# Patient Record
Sex: FEMALE | Race: WHITE | NOT HISPANIC OR LATINO | Employment: OTHER | URBAN - METROPOLITAN AREA
[De-identification: names, ages, dates, MRNs, and addresses within clinical notes are randomized per-mention and may not be internally consistent; named-entity substitution may affect disease eponyms.]

---

## 2017-01-10 ENCOUNTER — OFFICE VISIT - HEALTHEAST (OUTPATIENT)
Dept: ENDOCRINOLOGY | Facility: CLINIC | Age: 72
End: 2017-01-10

## 2017-01-10 DIAGNOSIS — E55.9 VITAMIN D DEFICIENCY: ICD-10-CM

## 2017-01-10 DIAGNOSIS — M85.80 OSTEOPENIA: ICD-10-CM

## 2017-01-10 ASSESSMENT — MIFFLIN-ST. JEOR: SCORE: 1413.95

## 2017-02-04 ENCOUNTER — COMMUNICATION - HEALTHEAST (OUTPATIENT)
Dept: FAMILY MEDICINE | Facility: CLINIC | Age: 72
End: 2017-02-04

## 2017-02-17 ENCOUNTER — COMMUNICATION - HEALTHEAST (OUTPATIENT)
Dept: FAMILY MEDICINE | Facility: CLINIC | Age: 72
End: 2017-02-17

## 2017-02-21 ENCOUNTER — RECORDS - HEALTHEAST (OUTPATIENT)
Dept: ADMINISTRATIVE | Facility: OTHER | Age: 72
End: 2017-02-21

## 2017-03-06 ENCOUNTER — COMMUNICATION - HEALTHEAST (OUTPATIENT)
Dept: FAMILY MEDICINE | Facility: CLINIC | Age: 72
End: 2017-03-06

## 2017-03-15 ENCOUNTER — OFFICE VISIT - HEALTHEAST (OUTPATIENT)
Dept: VASCULAR SURGERY | Facility: CLINIC | Age: 72
End: 2017-03-15

## 2017-03-15 ENCOUNTER — AMBULATORY - HEALTHEAST (OUTPATIENT)
Dept: VASCULAR SURGERY | Facility: CLINIC | Age: 72
End: 2017-03-15

## 2017-03-15 DIAGNOSIS — M79.89 LEFT ARM SWELLING: ICD-10-CM

## 2017-03-15 DIAGNOSIS — M79.89 LEG SWELLING: ICD-10-CM

## 2017-03-15 DIAGNOSIS — M21.42 FLAT FEET: ICD-10-CM

## 2017-03-15 DIAGNOSIS — Q66.6 VALGUS DEFORMITY OF BOTH FEET: ICD-10-CM

## 2017-03-15 DIAGNOSIS — I87.2 VENOUS INSUFFICIENCY OF BOTH LOWER EXTREMITIES: ICD-10-CM

## 2017-03-15 DIAGNOSIS — M21.41 FLAT FEET: ICD-10-CM

## 2017-04-10 ENCOUNTER — COMMUNICATION - HEALTHEAST (OUTPATIENT)
Dept: SCHEDULING | Facility: CLINIC | Age: 72
End: 2017-04-10

## 2017-04-11 ENCOUNTER — OFFICE VISIT - HEALTHEAST (OUTPATIENT)
Dept: FAMILY MEDICINE | Facility: CLINIC | Age: 72
End: 2017-04-11

## 2017-04-11 DIAGNOSIS — J44.89 CHRONIC AIRWAY OBSTRUCTION, NOT ELSEWHERE CLASSIFIED: ICD-10-CM

## 2017-04-11 DIAGNOSIS — J06.9 URI (UPPER RESPIRATORY INFECTION): ICD-10-CM

## 2017-04-11 DIAGNOSIS — R06.2 WHEEZING: ICD-10-CM

## 2017-04-20 ENCOUNTER — OFFICE VISIT - HEALTHEAST (OUTPATIENT)
Dept: FAMILY MEDICINE | Facility: CLINIC | Age: 72
End: 2017-04-20

## 2017-04-20 DIAGNOSIS — J20.9 COPD WITH ACUTE BRONCHITIS (H): ICD-10-CM

## 2017-04-20 DIAGNOSIS — J44.0 COPD WITH ACUTE BRONCHITIS (H): ICD-10-CM

## 2017-06-12 ENCOUNTER — OFFICE VISIT - HEALTHEAST (OUTPATIENT)
Dept: FAMILY MEDICINE | Facility: CLINIC | Age: 72
End: 2017-06-12

## 2017-06-12 DIAGNOSIS — I10 HYPERTENSION: ICD-10-CM

## 2017-06-12 DIAGNOSIS — J44.89 CHRONIC AIRWAY OBSTRUCTION, NOT ELSEWHERE CLASSIFIED: ICD-10-CM

## 2017-06-12 ASSESSMENT — MIFFLIN-ST. JEOR: SCORE: 1423.92

## 2017-06-26 ENCOUNTER — RECORDS - HEALTHEAST (OUTPATIENT)
Dept: ADMINISTRATIVE | Facility: OTHER | Age: 72
End: 2017-06-26

## 2017-06-28 ENCOUNTER — RECORDS - HEALTHEAST (OUTPATIENT)
Dept: ADMINISTRATIVE | Facility: OTHER | Age: 72
End: 2017-06-28

## 2017-06-29 ENCOUNTER — COMMUNICATION - HEALTHEAST (OUTPATIENT)
Dept: FAMILY MEDICINE | Facility: CLINIC | Age: 72
End: 2017-06-29

## 2017-07-13 ENCOUNTER — AMBULATORY - HEALTHEAST (OUTPATIENT)
Dept: ENDOCRINOLOGY | Facility: CLINIC | Age: 72
End: 2017-07-13

## 2017-07-13 DIAGNOSIS — M85.80 OSTEOPENIA: ICD-10-CM

## 2017-08-03 ENCOUNTER — COMMUNICATION - HEALTHEAST (OUTPATIENT)
Dept: FAMILY MEDICINE | Facility: CLINIC | Age: 72
End: 2017-08-03

## 2017-08-04 ENCOUNTER — COMMUNICATION - HEALTHEAST (OUTPATIENT)
Dept: SCHEDULING | Facility: CLINIC | Age: 72
End: 2017-08-04

## 2017-08-04 DIAGNOSIS — J44.9 COPD (CHRONIC OBSTRUCTIVE PULMONARY DISEASE) (H): ICD-10-CM

## 2017-08-11 ENCOUNTER — COMMUNICATION - HEALTHEAST (OUTPATIENT)
Dept: FAMILY MEDICINE | Facility: CLINIC | Age: 72
End: 2017-08-11

## 2017-08-11 DIAGNOSIS — J44.9 COPD (CHRONIC OBSTRUCTIVE PULMONARY DISEASE) (H): ICD-10-CM

## 2017-08-21 ENCOUNTER — COMMUNICATION - HEALTHEAST (OUTPATIENT)
Dept: FAMILY MEDICINE | Facility: CLINIC | Age: 72
End: 2017-08-21

## 2017-08-21 DIAGNOSIS — J44.9 COPD (CHRONIC OBSTRUCTIVE PULMONARY DISEASE) (H): ICD-10-CM

## 2017-08-21 DIAGNOSIS — R06.2 WHEEZING: ICD-10-CM

## 2017-09-18 ENCOUNTER — COMMUNICATION - HEALTHEAST (OUTPATIENT)
Dept: FAMILY MEDICINE | Facility: CLINIC | Age: 72
End: 2017-09-18

## 2017-10-30 ENCOUNTER — OFFICE VISIT - HEALTHEAST (OUTPATIENT)
Dept: FAMILY MEDICINE | Facility: CLINIC | Age: 72
End: 2017-10-30

## 2017-10-30 DIAGNOSIS — M25.519 SHOULDER PAIN: ICD-10-CM

## 2017-10-30 DIAGNOSIS — R01.1 MURMUR, HEART: ICD-10-CM

## 2017-10-30 DIAGNOSIS — M54.2 NECK PAIN: ICD-10-CM

## 2017-10-31 LAB
ATRIAL RATE - MUSE: 83 BPM
DIASTOLIC BLOOD PRESSURE - MUSE: NORMAL MMHG
INTERPRETATION ECG - MUSE: NORMAL
P AXIS - MUSE: 64 DEGREES
PR INTERVAL - MUSE: 198 MS
QRS DURATION - MUSE: 90 MS
QT - MUSE: 384 MS
QTC - MUSE: 451 MS
R AXIS - MUSE: 53 DEGREES
SYSTOLIC BLOOD PRESSURE - MUSE: NORMAL MMHG
T AXIS - MUSE: 51 DEGREES
VENTRICULAR RATE- MUSE: 83 BPM

## 2017-11-06 ENCOUNTER — HOSPITAL ENCOUNTER (OUTPATIENT)
Dept: CARDIOLOGY | Facility: HOSPITAL | Age: 72
Discharge: HOME | End: 2017-11-06
Attending: INTERNAL MEDICINE

## 2017-11-06 DIAGNOSIS — R01.1 MURMUR, HEART: ICD-10-CM

## 2017-11-06 LAB
AORTIC ROOT: 2.7 CM
AORTIC VALVE MEAN VELOCITY: 154 CM/S
AR DECEL SLOPE: 2840 MM/S2
AR PEAK VELOCITY: 455 CM/S
AV DIMENSIONLESS INDEX VTI: 0.5
AV MEAN GRADIENT: 11 MMHG
AV PEAK GRADIENT: 18.5 MMHG
AV REGURGITANT PEAK GRADIENT: 82.8 MMHG
AV REGURGITATION PRESSURE HALF TIME: 471 MS
AV VALVE AREA: 1.6 CM2
AV VELOCITY RATIO: 0.5
BSA FOR ECHO PROCEDURE: 2.06 M2
CV BLOOD PRESSURE: NORMAL MMHG
CV ECHO HEIGHT: 64 IN
CV ECHO WEIGHT: 208 LBS
DOP CALC AO PEAK VEL: 215 CM/S
DOP CALC AO VTI: 47.9 CM
DOP CALC LVOT AREA: 3.46 CM2
DOP CALC LVOT DIAMETER: 2.1 CM
DOP CALC LVOT PEAK VEL: 109 CM/S
DOP CALC LVOT STROKE VOLUME: 77.9 CM3
DOP CALCLVOT PEAK VEL VTI: 22.5 CM
EJECTION FRACTION: 68 % (ref 55–75)
FRACTIONAL SHORTENING: 53.1 % (ref 28–44)
INTERVENTRICULAR SEPTUM IN END DIASTOLE: 0.88 CM (ref 0.6–0.9)
IVS/PW RATIO: 1
LEFT ATRIUM SIZE: 3.5 CM
LEFT ATRIUM TO AORTIC ROOT RATIO: 1.3 NO UNITS
LEFT VENTRICLE CARDIAC INDEX: 2.3 L/MIN/M2
LEFT VENTRICLE CARDIAC OUTPUT: 4.7 L/MIN
LEFT VENTRICLE DIASTOLIC VOLUME INDEX: 32.3 CM3/M2 (ref 34–74)
LEFT VENTRICLE DIASTOLIC VOLUME: 66.6 CM3 (ref 46–106)
LEFT VENTRICLE HEART RATE: 60 BPM
LEFT VENTRICLE MASS INDEX: 56.4 G/M2
LEFT VENTRICLE SYSTOLIC VOLUME INDEX: 10.3 CM3/M2 (ref 11–31)
LEFT VENTRICLE SYSTOLIC VOLUME: 21.2 CM3 (ref 14–42)
LEFT VENTRICULAR INTERNAL DIMENSION IN DIASTOLE: 4.16 CM (ref 3.8–5.2)
LEFT VENTRICULAR INTERNAL DIMENSION IN SYSTOLE: 1.95 CM (ref 2.2–3.5)
LEFT VENTRICULAR MASS: 116.1 G
LEFT VENTRICULAR OUTFLOW TRACT MEAN GRADIENT: 2 MMHG
LEFT VENTRICULAR OUTFLOW TRACT MEAN VELOCITY: 68.7 CM/S
LEFT VENTRICULAR OUTFLOW TRACT PEAK GRADIENT: 5 MMHG
LEFT VENTRICULAR POSTERIOR WALL IN END DIASTOLE: 0.92 CM (ref 0.6–0.9)
LV STROKE VOLUME INDEX: 37.8 ML/M2
MITRAL VALVE DECELERATION SLOPE: 2930 MM/S2
MITRAL VALVE E/A RATIO: 0.9
MITRAL VALVE PRESSURE HALF-TIME: 125 MS
MV AVERAGE E/E' RATIO: 8 CM/S
MV DECELERATION TIME: 289 MS
MV E'TISSUE VEL-LAT: 9.48 CM/S
MV E'TISSUE VEL-MED: 9.67 CM/S
MV LATERAL E/E' RATIO: 8
MV MEDIAL E/E' RATIO: 7.9
MV PEAK A VELOCITY: 82 CM/S
MV PEAK E VELOCITY: 76.2 CM/S
MV VALVE AREA PRESSURE 1/2 METHOD: 1.8 CM2
NUC REST DIASTOLIC VOLUME INDEX: 3328 LBS
NUC REST SYSTOLIC VOLUME INDEX: 64 IN
TRICUSPID REGURGITATION PEAK PRESSURE GRADIENT: 33.9 MMHG
TRICUSPID VALVE PEAK REGURGITANT VELOCITY: 291 CM/S

## 2017-11-06 ASSESSMENT — MIFFLIN-ST. JEOR: SCORE: 1418.48

## 2017-11-16 ENCOUNTER — OFFICE VISIT - HEALTHEAST (OUTPATIENT)
Dept: FAMILY MEDICINE | Facility: CLINIC | Age: 72
End: 2017-11-16

## 2017-11-16 DIAGNOSIS — N39.41 URGE INCONTINENCE OF URINE: ICD-10-CM

## 2017-11-16 DIAGNOSIS — J44.9 COPD (CHRONIC OBSTRUCTIVE PULMONARY DISEASE) (H): ICD-10-CM

## 2017-11-16 DIAGNOSIS — K21.9 GASTROESOPHAGEAL REFLUX DISEASE WITHOUT ESOPHAGITIS: ICD-10-CM

## 2017-11-16 DIAGNOSIS — R26.9 ABNORMALITY OF GAIT: ICD-10-CM

## 2017-11-16 DIAGNOSIS — G40.909 EPILEPSY (H): ICD-10-CM

## 2017-11-16 DIAGNOSIS — M85.80 OSTEOPENIA: ICD-10-CM

## 2017-11-16 DIAGNOSIS — I10 BENIGN ESSENTIAL HYPERTENSION: ICD-10-CM

## 2017-11-16 DIAGNOSIS — Z00.00 MEDICARE ANNUAL WELLNESS VISIT, INITIAL: ICD-10-CM

## 2017-11-16 DIAGNOSIS — Z23 NEED FOR VACCINATION: ICD-10-CM

## 2017-11-16 DIAGNOSIS — M54.42 CHRONIC BILATERAL LOW BACK PAIN WITH BILATERAL SCIATICA: ICD-10-CM

## 2017-11-16 DIAGNOSIS — J43.9 PULMONARY EMPHYSEMA (H): ICD-10-CM

## 2017-11-16 DIAGNOSIS — I35.1 MILD AORTIC VALVE REGURGITATION: ICD-10-CM

## 2017-11-16 DIAGNOSIS — I35.0 MILD AORTIC VALVE STENOSIS: ICD-10-CM

## 2017-11-16 DIAGNOSIS — M79.605 LEG PAIN, DIFFUSE, LEFT: ICD-10-CM

## 2017-11-16 DIAGNOSIS — E55.9 VITAMIN D DEFICIENCY: ICD-10-CM

## 2017-11-16 DIAGNOSIS — E78.5 HYPERLIPIDEMIA: ICD-10-CM

## 2017-11-16 DIAGNOSIS — I87.2 VENOUS INSUFFICIENCY OF BOTH LOWER EXTREMITIES: ICD-10-CM

## 2017-11-16 DIAGNOSIS — K44.9 HIATAL HERNIA: ICD-10-CM

## 2017-11-16 DIAGNOSIS — G89.29 CHRONIC BILATERAL LOW BACK PAIN WITH BILATERAL SCIATICA: ICD-10-CM

## 2017-11-16 DIAGNOSIS — M54.41 CHRONIC BILATERAL LOW BACK PAIN WITH BILATERAL SCIATICA: ICD-10-CM

## 2017-11-16 LAB
CHOLEST SERPL-MCNC: 247 MG/DL
FASTING STATUS PATIENT QL REPORTED: YES
HDLC SERPL-MCNC: 74 MG/DL
LDLC SERPL CALC-MCNC: 158 MG/DL
TRIGL SERPL-MCNC: 77 MG/DL

## 2017-11-16 ASSESSMENT — MIFFLIN-ST. JEOR: SCORE: 1412.13

## 2017-12-09 ENCOUNTER — HOSPITAL ENCOUNTER (OUTPATIENT)
Dept: MAMMOGRAPHY | Facility: HOSPITAL | Age: 72
Discharge: HOME | End: 2017-12-09
Attending: FAMILY MEDICINE

## 2017-12-09 DIAGNOSIS — Z12.31 VISIT FOR SCREENING MAMMOGRAM: ICD-10-CM

## 2018-01-19 ENCOUNTER — COMMUNICATION - HEALTHEAST (OUTPATIENT)
Dept: ENDOCRINOLOGY | Facility: CLINIC | Age: 73
End: 2018-01-19

## 2018-01-22 ENCOUNTER — OFFICE VISIT - HEALTHEAST (OUTPATIENT)
Dept: FAMILY MEDICINE | Facility: CLINIC | Age: 73
End: 2018-01-22

## 2018-01-22 DIAGNOSIS — M54.50 LOW BACK PAIN: ICD-10-CM

## 2018-01-22 DIAGNOSIS — F32.0 MILD MAJOR DEPRESSION (H): ICD-10-CM

## 2018-01-22 DIAGNOSIS — I35.1 MILD AORTIC VALVE REGURGITATION: ICD-10-CM

## 2018-01-22 DIAGNOSIS — I35.0 MILD AORTIC VALVE STENOSIS: ICD-10-CM

## 2018-01-22 DIAGNOSIS — L84 PRE-ULCERATIVE CORN OR CALLOUS: ICD-10-CM

## 2018-01-22 ASSESSMENT — MIFFLIN-ST. JEOR: SCORE: 1404.87

## 2018-02-08 ENCOUNTER — OFFICE VISIT - HEALTHEAST (OUTPATIENT)
Dept: ENDOCRINOLOGY | Facility: CLINIC | Age: 73
End: 2018-02-08

## 2018-02-08 DIAGNOSIS — M85.80 OSTEOPENIA: ICD-10-CM

## 2018-02-08 DIAGNOSIS — M81.0 OSTEOPOROSIS: ICD-10-CM

## 2018-02-08 ASSESSMENT — MIFFLIN-ST. JEOR: SCORE: 1404.65

## 2018-02-13 ENCOUNTER — OFFICE VISIT - HEALTHEAST (OUTPATIENT)
Dept: PODIATRY | Age: 73
End: 2018-02-13

## 2018-02-13 ENCOUNTER — AMBULATORY - HEALTHEAST (OUTPATIENT)
Dept: ENDOCRINOLOGY | Facility: CLINIC | Age: 73
End: 2018-02-13

## 2018-02-13 DIAGNOSIS — M21.41 BILATERAL PES PLANUS: ICD-10-CM

## 2018-02-13 DIAGNOSIS — M20.40 HAMMER TOE, UNSPECIFIED LATERALITY: ICD-10-CM

## 2018-02-13 DIAGNOSIS — L84 CALLUS OF FOOT: ICD-10-CM

## 2018-02-13 DIAGNOSIS — M81.0 OSTEOPOROSIS: ICD-10-CM

## 2018-02-13 DIAGNOSIS — M21.42 BILATERAL PES PLANUS: ICD-10-CM

## 2018-02-13 DIAGNOSIS — M79.673 PAIN OF FOOT, UNSPECIFIED LATERALITY: ICD-10-CM

## 2018-02-13 ASSESSMENT — MIFFLIN-ST. JEOR: SCORE: 1401.47

## 2018-03-20 ENCOUNTER — COMMUNICATION - HEALTHEAST (OUTPATIENT)
Dept: SURGERY | Facility: CLINIC | Age: 73
End: 2018-03-20

## 2018-03-23 ENCOUNTER — COMMUNICATION - HEALTHEAST (OUTPATIENT)
Dept: FAMILY MEDICINE | Facility: CLINIC | Age: 73
End: 2018-03-23

## 2018-03-23 ENCOUNTER — OFFICE VISIT - HEALTHEAST (OUTPATIENT)
Dept: SURGERY | Facility: CLINIC | Age: 73
End: 2018-03-23

## 2018-03-23 DIAGNOSIS — K44.9 PARAESOPHAGEAL HERNIA: ICD-10-CM

## 2018-03-23 DIAGNOSIS — K42.9 UMBILICAL HERNIA WITHOUT OBSTRUCTION AND WITHOUT GANGRENE: ICD-10-CM

## 2018-03-23 ASSESSMENT — MIFFLIN-ST. JEOR: SCORE: 1380.95

## 2018-03-26 ENCOUNTER — OFFICE VISIT - HEALTHEAST (OUTPATIENT)
Dept: FAMILY MEDICINE | Facility: CLINIC | Age: 73
End: 2018-03-26

## 2018-03-26 DIAGNOSIS — K44.9 HIATAL HERNIA: ICD-10-CM

## 2018-03-26 DIAGNOSIS — I87.2 VENOUS INSUFFICIENCY OF BOTH LOWER EXTREMITIES: ICD-10-CM

## 2018-03-26 DIAGNOSIS — Z01.818 PRE-OP EXAM: ICD-10-CM

## 2018-03-26 DIAGNOSIS — J43.9 PULMONARY EMPHYSEMA (H): ICD-10-CM

## 2018-03-26 DIAGNOSIS — E78.5 HYPERLIPIDEMIA: ICD-10-CM

## 2018-03-26 DIAGNOSIS — I35.0 MILD AORTIC VALVE STENOSIS: ICD-10-CM

## 2018-03-26 DIAGNOSIS — I10 ESSENTIAL HYPERTENSION: ICD-10-CM

## 2018-03-26 DIAGNOSIS — I35.1 MILD AORTIC VALVE REGURGITATION: ICD-10-CM

## 2018-03-26 LAB
BASOPHILS # BLD AUTO: 0.1 THOU/UL (ref 0–0.2)
BASOPHILS NFR BLD AUTO: 1 % (ref 0–2)
EOSINOPHIL # BLD AUTO: 0.2 THOU/UL (ref 0–0.4)
EOSINOPHIL NFR BLD AUTO: 1 % (ref 0–6)
ERYTHROCYTE [DISTWIDTH] IN BLOOD BY AUTOMATED COUNT: 13.1 % (ref 11–14.5)
HCT VFR BLD AUTO: 42.5 % (ref 35–47)
HGB BLD-MCNC: 14.3 G/DL (ref 12–16)
LYMPHOCYTES # BLD AUTO: 1.9 THOU/UL (ref 0.8–4.4)
LYMPHOCYTES NFR BLD AUTO: 13 % (ref 20–40)
MCH RBC QN AUTO: 30.7 PG (ref 27–34)
MCHC RBC AUTO-ENTMCNC: 33.6 G/DL (ref 32–36)
MCV RBC AUTO: 92 FL (ref 80–100)
MONOCYTES # BLD AUTO: 1.4 THOU/UL (ref 0–0.9)
MONOCYTES NFR BLD AUTO: 10 % (ref 2–10)
NEUTROPHILS # BLD AUTO: 10.8 THOU/UL (ref 2–7.7)
NEUTROPHILS NFR BLD AUTO: 75 % (ref 50–70)
PLATELET # BLD AUTO: 197 THOU/UL (ref 140–440)
PMV BLD AUTO: 7.4 FL (ref 7–10)
RBC # BLD AUTO: 4.65 MILL/UL (ref 3.8–5.4)
WBC: 14.3 THOU/UL (ref 4–11)

## 2018-03-26 ASSESSMENT — MIFFLIN-ST. JEOR: SCORE: 1402.27

## 2018-03-27 ENCOUNTER — HOSPITAL ENCOUNTER (OUTPATIENT)
Dept: RADIOLOGY | Facility: HOSPITAL | Age: 73
Discharge: HOME | End: 2018-03-27
Attending: SURGERY

## 2018-03-27 DIAGNOSIS — K44.9 PARAESOPHAGEAL HERNIA: ICD-10-CM

## 2018-03-29 ENCOUNTER — OFFICE VISIT - HEALTHEAST (OUTPATIENT)
Dept: FAMILY MEDICINE | Facility: CLINIC | Age: 73
End: 2018-03-29

## 2018-03-29 DIAGNOSIS — R06.02 SHORTNESS OF BREATH ON EXERTION: ICD-10-CM

## 2018-04-03 ENCOUNTER — ANESTHESIA - HEALTHEAST (OUTPATIENT)
Dept: SURGERY | Facility: HOSPITAL | Age: 73
End: 2018-04-03

## 2018-04-03 ASSESSMENT — MIFFLIN-ST. JEOR: SCORE: 1366.89

## 2018-04-04 ENCOUNTER — SURGERY - HEALTHEAST (OUTPATIENT)
Dept: SURGERY | Facility: HOSPITAL | Age: 73
End: 2018-04-04

## 2018-04-04 ASSESSMENT — MIFFLIN-ST. JEOR: SCORE: 1371.31

## 2018-04-09 ENCOUNTER — COMMUNICATION - HEALTHEAST (OUTPATIENT)
Dept: FAMILY MEDICINE | Facility: CLINIC | Age: 73
End: 2018-04-09

## 2018-04-12 ENCOUNTER — OFFICE VISIT - HEALTHEAST (OUTPATIENT)
Dept: SURGERY | Facility: CLINIC | Age: 73
End: 2018-04-12

## 2018-04-12 DIAGNOSIS — Z48.89 POSTOPERATIVE VISIT: ICD-10-CM

## 2018-05-10 ENCOUNTER — OFFICE VISIT - HEALTHEAST (OUTPATIENT)
Dept: VASCULAR SURGERY | Facility: CLINIC | Age: 73
End: 2018-05-10

## 2018-05-10 DIAGNOSIS — M79.89 LEG SWELLING: ICD-10-CM

## 2018-05-10 DIAGNOSIS — M21.41 FLAT FEET: ICD-10-CM

## 2018-05-10 DIAGNOSIS — M21.42 FLAT FEET: ICD-10-CM

## 2018-05-10 DIAGNOSIS — I87.2 VENOUS INSUFFICIENCY OF BOTH LOWER EXTREMITIES: ICD-10-CM

## 2018-05-14 ENCOUNTER — OFFICE VISIT - HEALTHEAST (OUTPATIENT)
Dept: FAMILY MEDICINE | Facility: CLINIC | Age: 73
End: 2018-05-14

## 2018-05-14 DIAGNOSIS — R42 DIZZINESS: ICD-10-CM

## 2018-05-14 LAB
ALBUMIN SERPL-MCNC: 3.6 G/DL (ref 3.5–5)
ALP SERPL-CCNC: 79 U/L (ref 45–120)
ALT SERPL W P-5'-P-CCNC: <9 U/L (ref 0–45)
ANION GAP SERPL CALCULATED.3IONS-SCNC: 11 MMOL/L (ref 5–18)
AST SERPL W P-5'-P-CCNC: 11 U/L (ref 0–40)
BASOPHILS # BLD AUTO: 0 THOU/UL (ref 0–0.2)
BASOPHILS NFR BLD AUTO: 1 % (ref 0–2)
BILIRUB SERPL-MCNC: 0.3 MG/DL (ref 0–1)
BUN SERPL-MCNC: 19 MG/DL (ref 8–28)
CALCIUM SERPL-MCNC: 9.2 MG/DL (ref 8.5–10.5)
CHLORIDE BLD-SCNC: 101 MMOL/L (ref 98–107)
CO2 SERPL-SCNC: 26 MMOL/L (ref 22–31)
CREAT SERPL-MCNC: 0.59 MG/DL (ref 0.6–1.1)
EOSINOPHIL # BLD AUTO: 0.2 THOU/UL (ref 0–0.4)
EOSINOPHIL NFR BLD AUTO: 3 % (ref 0–6)
ERYTHROCYTE [DISTWIDTH] IN BLOOD BY AUTOMATED COUNT: 13.5 % (ref 11–14.5)
GFR SERPL CREATININE-BSD FRML MDRD: >60 ML/MIN/1.73M2
GLUCOSE BLD-MCNC: 77 MG/DL (ref 70–125)
HCT VFR BLD AUTO: 40.2 % (ref 35–47)
HGB BLD-MCNC: 13.5 G/DL (ref 12–16)
LYMPHOCYTES # BLD AUTO: 2.1 THOU/UL (ref 0.8–4.4)
LYMPHOCYTES NFR BLD AUTO: 31 % (ref 20–40)
MCH RBC QN AUTO: 30.2 PG (ref 27–34)
MCHC RBC AUTO-ENTMCNC: 33.7 G/DL (ref 32–36)
MCV RBC AUTO: 90 FL (ref 80–100)
MONOCYTES # BLD AUTO: 0.8 THOU/UL (ref 0–0.9)
MONOCYTES NFR BLD AUTO: 11 % (ref 2–10)
NEUTROPHILS # BLD AUTO: 3.8 THOU/UL (ref 2–7.7)
NEUTROPHILS NFR BLD AUTO: 55 % (ref 50–70)
PLATELET # BLD AUTO: 191 THOU/UL (ref 140–440)
PMV BLD AUTO: 7.4 FL (ref 7–10)
POTASSIUM BLD-SCNC: 4 MMOL/L (ref 3.5–5)
PROT SERPL-MCNC: 6.6 G/DL (ref 6–8)
RBC # BLD AUTO: 4.48 MILL/UL (ref 3.8–5.4)
SODIUM SERPL-SCNC: 138 MMOL/L (ref 136–145)
WBC: 7 THOU/UL (ref 4–11)

## 2018-05-14 ASSESSMENT — MIFFLIN-ST. JEOR: SCORE: 1384.69

## 2018-05-22 ENCOUNTER — COMMUNICATION - HEALTHEAST (OUTPATIENT)
Dept: FAMILY MEDICINE | Facility: CLINIC | Age: 73
End: 2018-05-22

## 2018-05-23 ENCOUNTER — AMBULATORY - HEALTHEAST (OUTPATIENT)
Dept: NURSING | Facility: CLINIC | Age: 73
End: 2018-05-23

## 2018-05-25 ENCOUNTER — AMBULATORY - HEALTHEAST (OUTPATIENT)
Dept: NURSING | Facility: CLINIC | Age: 73
End: 2018-05-25

## 2018-05-29 ENCOUNTER — OFFICE VISIT - HEALTHEAST (OUTPATIENT)
Dept: FAMILY MEDICINE | Facility: CLINIC | Age: 73
End: 2018-05-29

## 2018-05-29 DIAGNOSIS — R26.9 ABNORMALITY OF GAIT: ICD-10-CM

## 2018-05-29 DIAGNOSIS — K44.9 HIATAL HERNIA: ICD-10-CM

## 2018-05-29 DIAGNOSIS — I10 ESSENTIAL HYPERTENSION: ICD-10-CM

## 2018-05-29 DIAGNOSIS — L84 CALLUS OF FOOT: ICD-10-CM

## 2018-05-29 DIAGNOSIS — H91.93 DECREASED HEARING OF BOTH EARS: ICD-10-CM

## 2018-05-29 ASSESSMENT — MIFFLIN-ST. JEOR: SCORE: 1380.38

## 2018-06-05 ENCOUNTER — OFFICE VISIT - HEALTHEAST (OUTPATIENT)
Dept: PHYSICAL THERAPY | Facility: REHABILITATION | Age: 73
End: 2018-06-05

## 2018-06-05 DIAGNOSIS — R26.81 UNSTEADINESS ON FEET: ICD-10-CM

## 2018-06-05 DIAGNOSIS — R26.9 ABNORMALITY OF GAIT: ICD-10-CM

## 2018-06-05 DIAGNOSIS — M62.81 GENERALIZED MUSCLE WEAKNESS: ICD-10-CM

## 2018-06-05 DIAGNOSIS — R26.89 POOR BALANCE: ICD-10-CM

## 2018-06-05 DIAGNOSIS — Z74.09 DECREASED FUNCTIONAL MOBILITY AND ENDURANCE: ICD-10-CM

## 2018-06-07 ENCOUNTER — OFFICE VISIT - HEALTHEAST (OUTPATIENT)
Dept: PHYSICAL THERAPY | Facility: REHABILITATION | Age: 73
End: 2018-06-07

## 2018-06-07 DIAGNOSIS — Z74.09 DECREASED FUNCTIONAL MOBILITY AND ENDURANCE: ICD-10-CM

## 2018-06-07 DIAGNOSIS — M62.81 GENERALIZED MUSCLE WEAKNESS: ICD-10-CM

## 2018-06-07 DIAGNOSIS — R26.9 ABNORMALITY OF GAIT: ICD-10-CM

## 2018-06-07 DIAGNOSIS — R26.81 UNSTEADINESS ON FEET: ICD-10-CM

## 2018-06-07 DIAGNOSIS — R26.89 POOR BALANCE: ICD-10-CM

## 2018-06-13 ENCOUNTER — OFFICE VISIT - HEALTHEAST (OUTPATIENT)
Dept: PHYSICAL THERAPY | Facility: REHABILITATION | Age: 73
End: 2018-06-13

## 2018-06-13 DIAGNOSIS — R26.9 ABNORMALITY OF GAIT: ICD-10-CM

## 2018-06-13 DIAGNOSIS — M62.81 GENERALIZED MUSCLE WEAKNESS: ICD-10-CM

## 2018-06-13 DIAGNOSIS — Z74.09 DECREASED FUNCTIONAL MOBILITY AND ENDURANCE: ICD-10-CM

## 2018-06-13 DIAGNOSIS — R26.89 POOR BALANCE: ICD-10-CM

## 2018-06-13 DIAGNOSIS — R26.81 UNSTEADINESS ON FEET: ICD-10-CM

## 2018-06-18 ENCOUNTER — OFFICE VISIT - HEALTHEAST (OUTPATIENT)
Dept: PHYSICAL THERAPY | Facility: REHABILITATION | Age: 73
End: 2018-06-18

## 2018-06-18 DIAGNOSIS — M62.81 GENERALIZED MUSCLE WEAKNESS: ICD-10-CM

## 2018-06-18 DIAGNOSIS — R26.89 POOR BALANCE: ICD-10-CM

## 2018-06-18 DIAGNOSIS — R26.9 ABNORMALITY OF GAIT: ICD-10-CM

## 2018-06-18 DIAGNOSIS — R26.81 UNSTEADINESS ON FEET: ICD-10-CM

## 2018-06-18 DIAGNOSIS — Z74.09 DECREASED FUNCTIONAL MOBILITY AND ENDURANCE: ICD-10-CM

## 2018-06-19 ENCOUNTER — OFFICE VISIT - HEALTHEAST (OUTPATIENT)
Dept: AUDIOLOGY | Facility: CLINIC | Age: 73
End: 2018-06-19

## 2018-06-19 DIAGNOSIS — H90.3 SENSORINEURAL HEARING LOSS, BILATERAL: ICD-10-CM

## 2018-06-21 ENCOUNTER — OFFICE VISIT - HEALTHEAST (OUTPATIENT)
Dept: PHYSICAL THERAPY | Facility: REHABILITATION | Age: 73
End: 2018-06-21

## 2018-06-21 DIAGNOSIS — R26.81 UNSTEADINESS ON FEET: ICD-10-CM

## 2018-06-21 DIAGNOSIS — R26.89 POOR BALANCE: ICD-10-CM

## 2018-06-21 DIAGNOSIS — R26.9 ABNORMALITY OF GAIT: ICD-10-CM

## 2018-06-21 DIAGNOSIS — Z74.09 DECREASED FUNCTIONAL MOBILITY AND ENDURANCE: ICD-10-CM

## 2018-06-21 DIAGNOSIS — M62.81 GENERALIZED MUSCLE WEAKNESS: ICD-10-CM

## 2018-06-25 ENCOUNTER — OFFICE VISIT - HEALTHEAST (OUTPATIENT)
Dept: PHYSICAL THERAPY | Facility: REHABILITATION | Age: 73
End: 2018-06-25

## 2018-06-25 DIAGNOSIS — Z74.09 DECREASED FUNCTIONAL MOBILITY AND ENDURANCE: ICD-10-CM

## 2018-06-25 DIAGNOSIS — R26.9 ABNORMALITY OF GAIT: ICD-10-CM

## 2018-06-25 DIAGNOSIS — R26.81 UNSTEADINESS ON FEET: ICD-10-CM

## 2018-06-25 DIAGNOSIS — R26.89 POOR BALANCE: ICD-10-CM

## 2018-06-25 DIAGNOSIS — M62.81 GENERALIZED MUSCLE WEAKNESS: ICD-10-CM

## 2018-06-28 ENCOUNTER — OFFICE VISIT - HEALTHEAST (OUTPATIENT)
Dept: PHYSICAL THERAPY | Facility: REHABILITATION | Age: 73
End: 2018-06-28

## 2018-06-28 DIAGNOSIS — R26.89 POOR BALANCE: ICD-10-CM

## 2018-06-28 DIAGNOSIS — Z74.09 DECREASED FUNCTIONAL MOBILITY AND ENDURANCE: ICD-10-CM

## 2018-06-28 DIAGNOSIS — R26.81 UNSTEADINESS ON FEET: ICD-10-CM

## 2018-06-28 DIAGNOSIS — R26.9 ABNORMALITY OF GAIT: ICD-10-CM

## 2018-06-28 DIAGNOSIS — M62.81 GENERALIZED MUSCLE WEAKNESS: ICD-10-CM

## 2018-07-05 ENCOUNTER — OFFICE VISIT - HEALTHEAST (OUTPATIENT)
Dept: PHYSICAL THERAPY | Facility: REHABILITATION | Age: 73
End: 2018-07-05

## 2018-07-05 DIAGNOSIS — R26.9 ABNORMALITY OF GAIT: ICD-10-CM

## 2018-07-05 DIAGNOSIS — M62.81 GENERALIZED MUSCLE WEAKNESS: ICD-10-CM

## 2018-07-05 DIAGNOSIS — R26.81 UNSTEADINESS ON FEET: ICD-10-CM

## 2018-07-05 DIAGNOSIS — R26.89 POOR BALANCE: ICD-10-CM

## 2018-07-05 DIAGNOSIS — Z74.09 DECREASED FUNCTIONAL MOBILITY AND ENDURANCE: ICD-10-CM

## 2018-07-09 ENCOUNTER — COMMUNICATION - HEALTHEAST (OUTPATIENT)
Dept: FAMILY MEDICINE | Facility: CLINIC | Age: 73
End: 2018-07-09

## 2018-07-09 ENCOUNTER — OFFICE VISIT - HEALTHEAST (OUTPATIENT)
Dept: PHYSICAL THERAPY | Facility: REHABILITATION | Age: 73
End: 2018-07-09

## 2018-07-09 DIAGNOSIS — R26.81 UNSTEADINESS ON FEET: ICD-10-CM

## 2018-07-09 DIAGNOSIS — R26.89 POOR BALANCE: ICD-10-CM

## 2018-07-09 DIAGNOSIS — M62.81 GENERALIZED MUSCLE WEAKNESS: ICD-10-CM

## 2018-07-09 DIAGNOSIS — R26.9 ABNORMALITY OF GAIT: ICD-10-CM

## 2018-07-09 DIAGNOSIS — Z74.09 DECREASED FUNCTIONAL MOBILITY AND ENDURANCE: ICD-10-CM

## 2018-07-12 ENCOUNTER — COMMUNICATION - HEALTHEAST (OUTPATIENT)
Dept: FAMILY MEDICINE | Facility: CLINIC | Age: 73
End: 2018-07-12

## 2018-07-18 ENCOUNTER — OFFICE VISIT - HEALTHEAST (OUTPATIENT)
Dept: PHYSICAL THERAPY | Facility: REHABILITATION | Age: 73
End: 2018-07-18

## 2018-07-18 DIAGNOSIS — R26.81 UNSTEADINESS ON FEET: ICD-10-CM

## 2018-07-18 DIAGNOSIS — R26.89 POOR BALANCE: ICD-10-CM

## 2018-07-18 DIAGNOSIS — R26.9 ABNORMALITY OF GAIT: ICD-10-CM

## 2018-07-18 DIAGNOSIS — Z74.09 DECREASED FUNCTIONAL MOBILITY AND ENDURANCE: ICD-10-CM

## 2018-07-18 DIAGNOSIS — M62.81 GENERALIZED MUSCLE WEAKNESS: ICD-10-CM

## 2018-07-20 ENCOUNTER — COMMUNICATION - HEALTHEAST (OUTPATIENT)
Dept: FAMILY MEDICINE | Facility: CLINIC | Age: 73
End: 2018-07-20

## 2018-07-20 DIAGNOSIS — E87.6 HYPOKALEMIA: ICD-10-CM

## 2018-07-23 ENCOUNTER — OFFICE VISIT - HEALTHEAST (OUTPATIENT)
Dept: PHYSICAL THERAPY | Facility: REHABILITATION | Age: 73
End: 2018-07-23

## 2018-07-23 DIAGNOSIS — R26.9 ABNORMALITY OF GAIT: ICD-10-CM

## 2018-07-23 DIAGNOSIS — R26.81 UNSTEADINESS ON FEET: ICD-10-CM

## 2018-07-23 DIAGNOSIS — R26.89 POOR BALANCE: ICD-10-CM

## 2018-07-23 DIAGNOSIS — M62.81 GENERALIZED MUSCLE WEAKNESS: ICD-10-CM

## 2018-07-23 DIAGNOSIS — Z74.09 DECREASED FUNCTIONAL MOBILITY AND ENDURANCE: ICD-10-CM

## 2018-07-25 ENCOUNTER — OFFICE VISIT - HEALTHEAST (OUTPATIENT)
Dept: PHYSICAL THERAPY | Facility: REHABILITATION | Age: 73
End: 2018-07-25

## 2018-07-25 DIAGNOSIS — R26.81 UNSTEADINESS ON FEET: ICD-10-CM

## 2018-07-25 DIAGNOSIS — Z74.09 DECREASED FUNCTIONAL MOBILITY AND ENDURANCE: ICD-10-CM

## 2018-07-25 DIAGNOSIS — R26.9 ABNORMALITY OF GAIT: ICD-10-CM

## 2018-07-25 DIAGNOSIS — M62.81 GENERALIZED MUSCLE WEAKNESS: ICD-10-CM

## 2018-07-25 DIAGNOSIS — R26.89 POOR BALANCE: ICD-10-CM

## 2018-07-31 ENCOUNTER — OFFICE VISIT - HEALTHEAST (OUTPATIENT)
Dept: PHYSICAL THERAPY | Facility: REHABILITATION | Age: 73
End: 2018-07-31

## 2018-07-31 DIAGNOSIS — R26.9 ABNORMALITY OF GAIT: ICD-10-CM

## 2018-07-31 DIAGNOSIS — R26.89 POOR BALANCE: ICD-10-CM

## 2018-07-31 DIAGNOSIS — M62.81 GENERALIZED MUSCLE WEAKNESS: ICD-10-CM

## 2018-07-31 DIAGNOSIS — R26.81 UNSTEADINESS ON FEET: ICD-10-CM

## 2018-07-31 DIAGNOSIS — Z74.09 DECREASED FUNCTIONAL MOBILITY AND ENDURANCE: ICD-10-CM

## 2018-08-08 ENCOUNTER — OFFICE VISIT - HEALTHEAST (OUTPATIENT)
Dept: PHYSICAL THERAPY | Facility: REHABILITATION | Age: 73
End: 2018-08-08

## 2018-08-08 DIAGNOSIS — M62.81 GENERALIZED MUSCLE WEAKNESS: ICD-10-CM

## 2018-08-08 DIAGNOSIS — Z74.09 DECREASED FUNCTIONAL MOBILITY AND ENDURANCE: ICD-10-CM

## 2018-08-08 DIAGNOSIS — R26.9 ABNORMALITY OF GAIT: ICD-10-CM

## 2018-08-08 DIAGNOSIS — R26.81 UNSTEADINESS ON FEET: ICD-10-CM

## 2018-08-08 DIAGNOSIS — R26.89 POOR BALANCE: ICD-10-CM

## 2018-08-10 ENCOUNTER — COMMUNICATION - HEALTHEAST (OUTPATIENT)
Dept: FAMILY MEDICINE | Facility: CLINIC | Age: 73
End: 2018-08-10

## 2018-08-10 DIAGNOSIS — J44.9 COPD (CHRONIC OBSTRUCTIVE PULMONARY DISEASE) (H): ICD-10-CM

## 2018-08-13 ENCOUNTER — OFFICE VISIT - HEALTHEAST (OUTPATIENT)
Dept: PHYSICAL THERAPY | Facility: REHABILITATION | Age: 73
End: 2018-08-13

## 2018-08-13 DIAGNOSIS — Z74.09 DECREASED FUNCTIONAL MOBILITY AND ENDURANCE: ICD-10-CM

## 2018-08-13 DIAGNOSIS — R26.89 POOR BALANCE: ICD-10-CM

## 2018-08-13 DIAGNOSIS — R26.9 ABNORMALITY OF GAIT: ICD-10-CM

## 2018-08-13 DIAGNOSIS — R26.81 UNSTEADINESS ON FEET: ICD-10-CM

## 2018-08-13 DIAGNOSIS — M62.81 GENERALIZED MUSCLE WEAKNESS: ICD-10-CM

## 2018-08-23 ENCOUNTER — OFFICE VISIT - HEALTHEAST (OUTPATIENT)
Dept: PHYSICAL THERAPY | Facility: REHABILITATION | Age: 73
End: 2018-08-23

## 2018-08-23 DIAGNOSIS — R26.89 POOR BALANCE: ICD-10-CM

## 2018-08-23 DIAGNOSIS — R26.81 UNSTEADINESS ON FEET: ICD-10-CM

## 2018-08-23 DIAGNOSIS — R26.9 ABNORMALITY OF GAIT: ICD-10-CM

## 2018-08-23 DIAGNOSIS — Z74.09 DECREASED FUNCTIONAL MOBILITY AND ENDURANCE: ICD-10-CM

## 2018-08-23 DIAGNOSIS — M62.81 GENERALIZED MUSCLE WEAKNESS: ICD-10-CM

## 2018-08-28 ENCOUNTER — OFFICE VISIT - HEALTHEAST (OUTPATIENT)
Dept: PHYSICAL THERAPY | Facility: REHABILITATION | Age: 73
End: 2018-08-28

## 2018-08-28 DIAGNOSIS — R26.81 UNSTEADINESS ON FEET: ICD-10-CM

## 2018-08-28 DIAGNOSIS — R26.9 ABNORMALITY OF GAIT: ICD-10-CM

## 2018-08-28 DIAGNOSIS — Z74.09 DECREASED FUNCTIONAL MOBILITY AND ENDURANCE: ICD-10-CM

## 2018-08-28 DIAGNOSIS — M62.81 GENERALIZED MUSCLE WEAKNESS: ICD-10-CM

## 2018-08-28 DIAGNOSIS — R26.89 POOR BALANCE: ICD-10-CM

## 2018-09-04 ENCOUNTER — OFFICE VISIT - HEALTHEAST (OUTPATIENT)
Dept: PHYSICAL THERAPY | Facility: REHABILITATION | Age: 73
End: 2018-09-04

## 2018-09-04 DIAGNOSIS — R26.89 POOR BALANCE: ICD-10-CM

## 2018-09-04 DIAGNOSIS — Z74.09 DECREASED FUNCTIONAL MOBILITY AND ENDURANCE: ICD-10-CM

## 2018-09-04 DIAGNOSIS — M62.81 GENERALIZED MUSCLE WEAKNESS: ICD-10-CM

## 2018-09-04 DIAGNOSIS — R26.81 UNSTEADINESS ON FEET: ICD-10-CM

## 2018-09-04 DIAGNOSIS — R26.9 ABNORMALITY OF GAIT: ICD-10-CM

## 2018-09-09 ENCOUNTER — COMMUNICATION - HEALTHEAST (OUTPATIENT)
Dept: ENDOCRINOLOGY | Facility: CLINIC | Age: 73
End: 2018-09-09

## 2018-09-13 ENCOUNTER — AMBULATORY - HEALTHEAST (OUTPATIENT)
Dept: PULMONOLOGY | Facility: OTHER | Age: 73
End: 2018-09-13

## 2018-09-13 ENCOUNTER — COMMUNICATION - HEALTHEAST (OUTPATIENT)
Dept: PULMONOLOGY | Facility: OTHER | Age: 73
End: 2018-09-13

## 2018-09-13 DIAGNOSIS — R06.02 SOB (SHORTNESS OF BREATH): ICD-10-CM

## 2018-10-03 ENCOUNTER — RECORDS - HEALTHEAST (OUTPATIENT)
Dept: PULMONOLOGY | Facility: OTHER | Age: 73
End: 2018-10-03

## 2018-10-03 ENCOUNTER — RECORDS - HEALTHEAST (OUTPATIENT)
Dept: ADMINISTRATIVE | Facility: OTHER | Age: 73
End: 2018-10-03

## 2018-10-03 DIAGNOSIS — R06.02 SHORTNESS OF BREATH: ICD-10-CM

## 2018-10-03 LAB — HGB BLD-MCNC: 13.3 G/DL

## 2018-10-05 ENCOUNTER — AMBULATORY - HEALTHEAST (OUTPATIENT)
Dept: ENDOCRINOLOGY | Facility: CLINIC | Age: 73
End: 2018-10-05

## 2018-10-05 DIAGNOSIS — M81.0 OSTEOPOROSIS: ICD-10-CM

## 2018-10-09 ENCOUNTER — OFFICE VISIT - HEALTHEAST (OUTPATIENT)
Dept: PULMONOLOGY | Facility: OTHER | Age: 73
End: 2018-10-09

## 2018-10-09 DIAGNOSIS — K44.9 HIATAL HERNIA: ICD-10-CM

## 2018-10-09 DIAGNOSIS — R06.09 DYSPNEA ON EXERTION: ICD-10-CM

## 2018-10-09 ASSESSMENT — MIFFLIN-ST. JEOR: SCORE: 1404.87

## 2018-10-10 ENCOUNTER — HOSPITAL ENCOUNTER (OUTPATIENT)
Dept: RADIOLOGY | Facility: HOSPITAL | Age: 73
Discharge: HOME | End: 2018-10-10
Attending: INTERNAL MEDICINE

## 2018-10-10 DIAGNOSIS — R06.09 OTHER FORMS OF DYSPNEA: ICD-10-CM

## 2018-10-10 DIAGNOSIS — R06.09 DYSPNEA ON EXERTION: ICD-10-CM

## 2018-10-25 ENCOUNTER — TRANSFERRED RECORDS (OUTPATIENT)
Dept: HEALTH INFORMATION MANAGEMENT | Facility: CLINIC | Age: 73
End: 2018-10-25

## 2018-10-25 ENCOUNTER — OFFICE VISIT - HEALTHEAST (OUTPATIENT)
Dept: PULMONOLOGY | Facility: OTHER | Age: 73
End: 2018-10-25

## 2018-10-25 DIAGNOSIS — J44.9 CHRONIC OBSTRUCTIVE PULMONARY DISEASE, UNSPECIFIED COPD TYPE (H): ICD-10-CM

## 2018-10-25 DIAGNOSIS — K44.9 HIATAL HERNIA: ICD-10-CM

## 2018-10-25 ASSESSMENT — MIFFLIN-ST. JEOR: SCORE: 1409.41

## 2018-10-28 ENCOUNTER — COMMUNICATION - HEALTHEAST (OUTPATIENT)
Dept: FAMILY MEDICINE | Facility: CLINIC | Age: 73
End: 2018-10-28

## 2018-10-29 ENCOUNTER — COMMUNICATION - HEALTHEAST (OUTPATIENT)
Dept: FAMILY MEDICINE | Facility: CLINIC | Age: 73
End: 2018-10-29

## 2018-10-30 ENCOUNTER — COMMUNICATION - HEALTHEAST (OUTPATIENT)
Dept: PULMONOLOGY | Facility: OTHER | Age: 73
End: 2018-10-30

## 2018-10-30 DIAGNOSIS — J44.1 COPD EXACERBATION (H): ICD-10-CM

## 2018-11-02 ENCOUNTER — TELEPHONE (OUTPATIENT)
Dept: SURGERY | Facility: CLINIC | Age: 73
End: 2018-11-02

## 2018-11-02 DIAGNOSIS — K44.9 HIATAL HERNIA: Primary | ICD-10-CM

## 2018-11-02 RX ORDER — CEFAZOLIN SODIUM 2 G/50ML
2 SOLUTION INTRAVENOUS
Status: CANCELLED | OUTPATIENT
Start: 2018-11-02

## 2018-11-02 RX ORDER — CEFAZOLIN SODIUM 1 G/50ML
1 INJECTION, SOLUTION INTRAVENOUS SEE ADMIN INSTRUCTIONS
Status: CANCELLED | OUTPATIENT
Start: 2018-11-02

## 2018-11-02 NOTE — TELEPHONE ENCOUNTER
Spoke with patient to schedule surgery with Dr Jasmeet Wilder    Surgery was scheduled on 11/26/18 at [place] [facility]    Patient will have H&P with PAC on 11/13/18    Post-Op care appointment scheduled? NO, unclear if she will follow up here or [facility]     Patient is aware a / is needed day of surgery.     Surgery packet was sent via mail, patient has my direct contact information for any further questions.     Additional Comments:

## 2018-11-07 ENCOUNTER — AMBULATORY - HEALTHEAST (OUTPATIENT)
Dept: PULMONOLOGY | Facility: OTHER | Age: 73
End: 2018-11-07

## 2018-11-07 DIAGNOSIS — J44.1 COPD EXACERBATION (H): ICD-10-CM

## 2018-11-09 ENCOUNTER — TELEPHONE (OUTPATIENT)
Dept: SURGERY | Facility: CLINIC | Age: 73
End: 2018-11-09

## 2018-11-09 NOTE — TELEPHONE ENCOUNTER
Patient left a message stating she has some questions regarding her upcoming appointment.  Called and spoke with patient.  Questions were answered to patient's satisfaction.

## 2018-11-10 ENCOUNTER — COMMUNICATION - HEALTHEAST (OUTPATIENT)
Dept: FAMILY MEDICINE | Facility: CLINIC | Age: 73
End: 2018-11-10

## 2018-11-10 DIAGNOSIS — J44.9 COPD (CHRONIC OBSTRUCTIVE PULMONARY DISEASE) (H): ICD-10-CM

## 2018-11-12 ENCOUNTER — PRE VISIT (OUTPATIENT)
Dept: SURGERY | Facility: CLINIC | Age: 73
End: 2018-11-12

## 2018-11-12 NOTE — TELEPHONE ENCOUNTER
RECORDS RECEIVED FROM:   DATE RECEIVED:    NOTES STATUS DETAILS   OFFICE NOTE from Cardiologist N/A    OFFICE NOTE from Pulmonoligist N/A    MEDICATION LIST N/A    DIAGNOSTIC PROCEDURES     ECHO N/A    STRESS TEST N/A    PULMONARY FUNCTION TEST N/A    CORONARY ANGIOGRAM     EKG N/A    IMAGING (DISC & REPORT)      CHEST XRAY N/A

## 2018-11-13 ENCOUNTER — ANESTHESIA EVENT (OUTPATIENT)
Dept: SURGERY | Facility: CLINIC | Age: 73
DRG: 328 | End: 2018-11-13
Payer: MEDICARE

## 2018-11-13 ENCOUNTER — RECORDS - HEALTHEAST (OUTPATIENT)
Dept: ADMINISTRATIVE | Facility: OTHER | Age: 73
End: 2018-11-13

## 2018-11-13 ENCOUNTER — OFFICE VISIT (OUTPATIENT)
Dept: SURGERY | Facility: CLINIC | Age: 73
End: 2018-11-13
Payer: MEDICARE

## 2018-11-13 VITALS
SYSTOLIC BLOOD PRESSURE: 164 MMHG | RESPIRATION RATE: 20 BRPM | BODY MASS INDEX: 35.03 KG/M2 | WEIGHT: 205.2 LBS | OXYGEN SATURATION: 92 % | DIASTOLIC BLOOD PRESSURE: 90 MMHG | HEIGHT: 64 IN | TEMPERATURE: 97.8 F | HEART RATE: 106 BPM

## 2018-11-13 DIAGNOSIS — K44.9 HIATAL HERNIA: ICD-10-CM

## 2018-11-13 DIAGNOSIS — Z87.891 SMOKING HISTORY: ICD-10-CM

## 2018-11-13 DIAGNOSIS — Z01.818 PREOP EXAMINATION: Primary | ICD-10-CM

## 2018-11-13 LAB
ABO + RH BLD: NORMAL
ABO + RH BLD: NORMAL
ANION GAP SERPL CALCULATED.3IONS-SCNC: 6 MMOL/L (ref 3–14)
BLD GP AB SCN SERPL QL: NORMAL
BLOOD BANK CMNT PATIENT-IMP: NORMAL
BUN SERPL-MCNC: 16 MG/DL (ref 7–30)
CALCIUM SERPL-MCNC: 8.6 MG/DL (ref 8.5–10.1)
CHLORIDE SERPL-SCNC: 100 MMOL/L (ref 94–109)
CO2 SERPL-SCNC: 29 MMOL/L (ref 20–32)
CREAT SERPL-MCNC: 0.68 MG/DL (ref 0.52–1.04)
ERYTHROCYTE [DISTWIDTH] IN BLOOD BY AUTOMATED COUNT: 13.4 % (ref 10–15)
GFR SERPL CREATININE-BSD FRML MDRD: 84 ML/MIN/1.7M2
GLUCOSE SERPL-MCNC: 104 MG/DL (ref 70–99)
HCT VFR BLD AUTO: 43.6 % (ref 35–47)
HGB BLD-MCNC: 13.7 G/DL (ref 11.7–15.7)
MCH RBC QN AUTO: 29.3 PG (ref 26.5–33)
MCHC RBC AUTO-ENTMCNC: 31.4 G/DL (ref 31.5–36.5)
MCV RBC AUTO: 93 FL (ref 78–100)
NT-PROBNP SERPL-MCNC: 63 PG/ML (ref 0–125)
PLATELET # BLD AUTO: 246 10E9/L (ref 150–450)
POTASSIUM SERPL-SCNC: 3.8 MMOL/L (ref 3.4–5.3)
RBC # BLD AUTO: 4.67 10E12/L (ref 3.8–5.2)
SODIUM SERPL-SCNC: 135 MMOL/L (ref 133–144)
SPECIMEN EXP DATE BLD: NORMAL
WBC # BLD AUTO: 15.1 10E9/L (ref 4–11)

## 2018-11-13 RX ORDER — SOLIFENACIN SUCCINATE 5 MG/1
5 TABLET, FILM COATED ORAL EVERY MORNING
COMMUNITY
End: 2022-02-22

## 2018-11-13 RX ORDER — PREDNISONE 10 MG/1
10 TABLET ORAL
COMMUNITY
Start: 2018-10-30 | End: 2018-11-13

## 2018-11-13 RX ORDER — LOSARTAN POTASSIUM 50 MG/1
50 TABLET ORAL 2 TIMES DAILY
COMMUNITY
Start: 2017-11-16 | End: 2022-02-22

## 2018-11-13 RX ORDER — ACETAMINOPHEN 325 MG/1
975 TABLET ORAL ONCE
Status: CANCELLED | OUTPATIENT
Start: 2018-11-13 | End: 2018-11-13

## 2018-11-13 RX ORDER — RABEPRAZOLE SODIUM 20 MG/1
20 TABLET, DELAYED RELEASE ORAL EVERY MORNING
COMMUNITY
Start: 2017-11-16 | End: 2022-01-20

## 2018-11-13 RX ORDER — IPRATROPIUM BROMIDE AND ALBUTEROL SULFATE 2.5; .5 MG/3ML; MG/3ML
3 SOLUTION RESPIRATORY (INHALATION) ONCE
Status: CANCELLED | OUTPATIENT
Start: 2018-11-13 | End: 2018-11-13

## 2018-11-13 RX ORDER — HYOSCYAMINE SULFATE 0.38 MG/1
0.38 TABLET, EXTENDED RELEASE ORAL 2 TIMES DAILY
COMMUNITY
Start: 2018-11-11 | End: 2021-07-16

## 2018-11-13 RX ORDER — ALBUTEROL SULFATE 1.25 MG/3ML
1.25 SOLUTION RESPIRATORY (INHALATION) PRN
COMMUNITY
Start: 2017-11-16 | End: 2021-08-13

## 2018-11-13 RX ORDER — TIOTROPIUM BROMIDE 18 UG/1
2 CAPSULE ORAL; RESPIRATORY (INHALATION) DAILY
COMMUNITY
Start: 2018-11-13 | End: 2021-08-13

## 2018-11-13 RX ORDER — CHLORHEXIDINE GLUCONATE ORAL RINSE 1.2 MG/ML
15 SOLUTION DENTAL ONCE
Status: CANCELLED | OUTPATIENT
Start: 2018-11-13 | End: 2018-11-13

## 2018-11-13 RX ORDER — ALBUTEROL SULFATE 90 UG/1
2 AEROSOL, METERED RESPIRATORY (INHALATION) EVERY 6 HOURS PRN
COMMUNITY
Start: 2017-11-16 | End: 2022-02-22

## 2018-11-13 RX ORDER — POTASSIUM CHLORIDE 1500 MG/1
TABLET, EXTENDED RELEASE ORAL
COMMUNITY
Start: 2018-07-22 | End: 2021-07-16

## 2018-11-13 RX ORDER — PHENYTOIN SODIUM 100 MG/1
100-200 CAPSULE, EXTENDED RELEASE ORAL AT BEDTIME
COMMUNITY
End: 2021-10-15

## 2018-11-13 ASSESSMENT — ENCOUNTER SYMPTOMS: SEIZURES: 1

## 2018-11-13 ASSESSMENT — COPD QUESTIONNAIRES
CAT_SEVERITY: SEVERE
COPD: 1

## 2018-11-13 ASSESSMENT — LIFESTYLE VARIABLES: TOBACCO_USE: 1

## 2018-11-13 NOTE — ANESTHESIA PREPROCEDURE EVALUATION
Anesthesia Evaluation     . Pt has had prior anesthetic. Type: General and MAC    No history of anesthetic complications          ROS/MED HX    ENT/Pulmonary: Comment: History of neg sleep study 15 years ago. Started on nighttime 02 at that time.    (+)tobacco use, Past use severe COPD, O2 dependent, during Nighttime 1.5  liters/min,  , recent URI current symptoms of congestion and cough, s/p antibiotics and Prednisone-to see Pulm 1 week for update: . .    Neurologic:     (+)seizures last seizure: 1991 features: grand mal,     Cardiovascular:     (+) hypertension-range: high in clinic, last 160/90, ---. : . . . :. valvular problems/murmurs type: AS and MR mild:. Previous cardiac testing Echodate:2017results:date: results:ECG reviewed date:11/13/18 results:NSR, anteroseptal infarct, age undetermined date: results:         (-) taking anticoagulants/antiplatelets   METS/Exercise Tolerance:  1 - Eating, dressing   Hematologic:  - neg hematologic  ROS       Musculoskeletal:   (+) , , other musculoskeletal- hx shoulder sugery       GI/Hepatic:     (+) GERD hiatal hernia,       Renal/Genitourinary:  - ROS Renal section negative       Endo: Comment: Recent steroid use    (+) Obesity, .      Psychiatric:     (+) psychiatric history anxiety      Infectious Disease:  - neg infectious disease ROS       Malignancy:      - no malignancy   Other:    (+) No chance of pregnancy C-spine cleared: N/A, no H/O Chronic Pain,no other significant disability                    Physical Exam      Airway   Mallampati: II  TM distance: >3 FB  Neck ROM: limited    Dental   (+) upper dentures and lower dentures    Cardiovascular   Rhythm and rate: regular and normal      Pulmonary (+) decreased breath sounds     PE comment: Coarse breath sounds    Other findings: For further details of assessment, testing, and physical exam please see H and P completed on same date.           PAC Discussion and Assessment    ASA Classification: 3  Case is  suitable for: [place]  Anesthetic techniques and relevant risks discussed: GA and GA with regional block for post-op pain control  Invasive monitoring and risk discussed: No  Types:   Possibility and Risk of blood transfusion discussed: Yes  NPO instructions given:   Additional anesthetic preparation and risks discussed:   Needs early admission to pre-op area:   Other:     PAC Resident/NP Anesthesia Assessment:  Lalitha Underwood is a 73 year old female scheduled to undergo laparoscopic hiatal hernia repair with Dr. Wilder on 11/26/18. She has the following specific operative considerations:   - RCRI : 0.9% risk of major adverse cardiac event.   - VTE risk: 0.5%  - SHAZIA # of risks 2/8 = Low risk  - Risk of PONV score = 3.  If > 2, anti-emetic intervention recommended. If 3 or > anti emetic intervention recommended with two or more meds    Last GA for umbilical hernia repair 4/2018. No history of problems with anesthesia.       --Giant hiatal hernia. Above procedure now planned.    --HTN. Will hold Losartan on DOS. No cardiac symptoms. Echo above showing mild aortic stenosis/regurgitation. EKG updated above.    --Former smoker 1.5 PPD X 38 years. COPD, severe with 02 1.5 liters at night FEV1 43%. Recent URI s/p antibiotics and two courses of Prednisone. Congested cough continues. No fever. Coarse and diminished breath sounds. Patient will use Advair and Spiriva and Albuterol on DOS and will bring inhaler. Instructed patient to contact her Pulmonologist for an appointment in one week for update and to confirm that she is optimized to proceed to surgery. She agreed. Surgery team notified.    --History of neg sleep study 15 years ago but was put on oxygen at that time.    --Seizure disorder after fall with head injury age 18. 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 Has been on and off medication but has been taking Dilantin for several years with no issues. Last level 1.6. Has made contact with her neurologist to ensure she is on  the right dose prior to surgery.    --Pain management. Discussed possibility of nerve block if appropriate for patient's procedure. Final counseling and decisions by regional team on DOS.   --Very difficult IV access.    --Anxiety. Worried about upcoming surgery because of breathing and seizure history.    Type and screen drawn today.     Patient was discussed with Dr Bello.      Reviewed and Signed by PAC Mid-Level Provider/Resident  Mid-Level Provider/Resident: DONAVAN Leroy CNS  Date: 11/13/18  Time: 2:00pm    Attending Anesthesiologist Anesthesia Assessment:  73 year old for lap hiatal hernia repair for large hiatal hernia. Patient has known COPD but has had increasing SOB recently, saw pulmonary, and this HH found incidentally. Pulmonologist has been managing her COPD, she has had URI recently and received antibiotics and steroids. Now afebrile, but continues with a cough. She will see pulmonologist in a week (which will be 7 days prior to surgery) for re-evaluation.     She was given the aerobika device and trained on it today. FEV1 43%, DLCO 46% of predicted. She uses oxygen only at night, and walks around without significant difficulty without oxygen. No known cardiac disease, but does have family history. Will draw BNP today and consider stress test if that is abnormal.    Chart reviewed, patient seen and evaluated; agree with above assessment.    Patient is appropriate for the planned procedure without further workup or medical management change, pending result of BNP. The final anesthesia plan will be determined by the physician anesthesiologist caring for the patient on the day of surgery.        Reviewed and Signed by PAC Anesthesiologist  Anesthesiologist: steve  Date: 11/13/2018  Time:   Pass/Fail: Pass  Disposition:     PAC Pharmacist Assessment:        Pharmacist:   Date:   Time:      Anesthesia Plan      History & Physical Review      ASA Status:  3 .        Plan for General           Postoperative Care      Consents                          .

## 2018-11-13 NOTE — H&P
Pre-Operative H & P     CC:  Preoperative exam to assess for increased cardiopulmonary risk while undergoing surgery and anesthesia.    Date of Encounter: 11/13/2018  Primary Care Physician:  No primary care provider on file.  Reason for visit: Hiatal hernia [K44.9]  - Primary   HPI  Lalitha Underwood is a 73 year old female who presents for pre-operative H & P in preparation for laparoscopic hiatal hernia repair with Dr. Wilder on 11/26/18 at [facility]. History is obtained from the patient.   Patient who was recently evaluated by Dr. Wilder after findings of giant hiatal hernia on imaging. Patient was being evaluated for increasing dyspnea on exertion when her COPD was thought to be stable. Above procedure is now planned.   Her history is otherwise significant for HTN, 02 dependence at night, depression, and seizure disorder.   At the end of October she developed a cold with runny nose and coughing. No fever. Since that time she has had difficulty clearing this with congested cough and shortness of breath. She continues to use her inhalers and has been in touch with her Pulmonologist. She has finished a course of antibiotics and 2 five day courses of Prednisone 20 mg. She doesn't feel that she is much better overall. She is able to lay down to sleep at night and wears her oxygen.   Past Medical History  Past Medical History:   Diagnosis Date     Convulsive disorder (H)      COPD (chronic obstructive pulmonary disease) (H)      Hiatal hernia      Hypertension      Osteopenia      Oxygen dependent     1.5 L per NC     URI (upper respiratory infection)      Venous insufficiency of both lower extremities        Past Surgical History  Past Surgical History:   Procedure Laterality Date     HERNIA REPAIR, UMBILICAL  04/2018     Repair arm fracture Left      SHOULDER SURGERY Right 1967       Hx of Blood transfusions/reactions: Denies.      Hx of abnormal bleeding or  anti-platelet use: Denies.     Menstrual history: No LMP recorded.    Steroid use in the last year: Yes, recent for her URI.    Personal or FH with difficulty with Anesthesia:  Denies.     Prior to Admission Medications  Current Outpatient Prescriptions   Medication Sig Dispense Refill     albuterol (ACCUNEB) 1.25 MG/3ML nebulizer solution Inhale 1.25 mg into the lungs as needed       albuterol (PROAIR HFA/PROVENTIL HFA/VENTOLIN HFA) 108 (90 Base) MCG/ACT inhaler Inhale 2 puffs into the lungs every 6 hours as needed       fluticasone-salmeterol (ADVAIR) 250-50 MCG/DOSE diskus inhaler Inhale 1 puff into the lungs 2 times daily       losartan (COZAAR) 50 MG tablet Take 50 mg by mouth 2 times daily       potassium chloride SA (K-DUR/KLOR-CON M) 20 MEQ CR tablet Take by mouth daily (with lunch)       RABEprazole (ACIPHEX) 20 MG EC tablet Take 20 mg by mouth every morning       tiotropium (SPIRIVA) 18 MCG capsule Inhale 2 puffs into the lungs daily       UNKNOWN TO PATIENT Take 1 tablet by mouth daily (with lunch) HAIR SUPPLEMENT       Calcium Citrate 200 MG TABS Take 1 tablet by mouth 2 times daily       cholecalciferol (VITAMIN D3) 5000 units TABS tablet Take 5,000 Units by mouth daily (with lunch)       denosumab (PROLIA) 60 MG/ML SOLN injection Inject 60 mg Subcutaneous every 6 months       hyoscyamine (LEVBID) 0.375 MG 12 hr tablet Take 0.375 mg by mouth 2 times daily ON HOLD SINCE 10/26/2018 WHEN SHE STARTED VESICARE       OTHER MEDICAL SUPPLIES 1.5 L At Bedtime       phenytoin (DILANTIN) 100 MG CR capsule Take 100-200 mg by mouth At Bedtime 100 MG IN THE MORNING   200 MG AT BEDTIME       solifenacin (VESICARE) 5 MG tablet Take 5 mg by mouth every morning         Allergies  Allergies   Allergen Reactions     Clindamycin Rash     Carbamazepine Rash     Morphine Nausea       Social History  Social History     Social History     Marital status:      Spouse name: N/A     Number of children: N/A     Years of  "education: N/A     Occupational History     Not on file.     Social History Main Topics     Smoking status: Former Smoker     Packs/day: 1.50     Years: 38.00     Types: Cigarettes     Smokeless tobacco: Never Used      Comment: quit in 1998     Alcohol use Yes      Comment: occ     Drug use: Not on file     Sexual activity: Not on file     Other Topics Concern     Not on file     Social History Narrative       Family History  Family History   Problem Relation Age of Onset     Pulmonary Hypertension Daughter      idiopathic      HEART DISEASE Other      2 sibs MI, 1 sib electrical conduction disorder       Review of Systems  ROS/MED HX    The complete review of systems is negative other than noted in the HPI or here.   ENT/Pulmonary:     (+)SHAZIA risk factors tobacco use, Past use COPD, O2 dependent, during Nighttime 1.5  liters/min,  , . .    Neurologic:     (+)seizures, last 1991   Cardiovascular:     (+) hypertension----. : . . . :. . Previous cardiac testing Echodate:2017results:date: results:ECG reviewed date:3/24/18 results:, possible LAE, IVCD, LBBB, incomplete date: results:         (-) taking anticoagulants/antiplatelets   METS/Exercise Tolerance:  1 - Eating, dressing   Hematologic:  - neg hematologic  ROS       Musculoskeletal: NEG     GI/Hepatic:     (+) GERD hiatal hernia,       Renal/Genitourinary:  - ROS Renal section negative       Endo:  - neg endo ROS       Psychiatric:  - neg psychiatric ROS       Infectious Disease:  - neg infectious disease ROS       Malignancy:      - no malignancy   Other:    (+) No chance of pregnancy C-spine cleared: N/A, H/O Chronic Pain,no other significant disability        Physical Exam      Airway   Mallampati: II  TM distance: >3 FB  Neck ROM: limited    Temp: 97.8  F (36.6  C) Temp src: Oral BP: 164/90 Pulse: 106   Resp: 20 SpO2: 92 %    Last /90     205 lbs 3.2 oz  5' 4\"   Body mass index is 35.22 kg/(m^2).       Physical Exam  Constitutional: Awake, alert, " cooperative, no apparent distress, and appears stated age.  Eyes: Pupils equal, round and reactive to light, extra ocular muscles intact, sclera clear, conjunctiva normal.  HENT: Normocephalic, oral pharynx with moist mucus membranes, dentures. No goiter appreciated.   Respiratory: Lung fields diminished and course. Frequent congested cough.   Cardiovascular: Regular rate and rhythm, systolic murmur noted.  Carotids +2, no bruits. No edema. Palpable pulses to radial  DP and PT arteries.   GI: Normal bowel sounds, soft, non-distended, non-tender, no masses palpated. Difficult exam due to obese abdomen.  Lymph/Hematologic: No cervical lymphadenopathy and no supraclavicular lymphadenopathy.  Genitourinary:  Deferred.   Skin: Warm and dry.  No rashes at anticipated surgical site.   Musculoskeletal: Limited ROM of neck. There is no redness, warmth, or swelling of the joints. Gross motor strength is normal.    Neurologic: Awake, alert, oriented to name, place and time. Cranial nerves II-XII are grossly intact. Gait is irregular.  Neuropsychiatric: Mildly anxious, cooperative. Normal affect.     Labs: (personally reviewed)  Lab Results   Component Value Date    WBC 15.1 11/13/2018     Lab Results   Component Value Date    RBC 4.67 11/13/2018     Lab Results   Component Value Date    HGB 13.7 11/13/2018     Lab Results   Component Value Date    HCT 43.6 11/13/2018     Lab Results   Component Value Date    MCV 93 11/13/2018     Lab Results   Component Value Date    MCH 29.3 11/13/2018     Lab Results   Component Value Date    MCHC 31.4 11/13/2018     Lab Results   Component Value Date    RDW 13.4 11/13/2018     Lab Results   Component Value Date     11/13/2018     Last Comprehensive Metabolic Panel:  Sodium   Date Value Ref Range Status   11/13/2018 135 133 - 144 mmol/L Final     Potassium   Date Value Ref Range Status   11/13/2018 3.8 3.4 - 5.3 mmol/L Final     Chloride   Date Value Ref Range Status   11/13/2018 100  94 - 109 mmol/L Final     Carbon Dioxide   Date Value Ref Range Status   2018 29 20 - 32 mmol/L Final     Anion Gap   Date Value Ref Range Status   2018 6 3 - 14 mmol/L Final     Glucose   Date Value Ref Range Status   2018 104 (H) 70 - 99 mg/dL Final     Urea Nitrogen   Date Value Ref Range Status   2018 16 7 - 30 mg/dL Final     Creatinine   Date Value Ref Range Status   2018 0.68 0.52 - 1.04 mg/dL Final     GFR Estimate   Date Value Ref Range Status   2018 84 >60 mL/min/1.7m2 Final     Comment:     Non  GFR Calc     Calcium   Date Value Ref Range Status   2018 8.6 8.5 - 10.1 mg/dL Final     BNP 63  EKG: Personally reviewed but formal cardiology interpretation pendin18 Normal sinus rhythm, anteroseptal infarct, age undetermined  Cardiac echo: 2017     No previous study for comparison.    Technically somewhat limited examination.    Left ventricle ejection fraction is The calculated left ventricular   ejection fraction is 68%.    Abnormal diastolic relaxation    Right ventricular systolic function appears grossly normal.    Mild aortic stenosis. Mild aortic regurgitation.  PFT's: 10/3/18  Impression:  Full Pulmonary Function Test is abnormal.  PFTs are   consistent with severe obstructive disease.  Spirometry is not consistent with reversibility.  There is hyperinflation.  There is air-trapping.  Diffusion capacity when corrected for hemoglobin is moderately reduced.    Note, that RT comments that pt was unable to reach > 85% of VC. Also note   that on pleth pt was unable to pant during lung volume measurements. N2   washout was attempted but unsuccessful.   10/10/18 Sniff test  CONCLUSION:  No significant movement of the left hemidiaphragm. Findings are likely related to the large hiatal hernia defect and intrathoracic stomach.  CXR 3/29/18    FINDINGS: Mild cardiac enlargement. Pulmonary vascularity normal. Marked  elevation of the left  hemidiaphragm with subsegmental atelectasis left base.  Intrathoracic stomach lower left chest as seen on prior CT. Contrast material  within the left colon. The right lung is clear. Postop changes right shoulder.  Esophagram 3/27/18  ESOPHAGUS: Unremarkable flow of contrast through the esophagus. No narrowing or stricture of the esophagus. Large paraesophageal hiatus hernia again seen with essentially complete intrathoracic stomach. Difficult evaluation of gastric fold pattern from   the stomach folding upon itself. After several minutes, contrast flowed from the stomach into the duodenum. Mild gastroesophageal reflux noted  CT abd/pelvis 3/24/18  CONCLUSION:  1.  Small bowel obstruction secondary to a ventral wall hernia has progressed.  2.  Large esophageal hiatal hernia with a significant portion of the stomach located in the left chest.  Imaging and cardiac testing  reviewed by this provider  Outside records reviewed from: Care Everywhere    ASSESSMENT and PLAN  Lalitha Underwood is a 73 year old female scheduled to undergo laparoscopic hiatal hernia repair with Dr. Wilder on 11/26/18. She has the following specific operative considerations:   - RCRI : 0.9% risk of major adverse cardiac event.   - Anesthesia considerations:  Refer to PAC assessment in anesthesia records  - VTE risk: 0.5%  - SHAZIA # of risks 2/8 = Low risk  - Risk of PONV score = 3.  If > 2, anti-emetic intervention recommended. If 3 or > anti emetic intervention recommended with two or more meds     --Giant hiatal hernia. Above procedure now planned.    --HTN. Will hold Losartan on DOS. No cardiac symptoms. Echo above showing mild aortic stenosis/regurgitation. EKG updated above.    --Former smoker 1.5 PPD X 38 years. COPD, severe with 02 1.5 liters at night FEV1 43%. Recent URI s/p antibiotics and two courses of Prednisone. Congested cough continues. No fever. Coarse and diminished breath sounds. Patient will use Advair and Spiriva and Albuterol  on DOS and will bring inhaler. Instructed patient to contact her Pulmonologist for an appointment in one week for update and to confirm that she is optimized to proceed to surgery. She agreed. Surgery team notified.    --History of neg sleep study 15 years ago but was put on oxygen at that time.    --Seizure disorder after fall with head injury age 18. 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 Has been on and off medication but has been taking Dilantin for several years with no issues. Last level 1.6. Has made contact with her neurologist to ensure she is on the right dose prior to surgery.    --Pain management. Discussed possibility of nerve block if appropriate for patient's procedure. Final counseling and decisions by regional team on DOS.   --Very difficult IV access.    --Anxiety. Worried about upcoming surgery because of breathing and seizure history.    Type and screen drawn today.   Arrival time, NPO, shower and medication instructions provided by nursing staff today. Preparing For Your Surgery handout given.  Patient was discussed with Dr Bello.    DONAVAN Cortes CNS  Preoperative Assessment Center  [facility]  Clinic and Surgery Center  Phone: [phone]  Fax: [phone]

## 2018-11-13 NOTE — MR AVS SNAPSHOT
After Visit Summary   2018    Mrs. Lalitha Underwood    MRN: [id number]           Patient Information     Date Of Birth          1945        Visit Information        Provider Department      2018 2:00 PM Margaret Gutiérrez APRN [facility]        Today's Diagnoses     Preop examination    -  1    Hiatal hernia          Care Instructions    Preparing for Your Surgery      Name:  Lalitha Underwood   MRN:  [id number]   :  1945   Today's Date:  2018     Arriving for surgery:  Surgery date:  18  Arrival time:  5:30 am    Please come to:  [facility] [address]  [address] [place] [address]    -   parking is available in front of the hospital from 5:15 am to 8:00 pm    -  Stop at the Information Desk in the lobby    -   Inform the information person that you are here for surgery. An escort to 3C will be provided. If you would not like an escort, please proceed to 3C on the 3rd floor. [phone]     -  Bring your ID and insurance card.    What can I eat or drink?  -  You may have solid food or milk products until 8 hours prior to your surgery. (Until 11:30 pm 18 )  -  You may have water, apple juice, clear BLACK coffee (NO creamer or nondairy creamer), or 7up/Sprite until 2 hours prior to your surgery. (Until 5:30 am )    Which medicines can I take?       -  Do not bring your own medications to the hospital.        -  Follow Surgery Clinic instructions regarding Ibuprofen. If no instructions given, NO Ibuprofen the day prior to surgery.     -  Do NOT take these medications in the morning, the day of surgery:  Losartan, Calcium Citrate    -  Please take these medications the morning of surgery:  Advair inhaler, Rabeprazole (Aciphex), Tiotropium (Spiriva), Phenytoin (Dilantin), Solifenacin (Vesicare)      Albuterol inhaler/neb if needed      Acetaminophen (Tylenol) if needed      How do I  prepare myself?  -  Take two showers: one the night before surgery; and one the morning of surgery.         Use Scrubcare or Hibiclens to wash from neck down.  You may use your own shampoo and conditioner. No other hair products.   -  Do NOT use lotion, powder, colognes, deodorant, or antiperspirant the day of your surgery.  -  Do NOT wear any makeup, fingernail polish or jewelry.    -  Begin using Aerobika.  Use 3 times a day for 10-20 minutes each time.  Bring Aerobika to hospital.    Questions or Concerns:  If you have questions or concerns prior to your surgery, call [phone]. (Mon - Fri   8 am- 5:30 pm)  Questions after surgery, contact your Surgeons office.      AFTER YOUR SURGERY  Breathing exercises   Breathing exercises help you recover faster. Take deep breaths and let the air out slowly. This will:     Help you wake up after surgery.    Help prevent complications like pneumonia.  Preventing complications will help you go home sooner.   Nausea and vomiting   You may feel sick to your stomach after surgery; if so, let your nurse know.    Pain control:  After surgery, you may have pain. Our goal is to help you manage your pain. Pain medicine will help you feel comfortable enough to do activities that will help you heal.  These activities may include breathing exercises, walking and physical therapy.   To help your health care team treat your pain we will ask: 1) If you have pain  2) where it is located 3) describe your pain in your words  Methods of pain control include medications given by mouth, vein or by nerve block for some surgeries.  We may give you a pain control pump that will:  1) Deliver the medicine through a tube placed in your vein  2) Control the amount of medicine you receive  3) Allow you to push a button to deliver a dose of pain medicine  Sequential Compression Device (SCD) or Pneumo Boots:  You may need to wear SCD S on your legs or feet. These are wraps connected to a machine that  pumps in air and releases it. The repeated pumping helps prevent blood clots from forming.                     Follow-ups after your visit        Your next 10 appointments already scheduled     2018   Procedure with Jasmeet Wilder MD   [facility], [place], Same Day Surgery (--)    [address]  [place] [address] [phone]              Future tests that were ordered for you today     Open Future Orders        Priority Expected Expires Ordered    ABO/Rh type and screen Routine 2018    Basic metabolic panel Routine 2018    CBC with platelets Routine 2018            Who to contact     Please call your clinic at [phone] to:    Ask questions about your health    Make or cancel appointments    Discuss your medicines    Learn about your test results    Speak to your doctor            Additional Information About Your Visit        [employer] Information     [employer]reg is an electronic gateway that provides easy, online access to your medical records. With Pyreg, you can request a clinic appointment, read your test results, renew a prescription or communicate with your care team.     To sign up for Pyreg visit the website at [email or web address]   You will be asked to enter the access code listed below, as well as some personal information. Please follow the directions to create your username and password.     Your access code is: [email or web address]  Expires: 2/3/2019  6:30 AM     Your access code will  in 90 days. If you need help or a new code, please contact your [facility] or call [phone] for assistance.        Care EveryWhere ID     This is your Care EveryWhere ID. This could be used by other organizations to access your [place] medical records  [id number]        Your Vitals Were     Pulse Temperature Respirations Height Pulse Oximetry BMI (Body Mass Index)    106 97.8  " F (36.6  C) (Oral) 20 1.626 m (5' 4\") 92% 35.22 kg/m2       Blood Pressure from Last 3 Encounters:   11/13/18 (!) 191/99    Weight from Last 3 Encounters:   11/13/18 93.1 kg (205 lb 3.2 oz)               Primary Care Provider    None Specified       No primary provider on file.        Equal Access to Services     [facility]: Hadii aad ku hadasho Soomaali, waaxda luqadaha, qaybta kaalmada adeegyada, waxay sheilain haytamin adepao dubois lawhitneyn . So [facility] [phone].    ATENCIÓN: Si habla español, tiene a saxena disposición servicios gratuitos de asistencia lingüística. Llame al [phone].    We comply with applicable federal civil rights laws and [place] laws. We do not discriminate on the basis of race, color, national origin, age, disability, sex, sexual orientation, or gender identity.            Thank you!     Thank you for choosing [facility]  for your care. Our goal is always to provide you with excellent care. Hearing back from our patients is one way we can continue to improve our services. Please take a few minutes to complete the written survey that you may receive in the mail after your visit with us. Thank you!             Your Updated Medication List - Protect others around you: Learn how to safely use, store and throw away your medicines at www.disposemymeds.org.          This list is accurate as of 11/13/18  2:49 PM.  Always use your most recent med list.                   Brand Name Dispense Instructions for use Diagnosis    * albuterol 1.25 MG/3ML nebulizer solution    ACCUNEB     Inhale 1.25 mg into the lungs as needed        * albuterol 108 (90 Base) MCG/ACT inhaler    PROAIR HFA/PROVENTIL HFA/VENTOLIN HFA     Inhale 2 puffs into the lungs every 6 hours as needed        Calcium Citrate 200 MG Tabs      Take 1 tablet by mouth 2 times daily        cholecalciferol 5000 units Tabs tablet    vitamin D3     Take 5,000 Units by mouth daily (with lunch)        denosumab 60 " MG/ML Soln injection    PROLIA     Inject 60 mg Subcutaneous every 6 months        fluticasone-salmeterol 250-50 MCG/DOSE diskus inhaler    ADVAIR     Inhale 1 puff into the lungs 2 times daily        hyoscyamine 0.375 MG 12 hr tablet    LEVBID     Take 0.375 mg by mouth 2 times daily ON HOLD SINCE 10/26/2018 WHEN SHE STARTED VESICARE        losartan 50 MG tablet    COZAAR     Take 50 mg by mouth 2 times daily        OTHER MEDICAL SUPPLIES      1.5 L At Bedtime        phenytoin 100 MG CR capsule    DILANTIN     Take 100-200 mg by mouth At Bedtime 100 MG IN THE MORNING   200 MG AT BEDTIME        potassium chloride SA 20 MEQ CR tablet    K-DUR/KLOR-CON M     Take by mouth daily (with lunch)        RABEprazole 20 MG EC tablet    ACIPHEX     Take 20 mg by mouth every morning        solifenacin 5 MG tablet    VESICARE     Take 5 mg by mouth every morning        tiotropium 18 MCG capsule    SPIRIVA     Inhale 2 puffs into the lungs daily        UNKNOWN TO PATIENT      Take 1 tablet by mouth daily (with lunch) HAIR SUPPLEMENT        * Notice:  This list has 2 medication(s) that are the same as other medications prescribed for you. Read the directions carefully, and ask your doctor or other care provider to review them with you.

## 2018-11-13 NOTE — PATIENT INSTRUCTIONS
Preparing for Your Surgery      Name:  Lalitha Underwood   MRN:  [id number]   :  1945   Today's Date:  2018     Arriving for surgery:  Surgery date:  18  Arrival time:  5:30 am    Please come to:  [facility] [address]  [address] [place]  [address]    -   parking is available in front of the hospital from 5:15 am to 8:00 pm    -  Stop at the Information Desk in the lobby    -   Inform the information person that you are here for surgery. An escort to 3C will be provided. If you would not like an escort, please proceed to 3C on the 3rd floor. [phone]     -  Bring your ID and insurance card.    What can I eat or drink?  -  You may have solid food or milk products until 8 hours prior to your surgery. (Until 11:30 pm 18 )  -  You may have water, apple juice, clear BLACK coffee (NO creamer or nondairy creamer), or 7up/Sprite until 2 hours prior to your surgery. (Until 5:30 am )    Which medicines can I take?       -  Do not bring your own medications to the hospital.        -  Follow Surgery Clinic instructions regarding Ibuprofen. If no instructions given, NO Ibuprofen the day prior to surgery.     -  Do NOT take these medications in the morning, the day of surgery:  Losartan, Calcium Citrate    -  Please take these medications the morning of surgery:  Advair inhaler, Rabeprazole (Aciphex), Tiotropium (Spiriva), Phenytoin (Dilantin), Solifenacin (Vesicare)      Albuterol inhaler/neb if needed      Acetaminophen (Tylenol) if needed      How do I prepare myself?  -  Take two showers: one the night before surgery; and one the morning of surgery.         Use Scrubcare or Hibiclens to wash from neck down.  You may use your own shampoo and conditioner. No other hair products.   -  Do NOT use lotion, powder, colognes, deodorant, or antiperspirant the day of your surgery.  -  Do NOT wear any makeup, fingernail polish or jewelry.    -  Begin using  Aerobika.  Use 3 times a day for 10-20 minutes each time.  Bring Aerobika to hospital.    Questions or Concerns:  If you have questions or concerns prior to your surgery, call [phone]. (Mon - Fri   8 am- 5:30 pm)  Questions after surgery, contact your Surgeons office.      AFTER YOUR SURGERY  Breathing exercises   Breathing exercises help you recover faster. Take deep breaths and let the air out slowly. This will:     Help you wake up after surgery.    Help prevent complications like pneumonia.  Preventing complications will help you go home sooner.   Nausea and vomiting   You may feel sick to your stomach after surgery; if so, let your nurse know.    Pain control:  After surgery, you may have pain. Our goal is to help you manage your pain. Pain medicine will help you feel comfortable enough to do activities that will help you heal.  These activities may include breathing exercises, walking and physical therapy.   To help your health care team treat your pain we will ask: 1) If you have pain  2) where it is located 3) describe your pain in your words  Methods of pain control include medications given by mouth, vein or by nerve block for some surgeries.  We may give you a pain control pump that will:  1) Deliver the medicine through a tube placed in your vein  2) Control the amount of medicine you receive  3) Allow you to push a button to deliver a dose of pain medicine  Sequential Compression Device (SCD) or Pneumo Boots:  You may need to wear SCD S on your legs or feet. These are wraps connected to a machine that pumps in air and releases it. The repeated pumping helps prevent blood clots from forming.

## 2018-11-14 ENCOUNTER — COMMUNICATION - HEALTHEAST (OUTPATIENT)
Dept: FAMILY MEDICINE | Facility: CLINIC | Age: 73
End: 2018-11-14

## 2018-11-14 ENCOUNTER — COMMUNICATION - HEALTHEAST (OUTPATIENT)
Dept: PULMONOLOGY | Facility: OTHER | Age: 73
End: 2018-11-14

## 2018-11-14 DIAGNOSIS — J44.9 COPD (CHRONIC OBSTRUCTIVE PULMONARY DISEASE) (H): ICD-10-CM

## 2018-11-14 DIAGNOSIS — J44.1 COPD EXACERBATION (H): ICD-10-CM

## 2018-11-14 LAB — INTERPRETATION ECG - MUSE: NORMAL

## 2018-11-16 ENCOUNTER — COMMUNICATION - HEALTHEAST (OUTPATIENT)
Dept: VASCULAR SURGERY | Facility: CLINIC | Age: 73
End: 2018-11-16

## 2018-11-16 ENCOUNTER — AMBULATORY - HEALTHEAST (OUTPATIENT)
Dept: VASCULAR SURGERY | Facility: CLINIC | Age: 73
End: 2018-11-16

## 2018-11-16 DIAGNOSIS — Q66.6 VALGUS DEFORMITY OF BOTH FEET: ICD-10-CM

## 2018-11-16 DIAGNOSIS — M21.41 FLAT FEET: ICD-10-CM

## 2018-11-16 DIAGNOSIS — M21.42 FLAT FEET: ICD-10-CM

## 2018-11-16 DIAGNOSIS — M79.89 LEG SWELLING: ICD-10-CM

## 2018-11-16 DIAGNOSIS — I87.2 VENOUS INSUFFICIENCY OF BOTH LOWER EXTREMITIES: ICD-10-CM

## 2018-11-19 ENCOUNTER — RECORDS - HEALTHEAST (OUTPATIENT)
Dept: ADMINISTRATIVE | Facility: OTHER | Age: 73
End: 2018-11-19

## 2018-11-19 ENCOUNTER — COMMUNICATION - HEALTHEAST (OUTPATIENT)
Dept: VASCULAR SURGERY | Facility: CLINIC | Age: 73
End: 2018-11-19

## 2018-11-23 ENCOUNTER — OFFICE VISIT - HEALTHEAST (OUTPATIENT)
Dept: PULMONOLOGY | Facility: OTHER | Age: 73
End: 2018-11-23

## 2018-11-23 DIAGNOSIS — J44.9 CHRONIC OBSTRUCTIVE PULMONARY DISEASE, UNSPECIFIED COPD TYPE (H): ICD-10-CM

## 2018-11-23 ASSESSMENT — MIFFLIN-ST. JEOR: SCORE: 1400.79

## 2018-11-26 ENCOUNTER — ANESTHESIA (OUTPATIENT)
Dept: SURGERY | Facility: CLINIC | Age: 73
DRG: 328 | End: 2018-11-26
Payer: MEDICARE

## 2018-11-26 ENCOUNTER — HOSPITAL ENCOUNTER (INPATIENT)
Facility: CLINIC | Age: 73
LOS: 7 days | Discharge: HOME HEALTH | DRG: 328 | End: 2018-12-03
Attending: THORACIC SURGERY (CARDIOTHORACIC VASCULAR SURGERY) | Admitting: THORACIC SURGERY (CARDIOTHORACIC VASCULAR SURGERY)
Payer: MEDICARE

## 2018-11-26 ENCOUNTER — SURGERY (OUTPATIENT)
Age: 73
End: 2018-11-26

## 2018-11-26 ENCOUNTER — APPOINTMENT (OUTPATIENT)
Dept: GENERAL RADIOLOGY | Facility: CLINIC | Age: 73
DRG: 328 | End: 2018-11-26
Attending: THORACIC SURGERY (CARDIOTHORACIC VASCULAR SURGERY)
Payer: MEDICARE

## 2018-11-26 DIAGNOSIS — R19.4 BOWEL HABIT CHANGES: ICD-10-CM

## 2018-11-26 DIAGNOSIS — Z98.890 S/P REPAIR OF PARAESOPHAGEAL HERNIA: ICD-10-CM

## 2018-11-26 DIAGNOSIS — Z98.890 PONV (POSTOPERATIVE NAUSEA AND VOMITING): ICD-10-CM

## 2018-11-26 DIAGNOSIS — R11.2 PONV (POSTOPERATIVE NAUSEA AND VOMITING): ICD-10-CM

## 2018-11-26 DIAGNOSIS — Z87.19 S/P REPAIR OF PARAESOPHAGEAL HERNIA: ICD-10-CM

## 2018-11-26 DIAGNOSIS — G89.18 ACUTE POST-OPERATIVE PAIN: Primary | ICD-10-CM

## 2018-11-26 LAB
ABO + RH BLD: NORMAL
ABO + RH BLD: NORMAL
BLD GP AB SCN SERPL QL: NORMAL
BLOOD BANK CMNT PATIENT-IMP: NORMAL
CREAT SERPL-MCNC: 0.66 MG/DL (ref 0.52–1.04)
GFR SERPL CREATININE-BSD FRML MDRD: 88 ML/MIN/1.7M2
GLUCOSE BLDC GLUCOMTR-MCNC: 131 MG/DL (ref 70–99)
GLUCOSE BLDC GLUCOMTR-MCNC: 150 MG/DL (ref 70–99)
GLUCOSE BLDC GLUCOMTR-MCNC: 99 MG/DL (ref 70–99)
PLATELET # BLD AUTO: 259 10E9/L (ref 150–450)
SPECIMEN EXP DATE BLD: NORMAL

## 2018-11-26 PROCEDURE — 88307 TISSUE EXAM BY PATHOLOGIST: CPT | Performed by: THORACIC SURGERY (CARDIOTHORACIC VASCULAR SURGERY)

## 2018-11-26 PROCEDURE — 25000125 ZZHC RX 250: Performed by: NURSE ANESTHETIST, CERTIFIED REGISTERED

## 2018-11-26 PROCEDURE — 86900 BLOOD TYPING SEROLOGIC ABO: CPT | Performed by: THORACIC SURGERY (CARDIOTHORACIC VASCULAR SURGERY)

## 2018-11-26 PROCEDURE — 25000132 ZZH RX MED GY IP 250 OP 250 PS 637: Performed by: STUDENT IN AN ORGANIZED HEALTH CARE EDUCATION/TRAINING PROGRAM

## 2018-11-26 PROCEDURE — 71045 X-RAY EXAM CHEST 1 VIEW: CPT

## 2018-11-26 PROCEDURE — 27210995 ZZH RX 272: Performed by: THORACIC SURGERY (CARDIOTHORACIC VASCULAR SURGERY)

## 2018-11-26 PROCEDURE — 36415 COLL VENOUS BLD VENIPUNCTURE: CPT | Performed by: THORACIC SURGERY (CARDIOTHORACIC VASCULAR SURGERY)

## 2018-11-26 PROCEDURE — 0DH63UZ INSERTION OF FEEDING DEVICE INTO STOMACH, PERCUTANEOUS APPROACH: ICD-10-PCS | Performed by: THORACIC SURGERY (CARDIOTHORACIC VASCULAR SURGERY)

## 2018-11-26 PROCEDURE — 36000062 ZZH SURGERY LEVEL 4 1ST 30 MIN - UMMC: Performed by: THORACIC SURGERY (CARDIOTHORACIC VASCULAR SURGERY)

## 2018-11-26 PROCEDURE — 25000128 H RX IP 250 OP 636: Performed by: THORACIC SURGERY (CARDIOTHORACIC VASCULAR SURGERY)

## 2018-11-26 PROCEDURE — 88331 PATH CONSLTJ SURG 1 BLK 1SPC: CPT | Performed by: THORACIC SURGERY (CARDIOTHORACIC VASCULAR SURGERY)

## 2018-11-26 PROCEDURE — 25000125 ZZHC RX 250: Performed by: ANESTHESIOLOGY

## 2018-11-26 PROCEDURE — 00000146 ZZHCL STATISTIC GLUCOSE BY METER IP

## 2018-11-26 PROCEDURE — 71000015 ZZH RECOVERY PHASE 1 LEVEL 2 EA ADDTL HR: Performed by: THORACIC SURGERY (CARDIOTHORACIC VASCULAR SURGERY)

## 2018-11-26 PROCEDURE — A9270 NON-COVERED ITEM OR SERVICE: HCPCS | Performed by: SURGERY

## 2018-11-26 PROCEDURE — 86901 BLOOD TYPING SEROLOGIC RH(D): CPT | Performed by: THORACIC SURGERY (CARDIOTHORACIC VASCULAR SURGERY)

## 2018-11-26 PROCEDURE — 40000275 ZZH STATISTIC RCP TIME EA 10 MIN

## 2018-11-26 PROCEDURE — 25000128 H RX IP 250 OP 636: Performed by: NURSE ANESTHETIST, CERTIFIED REGISTERED

## 2018-11-26 PROCEDURE — 25000128 H RX IP 250 OP 636: Performed by: SURGERY

## 2018-11-26 PROCEDURE — 25000128 H RX IP 250 OP 636: Performed by: ANESTHESIOLOGY

## 2018-11-26 PROCEDURE — 25000565 ZZH ISOFLURANE, EA 15 MIN: Performed by: THORACIC SURGERY (CARDIOTHORACIC VASCULAR SURGERY)

## 2018-11-26 PROCEDURE — 71000014 ZZH RECOVERY PHASE 1 LEVEL 2 FIRST HR: Performed by: THORACIC SURGERY (CARDIOTHORACIC VASCULAR SURGERY)

## 2018-11-26 PROCEDURE — 85049 AUTOMATED PLATELET COUNT: CPT | Performed by: THORACIC SURGERY (CARDIOTHORACIC VASCULAR SURGERY)

## 2018-11-26 PROCEDURE — 25000132 ZZH RX MED GY IP 250 OP 250 PS 637: Performed by: ANESTHESIOLOGY

## 2018-11-26 PROCEDURE — 12000006 ZZH R&B IMCU INTERMEDIATE UMMC

## 2018-11-26 PROCEDURE — 86850 RBC ANTIBODY SCREEN: CPT | Performed by: THORACIC SURGERY (CARDIOTHORACIC VASCULAR SURGERY)

## 2018-11-26 PROCEDURE — 82565 ASSAY OF CREATININE: CPT | Performed by: THORACIC SURGERY (CARDIOTHORACIC VASCULAR SURGERY)

## 2018-11-26 PROCEDURE — 40000141 ZZH STATISTIC PERIPHERAL IV START W/O US GUIDANCE

## 2018-11-26 PROCEDURE — 0FB14ZX EXCISION OF RIGHT LOBE LIVER, PERCUTANEOUS ENDOSCOPIC APPROACH, DIAGNOSTIC: ICD-10-PCS | Performed by: THORACIC SURGERY (CARDIOTHORACIC VASCULAR SURGERY)

## 2018-11-26 PROCEDURE — 27210794 ZZH OR GENERAL SUPPLY STERILE: Performed by: THORACIC SURGERY (CARDIOTHORACIC VASCULAR SURGERY)

## 2018-11-26 PROCEDURE — 0DJ08ZZ INSPECTION OF UPPER INTESTINAL TRACT, VIA NATURAL OR ARTIFICIAL OPENING ENDOSCOPIC: ICD-10-PCS | Performed by: THORACIC SURGERY (CARDIOTHORACIC VASCULAR SURGERY)

## 2018-11-26 PROCEDURE — 40000014 ZZH STATISTIC ARTERIAL MONITORING DAILY

## 2018-11-26 PROCEDURE — 25000132 ZZH RX MED GY IP 250 OP 250 PS 637: Performed by: SURGERY

## 2018-11-26 PROCEDURE — 0W9930Z DRAINAGE OF RIGHT PLEURAL CAVITY WITH DRAINAGE DEVICE, PERCUTANEOUS APPROACH: ICD-10-PCS | Performed by: THORACIC SURGERY (CARDIOTHORACIC VASCULAR SURGERY)

## 2018-11-26 PROCEDURE — 36000064 ZZH SURGERY LEVEL 4 EA 15 ADDTL MIN - UMMC: Performed by: THORACIC SURGERY (CARDIOTHORACIC VASCULAR SURGERY)

## 2018-11-26 PROCEDURE — 37000008 ZZH ANESTHESIA TECHNICAL FEE, 1ST 30 MIN: Performed by: THORACIC SURGERY (CARDIOTHORACIC VASCULAR SURGERY)

## 2018-11-26 PROCEDURE — 0BQT4ZZ REPAIR DIAPHRAGM, PERCUTANEOUS ENDOSCOPIC APPROACH: ICD-10-PCS | Performed by: THORACIC SURGERY (CARDIOTHORACIC VASCULAR SURGERY)

## 2018-11-26 PROCEDURE — 37000009 ZZH ANESTHESIA TECHNICAL FEE, EACH ADDTL 15 MIN: Performed by: THORACIC SURGERY (CARDIOTHORACIC VASCULAR SURGERY)

## 2018-11-26 PROCEDURE — P9041 ALBUMIN (HUMAN),5%, 50ML: HCPCS | Performed by: NURSE ANESTHETIST, CERTIFIED REGISTERED

## 2018-11-26 PROCEDURE — 0W9B30Z DRAINAGE OF LEFT PLEURAL CAVITY WITH DRAINAGE DEVICE, PERCUTANEOUS APPROACH: ICD-10-PCS | Performed by: THORACIC SURGERY (CARDIOTHORACIC VASCULAR SURGERY)

## 2018-11-26 PROCEDURE — A9270 NON-COVERED ITEM OR SERVICE: HCPCS | Performed by: ANESTHESIOLOGY

## 2018-11-26 PROCEDURE — 40000170 ZZH STATISTIC PRE-PROCEDURE ASSESSMENT II: Performed by: THORACIC SURGERY (CARDIOTHORACIC VASCULAR SURGERY)

## 2018-11-26 RX ORDER — ALBUTEROL SULFATE 90 UG/1
2 AEROSOL, METERED RESPIRATORY (INHALATION) EVERY 6 HOURS PRN
Status: DISCONTINUED | OUTPATIENT
Start: 2018-11-26 | End: 2018-12-03 | Stop reason: HOSPADM

## 2018-11-26 RX ORDER — ALBUTEROL SULFATE 1.25 MG/3ML
1.25 SOLUTION RESPIRATORY (INHALATION) 4 TIMES DAILY PRN
Status: DISCONTINUED | OUTPATIENT
Start: 2018-11-26 | End: 2018-11-26

## 2018-11-26 RX ORDER — SODIUM CHLORIDE, SODIUM LACTATE, POTASSIUM CHLORIDE, CALCIUM CHLORIDE 600; 310; 30; 20 MG/100ML; MG/100ML; MG/100ML; MG/100ML
INJECTION, SOLUTION INTRAVENOUS CONTINUOUS PRN
Status: DISCONTINUED | OUTPATIENT
Start: 2018-11-26 | End: 2018-11-26

## 2018-11-26 RX ORDER — FENTANYL CITRATE 50 UG/ML
25-50 INJECTION, SOLUTION INTRAMUSCULAR; INTRAVENOUS
Status: DISCONTINUED | OUTPATIENT
Start: 2018-11-26 | End: 2018-11-26 | Stop reason: HOSPADM

## 2018-11-26 RX ORDER — CEFAZOLIN SODIUM 1 G/3ML
1 INJECTION, POWDER, FOR SOLUTION INTRAMUSCULAR; INTRAVENOUS SEE ADMIN INSTRUCTIONS
Status: DISCONTINUED | OUTPATIENT
Start: 2018-11-26 | End: 2018-11-26 | Stop reason: HOSPADM

## 2018-11-26 RX ORDER — PROCHLORPERAZINE MALEATE 5 MG
5 TABLET ORAL EVERY 6 HOURS PRN
Status: DISCONTINUED | OUTPATIENT
Start: 2018-11-26 | End: 2018-12-03 | Stop reason: HOSPADM

## 2018-11-26 RX ORDER — CHLORHEXIDINE GLUCONATE ORAL RINSE 1.2 MG/ML
15 SOLUTION DENTAL ONCE
Status: COMPLETED | OUTPATIENT
Start: 2018-11-26 | End: 2018-11-26

## 2018-11-26 RX ORDER — BUPIVACAINE HYDROCHLORIDE 2.5 MG/ML
INJECTION, SOLUTION INFILTRATION; PERINEURAL PRN
Status: DISCONTINUED | OUTPATIENT
Start: 2018-11-26 | End: 2018-11-26 | Stop reason: HOSPADM

## 2018-11-26 RX ORDER — PANTOPRAZOLE SODIUM 40 MG/1
40 TABLET, DELAYED RELEASE ORAL
Status: DISCONTINUED | OUTPATIENT
Start: 2018-11-27 | End: 2018-12-03 | Stop reason: HOSPADM

## 2018-11-26 RX ORDER — METOCLOPRAMIDE 5 MG/1
5 TABLET ORAL EVERY 6 HOURS PRN
Status: DISCONTINUED | OUTPATIENT
Start: 2018-11-26 | End: 2018-12-03 | Stop reason: HOSPADM

## 2018-11-26 RX ORDER — LIDOCAINE HYDROCHLORIDE 20 MG/ML
INJECTION, SOLUTION INFILTRATION; PERINEURAL PRN
Status: DISCONTINUED | OUTPATIENT
Start: 2018-11-26 | End: 2018-11-26

## 2018-11-26 RX ORDER — LIDOCAINE 4 G/G
1 PATCH TOPICAL
Status: DISCONTINUED | OUTPATIENT
Start: 2018-11-26 | End: 2018-12-03 | Stop reason: HOSPADM

## 2018-11-26 RX ORDER — NALOXONE HYDROCHLORIDE 0.4 MG/ML
.1-.4 INJECTION, SOLUTION INTRAMUSCULAR; INTRAVENOUS; SUBCUTANEOUS
Status: DISCONTINUED | OUTPATIENT
Start: 2018-11-26 | End: 2018-11-26

## 2018-11-26 RX ORDER — METOCLOPRAMIDE HYDROCHLORIDE 5 MG/ML
5 INJECTION INTRAMUSCULAR; INTRAVENOUS EVERY 6 HOURS PRN
Status: DISCONTINUED | OUTPATIENT
Start: 2018-11-26 | End: 2018-12-03 | Stop reason: HOSPADM

## 2018-11-26 RX ORDER — PHENYTOIN SODIUM 100 MG/1
200 CAPSULE, EXTENDED RELEASE ORAL AT BEDTIME
Status: DISCONTINUED | OUTPATIENT
Start: 2018-11-26 | End: 2018-12-03 | Stop reason: HOSPADM

## 2018-11-26 RX ORDER — IPRATROPIUM BROMIDE AND ALBUTEROL SULFATE 2.5; .5 MG/3ML; MG/3ML
3 SOLUTION RESPIRATORY (INHALATION) ONCE
Status: COMPLETED | OUTPATIENT
Start: 2018-11-26 | End: 2018-11-26

## 2018-11-26 RX ORDER — HYDROMORPHONE HYDROCHLORIDE 1 MG/ML
.3-.5 INJECTION, SOLUTION INTRAMUSCULAR; INTRAVENOUS; SUBCUTANEOUS EVERY 5 MIN PRN
Status: DISCONTINUED | OUTPATIENT
Start: 2018-11-26 | End: 2018-11-26 | Stop reason: HOSPADM

## 2018-11-26 RX ORDER — FENTANYL CITRATE 50 UG/ML
INJECTION, SOLUTION INTRAMUSCULAR; INTRAVENOUS PRN
Status: DISCONTINUED | OUTPATIENT
Start: 2018-11-26 | End: 2018-11-26

## 2018-11-26 RX ORDER — ACETAMINOPHEN 325 MG/1
975 TABLET ORAL ONCE
Status: COMPLETED | OUTPATIENT
Start: 2018-11-26 | End: 2018-11-26

## 2018-11-26 RX ORDER — LIDOCAINE 40 MG/G
CREAM TOPICAL
Status: DISCONTINUED | OUTPATIENT
Start: 2018-11-26 | End: 2018-12-03 | Stop reason: HOSPADM

## 2018-11-26 RX ORDER — TIOTROPIUM BROMIDE 18 UG/1
18 CAPSULE ORAL; RESPIRATORY (INHALATION) DAILY
Status: DISCONTINUED | OUTPATIENT
Start: 2018-11-26 | End: 2018-11-26

## 2018-11-26 RX ORDER — RABEPRAZOLE SODIUM 20 MG/1
20 TABLET, DELAYED RELEASE ORAL EVERY MORNING
Status: DISCONTINUED | OUTPATIENT
Start: 2018-11-27 | End: 2018-11-26

## 2018-11-26 RX ORDER — PROPOFOL 10 MG/ML
INJECTION, EMULSION INTRAVENOUS PRN
Status: DISCONTINUED | OUTPATIENT
Start: 2018-11-26 | End: 2018-11-26

## 2018-11-26 RX ORDER — CEFAZOLIN SODIUM 2 G/100ML
2 INJECTION, SOLUTION INTRAVENOUS
Status: COMPLETED | OUTPATIENT
Start: 2018-11-26 | End: 2018-11-26

## 2018-11-26 RX ORDER — ONDANSETRON 2 MG/ML
INJECTION INTRAMUSCULAR; INTRAVENOUS PRN
Status: DISCONTINUED | OUTPATIENT
Start: 2018-11-26 | End: 2018-11-26

## 2018-11-26 RX ORDER — PHENYTOIN SODIUM 100 MG/1
100 CAPSULE, EXTENDED RELEASE ORAL EVERY MORNING
Status: DISCONTINUED | OUTPATIENT
Start: 2018-11-27 | End: 2018-12-03 | Stop reason: HOSPADM

## 2018-11-26 RX ORDER — NALOXONE HYDROCHLORIDE 0.4 MG/ML
.1-.4 INJECTION, SOLUTION INTRAMUSCULAR; INTRAVENOUS; SUBCUTANEOUS
Status: DISCONTINUED | OUTPATIENT
Start: 2018-11-26 | End: 2018-12-03 | Stop reason: HOSPADM

## 2018-11-26 RX ORDER — METOPROLOL TARTRATE 1 MG/ML
1-2 INJECTION, SOLUTION INTRAVENOUS EVERY 5 MIN PRN
Status: DISCONTINUED | OUTPATIENT
Start: 2018-11-26 | End: 2018-11-26 | Stop reason: HOSPADM

## 2018-11-26 RX ORDER — ONDANSETRON 2 MG/ML
4 INJECTION INTRAMUSCULAR; INTRAVENOUS EVERY 30 MIN PRN
Status: DISCONTINUED | OUTPATIENT
Start: 2018-11-26 | End: 2018-11-26 | Stop reason: HOSPADM

## 2018-11-26 RX ORDER — ONDANSETRON 2 MG/ML
4 INJECTION INTRAMUSCULAR; INTRAVENOUS EVERY 6 HOURS PRN
Status: DISCONTINUED | OUTPATIENT
Start: 2018-11-26 | End: 2018-12-03 | Stop reason: HOSPADM

## 2018-11-26 RX ORDER — ONDANSETRON 4 MG/1
4 TABLET, ORALLY DISINTEGRATING ORAL EVERY 30 MIN PRN
Status: DISCONTINUED | OUTPATIENT
Start: 2018-11-26 | End: 2018-11-26 | Stop reason: HOSPADM

## 2018-11-26 RX ORDER — SODIUM CHLORIDE, SODIUM LACTATE, POTASSIUM CHLORIDE, CALCIUM CHLORIDE 600; 310; 30; 20 MG/100ML; MG/100ML; MG/100ML; MG/100ML
INJECTION, SOLUTION INTRAVENOUS CONTINUOUS
Status: DISCONTINUED | OUTPATIENT
Start: 2018-11-26 | End: 2018-11-26 | Stop reason: HOSPADM

## 2018-11-26 RX ORDER — ONDANSETRON 4 MG/1
4 TABLET, ORALLY DISINTEGRATING ORAL EVERY 6 HOURS PRN
Status: DISCONTINUED | OUTPATIENT
Start: 2018-11-26 | End: 2018-12-03 | Stop reason: HOSPADM

## 2018-11-26 RX ORDER — ALBUTEROL SULFATE 0.83 MG/ML
1.25 SOLUTION RESPIRATORY (INHALATION) 4 TIMES DAILY PRN
Status: DISCONTINUED | OUTPATIENT
Start: 2018-11-26 | End: 2018-12-03 | Stop reason: HOSPADM

## 2018-11-26 RX ORDER — ALBUMIN, HUMAN INJ 5% 5 %
SOLUTION INTRAVENOUS CONTINUOUS PRN
Status: DISCONTINUED | OUTPATIENT
Start: 2018-11-26 | End: 2018-11-26

## 2018-11-26 RX ORDER — EPHEDRINE SULFATE 50 MG/ML
INJECTION, SOLUTION INTRAMUSCULAR; INTRAVENOUS; SUBCUTANEOUS PRN
Status: DISCONTINUED | OUTPATIENT
Start: 2018-11-26 | End: 2018-11-26

## 2018-11-26 RX ADMIN — FENTANYL CITRATE 50 MCG: 50 INJECTION, SOLUTION INTRAMUSCULAR; INTRAVENOUS at 15:51

## 2018-11-26 RX ADMIN — ROCURONIUM BROMIDE 10 MG: 10 INJECTION INTRAVENOUS at 14:14

## 2018-11-26 RX ADMIN — LIDOCAINE HYDROCHLORIDE 100 MG: 20 INJECTION, SOLUTION INFILTRATION; PERINEURAL at 11:28

## 2018-11-26 RX ADMIN — PHENYLEPHRINE HYDROCHLORIDE 100 MCG: 10 INJECTION, SOLUTION INTRAMUSCULAR; INTRAVENOUS; SUBCUTANEOUS at 12:51

## 2018-11-26 RX ADMIN — PHENYLEPHRINE HYDROCHLORIDE 100 MCG: 10 INJECTION, SOLUTION INTRAMUSCULAR; INTRAVENOUS; SUBCUTANEOUS at 13:03

## 2018-11-26 RX ADMIN — FENTANYL CITRATE 25 MCG: 50 INJECTION, SOLUTION INTRAMUSCULAR; INTRAVENOUS at 16:42

## 2018-11-26 RX ADMIN — ROCURONIUM BROMIDE 20 MG: 10 INJECTION INTRAVENOUS at 14:19

## 2018-11-26 RX ADMIN — CEFAZOLIN SODIUM 2 G: 2 INJECTION, SOLUTION INTRAVENOUS at 11:56

## 2018-11-26 RX ADMIN — PHENYLEPHRINE HYDROCHLORIDE 100 MCG: 10 INJECTION, SOLUTION INTRAMUSCULAR; INTRAVENOUS; SUBCUTANEOUS at 13:20

## 2018-11-26 RX ADMIN — Medication 1 PAD.: at 15:31

## 2018-11-26 RX ADMIN — Medication 0.3 MG: at 17:16

## 2018-11-26 RX ADMIN — CHLORHEXIDINE GLUCONATE 0.12% ORAL RINSE 15 ML: 1.2 LIQUID ORAL at 09:56

## 2018-11-26 RX ADMIN — Medication 0.4 MG: at 17:29

## 2018-11-26 RX ADMIN — BUPIVACAINE HYDROCHLORIDE 27 ML: 2.5 INJECTION, SOLUTION INFILTRATION; PERINEURAL at 15:37

## 2018-11-26 RX ADMIN — ROCURONIUM BROMIDE 10 MG: 10 INJECTION INTRAVENOUS at 14:06

## 2018-11-26 RX ADMIN — HYDROCORTISONE SODIUM SUCCINATE 100 MG: 100 INJECTION, POWDER, FOR SOLUTION INTRAMUSCULAR; INTRAVENOUS at 13:19

## 2018-11-26 RX ADMIN — IPRATROPIUM BROMIDE AND ALBUTEROL SULFATE 3 ML: .5; 3 SOLUTION RESPIRATORY (INHALATION) at 09:56

## 2018-11-26 RX ADMIN — PHENYTOIN SODIUM 200 MG: 100 CAPSULE ORAL at 22:46

## 2018-11-26 RX ADMIN — ROCURONIUM BROMIDE 30 MG: 10 INJECTION INTRAVENOUS at 11:37

## 2018-11-26 RX ADMIN — Medication 100 MG: at 11:28

## 2018-11-26 RX ADMIN — Medication 5 MG: at 13:04

## 2018-11-26 RX ADMIN — PHENYLEPHRINE HYDROCHLORIDE 100 MCG: 10 INJECTION, SOLUTION INTRAMUSCULAR; INTRAVENOUS; SUBCUTANEOUS at 14:06

## 2018-11-26 RX ADMIN — ALBUMIN HUMAN: 0.05 INJECTION, SOLUTION INTRAVENOUS at 13:04

## 2018-11-26 RX ADMIN — FENTANYL CITRATE 50 MCG: 50 INJECTION, SOLUTION INTRAMUSCULAR; INTRAVENOUS at 16:50

## 2018-11-26 RX ADMIN — ENOXAPARIN SODIUM 40 MG: 40 INJECTION SUBCUTANEOUS at 12:11

## 2018-11-26 RX ADMIN — FENTANYL CITRATE 50 MCG: 50 INJECTION, SOLUTION INTRAMUSCULAR; INTRAVENOUS at 15:48

## 2018-11-26 RX ADMIN — ONDANSETRON 4 MG: 2 INJECTION INTRAMUSCULAR; INTRAVENOUS at 15:38

## 2018-11-26 RX ADMIN — SUGAMMADEX 180 MG: 100 INJECTION, SOLUTION INTRAVENOUS at 15:39

## 2018-11-26 RX ADMIN — ROCURONIUM BROMIDE 20 MG: 10 INJECTION INTRAVENOUS at 13:34

## 2018-11-26 RX ADMIN — ROCURONIUM BROMIDE 20 MG: 10 INJECTION INTRAVENOUS at 15:21

## 2018-11-26 RX ADMIN — PHENYLEPHRINE HYDROCHLORIDE 100 MCG: 10 INJECTION, SOLUTION INTRAMUSCULAR; INTRAVENOUS; SUBCUTANEOUS at 12:08

## 2018-11-26 RX ADMIN — SODIUM CHLORIDE, POTASSIUM CHLORIDE, SODIUM LACTATE AND CALCIUM CHLORIDE: 600; 310; 30; 20 INJECTION, SOLUTION INTRAVENOUS at 16:52

## 2018-11-26 RX ADMIN — CEFAZOLIN 1 G: 1 INJECTION, POWDER, FOR SOLUTION INTRAMUSCULAR; INTRAVENOUS at 13:56

## 2018-11-26 RX ADMIN — SODIUM CHLORIDE, POTASSIUM CHLORIDE, SODIUM LACTATE AND CALCIUM CHLORIDE: 600; 310; 30; 20 INJECTION, SOLUTION INTRAVENOUS at 11:15

## 2018-11-26 RX ADMIN — MIDAZOLAM 1 MG: 1 INJECTION INTRAMUSCULAR; INTRAVENOUS at 11:23

## 2018-11-26 RX ADMIN — FENTANYL CITRATE 25 MCG: 50 INJECTION, SOLUTION INTRAMUSCULAR; INTRAVENOUS at 16:40

## 2018-11-26 RX ADMIN — MIDAZOLAM 1 MG: 1 INJECTION INTRAMUSCULAR; INTRAVENOUS at 12:22

## 2018-11-26 RX ADMIN — FENTANYL CITRATE 50 MCG: 50 INJECTION, SOLUTION INTRAMUSCULAR; INTRAVENOUS at 15:37

## 2018-11-26 RX ADMIN — PROPOFOL 150 MG: 10 INJECTION, EMULSION INTRAVENOUS at 11:28

## 2018-11-26 RX ADMIN — ACETAMINOPHEN 975 MG: 325 TABLET, FILM COATED ORAL at 09:56

## 2018-11-26 RX ADMIN — PHENYLEPHRINE HYDROCHLORIDE 100 MCG: 10 INJECTION, SOLUTION INTRAMUSCULAR; INTRAVENOUS; SUBCUTANEOUS at 12:55

## 2018-11-26 RX ADMIN — PHENYLEPHRINE HYDROCHLORIDE 0.5 MCG/KG/MIN: 10 INJECTION, SOLUTION INTRAMUSCULAR; INTRAVENOUS; SUBCUTANEOUS at 13:30

## 2018-11-26 RX ADMIN — ROCURONIUM BROMIDE 20 MG: 10 INJECTION INTRAVENOUS at 13:16

## 2018-11-26 RX ADMIN — LIDOCAINE 1 PATCH: 560 PATCH PERCUTANEOUS; TOPICAL; TRANSDERMAL at 20:03

## 2018-11-26 RX ADMIN — FENTANYL CITRATE 50 MCG: 50 INJECTION, SOLUTION INTRAMUSCULAR; INTRAVENOUS at 12:23

## 2018-11-26 RX ADMIN — FENTANYL CITRATE 50 MCG: 50 INJECTION, SOLUTION INTRAMUSCULAR; INTRAVENOUS at 12:18

## 2018-11-26 RX ADMIN — PHENYLEPHRINE HYDROCHLORIDE 200 MCG: 10 INJECTION, SOLUTION INTRAMUSCULAR; INTRAVENOUS; SUBCUTANEOUS at 12:15

## 2018-11-26 RX ADMIN — ROCURONIUM BROMIDE 20 MG: 10 INJECTION INTRAVENOUS at 12:46

## 2018-11-26 RX ADMIN — ROCURONIUM BROMIDE 20 MG: 10 INJECTION INTRAVENOUS at 12:27

## 2018-11-26 RX ADMIN — ROCURONIUM BROMIDE 20 MG: 10 INJECTION INTRAVENOUS at 11:47

## 2018-11-26 RX ADMIN — Medication: at 16:39

## 2018-11-26 ASSESSMENT — PAIN DESCRIPTION - DESCRIPTORS
DESCRIPTORS: CRAMPING;DISCOMFORT
DESCRIPTORS: ACHING

## 2018-11-26 ASSESSMENT — ACTIVITIES OF DAILY LIVING (ADL): ADLS_ACUITY_SCORE: 15

## 2018-11-26 NOTE — OR NURSING
No Ann was placed in OR. Bladder scanned for 349 ml. Patient baseline urine status is frequent incontinence.

## 2018-11-26 NOTE — ANESTHESIA CARE TRANSFER NOTE
Patient: Lalitha Underwood    Procedure(s):  Laparoscopic Hiatal Hernia Repair,bilateral chest tubes  Esophagogastroduodenoscopy  PERCUTANEOUS INSERTION TUBE GASTROSTOMY    Diagnosis: Giant Hiatal Hernia  Diagnosis Additional Information: No value filed.    Anesthesia Type:   No value filed.     Note:  Airway :Face Mask  Patient transferred to:PACU  Handoff Report: Identifed the Patient, Identified the Reponsible Provider, Reviewed the pertinent medical history, Discussed the surgical course, Reviewed Intra-OP anesthesia mangement and issues during anesthesia, Set expectations for post-procedure period and Allowed opportunity for questions and acknowledgement of understanding      Vitals: (Last set prior to Anesthesia Care Transfer)    CRNA VITALS  11/26/2018 1541 - 11/26/2018 1611      11/26/2018             Resp Rate (observed): (!)  1                Electronically Signed By: DONAVAN Du CRNA  November 26, 2018  4:11 PM

## 2018-11-26 NOTE — PROGRESS NOTES
"SPIRITUAL HEALTH SERVICES  [facility] [address]   PRE-SURGERY VISIT      Had pre-surgery visit with Serene.    We shared a brief reflective conversation during which Serene explained that she was feeling particularly anxious because her daughter had  in this hospital a few years ago (\"If I don't make it through this I have someone waiting for me\").    She is also worried about her  who has early stage dementia and insisted on driving her here. She wants him to be able to leave hospital as soon as possible after she comes out of surgery.    Serene identifies as Advent. We prayed together for God to free her from anxiety and worry and to give her the strength and willingness to turn over her burdens to God's care.    Serene expects to be admitted and would appreciate another visit. I informed her to notify her bedside nurse of her request and a  would follow up.    Lito Knott  Chaplain Resident  Pager [phone]    "

## 2018-11-26 NOTE — IP AVS SNAPSHOT
MRN:[id number]                      After Visit Summary   11/26/2018    Mrs. Lalitha Underwood    MRN: [id number]           Thank you!     Thank you for choosing [facility] for your care. Our goal is always to provide you with excellent care. Hearing back from our patients is one way we can continue to improve our services. Please take a few minutes to complete the written survey that you may receive in the mail after you visit with us. Thank you!        Patient Information     Date Of Birth          1945        Designated Caregiver       Most Recent Value    Caregiver    Will someone help with your care after discharge? no      About your hospital stay     You were admitted on:  November 26, 2018 You last received care in the:  [address] [facility]    You were discharged on:  December 3, 2018       Who to Call     For medical emergencies, please call 911.  For non-urgent questions about your medical care, please call your primary care provider or clinic, [phone]  For questions related to your surgery, please call your surgery clinic        Attending Provider     Provider Specialty    Jasmeet Wilder MD Thoracic Diseases       Primary Care Provider Office Phone # Fax #    Kelly Hull [phone]      After Care Instructions     Discharge Instructions       THORACIC SURGERY DISCHARGE INSTRUCTIONS    DIET: Full liquid diet at time of discharge.  Follow post Nissen diet recommendations as discussed with dietician and detailed in provided handout    Take the medications in solution form provided by the pharmacy, after these solutions run out, you may return to taking them in tablet or capsule form from your home supply.     If your plans upon discharge include prolonged periods of sitting (i.e a lengthy car or plane ride), it is highly beneficial to get up and walk at least once per hour to help prevent swelling and blood clots.     You may remove chest tube dressing  "48 hours after tube removal and bandage the site at your own discretion thereafter.  Small amounts of leakage are normal for 2-3 days after removal.  Feel free to call with questions.    You may get incision wet 2 days after operation. Do not submerge, soak, or scrub incision or swim until seen in follow-up.    Take incentive spirometer home for continued frequent use    Activity as tolerated, no strenous activity until seen in follow-up, no lifting greater than 10 pounds for the next 8-10 weeks.    Stay hydrated. Take over the counter fiber (metamucil or benefiber) and stool softeners (Miralax, docusate or senna) if becoming constipated.     Call for fever greater than 101.5, chills, increased size of incision, red skin around incision, vision changes, muscle strength changes, sensation changes, shortness of breath, or other concerns.    No driving while taking narcotic pain medication.    Transition to ibuprofen or tylenol/acetaminophen for pain control. Do not take tylenol/acetaminophen and acetaminophen containing narcotic (e.g., percocet or vicodin) at the same time. If you have known ulcer problems, or kidney trouble (elevated creatinine) do not take the ibuprofen.    In emergencies, call 911    For other Questions or Concerns;   A.) During weekday working hours (Monday through Friday 8am to 4:30pm)   call [phone] and ask to speak to a clinical nurse specialist.     B.) At nights (after 4:30pm), on weekends, or if urgent call [phone] and   tell the  \"I would like to page job code [phone], the thoracic surgery   fellow on call, please.\"            Tubes       PEG TUBE INSTRUCTIONS:    Venting:  -You should vent or temporarily put your tube to gravity bag (or basin) drainage if you experience nausea or bloating.  This is important as it will help to protect the hernia repair.   If you are uncertain how to do this, please ask your nurse for instruction.    Skin care:  -Change the gauze " dressing around your PEG tube daily.  -Check for redness and swelling in the area where the tube goes into your skin.   -Keep the skin around your tube dry and with a split gauze under the flange. If the hole where the tube enters your body is draining fluid, you may need to put barrier cream or antibiotic cream on the skin around your tube after you are done cleaning it. Cover the area with bandages, and call your caregiver.   -If there are no stitches holding your PEG tube in place on your skin, gently turn the tube. This may decrease pressure on your skin, and help prevent an infection. Ask your caregiver if you should turn your PEG tube, and how to do it correctly.     Flushes:  -Flush your feeding tube with 30cc of water twice daily to ensure it does not become plugged  -If administering medications or tube feedings via your tube, make sure to flush with water immediately after use to prevent the tube from plugging                  Follow-up Appointments     Follow Up and recommended labs and tests       1.) Follow up with primary care physician, Kelly Hull, in 1-2 weeks.  2.) Follow up with a thoracic surgery Clinical Nurse Specialist in Thoracic Surgery clinic in 1 month, prior to which an upper GI should be performed.                  Additional Services     Home care nursing referral       _______________________  [facility]  Phone  [phone]  Fax  [phone]  ______________________    RN skilled nursing visit. RN to assess vital signs and weight, respiratory and cardiac status, pain level and activity tolerance, incision for signs/symptoms of infection, hydration, nutrition and bowel status and home safety.  RN to teach medication management.  RN to provide tube site care and management.    PT to eval and treat.    Your provider has ordered home care nursing services. If you have not been contacted within 2 days of your discharge please call the inpatient department phone number at  "[phone] .                  Future tests that were ordered for you     XR Upper GI without KUB                 Pending Results     No orders found from 2018 to 2018.            Statement of Approval     Ordered          18 1243  I have reviewed and agree with all the recommendations and orders detailed in this document.  EFFECTIVE NOW     Approved and electronically signed by:  Nuno Garcia PA-C             Admission Information     Date & Time Provider Department Dept. Phone    2018 Jasmeet Wilder MD [address] [facility] [phone]      Your Vitals Were     Blood Pressure Pulse Temperature Respirations Height Weight    149/56 (BP Location: Left arm) 83 97.6  F (36.4  C) (Oral) 16 1.626 m (5' 4\") 92.2 kg (203 lb 4.2 oz)    Pulse Oximetry BMI (Body Mass Index)                97% 34.89 kg/m2          [employer] Information     [facility] lets you send messages to your doctor, view your test results, renew your prescriptions, schedule appointments and more. To sign up, go to [email or web address] . Click on \"Log in\" on the left side of the screen, which will take you to the Welcome page. Then click on \"Sign up Now\" on the right side of the page.     You will be asked to enter the access code listed below, as well as some personal information. Please follow the directions to create your username and password.     Your access code is: [email or web address]  Expires: 2/3/2019  6:30 AM     Your access code will  in 90 days. If you need help or a new code, please call your [facility] clinic or [phone].        Care EveryWhere ID     This is your Care EveryWhere ID. This could be used by other organizations to access your [facility] medical records  [id number]        Equal Access to Services     [facility] MELLY NIELSEN: Jocelyn Cordero, waaxda luqadaha, qaybta kaalmada dewayne, kapil nielsen. So [facility] [phone].    ATENCIÓN: Si robertala español, " tiene a saxena disposición servicios gratuitos de asistencia lingüística. Derek arroyo [phone].    We comply with applicable federal civil rights laws and [place] laws. We do not discriminate on the basis of race, color, national origin, age, disability, sex, sexual orientation, or gender identity.               Review of your medicines      START taking        Dose / Directions    acetaminophen 500 MG tablet   Commonly known as:  TYLENOL   Used for:  Acute post-operative pain        Dose:  1000 mg   Take 2 tablets (1,000 mg) by mouth every 8 hours as needed for mild pain   Refills:  0       Lidocaine 4 % Patch   Commonly known as:  LIDOCARE   Used for:  Acute post-operative pain        Dose:  1 patch   Place 1 patch onto the skin every 24 hours Apply to point of maximal pain.  Do not place directly over incision.   Quantity:  10 patch   Refills:  0       methocarbamol 500 MG tablet   Commonly known as:  ROBAXIN   Used for:  Acute post-operative pain        Dose:  500 mg   Take 1 tablet (500 mg) by mouth 3 times daily as needed for muscle spasms Wean as tolerated starting at time of discharge   Quantity:  30 tablet   Refills:  0       ondansetron 4 MG ODT tab   Commonly known as:  ZOFRAN-ODT   Used for:  PONV (postoperative nausea and vomiting)        Dose:  4 mg   Take 1 tablet (4 mg) by mouth every 6 hours as needed for nausea or vomiting   Quantity:  10 tablet   Refills:  0       oxyCODONE 5 MG/5ML solution   Commonly known as:  ROXICODONE   Used for:  Acute post-operative pain        Dose:  2.5-5 mg   Take 2.5-5 mLs (2.5-5 mg) by mouth every 4 hours as needed for moderate to severe pain Wean narcotic use as tolerated starting at time of discharge   Quantity:  20 mL   Refills:  0       senna-docusate 8.6-50 MG tablet   Commonly known as:  SENOKOT-S/PERICOLACE   Used for:  Bowel habit changes        Dose:  1 tablet   Take 1 tablet by mouth 2 times daily Reduce dose or temporarily discontinue if having loose stools.    Quantity:  60 tablet   Refills:  0         CONTINUE these medicines which have NOT CHANGED        Dose / Directions    * albuterol 1.25 MG/3ML neb solution   Commonly known as:  ACCUNEB        Dose:  1.25 mg   Inhale 1.25 mg into the lungs as needed   Refills:  0       * albuterol 108 (90 Base) MCG/ACT inhaler   Commonly known as:  PROAIR HFA/PROVENTIL HFA/VENTOLIN HFA        Dose:  2 puff   Inhale 2 puffs into the lungs every 6 hours as needed   Refills:  0       Calcium Citrate 200 MG Tabs        Dose:  1 tablet   Take 1 tablet by mouth 2 times daily   Refills:  0       cholecalciferol 5000 units Tabs tablet   Commonly known as:  vitamin D3        Dose:  5000 Units   Take 5,000 Units by mouth daily (with lunch)   Refills:  0       denosumab 60 MG/ML Soln injection   Commonly known as:  PROLIA        Dose:  60 mg   Inject 60 mg Subcutaneous every 6 months   Refills:  0       fluticasone-salmeterol 250-50 MCG/DOSE inhaler   Commonly known as:  ADVAIR        Dose:  1 puff   Inhale 1 puff into the lungs 2 times daily   Refills:  0       hyoscyamine ER 0.375 MG 12 hr tablet   Commonly known as:  LEVBID        Dose:  0.375 mg   Take 0.375 mg by mouth 2 times daily ON HOLD SINCE 10/26/2018 WHEN SHE STARTED VESICARE   Refills:  0       losartan 50 MG tablet   Commonly known as:  COZAAR        Dose:  50 mg   Take 50 mg by mouth 2 times daily   Refills:  0       OTHER MEDICAL SUPPLIES        Dose:  1.5 L   1.5 L At Bedtime   Refills:  0       phenytoin 100 MG capsule   Commonly known as:  DILANTIN        Dose:  100-200 mg   Take 100-200 mg by mouth At Bedtime 100 MG IN THE MORNING   200 MG AT BEDTIME   Refills:  0       potassium chloride ER 20 MEQ CR tablet   Commonly known as:  K-DUR/KLOR-CON M        Take by mouth daily (with lunch)   Refills:  0       RABEprazole 20 MG EC tablet   Commonly known as:  ACIPHEX        Dose:  20 mg   Take 20 mg by mouth every morning   Refills:  0       solifenacin 5 MG tablet   Commonly  known as:  VESICARE        Dose:  5 mg   Take 5 mg by mouth every morning   Refills:  0       tiotropium 18 MCG inhaled capsule   Commonly known as:  SPIRIVA        Dose:  2 puff   Inhale 2 puffs into the lungs daily   Refills:  0       UNKNOWN TO PATIENT        Dose:  1 tablet   Take 1 tablet by mouth daily (with lunch) HAIR SUPPLEMENT   Refills:  0       * Notice:  This list has 2 medication(s) that are the same as other medications prescribed for you. Read the directions carefully, and ask your doctor or other care provider to review them with you.         Where to get your medicines      These medications were sent to [facility] [place], [place] - [address]  [address] [address]     Phone:  [phone]     Lidocaine 4 % Patch    methocarbamol 500 MG tablet    ondansetron 4 MG ODT tab    senna-docusate 8.6-50 MG tablet         Some of these will need a paper prescription and others can be bought over the counter. Ask your nurse if you have questions.     You don't need a prescription for these medications     acetaminophen 500 MG tablet         Information about where to get these medications is not yet available     ! Ask your nurse or doctor about these medications     oxyCODONE 5 MG/5ML solution                Protect others around you: Learn how to safely use, store and throw away your medicines at www.disposemymeds.org.        Information about OPIOIDS     PRESCRIPTION OPIOIDS: WHAT YOU NEED TO KNOW   We gave you an opioid (narcotic) pain medicine. It is important to manage your pain, but opioids are not always the best choice. You should first try all the other options your care team gave you. Take this medicine for as short a time (and as few doses) as possible.    Some activities can increase your pain, such as bandage changes or therapy sessions. It may help to take your pain medicine 30 to 60 minutes before these activities. Reduce your stress by  getting enough sleep, working on hobbies you enjoy and practicing relaxation or meditation. Talk to your care team about ways to manage your pain beyond prescription opioids.    These medicines have risks:    DO NOT drive when on new or higher doses of pain medicine. These medicines can affect your alertness and reaction times, and you could be arrested for driving under the influence (DUI). If you need to use opioids long-term, talk to your care team about driving.    DO NOT operate heavy machinery    DO NOT do any other dangerous activities while taking these medicines.    DO NOT drink any alcohol while taking these medicines.     If the opioid prescribed includes acetaminophen, DO NOT take with any other medicines that contain acetaminophen. Read all labels carefully. Look for the word  acetaminophen  or  Tylenol.  Ask your pharmacist if you have questions or are unsure.    You can get addicted to pain medicines, especially if you have a history of addiction (chemical, alcohol or substance dependence). Talk to your care team about ways to reduce this risk.    All opioids tend to cause constipation. Drink plenty of water and eat foods that have a lot of fiber, such as fruits, vegetables, prune juice, apple juice and high-fiber cereal. Take a laxative (Miralax, milk of magnesia, Colace, Senna) if you don t move your bowels at least every other day. Other side effects include upset stomach, sleepiness, dizziness, throwing up, tolerance (needing more of the medicine to have the same effect), physical dependence and slowed breathing.    Store your pills in a secure place, locked if possible. We will not replace any lost or stolen medicine. If you don t finish your medicine, please throw away (dispose) as directed by your pharmacist. The [place] Pollution [employer] has more information about safe disposal: https://www.pca.[facility].mn.us/living-green/managing-unwanted-medications             Medication List: This is  a list of all your medications and when to take them. Check marks below indicate your daily home schedule. Keep this list as a reference.      Medications           Morning Afternoon Evening Bedtime As Needed    acetaminophen 500 MG tablet   Commonly known as:  TYLENOL   Take 2 tablets (1,000 mg) by mouth every 8 hours as needed for mild pain   Last time this was given:  1,000 mg on 12/3/2018  8:07 AM                                * albuterol 1.25 MG/3ML neb solution   Commonly known as:  ACCUNEB   Inhale 1.25 mg into the lungs as needed                                * albuterol 108 (90 Base) MCG/ACT inhaler   Commonly known as:  PROAIR HFA/PROVENTIL HFA/VENTOLIN HFA   Inhale 2 puffs into the lungs every 6 hours as needed   Last time this was given:  2 puffs on 11/30/2018  7:16 AM                                Calcium Citrate 200 MG Tabs   Take 1 tablet by mouth 2 times daily                                cholecalciferol 5000 units Tabs tablet   Commonly known as:  vitamin D3   Take 5,000 Units by mouth daily (with lunch)                                denosumab 60 MG/ML Soln injection   Commonly known as:  PROLIA   Inject 60 mg Subcutaneous every 6 months                                fluticasone-salmeterol 250-50 MCG/DOSE inhaler   Commonly known as:  ADVAIR   Inhale 1 puff into the lungs 2 times daily                                hyoscyamine ER 0.375 MG 12 hr tablet   Commonly known as:  LEVBID   Take 0.375 mg by mouth 2 times daily ON HOLD SINCE 10/26/2018 WHEN SHE STARTED VESICARE                                Lidocaine 4 % Patch   Commonly known as:  LIDOCARE   Place 1 patch onto the skin every 24 hours Apply to point of maximal pain.  Do not place directly over incision.   Last time this was given:  1 patch on 12/3/2018  8:11 AM                                losartan 50 MG tablet   Commonly known as:  COZAAR   Take 50 mg by mouth 2 times daily   Last time this was given:  50 mg on 12/3/2018  8:09 AM                                 methocarbamol 500 MG tablet   Commonly known as:  ROBAXIN   Take 1 tablet (500 mg) by mouth 3 times daily as needed for muscle spasms Wean as tolerated starting at time of discharge   Last time this was given:  500 mg on 12/3/2018  8:09 AM                                ondansetron 4 MG ODT tab   Commonly known as:  ZOFRAN-ODT   Take 1 tablet (4 mg) by mouth every 6 hours as needed for nausea or vomiting   Last time this was given:  4 mg on 11/27/2018 10:59 AM                                OTHER MEDICAL SUPPLIES   1.5 L At Bedtime                                oxyCODONE 5 MG/5ML solution   Commonly known as:  ROXICODONE   Take 2.5-5 mLs (2.5-5 mg) by mouth every 4 hours as needed for moderate to severe pain Wean narcotic use as tolerated starting at time of discharge   Last time this was given:  5 mg on 12/3/2018  1:28 AM                                phenytoin 100 MG capsule   Commonly known as:  DILANTIN   Take 100-200 mg by mouth At Bedtime 100 MG IN THE MORNING   200 MG AT BEDTIME   Last time this was given:  100 mg on 12/3/2018  8:09 AM                                potassium chloride ER 20 MEQ CR tablet   Commonly known as:  K-DUR/KLOR-CON M   Take by mouth daily (with lunch)                                RABEprazole 20 MG EC tablet   Commonly known as:  ACIPHEX   Take 20 mg by mouth every morning                                senna-docusate 8.6-50 MG tablet   Commonly known as:  SENOKOT-S/PERICOLACE   Take 1 tablet by mouth 2 times daily Reduce dose or temporarily discontinue if having loose stools.                                solifenacin 5 MG tablet   Commonly known as:  VESICARE   Take 5 mg by mouth every morning                                tiotropium 18 MCG inhaled capsule   Commonly known as:  SPIRIVA   Inhale 2 puffs into the lungs daily                                UNKNOWN TO PATIENT   Take 1 tablet by mouth daily (with lunch) HAIR SUPPLEMENT                                 * Notice:  This list has 2 medication(s) that are the same as other medications prescribed for you. Read the directions carefully, and ask your doctor or other care provider to review them with you.

## 2018-11-26 NOTE — OR NURSING
Dr. Navarro of anesthesia notified that patient meets PACU discharge criteria. Per Dr. Navarro, patient okay to transfer to 6B and he will place formal sign out when able.

## 2018-11-26 NOTE — IP AVS SNAPSHOT
[address] [address]    Phone:  [phone]                                       After Visit Summary   11/26/2018    Mrs. Lalitha Underwood    MRN: [id number]           After Visit Summary Signature Page     I have received my discharge instructions, and my questions have been answered. I have discussed any challenges I see with this plan with the nurse or doctor.    ..........................................................................................................................................  Patient/Patient Representative Signature      ..........................................................................................................................................  Patient Representative Print Name and Relationship to Patient    ..................................................               ................................................  Date                                   Time    ..........................................................................................................................................  Reviewed by Signature/Title    ...................................................              ..............................................  Date                                               Time          22EPIC Rev 08/18

## 2018-11-26 NOTE — OR NURSING
"Dr. John Linares reviewed patients chest xray and stated \"the chest xray looks good and she can go to the floor.\"  "

## 2018-11-26 NOTE — BRIEF OP NOTE
[facility], [place]    Brief Operative Note    Pre-operative diagnosis: Giant Hiatal Hernia  Post-operative diagnosis Giant Hiatal Hernia  Procedure: Procedure(s):  Laparoscopic Hiatal Hernia Repair,bilateral chest tubes  Esophagogastroduodenoscopy  PERCUTANEOUS INSERTION TUBE GASTROSTOMY  Surgeon: Surgeon(s) and Role:     * Jasmeet Wilder MD - Primary     * Sepideh Mcadams MD - Resident - Assisting     * Gin Newby MD - Assisting  Anesthesia: General   Estimated blood loss: 50 mL  Drains: None  Specimens:   ID Type Source Tests Collected by Time Destination   A : liver biopsy Biopsy Liver SURGICAL PATHOLOGY EXAM Jasmeet Wilder MD 11/26/2018  2:44 PM      Findings:   Colon and stomach in hernia. Biopsy of liver lesion, frozen, returned benign.  Complications: None.  Implants: None.

## 2018-11-27 ENCOUNTER — APPOINTMENT (OUTPATIENT)
Dept: OCCUPATIONAL THERAPY | Facility: CLINIC | Age: 73
DRG: 328 | End: 2018-11-27
Attending: THORACIC SURGERY (CARDIOTHORACIC VASCULAR SURGERY)
Payer: MEDICARE

## 2018-11-27 ENCOUNTER — APPOINTMENT (OUTPATIENT)
Dept: PHYSICAL THERAPY | Facility: CLINIC | Age: 73
DRG: 328 | End: 2018-11-27
Attending: THORACIC SURGERY (CARDIOTHORACIC VASCULAR SURGERY)
Payer: MEDICARE

## 2018-11-27 ENCOUNTER — APPOINTMENT (OUTPATIENT)
Dept: GENERAL RADIOLOGY | Facility: CLINIC | Age: 73
DRG: 328 | End: 2018-11-27
Attending: SURGERY
Payer: MEDICARE

## 2018-11-27 LAB
ALBUMIN SERPL-MCNC: 3 G/DL (ref 3.4–5)
ALP SERPL-CCNC: 71 U/L (ref 40–150)
ALT SERPL W P-5'-P-CCNC: 87 U/L (ref 0–50)
ANION GAP SERPL CALCULATED.3IONS-SCNC: 9 MMOL/L (ref 3–14)
AST SERPL W P-5'-P-CCNC: 98 U/L (ref 0–45)
BILIRUB SERPL-MCNC: 0.3 MG/DL (ref 0.2–1.3)
BUN SERPL-MCNC: 15 MG/DL (ref 7–30)
CALCIUM SERPL-MCNC: 8.2 MG/DL (ref 8.5–10.1)
CHLORIDE SERPL-SCNC: 105 MMOL/L (ref 94–109)
CO2 SERPL-SCNC: 26 MMOL/L (ref 20–32)
CREAT SERPL-MCNC: 0.59 MG/DL (ref 0.52–1.04)
ERYTHROCYTE [DISTWIDTH] IN BLOOD BY AUTOMATED COUNT: 14.3 % (ref 10–15)
GFR SERPL CREATININE-BSD FRML MDRD: >90 ML/MIN/1.7M2
GLUCOSE SERPL-MCNC: 118 MG/DL (ref 70–99)
HCT VFR BLD AUTO: 38.1 % (ref 35–47)
HGB BLD-MCNC: 12.6 G/DL (ref 11.7–15.7)
LACTATE BLD-SCNC: 1.4 MMOL/L (ref 0.7–2)
MCH RBC QN AUTO: 30.6 PG (ref 26.5–33)
MCHC RBC AUTO-ENTMCNC: 33.1 G/DL (ref 31.5–36.5)
MCV RBC AUTO: 93 FL (ref 78–100)
PLATELET # BLD AUTO: 188 10E9/L (ref 150–450)
POTASSIUM SERPL-SCNC: 5 MMOL/L (ref 3.4–5.3)
PROT SERPL-MCNC: 6.2 G/DL (ref 6.8–8.8)
RADIOLOGIST FLAGS: NORMAL
RBC # BLD AUTO: 4.12 10E12/L (ref 3.8–5.2)
SODIUM SERPL-SCNC: 140 MMOL/L (ref 133–144)
WBC # BLD AUTO: 17.5 10E9/L (ref 4–11)

## 2018-11-27 PROCEDURE — A9270 NON-COVERED ITEM OR SERVICE: HCPCS | Performed by: SURGERY

## 2018-11-27 PROCEDURE — 25000132 ZZH RX MED GY IP 250 OP 250 PS 637: Performed by: SURGERY

## 2018-11-27 PROCEDURE — 40000133 ZZH STATISTIC OT WARD VISIT: Performed by: OCCUPATIONAL THERAPIST

## 2018-11-27 PROCEDURE — 97165 OT EVAL LOW COMPLEX 30 MIN: CPT | Mod: GO | Performed by: OCCUPATIONAL THERAPIST

## 2018-11-27 PROCEDURE — 97535 SELF CARE MNGMENT TRAINING: CPT | Mod: GO | Performed by: OCCUPATIONAL THERAPIST

## 2018-11-27 PROCEDURE — 83605 ASSAY OF LACTIC ACID: CPT | Performed by: THORACIC SURGERY (CARDIOTHORACIC VASCULAR SURGERY)

## 2018-11-27 PROCEDURE — 71046 X-RAY EXAM CHEST 2 VIEWS: CPT

## 2018-11-27 PROCEDURE — 25000128 H RX IP 250 OP 636: Performed by: SURGERY

## 2018-11-27 PROCEDURE — 25000132 ZZH RX MED GY IP 250 OP 250 PS 637

## 2018-11-27 PROCEDURE — 80053 COMPREHEN METABOLIC PANEL: CPT | Performed by: SURGERY

## 2018-11-27 PROCEDURE — 97530 THERAPEUTIC ACTIVITIES: CPT | Mod: GP

## 2018-11-27 PROCEDURE — 12000006 ZZH R&B IMCU INTERMEDIATE UMMC

## 2018-11-27 PROCEDURE — 25000132 ZZH RX MED GY IP 250 OP 250 PS 637: Performed by: STUDENT IN AN ORGANIZED HEALTH CARE EDUCATION/TRAINING PROGRAM

## 2018-11-27 PROCEDURE — A9270 NON-COVERED ITEM OR SERVICE: HCPCS

## 2018-11-27 PROCEDURE — 85027 COMPLETE CBC AUTOMATED: CPT | Performed by: SURGERY

## 2018-11-27 PROCEDURE — 83605 ASSAY OF LACTIC ACID: CPT

## 2018-11-27 PROCEDURE — 25000131 ZZH RX MED GY IP 250 OP 636 PS 637: Performed by: SURGERY

## 2018-11-27 PROCEDURE — 40000193 ZZH STATISTIC PT WARD VISIT

## 2018-11-27 PROCEDURE — 36416 COLLJ CAPILLARY BLOOD SPEC: CPT | Performed by: SURGERY

## 2018-11-27 PROCEDURE — 97162 PT EVAL MOD COMPLEX 30 MIN: CPT | Mod: GP

## 2018-11-27 RX ORDER — LOSARTAN POTASSIUM 50 MG/1
50 TABLET ORAL DAILY
Status: DISCONTINUED | OUTPATIENT
Start: 2018-11-27 | End: 2018-12-03 | Stop reason: HOSPADM

## 2018-11-27 RX ORDER — LABETALOL HYDROCHLORIDE 5 MG/ML
10 INJECTION, SOLUTION INTRAVENOUS EVERY 4 HOURS PRN
Status: DISCONTINUED | OUTPATIENT
Start: 2018-11-27 | End: 2018-12-03 | Stop reason: HOSPADM

## 2018-11-27 RX ADMIN — PHENYTOIN SODIUM 100 MG: 100 CAPSULE, EXTENDED RELEASE ORAL at 07:47

## 2018-11-27 RX ADMIN — UMECLIDINIUM 1 PUFF: 62.5 AEROSOL, POWDER ORAL at 07:47

## 2018-11-27 RX ADMIN — LOSARTAN POTASSIUM 50 MG: 50 TABLET ORAL at 18:23

## 2018-11-27 RX ADMIN — PANTOPRAZOLE SODIUM 40 MG: 40 TABLET, DELAYED RELEASE ORAL at 07:47

## 2018-11-27 RX ADMIN — ENOXAPARIN SODIUM 40 MG: 40 INJECTION SUBCUTANEOUS at 10:53

## 2018-11-27 RX ADMIN — ONDANSETRON 4 MG: 4 TABLET, ORALLY DISINTEGRATING ORAL at 10:59

## 2018-11-27 RX ADMIN — PHENYTOIN SODIUM 200 MG: 100 CAPSULE ORAL at 22:17

## 2018-11-27 RX ADMIN — LIDOCAINE 1 PATCH: 560 PATCH PERCUTANEOUS; TOPICAL; TRANSDERMAL at 08:02

## 2018-11-27 RX ADMIN — FLUTICASONE FUROATE AND VILANTEROL TRIFENATATE 1 PUFF: 200; 25 POWDER RESPIRATORY (INHALATION) at 07:47

## 2018-11-27 ASSESSMENT — PAIN DESCRIPTION - DESCRIPTORS
DESCRIPTORS: ACHING

## 2018-11-27 ASSESSMENT — ACTIVITIES OF DAILY LIVING (ADL)
ADLS_ACUITY_SCORE: 11
TRANSFERRING: 0-->INDEPENDENT
SWALLOWING: 0-->SWALLOWS FOODS/LIQUIDS WITHOUT DIFFICULTY
TOILETING: 0-->INDEPENDENT
RETIRED_COMMUNICATION: 0-->UNDERSTANDS/COMMUNICATES WITHOUT DIFFICULTY
COGNITION: 0 - NO COGNITION ISSUES REPORTED
ADLS_ACUITY_SCORE: 11
DRESS: 0-->INDEPENDENT
ADLS_ACUITY_SCORE: 11
AMBULATION: 0-->INDEPENDENT
RETIRED_EATING: 0-->INDEPENDENT
ADLS_ACUITY_SCORE: 15
BATHING: 0-->INDEPENDENT
FALL_HISTORY_WITHIN_LAST_SIX_MONTHS: NO
ADLS_ACUITY_SCORE: 11
ADLS_ACUITY_SCORE: 12

## 2018-11-27 NOTE — PROGRESS NOTES
"POST OP CHECK  11/26/2018    Lalitha Underwood is a 73 year old female with h/o Type IV hiatal hernia now POD #0 s/p esophagogastroscopy, laparoscopic repair of hiatal hernia, liver biopsy, gastrostomy tube placement, and bilateral tube thoracostomies.     Pt reports pain is controlled. Having one episode of incontinence, now having urinary retention and will have straight cath x1. No new issues.    /72  Pulse 90  Temp 97.4  F (36.3  C) (Oral)  Resp 21  Ht 1.626 m (5' 4\")  Wt 93.5 kg (206 lb 2.1 oz)  SpO2 94%  BMI 35.38 kg/m2  General: Alert, interactive, & in NAD  Resp: Non-labored on 3L NC  Cardiac: Regular rate; extremities warm, well perfused  Abdomen: Soft, appropriately tender, non-distended  Incision: c/d/i withouth erythema, warmth, or discharge.   Extremities: No LE edema or obvious joint abnormalities    EBL 50      A/P: No acute post-op issues. Continue plan of care per primary team. Please call with questions.     - straight cath     Carol De La Cruz MD  General Surgery PGY-1  [phone]      "

## 2018-11-27 NOTE — OP NOTE
Preoperative diagnosis: Hiatal hernia type IV  Postoperative diagnosis: Hiatal hernia type IV  Procedure:   1) Esophagogastroscopy  2) Laparoscopic repair of hiatal hernia    3) Liver biopsy  4) Gastrostomy tube placement (20 Fr)  5) Bilateral tube thoracostomies (19 Fr Graeme)   Anesthesia: General   Surgeon: Jasmeet Wilder (present and participated in the entire procedure)  Co-surgeon: Gin Newby (Dr. Newby assisted with the hernia sac reduction and performed intraoperative endoscopy, since this was a very challenging case and required 2 staff surgeons for these portions despite the presence of a resident)  Resident surgeon: Sepideh Linares  EBL: 50 ml  Complications: none immediate  Findings:  Endoscopy: normal mucosa, z-line below hiatus by about 1.5 cm after reduction of hernia  Laparoscopy: fatty-appearing nodule in the right lobe of the liver, biopsy revealed no malignancy on frozen section. The liver was severely enlarged and we needed 2 liver retractors to do the case. Overall a very challenging case due to obesity, severe hepatomegaly, a seemingly small peritoneal cavity, only a modestly enlarged hiatus, and a very large hernia sac. Additionally, she had a 11 cm diameter mesh at the site of a previous umbilical hernia repair, we avoided the mesh with our ports and placed the g-tube at a site remote to the mesh (to minimize potential contamination, we placed the PEG tube through a purse-string).  Hernia: >50% of stomach  G-tube: PEG with pursestring     Description of procedure  We secured  in the supine position, with a foot-board and prepared and draped the area. We used a 5-port approach and placed the first port using an open technique with fascial sutures for eventual closure. We had to place 3 more ports for an additional liver retractor and additional assistant ports for exposure. We placed the ports in such a way as to avoid the umbilical mesh. The remainder of the ports were placed under  laparoscopic guidance and the used a Kb liver retractor.    We then took down the gastrohepatic ligament and short gastric vessels and dissected the hiatus circumferentially, preserving the peritoneal lining on the crura. Next, we dissected the esophagus high up into the mediastinum to the level of the inferior pulmonary veins and taking great care to preserve the vagus nerves. We mobilized the gastroesophageal fat pad and off the GE junction to clearly identify it and closed the hiatus with a total of 3 stitches (# 2 Ticron with pledgets, 2 stitches posteriorly and one anteriorly).  We verified with a 52 dilator in place that there was sufficient room to comfortably pass a laparoscopic grasper alongside the esophagus and that the approximation was sufficient.     Next, we took two small bites out of the liver nodule (after conferring intra-operatively with Dr. Weeks) and sent them for frozen section (no malignancy). We easily controlled bleeding from the site with cautery and surgical.     G-tube: We then placed a purse-string on the anterior wall of the stomach and placed a G-tube (PEG) with endoscopic and laparoscopic guidance then tightened the purse-string.  Finally, we placed 2 19 Fr. Graeme drains through a subxiphoid incision, one into each pleural cavity and secured them.  We closed the incisions with absorbable sutures.

## 2018-11-27 NOTE — PROGRESS NOTES
"Thoracic Surgery Progress Note  11/27/2018    Stable overnight. Pain controlled with dilaudid PCA. Weaned from 4L to 3L NC. Tolerating clears. Had to be straight cathed yesterday. Good UOP since midnight.     /83 (BP Location: Left arm)  Pulse 90  Temp 97.4  F (36.3  C) (Axillary)  Resp 18  Ht 1.626 m (5' 4\")  Wt 92.3 kg (203 lb 6.4 oz)  SpO2 97%  BMI 34.91 kg/m2     since midnight  Chest tubes 180, 470 since midnight    PE  NAD  NLB on 3L  Abd incisions c/d/i, Gtube to gravity, chest tubes to -20 suction, placed to waterseal  Ext wwp    Labs  WBC 17.5  Hg 12.6  Cr 0.59  Albumin 3  ALT 87  AST 98  Tbili 0.3    A/P: 73F with baseline COPD on 1.5 L NC at night, POD1 s/p paraesophageal hernia repair. Stable.    -continue PCA, lidocaine patch, will hold off on tylenol 2/2 transaminitis  -encourage IS, continue chest tubes to waterseal  -continue G tube to gravity, will plan to clamp tomorrow  -clears  -monitor leukocytosis, afebrile, likely normal post-surgical response  -no abx  -monitor UOP  -lovenox ppx  -ambulate     Discussed with staff, Dr. Wilder.    Sepideh Linares MD PGY3  General Surgery    "

## 2018-11-27 NOTE — PROGRESS NOTES
STAFF ADDENDUM:  I saw and evaluated Ms. Underwood and agree with the resident s findings and plan of care as documented in the resident s note and edited by me, as applicable.      In summary, Ms. Underwood looks good this morning. We will work on ambulation. She needs prophylactic enoxaparin due to her COPD.  The patient had all questions answered and was in agreement with the plan.  Jasmeet Wilder MD

## 2018-11-27 NOTE — PLAN OF CARE
Problem: Patient Care Overview  Goal: Plan of Care/Patient Progress Review  Outcome: No Change  Admission          11/26/2018  7PM  -----------------------------------------------------------  Reason for admission: lap hernia repair  Primary team notified of pt arrival.  Admitted from: PACU  Via: bed  Accompanied by: family  Belongings: Placed in closet; valuables sent home with family  Admission Profile: complete  Teaching: orientation to unit and call light- call light within reach, call don't fall, use of console, meal times, when to call for the RN, and enforced importance of safety   Access: PIV  Telemetry:Placed on pt      Neuro: A&Ox4.   Cardiac: SR. VSS. BP 140s-150s SBP  Respiratory: Sating 92-94% on 4 L NC. Pt is on 1.5 L NC at home at night  GI/: BS active, denied flatus, pt unable to void after 4 hours, bladder scanned for 286, md aware. LBM 11/26  Diet/appetite: Tolerating clear liquid diet, denied nausea  Activity:  Assist of 2 to chair. Sat in chair for about 3 hours  Pain: pt c/o lower back pain, lidocaine patches ordered, PCA encouraged  Skin: 9 lap sites CDI, CT sites CDI, dressing changed.   LDA's: Bilateral CT to suction, known air leak.     Plan: Continue with POC. Notify primary team with changes.    Temp:  [97.4  F (36.3  C)-99  F (37.2  C)] 97.4  F (36.3  C)  Pulse:  [90] 90  Heart Rate:  [85-97] 85  Resp:  [16-24] 19  BP: (134-182)/() 143/78  MAP:  [135 mmHg-152 mmHg] 152 mmHg  Arterial Line BP: (154-158)/(117-146) 158/146  SpO2:  [93 %-100 %] 100 %

## 2018-11-27 NOTE — PLAN OF CARE
Problem: Patient Care Overview  Goal: Plan of Care/Patient Progress Review  Discharge Planner OT   Patient plan for discharge: rehab  Current status: Pt required Min A for bed mobility, standing and commode tx. Max A for LE dressing and UE dressing.   Barriers to return to prior living situation: UE ROM, post surgical precautions, pain  Recommendations for discharge: TCU  Rationale for recommendations: to increase ADL I and tolerance       Entered by: Edgar Feldman 11/27/2018 4:21 PM

## 2018-11-27 NOTE — PLAN OF CARE
Problem: Patient Care Overview  Goal: Plan of Care/Patient Progress Review    PT 6B / Discharge Planner PT   Patient plan for discharge: not discussed today  Current status: PT evaluation completed, patient limited by significant pain with mobility, utilizing PCA pump. Supine>sit with mod A, sit<>stand with min/mod A, ambulated ~10 ft in room with FWW + min A. VSS on 2-4L NC.   Barriers to return to prior living situation: medical needs, pain control, level of assist for mobility  Recommendations for discharge: currently recommend TCU, however significantly limited by pain during session today, will continue to assess mobility and update recs as appropriate  Rationale for recommendations: Patient is requiring assistance for all mobility and has decreased tolerance for activity due to pain.        Entered by: Terrence Gallardo 11/27/2018 3:16 PM

## 2018-11-27 NOTE — PROGRESS NOTES
"SPIRITUAL HEALTH SERVICES  SPIRITUAL ASSESSMENT Progress Note  [facility] Unit 6B   ON-CALL VISIT    REFERRAL SOURCE: Colleague recommendation/Initial unit  visit     After reviewing documentation, I visited patient, Serene Underwood to introduce myself as unit  and orient to the role of SHS on 6B.  Lito Knott had also suggested the value of a visit, as Serene had been anxious when he made a surgical prayer visit prior to her surgery yesterday. Serene told me she was still somewhat anxious, but grateful that \"the worst part is over.\" Serene asked for prayer, which I provided. I also offered information on how she can access additional SHS/ support, if desired.     PLAN: Will advise tomorrow's on-call  of patient's situation, should she request a visit during that time.    Joe Huber  Chaplain Resident  Pager [phone]    "

## 2018-11-27 NOTE — PROGRESS NOTES
"Neuro: A&Ox4. Lethargic at times.  Cardiac: SR. /77 (BP Location: Left arm)  Pulse 90  Temp 97.2  F (36.2  C) (Axillary)  Resp 18  Ht 1.626 m (5' 4\")  Wt 92.3 kg (203 lb 6.4 oz)  SpO2 100%  BMI 34.91 kg/m2   Respiratory: Sating >95% on 2-3L NC. CAPNO on. Pt desats to mid 80's while in pain. Encouraged IS and deep breathing.  GI/: Adequate urine output. No incontinence. Able to void on own. No BM this shift.  Diet/appetite: Tolerating clear liquids.  Activity:  Assist of 2, dangled feet at bedside.  Pain: Tolerable with discomfort. Main complaint of back pain, heat applied- T pump ordered. PCA dilaudid.  Skin: 9 lap sites, CT sites CDI  LDA's: Bilateral CT to suction, known air leak. PEG to gravity.    Plan: Continue with POC. Notify primary team with changes.  "

## 2018-11-27 NOTE — PLAN OF CARE
Neuro: A&Ox4.   Cardiac: SR. VSS. BP elevated (thoracic notified this AM, cozaar restarted this evening)   Respiratory: Sating > 95% on 1-2 L NC. Encourage IS Q 1 hr  GI/: Adequate urine output. Up to commode, encourage oral hydration. No BM. zofran PRN nausea.   Diet/appetite: Tolerating clears..  Activity:  Assist of 1 and walker, up to chair and in halls.  Pain: At acceptable level on current regimen. Dilaudid PCA and heating pad   Skin: No new deficits noted.  LDA's: CT to water, G-tube to gravity     Plan: Continue with POC. Notify primary team with changes.

## 2018-11-27 NOTE — PROGRESS NOTES
"Clinical Nutrition Services- Brief Note    Reviewed nursing screen for \"tube feeding or parenteral nutrition (PEG) placed\". PEG was placed for decompression only and no plan to use for feeding at this time. Pt is tolerating a clear liquid diet, eating well per nursing documentation and patient report. No nutrition issues identified at this time. RD will continue to follow per nutrition protocol.  Madelin Brito RD, MS, LD  6B- Pager: [phone]      "

## 2018-11-27 NOTE — PLAN OF CARE
Problem: Chronic Respiratory Difficulty Comorbidity  Goal: Chronic Respiratory Difficulty  Patient comorbidity will be monitored for signs and symptoms of Respiratory Difficulty (Chronic) condition.  Problems will be absent, minimized or managed by discharge/transition of care.  Outcome: No Change  Capnography monitoring

## 2018-11-27 NOTE — ANESTHESIA POSTPROCEDURE EVALUATION
Anesthesia POST Procedure Evaluation    Patient: Lalitha Underwood   MRN:     [id number] Gender:   female   Age:    73 year old :      1945        Preoperative Diagnosis: Giant Hiatal Hernia   Procedure(s):  Laparoscopic Hiatal Hernia Repair,bilateral chest tubes  Esophagogastroduodenoscopy  PERCUTANEOUS INSERTION TUBE GASTROSTOMY   Postop Comments: No value filed.       Anesthesia Type:  Not documented    Reportable Event: NO     PAIN: Uncomplicated   Sign Out status: Comfortable, Well controlled pain     PONV: No PONV   Sign Out status:  No Nausea or Vomiting     Neuro/Psych: Uneventful perioperative course   Sign Out Status: Preoperative baseline; Age appropriate mentation     Airway/Resp.: Uneventful perioperative course   Sign Out Status: Non labored breathing, age appropriate RR; Resp. Status within EXPECTED Parameters     CV: Uneventful perioperative course   Sign Out status: Appropriate BP and perfusion indices; Appropriate HR/Rhythm     Disposition:   Sign Out in:  PACU  Recovery Course: Uneventful  Follow-Up: Not required           Last Anesthesia Record Vitals:  CRNA VITALS  2018 1541 - 2018 1641      2018             NIBP: (!)  121/94    Ht Rate: 95          Last PACU/Preop Vitals:  Vitals:    18 1854 18 1900 18   BP: 162/70 137/76 134/73   Pulse:      Resp: 22 20 22   Temp: 36.4  C (97.5  F) 36.4  C (97.6  F) 36.6  C (97.9  F)   SpO2: 95% 95% 96%         Electronically Signed By: Robert Navarro MD, 2018, 9:28 PM

## 2018-11-27 NOTE — PROGRESS NOTES
11/27/18 1514   Quick Adds   Type of Visit Initial Occupational Therapy Evaluation   Living Environment   Lives With spouse   Living Arrangements ([facility])   Home Accessibility tub/shower is not walk in;grab bars present (bathtub)   Number of Stairs to Enter Home 0   Number of Stairs Within Home 0   Transportation Available car;family or friend will provide   Living Environment Comment  is home to A   Self-Care   Dominant Hand right   Usual Activity Tolerance moderate   Current Activity Tolerance fair   Regular Exercise no   Equipment Currently Used at Home walker, rolling  (has walker from previous surgery)   Functional Level Prior   Ambulation 0-->independent   Transferring 0-->independent   Toileting 0-->independent   Bathing 0-->independent   Dressing 0-->independent   Eating 0-->independent   Communication 0-->understands/communicates without difficulty   Swallowing 0-->swallows foods/liquids without difficulty   Cognition 0 - no cognition issues reported   Fall history within last six months no   Which of the above functional risks had a recent onset or change? none   General Information   Onset of Illness/Injury or Date of Surgery - Date 11/26/18   Referring Physician John Linares, Sepideh Parish MD   Patient/Family Goals Statement to get home and be Ind   Additional Occupational Profile Info/Pertinent History of Current Problem 73F with baseline COPD on 1.5 L NC at night, POD1 s/p paraesophageal hernia repair. Stable.   Precautions/Limitations abdominal precautions  (2 chest tubes)   Cognitive Status Examination   Orientation orientation to person, place and time   Level of Consciousness alert   Visual Perception   Visual Perception No deficits were identified   Sensory Examination   Sensory Comments Pt reports baseline numbness in feet   Pain Assessment   Patient Currently in Pain Yes, see Vital Sign flowsheet   Integumentary/Edema   Integumentary/Edema no deficits were identifed   Range of Motion (ROM)    ROM Comment below baseline, shoulder flexion under 90 deg   Strength   Strength Comments NT 2/2 pain   Muscle Tone Assessment   Muscle Tone Comments WNL   Coordination   Upper Extremity Coordination No deficits were identified   Transfer Skill: Sit to Stand   Level of [place]: Sit/Stand minimum assist (75% patients effort)   Upper Body Dressing   Level of [place]: Dress Upper Body moderate assist (50% patients effort)   Lower Body Dressing   Level of [place]: Dress Lower Body maximum assist (25% patients effort)   Toileting   Level of [place]: Toilet minimum assist (75% patients effort)   Grooming   Level of [place]: Grooming stand-by assist   Instrumental Activities of Daily Living (IADL)   Previous Responsibilities (Pt had A from  and DTR in law)   Activities of Daily Living Analysis   Impairments Contributing to Impaired Activities of Daily Living pain;post surgical precautions;ROM decreased   General Therapy Interventions   Planned Therapy Interventions ADL retraining;progressive activity/exercise;home program guidelines   Clinical Impression   Criteria for Skilled Therapeutic Interventions Met yes, treatment indicated   OT Diagnosis decreased ADL I and tolerance   Influenced by the following impairments pain, post surgical precautions   Assessment of Occupational Performance 3-5 Performance Deficits   Identified Performance Deficits home mgmt, leisure, dressing, bathing   Clinical Decision Making (Complexity) Low complexity   Therapy Frequency 5 times/wk   Predicted Duration of Therapy Intervention (days/wks) 12/04/18   Anticipated Equipment Needs at Discharge (TBD)   Anticipated Discharge Disposition Transitional Care Facility   Risks and Benefits of Treatment have been explained. Yes   Patient, Family & other staff in agreement with plan of care Yes   Clinical Impression Comments Pt may benefit from skilled OT to help increase ADL I and tolerance   [facility] AM-PAC TM  "\"6 Clicks\"   2016, Trustees of [facility], under license to [employer].  All rights reserved.   6 Clicks Short Forms Daily Activity Inpatient Short Form   [facility] AM-PAC  \"6 Clicks\" Daily Activity Inpatient Short Form   1. Putting on and taking off regular lower body clothing? 2 - A Lot   2. Bathing (including washing, rinsing, drying)? 2 - A Lot   3. Toileting, which includes using toilet, bedpan or urinal? 3 - A Little   4. Putting on and taking off regular upper body clothing? 2 - A Lot   5. Taking care of personal grooming such as brushing teeth? 3 - A Little   6. Eating meals? 3 - A Little   Daily Activity Raw Score (Score out of 24.Lower scores equate to lower levels of function) 15   Total Evaluation Time   Total Evaluation Time (Minutes) 10     "

## 2018-11-28 ENCOUNTER — APPOINTMENT (OUTPATIENT)
Dept: GENERAL RADIOLOGY | Facility: CLINIC | Age: 73
DRG: 328 | End: 2018-11-28
Attending: SURGERY
Payer: MEDICARE

## 2018-11-28 ENCOUNTER — APPOINTMENT (OUTPATIENT)
Dept: OCCUPATIONAL THERAPY | Facility: CLINIC | Age: 73
DRG: 328 | End: 2018-11-28
Attending: THORACIC SURGERY (CARDIOTHORACIC VASCULAR SURGERY)
Payer: MEDICARE

## 2018-11-28 LAB
ANION GAP SERPL CALCULATED.3IONS-SCNC: 5 MMOL/L (ref 3–14)
BUN SERPL-MCNC: 20 MG/DL (ref 7–30)
CALCIUM SERPL-MCNC: 7.7 MG/DL (ref 8.5–10.1)
CHLORIDE SERPL-SCNC: 101 MMOL/L (ref 94–109)
CO2 SERPL-SCNC: 30 MMOL/L (ref 20–32)
CREAT SERPL-MCNC: 0.85 MG/DL (ref 0.52–1.04)
ERYTHROCYTE [DISTWIDTH] IN BLOOD BY AUTOMATED COUNT: 14.3 % (ref 10–15)
GFR SERPL CREATININE-BSD FRML MDRD: 66 ML/MIN/1.7M2
GLUCOSE SERPL-MCNC: 94 MG/DL (ref 70–99)
HCT VFR BLD AUTO: 37.5 % (ref 35–47)
HGB BLD-MCNC: 11.7 G/DL (ref 11.7–15.7)
LACTATE BLD-SCNC: 1.6 MMOL/L (ref 0.7–2)
MCH RBC QN AUTO: 29.6 PG (ref 26.5–33)
MCHC RBC AUTO-ENTMCNC: 31.2 G/DL (ref 31.5–36.5)
MCV RBC AUTO: 95 FL (ref 78–100)
PLATELET # BLD AUTO: 160 10E9/L (ref 150–450)
POTASSIUM SERPL-SCNC: 4.8 MMOL/L (ref 3.4–5.3)
RADIOLOGIST FLAGS: NORMAL
RBC # BLD AUTO: 3.95 10E12/L (ref 3.8–5.2)
SODIUM SERPL-SCNC: 136 MMOL/L (ref 133–144)
WBC # BLD AUTO: 15.5 10E9/L (ref 4–11)

## 2018-11-28 PROCEDURE — 40000133 ZZH STATISTIC OT WARD VISIT: Performed by: OCCUPATIONAL THERAPIST

## 2018-11-28 PROCEDURE — A9270 NON-COVERED ITEM OR SERVICE: HCPCS | Performed by: THORACIC SURGERY (CARDIOTHORACIC VASCULAR SURGERY)

## 2018-11-28 PROCEDURE — 83605 ASSAY OF LACTIC ACID: CPT | Performed by: STUDENT IN AN ORGANIZED HEALTH CARE EDUCATION/TRAINING PROGRAM

## 2018-11-28 PROCEDURE — 36416 COLLJ CAPILLARY BLOOD SPEC: CPT | Performed by: STUDENT IN AN ORGANIZED HEALTH CARE EDUCATION/TRAINING PROGRAM

## 2018-11-28 PROCEDURE — 25000132 ZZH RX MED GY IP 250 OP 250 PS 637: Performed by: STUDENT IN AN ORGANIZED HEALTH CARE EDUCATION/TRAINING PROGRAM

## 2018-11-28 PROCEDURE — 25000128 H RX IP 250 OP 636: Performed by: SURGERY

## 2018-11-28 PROCEDURE — A9270 NON-COVERED ITEM OR SERVICE: HCPCS

## 2018-11-28 PROCEDURE — 12000006 ZZH R&B IMCU INTERMEDIATE UMMC

## 2018-11-28 PROCEDURE — 25000132 ZZH RX MED GY IP 250 OP 250 PS 637

## 2018-11-28 PROCEDURE — 97530 THERAPEUTIC ACTIVITIES: CPT | Mod: GO | Performed by: OCCUPATIONAL THERAPIST

## 2018-11-28 PROCEDURE — 25000132 ZZH RX MED GY IP 250 OP 250 PS 637: Performed by: SURGERY

## 2018-11-28 PROCEDURE — 71046 X-RAY EXAM CHEST 2 VIEWS: CPT

## 2018-11-28 PROCEDURE — A9270 NON-COVERED ITEM OR SERVICE: HCPCS | Performed by: SURGERY

## 2018-11-28 PROCEDURE — 97535 SELF CARE MNGMENT TRAINING: CPT | Mod: GO | Performed by: OCCUPATIONAL THERAPIST

## 2018-11-28 PROCEDURE — 25000132 ZZH RX MED GY IP 250 OP 250 PS 637: Performed by: THORACIC SURGERY (CARDIOTHORACIC VASCULAR SURGERY)

## 2018-11-28 PROCEDURE — 85027 COMPLETE CBC AUTOMATED: CPT | Performed by: SURGERY

## 2018-11-28 PROCEDURE — 80048 BASIC METABOLIC PNL TOTAL CA: CPT | Performed by: SURGERY

## 2018-11-28 RX ORDER — METHOCARBAMOL 500 MG/1
500 TABLET, FILM COATED ORAL 4 TIMES DAILY
Status: DISCONTINUED | OUTPATIENT
Start: 2018-11-29 | End: 2018-11-28

## 2018-11-28 RX ORDER — METHOCARBAMOL 500 MG/1
500 TABLET, FILM COATED ORAL 4 TIMES DAILY PRN
Status: DISCONTINUED | OUTPATIENT
Start: 2018-11-28 | End: 2018-11-29 | Stop reason: DRUGHIGH

## 2018-11-28 RX ADMIN — ALBUTEROL SULFATE 2 PUFF: 90 AEROSOL, METERED RESPIRATORY (INHALATION) at 15:36

## 2018-11-28 RX ADMIN — LOSARTAN POTASSIUM 50 MG: 50 TABLET ORAL at 08:03

## 2018-11-28 RX ADMIN — UMECLIDINIUM 1 PUFF: 62.5 AEROSOL, POWDER ORAL at 08:03

## 2018-11-28 RX ADMIN — METHOCARBAMOL TABLETS 500 MG: 500 TABLET, COATED ORAL at 23:49

## 2018-11-28 RX ADMIN — LIDOCAINE 1 PATCH: 560 PATCH PERCUTANEOUS; TOPICAL; TRANSDERMAL at 08:03

## 2018-11-28 RX ADMIN — PANTOPRAZOLE SODIUM 40 MG: 40 TABLET, DELAYED RELEASE ORAL at 08:03

## 2018-11-28 RX ADMIN — ENOXAPARIN SODIUM 40 MG: 40 INJECTION SUBCUTANEOUS at 10:12

## 2018-11-28 RX ADMIN — PHENYTOIN SODIUM 200 MG: 100 CAPSULE ORAL at 22:41

## 2018-11-28 RX ADMIN — FLUTICASONE FUROATE AND VILANTEROL TRIFENATATE 1 PUFF: 200; 25 POWDER RESPIRATORY (INHALATION) at 08:03

## 2018-11-28 RX ADMIN — PHENYTOIN SODIUM 100 MG: 100 CAPSULE, EXTENDED RELEASE ORAL at 08:03

## 2018-11-28 ASSESSMENT — ACTIVITIES OF DAILY LIVING (ADL)
ADLS_ACUITY_SCORE: 11
ADLS_ACUITY_SCORE: 15
ADLS_ACUITY_SCORE: 12
ADLS_ACUITY_SCORE: 11

## 2018-11-28 ASSESSMENT — PAIN DESCRIPTION - DESCRIPTORS: DESCRIPTORS: SORE;TENDER

## 2018-11-28 NOTE — PLAN OF CARE
Problem: Patient Care Overview  Goal: Plan of Care/Patient Progress Review  OT/6B:  Discharge Planner OT   Patient plan for discharge: rehab  Current status: Pt min A sit to stand and ambulate 20 ft with walker. Pt max A LB dressing   Barriers to return to prior living situation: medical status, pain, post op precautions, mobility   Recommendations for discharge: TCU  Rationale for recommendations: progress ADL I, transfers, mobility        Entered by: Anais Feldman 11/28/2018 3:44 PM

## 2018-11-28 NOTE — PROGRESS NOTES
"Social Work: Assessment with Discharge Plan    Patient Name: Lalitha Underwood  : 1945  Age: 73 year old  MRN: [id number]  Risk/Complexity Score: 3  Completed assessment with: pt    Presenting Information   Reason for Referral: discharge planning  Date of intake: 2018  Referral Source: 6B    Decision Maker: self at baseline  Alternate Decision Maker: spouse (spouse has been diagnosed with early dementia recently per pt)  Health Care Directive: Copy in Chart  Living Situation: Pt lives with spouse in their [facility]  Previous Functional Status: Pt reports she has been \"totally independent\" but that hernia has prevented her from being as independent as she would like recently.   Patient and family understanding of hospitalization: Hernia pressure  On lungs in addition to her COPD.   Cultural/Language/Spiritual Considerations: Pt reports she is [employer]  Adjustment to Illness: Pt adjusting adequately to illness and is motivated to return home with increased support from spouse. Pt open to TCU potentially but prefers to return home.     Physical Health  Reason for admission: No diagnosis found.  Services Needed/Recommended: therapies recommending TCU     Mental Health/Chemical Dependency:   Diagnosis: Pt reports history of anxiety and some depression.   Support/Services in Place: Pt states she has been in therapy for the past 3 years since dtr passed away.  Pt states she now sees a therapist once every 3 months.  Pt reports she also participated in a grief support group up until this summer.   Services Needed/Recommended: Pt to continue with quarterly therapy sessions and can increase frequency if needed.     Support System  Significant Relationship at Present time:  spouse  Family of Origin is available for support: yes  Other support available:  none  Current in home services: none  Gaps in Support System: n/a    Provider Information   Primary Care Physician:Kelly Hull      Financial   Income " Source: social security and [employer] income  Financial Concerns:  None, pt reports working for the [employer] for over 40 years and has good [employer] benefits.   Insurance: MEDICARE (primary), [employer]       Discharge Plan   Patient and family discharge goal: Pt would like to return home with assist from spouse and adult son  Provided Education on discharge plan: YES  Patient agreeable to discharge plan:  YES  A list of Medicare Certified Facilities was provided to the patient and/or family to encourage patient choice. Patient's choices for facility are:   1) [facility]  2) [facility]  Will NH provide Skilled rehabilitation or complex medical:  YES  General information regarding anticipated insurance coverage and possible out of pocket cost was discussed. Patient and patient's family are aware patient may incur the cost of transportation to the facility, pending insurance payment: YES  Barriers to discharge: medical stability    Discharge Recommendations   Disposition: therapies recommending TCU. Pt prefers to return home however if TCU is strongly recommended prefers referrals be sent to [facility] and [facility].   Transportation Needs: Son likely able to transport otherwise medical transport will be needed.   Name of Transportation Company and Phone: [employer]    Additional comments     Lalitha Underwood is a 73 year old female with h/o Type IV hiatal hernia now POD #0 s/p esophagogastroscopy, laparoscopic repair of hiatal hernia, liver biopsy, gastrostomy tube placement, and bilateral tube thoracostomies.     BRANDON Helms, [facility]  Float   Pager: [phone]  11/28/2018

## 2018-11-28 NOTE — PROGRESS NOTES
"Thoracic Surgery Progress Note  11/28/2018    Stable overnight. Pain controlled with dilaudid PCA. On 3L NC. Tolerating clears.     /62 (BP Location: Left arm)  Pulse 90  Temp 98.2  F (36.8  C) (Oral)  Resp 20  Ht 1.626 m (5' 4\")  Wt 92.3 kg (203 lb 6.4 oz)  SpO2 (!) 89%  BMI 34.91 kg/m2    Chest tubes 350, 475 since midnight    PE  NAD  NLB on 3L  Abd incisions c/d/i, Gtube to gravity, chest tubes to waterseal  Ext wwp    Labs  WBC 15.5 from 17.5  Hg 11.7 from 12.6  Cr 0.85    A/P: 73F with baseline COPD on 1.5 L NC at night, POD2 s/p paraesophageal hernia repair. Stable.    -continue PCA, lidocaine patch, will hold off on tylenol 2/2 transaminitis  -encourage IS, continue chest tubes to waterseal  -clamp G tube  -clears   -monitor leukocytosis, afebrile, likely normal post-surgical response  -no abx  -monitor UOP  -lovenox ppx  -ambulate     Seen with fellow, discussed with staff.     Sepideh Linares MD PGY3  General Surgery    "

## 2018-11-28 NOTE — PLAN OF CARE
Neuro: A&Ox4.   Cardiac: SR. VSS.   Respiratory: Sating >95% on 2L NC  GI/: Adequate urine output. Incontinence, brief on. No BM  Diet/appetite: Tolerating clears. Eating well.  Activity:  Assist of 1 and walker, up to chair and in halls.  Pain: At acceptable level on current regimen. Dilaudid PCA, Lidoderm patch and heating pad  Skin: No new deficits noted.  LDA's: G-tube to gravity, CT to water seal.     Plan: Continue with POC. Notify primary team with changes.

## 2018-11-28 NOTE — PROGRESS NOTES
11/27/18 0900   Quick Adds   Type of Visit Initial PT Evaluation   Living Environment   Lives With spouse   Living Arrangements house  ([facility])   Number of Stairs to Enter Home 0   Number of Stairs Within Home 0   Transportation Available car;family or friend will provide   Living Environment Comment Patient lives in [facility] in [place] with her , both retired. Patient reports  has some dementia, patient was driving prior to admission.   Self-Care   Dominant Hand right   Usual Activity Tolerance moderate   Current Activity Tolerance poor   Regular Exercise no   Equipment Currently Used at Home cane, straight  (owns FWW from previous surgery)   Activity/Exercise/Self-Care Comment Patient IND with mobility and ADLs at baseline. Patient says she takes cane with her in car for use in community, otherwise does not use AD.   Functional Level Prior   Ambulation 1-->assistive equipment   Transferring 0-->independent   Toileting 0-->independent   Bathing 0-->independent   Dressing 0-->independent   Eating 0-->independent   Communication 0-->understands/communicates without difficulty   Swallowing 0-->swallows foods/liquids without difficulty   Cognition 0 - no cognition issues reported   Fall history within last six months no   Which of the above functional risks had a recent onset or change? ambulation;transferring   General Information   Onset of Illness/Injury or Date of Surgery - Date 11/26/18   Referring Physician John Linares, Sepideh Parish MD   Patient/Family Goals Statement to return home   Pertinent History of Current Problem (include personal factors and/or comorbidities that impact the POC) 73F with baseline COPD on 1.5 L NC at night, POD1 s/p paraesophageal hernia repair.   Precautions/Limitations abdominal precautions   General Observations Patient greeted supine in bed, agreeable to PT, RN ok'd session.   General Info Comments Activity: up with assist   Cognitive Status Examination   Orientation  "orientation to person, place and time   Level of Consciousness alert   Follows Commands and Answers Questions 100% of the time   Pain Assessment   Patient Currently in Pain Yes, see Vital Sign flowsheet  (utlizing PCA pump)   Integumentary/Edema   Integumentary/Edema no deficits were identifed   Posture    Posture Protracted shoulders   Range of Motion (ROM)   ROM Comment BLE WFL   Strength   Strength Comments Not formally tested, presents with generalized weakness, BLE strength at least 3/5 with mobility   Bed Mobility   Bed Mobility Comments Supine>sit with mod A   Transfer Skills   Transfer Comments sit<>stand with min/mod A   Gait   Gait Comments ambulates with FWW + min A, decreased step length and foot clearance   Balance   Balance Comments impaired standing balance, FWW for stability   Sensory Examination   Sensory Perception no deficits were identified   General Therapy Interventions   Planned Therapy Interventions balance training;bed mobility training;gait training;strengthening;transfer training;risk factor education;home program guidelines;progressive activity/exercise   Clinical Impression   Criteria for Skilled Therapeutic Intervention yes, treatment indicated   PT Diagnosis impaired functional mobility   Influenced by the following impairments strength, balance, activity tolerance, pain, post-op precautions   Functional limitations due to impairments bed mobility, gait, transfers, functional endurance   Clinical Presentation Evolving/Changing   Clinical Presentation Rationale clinical judgement   Clinical Decision Making (Complexity) Moderate complexity   Therapy Frequency` 5 times/week   Predicted Duration of Therapy Intervention (days/wks) 1 week   Anticipated Discharge Disposition Transitional Care Facility   Risk & Benefits of therapy have been explained Yes   Patient, Family & other staff in agreement with plan of care Yes   [facility] AM-PAC TM \"6 Clicks\"   2016, [employer] " "[place], under license to [employer].  All rights reserved.   6 Clicks Short Forms Basic Mobility Inpatient Short Form   [facility] AM-PAC  \"6 Clicks\" V.2 Basic Mobility Inpatient Short Form   1. Turning from your back to your side while in a flat bed without using bedrails? 3 - A Little   2. Moving from lying on your back to sitting on the side of a flat bed without using bedrails? 2 - A Lot   3. Moving to and from a bed to a chair (including a wheelchair)? 3 - A Little   4. Standing up from a chair using your arms (e.g., wheelchair, or bedside chair)? 3 - A Little   5. To walk in hospital room? 3 - A Little   6. Climbing 3-5 steps with a railing? 2 - A Lot   Basic Mobility Raw Score (Score out of 24.Lower scores equate to lower levels of function) 16   Total Evaluation Time   Total Evaluation Time (Minutes) 10     "

## 2018-11-28 NOTE — PLAN OF CARE
"Problem: Chronic Respiratory Difficulty Comorbidity  Goal: Chronic Respiratory Difficulty  Patient comorbidity will be monitored for signs and symptoms of Respiratory Difficulty (Chronic) condition.  Problems will be absent, minimized or managed by discharge/transition of care.   Hx COPD; 1.5L @ home.     Problem: Patient Care Overview  Goal: Plan of Care/Patient Progress Review  Outcome: No Change  Neuro: A&Ox4.   Cardiac: SR. VSS. /71 (BP Location: Left arm)  Pulse 90  Temp 98.2  F (36.8  C) (Oral)  Resp 18  Ht 1.626 m (5' 4\")  Wt 92.3 kg (203 lb 6.4 oz)  SpO2 95%  BMI 34.91 kg/m2    Respiratory: Sating  94-98% on 2L  GI/: Adequate urine output. No flatus, No bowel movement   Diet/appetite:  Tolerating clear liquid  diet. Eating well.  Activity:  Assist of 1 up to chair and in halls.  Pain: At acceptable level on current regimen. PCA Dilaudid 0.2 q 10 min  Skin: No new deficits noted. 9 laps sites clean dry and intact  LDA's: chest tube times 2. Y'd together to water seal Moderate serosanguinous output    Plan: Continue with POC. Notify primary team with changes.      "

## 2018-11-29 ENCOUNTER — APPOINTMENT (OUTPATIENT)
Dept: GENERAL RADIOLOGY | Facility: CLINIC | Age: 73
DRG: 328 | End: 2018-11-29
Attending: THORACIC SURGERY (CARDIOTHORACIC VASCULAR SURGERY)
Payer: MEDICARE

## 2018-11-29 ENCOUNTER — APPOINTMENT (OUTPATIENT)
Dept: OCCUPATIONAL THERAPY | Facility: CLINIC | Age: 73
DRG: 328 | End: 2018-11-29
Attending: THORACIC SURGERY (CARDIOTHORACIC VASCULAR SURGERY)
Payer: MEDICARE

## 2018-11-29 ENCOUNTER — APPOINTMENT (OUTPATIENT)
Dept: GENERAL RADIOLOGY | Facility: CLINIC | Age: 73
DRG: 328 | End: 2018-11-29
Attending: SURGERY
Payer: MEDICARE

## 2018-11-29 ENCOUNTER — APPOINTMENT (OUTPATIENT)
Dept: PHYSICAL THERAPY | Facility: CLINIC | Age: 73
DRG: 328 | End: 2018-11-29
Attending: THORACIC SURGERY (CARDIOTHORACIC VASCULAR SURGERY)
Payer: MEDICARE

## 2018-11-29 LAB
COPATH REPORT: NORMAL
ERYTHROCYTE [DISTWIDTH] IN BLOOD BY AUTOMATED COUNT: 13.9 % (ref 10–15)
HCT VFR BLD AUTO: 34.5 % (ref 35–47)
HGB BLD-MCNC: 11.3 G/DL (ref 11.7–15.7)
MCH RBC QN AUTO: 30.5 PG (ref 26.5–33)
MCHC RBC AUTO-ENTMCNC: 32.8 G/DL (ref 31.5–36.5)
MCV RBC AUTO: 93 FL (ref 78–100)
PLATELET # BLD AUTO: 171 10E9/L (ref 150–450)
RADIOLOGIST FLAGS: NORMAL
RBC # BLD AUTO: 3.71 10E12/L (ref 3.8–5.2)
WBC # BLD AUTO: 13.6 10E9/L (ref 4–11)

## 2018-11-29 PROCEDURE — 40000193 ZZH STATISTIC PT WARD VISIT

## 2018-11-29 PROCEDURE — 25000132 ZZH RX MED GY IP 250 OP 250 PS 637: Mod: GY | Performed by: PHYSICIAN ASSISTANT

## 2018-11-29 PROCEDURE — 25000132 ZZH RX MED GY IP 250 OP 250 PS 637: Performed by: STUDENT IN AN ORGANIZED HEALTH CARE EDUCATION/TRAINING PROGRAM

## 2018-11-29 PROCEDURE — 25000128 H RX IP 250 OP 636: Performed by: SURGERY

## 2018-11-29 PROCEDURE — 71046 X-RAY EXAM CHEST 2 VIEWS: CPT | Mod: 77

## 2018-11-29 PROCEDURE — 97116 GAIT TRAINING THERAPY: CPT | Mod: GP

## 2018-11-29 PROCEDURE — 97535 SELF CARE MNGMENT TRAINING: CPT | Mod: GO | Performed by: OCCUPATIONAL THERAPIST

## 2018-11-29 PROCEDURE — A9270 NON-COVERED ITEM OR SERVICE: HCPCS | Performed by: SURGERY

## 2018-11-29 PROCEDURE — 25000132 ZZH RX MED GY IP 250 OP 250 PS 637: Performed by: THORACIC SURGERY (CARDIOTHORACIC VASCULAR SURGERY)

## 2018-11-29 PROCEDURE — A9270 NON-COVERED ITEM OR SERVICE: HCPCS | Performed by: STUDENT IN AN ORGANIZED HEALTH CARE EDUCATION/TRAINING PROGRAM

## 2018-11-29 PROCEDURE — 25000132 ZZH RX MED GY IP 250 OP 250 PS 637: Mod: GY | Performed by: STUDENT IN AN ORGANIZED HEALTH CARE EDUCATION/TRAINING PROGRAM

## 2018-11-29 PROCEDURE — 25000132 ZZH RX MED GY IP 250 OP 250 PS 637

## 2018-11-29 PROCEDURE — A9270 NON-COVERED ITEM OR SERVICE: HCPCS

## 2018-11-29 PROCEDURE — A9270 NON-COVERED ITEM OR SERVICE: HCPCS | Mod: GY | Performed by: STUDENT IN AN ORGANIZED HEALTH CARE EDUCATION/TRAINING PROGRAM

## 2018-11-29 PROCEDURE — 25000132 ZZH RX MED GY IP 250 OP 250 PS 637: Performed by: SURGERY

## 2018-11-29 PROCEDURE — 97530 THERAPEUTIC ACTIVITIES: CPT | Mod: GP

## 2018-11-29 PROCEDURE — 12000008 ZZH R&B INTERMEDIATE UMMC

## 2018-11-29 PROCEDURE — 71046 X-RAY EXAM CHEST 2 VIEWS: CPT

## 2018-11-29 PROCEDURE — A9270 NON-COVERED ITEM OR SERVICE: HCPCS | Mod: GY | Performed by: PHYSICIAN ASSISTANT

## 2018-11-29 PROCEDURE — 36416 COLLJ CAPILLARY BLOOD SPEC: CPT | Performed by: SURGERY

## 2018-11-29 PROCEDURE — 85027 COMPLETE CBC AUTOMATED: CPT | Performed by: SURGERY

## 2018-11-29 PROCEDURE — A9270 NON-COVERED ITEM OR SERVICE: HCPCS | Performed by: THORACIC SURGERY (CARDIOTHORACIC VASCULAR SURGERY)

## 2018-11-29 PROCEDURE — 40000133 ZZH STATISTIC OT WARD VISIT: Performed by: OCCUPATIONAL THERAPIST

## 2018-11-29 RX ORDER — METHOCARBAMOL 500 MG/1
500 TABLET, FILM COATED ORAL 3 TIMES DAILY PRN
Status: DISCONTINUED | OUTPATIENT
Start: 2018-11-29 | End: 2018-11-30

## 2018-11-29 RX ORDER — SENNOSIDES 8.6 MG
8.6 TABLET ORAL 2 TIMES DAILY
Status: DISCONTINUED | OUTPATIENT
Start: 2018-11-29 | End: 2018-12-03 | Stop reason: HOSPADM

## 2018-11-29 RX ORDER — OXYCODONE HCL 5 MG/5 ML
5 SOLUTION, ORAL ORAL EVERY 4 HOURS PRN
Status: DISCONTINUED | OUTPATIENT
Start: 2018-11-29 | End: 2018-12-03 | Stop reason: HOSPADM

## 2018-11-29 RX ORDER — POLYETHYLENE GLYCOL 3350 17 G/17G
17 POWDER, FOR SOLUTION ORAL DAILY
Status: DISCONTINUED | OUTPATIENT
Start: 2018-11-29 | End: 2018-11-30

## 2018-11-29 RX ADMIN — FLUTICASONE FUROATE AND VILANTEROL TRIFENATATE 1 PUFF: 200; 25 POWDER RESPIRATORY (INHALATION) at 08:45

## 2018-11-29 RX ADMIN — ALBUTEROL SULFATE 2 PUFF: 90 AEROSOL, METERED RESPIRATORY (INHALATION) at 06:53

## 2018-11-29 RX ADMIN — PANTOPRAZOLE SODIUM 40 MG: 40 TABLET, DELAYED RELEASE ORAL at 08:43

## 2018-11-29 RX ADMIN — PHENYTOIN SODIUM 100 MG: 100 CAPSULE, EXTENDED RELEASE ORAL at 08:43

## 2018-11-29 RX ADMIN — PHENYTOIN SODIUM 200 MG: 100 CAPSULE ORAL at 21:29

## 2018-11-29 RX ADMIN — OXYCODONE HYDROCHLORIDE 5 MG: 5 SOLUTION ORAL at 23:40

## 2018-11-29 RX ADMIN — SENNOSIDES 8.6 MG: 8.6 TABLET, FILM COATED ORAL at 20:42

## 2018-11-29 RX ADMIN — OXYCODONE HYDROCHLORIDE 5 MG: 5 SOLUTION ORAL at 15:25

## 2018-11-29 RX ADMIN — OXYCODONE HYDROCHLORIDE 5 MG: 5 SOLUTION ORAL at 08:39

## 2018-11-29 RX ADMIN — POLYETHYLENE GLYCOL 3350 17 G: 17 POWDER, FOR SOLUTION ORAL at 18:02

## 2018-11-29 RX ADMIN — METHOCARBAMOL TABLETS 500 MG: 500 TABLET, COATED ORAL at 04:06

## 2018-11-29 RX ADMIN — ACETAMINOPHEN 650 MG: 325 SOLUTION ORAL at 15:25

## 2018-11-29 RX ADMIN — ACETAMINOPHEN 650 MG: 325 SOLUTION ORAL at 11:59

## 2018-11-29 RX ADMIN — ENOXAPARIN SODIUM 40 MG: 40 INJECTION SUBCUTANEOUS at 11:58

## 2018-11-29 RX ADMIN — ACETAMINOPHEN 650 MG: 325 SOLUTION ORAL at 20:42

## 2018-11-29 RX ADMIN — ACETAMINOPHEN 650 MG: 325 SOLUTION ORAL at 08:39

## 2018-11-29 RX ADMIN — UMECLIDINIUM 1 PUFF: 62.5 AEROSOL, POWDER ORAL at 08:45

## 2018-11-29 RX ADMIN — LIDOCAINE 1 PATCH: 560 PATCH PERCUTANEOUS; TOPICAL; TRANSDERMAL at 08:49

## 2018-11-29 RX ADMIN — LOSARTAN POTASSIUM 50 MG: 50 TABLET ORAL at 08:43

## 2018-11-29 RX ADMIN — MENTHOL 1 PATCH: 205.5 PATCH TOPICAL at 00:19

## 2018-11-29 RX ADMIN — DOCUSATE SODIUM 286 ML: 50 LIQUID ORAL at 13:34

## 2018-11-29 ASSESSMENT — ACTIVITIES OF DAILY LIVING (ADL)
ADLS_ACUITY_SCORE: 16
ADLS_ACUITY_SCORE: 15
ADLS_ACUITY_SCORE: 16
ADLS_ACUITY_SCORE: 15
ADLS_ACUITY_SCORE: 15
ADLS_ACUITY_SCORE: 16

## 2018-11-29 ASSESSMENT — PAIN DESCRIPTION - DESCRIPTORS
DESCRIPTORS: ACHING;TIGHTNESS;TENDER
DESCRIPTORS: ACHING

## 2018-11-29 NOTE — PLAN OF CARE
Problem: Patient Care Overview  Goal: Plan of Care/Patient Progress Review  PT - 6B  Discharge Planner PT   Patient plan for discharge: Home  Current status: Education on precautions since pt unable to recall. CGA with supine to sit with verbal cues to bend opposite knee. Instructed pt in sit to stands x 3 reps throughout session with CGA-SBA.  Instructed pt in gait training with FWW x 80ft x 2 reps on 3-4L NC O2. Unable to get reliable O2 sat after first bout of ambulation due to poor signal, but 97-99% using ear pulse oximeter immediately after second bout of ambulation. Pt reports SOB with ambulation that dissipated after 30 sec seated rest break, no lightheadedness with ambulation.    Barriers to return to prior living situation: Limited distance and requiring assistance with ambulation, assistance with transfers. Unable to recall abdominal precautions.   Recommendations for discharge: TCU, Home with HH PT pending improved independence with ambulation and transfers  Rationale for recommendations: Current level of function.   Entered by: Viraj Hall 11/29/2018 3:15 PM

## 2018-11-29 NOTE — PROGRESS NOTES
"Thoracic Surgery Progress Note  11/29/2018    Stable overnight. Pain controlled with dilaudid PCA. On 3L NC. Tolerating clears. Right chest tube removed with stable post-pull CXR.     BP 92/58 (BP Location: Left arm)  Pulse 90  Temp 97.4  F (36.3  C) (Oral)  Resp 18  Ht 1.626 m (5' 4\")  Wt 92.2 kg (203 lb 4.2 oz)  SpO2 96%  BMI 34.89 kg/m2    Gtube 475   + unmeasured voids + baseline urinary incontinence    PE  NAD  NLB on 3L  Abd incisions c/d/i, Gtube to gravity, chest tubes to waterseal  Ext wwp    Labs  WBC 13.6 from 15.5   Hg 11.3 from 11.7    A/P: 73F with baseline COPD on 1.5 L NC at night, POD3 s/p paraesophageal hernia repair. Stable. R chest tube out today.     -PO pain meds, llidocaine patch, Tylenol  -encourage IS   -clamp G tube  -clears   -enema today  -leukocytosis trending down, afebrile, likely normal post-surgical response, no abx  -lovenox ppx  -ambulate     Seen with fellow, discussed with staff.     Sepideh Linares MD PGY3  General Surgery    "

## 2018-11-29 NOTE — PROGRESS NOTES
STAFF ADDENDUM:  I saw and evaluated Ms. Underwood and agree with the resident s findings and plan of care as documented in the resident s note and edited by me, as applicable.      In summary, Ms. Underwood is doing well. Tolerating cleras with G tube clamped  Plan:  Continue clears  Bowel regimen  Discontinue one chest tube  Gin Newby MD

## 2018-11-29 NOTE — PLAN OF CARE
Problem: Patient Care Overview  Goal: Plan of Care/Patient Progress Review  OT/6B:  Discharge Planner OT   Patient plan for discharge: rehab  Current status: Pt completed seated and standing ADLs with set-up and rest breaks. Pt limited by pain   Barriers to return to prior living situation: post op precautions, pain, ADL I  Recommendations for discharge: TCU  Rationale for recommendations: to progress ADL I, mobility, transfers       Entered by: Anais Feldman 11/29/2018 3:44 PM

## 2018-11-29 NOTE — PLAN OF CARE
"Problem: Patient Care Overview  Goal: Plan of Care/Patient Progress Review    Neuro: A&Ox4. Restless overnight d/t pain - frustrated could not get comfortable.   Cardiac: SR./66 (BP Location: Left arm)  Pulse 90  Temp 98.8  F (37.1  C) (Oral)  Resp 18  Ht 1.626 m (5' 4\")  Wt 92.2 kg (203 lb 4.2 oz)  SpO2 95%  BMI 34.89 kg/m2  Respiratory: Sating  >93% on 3L NC with humidification. Lungs clear/diminished.   GI/: Pt oliguric; reports peeing \"about the same as at home\"; intermittent incontinence. Bladder scanned <200.   No flatus yet. No BM since 11/26. (MD Jose made aware in AM).  Diet/appetite:  Tolerating clear liquid diet.   Activity:  Assist of 1 up to chair x3 on shift; ambulated in halls x2.   Pain: Pt very uncomfortable overnight. Back pain - mostly left, mid/lower. Massage, Icyhot patch, heat pad attempted without much effect. PRN robaxin ordered obtained; given, little improvement. PCA Dilaudid 0.2mg/10 min; doesn't do much for back pain, but makes sleepy.   Skin: 9 laps sites; CDI. Bruises. No new deficits noted; see documentation.   LDA's: CTx2 y'd together to same cannister; water seal minimal serosanguinous output     Plan: Continue with POC. Notify primary team with changes.    Goal: Chronic Respiratory Difficulty  Patient comorbidity will be monitored for signs and symptoms of Respiratory Difficulty (Chronic) condition.  Problems will be absent, minimized or managed by discharge/transition of care.   Hx COPD; 1.5L @ home.      11/29/18 0629   Surgery Nonspecified   Problems Assessed (Surgery) all   Problems Present (Surgery) bowel motility decreased;pain;situational response         "

## 2018-11-30 ENCOUNTER — APPOINTMENT (OUTPATIENT)
Dept: PHYSICAL THERAPY | Facility: CLINIC | Age: 73
DRG: 328 | End: 2018-11-30
Attending: THORACIC SURGERY (CARDIOTHORACIC VASCULAR SURGERY)
Payer: MEDICARE

## 2018-11-30 LAB
ERYTHROCYTE [DISTWIDTH] IN BLOOD BY AUTOMATED COUNT: 13.8 % (ref 10–15)
HCT VFR BLD AUTO: 35 % (ref 35–47)
HGB BLD-MCNC: 11.1 G/DL (ref 11.7–15.7)
MCH RBC QN AUTO: 29.7 PG (ref 26.5–33)
MCHC RBC AUTO-ENTMCNC: 31.7 G/DL (ref 31.5–36.5)
MCV RBC AUTO: 94 FL (ref 78–100)
PLATELET # BLD AUTO: 256 10E9/L (ref 150–450)
RBC # BLD AUTO: 3.74 10E12/L (ref 3.8–5.2)
WBC # BLD AUTO: 12 10E9/L (ref 4–11)

## 2018-11-30 PROCEDURE — 97530 THERAPEUTIC ACTIVITIES: CPT | Mod: GP

## 2018-11-30 PROCEDURE — 36415 COLL VENOUS BLD VENIPUNCTURE: CPT | Performed by: SURGERY

## 2018-11-30 PROCEDURE — A9270 NON-COVERED ITEM OR SERVICE: HCPCS | Mod: GY

## 2018-11-30 PROCEDURE — 97116 GAIT TRAINING THERAPY: CPT | Mod: GP

## 2018-11-30 PROCEDURE — 40000193 ZZH STATISTIC PT WARD VISIT

## 2018-11-30 PROCEDURE — 12000008 ZZH R&B INTERMEDIATE UMMC

## 2018-11-30 PROCEDURE — 85027 COMPLETE CBC AUTOMATED: CPT | Performed by: SURGERY

## 2018-11-30 PROCEDURE — A9270 NON-COVERED ITEM OR SERVICE: HCPCS | Mod: GY | Performed by: SURGERY

## 2018-11-30 PROCEDURE — 25000132 ZZH RX MED GY IP 250 OP 250 PS 637: Mod: GY | Performed by: SURGERY

## 2018-11-30 PROCEDURE — 25000132 ZZH RX MED GY IP 250 OP 250 PS 637: Mod: GY

## 2018-11-30 PROCEDURE — A9270 NON-COVERED ITEM OR SERVICE: HCPCS | Mod: GY | Performed by: STUDENT IN AN ORGANIZED HEALTH CARE EDUCATION/TRAINING PROGRAM

## 2018-11-30 PROCEDURE — 25000132 ZZH RX MED GY IP 250 OP 250 PS 637: Mod: GY | Performed by: STUDENT IN AN ORGANIZED HEALTH CARE EDUCATION/TRAINING PROGRAM

## 2018-11-30 PROCEDURE — 25000128 H RX IP 250 OP 636: Performed by: SURGERY

## 2018-11-30 RX ORDER — METHOCARBAMOL 500 MG/1
500 TABLET, FILM COATED ORAL 3 TIMES DAILY
Status: DISCONTINUED | OUTPATIENT
Start: 2018-11-30 | End: 2018-12-03 | Stop reason: HOSPADM

## 2018-11-30 RX ORDER — POLYETHYLENE GLYCOL 3350 17 G/17G
17 POWDER, FOR SOLUTION ORAL 2 TIMES DAILY
Status: DISCONTINUED | OUTPATIENT
Start: 2018-11-30 | End: 2018-12-03 | Stop reason: HOSPADM

## 2018-11-30 RX ADMIN — METHOCARBAMOL TABLETS 500 MG: 500 TABLET, COATED ORAL at 08:15

## 2018-11-30 RX ADMIN — LOSARTAN POTASSIUM 50 MG: 50 TABLET ORAL at 08:14

## 2018-11-30 RX ADMIN — METHOCARBAMOL TABLETS 500 MG: 500 TABLET, COATED ORAL at 13:51

## 2018-11-30 RX ADMIN — OXYCODONE HYDROCHLORIDE 5 MG: 5 SOLUTION ORAL at 04:10

## 2018-11-30 RX ADMIN — OXYCODONE HYDROCHLORIDE 5 MG: 5 SOLUTION ORAL at 08:42

## 2018-11-30 RX ADMIN — SENNOSIDES 8.6 MG: 8.6 TABLET, FILM COATED ORAL at 20:26

## 2018-11-30 RX ADMIN — SENNOSIDES 8.6 MG: 8.6 TABLET, FILM COATED ORAL at 08:14

## 2018-11-30 RX ADMIN — OXYCODONE HYDROCHLORIDE 5 MG: 5 SOLUTION ORAL at 21:58

## 2018-11-30 RX ADMIN — PANTOPRAZOLE SODIUM 40 MG: 40 TABLET, DELAYED RELEASE ORAL at 08:14

## 2018-11-30 RX ADMIN — PHENYTOIN SODIUM 100 MG: 100 CAPSULE, EXTENDED RELEASE ORAL at 08:14

## 2018-11-30 RX ADMIN — ACETAMINOPHEN 1000 MG: 325 SOLUTION ORAL at 13:51

## 2018-11-30 RX ADMIN — LIDOCAINE 1 PATCH: 560 PATCH PERCUTANEOUS; TOPICAL; TRANSDERMAL at 08:13

## 2018-11-30 RX ADMIN — OXYCODONE HYDROCHLORIDE 5 MG: 5 SOLUTION ORAL at 13:51

## 2018-11-30 RX ADMIN — METHOCARBAMOL TABLETS 500 MG: 500 TABLET, COATED ORAL at 03:06

## 2018-11-30 RX ADMIN — UMECLIDINIUM 1 PUFF: 62.5 AEROSOL, POWDER ORAL at 08:15

## 2018-11-30 RX ADMIN — POLYETHYLENE GLYCOL 3350 17 G: 17 POWDER, FOR SOLUTION ORAL at 20:26

## 2018-11-30 RX ADMIN — ACETAMINOPHEN 1000 MG: 325 SOLUTION ORAL at 20:25

## 2018-11-30 RX ADMIN — ENOXAPARIN SODIUM 40 MG: 40 INJECTION SUBCUTANEOUS at 11:33

## 2018-11-30 RX ADMIN — METHOCARBAMOL TABLETS 500 MG: 500 TABLET, COATED ORAL at 20:25

## 2018-11-30 RX ADMIN — POLYETHYLENE GLYCOL 3350 17 G: 17 POWDER, FOR SOLUTION ORAL at 08:13

## 2018-11-30 RX ADMIN — DOCUSATE SODIUM 286 ML: 50 LIQUID ORAL at 08:16

## 2018-11-30 RX ADMIN — FLUTICASONE FUROATE AND VILANTEROL TRIFENATATE 1 PUFF: 200; 25 POWDER RESPIRATORY (INHALATION) at 08:15

## 2018-11-30 RX ADMIN — PHENYTOIN SODIUM 200 MG: 100 CAPSULE ORAL at 21:58

## 2018-11-30 RX ADMIN — ACETAMINOPHEN 1000 MG: 325 SOLUTION ORAL at 08:14

## 2018-11-30 RX ADMIN — ALBUTEROL SULFATE 2 PUFF: 90 AEROSOL, METERED RESPIRATORY (INHALATION) at 07:16

## 2018-11-30 ASSESSMENT — ACTIVITIES OF DAILY LIVING (ADL)
ADLS_ACUITY_SCORE: 15

## 2018-11-30 ASSESSMENT — PAIN DESCRIPTION - DESCRIPTORS: DESCRIPTORS: ACHING

## 2018-11-30 NOTE — PROGRESS NOTES
STAFF ADDENDUM:  I saw and evaluated Ms. Underwood and agree with the resident s findings and plan of care as documented in the resident s note and edited by me, as applicable.      In summary, Ms. Underwood is progressing slowly, her main complaint is back pain. We are also working on proper bowel function.  The patient had all questions answered and was in agreement with the plan.  Jasmeet Wilder MD

## 2018-11-30 NOTE — PLAN OF CARE
Problem: Patient Care Overview  Goal: Plan of Care/Patient Progress Review  Outcome: No Change  Pt arrived to  just before 2200. Pt was very agitated as her back hurt and she wanted to sit in a chair. AVSS on 2L, A&Ox4, moderate back pain, oxycodone was offered but pt refused stated she wanted to wait. Currently sitting in her chair with warm water pad to her back and sitting on pillows, and resting.  LS fine crackles and diminished in bases. BS hypo, pt stated she sometimes passes gas and had a bm today on 6b. Chest tube had zero output since arriving from , new canister placed by  nurse prior to transport. Previous chest tube dressing cdi.  G-tube in place and clamped. Tolerating clear liquid diet. Right PIV SL. Resting at this time. Continue with POC.

## 2018-11-30 NOTE — PROGRESS NOTES
Transfer  Transferred to: 7B RM 38-1  Via:bed  Reason for transfer:Pt no longer appropriate for 6B- improved patient condition  Family: Notified patient's son (Mick) per pt request via telephone at 2117.  Pt's son acknowledged move and stated he would like her  know  Belongings: Packed and sent with pt  Chart: Delivered with pt to next unit  Medications: Meds sent to new unit with pt  Report given to: Pamela RN at 2107  Pt status: Stable

## 2018-11-30 NOTE — PLAN OF CARE
Problem: Patient Care Overview  Goal: Plan of Care/Patient Progress Review  OT/7B: Pt requesting to rest this PM on approach, pt agreeable to therapy later in the day. PT appointment scheduled. Will reschedule forward per POC.

## 2018-11-30 NOTE — PROGRESS NOTES
STAFF ADDENDUM:  I saw and evaluated Ms. Underwood and agree with the resident s findings and plan of care as documented in the resident s note and edited by me, as applicable.      73 F s/p HH repair, no fundoplication, G-tube. Left chest tube to waterseal. Encourage ambulation, PT/OT. Clears for comfort, enema today. Likely will need TCU.   Charissa Ramsay mD

## 2018-11-30 NOTE — PROGRESS NOTES
"Thoracic Surgery Progress Note  11/30/2018    NAEO. Reports back pain. Pain controlled overall. Tolerating clears. Voiding adequately. +BM    /62 (BP Location: Left arm)  Pulse 90  Temp 97.8  F (36.6  C) (Axillary)  Resp 20  Ht 1.626 m (5' 4\")  Wt 92.2 kg (203 lb 4.2 oz)  SpO2 97%  BMI 34.89 kg/m2    Intake/Output Summary (Last 24 hours) at 11/30/18 0627  Last data filed at 11/30/18 0500   Gross per 24 hour   Intake              550 ml   Output             1545 ml   Net             -995 ml     PE  NAD  NLB on RA  Abd incisions c/d/i, Gtube to clamped  Ext wwp, motor intact throughout    Left chest tube to waterseal, 460ml (350ml) in 24 hr, s/s, no air leak      Recent Labs  Lab 11/29/18  0558 11/28/18  0627 11/27/18  0632   WBC 13.6* 15.5* 17.5*   HGB 11.3* 11.7 12.6    160 188     CXR pending     A/P: 73F with baseline COPD on 1.5 L NC at night, 11/27 s/p paraesophageal hernia repair 11/27. Stable.     -PO pain meds, lidocaine patch, Tylenol, robaxin   -Encourage IS   -Clamp G tube  -Full liquid diet   -pink lady enema today, senna, miralax  -no abx  -lovenox ppx  -ambulate     Seen with fellow, discussed with staff.     Sangeeta Jimenez MD  Surgery PGY1   "

## 2018-11-30 NOTE — PLAN OF CARE
"Problem: Patient Care Overview  Goal: Plan of Care/Patient Progress Review  Outcome: No Change  Vital signs:  Temp: 97.8  F (36.6  C) Temp src: Axillary BP: 110/62   Heart Rate: 85 Resp: 20 SpO2: 97 % O2 Device: Nasal cannula Oxygen Delivery: 2 LPM     Activity: Up with Ax1 and walker.  Neuros: A&O x4, forgetful at times, calls appropriately.  Cardiac: WDL.  Respiratory: LS clear, diminished in bases. On 2L O2. Dyspnea upon exertion.   GI/: Voided 200 mL. Pt has hx of urinary incontinence/dribbling, briefs changed x1. +BS, passing flatus \"sometimes.\"  Diet: Clear.  Lines: Right PIV SL.  Incisions/Drains: Lap sites with liquid bandage, JEROME, no drainage. Left CT to waterseal, continues to have airleak, small output. Gtube clamped.   Pain/nausea: Pt c/o back pain, back massage x3, oxy given x2, robaxin given x1. Bio freeze utilized. Denies nausea. Sleeping between cares.   New changes this shift: New orders for pink lady enema, pt does not want to do this yet. Advanced to full liquids.          "

## 2018-11-30 NOTE — PROGRESS NOTES
"Social Work Services Progress Note    Hospital Day: 5  Date of Initial Social Work Evaluation:  11/28/18- please see for details  Collaborated with:  Chart review, team rounds, Thoracic team, nursing facilities    Data:  Pt is a 73 year old female admitted from home and is s/p paraesophageal hernia repair. Pt currently has a chest tube and uses oxygen at night at baseline. Pt will have a venting G tube at discharge. TCU is currently recommended and per MARCO eval pt would like referrals to [facility] and [facility] is needed.     Intervention:  SW met with pt in pt's room to introduce self and discuss discharge planning. Pt discussed having a lot of back pain but reported she is a \"titch better than yesterday\". SW explained TCU recommendations and inquired if pt is agreeable to this. Pt expressed concerns about getting stuck at a TCU, having to stay there longer than needed/wanted, and worries she will waste the day she arrives as she may not be able to get PT/OT that day. SW validated pt's concerns and explained that there will be initial nursing, PT/OT assessments which will develop and plan and goals for pt and pt's length of stay would be dependant on how pt progresses towards these goals. Pt asked if she would be able to be at a TCU for 3-4 days and SW explained that TCUs generally want a pt to be there at least 5 days. SW encouraged pt to ask PT/OT when they see her how long they think she would need to be there. Pt discussed how PT/OT here are not very aggressive about things and don't care where she goes. SW explained that they do care and explained that they are not able to work with pts as much here as they can in a TCU. Pt ultimately is agreeable to SW making TCU referrals in case she needs TCU but wants to see how she does with PT/OT during the rest of her stay and is open to having home PT/OT if she doesn't go to TCU.   Pt denied having further SW questions or needs at this time. SW " updated OT as to pt's concerns with going to a TCU and pt's questions about length of stay.     MARCO contacted the following TCUs:  - [facility] (p. [phone], f. [phone], have access to Epic)- spoke with Valery in admissions and she anticipates bed availability over the weekend and is willing to review referral, referral sent via [employer], waiting to hear back. Katarzyna called back reporting that pt would not be appropriate for a weekend admission given her g tube needs. Katarzyna reported that if pt is still needing placement Monday they will re-assess then.   - [facility] (p. [phone], f. [phone])- sent referral via Epic, waiting to hear back.     PAS completed in anticipation of discharge- confirmation code: [id number].    Assessment:  See bedside RN, PT/OT, medical team notes    Plan:    Anticipated Disposition:  TCU vs home    Barriers to d/c plan:  Medical stability, bed availability/acceptance at TCU if needed    Follow Up:  MARCO JERRY will continue to follow    NAYELI Ansari, MSW  7B   [phone] (pager) [phone]  11/30/2018

## 2018-11-30 NOTE — PLAN OF CARE
Problem: Patient Care Overview  Goal: Plan of Care/Patient Progress Review  Neuro: A&Ox4.   Cardiac: SR. VSS.   Respiratory: Sating 98% on 2 LPM by NC. Continue to be dyspneaic with activities.   GI/: Adequate urine output. BM x2 post enema and miralax .   Diet/appetite: Tolerating clear lq diet. Eating well.  Activity:  Assist of one with walker up to chair and ambulated in halls with PT.   Pain: At acceptable level on current regimen.   Skin: one CT got pulled out by Md , pt tolerated well. No new deficits noted.  LDA's: PIV sl'd. CT to water seal with air leak functioning well.   Plan: Continue with POC. Notify primary team with changes.

## 2018-11-30 NOTE — PLAN OF CARE
"Problem: Patient Care Overview  Goal: Individualization & Mutuality  Outcome: No Change  /41 (BP Location: Left arm)  Pulse 90  Temp 97.1  F (36.2  C) (Oral)  Resp 20  Ht 1.626 m (5' 4\")  Wt 92.2 kg (203 lb 4.2 oz)  SpO2 98%  BMI 34.89 kg/m2    AVSS, 2L NC in use- baseline at home. Ax1 when OOB & in up in chair most of this shift. Incision with liquid dressing & juan carlos/ cdi. FLD, good PO intake. Adequate urine output & have unmeasured urine incontinent. Hypo BS, received enema & no result yet. LS diminished & crackles in bases. A&O times 4 & forgetful at times. Will continue POC.       "

## 2018-12-01 ENCOUNTER — APPOINTMENT (OUTPATIENT)
Dept: GENERAL RADIOLOGY | Facility: CLINIC | Age: 73
DRG: 328 | End: 2018-12-01
Attending: THORACIC SURGERY (CARDIOTHORACIC VASCULAR SURGERY)
Payer: MEDICARE

## 2018-12-01 ENCOUNTER — APPOINTMENT (OUTPATIENT)
Dept: PHYSICAL THERAPY | Facility: CLINIC | Age: 73
DRG: 328 | End: 2018-12-01
Attending: THORACIC SURGERY (CARDIOTHORACIC VASCULAR SURGERY)
Payer: MEDICARE

## 2018-12-01 ENCOUNTER — APPOINTMENT (OUTPATIENT)
Dept: GENERAL RADIOLOGY | Facility: CLINIC | Age: 73
DRG: 328 | End: 2018-12-01
Attending: SURGERY
Payer: MEDICARE

## 2018-12-01 ENCOUNTER — APPOINTMENT (OUTPATIENT)
Dept: OCCUPATIONAL THERAPY | Facility: CLINIC | Age: 73
DRG: 328 | End: 2018-12-01
Attending: THORACIC SURGERY (CARDIOTHORACIC VASCULAR SURGERY)
Payer: MEDICARE

## 2018-12-01 LAB
ERYTHROCYTE [DISTWIDTH] IN BLOOD BY AUTOMATED COUNT: 13.7 % (ref 10–15)
HCT VFR BLD AUTO: 36.1 % (ref 35–47)
HGB BLD-MCNC: 11.8 G/DL (ref 11.7–15.7)
MCH RBC QN AUTO: 29.8 PG (ref 26.5–33)
MCHC RBC AUTO-ENTMCNC: 32.7 G/DL (ref 31.5–36.5)
MCV RBC AUTO: 91 FL (ref 78–100)
PLATELET # BLD AUTO: 261 10E9/L (ref 150–450)
RBC # BLD AUTO: 3.96 10E12/L (ref 3.8–5.2)
WBC # BLD AUTO: 11.9 10E9/L (ref 4–11)

## 2018-12-01 PROCEDURE — 40000133 ZZH STATISTIC OT WARD VISIT

## 2018-12-01 PROCEDURE — 36416 COLLJ CAPILLARY BLOOD SPEC: CPT | Performed by: STUDENT IN AN ORGANIZED HEALTH CARE EDUCATION/TRAINING PROGRAM

## 2018-12-01 PROCEDURE — 85027 COMPLETE CBC AUTOMATED: CPT | Performed by: STUDENT IN AN ORGANIZED HEALTH CARE EDUCATION/TRAINING PROGRAM

## 2018-12-01 PROCEDURE — 25000132 ZZH RX MED GY IP 250 OP 250 PS 637: Mod: GY | Performed by: SURGERY

## 2018-12-01 PROCEDURE — 97535 SELF CARE MNGMENT TRAINING: CPT | Mod: GO

## 2018-12-01 PROCEDURE — 25000132 ZZH RX MED GY IP 250 OP 250 PS 637: Mod: GY

## 2018-12-01 PROCEDURE — 12000008 ZZH R&B INTERMEDIATE UMMC

## 2018-12-01 PROCEDURE — 71046 X-RAY EXAM CHEST 2 VIEWS: CPT

## 2018-12-01 PROCEDURE — 97116 GAIT TRAINING THERAPY: CPT | Mod: GP | Performed by: PHYSICAL THERAPIST

## 2018-12-01 PROCEDURE — A9270 NON-COVERED ITEM OR SERVICE: HCPCS | Mod: GY | Performed by: STUDENT IN AN ORGANIZED HEALTH CARE EDUCATION/TRAINING PROGRAM

## 2018-12-01 PROCEDURE — 25000128 H RX IP 250 OP 636: Performed by: SURGERY

## 2018-12-01 PROCEDURE — 97530 THERAPEUTIC ACTIVITIES: CPT | Mod: GO

## 2018-12-01 PROCEDURE — A9270 NON-COVERED ITEM OR SERVICE: HCPCS | Mod: GY | Performed by: SURGERY

## 2018-12-01 PROCEDURE — 25000132 ZZH RX MED GY IP 250 OP 250 PS 637: Mod: GY | Performed by: STUDENT IN AN ORGANIZED HEALTH CARE EDUCATION/TRAINING PROGRAM

## 2018-12-01 PROCEDURE — 71045 X-RAY EXAM CHEST 1 VIEW: CPT

## 2018-12-01 PROCEDURE — 97530 THERAPEUTIC ACTIVITIES: CPT | Mod: GP | Performed by: PHYSICAL THERAPIST

## 2018-12-01 PROCEDURE — 40000193 ZZH STATISTIC PT WARD VISIT: Performed by: PHYSICAL THERAPIST

## 2018-12-01 PROCEDURE — A9270 NON-COVERED ITEM OR SERVICE: HCPCS | Mod: GY

## 2018-12-01 RX ORDER — ACETAMINOPHEN 500 MG
1000 TABLET ORAL 3 TIMES DAILY
Status: DISCONTINUED | OUTPATIENT
Start: 2018-12-01 | End: 2018-12-03 | Stop reason: HOSPADM

## 2018-12-01 RX ORDER — LANOLIN ALCOHOL/MO/W.PET/CERES
3 CREAM (GRAM) TOPICAL
Status: DISCONTINUED | OUTPATIENT
Start: 2018-12-01 | End: 2018-12-03 | Stop reason: HOSPADM

## 2018-12-01 RX ADMIN — ACETAMINOPHEN 1000 MG: 325 SOLUTION ORAL at 13:42

## 2018-12-01 RX ADMIN — METHOCARBAMOL TABLETS 500 MG: 500 TABLET, COATED ORAL at 19:28

## 2018-12-01 RX ADMIN — MELATONIN TAB 3 MG 3 MG: 3 TAB at 21:42

## 2018-12-01 RX ADMIN — UMECLIDINIUM 1 PUFF: 62.5 AEROSOL, POWDER ORAL at 08:22

## 2018-12-01 RX ADMIN — ACETAMINOPHEN 1000 MG: 500 TABLET, FILM COATED ORAL at 19:28

## 2018-12-01 RX ADMIN — LIDOCAINE 1 PATCH: 560 PATCH PERCUTANEOUS; TOPICAL; TRANSDERMAL at 08:21

## 2018-12-01 RX ADMIN — ACETAMINOPHEN 1000 MG: 325 SOLUTION ORAL at 08:22

## 2018-12-01 RX ADMIN — MENTHOL 1 PATCH: 205.5 PATCH TOPICAL at 19:29

## 2018-12-01 RX ADMIN — POLYETHYLENE GLYCOL 3350 17 G: 17 POWDER, FOR SOLUTION ORAL at 08:22

## 2018-12-01 RX ADMIN — METHOCARBAMOL TABLETS 500 MG: 500 TABLET, COATED ORAL at 13:42

## 2018-12-01 RX ADMIN — SENNOSIDES 8.6 MG: 8.6 TABLET, FILM COATED ORAL at 19:28

## 2018-12-01 RX ADMIN — OXYCODONE HYDROCHLORIDE 5 MG: 5 SOLUTION ORAL at 02:27

## 2018-12-01 RX ADMIN — ENOXAPARIN SODIUM 40 MG: 40 INJECTION SUBCUTANEOUS at 13:42

## 2018-12-01 RX ADMIN — METHOCARBAMOL TABLETS 500 MG: 500 TABLET, COATED ORAL at 08:23

## 2018-12-01 RX ADMIN — LOSARTAN POTASSIUM 50 MG: 50 TABLET ORAL at 08:23

## 2018-12-01 RX ADMIN — OXYCODONE HYDROCHLORIDE 5 MG: 5 SOLUTION ORAL at 21:42

## 2018-12-01 RX ADMIN — PHENYTOIN SODIUM 200 MG: 100 CAPSULE ORAL at 21:24

## 2018-12-01 RX ADMIN — MENTHOL 1 PATCH: 205.5 PATCH TOPICAL at 08:23

## 2018-12-01 RX ADMIN — PANTOPRAZOLE SODIUM 40 MG: 40 TABLET, DELAYED RELEASE ORAL at 08:23

## 2018-12-01 RX ADMIN — FLUTICASONE FUROATE AND VILANTEROL TRIFENATATE 1 PUFF: 200; 25 POWDER RESPIRATORY (INHALATION) at 08:22

## 2018-12-01 RX ADMIN — PHENYTOIN SODIUM 100 MG: 100 CAPSULE, EXTENDED RELEASE ORAL at 08:23

## 2018-12-01 RX ADMIN — SENNOSIDES 8.6 MG: 8.6 TABLET, FILM COATED ORAL at 08:23

## 2018-12-01 ASSESSMENT — ACTIVITIES OF DAILY LIVING (ADL)
ADLS_ACUITY_SCORE: 15

## 2018-12-01 ASSESSMENT — PAIN DESCRIPTION - DESCRIPTORS: DESCRIPTORS: ACHING

## 2018-12-01 NOTE — PLAN OF CARE
Problem: Patient Care Overview  Goal: Plan of Care/Patient Progress Review  OT/7B: Discharge Planner OT   Patient plan for discharge: Not discussed  Current status: Educated on importance of OOB activity for back pain management. Functional mobility ~10ft x2 SBA+FWW. Toilet transfer CGA-Glo+FWW. IND pericare/clothing management. Tolerates standing at sink ~5min for ADLs before requesting to return to bed. Glo for bed mobility to manage LE following abdominal precautions using log-roll technique. VSS on 2L O2 throughout session.  Barriers to return to prior living situation: generalized weakness, activity tolerance, pain, abdominal precautions, fall risk  Recommendations for discharge: TCU  Rationale for recommendations: Pt is performing below functional baseline and would benefit from continued skilled therapy services to increase activity tolerance and IND required for ADL/IADL participation prior to returning home.        Entered by: Tina Dobbins 12/01/2018 9:39 AM

## 2018-12-01 NOTE — PLAN OF CARE
"Problem: Patient Care Overview  Goal: Individualization & Mutuality  Outcome: No Change  /62 (BP Location: Left arm)  Pulse 96  Temp 98.3  F (36.8  C) (Oral)  Resp 20  Ht 1.626 m (5' 4\")  Wt 92.2 kg (203 lb 4.2 oz)  SpO2 98%  BMI 34.89 kg/m2    AVSS, back pain controlled with current regime & denies N/V. Voiding & incontinent at times. Passing flatus & no BM. Up ambulated with staff. abd incision with derm/ bruising around the incisions. CT site with dressing & CT removed by team this afternoon. Mid abd have bruising & ecchymotic. A&O times 4/ forgetful at times. Continue POC.       "

## 2018-12-01 NOTE — PLAN OF CARE
Problem: Patient Care Overview  Goal: Plan of Care/Patient Progress Review  PT 7B    Discharge Planner PT   Patient plan for discharge: TCU  Current status: sit <> stand with FWW and min assist.  Ambulated 100', 150', 225' with FWW and SBA.  SpO2 > 94% on 2 LPM via NC during session.  Mobility improving.   Barriers to return to prior living situation: assistance needed for safe mobility.  Recommendations for discharge: TCU  Rationale for recommendations: dependence with functional mobility, limited tolerance to activity.        Entered by: Dejuan Waldron 12/01/2018 5:07 PM

## 2018-12-01 NOTE — PROGRESS NOTES
"Thoracic Surgery Progress Note  12/01/2018    GAGANDEEP. Reports back pain improved during day, no has left shoulder pain    /62 (BP Location: Left arm)  Pulse 96  Temp 98.3  F (36.8  C) (Oral)  Resp 20  Ht 1.626 m (5' 4\")  Wt 92.2 kg (203 lb 4.2 oz)  SpO2 98%  BMI 34.89 kg/m2    Intake/Output Summary (Last 24 hours) at 11/30/18 0627  Last data filed at 11/30/18 0500   Gross per 24 hour   Intake              550 ml   Output             1545 ml   Net             -995 ml     PE  NAD  NLB on RA  Abd incisions c/d/i, Gtube to clamped  Ext wwp, motor intact throughout    Left chest tube to waterseal      Recent Labs  Lab 12/01/18  0720 11/30/18  0715 11/29/18  0558   WBC 11.9* 12.0* 13.6*   HGB 11.8 11.1* 11.3*    256 171     CXR pending     A/P: 73F with baseline COPD on 1.5 L NC at night, 11/27 s/p paraesophageal hernia repair 11/27. Stable.     -PO pain meds, lidocaine patch, Tylenol, robaxin   -Encourage IS   -Clamp G tube  -Full liquid diet   -no abx  -lovenox ppx  -ambulate   -remove chest tube today  -PT, likely TCT early next week    Danny Acosta  Thoracic Surgery Fellow  [phone]      "

## 2018-12-01 NOTE — PLAN OF CARE
"Problem: Patient Care Overview  Goal: Plan of Care/Patient Progress Review  Outcome: No Change  /70 (BP Location: Right arm)  Pulse 78  Temp 98.1  F (36.7  C) (Oral)  Resp 20  Ht 1.626 m (5' 4\")  Wt 92.2 kg (203 lb 4.2 oz)  SpO2 100%  BMI 34.89 kg/m2  Currently on 2L of oxygen/saturation at 94%. Oxycodone for pain.   Chest tube on water seal/small output noted. Voiding adequately.   Abdomen rounded, passing flatus, no stool. Full liquid with no discomfort.  Continue with plan of care.        "

## 2018-12-01 NOTE — PROGRESS NOTES
Social Work Services Progress Note    Hospital Day: 6  Date of Initial Social Work Evaluation:  11/28/18  Collaborated with:  The patient and paged Thoracic Team    Data:  SW notified that pt was potentially ready for discharge over weekend and awaiting further recs re: home vs TCU.  PT/OT are continuing to recommend TCU.    Intervention:  Met with pt, she is in agreement with TCU but again emphasized that she hopes it is a short stay.  She is likely going to remain hospitalized through the weekend, per thoaric notes (paged thoracic but did not hear back).  Chest tube removed today.  Pt is in agreement with going to either [facility] or [facility] where referrals have been sent.      Assessment:  TCU is still recommended, pt is in agreement with TCU.  Anticipated discharge early next week per notes.    Plan:    Anticipated Disposition:  Facility:  [facility] vs [facility]    Barriers to d/c plan:  Medical stability    Follow Up:  Will have Monday SW f/u, please page weekend SW if more immediate needs arise.    CLEVE Louis  Saturday    Pager [phone]

## 2018-12-01 NOTE — PLAN OF CARE
Problem: Patient Care Overview  Goal: Plan of Care/Patient Progress Review  Discharge Planner PT   Patient plan for discharge: TCU  Current status: PT: Pt seated in chair, agreeable to ambulate with PT. Pt feeling better but with sore back and at spot below scapula, provided pt heat pack after light STM to help with mm pain, also educated pt on ambulating more frequently with nursing to change position and decrease time in bed/chair, pt very agreeable and RN also on board. Pt transfers min A sit<>stand to wh walker, edu on hand positioning, leaning forward to shift weight. Pt ambulated 90' x2 with wh walker on 3>2 L O2 with sats 94%+ and on seated rest between. Edu on PLB and posture during gait.  Barriers to return to prior living situation: medical and functional mobility status  Recommendations for discharge: TCU  Rationale for recommendations: Pt will benefit from continued therapy treatment to improve functional I with mobility, ADLs to progress to ultimately return home alone.       Entered by: Rossy Hernandez 12/01/2018 6:37 AM

## 2018-12-02 ENCOUNTER — APPOINTMENT (OUTPATIENT)
Dept: GENERAL RADIOLOGY | Facility: CLINIC | Age: 73
DRG: 328 | End: 2018-12-02
Attending: THORACIC SURGERY (CARDIOTHORACIC VASCULAR SURGERY)
Payer: MEDICARE

## 2018-12-02 LAB
ERYTHROCYTE [DISTWIDTH] IN BLOOD BY AUTOMATED COUNT: 13.7 % (ref 10–15)
HCT VFR BLD AUTO: 34.7 % (ref 35–47)
HGB BLD-MCNC: 11.1 G/DL (ref 11.7–15.7)
MCH RBC QN AUTO: 29.6 PG (ref 26.5–33)
MCHC RBC AUTO-ENTMCNC: 32 G/DL (ref 31.5–36.5)
MCV RBC AUTO: 93 FL (ref 78–100)
PLATELET # BLD AUTO: 279 10E9/L (ref 150–450)
RBC # BLD AUTO: 3.75 10E12/L (ref 3.8–5.2)
WBC # BLD AUTO: 9.5 10E9/L (ref 4–11)

## 2018-12-02 PROCEDURE — A9270 NON-COVERED ITEM OR SERVICE: HCPCS | Mod: GY | Performed by: SURGERY

## 2018-12-02 PROCEDURE — 25000132 ZZH RX MED GY IP 250 OP 250 PS 637: Mod: GY | Performed by: STUDENT IN AN ORGANIZED HEALTH CARE EDUCATION/TRAINING PROGRAM

## 2018-12-02 PROCEDURE — 12000008 ZZH R&B INTERMEDIATE UMMC

## 2018-12-02 PROCEDURE — A9270 NON-COVERED ITEM OR SERVICE: HCPCS | Mod: GY | Performed by: STUDENT IN AN ORGANIZED HEALTH CARE EDUCATION/TRAINING PROGRAM

## 2018-12-02 PROCEDURE — 25000132 ZZH RX MED GY IP 250 OP 250 PS 637: Mod: GY | Performed by: SURGERY

## 2018-12-02 PROCEDURE — 85027 COMPLETE CBC AUTOMATED: CPT | Performed by: STUDENT IN AN ORGANIZED HEALTH CARE EDUCATION/TRAINING PROGRAM

## 2018-12-02 PROCEDURE — 36416 COLLJ CAPILLARY BLOOD SPEC: CPT | Performed by: STUDENT IN AN ORGANIZED HEALTH CARE EDUCATION/TRAINING PROGRAM

## 2018-12-02 PROCEDURE — 25000128 H RX IP 250 OP 636: Performed by: SURGERY

## 2018-12-02 PROCEDURE — 25000132 ZZH RX MED GY IP 250 OP 250 PS 637: Mod: GY

## 2018-12-02 PROCEDURE — 71046 X-RAY EXAM CHEST 2 VIEWS: CPT

## 2018-12-02 PROCEDURE — A9270 NON-COVERED ITEM OR SERVICE: HCPCS | Mod: GY

## 2018-12-02 RX ADMIN — MENTHOL 1 PATCH: 205.5 PATCH TOPICAL at 08:57

## 2018-12-02 RX ADMIN — ACETAMINOPHEN 1000 MG: 500 TABLET, FILM COATED ORAL at 19:38

## 2018-12-02 RX ADMIN — UMECLIDINIUM 1 PUFF: 62.5 AEROSOL, POWDER ORAL at 08:58

## 2018-12-02 RX ADMIN — LIDOCAINE 1 PATCH: 560 PATCH PERCUTANEOUS; TOPICAL; TRANSDERMAL at 08:57

## 2018-12-02 RX ADMIN — ACETAMINOPHEN 1000 MG: 500 TABLET, FILM COATED ORAL at 13:33

## 2018-12-02 RX ADMIN — METHOCARBAMOL TABLETS 500 MG: 500 TABLET, COATED ORAL at 13:32

## 2018-12-02 RX ADMIN — PANTOPRAZOLE SODIUM 40 MG: 40 TABLET, DELAYED RELEASE ORAL at 08:59

## 2018-12-02 RX ADMIN — METHOCARBAMOL TABLETS 500 MG: 500 TABLET, COATED ORAL at 19:31

## 2018-12-02 RX ADMIN — MELATONIN TAB 3 MG 3 MG: 3 TAB at 21:38

## 2018-12-02 RX ADMIN — MENTHOL 1 PATCH: 205.5 PATCH TOPICAL at 19:32

## 2018-12-02 RX ADMIN — PHENYTOIN SODIUM 200 MG: 100 CAPSULE ORAL at 21:38

## 2018-12-02 RX ADMIN — LOSARTAN POTASSIUM 50 MG: 50 TABLET ORAL at 08:59

## 2018-12-02 RX ADMIN — OXYCODONE HYDROCHLORIDE 5 MG: 5 SOLUTION ORAL at 21:39

## 2018-12-02 RX ADMIN — METHOCARBAMOL TABLETS 500 MG: 500 TABLET, COATED ORAL at 08:59

## 2018-12-02 RX ADMIN — ACETAMINOPHEN 1000 MG: 500 TABLET, FILM COATED ORAL at 08:59

## 2018-12-02 RX ADMIN — PHENYTOIN SODIUM 100 MG: 100 CAPSULE, EXTENDED RELEASE ORAL at 08:59

## 2018-12-02 RX ADMIN — OXYCODONE HYDROCHLORIDE 5 MG: 5 SOLUTION ORAL at 02:13

## 2018-12-02 RX ADMIN — ENOXAPARIN SODIUM 40 MG: 40 INJECTION SUBCUTANEOUS at 13:33

## 2018-12-02 RX ADMIN — FLUTICASONE FUROATE AND VILANTEROL TRIFENATATE 1 PUFF: 200; 25 POWDER RESPIRATORY (INHALATION) at 08:58

## 2018-12-02 RX ADMIN — SENNOSIDES 8.6 MG: 8.6 TABLET, FILM COATED ORAL at 09:00

## 2018-12-02 ASSESSMENT — ACTIVITIES OF DAILY LIVING (ADL)
ADLS_ACUITY_SCORE: 15

## 2018-12-02 NOTE — PLAN OF CARE
"Problem: Patient Care Overview  Goal: Plan of Care/Patient Progress Review  Outcome: No Change  /76 (BP Location: Left arm)  Pulse 92  Temp 97.8  F (36.6  C) (Oral)  Resp 18  Ht 1.626 m (5' 4\")  Wt 92.2 kg (203 lb 4.2 oz)  SpO2 98%  BMI 34.89 kg/m2  Voiding adequately. Abdomen rounded/passing flatus but no stool.  No nausea. Oxycodone for pain. Old chest tube site is intact. Gtube remain clamped.  Saturation at 95% 2L/NC with sleep. Continue with plan of care.        "

## 2018-12-02 NOTE — PROGRESS NOTES
Calorie Count  Intake recorded for: 12/1  Kcals: 1061  Protein: 40g  # Meals Recorded: 50% coke, turkey w/ gravy, 25% peaches, pancake, coffee,   # Supplements Recorded: 100% 2 Boost shakes, 50% 1 magic cup

## 2018-12-02 NOTE — PROGRESS NOTES
STAFF ADDENDUM:  I saw and evaluated Ms. Underwood and agree with the resident s findings and plan of care as documented in the resident s note and edited by me, as applicable.      In summary, Ms. Underwood is getting ready for discharge pending TCU arrangements.  The patient had all questions answered and was in agreement with the plan.  Jasmeet Wilder MD

## 2018-12-02 NOTE — PLAN OF CARE
"Problem: Patient Care Overview  Goal: Individualization & Mutuality  Outcome: No Change  /53 (BP Location: Left arm)  Pulse 86  Temp 99  F (37.2  C) (Oral)  Resp 20  Ht 1.626 m (5' 4\")  Wt 92.2 kg (203 lb 4.2 oz)  SpO2 92%  BMI 34.89 kg/m2    AVSS, pain managed well with current regime & denies N/V. Tolerating FLD well. Active BS, passing flatus & last BM 12/1. Multiple bruising around abd & old CT site. Derma bonded incision, juan carlos & cdi. Ambulated with walker. 2L NC in use. Will continue POC.        "

## 2018-12-02 NOTE — PLAN OF CARE
Problem: Patient Care Overview  Goal: Plan of Care/Patient Progress Review  Outcome: Improving  AVSS. 98% 2L NC. Pain well controlled w/o PRN medications. GT clamped. Abdomen rounded with some bruising around incision sites. Previous CT site dressings c/d/i. Tolerating regular diet, w/o nausea. Voiding in adequate amounts. Had loose BM x1. Ambulated in soriano with PT, SBA with walker. PIV SL'D. Continue POC.

## 2018-12-02 NOTE — PROGRESS NOTES
STAFF ADDENDUM:  I saw and evaluated Ms. Underwood and agree with the resident s findings and plan of care as documented in the resident s note and edited by me, as applicable.      In summary, Ms. Underwood is getting better and approaching discharge to TCU. We will remove her right chest tube today.  The patient had all questions answered and was in agreement with the plan.  Jasmeet Wilder MD

## 2018-12-02 NOTE — PROGRESS NOTES
"Thoracic Surgery Progress Note  12/02/2018    NAEO. Pain better with chest tube removed. Took only 2 tabs 5 mg oxy. Tolerating fulls. Having bowel function.    /53 (BP Location: Left arm)  Pulse 86  Temp 99  F (37.2  C) (Oral)  Resp 20  Ht 1.626 m (5' 4\")  Wt 92.2 kg (203 lb 4.2 oz)  SpO2 92%  BMI 34.89 kg/m2    Intake/Output Summary (Last 24 hours) at 11/30/18 0627  Last data filed at 11/30/18 0500   Gross per 24 hour   Intake              550 ml   Output             1545 ml   Net             -995 ml     PE  NAD  NLB on RA  Abd incisions c/d/i, Gtube to clamped  Ext wwp, motor intact throughout      Recent Labs  Lab 12/02/18  0818 12/01/18  0720 11/30/18  0715   WBC 9.5 11.9* 12.0*   HGB 11.1* 11.8 11.1*    261 256     CXR pending     A/P: 73F with baseline COPD on 1.5 L NC at night, 11/27 s/p paraesophageal hernia repair 11/27. Stable, doing well.     -PO pain meds, lidocaine patch, Tylenol, robaxin   -Encourage IS   -Clamp G tube  -Full liquid diet   -no abx  -lovenox ppx  -ambulate   -PT, likely TCT early next week    Seen with fellow, discussed with Dr. Wilder.    Sepideh Linares MD PGY3  General Surgery      "

## 2018-12-03 ENCOUNTER — RECORDS - HEALTHEAST (OUTPATIENT)
Dept: ADMINISTRATIVE | Facility: OTHER | Age: 73
End: 2018-12-03

## 2018-12-03 ENCOUNTER — APPOINTMENT (OUTPATIENT)
Dept: GENERAL RADIOLOGY | Facility: CLINIC | Age: 73
DRG: 328 | End: 2018-12-03
Attending: THORACIC SURGERY (CARDIOTHORACIC VASCULAR SURGERY)
Payer: MEDICARE

## 2018-12-03 ENCOUNTER — APPOINTMENT (OUTPATIENT)
Dept: PHYSICAL THERAPY | Facility: CLINIC | Age: 73
DRG: 328 | End: 2018-12-03
Attending: THORACIC SURGERY (CARDIOTHORACIC VASCULAR SURGERY)
Payer: MEDICARE

## 2018-12-03 ENCOUNTER — APPOINTMENT (OUTPATIENT)
Dept: OCCUPATIONAL THERAPY | Facility: CLINIC | Age: 73
DRG: 328 | End: 2018-12-03
Attending: THORACIC SURGERY (CARDIOTHORACIC VASCULAR SURGERY)
Payer: MEDICARE

## 2018-12-03 VITALS
OXYGEN SATURATION: 97 % | TEMPERATURE: 97.6 F | HEART RATE: 83 BPM | HEIGHT: 64 IN | RESPIRATION RATE: 16 BRPM | WEIGHT: 203.26 LBS | DIASTOLIC BLOOD PRESSURE: 56 MMHG | BODY MASS INDEX: 34.7 KG/M2 | SYSTOLIC BLOOD PRESSURE: 149 MMHG

## 2018-12-03 LAB
ERYTHROCYTE [DISTWIDTH] IN BLOOD BY AUTOMATED COUNT: 13.7 % (ref 10–15)
HCT VFR BLD AUTO: 34.3 % (ref 35–47)
HGB BLD-MCNC: 10.9 G/DL (ref 11.7–15.7)
MCH RBC QN AUTO: 29.5 PG (ref 26.5–33)
MCHC RBC AUTO-ENTMCNC: 31.8 G/DL (ref 31.5–36.5)
MCV RBC AUTO: 93 FL (ref 78–100)
PLATELET # BLD AUTO: 287 10E9/L (ref 150–450)
RADIOLOGIST FLAGS: NORMAL
RBC # BLD AUTO: 3.7 10E12/L (ref 3.8–5.2)
WBC # BLD AUTO: 9.1 10E9/L (ref 4–11)

## 2018-12-03 PROCEDURE — 97116 GAIT TRAINING THERAPY: CPT | Mod: GP | Performed by: PHYSICAL THERAPIST

## 2018-12-03 PROCEDURE — 85027 COMPLETE CBC AUTOMATED: CPT | Performed by: STUDENT IN AN ORGANIZED HEALTH CARE EDUCATION/TRAINING PROGRAM

## 2018-12-03 PROCEDURE — 25000132 ZZH RX MED GY IP 250 OP 250 PS 637: Mod: GY

## 2018-12-03 PROCEDURE — 25000132 ZZH RX MED GY IP 250 OP 250 PS 637: Mod: GY | Performed by: STUDENT IN AN ORGANIZED HEALTH CARE EDUCATION/TRAINING PROGRAM

## 2018-12-03 PROCEDURE — A9270 NON-COVERED ITEM OR SERVICE: HCPCS | Mod: GY | Performed by: STUDENT IN AN ORGANIZED HEALTH CARE EDUCATION/TRAINING PROGRAM

## 2018-12-03 PROCEDURE — 71046 X-RAY EXAM CHEST 2 VIEWS: CPT

## 2018-12-03 PROCEDURE — A9270 NON-COVERED ITEM OR SERVICE: HCPCS | Mod: GY | Performed by: SURGERY

## 2018-12-03 PROCEDURE — 40000133 ZZH STATISTIC OT WARD VISIT

## 2018-12-03 PROCEDURE — 40000193 ZZH STATISTIC PT WARD VISIT: Performed by: PHYSICAL THERAPIST

## 2018-12-03 PROCEDURE — 25000132 ZZH RX MED GY IP 250 OP 250 PS 637: Mod: GY | Performed by: SURGERY

## 2018-12-03 PROCEDURE — 97535 SELF CARE MNGMENT TRAINING: CPT | Mod: GO

## 2018-12-03 PROCEDURE — 36415 COLL VENOUS BLD VENIPUNCTURE: CPT | Performed by: STUDENT IN AN ORGANIZED HEALTH CARE EDUCATION/TRAINING PROGRAM

## 2018-12-03 PROCEDURE — A9270 NON-COVERED ITEM OR SERVICE: HCPCS | Mod: GY

## 2018-12-03 PROCEDURE — 97530 THERAPEUTIC ACTIVITIES: CPT | Mod: GO

## 2018-12-03 PROCEDURE — 97530 THERAPEUTIC ACTIVITIES: CPT | Mod: GP | Performed by: PHYSICAL THERAPIST

## 2018-12-03 RX ORDER — METHOCARBAMOL 500 MG/1
500 TABLET, FILM COATED ORAL 3 TIMES DAILY PRN
Qty: 50 TABLET | DISCHARGE
Start: 2018-12-03 | End: 2018-12-03

## 2018-12-03 RX ORDER — ONDANSETRON 4 MG/1
4 TABLET, ORALLY DISINTEGRATING ORAL EVERY 6 HOURS PRN
Qty: 10 TABLET | Refills: 0 | Status: SHIPPED | OUTPATIENT
Start: 2018-12-03 | End: 2021-07-16

## 2018-12-03 RX ORDER — ACETAMINOPHEN 500 MG
1000 TABLET ORAL EVERY 8 HOURS PRN
COMMUNITY
Start: 2018-12-03 | End: 2021-12-13

## 2018-12-03 RX ORDER — METHOCARBAMOL 500 MG/1
500 TABLET, FILM COATED ORAL 3 TIMES DAILY PRN
Qty: 30 TABLET | Refills: 0 | Status: SHIPPED | OUTPATIENT
Start: 2018-12-03 | End: 2021-07-16

## 2018-12-03 RX ORDER — LIDOCAINE 4 G/G
1 PATCH TOPICAL EVERY 24 HOURS
Qty: 10 PATCH | Refills: 0 | Status: SHIPPED | OUTPATIENT
Start: 2018-12-03 | End: 2021-07-16

## 2018-12-03 RX ORDER — ONDANSETRON 4 MG/1
4 TABLET, ORALLY DISINTEGRATING ORAL EVERY 6 HOURS PRN
Qty: 10 TABLET | Refills: 0 | DISCHARGE
Start: 2018-12-03 | End: 2018-12-03

## 2018-12-03 RX ORDER — ACETAMINOPHEN 500 MG
1000 TABLET ORAL EVERY 8 HOURS PRN
DISCHARGE
Start: 2018-12-03 | End: 2018-12-03

## 2018-12-03 RX ORDER — AMOXICILLIN 250 MG
1 CAPSULE ORAL 2 TIMES DAILY
Qty: 60 TABLET | Refills: 0 | Status: SHIPPED | OUTPATIENT
Start: 2018-12-03 | End: 2021-11-29

## 2018-12-03 RX ORDER — OXYCODONE HCL 5 MG/5 ML
2.5-5 SOLUTION, ORAL ORAL EVERY 4 HOURS PRN
Qty: 20 ML | Refills: 0 | Status: SHIPPED | DISCHARGE
Start: 2018-12-03 | End: 2021-07-16

## 2018-12-03 RX ORDER — AMOXICILLIN 250 MG
1 CAPSULE ORAL 2 TIMES DAILY
Qty: 60 TABLET | Refills: 0 | DISCHARGE
Start: 2018-12-03 | End: 2018-12-03

## 2018-12-03 RX ADMIN — FLUTICASONE FUROATE AND VILANTEROL TRIFENATATE 1 PUFF: 200; 25 POWDER RESPIRATORY (INHALATION) at 08:09

## 2018-12-03 RX ADMIN — PHENYTOIN SODIUM 100 MG: 100 CAPSULE, EXTENDED RELEASE ORAL at 08:09

## 2018-12-03 RX ADMIN — PANTOPRAZOLE SODIUM 40 MG: 40 TABLET, DELAYED RELEASE ORAL at 08:09

## 2018-12-03 RX ADMIN — UMECLIDINIUM 1 PUFF: 62.5 AEROSOL, POWDER ORAL at 08:09

## 2018-12-03 RX ADMIN — LIDOCAINE 1 PATCH: 560 PATCH PERCUTANEOUS; TOPICAL; TRANSDERMAL at 08:11

## 2018-12-03 RX ADMIN — LOSARTAN POTASSIUM 50 MG: 50 TABLET ORAL at 08:09

## 2018-12-03 RX ADMIN — METHOCARBAMOL TABLETS 500 MG: 500 TABLET, COATED ORAL at 08:09

## 2018-12-03 RX ADMIN — ACETAMINOPHEN 1000 MG: 500 TABLET, FILM COATED ORAL at 08:07

## 2018-12-03 RX ADMIN — OXYCODONE HYDROCHLORIDE 5 MG: 5 SOLUTION ORAL at 01:28

## 2018-12-03 RX ADMIN — MENTHOL 1 PATCH: 205.5 PATCH TOPICAL at 09:57

## 2018-12-03 ASSESSMENT — PAIN DESCRIPTION - DESCRIPTORS: DESCRIPTORS: ACHING

## 2018-12-03 ASSESSMENT — ACTIVITIES OF DAILY LIVING (ADL)
ADLS_ACUITY_SCORE: 15

## 2018-12-03 NOTE — PROGRESS NOTES
Social Work Services Progress Note    12PM Addendum: PT/OT now recommending home with assist, home PT/OT. RNCC to follow up with pt.     Hospital Day: 8  Date of Initial Social Work Evaluation:  11.28.13  Collaborated with:  Pt, TCU facilities, thoracic team    Data:  SW involved for discharge planning.     Intervention:  SW received message from [facility] who states they cannot accept pt with chest tube.  SW called back this morning and left VM with admissions that pt no longer requires chest tube.     SW additionally refaxed referral to [facility] as initial referral was made to [facility].      Assessment: SW to remain involved for discharge planning.   Plan:    Anticipated Disposition:  Facility:  TCU TBD    Barriers to d/c plan:  Bed availability    Follow Up:  SW will continue to follow and assist.     BRANDON Helms, LGSW  [place]   7B covering SW for 12/3-12/4 Only  Pager [phone]  12/3/2018

## 2018-12-03 NOTE — PLAN OF CARE
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 There is not mass effect associated with it. We will continue to monitor the patients clinical exam for any worsening of symptoms which may indicate expansion of the stroke or the area of the edema resulting in the clinical change. The pattern is suggestive of embolic etiology.       Problem: Patient Care Overview  Goal: Plan of Care/Patient Progress Review  Outcome: No Change  AVSS. 98% 2L NC. Denies pain. BM x1. Tolerating FLD w/o nausea. GT clamped. Lap sites with some bruising on abdomen. Up in chair throughout shift. Ambulates to bathroom and halls with SBA and walker. Continue POC.

## 2018-12-03 NOTE — PLAN OF CARE
Problem: Patient Care Overview  Goal: Plan of Care/Patient Progress Review  OT/7B: Discharge Planner OT   Patient plan for discharge: Home   Current status: SBA for bed mobility. SBA+FWW for in-room mobility ~15ft x2. Tolerates standing at sink for ADLs ~10min. Educated on AE (shower chair), energy conservation for bathing and dressing. Provided energy conservation handout encouraging IND utilization for ADL/IADLs. Pt receptive. VSS.   Barriers to return to prior living situation: generalized weakness, abdominal precautions  Recommendations for discharge: Home with assist and home PT for safe functional mobility+FWW use  Rationale for recommendations: Pt performing near functional baseline and demonstrates IND with BADLs. Encourging use of FWW and AE (shower chair) for safety and energy conservation. Home PT recommended for safe mobility and balance.        Entered by: Tina Dobbins 12/03/2018 11:12 AM         Occupational Therapy Discharge Summary    Reason for therapy discharge:    Discharged to home with home therapy.    Progress towards therapy goal(s). See goals on Care Plan in Psychiatric electronic health record for goal details.  Goals partially met.  Barriers to achieving goals:   discharge from facility.    Therapy recommendation(s):    Continued therapy is recommended.  Rationale/Recommendations:  Recommending home PT to increase activity tolerance and safe functional mobility. .

## 2018-12-03 NOTE — PROGRESS NOTES
Care Coordinator Progress Note    Admission Date/Time:  11/26/2018  Attending MD:  Jasmeet Wilder*    Data  Chart reviewed, discussed with interdisciplinary team.   Patient was admitted for:    Acute post-operative pain  PONV (postoperative nausea and vomiting)  Bowel habit changes  S/P repair of paraesophageal hernia.    Concerns with insurance coverage for discharge needs: None.  Current Living Situation: Patient lives with spouse.  Support System: Supportive and Involved  Services Involved: DME , has oxygen concentrator overnight oxygen  Transportation at Discharge: Family or friend will provide  Transportation to Medical Appointments:   - Name of caregiver: spouseSaul  Barriers to Discharge: no barriers identified    Coordination of Care and Referrals: Provided patient/family with options for Home Care.        Assessment  Patient is a 73 year old female who is s/p paraesophogeal hernia repair with G tube placement.  Per MD team patient is medically ready for discharge today.  PT/OT had been recommending TCU, but as of today have changed their recommendations to home with home PT and use of FWW.  Met with patient at bedside to introduce RNCC role and discuss discharge planning.  Patient agreeable to home care f/u.  After giving choice of agency patient chose [facility](P: [phone], F: [phone]), referral made and orders placed.  Patient has a walker at home.  Bedside RN to do G tube teaching with patient prior to discharge.  Patients  will provide transportation to home.       Plan  Anticipated Discharge Date: 12/3/2018  Anticipated Discharge Plan:  Home with [facility] for skilled RN/PT visits.      Kaleigh Campuzano, RNCC  [phone]

## 2018-12-03 NOTE — DISCHARGE SUMMARY
NAME: Lalitha Underwood   MRN: [id number]   : 1945     DATE OF ADMISSION: 2018     PRE/POSTOPERATIVE DIAGNOSES: Hiatal hernia type IV    PROCEDURES PERFORMED:   1. Esophagogastroscopy  2. Laparoscopic repair of hiatal hernia    3. Liver biopsy  4. Gastrostomy tube placement (20 Fr)  5. Bilateral tube thoracostomies (19 Fr Graeme)     PATHOLOGY RESULTS:   LIVER BIOPSY:   - Sclerosed cavernous hemangioma   - Negative for malignancy     CULTURE RESULTS: None     INTRAOPERATIVE COMPLICATIONS: None     POSTOPERATIVE COMPLICATIONS: None     DRAINS/TUBES PRESENT AT DISCHARGE: PEG tube for gastropexy and PRN decompression     DATE OF DISCHARGE:  December 3, 2018     HOSPITAL COURSE: Lalitha Underwood is a 73 year old female who on 2018 underwent the above-named procedures.  She tolerated the operation well and postoperatively was transferred to the general post-surgical unit.  The remainder of her course was essentially uncomplicated.  Prior to discharge, her pain was controlled well, she was able to perform ADLs and ambulate independently without difficulty, and had full return of bowel and bladder function.  On December 3, 2018, she was discharged to home in stable condition.    DISCHARGE EXAM:   A&O, NAD  Resp non-labored  Distal extremities warm    Incisions healing well     DISCHARGE INSTRUCTIONS:    Discharge Procedure Orders  XR Upper GI without KUB   Standing Status: Future  Standing Exp. Date: 19   Order Specific Question Answer Comments   Priority Routine      Home care nursing referral   Referral Type: Home Health Therapies & Aides     Follow Up and recommended labs and tests   Order Comments: 1.) Follow up with primary care physician, Kelly Hull, in 1-2 weeks.  2.) Follow up with a thoracic surgery Clinical Nurse Specialist in Thoracic Surgery clinic in 1 month, prior to which an upper GI should be performed.     Discharge Instructions   Order Comments: THORACIC SURGERY DISCHARGE  INSTRUCTIONS    DIET: Full liquid diet at time of discharge.  Follow post Nissen diet recommendations as discussed with dietician and detailed in provided handout    Take the medications in solution form provided by the pharmacy, after these solutions run out, you may return to taking them in tablet or capsule form from your home supply.     If your plans upon discharge include prolonged periods of sitting (i.e a lengthy car or plane ride), it is highly beneficial to get up and walk at least once per hour to help prevent swelling and blood clots.     You may remove chest tube dressing 48 hours after tube removal and bandage the site at your own discretion thereafter.  Small amounts of leakage are normal for 2-3 days after removal.  Feel free to call with questions.    You may get incision wet 2 days after operation. Do not submerge, soak, or scrub incision or swim until seen in follow-up.    Take incentive spirometer home for continued frequent use    Activity as tolerated, no strenous activity until seen in follow-up, no lifting greater than 10 pounds for the next 8-10 weeks.    Stay hydrated. Take over the counter fiber (metamucil or benefiber) and stool softeners (Miralax, docusate or senna) if becoming constipated.     Call for fever greater than 101.5, chills, increased size of incision, red skin around incision, vision changes, muscle strength changes, sensation changes, shortness of breath, or other concerns.    No driving while taking narcotic pain medication.    Transition to ibuprofen or tylenol/acetaminophen for pain control. Do not take tylenol/acetaminophen and acetaminophen containing narcotic (e.g., percocet or vicodin) at the same time. If you have known ulcer problems, or kidney trouble (elevated creatinine) do not take the ibuprofen.    In emergencies, call 911    For other Questions or Concerns;  A.) During weekday working hours (Monday through Friday 8am to 4:30pm)  call [phone] and  "ask to speak to a clinical nurse specialist.    B.) At nights (after 4:30pm), on weekends, or if urgent call [phone] and  tell the  \"I would like to page job code [phone], the thoracic surgery  fellow on call, please.\"     MD face to face encounter   Order Comments: Documentation of Face to Face and Certification for Home Health Services    I certify that patient: Lalitha Underwood is under my care and that I, or a nurse practitioner or physician's assistant working with me, had a face-to-face encounter that meets the physician face-to-face encounter requirements with this patient on: 12/3/2018.    This encounter with the patient was in whole, or in part, for the following medical condition, which is the primary reason for home health care: s/p paraesophogeal hernia repair.    I certify that, based on my findings, the following services are medically necessary home health services: Nursing and Physical Therapy.    My clinical findings support the need for the above services because: Nurse is needed: to reinforce education on G tube management and assess patient in home setting following surgery. and Physical Therapy Services are needed to assess and treat the following functional impairments: mobility.    Further, I certify that my clinical findings support that this patient is homebound (i.e. absences from home require considerable and taxing effort and are for medical reasons or [facility] services or infrequently or of short duration when for other reasons) because: Requires assistance of another person or specialized equipment to access medical services because patient: Requires supervision of another for safe transfer...    Based on the above findings. I certify that this patient is confined to the home and needs intermittent skilled nursing care, physical therapy and/or speech therapy.  The patient is under my care, and I have initiated the establishment of the plan of care.  This patient will be " followed by a physician who will periodically review the plan of care.  Physician/Provider to provide follow up care: Kelly Hull    Attending hospital physician (the Medicare certified [facility] provider): Dr. Jasemet Wilder  Physician Signature: See electronic signature associated with these discharge orders.  Date: 12/3/2018     Tubes   Order Comments: PEG TUBE INSTRUCTIONS:    Venting:  -You should vent or temporarily put your tube to gravity bag (or basin) drainage if you experience nausea or bloating.  This is important as it will help to protect the hernia repair.   If you are uncertain how to do this, please ask your nurse for instruction.    Skin care:  -Change the gauze dressing around your PEG tube daily.  -Check for redness and swelling in the area where the tube goes into your skin.   -Keep the skin around your tube dry and with a split gauze under the flange. If the hole where the tube enters your body is draining fluid, you may need to put barrier cream or antibiotic cream on the skin around your tube after you are done cleaning it. Cover the area with bandages, and call your caregiver.   -If there are no stitches holding your PEG tube in place on your skin, gently turn the tube. This may decrease pressure on your skin, and help prevent an infection. Ask your caregiver if you should turn your PEG tube, and how to do it correctly.     Flushes:  -Flush your feeding tube with 30cc of water twice daily to ensure it does not become plugged  -If administering medications or tube feedings via your tube, make sure to flush with water immediately after use to prevent the tube from plugging         DISCHARGE MEDICATIONS:   Current Discharge Medication List      START taking these medications    Details   acetaminophen (TYLENOL) 500 MG tablet Take 2 tablets (1,000 mg) by mouth every 8 hours as needed for mild pain    Associated Diagnoses: Acute post-operative pain      methocarbamol (ROBAXIN) 500 MG tablet Take  1 tablet (500 mg) by mouth 3 times daily as needed for muscle spasms Wean as tolerated starting at time of discharge  Qty: 30 tablet, Refills: 0    Associated Diagnoses: Acute post-operative pain      ondansetron (ZOFRAN-ODT) 4 MG ODT tab Take 1 tablet (4 mg) by mouth every 6 hours as needed for nausea or vomiting  Qty: 10 tablet, Refills: 0    Associated Diagnoses: PONV (postoperative nausea and vomiting)      oxyCODONE (ROXICODONE) 5 MG/5ML solution Take 2.5-5 mLs (2.5-5 mg) by mouth every 4 hours as needed for moderate to severe pain Wean narcotic use as tolerated starting at time of discharge  Qty: 20 mL, Refills: 0    Associated Diagnoses: Acute post-operative pain      senna-docusate (SENOKOT-S/PERICOLACE) 8.6-50 MG tablet Take 1 tablet by mouth 2 times daily Reduce dose or temporarily discontinue if having loose stools.  Qty: 60 tablet, Refills: 0    Associated Diagnoses: Bowel habit changes         CONTINUE these medications which have NOT CHANGED    Details   albuterol (PROAIR HFA/PROVENTIL HFA/VENTOLIN HFA) 108 (90 Base) MCG/ACT inhaler Inhale 2 puffs into the lungs every 6 hours as needed      Calcium Citrate 200 MG TABS Take 1 tablet by mouth 2 times daily      cholecalciferol (VITAMIN D3) 5000 units TABS tablet Take 5,000 Units by mouth daily (with lunch)      fluticasone-salmeterol (ADVAIR) 250-50 MCG/DOSE diskus inhaler Inhale 1 puff into the lungs 2 times daily      hyoscyamine (LEVBID) 0.375 MG 12 hr tablet Take 0.375 mg by mouth 2 times daily ON HOLD SINCE 10/26/2018 WHEN SHE STARTED VESICARE      losartan (COZAAR) 50 MG tablet Take 50 mg by mouth 2 times daily      OTHER MEDICAL SUPPLIES 1.5 L At Bedtime      phenytoin (DILANTIN) 100 MG CR capsule Take 100-200 mg by mouth At Bedtime 100 MG IN THE MORNING   200 MG AT BEDTIME      potassium chloride SA (K-DUR/KLOR-CON M) 20 MEQ CR tablet Take by mouth daily (with lunch)      RABEprazole (ACIPHEX) 20 MG EC tablet Take 20 mg by mouth every morning       solifenacin (VESICARE) 5 MG tablet Take 5 mg by mouth every morning      tiotropium (SPIRIVA) 18 MCG capsule Inhale 2 puffs into the lungs daily      albuterol (ACCUNEB) 1.25 MG/3ML nebulizer solution Inhale 1.25 mg into the lungs as needed      denosumab (PROLIA) 60 MG/ML SOLN injection Inject 60 mg Subcutaneous every 6 months      UNKNOWN TO PATIENT Take 1 tablet by mouth daily (with lunch) HAIR SUPPLEMENT

## 2018-12-03 NOTE — PLAN OF CARE
Problem: Patient Care Overview  Goal: Individualization & Mutuality  Discharge Planner PT   Patient plan for discharge: TBD, reports preference for home w/home PT, though does note some concerns with dressing and pt will see OT regarding  Current status: PT 7B: Pt demonstrates very much improved mobility status since eval, and was last seen in PT 11/1. Pt JIMBO with bed mobility simulating home set-up, also JIMBO transferring, progressed from SBA to JIMBO with gait and was able to achieve 150 ft x 2 with FWW. O2 sats 92% on 2L with gait, and without supplemental O2 briefly desaturated to 86% on RA, though recovered to 90+% within 20 sec of rest. Discussed discharge safety, pt does not have concerns except indicates is currently having difficulty putting on her underpants and in the past (after prior abdominal surgery) wore the same clothes for 2 wks straight, which indicates some concern pt may not be able to safely manage dressing at home.   Barriers to return to prior living situation: no major barriers from PT standpoint, but recommend OT assess pt for safe ADLs with respect to home DC  Recommendations for discharge: home with assist, home PT. Recommend walker use initially within pt's home and for going to/from apts. Pt already has walker available at home for use.   Rationale for recommendations: progress in PT       Entered by: Rosa Maria Sampson 12/03/2018 9:35 AM

## 2018-12-03 NOTE — PROGRESS NOTES
"Thoracic Surgery Progress Note  12/03/2018    NAEO. Reports muscular chest pain. Tolerating fulls. Having bowel function.    /56 (BP Location: Left arm)  Pulse 83  Temp 97.6  F (36.4  C) (Oral)  Resp 16  Ht 1.626 m (5' 4\")  Wt 92.2 kg (203 lb 4.2 oz)  SpO2 97%  BMI 34.89 kg/m2    PE  NAD  NLB on RA  Abd incisions c/d/i, Gtube to clamped  Ext wwp, motor intact throughout      Recent Labs  Lab 12/03/18  0745 12/02/18  0818 12/01/18  0720   WBC 9.1 9.5 11.9*   HGB 10.9* 11.1* 11.8    279 261     A/P: 73F with baseline COPD on 1.5 L NC at night, 11/27 s/p paraesophageal hernia repair 11/27. Stable, doing well.     -PO pain meds, lidocaine patch, Tylenol, robaxin   -Encourage IS   -Clamp G tube  -Full liquid diet   -no abx  -lovenox ppx  -ambulate   -PT, likely TCU today/tomorrow pending bed availability     Seen with fellow, discussed with staff.  Sangeeta Jimenez MD  Surgery PGY1     "

## 2018-12-03 NOTE — PLAN OF CARE
Problem: Patient Care Overview  Goal: Plan of Care/Patient Progress Review  Outcome: No Change  Vitals:    12/02/18 0738 12/02/18 1512 12/03/18 0112 12/03/18 0737   BP: 144/53 131/60 141/68 149/56   BP Location: Left arm Left arm Left arm Left arm   Pulse: 86  89 83   Resp: 20 18 16 16   Temp: 99  F (37.2  C) 97.5  F (36.4  C) 98.4  F (36.9  C) 97.6  F (36.4  C)   TempSrc: Oral Oral Oral Oral   SpO2: 92% 98% 96% 97%   Weight:       Height:         Afebrile. VSS. sats 97% on 2L NC. Pain is currently managed with schedule tylenol and robaxin. Discharge paperwork was discussed with patient. A copy was given to patient. Dressing on on lap sites changed. G tube teaching and Nissen diet teaching completed with patient. R PIV removed. Patient discharging home. Awaiting transport. Continue plan of care.

## 2018-12-03 NOTE — PLAN OF CARE
"Problem: Patient Care Overview  Goal: Plan of Care/Patient Progress Review  Outcome: No Change  /68 (BP Location: Left arm)  Pulse 89  Temp 98.4  F (36.9  C) (Oral)  Resp 16  Ht 1.626 m (5' 4\")  Wt 92.2 kg (203 lb 4.2 oz)  SpO2 96%  BMI 34.89 kg/m2  Voiding adequately. Abdomen rounded/passing flatus, small stool. Oxycodone for pain.  Gtube remain clamped. Abdominal incision intact with no drainage. Continue with plan of care.          "

## 2018-12-03 NOTE — PROGRESS NOTES
[facility]  Met with pt to discuss plans for HC.  Pt to be discharged home  12/3  and has agreed to have [facility] follow with services of SN and PT. Patient care support center processing referral.  Pt verbalized understanding that initial visit is scheduled for  1 to 2 days after discharge.    Pt has 24 hour phone number for [facility] for any questions or concerns.    Thank you  Caty Lorenz RN, BSN  [facility] Liaison  [facility]  [phone]

## 2018-12-04 ENCOUNTER — PATIENT OUTREACH (OUTPATIENT)
Dept: CARE COORDINATION | Facility: CLINIC | Age: 73
End: 2018-12-04

## 2018-12-04 NOTE — PROGRESS NOTES
Patient is feeling frustrated with her tube  She is not sure about it and would like someone to call her back  She states that she was not given clear instructions and ate some scrambled eggs and thinks the scrabble eggs are stuck in the tube    She also called home care to see if they could come out and look at it but they are suppose to come out tomorrow    Advised the patient I would route message to care team to see if they could give her a call back about her tube.

## 2018-12-05 ENCOUNTER — TELEPHONE (OUTPATIENT)
Dept: SURGERY | Facility: CLINIC | Age: 73
End: 2018-12-05

## 2018-12-05 NOTE — TELEPHONE ENCOUNTER
Call to Lalitha in response to message received about her feeding tube being clogged with scrambled eggs.    A home care nurse came out last night but was unable to flush the tube because Lalitha was not given the syringe at the time of discharge. The home care nurse has ordered syringes and they are expected to be in tomorrow.     Lalitha was able to milk the contents from the tube to clear it out. She was quite disappointed that she was not given any teaching on how to care for the tube before she was discharged from the hospital. Lalitha says all she got was a sheet of paper but no one explained itr or showed her what to do.    I will share this information with Dr. Wilder so he can follow up with our inpatient team.     Lalitha's questions were answered and her tube issue has been addressed.

## 2018-12-12 ENCOUNTER — OFFICE VISIT - HEALTHEAST (OUTPATIENT)
Dept: FAMILY MEDICINE | Facility: CLINIC | Age: 73
End: 2018-12-12

## 2018-12-12 DIAGNOSIS — S40.021A HEMATOMA OF ARM, RIGHT, INITIAL ENCOUNTER: ICD-10-CM

## 2018-12-12 DIAGNOSIS — E66.01 MORBID OBESITY (H): ICD-10-CM

## 2018-12-12 DIAGNOSIS — R60.9 EDEMA, UNSPECIFIED TYPE: ICD-10-CM

## 2018-12-12 DIAGNOSIS — D18.03 CAVERNOUS HEMANGIOMA OF LIVER: ICD-10-CM

## 2018-12-12 DIAGNOSIS — K44.9 HIATAL HERNIA: ICD-10-CM

## 2018-12-12 ASSESSMENT — MIFFLIN-ST. JEOR: SCORE: 1402.61

## 2018-12-17 DIAGNOSIS — K44.9 HIATAL HERNIA: Primary | ICD-10-CM

## 2018-12-19 ENCOUNTER — ANCILLARY PROCEDURE (OUTPATIENT)
Dept: GENERAL RADIOLOGY | Facility: CLINIC | Age: 73
End: 2018-12-19
Attending: PHYSICIAN ASSISTANT
Payer: MEDICARE

## 2018-12-19 ENCOUNTER — RECORDS - HEALTHEAST (OUTPATIENT)
Dept: ADMINISTRATIVE | Facility: OTHER | Age: 73
End: 2018-12-19

## 2018-12-19 ENCOUNTER — OFFICE VISIT (OUTPATIENT)
Dept: SURGERY | Facility: CLINIC | Age: 73
End: 2018-12-19
Attending: THORACIC SURGERY (CARDIOTHORACIC VASCULAR SURGERY)
Payer: MEDICARE

## 2018-12-19 VITALS
SYSTOLIC BLOOD PRESSURE: 156 MMHG | BODY MASS INDEX: 34.57 KG/M2 | RESPIRATION RATE: 16 BRPM | HEART RATE: 118 BPM | WEIGHT: 202.5 LBS | TEMPERATURE: 97.8 F | HEIGHT: 64 IN | OXYGEN SATURATION: 94 % | DIASTOLIC BLOOD PRESSURE: 83 MMHG

## 2018-12-19 DIAGNOSIS — Z87.19 S/P REPAIR OF PARAESOPHAGEAL HERNIA: ICD-10-CM

## 2018-12-19 DIAGNOSIS — K44.9 HIATAL HERNIA: Primary | ICD-10-CM

## 2018-12-19 DIAGNOSIS — Z98.890 S/P REPAIR OF PARAESOPHAGEAL HERNIA: ICD-10-CM

## 2018-12-19 DIAGNOSIS — Z93.1 S/P NISSEN FUNDOPLICATION (WITH GASTROSTOMY TUBE PLACEMENT) (H): ICD-10-CM

## 2018-12-19 PROCEDURE — G0463 HOSPITAL OUTPT CLINIC VISIT: HCPCS | Mod: ZF

## 2018-12-19 ASSESSMENT — MIFFLIN-ST. JEOR: SCORE: 1408.53

## 2018-12-19 ASSESSMENT — PAIN SCALES - GENERAL: PAINLEVEL: NO PAIN (0)

## 2018-12-19 NOTE — Clinical Note
12/19/2018      RE: Lalitha Underwood  [address]  [facility] [address]       THORACIC SURGERY FOLLOW UP VISIT    Dear Dr. Hull,  I saw Lalitha Underwood in follow-up today. The clinical summary follows:     PREOP DIAGNOSIS   Hiatal hernia type IV  PROCEDURE   1.Esophagograstroscopy   2. Laparoscopic repair of hiatal hernia   3. Liver biopsy   4. Gastrostomy tube placement (20Fr)  5. Bilateral tube thoracostomies (19Fr Graeme)   DATE OF PROCEDURE  11/26/18    Pathology results:   LIVER BIOPSY:   - Sclerosed cavernous hemangioma   - Negative for malignancy     COMPLICATIONS  None    INTERVAL STUDIES  Upper GI endoscopy ***     ETOH***  TOB***  BMI***  SUBJECTIVE  ***    From a personal perspective, ***    IMPRESSION No diagnosis found.  She is a 73y.o. With type IV hiatal hernia s/p laparoscopic repair 11/26/18.     PLAN  I spent a total of *** minutes with M***. Lalitha Underwood and ***, more than 50% of which were spent in counseling, coordination of care, and face-to-face time. I reviewed the plan as follows:  ***  Necessary Tests & Appointments: ***  Pain Control Plan: ***  Anticoagulation Plan: ***  Smoking Cessation: ***  All questions were answered and the patient and present family were in agreement with the plan.  I appreciate the opportunity to participate in the care of your patient and will keep you updated.  Sincerely,      Jasmeet Wilder MD

## 2018-12-19 NOTE — NURSING NOTE
"Oncology Rooming Note    December 19, 2018 4:56 PM   Lalitha Underwood is a 73 year old female who presents for:    Chief Complaint   Patient presents with     Oncology Clinic Visit     Return: Post op Hiatal Hernia     Initial Vitals: /83   Pulse 118   Temp 97.8  F (36.6  C) (Tympanic)   Resp 16   Ht 1.626 m (5' 4\")   Wt 91.9 kg (202 lb 8 oz)   SpO2 94%   Breastfeeding? No   BMI 34.76 kg/m   Estimated body mass index is 34.76 kg/m  as calculated from the following:    Height as of this encounter: 1.626 m (5' 4\").    Weight as of this encounter: 91.9 kg (202 lb 8 oz). Body surface area is 2.04 meters squared.  No Pain (0) Comment: Data Unavailable   No LMP recorded. Patient is postmenopausal.  Allergies reviewed: Yes  Medications reviewed: Yes    Medications: Medication refills not needed today.  Pharmacy name entered into [facility]: [facility] [address] [place] [address]  AT [facility] [address]    Clinical concerns: no new concerns today but is hoping that she can get her g - tube out today. Dr. Wilder was notified.    10 minutes for nursing intake (face to face time)     Eliana German CMA              "

## 2018-12-19 NOTE — LETTER
12/19/2018      RE: Lalitha Underwood  [address] [facility] [address]       THORACIC SURGERY FOLLOW UP VISIT    Dear Dr. Hull,  I saw Lalitha Underwood in follow-up today. The clinical summary follows:     PREOP DIAGNOSIS   Hiatal hernia type IV  PROCEDURE   1. Laparoscopic repair of hiatal hernia   2. Liver biopsy   3. Gastrostomy tube placement (20Fr)  4. Bilateral tube thoracostomies (19Fr Graeme)   DATE OF PROCEDURE  11/26/18    Pathology results:   LIVER BIOPSY:   - Sclerosed cavernous hemangioma   - Negative for malignancy     COMPLICATIONS  None    INTERVAL STUDIES  Upper GI endoscopy 12/19/2018: expected postop changes     ETOH negative  TOB previous smoker  BMI 35  SUBJECTIVE  Ms. Undewrood is doing very well and advancing her diet. We removed her g-tube today, since she had inflammation at the site and it has been almost 1 month.    From a personal perspective, she is here alone today.    IMPRESSION (K44.9) Hiatal hernia  (primary encounter diagnosis)  (Z93.1) S/P Nissen fundoplication (with gastrostomy tube placement) (H)    73 y.o. female 1 month s/p laparoscopic repair of hiatal hernia type IV    PLAN    I reviewed the plan as follows:  Follow-up in 3 months with Dr. Tamayo  No further follow-up with me unless she develops new problems.  All questions were answered and the patient and present family were in agreement with the plan.  I appreciate the opportunity to participate in the care of your patient and will keep you updated.  Sincerely,      Jasmeet Wilder MD

## 2018-12-19 NOTE — PROGRESS NOTES
THORACIC SURGERY FOLLOW UP VISIT    Dear Dr. Hull,  I saw Lalitha Underwood in follow-up today. The clinical summary follows:     PREOP DIAGNOSIS   Hiatal hernia type IV  PROCEDURE   1. Laparoscopic repair of hiatal hernia   2. Liver biopsy   3. Gastrostomy tube placement (20Fr)  4. Bilateral tube thoracostomies (19Fr Graeme)   DATE OF PROCEDURE  11/26/18    Pathology results:   LIVER BIOPSY:   - Sclerosed cavernous hemangioma   - Negative for malignancy     COMPLICATIONS  None    INTERVAL STUDIES  Upper GI endoscopy 12/19/2018: expected postop changes     ETOH negative  TOB previous smoker  BMI 35  SUBJECTIVE  Ms. Underwood is doing very well and advancing her diet. We removed her g-tube today, since she had inflammation at the site and it has been almost 1 month.    From a personal perspective, she is here alone today.    IMPRESSION (K44.9) Hiatal hernia  (primary encounter diagnosis)  (Z93.1) S/P Nissen fundoplication (with gastrostomy tube placement) (H)    73 y.o. female 1 month s/p laparoscopic repair of hiatal hernia type IV    PLAN    I reviewed the plan as follows:  Follow-up in 3 months with Dr. Tamayo  No further follow-up with me unless she develops new problems.  All questions were answered and the patient and present family were in agreement with the plan.  I appreciate the opportunity to participate in the care of your patient and will keep you updated.  Sincerely,

## 2018-12-20 ENCOUNTER — AMBULATORY - HEALTHEAST (OUTPATIENT)
Dept: PULMONOLOGY | Facility: OTHER | Age: 73
End: 2018-12-20

## 2018-12-20 DIAGNOSIS — K44.9 DIAPHRAGMATIC HERNIA WITHOUT OBSTRUCTION AND WITHOUT GANGRENE: ICD-10-CM

## 2018-12-24 ENCOUNTER — COMMUNICATION - HEALTHEAST (OUTPATIENT)
Dept: FAMILY MEDICINE | Facility: CLINIC | Age: 73
End: 2018-12-24

## 2019-01-04 ENCOUNTER — COMMUNICATION - HEALTHEAST (OUTPATIENT)
Dept: ENDOCRINOLOGY | Facility: CLINIC | Age: 74
End: 2019-01-04

## 2019-01-29 ENCOUNTER — COMMUNICATION - HEALTHEAST (OUTPATIENT)
Dept: FAMILY MEDICINE | Facility: CLINIC | Age: 74
End: 2019-01-29

## 2019-02-21 ENCOUNTER — OFFICE VISIT - HEALTHEAST (OUTPATIENT)
Dept: FAMILY MEDICINE | Facility: CLINIC | Age: 74
End: 2019-02-21

## 2019-02-21 DIAGNOSIS — Z00.00 MEDICARE ANNUAL WELLNESS VISIT, SUBSEQUENT: ICD-10-CM

## 2019-02-21 DIAGNOSIS — R26.9 ABNORMALITY OF GAIT: ICD-10-CM

## 2019-02-21 DIAGNOSIS — I35.0 MILD AORTIC VALVE STENOSIS: ICD-10-CM

## 2019-02-21 DIAGNOSIS — N39.41 URGE INCONTINENCE OF URINE: ICD-10-CM

## 2019-02-21 DIAGNOSIS — R60.9 EDEMA, UNSPECIFIED TYPE: ICD-10-CM

## 2019-02-21 DIAGNOSIS — I35.1 MILD AORTIC VALVE REGURGITATION: ICD-10-CM

## 2019-02-21 DIAGNOSIS — E78.5 HYPERLIPIDEMIA, UNSPECIFIED HYPERLIPIDEMIA TYPE: ICD-10-CM

## 2019-02-21 DIAGNOSIS — G40.909 NONINTRACTABLE EPILEPSY WITHOUT STATUS EPILEPTICUS, UNSPECIFIED EPILEPSY TYPE (H): ICD-10-CM

## 2019-02-21 DIAGNOSIS — K21.9 GASTROESOPHAGEAL REFLUX DISEASE WITHOUT ESOPHAGITIS: ICD-10-CM

## 2019-02-21 DIAGNOSIS — E55.9 VITAMIN D DEFICIENCY: ICD-10-CM

## 2019-02-21 DIAGNOSIS — I10 ESSENTIAL HYPERTENSION: ICD-10-CM

## 2019-02-21 DIAGNOSIS — I87.2 VENOUS INSUFFICIENCY OF BOTH LOWER EXTREMITIES: ICD-10-CM

## 2019-02-21 DIAGNOSIS — E66.01 MORBID OBESITY (H): ICD-10-CM

## 2019-02-21 DIAGNOSIS — M81.0 AGE-RELATED OSTEOPOROSIS WITHOUT CURRENT PATHOLOGICAL FRACTURE: ICD-10-CM

## 2019-02-21 DIAGNOSIS — M81.0 SENILE OSTEOPOROSIS: ICD-10-CM

## 2019-02-21 DIAGNOSIS — G60.9 NEUROPATHY, IDIOPATHIC: ICD-10-CM

## 2019-02-21 DIAGNOSIS — J43.9 PULMONARY EMPHYSEMA, UNSPECIFIED EMPHYSEMA TYPE (H): ICD-10-CM

## 2019-02-21 LAB
ALBUMIN SERPL-MCNC: 4.1 G/DL (ref 3.5–5)
ALP SERPL-CCNC: 83 U/L (ref 45–120)
ALT SERPL W P-5'-P-CCNC: 10 U/L (ref 0–45)
ANION GAP SERPL CALCULATED.3IONS-SCNC: 12 MMOL/L (ref 5–18)
AST SERPL W P-5'-P-CCNC: 13 U/L (ref 0–40)
BILIRUB SERPL-MCNC: 0.3 MG/DL (ref 0–1)
BUN SERPL-MCNC: 18 MG/DL (ref 8–28)
CALCIUM SERPL-MCNC: 9.4 MG/DL (ref 8.5–10.5)
CHLORIDE BLD-SCNC: 101 MMOL/L (ref 98–107)
CHOLEST SERPL-MCNC: 226 MG/DL
CO2 SERPL-SCNC: 25 MMOL/L (ref 22–31)
CREAT SERPL-MCNC: 0.67 MG/DL (ref 0.6–1.1)
ERYTHROCYTE [DISTWIDTH] IN BLOOD BY AUTOMATED COUNT: 14 % (ref 11–14.5)
FASTING STATUS PATIENT QL REPORTED: YES
GFR SERPL CREATININE-BSD FRML MDRD: >60 ML/MIN/1.73M2
GLUCOSE BLD-MCNC: 80 MG/DL (ref 70–125)
HCT VFR BLD AUTO: 39.3 % (ref 35–47)
HDLC SERPL-MCNC: 82 MG/DL
HGB BLD-MCNC: 12.8 G/DL (ref 12–16)
LDLC SERPL CALC-MCNC: 128 MG/DL
MCH RBC QN AUTO: 29.1 PG (ref 27–34)
MCHC RBC AUTO-ENTMCNC: 32.5 G/DL (ref 32–36)
MCV RBC AUTO: 90 FL (ref 80–100)
PHENYTOIN SERPL-MCNC: 4.8 UG/ML (ref 10–20)
PLATELET # BLD AUTO: 189 THOU/UL (ref 140–440)
PMV BLD AUTO: 7.6 FL (ref 7–10)
POTASSIUM BLD-SCNC: 4.1 MMOL/L (ref 3.5–5)
PROT SERPL-MCNC: 7.1 G/DL (ref 6–8)
RBC # BLD AUTO: 4.39 MILL/UL (ref 3.8–5.4)
SODIUM SERPL-SCNC: 138 MMOL/L (ref 136–145)
TRIGL SERPL-MCNC: 80 MG/DL
TSH SERPL DL<=0.005 MIU/L-ACNC: 2.67 UIU/ML (ref 0.3–5)
VIT B12 SERPL-MCNC: 362 PG/ML (ref 213–816)
WBC: 8 THOU/UL (ref 4–11)

## 2019-02-21 ASSESSMENT — MIFFLIN-ST. JEOR: SCORE: 1392.4

## 2019-02-22 LAB
25(OH)D3 SERPL-MCNC: 59 NG/ML (ref 30–80)
25(OH)D3 SERPL-MCNC: 59 NG/ML (ref 30–80)

## 2019-03-11 ENCOUNTER — RECORDS - HEALTHEAST (OUTPATIENT)
Dept: ADMINISTRATIVE | Facility: OTHER | Age: 74
End: 2019-03-11

## 2019-03-12 ENCOUNTER — OFFICE VISIT - HEALTHEAST (OUTPATIENT)
Dept: PODIATRY | Facility: CLINIC | Age: 74
End: 2019-03-12

## 2019-03-12 ENCOUNTER — COMMUNICATION - HEALTHEAST (OUTPATIENT)
Dept: ADMINISTRATIVE | Facility: CLINIC | Age: 74
End: 2019-03-12

## 2019-03-12 DIAGNOSIS — M21.41 BILATERAL PES PLANUS: ICD-10-CM

## 2019-03-12 DIAGNOSIS — R26.81 UNSTABLE GAIT: ICD-10-CM

## 2019-03-12 DIAGNOSIS — M21.42 BILATERAL PES PLANUS: ICD-10-CM

## 2019-03-12 ASSESSMENT — MIFFLIN-ST. JEOR: SCORE: 1392.4

## 2019-03-18 ENCOUNTER — OFFICE VISIT - HEALTHEAST (OUTPATIENT)
Dept: PULMONOLOGY | Facility: OTHER | Age: 74
End: 2019-03-18

## 2019-03-18 ENCOUNTER — HOSPITAL ENCOUNTER (OUTPATIENT)
Dept: RADIOLOGY | Facility: HOSPITAL | Age: 74
Discharge: HOME | End: 2019-03-18
Attending: INTERNAL MEDICINE

## 2019-03-18 DIAGNOSIS — J43.2 CENTRILOBULAR EMPHYSEMA (H): ICD-10-CM

## 2019-03-18 DIAGNOSIS — K44.9 DIAPHRAGMATIC HERNIA WITHOUT OBSTRUCTION AND WITHOUT GANGRENE: ICD-10-CM

## 2019-04-08 ENCOUNTER — RECORDS - HEALTHEAST (OUTPATIENT)
Dept: ADMINISTRATIVE | Facility: OTHER | Age: 74
End: 2019-04-08

## 2019-04-10 ENCOUNTER — AMBULATORY - HEALTHEAST (OUTPATIENT)
Dept: ENDOCRINOLOGY | Facility: CLINIC | Age: 74
End: 2019-04-10

## 2019-04-10 ENCOUNTER — HOSPITAL ENCOUNTER (OUTPATIENT)
Dept: MAMMOGRAPHY | Facility: CLINIC | Age: 74
Discharge: HOME | End: 2019-04-10
Attending: FAMILY MEDICINE

## 2019-04-10 DIAGNOSIS — Z12.31 VISIT FOR SCREENING MAMMOGRAM: ICD-10-CM

## 2019-04-19 ENCOUNTER — COMMUNICATION - HEALTHEAST (OUTPATIENT)
Dept: PULMONOLOGY | Facility: OTHER | Age: 74
End: 2019-04-19

## 2019-04-22 ENCOUNTER — RECORDS - HEALTHEAST (OUTPATIENT)
Dept: ADMINISTRATIVE | Facility: OTHER | Age: 74
End: 2019-04-22

## 2019-04-22 ENCOUNTER — RECORDS - HEALTHEAST (OUTPATIENT)
Dept: PULMONOLOGY | Facility: OTHER | Age: 74
End: 2019-04-22

## 2019-04-22 DIAGNOSIS — J43.2 CENTRILOBULAR EMPHYSEMA (H): ICD-10-CM

## 2019-04-23 ENCOUNTER — OFFICE VISIT - HEALTHEAST (OUTPATIENT)
Dept: PHYSICAL THERAPY | Facility: REHABILITATION | Age: 74
End: 2019-04-23

## 2019-04-23 DIAGNOSIS — R26.9 ABNORMALITY OF GAIT: ICD-10-CM

## 2019-04-23 DIAGNOSIS — Z74.09 DECREASED FUNCTIONAL MOBILITY AND ENDURANCE: ICD-10-CM

## 2019-04-23 DIAGNOSIS — R26.89 POOR BALANCE: ICD-10-CM

## 2019-04-23 DIAGNOSIS — R26.81 UNSTEADINESS ON FEET: ICD-10-CM

## 2019-04-23 DIAGNOSIS — M62.81 GENERALIZED MUSCLE WEAKNESS: ICD-10-CM

## 2019-04-24 ENCOUNTER — COMMUNICATION - HEALTHEAST (OUTPATIENT)
Dept: PULMONOLOGY | Facility: OTHER | Age: 74
End: 2019-04-24

## 2019-04-25 ENCOUNTER — OFFICE VISIT - HEALTHEAST (OUTPATIENT)
Dept: PHYSICAL THERAPY | Facility: REHABILITATION | Age: 74
End: 2019-04-25

## 2019-04-25 ENCOUNTER — AMBULATORY - HEALTHEAST (OUTPATIENT)
Dept: PULMONOLOGY | Facility: OTHER | Age: 74
End: 2019-04-25

## 2019-04-25 DIAGNOSIS — R26.9 ABNORMALITY OF GAIT: ICD-10-CM

## 2019-04-25 DIAGNOSIS — R26.81 UNSTEADINESS ON FEET: ICD-10-CM

## 2019-04-25 DIAGNOSIS — R26.89 POOR BALANCE: ICD-10-CM

## 2019-04-25 DIAGNOSIS — Z74.09 DECREASED FUNCTIONAL MOBILITY AND ENDURANCE: ICD-10-CM

## 2019-04-25 DIAGNOSIS — M62.81 GENERALIZED MUSCLE WEAKNESS: ICD-10-CM

## 2019-04-26 ENCOUNTER — COMMUNICATION - HEALTHEAST (OUTPATIENT)
Dept: FAMILY MEDICINE | Facility: CLINIC | Age: 74
End: 2019-04-26

## 2019-04-26 DIAGNOSIS — J44.9 COPD (CHRONIC OBSTRUCTIVE PULMONARY DISEASE) (H): ICD-10-CM

## 2019-04-30 ENCOUNTER — OFFICE VISIT - HEALTHEAST (OUTPATIENT)
Dept: PHYSICAL THERAPY | Facility: REHABILITATION | Age: 74
End: 2019-04-30

## 2019-04-30 DIAGNOSIS — Z74.09 DECREASED FUNCTIONAL MOBILITY AND ENDURANCE: ICD-10-CM

## 2019-04-30 DIAGNOSIS — R26.9 ABNORMALITY OF GAIT: ICD-10-CM

## 2019-04-30 DIAGNOSIS — R26.89 POOR BALANCE: ICD-10-CM

## 2019-04-30 DIAGNOSIS — R26.81 UNSTEADINESS ON FEET: ICD-10-CM

## 2019-04-30 DIAGNOSIS — M62.81 GENERALIZED MUSCLE WEAKNESS: ICD-10-CM

## 2019-05-02 ENCOUNTER — OFFICE VISIT - HEALTHEAST (OUTPATIENT)
Dept: PHYSICAL THERAPY | Facility: REHABILITATION | Age: 74
End: 2019-05-02

## 2019-05-02 DIAGNOSIS — R26.9 ABNORMALITY OF GAIT: ICD-10-CM

## 2019-05-02 DIAGNOSIS — M62.81 GENERALIZED MUSCLE WEAKNESS: ICD-10-CM

## 2019-05-02 DIAGNOSIS — R26.81 UNSTEADINESS ON FEET: ICD-10-CM

## 2019-05-02 DIAGNOSIS — R26.89 POOR BALANCE: ICD-10-CM

## 2019-05-02 DIAGNOSIS — Z74.09 DECREASED FUNCTIONAL MOBILITY AND ENDURANCE: ICD-10-CM

## 2019-05-06 ENCOUNTER — COMMUNICATION - HEALTHEAST (OUTPATIENT)
Dept: PULMONOLOGY | Facility: OTHER | Age: 74
End: 2019-05-06

## 2019-05-06 ENCOUNTER — OFFICE VISIT - HEALTHEAST (OUTPATIENT)
Dept: VASCULAR SURGERY | Facility: CLINIC | Age: 74
End: 2019-05-06

## 2019-05-06 DIAGNOSIS — M21.42 FLAT FEET: ICD-10-CM

## 2019-05-06 DIAGNOSIS — Q66.6 VALGUS DEFORMITY OF BOTH FEET: ICD-10-CM

## 2019-05-06 DIAGNOSIS — M21.41 FLAT FEET: ICD-10-CM

## 2019-05-06 DIAGNOSIS — M79.89 LEG SWELLING: ICD-10-CM

## 2019-05-06 DIAGNOSIS — E65 LOCALIZED DEPOSITS OF FAT: ICD-10-CM

## 2019-05-06 DIAGNOSIS — I87.2 VENOUS INSUFFICIENCY OF BOTH LOWER EXTREMITIES: ICD-10-CM

## 2019-05-06 ASSESSMENT — MIFFLIN-ST. JEOR: SCORE: 1346.82

## 2019-05-09 ENCOUNTER — OFFICE VISIT - HEALTHEAST (OUTPATIENT)
Dept: PHYSICAL THERAPY | Facility: REHABILITATION | Age: 74
End: 2019-05-09

## 2019-05-09 DIAGNOSIS — R26.89 POOR BALANCE: ICD-10-CM

## 2019-05-09 DIAGNOSIS — M62.81 GENERALIZED MUSCLE WEAKNESS: ICD-10-CM

## 2019-05-09 DIAGNOSIS — Z74.09 DECREASED FUNCTIONAL MOBILITY AND ENDURANCE: ICD-10-CM

## 2019-05-09 DIAGNOSIS — R26.9 ABNORMALITY OF GAIT: ICD-10-CM

## 2019-05-09 DIAGNOSIS — R26.81 UNSTEADINESS ON FEET: ICD-10-CM

## 2019-05-14 ENCOUNTER — OFFICE VISIT - HEALTHEAST (OUTPATIENT)
Dept: PHYSICAL THERAPY | Facility: REHABILITATION | Age: 74
End: 2019-05-14

## 2019-05-14 DIAGNOSIS — R26.81 UNSTEADINESS ON FEET: ICD-10-CM

## 2019-05-14 DIAGNOSIS — R26.89 POOR BALANCE: ICD-10-CM

## 2019-05-14 DIAGNOSIS — R26.9 ABNORMALITY OF GAIT: ICD-10-CM

## 2019-05-14 DIAGNOSIS — M62.81 GENERALIZED MUSCLE WEAKNESS: ICD-10-CM

## 2019-05-14 DIAGNOSIS — Z74.09 DECREASED FUNCTIONAL MOBILITY AND ENDURANCE: ICD-10-CM

## 2019-05-16 ENCOUNTER — OFFICE VISIT - HEALTHEAST (OUTPATIENT)
Dept: PHYSICAL THERAPY | Facility: REHABILITATION | Age: 74
End: 2019-05-16

## 2019-05-16 DIAGNOSIS — R26.9 ABNORMALITY OF GAIT: ICD-10-CM

## 2019-05-16 DIAGNOSIS — M62.81 GENERALIZED MUSCLE WEAKNESS: ICD-10-CM

## 2019-05-16 DIAGNOSIS — R26.89 POOR BALANCE: ICD-10-CM

## 2019-05-16 DIAGNOSIS — R26.81 UNSTEADINESS ON FEET: ICD-10-CM

## 2019-05-16 DIAGNOSIS — Z74.09 DECREASED FUNCTIONAL MOBILITY AND ENDURANCE: ICD-10-CM

## 2019-05-21 ENCOUNTER — OFFICE VISIT - HEALTHEAST (OUTPATIENT)
Dept: PHYSICAL THERAPY | Facility: REHABILITATION | Age: 74
End: 2019-05-21

## 2019-05-21 DIAGNOSIS — M62.81 GENERALIZED MUSCLE WEAKNESS: ICD-10-CM

## 2019-05-21 DIAGNOSIS — R26.81 UNSTEADINESS ON FEET: ICD-10-CM

## 2019-05-21 DIAGNOSIS — R26.9 ABNORMALITY OF GAIT: ICD-10-CM

## 2019-05-21 DIAGNOSIS — R26.89 POOR BALANCE: ICD-10-CM

## 2019-05-21 DIAGNOSIS — Z74.09 DECREASED FUNCTIONAL MOBILITY AND ENDURANCE: ICD-10-CM

## 2019-05-23 ENCOUNTER — OFFICE VISIT - HEALTHEAST (OUTPATIENT)
Dept: PHYSICAL THERAPY | Facility: REHABILITATION | Age: 74
End: 2019-05-23

## 2019-05-23 DIAGNOSIS — R26.81 UNSTEADINESS ON FEET: ICD-10-CM

## 2019-05-23 DIAGNOSIS — Z74.09 DECREASED FUNCTIONAL MOBILITY AND ENDURANCE: ICD-10-CM

## 2019-05-23 DIAGNOSIS — M62.81 GENERALIZED MUSCLE WEAKNESS: ICD-10-CM

## 2019-05-23 DIAGNOSIS — R26.9 ABNORMALITY OF GAIT: ICD-10-CM

## 2019-05-23 DIAGNOSIS — R26.89 POOR BALANCE: ICD-10-CM

## 2019-05-28 ENCOUNTER — OFFICE VISIT - HEALTHEAST (OUTPATIENT)
Dept: PHYSICAL THERAPY | Facility: REHABILITATION | Age: 74
End: 2019-05-28

## 2019-05-28 DIAGNOSIS — R26.81 UNSTEADINESS ON FEET: ICD-10-CM

## 2019-05-28 DIAGNOSIS — R26.89 POOR BALANCE: ICD-10-CM

## 2019-05-28 DIAGNOSIS — Z74.09 DECREASED FUNCTIONAL MOBILITY AND ENDURANCE: ICD-10-CM

## 2019-05-28 DIAGNOSIS — M62.81 GENERALIZED MUSCLE WEAKNESS: ICD-10-CM

## 2019-05-28 DIAGNOSIS — R26.9 ABNORMALITY OF GAIT: ICD-10-CM

## 2019-05-30 ENCOUNTER — OFFICE VISIT - HEALTHEAST (OUTPATIENT)
Dept: PHYSICAL THERAPY | Facility: REHABILITATION | Age: 74
End: 2019-05-30

## 2019-05-30 DIAGNOSIS — M62.81 GENERALIZED MUSCLE WEAKNESS: ICD-10-CM

## 2019-05-30 DIAGNOSIS — R26.81 UNSTEADINESS ON FEET: ICD-10-CM

## 2019-05-30 DIAGNOSIS — R26.9 ABNORMALITY OF GAIT: ICD-10-CM

## 2019-05-30 DIAGNOSIS — R26.89 POOR BALANCE: ICD-10-CM

## 2019-05-30 DIAGNOSIS — Z74.09 DECREASED FUNCTIONAL MOBILITY AND ENDURANCE: ICD-10-CM

## 2019-06-03 ENCOUNTER — COMMUNICATION - HEALTHEAST (OUTPATIENT)
Dept: FAMILY MEDICINE | Facility: CLINIC | Age: 74
End: 2019-06-03

## 2019-06-03 DIAGNOSIS — R42 DIZZINESS: ICD-10-CM

## 2019-06-06 ENCOUNTER — OFFICE VISIT - HEALTHEAST (OUTPATIENT)
Dept: PHYSICAL THERAPY | Facility: REHABILITATION | Age: 74
End: 2019-06-06

## 2019-06-06 DIAGNOSIS — R26.9 ABNORMALITY OF GAIT: ICD-10-CM

## 2019-06-06 DIAGNOSIS — Z74.09 DECREASED FUNCTIONAL MOBILITY AND ENDURANCE: ICD-10-CM

## 2019-06-06 DIAGNOSIS — R26.81 UNSTEADINESS ON FEET: ICD-10-CM

## 2019-06-06 DIAGNOSIS — R26.89 POOR BALANCE: ICD-10-CM

## 2019-06-06 DIAGNOSIS — M62.81 GENERALIZED MUSCLE WEAKNESS: ICD-10-CM

## 2019-06-13 ENCOUNTER — OFFICE VISIT - HEALTHEAST (OUTPATIENT)
Dept: PHYSICAL THERAPY | Facility: REHABILITATION | Age: 74
End: 2019-06-13

## 2019-06-13 ENCOUNTER — COMMUNICATION - HEALTHEAST (OUTPATIENT)
Dept: FAMILY MEDICINE | Facility: CLINIC | Age: 74
End: 2019-06-13

## 2019-06-13 DIAGNOSIS — R26.81 UNSTEADINESS ON FEET: ICD-10-CM

## 2019-06-13 DIAGNOSIS — M62.81 GENERALIZED MUSCLE WEAKNESS: ICD-10-CM

## 2019-06-13 DIAGNOSIS — R26.9 ABNORMALITY OF GAIT: ICD-10-CM

## 2019-06-13 DIAGNOSIS — Z74.09 DECREASED FUNCTIONAL MOBILITY AND ENDURANCE: ICD-10-CM

## 2019-06-13 DIAGNOSIS — R26.89 POOR BALANCE: ICD-10-CM

## 2019-06-20 ENCOUNTER — OFFICE VISIT - HEALTHEAST (OUTPATIENT)
Dept: PHYSICAL THERAPY | Facility: REHABILITATION | Age: 74
End: 2019-06-20

## 2019-06-20 DIAGNOSIS — Z74.09 DECREASED FUNCTIONAL MOBILITY AND ENDURANCE: ICD-10-CM

## 2019-06-20 DIAGNOSIS — R26.89 POOR BALANCE: ICD-10-CM

## 2019-06-20 DIAGNOSIS — M62.81 GENERALIZED MUSCLE WEAKNESS: ICD-10-CM

## 2019-06-20 DIAGNOSIS — R26.81 UNSTEADINESS ON FEET: ICD-10-CM

## 2019-06-20 DIAGNOSIS — R26.9 ABNORMALITY OF GAIT: ICD-10-CM

## 2019-06-27 ENCOUNTER — OFFICE VISIT - HEALTHEAST (OUTPATIENT)
Dept: PHYSICAL THERAPY | Facility: REHABILITATION | Age: 74
End: 2019-06-27

## 2019-06-27 DIAGNOSIS — R26.81 UNSTEADINESS ON FEET: ICD-10-CM

## 2019-06-27 DIAGNOSIS — R26.89 POOR BALANCE: ICD-10-CM

## 2019-06-27 DIAGNOSIS — R26.9 ABNORMALITY OF GAIT: ICD-10-CM

## 2019-06-27 DIAGNOSIS — Z74.09 DECREASED FUNCTIONAL MOBILITY AND ENDURANCE: ICD-10-CM

## 2019-06-27 DIAGNOSIS — M62.81 GENERALIZED MUSCLE WEAKNESS: ICD-10-CM

## 2019-06-28 ENCOUNTER — OFFICE VISIT - HEALTHEAST (OUTPATIENT)
Dept: OTOLARYNGOLOGY | Facility: CLINIC | Age: 74
End: 2019-06-28

## 2019-06-28 ENCOUNTER — OFFICE VISIT - HEALTHEAST (OUTPATIENT)
Dept: AUDIOLOGY | Facility: CLINIC | Age: 74
End: 2019-06-28

## 2019-06-28 DIAGNOSIS — R42 DIZZINESS AND GIDDINESS: ICD-10-CM

## 2019-06-28 DIAGNOSIS — H90.3 SENSORINEURAL HEARING LOSS (SNHL) OF BOTH EARS: ICD-10-CM

## 2019-06-28 DIAGNOSIS — H90.3 SENSORINEURAL HEARING LOSS, BILATERAL: ICD-10-CM

## 2019-06-28 DIAGNOSIS — H93.A9 SUBJECTIVE PULSATILE TINNITUS: ICD-10-CM

## 2019-07-10 ENCOUNTER — COMMUNICATION - HEALTHEAST (OUTPATIENT)
Dept: FAMILY MEDICINE | Facility: CLINIC | Age: 74
End: 2019-07-10

## 2019-07-11 ENCOUNTER — AMBULATORY - HEALTHEAST (OUTPATIENT)
Dept: NURSING | Facility: CLINIC | Age: 74
End: 2019-07-11

## 2019-08-09 ENCOUNTER — COMMUNICATION - HEALTHEAST (OUTPATIENT)
Dept: PULMONOLOGY | Facility: OTHER | Age: 74
End: 2019-08-09

## 2019-09-03 ENCOUNTER — COMMUNICATION - HEALTHEAST (OUTPATIENT)
Dept: FAMILY MEDICINE | Facility: CLINIC | Age: 74
End: 2019-09-03

## 2019-09-03 DIAGNOSIS — E87.6 HYPOKALEMIA: ICD-10-CM

## 2019-09-04 ENCOUNTER — COMMUNICATION - HEALTHEAST (OUTPATIENT)
Dept: ENDOCRINOLOGY | Facility: CLINIC | Age: 74
End: 2019-09-04

## 2019-09-04 ENCOUNTER — AMBULATORY - HEALTHEAST (OUTPATIENT)
Dept: ENDOCRINOLOGY | Facility: CLINIC | Age: 74
End: 2019-09-04

## 2019-09-04 DIAGNOSIS — M81.0 AGE-RELATED OSTEOPOROSIS WITHOUT CURRENT PATHOLOGICAL FRACTURE: ICD-10-CM

## 2019-09-09 ENCOUNTER — COMMUNICATION - HEALTHEAST (OUTPATIENT)
Dept: VASCULAR SURGERY | Facility: CLINIC | Age: 74
End: 2019-09-09

## 2019-09-09 DIAGNOSIS — I87.2 VENOUS INSUFFICIENCY OF BOTH LOWER EXTREMITIES: ICD-10-CM

## 2019-09-09 DIAGNOSIS — M79.89 LEG SWELLING: ICD-10-CM

## 2019-09-16 ENCOUNTER — AMBULATORY - HEALTHEAST (OUTPATIENT)
Dept: LAB | Facility: CLINIC | Age: 74
End: 2019-09-16

## 2019-09-16 ENCOUNTER — OFFICE VISIT - HEALTHEAST (OUTPATIENT)
Dept: PULMONOLOGY | Facility: OTHER | Age: 74
End: 2019-09-16

## 2019-09-16 DIAGNOSIS — J43.8 OTHER EMPHYSEMA (H): ICD-10-CM

## 2019-09-16 DIAGNOSIS — M81.0 AGE-RELATED OSTEOPOROSIS WITHOUT CURRENT PATHOLOGICAL FRACTURE: ICD-10-CM

## 2019-09-16 LAB — CALCIUM SERPL-MCNC: 9.6 MG/DL (ref 8.5–10.5)

## 2019-09-16 ASSESSMENT — MIFFLIN-ST. JEOR: SCORE: 1468.38

## 2019-09-17 ENCOUNTER — COMMUNICATION - HEALTHEAST (OUTPATIENT)
Dept: FAMILY MEDICINE | Facility: CLINIC | Age: 74
End: 2019-09-17

## 2019-09-17 LAB
25(OH)D3 SERPL-MCNC: 53.9 NG/ML (ref 30–80)
25(OH)D3 SERPL-MCNC: 53.9 NG/ML (ref 30–80)

## 2019-09-26 ENCOUNTER — COMMUNICATION - HEALTHEAST (OUTPATIENT)
Dept: FAMILY MEDICINE | Facility: CLINIC | Age: 74
End: 2019-09-26

## 2019-09-26 DIAGNOSIS — J44.9 COPD (CHRONIC OBSTRUCTIVE PULMONARY DISEASE) (H): ICD-10-CM

## 2019-10-01 ENCOUNTER — AMBULATORY - HEALTHEAST (OUTPATIENT)
Dept: NURSING | Facility: CLINIC | Age: 74
End: 2019-10-01

## 2019-10-01 DIAGNOSIS — Z23 NEEDS FLU SHOT: ICD-10-CM

## 2019-10-07 ENCOUNTER — COMMUNICATION - HEALTHEAST (OUTPATIENT)
Dept: SCHEDULING | Facility: CLINIC | Age: 74
End: 2019-10-07

## 2019-10-07 DIAGNOSIS — K21.9 GASTROESOPHAGEAL REFLUX DISEASE WITHOUT ESOPHAGITIS: ICD-10-CM

## 2019-10-07 DIAGNOSIS — I10 ESSENTIAL HYPERTENSION: ICD-10-CM

## 2019-10-07 DIAGNOSIS — J44.9 COPD (CHRONIC OBSTRUCTIVE PULMONARY DISEASE) (H): ICD-10-CM

## 2019-10-08 ENCOUNTER — AMBULATORY - HEALTHEAST (OUTPATIENT)
Dept: FAMILY MEDICINE | Facility: CLINIC | Age: 74
End: 2019-10-08

## 2019-10-08 DIAGNOSIS — R26.9 ABNORMALITY OF GAIT: ICD-10-CM

## 2019-10-15 ENCOUNTER — OFFICE VISIT - HEALTHEAST (OUTPATIENT)
Dept: ENDOCRINOLOGY | Facility: CLINIC | Age: 74
End: 2019-10-15

## 2019-10-15 DIAGNOSIS — M81.0 AGE-RELATED OSTEOPOROSIS WITHOUT CURRENT PATHOLOGICAL FRACTURE: ICD-10-CM

## 2019-10-15 ASSESSMENT — MIFFLIN-ST. JEOR: SCORE: 1484.71

## 2019-10-16 ENCOUNTER — COMMUNICATION - HEALTHEAST (OUTPATIENT)
Dept: ADMINISTRATIVE | Facility: CLINIC | Age: 74
End: 2019-10-16

## 2019-10-16 DIAGNOSIS — M81.0 AGE-RELATED OSTEOPOROSIS WITHOUT CURRENT PATHOLOGICAL FRACTURE: ICD-10-CM

## 2019-10-28 ENCOUNTER — OFFICE VISIT - HEALTHEAST (OUTPATIENT)
Dept: PHYSICAL THERAPY | Facility: REHABILITATION | Age: 74
End: 2019-10-28

## 2019-10-28 DIAGNOSIS — R26.9 ABNORMALITY OF GAIT: ICD-10-CM

## 2019-10-28 DIAGNOSIS — R26.89 DECREASED FUNCTIONAL MOBILITY: ICD-10-CM

## 2019-10-28 DIAGNOSIS — M62.81 GENERALIZED MUSCLE WEAKNESS: ICD-10-CM

## 2019-10-30 ENCOUNTER — OFFICE VISIT - HEALTHEAST (OUTPATIENT)
Dept: PHYSICAL THERAPY | Facility: REHABILITATION | Age: 74
End: 2019-10-30

## 2019-10-30 DIAGNOSIS — R26.89 POOR BALANCE: ICD-10-CM

## 2019-10-30 DIAGNOSIS — R26.89 DECREASED FUNCTIONAL MOBILITY: ICD-10-CM

## 2019-10-30 DIAGNOSIS — R26.81 UNSTEADINESS ON FEET: ICD-10-CM

## 2019-10-30 DIAGNOSIS — Z74.09 DECREASED FUNCTIONAL MOBILITY AND ENDURANCE: ICD-10-CM

## 2019-10-30 DIAGNOSIS — R26.9 ABNORMALITY OF GAIT: ICD-10-CM

## 2019-10-30 DIAGNOSIS — M62.81 GENERALIZED MUSCLE WEAKNESS: ICD-10-CM

## 2019-11-11 ENCOUNTER — OFFICE VISIT - HEALTHEAST (OUTPATIENT)
Dept: PHYSICAL THERAPY | Facility: REHABILITATION | Age: 74
End: 2019-11-11

## 2019-11-11 DIAGNOSIS — M62.81 GENERALIZED MUSCLE WEAKNESS: ICD-10-CM

## 2019-11-11 DIAGNOSIS — Z74.09 DECREASED FUNCTIONAL MOBILITY AND ENDURANCE: ICD-10-CM

## 2019-11-11 DIAGNOSIS — R26.89 DECREASED FUNCTIONAL MOBILITY: ICD-10-CM

## 2019-11-11 DIAGNOSIS — R26.89 POOR BALANCE: ICD-10-CM

## 2019-11-11 DIAGNOSIS — R26.9 ABNORMALITY OF GAIT: ICD-10-CM

## 2019-11-11 DIAGNOSIS — R26.81 UNSTEADINESS ON FEET: ICD-10-CM

## 2019-11-14 ENCOUNTER — OFFICE VISIT - HEALTHEAST (OUTPATIENT)
Dept: PHYSICAL THERAPY | Facility: REHABILITATION | Age: 74
End: 2019-11-14

## 2019-11-14 DIAGNOSIS — R26.81 UNSTEADINESS ON FEET: ICD-10-CM

## 2019-11-14 DIAGNOSIS — R26.89 POOR BALANCE: ICD-10-CM

## 2019-11-14 DIAGNOSIS — Z74.09 DECREASED FUNCTIONAL MOBILITY AND ENDURANCE: ICD-10-CM

## 2019-11-14 DIAGNOSIS — R26.9 ABNORMALITY OF GAIT: ICD-10-CM

## 2019-11-14 DIAGNOSIS — M62.81 GENERALIZED MUSCLE WEAKNESS: ICD-10-CM

## 2019-11-14 DIAGNOSIS — R26.89 DECREASED FUNCTIONAL MOBILITY: ICD-10-CM

## 2019-11-18 ENCOUNTER — OFFICE VISIT - HEALTHEAST (OUTPATIENT)
Dept: PHYSICAL THERAPY | Facility: REHABILITATION | Age: 74
End: 2019-11-18

## 2019-11-18 DIAGNOSIS — R26.89 POOR BALANCE: ICD-10-CM

## 2019-11-18 DIAGNOSIS — R26.9 ABNORMALITY OF GAIT: ICD-10-CM

## 2019-11-18 DIAGNOSIS — R26.81 UNSTEADINESS ON FEET: ICD-10-CM

## 2019-11-18 DIAGNOSIS — Z74.09 DECREASED FUNCTIONAL MOBILITY AND ENDURANCE: ICD-10-CM

## 2019-11-18 DIAGNOSIS — M62.81 GENERALIZED MUSCLE WEAKNESS: ICD-10-CM

## 2019-11-18 DIAGNOSIS — R26.89 DECREASED FUNCTIONAL MOBILITY: ICD-10-CM

## 2019-11-20 ENCOUNTER — OFFICE VISIT - HEALTHEAST (OUTPATIENT)
Dept: PHYSICAL THERAPY | Facility: REHABILITATION | Age: 74
End: 2019-11-20

## 2019-11-20 DIAGNOSIS — R26.89 DECREASED FUNCTIONAL MOBILITY: ICD-10-CM

## 2019-11-20 DIAGNOSIS — R26.81 UNSTEADINESS ON FEET: ICD-10-CM

## 2019-11-20 DIAGNOSIS — R26.89 POOR BALANCE: ICD-10-CM

## 2019-11-20 DIAGNOSIS — R26.9 ABNORMALITY OF GAIT: ICD-10-CM

## 2019-11-20 DIAGNOSIS — Z74.09 DECREASED FUNCTIONAL MOBILITY AND ENDURANCE: ICD-10-CM

## 2019-11-20 DIAGNOSIS — M62.81 GENERALIZED MUSCLE WEAKNESS: ICD-10-CM

## 2019-11-24 ENCOUNTER — OFFICE VISIT - HEALTHEAST (OUTPATIENT)
Dept: FAMILY MEDICINE | Facility: CLINIC | Age: 74
End: 2019-11-24

## 2019-11-24 DIAGNOSIS — J06.9 VIRAL URI: ICD-10-CM

## 2019-11-24 DIAGNOSIS — J43.9 PULMONARY EMPHYSEMA, UNSPECIFIED EMPHYSEMA TYPE (H): ICD-10-CM

## 2019-11-25 ENCOUNTER — OFFICE VISIT - HEALTHEAST (OUTPATIENT)
Dept: PHYSICAL THERAPY | Facility: REHABILITATION | Age: 74
End: 2019-11-25

## 2019-11-25 DIAGNOSIS — R26.89 POOR BALANCE: ICD-10-CM

## 2019-11-25 DIAGNOSIS — R26.89 DECREASED FUNCTIONAL MOBILITY: ICD-10-CM

## 2019-11-25 DIAGNOSIS — Z74.09 DECREASED FUNCTIONAL MOBILITY AND ENDURANCE: ICD-10-CM

## 2019-11-25 DIAGNOSIS — R26.81 UNSTEADINESS ON FEET: ICD-10-CM

## 2019-11-25 DIAGNOSIS — M62.81 GENERALIZED MUSCLE WEAKNESS: ICD-10-CM

## 2019-11-25 DIAGNOSIS — R26.9 ABNORMALITY OF GAIT: ICD-10-CM

## 2019-12-04 ENCOUNTER — COMMUNICATION - HEALTHEAST (OUTPATIENT)
Dept: FAMILY MEDICINE | Facility: CLINIC | Age: 74
End: 2019-12-04

## 2019-12-04 ENCOUNTER — AMBULATORY - HEALTHEAST (OUTPATIENT)
Dept: FAMILY MEDICINE | Facility: CLINIC | Age: 74
End: 2019-12-04

## 2019-12-04 DIAGNOSIS — K21.9 GASTROESOPHAGEAL REFLUX DISEASE WITHOUT ESOPHAGITIS: ICD-10-CM

## 2019-12-07 ENCOUNTER — OFFICE VISIT - HEALTHEAST (OUTPATIENT)
Dept: FAMILY MEDICINE | Facility: CLINIC | Age: 74
End: 2019-12-07

## 2019-12-07 DIAGNOSIS — J44.1 COPD EXACERBATION (H): ICD-10-CM

## 2019-12-09 ENCOUNTER — COMMUNICATION - HEALTHEAST (OUTPATIENT)
Dept: PULMONOLOGY | Facility: OTHER | Age: 74
End: 2019-12-09

## 2019-12-09 DIAGNOSIS — J44.1 COPD EXACERBATION (H): ICD-10-CM

## 2019-12-10 ENCOUNTER — COMMUNICATION - HEALTHEAST (OUTPATIENT)
Dept: FAMILY MEDICINE | Facility: CLINIC | Age: 74
End: 2019-12-10

## 2019-12-11 ENCOUNTER — COMMUNICATION - HEALTHEAST (OUTPATIENT)
Dept: PULMONOLOGY | Facility: OTHER | Age: 74
End: 2019-12-11

## 2019-12-11 DIAGNOSIS — J44.1 COPD EXACERBATION (H): ICD-10-CM

## 2019-12-12 ENCOUNTER — OFFICE VISIT - HEALTHEAST (OUTPATIENT)
Dept: PHYSICAL THERAPY | Facility: REHABILITATION | Age: 74
End: 2019-12-12

## 2019-12-12 DIAGNOSIS — M62.81 GENERALIZED MUSCLE WEAKNESS: ICD-10-CM

## 2019-12-12 DIAGNOSIS — R26.9 ABNORMALITY OF GAIT: ICD-10-CM

## 2019-12-12 DIAGNOSIS — Z74.09 DECREASED FUNCTIONAL MOBILITY AND ENDURANCE: ICD-10-CM

## 2019-12-12 DIAGNOSIS — R26.89 POOR BALANCE: ICD-10-CM

## 2019-12-12 DIAGNOSIS — R26.89 DECREASED FUNCTIONAL MOBILITY: ICD-10-CM

## 2019-12-12 DIAGNOSIS — R26.81 UNSTEADINESS ON FEET: ICD-10-CM

## 2020-01-04 ENCOUNTER — OFFICE VISIT - HEALTHEAST (OUTPATIENT)
Dept: FAMILY MEDICINE | Facility: CLINIC | Age: 75
End: 2020-01-04

## 2020-01-04 DIAGNOSIS — M25.471 BILATERAL SWELLING OF FEET AND ANKLES: ICD-10-CM

## 2020-01-04 DIAGNOSIS — M25.472 BILATERAL SWELLING OF FEET AND ANKLES: ICD-10-CM

## 2020-01-04 DIAGNOSIS — M25.475 BILATERAL SWELLING OF FEET AND ANKLES: ICD-10-CM

## 2020-01-04 DIAGNOSIS — M25.474 BILATERAL SWELLING OF FEET AND ANKLES: ICD-10-CM

## 2020-01-14 ENCOUNTER — COMMUNICATION - HEALTHEAST (OUTPATIENT)
Dept: FAMILY MEDICINE | Facility: CLINIC | Age: 75
End: 2020-01-14

## 2020-02-24 ENCOUNTER — COMMUNICATION - HEALTHEAST (OUTPATIENT)
Dept: FAMILY MEDICINE | Facility: CLINIC | Age: 75
End: 2020-02-24

## 2020-02-24 ENCOUNTER — OFFICE VISIT - HEALTHEAST (OUTPATIENT)
Dept: FAMILY MEDICINE | Facility: CLINIC | Age: 75
End: 2020-02-24

## 2020-02-24 DIAGNOSIS — Z00.00 MEDICARE ANNUAL WELLNESS VISIT, SUBSEQUENT: ICD-10-CM

## 2020-02-24 DIAGNOSIS — I35.1 MILD AORTIC VALVE REGURGITATION: ICD-10-CM

## 2020-02-24 DIAGNOSIS — R25.2 LEG CRAMPS: ICD-10-CM

## 2020-02-24 DIAGNOSIS — E55.9 VITAMIN D DEFICIENCY: ICD-10-CM

## 2020-02-24 DIAGNOSIS — E78.5 HYPERLIPIDEMIA, UNSPECIFIED HYPERLIPIDEMIA TYPE: ICD-10-CM

## 2020-02-24 DIAGNOSIS — K21.9 GASTROESOPHAGEAL REFLUX DISEASE WITHOUT ESOPHAGITIS: ICD-10-CM

## 2020-02-24 DIAGNOSIS — G60.9 NEUROPATHY, IDIOPATHIC: ICD-10-CM

## 2020-02-24 DIAGNOSIS — I35.0 MILD AORTIC VALVE STENOSIS: ICD-10-CM

## 2020-02-24 DIAGNOSIS — M81.0 AGE-RELATED OSTEOPOROSIS WITHOUT CURRENT PATHOLOGICAL FRACTURE: ICD-10-CM

## 2020-02-24 DIAGNOSIS — E66.01 MORBID OBESITY (H): ICD-10-CM

## 2020-02-24 DIAGNOSIS — J43.9 PULMONARY EMPHYSEMA, UNSPECIFIED EMPHYSEMA TYPE (H): ICD-10-CM

## 2020-02-24 DIAGNOSIS — I87.2 VENOUS INSUFFICIENCY OF BOTH LOWER EXTREMITIES: ICD-10-CM

## 2020-02-24 DIAGNOSIS — G40.909 NONINTRACTABLE EPILEPSY WITHOUT STATUS EPILEPTICUS, UNSPECIFIED EPILEPSY TYPE (H): ICD-10-CM

## 2020-02-24 DIAGNOSIS — I10 ESSENTIAL HYPERTENSION: ICD-10-CM

## 2020-02-24 DIAGNOSIS — R26.9 ABNORMALITY OF GAIT: ICD-10-CM

## 2020-02-24 DIAGNOSIS — N39.41 URGE INCONTINENCE OF URINE: ICD-10-CM

## 2020-02-24 LAB
ALBUMIN SERPL-MCNC: 4.1 G/DL (ref 3.5–5)
ALP SERPL-CCNC: 72 U/L (ref 45–120)
ALT SERPL W P-5'-P-CCNC: 14 U/L (ref 0–45)
ANION GAP SERPL CALCULATED.3IONS-SCNC: 14 MMOL/L (ref 5–18)
AST SERPL W P-5'-P-CCNC: 17 U/L (ref 0–40)
BILIRUB SERPL-MCNC: 0.5 MG/DL (ref 0–1)
BUN SERPL-MCNC: 21 MG/DL (ref 8–28)
CALCIUM SERPL-MCNC: 9.8 MG/DL (ref 8.5–10.5)
CHLORIDE BLD-SCNC: 100 MMOL/L (ref 98–107)
CHOLEST SERPL-MCNC: 180 MG/DL
CO2 SERPL-SCNC: 25 MMOL/L (ref 22–31)
CREAT SERPL-MCNC: 0.72 MG/DL (ref 0.6–1.1)
ERYTHROCYTE [DISTWIDTH] IN BLOOD BY AUTOMATED COUNT: 13.6 % (ref 11–14.5)
FASTING STATUS PATIENT QL REPORTED: NORMAL
GFR SERPL CREATININE-BSD FRML MDRD: >60 ML/MIN/1.73M2
GLUCOSE BLD-MCNC: 91 MG/DL (ref 70–125)
HCT VFR BLD AUTO: 41.4 % (ref 35–47)
HDLC SERPL-MCNC: 63 MG/DL
HGB BLD-MCNC: 13.8 G/DL (ref 12–16)
LDLC SERPL CALC-MCNC: 100 MG/DL
MAGNESIUM SERPL-MCNC: 1.6 MG/DL (ref 1.8–2.6)
MCH RBC QN AUTO: 28.9 PG (ref 27–34)
MCHC RBC AUTO-ENTMCNC: 33.4 G/DL (ref 32–36)
MCV RBC AUTO: 87 FL (ref 80–100)
PLATELET # BLD AUTO: 207 THOU/UL (ref 140–440)
PMV BLD AUTO: 7.8 FL (ref 7–10)
POTASSIUM BLD-SCNC: 4.2 MMOL/L (ref 3.5–5)
PROT SERPL-MCNC: 7 G/DL (ref 6–8)
RBC # BLD AUTO: 4.79 MILL/UL (ref 3.8–5.4)
SODIUM SERPL-SCNC: 139 MMOL/L (ref 136–145)
TRIGL SERPL-MCNC: 86 MG/DL
WBC: 8 THOU/UL (ref 4–11)

## 2020-02-24 ASSESSMENT — PATIENT HEALTH QUESTIONNAIRE - PHQ9: SUM OF ALL RESPONSES TO PHQ QUESTIONS 1-9: 0

## 2020-02-24 ASSESSMENT — MIFFLIN-ST. JEOR: SCORE: 1505.23

## 2020-02-25 ENCOUNTER — AMBULATORY - HEALTHEAST (OUTPATIENT)
Dept: FAMILY MEDICINE | Facility: CLINIC | Age: 75
End: 2020-02-25

## 2020-02-25 ENCOUNTER — COMMUNICATION - HEALTHEAST (OUTPATIENT)
Dept: FAMILY MEDICINE | Facility: CLINIC | Age: 75
End: 2020-02-25

## 2020-02-25 LAB
25(OH)D3 SERPL-MCNC: 54.7 NG/ML (ref 30–80)
25(OH)D3 SERPL-MCNC: 54.7 NG/ML (ref 30–80)

## 2020-02-27 ENCOUNTER — COMMUNICATION - HEALTHEAST (OUTPATIENT)
Dept: FAMILY MEDICINE | Facility: CLINIC | Age: 75
End: 2020-02-27

## 2020-03-06 ENCOUNTER — COMMUNICATION - HEALTHEAST (OUTPATIENT)
Dept: FAMILY MEDICINE | Facility: CLINIC | Age: 75
End: 2020-03-06

## 2020-03-06 DIAGNOSIS — E04.1 LEFT THYROID NODULE: ICD-10-CM

## 2020-03-09 ENCOUNTER — HOSPITAL ENCOUNTER (OUTPATIENT)
Dept: ULTRASOUND IMAGING | Facility: HOSPITAL | Age: 75
Discharge: HOME | End: 2020-03-09
Attending: FAMILY MEDICINE

## 2020-03-09 DIAGNOSIS — E04.1 LEFT THYROID NODULE: ICD-10-CM

## 2020-03-16 ENCOUNTER — OFFICE VISIT - HEALTHEAST (OUTPATIENT)
Dept: PHYSICAL THERAPY | Facility: REHABILITATION | Age: 75
End: 2020-03-16

## 2020-03-16 DIAGNOSIS — R26.9 ABNORMALITY OF GAIT: ICD-10-CM

## 2020-03-17 ENCOUNTER — AMBULATORY - HEALTHEAST (OUTPATIENT)
Dept: ENDOCRINOLOGY | Facility: CLINIC | Age: 75
End: 2020-03-17

## 2020-03-17 ENCOUNTER — COMMUNICATION - HEALTHEAST (OUTPATIENT)
Dept: FAMILY MEDICINE | Facility: CLINIC | Age: 75
End: 2020-03-17

## 2020-03-17 DIAGNOSIS — E07.9 THYROID MASS: ICD-10-CM

## 2020-03-17 DIAGNOSIS — M85.80 OSTEOPENIA: ICD-10-CM

## 2020-03-17 DIAGNOSIS — Z80.8 FAMILY HISTORY OF THYROID CANCER: ICD-10-CM

## 2020-03-17 DIAGNOSIS — M81.0 AGE-RELATED OSTEOPOROSIS WITHOUT CURRENT PATHOLOGICAL FRACTURE: ICD-10-CM

## 2020-03-18 ENCOUNTER — COMMUNICATION - HEALTHEAST (OUTPATIENT)
Dept: PHYSICAL THERAPY | Facility: REHABILITATION | Age: 75
End: 2020-03-18

## 2020-04-06 ENCOUNTER — COMMUNICATION - HEALTHEAST (OUTPATIENT)
Dept: FAMILY MEDICINE | Facility: CLINIC | Age: 75
End: 2020-04-06

## 2020-04-06 DIAGNOSIS — E83.42 HYPOMAGNESEMIA: ICD-10-CM

## 2020-04-09 ENCOUNTER — COMMUNICATION - HEALTHEAST (OUTPATIENT)
Dept: SCHEDULING | Facility: CLINIC | Age: 75
End: 2020-04-09

## 2020-04-22 ENCOUNTER — COMMUNICATION - HEALTHEAST (OUTPATIENT)
Dept: AUDIOLOGY | Facility: CLINIC | Age: 75
End: 2020-04-22

## 2020-05-07 ENCOUNTER — COMMUNICATION - HEALTHEAST (OUTPATIENT)
Dept: VASCULAR SURGERY | Facility: CLINIC | Age: 75
End: 2020-05-07

## 2020-05-11 ENCOUNTER — AMBULATORY - HEALTHEAST (OUTPATIENT)
Dept: VASCULAR SURGERY | Facility: CLINIC | Age: 75
End: 2020-05-11

## 2020-05-11 ENCOUNTER — RECORDS - HEALTHEAST (OUTPATIENT)
Dept: ADMINISTRATIVE | Facility: OTHER | Age: 75
End: 2020-05-11

## 2020-05-11 DIAGNOSIS — M21.42 FLAT FEET: ICD-10-CM

## 2020-05-11 DIAGNOSIS — I87.2 VENOUS INSUFFICIENCY OF BOTH LOWER EXTREMITIES: ICD-10-CM

## 2020-05-11 DIAGNOSIS — Q66.6 VALGUS DEFORMITY OF BOTH FEET: ICD-10-CM

## 2020-05-11 DIAGNOSIS — M79.89 LEG SWELLING: ICD-10-CM

## 2020-05-11 DIAGNOSIS — E65 LOCALIZED DEPOSITS OF FAT: ICD-10-CM

## 2020-05-11 DIAGNOSIS — M21.41 FLAT FEET: ICD-10-CM

## 2020-05-15 ENCOUNTER — AMBULATORY - HEALTHEAST (OUTPATIENT)
Dept: FAMILY MEDICINE | Facility: CLINIC | Age: 75
End: 2020-05-15

## 2020-05-15 DIAGNOSIS — K21.9 GASTROESOPHAGEAL REFLUX DISEASE WITHOUT ESOPHAGITIS: ICD-10-CM

## 2020-05-21 ENCOUNTER — AMBULATORY - HEALTHEAST (OUTPATIENT)
Dept: ENDOCRINOLOGY | Facility: CLINIC | Age: 75
End: 2020-05-21

## 2020-05-27 ENCOUNTER — OFFICE VISIT - HEALTHEAST (OUTPATIENT)
Dept: PHYSICAL THERAPY | Facility: REHABILITATION | Age: 75
End: 2020-05-27

## 2020-05-27 DIAGNOSIS — M62.81 GENERALIZED MUSCLE WEAKNESS: ICD-10-CM

## 2020-05-27 DIAGNOSIS — R26.9 ABNORMALITY OF GAIT: ICD-10-CM

## 2020-05-27 DIAGNOSIS — R26.89 POOR BALANCE: ICD-10-CM

## 2020-05-27 DIAGNOSIS — R26.81 UNSTEADINESS ON FEET: ICD-10-CM

## 2020-05-27 DIAGNOSIS — Z74.09 DECREASED FUNCTIONAL MOBILITY AND ENDURANCE: ICD-10-CM

## 2020-05-27 DIAGNOSIS — R26.89 DECREASED FUNCTIONAL MOBILITY: ICD-10-CM

## 2020-06-04 ENCOUNTER — OFFICE VISIT - HEALTHEAST (OUTPATIENT)
Dept: PHYSICAL THERAPY | Facility: REHABILITATION | Age: 75
End: 2020-06-04

## 2020-06-04 DIAGNOSIS — R26.89 DECREASED FUNCTIONAL MOBILITY: ICD-10-CM

## 2020-06-04 DIAGNOSIS — R26.89 POOR BALANCE: ICD-10-CM

## 2020-06-04 DIAGNOSIS — R26.81 UNSTEADINESS ON FEET: ICD-10-CM

## 2020-06-04 DIAGNOSIS — M62.81 GENERALIZED MUSCLE WEAKNESS: ICD-10-CM

## 2020-06-04 DIAGNOSIS — R26.9 ABNORMALITY OF GAIT: ICD-10-CM

## 2020-06-09 ENCOUNTER — COMMUNICATION - HEALTHEAST (OUTPATIENT)
Dept: FAMILY MEDICINE | Facility: CLINIC | Age: 75
End: 2020-06-09

## 2020-06-11 ENCOUNTER — OFFICE VISIT - HEALTHEAST (OUTPATIENT)
Dept: PHYSICAL THERAPY | Facility: REHABILITATION | Age: 75
End: 2020-06-11

## 2020-06-11 DIAGNOSIS — R26.89 DECREASED FUNCTIONAL MOBILITY: ICD-10-CM

## 2020-06-11 DIAGNOSIS — R26.89 POOR BALANCE: ICD-10-CM

## 2020-06-11 DIAGNOSIS — Z74.09 DECREASED FUNCTIONAL MOBILITY AND ENDURANCE: ICD-10-CM

## 2020-06-11 DIAGNOSIS — R26.9 ABNORMALITY OF GAIT: ICD-10-CM

## 2020-06-11 DIAGNOSIS — M62.81 GENERALIZED MUSCLE WEAKNESS: ICD-10-CM

## 2020-06-11 DIAGNOSIS — R26.81 UNSTEADINESS ON FEET: ICD-10-CM

## 2020-06-18 ENCOUNTER — OFFICE VISIT - HEALTHEAST (OUTPATIENT)
Dept: PHYSICAL THERAPY | Facility: REHABILITATION | Age: 75
End: 2020-06-18

## 2020-06-18 DIAGNOSIS — M62.81 GENERALIZED MUSCLE WEAKNESS: ICD-10-CM

## 2020-06-18 DIAGNOSIS — R26.89 POOR BALANCE: ICD-10-CM

## 2020-06-18 DIAGNOSIS — R26.9 ABNORMALITY OF GAIT: ICD-10-CM

## 2020-06-18 DIAGNOSIS — Z74.09 DECREASED FUNCTIONAL MOBILITY AND ENDURANCE: ICD-10-CM

## 2020-06-18 DIAGNOSIS — R26.89 DECREASED FUNCTIONAL MOBILITY: ICD-10-CM

## 2020-06-18 DIAGNOSIS — R26.81 UNSTEADINESS ON FEET: ICD-10-CM

## 2020-06-25 ENCOUNTER — OFFICE VISIT - HEALTHEAST (OUTPATIENT)
Dept: PHYSICAL THERAPY | Facility: REHABILITATION | Age: 75
End: 2020-06-25

## 2020-06-25 DIAGNOSIS — R26.89 POOR BALANCE: ICD-10-CM

## 2020-06-25 DIAGNOSIS — Z74.09 DECREASED FUNCTIONAL MOBILITY AND ENDURANCE: ICD-10-CM

## 2020-06-25 DIAGNOSIS — R26.9 ABNORMALITY OF GAIT: ICD-10-CM

## 2020-06-25 DIAGNOSIS — R26.81 UNSTEADINESS ON FEET: ICD-10-CM

## 2020-06-25 DIAGNOSIS — R26.89 DECREASED FUNCTIONAL MOBILITY: ICD-10-CM

## 2020-06-25 DIAGNOSIS — M62.81 GENERALIZED MUSCLE WEAKNESS: ICD-10-CM

## 2020-07-02 ENCOUNTER — OFFICE VISIT - HEALTHEAST (OUTPATIENT)
Dept: PHYSICAL THERAPY | Facility: REHABILITATION | Age: 75
End: 2020-07-02

## 2020-07-02 DIAGNOSIS — R26.89 POOR BALANCE: ICD-10-CM

## 2020-07-02 DIAGNOSIS — R26.89 DECREASED FUNCTIONAL MOBILITY: ICD-10-CM

## 2020-07-02 DIAGNOSIS — Z74.09 DECREASED FUNCTIONAL MOBILITY AND ENDURANCE: ICD-10-CM

## 2020-07-02 DIAGNOSIS — R26.81 UNSTEADINESS ON FEET: ICD-10-CM

## 2020-07-02 DIAGNOSIS — M62.81 GENERALIZED MUSCLE WEAKNESS: ICD-10-CM

## 2020-07-02 DIAGNOSIS — R26.9 ABNORMALITY OF GAIT: ICD-10-CM

## 2020-07-09 ENCOUNTER — HOSPITAL ENCOUNTER (OUTPATIENT)
Dept: MAMMOGRAPHY | Facility: CLINIC | Age: 75
Discharge: HOME | End: 2020-07-09
Attending: FAMILY MEDICINE

## 2020-07-09 ENCOUNTER — OFFICE VISIT - HEALTHEAST (OUTPATIENT)
Dept: PHYSICAL THERAPY | Facility: REHABILITATION | Age: 75
End: 2020-07-09

## 2020-07-09 DIAGNOSIS — R26.89 POOR BALANCE: ICD-10-CM

## 2020-07-09 DIAGNOSIS — R26.9 ABNORMALITY OF GAIT: ICD-10-CM

## 2020-07-09 DIAGNOSIS — R26.81 UNSTEADINESS ON FEET: ICD-10-CM

## 2020-07-09 DIAGNOSIS — R26.89 DECREASED FUNCTIONAL MOBILITY: ICD-10-CM

## 2020-07-09 DIAGNOSIS — Z12.31 VISIT FOR SCREENING MAMMOGRAM: ICD-10-CM

## 2020-07-09 DIAGNOSIS — M62.81 GENERALIZED MUSCLE WEAKNESS: ICD-10-CM

## 2020-07-09 DIAGNOSIS — Z74.09 DECREASED FUNCTIONAL MOBILITY AND ENDURANCE: ICD-10-CM

## 2020-07-10 ENCOUNTER — OFFICE VISIT - HEALTHEAST (OUTPATIENT)
Dept: OTOLARYNGOLOGY | Facility: CLINIC | Age: 75
End: 2020-07-10

## 2020-07-10 ENCOUNTER — OFFICE VISIT - HEALTHEAST (OUTPATIENT)
Dept: AUDIOLOGY | Facility: CLINIC | Age: 75
End: 2020-07-10

## 2020-07-10 DIAGNOSIS — G60.9 NEUROPATHY, IDIOPATHIC: ICD-10-CM

## 2020-07-10 DIAGNOSIS — H90.3 SENSORINEURAL HEARING LOSS (SNHL) OF BOTH EARS: ICD-10-CM

## 2020-07-10 DIAGNOSIS — H90.3 SENSORINEURAL HEARING LOSS, BILATERAL: ICD-10-CM

## 2020-07-10 DIAGNOSIS — E04.1 THYROID NODULE: ICD-10-CM

## 2020-07-10 DIAGNOSIS — R26.89 IMBALANCE: ICD-10-CM

## 2020-07-13 ENCOUNTER — COMMUNICATION - HEALTHEAST (OUTPATIENT)
Dept: FAMILY MEDICINE | Facility: CLINIC | Age: 75
End: 2020-07-13

## 2020-07-14 ENCOUNTER — COMMUNICATION - HEALTHEAST (OUTPATIENT)
Dept: FAMILY MEDICINE | Facility: CLINIC | Age: 75
End: 2020-07-14

## 2020-07-14 DIAGNOSIS — F41.9 ANXIETY: ICD-10-CM

## 2020-07-15 ENCOUNTER — COMMUNICATION - HEALTHEAST (OUTPATIENT)
Dept: FAMILY MEDICINE | Facility: CLINIC | Age: 75
End: 2020-07-15

## 2020-07-15 ENCOUNTER — COMMUNICATION - HEALTHEAST (OUTPATIENT)
Dept: INTERVENTIONAL RADIOLOGY/VASCULAR | Facility: HOSPITAL | Age: 75
End: 2020-07-15

## 2020-07-16 ENCOUNTER — HOSPITAL ENCOUNTER (OUTPATIENT)
Dept: ULTRASOUND IMAGING | Facility: HOSPITAL | Age: 75
Discharge: HOME | End: 2020-07-16
Attending: FAMILY MEDICINE

## 2020-07-16 ENCOUNTER — COMMUNICATION - HEALTHEAST (OUTPATIENT)
Dept: FAMILY MEDICINE | Facility: CLINIC | Age: 75
End: 2020-07-16

## 2020-07-16 DIAGNOSIS — E07.9 THYROID MASS: ICD-10-CM

## 2020-07-17 ENCOUNTER — AMBULATORY - HEALTHEAST (OUTPATIENT)
Dept: ULTRASOUND IMAGING | Facility: HOSPITAL | Age: 75
End: 2020-07-17

## 2020-07-26 ENCOUNTER — AMBULATORY - HEALTHEAST (OUTPATIENT)
Dept: FAMILY MEDICINE | Facility: CLINIC | Age: 75
End: 2020-07-26

## 2020-07-26 DIAGNOSIS — E07.9 THYROID MASS: ICD-10-CM

## 2020-07-28 ENCOUNTER — COMMUNICATION - HEALTHEAST (OUTPATIENT)
Dept: SCHEDULING | Facility: CLINIC | Age: 75
End: 2020-07-28

## 2020-07-29 ENCOUNTER — HOSPITAL ENCOUNTER (OUTPATIENT)
Dept: ULTRASOUND IMAGING | Facility: HOSPITAL | Age: 75
Discharge: HOME | End: 2020-07-29
Attending: FAMILY MEDICINE | Admitting: RADIOLOGY

## 2020-07-29 DIAGNOSIS — E07.9 THYROID MASS: ICD-10-CM

## 2020-07-29 DIAGNOSIS — Z80.8 FAMILY HISTORY OF THYROID CANCER: ICD-10-CM

## 2020-07-31 LAB
CAP COMMENT: NORMAL
LAB AP CHARGES (HE HISTORICAL CONVERSION): NORMAL
LAB AP INITIAL CYTO EVAL (HE HISTORICAL CONVERSION): NORMAL
LAB MED GENERAL PATH INTERP (HE HISTORICAL CONVERSION): NORMAL
PATH REPORT.COMMENTS IMP SPEC: NORMAL
PATH REPORT.FINAL DX SPEC: NORMAL
PATH REPORT.MICROSCOPIC SPEC OTHER STN: NORMAL
PATH REPORT.RELEVANT HX SPEC: NORMAL
SPECIMEN DESCRIPTION: NORMAL

## 2020-08-02 ENCOUNTER — COMMUNICATION - HEALTHEAST (OUTPATIENT)
Dept: FAMILY MEDICINE | Facility: CLINIC | Age: 75
End: 2020-08-02

## 2020-08-03 ENCOUNTER — COMMUNICATION - HEALTHEAST (OUTPATIENT)
Dept: FAMILY MEDICINE | Facility: CLINIC | Age: 75
End: 2020-08-03

## 2020-08-03 DIAGNOSIS — R26.89 IMBALANCE: ICD-10-CM

## 2020-08-25 ENCOUNTER — COMMUNICATION - HEALTHEAST (OUTPATIENT)
Dept: FAMILY MEDICINE | Facility: CLINIC | Age: 75
End: 2020-08-25

## 2020-09-01 ENCOUNTER — AMBULATORY - HEALTHEAST (OUTPATIENT)
Dept: PULMONOLOGY | Facility: OTHER | Age: 75
End: 2020-09-01

## 2020-09-08 ENCOUNTER — AMBULATORY - HEALTHEAST (OUTPATIENT)
Dept: PULMONOLOGY | Facility: OTHER | Age: 75
End: 2020-09-08

## 2020-09-08 ENCOUNTER — OFFICE VISIT - HEALTHEAST (OUTPATIENT)
Dept: PULMONOLOGY | Facility: OTHER | Age: 75
End: 2020-09-08

## 2020-09-08 DIAGNOSIS — J43.9 PULMONARY EMPHYSEMA, UNSPECIFIED EMPHYSEMA TYPE (H): ICD-10-CM

## 2020-09-08 ASSESSMENT — MIFFLIN-ST. JEOR: SCORE: 1472.91

## 2020-09-10 ENCOUNTER — RECORDS - HEALTHEAST (OUTPATIENT)
Dept: ADMINISTRATIVE | Facility: OTHER | Age: 75
End: 2020-09-10

## 2020-09-23 ENCOUNTER — COMMUNICATION - HEALTHEAST (OUTPATIENT)
Dept: VASCULAR SURGERY | Facility: CLINIC | Age: 75
End: 2020-09-23

## 2020-09-29 ENCOUNTER — AMBULATORY - HEALTHEAST (OUTPATIENT)
Dept: ENDOCRINOLOGY | Facility: CLINIC | Age: 75
End: 2020-09-29

## 2020-09-29 DIAGNOSIS — M81.0 AGE-RELATED OSTEOPOROSIS WITHOUT CURRENT PATHOLOGICAL FRACTURE: ICD-10-CM

## 2020-09-30 ENCOUNTER — AMBULATORY - HEALTHEAST (OUTPATIENT)
Dept: VASCULAR SURGERY | Facility: CLINIC | Age: 75
End: 2020-09-30

## 2020-09-30 DIAGNOSIS — M79.89 LEG SWELLING: ICD-10-CM

## 2020-09-30 DIAGNOSIS — I87.2 VENOUS INSUFFICIENCY OF BOTH LOWER EXTREMITIES: ICD-10-CM

## 2020-10-01 ENCOUNTER — RECORDS - HEALTHEAST (OUTPATIENT)
Dept: ADMINISTRATIVE | Facility: OTHER | Age: 75
End: 2020-10-01

## 2020-10-05 ENCOUNTER — COMMUNICATION - HEALTHEAST (OUTPATIENT)
Dept: FAMILY MEDICINE | Facility: CLINIC | Age: 75
End: 2020-10-05

## 2020-10-08 ENCOUNTER — RECORDS - HEALTHEAST (OUTPATIENT)
Dept: ADMINISTRATIVE | Facility: OTHER | Age: 75
End: 2020-10-08

## 2020-10-19 ENCOUNTER — RECORDS - HEALTHEAST (OUTPATIENT)
Dept: ADMINISTRATIVE | Facility: OTHER | Age: 75
End: 2020-10-19

## 2020-10-20 ENCOUNTER — COMMUNICATION - HEALTHEAST (OUTPATIENT)
Dept: FAMILY MEDICINE | Facility: CLINIC | Age: 75
End: 2020-10-20

## 2020-10-27 ENCOUNTER — OFFICE VISIT - HEALTHEAST (OUTPATIENT)
Dept: FAMILY MEDICINE | Facility: CLINIC | Age: 75
End: 2020-10-27

## 2020-10-27 DIAGNOSIS — B07.0 PLANTAR WARTS: ICD-10-CM

## 2020-10-27 ASSESSMENT — PATIENT HEALTH QUESTIONNAIRE - PHQ9: SUM OF ALL RESPONSES TO PHQ QUESTIONS 1-9: 7

## 2020-11-04 ENCOUNTER — OFFICE VISIT - HEALTHEAST (OUTPATIENT)
Dept: FAMILY MEDICINE | Facility: CLINIC | Age: 75
End: 2020-11-04

## 2020-11-04 DIAGNOSIS — B07.0 PLANTAR WARTS: ICD-10-CM

## 2020-11-10 ENCOUNTER — COMMUNICATION - HEALTHEAST (OUTPATIENT)
Dept: FAMILY MEDICINE | Facility: CLINIC | Age: 75
End: 2020-11-10

## 2020-11-10 ENCOUNTER — COMMUNICATION - HEALTHEAST (OUTPATIENT)
Dept: PULMONOLOGY | Facility: OTHER | Age: 75
End: 2020-11-10

## 2020-11-11 ENCOUNTER — COMMUNICATION - HEALTHEAST (OUTPATIENT)
Dept: FAMILY MEDICINE | Facility: CLINIC | Age: 75
End: 2020-11-11

## 2020-11-17 ENCOUNTER — COMMUNICATION - HEALTHEAST (OUTPATIENT)
Dept: FAMILY MEDICINE | Facility: CLINIC | Age: 75
End: 2020-11-17

## 2020-11-17 DIAGNOSIS — K21.9 GASTROESOPHAGEAL REFLUX DISEASE WITHOUT ESOPHAGITIS: ICD-10-CM

## 2020-11-23 ENCOUNTER — AMBULATORY - HEALTHEAST (OUTPATIENT)
Dept: ENDOCRINOLOGY | Facility: CLINIC | Age: 75
End: 2020-11-23

## 2020-11-25 ENCOUNTER — OFFICE VISIT - HEALTHEAST (OUTPATIENT)
Dept: ENDOCRINOLOGY | Facility: CLINIC | Age: 75
End: 2020-11-25

## 2020-11-25 DIAGNOSIS — M81.0 AGE-RELATED OSTEOPOROSIS WITHOUT CURRENT PATHOLOGICAL FRACTURE: ICD-10-CM

## 2020-12-02 ENCOUNTER — OFFICE VISIT - HEALTHEAST (OUTPATIENT)
Dept: FAMILY MEDICINE | Facility: CLINIC | Age: 75
End: 2020-12-02

## 2020-12-02 DIAGNOSIS — B07.0 PLANTAR WARTS: ICD-10-CM

## 2020-12-02 DIAGNOSIS — R60.0 EDEMA OF LEFT LOWER EXTREMITY: ICD-10-CM

## 2020-12-03 ENCOUNTER — HOSPITAL ENCOUNTER (OUTPATIENT)
Dept: ULTRASOUND IMAGING | Facility: HOSPITAL | Age: 75
Discharge: HOME | End: 2020-12-03

## 2020-12-03 DIAGNOSIS — R60.0 EDEMA OF LEFT LOWER EXTREMITY: ICD-10-CM

## 2020-12-07 ENCOUNTER — COMMUNICATION - HEALTHEAST (OUTPATIENT)
Dept: FAMILY MEDICINE | Facility: CLINIC | Age: 75
End: 2020-12-07

## 2020-12-08 ENCOUNTER — RECORDS - HEALTHEAST (OUTPATIENT)
Dept: ADMINISTRATIVE | Facility: OTHER | Age: 75
End: 2020-12-08

## 2020-12-16 ENCOUNTER — OFFICE VISIT - HEALTHEAST (OUTPATIENT)
Dept: FAMILY MEDICINE | Facility: CLINIC | Age: 75
End: 2020-12-16

## 2020-12-16 ENCOUNTER — COMMUNICATION - HEALTHEAST (OUTPATIENT)
Dept: FAMILY MEDICINE | Facility: CLINIC | Age: 75
End: 2020-12-16

## 2020-12-16 DIAGNOSIS — B07.0 PLANTAR WARTS: ICD-10-CM

## 2020-12-16 DIAGNOSIS — R60.9 EDEMA, UNSPECIFIED TYPE: ICD-10-CM

## 2020-12-16 DIAGNOSIS — R26.9 ABNORMALITY OF GAIT: ICD-10-CM

## 2021-01-06 ENCOUNTER — AMBULATORY - HEALTHEAST (OUTPATIENT)
Dept: ENDOCRINOLOGY | Facility: CLINIC | Age: 76
End: 2021-01-06

## 2021-01-06 ENCOUNTER — OFFICE VISIT - HEALTHEAST (OUTPATIENT)
Dept: FAMILY MEDICINE | Facility: CLINIC | Age: 76
End: 2021-01-06

## 2021-01-06 DIAGNOSIS — Z92.29 PERSONAL HISTORY OF OTHER DRUG THERAPY: ICD-10-CM

## 2021-01-06 DIAGNOSIS — M81.0 AGE-RELATED OSTEOPOROSIS WITHOUT CURRENT PATHOLOGICAL FRACTURE: ICD-10-CM

## 2021-01-06 DIAGNOSIS — B07.0 PLANTAR WARTS: ICD-10-CM

## 2021-01-22 ENCOUNTER — COMMUNICATION - HEALTHEAST (OUTPATIENT)
Dept: ENDOCRINOLOGY | Facility: CLINIC | Age: 76
End: 2021-01-22

## 2021-01-25 ENCOUNTER — COMMUNICATION - HEALTHEAST (OUTPATIENT)
Dept: ENDOCRINOLOGY | Facility: CLINIC | Age: 76
End: 2021-01-25

## 2021-02-01 ENCOUNTER — OFFICE VISIT - HEALTHEAST (OUTPATIENT)
Dept: VASCULAR SURGERY | Facility: CLINIC | Age: 76
End: 2021-02-01

## 2021-02-01 ENCOUNTER — AMBULATORY - HEALTHEAST (OUTPATIENT)
Dept: NURSING | Facility: CLINIC | Age: 76
End: 2021-02-01

## 2021-02-01 DIAGNOSIS — Q66.6 VALGUS DEFORMITY OF BOTH FEET: ICD-10-CM

## 2021-02-01 DIAGNOSIS — I89.0 ACQUIRED LYMPHEDEMA OF LEG: ICD-10-CM

## 2021-02-01 DIAGNOSIS — M21.70 LEG LENGTH DISCREPANCY: ICD-10-CM

## 2021-02-01 DIAGNOSIS — I87.2 VENOUS INSUFFICIENCY OF BOTH LOWER EXTREMITIES: ICD-10-CM

## 2021-02-01 DIAGNOSIS — I87.303 VENOUS HYPERTENSION OF LOWER EXTREMITY, BILATERAL: ICD-10-CM

## 2021-02-01 DIAGNOSIS — R26.89 IMPAIRED GAIT AND MOBILITY: ICD-10-CM

## 2021-02-01 DIAGNOSIS — M79.89 LEG SWELLING: ICD-10-CM

## 2021-02-12 ENCOUNTER — OFFICE VISIT - HEALTHEAST (OUTPATIENT)
Dept: FAMILY MEDICINE | Facility: CLINIC | Age: 76
End: 2021-02-12

## 2021-02-12 DIAGNOSIS — B07.0 PLANTAR WART: ICD-10-CM

## 2021-02-12 DIAGNOSIS — Z00.00 MEDICARE ANNUAL WELLNESS VISIT, SUBSEQUENT: ICD-10-CM

## 2021-02-12 DIAGNOSIS — J43.9 PULMONARY EMPHYSEMA, UNSPECIFIED EMPHYSEMA TYPE (H): ICD-10-CM

## 2021-02-12 DIAGNOSIS — I10 ESSENTIAL HYPERTENSION: ICD-10-CM

## 2021-02-12 DIAGNOSIS — I35.0 MILD AORTIC VALVE STENOSIS: ICD-10-CM

## 2021-02-12 DIAGNOSIS — I87.2 VENOUS INSUFFICIENCY OF BOTH LOWER EXTREMITIES: ICD-10-CM

## 2021-02-12 DIAGNOSIS — K21.9 GASTROESOPHAGEAL REFLUX DISEASE WITHOUT ESOPHAGITIS: ICD-10-CM

## 2021-02-12 DIAGNOSIS — G40.909 NONINTRACTABLE EPILEPSY WITHOUT STATUS EPILEPTICUS, UNSPECIFIED EPILEPSY TYPE (H): ICD-10-CM

## 2021-02-12 DIAGNOSIS — M81.0 AGE-RELATED OSTEOPOROSIS WITHOUT CURRENT PATHOLOGICAL FRACTURE: ICD-10-CM

## 2021-02-12 DIAGNOSIS — E55.9 VITAMIN D DEFICIENCY: ICD-10-CM

## 2021-02-12 DIAGNOSIS — E78.5 HYPERLIPIDEMIA, UNSPECIFIED HYPERLIPIDEMIA TYPE: ICD-10-CM

## 2021-02-12 DIAGNOSIS — E66.01 MORBID OBESITY (H): ICD-10-CM

## 2021-02-12 LAB
ALBUMIN SERPL-MCNC: 4.2 G/DL (ref 3.5–5)
ALP SERPL-CCNC: 69 U/L (ref 45–120)
ALT SERPL W P-5'-P-CCNC: 11 U/L (ref 0–45)
ANION GAP SERPL CALCULATED.3IONS-SCNC: 11 MMOL/L (ref 5–18)
AST SERPL W P-5'-P-CCNC: 14 U/L (ref 0–40)
BILIRUB SERPL-MCNC: 0.5 MG/DL (ref 0–1)
BUN SERPL-MCNC: 20 MG/DL (ref 8–28)
CALCIUM SERPL-MCNC: 9.7 MG/DL (ref 8.5–10.5)
CHLORIDE BLD-SCNC: 98 MMOL/L (ref 98–107)
CHOLEST SERPL-MCNC: 192 MG/DL
CO2 SERPL-SCNC: 29 MMOL/L (ref 22–31)
CREAT SERPL-MCNC: 0.8 MG/DL (ref 0.6–1.1)
ERYTHROCYTE [DISTWIDTH] IN BLOOD BY AUTOMATED COUNT: 13.2 % (ref 11–14.5)
FASTING STATUS PATIENT QL REPORTED: YES
GFR SERPL CREATININE-BSD FRML MDRD: >60 ML/MIN/1.73M2
GLUCOSE BLD-MCNC: 89 MG/DL (ref 70–125)
HCT VFR BLD AUTO: 44.2 % (ref 35–47)
HDLC SERPL-MCNC: 59 MG/DL
HGB BLD-MCNC: 14.5 G/DL (ref 12–16)
LDLC SERPL CALC-MCNC: 110 MG/DL
MCH RBC QN AUTO: 28.9 PG (ref 27–34)
MCHC RBC AUTO-ENTMCNC: 32.8 G/DL (ref 32–36)
MCV RBC AUTO: 88 FL (ref 80–100)
PLATELET # BLD AUTO: 223 THOU/UL (ref 140–440)
PMV BLD AUTO: 9.5 FL (ref 7–10)
POTASSIUM BLD-SCNC: 3.9 MMOL/L (ref 3.5–5)
PROT SERPL-MCNC: 7.2 G/DL (ref 6–8)
RBC # BLD AUTO: 5.02 MILL/UL (ref 3.8–5.4)
SODIUM SERPL-SCNC: 138 MMOL/L (ref 136–145)
TRIGL SERPL-MCNC: 117 MG/DL
WBC: 8.9 THOU/UL (ref 4–11)

## 2021-02-12 ASSESSMENT — MIFFLIN-ST. JEOR: SCORE: 1492.19

## 2021-02-15 LAB
25(OH)D3 SERPL-MCNC: 52.1 NG/ML (ref 30–80)
25(OH)D3 SERPL-MCNC: 52.1 NG/ML (ref 30–80)

## 2021-02-22 ENCOUNTER — COMMUNICATION - HEALTHEAST (OUTPATIENT)
Dept: FAMILY MEDICINE | Facility: CLINIC | Age: 76
End: 2021-02-22

## 2021-02-24 ENCOUNTER — OFFICE VISIT - HEALTHEAST (OUTPATIENT)
Dept: FAMILY MEDICINE | Facility: CLINIC | Age: 76
End: 2021-02-24

## 2021-02-24 DIAGNOSIS — B07.0 PLANTAR WART: ICD-10-CM

## 2021-03-01 ENCOUNTER — AMBULATORY - HEALTHEAST (OUTPATIENT)
Dept: NURSING | Facility: CLINIC | Age: 76
End: 2021-03-01

## 2021-03-04 ENCOUNTER — OFFICE VISIT - HEALTHEAST (OUTPATIENT)
Dept: PODIATRY | Facility: CLINIC | Age: 76
End: 2021-03-04

## 2021-03-04 DIAGNOSIS — I73.9 PAD (PERIPHERAL ARTERY DISEASE) (H): ICD-10-CM

## 2021-03-04 DIAGNOSIS — L57.0 KERATOMA: ICD-10-CM

## 2021-03-04 DIAGNOSIS — M20.5X1 HALLUX LIMITUS OF RIGHT FOOT: ICD-10-CM

## 2021-03-05 ENCOUNTER — COMMUNICATION - HEALTHEAST (OUTPATIENT)
Dept: FAMILY MEDICINE | Facility: CLINIC | Age: 76
End: 2021-03-05

## 2021-03-05 DIAGNOSIS — R42 DIZZINESS: ICD-10-CM

## 2021-03-08 ENCOUNTER — OFFICE VISIT - HEALTHEAST (OUTPATIENT)
Dept: PULMONOLOGY | Facility: OTHER | Age: 76
End: 2021-03-08

## 2021-03-08 DIAGNOSIS — K21.9 GASTROESOPHAGEAL REFLUX DISEASE WITHOUT ESOPHAGITIS: ICD-10-CM

## 2021-03-08 ASSESSMENT — MIFFLIN-ST. JEOR: SCORE: 1492.19

## 2021-03-09 LAB
LAB AP CHARGES (HE HISTORICAL CONVERSION): NORMAL
PATH REPORT.COMMENTS IMP SPEC: NORMAL
PATH REPORT.COMMENTS IMP SPEC: NORMAL
PATH REPORT.FINAL DX SPEC: NORMAL
PATH REPORT.GROSS SPEC: NORMAL
PATH REPORT.MICROSCOPIC SPEC OTHER STN: NORMAL
PATH REPORT.RELEVANT HX SPEC: NORMAL
RESULT FLAG (HE HISTORICAL CONVERSION): NORMAL

## 2021-03-10 ENCOUNTER — AMBULATORY - HEALTHEAST (OUTPATIENT)
Dept: VASCULAR SURGERY | Facility: CLINIC | Age: 76
End: 2021-03-10

## 2021-04-08 ENCOUNTER — COMMUNICATION - HEALTHEAST (OUTPATIENT)
Dept: FAMILY MEDICINE | Facility: CLINIC | Age: 76
End: 2021-04-08

## 2021-04-08 DIAGNOSIS — J43.9 PULMONARY EMPHYSEMA, UNSPECIFIED EMPHYSEMA TYPE (H): ICD-10-CM

## 2021-04-13 ENCOUNTER — OFFICE VISIT - HEALTHEAST (OUTPATIENT)
Dept: PHARMACY | Facility: CLINIC | Age: 76
End: 2021-04-13

## 2021-04-13 DIAGNOSIS — E55.9 VITAMIN D DEFICIENCY: ICD-10-CM

## 2021-04-13 DIAGNOSIS — N39.41 URGE INCONTINENCE OF URINE: ICD-10-CM

## 2021-04-13 DIAGNOSIS — R42 DIZZINESS: ICD-10-CM

## 2021-04-13 DIAGNOSIS — K21.9 GASTROESOPHAGEAL REFLUX DISEASE WITHOUT ESOPHAGITIS: ICD-10-CM

## 2021-04-13 DIAGNOSIS — G40.909 NONINTRACTABLE EPILEPSY WITHOUT STATUS EPILEPTICUS, UNSPECIFIED EPILEPSY TYPE (H): ICD-10-CM

## 2021-04-13 DIAGNOSIS — R25.2 LEG CRAMPS: ICD-10-CM

## 2021-04-13 DIAGNOSIS — J43.9 PULMONARY EMPHYSEMA, UNSPECIFIED EMPHYSEMA TYPE (H): ICD-10-CM

## 2021-04-13 DIAGNOSIS — I10 ESSENTIAL HYPERTENSION: ICD-10-CM

## 2021-04-13 PROCEDURE — 99607 MTMS BY PHARM ADDL 15 MIN: CPT | Performed by: PHARMACIST

## 2021-04-13 PROCEDURE — 99605 MTMS BY PHARM NP 15 MIN: CPT | Performed by: PHARMACIST

## 2021-05-14 ENCOUNTER — OFFICE VISIT - HEALTHEAST (OUTPATIENT)
Dept: PODIATRY | Facility: CLINIC | Age: 76
End: 2021-05-14

## 2021-05-14 DIAGNOSIS — M25.571 PAIN IN JOINT INVOLVING ANKLE AND FOOT, RIGHT: ICD-10-CM

## 2021-05-14 DIAGNOSIS — M20.41 HAMMER TOES OF BOTH FEET: ICD-10-CM

## 2021-05-14 DIAGNOSIS — M20.42 HAMMER TOES OF BOTH FEET: ICD-10-CM

## 2021-05-14 DIAGNOSIS — L57.0 KERATOMA: ICD-10-CM

## 2021-05-24 ENCOUNTER — RECORDS - HEALTHEAST (OUTPATIENT)
Dept: ADMINISTRATIVE | Facility: CLINIC | Age: 76
End: 2021-05-24

## 2021-05-25 ENCOUNTER — RECORDS - HEALTHEAST (OUTPATIENT)
Dept: ADMINISTRATIVE | Facility: CLINIC | Age: 76
End: 2021-05-25

## 2021-05-25 ENCOUNTER — OFFICE VISIT - HEALTHEAST (OUTPATIENT)
Dept: PODIATRY | Facility: CLINIC | Age: 76
End: 2021-05-25

## 2021-05-25 ENCOUNTER — AMBULATORY - HEALTHEAST (OUTPATIENT)
Dept: ENDOCRINOLOGY | Facility: CLINIC | Age: 76
End: 2021-05-25

## 2021-05-25 DIAGNOSIS — M25.571 PAIN IN JOINT INVOLVING ANKLE AND FOOT, RIGHT: ICD-10-CM

## 2021-05-25 ASSESSMENT — MIFFLIN-ST. JEOR: SCORE: 1500.13

## 2021-05-26 ENCOUNTER — RECORDS - HEALTHEAST (OUTPATIENT)
Dept: ADMINISTRATIVE | Facility: CLINIC | Age: 76
End: 2021-05-26

## 2021-05-26 ASSESSMENT — PATIENT HEALTH QUESTIONNAIRE - PHQ9: SUM OF ALL RESPONSES TO PHQ QUESTIONS 1-9: 0

## 2021-05-27 ENCOUNTER — RECORDS - HEALTHEAST (OUTPATIENT)
Dept: ADMINISTRATIVE | Facility: CLINIC | Age: 76
End: 2021-05-27

## 2021-05-27 VITALS
TEMPERATURE: 97.6 F | HEART RATE: 76 BPM | DIASTOLIC BLOOD PRESSURE: 72 MMHG | RESPIRATION RATE: 20 BRPM | SYSTOLIC BLOOD PRESSURE: 130 MMHG

## 2021-05-27 VITALS — HEART RATE: 85 BPM | DIASTOLIC BLOOD PRESSURE: 76 MMHG | OXYGEN SATURATION: 96 % | SYSTOLIC BLOOD PRESSURE: 130 MMHG

## 2021-05-27 VITALS
BODY MASS INDEX: 38.79 KG/M2 | SYSTOLIC BLOOD PRESSURE: 108 MMHG | WEIGHT: 226 LBS | HEART RATE: 88 BPM | TEMPERATURE: 98 F | DIASTOLIC BLOOD PRESSURE: 84 MMHG

## 2021-05-27 ASSESSMENT — PATIENT HEALTH QUESTIONNAIRE - PHQ9: SUM OF ALL RESPONSES TO PHQ QUESTIONS 1-9: 7

## 2021-05-27 NOTE — TELEPHONE ENCOUNTER
Per [facility] medicare is the primary payor so a PA is not necessary. NO PA needed per [facility] 3/22/19. Paperwork sent to scan.

## 2021-05-28 ENCOUNTER — RECORDS - HEALTHEAST (OUTPATIENT)
Dept: ADMINISTRATIVE | Facility: CLINIC | Age: 76
End: 2021-05-28

## 2021-05-28 NOTE — PROGRESS NOTES
"[facility] Daily Progress     Patient Name: Lalitha Underwood  Date: 5/14/2019  Visit #: 6  PTA visit #:  -  Date of evaluation: 4/23/2019   Referral Diagnosis: Neuropathy, idiopathic [G60.9]  Referring provider: Kelly Hull MD  Visit Diagnosis:     ICD-10-CM    1. Unsteadiness on feet R26.81    2. Generalized muscle weakness M62.81    3. Decreased functional mobility and endurance Z74.09    4. Poor balance R26.89    5. Abnormality of gait R26.9          Assessment:     Significant hip weakness likely most limiting pt gait and balance.  Pt has many things factoring into her fear of falling - she is afraid of injury, increased by history of osteoporosis and knee pain and inability to transfer floor<>stand.  This fear significantly decreases her confidence and balance in public areas.  We discussed a life alert today, or that she may need a walker in her future but I do believe ongoing strengthening and PT should continue to help.  She does also see a psychotherapist, and we discussed that they may be able to address this to some extent.  Patient is benefitting from skilled physical therapy and is making steady progress toward functional goals.  Patient is appropriate to continue with skilled physical therapy intervention, as indicated by initial plan of care.       Goal Status:  Pt. will demonstrate/verbalize independence in self-management of condition in : 6 weeks  Pt. will be independent with home exercise program in : 6 weeks    Pt will: be able to step up a curb with walker safely in 6 weeks  Pt will: improve her gait speed by 0.3 m/sec in 6 weeks  Pt will: be able to walk on a slope ascend/descend without AD safely so she can walk a ramp in the community in 6 weeks      Plan / Patient Education:     Continue with initial plan of care.  Progress with home program as tolerated.   Plan for next visit: Step up on foam 6\", static standing on incline    Subjective:     Still very fearfull of " "falling.  Fearful of breaking bones, fearful she won't be able to get back up.    Objective:     Pt education: We discussed a life alert today, or that she may need a walker in her future but I do believe ongoing strengthening and PT should continue to help.  She does also see a psychotherapist, and we discussed that they may be able to address this to some extent.      Gait: outside on asphalt with an incline.  Pt fearful and increased lateral sway.  Gait with SPC and SBA.    Treatment Today     TREATMENT MINUTES COMMENTS   Evaluation     Self-care/ Home management     Manual therapy     Neuromuscular Re-education 13 Walking on stepping stones, standing on foam, step ups on foam   Therapeutic Activity     Therapeutic Exercises 30 Exercises:  Exercise #1: NuStep L5 5'  Comment #1: HEP review of sit<>stands, gastroc stretch, towel scrunch  Exercise #2: Step over hurdles fwd, sideways ~10x each  Comment #2: Step up on blue foam 10x  Exercise #3: Seated L4 band hip abduction  Comment #3: Hooklying hip ab/adduction 10x 10\"  Exercise #4: Narrow gait - unable to do tandem 2 laps with SPC  Comment #4: Sit<>stands 12 x cues to slow down  Exercise #5: Step over zofia 6\" 10x benedicto  Exercise #6: Standing on blue foam, no hanging on 60\" x2  Comment #6: Controlled cone taps 10x benedicto  Exercise #7: Backwards walking over dowels 10x       Gait training 10    Modality__________________                Total 53    Blank areas are intentional and mean the treatment did not include these items.       Latasha Cowan, CALLIET  5/14/2019  "

## 2021-05-28 NOTE — PROGRESS NOTES
[facility] Daily Progress     Patient Name: Lalitha Underwood  Date: 5/9/2019  Visit #: 5  PTA visit #:  -  Date of evaluation: 4/23/2019   Referral Diagnosis: Neuropathy, idiopathic [G60.9]  Referring provider: Kelly Hull MD  Visit Diagnosis:     ICD-10-CM    1. Unsteadiness on feet R26.81    2. Generalized muscle weakness M62.81    3. Decreased functional mobility and endurance Z74.09    4. Poor balance R26.89    5. Abnormality of gait R26.9          Assessment:     Significant hip weakness likely most limiting pt gait and balance.  Patient is benefitting from skilled physical therapy and is making steady progress toward functional goals.  Patient is appropriate to continue with skilled physical therapy intervention, as indicated by initial plan of care.     From Eval: Lalitha Underwood is a 74 y.o. female who presents to therapy today with chief complaints of unsteadiness on feet.  She has been seen here at [facility] in the past.  She has recently had both podiatry and neurology follow ups for this issue.  [facility] Dr. Carter did do an MRI of the entire spine which I do not have access to but per pt report they found degenerative changes only and nothing to explain her imbalance.  She believes fear is a big factor in her unsteadiness.  On testing today she demonstrates decline significantly since her last PT sessions in 2018.  She is below age/gender matched norms for all balance testing which puts her at high fall risk.  She also demonstrates significant LE weakness.  Patient will benefit from 1:1 skilled PT services to address the above limitations.     Goal Status:  Pt. will demonstrate/verbalize independence in self-management of condition in : 6 weeks  Pt. will be independent with home exercise program in : 6 weeks    Pt will: be able to step up a curb with walker safely in 6 weeks  Pt will: improve her gait speed by 0.3 m/sec in 6 weeks  Pt will: be able to walk on a  "slope ascend/descend without AD safely so she can walk a ramp in the community in 6 weeks      Plan / Patient Education:     Continue with initial plan of care.  Progress with home program as tolerated.   Plan for next visit: Step up on foam 6\", static standing on incline    Subjective:     Pain Rating: Not noticing a specific change to date.    Objective:          Initial test: 4/23/19 Norms    30 second sit<>stand    11 reps  Uses arms 30-35th percentile    TUG    13.4 seconds    <10 sec    Cognitive TUG    14.6 seconds      Comfortable Gait Speed    0.90 m/s 1.32 M/sec    Fast Gait Speed    1.0 m/s 1.71 M/sec    4 Square Step Test    13.47 seconds  With SPC <15 sec    2 Minute Walk test    103.1 M 145.9 M    Turning    R 5.6/L 7.5           Gait: Poor with SPC, carries SPC and does not use it.  Significant toe out and trunk sway indicative of <3/5 hip abduction strength.    Treatment Today     TREATMENT MINUTES COMMENTS   Evaluation     Self-care/ Home management     Manual therapy     Neuromuscular Re-education 13 Walking on stepping stones, standing on foam, step ups on foam   Therapeutic Activity     Therapeutic Exercises 30 Exercises:  Exercise #1: NuStep L5 5'  Comment #1: HEP review of sit<>stands, gastroc stretch, towel scrunch  Exercise #2: Step over hurdles fwd, sideways ~10x each  Comment #2: Step ups 10x benedicto  Exercise #3: Seated L3 band pull aparts 5x 20\"  Comment #3: Hooklying hip ab/adduction 10x 10\"  Exercise #4: Tandem gait and grapevine 2 laps with SPC  Comment #4: Backwards pernell       Gait training     Modality__________________                Total 43    Blank areas are intentional and mean the treatment did not include these items.       Latasha Cowan, DPT  5/9/2019  "

## 2021-05-28 NOTE — PROGRESS NOTES
[facility] Daily Progress     Patient Name: Lalitha Underwood  Date: 4/25/2019  Visit #: 2  PTA visit #:  -    Date of evaluation: 4/23/2019   Referral Diagnosis: Neuropathy, idiopathic [G60.9]  Referring provider: Kelly Hull MD  Visit Diagnosis:     ICD-10-CM    1. Unsteadiness on feet R26.81    2. Generalized muscle weakness M62.81    3. Decreased functional mobility and endurance Z74.09    4. Poor balance R26.89    5. Abnormality of gait R26.9          Assessment:     Patient is benefitting from skilled physical therapy and is making steady progress toward functional goals.  Patient is appropriate to continue with skilled physical therapy intervention, as indicated by initial plan of care.     From Eval: Lalitha Underwood is a 74 y.o. female who presents to therapy today with chief complaints of unsteadiness on feet.  She has been seen here at [facility] in the past.  She has recently had both podiatry and neurology follow ups for this issue.  [facility] Dr. Carter did do an MRI of the entire spine which I do not have access to but per pt report they found degenerative changes only and nothing to explain her imbalance.  She believes fear is a big factor in her unsteadiness.  On testing today she demonstrates decline significantly since her last PT sessions in 2018.  She is below age/gender matched norms for all balance testing which puts her at high fall risk.  She also demonstrates significant LE weakness.  Patient will benefit from 1:1 skilled PT services to address the above limitations.     Goal Status:  Pt. will demonstrate/verbalize independence in self-management of condition in : 6 weeks  Pt. will be independent with home exercise program in : 6 weeks    Pt will: be able to step up a curb with walker safely in 6 weeks  Pt will: improve her gait speed by 0.3 m/sec in 6 weeks  Pt will: be able to walk on a slope ascend/descend without AD safely so she can walk a ramp in  "the community in 6 weeks      Plan / Patient Education:     Continue with initial plan of care.  Progress with home program as tolerated.   Plan for next visit: Hip/gluteal strengthening.  Foot intrinsic strengthening and ankle stretches.  Quad strengthening.  Dynamic balance/gait training    Subjective:     Pain Rating: Stiff, not really painful  Pt having an upset stomach today.    Pt would prefer just a few exercises for a HEP, as she admits she is not good at doing exercises.    Objective:     Therapeutic Exercise  NuStep L6, 5'          Initial test: 4/23/19 Norms    30 second sit<>stand    11 reps  Uses arms 30-35th percentile    TUG    13.4 seconds    <10 sec    Cognitive TUG    14.6 seconds      Comfortable Gait Speed    0.90 m/s 1.32 M/sec    Fast Gait Speed    1.0 m/s 1.71 M/sec    4 Square Step Test    13.47 seconds  With SPC <15 sec    2 Minute Walk test    103.1 M 145.9 M    Turning    R 5.6/L 7.5           Gait: Poor with SPC, carries SPC and does not use it    Treatment Today     TREATMENT MINUTES COMMENTS   Evaluation     Self-care/ Home management     Manual therapy     Neuromuscular Re-education     Therapeutic Activity     Therapeutic Exercises 25 Initiated HEP today:  - Toe curls (like towel scrunch or similar at home)  - Gastroc stretch 2x 30\", many cues for form  - Sit<>stands 10x   Gait training     Modality__________________                Total 25    Blank areas are intentional and mean the treatment did not include these items.       Latasha Cowan, DPT  4/25/2019    "

## 2021-05-28 NOTE — TELEPHONE ENCOUNTER
Refill Approved    Rx renewed per Medication Renewal Policy. Medication was last renewed on 11/16/17.    Marley Vuong, [facility] Triage/Med Refill 4/29/2019     Requested Prescriptions   Pending Prescriptions Disp Refills     albuterol (PROAIR HFA;PROVENTIL HFA;VENTOLIN HFA) 90 mcg/actuation inhaler [Pharmacy Med Name: ALBUTEROL HFA INH (200 PUFFS) 18GM] 54 g 0     Sig: INHALE 2 PUFFS EVERY 6 HOURS AS NEEDED FOR WHEEZING       Albuterol/Levalbuterol Refill Protocol Passed - 4/26/2019  9:48 AM        Passed - PCP or prescribing provider visit in last year     Last office visit with prescriber/PCP: 12/12/2018 Kelly Hull MD OR same dept: 12/12/2018 Kelly Hull MD OR same specialty: 12/12/2018 Kelly Hull MD Last physical: 2/21/2019       Next appt within 3 mo: Visit date not found  Next physical within 3 mo: Visit date not found  Prescriber OR PCP: Kelly Hull MD  Last diagnosis associated with med order: 1. COPD (chronic obstructive pulmonary disease) (H)  - albuterol (PROAIR HFA;PROVENTIL HFA;VENTOLIN HFA) 90 mcg/actuation inhaler [Pharmacy Med Name: ALBUTEROL HFA INH (200 PUFFS) 18GM]; INHALE 2 PUFFS EVERY 6 HOURS AS NEEDED FOR WHEEZING  Dispense: 54 g; Refill: 0    If protocol passes may refill for 6 months if within 3 months of last provider visit (or a total of 9 months). If patient requesting >1 inhaler per month refill x 6 months and have patient make appointment with provider.

## 2021-05-28 NOTE — PROGRESS NOTES
Date of Service: 05/06/19    Date last seen: 05/10/18    PCP: Kelly Hull MD    Impression:  1. Bilateral leg swelling-stable  2. Bilateral venous insufficiency legs-stable  3. Flat feet with valgus deformities  4. Lipidema    Plan:  1. Questions were answered.    2. New compression socks written for.   3. Continue insoles.  New ones written for.     4. Doing well with program,continue.  5. Patient will follow up in 1 year or when needed.      Time spent with patient 15 minutes with greater than 50% time in consultation, education and coordination of care.     ---------------------------------------------------------------------------------------------------------------------    Chief Complaint: Bilateral leg swelling      History of Present Illness:    Lalitha Underwood returns to the [facility] for follow up of Bilateral leg swelling.  She wears compression stockings and replaces them at least twice a year.  The swelling has remained the same. Previous treatment has included MLD, education, compression bandaging, lymphatic exercise, range of motion work, exercise and elevation when able.  There has been no new numbness, tingling, weakness, masses, rashes, shortness of breath or chest pain. There have not been any new areas of ulceration. There has been no new fevers or pain.  There are no new or changing skin lesions. Pain is rated a 0 on a 10 point scale.  Her compression is working well. She wears her insoles.  She is due for new insoles and compression.  She feels she overall is doing well.  She feels things are stable.    Past Medical History:   Diagnosis Date     Convulsive disorder (H)      COPD (chronic obstructive pulmonary disease) (H)      Depression      Hernia of unspecified site of abdominal cavity without mention of obstruction or gangrene      Hiatal hernia      Hypertension      On home oxygen therapy     at 1.5 liters at nite     Osteopenia       Shortness of breath      Venous insufficiency of both lower extremities        Past Surgical History:   Procedure Laterality Date     OTHER SURGICAL HISTORY Left 2003    Broke titanium in left arm     SHOULDER SURGERY Right 1967     UMBILICAL HERNIA REPAIR  04/04/2018    Dr. Jimenez       Current Outpatient Medications   Medication Sig Dispense Refill     albuterol (PROAIR HFA;PROVENTIL HFA;VENTOLIN HFA) 90 mcg/actuation inhaler INHALE 2 PUFFS EVERY 6 HOURS AS NEEDED FOR WHEEZING 3 Inhaler 0     calcium citrate (CALCITRATE) 200 mg (950 mg) tablet Take 1 tablet by mouth 2 (two) times a day.       cholecalciferol, vitamin D3, 5,000 unit Tab Take 5,000 Units by mouth daily.        denosumab 60 mg/mL Syrg Inject 60 mg under the skin every 6 (six) months.       fluticasone-salmeterol (ADVAIR) 250-50 mcg/dose DISKUS Inhale 1 puff 2 (two) times a day. 180 each 3     losartan (COZAAR) 50 MG tablet Take 1 tablet (50 mg total) by mouth 2 (two) times a day. 180 tablet 3     OXYGEN-AIR DELIVERY SYSTEMS [facility] [place] Use 1.5 L As Directed bedtime.       phenytoin (DILANTIN) 100 MG ER capsule Take 2 tabs by mouth in the morning and 1 tab in the evening. 270 capsule 3     potassium chloride 20 mEq TbER Take 20 mEq by mouth daily. 90 tablet 3     RABEprazole (ACIPHEX) 20 mg tablet Take 1 tablet (20 mg total) by mouth daily. 90 tablet 3     solifenacin (VESICARE) 5 MG tablet Take 2 tablets (10 mg total) by mouth daily. 180 tablet 3     tiotropium (SPIRIVA) 18 mcg inhalation capsule Place 1 capsule (2 puffs total) into inhaler and inhale daily. 90 capsule 3     Current Facility-Administered Medications   Medication Dose Route Frequency Provider Last Rate Last Dose     denosumab 60 mg (PROLIA 60 mg/ml)  60 mg Subcutaneous Q6 Months Lalitha Haq NP   60 mg at 04/10/19 0812       Allergies   Allergen Reactions     Clindamycin Rash     Carbamazepine Rash     Morphine Nausea Only       Social History     Socioeconomic History     Marital  status:      Spouse name: Not on file     Number of children: Not on file     Years of education: Not on file     Highest education level: Not on file   Occupational History     Not on file   Social Needs     Financial resource strain: Not on file     Food insecurity:     Worry: Not on file     Inability: Not on file     Transportation needs:     Medical: Not on file     Non-medical: Not on file   Tobacco Use     Smoking status: Former Smoker     Types: Cigarettes     Last attempt to quit: 1998     Years since quittin.4     Smokeless tobacco: Never Used   Substance and Sexual Activity     Alcohol use: Yes     Alcohol/week: 1.2 oz     Types: 2 Glasses of wine per week     Drug use: Yes     Types: Oxycodone     Sexual activity: Never   Lifestyle     Physical activity:     Days per week: Not on file     Minutes per session: Not on file     Stress: Not on file   Relationships     Social connections:     Talks on phone: Not on file     Gets together: Not on file     Attends Nondenominational service: Not on file     Active member of club or organization: Not on file     Attends meetings of clubs or organizations: Not on file     Relationship status: Not on file     Intimate partner violence:     Fear of current or ex partner: Not on file     Emotionally abused: Not on file     Physically abused: Not on file     Forced sexual activity: Not on file   Other Topics Concern     Not on file   Social History Narrative    .  Two kids.  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       Family History   Problem Relation Age of Onset     Breast cancer Mother 66     Hypertension Mother      Heart attack Mother      Varicose Veins Mother      Hypertension Father      Heart attack Sister      Heart disease Sister      Diabetes Son      Pulmonary Hypertension Daughter      Heart attack Sister      Heart disease Sister      Review of Systems:  Lalitha Underwood no new numbess, tingling or weakness, redness or rashes, fevers, new masses,  unexplained weight loss, increased pain, new ulcers, shortness of breath and chest pain  Full 12 point review of systems was completed.    Labs:    I personally reviewed the following labs today and those on care everywhere, if indicated    No results found for: SEDRATE      No results found for: CRP        Lab Results   Component Value Date    CREATININE 0.67 02/21/2019      No results found for: HGBA1C        Lab Results   Component Value Date    BUN 18 02/21/2019              Lab Results   Component Value Date    ALBUMIN 4.1 02/21/2019       Vitamin D, Total (25-Hydroxy)   Date Value Ref Range Status   02/21/2019 59.0 30.0 - 80.0 ng/mL Final       Lab Results   Component Value Date    TSH 2.67 02/21/2019     Lab Results   Component Value Date    WBC 8.0 02/21/2019    HGB 12.8 02/21/2019    HCT 39.3 02/21/2019    MCV 90 02/21/2019     02/21/2019       Imaging:    I personally reviewed the following imaging today and those on care everywhere, if indicated    Nothing new to review    Physical Exam:  Vitals:    05/06/19 0956   BP: 132/64   Pulse: 60   Resp: 20   Temp: 97.8  F (36.6  C)     BMI 32.67 stable, weight 191 pounds    Circumferential measures:    Vasc Edema 12/7/2015 3/15/2017 5/10/2018 5/6/2019   Right-just above MCP 18.5 18 18 19   Right Wrist 15.6 16.4 16 17.2   Right Up 10cm 19.5 21.3 21.4 21.5   Right Up 10cm From Elbow 28.4 31.8 30 32.5   Left-just above MCP 18.4 18 18 19.5   Left Wrist 15.9 16.2 17 17   Left Up 10cm 18.4 21.8 22 23   Left Up 10cm From Elbow 42.2 44.8 37 44.5   Right just above MTP 20.0 19.6 19 20.7   Right Ankle 18.8 18.5 18.5 19.3   Right Widest Calf 40.6 39.2 41 39.5   Right Thigh Up 10cm 49.9 55 51.5 56.5   Left - just above MTP 19.6 23 19 20.2   Left Ankle 19.5 20.3 19.5 20.5   Left Widest Calf 41.2 20.2 39.5 40   Left Thigh Up 10cm 58.5 61.5 57 66.8     Circumferential measures looking good.    General:  74 y.o. female in no apparent distress.      Psych: Alert and  oriented x 3.  Cooperative. Affect normal.    HEENT: Atraumatic and normocephalic.    Musculoskeletal:  Normal range of motion in knees and ankles bilaterally. There is no active joint synovitis, erythema, swelling or joint laxity.  Flat feet with valgus deformities continue.  Unchanged.  Normal strength ankle dorsiflexion and great toe extension bilaterally.     Neurological:  Sensation is intact to pin prick and light touch in both legs.      Vascular: Dorsalis pedis and posterior tibialis pulses are strong and equal bilaterally. There are significant telangietasias, medial ankle venous flares, venous varicosities  and spider veins .    Integumentary: Skin of the legs is uniformly warm and dry.   Pear shaped body.    Jyoti Shultz MD, ABWMS, FACCWS, [facility]  Medical Director Wound Care and Lymphedema  [facility]  [phone]

## 2021-05-28 NOTE — PROGRESS NOTES
[facility] Daily Progress     Patient Name: Lalitha Underwood  Date: 4/30/2019  Visit #: 3  PTA visit #:  -    Date of evaluation: 4/23/2019   Referral Diagnosis: Neuropathy, idiopathic [G60.9]  Referring provider: Kelly Hull MD  Visit Diagnosis:     ICD-10-CM    1. Unsteadiness on feet R26.81    2. Generalized muscle weakness M62.81    3. Decreased functional mobility and endurance Z74.09    4. Poor balance R26.89    5. Abnormality of gait R26.9          Assessment:     Significant hip weakness likely most limiting pt gait and balance.  Patient is benefitting from skilled physical therapy and is making steady progress toward functional goals.  Patient is appropriate to continue with skilled physical therapy intervention, as indicated by initial plan of care.     From Eval: Lalitha Underwood is a 74 y.o. female who presents to therapy today with chief complaints of unsteadiness on feet.  She has been seen here at [facility] in the past.  She has recently had both podiatry and neurology follow ups for this issue.  [facility] Dr. Carter did do an MRI of the entire spine which I do not have access to but per pt report they found degenerative changes only and nothing to explain her imbalance.  She believes fear is a big factor in her unsteadiness.  On testing today she demonstrates decline significantly since her last PT sessions in 2018.  She is below age/gender matched norms for all balance testing which puts her at high fall risk.  She also demonstrates significant LE weakness.  Patient will benefit from 1:1 skilled PT services to address the above limitations.     Goal Status:  Pt. will demonstrate/verbalize independence in self-management of condition in : 6 weeks  Pt. will be independent with home exercise program in : 6 weeks    Pt will: be able to step up a curb with walker safely in 6 weeks  Pt will: improve her gait speed by 0.3 m/sec in 6 weeks  Pt will: be able to walk on  "a slope ascend/descend without AD safely so she can walk a ramp in the community in 6 weeks      Plan / Patient Education:     Continue with initial plan of care.  Progress with home program as tolerated.   Plan for next visit: Hip/gluteal strengthening.  Foot intrinsic strengthening and ankle stretches.  Quad strengthening.  Dynamic balance/gait training    Subjective:     Pain Rating: Stiff, not really painful  No significant change to date.    Objective:     Therapeutic Exercise  NuStep L6, 5'          Initial test: 4/23/19 Norms    30 second sit<>stand    11 reps  Uses arms 30-35th percentile    TUG    13.4 seconds    <10 sec    Cognitive TUG    14.6 seconds      Comfortable Gait Speed    0.90 m/s 1.32 M/sec    Fast Gait Speed    1.0 m/s 1.71 M/sec    4 Square Step Test    13.47 seconds  With SPC <15 sec    2 Minute Walk test    103.1 M 145.9 M    Turning    R 5.6/L 7.5           Gait: Poor with SPC, carries SPC and does not use it.  Significant toe out and trunk sway indicative of <3/5 hip abduction strength.    Treatment Today     TREATMENT MINUTES COMMENTS   Evaluation     Self-care/ Home management     Manual therapy     Neuromuscular Re-education     Therapeutic Activity     Therapeutic Exercises 25 Exercises:  Exercise #1: NuStep L5 5'  Comment #1: HEP review of sit<>stands, gastroc stretch, towel scrunch  Exercise #2: Step over hurdles fwd, sideways ~10x each  Comment #2: Step ups 10x benedicto  Exercise #3: Seated L3 band pull aparts 5x 20\" DIFFICULT       Gait training     Modality__________________                Total 25    Blank areas are intentional and mean the treatment did not include these items.       Latasha Cowan, CALLIET  4/30/2019  "

## 2021-05-28 NOTE — PROGRESS NOTES
[facility] Daily Progress     Patient Name: Lalitha Underwood  Date: 5/16/2019  Visit #: 7 (retest at 10)  PTA visit #:  -  Date of evaluation: 4/23/2019   Referral Diagnosis: Neuropathy, idiopathic [G60.9]  Referring provider: Kelly Hull MD  Visit Diagnosis:     ICD-10-CM    1. Unsteadiness on feet R26.81    2. Generalized muscle weakness M62.81    3. Decreased functional mobility and endurance Z74.09    4. Poor balance R26.89    5. Abnormality of gait R26.9          Assessment:     Significant hip weakness likely most limiting pt gait and balance.  Pt has many things factoring into her fear of falling - she is afraid of injury, increased by history of osteoporosis and knee pain and inability to transfer floor<>stand.  This fear significantly decreases her confidence and balance in public areas.  We discussed a life alert today, or that she may need a walker in her future but I do believe ongoing strengthening and PT should continue to help.  She does also see a psychotherapist, and we discussed that they may be able to address this to some extent.  Patient is benefitting from skilled physical therapy and is making steady progress toward functional goals.  Patient is appropriate to continue with skilled physical therapy intervention, as indicated by initial plan of care.       Goal Status:  Pt. will demonstrate/verbalize independence in self-management of condition in : 6 weeks  Pt. will be independent with home exercise program in : 6 weeks    Pt will: be able to step up a curb with walker safely in 6 weeks  Pt will: improve her gait speed by 0.3 m/sec in 6 weeks  Pt will: be able to walk on a slope ascend/descend without AD safely so she can walk a ramp in the community in 6 weeks      Plan / Patient Education:     Continue with initial plan of care.  Progress with home program as tolerated.       Subjective:     Still very fearfull of falling.  Fearful of breaking bones, fearful she won't be  "able to get back up.    Objective:     Gait: outside on asphalt with an incline.  Pt fearful and increased lateral sway.  Gait with SPC and SBA.    Treatment Today     TREATMENT MINUTES COMMENTS   Evaluation     Self-care/ Home management     Manual therapy     Neuromuscular Re-education 10 Walking on stepping stones, standing on foam, step ups on foam   Therapeutic Activity     Therapeutic Exercises 30 Exercises:  Exercise #1: NuStep L5 5'  Comment #1: HEP review of sit<>stands, gastroc stretch, towel scrunch  Exercise #2: Step over hurdles fwd, sideways ~10x each  Comment #2: Step up on blue foam 10x  Exercise #3: Seated L4 band hip abduction  Comment #3: Hooklying hip ab/adduction 10x 10\"  Exercise #4: Narrow gait - unable to do tandem 2 laps with SPC  Comment #4: Sit<>stands 12 x cues to slow down  Exercise #5: Step over zofia 6\" 10x benedicto  Comment #5: Static balance on incline/foam 30\"  Exercise #6: Standing on blue foam, no hanging on 60\" x2  Comment #6: Controlled cone taps 10x benedicto  Exercise #7: Backwards walking over dowels 10x  Comment #7: SLS with other foot on ball  Exercise #8: Walking across blue foam further away from bars/wall 10x       Gait training 6    Modality__________________                Total 46    Blank areas are intentional and mean the treatment did not include these items.       Latasha Cowan, CALLIET  5/16/2019  "

## 2021-05-28 NOTE — TELEPHONE ENCOUNTER
Serene called to let Dr. Tamayo know she is feeling better. No questions or concerns at this time.

## 2021-05-28 NOTE — PROGRESS NOTES
DME Provider: Sabrina  Date Faxed: 4/25/19  Ordering Provider: Dr. Tamayo  Equipment ordered: Overnight oximtery

## 2021-05-28 NOTE — PROGRESS NOTES
[facility] Daily Progress     Patient Name: Lalitha Underwood  Date: 5/2/2019  Visit #: 4  PTA visit #:  -  Date of evaluation: 4/23/2019   Referral Diagnosis: Neuropathy, idiopathic [G60.9]  Referring provider: Kelly Hull MD  Visit Diagnosis:     ICD-10-CM    1. Unsteadiness on feet R26.81    2. Generalized muscle weakness M62.81    3. Decreased functional mobility and endurance Z74.09    4. Poor balance R26.89    5. Abnormality of gait R26.9          Assessment:     Significant hip weakness likely most limiting pt gait and balance.  Patient is benefitting from skilled physical therapy and is making steady progress toward functional goals.  Patient is appropriate to continue with skilled physical therapy intervention, as indicated by initial plan of care.     From Eval: Lalitha Underwood is a 74 y.o. female who presents to therapy today with chief complaints of unsteadiness on feet.  She has been seen here at [facility] in the past.  She has recently had both podiatry and neurology follow ups for this issue.  [facility] Dr. Carter did do an MRI of the entire spine which I do not have access to but per pt report they found degenerative changes only and nothing to explain her imbalance.  She believes fear is a big factor in her unsteadiness.  On testing today she demonstrates decline significantly since her last PT sessions in 2018.  She is below age/gender matched norms for all balance testing which puts her at high fall risk.  She also demonstrates significant LE weakness.  Patient will benefit from 1:1 skilled PT services to address the above limitations.     Goal Status:  Pt. will demonstrate/verbalize independence in self-management of condition in : 6 weeks  Pt. will be independent with home exercise program in : 6 weeks    Pt will: be able to step up a curb with walker safely in 6 weeks  Pt will: improve her gait speed by 0.3 m/sec in 6 weeks  Pt will: be able to walk on a  "slope ascend/descend without AD safely so she can walk a ramp in the community in 6 weeks      Plan / Patient Education:     Continue with initial plan of care.  Progress with home program as tolerated.   Plan for next visit: Step up on foam 6\", static standing on incline    Subjective:     Pain Rating: Stiff, not really painful  No significant change to date.    Objective:            Initial test: 4/23/19 Norms    30 second sit<>stand    11 reps  Uses arms 30-35th percentile    TUG    13.4 seconds    <10 sec    Cognitive TUG    14.6 seconds      Comfortable Gait Speed    0.90 m/s 1.32 M/sec    Fast Gait Speed    1.0 m/s 1.71 M/sec    4 Square Step Test    13.47 seconds  With SPC <15 sec    2 Minute Walk test    103.1 M 145.9 M    Turning    R 5.6/L 7.5           Gait: Poor with SPC, carries SPC and does not use it.  Significant toe out and trunk sway indicative of <3/5 hip abduction strength.    Treatment Today     TREATMENT MINUTES COMMENTS   Evaluation     Self-care/ Home management     Manual therapy     Neuromuscular Re-education 12 Walking on stepping stones, standing on foam, step ups on foam   Therapeutic Activity     Therapeutic Exercises 25 Exercises:  Exercise #1: NuStep L5 5'  Comment #1: HEP review of sit<>stands, gastroc stretch, towel scrunch  Exercise #2: Step over hurdles fwd, sideways ~10x each  Comment #2: Step ups 10x benedicto  Exercise #3: Seated L3 band pull aparts 5x 20\" DIFFICULT       Gait training 10 With SPC.  Many manual cues and verbal cues, demosntrations for pt to use cane appropriately.   Modality__________________                Total 47    Blank areas are intentional and mean the treatment did not include these items.       Latasha Cowan, DPT  5/2/2019  "

## 2021-05-28 NOTE — TELEPHONE ENCOUNTER
Lalitha called for results of her SERGIO and PFTs. Called her back and let her know that her PFTs show severely reduced lung function, consistent with COPD. Her surgery has improved her air trapping but air flow is about the same. Reminded to follow up in six months. [place] is borderline , she has O2 at night and is wondering if she needs to keep it. Will call her back after talking again with Dr. Tamayo.

## 2021-05-28 NOTE — PATIENT INSTRUCTIONS - HE
"Swelling in the legs can be caused by many reasons. No matter what the reason, treatment usually includes some type of compression. You should wear your compression socks as much as you can. Your compression should be put on first thing in the morning. Take the compression off at night. It is especially important to wear them with long periods of sitting/standing, long car rides or if you will be flying. Going without compression for even brief periods of time can be damaging to your legs and your health.  Compression socks should get replaced usually every 4-6 months. They do not need to be worn at night while in bed. If we have seen you in the last year, we can refill your sock prescription, otherwise your primary care provider is able to refill them for you. Call us with any problems or questions.   Compression Velcro may need to be replaced every 9-12 months.  Often the liners and socks need to be replaced every three or four months.  The neoprene velcro may last up to 2 years.  If the velcro becomes worn a tailor may be able to repair it for you inexpensively.    If you do a lot of standing, it is good to do calf raises to help keep the blood pumping. If you sit a lot at work, it is good to get up periodically to walk around. Elevation of the foot of your bed 4-6\" helps the blood return back to where it is needed.   Please call us if you have any questions [phone]    Thank you for choosing [facility].  "

## 2021-05-28 NOTE — PROGRESS NOTES
[facility] Certification Request    April 23, 2019      Patient: Lalitha Underwood  MR Number: [id number]  YOB: 1945  Date of Visit: 4/23/2019      Dear Dr. Hull:    Thank you for this referral.   We are seeing Lalitha Underwood for Physical Therapy of unsteady gait, neuropathy.    Medicare and/or Medicaid requires physician review and approval of the treatment plan. Please review the plan of care and verify that you agree with the therapy plan of care by co-signing this note.      Plan of Care  Authorization / Certification Start Date: 04/23/19  Authorization / Certification End Date: 07/23/19  Authorization / Certification Number of Visits: 12  Communication with: Referral Source  Patient Related Instruction: Nature of Condition;Treatment plan and rationale;Self Care instruction;Basis of treatment;Body mechanics;Posture  Times per Week: 2  Number of Weeks: 6  Number of Visits: 12  Discharge Planning: When goals are met or plateau of progress  Precautions / Restrictions : COPD  Therapeutic Exercise: ROM;Stretching;Strengthening  Neuromuscular Reeducation: posture;core;balance/proprioception  Manual Therapy: joint mobilization;muscle energy      Goals:  Pt. will demonstrate/verbalize independence in self-management of condition in : 6 weeks  Pt. will be independent with home exercise program in : 6 weeks    Pt will: be able to step up a curb with walker safely in 6 weeks  Pt will: improve her gait speed by 0.3 m/sec in 6 weeks  Pt will: be able to walk on a slope ascend/descend without AD safely so she can walk a ramp in the community in 6 weeks        If you have any questions or concerns, please don't hesitate to call.    Sincerely,      Latasha Cowan, PT        Physician recommendation:     ___ Follow therapist's recommendation        ___ Modify therapy      *Physician co-signature indicates they certify the need for these services furnished within this plan and while under their  care.      [facility]   Balance Initial Evaluation    Patient Name: Lalitha Underwood  Date of evaluation: 4/23/2019  Referral Diagnosis: Neuropathy, idiopathic [G60.9]  Referring provider: Kelly Hull MD  Visit Diagnosis:     ICD-10-CM    1. Unsteadiness on feet R26.81    2. Generalized muscle weakness M62.81    3. Decreased functional mobility and endurance Z74.09    4. Poor balance R26.89    5. Abnormality of gait R26.9        Assessment:   Lalitha Underwood is a 74 y.o. female who presents to therapy today with chief complaints of unsteadiness on feet.  She has been seen here at [facility] in the past.  She has recently had both podiatry and neurology follow ups for this issue.  [facility] Dr. Carter did do an MRI of the entire spine which I do not have access to but per pt report they found degenerative changes only and nothing to explain her imbalance.  She believes fear is a big factor in her unsteadiness.  On testing today she demonstrates decline significantly since her last PT sessions in 2018.  She is below age/gender matched norms for all balance testing which puts her at high fall risk.  She also demonstrates significant LE weakness.  Patient will benefit from 1:1 skilled PT services to address the above limitations.     Goals:  Pt. will demonstrate/verbalize independence in self-management of condition in : 6 weeks  Pt. will be independent with home exercise program in : 6 weeks    Pt will: be able to step up a curb with walker safely in 6 weeks  Pt will: improve her gait speed by 0.3 m/sec in 6 weeks  Pt will: be able to walk on a slope ascend/descend without AD safely so she can walk a ramp in the community in 6 weeks      Patient's expectations/goals are realistic.    Barriers to Learning or Achieving Goals:  Chronicity of the problem.       Plan / Patient Instructions:        Plan of Care:   Authorization / Certification Start Date: 04/23/19  Authorization /  "Certification End Date: 07/23/19  Authorization / Certification Number of Visits: 12  Communication with: Referral Source  Patient Related Instruction: Nature of Condition;Treatment plan and rationale;Self Care instruction;Basis of treatment;Body mechanics;Posture  Times per Week: 2  Number of Weeks: 6  Number of Visits: 12  Discharge Planning: When goals are met or plateau of progress  Precautions / Restrictions : COPD  Therapeutic Exercise: ROM;Stretching;Strengthening  Neuromuscular Reeducation: posture;core;balance/proprioception  Manual Therapy: joint mobilization;muscle energy      Plan for next visit: Hip/gluteal strengthening.  Foot intrinsic strengthening and ankle stretches.  Quad strengthening.  Dynamic balance/gait training     Subjective:         Living situation:  is on hospice  Caregiver support: Son lives in [facility], calls daily.  Social with ladies/friends groups  Equipment available: SPC, 2WW, 4WW walker  Activity level: Homebound, hasn't done her own grocery shopping since last November  Home setup: Some rugs that may be slippery underneath, no thick rugs.  No stairs.    Chief mobility complaint: Feels unsteady on her feet.  \"I think it's in my head\".  No pain.  She reports she freezes a lot because she feels very uneasy.  She is seeing podiatry to make sure nothing is wrong.  Thinks she has numbness in her feet/toes but the podiatrist could not find any.  He reports he saw neurology as well, they did MRIs of her entire spine to look for compression and just saw \"normal wear and tear.\"  She is frustrated not to have answers, but continues to report she thinks \"It's in my head\".  She uses a walker in the winter, now feels like she has to use it all the time.  Keeps it in the trunk of her car.  Big open spaces.    Patient's functional goals: Step up on a curb.  Walk up a handicap ramp. Stand easier from chair.  Fall history: No falls, thinks 2 years ago maybe at home turning   Functional " limitations: Bend, stairs, walk, uneven surfaces, kneel/squat, house/yard work, looking up/down/turning head, community ambulation      Pain  Pain Ratin  Pain rating at best: 0  Pain rating at worst: 3    Past medical history/precautions: Umbilical and hiatal hernia repairs last year, COPD, no seizures since , venous insufficiency of both LE       Objective:      Note: Items left blank indicates the item was not performed or not indicated at the time of the evaluation.    Balance Examination  1. Unsteadiness on feet     2. Generalized muscle weakness     3. Decreased functional mobility and endurance     4. Poor balance     5. Abnormality of gait       Involved side: Bilateral    Posture Observation: Fair seated  Assistive Device: SPC today, has a 4WW  Gait Observation: Significant trendelenburg R>L    Sit<>stand: uses arms, speed WNL, has to steady herself when standing    Sit<>supine: NT today    LE Strength:  EHL 4/5 benedicto  DF 4+/5 R, 4/5 L  PF 4/5 able to go up benedicto with limited ROM  Knee extension 4/5 benedicto  Knee flexion 5/5 benedicto  Hip flexion 4+/5 benedicto  Hip abduction 4/5 R, 4+/5 L    LE ROM: Great toe WNL.  Ankle DF limited to just past neutral.  Knees WFL.  Hip mobility mildly limited    Foot strength: Unable to towel scrunch, unable to pick things up with toes.          Initial test: 19 Norms (PREVIOUS): 18   30 second sit<>stand    11 reps  Uses arms 30-35th percentile 12 reps   TUG    13.4 seconds    <10 sec 8.88 seconds   Cognitive TUG    14.6 seconds  NT seconds   Comfortable Gait Speed    0.90 m/s 1.32 M/sec 1.01 m/s   Fast Gait Speed    1.0 m/s 1.71 M/sec 1.20 m/s   4 Square Step Test    13.47 seconds  With SPC <15 sec 17.72 seconds   2 Minute Walk test    103.1 M 145.9 M 125.5 meters   Turning    R 5.6/L 7.5  NT/56          Treatment Today     TREATMENT MINUTES COMMENTS   Evaluation 50 Low complexity neuro eval,  Unsteady gait   Self-care/ Home management     Manual therapy     Neuromuscular  Re-education     Therapeutic Activity     Therapeutic Exercises     Gait training 10 Discussed her gait pattern, discussed use of SPC vs 4WW.  Pt educated that 4WW should make her steadier in the community and will provide a lot more feeling of stability than the cane.  She does not use the SPC correctly, though she could to allow the right hip to strengthen.  She had difficulty doing this in clinic today, will continue to work on future appointments.   Modality__________________                Total 60    Blank areas are intentional and mean the treatment did not include these items.          Latasha Cowan, SANDY  4/23/2019  10:55 AM

## 2021-05-28 NOTE — TELEPHONE ENCOUNTER
Call from Lalitha. States she was in to see Dr. Tamayo recently and was supposed to have SERGIO testing done.  She called her oxygen company and they do not have orders for this.    Looks like orders were never sent there.    Will have orders sent to [facility] for SERGIO testing.on room air.   Postop Diagnosis: same

## 2021-05-29 ENCOUNTER — RECORDS - HEALTHEAST (OUTPATIENT)
Dept: ADMINISTRATIVE | Facility: CLINIC | Age: 76
End: 2021-05-29

## 2021-05-29 NOTE — PROGRESS NOTES
[facility] Daily Progress     Patient Name: Lalitha Underwood  Date: 5/23/2019  Visit #: 9 (retest at 11)  PTA visit #:  -  Date of evaluation: 4/23/2019   Referral Diagnosis: Neuropathy, idiopathic [G60.9]  Referring provider: Kelly Hull MD  Visit Diagnosis:     ICD-10-CM    1. Unsteadiness on feet R26.81    2. Generalized muscle weakness M62.81    3. Decreased functional mobility and endurance Z74.09    4. Poor balance R26.89    5. Abnormality of gait R26.9          Assessment:     Significant hip weakness likely most limiting pt gait and balance.  Pt has many things factoring into her fear of falling - she is afraid of injury, increased by history of osteoporosis and knee pain and inability to transfer floor<>stand.  This fear significantly decreases her confidence and balance in public areas.     Today she demonstrates improved confidence and balance on foam and BOSU surfaces with SPC.    Patient is benefitting from skilled physical therapy and is making steady progress toward functional goals.  Patient is appropriate to continue with skilled physical therapy intervention, as indicated by initial plan of care.       Goal Status:  Pt. will demonstrate/verbalize independence in self-management of condition in : 6 weeks  Pt. will be independent with home exercise program in : 6 weeks    Pt will: be able to step up a curb with walker safely in 6 weeks  Pt will: improve her gait speed by 0.3 m/sec in 6 weeks  Pt will: be able to walk on a slope ascend/descend without AD safely so she can walk a ramp in the community in 6 weeks      Plan / Patient Education:     Continue with initial plan of care.  Progress with home program as tolerated.   Next visit: Reassess/retest    Subjective:     Did go to a doctor and found she has a UTI.  Will be starting medication for this today.    Objective:     Fatigues quickly with repetitive exercises, but able to reduce this with standing rest breaks.    Treatment  "Today     TREATMENT MINUTES COMMENTS   Evaluation     Self-care/ Home management     Manual therapy     Neuromuscular Re-education 15 Walking on stepping stones, standing on foam, step ups on foam   Therapeutic Activity     Therapeutic Exercises 33 Exercises:  Exercise #1: NuStep L6, 3' with arms, 3' without arms  Comment #1: HEP review of sit<>stands, gastroc stretch, towel scrunch  Exercise #2: Step over hurdles fwd, sideways ~10x each difficult today  Comment #2: Step up on blue foam 10x GOOD with SPC today!  Exercise #3: Seated L4 band hip abduction  Comment #3: Hooklying hip ab/adduction 10x 10\"  Exercise #4: Narrow gait - unable to do tandem 2 laps with SPC  Comment #4: Sit<>stands 10 x, with foam cushion 10x no hands  Exercise #5: Step over zofia 6\" 10x benedicto  Comment #5: Step lunge onto incline, BOSU 10x benedicto  Exercise #6: Standing on blue foam, no hanging on 60\" x2  Comment #6: Controlled cone taps 10x benedicto no hanging on difficult today  Exercise #7: Backwards walking over dowels 10x  Comment #7: SLS with other foot on ball  Exercise #8: Walking across blue/ylw foam further away from bars/wall 10x  Comment #8: Step ups laterally 10x benedicto       Gait training     Modality__________________                Total 48 Pt had to leave early to pickup    Blank areas are intentional and mean the treatment did not include these items.       Latasha Cowan, DPT  5/23/2019  "

## 2021-05-29 NOTE — PROGRESS NOTES
[facility] Daily Progress     Patient Name: Lalitha Underwood  Date: 6/13/2019  Visit #: 13  PTA visit #:  -  Date of evaluation: 4/23/2019  Referral Diagnosis: Neuropathy, idiopathic [G60.9]  Referring provider: Kelly Hull MD  Visit Diagnosis:     ICD-10-CM    1. Unsteadiness on feet R26.81    2. Generalized muscle weakness M62.81    3. Decreased functional mobility and endurance Z74.09    4. Poor balance R26.89    5. Abnormality of gait R26.9          Assessment:     Serene is demonstrating good improvement on the TUG test and 4-square step test.  Her endurance and gait speed are much unchanged.  I do believe the 4-square step test is a good indication of assessing her chief complaint of unsteadiness on feet.  Since she has made significant improvement on this test I believe she is a good candidate to continue PT 1x a week for 4 weeks to continue to focus on balance and transfers.  We assessed the Garcia balance test as well today for the first time and it does indicate a fall risk.  She is significantly lacking confidence as well which I believe will improve as long as we can challenge it enough in a clinical setting.  Pt does have a small HEP focusing on strength, it is few exercises per pt request.  POC and goals have been updated and were made in collaboration with the patient.    Patient is benefitting from skilled physical therapy and is making steady progress toward functional goals.  Patient is appropriate to continue with skilled physical therapy intervention, as indicated by initial plan of care.       Goal Status:  Pt. will demonstrate/verbalize independence in self-management of condition in : 6 weeks: Ongoing  Pt. will be independent with home exercise program in : 6 weeks: MET  Pt will: be able to step up a curb with walker safely in 6 weeks: Not met  Pt will: improve her gait speed by 0.3 m/sec in 6 weeks: Not met  Pt will: be able to walk on a slope ascend/descend without AD safely  "so she can walk a ramp in the community in 6 weeks: Partially met with AD  Pt will ambulate across an open space with her cane without feeling unsteady in 4 weeks: new goal  Pt will improve Garcia balance scale by 6 points in 4 weeks to demonstrate low fall risk: new goal      Plan / Patient Education:     Continue with POC 1x/week for 4 weeks    Next visit: Balance activities with decreasing support      Subjective:     Not sure she is making any progress.  Still very uncomfortable walking across open spaces.    Objective:     See 5/28/19 for objective tests    /71, Pulse 77, SaO2 95-98%      Treatment Today     TREATMENT MINUTES COMMENTS   Evaluation     Self-care/ Home management     Manual therapy     Neuromuscular Re-education 40 Walking on stepping stones, standing on foam, step ups on foam   Therapeutic Activity     Therapeutic Exercises 14 Exercises:  Exercise #1: NuStep L6, 3' with arms, 3' without arms  Comment #1: Mini Lunges down/back 4x  Exercise #2: Forward over zofia, HEEL STRIKE 10x  Comment #2: Step up on blue foam 10x GOOD with SPC today!, did without 2x  Exercise #3: Step over blue 1/2 foam 10x   Comment #3: Hooklying hip ab/adduction 10x 10\"  Exercise #4: Narrow gait - unable to do tandem 2 laps with SPC  Comment #4: Sit<>stands 10 x  Exercise #5: Step over zofia 6\" 10x benedicto  Comment #5: Step lunge onto incline, BOSU 10x benedicto  Exercise #6: Standing on blue foam no hold, eyes closed 60\" x2  Comment #6: Controlled cone taps 10x benedicto Finger tip and cane  Exercise #7: Backwards walking over dowels 10x  Comment #7: SLS with other foot on ball  Exercise #8: Walking across blue/ylw foam further away from bars/wall 10x with SPC  Comment #8: Step ups laterally 10x benedicto       Gait training     Modality__________________                Total 54    Blank areas are intentional and mean the treatment did not include these items.       Latasha Cowan, DPT  6/13/2019  "

## 2021-05-29 NOTE — PROGRESS NOTES
[facility] Daily Progress     Patient Name: Lalitha Underwood  Date: 5/28/2019  Visit #: 9 (retest at 11)  PTA visit #:  -  Date of evaluation: 4/23/2019   Referral Diagnosis: Neuropathy, idiopathic [G60.9]  Referring provider: Kelly Hull MD  Visit Diagnosis:     ICD-10-CM    1. Unsteadiness on feet R26.81    2. Generalized muscle weakness M62.81    3. Decreased functional mobility and endurance Z74.09    4. Poor balance R26.89    5. Abnormality of gait R26.9          Assessment:     Serene is demonstrating good improvement on the TUG test and 4-square step test.  Her endurance and gait speed are much unchanged.  I do believe the 4-square step test is a good indication of assessing her chief complaint of unsteadiness on feet.  Since she has made significant improvement on this test I believe she is a good candidate to continue PT 1x a week for 4 weeks to continue to focus on balance and transfers.  We assessed the Garcia balance test as well today for the first time and it does indicate a fall risk.  She is significantly lacking confidence as well which I believe will improve as long as we can challenge it enough in a clinical setting.  Pt does have a small HEP focusing on strength, it is few exercises per pt request.  POC and goals have been updated and were made in collaboration with the patient.    Patient is benefitting from skilled physical therapy and is making steady progress toward functional goals.  Patient is appropriate to continue with skilled physical therapy intervention, as indicated by initial plan of care.       Goal Status:  Pt. will demonstrate/verbalize independence in self-management of condition in : 6 weeks: Ongoing  Pt. will be independent with home exercise program in : 6 weeks: MET  Pt will: be able to step up a curb with walker safely in 6 weeks: Not met  Pt will: improve her gait speed by 0.3 m/sec in 6 weeks: Not met  Pt will: be able to walk on a slope ascend/descend  without AD safely so she can walk a ramp in the community in 6 weeks: Partially met with AD  Pt will ambulate across an open space with her cane without feeling unsteady in 4 weeks: new goal  Pt will improve Garcia balance scale by 6 points in 4 weeks to demonstrate low fall risk: new goal      Plan / Patient Education:     Authorization / Certification Start Date: 05/28/19  Authorization / Certification End Date: 07/23/19  Authorization / Certification Number of Visits: 4  Communication with: Referral Source  Patient Related Instruction: Nature of Condition;Treatment plan and rationale;Self Care instruction;Basis of treatment;Body mechanics;Posture  Times per Week: 1  Number of Weeks: 4  Number of Visits: 4  Discharge Planning: When goals are met or plateau of progress  Precautions / Restrictions : COPD  Therapeutic Exercise: ROM;Stretching;Strengthening  Neuromuscular Reeducation: posture;core;balance/proprioception  Manual Therapy: joint mobilization;muscle energy    Next visit: Balance activities with decreasing support      Subjective:     Pt still very weary when she is walking through an open space with nothing to hang on to.    Objective:     Garcia Balance Scale: TESTED in middle of room, near chairs  1) Sitting to standing (3)   2) Standing unsupported (4)  3) Sitting with back unsupported but feet supported on floor (4)  4) Standing to sitting (3)  5) Transfer(3)  6) Standing unsupported with eyes closed (4)  7) Standing unsupported with feet together (3)  8) Reaching forward with outstretched arm while standing (3)  9)  object from the floor from a standing position (3)  10) Turning to look behind left and right shoulder while standing (3)  11) Turn 360 degrees (3)  12) Place alternate foot on step/stool while standing unsupported (3)  13) Standing unsupported one foot in front (1)  14) Standing on one leg (0)    Total score: 40/56 (<45/56 falls risk)          Initial test: 4/23/19 Norms  "(PREVIOUS): 8/23/18   30 second sit<>stand    12 reps  Uses arms 30-35th percentile 11 reps  Uses arms   TUG    10.35 sec  Comf speed 15.4 sec    <10 sec 13.4 sec with SPC   Cognitive TUG    12.9 sec   14.6 sec   Comfortable Gait Speed    0.83 m/s 1.32 M/sec 0.9 m/s   Fast Gait Speed    0.95 m/s 1.71 M/sec 1.0 m/s   4 Square Step Test    10.25 sec  with SPC <15 sec 13.47 seconds  With SPC   2 Minute Walk test    101.1 M 145.9 M 103.1 M   Turning    R 3.0/L 5.4 sec   R 5.6/L 7.5 sec              Treatment Today     TREATMENT MINUTES COMMENTS   Evaluation     Self-care/ Home management     Manual therapy     Neuromuscular Re-education 43 Walking on stepping stones, standing on foam, step ups on foam   Therapeutic Activity     Therapeutic Exercises 12 Exercises:  Exercise #1: NuStep L6, 3' with arms, 3' without arms  Comment #1: HEP review of sit<>stands, gastroc stretch, towel scrunch  Exercise #2: Step over hurdles fwd, sideways ~10x each difficult today  Comment #2: Step up on blue foam 10x GOOD with SPC today!  Exercise #3: Seated L4 band hip abduction  Comment #3: Hooklying hip ab/adduction 10x 10\"  Exercise #4: Narrow gait - unable to do tandem 2 laps with SPC  Comment #4: Sit<>stands 10 x, with foam cushion 10x no hands  Exercise #5: Step over zofia 6\" 10x benedicto  Comment #5: Step lunge onto incline, BOSU 10x benedicto  Exercise #6: Standing on blue foam, no hanging on 60\" x2  Comment #6: Controlled cone taps 10x benedicto no hanging on difficult today  Exercise #7: Backwards walking over dowels 10x  Comment #7: SLS with other foot on ball  Exercise #8: Walking across blue/ylw foam further away from bars/wall 10x  Comment #8: Step ups laterally 10x benedicto       Gait training     Modality__________________                Total 55    Blank areas are intentional and mean the treatment did not include these items.       Latasha Cowan, DPT  5/28/2019  "

## 2021-05-29 NOTE — PROGRESS NOTES
[facility] Daily Progress     Patient Name: Lalitha Underwood  Date: 6/6/2019  Visit #: 12  PTA visit #:  -  Date of evaluation: 4/23/2019  Referral Diagnosis: Neuropathy, idiopathic [G60.9]  Referring provider: Kelly Hull MD  Visit Diagnosis:     ICD-10-CM    1. Unsteadiness on feet R26.81    2. Generalized muscle weakness M62.81    3. Decreased functional mobility and endurance Z74.09    4. Poor balance R26.89    5. Abnormality of gait R26.9          Assessment:     Serene is demonstrating good improvement on the TUG test and 4-square step test.  Her endurance and gait speed are much unchanged.  I do believe the 4-square step test is a good indication of assessing her chief complaint of unsteadiness on feet.  Since she has made significant improvement on this test I believe she is a good candidate to continue PT 1x a week for 4 weeks to continue to focus on balance and transfers.  We assessed the Garcia balance test as well today for the first time and it does indicate a fall risk.  She is significantly lacking confidence as well which I believe will improve as long as we can challenge it enough in a clinical setting.  Pt does have a small HEP focusing on strength, it is few exercises per pt request.  POC and goals have been updated and were made in collaboration with the patient.    Patient is benefitting from skilled physical therapy and is making steady progress toward functional goals.  Patient is appropriate to continue with skilled physical therapy intervention, as indicated by initial plan of care.       Goal Status:  Pt. will demonstrate/verbalize independence in self-management of condition in : 6 weeks: Ongoing  Pt. will be independent with home exercise program in : 6 weeks: MET  Pt will: be able to step up a curb with walker safely in 6 weeks: Not met  Pt will: improve her gait speed by 0.3 m/sec in 6 weeks: Not met  Pt will: be able to walk on a slope ascend/descend without AD safely  "so she can walk a ramp in the community in 6 weeks: Partially met with AD  Pt will ambulate across an open space with her cane without feeling unsteady in 4 weeks: new goal  Pt will improve Garcia balance scale by 6 points in 4 weeks to demonstrate low fall risk: new goal      Plan / Patient Education:     Continue with POC 1x/week for 4 weeks    Next visit: Balance activities with decreasing support      Subjective:     Feels worse today and in the last 2 weeks.  Is having more trouble walking around her house now as well.    Objective:     See 5/28/19 for objective tests    /71, Pulse 77, SaO2 95-98%      Treatment Today     TREATMENT MINUTES COMMENTS   Evaluation     Self-care/ Home management     Manual therapy     Neuromuscular Re-education 40 Walking on stepping stones, standing on foam, step ups on foam   Therapeutic Activity     Therapeutic Exercises 15 Exercises:  Exercise #1: NuStep L6, 3' with arms, 3' without arms  Comment #1: HEP review of sit<>stands, gastroc stretch, towel scrunch  Exercise #2: Forward over zofia, HEEL STRIKE  Comment #2: Step up on blue foam 10x GOOD with SPC today!, did without 2x  Exercise #3: Seated L4 band hip abduction  Comment #3: Hooklying hip ab/adduction 10x 10\"  Exercise #4: Narrow gait - unable to do tandem 2 laps with SPC  Comment #4: Sit<>stands 10 x  Exercise #5: Step over zofia 6\" 10x benedicto  Comment #5: Step lunge onto incline, BOSU 10x benedicto  Exercise #6: Standing on blue foam, no hanging on 60\" x2  Comment #6: Controlled cone taps 10x benedicto no hanging on difficult today  Exercise #7: Backwards walking over dowels 10x  Comment #7: SLS with other foot on ball  Exercise #8: Walking across blue/ylw foam further away from bars/wall 10x  Comment #8: Step ups laterally 10x benedicto       Gait training     Modality__________________                Total 55 Pt late to appointment today   Blank areas are intentional and mean the treatment did not include these items.       Latasha" CALLIE CowanT  6/6/2019

## 2021-05-29 NOTE — TELEPHONE ENCOUNTER
Will send a message to the [facility] to see if they are able to schedule patient for a nurse only visit for second shingrix vaccine.

## 2021-05-29 NOTE — PROGRESS NOTES
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  Many of these items weighing more than 50 pounds.  He states approximately 2 months ago he had a brief episode of severe abdominal pain above his umbilicus with some radiation across his abdomen slightly worse in the left.  He denies nausea or vomiting.  That pain subsided.  He has noticed change in the appearance of his umbilicus but he thinks he may have first noticed small changes 2 years ago.  He denies a noman bulge in his umbilicus.  He is having regular bowel movements.  1-1/2 to 2 months ago he then began experiencing pain in the left groin with some radiation to the left lower abdomen and radiation of pain down the medial aspect of his left thigh.  He denies a palpable or visible bulge in the left groin.  He does not recall a specific event preceding the onset of his symptoms.      Past Medical History:   Diagnosis Date   • ADHD        History reviewed. No pertinent surgical history.    Social History     Tobacco Use   • Smoking status: Never Smoker   • Smokeless tobacco: Never Used   Substance Use Topics   • Alcohol use: Yes     Alcohol/week: 0.0 - 1.0 standard drinks   • Drug use: Never       Family History  Family History   Problem Relation Age of Onset   • Patient is unaware of any medical problems Mother    • Patient is unaware of any medical problems Father        No current outpatient medications on file.     No current facility-administered medications for this visit.        ALLERGIES:  No Known Allergies    Review of Systems   Constitutional: Negative for appetite change, chills, diaphoresis, fatigue, fever and unexpected weight change.   HENT: Negative for congestion, hearing loss, rhinorrhea, sneezing, sore throat, trouble swallowing and voice change.    Eyes: Negative for pain,  [facility] Daily Progress     Patient Name: Lalitha Underwood  Date: 6/20/2019  Visit #: 14  PTA visit #:  -  Date of evaluation: 4/23/2019  Referral Diagnosis: Neuropathy, idiopathic [G60.9]  Referring provider: Kelly Hull MD  Visit Diagnosis:     ICD-10-CM    1. Unsteadiness on feet R26.81    2. Generalized muscle weakness M62.81    3. Decreased functional mobility and endurance Z74.09    4. Poor balance R26.89    5. Abnormality of gait R26.9          Assessment:     Serene demonstrates some improvement in Garcia, TUG, 4-square step test.  She does not demonstrate improvement in gait speed or 2-minute walk test.  We have 1 more additional session to review HEP and we will likely discharge patient then.  She is to f/u with ENT and neurology.    Patient is benefitting from skilled physical therapy and is making steady progress toward functional goals.  Patient is appropriate to continue with skilled physical therapy intervention, as indicated by initial plan of care.       Goal Status:  Pt. will demonstrate/verbalize independence in self-management of condition in : 6 weeks: Ongoing  Pt. will be independent with home exercise program in : 6 weeks: MET  Pt will: be able to step up a curb with walker safely in 6 weeks: Not met  Pt will: improve her gait speed by 0.3 m/sec in 6 weeks: Not met  Pt will: be able to walk on a slope ascend/descend without AD safely so she can walk a ramp in the community in 6 weeks: Partially met with AD  Pt will ambulate across an open space with her cane without feeling unsteady in 4 weeks: new goal  Pt will improve Garcia balance scale by 6 points in 4 weeks to demonstrate low fall risk: new goal      Plan / Patient Education:     Continue with POC 1x/week for 4 weeks  Review HEP    Subjective:     Feeling a little better today, not sure why.  Seems to be no rhyme or reason.  Neurology is going to try changing her medications    Objective:     See 5/28/19 for objective  "tests    /71, Pulse 77, SaO2 95-98%    Garcia Balance Scale: TESTED in middle of room, near chairs  1) Sitting to standing (3)   2) Standing unsupported (4)  3) Sitting with back unsupported but feet supported on floor (4)  4) Standing to sitting (4)  5) Transfer(3)  6) Standing unsupported with eyes closed (4)  7) Standing unsupported with feet together (4)  8) Reaching forward with outstretched arm while standing (3)  9)  object from the floor from a standing position (3)  10) Turning to look behind left and right shoulder while standing (4)  11) Turn 360 degrees (2)  12) Place alternate foot on step/stool while standing unsupported (4)  13) Standing unsupported one foot in front (2)  14) Standing on one leg (1)     Total score: 45/56 (<45/56 falls risk)  (Previous 40/56)          Initial test: 4/23/19 Norms (PREVIOUS): 8/23/18   30 second sit<>stand    10 reps  Uses arms 30-35th percentile 12 reps  Uses arms   TUG    11.6 sec Comf   9.5 sec FAST    <10 sec 13.4 sec with SPC   Cognitive TUG    13.3 sec   14.6 sec   Comfortable Gait Speed    0.83 m/s 1.32 M/sec 0.9 m/s   Fast Gait Speed    0.95 m/s 1.71 M/sec 1.0 m/s   4 Square Step Test    9.5 sec  Without SPC <15 sec 10.25 seconds  With SPC   2 Minute Walk test    99.2 M 145.9 M 101.1 M   Turning    R 3.0/L 5.4 sec   R 5.6/L 7.5 sec            Treatment Today     TREATMENT MINUTES COMMENTS   Evaluation     Self-care/ Home management     Manual therapy     Neuromuscular Re-education  Walking on stepping stones, standing on foam, step ups on foam   Therapeutic Activity     Therapeutic Exercises 23 Exercises:  Exercise #1: NuStep L6, 3' with arms, 3' without arms  Comment #1: Mini Lunges down/back 4x  Exercise #2: Forward over zofia, HEEL STRIKE 10x  Comment #2: Step up on blue foam 10x GOOD with SPC today!, did without 2x  Exercise #3: Step over blue 1/2 foam 10x   Comment #3: Hooklying hip ab/adduction 10x 10\"  Exercise #4: Narrow gait - unable to do " discharge and redness.   Respiratory: Negative for apnea, cough, choking, chest tightness, shortness of breath and wheezing.    Cardiovascular: Negative for chest pain, palpitations and leg swelling.   Gastrointestinal: Positive for abdominal pain (mid/umbilical abdominal pain with bulging). Negative for abdominal distention, anal bleeding, blood in stool, constipation, diarrhea, nausea, rectal pain and vomiting.   Endocrine: Negative for cold intolerance, heat intolerance, polydipsia and polyuria.   Genitourinary: Negative for difficulty urinating, dysuria, flank pain, frequency, hematuria, scrotal swelling, testicular pain and urgency.        Intermittent left inguinal pain   Musculoskeletal: Negative for arthralgias, back pain, joint swelling and myalgias.   Skin: Negative for color change, rash and wound.   Neurological: Positive for light-headedness (on/off with abdominal pain). Negative for dizziness, seizures, syncope, speech difficulty, numbness and headaches.   Hematological: Negative for adenopathy. Does not bruise/bleed easily.   Psychiatric/Behavioral: Negative for confusion and decreased concentration. The patient is not hyperactive.         OBJECTIVE:  Visit Vitals  /86 (BP Location: LUE - Left upper extremity, Patient Position: Sitting, Cuff Size: Regular)   Pulse 89   Temp 98 °F (36.7 °C) (Temporal)   Resp 17   Ht 5' 9\" (1.753 m)   Wt 85.3 kg (188 lb)   SpO2 98%   BMI 27.76 kg/m²       Physical Exam   Constitutional: He appears well-developed and well-nourished. No distress.   Eyes: Conjunctivae are normal. No scleral icterus.   Neck: Neck supple.   Cardiovascular: Normal rate, regular rhythm and normal heart sounds.   Pulmonary/Chest: Effort normal and breath sounds normal. No respiratory distress.   Abdominal: Soft. He exhibits no distension and no mass. There is abdominal tenderness (left inguinal) in the left lower quadrant. There is no rigidity and no guarding. A hernia (small reducible  "tandem 2 laps with SPC  Comment #4: Sit<>stands 10 x  Exercise #5: Step over zofia 6\" 10x benedicto  Comment #5: Step lunge onto incline, BOSU 10x benedicto  Exercise #6: Standing on blue foam no hold, eyes closed 60\" x2  Comment #6: Controlled cone taps 10x benedicto Finger tip and cane  Exercise #7: Backwards walking over dowels 10x  Comment #7: SLS with other foot on ball  Exercise #8: Walking across blue/ylw foam further away from bars/wall 10x with SPC  Comment #8: Step ups laterally 10x benedicto       Gait training     Modality__________________     Reassessment 30 CPT 34832 see above         Total 53    Blank areas are intentional and mean the treatment did not include these items.       Latasha Cowan, DPT  6/20/2019  " fat containing umbilical) is present. Hernia confirmed negative in the right inguinal area and confirmed negative in the left inguinal area.   Lymphadenopathy:     He has no cervical adenopathy.   Skin: Skin is warm and dry. No rash noted.   Vitals reviewed.       ASSESSMENT/PLAN:  Umbilical hernia without obstruction and without gangrene  Slight enlargement over the last few months with mild symptoms.  Ultimately would be reasonable to proceed with operative repair however would like to complete evaluation of his left lower abdominal and left groin pain to be sure that he does not have concurrent left inguinal hernia that would also warrant repair.   -Follow-up after CT scan to discuss surgical planning    Left groin pain  Unclear etiology without clear evidence on physical exam for an inguinal or femoral hernia.  His pain in the left lower abdomen superior to the inguinal canal as well as radiation of discomfort down the medial aspect of his thigh are not typically seen with a small inguinal hernia.  These may be more related to musculoskeletal strain and or inflammation.  Would like to obtain imaging to better evaluate for possible hernia or inflammatory changes.   -Will obtain CT scan of the abdomen pelvis with oral and IV contrast   -Follow-up after CT scan completed      Electronically signed by: Janusz Santos MD  5/19/2020

## 2021-05-29 NOTE — PROGRESS NOTES
[facility] Daily Progress     Patient Name: Lalitha Underwood  Date: 5/21/2019  Visit #: 8 (retest at 10)  PTA visit #:  -  Date of evaluation: 4/23/2019   Referral Diagnosis: Neuropathy, idiopathic [G60.9]  Referring provider: Kelly Hull MD  Visit Diagnosis:     ICD-10-CM    1. Unsteadiness on feet R26.81    2. Generalized muscle weakness M62.81    3. Decreased functional mobility and endurance Z74.09    4. Poor balance R26.89    5. Abnormality of gait R26.9          Assessment:     Significant hip weakness likely most limiting pt gait and balance.  Pt has many things factoring into her fear of falling - she is afraid of injury, increased by history of osteoporosis and knee pain and inability to transfer floor<>stand.  This fear significantly decreases her confidence and balance in public areas.     Today she demonstrates improved confidence and balance on foam and BOSU surfaces with SPC.    Patient is benefitting from skilled physical therapy and is making steady progress toward functional goals.  Patient is appropriate to continue with skilled physical therapy intervention, as indicated by initial plan of care.       Goal Status:  Pt. will demonstrate/verbalize independence in self-management of condition in : 6 weeks  Pt. will be independent with home exercise program in : 6 weeks    Pt will: be able to step up a curb with walker safely in 6 weeks  Pt will: improve her gait speed by 0.3 m/sec in 6 weeks  Pt will: be able to walk on a slope ascend/descend without AD safely so she can walk a ramp in the community in 6 weeks      Plan / Patient Education:     Continue with initial plan of care.  Progress with home program as tolerated.     Subjective:     Feels so/so today.  Got up early.     Objective:     Fatigues quickly with repetitive exercises, but able to reduce this with standing rest breaks.    Treatment Today     TREATMENT MINUTES COMMENTS   Evaluation     Self-care/ Home management    "  Manual therapy     Neuromuscular Re-education 15 Walking on stepping stones, standing on foam, step ups on foam   Therapeutic Activity     Therapeutic Exercises 38 Exercises:  Exercise #1: NuStep L6, 3' with arms, 3' without arms  Comment #1: HEP review of sit<>stands, gastroc stretch, towel scrunch  Exercise #2: Step over hurdles fwd, sideways ~10x each  Comment #2: Step up on blue foam 10x GOOD with SPC today!  Exercise #3: Seated L4 band hip abduction  Comment #3: Hooklying hip ab/adduction 10x 10\"  Exercise #4: Narrow gait - unable to do tandem 2 laps with SPC  Comment #4: Sit<>stands 10 x minor verbal cues  Exercise #5: Step over zofia 6\" 10x benedicto  Comment #5: Step lunge onto incline, BOSU 10x benedicto  Exercise #6: Standing on blue foam, no hanging on 60\" x2  Comment #6: Controlled cone taps 10x benedicto  Exercise #7: Backwards walking over dowels 10x  Comment #7: SLS with other foot on ball  Exercise #8: Walking across blue/ylw foam further away from bars/wall 10x       Gait training     Modality__________________                Total 53    Blank areas are intentional and mean the treatment did not include these items.       Latasha Cowan, CALLIET  5/21/2019  "

## 2021-05-29 NOTE — TELEPHONE ENCOUNTER
New Appointment Needed  What is the reason for the visit:    2nd shingles shot  Provider Preference: Any available   Patient is interested in going to the [facility] for this.  How soon do you need to be seen?: when shot available  Waitlist offered?: No  Okay to leave a detailed message:  Yes  Patient would like a call back on status

## 2021-05-29 NOTE — PROGRESS NOTES
[facility] Daily Progress     Patient Name: Lalitha Underwood  Date: 5/30/2019  Visit #: 9 (retest at 11)  PTA visit #:  -  Date of evaluation: 4/23/2019  Referral Diagnosis: Neuropathy, idiopathic [G60.9]  Referring provider: Kelly Hull MD  Visit Diagnosis:     ICD-10-CM    1. Unsteadiness on feet R26.81    2. Generalized muscle weakness M62.81    3. Decreased functional mobility and endurance Z74.09    4. Poor balance R26.89    5. Abnormality of gait R26.9          Assessment:     Serene is demonstrating good improvement on the TUG test and 4-square step test.  Her endurance and gait speed are much unchanged.  I do believe the 4-square step test is a good indication of assessing her chief complaint of unsteadiness on feet.  Since she has made significant improvement on this test I believe she is a good candidate to continue PT 1x a week for 4 weeks to continue to focus on balance and transfers.  We assessed the Garcia balance test as well today for the first time and it does indicate a fall risk.  She is significantly lacking confidence as well which I believe will improve as long as we can challenge it enough in a clinical setting.  Pt does have a small HEP focusing on strength, it is few exercises per pt request.  POC and goals have been updated and were made in collaboration with the patient.    Patient is benefitting from skilled physical therapy and is making steady progress toward functional goals.  Patient is appropriate to continue with skilled physical therapy intervention, as indicated by initial plan of care.       Goal Status:  Pt. will demonstrate/verbalize independence in self-management of condition in : 6 weeks: Ongoing  Pt. will be independent with home exercise program in : 6 weeks: MET  Pt will: be able to step up a curb with walker safely in 6 weeks: Not met  Pt will: improve her gait speed by 0.3 m/sec in 6 weeks: Not met  Pt will: be able to walk on a slope ascend/descend  "without AD safely so she can walk a ramp in the community in 6 weeks: Partially met with AD  Pt will ambulate across an open space with her cane without feeling unsteady in 4 weeks: new goal  Pt will improve Garcia balance scale by 6 points in 4 weeks to demonstrate low fall risk: new goal      Plan / Patient Education:     Continue with POC 1x/week for 4 weeks    Next visit: Balance activities with decreasing support      Subjective:     No change.  Going to talk to her couselor as well see if he has ideas.    Objective:     See 5/28/19 for objective tests        Treatment Today     TREATMENT MINUTES COMMENTS   Evaluation     Self-care/ Home management     Manual therapy     Neuromuscular Re-education 35 Walking on stepping stones, standing on foam, step ups on foam   Therapeutic Activity     Therapeutic Exercises 10 Exercises:  Exercise #1: NuStep L6, 3' with arms, 3' without arms  Comment #1: HEP review of sit<>stands, gastroc stretch, towel scrunch  Exercise #2: Forward over zofia, HEEL STRIKE  Comment #2: Step up on blue foam 10x GOOD with SPC today!, did without 2x  Exercise #3: Seated L4 band hip abduction  Comment #3: Hooklying hip ab/adduction 10x 10\"  Exercise #4: Narrow gait - unable to do tandem 2 laps with SPC  Comment #4: Sit<>stands 10 x  Exercise #5: Step over zofia 6\" 10x benedicto  Comment #5: Step lunge onto incline, BOSU 10x benedicto  Exercise #6: Standing on blue foam, no hanging on 60\" x2  Comment #6: Controlled cone taps 10x benedicto no hanging on difficult today  Exercise #7: Backwards walking over dowels 10x  Comment #7: SLS with other foot on ball  Exercise #8: Walking across blue/ylw foam further away from bars/wall 10x  Comment #8: Step ups laterally 10x benedicto       Gait training     Modality__________________                Total 45 Pt late to appointment today   Blank areas are intentional and mean the treatment did not include these items.       Latasha Cowan, CALLIET  5/30/2019  "

## 2021-05-29 NOTE — TELEPHONE ENCOUNTER
Reason contacted:  Shingrix  Information relayed:  Informed patient I have placed her on the wait list for 2nd dose of Shingrix and we will contact her to schedule once we have Shingrix in stock. Pt voiced understanding.  Additional questions:  No  Further follow-up needed:  No

## 2021-05-30 ENCOUNTER — RECORDS - HEALTHEAST (OUTPATIENT)
Dept: ADMINISTRATIVE | Facility: CLINIC | Age: 76
End: 2021-05-30

## 2021-05-30 VITALS — HEIGHT: 64 IN | BODY MASS INDEX: 35.34 KG/M2 | WEIGHT: 207 LBS

## 2021-05-30 VITALS — BODY MASS INDEX: 36.85 KG/M2 | WEIGHT: 214.7 LBS

## 2021-05-30 VITALS — WEIGHT: 213.1 LBS | BODY MASS INDEX: 36.58 KG/M2

## 2021-05-30 NOTE — PROGRESS NOTES
"HPI: This patient is a 73yo who presents for evaluation of hearing and dizziness at the request of Dr. Hull. Patient has noted a feeling in her head that she describes as \"somewhat pulsatile\" and sizzling.  It happens when she's walking and she descries it as just \"feeling off\" in her head. Others have commented on patient's hearing, but she states that her hearing is not interrupting her life. Denies otalgia, otorrhea, vertigo, and ear infections.  Patient has an unsteady gait for which she's been seeing PT.  Does have venous insufficiency in lower limbs bilaterally and obesity.  Has seizure disorder that is stable on medication.  Had a recent MRI that was normal aside from age-related changes.    Past medical history, surgical history, social history, family history, medications, and allergies have been reviewed with the patient and are documented above.    Review of Systems: a 10-system review was performed. Pertinent positives are noted in the HPI and on a separate scanned document in the chart.    PHYSICAL EXAMINATION:  GEN: no acute distress, normocephalic  EYES: extraocular movements are intact, pupils are equal and round. Sclera clear.   EARS: auricles are normally formed. The external auditory canals are clear with minimal to no cerumen. Tympanic membranes are intact bilaterally with no signs of infection, effusion, retractions, or perforations.  NOSE: anterior nares are patent.   OC/OP: clear, dentition is in good repair. The tongue and palate are fully mobile and symmetric. The floor of mouth, base of tongue, and tonsils are soft and symmetric.   NECK: soft and supple. No lymphadenopathy or masses. Airway is midline.  NEURO: CN VII and XII are intact and symmetric. alert and oriented. No nystagmus. Gait is normal.  PULM: breathing comfortably on room air, normal chest expansion with respiration  HEART: no clubbing or cyanosis, no peripheral edema    IMAGING: MRI 4/8/19  HEAD MRI:  1. No finding for " acute infarct or intracranial hemorrhage. No extra-axial collection and no findings to suggest an abnormal mass on this noncontrast brain MRI.  2. Mild diffuse intraparenchymal volume loss.  3. Moderate burden scattered white matter FLAIR hyperintense foci as detailed above. While nonspecific, this most likely relates to a moderate burden chronic small vessel ischemic change.    AUDIOGRAM:  Normal sloping to mod-severe SNHL bilaterally. Normal tymps bilaterally.  Stable hearing per 6/19/18 audiogram.    MEDICAL DECISION-MAKING: This patient is a 75 yo with sensorineural hearing loss and pulsatile tinnitus. Medically clear for hearing aids should the patient desire them; however, she is not ready for hearing aids at this time.  For the subjective pulsatile tinnitus, will order a CTA of head and neck.  If CTA normal, then there is nothing else from an ENT standpoint we can offer.  Did reassure patient her ear exam and hearing test were normal aside from high frequency SNHL. She is with good understanding and in agreement of plan.

## 2021-05-30 NOTE — TELEPHONE ENCOUNTER
First Attempt: Patient is on our Shingrix waiting list to receive her 1st dose of the Shingrix vaccine. We received a shipment in.  for patient with her spouse to call back to schedule a nurse only appt for this.

## 2021-05-30 NOTE — PROGRESS NOTES
[facility] Daily Progress     Patient Name: Lalitha Underwood  Date: 6/27/2019  Visit #: 15  PTA visit #:  -  Date of evaluation: 4/23/2019  Referral Diagnosis: Neuropathy, idiopathic [G60.9]  Referring provider: Kelly Hull MD  Visit Diagnosis:     ICD-10-CM    1. Unsteadiness on feet R26.81    2. Generalized muscle weakness M62.81    3. Decreased functional mobility and endurance Z74.09    4. Poor balance R26.89    5. Abnormality of gait R26.9          Assessment:     Serene demonstrates some improvement in Garcia, TUG, 4-square step test.  She does not demonstrate improvement in gait speed or 2-minute walk test.  She seems to have plateaued some as these changes are not clinically significant.  She has met most of her PT goals but did not reach her new goals.  Pt is appropriate for discharge at this time to continue on her own with home exercises.  She is to f/u with ENT and neurology.    Goal Status:  Pt. will demonstrate/verbalize independence in self-management of condition in : 6 weeks: Ongoing  Pt. will be independent with home exercise program in : 6 weeks: MET  Pt will: be able to step up a curb with walker safely in 6 weeks: Not met  Pt will: improve her gait speed by 0.3 m/sec in 6 weeks: Not met  Pt will: be able to walk on a slope ascend/descend without AD safely so she can walk a ramp in the community in 6 weeks: Partially met with AD  Pt will ambulate across an open space with her cane without feeling unsteady in 4 weeks: new goal: Not met  Pt will improve Garcia balance scale by 6 points in 4 weeks to demonstrate low fall risk: new goal: Progressed      Plan / Patient Education:     Discharge with HEP    Subjective:     Feeling a little better today, not sure why.  Seems to be no rhyme or reason.  Neurology is going to try changing her medications    Objective:     See 5/28/19 for objective tests    /71, Pulse 77, SaO2 95-98%    Garcia Balance Scale: TESTED in middle of room, near  "chairs  1) Sitting to standing (3)   2) Standing unsupported (4)  3) Sitting with back unsupported but feet supported on floor (4)  4) Standing to sitting (4)  5) Transfer(3)  6) Standing unsupported with eyes closed (4)  7) Standing unsupported with feet together (4)  8) Reaching forward with outstretched arm while standing (3)  9)  object from the floor from a standing position (3)  10) Turning to look behind left and right shoulder while standing (4)  11) Turn 360 degrees (2)  12) Place alternate foot on step/stool while standing unsupported (4)  13) Standing unsupported one foot in front (2)  14) Standing on one leg (1)     Total score: 45/56 (<45/56 falls risk)  (Previous 40/56)          Initial test: 4/23/19 Norms (PREVIOUS): 8/23/18   30 second sit<>stand    10 reps  Uses arms 30-35th percentile 12 reps  Uses arms   TUG    11.6 sec Comf   9.5 sec FAST    <10 sec 13.4 sec with SPC   Cognitive TUG    13.3 sec   14.6 sec   Comfortable Gait Speed    0.83 m/s 1.32 M/sec 0.9 m/s   Fast Gait Speed    0.95 m/s 1.71 M/sec 1.0 m/s   4 Square Step Test    9.5 sec  Without SPC <15 sec 10.25 seconds  With SPC   2 Minute Walk test    99.2 M 145.9 M 101.1 M   Turning    R 3.0/L 5.4 sec   R 5.6/L 7.5 sec            Treatment Today     TREATMENT MINUTES COMMENTS   Evaluation     Self-care/ Home management     Manual therapy     Neuromuscular Re-education 30 Walking on stepping stones, standing on foam, step ups on foam   Therapeutic Activity     Therapeutic Exercises 23 Exercises:  Reviewed home program today:  - Towel scrunches, stretching toes flex/ext daily  - Bridges 10x 10\"  - Sit<>stands 15x  - Tandem stance 30\" each  - Gastroc/soleus stretch 30\" each     Gait training     Modality__________________     Reassessment  CPT 00969 see above         Total 53    Blank areas are intentional and mean the treatment did not include these items.       Latasha Cowan, DPT  6/27/2019  "

## 2021-05-31 VITALS — HEIGHT: 64 IN | BODY MASS INDEX: 35.51 KG/M2 | WEIGHT: 208 LBS

## 2021-05-31 VITALS — WEIGHT: 206.6 LBS | BODY MASS INDEX: 35.27 KG/M2 | HEIGHT: 64 IN

## 2021-05-31 VITALS — WEIGHT: 208.06 LBS | BODY MASS INDEX: 35.71 KG/M2

## 2021-05-31 VITALS — BODY MASS INDEX: 35.71 KG/M2 | WEIGHT: 209.2 LBS | HEIGHT: 64 IN

## 2021-05-31 VITALS — HEIGHT: 64 IN | BODY MASS INDEX: 35 KG/M2 | WEIGHT: 205 LBS

## 2021-05-31 VITALS — BODY MASS INDEX: 35.29 KG/M2 | HEIGHT: 64 IN | WEIGHT: 206.7 LBS

## 2021-05-31 VITALS — BODY MASS INDEX: 35.17 KG/M2 | HEIGHT: 64 IN | WEIGHT: 206 LBS

## 2021-05-31 NOTE — TELEPHONE ENCOUNTER
Who is calling:  The patient  Reason for Call:  The patient is calling to check the status of the medication request. The patient is aware that it is pending review. The patient would like a 90 day supply with refills.   Date of last appointment with primary care: 6/28/2019  Okay to leave a detailed message: No

## 2021-05-31 NOTE — TELEPHONE ENCOUNTER
Lalitha called with complaint of some random discomfort in her abdomin near her hernia repair and is wondering if Dr. Tamayo would order a scan to make sure nothing is coming apart from her surgery. Will update Dr. Tamayo. She did not want to follow up with her PCP at this time. Instructed to go to the Ed if increased pain or concern over the weekend.

## 2021-06-01 VITALS — WEIGHT: 197.6 LBS | HEIGHT: 64 IN | BODY MASS INDEX: 33.73 KG/M2

## 2021-06-01 VITALS — HEIGHT: 64 IN | BODY MASS INDEX: 35 KG/M2 | WEIGHT: 205 LBS

## 2021-06-01 VITALS — WEIGHT: 200 LBS | BODY MASS INDEX: 34.33 KG/M2

## 2021-06-01 VITALS — HEIGHT: 64 IN | BODY MASS INDEX: 34.08 KG/M2 | WEIGHT: 199.6 LBS

## 2021-06-01 VITALS — WEIGHT: 200.6 LBS | HEIGHT: 64 IN | BODY MASS INDEX: 34.25 KG/M2

## 2021-06-01 VITALS — BODY MASS INDEX: 33.12 KG/M2 | WEIGHT: 198.8 LBS | HEIGHT: 65 IN

## 2021-06-01 VITALS — WEIGHT: 201.8 LBS | BODY MASS INDEX: 33.62 KG/M2 | HEIGHT: 65 IN

## 2021-06-01 VITALS — WEIGHT: 201 LBS | BODY MASS INDEX: 33.71 KG/M2

## 2021-06-01 NOTE — TELEPHONE ENCOUNTER
Refill Approved    Rx renewed per Medication Renewal Policy. Medication was last renewed on 6/12/2017 with 3 refills.  Last office visit:2/21/2019 with  PCP DR MARYLOU Schulz, [facility] Triage/Med Refill 9/3/2019     Requested Prescriptions   Pending Prescriptions Disp Refills     potassium chloride (K-DUR,KLOR-CON) 20 MEQ tablet [Pharmacy Med Name: POTASSIUM CL 20MEQ ER TABLETS] 90 tablet 0     Sig: TAKE 1 TABLET BY MOUTH EVERY DAY       Potassium Supplements Refill Protocol Passed - 9/3/2019  2:52 PM        Passed - PCP or prescribing provider visit in past 12 months       Last office visit with prescriber/PCP: 12/12/2018 Kelly Hull MD OR same dept: 12/12/2018 Kelly Hull MD OR same specialty: 12/12/2018 Kelly Hull MD  Last physical: 2/21/2019 Last MTM visit: Visit date not found   Next visit within 3 mo: Visit date not found  Next physical within 3 mo: Visit date not found  Prescriber OR PCP: Kelly Hull MD  Last diagnosis associated with med order: 1. Hypokalemia  - potassium chloride (K-DUR,KLOR-CON) 20 MEQ tablet [Pharmacy Med Name: POTASSIUM CL 20MEQ ER TABLETS]; TAKE 1 TABLET BY MOUTH EVERY DAY  Dispense: 90 tablet; Refill: 0    If protocol passes may refill for 12 months if within 3 months of last provider visit (or a total of 15 months).             Passed - Potassium level in last 12 months     Lab Results   Component Value Date    Potassium 4.1 02/21/2019

## 2021-06-01 NOTE — TELEPHONE ENCOUNTER
Patient called to request a prescription for 2 pairs of compression stockings be sent to [facility].

## 2021-06-01 NOTE — TELEPHONE ENCOUNTER
Central PA team  [phone]  Pool: HE PA MED ([id number])          PA has been initiated.       PA form completed and faxed insurance via Cover My Meds     Key:  [id number]     Medication:  RABEPRAZOLE 20MG    Insurance:  FEP        Response will be received via fax and may take up to 5-10 business days depending on plan

## 2021-06-01 NOTE — TELEPHONE ENCOUNTER
Refill Request  Did you contact pharmacy: Yes  Medication name:   Requested Prescriptions     Pending Prescriptions Disp Refills     tiotropium (SPIRIVA) 18 mcg inhalation capsule 90 capsule 3     Sig: Place 1 capsule (2 puffs total) into inhaler and inhale daily.     Who prescribed the medication:   HARMAN SANCHEZ  Pharmacy Name and Location:   [facility]  [address] [address]  Phone: [phone]  Fax: [phone]  Is patient out of medication: No.  7 days left  Patient notified refills processed in 72 hours:  yes  Okay to leave a detailed message: yes

## 2021-06-01 NOTE — PROGRESS NOTES
"Assessment and Plan:Lalitha Underwood is a 74 y.o. F with a past medical history significant for severe COPD and a prior severe hiatal hernia who presents to clinic today for follow up.  Since her hernia repair her symptoms are much improved.  Her PFTs show a dramatic change in air trapping and lung volumes, but curioiusly, did not change her airflows.    1) COPD - remain on daily spiriva and OK to use advair only once a day given her symptoms are so well managed.  2) Recommend flu shot later in the year  3) Recommend exercise as possible to improve conditioning  4) RTC in 1 year           CCx: COPD follow up    HPI:Ms. Underwood is a 74 year old female with a history of COPD who returns for follow up.  This is her second visit after having her hiatal hernia repair early in the year.  She thinks she is continuing to do well.  She has had no exacerbations and has not required antibiotics or steroids.  She is not limited by her lungs in anyway.  She has an occasional cough.  She is using spiriva once a day and advair once a day, having quit her morning dose of advair with no detriment.  She uses her albuterol in the morning to \"get going\".  She ambulates with a walker given some instability issues, and this has helped a lot over walking with a cane alone.  She did have a intermittent pulling sensation in her stomach she was worried was her surgical repair coming undone.  It went away and didn't effect her breathing.    ROS:  Review of Systems - History obtained from the patient  General ROS: negative  Psychological ROS: negative  ENT ROS: negative  Allergy and Immunology ROS: negative  Endocrine ROS: negative  Respiratory ROS: positive for - cough  negative for - shortness of breath or sputum changes  Cardiovascular ROS: no chest pain or palpitations  Gastrointestinal ROS: no abdominal pain, change in bowel habits, or black or bloody stools  Genito-Urinary ROS: no dysuria, trouble voiding, or hematuria  Musculoskeletal " ROS: negative  Neurological ROS: no TIA or stroke symptoms  Dermatological ROS: negative      Current Meds:  Current Outpatient Medications   Medication Sig     albuterol (PROAIR HFA;PROVENTIL HFA;VENTOLIN HFA) 90 mcg/actuation inhaler INHALE 2 PUFFS EVERY 6 HOURS AS NEEDED FOR WHEEZING     calcium citrate (CALCITRATE) 200 mg (950 mg) tablet Take 1 tablet by mouth 2 (two) times a day.     cholecalciferol, vitamin D3, 5,000 unit Tab Take 5,000 Units by mouth daily.      denosumab 60 mg/mL Syrg Inject 60 mg under the skin every 6 (six) months.     fluticasone-salmeterol (ADVAIR) 250-50 mcg/dose DISKUS Inhale 1 puff 2 (two) times a day.     levETIRAcetam (KEPPRA) 500 MG tablet Take 500 mg by mouth 2 (two) times a day.     losartan (COZAAR) 50 MG tablet Take 1 tablet (50 mg total) by mouth 2 (two) times a day.     OXYGEN-AIR DELIVERY SYSTEMS MISC Use 1.5 L As Directed bedtime.     potassium chloride 20 mEq TbER Take 20 mEq by mouth daily.     RABEprazole (ACIPHEX) 20 mg tablet Take 1 tablet (20 mg total) by mouth daily.     tiotropium (SPIRIVA) 18 mcg inhalation capsule Place 1 capsule (2 puffs total) into inhaler and inhale daily.       Labs:  No results found for this or any previous visit (from the past 72 hour(s)).    I have personally reviewed all pertinent imaging studies and PFT results unless otherwise noted.    Imaging studies:  Mammo Screening Bilateral    Result Date: 4/10/2019  BILATERAL FULL FIELD DIGITAL SCREENING MAMMOGRAM WITH TOMOSYNTHESIS Performed on: 4/10/19. Compared to: 12/09/2017 Mammo Screening Bilateral, 12/01/2016 Mammo Screening Bilateral, and 12/16/2015 Mammo Screening Bilateral Findings: The breasts have scattered areas of fibroglandular densities. There is no radiographic evidence of malignancy. This study was evaluated with the assistance of Computer-Aided Detection. Breast Tomosynthesis was used in interpretation. Repeat routine screening mammogram in one year is recommended. ACR BI-RADS  "Category 1: Negative        Physical Exam:  /80   Pulse 86   Resp 12   Ht 5' 4\" (1.626 m)   Wt 219 lb (99.3 kg)   LMP 12/12/1998   SpO2 94%   Breastfeeding? No   BMI 37.59 kg/m    General - Well nourished  Ears/Mouth -  OP pink moist, no thrush  Neck - no cervical lymphadenopathy  Lungs - Clear to ausculation bilaterally  CVS - regular rhythm with 2/6 pansystolic murmur  Abdomen - soft, NT, ND, NABS  Ext - no cyanosis, clubbing or edema  Skin - no rash  Psychology - alert and oriented, answers appropriate        Electronically signed by:    Perry Tamayo MD PhD  [facility]  "

## 2021-06-01 NOTE — TELEPHONE ENCOUNTER
Who is calling:  Patient   Reason for Call:  Caller states in my chart Pneumo Conj 13-V vaccine is overdue for her .Caller would like to know if she need to take one . Please advise .  Date of last appointment with primary care: 9/16/19  Okay to leave a detailed message: No

## 2021-06-01 NOTE — TELEPHONE ENCOUNTER
Please call patient.  We had an upgrade to our computer system last weekend that is currently suggesting patients are overdue for pneumococcal 13 vaccine incorrectly. She may disregard this message as it was sent in error.  MARYLOUJ

## 2021-06-01 NOTE — PATIENT INSTRUCTIONS - HE
1) I think your lungs are stable, I would not change anything regarding your meds  2) Keep working on your balance and exercise  3) I don't need to see you back for a year

## 2021-06-01 NOTE — TELEPHONE ENCOUNTER
RN cannot approve Refill Request    RN can NOT refill this medication PCP messaged that patient is overdue for Office Visit. Last office visit: 12/12/2018 Kelly Hull MD Last Physical: 2/21/2019 Last MTM visit: Visit date not found Last visit same specialty: 12/12/2018 Kelly Hull MD.  Next visit within 3 mo: Visit date not found  Next physical within 3 mo: Visit date not found      Myranda Ware, [facility] Triage/Med Refill 9/27/2019    Requested Prescriptions   Pending Prescriptions Disp Refills     tiotropium (SPIRIVA) 18 mcg inhalation capsule 90 capsule 3     Sig: Place 1 capsule (2 puffs total) into inhaler and inhale daily.       Ipratropium/Tiotropium/Umeclidinium Refill Protocol Failed - 9/26/2019 12:28 PM        Failed - PCP or prescribing provider visit in last 6 months     Last office visit with prescriber/PCP: Visit date not found OR same dept: 12/12/2018 Kelly Hull MD OR same specialty: 12/12/2018 Kelly Hull MD Last physical: Visit date not found Last MTM visit: Visit date not found     Next appt within 3 mo: Visit date not found  Next physical within 3 mo: Visit date not found  Prescriber OR PCP: Kelly Hull MD  Last diagnosis associated with med order: 1. COPD (chronic obstructive pulmonary disease) (H)  - tiotropium (SPIRIVA) 18 mcg inhalation capsule; Place 1 capsule (2 puffs total) into inhaler and inhale daily.  Dispense: 90 capsule; Refill: 3    If protocol passes may refill for 6 months if within 3 months of last provider visit (or a total of 9 months).

## 2021-06-01 NOTE — TELEPHONE ENCOUNTER
Prior Authorization Request  Who s requesting:  Patient  Pharmacy Name and Location: [facility] - [place], [place] - [address] AT Portal to Registered [facility] Sites     Medication Name:      Disp Refills Start End    RABEprazole (ACIPHEX) 20 mg tablet 90 tablet 3 11/14/2018     Sig - Route: Take 1 tablet (20 mg total) by mouth daily. - Oral    Class: Print      Insurance Plan: Medicare A and B  Insurance Member ID Number:  [id number]  Informed patient that prior authorizations can take up to 10 business days for response:   Yes  Okay to leave a detailed message: Yes

## 2021-06-01 NOTE — TELEPHONE ENCOUNTER
Reason contacted:  Vaccine question  Information relayed:  Below message   Additional questions:  No  Further follow-up needed:  No  Okay to leave a detailed message:  No

## 2021-06-02 ENCOUNTER — RECORDS - HEALTHEAST (OUTPATIENT)
Dept: ADMINISTRATIVE | Facility: CLINIC | Age: 76
End: 2021-06-02

## 2021-06-02 VITALS — HEIGHT: 64 IN | WEIGHT: 206 LBS | BODY MASS INDEX: 35.17 KG/M2

## 2021-06-02 VITALS — BODY MASS INDEX: 34.85 KG/M2 | HEIGHT: 64 IN | WEIGHT: 204.1 LBS

## 2021-06-02 VITALS — BODY MASS INDEX: 34.83 KG/M2 | HEIGHT: 64 IN | WEIGHT: 204 LBS

## 2021-06-02 VITALS — BODY MASS INDEX: 34.91 KG/M2 | HEIGHT: 64 IN | WEIGHT: 204.5 LBS

## 2021-06-02 VITALS — HEIGHT: 64 IN | BODY MASS INDEX: 34.8 KG/M2

## 2021-06-02 VITALS — HEIGHT: 64 IN | WEIGHT: 191.5 LBS | BODY MASS INDEX: 32.69 KG/M2

## 2021-06-02 VITALS — WEIGHT: 204 LBS | BODY MASS INDEX: 34.83 KG/M2 | HEIGHT: 64 IN

## 2021-06-02 NOTE — TELEPHONE ENCOUNTER
Reason contacted:  Orders request   Information relayed:  Below message   Additional questions:  No  Further follow-up needed:  No  Okay to leave a detailed message:  No

## 2021-06-02 NOTE — TELEPHONE ENCOUNTER
Serene patient    Patient called. She just received a call from [facility] to arrange a DXA scan and she's not sure why? She had her previouse scans done at [facility].     Please send orders to [facility] instead.    Serene @ [phone]

## 2021-06-02 NOTE — TELEPHONE ENCOUNTER
4 concerns.    1. Mail order needs spiriva refill. Was sent to [facility], send to [facility].    2. Also needs 2 other meds filled, needs printed and mailed to patient, losartan and generic aciphex.    3. ALso wants referral to PT refresh or to [facility]. Was there last year for walking.  Feet issues and balance issues.    Patient is scheduled for Feb 2020 for her physical.    Routed to clinic care team to review items #2 and #3.    #1 done by Triage RN.    Jihan Shi, RN, [facility] Nurse Triage/Med Refills RN

## 2021-06-02 NOTE — PROGRESS NOTES
[facility] Daily Progress     Patient Name: Lalitha Underwood  Date: 10/30/2019  Visit #:2  PTA visit #:  1  Referral Diagnosis: [unfilled]  Referring provider: Kelly Hull MD  Visit Diagnosis:     ICD-10-CM    1. Abnormality of gait R26.9    2. Generalized muscle weakness M62.81    3. Decreased functional mobility R26.89    4. Unsteadiness on feet R26.81    5. Decreased functional mobility and endurance Z74.09    6. Poor balance R26.89          Assessment:   Pt returns today for her first follow up appointment.  Pt with waddling type gait which is more pronounced without her SEC.    Patient is benefitting from skilled physical therapy and is making steady progress toward functional goals.  Patient is appropriate to continue with skilled physical therapy intervention, as indicated by initial plan of care.    Goal Status:On going  Pt. will demonstrate/verbalize independence in self-management of condition in : 4 weeks  Pt. will be independent with home exercise program in : 4 weeks    Pt will: improve 4-square step test to <15 seconds in 4-6 weeks to demonstrate improved mobility over obstacles and multi-directional balance  Pt will: improve her gait speed by 0.6 m/sec in 4 weeks to demonstrate better community ambulation with SPC  Pt will: perform step ups with only SPC to demosntrate improved community ambulation      Plan / Patient Education:     Continue with initial plan of care.  Progress with home program as tolerated.   Progress hip and ankle strengthening, balance drills.     Subjective:   Pain Ratin   Pt reports that she tends to walk close to the wall at home. She does not use her cane inside her home.   Pt has been compliant with her HEP.        Objective:     Pt ambulates with a SEC, + Trendelenburg B.     Exercises:  Exercise #1: Treadmill @ .8 MPH x3'  Comment #1: Sit<>stands 10x pt uses arms up, no arms down,  HEP  Exercise #2: Wobble board balance fwd/side  Comment #2:  "30\"  Exercise #3: Cone taps in // bars  Comment #3: 10x benedicto   Exercise #4: Heel/toe raises in // bars HEP  Comment #4: 10x, difficulty posterior weight shift  Exercise #5: Low back stretch seated HEP  Comment #5: 30\"  Exercise #6: Walking across open space 5' with SPC  Comment #6: Standing tandem HEP 30\"  Exercise #7: standing hip AB and ext alternating legs  Comment #7: x10 each B  Exercise #8: march  Comment #8: x10      Treatment Today    TREATMENT MINUTES COMMENTS   Evaluation     Self-care/ Home management     Manual therapy     Neuromuscular Re-education     Therapeutic Activity     Therapeutic Exercises 28 Treadmill @.8 MPH x 3'  See flow sheet  Added to HEP:   -standing hip AB -alternating   -standing hip Ext-alternating  -marching   Gait training     Modality__________________                Total 28    Blank areas are intentional and mean the treatment did not include these items.       ABRAN Persaud  10/30/2019    "

## 2021-06-02 NOTE — TELEPHONE ENCOUNTER
Please print prescriptions pended for approval if appropriate  Patient is requesting an order for PT

## 2021-06-02 NOTE — PROGRESS NOTES
[facility]  ENDOCRINOLOGY    Osteoporosis Follow Up 10/15/2019    Lalitha Underwood, 1945, [id number]          Reason for visit      1. Age-related osteoporosis without current pathological fracture        History     Lalitha Underwood is a very pleasant 74 y.o. old female who presents for follow up.   SUMMARY:  1. Osteopenia-she was started on alendronate 2 years ago. She has been tolerating alendronate well however a recent bone density report as noted below showed deterioration in the bone density in the right hip. She states she's been compliant with her alendronate every week.  She does not take any calcium supplements and only consumes about 1 serving of dairy each day. She takes 5000 IU daily of the 3 and her recent level was 41.8.  TSH in December 2014 was 1.87.  She has been on Dilantin for the last 24 years after having a grand mal seizure. Prior to that she was diagnosed with seizure disorder at the age of 18 and use medications initially for about 2 years and then discontinued for about 27 years.  She is also a former smoker and smoked for about 35 years and quit in 1998.  She does not undertake any weight-bearing exercise currently. She is thinking of joining the [employer] in January  She has had several falls but has never resulted in a fragility fracture.  She has lost 1.75 inches in height.  Menopause at age 50 and she used hormone replacement therapy for about 2-3 years and stopped after the women's health initiative data came out.  Her mother had an osteoporotic hip fracture at age 90. She had to have open reduction internal fixation and lived for another 4 years after that but was dependent on a walker.  Her sister also has osteopenia.  She has a long-standing history of hiatal hernia and has been on proton pump inhibitors followed time and is currently in [employer]ip[employer].      TODAY:  Serene returns today in f/u for Osteoporosis. She reports that she has been having some difficulty with walking lately, and  has been using her wheeled Walker for ambulation. She states that it is a balance problem and that she is fine when she is at home and has things to hold on to. Her latest Dexa Scan shows: 73 y.o. female with LOW BONE DENSITY (OSTEOPENIA), stable on the current treatment.  She has had no problems with the Prolia injections. She has been on them since 2015.  Current Vit D level is 53. 9 and Calcium level is 9.6.     Risk Factors     The following high- risk conditions have been ruled out: celiac disease, eating disorders, gastric bypass, hyperparathyroidism, inflammatory bowel disease, hyperthyroidism, rheumatoid arthritis, lupus, chronic kidney disease.      Lalitha Underwood has the following risk factors: Age, female gender, ex smoker, and breast cancer in her mother.      She is not on high risk medications such as glucocorticoids, anti-coagulants, chemotherapy or levothyroxine.  She is on an anticonvulsant.  Patient deniesHysterectomy, Oophrectomy, Breast cancer and Family history of breast cancer.   Menopause was at age: 50 years    Past Medical History     Patient Active Problem List   Diagnosis     Solar Elastosis     Rosacea     Epilepsy And Recurrent Seizures     Esophageal Reflux     Vitamin D Deficiency     Pulmonary emphysema (H)     Hyperlipidemia     Walk Is Wobbly Or Unsteady (Ataxia)     Hypertension     Venous insufficiency of both lower extremities     Hiatal hernia     Leg swelling     Left arm swelling     Flat feet     Valgus deformity of both feet     Urge incontinence of urine     Mild aortic valve regurgitation     Mild aortic valve stenosis     Osteoporosis     Decreased hearing of both ears     Obesity (BMI 35.0-39.9) with comorbidity (H)     Neuropathy, idiopathic     Localized deposits of fat     Collapsed arches     S/P repair of paraesophageal hernia       Family History       family history includes Breast cancer (age of onset: 66) in her mother; Diabetes in her son; Heart attack in her  "mother, sister, and sister; Heart disease in her sister and sister; Hypertension in her father and mother; Pulmonary Hypertension in her daughter; Varicose Veins in her mother.    Social History      reports that she quit smoking about 20 years ago. Her smoking use included cigarettes. She has never used smokeless tobacco. She reports current alcohol use of about 2.0 standard drinks of alcohol per week. She reports current drug use. Drug: Oxycodone.      Review of Systems     Patient denies current pain, limited mobility, fractures.   Remainder per HPI.      Vital Signs     /76 (Patient Site: Right Arm, Patient Position: Sitting, Cuff Size: Adult Regular) Comment (Patient Site): taken on forearm  Pulse 76   Ht 5' 4\" (1.626 m)   Wt (!) 222 lb 9.6 oz (101 kg)   LMP 12/12/1998   BMI 38.21 kg/m      Physical Exam     GENERAL:  Normal, NIRD  EYES:  Pupils equal, round and reactive to light; no proptosis, lid lag or  periorbital edema.  THYROID:  Thyroid is normal.  No tenderness or bruit  NECK: No lymph nodes  MUSCULOSKELETAL: No joint abnormalities, FROM in all four extremities. No kyphosis. Muscle strength grossly normal without evidence of wasting.  HEART:  Regular rate and rhythm without murmur.  LUNGS:  Clear to auscultation.  ABDOMEN:Soft, non-tender, no masses or organomegaly  NEURO:  Patella Reflexes were normal.No tremors  SKIN:  No acanthosis nigricans or vitiligo        Assessment     1. Age-related osteoporosis without current pathological fracture        Plan     Pt due for Prolia today.  She will continue on therapy. We will get another Dexa Scan next year.  F/u with me in 1 year.       Total visit minutes:25  Time spent counseling and coordination of care:23    Lalitha GUSMAN Endocrinology  10/15/2019  2:30 PM      Current Medications     Outpatient Medications Prior to Visit   Medication Sig Dispense Refill     hyoscyamine (LEVBID) 0.375 mg 12 hr tablet Take 0.375 mg by mouth every 12 " (twelve) hours as needed for cramping.       albuterol (PROAIR HFA;PROVENTIL HFA;VENTOLIN HFA) 90 mcg/actuation inhaler INHALE 2 PUFFS EVERY 6 HOURS AS NEEDED FOR WHEEZING 3 Inhaler 0     calcium citrate (CALCITRATE) 200 mg (950 mg) tablet Take 1 tablet by mouth 2 (two) times a day.       cholecalciferol, vitamin D3, 5,000 unit Tab Take 5,000 Units by mouth daily.        denosumab 60 mg/mL Syrg Inject 60 mg under the skin every 6 (six) months.       fluticasone-salmeterol (ADVAIR) 250-50 mcg/dose DISKUS Inhale 1 puff 2 (two) times a day. 180 each 3     levETIRAcetam (KEPPRA) 500 MG tablet Take 500 mg by mouth 2 (two) times a day.  3     losartan (COZAAR) 50 MG tablet Take 1 tablet (50 mg total) by mouth 2 (two) times a day. 180 tablet 1     OXYGEN-AIR DELIVERY SYSTEMS [facility] Use 1.5 L As Directed bedtime.       potassium chloride 20 mEq TbER Take 20 mEq by mouth daily. 90 tablet 3     RABEprazole (ACIPHEX) 20 mg tablet Take 1 tablet (20 mg total) by mouth daily. 90 tablet 1     tiotropium (SPIRIVA) 18 mcg inhalation capsule Place 1 capsule (2 puffs total) into inhaler and inhale daily. 90 capsule 3     Facility-Administered Medications Prior to Visit   Medication Dose Route Frequency Provider Last Rate Last Dose     denosumab 60 mg (PROLIA 60 mg/ml)  60 mg Subcutaneous Q6 Months Lalitha Haq NP   60 mg at 10/15/19 1052         Lab Results     TSH   Date Value Ref Range Status   02/21/2019 2.67 0.30 - 5.00 uIU/mL Final     PTH   Date Value Ref Range Status   11/21/2013 71 16 - 73 pg/mL Final     Calcium   Date Value Ref Range Status   09/16/2019 9.6 8.5 - 10.5 mg/dL Final     Phosphorus   Date Value Ref Range Status   11/21/2013 3.9 2.5 - 4.5 mg/dL Final           Imaging Results   Last DEXA scan:  Results for orders placed in visit on 02/08/18   DXA Bone Density Scan    Narrative 12/31/2018      RE: Lalitha Underwood  YOB: 1945        Dear Lalitha Haq,    Patient Profile:  73 y.o. female,  postmenopausal, is here for the follow up bone density   test.   History of fractures - Yes;  Wrist. Family history of osteoporosis - Yes;    mother.  Family history of hip fracture: Yes;  mother. Smoking history -   Past. Osteoporosis treatment past -  No. Osteoporosis treatment current -   Yes;  Prolia.  Chronic medical problems - Chronic low back problems. High   risk medications -  Anti-seizure;  Yes, Currently.      Assessment:    1. The spine bone density L1-L4 was not done because of the significant   artifacts.  2. Femoral bone densities show left femoral neck T- score -1.2 and right   femoral neck T-score -0.5, stable compared to 2016.  3. Left forearm bone density with T-score 0.2.      73 y.o. female with LOW BONE DENSITY (OSTEOPENIA), stable on the current   treatment.        Recommendations:  Appropriate calcium and vitamin D supplements and active treatment   recommended with follow up bone density scan in 2 years.      Bone densitometry was performed on your patient using our ComCam   densitometer. The results are summarized and a copy of the actual scans   are included for your review. In conformity with the International Society   of Clinical Densitometry's most recent position statement for DXA   interpretation (2015), the diagnosis will be made on the lowest measured   T-score of the lumbar spine, femoral neck, total proximal femur or 33%   radius. Note the change in terminology for diagnostic classification from   OSTEOPENIA to LOW BONE MASS. All trending for sequential exams will be   done using multiple vertebrae or the total proximal femur. Fracture risk   is based on the WHO Fracture Risk Assessment Tool (FRAX). If additional   information is needed or if you would like to discuss the results, please   do not hesitate to call me.       Thank you for referring this patient to [facility]opor[facility].   We are happy to be of service in support of you and your practice. If you  "  have any questions or suggestions to improve our service, please call me   at [phone].     Sincerely,     Ashli Mendez M.D. RENETTA.  Osteoporosis Services, [facility]               Prolia Injection Phone Screen      Screening questions have been asked 2-3 days prior to administration visit for Prolia. If any questions are answered with \"Yes,\" this phone encounter were will routed to ordering provider for further evaluation.     1.  When was the last injection?  4/10/19    2.  Has insurance for this injection been verified?  Yes    3.  Did you experience any new onset achiness or rashes that lasted for over a month with your previous Prolia injection?   No    4.  Do you have a fever over 101?F or a new deep cough that is unusual for you today? No    5.  Have you started any new medications in the last 6 months that you were told could affect your immune system? These may have been prescribed by oncologist, transplant, rheumatology, or dermatology.   No    6.  In the last 6 months have you have gastric bypass or parathyroid surgery?   No    7.  Do you plan dental work requiring drilling into the bone such as implants/extractions or oral surgery in the next 2-3 months?   No    8. Do you have new insurance since the last injection?    Patient informed if symptoms discussed above present prior to their administration appointment, they are to notify clinic immediately.     Reshma Chatman            The following steps were completed to comply with the REMS program for Prolia:  1. Ordering provider has previously reviewed information in the Medication Guide and Patient Counseling Chart, including the serious risks of Prolia  and the symptoms of each risk and have been advised to seek prompt medical attention if they have signs or symptoms of any of the serious risks.  2. Provided each patient a copy of the Medication Guide and Patient Brochure.  See MAR for administration details.   Indication: Prolia  " (denosumab) is a prescription medicine used to treat osteoporosis in patients who:   Are at high risk for fracture, meaning patients who have had a fracture related to osteoporosis, or who have multiple risk factors for fracture; Cannot use another osteoporosis medicine or other osteoporosis medicines did not work well.   The timeline for early/late injections would be 4 weeks early and any time after the 6 month edmar. If a patient receives their injection late, then the subsequent injection would be 6 months from the date that they actually received the injection    Have the screening questions been asked prior to this administration? Yes      After obtaining consent, and per orders of Lalitha Haq NP, injection of Prolia 60 mg given by Reshma Chatman. Patient instructed to remain in clinic for 20 minutes afterwards, and to report any adverse reaction to me immediately.

## 2021-06-02 NOTE — TELEPHONE ENCOUNTER
Attempted to call patient X 2 and received a busy tone.   Printed prescriptions were mailed to patients home address on file.

## 2021-06-03 VITALS
SYSTOLIC BLOOD PRESSURE: 149 MMHG | HEART RATE: 96 BPM | RESPIRATION RATE: 16 BRPM | TEMPERATURE: 97.8 F | OXYGEN SATURATION: 96 % | DIASTOLIC BLOOD PRESSURE: 84 MMHG

## 2021-06-03 VITALS
OXYGEN SATURATION: 96 % | DIASTOLIC BLOOD PRESSURE: 68 MMHG | SYSTOLIC BLOOD PRESSURE: 152 MMHG | RESPIRATION RATE: 20 BRPM | HEART RATE: 89 BPM | BODY MASS INDEX: 38.19 KG/M2 | WEIGHT: 222.5 LBS | TEMPERATURE: 97.6 F

## 2021-06-03 VITALS
OXYGEN SATURATION: 95 % | TEMPERATURE: 98 F | RESPIRATION RATE: 16 BRPM | BODY MASS INDEX: 37.02 KG/M2 | WEIGHT: 215.7 LBS | HEART RATE: 86 BPM

## 2021-06-03 VITALS
SYSTOLIC BLOOD PRESSURE: 130 MMHG | DIASTOLIC BLOOD PRESSURE: 80 MMHG | OXYGEN SATURATION: 94 % | WEIGHT: 219 LBS | HEART RATE: 86 BPM | HEIGHT: 64 IN | BODY MASS INDEX: 37.39 KG/M2 | RESPIRATION RATE: 12 BRPM

## 2021-06-03 VITALS
BODY MASS INDEX: 38 KG/M2 | HEIGHT: 64 IN | DIASTOLIC BLOOD PRESSURE: 76 MMHG | WEIGHT: 222.6 LBS | HEART RATE: 76 BPM | SYSTOLIC BLOOD PRESSURE: 118 MMHG

## 2021-06-03 NOTE — PROGRESS NOTES
[facility] Daily Progress     Patient Name: Lalitha Underwood  Date: 2019  Visit #:  PTA visit #: 2  Referral Diagnosis: Abnormality of gait  Referring provider: Kelly Hull MD  Visit Diagnosis:     ICD-10-CM    1. Abnormality of gait R26.9    2. Generalized muscle weakness M62.81    3. Decreased functional mobility R26.89    4. Unsteadiness on feet R26.81    5. Decreased functional mobility and endurance Z74.09    6. Poor balance R26.89          Assessment:     Pt unable to walk across room with support >SPC.  Discussed that we may have to use her walker so that she is better able to exercise.  Pt tolerated exercises well.  She is unable to use UE to push herself out of a chair or hold herself up in a seated position on bed.    Patient is benefitting from skilled physical therapy and is making steady progress toward functional goals.  Patient is appropriate to continue with skilled physical therapy intervention, as indicated by initial plan of care.    Goal Status:On going  Pt. will demonstrate/verbalize independence in self-management of condition in : 4 weeks  Pt. will be independent with home exercise program in : 4 weeks    Pt will: improve 4-square step test to <15 seconds in 4-6 weeks to demonstrate improved mobility over obstacles and multi-directional balance  Pt will: improve her gait speed by 0.6 m/sec in 4 weeks to demonstrate better community ambulation with SPC  Pt will: perform step ups with only SPC to demosntrate improved community ambulation      Plan / Patient Education:     Continue with initial plan of care.  Progress with home program as tolerated.   Progress hip and ankle strengthening, balance drills.     Subjective:     Pain Ratin   No falls since previous visit.  She does have a lot of daily exercises she does.  Uses her walker or cart when out in the community.     is back home on hospice.        Objective:     Pt ambulates with a SEC, + Trendelenburg  "B.     Exercises:  Exercise #1: NuStep L7 6'  Comment #1: Sit<>stands 10x gave a cushion and no arms 10x HEP  Exercise #2: Wobble board balance fwd/side  Comment #2: 30\"  Exercise #3: Cone taps in // bars  Comment #3: 10x benedicto   Exercise #4: Heel/toe raises in // bars HEP  Comment #4: 10x, difficulty posterior weight shift  Exercise #5: Tandom stance on foam, head turns with gaze stabilization  Comment #5: 2' each foot forward  Exercise #6: Walking across open space  Comment #6: 5' with SPC - tried step to gait pattern, no change  Exercise #7: standing hip AB and ext alternating legs  Comment #7: x10 each B  Exercise #8: march  Comment #8: x10  Exercise #9: Chair press up - UNABLE  Comment #9: Lat pull downs instead - 15x  Exercise #10: Counter top pushups  Comment #10: 15x    Had pt focus on a solid object when walking across the room, this helped slightly.  Also had to cue patient to use her cane.    Treatment Today    TREATMENT MINUTES COMMENTS   Evaluation     Self-care/ Home management     Manual therapy     Neuromuscular Re-education 25 Balance drills in //bars:  -NBOS on firm and foam x60\"  -NBOS with head and trunk turns 10x benedicto  - Tandem stance   Therapeutic Activity     Therapeutic Exercises 30 Treadmill @.8 MPH x 3'  See flow sheet     Gait training     Modality__________________                Total 55    Blank areas are intentional and mean the treatment did not include these items.       Latasha Cowan PT, DPT  11/18/2019  "

## 2021-06-03 NOTE — PROGRESS NOTES
[facility] Daily Progress     Patient Name: Lalitha Underwood  Date: 2019  Visit #:4  PTA visit #: 2  Referral Diagnosis: Abnormality of gait  Referring provider: Kelly Hull MD  Visit Diagnosis:     ICD-10-CM    1. Abnormality of gait R26.9    2. Generalized muscle weakness M62.81    3. Decreased functional mobility R26.89    4. Unsteadiness on feet R26.81    5. Decreased functional mobility and endurance Z74.09    6. Poor balance R26.89          Assessment:     Pt unable to walk across room with support >SPC.  Discussed that we may have to use her walker so that she is better able to exercise.  Pt tolerated exercises well.  She is unable to use UE to push herself out of a chair or hold herself up in a seated position on bed.    Patient is benefitting from skilled physical therapy and is making steady progress toward functional goals.  Patient is appropriate to continue with skilled physical therapy intervention, as indicated by initial plan of care.    Goal Status:On going  Pt. will demonstrate/verbalize independence in self-management of condition in : 4 weeks  Pt. will be independent with home exercise program in : 4 weeks    Pt will: improve 4-square step test to <15 seconds in 4-6 weeks to demonstrate improved mobility over obstacles and multi-directional balance  Pt will: improve her gait speed by 0.6 m/sec in 4 weeks to demonstrate better community ambulation with SPC  Pt will: perform step ups with only SPC to demosntrate improved community ambulation      Plan / Patient Education:     Continue with initial plan of care.  Progress with home program as tolerated.   Progress hip and ankle strengthening, balance drills.     Subjective:   Pain Ratin   Pt still has not walked on her treadmill.  No falls since previous visit.  She does have a lot of daily exercises she does.        Objective:     Pt ambulates with a SEC, + Trendelenburg B.     Exercises:  Exercise #1: Treadmill @ 1.4  "MPH x 5'  Comment #1: Sit<>stands 10x pt uses arms up, no arms down,  HEP  Exercise #2: Wobble board balance fwd/side  Comment #2: 30\"  Exercise #3: Cone taps in // bars  Comment #3: 10x benedicto   Exercise #4: Heel/toe raises in // bars HEP  Comment #4: 10x, difficulty posterior weight shift  Exercise #5: Low back stretch seated HEP  Comment #5: 30\"  Exercise #6: Walking across open space 5' with SPC  Comment #6: Standing tandem HEP 30\" on firm and foam  Exercise #7: standing hip AB and ext alternating legs  Comment #7: x10 each B  Exercise #8: march  Comment #8: x10    Had pt focus on a solid object when walking across the room, this helped slightly.  Also had to cue patient to use her cane.    Treatment Today    TREATMENT MINUTES COMMENTS   Evaluation     Self-care/ Home management     Manual therapy     Neuromuscular Re-education 25 Balance drills in //bars:  -NBOS on firm and foam x60\"  -NBOS with head and trunk turns 10x benedicto  - Tandem stance   Therapeutic Activity     Therapeutic Exercises 30 Treadmill @.8 MPH x 3'  See flow sheet     Gait training     Modality__________________                Total 55    Blank areas are intentional and mean the treatment did not include these items.       Latasha Cowan PT, DPT  11/14/2019  "

## 2021-06-03 NOTE — PROGRESS NOTES
"[facility] Daily Progress     Patient Name: Lalitha Underwood  Date: 2019  Visit #:  PTA visit #: 3  Referral Diagnosis: Abnormality of gait  Referring provider: Kelly Hull MD  Visit Diagnosis:     ICD-10-CM    1. Abnormality of gait R26.9    2. Generalized muscle weakness M62.81    3. Decreased functional mobility R26.89    4. Unsteadiness on feet R26.81    5. Decreased functional mobility and endurance Z74.09    6. Poor balance R26.89          Assessment:     Pt tolerating higher level balance drills without LOB.  Pt does require short rest breaks every 2-3 exercises.    Patient is benefitting from skilled physical therapy and is making steady progress toward functional goals.  Patient is appropriate to continue with skilled physical therapy intervention, as indicated by initial plan of care.    Goal Status:On going  Pt. will demonstrate/verbalize independence in self-management of condition in : 4 weeks  Pt. will be independent with home exercise program in : 4 weeks    Pt will: improve 4-square step test to <15 seconds in 4-6 weeks to demonstrate improved mobility over obstacles and multi-directional balance  Pt will: improve her gait speed by 0.6 m/sec in 4 weeks to demonstrate better community ambulation with SPC  Pt will: perform step ups with only SPC to demosntrate improved community ambulation      Plan / Patient Education:     Continue with initial plan of care.  Progress with home program as tolerated.   Progress hip and ankle strengthening, balance drills.     Subjective:   Pt reports she does her exercises prior to bed.   Pt reports no falls or LOB since her last visit.  Pt reports she uses a heating pad at night \"it keeps the cramps away.\"  Pain Ratin           Objective:     Pt ambulates with a SEC, + Trendelenburg B.     Exercises:  Exercise #1: NuStep L7 6'  Comment #1: Sit<>stands 10x gave a cushion and no arms 10x HEP  Exercise #2: Wobble board balance " "fwd/side  Comment #2: 30\"  Exercise #3: Cone taps in // bars  Comment #3: 10x benedicto   Exercise #4: Heel/toe raises in // bars HEP  Comment #4: 10x, difficulty posterior weight shift  Exercise #5: Tandom stance on foam, head turns with gaze stabilization  Comment #5: 2' each foot forward  Exercise #6: Walking across open space  Comment #6: 5' with SPC - tried step to gait pattern, no change  Exercise #7: standing hip AB and ext alternating legs  Comment #7: x10 each B  Exercise #8: march  Comment #8: x10  Exercise #9: Chair press up - UNABLE  Comment #9: Lat pull downs instead - 15x  Exercise #10: Counter top pushups  Comment #10: 15x        Treatment Today    TREATMENT MINUTES COMMENTS   Evaluation     Self-care/ Home management     Manual therapy     Neuromuscular Re-education 25 Balance drills in //bars:  -NBOS on firm and foam x60\"  -NBOS with head and trunk turns 10x benedicto  - Tandem stance  -walk on airex and 4 dynadiscs and up/down 4' step 10ft x4 forward and side stepping  -toe taps onto 1 cone x10  -toe taps onto 3 cones x10  -reaching forward and across body for cones x10   Therapeutic Activity     Therapeutic Exercises 30 Treadmill @1.3 MPH x 5'  See flow sheet     Gait training     Modality__________________                Total 55    Blank areas are intentional and mean the treatment did not include these items.       Sumaya Garnett PTA,CLT  11/20/2019  "

## 2021-06-03 NOTE — PROGRESS NOTES
Assessment:     1. Viral URI     2. Pulmonary emphysema, unspecified emphysema type (H)            Plan:     Patient with emphysema with a probable viral URI.  Given that her symptoms are so mild would recommend just continuing to monitor.  No antibiotics indicated at this time.  If her symptoms are getting significantly worse so she is having difficulty breathing or wheezing recommend that she make a follow-up appointment with her primary care provider or come back in as needed to urgent care.  She does agreeable with this plan.    Subjective:       74 y.o. female with a history of emphysema presents for evaluation some slight nasal congestion and cough for the past couple of days.  She otherwise feels well and has not had a fever.  She describes her symptoms as very mild but because she has emphysema would like somebody to listen to her lungs.  She denies any significantly increased shortness of breath or wheezing.  She uses Advair and Spiriva regularly and uses albuterol as needed as a rescue inhaler.  She has not really needed to use it any more frequently over the past several days.  She denies any ear pain, headache, sore throat, facial pain, abdominal pain, nausea, vomiting, or skin rash.    Patient Active Problem List   Diagnosis     Solar Elastosis     Rosacea     Epilepsy And Recurrent Seizures     Esophageal Reflux     Vitamin D Deficiency     Pulmonary emphysema (H)     Hyperlipidemia     Walk Is Wobbly Or Unsteady (Ataxia)     Hypertension     Venous insufficiency of both lower extremities     Hiatal hernia     Leg swelling     Left arm swelling     Flat feet     Valgus deformity of both feet     Urge incontinence of urine     Mild aortic valve regurgitation     Mild aortic valve stenosis     Osteoporosis     Decreased hearing of both ears     Obesity (BMI 35.0-39.9) with comorbidity (H)     Neuropathy, idiopathic     Localized deposits of fat     Collapsed arches     S/P repair of paraesophageal hernia        Past Medical History:   Diagnosis Date     Convulsive disorder (H)      COPD (chronic obstructive pulmonary disease) (H)      Depression      Hernia of unspecified site of abdominal cavity without mention of obstruction or gangrene      Hiatal hernia      Hypertension      On home oxygen therapy     at 1.5 liters at nite     Osteopenia      Shortness of breath      Venous insufficiency of both lower extremities        Past Surgical History:   Procedure Laterality Date     OTHER SURGICAL HISTORY Left 2003    Broke titanium in left arm     SHOULDER SURGERY Right 1967     UMBILICAL HERNIA REPAIR  04/04/2018    Dr. Jimenez       Current Outpatient Medications on File Prior to Visit   Medication Sig Dispense Refill     albuterol (PROAIR HFA;PROVENTIL HFA;VENTOLIN HFA) 90 mcg/actuation inhaler INHALE 2 PUFFS EVERY 6 HOURS AS NEEDED FOR WHEEZING 3 Inhaler 0     calcium citrate (CALCITRATE) 200 mg (950 mg) tablet Take 1 tablet by mouth 2 (two) times a day.       cholecalciferol, vitamin D3, 5,000 unit Tab Take 5,000 Units by mouth daily.        denosumab 60 mg/mL Syrg Inject 60 mg under the skin every 6 (six) months.       fluticasone-salmeterol (ADVAIR) 250-50 mcg/dose DISKUS Inhale 1 puff 2 (two) times a day. 180 each 3     hyoscyamine (LEVBID) 0.375 mg 12 hr tablet Take 0.375 mg by mouth every 12 (twelve) hours as needed for cramping.       levETIRAcetam (KEPPRA) 500 MG tablet Take 500 mg by mouth 2 (two) times a day.  3     losartan (COZAAR) 50 MG tablet Take 1 tablet (50 mg total) by mouth 2 (two) times a day. 180 tablet 1     multivit-min-iron-FA-lutein (MULTIVITAMIN WOMEN 50 PLUS) 8 mg iron-400 mcg-300 mcg Tab Take by mouth.       potassium chloride 20 mEq TbER Take 20 mEq by mouth daily. 90 tablet 3     RABEprazole (ACIPHEX) 20 mg tablet Take 1 tablet (20 mg total) by mouth daily. 90 tablet 1     tiotropium (SPIRIVA) 18 mcg inhalation capsule Place 1 capsule (2 puffs total) into inhaler and inhale daily. 90  capsule 3     OXYGEN-AIR DELIVERY SYSTEMS MISC Use 1.5 L As Directed bedtime.       Current Facility-Administered Medications on File Prior to Visit   Medication Dose Route Frequency Provider Last Rate Last Dose     denosumab 60 mg (PROLIA 60 mg/ml)  60 mg Subcutaneous Q6 Months Lalitha Haq NP   60 mg at 10/15/19 1052       Allergies   Allergen Reactions     Clindamycin Rash     Carbamazepine Rash     Morphine Nausea Only       Family History   Problem Relation Age of Onset     Breast cancer Mother 66     Hypertension Mother      Heart attack Mother      Varicose Veins Mother      Hypertension Father      Heart attack Sister      Heart disease Sister      Diabetes Son      Pulmonary Hypertension Daughter      Heart attack Sister      Heart disease Sister        Social History     Socioeconomic History     Marital status:      Spouse name: None     Number of children: None     Years of education: None     Highest education level: None   Occupational History     None   Social Needs     Financial resource strain: None     Food insecurity:     Worry: None     Inability: None     Transportation needs:     Medical: None     Non-medical: None   Tobacco Use     Smoking status: Former Smoker     Types: Cigarettes     Last attempt to quit: 1998     Years since quittin.9     Smokeless tobacco: Never Used   Substance and Sexual Activity     Alcohol use: Yes     Alcohol/week: 2.0 standard drinks     Types: 2 Glasses of wine per week     Drug use: Yes     Types: Oxycodone     Sexual activity: Never   Lifestyle     Physical activity:     Days per week: None     Minutes per session: None     Stress: None   Relationships     Social connections:     Talks on phone: None     Gets together: None     Attends Muslim service: None     Active member of club or organization: None     Attends meetings of clubs or organizations: None     Relationship status: None     Intimate partner violence:     Fear of current or  ex partner: None     Emotionally abused: None     Physically abused: None     Forced sexual activity: None   Other Topics Concern     None   Social History Narrative    .  Two kids.  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         Review of Systems  A 12 point comprehensive review of systems was negative except as noted.      Objective:   /84 (Patient Site: Right Arm, Patient Position: Sitting, Cuff Size: Adult Regular)   Pulse 96   Temp 97.8  F (36.6  C) (Oral)   Resp 16   LMP 12/12/1998   SpO2 96%     General Appearance:    Alert, pleasant, cooperative, no distress, appears stated age, obese   Head:    Normocephalic, without obvious abnormality, atraumatic   Eyes:    Conjunctiva/corneas clear   Ears:    Normal TM's without erythema or bulging. Nicole external ear canals, both ears   Nose:   Nares normal, septum midline, mucosa normal, no drainage    or sinus tenderness   Throat:   Lips, mucosa, and tongue normal; teeth and gums normal.  No tonsilar hypertrophy or exudate.   Neck:   Supple, symmetrical, trachea midline, no adenopathy    Lungs:   , Prolonged expiration noted bilaterally.  Otherwise lungs are clear.    Heart:    Regular rate and rhythm, S1 and S2 normal, no murmur, rub   or gallop       Extremities:   Extremities normal, atraumatic, no cyanosis or edema   Skin:   Skin color, texture, turgor normal, no rashes or lesions               This note has been dictated using voice recognition software. Any grammatical or context distortions are unintentional and inherent to the software

## 2021-06-03 NOTE — PROGRESS NOTES
[facility] Daily Progress     Patient Name: Lalitha Underwood  Date: 2019  Visit #:3  PTA visit #: 2  Referral Diagnosis: Abnormality of gait  Referring provider: Kelly Hull MD  Visit Diagnosis:     ICD-10-CM    1. Abnormality of gait R26.9    2. Generalized muscle weakness M62.81    3. Decreased functional mobility R26.89    4. Unsteadiness on feet R26.81    5. Decreased functional mobility and endurance Z74.09    6. Poor balance R26.89          Assessment:   No LOB with dynamic balance drills.   Pt with waddling type gait which is more pronounced without her SEC.  Pt demonstrates weak hip musculature.   Patient is benefitting from skilled physical therapy and is making steady progress toward functional goals.  Patient is appropriate to continue with skilled physical therapy intervention, as indicated by initial plan of care.    Goal Status:On going  Pt. will demonstrate/verbalize independence in self-management of condition in : 4 weeks  Pt. will be independent with home exercise program in : 4 weeks    Pt will: improve 4-square step test to <15 seconds in 4-6 weeks to demonstrate improved mobility over obstacles and multi-directional balance  Pt will: improve her gait speed by 0.6 m/sec in 4 weeks to demonstrate better community ambulation with SPC  Pt will: perform step ups with only SPC to demosntrate improved community ambulation      Plan / Patient Education:     Continue with initial plan of care.  Progress with home program as tolerated.   Progress hip and ankle strengthening, balance drills.     Subjective:   Pain Ratin   Pt reports that her  recently had a fall fracturing his hip. She has tried to keep up with the exercises. She has not walked on her treadmill.  Pt reports no falls or LOB since her last visit.        Objective:   O2 stats after treadmill: 96%-98%  HR: 102  Pt ambulates with a SEC, + Trendelenburg B.     Exercises:  Exercise #1: Treadmill @ 1.4 MPH x  "5'  Comment #1: Sit<>stands 10x pt uses arms up, no arms down,  HEP  Exercise #2: Wobble board balance fwd/side  Comment #2: 30\"  Exercise #3: Cone taps in // bars  Comment #3: 10x benedicto   Exercise #4: Heel/toe raises in // bars HEP  Comment #4: 10x, difficulty posterior weight shift  Exercise #5: Low back stretch seated HEP  Comment #5: 30\"  Exercise #6: Walking across open space 5' with SPC  Comment #6: Standing tandem HEP 30\" on firm and foam  Exercise #7: standing hip AB and ext alternating legs  Comment #7: x10 each B  Exercise #8: march  Comment #8: x10      Treatment Today    TREATMENT MINUTES COMMENTS   Evaluation     Self-care/ Home management     Manual therapy     Neuromuscular Re-education 18 Balance drills in //bars:  -NBOS on firm and foam x60\"  -modified tandem stand on firm and foam 30 x2 each  -side stepping 10 ft x4 no UE support  - marching 10 ft x 4 1 hand on bar  -tandem walk 10 ft x4 1 hand on bar   Therapeutic Activity     Therapeutic Exercises 15 Treadmill @.8 MPH x 3'  See flow sheet     Gait training     Modality__________________                Total 33    Blank areas are intentional and mean the treatment did not include these items.       ABRAN Persaud  11/11/2019  "

## 2021-06-03 NOTE — TELEPHONE ENCOUNTER
Medication Question or Clarification  Who is calling: Pharmacy: [facility]  What medication are you calling about? (include dose and sig)   RABEprazole (ACIPHEX) 20 mg tablet 90 tablet 1 10/7/2019     Sig - Route: Take 1 tablet (20 mg total) by mouth daily. - Oral    Class: Print        Who prescribed the medication?: Kelly Hull MD  What is your question/concern?: Pharmacy stated the above medication is on back order and an alternative is requested.  Pharmacy: [facility]  Okay to leave a detailed message?: Yes  Site CMT - Please call the pharmacy to obtain any additional needed information.

## 2021-06-03 NOTE — PATIENT INSTRUCTIONS - HE
Symptoms consistent with a viral upper respiratory infection.   No antibiotics indicated at this time.  Recommend symptomatic treatment such as decongestants such as Mucinex.  Recommend follow up if getting worse or not improving.

## 2021-06-03 NOTE — PROGRESS NOTES
[facility] Daily Progress     Patient Name: Lalitha Underwood  Date: 2019  Visit #:  PTA visit #: 4  Referral Diagnosis: Abnormality of gait  Referring provider: Kelly Hull MD  Visit Diagnosis:     ICD-10-CM    1. Abnormality of gait R26.9    2. Generalized muscle weakness M62.81    3. Decreased functional mobility R26.89    4. Unsteadiness on feet R26.81    5. Decreased functional mobility and endurance Z74.09    6. Poor balance R26.89          Assessment:     Pt tolerating higher level balance drills without LOB.  Pt does require short rest breaks every 2-3 exercises.    Patient is benefitting from skilled physical therapy and is making steady progress toward functional goals.  Patient is appropriate to continue with skilled physical therapy intervention, as indicated by initial plan of care.    Goal Status:On going  Pt. will demonstrate/verbalize independence in self-management of condition in : 4 weeks  Pt. will be independent with home exercise program in : 4 weeks    Pt will: improve 4-square step test to <15 seconds in 4-6 weeks to demonstrate improved mobility over obstacles and multi-directional balance  Pt will: improve her gait speed by 0.6 m/sec in 4 weeks to demonstrate better community ambulation with SPC  Pt will: perform step ups with only SPC to demosntrate improved community ambulation      Plan / Patient Education:     Continue with initial plan of care.  Progress with home program as tolerated.   Progress hip and ankle strengthening, balance drills.   Suggested to pt using her walker at night. Pt stated she does not bring her walker in from her car.    Subjective:     Pain Ratin   Pt reports she felt like she had a set back over the weekend. Pt reports having a riddle time moving around her house. She does not typically use an AD in her home.         Objective:     Pt ambulates with a SEC, + Trendelenburg B.     Exercises:  Exercise #1: NuStep L7 6'  Comment #1:  "Sit<>stands 10x gave a cushion and no arms 10x HEP  Exercise #2: Wobble board balance fwd/side  Comment #2: 30\"  Exercise #3: Cone taps in // bars  Comment #3: 10x benedicto   Exercise #4: Heel/toe raises in // bars HEP  Comment #4: 10x, difficulty posterior weight shift  Exercise #5: Tandom stance on foam, head turns with gaze stabilization  Comment #5: 2' each foot forward  Exercise #6: Walking across open space  Comment #6: 5' with SPC - tried step to gait pattern, no change  Exercise #7: standing hip AB and ext alternating legs  Comment #7: x10 each B  Exercise #8: march  Comment #8: x10  Exercise #9: Chair press up - UNABLE  Comment #9: Lat pull downs instead - 15x  Exercise #10: Counter top pushups  Comment #10: 15x        Treatment Today    TREATMENT MINUTES COMMENTS   Evaluation     Self-care/ Home management     Manual therapy     Neuromuscular Re-education 30 Balance drills in //bars:  -NBOS on firm and foam x60\"  -NBOS with head and trunk turns 10x benedicto  - Tandem stance  -walk on airex and 4 dynadiscs and up/down 4' step 10ft x4 forward and side stepping  -toe taps onto 1 cone x10  -toe taps onto 3 cones x10  -reaching forward and across body for cones x10   Therapeutic Activity     Therapeutic Exercises 25 Treadmill @1.3 MPH x 5'  See flow sheet     Gait training     Modality__________________                Total 55    Blank areas are intentional and mean the treatment did not include these items.       Sumaya Garnett PTA,CLT  11/25/2019  "

## 2021-06-04 VITALS
DIASTOLIC BLOOD PRESSURE: 86 MMHG | HEART RATE: 84 BPM | OXYGEN SATURATION: 92 % | BODY MASS INDEX: 37.56 KG/M2 | SYSTOLIC BLOOD PRESSURE: 124 MMHG | HEIGHT: 64 IN | WEIGHT: 220 LBS

## 2021-06-04 VITALS
OXYGEN SATURATION: 94 % | WEIGHT: 228 LBS | TEMPERATURE: 98 F | RESPIRATION RATE: 20 BRPM | BODY MASS INDEX: 38.93 KG/M2 | HEIGHT: 64 IN | HEART RATE: 74 BPM | DIASTOLIC BLOOD PRESSURE: 84 MMHG | SYSTOLIC BLOOD PRESSURE: 136 MMHG

## 2021-06-04 NOTE — TELEPHONE ENCOUNTER
Serene called to say she is really not feeling any better. Has completed her prednisone and today is her last day of her antibiotic. Will give 7 days of prednisone 40 mg and then 7 days of 20 mg of prednisone per Dr. Messer. Instructed to call next week with an update, sooner if symptoms get worse.

## 2021-06-04 NOTE — PROGRESS NOTES
Assessment and Plan   1. COPD exacerbation (H)  Given symptoms (increased dyspnea, sputum V, and sputum purulence) and age >65 will treat as complicated COPD but no risk factors for pseudomonas so will treat with the below. Personally reviewed CXR which showed no acute findings. Patient instructed to continue home inhalers and call her Pulm clinic on Monday to let them know about exacerbation and if there are any different instructions or follow up recommended before she is scheduled to be seen fall 2020. If worsening or failing to improve, should RTC.  - XR Chest 2 Views; Future  - XR Chest 2 Views  - amoxicillin-clavulanate (AUGMENTIN) 875-125 mg per tablet; Take 1 tablet by mouth 2 (two) times a day for 5 days.  Dispense: 10 tablet; Refill: 0  - predniSONE (DELTASONE) 20 MG tablet; Take 40 mg by mouth daily for 5 days.  Dispense: 10 tablet; Refill: 0    Follow up if worsening.    Options for treatment and follow-up care were reviewed with the patient and/or guardian. Lalitha Underwood and/or guardian engaged in the decision making process and verbalized understanding of the options discussed and agreed with the final plan.    Anthony Cotton MD         HPI:   Lalitha Underwood is a 74 y.o. female who presents to clinic today for   Chief Complaint   Patient presents with     Cough     x 2 week that is worsening last few days, chest congestion ,SOB      Patient stated she was coughing up phlegm 2 weeks ago, which then improved. However, she has been coughing up phlegm for the past few days now. No fevers or chills, nausea, vomiting, diarrhea. She has felt congestion in her chest and felt she has been more shortness of breath recently. She uses O2 while she sleeps - has for many years. She has not needed to use her O2 during the day.    Patient was seen here in  11/24, but did not require antibiotics or steroids.    She follows with Dr. Tamayo for Pulm, last saw him 9/19/2019 - plan had been to return in 1  year.    She has been taking Mucinex. Using her rescue inhaler a bit more than usual.         Review of Systems:     A complete, 12 point review of systems was negative unless otherwise noted above in the HPI.          PMHX:   Active Problems List  Patient Active Problem List   Diagnosis     Solar Elastosis     Rosacea     Epilepsy And Recurrent Seizures     Esophageal Reflux     Vitamin D Deficiency     Pulmonary emphysema (H)     Hyperlipidemia     Walk Is Wobbly Or Unsteady (Ataxia)     Hypertension     Venous insufficiency of both lower extremities     Hiatal hernia     Leg swelling     Left arm swelling     Flat feet     Valgus deformity of both feet     Urge incontinence of urine     Mild aortic valve regurgitation     Mild aortic valve stenosis     Osteoporosis     Decreased hearing of both ears     Obesity (BMI 35.0-39.9) with comorbidity (H)     Neuropathy, idiopathic     Localized deposits of fat     Collapsed arches     S/P repair of paraesophageal hernia     Active problem list reviewed.    Current Medications  Current Outpatient Medications   Medication Sig Dispense Refill     albuterol (PROAIR HFA;PROVENTIL HFA;VENTOLIN HFA) 90 mcg/actuation inhaler INHALE 2 PUFFS EVERY 6 HOURS AS NEEDED FOR WHEEZING 3 Inhaler 0     calcium citrate (CALCITRATE) 200 mg (950 mg) tablet Take 1 tablet by mouth 2 (two) times a day.       cholecalciferol, vitamin D3, 5,000 unit Tab Take 5,000 Units by mouth daily.        denosumab 60 mg/mL Syrg Inject 60 mg under the skin every 6 (six) months.       fluticasone-salmeterol (ADVAIR) 250-50 mcg/dose DISKUS Inhale 1 puff 2 (two) times a day. 180 each 3     hyoscyamine (LEVBID) 0.375 mg 12 hr tablet Take 0.375 mg by mouth every 12 (twelve) hours as needed for cramping.       levETIRAcetam (KEPPRA) 500 MG tablet Take 500 mg by mouth 2 (two) times a day.  3     losartan (COZAAR) 50 MG tablet Take 1 tablet (50 mg total) by mouth 2 (two) times a day. 180 tablet 1      multivit-min-iron-FA-lutein (MULTIVITAMIN WOMEN 50 PLUS) 8 mg iron-400 mcg-300 mcg Tab Take by mouth.       omeprazole (PRILOSEC) 20 MG capsule Take 1 capsule (20 mg total) by mouth daily. 90 capsule 4     OXYGEN-AIR DELIVERY SYSTEMS MISC Use 1.5 L As Directed bedtime.       potassium chloride 20 mEq TbER Take 20 mEq by mouth daily. 90 tablet 3     RABEprazole (ACIPHEX) 20 mg tablet Take 1 tablet (20 mg total) by mouth daily. 90 tablet 1     tiotropium (SPIRIVA) 18 mcg inhalation capsule Place 1 capsule (2 puffs total) into inhaler and inhale daily. 90 capsule 3     amoxicillin-clavulanate (AUGMENTIN) 875-125 mg per tablet Take 1 tablet by mouth 2 (two) times a day for 5 days. 10 tablet 0     predniSONE (DELTASONE) 20 MG tablet Take 40 mg by mouth daily for 5 days. 10 tablet 0     Current Facility-Administered Medications   Medication Dose Route Frequency Provider Last Rate Last Dose     denosumab 60 mg (PROLIA 60 mg/ml)  60 mg Subcutaneous Q6 Months Lalitha Haq NP   60 mg at 10/15/19 1052     Medication list reviewed.    Social History  Social History     Tobacco Use     Smoking status: Former Smoker     Types: Cigarettes     Last attempt to quit: 1998     Years since quittin.0     Smokeless tobacco: Never Used   Substance Use Topics     Alcohol use: Yes     Alcohol/week: 2.0 standard drinks     Types: 2 Glasses of wine per week     Drug use: Yes     Types: Oxycodone     Social History     Substance and Sexual Activity   Drug Use Yes     Types: Oxycodone     Social history reviewed.    Family History  Family History   Problem Relation Age of Onset     Breast cancer Mother 66     Hypertension Mother      Heart attack Mother      Varicose Veins Mother      Hypertension Father      Heart attack Sister      Heart disease Sister      Diabetes Son      Pulmonary Hypertension Daughter      Heart attack Sister      Heart disease Sister      Family history reviewed.    Allergies  Allergies   Allergen  Reactions     Clindamycin Rash     Carbamazepine Rash     Morphine Nausea Only     Allergies reviewed.       Physical Exam:     Vitals:    12/07/19 0913   Pulse: 86   Resp: 16   Temp: 98  F (36.7  C)   TempSrc: Oral   SpO2: 95%   Weight: 215 lb 11.2 oz (97.8 kg)     Body mass index is 37.02 kg/m .    GENERAL APPEARANCE: alert, appears stated age, no acute distress  HEENT: Eyes grossly normal to inspection, mouth and throat without erythema, ulcers, or lesions  NECK: no cervical lymphadenopathy  RESP: Diffuse bilateral expiratory wheezing  CV: regular rate and rhythm, no murmur, click, rub, or gallop  ABDOMEN: soft, nontender   MSK: extremities normal, no gross deformities noted, no lower extremity edema  NEURO: sensory exam grossly normal, mentation appears intact and speech normal  PSYCH: mood and affect normal

## 2021-06-04 NOTE — PROGRESS NOTES
[facility] Daily Progress     Patient Name: Lalitha Underwood  Date: 2019  Visit #:  PTA visit #: 4  Referral Diagnosis: Abnormality of gait  Referring provider: Kelly Hull MD  Visit Diagnosis:     ICD-10-CM    1. Abnormality of gait R26.9    2. Generalized muscle weakness M62.81    3. Decreased functional mobility R26.89    4. Unsteadiness on feet R26.81    5. Decreased functional mobility and endurance Z74.09    6. Poor balance R26.89          Assessment:     Serene is retested today however has been ill with complications of COPD.  It likely was not a valid retest day due to her recent illness.  She did demonstrate some improvement in the TUG test, and improvement in her gait speed since initial evaluation though this is not significant.  There was no improvement for sit<>stand test, 4-square step test, 2 minutes walk test.  She continues to have significant difficulty when she reaches an open space.  We will discharge her at this time but I do recommend that she follows up in 2-3 months.    Goal Status:On going  Pt. will demonstrate/verbalize independence in self-management of condition in : 4 weeks: MET  Pt. will be independent with home exercise program in : 4 weeks: MET  Pt will: improve 4-square step test to <15 seconds in 4-6 weeks to demonstrate improved mobility over obstacles and multi-directional balance:   Pt will: improve her gait speed by 0.6 m/sec in 4 weeks to demonstrate better community ambulation with SPC:   Pt will: perform step ups with only SPC to demosntrate improved community ambulation: Not met      Plan / Patient Education:     Continue with initial plan of care.  Progress with home program as tolerated.   Progress hip and ankle strengthening, balance drills.   Suggested to pt using her walker at night. Pt stated she does not bring her walker in from her car.    Subjective:     Pain Ratin   Pt is not feeling well today, on a course of prednisone and  "antibiotics.    Uses a walker for curbs/community ambulation.  She gets out into the community daily.          Objective:       Functional test Score / AD if used Previous Score from 6/27/19 Fall risk cutoff score Norms Observations   30 second sit<>stand 11x    11 <8 high fall risk  9-12 mod risk       Timed up and go (TUG)  (seconds) 11.4 sec 12.8 sec >13 sec       Cognitive TUG (seconds) 15.1 sec    13.3 sec >15 sec 9-10 seconds     Comfortable gait speed 10M    0.9 M/sec      1 M/sec   1.32 M/sec - end of last episode of care  0.71 M/sec - eval <1.0 m/s       4-square step test    41 s Trial 1  18 s Trial 2 9.5 sec without SPC - end of last episode  38 sec with SPC - IE >15 sec       2 minute walk test    87.6 M 91%SaO2, several minutes to get back to 98% 98 M             Exercises:  Exercise #1: NuStep L7 6'  Comment #1: Sit<>stands 10x gave a cushion and no arms 10x HEP  Exercise #2: Wobble board balance fwd/side  Comment #2: 30\"  Exercise #3: Cone taps in // bars  Comment #3: 10x benedicto   Exercise #4: Heel/toe raises in // bars HEP  Comment #4: 10x, difficulty posterior weight shift  Exercise #5: Tandom stance on foam, head turns with gaze stabilization  Comment #5: 2' each foot forward  Exercise #6: Walking across open space  Comment #6: 5' with SPC - tried step to gait pattern, no change  Exercise #7: standing hip AB and ext alternating legs  Comment #7: x10 each B  Exercise #8: march  Comment #8: x10  Exercise #9: Chair press up -ABLE  Comment #9: Lat pull downs instead - 15x  Exercise #10: Counter top pushups  Comment #10: 15x        Treatment Today    TREATMENT MINUTES COMMENTS   Evaluation     Self-care/ Home management     Manual therapy     Neuromuscular Re-education     Therapeutic Activity     Therapeutic Exercises 25 Treadmill @1.3 MPH x 5'  See flow sheet     Gait training     Modality__________________     Physical Performance Testing  30          Total 55    Blank areas are intentional and mean the " treatment did not include these items.       Latasha LEDESMAT

## 2021-06-04 NOTE — PROGRESS NOTES
Assessment:     1. Bilateral swelling of feet and ankles  hydroCHLOROthiazide (MICROZIDE) 12.5 mg capsule          Plan:     Patient's bilateral ankle swelling slightly worse ever since she stopped her hydrochlorothiazide.  She is wanting to get started back on this again as this is been uncomfortable for her.  Prescription given for hydrochlorothiazide 2.5 mg taken daily.  Recommend she follow-up with her primary care provider if symptoms are getting worse or not improving.    Small red irritated area on the lateral aspect of her right ankle overall improved.  Continue to monitor.    Subjective:       74 y.o. female presents for evaluation of bilateral ankle swelling that has been going on ever since she stopped taking her hydrochlorothiazide.  This was stopped initially because she had been feeling a bit lightheaded but she does not think that stopping it really made any difference for this.  Her ankle swelling is gotten a bit more uncomfortable for her and is hoping to get back on hydrochlorothiazide.  She denies any calf pain, swelling, or redness.  No shortness of breath or wheezing.  She was recently treated for a bronchitis which is now significantly better.    She also has a small red irritated area on the lateral aspect of her right ankle that started about a week ago but overall this is better.    Patient Active Problem List   Diagnosis     Solar Elastosis     Rosacea     Epilepsy And Recurrent Seizures     Esophageal Reflux     Vitamin D Deficiency     Pulmonary emphysema (H)     Hyperlipidemia     Walk Is Wobbly Or Unsteady (Ataxia)     Hypertension     Venous insufficiency of both lower extremities     Hiatal hernia     Leg swelling     Left arm swelling     Flat feet     Valgus deformity of both feet     Urge incontinence of urine     Mild aortic valve regurgitation     Mild aortic valve stenosis     Osteoporosis     Decreased hearing of both ears     Obesity (BMI 35.0-39.9) with comorbidity (H)      Neuropathy, idiopathic     Localized deposits of fat     Collapsed arches     S/P repair of paraesophageal hernia       Past Medical History:   Diagnosis Date     Convulsive disorder (H)      COPD (chronic obstructive pulmonary disease) (H)      Depression      Hernia of unspecified site of abdominal cavity without mention of obstruction or gangrene      Hiatal hernia      Hypertension      On home oxygen therapy     at 1.5 liters at nite     Osteopenia      Shortness of breath      Venous insufficiency of both lower extremities        Past Surgical History:   Procedure Laterality Date     OTHER SURGICAL HISTORY Left 2003    Broke titanium in left arm     SHOULDER SURGERY Right 1967     UMBILICAL HERNIA REPAIR  04/04/2018    Dr. Jimenez       Current Outpatient Medications on File Prior to Visit   Medication Sig Dispense Refill     albuterol (PROAIR HFA;PROVENTIL HFA;VENTOLIN HFA) 90 mcg/actuation inhaler INHALE 2 PUFFS EVERY 6 HOURS AS NEEDED FOR WHEEZING 3 Inhaler 0     calcium citrate (CALCITRATE) 200 mg (950 mg) tablet Take 1 tablet by mouth 2 (two) times a day.       cholecalciferol, vitamin D3, 5,000 unit Tab Take 5,000 Units by mouth daily.        denosumab 60 mg/mL Syrg Inject 60 mg under the skin every 6 (six) months.       fluticasone-salmeterol (ADVAIR) 250-50 mcg/dose DISKUS Inhale 1 puff 2 (two) times a day. 180 each 3     hyoscyamine (LEVBID) 0.375 mg 12 hr tablet Take 0.375 mg by mouth every 12 (twelve) hours as needed for cramping.       levETIRAcetam (KEPPRA) 500 MG tablet Take 500 mg by mouth 2 (two) times a day.  3     losartan (COZAAR) 50 MG tablet Take 1 tablet (50 mg total) by mouth 2 (two) times a day. 180 tablet 1     multivit-min-iron-FA-lutein (MULTIVITAMIN WOMEN 50 PLUS) 8 mg iron-400 mcg-300 mcg Tab Take by mouth.       omeprazole (PRILOSEC) 20 MG capsule Take 1 capsule (20 mg total) by mouth daily. 90 capsule 4     OXYGEN-AIR DELIVERY SYSTEMS [facility] Use 1.5 L As Directed bedtime.        potassium chloride 20 mEq TbER Take 20 mEq by mouth daily. 90 tablet 3     RABEprazole (ACIPHEX) 20 mg tablet Take 1 tablet (20 mg total) by mouth daily. 90 tablet 1     tiotropium (SPIRIVA) 18 mcg inhalation capsule Place 1 capsule (2 puffs total) into inhaler and inhale daily. 90 capsule 3     Current Facility-Administered Medications on File Prior to Visit   Medication Dose Route Frequency Provider Last Rate Last Dose     denosumab 60 mg (PROLIA 60 mg/ml)  60 mg Subcutaneous Q6 Months Lalitha Haq NP   60 mg at 10/15/19 1052       Allergies   Allergen Reactions     Clindamycin Rash     Carbamazepine Rash     Morphine Nausea Only       Family History   Problem Relation Age of Onset     Breast cancer Mother 66     Hypertension Mother      Heart attack Mother      Varicose Veins Mother      Hypertension Father      Heart attack Sister      Heart disease Sister      Diabetes Son      Pulmonary Hypertension Daughter      Heart attack Sister      Heart disease Sister        Social History     Socioeconomic History     Marital status:      Spouse name: None     Number of children: None     Years of education: None     Highest education level: None   Occupational History     None   Social Needs     Financial resource strain: None     Food insecurity:     Worry: None     Inability: None     Transportation needs:     Medical: None     Non-medical: None   Tobacco Use     Smoking status: Former Smoker     Types: Cigarettes     Last attempt to quit: 1998     Years since quittin.0     Smokeless tobacco: Never Used   Substance and Sexual Activity     Alcohol use: Yes     Alcohol/week: 2.0 standard drinks     Types: 2 Glasses of wine per week     Drug use: Yes     Types: Oxycodone     Sexual activity: Never   Lifestyle     Physical activity:     Days per week: None     Minutes per session: None     Stress: None   Relationships     Social connections:     Talks on phone: None     Gets together: None      Attends Zoroastrian service: None     Active member of club or organization: None     Attends meetings of clubs or organizations: None     Relationship status: None     Intimate partner violence:     Fear of current or ex partner: None     Emotionally abused: None     Physically abused: None     Forced sexual activity: None   Other Topics Concern     None   Social History Narrative    .  Two kids.  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         Review of Systems  12 point review of systems performed and is negative except as per HPI     Objective:                               General Appearance:      Vitals:    01/04/20 1318   BP: 152/68   Pulse: 89   Resp:    Temp:    SpO2:            Alert, pleasant, cooperative, no distress, appears stated age   Head:    Normocephalic, without obvious abnormality, atraumatic               Throat:   Lips, mucosa, and tongue normal; teeth and gums normal.  No tonsilar hypertrophy or exudate.   Neck:   Supple, symmetrical, trachea midline, no adenopathy    Lungs:     Clear to auscultation bilaterally without wheezes, rales, or rhonchi, respirations unlabored    Heart:    Regular rate and rhythm, S1 and S2 normal, no murmur, rub or gallop       Extremities:  1+ edema noted bilaterally in her feet and ankles.     Skin:  Very minimally tender area of the skin just on the lateral aspect of her right ankle.  No ecchymosis.  There is a slightly reddened       This note has been dictated using voice recognition software. Any grammatical or context distortions are unintentional and inherent to the software

## 2021-06-04 NOTE — TELEPHONE ENCOUNTER
Serene called with an update. She was seen in walk in clinic yesterday for shortness of breath and areally bad cough. Was started on prednisone and an antibiotic. Requesting something for her cough, said it is really bad, And is wondering if she should be seen sooner. Instructed to continue the prednisone and antibiotic will order tessalon pearls for her cough and instructed to call with any concerns or if no improvement. Message left with her .

## 2021-06-05 VITALS
HEIGHT: 64 IN | WEIGHT: 226 LBS | SYSTOLIC BLOOD PRESSURE: 132 MMHG | BODY MASS INDEX: 38.58 KG/M2 | HEART RATE: 72 BPM | DIASTOLIC BLOOD PRESSURE: 88 MMHG

## 2021-06-05 VITALS
SYSTOLIC BLOOD PRESSURE: 124 MMHG | BODY MASS INDEX: 39.05 KG/M2 | WEIGHT: 227.5 LBS | DIASTOLIC BLOOD PRESSURE: 82 MMHG | TEMPERATURE: 96.8 F | HEART RATE: 80 BPM

## 2021-06-05 VITALS
WEIGHT: 225.1 LBS | TEMPERATURE: 96.3 F | SYSTOLIC BLOOD PRESSURE: 112 MMHG | BODY MASS INDEX: 38.64 KG/M2 | HEART RATE: 88 BPM | DIASTOLIC BLOOD PRESSURE: 62 MMHG

## 2021-06-05 VITALS
BODY MASS INDEX: 38.26 KG/M2 | HEART RATE: 80 BPM | TEMPERATURE: 96.2 F | WEIGHT: 222.9 LBS | SYSTOLIC BLOOD PRESSURE: 122 MMHG | DIASTOLIC BLOOD PRESSURE: 82 MMHG

## 2021-06-05 VITALS
BODY MASS INDEX: 38.31 KG/M2 | DIASTOLIC BLOOD PRESSURE: 84 MMHG | RESPIRATION RATE: 18 BRPM | HEART RATE: 88 BPM | TEMPERATURE: 98.2 F | SYSTOLIC BLOOD PRESSURE: 122 MMHG | WEIGHT: 223.2 LBS

## 2021-06-05 VITALS — BODY MASS INDEX: 38.58 KG/M2 | WEIGHT: 226 LBS | HEIGHT: 64 IN

## 2021-06-05 VITALS — HEART RATE: 100 BPM | SYSTOLIC BLOOD PRESSURE: 120 MMHG | DIASTOLIC BLOOD PRESSURE: 76 MMHG

## 2021-06-06 NOTE — PROGRESS NOTES
Assessment and Plan:     1. Medicare annual wellness visit, subsequent  At today's visit, we discussed lifestyle interventions to promote self-management and wellness, including maintenance of a healthy weight, healthy diet, regular physical activity and exercise, and falls prevention.  Referral is placed to physical therapy to assist with balance.  I complete a handicap parking sticker today for her.  She has an advance healthcare directive, I advised that she forward this to us.  She declines further evaluation and treatment in regards to diet and obesity.  Immunizations reviewed, advised tetanus booster this summer, she will check her insurance coverage.  Discussed colon cancer screening, she is not interested in completing this before her 75th birthday and would like to defer future colon cancer screening.  She will continue yearly mammograms due to family history.    2. Pulmonary emphysema, unspecified emphysema type (H)  She continues to follow with Dr. Tamayo of pulmonology, stable on Advair and Spiriva with oxygen with sleep.    3. Hyperlipidemia, unspecified hyperlipidemia type  Encourage continued efforts at healthy lifestyle habits.  She currently takes no medication and management of this.  Will check fasting lipids today.    4. Essential hypertension  Adequately managed with combination of losartan and hydrochlorothiazide.  Advised healthy lifestyle habits.  Will check comprehensive metabolic panel in monitoring of these medications.  - losartan (COZAAR) 50 MG tablet; Take 1 tablet (50 mg total) by mouth 2 (two) times a day.  Dispense: 180 tablet; Refill: 4    5. Mild aortic valve regurgitation  6. Mild aortic valve stenosis  No symptoms from this.  No significant change in heart murmur.  Would plan follow-up echocardiogram in the next 1 to 2 years for monitoring.    7. Obesity (BMI 35.0-39.9) with comorbidity (H)  Encouraged healthy lifestyle habits.  She declines referral.    8. Neuropathy,  idiopathic  Advised that she follow-up with Dr. Morales to discuss this further.  Will obtain records in regards to previous MRI studies and further evaluation of this.    9. Age-related osteoporosis without current pathological fracture  She will continue Prolia under the direction of Lalitha Haq NP through endocrinology.  Check vitamin D level today.    10. Venous insufficiency of both lower extremities  She continues to work with Dr. Shultz.  Due to worsened edema, will increase hydrochlorothiazide to 25 mg daily.  Advised continued use of compression stockings, regular exercise, elevation of legs when seated, and limitation of dietary salt intake.    11. Walk Is Wobbly Or Unsteady (Ataxia)  Using a walker or cane.  Handicap parking sticker completed today.  Deserving further evaluation as noted by Dr. Morales, unclear how much possible neuropathy may be contributing.  Referral placed for physical therapy.  Records request made for MRI results from neurology as patient has not heard results.  Advise follow-up with neurology to discuss abnormality of gait and to discuss potential diagnosis of neuropathy that they are exploring.  - Ambulatory referral to Adult PT- Internal    12. Nonintractable epilepsy without status epilepticus, unspecified epilepsy type (H)  She will continue to follow with Dr. Morales.  Doing well on Keppra instead of Dilantin.  - levETIRAcetam (KEPPRA) 500 MG tablet; Take 1 tablet (500 mg total) by mouth 2 (two) times a day.  Dispense: 180 tablet; Refill: 4    13. Gastroesophageal reflux disease without esophagitis  Adequately managed with daily antacid.  - omeprazole (PRILOSEC) 20 MG capsule; Take 1 capsule (20 mg total) by mouth daily before breakfast.  Dispense: 90 capsule; Refill: 4  - RABEprazole (ACIPHEX) 20 mg tablet; Take 1 tablet (20 mg total) by mouth daily.  Dispense: 90 tablet; Refill: 4    14. Vitamin D deficiency  Encouraged daily supplement.  Will check vitamin D level  both due to prior deficiency and diagnosis of osteoporosis.    15. Urge incontinence of urine  Chronic, she continues to work with her gynecologist using a pessary.    16. Screen for colon cancer  We discussed colon cancer screening recommendations up through age 75.  Advise colonoscopy.  She is not interested.    20. Leg cramps  Advised adequate hydration, consideration of regular stretching, addition of magnesium supplement.  Will check magnesium level and hemogram today to assess further looking for electrolyte abnormalities or anemia or iron deficiency that would suggest underlying contributing factors.  Would prefer to check ferritin, however this is not covered by Medicare unfortunately.  - Magnesium  - HM2(CBC w/o Differential)     The patient's current medical problems were reviewed.    The following health maintenance schedule was reviewed with the patient and provided in printed form in the after visit summary:   Health Maintenance   Topic Date Due     COPD ACTION PLAN  1945     COLONOSCOPY  08/26/2019     FALL RISK ASSESSMENT  02/21/2020     MEDICARE ANNUAL WELLNESS VISIT  02/21/2020     TD 18+ HE  06/07/2020     DXA SCAN  12/31/2020     MAMMOGRAM  04/10/2021     LIPID  02/21/2024     ADVANCE CARE PLANNING  02/21/2024     HEPATITIS C SCREENING  Completed     SPIROMETRY  Completed     PNEUMOCOCCAL IMMUNIZATION 65+ LOW/MEDIUM RISK  Completed     INFLUENZA VACCINE RULE BASED  Completed     ZOSTER VACCINES  Completed        Subjective:   Chief Complaint: Lalitha Underwood is an 74 y.o. female here for an Annual Wellness visit.   HPI: History of COPD, followed by Dr. Tamayo of pulmonology.  She remains compliant with Advair but taking just once daily now, also Spiriva.  Wearing 1.5 lpm of oxygen with sleep.  Overall is feeling stable.  Last flare in November responded quickly to prednisone.  Feeling that things are going well.  History of dyslipidemia due for follow-up.  She remains on  hydrochlorothiazide losartan and management of her hypertension.  Denies lightheadedness, dizziness, headaches, chest pain, dyspnea, or swelling.  Previous history of mild aortic valve regurgitation and mild stenosis, last echocardiogram November 2017.  She denies lightheadedness or dizziness or impact on activity level.  History of which she felt is neuropathy, this is been presumed.  She saw Dr. Morales recently for management of her epilepsy, he had recommended imaging with MRIs, she has not yet received those results, that was back in last spring.  Due for follow-up.  She has not previously had any formal diagnosis of neuropathy and has not previously had an EMG.  History of osteoporosis for which she receives Prolia under the direction of the endocrinology team.  Chronic bilateral venous insufficiency for which she uses compression stockings, it is stable.  Managed by Dr. Shultz.  Ongoing difficulties with ataxia and balance troubles, unclear if it is related to her neuropathy, managed by neurology and further evaluation results are not yet available.  Interested in seeing physical therapy for strength and balancing.  Using a walker or cane at all times.  Interested in handicap parking sticker.  History of epilepsy and recurrent seizure disorder, was transitioned from Dilantin to Keppra by Dr. Morales and that is going well.  Chronic esophageal reflux managed previously with AcipHex, has transition to omeprazole due to insurance coverage.  History of vitamin D deficiency that is due for follow-up.  Neuro with pulsatile tinnitus, CTA was recommended but never done, she is considering this.  Chronic urge incontinence is stable.  She is had increasing difficulties with leg cramps mostly at bedtime, intermittent hand cramps, and in general increased leg pain.  Some improvement with addition of a multivitamin and use of an over-the- cream to help with hand cramps, interested in further evaluation  and treatment.  Playing cards seems to trigger the hand cramps in particular.  Patient has an advance healthcare directive at home, she will forward this to us.  She is due for tetanus booster, would like to check her insurance coverage first.  She is due for colonoscopy but is not interested in further colonoscopies.    Review of Systems:   Please see above.  The rest of the review of systems are negative for all systems.    Patient Care Team:  Kelly Hull MD as PCP - General (Family Medicine)  Kelly Hull MD as Assigned PCP     Patient Active Problem List   Diagnosis     Solar Elastosis     Rosacea     Epilepsy And Recurrent Seizures     Esophageal Reflux     Vitamin D Deficiency     Pulmonary emphysema (H)     Hyperlipidemia     Walk Is Wobbly Or Unsteady (Ataxia)     Hypertension     Venous insufficiency of both lower extremities     Left arm swelling     Valgus deformity of both feet     Urge incontinence of urine     Mild aortic valve regurgitation     Mild aortic valve stenosis     Osteoporosis     Decreased hearing of both ears     Obesity (BMI 35.0-39.9) with comorbidity (H)     Neuropathy, idiopathic     Localized deposits of fat     Collapsed arches     S/P repair of paraesophageal hernia     Past Medical History:   Diagnosis Date     Convulsive disorder (H)      COPD (chronic obstructive pulmonary disease) (H)      Depression      Hernia of unspecified site of abdominal cavity without mention of obstruction or gangrene      Hiatal hernia      Hypertension      On home oxygen therapy     at 1.5 liters at nite     Osteopenia      Shortness of breath      Venous insufficiency of both lower extremities       Past Surgical History:   Procedure Laterality Date     OTHER SURGICAL HISTORY Left 2003    Broke titanium in left arm     SHOULDER SURGERY Right 1967     UMBILICAL HERNIA REPAIR  04/04/2018    Dr. Jimenez      Family History   Problem Relation Age of Onset     Breast cancer Mother 66      Hypertension Mother      Heart attack Mother      Varicose Veins Mother      Hypertension Father      Heart attack Sister      Heart disease Sister      Diabetes Son      Pulmonary Hypertension Daughter      Heart attack Sister      Heart disease Sister       Social History     Socioeconomic History     Marital status:      Spouse name: Not on file     Number of children: Not on file     Years of education: Not on file     Highest education level: Not on file   Occupational History     Not on file   Social Needs     Financial resource strain: Not on file     Food insecurity:     Worry: Not on file     Inability: Not on file     Transportation needs:     Medical: Not on file     Non-medical: Not on file   Tobacco Use     Smoking status: Former Smoker     Types: Cigarettes     Last attempt to quit: 1998     Years since quittin.2     Smokeless tobacco: Never Used   Substance and Sexual Activity     Alcohol use: Yes     Alcohol/week: 2.0 standard drinks     Types: 2 Glasses of wine per week     Drug use: Yes     Types: Oxycodone     Sexual activity: Never   Lifestyle     Physical activity:     Days per week: Not on file     Minutes per session: Not on file     Stress: Not on file   Relationships     Social connections:     Talks on phone: Not on file     Gets together: Not on file     Attends Muslim service: Not on file     Active member of club or organization: Not on file     Attends meetings of clubs or organizations: Not on file     Relationship status: Not on file     Intimate partner violence:     Fear of current or ex partner: Not on file     Emotionally abused: Not on file     Physically abused: Not on file     Forced sexual activity: Not on file   Other Topics Concern     Not on file   Social History Narrative    .  Two kids.  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      Current Outpatient Medications   Medication Sig Dispense Refill     albuterol (PROAIR HFA;PROVENTIL HFA;VENTOLIN HFA) 90  mcg/actuation inhaler Inhale 2 puffs every 4 (four) hours as needed for wheezing. 3 Inhaler 4     calcium citrate (CALCITRATE) 200 mg (950 mg) tablet Take 1 tablet by mouth 2 (two) times a day.       cholecalciferol, vitamin D3, 5,000 unit Tab Take 5,000 Units by mouth daily.        denosumab 60 mg/mL Syrg Inject 60 mg under the skin every 6 (six) months.       fluticasone propion-salmeteroL (ADVAIR) 250-50 mcg/dose DISKUS Inhale 1 puff 2 (two) times a day. 180 each 4     folic acid/multivit-min/lutein (CENTRUM SILVER ORAL) Take by mouth.       hyoscyamine (LEVBID) 0.375 mg 12 hr tablet Take 1 tablet (0.375 mg total) by mouth every 12 (twelve) hours as needed for cramping. 60 tablet 3     levETIRAcetam (KEPPRA) 500 MG tablet Take 1 tablet (500 mg total) by mouth 2 (two) times a day. 180 tablet 4     losartan (COZAAR) 50 MG tablet Take 1 tablet (50 mg total) by mouth 2 (two) times a day. 180 tablet 4     multivit-min-iron-FA-lutein (MULTIVITAMIN WOMEN 50 PLUS) 8 mg iron-400 mcg-300 mcg Tab Take by mouth.       omeprazole (PRILOSEC) 20 MG capsule Take 1 capsule (20 mg total) by mouth daily before breakfast. 90 capsule 4     OXYGEN-AIR DELIVERY SYSTEMS [facility] Use 1.5 L As Directed bedtime.       potassium chloride 20 mEq TbER Take 20 mEq by mouth daily. 90 tablet 4     RABEprazole (ACIPHEX) 20 mg tablet Take 1 tablet (20 mg total) by mouth daily. 90 tablet 4     tiotropium (SPIRIVA) 18 mcg inhalation capsule Place 1 capsule (2 puffs total) into inhaler and inhale daily. 90 capsule 4     hydroCHLOROthiazide (HYDRODIURIL) 25 MG tablet Take 1 tablet (25 mg total) by mouth daily. 90 tablet 4     Current Facility-Administered Medications   Medication Dose Route Frequency Provider Last Rate Last Dose     denosumab 60 mg (PROLIA 60 mg/ml)  60 mg Subcutaneous Q6 Months Lalitha Haq NP   60 mg at 10/15/19 1052      Objective:   Vital Signs:   Visit Vitals  /84   Pulse 74   Temp 98  F (36.7  C) (Oral)   Resp 20   Ht 5'  "3.75\" (1.619 m)   Wt (!) 228 lb (103.4 kg)   LMP 12/12/1998   SpO2 94%   BMI 39.44 kg/m         VisionScreening:  No exam data present     PHYSICAL EXAM  Physical Examination: General appearance - alert, well appearing, and in no distress, oriented to person, place, and time and normal appearing weight  Mental status - alert, oriented to person, place, and time, normal mood, behavior, speech, dress, motor activity, and thought processes  Eyes - pupils equal and reactive, extraocular eye movements intact  Ears - bilateral TM's and external ear canals normal  Nose - normal and patent, no erythema, discharge or polyps  Mouth - mucous membranes moist, pharynx normal without lesions  Neck - supple, no significant adenopathy  Lymphatics - no palpable lymphadenopathy, no hepatosplenomegaly  Chest - clear to auscultation, no wheezes, rales or rhonchi, symmetric air entry  Heart - normal rate, regular rhythm, normal S1, S2, 2/6 early systolic murmur right upper sternal border, no rubs, clicks or gallops  Abdomen - soft, nontender, nondistended, no masses or organomegaly  Breasts - declined  Neurological - alert, oriented, normal speech, no focal findings or movement disorder noted  Musculoskeletal - no joint tenderness, deformity or swelling  Extremities - peripheral pulses normal, 1+ pedal edema bilaterally, no clubbing or cyanosis  Skin - normal coloration and turgor, no rashes, no suspicious skin lesions noted      Assessment Results 2/24/2020   Activities of Daily Living No help needed   Instrumental Activities of Daily Living No help needed   Mini Cog Total Score 5   Some recent data might be hidden     A Mini-Cog score of 0-2 suggests the possibility of dementia, score of 3-5 suggests no dementia    Identified Health Risks:     The patient was counseled and encouraged to consider modifying their diet and eating habits. She was provided with information on recommended healthy diet options.  Information on urinary " incontinence and treatment options given to patient.  She is at risk for falling and has been provided with information to reduce the risk of falling at home.  Information regarding advance directives (living fritz), including where she can download the appropriate form, was provided to the patient via the AVS.

## 2021-06-06 NOTE — TELEPHONE ENCOUNTER
Please call patient.  At her visit today, she endorsed taking both omeprazole and AcipHex.  We had prescribed omeprazole while AcipHex was on back order.  Please find out which of these she is currently taking.  She should take only 1 and not the other.

## 2021-06-06 NOTE — TELEPHONE ENCOUNTER
Please call patient:    I was able to obtain and review the roport of her head and neck MRI from Dr. Morales's office.  There are some wear and tear findings in her brain and neck, but nothing to account for her symptoms, based on my interpretation.  There was an incidental finding of a nodule on the left side of her thyroid (a gland that sits in her neck).  This should be evaluated further with an ultrasound.  The nodule was about 3 cm in diameter, so about the size of a ping pong ball.  It's not uncommon that we find nodules in the thyroid incidentally, and we want to be sure that it is not concerning.  Our radiology office will contact her next week to schedule the ultrasound.  JALEEL

## 2021-06-06 NOTE — TELEPHONE ENCOUNTER
Left message to call back for: medication question  Information to relay to patient:  Below message

## 2021-06-06 NOTE — TELEPHONE ENCOUNTER
Who is calling:  Patient   Reason for Call:  Calling back for unknown reasons . No detailed msg left to relay , Patient replied to medication question from PCP yesterday. Please call patient back in regards to Paper work left to be filled out and leave detailed msg when replying. Patient is available for conversation for the rest of the day .  Please advise   Date of last appointment with primary care:   02/24/20  Okay to leave a detailed message: Yes

## 2021-06-06 NOTE — TELEPHONE ENCOUNTER
Who is calling:  Patient   Reason for Call:  Patient is calling looking for her handicap for that was filled out for her at her doctors appointment on the 24th. Can the form be mailed out to her? Please advise  Date of last appointment with primary care: 02/24/2020  Okay to leave a detailed message: Yes

## 2021-06-06 NOTE — TELEPHONE ENCOUNTER
Called patient to discuss the abnormal thyroid ultrasound result. Recommend fine needle aspiration biopsy.  Ordered for the patient.    Patient is also concerned about her imbalance.  She was under the impression that her last hearing test demonstrated possible reason for her dizziness.  I was unable to find this and a cursory search of her chart, I would defer this back to the PCP and patient is amenable to wait.

## 2021-06-06 NOTE — TELEPHONE ENCOUNTER
Called and talked to patient.  Due to COVID19 we will be canceling appointments up to March 30th, 2020.  Patient is scheduled for 4/2.

## 2021-06-06 NOTE — TELEPHONE ENCOUNTER
Patient Returning Call  Reason for call:  lmtcb   Information relayed to patient:  Relayed msg and patient states she is only taking Omeprazole. Patient agrees not to take both medications and will follow up as needed .  Thank You   Patient has additional questions:  No  If YES, what are your questions/concerns:  Na   Okay to leave a detailed message?: No call back needed

## 2021-06-07 NOTE — TELEPHONE ENCOUNTER
If patient is feeling well overall, I think it would be reasonable to wait to come in when coronavirus concerns slow down. If she were to develop any new symptoms to concerns in the meantime, please let us know so we can determine appropriate follow up.

## 2021-06-07 NOTE — TELEPHONE ENCOUNTER
Who is calling:  [facility]  Reason for Call:  The radiologist  Is insisting that Thyroid biopsy for this patient is not urgent and should not be done at this time.  Also with the patient needing sedation should not do now.  Needs PCP's authorization to delay this procedure on the patient, please advise.  Date of last appointment with primary care: 2/24/20  Okay to leave a detailed message: No

## 2021-06-07 NOTE — TELEPHONE ENCOUNTER
LVM to call back regarding balance and hearing loss questions.    Scott Pineda, [facility]  Clinical Audiologist  [place] #[id number]

## 2021-06-07 NOTE — TELEPHONE ENCOUNTER
Patient is feeling good and would like to wait until end of may , she will set up lab apt for Magnesium at the [facility], please place order.

## 2021-06-07 NOTE — TELEPHONE ENCOUNTER
Called and left message to return call to clinic. If patients is feeling ok, we should wait a few more weeks. When she saw Dr. Hull a month ago, it was explained that she could return for BP check and labs in 4-8 weeks. Given the COVID issue, we would advise that she wait to come in unless she is feeling unwell. If that is the case, we will call her and discuss her symptoms over the phone.     Charley Philip RN

## 2021-06-07 NOTE — TELEPHONE ENCOUNTER
Who is calling:  patient  Reason for Call:  Patient called stating she was seen in February & was told to return for a blood pressure check & labs. She would like to know if she needs to come in or if she can hold off. If she needs to come in for the BP check & lab, she would prefer to come to the [facility] clinic as if it closer to her home.   Date of last appointment with primary care: 2/24/2020  Okay to leave a detailed message: Yes

## 2021-06-08 NOTE — TELEPHONE ENCOUNTER
Medication Question or Clarification  Who is calling: Patient  What medication are you calling about (include dose and sig)?:   Disp  Refills  Start  End      hyoscyamine (LEVBID) 0.375 mg 12 hr tablet  60 tablet  3  5/15/2020      Sig - Route: Take 1 tablet (0.375 mg total) by mouth every 12 (twelve) hours as needed for cramping. - Oral     Sent to pharmacy as: hyoscyamine ER 0.375 mg tablet,extended release,12 hr (LEVBID)     E-Prescribing Status: Receipt confirmed by pharmacy (5/15/2020  8:36 AM CDT)       Who prescribed the medication?: Kelly Hull MD   What is your question/concern?: Patient stated she saves more money when she gets a 90 day supply of her medications. Patient stated the first Rx was written for a 30 day supply back in 02/2020 and now on 5/15/20, Kelly Hull MD wrote her a 30 day supply again. Patient is asking why did Kelly Hull MD write her another 30 day supply Rx for hyoscyamine? Patient stated she didn't request for a refill from the office. Patient stated she had to pay the price of a 90 day supply, for only a 30 day supply of hyoscyamine. Patient stated she would like an explanation from Kelly Hull MD on this.  Requested Pharmacy: n/a  Okay to leave a detailed message?: No  [phone]

## 2021-06-08 NOTE — PROGRESS NOTES
[facility]  ENDOCRINOLOGY    Osteoporosis Follow Up 1/11/2017    Lalitha Underwood, 1945, [id number]          Reason for visit      1. Osteopenia    2. Vitamin D deficiency        History     Lalitha Underwood is a very pleasant 71 y.o. old female who presents for follow up.   SUMMARY:  1. Osteopenia-she was started on alendronate 2 years ago. She has been tolerating alendronate well however a recent bone density report as noted below showed deterioration in the bone density in the right hip. She states she's been compliant with her alendronate every week.  She does not take any calcium supplements and only consumes about 1 serving of dairy each day. She takes 5000 IU daily of the 3 and her recent level was 41.8.  TSH in December 2014 was 1.87.  She has been on Dilantin for the last 24 years after having a grand mal seizure. Prior to that she was diagnosed with seizure disorder at the age of 18 and use medications initially for about 2 years and then discontinued for about 27 years.  She is also a former smoker and smoked for about 35 years and quit in 1998.  She does not undertake any weight-bearing exercise currently. She is thinking of joining the [employer] in January  She has had several falls but has never resulted in a frigidity fracture.  She has lost 1.75 inches in height.  Menopause at age 50 and she used hormone replacement therapy for about 2-3 years and stopped after the women's health initiative data came out.  Her mother had an osteoporotic hip fracture at age 90. She had to have open reduction internal fixation and lived for another 4 years after that but was dependent on a walker.  Her sister also has osteopenia.  She has a long-standing history of hiatal hernia and has been on proton pump inhibitors followed time and is currently in AcipHex.     Today:  Serene returns today in f/u after a recent Dexa Scan.  This was a one year scan after she had started Prolia injections for Osteopenia.  The Scan  "shows significant improvement in both hips.      Risk Factors     The following high- risk conditions have been ruled out: celiac disease, eating disorders, gastric bypass, hyperparathyroidism, inflammatory bowel disease, hyperthyroidism, rheumatoid arthritis, lupus, chronic kidney disease.     Lalitha Underwood has the following risk factors: Age, female gender, ex smoker, and breast cancer in her mother.     She is not on high risk medications such as glucocorticoids, anti-coagulants, chemotherapy or levothyroxine.  She is on an anticonvulsant.  Patient deniesHysterectomy, Oophrectomy, Breast cancer and Family history of breast cancer.   Menopause was at age: 50 years.   Past Medical History     Patient Active Problem List   Diagnosis     Solar Elastosis     Rosacea     Epilepsy And Recurrent Seizures     Esophageal Reflux     Vitamin D Deficiency     Chronic Obstructive Pulmonary Disease     Hyperlipidemia     Walk Is Wobbly Or Unsteady (Ataxia)     Obesity     Hypertension     Osteopenia     Venous insufficiency of both lower extremities     Hiatal hernia     Leg swelling     Left arm swelling     Flat feet     History of right shoulder fracture       Family History       family history includes Breast cancer (age of onset: 66) in her mother; Heart attack in her mother; Hypertension in her mother; Varicose Veins in her mother.    Social History      reports that she quit smoking about 18 years ago. Her smoking use included Cigarettes. She has never used smokeless tobacco. She reports that she drinks about 1.2 oz of alcohol per week  She reports that she does not use illicit drugs.      Review of Systems     Patient denies current pain, limited mobility, fractures.   Remainder per HPI.      Vital Signs     Visit Vitals     /76     Ht 5' 4\" (1.626 m)     Wt 207 lb (93.9 kg)     LMP 12/12/1998     BMI 35.53 kg/m2       Physical Exam     GENERAL:  Normal, NIRD  EYES:  Pupils equal, round and reactive to light; " no proptosis, lid lag or  periorbital edema.  THYROID:  Thyroid is normal.  No tenderness or bruit  NECK: No lymph nodes  MUSCULOSKELETAL: No joint abnormalities, FROM in all four extremities. No kyphosis. Muscle strength grossly normal without evidence of wasting.  HEART:  Regular rate and rhythm without murmur.  LUNGS:  Clear to auscultation.  NEURO:  Patella Reflexes were normal.No tremors  SKIN:  No acanthosis nigricans or vitiligo        Assessment     1. Osteopenia    2. Vitamin D deficiency        Plan     Will have her get another Prolia injection today and will schedule the next one.  Will f/u in 1 year.      Total visit minutes: 20  Time spent counseling and coordination of care:18    Lalitha L Severino   Endocrinology  1/11/2017  2:37 PM      Current Medications     Outpatient Medications Prior to Visit   Medication Sig Dispense Refill     albuterol (PROVENTIL HFA;VENTOLIN HFA) 90 mcg/actuation inhaler Inhale 2 puffs every 6 (six) hours as needed for wheezing. 3 Inhaler 3     benzonatate (TESSALON PERLES) 100 MG capsule Take 1-2 capsules (100-200 mg total) by mouth every 6 (six) hours as needed for cough. 30 capsule 0     calcium citrate 250 mg calcium Tab Take 500 Units by mouth 2 (two) times a day.       cholecalciferol, vitamin D3, 5,000 unit Tab Take by mouth.       DENOSUMAB (PROLIA SUBQ) Inject 60 mL as directed every 6 (six) months.       fluticasone-salmeterol (ADVAIR) 250-50 mcg/dose DISKUS Inhale 1 puff 2 (two) times a day. 3 each 3     hydroCHLOROthiazide (HYDRODIURIL) 12.5 MG tablet Take 1 tablet (12.5 mg total) by mouth daily. 90 tablet 3     hyoscyamine (LEVBID) 0.375 mg 12 hr tablet Take 1 tablet (0.375 mg total) by mouth every 12 (twelve) hours as needed for cramping. 180 tablet 3     losartan (COZAAR) 50 MG tablet Take 1 tablet (50 mg total) by mouth 2 (two) times a day. 180 tablet 3     OXYGEN-AIR DELIVERY SYSTEMS MISC Use 1.5 L As Directed bedtime.       phenytoin (DILANTIN) 100 MG ER  capsule Take 1 capsule (100 mg total) by mouth 3 (three) times a day. 270 capsule 3     potassium chloride 20 mEq TbER Take 20 mEq by mouth daily. 90 tablet 3     RABEprazole (ACIPHEX) 20 mg tablet Take 1 tablet (20 mg total) by mouth daily. 90 tablet 3     tiotropium (SPIRIVA) 18 mcg inhalation capsule Place 1 capsule (2 puffs total) into inhaler and inhale daily. 90 capsule 3     No facility-administered medications prior to visit.          Lab Results     TSH   Date Value Ref Range Status   12/05/2014 1.87 0.30 - 5.05 uIU/mL Final     PTH   Date Value Ref Range Status   11/21/2013 71 16 - 73 pg/mL Final     CALCIUM   Date Value Ref Range Status   11/14/2016 9.3 8.5 - 10.5 mg/dL Final     PHOSPHORUS   Date Value Ref Range Status   11/21/2013 3.9 2.5 - 4.5 mg/dL Final           Imaging Results   Last DEXA scan:  Results for orders placed in visit on 12/28/16   DXA Bone Density Scan    Narrative 12/28/2016      RE: Lalitha Underwood  YOB: 1945        Dear Dr.Rebecca RITA Haq,    Patient Profile:  71 y.o. female, postmenopausal, is here for the follow up bone density   test.   History of fractures - None. Family history of osteoporosis - None.    Family history of hip fracture: Yes;  mother. Smoking history - Past.   Osteoporosis treatment past -  Yes;  Bisphosphonates. Osteoporosis   treatment current - Yes;  Prolia.  Chronic medical problems - Chronic low   back problems. High risk medications -  None.      Assessment:    1. The spine bone density was not done.  2. Femoral bone densities show left femoral neck T- score -1.3 and right   femoral neck T-score -0.7 and significant improvement of 7.4% on the right   hip compared to 2015.  3. Left forearm bone density with T-score 1.2, stable.    71 y.o. female with LOW BONE DENSITY (OSTEOPENIA), stable on the current   treatment.        Recommendations:  Appropriate calcium and vitamin D supplements and active treatment   recommended with follow up bone  density scan in 2 years.      Bone densitometry was performed on your patient using our [employer] iDXA   densitometer. The results are summarized and a copy of the actual scans   are included for your review. In conformity with the International Society   of Clinical Densitometry's most recent position statement for DXA   interpretation (2015), the diagnosis will be made on the lowest measured   T-score of the lumbar spine, femoral neck, total proximal femur or 33%   radius. Note the change in terminology for diagnostic classification from   OSTEOPENIA to LOW BONE MASS. All trending for sequential exams will be   done using multiple vertebrae or the total proximal femur. Fracture risk   is based on the WHO Fracture Risk Assessment Tool (FRAX). If additional   information is needed or if you would like to discuss the results, please   do not hesitate to call me.       Thank you for referring this patient to [facility]oporosis [facility].   We are happy to be of service in support of you and your practice. If you   have any questions or suggestions to improve our service, please call me   at [phone].     Sincerely,     Ashli Mendez M.D. CDmitriyCANN MARIE.  Osteoporosis Services, [facility]

## 2021-06-08 NOTE — PROGRESS NOTES

## 2021-06-08 NOTE — TELEPHONE ENCOUNTER
Updated patient that prescriptions were written and will be faxed to [facility] today.

## 2021-06-08 NOTE — PROGRESS NOTES
[facility] Rehabilitation Daily Progress     Patient Name: Lalitha Underwood  Date: 2020  Date of Initial Evaluation: 3/16/2020  Visit #: 3   PTA visit #:  1  Referral Diagnosis: Abnormality of gait   Referring provider: Kelly Hull MD  Visit Diagnosis:     ICD-10-CM    1. Abnormality of gait  R26.9    2. Generalized muscle weakness  M62.81    3. Decreased functional mobility  R26.89    4. Unsteadiness on feet  R26.81    5. Poor balance  R26.89          Assessment:     HEP/POC compliance is  good .      Pt continues to tired with exercises and breathing is a bit difficult due to the mask. O2 stats in the normal range.  The patient will benefit from return to PT for increasing strength, balance, and activity tolerance.     Goal Status:  Pt. will demonstrate/verbalize independence in self-management of condition in : 4 weeks  Pt. will be independent with home exercise program in : 4 weeks    Pt will: improve MILAN balance testing by 6 points in 4 weeks  Pt will: improve 2 min walk test to 130 M in 4 weeks      Plan / Patient Education:     Continue with initial plan of care.     Plan for next visit: LE and core strengthening and balance exercises.    Subjective:   Pt reports she has been using her walker when out in pubic. She does not use it in her home.   Pt reports no falls or LOB.  Pt continues to perform 5-6 exercises each night. She finds it difficult to do the bridges due to her soft mattress.  Pain Ratin         Objective:     95% O2 106 HR after nustep and standing exercises    Exercises:  Exercise #1: Nu step WL 5 x 5'  Comment #1: sit to stand  x15  Exercise #2: hip kicks fwd, side and bwd x 10 B  Comment #2: Rows and shoulder extensions X 15 blue  Exercise #3: lateral and forward step ups on 4' step x10 each  Comment #3: step ups onto red disc x15  Exercise #4: Bridge X 10  Comment #4: Cone Taps in // bars  Exercise #5: marching in //bars  Comment #5: x10  Exercise #6: seated hip AB  and flexion with green band  Comment #6: x10 each B    Treatment Today     TREATMENT MINUTES COMMENTS   Evaluation     Self-care/ Home management     Manual therapy     Neuromuscular Re-education     Therapeutic Activity     Therapeutic Exercises 30 See flowsheet   Gait training     Modality__________________                Total 30    Blank areas are intentional and mean the treatment did not include these items.       Sumaya Garnett, PTA,CLT  6/4/2020

## 2021-06-08 NOTE — PROGRESS NOTES
[facility] Rehabilitation Daily Progress     Patient Name: Lalitha Underwood  Date: 2020  Date of Initial Evaluation: 3/16/2020  Visit #:    PTA visit #:  1  Referral Diagnosis: Abnormality of gait   Referring provider: Kelly Hull MD  Visit Diagnosis:     ICD-10-CM    1. Abnormality of gait  R26.9    2. Generalized muscle weakness  M62.81    3. Decreased functional mobility  R26.89    4. Unsteadiness on feet  R26.81    5. Poor balance  R26.89    6. Decreased functional mobility and endurance  Z74.09          Assessment:     HEP/POC compliance is  good .     Patient did well with session today, still requiring breaks throughout the session, but more for her COPD than her LE weakness. The patient would benefit from switching to hour long sessions after another 1-2 visits. O2 stats in the normal range. The patient will benefit from return to PT for increasing strength, balance, and activity tolerance.     Goal Status:  Pt. will demonstrate/verbalize independence in self-management of condition in : 4 weeks  Pt. will be independent with home exercise program in : 4 weeks    Pt will: improve MILAN balance testing by 6 points in 4 weeks  Pt will: improve 2 min walk test to 130 M in 4 weeks      Plan / Patient Education:     Continue with initial plan of care.     Plan for next visit: LE and core strengthening and balance exercises.    Subjective:     Pain Ratin     Had some cramping last night she put a heating pad on to help. Exercises are going fine thus far. New band has been helpful, old one was worn out. Did get out to [employer] the other day and did some walking with a cart.    Objective:     95% O2 108 HR after nustep and standing exercises    Exercises:  Exercise #1: Nu step WL 5 x 5'  Comment #1: sit to stand  x15  Exercise #2: hip kicks fwd, side and bwd x 10 B- patient to trial with yellow band at home.  Comment #2: Rows and shoulder extensions X 15 blue  Exercise #3: lateral and forward  step ups on 4' step x10 each  Comment #3: step ups onto red disc x15 then fwd to green disc and back  Exercise #4: Bridge X 10  Comment #4: Cone Taps in // bars  Exercise #5: marching in //bars  Comment #5: x10  Exercise #6: seated hip AB and flexion with green band  Comment #6: x10 each B  Exercise #7: Walking in hallway X 3 laps  Comment #7: lateral steps to stair on stair case X 10/side    Treatment Today     TREATMENT MINUTES COMMENTS   Evaluation     Self-care/ Home management     Manual therapy     Neuromuscular Re-education     Therapeutic Activity     Therapeutic Exercises 32 See flowsheet   Gait training     Modality__________________                Total 32    Blank areas are intentional and mean the treatment did not include these items.       Hero ROQUE, PT  6/11/2020

## 2021-06-08 NOTE — TELEPHONE ENCOUNTER
Called and notified patient of the below message. She is wondering if you might have this fax from [facility] for her to review?

## 2021-06-08 NOTE — TELEPHONE ENCOUNTER
I received a faxed refill request on the date requested, requesting the prescription be sent as generic hyoscyamine ER rather than Levbid.  The fax was generated by [employer].  The request was for 60 tabs and 3 refills, which was granted per request.  There was no request made for a different quantity.  My apologies that the Rx was sent without her knowledge, as it was an appropriate request from the mail order pharmacy.  MARYLOUJ

## 2021-06-08 NOTE — PROGRESS NOTES
[facility] Rehabilitation Daily Progress     Patient Name: Lalitha Underwood  Date: 2020  Date of Initial Evaluation: 3/16/2020  Visit #: 2   PTA visit #:  -  Referral Diagnosis: Abnormality of gait   Referring provider: Kelly Hull MD  Visit Diagnosis:     ICD-10-CM    1. Abnormality of gait  R26.9    2. Generalized muscle weakness  M62.81    3. Decreased functional mobility  R26.89    4. Unsteadiness on feet  R26.81    5. Decreased functional mobility and endurance  Z74.09    6. Poor balance  R26.89          Assessment:     HEP/POC compliance is  good .     Patient returns to PT for work on endurance, strength, and balance. She did well with return to PT, but with needing to use a mask, the patient felt she did tire faster, with multiple breaks for her breathing. O2 sats did remain normal during exercise. The patient will benefit from return to PT for increasing strength, balance, and activity tolerance.     Goal Status:  Pt. will demonstrate/verbalize independence in self-management of condition in : 4 weeks  Pt. will be independent with home exercise program in : 4 weeks    Pt will: improve MILAN balance testing by 6 points in 4 weeks  Pt will: improve 2 min walk test to 130 M in 4 weeks      Plan / Patient Education:     Continue with initial plan of care.     Plan for next visit: LE and core strengthening and balance exercises.    Subjective:     Pain Ratin     She is doing 5-6 exercises every night. Wouldn't say she is too active though. She is using a walker when out and about.    Objective:     96% O2 128 HR after nustep and stairs stepping    Exercises:  Exercise #1: Nustep X 5  Comment #1: Sit to stands X 10  Exercise #2: Hip kicks lateral bwd march in place X 10 each  Comment #2: Rows and shoulder extensions X 15 blue  Exercise #3: lateral step ups to stairs X 10 each side  Comment #3: Step ups to red foam X 10  Exercise #4: Bridge X 10  Comment #4: Cone Taps in //  bars    Treatment Today     TREATMENT MINUTES COMMENTS   Evaluation     Self-care/ Home management     Manual therapy     Neuromuscular Re-education     Therapeutic Activity     Therapeutic Exercises 39 See flowsheet   Gait training     Modality__________________                Total 39    Blank areas are intentional and mean the treatment did not include these items.       Hero ROQUE, PT  5/27/2020

## 2021-06-08 NOTE — TELEPHONE ENCOUNTER
Patient called to request orders for new insoles as well as compression stockings that she would like sent to [facility]. She has not been seen for 1 year, she is wondering if she needs to have a visit before orders are sent?

## 2021-06-08 NOTE — TELEPHONE ENCOUNTER
Patient here for pain in the R ankle - started hurting last night at softball while she was running.  Pain is worse today.  Patient presents ambulatory to the urgent care.     Unable to determine why this was refilled on 5/15/2020   It was an orders only encounter therefore I am uncertain how this request came through?

## 2021-06-09 NOTE — PROGRESS NOTES
Pre-Procedure Unconfirmed COVID Test     Lalitha SALINAS Kj    COVID Screening  Due to the inability to confirm the patient's COVID status (positive or negative), the patient was screened for COVID symptoms     Patient reports the following:  Fever? No   Cough? No   Shortness of breath? No   Skin rash? No    If the patient is positive for new symptoms or worsening symptoms, and the procedure is deemed necessary by the ordering provider, notify your manager/supervisor. Patient has not been called for COVID testing. Will follow-up with MD.    Patient Information  Patient informed of the no visitor policy  Patient instructed to continue to self-quarantine prior to procedure  Patient informed to contact the ordering provider if the following symptoms develop prior to procedure:   Fever  Cough  Shortness of Breath  Sore throat   Runny or stuffy nose  Muscle or body aches  Headaches  Fatigue  Vomiting or diarrhea   Rash    Dioegnes Bernstein

## 2021-06-09 NOTE — PROGRESS NOTES
[facility] Rehabilitation Daily Progress     Patient Name: Lalitha Underwood  Date: 2020  Date of Initial Evaluation: 3/16/2020  Visit #: 6   PTA visit #:  3  Referral Diagnosis: Abnormality of gait   Referring provider: Kelly Hull MD  Visit Diagnosis:     ICD-10-CM    1. Abnormality of gait  R26.9    2. Generalized muscle weakness  M62.81    3. Decreased functional mobility  R26.89    4. Unsteadiness on feet  R26.81    5. Decreased functional mobility and endurance  Z74.09    6. Poor balance  R26.89          Assessment:     HEP/POC compliance is  good .     Patient tested with vestibular oculomotor screen today. She was normal with this to rule out this as a possibility of her reported occasional dizziness. She did have a + robyn hallpike on the left with testing. Due to a lack of time and minor dizziness, will treat next time patient has session with current PT.     The patient will benefit from return to PT for increasing strength, balance, and activity tolerance.     Lalitha Underwood is a 74 y.o. female with PMH significant for COPD, osteopenia, pre-diabetes who presents to therapy today with chief complaints of mobility issues due to decreased balance and strength.  Pt symptoms are chronic and worsening.  On exam pt demonstrates significant fall risk, she scores a 36/56 on the [place] Balance Scale and 30 sec sit<>stand.  Pt would benefit from skilled PT to address the above limitations.    Goals:  Pt. will demonstrate/verbalize independence in self-management of condition in : 4 weeks  Pt. will be independent with home exercise program in : 4 weeks    Pt will: improve MILAN balance testing by 6 points in 4 weeks  Pt will: improve 2 min walk test to 130 M in 4 weeks      Plan / Patient Education:     Continue with initial plan of care.     Plan for next visit: LE and core strengthening and balance exercises.    Subjective:     Pain Ratin     She reports she is going to be getting an ENT  consult and thyroid biopsy. Continues to work on the exercises to the best of her ability.    Objective:     VOMS- normal with exception of convergence 5 inches- patient has h/o convergence issues.    [place]-Hallpike Test- Positive on the left.    Exercises:  Exercise #1: Nu step WL 5 x 5'  Comment #1: sit to stand  x15  Exercise #2: hip kicks fwd, side and bwd x 10 B- patient to trial with yellow band at home.  Comment #2: Rows and shoulder extensions X 15 blue  Exercise #3: lateral and forward step ups on 4' step x10 each  Comment #3: step ups onto red disc x15 then fwd to green disc and back  Exercise #4: Bridge X 10  Comment #4: Cone Taps in // bars  Exercise #5: marching in //bars  Comment #5: x10  Exercise #6: seated hip AB and flexion with green band  Comment #6: x10 each B  Exercise #7: Walking in hallway X 3 laps  Comment #7: lateral steps to stair on stair case X 10/side    Treatment Today     TREATMENT MINUTES COMMENTS   Evaluation     Self-care/ Home management     Manual therapy     Neuromuscular Re-education 15 VOMS and robyn-hallpike testing.   Therapeutic Activity     Therapeutic Exercises 10 See flowsheet   Gait training     Modality__________________                Total 25    Blank areas are intentional and mean the treatment did not include these items.       Hero ROQUE, PT  6/25/2020

## 2021-06-09 NOTE — TELEPHONE ENCOUNTER
Who is calling:  Vanessa From [facility] Procedure [place]   Reason for Call:  Caller states patient need to be tested  for COVID-19  3 to 4 days  prior to the Ultrasound Biopsy Procedure requesting for COVID-19  Test orders . Up on completion of orders please call patient and inform . writer  Called both phone number on file  to inform  Patient about her Lorazepam Rx was sent to [facility]'s pharmacy patient did not answer the phone and no Voice mail set up .  Date of last appointment with primary care: unknown   Okay to leave a detailed message: No

## 2021-06-09 NOTE — TELEPHONE ENCOUNTER
Rx for lorazepam sent 7/14.  Radiology usually places the pre-procedure order, so my apologies that this was not done.  I have placed the order.  Our COVID scheduling team will contact patient in the next 2 days to schedule the test.  MARYLOUJ

## 2021-06-09 NOTE — PROGRESS NOTES
[facility] Rehabilitation Daily Progress     Patient Name: Lalitha Underwood  Date: 2020  Date of Initial Evaluation: 3/16/2020  Visit #: 5/8   PTA visit #:  3  Referral Diagnosis: Abnormality of gait   Referring provider: Kelly Hull MD  Visit Diagnosis:     ICD-10-CM    1. Abnormality of gait  R26.9    2. Generalized muscle weakness  M62.81    3. Decreased functional mobility  R26.89    4. Unsteadiness on feet  R26.81    5. Poor balance  R26.89    6. Decreased functional mobility and endurance  Z74.09          Assessment:     HEP/POC compliance is  good .   Patient requiring more rest breaks today when on the Nustep.  The patient will benefit from return to PT for increasing strength, balance, and activity tolerance.     Lalitha Underwood is a 74 y.o. female with PMH significant for COPD, osteopenia, pre-diabetes who presents to therapy today with chief complaints of mobility issues due to decreased balance and strength.  Pt symptoms are chronic and worsening.  On exam pt demonstrates significant fall risk, she scores a 36/56 on the Milan Balance Scale and 30 sec sit<>stand.  Pt would benefit from skilled PT to address the above limitations.    Goals:  Pt. will demonstrate/verbalize independence in self-management of condition in : 4 weeks  Pt. will be independent with home exercise program in : 4 weeks    Pt will: improve MILAN balance testing by 6 points in 4 weeks  Pt will: improve 2 min walk test to 130 M in 4 weeks           Plan / Patient Education:     Continue with initial plan of care.     Plan for next visit: LE and core strengthening and balance exercises.    Subjective:   Pt continues to report occasional cramping in her L hip.   Pt reports some more difficulty with her breathing. Pt feels that this may be due to the humidity.    Pain Ratin         Objective:     95% O2 108 HR after nustep and standing exercises, O2 @ 91 after walkng, HR @ 122    Exercises:  Exercise #1: Nu step WL  5 x 5'  Comment #1: sit to stand  x15  Exercise #2: hip kicks fwd, side and bwd x 10 B- patient to trial with yellow band at home.  Comment #2: Rows and shoulder extensions X 15 blue  Exercise #3: lateral and forward step ups on 4' step x10 each  Comment #3: step ups onto red disc x15 then fwd to green disc and back  Exercise #4: Bridge X 10  Comment #4: Cone Taps in // bars  Exercise #5: marching in //bars  Comment #5: x10  Exercise #6: seated hip AB and flexion with green band  Comment #6: x10 each B  Exercise #7: Walking in hallway X 3 laps  Comment #7: lateral steps to stair on stair case X 10/side    Treatment Today     TREATMENT MINUTES COMMENTS   Evaluation     Self-care/ Home management     Manual therapy     Neuromuscular Re-education     Therapeutic Activity     Therapeutic Exercises 30 See flowsheet   Gait training     Modality__________________                Total 30    Blank areas are intentional and mean the treatment did not include these items.       Sumaya Garnett, PTA,CLT  6/18/2020

## 2021-06-09 NOTE — PROGRESS NOTES
"HPI: This patient is a 74yo F who presents for evaluation of balance issues at the request of Dr. Hull. She has multiple medical issues, including venous insufficiency in her lower extremities, that contribute to an unsteady gait. She does not describe vertigo or oscillopsia. She has been in PT/OT for her unsteadiness and their assessment is multifactorial, otologic sources not making the list. She has neuropathy in her feet also. She also has considerable anxiety about her walking, stating that she basically stopped in the middle of crossing the street and was unable to make herself move. She knows she is too deconditioned and weak to get herself off the floor if she did fall. She comes today because she \"needs an answer\" as to why she can't walk without unsteadiness.     Past medical history, surgical history, social history, family history, medications, and allergies have been reviewed with the patient and are documented above.    Review of Systems: a 10-system review was performed. Pertinent positives are noted in the HPI and on a separate scanned document in the chart.    PHYSICAL EXAMINATION:  GEN: no acute distress, normocephalic. Obese.  EYES: extraocular movements are intact, pupils are equal and round. Sclera clear.   EARS: auricles are normally formed. The external auditory canals are clear with minimal to no cerumen. Tympanic membranes are intact bilaterally with no signs of infection, effusion, retractions, or perforations.  NOSE: anterior nares are patent.   NECK: soft and supple. No lymphadenopathy or masses. Airway is midline.  NEURO: CN VII and XII symmetric. alert and oriented.   PULM: breathing comfortably on room air, normal chest expansion with respiration  CARDS: no cyanosis or clubbing, normal carotid pulses  EXT: wide-based gait with flat feet. Poor leg musculature with significant venous insufficiency. Unable to stand with feet together due to body habitus and overall deconditioning. "     AUDIOGRAM: mild to moderate SNHL bilaterally, unchanged from 2019.     MEDICAL DECISION-MAKING: Serene is a 76yo F with multifactorial unsteadiness (general poor muscle conditioning, obesity, neuropathy, and venous insufficiency complicated by anxiety about it). There is no otologic component. Continue PT/OT and Neurology follow-up. She also appears to have a thyroid nodule that is to be biopsied next week. In discussion about this, she was trying to go down the path that her unsteadiness could be a result of the thyroid nodule. Quickly dispelled that and redirected her to her lower extremities lack of conditioning as the source. If her thyroid biopsy shows any pathology requiring intervention, she is welcome to return or be re-referred. Medically clear for hearing aids for her SNHL; had to explain here, too, that the hearing aids will not make her steadier on her feet.

## 2021-06-09 NOTE — PROGRESS NOTES
[facility] Rehabilitation Daily Progress     Patient Name: Lalitha Underwood  Date: 7/9/2020  Date of Initial Evaluation: 3/16/2020  Visit #: 8/8   PTA visit #:  4  Referral Diagnosis: Abnormality of gait   Referring provider: Kelly Hull MD  Visit Diagnosis:     ICD-10-CM    1. Abnormality of gait  R26.9    2. Generalized muscle weakness  M62.81    3. Decreased functional mobility  R26.89    4. Unsteadiness on feet  R26.81    5. Decreased functional mobility and endurance  Z74.09    6. Poor balance  R26.89          Assessment:     HEP/POC compliance is  good .     Pt has a hard time with abdominal sets. She holds her breath. Pt requires many verbal cues for correct technique. Worked on this again today with slight improvement.    Continues to display general deconditioning 2/2 COPD requiring rest breaks, but she does recover quickly. Patient will benefit from further PT visits, re-assessment of mobility next session with POC update.    The patient will benefit from return to PT for increasing strength, balance, and activity tolerance.     Lalitha Underwood is a 74 y.o. female with PMH significant for COPD, osteopenia, pre-diabetes who presents to therapy today with chief complaints of mobility issues due to decreased balance and strength.  Pt symptoms are chronic and worsening.  On exam pt demonstrates significant fall risk, she scores a 36/56 on the Milan Balance Scale and 30 sec sit<>stand.  Pt would benefit from skilled PT to address the above limitations.    Goals:  Pt. will demonstrate/verbalize independence in self-management of condition in : 4 weeks  Pt. will be independent with home exercise program in : 4 weeks    Pt will: improve MILAN balance testing by 6 points in 4 weeks  Pt will: improve 2 min walk test to 130 M in 4h weeks      Plan / Patient Education:     Continue with initial plan of care.     Plan for next visit: LE and core strengthening and balance exercises. Re-test balance and  "strength, new med cert    Subjective:   Pt states she does her exercises \" I may do them too fast.\"   Pt reports the humidity and the mask has affected her breathing.     Pain Ratin     She reports she is going to be getting an ENT consult and thyroid biopsy in the next 2 weeks.    Feels coming to PT is helpful and is necessary for her. Will plan to return after getting results and info from biopsy and ENT.    Objective:     O2: 88% after stairs, 95% after 1.5 min rest  HR: 120 BPM after stairs 108 BPM after 1.5 min rest    - [facility] today    Exercises:  Exercise #1: Nustep WL x5  Comment #1: sit to stand  x15  Exercise #2: hip kicks fwd, side and bwd x 10 B-  Comment #2: Rows and shoulder extensions X 15 blue  Exercise #3: Laterla and fwd step ups on 6' step x10 each  Comment #3: NBOS 60\" and FWD step ups onto green disc x15  Exercise #4: bridge x12  Comment #4: Cone Taps in // bars  Exercise #5: marching in //bars  Comment #5: x10  Exercise #6: seated hip AB and seated march with green band  Comment #6: x10 each  Exercise #7: Walk in hallway x 3 laps  Comment #7: lateral steps to stair on stair case X 10/side  Exercise #8: Fwd steps to stairs X 10  Comment #8: Abdominal crunches X 15  Exercise #9: TA SLR X 10  Comment #9: Lunges to bosu X 10  Exercise #10: Gait across [place] foam in // X 4 passes    Treatment Today     TREATMENT MINUTES COMMENTS   Evaluation     Self-care/ Home management     Manual therapy     Neuromuscular Re-education 20 See flow sheet    Therapeutic Activity     Therapeutic Exercises 35 See flowsheet   Gait training     Modality__________________                Total 55    Blank areas are intentional and mean the treatment did not include these items.       Hero ROQUE, PT  2020  "

## 2021-06-09 NOTE — PROGRESS NOTES
Patient called regarding COVID screen prior to procedure 2020. Patient has not had COVID testing, no order in the system. Also patient has concerns regarding anxiety for procedure. Per patient she was going to take  Ativan from four years ago. Explain to the patient she should not take  medications. Patient informed to call MD who ordered biopsy regarding the Ativan. Patient given COVID testing number and scheduling number. Per Dr. Back patient need to reschedule. Called Dr. Hull office, and spoke with Destini JARRETT; explained the patient's situation. She will contact patient and let Dr. Hull know procedure is cancelled. Writer called patient and informed her primary Md office will call.

## 2021-06-09 NOTE — TELEPHONE ENCOUNTER
Who is calling:  Patient   Reason for Call:  Patient stated that her biopsy appointment today was canceled. Patient stated she was informed that she needed a COVID testing done. Patient stated that she needs some anxiety medications for this procedure as well. Patient stated she needs to reschedule her biopsy 3 days after COVID testing. Patient would like a call back.  Date of last appointment with primary care: n/a  Okay to leave a detailed message: Yes  [phone]

## 2021-06-09 NOTE — PROGRESS NOTES
"[facility] Rehabilitation Daily Progress     Patient Name: Lalitha Underwood  Date: 2020  Date of Initial Evaluation: 3/16/2020  Visit #:    PTA visit #:  4  Referral Diagnosis: Abnormality of gait   Referring provider: Kelly Hull MD  Visit Diagnosis:     ICD-10-CM    1. Abnormality of gait  R26.9    2. Generalized muscle weakness  M62.81    3. Decreased functional mobility  R26.89    4. Unsteadiness on feet  R26.81    5. Decreased functional mobility and endurance  Z74.09    6. Poor balance  R26.89          Assessment:     HEP/POC compliance is  good .     Pt has a hard time with abdominal sets. She holds her breath. Pt requires many verbal cues for correct technique.    The patient will benefit from return to PT for increasing strength, balance, and activity tolerance.     Lalitha Underwood is a 74 y.o. female with PMH significant for COPD, osteopenia, pre-diabetes who presents to therapy today with chief complaints of mobility issues due to decreased balance and strength.  Pt symptoms are chronic and worsening.  On exam pt demonstrates significant fall risk, she scores a 36/56 on the Milan Balance Scale and 30 sec sit<>stand.  Pt would benefit from skilled PT to address the above limitations.    Goals:  Pt. will demonstrate/verbalize independence in self-management of condition in : 4 weeks  Pt. will be independent with home exercise program in : 4 weeks    Pt will: improve MILAN balance testing by 6 points in 4 weeks  Pt will: improve 2 min walk test to 130 M in 4 weeks      Plan / Patient Education:     Continue with initial plan of care.     Plan for next visit: LE and core strengthening and balance exercises.    Subjective:   Pt states she does her exercises \" I may do them too fast.\"   Pt reports the humidity and the mask has affected her breathing.     Pain Ratin     She reports she is going to be getting an ENT consult and thyroid biopsy in the next 2 weeks.    Objective:     O2: 93% " "after walking, 96% after short rest  HR: 120 BPM after walking 108 BPM after short rest    Exercises:  Exercise #1: Nustep WL x5  Comment #1: sit to stand  x15  Exercise #2: hip kicks fwd, side and bwd x 10 B- patient to trial with yellow band at home.  Comment #2: Rows and shoulder extensions X 15 blue  Exercise #3: Laterla and fwd step ups on 6' step x10 each  Comment #3: NBOS 60\" and FWD step ups onto green disc x15  Exercise #4: bridge x12  Comment #4: Cone Taps in // bars  Exercise #5: marching in //bars  Comment #5: x10  Exercise #6: seated hip AB and seated march with green band  Comment #6: x10 each  Exercise #7: Wa.lk in hallway x 3 laps  Comment #7: lateral steps to stair on stair case X 10/side    Treatment Today     TREATMENT MINUTES COMMENTS   Evaluation     Self-care/ Home management     Manual therapy     Neuromuscular Re-education 15 See flow sheet    Therapeutic Activity     Therapeutic Exercises 15 See flowsheet  Much time spent on ab sets   Gait training     Modality__________________                Total 30    Blank areas are intentional and mean the treatment did not include these items.       Sumaya Garnett, PTA,CLT  7/2/2020  "

## 2021-06-09 NOTE — PROGRESS NOTES
Date of Service: 03/15/17    Date last seen: 12/07/15    PCP: Kelly Hull MD    Impression:  1.  Bilateral leg swelling  2.  Bilateral venous insufficiency legs  3.  Flat feet with valgus deformities and foot pain  4.  Left arm post traumatic lymphedema ( elbow fracture with implant around 1998) with adipose deposition       Plan:  1.  Questions were answered.    2.  Continuing support group for grieving over loss of daughter.   3.  Continue insoles.  New ones written for.     4.  New compression socks written for.   5.  Compression sleeve for proximal left arm.  She does not want plastic surgery and massage and bandaging was not extremely effective.    6.  Patient will follow up in 1 year or when needed.     Time spent with patient 25 minutes with greater than 50% time in consultation, education and coordination of care.     ---------------------------------------------------------------------------------------------------------------------    Chief Complaint: Bilateral leg swelling with foot pain      History of Present Illness:    Lalitha Underwood returns to the [facility] for follow up of Bilateral leg swelling with foot pain.   Previous treatment has included MLD, education, compression bandaging, lymphatic exercise, range of motion work, exercise and elevation when able. Present compression the patient is using is compression stockings. She replaces her sock at least twice a year.  The swelling has remained the same.  There has been no new numbness, tingling, weakness, masses, rashes, shortness of breath or chest pain. There have not been any new areas of ulceration. There has been no new fevers or pain.  There are no new or changing skin lesions. Pain is rated a 0 on a 10 point scale.  The left arm swelling has been slowly getting worse.  She was never able to wear a sleeve, but she is interested now.  She needs new compression stockings and new insoles.   She recently lost her 45 years  old daughter to pulmonary hypertension.  She is attending a grief support group which seems to be helping.    Past Medical History:   Diagnosis Date     Convulsive disorder      COPD (chronic obstructive pulmonary disease)      Depression      Hernia of unspecified site of abdominal cavity without mention of obstruction or gangrene      Hiatal hernia      Hypertension      Osteopenia      Venous insufficiency of both lower extremities        Past Surgical History:   Procedure Laterality Date     OTHER SURGICAL HISTORY Left 2003    Broke titanium in left arm     SHOULDER SURGERY Right 1967       Current Outpatient Prescriptions   Medication Sig Dispense Refill     albuterol (PROVENTIL HFA;VENTOLIN HFA) 90 mcg/actuation inhaler Inhale 2 puffs every 6 (six) hours as needed for wheezing. 3 Inhaler 3     benzonatate (TESSALON PERLES) 100 MG capsule Take 1-2 capsules (100-200 mg total) by mouth every 6 (six) hours as needed for cough. 30 capsule 0     calcium citrate 250 mg calcium Tab Take 500 Units by mouth 2 (two) times a day.       cholecalciferol, vitamin D3, 5,000 unit Tab Take by mouth.       DENOSUMAB (PROLIA SUBQ) Inject 60 mL as directed every 6 (six) months.       fluticasone-salmeterol (ADVAIR) 250-50 mcg/dose DISKUS Inhale 1 puff 2 (two) times a day. 3 each 3     hydroCHLOROthiazide (HYDRODIURIL) 12.5 MG tablet Take 1 tablet (12.5 mg total) by mouth daily. 90 tablet 3     hyoscyamine (LEVBID) 0.375 mg 12 hr tablet Take 1 tablet (0.375 mg total) by mouth every 12 (twelve) hours as needed for cramping. 180 tablet 3     losartan (COZAAR) 50 MG tablet Take 1 tablet (50 mg total) by mouth 2 (two) times a day. 180 tablet 3     OXYGEN-AIR DELIVERY SYSTEMS [facility] Use 1.5 L As Directed bedtime.       phenytoin (DILANTIN) 100 MG ER capsule Take 1 capsule (100 mg total) by mouth 3 (three) times a day. 270 capsule 3     potassium chloride 20 mEq TbER Take 20 mEq by mouth daily. 90 tablet 3     RABEprazole (ACIPHEX) 20 mg  tablet Take 1 tablet (20 mg total) by mouth daily. 90 tablet 3     tiotropium (SPIRIVA) 18 mcg inhalation capsule Place 1 capsule (2 puffs total) into inhaler and inhale daily. 90 capsule 3     No current facility-administered medications for this visit.        Allergies   Allergen Reactions     Clindamycin Rash     Carbamazepine Rash     Morphine Nausea Only       Social History     Social History     Marital status:      Spouse name: N/A     Number of children: N/A     Years of education: N/A     Occupational History     Not on file.     Social History Main Topics     Smoking status: Former Smoker     Types: Cigarettes     Quit date: 12/7/1998     Smokeless tobacco: Never Used     Alcohol use 1.2 oz/week     2 Glasses of wine per week     Drug use: No     Sexual activity: No     Other Topics Concern     Not on file     Social History Narrative    .  Two kids.  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       Family History   Problem Relation Age of Onset     Breast cancer Mother 66     Hypertension Mother      Heart attack Mother      Varicose Veins Mother      Hypertension Father      Heart attack Sister      Diabetes Son      Pulmonary Hypertension Daughter      Heart attack Sister      Review of Systems:  Lalitha Underwood no new numbess, tingling or weakness, redness or rashes, fevers, new masses, unexplained weight loss, increased pain, new ulcers, shortness of breath and chest pain  Full 12 point review of systems was completed.    Physical Exam:  Vitals:    03/15/17 1338   BP: 132/66   Pulse: 67   Resp: 18   Temp: 97.6  F (36.4  C)    There is no height or weight on file to calculate BMI.    Circumferential measures:    Vasc Edema 12/7/2015 3/15/2017   Right-just above MCP 18.5 18   Right Wrist 15.6 16.4   Right Up 10cm 19.5 21.3   Right Up 10cm From Elbow 28.4 31.8   Left-just above MCP 18.4 18   Left Wrist 15.9 16.2   Left Up 10cm 18.4 21.8   Left Up 10cm From Elbow 42.2 44.8   Right just above MTP 20.0 19.6    Right Ankle 18.8 18.5   Right Widest Calf 40.6 39.2   Right Thigh Up 10cm 49.9 55   Left - just above MTP 19.6 23   Left Ankle 19.5 20.3   Left Widest Calf 41.2 20.2   Left Thigh Up 10cm 58.5 61.5       General:  71 y.o. female in no apparent distress.  Alert and oriented x 3.  Cooperative. Affect normal.    Musculoskeletal:  Normal range of motion in knees and ankles, elbows and wrist bilaterally throughout .  Right shoulder to 75 degrees forward flexion and abduction ( old).  Left to 165 degrees forward flexion and abduction.  There is no active joint synovitis, erythema, swelling or joint laxity.       Neurological:  Sensation is intact to pin prick and light touch in both legs.  Strength testing is normal in hip flexion, knee flexion, knee extension, ankle dorsiflexion, great toe extension, shoulder abduction, elbow flexion, elbow extension, wrist extension and intrinsic hands  bilaterally. Deep tendon reflexes, knee jerks and ankle jerks, are normal bilaterally.      Vascular: Dorsalis pedis and posterior tibialis pulses are strong and equal bilaterally. Radial arterial pulses are strong and equal at the wrists.  There are some significant telangietasias, medial ankle venous flares, venous varicosities  and spider veins . There is normal capillary refill.     Integumentary: Skin of the arms and legs is uniformly warm and dry.  There is continuing adipose tissue distal left posterior upper arm.  No specific masses or pain is palpated.      Jyoti Shultz MD, ABWMS, FACCWS, [facility]  Medical Director Wound Care and Lymphedema  [facility]  [phone]

## 2021-06-10 NOTE — PROGRESS NOTES
ASSESSMENT:   1. URI (upper respiratory infection)  predniSONE (DELTASONE) 20 MG tablet   2. Chronic Obstructive Pulmonary Disease  albuterol nebulizer solution 3 mL (PROVENTIL)    predniSONE (DELTASONE) 20 MG tablet   3. Wheezing  albuterol nebulizer solution 3 mL (PROVENTIL)    predniSONE (DELTASONE) 20 MG tablet       PLAN:  Upper respiratory infection  Likely viral  Prednisone prescribed.   Continue to use inhaler as prescribed.   May use an antihistamine such as claritin, zyrtec or allegra for runny nose/watery eyes.   Push fluids, get extra rest  Recommend hot tea with lemon/honey, lozenges  Recommend hot steamy showers, saline nasal spray or a netti pot to relieve congestion  May use a cough suppressant or a cool mist humidifier to lessen cough  Return to clinic if symptoms are not improving as expected or if worsening in any way.       SUBJECTIVE:   Lalitha Underwood is a 72 y.o. female presents today with cold symptoms for 4 days. She has had mostly dry cough, SOB, wheezing, watery eyes, rhinorrhea, nasal congestion, fatigue but denies congestion, sore throat, post nasal drip, fever and chills. Sick contacts: family. Has tried nothing for relief. She has a hx of COPD and is using her albuterol about 3 -4 times daily with temp relief. Still feels quite tight in her chest.     Past Medical History:   Diagnosis Date     Convulsive disorder      COPD (chronic obstructive pulmonary disease)      Depression      Hernia of unspecified site of abdominal cavity without mention of obstruction or gangrene      Hiatal hernia      Hypertension      Osteopenia      Venous insufficiency of both lower extremities        History   Smoking Status     Former Smoker     Types: Cigarettes     Quit date: 12/7/1998   Smokeless Tobacco     Never Used       Current Medications:  Current Outpatient Prescriptions   Medication Sig Dispense Refill     albuterol (PROVENTIL HFA;VENTOLIN HFA) 90 mcg/actuation inhaler Inhale 2 puffs every 6  (six) hours as needed for wheezing. 3 Inhaler 3     calcium citrate 250 mg calcium Tab Take 500 Units by mouth 2 (two) times a day.       cholecalciferol, vitamin D3, 5,000 unit Tab Take by mouth.       DENOSUMAB (PROLIA SUBQ) Inject 60 mL as directed every 6 (six) months.       fluticasone-salmeterol (ADVAIR) 250-50 mcg/dose DISKUS Inhale 1 puff 2 (two) times a day. 3 each 3     hydroCHLOROthiazide (HYDRODIURIL) 12.5 MG tablet Take 1 tablet (12.5 mg total) by mouth daily. 90 tablet 3     hyoscyamine (LEVBID) 0.375 mg 12 hr tablet Take 1 tablet (0.375 mg total) by mouth every 12 (twelve) hours as needed for cramping. 180 tablet 3     losartan (COZAAR) 50 MG tablet Take 1 tablet (50 mg total) by mouth 2 (two) times a day. 180 tablet 3     OXYGEN-AIR DELIVERY SYSTEMS MISC Use 1.5 L As Directed bedtime.       phenytoin (DILANTIN) 100 MG ER capsule Take 1 capsule (100 mg total) by mouth 3 (three) times a day. 270 capsule 3     potassium chloride 20 mEq TbER Take 20 mEq by mouth daily. 90 tablet 3     tiotropium (SPIRIVA) 18 mcg inhalation capsule Place 1 capsule (2 puffs total) into inhaler and inhale daily. 90 capsule 3     predniSONE (DELTASONE) 20 MG tablet Take 1 tablet (20 mg total) by mouth daily. TAKE 2 TABLETS DAILY FOR THE FIRST 3 DAYS, THEN ONE TABLET DAILY FOR THE NEXT 3 DAYS 9 tablet 0     No current facility-administered medications for this visit.        Allergies:   Allergies   Allergen Reactions     Clindamycin Rash     Carbamazepine Rash     Morphine Nausea Only       OBJECTIVE:   Vitals:    04/11/17 1213   BP: 110/60   Pulse: 68   Resp: 24   Temp: 98.3  F (36.8  C)   TempSrc: Oral   SpO2: 95%   Weight: 213 lb 1.6 oz (96.7 kg)     Physical exam reveals a pleasant 72 y.o. female.   Appears alert and cooperative.  Eyes:  HARVINDER, EOMI  Ears:  normal TMs bilaterally and normal canals bilaterally  Nose:    Mucosa normal. Scant, clear rhinorrhea.septum midline, normal mucosa and clear rhinorrhea  Mouth:   Mucosa pink and moist.  mild erythema   Neck: normal, supple and no adenopathy  Sinuses: nontender with palpation  Lungs: inspiratory and expiratory wheezes throughout, no crackles.   Heart: regular rate and rhythm, no murmur, rub or gallop

## 2021-06-10 NOTE — PROGRESS NOTES
SUBJECTIVE:   Lalitha Underwood is a 72 y.o. female comes in for evaluation of continued cough and chest tightness when breathing.  She was seen a little over a week ago after symptoms began it is thought that she developed a viral illness.  She has a history of COPD and she continues to use albuterol MDI and nebs as well as Advair to help with her symptoms.     OBJECTIVE:  /80  Pulse 75  Temp 97.6  F (36.4  C) (Oral)   Resp 20  Wt 214 lb 11.2 oz (97.4 kg)  LMP 12/12/1998  SpO2 96%  BMI 36.85 kg/m2  Appearance: talkative and mildly ill.   ENT- ENT exam normal, no neck nodes or sinus tenderness.   LUNGS: Fair to good air movement throughout.  She has some end expiratory wheezes and mostly lung fields as well as some coarse wheezes/rhonchi.  CV: Regular and not tachy.  SKIN: Warm, dry with good color.    Studies: None    ASSESSMENT:   COPD with bronchitis.     PLAN:    Patient Instructions       COPD bronchitis    It has been almost 2 weeks and no improvement.    Will treat with antibiotic Doxycycline.    Continue with nebs and COPD medications.       Medications Ordered   Medications     doxycycline (VIBRA-TABS) 100 MG tablet     Sig: Take 1 tablet (100 mg total) by mouth 2 (two) times a day for 10 days.     Dispense:  20 tablet     Refill:  0     Or equivalent

## 2021-06-11 NOTE — PROGRESS NOTES
DME Provider: Sabrina  Date Faxed: 9/8/2020  Ordering Provider: Dr. Tamayo  Equipment ordered: Portable oxygen concentrator

## 2021-06-11 NOTE — PROGRESS NOTES
Assessment/Plan:     1. Chronic Obstructive Pulmonary Disease  Controlled and stable.  Of note is provided to her for her oxygen supplier indicating her benefit from and her need for continued use of overnight oxygen per oximetry study report from June 2016.  She may continue her rescue inhaler, Advair, and Spiriva.  Continue to abstain from tobacco use.    2. Hypertension  Controlled on current regimen.  Potassium refill provided.  - potassium chloride 20 mEq TbER; Take 20 mEq by mouth daily.  Dispense: 90 tablet; Refill: 3      Subjective:      Lalitha Underwood is a 72 y.o. female presented to clinic today requesting a letter stating her oxygen need to do COPD and overnight hypoxia.  This letter is required for ongoing use of her overnight oxygen.  She has been doing well on her oxygen, has been using it for at least 20 years now.  When taking it she is felt significantly less tired, awakened less at night, and we review that she is at lower risk for complications from her COPD as a result of that.  She continues to use her rescue inhaler most mornings and as needed.  She remains compliant with Advair and Spiriva.  She is needing a refill of her potassium today.    Current Outpatient Prescriptions   Medication Sig Dispense Refill     albuterol (ACCUNEB) 1.25 mg/3 mL nebulizer solution Take 3 mL (1.25 mg total) by nebulization every 6 (six) hours as needed for wheezing. 75 mL 0     albuterol (PROVENTIL HFA;VENTOLIN HFA) 90 mcg/actuation inhaler Inhale 2 puffs every 6 (six) hours as needed for wheezing. 3 Inhaler 3     calcium citrate 250 mg calcium Tab Take 500 Units by mouth 2 (two) times a day.       cholecalciferol, vitamin D3, 5,000 unit Tab Take by mouth.       DENOSUMAB (PROLIA SUBQ) Inject 60 mL as directed every 6 (six) months.       fluticasone-salmeterol (ADVAIR) 250-50 mcg/dose DISKUS Inhale 1 puff 2 (two) times a day. 3 each 3     hydroCHLOROthiazide (HYDRODIURIL) 12.5 MG tablet Take 1 tablet (12.5 mg  total) by mouth daily. 90 tablet 3     hyoscyamine (LEVBID) 0.375 mg 12 hr tablet Take 1 tablet (0.375 mg total) by mouth every 12 (twelve) hours as needed for cramping. 180 tablet 3     losartan (COZAAR) 50 MG tablet Take 1 tablet (50 mg total) by mouth 2 (two) times a day. 180 tablet 3     OXYGEN-AIR DELIVERY SYSTEMS [facility] Use 1.5 L As Directed bedtime.       phenytoin (DILANTIN) 100 MG ER capsule Take 1 capsule (100 mg total) by mouth 3 (three) times a day. 270 capsule 3     predniSONE (DELTASONE) 20 MG tablet Take 1 tablet (20 mg total) by mouth daily. TAKE 2 TABLETS DAILY FOR THE FIRST 3 DAYS, THEN ONE TABLET DAILY FOR THE NEXT 3 DAYS 9 tablet 0     tiotropium (SPIRIVA) 18 mcg inhalation capsule Place 1 capsule (2 puffs total) into inhaler and inhale daily. 90 capsule 3     potassium chloride 20 mEq TbER Take 20 mEq by mouth daily. 90 tablet 3     No current facility-administered medications for this visit.        Past Medical History, Family History, and Social History reviewed.  Past Medical History:   Diagnosis Date     Convulsive disorder      COPD (chronic obstructive pulmonary disease)      Depression      Hernia of unspecified site of abdominal cavity without mention of obstruction or gangrene      Hiatal hernia      Hypertension      Osteopenia      Venous insufficiency of both lower extremities      Past Surgical History:   Procedure Laterality Date     OTHER SURGICAL HISTORY Left 2003    Broke titanium in left arm     SHOULDER SURGERY Right 1967     Clindamycin; Carbamazepine; and Morphine  Family History   Problem Relation Age of Onset     Breast cancer Mother 66     Hypertension Mother      Heart attack Mother      Varicose Veins Mother      Hypertension Father      Heart attack Sister      Diabetes Son      Pulmonary Hypertension Daughter      Heart attack Sister      Social History     Social History     Marital status:      Spouse name: N/A     Number of children: N/A     Years of  "education: N/A     Occupational History     Not on file.     Social History Main Topics     Smoking status: Former Smoker     Types: Cigarettes     Quit date: 12/7/1998     Smokeless tobacco: Never Used     Alcohol use 1.2 oz/week     2 Glasses of wine per week     Drug use: No     Sexual activity: No     Other Topics Concern     Not on file     Social History Narrative    .  Two kids.  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         Review of systems is as stated in HPI, and the remainder of the 10 system review is otherwise negative.    Objective:     Vitals:    06/12/17 1516   BP: 126/82   Patient Site: Right Arm   Patient Position: Sitting   Cuff Size: Adult Large   Pulse: 73   Resp: 16   Temp: 97.9  F (36.6  C)   TempSrc: Oral   SpO2: 93%   Weight: 209 lb 3.2 oz (94.9 kg)   Height: 5' 4\" (1.626 m)    Body mass index is 35.91 kg/(m^2).    Alert female.  Mucous members moist.  Heart with regular rate and rhythm.  Lungs clear.      This note has been dictated using voice recognition software. Any grammatical or context distortions are unintentional and inherent to the the software.     "

## 2021-06-11 NOTE — PROGRESS NOTES
Assessment and Plan:Lalitha Underwood is a 75 y.o. F with a past medical history significant for severe COPD who presents to clinic today for follow up.  She is doing quite well from a breathing standpoint.  She is stable on her spiriva and once a day advair.  She would like to see if she needs oxygen with exertion.    1) COPD - continue spiriva and advair as current.  Use albuterol as needed.    2) Chronic hypoxic respiratory failure - uses 1.5 L with sleep, today we learned she qualifies for oxygen with ambulation    Due to desaturation of SaO2 less than or equal to 88% on room air at rest from COPD, home oxygen therapy will benefit my patient's condition.  The patient has tried (or considered) other medications with limited success and oxygen is still required. The patient is mobile in the home and requires portability.    3) RTC in 6 months        CCx: copd    HPI: Ms. Michaud is a 75 year old female with severe COPD and chronic hypoxic respiratory failure who presents for a routine follow up.  She feels her lungs are doing well as of late, her main problem is gait steadiness for which she is seeing therapists for.  She remains on spiriva and advair.  She uses albuterol once in the morning out of routine, but then almost never needs it later in the day.  She has had no flairs of her lungs since then.  She is gaining some weight so feels a little more short of breath from this.  She is wondering if she needs portable oxygen.  She uses 1.5L with sleep at night, but this unit is not portable.    ROS:  Review of Systems - History obtained from the patient  General ROS: negative  Psychological ROS: negative  ENT ROS: negative  Allergy and Immunology ROS: negative  Endocrine ROS: negative  Respiratory ROS: positive for - shortness of breath  negative for - cough, hemoptysis, orthopnea, sputum changes, stridor or tachypnea  Cardiovascular ROS: no chest pain or palpitations  Gastrointestinal ROS: no abdominal pain, change  in bowel habits, or black or bloody stools  Genito-Urinary ROS: no dysuria, trouble voiding, or hematuria  Musculoskeletal ROS: negative  Neurological ROS: no TIA or stroke symptoms  Dermatological ROS: negative      Current Meds:  Current Outpatient Medications   Medication Sig     albuterol (PROAIR HFA;PROVENTIL HFA;VENTOLIN HFA) 90 mcg/actuation inhaler Inhale 2 puffs every 4 (four) hours as needed for wheezing.     calcium citrate (CALCITRATE) 200 mg (950 mg) tablet Take 1 tablet by mouth 2 (two) times a day.     cholecalciferol, vitamin D3, 5,000 unit Tab Take 5,000 Units by mouth daily.      denosumab 60 mg/mL Syrg Inject 60 mg under the skin every 6 (six) months.     fluticasone propion-salmeteroL (ADVAIR) 250-50 mcg/dose DISKUS Inhale 1 puff 2 (two) times a day.     hydroCHLOROthiazide (HYDRODIURIL) 25 MG tablet Take 1 tablet (25 mg total) by mouth daily.     hyoscyamine (LEVBID) 0.375 mg 12 hr tablet Take 1 tablet (0.375 mg total) by mouth every 12 (twelve) hours as needed for cramping.     levETIRAcetam (KEPPRA) 500 MG tablet Take 1 tablet (500 mg total) by mouth 2 (two) times a day.     LORazepam (ATIVAN) 0.5 MG tablet Take 1 tablet (0.5 mg total) by mouth every 8 (eight) hours as needed for anxiety.     losartan (COZAAR) 50 MG tablet Take 1 tablet (50 mg total) by mouth 2 (two) times a day.     omeprazole (PRILOSEC) 20 MG capsule Take 1 capsule (20 mg total) by mouth daily before breakfast.     OXYGEN-AIR DELIVERY SYSTEMS MISC Use 1.5 L As Directed bedtime.     potassium chloride 20 mEq TbER Take 20 mEq by mouth daily.     RABEprazole (ACIPHEX) 20 mg tablet      tiotropium (SPIRIVA) 18 mcg inhalation capsule Place 1 capsule (2 puffs total) into inhaler and inhale daily.       Labs:  No results found for this or any previous visit (from the past 72 hour(s)).    I have personally reviewed all pertinent imaging studies and PFT results unless otherwise noted.    Imaging studies:  Us Biopsy Thyroid Fine  "Needle Aspiration    Result Date: 7/29/2020  EXAM: 1. FINE-NEEDLE ASPIRATION LEFT THYROID NODULE 2. ULTRASOUND GUIDANCE LOCATION: [facility] DATE/TIME: 7/29/2020 2:20 PM INDICATION: Family history of malignant neoplasm of other organs or systems PROCEDURE: Informed consent obtained. Time out performed. The site was prepped and draped in sterile fashion. 5 mL of 1% lidocaine was infused into the local soft tissues. Under direct ultrasound guidance, multiple fine-needle aspirates of the nodule were obtained. RADIOLOGIC SUPERVISION AND INTERPRETATION: ULTRASOUND GUIDANCE: Images demonstrate the needle within the nodule.     1.  Status post ultrasound-guided fine-needle aspiration of a left thyroid nodule.         Physical Exam:  /86   Pulse 84   Ht 5' 4\" (1.626 m)   Wt 220 lb (99.8 kg)   LMP 12/12/1998   SpO2 92% Comment: RA  BMI 37.76 kg/m    General - Well nourished  Ears/Mouth -  OP pink moist, no thrush  Neck - no cervical lymphadenopathy  Lungs - Clear to ausculation bilaterally  CVS - regular rhythm with no murmurs, rubs or gallups  Abdomen - soft, NT, ND, NABS  Ext - no cyanosis, clubbing or edema  Skin - no rash  Psychology - alert and oriented, answers appropriate        Electronically signed by:    Perry Tamayo MD PhD  [facility]    "

## 2021-06-11 NOTE — PROGRESS NOTES
Oxygen saturation walk test    Patient oxygen saturation on RA at rest is 93%.  Oxygen saturation while ambulating 150ft on RA is 84%.    Oxygen pulse dose testing  While ambulating 300ft on 3LPM at pulse dose, oxygen saturation is 84%.   While ambulating 300ft on 5LPM at pulse dose, oxygen saturation is 89%.    DME Provider: [employer]    Patient is ambulatory within his/her home.

## 2021-06-11 NOTE — PATIENT INSTRUCTIONS - HE
1) Continue spiriva and advair  2) Continue albuterol use as current  3) We will check your oxygen with ambulation to see if it dips, this may help you qualify for portable oxygen.

## 2021-06-12 NOTE — PROGRESS NOTES
"Lalitha Underwood is a 75 y.o. female who is being evaluated via a billable telephone visit.      The patient has been notified of following:     \"This telephone visit will be conducted via a call between you and your physician/provider. We have found that certain health care needs can be provided without the need for a physical exam.  This service lets us provide the care you need with a short phone conversation.  If a prescription is necessary we can send it directly to your pharmacy.  If lab work is needed we can place an order for that and you can then stop by our lab to have the test done at a later time.    Telephone visits are billed at different rates depending on your insurance coverage. During this emergency period, for some insurers they may be billed the same as an in-person visit.  Please reach out to your insurance provider with any questions.    If during the course of the call the physician/provider feels a telephone visit is not appropriate, you will not be charged for this service.\"    Patient has given verbal consent to a Telephone visit? Yes    What phone number would you like to be contacted at? [phone]    Patient would like to receive their AVS by AVS Preference: James.    Assessment/Plan:    1. Plantar warts  Patient describes skin lesion most consistent with a plantar wart on the bottom of her foot.   I recommend follow-up in clinic for cryotherapy.  She will schedule this for next week.  In the meantime, she will hold off on using over-the-counter medications for wart removal given that it has not been helping thus far.    Subjective:    Lalitha Underwood is a 75-year-old female here today for telephone visit to discuss concerns regarding possible wart on the bottom of her foot.  Patient first developed what looks like a wart on the bottom of her right foot earlier this summer.  She tried using several over-the-counter wart remedies including the Compound W Band-Aids.  These " over-the-counter medications did not provide relief.  She does have some discomfort with weightbearing on the right foot.  She is interested in having it removed in clinic.  She may also have a wart on her hairline but she is not sure if that is what this is.  She would like to have this evaluated in clinic as well.  Review of systems is as stated in HPI, and the remainder of the 10 system review is otherwise unremarkable.    Past Medical History, Family History, and Social History reviewed.    Past Surgical History:   Procedure Laterality Date     OTHER SURGICAL HISTORY Left     Broke titanium in left arm     SHOULDER SURGERY Right 1967     UMBILICAL HERNIA REPAIR  2018    Dr. Jimenez      THYROID BIOPSY  2020        Family History   Problem Relation Age of Onset     Breast cancer Mother 66     Hypertension Mother      Heart attack Mother      Varicose Veins Mother      Hypertension Father      Heart attack Sister      Heart disease Sister      Diabetes Son      Pulmonary Hypertension Daughter      Heart attack Sister      Heart disease Sister         Past Medical History:   Diagnosis Date     Convulsive disorder (H)      COPD (chronic obstructive pulmonary disease) (H)      Depression      Hernia of unspecified site of abdominal cavity without mention of obstruction or gangrene      Hiatal hernia      Hypertension      On home oxygen therapy     at 1.5 liters at nite     Osteopenia      Shortness of breath      Venous insufficiency of both lower extremities         Social History     Tobacco Use     Smoking status: Former Smoker     Types: Cigarettes     Quit date: 1998     Years since quittin.9     Smokeless tobacco: Never Used   Substance Use Topics     Alcohol use: Yes     Alcohol/week: 2.0 standard drinks     Types: 2 Glasses of wine per week     Drug use: Yes     Types: Oxycodone        Current Outpatient Medications   Medication Sig Dispense Refill     albuterol (PROAIR HFA;PROVENTIL  HFA;VENTOLIN HFA) 90 mcg/actuation inhaler Inhale 2 puffs every 4 (four) hours as needed for wheezing. 3 Inhaler 4     calcium citrate (CALCITRATE) 200 mg (950 mg) tablet Take 1 tablet by mouth 2 (two) times a day.       cholecalciferol, vitamin D3, 5,000 unit Tab Take 5,000 Units by mouth daily.        denosumab 60 mg/mL Syrg Inject 60 mg under the skin every 6 (six) months.       fluticasone propion-salmeteroL (ADVAIR) 250-50 mcg/dose DISKUS Inhale 1 puff 2 (two) times a day. 180 each 4     hydroCHLOROthiazide (HYDRODIURIL) 25 MG tablet Take 1 tablet (25 mg total) by mouth daily. 90 tablet 4     hyoscyamine (LEVBID) 0.375 mg 12 hr tablet Take 1 tablet (0.375 mg total) by mouth every 12 (twelve) hours as needed for cramping. 60 tablet 3     levETIRAcetam (KEPPRA) 500 MG tablet Take 1 tablet (500 mg total) by mouth 2 (two) times a day. 180 tablet 4     losartan (COZAAR) 50 MG tablet Take 1 tablet (50 mg total) by mouth 2 (two) times a day. 180 tablet 4     omeprazole (PRILOSEC) 20 MG capsule Take 1 capsule (20 mg total) by mouth daily before breakfast. 90 capsule 4     OXYGEN-AIR DELIVERY SYSTEMS [facility] Use 1.5 L As Directed bedtime.       potassium chloride 20 mEq TbER Take 20 mEq by mouth daily. 90 tablet 4     RABEprazole (ACIPHEX) 20 mg tablet        tiotropium (SPIRIVA) 18 mcg inhalation capsule Place 1 capsule (2 puffs total) into inhaler and inhale daily. 90 capsule 4     Current Facility-Administered Medications   Medication Dose Route Frequency Provider Last Rate Last Dose     denosumab 60 mg (PROLIA 60 mg/ml)  60 mg Subcutaneous Q6 Months Lalitha Haq, NP   60 mg at 10/15/19 1052     denosumab 60 mg (PROLIA 60 mg/ml)  60 mg Subcutaneous Q6 Months Lalitha Haq, NP   60 mg at 05/21/20 1303          Objective:    There were no vitals filed for this visit.   There is no height or weight on file to calculate BMI.      General:   Patient is pleasant and cooperative over the phone.  Does not sound to be in  any type of respiratory distress.  No coughing.           This note has been dictated using voice recognition software. Any grammatical or context distortions are unintentional and inherent to the use of this software.       Phone call duration:  5 minutes    Loretta Sewell, CNP

## 2021-06-12 NOTE — PROGRESS NOTES
[facility] Discharge Summary  Patient Name: Lalitha Underwood  Date: 10/22/2020  Referral Diagnosis:   Abnormality of gait   Referring provider: Kelly Hull MD  Visit Diagnosis:   1. Abnormality of gait     2. Generalized muscle weakness     3. Decreased functional mobility     4. Unsteadiness on feet     5. Decreased functional mobility and endurance     6. Poor balance         Goals:   Pt. will demonstrate/verbalize independence in self-management of condition in : 4 weeks Met  Pt. will be independent with home exercise program in : 4 weeks Met  Pt will: improve MILAN balance testing by 6 points in 4 weeks Not Tested  Pt will: improve 2 min walk test to 130 M in 4h weeks Not Tested    Patient was seen for 8 visits from 3/16/2020 to 7/9/2020 with - missed appointments.    The patient made progress towards goals. She was to be re-tested in her next session, but did not return following her last visit.    Therapy will be discontinued at this time.  The patient will need a new referral to resume.    Thank you for your referral.  Hero ROQUE, PT  10/22/2020  2:21 PM

## 2021-06-12 NOTE — TELEPHONE ENCOUNTER
Patient Returning Call  Reason for call:  DON'T KNOW WHY  Information relayed to patient:  THERE WAS NOTHING TO RELAY TO THE PATIENT  Patient has additional questions:  No  If YES, what are your questions/concerns:  WOULD LIKE A CALL AND A NAME OF PERSON SHE MAY CONTACT OR REASON FOR THE CALL  Okay to leave a detailed message?: Yes

## 2021-06-12 NOTE — PROGRESS NOTES
Assessment/Plan:    1. Plantar warts  Plantar wart located on ball of right foot.  First round of cryotherapy completed today.  Patient tolerated this well.  She will return to clinic in approximately 3 weeks for second round.  Recommend monitoring for any new or worsening symptoms in the meantime.      Subjective:    Lalitha Underwood is a 75-year-old female seen today for wart removal.  She has had what she believes to be a plantar wart on the ball of her right foot since early summer 2020.  She reports having history of plantar warts in her younger years but has not had one recently until now.  She is fairly confident that what it is.  She has been using over-the-counter remedies such as Compound W etc. without relief.  It is causing some discomfort with weightbearing on her right foot. Review of systems is as stated in HPI, and the remainder of the 10 system review is otherwise unremarkable.    Past Medical History, Family History, and Social History reviewed.    Past Surgical History:   Procedure Laterality Date     OTHER SURGICAL HISTORY Left 2003    Broke titanium in left arm     SHOULDER SURGERY Right 1967     UMBILICAL HERNIA REPAIR  04/04/2018    Dr. Jimenez      THYROID BIOPSY  7/29/2020        Family History   Problem Relation Age of Onset     Breast cancer Mother 66     Hypertension Mother      Heart attack Mother      Varicose Veins Mother      Hypertension Father      Heart attack Sister      Heart disease Sister      Diabetes Son      Pulmonary Hypertension Daughter      Heart attack Sister      Heart disease Sister         Past Medical History:   Diagnosis Date     Convulsive disorder (H)      COPD (chronic obstructive pulmonary disease) (H)      Depression      Hernia of unspecified site of abdominal cavity without mention of obstruction or gangrene      Hiatal hernia      Hypertension      On home oxygen therapy     at 1.5 liters at nite     Osteopenia      Shortness of breath      Venous  insufficiency of both lower extremities         Social History     Tobacco Use     Smoking status: Former Smoker     Types: Cigarettes     Quit date: 1998     Years since quittin.9     Smokeless tobacco: Never Used   Substance Use Topics     Alcohol use: Yes     Alcohol/week: 2.0 standard drinks     Types: 2 Glasses of wine per week     Drug use: Yes     Types: Oxycodone        Current Outpatient Medications   Medication Sig Dispense Refill     albuterol (PROAIR HFA;PROVENTIL HFA;VENTOLIN HFA) 90 mcg/actuation inhaler Inhale 2 puffs every 4 (four) hours as needed for wheezing. 3 Inhaler 4     calcium citrate (CALCITRATE) 200 mg (950 mg) tablet Take 1 tablet by mouth 2 (two) times a day.       cholecalciferol, vitamin D3, 5,000 unit Tab Take 5,000 Units by mouth daily.        denosumab 60 mg/mL Syrg Inject 60 mg under the skin every 6 (six) months.       fluticasone propion-salmeteroL (ADVAIR) 250-50 mcg/dose DISKUS Inhale 1 puff 2 (two) times a day. 180 each 4     hydroCHLOROthiazide (HYDRODIURIL) 25 MG tablet Take 1 tablet (25 mg total) by mouth daily. 90 tablet 4     levETIRAcetam (KEPPRA) 500 MG tablet Take 1 tablet (500 mg total) by mouth 2 (two) times a day. 180 tablet 4     losartan (COZAAR) 50 MG tablet Take 1 tablet (50 mg total) by mouth 2 (two) times a day. 180 tablet 4     omeprazole (PRILOSEC) 20 MG capsule Take 1 capsule (20 mg total) by mouth daily before breakfast. 90 capsule 4     OXYGEN-AIR DELIVERY SYSTEMS MISC Use 1.5 L As Directed bedtime.       potassium chloride 20 mEq TbER Take 20 mEq by mouth daily. 90 tablet 4     RABEprazole (ACIPHEX) 20 mg tablet        tiotropium (SPIRIVA) 18 mcg inhalation capsule Place 1 capsule (2 puffs total) into inhaler and inhale daily. 90 capsule 4     hyoscyamine (LEVBID) 0.375 mg 12 hr tablet Take 1 tablet (0.375 mg total) by mouth every 12 (twelve) hours as needed for cramping. 60 tablet 3     Current Facility-Administered Medications   Medication  Dose Route Frequency Provider Last Rate Last Dose     denosumab 60 mg (PROLIA 60 mg/ml)  60 mg Subcutaneous Q6 Months Severino Lalitha L, NP   60 mg at 10/15/19 1052     denosumab 60 mg (PROLIA 60 mg/ml)  60 mg Subcutaneous Q6 Months Felicia Haqecca L, NP   60 mg at 05/21/20 1303          Objective:    Vitals:    11/04/20 1407   BP: 130/76   Patient Site: Right Arm   Patient Position: Sitting   Cuff Size: Adult Large   Pulse: 85   SpO2: 96%      There is no height or weight on file to calculate BMI.      General Appearance:  Alert, cooperative, no distress, appears stated age   Skin: Warm, dry.  Skin color, texture, turgor normal.  Plantar wart noted on ball of right foot.  No other lesions noted on the foot.       This note has been dictated using voice recognition software. Any grammatical or context distortions are unintentional and inherent to the use of this software.

## 2021-06-13 NOTE — TELEPHONE ENCOUNTER
Left message to call back for: medication clarification  Information to relay to patient:  Below message from Dr. Hull

## 2021-06-13 NOTE — TELEPHONE ENCOUNTER
"Refill Request  Did you contact pharmacy: Yes  Medication name:   Requested Prescriptions     Pending Prescriptions Disp Refills     RABEprazole (ACIPHEX) 20 mg tablet   0     Who prescribed the medication: Kelly Hull MD Original prescriber was Dr Renetta Rae from over 10 yrs ago.    Requested Pharmacy: [facility]   Is patient out of medication: No.  \"several\" days left  Patient notified refills processed in 3 business days:  yes  Okay to leave a detailed message: yes    Patient is stating that per [facility] pharmacy that this medication is needing a prior authorization. Would it make a difference if she got the generic form of this medication then would not need the prior auth.     "

## 2021-06-13 NOTE — TELEPHONE ENCOUNTER
Prior Authorization Request  Who s requesting:  Pharmacy  Pharmacy Name and Location: [facility]  Medication Name:    Disp Refills Start End    RABEprazole (ACIPHEX) 20 mg tablet   8/3/2020     Class: Historical Med      Insurance Plan: [employer]   Insurance Member ID Number:  [id number]  CoverMyMeds Key: N/A  Informed patient that prior authorizations can take up to 10 business days for response:   NA  Okay to leave a detailed message: Yes  [phone]

## 2021-06-13 NOTE — PROGRESS NOTES

## 2021-06-13 NOTE — TELEPHONE ENCOUNTER
Central PA team  [phone]  Pool: HE PA MED ([id number])          PA has been initiated.       PA form completed and faxed insurance via Cover My Meds     Key:  [id number] - PA Case ID: [id number]     Medication:  RABEprazole Sodium 20MG dr tablets    Insurance:  [employer] FEP        Response will be received via fax and may take up to 5-10 business days depending on plan

## 2021-06-13 NOTE — PROGRESS NOTES
Assessment/Plan:    1. Plantar warts  Third round cryotherapy performed on plantar wart of right foot today.  Patient tolerated well.  Will monitor and follow-up for additional treatment if still having symptoms of plantar wart.    2. Edema, unspecified type  Bilateral lower extremity edema.  Recent ultrasound negative for DVT.  I recommend patient continue with higher dose of hydrochlorothiazide, will check BMP at her follow-up visit in 3 weeks.  Also recommend consistent use of compression stockings, elevation of lower extremities and decreasing sodium intake.      Subjective:    Lalitha Underwood is a 75-year-old female here today for follow-up on plantar wart.  Patient has had 2 rounds of cryotherapy up to this point.  She continues to have discomfort on her right ball of her foot from plantar wart that she has had since summer 2020.  She will be having third round of cryotherapy completed today.  She would also like to talk about lower extremity edema.  She had ultrasound which was negative for DVT.  Patient increased dose of hydrochlorothiazide over the last couple weeks, currently taking 25 mg in the morning and 12.5 in the afternoon.  Feels that this may be helping slightly.  She does have compression stockings but is not consistent with using them, typically uses a couple days a week.  She does not elevate her legs often.  Denies any pain in her lower extremities.  No redness or other skin changes that she has noticed.    Patient is due for colonoscopy but declines this.  Review of systems is as stated in HPI, and the remainder of the 10 system review is otherwise unremarkable.    Past Medical History, Family History, and Social History reviewed.    Past Surgical History:   Procedure Laterality Date     OTHER SURGICAL HISTORY Left 2003    Broke titanium in left arm     SHOULDER SURGERY Right 1967     UMBILICAL HERNIA REPAIR  04/04/2018    Dr. Jimenez      THYROID BIOPSY  7/29/2020        Family History    Problem Relation Age of Onset     Breast cancer Mother 66     Hypertension Mother      Heart attack Mother      Varicose Veins Mother      Hypertension Father      Heart attack Sister      Heart disease Sister      Diabetes Son      Pulmonary Hypertension Daughter      Heart attack Sister      Heart disease Sister         Past Medical History:   Diagnosis Date     Convulsive disorder (H)      COPD (chronic obstructive pulmonary disease) (H)      Depression      Hernia of unspecified site of abdominal cavity without mention of obstruction or gangrene      Hiatal hernia      Hypertension      On home oxygen therapy     at 1.5 liters at nite     Osteopenia      Shortness of breath      Venous insufficiency of both lower extremities         Social History     Tobacco Use     Smoking status: Former Smoker     Types: Cigarettes     Quit date: 1998     Years since quittin.0     Smokeless tobacco: Never Used   Substance Use Topics     Alcohol use: Yes     Alcohol/week: 2.0 standard drinks     Types: 2 Glasses of wine per week     Drug use: Yes     Types: Oxycodone        Current Outpatient Medications   Medication Sig Dispense Refill     albuterol (PROAIR HFA;PROVENTIL HFA;VENTOLIN HFA) 90 mcg/actuation inhaler Inhale 2 puffs every 4 (four) hours as needed for wheezing. 3 Inhaler 4     calcium citrate (CALCITRATE) 200 mg (950 mg) tablet Take 1 tablet by mouth 2 (two) times a day.       cholecalciferol, vitamin D3, 5,000 unit Tab Take 5,000 Units by mouth daily.        denosumab 60 mg/mL Syrg Inject 60 mg under the skin every 6 (six) months.       fluticasone propion-salmeteroL (ADVAIR) 250-50 mcg/dose DISKUS Inhale 1 puff 2 (two) times a day. 180 each 4     hydroCHLOROthiazide (HYDRODIURIL) 25 MG tablet Take 1 tablet (25 mg total) by mouth daily. 90 tablet 4     hyoscyamine (LEVBID) 0.375 mg 12 hr tablet Take 1 tablet (0.375 mg total) by mouth every 12 (twelve) hours as needed for cramping. 60 tablet 3      levETIRAcetam (KEPPRA) 500 MG tablet Take 1 tablet (500 mg total) by mouth 2 (two) times a day. 180 tablet 4     losartan (COZAAR) 50 MG tablet Take 1 tablet (50 mg total) by mouth 2 (two) times a day. 180 tablet 4     OXYGEN-AIR DELIVERY SYSTEMS [facility] Use 1.5 L As Directed bedtime.       potassium chloride 20 mEq TbER Take 20 mEq by mouth daily. 90 tablet 4     RABEprazole (ACIPHEX) 20 mg tablet Take 1 tablet (20 mg total) by mouth daily. 90 tablet 3     tiotropium (SPIRIVA) 18 mcg inhalation capsule Place 1 capsule (2 puffs total) into inhaler and inhale daily. 90 capsule 4     Current Facility-Administered Medications   Medication Dose Route Frequency Provider Last Rate Last Admin     denosumab 60 mg (PROLIA 60 mg/ml)  60 mg Subcutaneous Q6 Months Severino, Lalitha L, NP   60 mg at 10/15/19 1052     denosumab 60 mg (PROLIA 60 mg/ml)  60 mg Subcutaneous Q6 Months Tyron Haqca L, NP   60 mg at 11/23/20 1356          Objective:    Vitals:    12/16/20 1316   BP: 112/62   Patient Site: Left Arm   Patient Position: Sitting   Cuff Size: Adult Large   Pulse: 88   Temp: (!) 96.3  F (35.7  C)   Weight: (!) 225 lb 1.6 oz (102.1 kg)      Body mass index is 38.64 kg/m .      General Appearance:  Alert, cooperative, no distress, appears stated age   Heart:  Regular rate and rhythm, S1, S2 normal, no murmur, rub or gallop   Extremities:  Plantar wart noted on bottom of right foot.  Some callused skin around it.  Bilateral lower extremities with +1 pitting edema.  No redness, tenderness.   Skin: Warm, dry.  Skin color, texture, turgor normal.           This note has been dictated using voice recognition software. Any grammatical or context distortions are unintentional and inherent to the use of this software.

## 2021-06-13 NOTE — TELEPHONE ENCOUNTER
Patient Returning Call  Reason for call:  Patient returning missed call from clinic staff  Information relayed to patient:  Writer relayed clinic encounter message as written to patient on medication clarification being needed.  Patient has additional questions:  Yes  If YES, what are your questions/concerns:  Patient states she was taking omeprazole for a short time only as her original medication, aciphex, was unavailable through the pharmacy(out of stock/back order).    Patient states she is now just only taking the generic aciphex medication. Patient also asked about her Prior authorization for the aciphex, see related encounter, and writer did let patient know of the approval since she asked specifically about it.    Okay to leave a detailed message?: Yes

## 2021-06-13 NOTE — PROGRESS NOTES
Assessment/Plan:    1. Plantar warts  Second round of cryotherapy completed today on plantar wart.  Will have patient schedule third round for 2 to 3 weeks out and cancel if wart resolves before then.  She will continue to monitor for any redness, swelling or increased pain.    2. Left lower leg edema  Patient has chronic bilateral lower extremity edema.  More recently, left lower extremity, particularly the left ankle, has been worse.  She is fairly sedentary.  Denies any pain currently but is swelling is quite a bit more noticeable.  Will obtain ultrasound of her left lower extremity for safe measures.  If negative, will consider adjusting diuretics.  -US left leg    Subjective:    Lalitha Underwood is a 75 year old female seen today for wart removal.  She was seen about 3 weeks ago for initial round of cryotherapy.  She has had plantar wart on the bottom of her foot for the last couple of months.  She has had no relief from over-the-counter regimens.  She reports tolerating the cryotherapy at her last appointment well.  She continued to have some discomfort with walking on that foot.  No redness, swelling or worsening pain than before.    She has noticed that her left ankle has been more swollen recently.  She always has some degree of edema in her lower extremities.  She uses furosemide and feels that the dose may need to be increased.  The left leg has been worse recently.  She is fairly sedentary.  She denies any pain in her calf.  No weakness.  No history of blood clots.  Review of systems is as stated in HPI, and the remainder of the 10 system review is otherwise unremarkable.    Past Medical History, Family History, and Social History reviewed.    Past Surgical History:   Procedure Laterality Date     OTHER SURGICAL HISTORY Left 2003    Broke titanium in left arm     SHOULDER SURGERY Right 1967     UMBILICAL HERNIA REPAIR  04/04/2018    Dr. Jimenez     US THYROID BIOPSY  7/29/2020        Family History    Problem Relation Age of Onset     Breast cancer Mother 66     Hypertension Mother      Heart attack Mother      Varicose Veins Mother      Hypertension Father      Heart attack Sister      Heart disease Sister      Diabetes Son      Pulmonary Hypertension Daughter      Heart attack Sister      Heart disease Sister         Past Medical History:   Diagnosis Date     Convulsive disorder (H)      COPD (chronic obstructive pulmonary disease) (H)      Depression      Hernia of unspecified site of abdominal cavity without mention of obstruction or gangrene      Hiatal hernia      Hypertension      On home oxygen therapy     at 1.5 liters at nite     Osteopenia      Shortness of breath      Venous insufficiency of both lower extremities         Social History     Tobacco Use     Smoking status: Former Smoker     Types: Cigarettes     Quit date: 1998     Years since quittin.0     Smokeless tobacco: Never Used   Substance Use Topics     Alcohol use: Yes     Alcohol/week: 2.0 standard drinks     Types: 2 Glasses of wine per week     Drug use: Yes     Types: Oxycodone        Current Outpatient Medications   Medication Sig Dispense Refill     albuterol (PROAIR HFA;PROVENTIL HFA;VENTOLIN HFA) 90 mcg/actuation inhaler Inhale 2 puffs every 4 (four) hours as needed for wheezing. 3 Inhaler 4     calcium citrate (CALCITRATE) 200 mg (950 mg) tablet Take 1 tablet by mouth 2 (two) times a day.       cholecalciferol, vitamin D3, 5,000 unit Tab Take 5,000 Units by mouth daily.        denosumab 60 mg/mL Syrg Inject 60 mg under the skin every 6 (six) months.       fluticasone propion-salmeteroL (ADVAIR) 250-50 mcg/dose DISKUS Inhale 1 puff 2 (two) times a day. 180 each 4     hydroCHLOROthiazide (HYDRODIURIL) 25 MG tablet Take 1 tablet (25 mg total) by mouth daily. 90 tablet 4     hyoscyamine (LEVBID) 0.375 mg 12 hr tablet Take 1 tablet (0.375 mg total) by mouth every 12 (twelve) hours as needed for cramping. 60 tablet 3      levETIRAcetam (KEPPRA) 500 MG tablet Take 1 tablet (500 mg total) by mouth 2 (two) times a day. 180 tablet 4     losartan (COZAAR) 50 MG tablet Take 1 tablet (50 mg total) by mouth 2 (two) times a day. 180 tablet 4     OXYGEN-AIR DELIVERY SYSTEMS [facility] Use 1.5 L As Directed bedtime.       potassium chloride 20 mEq TbER Take 20 mEq by mouth daily. 90 tablet 4     RABEprazole (ACIPHEX) 20 mg tablet Take 1 tablet (20 mg total) by mouth daily. 90 tablet 3     tiotropium (SPIRIVA) 18 mcg inhalation capsule Place 1 capsule (2 puffs total) into inhaler and inhale daily. 90 capsule 4     Current Facility-Administered Medications   Medication Dose Route Frequency Provider Last Rate Last Admin     denosumab 60 mg (PROLIA 60 mg/ml)  60 mg Subcutaneous Q6 Months Severino Lalitha L, NP   60 mg at 10/15/19 1052     denosumab 60 mg (PROLIA 60 mg/ml)  60 mg Subcutaneous Q6 Months Lalitha Haq L, NP   60 mg at 11/23/20 1356          Objective:    Vitals:    12/02/20 1317   BP: 124/82   Patient Site: Left Arm   Patient Position: Sitting   Cuff Size: Adult Large   Pulse: 80   Temp: 96.8  F (36  C)   Weight: (!) 227 lb 8 oz (103.2 kg)      Body mass index is 39.05 kg/m .    General Appearance:  Alert, cooperative, no distress, appears stated age   Extremities:  Left lower extremity with +1 pitting edema around the ankle.  Trace swelling of the right ankle.  She has some varicose veins noted on bilateral lower extremities.  Extremities are otherwise normal.     Skin:  Plantar wart noted on ball of right foot.  Skin is otherwise warm, dry.           This note has been dictated using voice recognition software. Any grammatical or context distortions are unintentional and inherent to the use of this software.

## 2021-06-13 NOTE — PROGRESS NOTES
" Lalitha Underwood is a 75 y.o. female who is being evaluated via a billable telephone visit.      The patient has been notified of following:     \"This telephone visit will be conducted via a call between you and your physician/provider. We have found that certain health care needs can be provided without the need for a physical exam.  This service lets us provide the care you need with a short phone conversation.  If a prescription is necessary we can send it directly to your pharmacy.  If lab work is needed we can place an order for that and you can then stop by our lab to have the test done at a later time.    Telephone visits are billed at different rates depending on your insurance coverage. During this emergency period, for some insurers they may be billed the same as an in-person visit.  Please reach out to your insurance provider with any questions.    If during the course of the call the physician/provider feels a telephone visit is not appropriate, you will not be charged for this service.\"    Patient has given verbal consent to a Telephone visit? Yes    What phone number would you like to be contacted at? [phone]    Patient would like to receive their AVS by AVS Preference: James.    Additional provider notes:       Reason for visit      1. Age-related osteoporosis without current pathological fracture        History     Lalitha Underwood is a very pleasant 75 y.o. old female who presents for follow up.   SUMMARY:  1. Osteopenia-she was started on alendronate 2 years ago. She has been tolerating alendronate well however a recent bone density report as noted below showed deterioration in the bone density in the right hip. She states she's been compliant with her alendronate every week.  She does not take any calcium supplements and only consumes about 1 serving of dairy each day. She takes 5000 IU daily of the 3 and her recent level was 41.8.  TSH in December 2014 was 1.87.  She has been on Dilantin " "for the last 24 years after having a grand mal seizure. Prior to that she was diagnosed with seizure disorder at the age of 18 and use medications initially for about 2 years and then discontinued for about 27 years.  She is also a former smoker and smoked for about 35 years and quit in 1998.  She does not undertake any weight-bearing exercise currently. She is thinking of joining the [facility] in January  She has had several falls but has never resulted in a fragility fracture.  She has lost 1.75 inches in height.  Menopause at age 50 and she used hormone replacement therapy for about 2-3 years and stopped after the women's health initiative data came out.  Her mother had an osteoporotic hip fracture at age 90. She had to have open reduction internal fixation and lived for another 4 years after that but was dependent on a walker.  Her sister also has osteopenia.  She has a long-standing history of hiatal hernia and has been on proton pump inhibitors followed time and is currently in AcipHex.       TODAY:  Serene is contacted today in f/u for Osteoporosis. She remains on Prolia injections and has had no problems with them. She is currently due for a f/u Dexa Scan. Her last showed stability under current treatment. She notes that she has become \"addicted\" to using her walker. She has been to the \"[facility] to get her \"crystals checked\" has undergone some Psychotherapy to work on her anxiety as well as PT. She states that it is difficult for her to walk without it and feel safe. Her current Vit D level is 54.7, and Calcium level is 9.8.       Risk Factors     The following high- risk conditions have been ruled out: celiac disease, eating disorders, gastric bypass, hyperparathyroidism, inflammatory bowel disease, hyperthyroidism, rheumatoid arthritis, lupus, chronic kidney disease.      Lalitha Underwood has the following risk factors: Age, female gender, ex smoker, and breast cancer in her mother.      She is not on high " risk medications such as glucocorticoids, anti-coagulants, chemotherapy or levothyroxine.  She is on an anticonvulsant.  Patient deniesHysterectomy, Oophrectomy, Breast cancer and Family history of breast cancer.   Menopause was at age: 50 years    Past Medical History     Patient Active Problem List   Diagnosis     Solar Elastosis     Rosacea     Epilepsy And Recurrent Seizures     Esophageal Reflux     Vitamin D Deficiency     Pulmonary emphysema (H)     Hyperlipidemia     Walk Is Wobbly Or Unsteady (Ataxia)     Hypertension     Venous insufficiency of both lower extremities     Left arm swelling     Valgus deformity of both feet     Urge incontinence of urine     Mild aortic valve regurgitation     Mild aortic valve stenosis     Osteoporosis     Decreased hearing of both ears     Obesity (BMI 35.0-39.9) with comorbidity (H)     Neuropathy, idiopathic     Localized deposits of fat     Collapsed arches     S/P repair of paraesophageal hernia       Family History       family history includes Breast cancer (age of onset: 66) in her mother; Diabetes in her son; Heart attack in her mother, sister, and sister; Heart disease in her sister and sister; Hypertension in her father and mother; Pulmonary Hypertension in her daughter; Varicose Veins in her mother.    Social History      reports that she quit smoking about 21 years ago. Her smoking use included cigarettes. She has never used smokeless tobacco. She reports current alcohol use of about 2.0 standard drinks of alcohol per week. She reports current drug use. Drug: Oxycodone.      Review of Systems     Patient denies current pain, limited mobility, fractures.   Remainder per HPI.      Vital Signs     LMP 12/12/1998         Assessment     1. Age-related osteoporosis without current pathological fracture        Plan     Encouraged to keep working on her walking. She will remain on the Prolia injections and has her next one scheduled. F/u with me in 1 year, sooner with  problems or concerns.           Current Medications     Outpatient Medications Prior to Visit   Medication Sig Dispense Refill     albuterol (PROAIR HFA;PROVENTIL HFA;VENTOLIN HFA) 90 mcg/actuation inhaler Inhale 2 puffs every 4 (four) hours as needed for wheezing. 3 Inhaler 4     calcium citrate (CALCITRATE) 200 mg (950 mg) tablet Take 1 tablet by mouth 2 (two) times a day.       cholecalciferol, vitamin D3, 5,000 unit Tab Take 5,000 Units by mouth daily.        denosumab 60 mg/mL Syrg Inject 60 mg under the skin every 6 (six) months.       fluticasone propion-salmeteroL (ADVAIR) 250-50 mcg/dose DISKUS Inhale 1 puff 2 (two) times a day. 180 each 4     hydroCHLOROthiazide (HYDRODIURIL) 25 MG tablet Take 1 tablet (25 mg total) by mouth daily. 90 tablet 4     hyoscyamine (LEVBID) 0.375 mg 12 hr tablet Take 1 tablet (0.375 mg total) by mouth every 12 (twelve) hours as needed for cramping. 60 tablet 3     levETIRAcetam (KEPPRA) 500 MG tablet Take 1 tablet (500 mg total) by mouth 2 (two) times a day. 180 tablet 4     losartan (COZAAR) 50 MG tablet Take 1 tablet (50 mg total) by mouth 2 (two) times a day. 180 tablet 4     OXYGEN-AIR DELIVERY SYSTEMS [facility] Use 1.5 L As Directed bedtime.       potassium chloride 20 mEq TbER Take 20 mEq by mouth daily. 90 tablet 4     RABEprazole (ACIPHEX) 20 mg tablet Take 1 tablet (20 mg total) by mouth daily. 90 tablet 3     tiotropium (SPIRIVA) 18 mcg inhalation capsule Place 1 capsule (2 puffs total) into inhaler and inhale daily. 90 capsule 4     Facility-Administered Medications Prior to Visit   Medication Dose Route Frequency Provider Last Rate Last Admin     denosumab 60 mg (PROLIA 60 mg/ml)  60 mg Subcutaneous Q6 Months Lalitha Haq NP   60 mg at 10/15/19 1052     denosumab 60 mg (PROLIA 60 mg/ml)  60 mg Subcutaneous Q6 Months Lalitha Haq NP   60 mg at 11/23/20 1356         Lab Results     TSH   Date Value Ref Range Status   02/21/2019 2.67 0.30 - 5.00 uIU/mL Final      PTH   Date Value Ref Range Status   11/21/2013 71 16 - 73 pg/mL Final     Calcium   Date Value Ref Range Status   02/24/2020 9.8 8.5 - 10.5 mg/dL Final     Phosphorus   Date Value Ref Range Status   11/21/2013 3.9 2.5 - 4.5 mg/dL Final           Imaging Results   Last DEXA scan:  Results for orders placed in visit on 02/08/18   DXA Bone Density Scan    Narrative 12/31/2018      RE: Lalitha Underwood  YOB: 1945        Dear Lalitha Haq,    Patient Profile:  73 y.o. female, postmenopausal, is here for the follow up bone density   test.   History of fractures - Yes;  Wrist. Family history of osteoporosis - Yes;    mother.  Family history of hip fracture: Yes;  mother. Smoking history -   Past. Osteoporosis treatment past -  No. Osteoporosis treatment current -   Yes;  Prolia.  Chronic medical problems - Chronic low back problems. High   risk medications -  Anti-seizure;  Yes, Currently.      Assessment:    1. The spine bone density L1-L4 was not done because of the significant   artifacts.  2. Femoral bone densities show left femoral neck T- score -1.2 and right   femoral neck T-score -0.5, stable compared to 2016.  3. Left forearm bone density with T-score 0.2.      73 y.o. female with LOW BONE DENSITY (OSTEOPENIA), stable on the current   treatment.        Recommendations:  Appropriate calcium and vitamin D supplements and active treatment   recommended with follow up bone density scan in 2 years.      Bone densitometry was performed on your patient using our [employer]   densitometer. The results are summarized and a copy of the actual scans   are included for your review. In conformity with the International Society   of Clinical Densitometry's most recent position statement for DXA   interpretation (2015), the diagnosis will be made on the lowest measured   T-score of the lumbar spine, femoral neck, total proximal femur or 33%   radius. Note the change in terminology for diagnostic  classification from   OSTEOPENIA to LOW BONE MASS. All trending for sequential exams will be   done using multiple vertebrae or the total proximal femur. Fracture risk   is based on the WHO Fracture Risk Assessment Tool (FRAX). If additional   information is needed or if you would like to discuss the results, please   do not hesitate to call me.       Thank you for referring this patient to [facility].   We are happy to be of service in support of you and your practice. If you   have any questions or suggestions to improve our service, please call me   at [phone].     Sincerely,     Ashli Mendez M.D. C.C.MYA.  Osteoporosis Services, [facility]           Phone call duration: 18 minutes

## 2021-06-14 NOTE — PROGRESS NOTES
"  Assessment & Plan     Plantar warts  She continues to have discomfort associated with plantar wart on bottom of right foot. Fourth round of cryotherapy completed today.  Patient tolerated well.  She will follow up in 6 to 8 weeks to reassess wart at the time for physical exam.  This will allow for more healing time to determine if wart is still present.    7 minutes spent on the date of the encounter doing chart review, history and exam, documentation and further activities as noted above    BMI:   Estimated body mass index is 38.26 kg/m  as calculated from the following:    Height as of 9/8/20: 5' 4\" (1.626 m).    Weight as of this encounter: 222 lb 14.4 oz (101.1 kg).       No follow-ups on file.    Loretta Sewell, [facility]     Lalitha Underwood is 75 y.o. and presents to clinic today for the following health issues: Follow-up  HPI   Patient is here today for follow-up on plantar wart she has had 3 rounds of cryotherapy completed over the last few months.  Continues to have some discomfort associated with the plantar wart on the bottom of her left foot.  Area scabbed up and there is a small callus buildup today.  She initially felt that pain was improving a few days after her last treatment.  She denies any change in sensation of her foot.  She does have some neuropathy at baseline.  She has not been using any over-the-counter medications on the wart.          Review of Systems  Review of Systems - pertinent positives noted in HPI, otherwise ROS is negative.        Objective    /82 (Patient Site: Right Arm, Patient Position: Sitting, Cuff Size: Adult Regular)   Pulse 80   Temp (!) 96.2  F (35.7  C)   Wt (!) 222 lb 14.4 oz (101.1 kg)   LMP 12/12/1998   BMI 38.26 kg/m    Body mass index is 38.26 kg/m .  Physical Exam    General Appearance:  Alert, cooperative, no distress, appears stated age   Skin: Warm, dry.  Plantar wart on ball of right foot. Some tenderness " with deep palpation.

## 2021-06-14 NOTE — PROGRESS NOTES
Assessment and Plan:     1. Medicare annual wellness visit, initial  At today's visit, we discussed lifestyle interventions to promote self-management and wellness, including maintenance of a healthy weight, healthy diet, regular physical activity and exercise, and falls prevention.  She has an advanced healthcare directive.  Discussed yearly mammography due to family history of breast cancer.  Up-to-date with colon cancer screening.  No longer need of Pap smear screening.  Will obtain fasting lipids and fasting glucose today.  Prior screen for hepatitis C negative.  Reviewed measures to reduce risk of falls, she declines further assistance with this.  We discussed her perception of reduced hearing, as it is not bothering her and she is not wanting intervention, we elect not to pursue further evaluation.  Up-to-date to date with routine vaccinations, discussed future shingles vaccine.    2. Need for vaccination  - Influenza High Dose, Seasonal 65+ yrs    3. COPD (chronic obstructive pulmonary disease)  She will continue to avoid tobacco use.  She will continue Advair and Spiriva daily for control, albuterol as needed, and prednisone burst with exacerbations.  She will continue oxygen with sleep.  She is adequately vaccinated against pneumonia and influenza.  - tiotropium (SPIRIVA) 18 mcg inhalation capsule; Place 1 capsule (2 puffs total) into inhaler and inhale daily.  Dispense: 90 capsule; Refill: 3  - albuterol (ACCUNEB) 1.25 mg/3 mL nebulizer solution; Take 3 mL (1.25 mg total) by nebulization every 6 (six) hours as needed for wheezing.  Dispense: 75 mL; Refill: 0  - albuterol (PROAIR HFA;PROVENTIL HFA;VENTOLIN HFA) 90 mcg/actuation inhaler; Inhale 2 puffs every 6 (six) hours as needed for wheezing.  Dispense: 3 Inhaler; Refill: 3    4. Pulmonary emphysema  Due to COPD.  Managed as above.    5. Benign essential hypertension  Chronic and stable on current regimen of hydrochlorthiazide and losartan.  Will check  conference of metabolic panel in monitoring of this today.  - hydroCHLOROthiazide (HYDRODIURIL) 12.5 MG tablet; Take 1 tablet (12.5 mg total) by mouth daily.  Dispense: 90 tablet; Refill: 3    6. Vitamin D deficiency  Encouraged adequate daily intake.  Will check vitamin D level.  - Vitamin D, Total (25-Hydroxy)    7. Epilepsy  Doing well without recurrence of seizures.  She is tolerating full phenytoin well, we elected not make any changes.  Form completed for her from [employer] of transportation.  Will check phenytoin level.  - Comprehensive Metabolic Panel  - HM2(CBC w/o Differential)  - Phenytoin (Dilantin )    8. Hyperlipidemia  Encouraged healthy lifestyle habits.  No medications at this time.  Will check lipid cascade and conference of metabolic panel.  - Lipid [place] FASTING  - Comprehensive Metabolic Panel    9. Gastroesophageal reflux disease without esophagitis  Symptoms stable on AcipHex, she will continue.    10. Hiatal hernia  She continues with dietary measures to reduce risk of symptoms.  Continue AcipHex.    11. Osteopenia  Doing well, remains on Prolia injections.  Will check vitamin D level today.  Discussed measures to reduce risk of falling.  Discussed importance of regular exercise.    12. Venous insufficiency of both lower extremities  Chronic and stable.  She remains on hydrochlorothiazide.    13. 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  Encouraged regular physical activity at home, prior physical therapy exercises as well.  She declines further assistance.  Plans to begin seeing a  at [facility], will let us know if further interventions are needed.    14. Chronic bilateral low back pain with bilateral sciatica  Chronic and stable.  Declines further intervention at this time.  Unclear if this is related to her left leg cramping.  As this causes her to need to use a cane and puts her at higher risk of falling and is affected her gait, we complete forms  for disability parking    15. Urge incontinence of urine  She continues to work with gynecology, has pessary, and continues to take Levbid as needed.    16. Leg pain, diffuse, left  We discussed differential diagnosis, I am most suspicious of this being related to her sciatica, also discussed possibility of underlying vascular complication, though this is unlikely given presence of symptoms only in certain positions and never with activity.  We discussed option of medication such as gabapentin, we discussed options of stretching or Zickel therapy, she will consider.    17. Mild aortic valve stenosis  18. Mild aortic valve regurgitation  Because of murmur, asymptomatic.  Discussed future monitoring, could consider echocardiogram at intervals or should symptoms develop.    The patient's current medical problems were reviewed.    I have had an Advance Directives discussion with the patient.  The following high BMI interventions were performed this visit: encouragement to exercise and lifestyle education regarding diet  The following health maintenance schedule was reviewed with the patient and provided in printed form in the after visit summary:   Health Maintenance   Topic Date Due     FALL RISK ASSESSMENT  11/16/2018     MAMMOGRAM  12/01/2018     DXA SCAN  12/28/2018     COLONOSCOPY  08/26/2019     TD 18+ HE  06/07/2020     ADVANCE DIRECTIVES DISCUSSED WITH PATIENT  11/14/2021     PNEUMOCOCCAL POLYSACCHARIDE VACCINE AGE 65 AND OVER  Completed     INFLUENZA VACCINE RULE BASED  Completed     PNEUMOCOCCAL CONJUGATE VACCINE FOR ADULTS (PCV13 OR PREVNAR)  Completed     ZOSTER VACCINE  Completed        Subjective:   Chief Complaint: Lalitha Underwood is an 72 y.o. female here for an Annual Wellness visit.   HPI: Recent difficulties with left leg cramping described as deep and occurring in the posterior lateral thigh and calf.  It seems better with movement and it seems to occur more often if she has her foot elevated on the  recliner or if she turns it the wrong way.  Nothing radiates into her ankle.  Does not seem to be coming from her back.  No associated weakness, numbness, or tingling.  No prior history of same.  She does have a history of chronic low back pain and prior by it lateral sciatica, more recently has not had the sciatica symptoms however, has seen physical therapy and is doing her exercises at home about 3 times a week.  She carries a cane as result of this.  This is been stable.    History of COPD, uses Advair and Spiriva daily, albuterol needed about twice daily.  She has prednisone available.  She uses oxygen with sleep.  History of hypertension for which she takes losartan hydrochlorothiazide  Denies lightheadedness, dizziness, headaches, chest pain, dyspnea, or swelling.  She is a seizure disorder, has been taking phenytoin for 20 years or more, last seizure was in 1991.  She has form from [employer] reflecting this.  She is taking AcipHex for esophageal reflux and hiatal hernia, symptoms have been adequately controlled.  She is due for follow-up of hyperlipidemia.  History of venous insufficiency for which she takes hydrochlorothiazide daily and that has been stable.  She receives Prolia injections for osteopenia.  Chronic ataxic gait, feels like her balance is worsening, is taking classes for this and physical therapy in the past.  She is planning to begin working out with a  for the next month at [facility] and would like to see how this goes.  She carries a cane with her.  History of overactive bladder and urge incontinence of her urine for which she sees gynecology and has a pessary.  She also takes Levbid as needed.  Chronic flat feet, she has insoles that are prescribed to her.  Recent evaluation in urgent care on October 30, new murmur identified, echocardiogram reviewed today showing mild aortic stenosis and regurgitation along with abnormal diastolic relaxation.    Review of  Systems:   Please see above.  The rest of the review of systems are negative for all systems.    Patient Care Team:  Kelly Hull MD as PCP - General (Family Medicine)     Patient Active Problem List   Diagnosis     Solar Elastosis     Rosacea     Epilepsy And Recurrent Seizures     Esophageal Reflux     Vitamin D Deficiency     Chronic Obstructive Pulmonary Disease     Hyperlipidemia     Walk Is Wobbly Or Unsteady (Ataxia)     Obesity     Hypertension     Osteopenia     Venous insufficiency of both lower extremities     Hiatal hernia     Leg swelling     Left arm swelling     Flat feet     History of right shoulder fracture     Valgus deformity of both feet     Past Medical History:   Diagnosis Date     Convulsive disorder      COPD (chronic obstructive pulmonary disease)      Depression      Hernia of unspecified site of abdominal cavity without mention of obstruction or gangrene      Hiatal hernia      Hypertension      Osteopenia      Venous insufficiency of both lower extremities       Past Surgical History:   Procedure Laterality Date     OTHER SURGICAL HISTORY Left 2003    Broke titanium in left arm     SHOULDER SURGERY Right 1967      Family History   Problem Relation Age of Onset     Breast cancer Mother 66     Hypertension Mother      Heart attack Mother      Varicose Veins Mother      Hypertension Father      Heart attack Sister      Heart disease Sister      Diabetes Son      Pulmonary Hypertension Daughter      Heart attack Sister      Heart disease Sister       Social History     Social History     Marital status:      Spouse name: N/A     Number of children: N/A     Years of education: N/A     Occupational History     Not on file.     Social History Main Topics     Smoking status: Former Smoker     Types: Cigarettes     Quit date: 12/7/1998     Smokeless tobacco: Never Used     Alcohol use 1.2 oz/week     2 Glasses of wine per week     Drug use: No     Sexual activity: No     Other Topics  Concern     Not on file     Social History Narrative    .  Two kids.  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      Current Outpatient Prescriptions   Medication Sig Dispense Refill     albuterol (ACCUNEB) 1.25 mg/3 mL nebulizer solution Take 3 mL (1.25 mg total) by nebulization every 6 (six) hours as needed for wheezing. 75 mL 0     albuterol (PROAIR HFA;PROVENTIL HFA;VENTOLIN HFA) 90 mcg/actuation inhaler Inhale 2 puffs every 6 (six) hours as needed for wheezing. 3 Inhaler 3     calcium citrate 250 mg calcium Tab Take 500 Units by mouth 2 (two) times a day.       cholecalciferol, vitamin D3, 5,000 unit Tab Take by mouth.       DENOSUMAB (PROLIA SUBQ) Inject 60 mL as directed every 6 (six) months.       fluticasone-salmeterol (ADVAIR) 250-50 mcg/dose DISKUS Inhale 1 puff 2 (two) times a day. 180 each 3     hydroCHLOROthiazide (HYDRODIURIL) 12.5 MG tablet Take 1 tablet (12.5 mg total) by mouth daily. 90 tablet 3     hyoscyamine (LEVBID) 0.375 mg 12 hr tablet Take 1 tablet (0.375 mg total) by mouth every 12 (twelve) hours as needed for cramping. 180 tablet 3     losartan (COZAAR) 50 MG tablet Take 1 tablet (50 mg total) by mouth 2 (two) times a day. 180 tablet 3     OXYGEN-AIR DELIVERY SYSTEMS MISC Use 1.5 L As Directed bedtime.       phenytoin (DILANTIN) 100 MG ER capsule Take 1 capsule (100 mg total) by mouth 3 (three) times a day. 270 capsule 3     potassium chloride 20 mEq TbER Take 20 mEq by mouth daily. 90 tablet 3     RABEprazole (ACIPHEX) 20 mg tablet Take 1 tablet (20 mg total) by mouth daily. 90 tablet 3     predniSONE (DELTASONE) 20 MG tablet Take 1 tablet (20 mg total) by mouth daily. TAKE 2 TABLETS DAILY FOR THE FIRST 3 DAYS, THEN ONE TABLET DAILY FOR THE NEXT 3 DAYS 9 tablet 0     tiotropium (SPIRIVA) 18 mcg inhalation capsule Place 1 capsule (2 puffs total) into inhaler and inhale daily. 90 capsule 3     No current facility-administered medications for this visit.       Objective:   Vital Signs:   Visit Vitals  "    /82 (Patient Site: Left Arm, Patient Position: Sitting, Cuff Size: Adult Large)     Pulse 95     Temp 98  F (36.7  C)     Resp 20     Ht 5' 4\" (1.626 m)     Wt 206 lb 9.6 oz (93.7 kg)     LMP 12/12/1998     SpO2 98%     BMI 35.46 kg/m2        VisionScreening:  No exam data present     PHYSICAL EXAM  Physical Examination: General appearance - alert, well appearing, and in no distress, oriented to person, place, and time, overweight and acyanotic, in no respiratory distress  Mental status - alert, oriented to person, place, and time, normal mood, behavior, speech, dress, motor activity, and thought processes  Eyes - pupils equal and reactive, extraocular eye movements intact  Ears - bilateral TM's and external ear canals normal  Nose - normal and patent, no erythema, discharge or polyps  Mouth - mucous membranes moist, pharynx normal without lesions  Neck - supple, no significant adenopathy, thyroid exam: thyroid is normal in size without nodules or tenderness  Lymphatics - no palpable lymphadenopathy, no hepatosplenomegaly  Chest - clear to auscultation, no wheezes, rales or rhonchi, symmetric air entry  Heart - normal rate, regular rhythm, normal S1, S2, 2/6 early systolic murmur at RUSB, norubs, clicks or gallops  Abdomen - soft, nontender, nondistended, no masses or organomegaly  Neurological - alert, oriented, normal speech, no focal findings or movement disorder noted  Musculoskeletal - no joint tenderness, deformity or swelling, good strength and sensation through lower extremities  Extremities - peripheral pulses normal, no pedal edema, no clubbing or cyanosis, pedal edema 1 + nonpitting  Skin - normal coloration and turgor, no rashes, no suspicious skin lesions noted     Assessment Results 11/16/2017   Activities of Daily Living No help needed   Instrumental Activities of Daily Living No help needed   Mini Cog Total Score 4   Some recent data might be hidden     A Mini-Cog score of 0-2 suggests the " possibility of dementia, score of 3-5 suggests no dementia    Identified Health Risks:     The patient was provided with suggestions to help her develop a healthy lifestyle.   The patient was provided with written information regarding signs of hearing loss.  She is at risk for falling and has been provided with information to reduce the risk of falling at home.  Patient's advanced directive was discussed and I am comfortable with the patient's wishes.

## 2021-06-15 ENCOUNTER — RECORDS - HEALTHEAST (OUTPATIENT)
Dept: FAMILY MEDICINE | Facility: CLINIC | Age: 76
End: 2021-06-15

## 2021-06-15 DIAGNOSIS — Z12.31 VISIT FOR SCREENING MAMMOGRAM: ICD-10-CM

## 2021-06-15 NOTE — PROGRESS NOTES
Assessment & Plan     Plantar wart  She continues to have quite a bit of discomfort related to plantar wart on ball of right foot.  Cryotherapy completed today..  Referral to podiatry made and soonest appointment available is April.  I encouraged her to keep this appointment in the event that symptoms fail to improve.       Return in about 3 weeks (around 3/17/2021) for follow up wart.    Loretta Sewell, CNP  [facility]    Subjective   Lalitha Underwood is 75 y.o. and presents today for the following health issues   HPI   Patient is here today to have another round of cryotherapy completed on plantar wart located on sole of right foot.  She continues to have discomfort as a result of the wart, particularly with weightbearing.  It is relieved when she wears supportive shoes.  She has had 4-5 rounds of cryotherapy completed so far.  Has not been using any over-the-counter medications recently.  She denies any new pain or symptoms otherwise.  No additional concerns today.    Review of Systems  Review of Systems - pertinent positives noted in HPI, otherwise ROS is negative.        Objective    /76 (Patient Site: Left Arm, Patient Position: Sitting, Cuff Size: Adult Regular)   Pulse 100   LMP 12/12/1998   There is no height or weight on file to calculate BMI.  Physical Exam    General Appearance:  Alert, cooperative, no distress, appears stated age   Skin: Warm, dry.  Plantar wart noted on ball of right foot.  Callusing noted.

## 2021-06-15 NOTE — PROGRESS NOTES
FOOT AND ANKLE SURGERY/PODIATRY CONSULT NOTE         ASSESSMENT:   Keratoma  Hallux Limitus       TREATMENT:  -I trimmed the callus tissue x2 plantar 1st MPJ on the right foot today. No pinpoint bleeding noted. I reviewed with the patient that the skin lesion may not be related to wart. Shave biopsy sent to pathology.     -We discussed that increased skin pressure under the MPJ's can cause similar skin lesions. I have referred her to [facility] to modify her existing insets to further offload the 1st metatarsal head.     -I will contact her with the pathology report when available and we will be guided by the results.    Alfonso Orozco DPM  [facility] Podiatry/Foot & Ankle Surgery      HPI: I was asked to see Lalitha Underwood today by Loretta Sewell CNP for a painful wart on her right foot. The patient states she started having pain several weeks ago and has tried freezing the wart in the past with no relief. She currently wears custom inserts from [address]. PMH reviewed.     Past Medical History:   Diagnosis Date     Convulsive disorder (H)      COPD (chronic obstructive pulmonary disease) (H)      Depression      Hernia of unspecified site of abdominal cavity without mention of obstruction or gangrene      Hiatal hernia      Hypertension      On home oxygen therapy     at 1.5 liters at nite     Osteopenia      Shortness of breath      Venous insufficiency of both lower extremities        Past Surgical History:   Procedure Laterality Date     OTHER SURGICAL HISTORY Left 2003    Broke titanium in left arm     SHOULDER SURGERY Right 1967     UMBILICAL HERNIA REPAIR  04/04/2018    Dr. Jimenez     US THYROID BIOPSY  7/29/2020       Allergies   Allergen Reactions     Clindamycin Rash     Carbamazepine Rash     Morphine Nausea Only         Current Outpatient Medications:      albuterol (PROAIR HFA;PROVENTIL HFA;VENTOLIN HFA) 90 mcg/actuation inhaler, Inhale 2 puffs every 4 (four) hours as needed for wheezing., Disp: 3  Inhaler, Rfl: 4     calcium citrate (CALCITRATE) 200 mg (950 mg) tablet, Take 1 tablet by mouth 2 (two) times a day., Disp: , Rfl:      cholecalciferol, vitamin D3, 5,000 unit Tab, Take 5,000 Units by mouth daily. , Disp: , Rfl:      denosumab 60 mg/mL Syrg, Inject 60 mg under the skin every 6 (six) months., Disp: , Rfl:      fluticasone propion-salmeteroL (ADVAIR) 250-50 mcg/dose DISKUS, Inhale 1 puff 2 (two) times a day., Disp: 180 each, Rfl: 4     hydroCHLOROthiazide (HYDRODIURIL) 25 MG tablet, Take 1 tablet (25 mg total) by mouth daily., Disp: 90 tablet, Rfl: 4     levETIRAcetam (KEPPRA) 500 MG tablet, Take 1 tablet (500 mg total) by mouth 2 (two) times a day., Disp: 180 tablet, Rfl: 4     losartan (COZAAR) 50 MG tablet, Take 1 tablet (50 mg total) by mouth 2 (two) times a day., Disp: 180 tablet, Rfl: 4     OXYGEN-AIR DELIVERY SYSTEMS [facility] Use 1.5 L As Directed bedtime., Disp: , Rfl:      potassium chloride 20 mEq TbER, Take 20 mEq by mouth daily., Disp: 90 tablet, Rfl: 4     RABEprazole (ACIPHEX) 20 mg tablet, Take 1 tablet (20 mg total) by mouth daily., Disp: 90 tablet, Rfl: 3     tiotropium (SPIRIVA) 18 mcg inhalation capsule, Place 1 capsule (2 puffs total) into inhaler and inhale daily., Disp: 90 capsule, Rfl: 4    Current Facility-Administered Medications:      denosumab 60 mg (PROLIA 60 mg/ml), 60 mg, Subcutaneous, Q6 Months, Kapriscillar Lalitha L, NP, 60 mg at 10/15/19 1052     denosumab 60 mg (PROLIA 60 mg/ml), 60 mg, Subcutaneous, Q6 Months, Kaehr, Lalitha L, NP, 60 mg at 11/23/20 1356     [START ON 3/6/2021] denosumab 60 mg (PROLIA 60 mg/ml), 60 mg, Subcutaneous, Q6 Months, Lalitha Haq NP    Family History   Problem Relation Age of Onset     Breast cancer Mother 66     Hypertension Mother      Heart attack Mother      Varicose Veins Mother      Hypertension Father      Heart attack Sister      Heart disease Sister      Diabetes Son      Pulmonary Hypertension Daughter      Heart attack Sister       Heart disease Sister        Social History     Socioeconomic History     Marital status:      Spouse name: Not on file     Number of children: Not on file     Years of education: Not on file     Highest education level: Not on file   Occupational History     Not on file   Social Needs     Financial resource strain: Not on file     Food insecurity     Worry: Not on file     Inability: Not on file     Transportation needs     Medical: Not on file     Non-medical: Not on file   Tobacco Use     Smoking status: Former Smoker     Types: Cigarettes     Quit date: 1998     Years since quittin.2     Smokeless tobacco: Never Used   Substance and Sexual Activity     Alcohol use: Yes     Alcohol/week: 2.0 standard drinks     Types: 2 Glasses of wine per week     Drug use: Yes     Types: Oxycodone     Sexual activity: Never   Lifestyle     Physical activity     Days per week: Not on file     Minutes per session: Not on file     Stress: Not on file   Relationships     Social connections     Talks on phone: Not on file     Gets together: Not on file     Attends Latter day service: Not on file     Active member of club or organization: Not on file     Attends meetings of clubs or organizations: Not on file     Relationship status: Not on file     Intimate partner violence     Fear of current or ex partner: Not on file     Emotionally abused: Not on file     Physically abused: Not on file     Forced sexual activity: Not on file   Other Topics Concern     Not on file   Social History Narrative    .  Two kids.  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       Review of Systems - 10 point Review of Systems is negative except for right foot pain which is noted in HPI.    OBJECTIVE:  Appearance: alert, well appearing, and in no distress.    Vitals:    21 1508   BP: 130/72   Pulse: 76   Resp: 20   Temp: 97.6  F (36.4  C)       BMI= There is no height or weight on file to calculate BMI.    General appearance: Patient is alert  and fully cooperative with history & exam.  No sign of distress is noted during the visit.     Psychiatric: Affect is pleasant & appropriate.  Patient appears motivated to improve health.     Respiratory: Breathing is regular & unlabored while sitting.     HEENT: Hearing is intact to spoken word.  Speech is clear.  No gross evidence of visual impairment that would impact ambulation.      Vascular: Dorsalis pedis palpable right. Non-palpable PT right. There is no appreciable edema noted.  Dermatologic: Turgor and texture are within normal limits. Hyperkeratotic tissue x2 plantar 1st MPJ right foot, no disruption of normal skin lines or pinpoint bleeding noted.   Neurologic: All epicritic and proprioceptive sensations are grossly intact right.  Musculoskeletal: Prominent metatarsal heads right foot. There is mild limitation dorsiflexion 1st MPJ right.

## 2021-06-15 NOTE — TELEPHONE ENCOUNTER
Reason contacted:  Symptom  Information relayed:   Patient is calling because she was referred to podiatry due to plantars wart.     She states the podiatrist she was initially scheduled with had an accident and will be out of the office.   She is unable to arrange an appointment with a different podiatrist until April.    Patient is wondering if she could arrange an appointment to have one more wart treatment prior to her appointment in April with podiatry?     Please advise   Additional questions:  No  Further follow-up needed:  Yes  Okay to leave a detailed message:  Yes - [phone]

## 2021-06-15 NOTE — PROGRESS NOTES
I reviewed the pathology report with the patient today which states possible verruca but sample is very thin. I spoke to pathology who recommends a punch biopsy. Discussed with patient today. The patient is not having pain after having the callus trimmed. She would like to avoid a punch biopsy at this time. I recommend she continue to monitor and return to see me as symptoms dictate. We discussed that the lesion does not appear to show any signs of malignancy at this time, but reviewed these features today.

## 2021-06-15 NOTE — TELEPHONE ENCOUNTER
Reason for Call:  Other call back      Detailed comments: Recvd call from pt/Lalitha, she is very upset-- she was in clinic on 02.12.2021 for an OV w/Loretta Sewell NP. Lalitha stated to me that she mentioned at that visit that she had her COVID vaccine and was receiving the 2nd dose on 02.22.2021.   At her OV she recvd her Td shot as she was due for it. At that time nothing was stated that she would not be able to receive the 2nd COVID vaccine, due to having the Td.   She showed up today for her 2nd vaccine and was told she could not have it due to receiving the Td shot on 02.12.2021 but no other reason was provided to her. So she reached out to our office and at that time I shared with her that she has to wait until 2 weeks from the date of her Td to receive the 2nd vaccine.  I directed her to the COVID scheduling # [phone] to explain to them what happened and if she could get scheduled for the 2nd vaccine the first week of March.  (I do see that she is scheduled for March 1st, 2021 for her 2nd vaccine)    Lalitha asked that I relay this message so in the future everyone in the clinic is aware of this situation.    She also asked if she could receive a call bk from Dr Hull or the clinic manager so she knows that we are aware of this situation.    Phone Number Patient can be reached at:   Cell number on file:    Telephone Information:   Mobile [phone]       Best Time: anytime    Can we leave a detailed message on this number?: Yes    Call taken on 2/22/2021 at 3:25 PM by Martina Schwarz

## 2021-06-15 NOTE — PROGRESS NOTES
Assessment/Plan:     1. Pre-ulcerative corn or callous  Debridement performed as noted.  I advised follow-up with podiatry with recurrence.  May use warm soaks if helpful.  Visual monitoring to ensure no ulceration occurs.    2. Mild major depression  We discussed options.  Difficult to know whether she has true major depression or more of an adjustment disorder.  Encouraged her to begin seeing a cognitive behavioral therapist.  We discussed option of initiating sertraline, she is going to consider and will call me if she like to do that.  In the event that we initiated sertraline, would need to monitor phenytoin levels to ensure they do not become higher as a result of sertraline.    3. Mild aortic valve regurgitation  4. Mild aortic valve stenosis  Reassurance provided that in the mild state these valvular disorder should be asymptomatic.  Reviewed warning signs and symptoms of other underlying cardiovascular disease and when to seek emergent care.    5. Low back pain  I feel that she would benefit from regular exercise and use of a  both her low back pain, for her balance changes, and for management of hypertension.  Letter provided to her supporting this.      Subjective:      Lalitha Underwood is a 72 y.o. female presenting to clinic today for a few concerns.  She has first had some difficulties with a callus in her left third toe.  She has had a significant callus there, had seen with foot care nurse who came to her home, unfortunately with the last attempted debridement there is a some bleeding afterward.  Patient has been soaking her foot.  Discomfort from it seems to come and go.  Has not noticed any open areas currently.  She has no difficulty with the sensation in her feet.  No history of peripheral vascular disease.    Patient notes that her sister called her with concerns about potential depression in patient.  She has been to significant amount of time with patient during the holidays.   Finds that she has been less engaged, finding less enjoyment, feeling more anxious, and in general is more negative.  Sister has psychology background.  Patient herself is noting a little bit less enjoyment but attributes this to her 's illness, he has been hospitalized twice within the last 2 months.  She also continues to struggle with grief from the loss of her daughter.  Some difficulties with motivation at times.  She has no personal history of major depression.  She is wondering about my thoughts.    Requesting a letter for [facility] to allow her to exercise regularly and see a  is so that it may be covered by her insurance.  She is a history of balance difficulties.  She also has a history of low back pain with bilateral sciatica.    Current Outpatient Prescriptions   Medication Sig Dispense Refill     albuterol (ACCUNEB) 1.25 mg/3 mL nebulizer solution Take 3 mL (1.25 mg total) by nebulization every 6 (six) hours as needed for wheezing. 75 mL 0     albuterol (PROAIR HFA;PROVENTIL HFA;VENTOLIN HFA) 90 mcg/actuation inhaler Inhale 2 puffs every 6 (six) hours as needed for wheezing. 3 Inhaler 3     calcium citrate 250 mg calcium Tab Take 500 Units by mouth 2 (two) times a day.       cholecalciferol, vitamin D3, 5,000 unit Tab Take by mouth.       DENOSUMAB (PROLIA SUBQ) Inject 60 mL as directed every 6 (six) months.       fluticasone-salmeterol (ADVAIR) 250-50 mcg/dose DISKUS Inhale 1 puff 2 (two) times a day. 180 each 3     hydroCHLOROthiazide (HYDRODIURIL) 12.5 MG tablet Take 1 tablet (12.5 mg total) by mouth daily. 90 tablet 3     hyoscyamine (LEVBID) 0.375 mg 12 hr tablet Take 1 tablet (0.375 mg total) by mouth every 12 (twelve) hours as needed for cramping. 180 tablet 3     losartan (COZAAR) 50 MG tablet Take 1 tablet (50 mg total) by mouth 2 (two) times a day. 180 tablet 3     OXYGEN-AIR DELIVERY SYSTEMS MISC Use 1.5 L As Directed bedtime.       phenytoin (DILANTIN) 100 MG ER capsule  Take 1 capsule (100 mg total) by mouth 3 (three) times a day. 270 capsule 3     potassium chloride 20 mEq TbER Take 20 mEq by mouth daily. 90 tablet 3     RABEprazole (ACIPHEX) 20 mg tablet Take 1 tablet (20 mg total) by mouth daily. 90 tablet 3     tiotropium (SPIRIVA) 18 mcg inhalation capsule Place 1 capsule (2 puffs total) into inhaler and inhale daily. 90 capsule 3     predniSONE (DELTASONE) 20 MG tablet Take 1 tablet (20 mg total) by mouth daily. TAKE 2 TABLETS DAILY FOR THE FIRST 3 DAYS, THEN ONE TABLET DAILY FOR THE NEXT 3 DAYS 9 tablet 0     No current facility-administered medications for this visit.        Past Medical History, Family History, and Social History reviewed.  Past Medical History:   Diagnosis Date     Convulsive disorder      COPD (chronic obstructive pulmonary disease)      Depression      Hernia of unspecified site of abdominal cavity without mention of obstruction or gangrene      Hiatal hernia      Hypertension      Osteopenia      Venous insufficiency of both lower extremities      Past Surgical History:   Procedure Laterality Date     OTHER SURGICAL HISTORY Left 2003    Broke titanium in left arm     SHOULDER SURGERY Right 1967     Clindamycin; Carbamazepine; and Morphine  Family History   Problem Relation Age of Onset     Breast cancer Mother 66     Hypertension Mother      Heart attack Mother      Varicose Veins Mother      Hypertension Father      Heart attack Sister      Heart disease Sister      Diabetes Son      Pulmonary Hypertension Daughter      Heart attack Sister      Heart disease Sister      Social History     Social History     Marital status:      Spouse name: N/A     Number of children: N/A     Years of education: N/A     Occupational History     Not on file.     Social History Main Topics     Smoking status: Former Smoker     Types: Cigarettes     Quit date: 12/7/1998     Smokeless tobacco: Never Used     Alcohol use 1.2 oz/week     2 Glasses of wine per week  "    Drug use: No     Sexual activity: No     Other Topics Concern     Not on file     Social History Narrative    .  Two kids.  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         Review of systems is as stated in HPI, and the remainder of the 10 system review is otherwise negative.    Objective:     Vitals:    01/22/18 0952   BP: 130/80   Patient Site: Right Arm   Patient Position: Sitting   Cuff Size: Adult Regular   Pulse: 74   Resp: 20   Weight: 205 lb (93 kg)   Height: 5' 4\" (1.626 m)    Body mass index is 35.19 kg/(m^2).    Alert female.  Thought processes and judgment intact.  Affect is appropriate.  Normal psychomotor activity.  Left third toe with significant callus at the tip.  There is an area of healed ulcer with overlying eschar.    Debridement performed of callus of the left distal third toe using a 15 blade.  I am able to remove a significant amount of callus.  There is no open areas or bleeding.  There does not appear to be any current ulceration underlying the callus.      This note has been dictated using voice recognition software. Any grammatical or context distortions are unintentional and inherent to the the software.   "

## 2021-06-15 NOTE — PATIENT INSTRUCTIONS - HE
A sample was sent to pathology today, Dr. Orozco will call you when we get the results from pathology.   No more freezing treatments are needed.   If the area gets built up with callous again feel free to set up an appointment to get it shaved down         [facility] [employer]  [facility]  [place] [address]  [phone]

## 2021-06-15 NOTE — TELEPHONE ENCOUNTER
Noted; reminder sent out to staff and providers. I also alerted the vaccine teams to be sure to mention this when scheduling the second vaccine as well.     Spoke to patient and let her know we appreciate the call and the actions we have taken. No need for further action.

## 2021-06-15 NOTE — PROGRESS NOTES
Lalitha Underwood is a 75 y.o. female who is being evaluated via a billable telephone visit.      What phone number would you like to be contacted at? [phone]   How would you like to obtain your AVS? AVS Preference: James.    Assessment & Plan   Ms. Underwood has severe COPD that is under good control with triple therapy.  There is no need to change her medications.  She remains on nocturnal and ambulatory oxygen for chronic hypoxic respiratory failure and is using this appropriately.    1) COPD - severe Gold C with infrequent exacerbations.  Continue spiriva and advair with as needed albuterol  2) Dyspnea - from COPD, could stand to get more exercise for pulmonary rehab but limited by vertigo issues requiring a walker  3) Chronic hypoxic respiratory failure. - continue 1.5 L nasal canula with sleep and exertion.  4) RTC in 1 year      Perry Tamayo MD  [facility]    Lalitha Underwood is 75 y.o. and presents today for the following health issues   COPD - Serene states her lungs have done well.  She has had no issues since we last spoke, no exacerbations or hospitalizations.  She is using her oxygen as previously prescribed.  She has just gotten her second COVID vaccine and she believes this stirred up her vertigo which seems to have been responding to adjustments.  She has not been terribly active this winter given the need for a walker secondary to her vertigo.      Phone call start: [phone]  Phone call end: [phone]

## 2021-06-15 NOTE — PROGRESS NOTES
Assessment and Plan:     Patient has been advised of split billing requirements and indicates understanding: Yes  1. Medicare annual wellness visit, subsequent  Medicare annual wellness visit completed today.  Discussed healthy lifestyle modifications to promote wellness including maintaining a healthy weight, regular physical activity and healthy diet.  Tetanus booster administered today.  We discussed colon cancer screening and she is not interested in pursuing any further colon cancer screenings.  She is up-to-date on mammogram.  We will check the following labs as noted below.  Continue with annual wellness visits going forward.  Recommend returning to clinic in 6 months for medication check.    - Td, Preservative Free (green label)  - HM2(CBC w/o Differential)    2. Morbid obesity (H)  Discussed importance of healthy weight.  Encouraged adequate exercise and maintaining healthy diet.    3. Essential hypertension  Blood pressure remains adequately controlled on losartan and hydrochlorothiazide.  Will check metabolic panel today to ensure stable renal function and electrolytes.  - Comprehensive Metabolic Panel  - losartan (COZAAR) 50 MG tablet; Take 1 tablet (50 mg total) by mouth 2 (two) times a day.  Dispense: 180 tablet; Refill: 4  - hydroCHLOROthiazide (HYDRODIURIL) 25 MG tablet; Take 1 tablet (25 mg total) by mouth daily.  Dispense: 90 tablet; Refill: 4    4. Mild aortic valve stenosis  She is asymptomatic.  Follow-up with cardiology within the year for repeat echocardiogram.    5. Hyperlipidemia, unspecified hyperlipidemia type  As above, encouraged healthy lifestyle habits.  Will check fasting lipids today.  - Lipid [place] FASTING    6. Pulmonary emphysema, unspecified emphysema type (H)  Followed by Dr. Tamayo in pulmonology.  Stable on Spiriva and Advair.  Continue oxygen with sleep.  - tiotropium (SPIRIVA) 18 mcg inhalation capsule; Place 1 capsule (2 puffs total) into inhaler and inhale daily.   Dispense: 90 capsule; Refill: 4  - potassium chloride 20 mEq TbER; Take 20 mEq by mouth daily.  Dispense: 90 tablet; Refill: 4  - fluticasone propion-salmeteroL (ADVAIR) 250-50 mcg/dose DISKUS; Inhale 1 puff 2 (two) times a day.  Dispense: 180 each; Refill: 4  - albuterol (PROAIR HFA;PROVENTIL HFA;VENTOLIN HFA) 90 mcg/actuation inhaler; Inhale 2 puffs every 4 (four) hours as needed for wheezing.  Dispense: 3 Inhaler; Refill: 4    7. Venous insufficiency of both lower extremities  Followed by Dr. Shultz at vascular clinic.  Continues hydrochlorothiazide.  Recommend regular exercise and use of compression stockings as well as decreasing sodium intake.  - hydroCHLOROthiazide (HYDRODIURIL) 25 MG tablet; Take 1 tablet (25 mg total) by mouth daily.  Dispense: 90 tablet; Refill: 4    8. Nonintractable epilepsy without status epilepticus, unspecified epilepsy type (H)  Doing well on Keppra.  No concern for seizures at this time.  Continue to follow with Dr. Morales in neurology.  - levETIRAcetam (KEPPRA) 500 MG tablet; Take 1 tablet (500 mg total) by mouth 2 (two) times a day.  Dispense: 180 tablet; Refill: 4    9. Gastroesophageal reflux disease without esophagitis  Stable.  Continue aciphex.  - RABEprazole (ACIPHEX) 20 mg tablet; Take 1 tablet (20 mg total) by mouth daily.  Dispense: 90 tablet; Refill: 3    10. Plantar wart  Several rounds of cryotherapy completed on plantar wart.  Given persistence, will place referral to podiatry.  - Ambulatory referral to Podiatry - [facility] (includes [facility] groups)    11. Age-related osteoporosis without current pathological fracture  Reviewed history of osteoporosis.  Continue with Prolia injections every 6 months.  Next DEXA scan due in 2023.    12. Vitamin D deficiency  History of vitamin D deficiency noted.  Will check vitamin D level today.  - Vitamin D, Total (25-Hydroxy)      The patient's current medical problems were reviewed.    The following high BMI  interventions were performed this visit: encouragement to exercise and lifestyle education regarding diet  The following health maintenance schedule was reviewed with the patient and provided in printed form in the after visit summary:   Health Maintenance   Topic Date Due     COPD ACTION PLAN  1945     HEPATITIS B VACCINES (1 of 3 - Risk 3-dose series) 03/22/1964     COLORECTAL CANCER SCREENING  08/26/2019     COVID-19 Vaccine (2 of 2 - Pfizer series) 02/22/2021     MEDICARE ANNUAL WELLNESS VISIT  02/12/2022     FALL RISK ASSESSMENT  02/12/2022     DEXA SCAN  01/20/2023     LIPID  02/12/2026     ADVANCE CARE PLANNING  02/12/2026     TD 18+ HE  02/12/2031     HEPATITIS C SCREENING  Completed     SPIROMETRY  Completed     Pneumococcal Vaccine: 65+ Years  Completed     INFLUENZA VACCINE RULE BASED  Completed     ZOSTER VACCINES  Completed     Pneumococcal Vaccine: Pediatrics (0 to 5 Years) and At-Risk Patients (6 to 64 Years)  Aged Out        Subjective:   Chief Complaint: Lalitha Underwood is an 75 y.o. female here for an Annual Wellness visit.   HPI: Patient doing well today overall.  She continues to struggle with plantar wart on bottom of right foot.  She has had several rounds of cryotherapy completed here in clinic without much benefit.  Wart causes discomfort with weightbearing.  She does not feel that it is necessarily getting worse but just not improving.  She denies warts anywhere else on her body.  Additionally, she reports having some right hamstring pain.  Feels as though she may have pulled a muscle during her therapy that she does for chronic dizziness.  She denies any known injury.  Pain is described as a dull muscle ache.  No shooting or stabbing pain.  No swelling of her right thigh or lower extremity.  She denies any new low back pain but does have some chronic low back pain unchanged.  She denies any weakness of her right leg.  No giving out of her right leg.      She has history of COPD from  previous smoking.  Has not smoked in over 20 years.  Denies any new shortness of breath or respiratory symptoms today.  She remains compliant on Advair and Spiriva.  She wears 1.5 L of oxygen during sleep.  No new concerns in this regard today.      She has dyslipidemia and is due for follow-up on that today.  She has hypertension and remains on hydrochlorothiazide and losartan.  Tolerating medications well and blood pressure remains adequately controlled on this regimen.      She has history of epilepsy and is on Keppra 500 mg twice daily.  She has been stable on this dose without any concerns for seizures.  It has been a while since she has seen neurology but previously followed with Dr. Morales in neurology.  She is due for follow-up with him.    History of osteoporosis for which she receives Prolia injections.  Due for DEXA bone density scan in 1 year.    Chronic bilateral venous insufficiency, uses compression stockings on occasion.  Continues to be managed by Dr. Shultz and was seen recently by him.    She has ongoing difficulties with balance issues and ataxia.  It is unclear if this is related to neuropathy versus neurology.  She continues to do therapy for strength and balance.  She uses assistance with walker currently.    She remains on omeprazole for reflux.  Has history of vitamin D deficiency and is due for follow-up on this.    She has chronic urge incontinence which is stable overall.  Is followed by gynecology.    She is due for tetanus booster today.  Also due for colonoscopy but declines referral for this.    Review of Systems:   Please see above.  The rest of the review of systems are negative for all systems.    Patient Care Team:  Kelly Hull MD as PCP - General (Family Medicine)  Kelly Hull MD as Assigned PCP  Nabila Garrett MD as Assigned Surgical Provider  Perry Tamayo MD as Assigned Pulmonology Provider     Patient Active Problem List    Diagnosis     Solar Elastosis     Rosacea     Epilepsy And Recurrent Seizures     Esophageal Reflux     Vitamin D Deficiency     Pulmonary emphysema (H)     Hyperlipidemia     Impaired gait and mobility     Hypertension     Venous insufficiency of both lower extremities     Leg swelling     Left arm swelling     Valgus deformity of both feet     Urge incontinence of urine     Mild aortic valve regurgitation     Mild aortic valve stenosis     Osteoporosis     Decreased hearing of both ears     Obesity (BMI 35.0-39.9) with comorbidity (H)     Neuropathy, idiopathic     Localized deposits of fat     Collapsed arches     S/P repair of paraesophageal hernia     Venous hypertension of lower extremity, bilateral     Acquired lymphedema of leg     Leg length discrepancy     Past Medical History:   Diagnosis Date     Convulsive disorder (H)      COPD (chronic obstructive pulmonary disease) (H)      Depression      Hernia of unspecified site of abdominal cavity without mention of obstruction or gangrene      Hiatal hernia      Hypertension      On home oxygen therapy     at 1.5 liters at nite     Osteopenia      Shortness of breath      Venous insufficiency of both lower extremities       Past Surgical History:   Procedure Laterality Date     OTHER SURGICAL HISTORY Left 2003    Broke titanium in left arm     SHOULDER SURGERY Right 1967     UMBILICAL HERNIA REPAIR  04/04/2018    Dr. Jimenez      THYROID BIOPSY  7/29/2020      Family History   Problem Relation Age of Onset     Breast cancer Mother 66     Hypertension Mother      Heart attack Mother      Varicose Veins Mother      Hypertension Father      Heart attack Sister      Heart disease Sister      Diabetes Son      Pulmonary Hypertension Daughter      Heart attack Sister      Heart disease Sister       Social History     Socioeconomic History     Marital status:      Spouse name: Not on file     Number of children: Not on file     Years of education: Not on file      Highest education level: Not on file   Occupational History     Not on file   Social Needs     Financial resource strain: Not on file     Food insecurity     Worry: Not on file     Inability: Not on file     Transportation needs     Medical: Not on file     Non-medical: Not on file   Tobacco Use     Smoking status: Former Smoker     Types: Cigarettes     Quit date: 1998     Years since quittin.2     Smokeless tobacco: Never Used   Substance and Sexual Activity     Alcohol use: Yes     Alcohol/week: 2.0 standard drinks     Types: 2 Glasses of wine per week     Drug use: Yes     Types: Oxycodone     Sexual activity: Never   Lifestyle     Physical activity     Days per week: Not on file     Minutes per session: Not on file     Stress: Not on file   Relationships     Social connections     Talks on phone: Not on file     Gets together: Not on file     Attends Mosque service: Not on file     Active member of club or organization: Not on file     Attends meetings of clubs or organizations: Not on file     Relationship status: Not on file     Intimate partner violence     Fear of current or ex partner: Not on file     Emotionally abused: Not on file     Physically abused: Not on file     Forced sexual activity: Not on file   Other Topics Concern     Not on file   Social History Narrative    .  Two kids.  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      Current Outpatient Medications   Medication Sig Dispense Refill     albuterol (PROAIR HFA;PROVENTIL HFA;VENTOLIN HFA) 90 mcg/actuation inhaler Inhale 2 puffs every 4 (four) hours as needed for wheezing. 3 Inhaler 4     calcium citrate (CALCITRATE) 200 mg (950 mg) tablet Take 1 tablet by mouth 2 (two) times a day.       cholecalciferol, vitamin D3, 5,000 unit Tab Take 5,000 Units by mouth daily.        denosumab 60 mg/mL Syrg Inject 60 mg under the skin every 6 (six) months.       fluticasone propion-salmeteroL (ADVAIR) 250-50 mcg/dose DISKUS Inhale 1 puff 2 (two) times  "a day. 180 each 4     hydroCHLOROthiazide (HYDRODIURIL) 25 MG tablet Take 1 tablet (25 mg total) by mouth daily. 90 tablet 4     levETIRAcetam (KEPPRA) 500 MG tablet Take 1 tablet (500 mg total) by mouth 2 (two) times a day. 180 tablet 4     losartan (COZAAR) 50 MG tablet Take 1 tablet (50 mg total) by mouth 2 (two) times a day. 180 tablet 4     OXYGEN-AIR DELIVERY SYSTEMS [facility] Use 1.5 L As Directed bedtime.       potassium chloride 20 mEq TbER Take 20 mEq by mouth daily. 90 tablet 4     RABEprazole (ACIPHEX) 20 mg tablet Take 1 tablet (20 mg total) by mouth daily. 90 tablet 3     tiotropium (SPIRIVA) 18 mcg inhalation capsule Place 1 capsule (2 puffs total) into inhaler and inhale daily. 90 capsule 4     Current Facility-Administered Medications   Medication Dose Route Frequency Provider Last Rate Last Admin     denosumab 60 mg (PROLIA 60 mg/ml)  60 mg Subcutaneous Q6 Months Felicia Haqecca RITA, NP   60 mg at 10/15/19 1052     denosumab 60 mg (PROLIA 60 mg/ml)  60 mg Subcutaneous Q6 Months Severino Lalitha L, NP   60 mg at 11/23/20 1356     [START ON 3/6/2021] denosumab 60 mg (PROLIA 60 mg/ml)  60 mg Subcutaneous Q6 Months Tyron Haqca RITA, NP          Objective:   Vital Signs:   Visit Vitals  /88 (Patient Site: Left Arm, Patient Position: Sitting, Cuff Size: Adult Large)   Pulse 72   Ht 5' 3.5\" (1.613 m)   Wt (!) 226 lb (102.5 kg)   [facility] 12/12/1998   BMI 39.41 kg/m           VisionScreening:  No exam data present     PHYSICAL EXAM    General Appearance:    Alert, overweight female.  Cooperative, no distress, appears stated age.     Head:    Normocephalic, without obvious abnormality, atraumatic   Eyes:    PERRL, conjunctiva/corneas clear, EOM's intact, fundi     benign, both eyes        Ears:    Normal TM's and external ear canals, both ears   Nose:   Nares normal, septum midline, mucosa normal, no drainage    or sinus tenderness   Throat:   Lips, mucosa, and tongue normal; teeth and gums normal   Neck:   Supple, " symmetrical, trachea midline, no adenopathy;        thyroid:  No enlargement/tenderness/nodules; no carotid    bruit or JVD   Back:     Symmetric, no curvature, ROM normal, no CVA tenderness   Lungs:     Clear to auscultation bilaterally, respirations unlabored   Chest wall:    No tenderness or deformity   Heart:    Regular rate and rhythm, S1 and S2 normal, no murmur, rub   or gallop   Abdomen:     Soft, non-tender, bowel sounds active all four quadrants,     no masses, no organomegaly.     Genitalia:   Patient declined   Rectal:   Declined.   Extremities:   Extremities normal, atraumatic, no cyanosis or edema   Pulses:   2+ and symmetric all extremities   Skin:   Skin color, texture, turgor normal, no rashes or lesions   Lymph nodes:   Cervical, supraclavicular, and axillary nodes normal   Neurologic:   CNII-XII intact. Normal strength, sensation and reflexes       throughout         Assessment Results 2/12/2021   Activities of Daily Living No help needed   Instrumental Activities of Daily Living No help needed   Get Up and Go Score Less than 12 seconds   Mini Cog Total Score 5   Some recent data might be hidden     A Mini-Cog score of 0-2 suggests the possibility of dementia, score of 3-5 suggests no dementia    Identified Health Risks:     The patient was counseled and encouraged to consider modifying their diet and eating habits. She was provided with information on recommended healthy diet options.  The patient was provided with written information regarding signs of hearing loss.  Information on urinary incontinence and treatment options given to patient.  The patient's PHQ-9 score is consistent with mild depression. She was provided with information regarding depression and was advised to schedule a follow up appointment in 6 months to further address this issue.  She is at risk for falling and has been provided with information to reduce the risk of falling at home.  She is at risk for falling and has been  provided with information about  [employer] [place]'s Matter of Balance program.  Patient's advanced directive was discussed and I am comfortable with the patient's wishes.

## 2021-06-15 NOTE — PROGRESS NOTES
[facility]  ENDOCRINOLOGY    Osteoporosis Follow Up 2/9/2018    Lalitha Underwood, 1945, [id number]          Reason for visit      1. Osteopenia    2. Osteoporosis        History     Lalitha Underwood is a very pleasant 72 y.o. old female who presents for follow up.   SUMMARY:  1. Osteopenia-she was started on alendronate 2 years ago. She has been tolerating alendronate well however a recent bone density report as noted below showed deterioration in the bone density in the right hip. She states she's been compliant with her alendronate every week.  She does not take any calcium supplements and only consumes about 1 serving of dairy each day. She takes 5000 IU daily of the 3 and her recent level was 41.8.  TSH in December 2014 was 1.87.  She has been on Dilantin for the last 24 years after having a grand mal seizure. Prior to that she was diagnosed with seizure disorder at the age of 18 and use medications initially for about 2 years and then discontinued for about 27 years.  She is also a former smoker and smoked for about 35 years and quit in 1998.  She does not undertake any weight-bearing exercise currently. She is thinking of joining the [employer] in January  She has had several falls but has never resulted in a fragility fracture.  She has lost 1.75 inches in height.  Menopause at age 50 and she used hormone replacement therapy for about 2-3 years and stopped after the women's health initiative data came out.  Her mother had an osteoporotic hip fracture at age 90. She had to have open reduction internal fixation and lived for another 4 years after that but was dependent on a walker.  Her sister also has osteopenia.  She has a long-standing history of hiatal hernia and has been on proton pump inhibitors followed time and is currently in AcipHex.    TODAY:  Serene returns today in f/u for Osteoporosis.  She continues getting Prolia injections every 6 months and denies any problems with them. She continues to be on  Dilantin, which puts her at risk.  She is taking Vit D and Calcium Citrate in supplementation.  Her current Vit D level is 57, and Calcium is 9.5.She does some walking, but reports that she could do more.    Risk Factors     The following high- risk conditions have been ruled out: celiac disease, eating disorders, gastric bypass, hyperparathyroidism, inflammatory bowel disease, hyperthyroidism, rheumatoid arthritis, lupus, chronic kidney disease.      Lalitha Underwood has the following risk factors: Age, female gender, ex smoker, and breast cancer in her mother.      She is not on high risk medications such as glucocorticoids, anti-coagulants, chemotherapy or levothyroxine.  She is on an anticonvulsant.  Patient deniesHysterectomy, Oophrectomy, Breast cancer and Family history of breast cancer.   Menopause was at age: 50 years.     Past Medical History     Patient Active Problem List   Diagnosis     Solar Elastosis     Rosacea     Epilepsy And Recurrent Seizures     Esophageal Reflux     Vitamin D Deficiency     Pulmonary emphysema     Hyperlipidemia     Walk Is Wobbly Or Unsteady (Ataxia)     Obesity     Hypertension     Osteopenia     Venous insufficiency of both lower extremities     Hiatal hernia     Leg swelling     Left arm swelling     Flat feet     History of right shoulder fracture     Valgus deformity of both feet     Urge incontinence of urine     Mild aortic valve regurgitation     Mild aortic valve stenosis     Osteoporosis       Family History       family history includes Breast cancer (age of onset: 66) in her mother; Diabetes in her son; Heart attack in her mother, sister, and sister; Heart disease in her sister and sister; Hypertension in her father and mother; Pulmonary Hypertension in her daughter; Varicose Veins in her mother.    Social History      reports that she quit smoking about 19 years ago. Her smoking use included Cigarettes. She has never used smokeless tobacco. She reports that she  "drinks about 1.2 oz of alcohol per week  She reports that she does not use illicit drugs.      Review of Systems     Patient denies current pain, limited mobility, fractures.   Remainder per HPI.      Vital Signs     /82 (Patient Site: Right Arm, Patient Position: Sitting, Cuff Size: Adult Regular) Comment (Patient Site): taken on forearm  Pulse 78  Ht 5' 3.5\" (1.613 m)  Wt 206 lb 11.2 oz (93.8 kg)  LMP 12/12/1998  BMI 36.04 kg/m2    Physical Exam     GENERAL:  Normal, NIRD  EYES:  Pupils equal, round and reactive to light; no proptosis, lid lag or  periorbital edema.  THYROID:  Thyroid is normal.  No tenderness or bruit  NECK: No lymph nodes  MUSCULOSKELETAL: No joint abnormalities, FROM in all four extremities. No kyphosis. Muscle strength grossly normal without evidence of wasting.  HEART:  Regular rate and rhythm without murmur.  LUNGS:  Clear to auscultation.  ABDOMEN:Soft, non-tender, no masses or organomegaly  NEURO:  Patella Reflexes were normal.No tremors  SKIN:  No acanthosis nigricans or vitiligo        Assessment     1. Osteopenia    2. Osteoporosis        Plan     She will continue on the Prolia injections at present.  She is due for another Dexa Scan in December and we will f/u together in January of next year, at which time we will decide if a Drug Holiday is appropriate at that time.      Total visit minutes:25  Time spent counseling and coordination of care:23    Lalitha Haq   Endocrinology  2/9/2018  9:59 AM      Current Medications     Outpatient Medications Prior to Visit   Medication Sig Dispense Refill     albuterol (ACCUNEB) 1.25 mg/3 mL nebulizer solution Take 3 mL (1.25 mg total) by nebulization every 6 (six) hours as needed for wheezing. 75 mL 0     albuterol (PROAIR HFA;PROVENTIL HFA;VENTOLIN HFA) 90 mcg/actuation inhaler Inhale 2 puffs every 6 (six) hours as needed for wheezing. 3 Inhaler 3     calcium citrate 250 mg calcium Tab Take 500 Units by mouth 2 (two) times a " day.       cholecalciferol, vitamin D3, 5,000 unit Tab Take by mouth.       fluticasone-salmeterol (ADVAIR) 250-50 mcg/dose DISKUS Inhale 1 puff 2 (two) times a day. 180 each 3     hydroCHLOROthiazide (HYDRODIURIL) 12.5 MG tablet Take 1 tablet (12.5 mg total) by mouth daily. 90 tablet 3     hyoscyamine (LEVBID) 0.375 mg 12 hr tablet Take 1 tablet (0.375 mg total) by mouth every 12 (twelve) hours as needed for cramping. 180 tablet 3     losartan (COZAAR) 50 MG tablet Take 1 tablet (50 mg total) by mouth 2 (two) times a day. 180 tablet 3     OXYGEN-AIR DELIVERY SYSTEMS MISC Use 1.5 L As Directed bedtime.       phenytoin (DILANTIN) 100 MG ER capsule Take 1 capsule (100 mg total) by mouth 3 (three) times a day. 270 capsule 3     potassium chloride 20 mEq TbER Take 20 mEq by mouth daily. 90 tablet 3     RABEprazole (ACIPHEX) 20 mg tablet Take 1 tablet (20 mg total) by mouth daily. 90 tablet 3     tiotropium (SPIRIVA) 18 mcg inhalation capsule Place 1 capsule (2 puffs total) into inhaler and inhale daily. 90 capsule 3     DENOSUMAB (PROLIA SUBQ) Inject 60 mL as directed every 6 (six) months.       No facility-administered medications prior to visit.          Lab Results     TSH   Date Value Ref Range Status   12/05/2014 1.87 0.30 - 5.05 uIU/mL Final     PTH   Date Value Ref Range Status   11/21/2013 71 16 - 73 pg/mL Final     Calcium   Date Value Ref Range Status   11/16/2017 9.5 8.5 - 10.5 mg/dL Final     Phosphorus   Date Value Ref Range Status   11/21/2013 3.9 2.5 - 4.5 mg/dL Final           Imaging Results   Last DEXA scan:  Results for orders placed in visit on 12/28/16   DXA Bone Density Scan    Narrative 12/28/2016      RE: Lalitha Underwood  YOB: 1945        Dear Dr.Rebecca RITA Haq,    Patient Profile:  71 y.o. female, postmenopausal, is here for the follow up bone density   test.   History of fractures - None. Family history of osteoporosis - None.    Family history of hip fracture: Yes;  mother.  Smoking history - Past.   Osteoporosis treatment past -  Yes;  Bisphosphonates. Osteoporosis   treatment current - Yes;  Prolia.  Chronic medical problems - Chronic low   back problems. High risk medications -  None.      Assessment:    1. The spine bone density was not done.  2. Femoral bone densities show left femoral neck T- score -1.3 and right   femoral neck T-score -0.7 and significant improvement of 7.4% on the right   hip compared to 2015.  3. Left forearm bone density with T-score 1.2, stable.    71 y.o. female with LOW BONE DENSITY (OSTEOPENIA), stable on the current   treatment.        Recommendations:  Appropriate calcium and vitamin D supplements and active treatment   recommended with follow up bone density scan in 2 years.      Bone densitometry was performed on your patient using our [employer]   densitometer. The results are summarized and a copy of the actual scans   are included for your review. In conformity with the International Society   of Clinical Densitometry's most recent position statement for DXA   interpretation (2015), the diagnosis will be made on the lowest measured   T-score of the lumbar spine, femoral neck, total proximal femur or 33%   radius. Note the change in terminology for diagnostic classification from   OSTEOPENIA to LOW BONE MASS. All trending for sequential exams will be   done using multiple vertebrae or the total proximal femur. Fracture risk   is based on the WHO Fracture Risk Assessment Tool (FRAX). If additional   information is needed or if you would like to discuss the results, please   do not hesitate to call me.       Thank you for referring this patient to [facility].   We are happy to be of service in support of you and your practice. If you   have any questions or suggestions to improve our service, please call me   at [phone].     Sincerely,     Ashli Mendez M.D. ARAVINDCANN MARIE.  Osteoporosis Services, [facility]

## 2021-06-16 NOTE — TELEPHONE ENCOUNTER
Telephone Encounter by Bonnie Alvarez at 9/23/2019  3:11 PM     Author: Bonnie Alvarez Service: -- Author Type: --    Filed: 9/23/2019  3:13 PM Encounter Date: 9/17/2019 Status: Signed    : Bonnie Alvarez APPROVED:    Approval start date:8/24/19  Approval end date:9/22/2020    Pharmacy has been notified of approval and will contact patient when medication is ready for pickup.

## 2021-06-16 NOTE — PROGRESS NOTES
Preoperative Exam    Scheduled Procedure: hernia repair   Surgery Date:  No later than 4/4/18  Surgery Location: [facility], fax [phone]    Surgeon:  Dr. Jimenez    Assessment/Plan:     1. Pre-op exam  Preoperative exam was completed today.  Patient is scheduled for periumbilical hernia repair on April 4, 2018.  Surgery will be completed by Dr. Jimenez at [facility].  Patient had EKG completed this past week at the hospital which was normal. Additional EKG is not indicated. Basic metabolic panel and hepatic profile  were normal at that time as well.  CBC showed mildly elevated white blood cell count. CBC was repeated today and indicates that white blood cell count is decreasing.  No other labs or workup is needed at this time.  Patient may proceed with scheduled procedure.  Patient should notify clinic or surgeon if shehas any questions or concerns.  - HM1(CBC and Differential)  - HM1 (CBC with Diff)    2. Periumbilical Hernia   Patient has a corrie-umbilical hernia without strangulation or major obstruction.  She has had signs of early obstruction in which she was seen in the ED for last weekend.  Hernia is able to be reduced manually.  She is currently using a abdominal binder to help relieve pain.  Surgery is scheduled for periumbilical hernia repair next week.    3. Pulmonary emphysema  Patient has chronic obstructive pulmonary disease, emphysema.  She continues to avoid tobacco use.  Uses Advair and Spiriva daily for control.  She has albuterol as needed.  She reports the condition remains stable.     4. Hyperlipidemia  Discussed healthy diet and exercise.  Patient does not require medication at this time.  We will continue to monitor at follow-up appointments.    5. Essential hypertension  Patient has hypertension which is chronic but stable.  Current regimen includes hydrochlorothiazide and losartan.  Basic metabolic panel was recently drawn and remained stable.  Will continue to follow-up at  future visits.    6. Venous insufficiency of both lower extremities  Venous insufficiency is chronic but stable.  She remains on hydrochlorothiazide.    7. Hiatal hernia  Patient's hiatal hernia is not bothersome to her at this time.  Continues to watch and wait.  Encouraged dietary measures to reduce risk of worsening symptoms.    8. Mild aortic valve regurgitation  9. Mild aortic valve stenosis  Remained stable.  She is asymptomatic.  We will continue to monitor in the future.  Discussed worsening signs and symptoms to be aware of.   8. Mild aortic valve stenosis    Surgical Procedure Risk: Intermediate (reported cardiac risk generally 1-5%)  Have you had prior anesthesia?: YES  Have you or any family members had a previous anesthesia reaction:  No  Do you or any family members have a history of a clotting or bleeding disorder?: No  Cardiac Risk Assessment: no increased risk for major cardiac complications    Patient approved for surgery with general or local anesthesia.    Functional Status: Independent  Patient plans to recover at home with family.     Subjective:      Lalitha Underwood is a 73 y.o. female who presents for a preoperative consultation.  She is scheduled for a periumbilical hernia repair on April 4, 2018.  This will be completed by Dr. Jimenez at [facility].  Patient reports symptoms of abdominal pain brought her to the hospital last week.  Hernia was discovered on Monday, 1 week ago.  She was able to manually reduce the hernia on her own last week.  She has not tried to do that again fear of causing abdominal pain.  Patient reports that other chronic conditions are stable at this time.  Hiatal hernia is not causing symptoms at this time.  She will continue to watch and wait with possibility of future procedure to repair that.  She has no other concerns today.  States that she is feeling well overall and is ready to have the surgery done. All other systems reviewed and are negative, other  than those listed in the HPI.    Pertinent History  Do you have difficulty breathing or chest pain after walking up a flight of stairs: No  History of obstructive sleep apnea: No  Steroid use in the last 6 months: No  Frequent Aspirin/NSAID use: No  Prior Blood Transfusion: No  Prior Blood Transfusion Reaction: No  If for some reason prior to, during or after the procedure, if it is medically indicated, would you be willing to have a blood transfusion?:  There is no transfusion refusal.    Current Outpatient Prescriptions   Medication Sig Dispense Refill     albuterol (ACCUNEB) 1.25 mg/3 mL nebulizer solution Take 3 mL (1.25 mg total) by nebulization every 6 (six) hours as needed for wheezing. 75 mL 0     albuterol (PROAIR HFA;PROVENTIL HFA;VENTOLIN HFA) 90 mcg/actuation inhaler Inhale 2 puffs every 6 (six) hours as needed for wheezing. 3 Inhaler 3     calcium citrate 250 mg calcium Tab Take 500 Units by mouth 2 (two) times a day.       cholecalciferol, vitamin D3, 5,000 unit Tab Take by mouth.       fluticasone-salmeterol (ADVAIR) 250-50 mcg/dose DISKUS Inhale 1 puff 2 (two) times a day. 180 each 3     hydroCHLOROthiazide (HYDRODIURIL) 12.5 MG tablet Take 1 tablet (12.5 mg total) by mouth daily. 90 tablet 3     hyoscyamine (LEVBID) 0.375 mg 12 hr tablet Take 1 tablet (0.375 mg total) by mouth every 12 (twelve) hours as needed for cramping. 180 tablet 3     losartan (COZAAR) 50 MG tablet Take 1 tablet (50 mg total) by mouth 2 (two) times a day. 180 tablet 3     OXYGEN-AIR DELIVERY SYSTEMS MISC Use 1.5 L As Directed bedtime.       phenytoin (DILANTIN) 100 MG ER capsule Take 1 capsule (100 mg total) by mouth 3 (three) times a day. 270 capsule 3     potassium chloride 20 mEq TbER Take 20 mEq by mouth daily. 90 tablet 3     RABEprazole (ACIPHEX) 20 mg tablet Take 1 tablet (20 mg total) by mouth daily. 90 tablet 3     tiotropium (SPIRIVA) 18 mcg inhalation capsule Place 1 capsule (2 puffs total) into inhaler and inhale  daily. 90 capsule 3     Current Facility-Administered Medications   Medication Dose Route Frequency Provider Last Rate Last Dose     denosumab 60 mg (PROLIA 60 mg/ml)  60 mg Subcutaneous Q6 Months Lalitha Haq NP   60 mg at 02/13/18 1005        Allergies   Allergen Reactions     Clindamycin Rash     Carbamazepine Rash     Morphine Nausea Only       Patient Active Problem List   Diagnosis     Solar Elastosis     Rosacea     Epilepsy And Recurrent Seizures     Esophageal Reflux     Vitamin D Deficiency     Pulmonary emphysema     Hyperlipidemia     Walk Is Wobbly Or Unsteady (Ataxia)     Obesity     Hypertension     Osteopenia     Venous insufficiency of both lower extremities     Hiatal hernia     Leg swelling     Left arm swelling     Flat feet     History of right shoulder fracture     Valgus deformity of both feet     Urge incontinence of urine     Mild aortic valve regurgitation     Mild aortic valve stenosis     Osteoporosis       Past Medical History:   Diagnosis Date     Convulsive disorder      COPD (chronic obstructive pulmonary disease)      Depression      Hernia of unspecified site of abdominal cavity without mention of obstruction or gangrene      Hiatal hernia      Hypertension      Osteopenia      Venous insufficiency of both lower extremities        Past Surgical History:   Procedure Laterality Date     OTHER SURGICAL HISTORY Left 2003    Broke titanium in left arm     SHOULDER SURGERY Right 1967       Social History     Social History     Marital status:      Spouse name: N/A     Number of children: N/A     Years of education: N/A     Occupational History     Not on file.     Social History Main Topics     Smoking status: Former Smoker     Types: Cigarettes     Quit date: 12/7/1998     Smokeless tobacco: Never Used     Alcohol use 1.2 oz/week     2 Glasses of wine per week     Drug use: No     Sexual activity: No     Other Topics Concern     Not on file     Social History Narrative     ".  Two kids.  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       Patient Care Team:  Kelly Hull MD as PCP - General (Family Medicine)          Objective:     Vitals:    03/26/18 1350   BP: 118/64   Pulse: 100   Weight: 201 lb 12.8 oz (91.5 kg)   Height: 5' 4.75\" (1.645 m)   LMP: 12/12/1998         Physical Exam:    General Appearance:    Alert, cooperative, no distress, appears stated age.     Head:    Normocephalic, without obvious abnormality, atraumatic   Eyes:    PERRL, conjunctiva/corneas clear, EOM's intact, fundi     benign, both eyes        Ears:    Normal TM's and external ear canals, both ears   Nose:   Nares normal, septum midline, mucosa normal, no drainage    or sinus tenderness   Throat:   Lips, mucosa, and tongue normal; teeth and gums normal   Neck:   Supple, symmetrical, trachea midline, no adenopathy;        thyroid:  No enlargement/tenderness/nodules; no carotid    bruit or JVD   Back:     Symmetric, no curvature, ROM normal, no CVA tenderness   Lungs:     Clear to auscultation bilaterally, respirations unlabored   Heart:    Regular rate and rhythm, S1 and S2 normal, no murmur, rub   or gallop   Abdomen:     Soft, non-tender, bowel sounds active all four quadrants,     no masses, no organomegaly.     Extremities:   Extremities normal, atraumatic, no cyanosis or edema   Pulses:   2+ and symmetric all extremities   Skin:   Skin color, texture, turgor normal, no rashes or lesions   Lymph nodes:   Cervical, supraclavicular, and axillary nodes normal   Neurologic:   CNII-XII intact. Normal strength, sensation and reflexes       throughout         There are no Patient Instructions on file for this visit.    EKG: Not indicated at this time.  Normal EKG reading on 3/24/2018    Labs:  Recent Results (from the past 120 hour(s))   HM2(CBC w/o Differential)    Collection Time: 03/24/18  4:33 PM   Result Value Ref Range    WBC 17.4 (H) 4.0 - 11.0 thou/uL    RBC 5.26 3.80 - 5.40 mill/uL    Hemoglobin 15.7 12.0 - " 16.0 g/dL    Hematocrit 46.9 35.0 - 47.0 %    MCV 89 80 - 100 fL    MCH 29.8 27.0 - 34.0 pg    MCHC 33.5 32.0 - 36.0 g/dL    RDW 13.7 11.0 - 14.5 %    Platelets 230 140 - 440 thou/uL    MPV 9.9 8.5 - 12.5 fL   Basic Metabolic Panel    Collection Time: 03/24/18  4:33 PM   Result Value Ref Range    Sodium 141 136 - 145 mmol/L    Potassium 4.1 3.5 - 5.0 mmol/L    Chloride 99 98 - 107 mmol/L    CO2 28 22 - 31 mmol/L    Anion Gap, Calculation 14 5 - 18 mmol/L    Glucose 139 (H) 70 - 125 mg/dL    Calcium 10.1 8.5 - 10.5 mg/dL    BUN 16 8 - 28 mg/dL    Creatinine 0.77 0.60 - 1.10 mg/dL    GFR MDRD Af Amer >60 >60 mL/min/1.73m2    GFR MDRD Non Af Amer >60 >60 mL/min/1.73m2   Hepatic Profile    Collection Time: 03/24/18  4:33 PM   Result Value Ref Range    Bilirubin, Total 0.4 0.0 - 1.0 mg/dL    Bilirubin, Direct 0.1 <=0.5 mg/dL    Protein, Total 7.6 6.0 - 8.0 g/dL    Albumin 4.0 3.5 - 5.0 g/dL    Alkaline Phosphatase 79 45 - 120 U/L    AST 14 0 - 40 U/L    ALT 10 0 - 45 U/L   Lipase    Collection Time: 03/24/18  4:33 PM   Result Value Ref Range    Lipase 22 0 - 52 U/L   Lactic Acid    Collection Time: 03/24/18  4:33 PM   Result Value Ref Range    Lactic Acid 1.6 0.5 - 2.2 mmol/L   Troponin I    Collection Time: 03/24/18  4:33 PM   Result Value Ref Range    Troponin I <0.01 0.00 - 0.29 ng/mL   ECG 12 lead nursing unit performed    Collection Time: 03/24/18  4:49 PM   Result Value Ref Range    SYSTOLIC BLOOD PRESSURE  mmHg    DIASTOLIC BLOOD PRESSURE  mmHg    VENTRICULAR RATE 106 BPM    ATRIAL RATE 106 BPM    P-R INTERVAL 186 ms    QRS DURATION 114 ms    Q-T INTERVAL 366 ms    QTC CALCULATION (BEZET) 486 ms    P Axis 57 degrees    R AXIS -28 degrees    T AXIS 101 degrees    MUSE DIAGNOSIS       Sinus tachycardia  Possible Left atrial enlargement    IVCD of Left Bundle type (incomplete)  Abnormal ECG  When compared with ECG of 30-OCT-2017 16:34,  IVCD is new      Confirmed by VINICIO CHRISTY MD LOC: ([id number]) on 3/25/2018  3:38:12 PM     Urinalysis-UC if Indicated    Collection Time: 03/24/18  7:26 PM   Result Value Ref Range    Color, UA Yellow Colorless, Yellow, Straw, Light Yellow    Clarity, UA Clear Clear    Glucose, UA Negative Negative    Bilirubin, UA Negative Negative    Ketones, UA >150 mg/dL (!) Negative, 60 mg/dL    Specific Gravity, UA >1.050 (H) 1.001 - 1.030    Blood, UA Negative Negative    pH, UA 6.0 4.5 - 8.0    Protein,  mg/dL (!) Negative mg/dL    Urobilinogen, UA 2.0 E.U./dL <2.0 E.U./dL, 2.0 E.U./dL    Nitrite, UA Negative Negative    Leukocytes, UA Negative Negative    Bacteria, UA None Seen None Seen hpf    RBC, UA 0-2 None Seen, 0-2 hpf    WBC, UA 0-5 None Seen, 0-5 hpf    Squam Epithel, UA  (!) None Seen, 0-5 lpf    Mucus, UA Many (!) None Seen lpf    Hyaline Casts, UA 0-5 0-5, None Seen lpf   HM1 (CBC with Diff)    Collection Time: 03/26/18  2:02 PM   Result Value Ref Range    WBC 14.3 (H) 4.0 - 11.0 thou/uL    RBC 4.65 3.80 - 5.40 mill/uL    Hemoglobin 14.3 12.0 - 16.0 g/dL    Hematocrit 42.5 35.0 - 47.0 %    MCV 92 80 - 100 fL    MCH 30.7 27.0 - 34.0 pg    MCHC 33.6 32.0 - 36.0 g/dL    RDW 13.1 11.0 - 14.5 %    Platelets 197 140 - 440 thou/uL    MPV 7.4 7.0 - 10.0 fL    Neutrophils % 75 (H) 50 - 70 %    Lymphocytes % 13 (L) 20 - 40 %    Monocytes % 10 2 - 10 %    Eosinophils % 1 0 - 6 %    Basophils % 1 0 - 2 %    Neutrophils Absolute 10.8 (H) 2.0 - 7.7 thou/uL    Lymphocytes Absolute 1.9 0.8 - 4.4 thou/uL    Monocytes Absolute 1.4 (H) 0.0 - 0.9 thou/uL    Eosinophils Absolute 0.2 0.0 - 0.4 thou/uL    Basophils Absolute 0.1 0.0 - 0.2 thou/uL       Immunization History   Administered Date(s) Administered     DT (pediatric) 01/01/1993, 11/01/1993     Influenza high dose, seasonal 09/16/2015, 11/14/2016, 11/16/2017     Influenza, inj, historic,unspecified 10/26/2007, 11/14/2008     Influenza, seasonal,quad inj 6-35 mos 11/11/2010, 09/13/2011, 11/02/2012, 09/12/2013, 09/16/2014     PPD Test  01/01/2016     Pneumo Conj 13-V (2010&after) 09/16/2015     Pneumo Polysac 23-V 06/01/2006, 09/12/2013     Td,adult,historic,unspecified 06/07/2010     Tdap 06/28/2006     ZOSTER, LIVE 10/26/2007           Electronically signed by Loretta Sewell CNP 03/26/18 1:52 PM

## 2021-06-16 NOTE — PROGRESS NOTES
"ASSESSMENT: Callus on the left third toe, hammertoe, pes planus, foot pain    PLAN: We reviewed options for managing the callus at home including moisturizing, padding and filing.  If it becomes painful again, return to clinic for debridement as needed.      SUBJECTIVE: New patient visit at the [place] clinic regarding a painful callus on the end of her left third toe.  This developed over the past several months.  She has not seen bleeding or drainage.  Some relief when a traveling nurse debrided down.  That lasted for a few weeks.  She has not seen bleeding or drainage from.  She denies any trauma.  Trouble with flatfeet her whole life, but that is unchanged.  She denies diabetes.  No numbness or tingling.    PHYSICAL EXAM:  /68  Pulse 64  Resp 16  Ht 5' 3.5\" (1.613 m)  Wt 206 lb (93.4 kg)  LMP 12/12/1998  SpO2 99%  BMI 35.92 kg/m2  General: Pleasant 72 y.o. female in no acute distress.  Vascular: DP pulses are diminished. PT pulses are diminished. Pedal hair is diminished. Feet are warm to the touch.  Cardiac: Pulse is regular.  Lymphatic: No edema at the ankles.  Neuro: Sensation in the feet is grossly intact to light touch.  Derm: Hyperkeratosis at the end of the left third toe.  That was debrided down to intact skin.  Remaining skin is thin and shiny but intact.  Musculoskeletal: Significant pes planus bilaterally.  Hammertoe contractures in the lesser digits bilaterally.    Past Medical History:   Diagnosis Date     Convulsive disorder      COPD (chronic obstructive pulmonary disease)      Depression      Hernia of unspecified site of abdominal cavity without mention of obstruction or gangrene      Hiatal hernia      Hypertension      Osteopenia      Venous insufficiency of both lower extremities        She has a past surgical history that includes Shoulder surgery (Right, 1967) and Other surgical history (Left, 2003).    Allergies   Allergen Reactions     Clindamycin Rash     " Carbamazepine Rash     Morphine Nausea Only       Current Outpatient Prescriptions   Medication Sig Dispense Refill     albuterol (ACCUNEB) 1.25 mg/3 mL nebulizer solution Take 3 mL (1.25 mg total) by nebulization every 6 (six) hours as needed for wheezing. 75 mL 0     albuterol (PROAIR HFA;PROVENTIL HFA;VENTOLIN HFA) 90 mcg/actuation inhaler Inhale 2 puffs every 6 (six) hours as needed for wheezing. 3 Inhaler 3     calcium citrate 250 mg calcium Tab Take 500 Units by mouth 2 (two) times a day.       cholecalciferol, vitamin D3, 5,000 unit Tab Take by mouth.       fluticasone-salmeterol (ADVAIR) 250-50 mcg/dose DISKUS Inhale 1 puff 2 (two) times a day. 180 each 3     hydroCHLOROthiazide (HYDRODIURIL) 12.5 MG tablet Take 1 tablet (12.5 mg total) by mouth daily. 90 tablet 3     hyoscyamine (LEVBID) 0.375 mg 12 hr tablet Take 1 tablet (0.375 mg total) by mouth every 12 (twelve) hours as needed for cramping. 180 tablet 3     losartan (COZAAR) 50 MG tablet Take 1 tablet (50 mg total) by mouth 2 (two) times a day. 180 tablet 3     OXYGEN-AIR DELIVERY SYSTEMS [facility] Use 1.5 L As Directed bedtime.       phenytoin (DILANTIN) 100 MG ER capsule Take 1 capsule (100 mg total) by mouth 3 (three) times a day. 270 capsule 3     potassium chloride 20 mEq TbER Take 20 mEq by mouth daily. 90 tablet 3     RABEprazole (ACIPHEX) 20 mg tablet Take 1 tablet (20 mg total) by mouth daily. 90 tablet 3     tiotropium (SPIRIVA) 18 mcg inhalation capsule Place 1 capsule (2 puffs total) into inhaler and inhale daily. 90 capsule 3     No current facility-administered medications for this visit.        Family History:  family history includes Breast cancer (age of onset: 66) in her mother; Diabetes in her son; Heart attack in her mother, sister, and sister; Heart disease in her sister and sister; Hypertension in her father and mother; Pulmonary Hypertension in her daughter; Varicose Veins in her mother.    Social History:  Reviewed, and she reports  that she quit smoking about 19 years ago. Her smoking use included Cigarettes. She has never used smokeless tobacco. She reports that she drinks about 1.2 oz of alcohol per week  She reports that she does not use illicit drugs.    Review of Systems:  A 12 point comprehensive review of systems was negative except as noted.

## 2021-06-16 NOTE — TELEPHONE ENCOUNTER
tiotropium (SPIRIVA) 18 mcg inhalation capsule  18 mcg, Inhalation, DAILY 2/12/2021 ROBBY DASILVA 90 capsule 4 ordered           EditCancel Reorder       Summary: Place 1 capsule (2 puffs total) into inhaler and inhale daily., Starting Fri 2/12/2021, Print   Dose, Frequency: 18 mcg, DAILY  Ord/Sold: 2/12/2021 (O)  Report  Taking:   Long-term:   Pharmacy: [facility] #[id number] - [address]  AT [facility] [address]  Med Dose History       Patient Sig: Place 1 capsule (2 puffs total) into inhaler and inhale daily.        CASE:  Print.    Refilled 2/12/21.  Pharmacy change.  Refills adjusted.

## 2021-06-16 NOTE — TELEPHONE ENCOUNTER
Telephone Encounter by Destini Daugherty LPN at 3/6/2020  2:54 PM     Author: Destini Daugherty LPN Service: -- Author Type: Licensed Nurse    Filed: 3/6/2020  2:55 PM Encounter Date: 3/6/2020 Status: Signed    : Destini Daugherty LPN (Licensed Nurse)       Patient Returning Call  Reason for call:  Patient returning call   Information relayed to patient:  Kelly Hull MD           3/6/20 2:01 PM   Note      Please call patient:     I was able to obtain and review the roport of her head and neck MRI from Dr. Morales's office.  There are some wear and tear findings in her brain and neck, but nothing to account for her symptoms, based on my interpretation.  There was an incidental finding of a nodule on the left side of her thyroid (a gland that sits in her neck).  This should be evaluated further with an ultrasound.  The nodule was about 3 cm in diameter, so about the size of a ping pong ball.  It's not uncommon that we find nodules in the thyroid incidentally, and we want to be sure that it is not concerning.  Our radiology office will contact her next week to schedule the ultrasound.  VJ      Patient has additional questions:  No  If YES, what are your questions/concerns:  Patient verbalized understanding above message   Okay to leave a detailed message?: No

## 2021-06-16 NOTE — PROGRESS NOTES
Medication Therapy Management (MTM) Encounter    Assessment:                                                      Leg Cramps: We reviewed common causes of leg cramps including dehydration, low magnesium, low potassium.  Her last potassium value was normal.  Her last magnesium level was slightly low however.  At this point she is not having worsening in symptoms, but discussed that if her symptoms return then to double the magnesium at bedtime regardless of her magnesium level.  I will place a future magnesium level, but I do not think it needs to be checked today because she could increase the magnesium even if her level was normal.  Encouraged her to drink more water, but she will need to be cautious due to her incontinence as well.    Dizziness: Patient to continue to follow with [facility] and use her walker    Hypertension: Last blood pressure at goal and is not too low therefore likely not contributing to her dizziness.    COPD - severe Gold C: Stable.  Encouraged her to continue current inhalers and follow-up with pulmonology.  Spiriva refilled.    Epilepsy: Continue current medication and follow-up with neurology as needed    GERD: Stable.  Continue current regimen.    Osteoporosis: Patient is not getting much dietary calcium intake, and is only getting 600 mg of calcium daily, however her last DEXA scan was good therefore okay to continue current calcium regimen and encouraged her to get more dietary calcium intake.  Last vitamin D level normal.  She is on the appropriate type of calcium since she is on PPI.    Urinary Incontinence: Patient to continue current regimen and follow-up with urology.    Plan:                                                     1.  If your cramps return, double your magnesium to 500 mg x 2 nightly  2.  Future magnesium level    Follow Up  As needed with MTM    Subjective & Objective                                                     Lalitha Underwood is  a 76 y.o. female coming in for an initial visit for Medication Therapy Management. She was referred to me from self. Of note, I have met with her  Tony in 2016.     Reason for visit: Patient was going through leg cramps and she wonders if she is taking too much medications. She initiated the appointment on her own   Medication Adherence/Access: She brings her medications with her today.  's care is in [facility]. Eventually she may transfer to [facility].     Leg Cramps: Fred gets them during the night or if sitting for a long period of time. She will do leg stretches before bed, not sure if helpful. Not every day she gets a cramp, it is random. However, she feels the cramps have actually been much better lately.   She does still take the magnesium 500 mg daily. She is not sure if it was helpful. Admits that she does not drink much water.   Last mag level 2/24/20 = 1.6    Dizziness: Worsened after COVID, was referred to [facility]. Using a walker. She has a follow up tomorrow for balance. No recent falls.     Hypertension: Currently taking HCTZ 25 mg daily, losartan 50 mg two times a day, and potassium 20 meq daily. She has been of hydrochlorothiazide and her edema would worsen. Patient does not monitor BP at home. Last K = 3.9 on 2/12/21  BP Readings from Last 3 Encounters:   03/04/21 130/72   02/24/21 120/76   02/12/21 132/88       COPD - severe Gold C: Continues Advair 250-50 mcg 1 puff two times a day, Spiriva daily, and albuterol HFA PRN.  She wears 1.5 L of oxygen during sleep. Breathing has been ok.  She coughed up a little phlegm and it was not clear but she only coughed a little yesterday and it is getting better. Rinses mouth after Advair. Uses albuterol once in the morning. She needs a refill of Spiriva.   Follows with Dr. Tamayo    Has not smoked in over 20 years.     Epilepsy: Continues Keppra 500 mg two times a day.  No concern for seizures at this time.  Continue to  follow with Dr. Morales in neurology. No recent follow up and only went to him for the vertigo again.     GERD: Currently taking rabeprazole 20 mg daily. She has hiatal hernia and hx surgery about two years ago since it was putting pressre on her lungs. No symptoms now.     Osteoporosis: Currently taking calcium citrate 300 mg two times a day, Vitamin D 5000 units daily, and Prolia.  Last DEXA scan on 21 showed a t-score of -1.   Dietary calcium intake: once a week ice cream, cheese not daily, yogurt rarely.   Vitamin D, Total (25-Hydroxy)   Date Value Ref Range Status   2021 52.1 30.0 - 80.0 ng/mL Final       Urinary Incontinence: Currently taking solifenacin 5 mg x2 (10 mg) daily. Follows with urology and has a pessaerie. Thinks the current medication finally started working, not completely helpful but a little bit.       PMH: reviewed in EPIC   Allergies/ADRs: reviewed in EPIC   Alcohol: reviewed in EPIC   Tobacco:   Social History     Tobacco Use   Smoking Status Former Smoker     Types: Cigarettes     Quit date: 1998     Years since quittin.3   Smokeless Tobacco Never Used     Today's Vitals: There were no vitals filed for this visit.  ----------------    The patient declined an after visit summary    I spent 45 minutes with this patient today.   All changes were made via collaborative practice agreement with Kelly Hull MD. A copy of the visit note was provided to the patient's provider.     Karissa Felipe, Pharm.D., [facility]  Medication Therapy Management Pharmacist  [facility]      Current Outpatient Medications   Medication Sig Dispense Refill     albuterol (PROAIR HFA;PROVENTIL HFA;VENTOLIN HFA) 90 mcg/actuation inhaler Inhale 2 puffs every 4 (four) hours as needed for wheezing. 3 Inhaler 4     calcium citrate (CALCITRATE) 200 mg (950 mg) tablet Take 1 tablet by mouth 2 (two) times a day.       cholecalciferol, vitamin D3, 5,000 unit Tab Take 5,000 Units  by mouth daily.        denosumab 60 mg/mL Syrg Inject 60 mg under the skin every 6 (six) months.       fluticasone propion-salmeteroL (ADVAIR) 250-50 mcg/dose DISKUS Inhale 1 puff 2 (two) times a day. 180 each 4     hydroCHLOROthiazide (HYDRODIURIL) 25 MG tablet Take 1 tablet (25 mg total) by mouth daily. 90 tablet 4     levETIRAcetam (KEPPRA) 500 MG tablet Take 1 tablet (500 mg total) by mouth 2 (two) times a day. 180 tablet 4     losartan (COZAAR) 50 MG tablet Take 1 tablet (50 mg total) by mouth 2 (two) times a day. 180 tablet 4     OXYGEN-AIR DELIVERY SYSTEMS MISC Use 1.5 L As Directed bedtime.       potassium chloride 20 mEq TbER Take 20 mEq by mouth daily. 90 tablet 4     RABEprazole (ACIPHEX) 20 mg tablet Take 1 tablet (20 mg total) by mouth daily. 90 tablet 3     tiotropium (SPIRIVA) 18 mcg inhalation capsule Place 1 capsule (2 puffs total) into inhaler and inhale daily. 90 capsule 2     Current Facility-Administered Medications   Medication Dose Route Frequency Provider Last Rate Last Admin     denosumab 60 mg (PROLIA 60 mg/ml)  60 mg Subcutaneous Q6 Months Lalitha Haq NP   60 mg at 10/15/19 1052     denosumab 60 mg (PROLIA 60 mg/ml)  60 mg Subcutaneous Q6 Months Lalitha Haq NP   60 mg at 11/23/20 1356     denosumab 60 mg (PROLIA 60 mg/ml)  60 mg Subcutaneous Q6 Months Lalitha Haq NP            Medication Therapy Recommendations  No medication therapy recommendations to display

## 2021-06-16 NOTE — PROGRESS NOTES
HPI:  Lalitha Underwood is a 73 y.o. female who was referred to me by Kelly Hull MD for a ventral hernia.  She presents with complaints of a bulge in the periumbilical region with recent severe dicomfort.   She has noted this for the past several years and had an episode with what was diagnosed as an incarcerated ventral hernia approximately 1 week ago.  She presented to the emergency room where she was evaluated and the hernia was reduced.  She now presents for evaluation follow-up..  She denies any nausea, vomiting, fevers, chills, bloody bowel movements or any other complaints at this time.  Additionally, she does have a history of COPD but denies any history of epigastric or left chest pain.  She denies any reflux but does state that after eating meals at time she feels like she is nauseated.    Allergies:Clindamycin; Carbamazepine; and Morphine    Past Medical History:   Diagnosis Date     Convulsive disorder      COPD (chronic obstructive pulmonary disease)      Depression      Hernia of unspecified site of abdominal cavity without mention of obstruction or gangrene      Hiatal hernia      Hypertension      Osteopenia      Venous insufficiency of both lower extremities        Past Surgical History:   Procedure Laterality Date     OTHER SURGICAL HISTORY Left 2003    Broke titanium in left arm     SHOULDER SURGERY Right 1967       CURRENT MEDS:  Current Outpatient Prescriptions   Medication Sig Dispense Refill     albuterol (ACCUNEB) 1.25 mg/3 mL nebulizer solution Take 3 mL (1.25 mg total) by nebulization every 6 (six) hours as needed for wheezing. 75 mL 0     albuterol (PROAIR HFA;PROVENTIL HFA;VENTOLIN HFA) 90 mcg/actuation inhaler Inhale 2 puffs every 6 (six) hours as needed for wheezing. 3 Inhaler 3     calcium citrate 250 mg calcium Tab Take 500 Units by mouth 2 (two) times a day.       cholecalciferol, vitamin D3, 5,000 unit Tab Take by mouth.       fluticasone-salmeterol (ADVAIR) 250-50 mcg/dose  DISKUS Inhale 1 puff 2 (two) times a day. 180 each 3     hydroCHLOROthiazide (HYDRODIURIL) 12.5 MG tablet Take 1 tablet (12.5 mg total) by mouth daily. 90 tablet 3     hyoscyamine (LEVBID) 0.375 mg 12 hr tablet Take 1 tablet (0.375 mg total) by mouth every 12 (twelve) hours as needed for cramping. 180 tablet 3     losartan (COZAAR) 50 MG tablet Take 1 tablet (50 mg total) by mouth 2 (two) times a day. 180 tablet 3     OXYGEN-AIR DELIVERY SYSTEMS MISC Use 1.5 L As Directed bedtime.       phenytoin (DILANTIN) 100 MG ER capsule Take 1 capsule (100 mg total) by mouth 3 (three) times a day. 270 capsule 3     potassium chloride 20 mEq TbER Take 20 mEq by mouth daily. 90 tablet 3     RABEprazole (ACIPHEX) 20 mg tablet Take 1 tablet (20 mg total) by mouth daily. 90 tablet 3     tiotropium (SPIRIVA) 18 mcg inhalation capsule Place 1 capsule (2 puffs total) into inhaler and inhale daily. 90 capsule 3     Current Facility-Administered Medications   Medication Dose Route Frequency Provider Last Rate Last Dose     denosumab 60 mg (PROLIA 60 mg/ml)  60 mg Subcutaneous Q6 Months Lalitha Haq NP   60 mg at 02/13/18 1005       Family History   Problem Relation Age of Onset     Breast cancer Mother 66     Hypertension Mother      Heart attack Mother      Varicose Veins Mother      Hypertension Father      Heart attack Sister      Heart disease Sister      Diabetes Son      Pulmonary Hypertension Daughter      Heart attack Sister      Heart disease Sister         reports that she quit smoking about 19 years ago. Her smoking use included Cigarettes. She has never used smokeless tobacco. She reports that she drinks about 1.2 oz of alcohol per week  She reports that she does not use illicit drugs.    Review of Systems -   The 12 point review of systems  is within normal limits except for as mentioned above in the HPI.  General ROS: No complaints or constitutional symptoms  Ophthalmic ROS: No complaints of visual changes  Skin: No  "complaints or symptoms   Endocrine: No complaints or symptoms  Hematologic/Lymphatic: No symptoms or complaints  Psychiatric: No symptoms or complaints  Respiratory ROS: no cough, shortness of breath, or wheezing  Cardiovascular ROS: no chest pain or dyspnea on exertion  Gastrointestinal ROS: As per HPI  Genito-Urinary ROS: no dysuria, trouble voiding, or hematuria  Musculoskeletal ROS: no joint or muscle pain  Neurological ROS: no TIA or stroke symptoms      Pulse (!) 113  Ht 5' 3.75\" (1.619 m)  Wt 200 lb 9.6 oz (91 kg)  LMP 12/12/1998  SpO2 100%  Breastfeeding? No  BMI 34.7 kg/m2  Body mass index is 34.7 kg/(m^2).    EXAM:  GENERAL: Well developed female  HEENT: Extra ocular muscles intact, pupils are round and reactive, sclera is anicteric,   NECK:  No obvious masses or deformities  CARDIAC: RRR w/out murmur   CHEST/LUNG: Clear to auscultation on the right with audible bowel sounds in the left lower lung fields  ABDOMEN: Soft, palpable umbilical hernia with reducible intestinal contents measuring approximately 3-4 cm  NEURO: No obvious defects noted.  EXT: No edema, no obvious deformities or any other abnormalities    IMAGES:   CT ABDOMEN PELVIS W ORAL W IV CONTRAST  3/24/2018 6:38 PM      INDICATION: abdominal pain, umbilical hernia more painful abdominal pain, umbilical hernia more painful  TECHNIQUE: CT abdomen and pelvis. Multiplanar reformation images (MPR). Dose reduction techniques were used.   IV CONTRAST: Iohexol (Omni) 100 mL  COMPARISON: CT abdomen and pelvis 03/19/2018     FINDINGS:  LUNG BASES: Large esophageal hiatal hernia. The stomach is distended with fluid, with the majority of the stomach located in the left chest.      ABDOMEN: The liver, spleen, pancreas, adrenal glands, gallbladder, and right kidney are normal. Small left renal cyst. No adenopathy. Atherosclerotic disease of the aorta.     PELVIS: Several moderately dilated, fluid-filled loops of small bowel have progressed. This is " secondary to a midline ventral wall hernia containing a dilated loop of small bowel. The fascial defect measures 3.6 cm in width. No ascites or adenopathy.   Pessary in the vagina.     MUSCULOSKELETAL: Scoliosis. Degenerative changes lumbar spine.     IMPRESSION:   CONCLUSION:  1.  Small bowel obstruction secondary to a ventral wall hernia has progressed.  2.  Large esophageal hiatal hernia with a significant portion of the stomach located in the left chest.    Assessment/Plan:    Lalitha Underwood is a 73 y.o. female with a extremely large paraesophageal hernia as well as an umbilical hernia.  The pathophysiology of these hernias was discussed as were the surgical and non-operative management strategies.  She does have a risk of gastric volvulus given the size of the hiatal hernia.  Additionally, her pulmonary function may be continually compromised secondary to the volume displacement by the abdominal contents in the left lung and chest.  Her umbilical hernia is uncomplicated at this time and should be addressed as well.    The risks of surgery were discussed which include, but are not limited to, bleeding, infection, recurrent hernia, chronic pain, poor cosmesis, blood clots, stroke, heart attack and death.  Additionally, the risks of observation were discussed which include, but are not limited to, enlargement of the hernia, incarceration, strangulation, pain and death.  Surgical options including open, laparoscopic and robotic hernia repair were discussed in detail.      We had an extensive discussion regarding robotic repair of her umbilical hernia which would take approximately 2 hours.  However addressing the paraesophageal hernia would extend the case several hours.  We would be able to accomplish both surgeries at the same time.  However, the risk of recurrence for her paraesophageal hernia is fairly high and I have mentioned that we will likely place mesh inside the abdomen and packed to the stomach to the  abdominal wall.  After careful consideration, she would like to address both problems at the same time if possible.  Scheduling may be a bit difficult given the length of the case but we will aim for this.  In the meantime I will have her follow-up with her primary care physician and undergo additional imaging with an esophagram and CT scan for better preoperative planning.    She understands everything which was discussed.  We will schedule surgery accordingly.    Bull Jimenez D.O. [facility]  [phone]  [facility] Department of Surgery

## 2021-06-17 NOTE — ANESTHESIA CARE TRANSFER NOTE
Last vitals:   Vitals:    04/04/18 1605   BP: 166/75   Pulse: 82   Resp: 14   Temp: 36.2  C (97.1  F)   SpO2: 100%     Patient's level of consciousness is drowsy  Spontaneous respirations: yes  Maintains airway independently: yes  Dentition unchanged: yes  Oropharynx: oropharynx clear of all foreign objects    QCDR Measures:  ASA# 20 - Surgical Safety Checklist: WHO surgical safety checklist completed prior to induction  PQRS# 430 - Adult PONV Prevention: 4558F - Pt received => 2 anti-emetic agents (different classes) preop & intraop  ASA# 8 - Peds PONV Prevention: NA - Not pediatric patient, not GA or 2 or more risk factors NOT present  PQRS# 424 - [place]-op Temp Management: 4559F - At least one body temp DOCUMENTED => 35.5C or 95.9F within required timeframe  PQRS# 426 - PACU Transfer Protocol: - Transfer of care checklist used  ASA# 14 - Acute Post-op Pain: ASA14B - Patient did NOT experience pain >= 7 out of 10

## 2021-06-17 NOTE — PROGRESS NOTES
"HPI: Pt is here for follow up umbilical hernia repair with Dr. Jimenez on 4/04/2018.   She reports discomfort with coughing, getting out of the car and getting out of the bed. She is occasionally taking a pain pill at bedtime, but pain is tolerable and controlled. She reports holding fluid and has been up about 8 pounds since surgery. She feels \"full\" and is wondering when her abdomen will decrease. She feels her breathing is not as good due to this, but she is not sort of breath except after exertion. This was true prior to surgery, but she thinks it is a little more prominent since surgery. She reports the walk to the room from the waiting room was too long for her and       BP (!) 154/99 (Patient Site: Right Arm, Patient Position: Sitting, Cuff Size: Adult Regular) Comment (Patient Site): forearm  Pulse (!) 113  LMP 12/12/1998  SpO2 (!) 89%    Recheck of HR and SpO2 after resting in exam room for 10 minutes  HR 94; SpO2 93%    EXAM:  GENERAL:Appears well, NAD, fatigued after walking from waiting room  RESP: regular respiratory effort and excursion  ABDOMEN:  Soft, +BS, not distended, nontender  SURGICAL WOUNDS:  Incisions healing well, no enduration or drainage.  EXT: moderate edema on ankles      Assessment/Plan: Post op recovery progressing as expected for the patient's history and age. No obvious distension to abdomen, but it is not uncommon to feel bloated after surgery. This should dissipate over the next couple weeks, as should her swollen ankles. If edema persists, she should see her PCP. She will require extra rest after surgery for healing and rehabilitation. Managed patient's expectations of how fast she should be recuperating after surgery. Patient is healing well and should return if she has any further issues.    Yumiko Mccullough , [facility] Surgery       "

## 2021-06-17 NOTE — PATIENT INSTRUCTIONS - HE
Physical therapy to work with you regarding your right ankle pain.      See Dr. Amor about your right ankle burning and tingling.    Come back to clinic in about 2 1/2 months for callus and nail trimming.  We have your name on a list to call, if you do not hear from us please call clinic closer to that time to be scheduled.    Get a crest pad from upstairs in [address] [facility].

## 2021-06-17 NOTE — ANESTHESIA POSTPROCEDURE EVALUATION
Patient: Lalitha Underwood  ROBOTIC UMBILICAL HERNIA  REPAIR WITH MESH PLACEMENT  Anesthesia type: general    Patient location: PACU  Last vitals:   Vitals:    04/04/18 1915   BP: 154/75   Pulse: 96   Resp: 22   Temp: 36.7  C (98.1  F)   SpO2: 94%     Post vital signs: stable  Level of consciousness: awake and responds to simple questions  Post-anesthesia pain: pain controlled  Post-anesthesia nausea and vomiting: no  Pulmonary: unassisted, return to baseline  Cardiovascular: stable and blood pressure at baseline  Hydration: adequate  Anesthetic events: no    QCDR Measures:  ASA# 11 - [place]-op Cardiac Arrest: ASA11B - Patient did NOT experience unanticipated cardiac arrest  ASA# 12 - [place]-op Mortality Rate: ASA12B - Patient did NOT die  ASA# 13 - PACU Re-Intubation Rate: ASA13B - Patient did NOT require a new airway mgmt  ASA# 10 - Composite Anes Safety: ASA10A - No serious adverse event    Additional Notes: Patient admitted for acute pain management.    Enio Santos MD

## 2021-06-17 NOTE — PATIENT INSTRUCTIONS - HE
"Patient Instructions by Latasha Cowan PT at 4/25/2019  9:30 AM     Author: Latasha Cowan PT Service: -- Author Type: Physical Therapist    Filed: 4/25/2019  9:58 AM Encounter Date: 4/25/2019 Status: Signed    : Latasha Cowan PT (Physical Therapist)           TOE YOGA    Sit with a towel on a smooth surface. Place your foot on the towel. Using your toes, scrunch the towel up. Unfold fold the towel and repeat the process.    You do not necessarily need to the towel, if you can imagine doing      GASTROCNEMIUS STRETCH    While standing and leaning against a wall, place one foot back behind you and bend the front knee until a gentle stretch is felt on the back of the lower leg.     Your back toe should point forward    Hold 30\", twice on each leg      SIT TO STAND    While making sure you bend at the hip, SLOWLY sit down    Your chest may come forward over your toes, but your spine should stay in neutral as shown.    You may use your hands to get back up                  "

## 2021-06-17 NOTE — PROGRESS NOTES
1 year f/u leg swelling. Wears compression socks. Having breathing difficulties post hernia repair. Also states dizzy.Daughter  2 years ago.

## 2021-06-17 NOTE — PATIENT INSTRUCTIONS - HE
"Patient Instructions by Latasha Cowan PT at 6/6/2019 11:00 AM     Author: Latasha Cowan PT Service: -- Author Type: Physical Therapist    Filed: 6/6/2019 11:31 AM Encounter Date: 6/6/2019 Status: Signed    : Latasha Cowan PT (Physical Therapist)        BRIDGING    While lying on your back, tighten your lower abdominals, squeeze your buttocks and then raise your buttocks off the floor/bed as creating a \"Bridge\" with your body.      TANDEM STANCE    Stand with one foot directly in front of the other so that the toes of one foot touches the heel of the other. Maintain your balance.  If this is too difficult, take a step forward and maintain your balance in that position.                  "

## 2021-06-17 NOTE — PROGRESS NOTES
Date of Service: 05/10/18    Date last seen: 03/15/17    PCP: Kelly Hull MD    Impression:  1.  Bilateral leg swelling-stable  2.  Bilateral venous insufficiency legs-stable  3.  Flat feet with valgus deformities    Plan:  1.  Questions were answered.    2.  New compression socks written for.   3.  Continue insoles.  New ones written for.     4.   Patient will follow up in 1 year or when needed.      Time spent with patient 25 minutes with greater than 50% time in consultation, education and coordination of care.     ---------------------------------------------------------------------------------------------------------------------    Chief Complaint: Bilateral leg swelling with foot pain      History of Present Illness:    Lalitha Underwood returns to the [facility] for follow up of Bilateral leg swelling with foot pain.  Present compression the patient is using is compression stockings. She replaces her sock at least twice a year.  The swelling has remained the same. Previous treatment has included MLD, education, compression bandaging, lymphatic exercise, range of motion work, exercise and elevation when able.  There has been no new numbness, tingling, weakness, masses, rashes, shortness of breath or chest pain. There have not been any new areas of ulceration. There has been no new fevers or pain.  There are no new or changing skin lesions. Pain is rated a 0 on a 10 point scale.  The left arm swelling has been stable.  She needs new compression stockings and new insoles and is here to get new ones. She had abdominal hernia surgery April 4th, 2018.   She is recovering nicely.  Otherwise, she has been healthy.  She says she was a little dizzy when she first got here but is doing better.  She is scheduled to see her primary care doctor soon.  She does not feel faint.    Past Medical History:   Diagnosis Date     Convulsive disorder      COPD (chronic  obstructive pulmonary disease)      Depression      Hernia of unspecified site of abdominal cavity without mention of obstruction or gangrene      Hiatal hernia      Hypertension      On home oxygen therapy     at 1.5 liters at nite     Osteopenia      Shortness of breath      Venous insufficiency of both lower extremities        Past Surgical History:   Procedure Laterality Date     OTHER SURGICAL HISTORY Left 2003    Broke titanium in left arm     SHOULDER SURGERY Right 1967     UMBILICAL HERNIA REPAIR  04/04/2018    Dr. Jimenez       Current Outpatient Prescriptions   Medication Sig Dispense Refill     albuterol (ACCUNEB) 1.25 mg/3 mL nebulizer solution Take 3 mL (1.25 mg total) by nebulization every 6 (six) hours as needed for wheezing. 75 mL 0     albuterol (PROAIR HFA;PROVENTIL HFA;VENTOLIN HFA) 90 mcg/actuation inhaler Inhale 2 puffs every 6 (six) hours as needed for wheezing. 3 Inhaler 3     calcium citrate (CALCITRATE) 200 mg (950 mg) tablet Take 1 tablet by mouth 2 (two) times a day.       cholecalciferol, vitamin D3, 5,000 unit Tab Take 5,000 Units by mouth daily.        denosumab 60 mg/mL Syrg Inject 60 mg under the skin every 6 (six) months.       fluticasone-salmeterol (ADVAIR) 250-50 mcg/dose DISKUS Inhale 1 puff 2 (two) times a day. 180 each 3     hydroCHLOROthiazide (HYDRODIURIL) 12.5 MG tablet Take 1 tablet (12.5 mg total) by mouth daily. 90 tablet 3     hyoscyamine (LEVBID) 0.375 mg 12 hr tablet Take 1 tablet (0.375 mg total) by mouth every 12 (twelve) hours as needed for cramping. 180 tablet 3     losartan (COZAAR) 50 MG tablet Take 1 tablet (50 mg total) by mouth 2 (two) times a day. 180 tablet 3     OXYGEN-AIR DELIVERY SYSTEMS [facility] [place] Use 1.5 L As Directed bedtime.       phenytoin (DILANTIN) 100 MG ER capsule Take 100 mg by mouth every morning.       phenytoin (DILANTIN) 100 MG ER capsule Take 200 mg by mouth at bedtime.       potassium chloride 20 mEq TbER Take 20 mEq by mouth daily. 90 tablet 3      RABEprazole (ACIPHEX) 20 mg tablet Take 1 tablet (20 mg total) by mouth daily. 90 tablet 3     tiotropium (SPIRIVA) 18 mcg inhalation capsule Place 1 capsule (2 puffs total) into inhaler and inhale daily. 90 capsule 3     No current facility-administered medications for this visit.        Allergies   Allergen Reactions     Clindamycin Rash     Carbamazepine Rash     Morphine Nausea Only       Social History     Social History     Marital status:      Spouse name: N/A     Number of children: N/A     Years of education: N/A     Occupational History     Not on file.     Social History Main Topics     Smoking status: Former Smoker     Types: Cigarettes     Quit date: 12/7/1998     Smokeless tobacco: Never Used     Alcohol use 1.2 oz/week     2 Glasses of wine per week     Drug use: Yes     Special: Oxycodone     Sexual activity: No     Other Topics Concern     Not on file     Social History Narrative    .  Two kids.  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       Family History   Problem Relation Age of Onset     Breast cancer Mother 66     Hypertension Mother      Heart attack Mother      Varicose Veins Mother      Hypertension Father      Heart attack Sister      Heart disease Sister      Diabetes Son      Pulmonary Hypertension Daughter      Heart attack Sister      Heart disease Sister      Review of Systems:  Lalitha Underwood no new numbess, tingling or weakness, redness or rashes, fevers, new masses, unexplained weight loss, increased pain, new ulcers, shortness of breath and chest pain  Full 12 point review of systems was completed.    Physical Exam:  Vitals:    05/10/18 1101   BP: 112/70   Pulse: 88   Resp: 12   Temp: 98  F (36.7  C)   SpO2: 93%     BMI 34.33    Circumferential measures:    Vasc Edema 12/7/2015 3/15/2017 5/10/2018   Right-just above MCP 18.5 18 18   Right Wrist 15.6 16.4 16   Right Up 10cm 19.5 21.3 21.4   Right Up 10cm From Elbow 28.4 31.8 30   Left-just above MCP 18.4 18 18   Left Wrist  15.9 16.2 17   Left Up 10cm 18.4 21.8 22   Left Up 10cm From Elbow 42.2 44.8 37   Right just above MTP 20.0 19.6 19   Right Ankle 18.8 18.5 18.5   Right Widest Calf 40.6 39.2 41   Right Thigh Up 10cm 49.9 55 51.5   Left - just above MTP 19.6 23 19   Left Ankle 19.5 20.3 19.5   Left Widest Calf 41.2 20.2 39.5   Left Thigh Up 10cm 58.5 61.5 57     Circumferential measures looking good.    General:  73 y.o. female in no apparent distress.  Alert and oriented x 3.  Cooperative. Affect normal.    Musculoskeletal:  Normal range of motion in knees and ankles bilaterally. There is no active joint synovitis, erythema, swelling or joint laxity.  Flat feet with valgus deformities continue.  Unchanged.    Neurological:  Sensation is intact to pin prick and light touch in both legs.  Strength testing is normal in hip flexion, knee flexion, knee extension, ankle dorsiflexion, great toe extension bilaterally.      Vascular: Dorsalis pedis and posterior tibialis pulses are strong and equal bilaterally. There are some significant telangietasias, medial ankle venous flares, venous varicosities  and spider veins .    Integumentary: Skin of the legs is uniformly warm and dry.    Jyoti Shultz MD, ABWMS, FACCWS, [facility]  Medical Director Wound Care and Lymphedema  [facility]  [phone]

## 2021-06-17 NOTE — TELEPHONE ENCOUNTER
Telephone Encounter by Eboni Calvo at 11/18/2020  8:19 AM     Author: Eboni Calvo Service: -- Author Type: --    Filed: 11/18/2020  8:19 AM Encounter Date: 11/17/2020 Status: Signed    : Eboni Calvo APPROVED:    Approval start date: 10/18/2020  Approval end date:  11/17/2021    Pharmacy has been notified of approval and will contact patient when medication is ready for pickup.

## 2021-06-17 NOTE — PROGRESS NOTES
FOOT AND ANKLE SURGERY/PODIATRY Progress Note        ASSESSMENT:   Pronation deformity bilateral feet    HPI: Lalitha Underwood was seen again today to have her right ankle evaluated.  She stated that several days ago she had a severe burning pain in the ankle.  It was intermittent and lasted a few days.  She stated that at this time she has no pain.  She is able to ambulate and weight-bear without any discomfort.  She has no pain with range of motion of her right ankle.  She never had any obvious redness or swelling surrounding her right ankle.  She denies any recent trauma to the right ankle.  She denies any other previous treatment.  She does have a history of very flat feet.  She does wear orthotics.  She wears support stockings to control swelling of her feet and ankles.    Past Medical History:   Diagnosis Date     Convulsive disorder (H)      COPD (chronic obstructive pulmonary disease) (H)      Depression      Hernia of unspecified site of abdominal cavity without mention of obstruction or gangrene      Hiatal hernia      Hypertension      On home oxygen therapy     at 1.5 liters at nite     Osteopenia      Shortness of breath      Venous insufficiency of both lower extremities        Past Surgical History:   Procedure Laterality Date     OTHER SURGICAL HISTORY Left 2003    Broke titanium in left arm     SHOULDER SURGERY Right 1967     UMBILICAL HERNIA REPAIR  04/04/2018    Dr. Jimenez     US THYROID BIOPSY  7/29/2020       Allergies   Allergen Reactions     Clindamycin Rash     Carbamazepine Rash     Morphine Nausea Only         Current Outpatient Medications:      albuterol (PROAIR HFA;PROVENTIL HFA;VENTOLIN HFA) 90 mcg/actuation inhaler, Inhale 2 puffs every 4 (four) hours as needed for wheezing., Disp: 3 Inhaler, Rfl: 4     biotin 5 mg cap, Take 1 capsule by mouth daily., Disp: , Rfl:      calcium citrate (CALCITRATE) 200 mg (950 mg) tablet, Take 1 tablet by mouth 2 (two) times a day., Disp: , Rfl:       cholecalciferol, vitamin D3, 5,000 unit Tab, Take 5,000 Units by mouth daily. , Disp: , Rfl:      denosumab 60 mg/mL Syrg, Inject 60 mg under the skin every 6 (six) months., Disp: , Rfl:      fluticasone propion-salmeteroL (ADVAIR) 250-50 mcg/dose DISKUS, Inhale 1 puff 2 (two) times a day., Disp: 180 each, Rfl: 4     hydroCHLOROthiazide (HYDRODIURIL) 25 MG tablet, Take 1 tablet (25 mg total) by mouth daily., Disp: 90 tablet, Rfl: 4     levETIRAcetam (KEPPRA) 500 MG tablet, Take 1 tablet (500 mg total) by mouth 2 (two) times a day., Disp: 180 tablet, Rfl: 4     losartan (COZAAR) 50 MG tablet, Take 1 tablet (50 mg total) by mouth 2 (two) times a day., Disp: 180 tablet, Rfl: 4     magnesium oxide 500 mg Tab, Take 500 mg by mouth at bedtime., Disp: , Rfl:      nystatin-triamcinolone (MYCOLOG II) cream, nystatin-triamcinolone 100,000 unit/g-0.1 % topical cream, Disp: , Rfl:      OXYGEN-AIR DELIVERY SYSTEMS [facility] [place] Use 1.5 L As Directed bedtime., Disp: , Rfl:      potassium chloride 20 mEq TbER, Take 20 mEq by mouth daily., Disp: 90 tablet, Rfl: 4     RABEprazole (ACIPHEX) 20 mg tablet, Take 1 tablet (20 mg total) by mouth daily., Disp: 90 tablet, Rfl: 3     solifenacin (VESICARE) 5 MG tablet, Take 10 mg by mouth daily., Disp: , Rfl:      tiotropium (SPIRIVA) 18 mcg inhalation capsule, Place 1 capsule (2 puffs total) into inhaler and inhale daily., Disp: 90 capsule, Rfl: 3     nystatin (MYCOSTATIN) powder, nystatin 100,000 unit/gram topical powder  APPLY TO THE AFFECTED AREA(S) BY TOPICAL ROUTE 2 TIMES PER DAY, Disp: , Rfl:     Current Facility-Administered Medications:      denosumab 60 mg (PROLIA 60 mg/ml), 60 mg, Subcutaneous, Q6 Months, Lalitha Haq NP, 60 mg at 10/15/19 1052     denosumab 60 mg (PROLIA 60 mg/ml), 60 mg, Subcutaneous, Q6 Months, Lalitha Haq NP, 60 mg at 05/25/21 1034     denosumab 60 mg (PROLIA 60 mg/ml), 60 mg, Subcutaneous, Q6 Months, Lalitha Haq, BRIANNA    Family History   Problem  Relation Age of Onset     Breast cancer Mother 66     Hypertension Mother      Heart attack Mother      Varicose Veins Mother      Hypertension Father      Heart attack Sister      Heart disease Sister      Diabetes Son      Pulmonary Hypertension Daughter      Heart attack Sister      Heart disease Sister        Social History     Socioeconomic History     Marital status:      Spouse name: Not on file     Number of children: Not on file     Years of education: Not on file     Highest education level: Not on file   Occupational History     Not on file   Social Needs     Financial resource strain: Not on file     Food insecurity     Worry: Not on file     Inability: Not on file     Transportation needs     Medical: Not on file     Non-medical: Not on file   Tobacco Use     Smoking status: Former Smoker     Types: Cigarettes     Quit date: 1998     Years since quittin.4     Smokeless tobacco: Never Used   Substance and Sexual Activity     Alcohol use: Yes     Alcohol/week: 2.0 standard drinks     Types: 2 Glasses of wine per week     Drug use: Yes     Types: Oxycodone     Sexual activity: Never   Lifestyle     Physical activity     Days per week: Not on file     Minutes per session: Not on file     Stress: Not on file   Relationships     Social connections     Talks on phone: Not on file     Gets together: Not on file     Attends [employer]an service: Not on file     Active member of club or organization: Not on file     Attends meetings of clubs or organizations: Not on file     Relationship status: Not on file     Intimate partner violence     Fear of current or ex partner: Not on file     Emotionally abused: Not on file     Physically abused: Not on file     Forced sexual activity: Not on file   Other Topics Concern     Not on file   Social History Narrative    .  Two kids.  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       10 point Review of Systems is negative     There were no vitals filed for this  visit.    BMI= There is no height or weight on file to calculate BMI.    OBJECTIVE:  General appearance: Patient is alert and fully cooperative with history & exam.  No sign of distress is noted during the visit.  Vascular: Dorsalis pedis and posterior tibial pulses are palpable. There is no pedal hair growth bilaterally.  CFT < 3 sec from anterior tibial surface to distal digits bilaterally. There is no appreciable edema noted.  Dermatologic: Turgor and texture are within normal limits. No coloration or temperature changes. No primary or secondary lesions noted.  Neurologic: All epicritic and proprioceptive sensations are grossly intact within normal limits bilaterally.  Musculoskeletal: All active and passive ankle, subtalar, midtarsal, and 1st MPJ range of motion are grossly intact without pain or crepitus, with the exception of none. Manual muscle strength is within normal limits bilaterally. All dorsiflexors, plantarflexors, invertors, evertors are intact bilaterally.  No tenderness present to the right foot or ankle on palpation.  No tenderness to the right foot or ankle with range of motion.  Severe flattening of the medial longitudinal arch noted bilaterally.  Calf is soft/non-tender without warmth/induration    Imaging:         No results found.         TREATMENT:  I informed the patient that there is no clinical evidence of any inflammation to the right ankle.  I informed the patient that at this particular time she can participate in all activities without any restriction.  If her pain returns she is to return to the clinic for follow-up visit.        Elijah Amor; NAY  [facility] Foot & Ankle Surgery/Podiatry

## 2021-06-17 NOTE — PROGRESS NOTES
FOOT AND ANKLE SURGERY/PODIATRY Progress Note        ASSESSMENT:   Keratoma  Hammertoe  Right ankle pain      TREATMENT:  Keratoma: I trimmed callus tissue x5 plantar 1st MPJ right foot with a #15 blade today. I recommend she use a pumice stone 2-3x per week prn.     Hammertoe: There is also callus formation along the distal 3rd digit left foot. No open lesions. We discussed that the etiology of callus formation is due to the underlying hammertoe deformity. Treatment options include CREST pads and surgical correction of the hammertoe deformity. She would like to avoid surgery at this time. I have referred her to [facility] for CREST pads.     Right Ankle pain: Patient complains of tingling sensation along the lateral right ankle. No pain on exam today, no evidence of history of trauma. We discussed that her symptoms may be related to nerve dysfunction. I have referred her to my partner Dr. Amor for nerve pain consultation.     All questions invited and answered. She will follow-up with me as concerns develop.     Alfonso Orozco DPM  [facility] Podiatry/Foot & Ankle Surgery      HPI: Lalitha Underwood was seen again today complaining of bilateral foot pain. She has noticed callus formation on the plantar right foot and has not used a pumice stone as directed. She also complains of pain in the 3rd digit left foot, denies trauma.  Also complains of right ankle tingling, mild pain, denies trauma.     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 Jimenez     US THYROID BIOPSY  7/29/2020       Allergies   Allergen Reactions     Clindamycin Rash     Carbamazepine Rash     Morphine Nausea Only         Current Outpatient Medications:      albuterol (PROAIR HFA;PROVENTIL HFA;VENTOLIN HFA) 90 mcg/actuation inhaler, Inhale 2 puffs every 4 (four) hours as needed for wheezing., Disp: 3 Inhaler, Rfl: 4     biotin 5 mg cap, Take 1 capsule by mouth daily., Disp: , Rfl:      calcium citrate (CALCITRATE) 200 mg (950 mg) tablet, Take 1 tablet by mouth 2 (two) times a day., Disp: , Rfl:      cholecalciferol, vitamin D3, 5,000 unit Tab, Take 5,000 Units by mouth daily. , Disp: , Rfl:      denosumab 60 mg/mL Syrg, Inject 60 mg under the skin every 6 (six) months., Disp: , Rfl:      fluticasone propion-salmeteroL (ADVAIR) 250-50 mcg/dose DISKUS, Inhale 1 puff 2 (two) times a day., Disp: 180 each, Rfl: 4     hydroCHLOROthiazide (HYDRODIURIL) 25 MG tablet, Take 1 tablet (25 mg total) by mouth daily., Disp: 90 tablet, Rfl: 4     levETIRAcetam (KEPPRA) 500 MG tablet, Take 1 tablet (500 mg total) by mouth 2 (two) times a day., Disp: 180 tablet, Rfl: 4     losartan (COZAAR) 50 MG tablet, Take 1 tablet (50 mg total) by mouth 2 (two) times a day., Disp: 180 tablet, Rfl: 4     magnesium oxide 500 mg Tab, Take 500 mg by mouth at bedtime., Disp: , Rfl:      nystatin-triamcinolone (MYCOLOG II) cream, nystatin-triamcinolone 100,000 unit/g-0.1 % topical cream, Disp: , Rfl:      OXYGEN-AIR DELIVERY SYSTEMS [facility] [place] Use 1.5 L As Directed bedtime., Disp: , Rfl:      potassium chloride 20 mEq TbER, Take 20 mEq by mouth daily., Disp: 90 tablet, Rfl: 4     RABEprazole (ACIPHEX) 20 mg tablet, Take 1 tablet (20 mg total) by mouth daily., Disp: 90 tablet, Rfl: 3     solifenacin (VESICARE) 5 MG tablet, Take 10 mg by mouth daily., Disp: , Rfl:      tiotropium (SPIRIVA) 18 mcg inhalation capsule, Place 1  capsule (2 puffs total) into inhaler and inhale daily., Disp: 90 capsule, Rfl: 3    Current Facility-Administered Medications:      denosumab 60 mg (PROLIA 60 mg/ml), 60 mg, Subcutaneous, Q6 Months, Kaehr, Lalitha L, NP, 60 mg at 10/15/19 1052     denosumab 60 mg (PROLIA 60 mg/ml), 60 mg, Subcutaneous, Q6 Months, Kaehr, Lalitha L, NP, 60 mg at 20 1356     denosumab 60 mg (PROLIA 60 mg/ml), 60 mg, Subcutaneous, Q6 Months, Kaehr, Lalitha L, NP    Family History   Problem Relation Age of Onset     Breast cancer Mother 66     Hypertension Mother      Heart attack Mother      Varicose Veins Mother      Hypertension Father      Heart attack Sister      Heart disease Sister      Diabetes Son      Pulmonary Hypertension Daughter      Heart attack Sister      Heart disease Sister        Social History     Socioeconomic History     Marital status:      Spouse name: Not on file     Number of children: Not on file     Years of education: Not on file     Highest education level: Not on file   Occupational History     Not on file   Social Needs     Financial resource strain: Not on file     Food insecurity     Worry: Not on file     Inability: Not on file     Transportation needs     Medical: Not on file     Non-medical: Not on file   Tobacco Use     Smoking status: Former Smoker     Types: Cigarettes     Quit date: 1998     Years since quittin.4     Smokeless tobacco: Never Used   Substance and Sexual Activity     Alcohol use: Yes     Alcohol/week: 2.0 standard drinks     Types: 2 Glasses of wine per week     Drug use: Yes     Types: Oxycodone     Sexual activity: Never   Lifestyle     Physical activity     Days per week: Not on file     Minutes per session: Not on file     Stress: Not on file   Relationships     Social connections     Talks on phone: Not on file     Gets together: Not on file     Attends Gnosticism service: Not on file     Active member of club or organization: Not on file     Attends  meetings of clubs or organizations: Not on file     Relationship status: Not on file     Intimate partner violence     Fear of current or ex partner: Not on file     Emotionally abused: Not on file     Physically abused: Not on file     Forced sexual activity: Not on file   Other Topics Concern     Not on file   Social History Narrative    .  Two kids.  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       10 point Review of Systems is negative except for foot pain which is noted in HPI.       Vitals:    05/14/21 1414   BP: 108/84   Pulse: 88   Temp: 98  F (36.7  C)       BMI= Body mass index is 39.41 kg/m .    OBJECTIVE:  General appearance: Patient is alert and fully cooperative with history & exam.  No sign of distress is noted during the visit.  Vascular: Dorsalis pedis and posterior tibial pulses are non-palpable. There is no appreciable edema noted.  Dermatologic: Hyperkeratotic tissue x5 plantar 1st MPJ right. Hyperkeratotic tissue distal 3rd digit left foot. No erythema or open lesions bilateral.   Neurologic: All epicritic and proprioceptive sensations are grossly intact bilateral.  Musculoskeletal: Contracted digits bilateral. No pain along lateral right ankle, full ROM.     Imaging:     No results found.

## 2021-06-17 NOTE — ANESTHESIA PREPROCEDURE EVALUATION
Anesthesia Evaluation      Patient summary reviewed   No history of anesthetic complications     Airway   Mallampati: I  Neck ROM: full   Pulmonary - normal exam   (+) COPD,     ROS comment: Pt had chest congestion and dyspnea on exertion last week, which has resolved. No evidence of pneumonia on CXR, but does have significant hiatal hernia. PCP had requested followup within 1-2 days but pt did not follow up because her symptoms had resolved.                         Cardiovascular - normal exam  Exercise tolerance: > or = 4 METS  (+) hypertension, ,     (-) murmur  ECG reviewed  Rhythm: regular  Rate: normal,    no murmur   ROS comment: Borderline LBBB on EKG in late March, which is apparently new. Possibly rate-dependent, since HR was 102, and previous EKGs had a slower HR. Pt denies any anginal symptoms.     Neuro/Psych      Endo/Other - negative ROS      GI/Hepatic/Renal    (+) hiatal hernia, GERD,             Dental - normal exam                        Anesthesia Plan  Planned anesthetic: general endotracheal  Discussed EKG findings and respiratory symptoms with pt over the phone. Given that her respiratory symptoms have resolved and no concerning cardiac symptoms, it is reasonable to proceed, but pt understands that anesthesiologist on DOS will decide whether further workup is needed, based on in-person evaluation on DOS.  ASA 2     Anesthetic plan and risks discussed with: patient    Post-op plan: routine recovery

## 2021-06-17 NOTE — PROGRESS NOTES
Chief Complaint   Patient presents with     POss pneumonia     Hx of pneumonia. Latasha from surgery upstairs said to call her if you have any concerns.       HPI    Patient is here for 36 hrs of feeling phlegm in her chest without coughing (she reported a baseline occasional cough that has not changed), and having shortness of breath when walking from the parking lot to the clinic. She had no shortness of breath at home. No fever, nasal congestion, chest pain, shortness of breath at rest, chest tightness. She is scheduled to have an umbilical hernia repair 4/4.     ROS: Pertinent ROS noted in HPI.     Allergies   Allergen Reactions     Clindamycin Rash     Carbamazepine Rash     Morphine Nausea Only       Patient Active Problem List   Diagnosis     Solar Elastosis     Rosacea     Epilepsy And Recurrent Seizures     Esophageal Reflux     Vitamin D Deficiency     Pulmonary emphysema     Hyperlipidemia     Walk Is Wobbly Or Unsteady (Ataxia)     Obesity     Hypertension     Osteopenia     Venous insufficiency of both lower extremities     Hiatal hernia     Leg swelling     Left arm swelling     Flat feet     History of right shoulder fracture     Valgus deformity of both feet     Urge incontinence of urine     Mild aortic valve regurgitation     Mild aortic valve stenosis     Osteoporosis     Pre-op exam       Family History   Problem Relation Age of Onset     Breast cancer Mother 66     Hypertension Mother      Heart attack Mother      Varicose Veins Mother      Hypertension Father      Heart attack Sister      Heart disease Sister      Diabetes Son      Pulmonary Hypertension Daughter      Heart attack Sister      Heart disease Sister        Social History     Social History     Marital status:      Spouse name: N/A     Number of children: N/A     Years of education: N/A     Occupational History     Not on file.     Social History Main Topics     Smoking status: Former Smoker     Types: Cigarettes     Quit  date: 12/7/1998     Smokeless tobacco: Never Used     Alcohol use 1.2 oz/week     2 Glasses of wine per week     Drug use: No     Sexual activity: No     Other Topics Concern     Not on file     Social History Narrative    .  Two kids.  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       Objective:    Vitals:    03/29/18 0929   BP: 126/76   Pulse: 82   Resp: 18   Temp: 97.8  F (36.6  C)   SpO2: 96%       Gen:NAD  Throat: oropharynx clear, tonsils normal  Nose: no discharge  Neck: no significant adenopathy  CV: RRR, normal S1S2, no M, R, G. No peripheral edema.   Pulm: CTAB, normal effort    CXR - no acute infiltrates per my interpretation, reviewed official read as well, discussed findings during visit.      Shortness of breath on exertion - no evidence of fluid overload/acute heart failure, suspect developing URI . Advised f/u with PCP in 1-2 days.   -     XR Chest 2 Views

## 2021-06-17 NOTE — PROGRESS NOTES

## 2021-06-17 NOTE — PATIENT INSTRUCTIONS - HE
"Patient Instructions by Latasha Cowan PT at 6/27/2019 11:00 AM     Author: Latasha Cowan PT Service: -- Author Type: Physical Therapist    Filed: 6/27/2019  1:08 PM Encounter Date: 6/27/2019 Status: Signed    : Latasha Cowan PT (Physical Therapist)           TOE YOGA    Sit with a towel on a smooth surface. Place your foot on the towel. Using your toes, scrunch the towel up. Unfold fold the towel and repeat the process.    You do not necessarily need to the towel, if you can imagine doing      GASTROCNEMIUS STRETCH    While standing and leaning against a wall, place one foot back behind you and bend the front knee until a gentle stretch is felt on the back of the lower leg.     Your back toe should point forward    Hold 30\", twice on each leg      SIT TO STAND    While making sure you bend at the hip, SLOWLY sit down    Your chest may come forward over your toes, but your spine should stay in neutral as shown.    You may use your hands to get back up          BRIDGING    While lying on your back, tighten your lower abdominals, squeeze your buttocks and then raise your buttocks off the floor/bed as creating a \"Bridge\" with your body.      TANDEM STANCE    Stand with one foot directly in front of the other so that the toes of one foot touches the heel of the other. Maintain your balance.  If this is too difficult, take a step forward and maintain your balance in that position.                  "

## 2021-06-17 NOTE — PATIENT INSTRUCTIONS - HE
Patient Instructions by Kelly Hull MD at 2/21/2019  9:50 AM     Author: Kelly Hull MD Service: -- Author Type: Physician    Filed: 2/21/2019 11:18 AM Encounter Date: 2/21/2019 Status: Addendum    : Kelly Hull MD (Physician)    Related Notes: Original Note by Kelly Hull MD (Physician) filed at 2/21/2019 11:17 AM         Patient Education     Exercise for a Healthier Heart  You may wonder how you can improve the health of your heart. If youre thinking about exercise, youre on the right track. You dont need to become an athlete, but you do need a certain amount of brisk exercise to help strengthen your heart. If you have been diagnosed with a heart condition, your doctor may recommend exercise to help stabilize your condition. To help make exercise a habit, choose safe, fun activities.       Be sure to check with your health care provider before starting an exercise program.    Why exercise?  Exercising regularly offers many healthy rewards. It can help you do all of the following:    Improve your blood cholesterol levels to help prevent further heart trouble    Lower your blood pressure to help prevent a stroke or heart attack    Control diabetes, or reduce your risk of getting this disease    Improve your heart and lung function    Reach and maintain a healthy weight    Make your muscles stronger and more limber so you can stay active    Prevent falls and fractures by slowing the loss of bone mass (osteoporosis)    Manage stress better  Exercise tips  Ease into your routine. Set small goals. Then build on them.  Exercise on most days. Aim for a total of 150 or more minutes of moderate to  vigorous intensity activity each week. Consider 40 minutes, 3 to 4 times a week. For best results, activity should last for 40 minutes on average. It is OK to work up to the 40 minute period over time. Examples of moderate-intensity activity is walking one mile in 15 minutes or 30 to 45  minutes of yard work.  Step up your daily activity level. Along with your exercise program, try being more active throughout the day. Walk instead of drive. Do more household tasks or yard work.  Choose one or more activities you enjoy. Walking is one of the easiest things you can do. You can also try swimming, riding a bike, or taking an exercise class.  Stop exercising and call your doctor if you:    Have chest pain or feel dizzy or lightheaded    Feel burning, tightness, pressure, or heaviness in your chest, neck, shoulders, back, or arms    Have unusual shortness of breath    Have increased joint or muscle pain    Have palpitations or an irregular heartbeat      1364-5111 [employer]. [address] [place] [address], [place] [address]. All rights reserved. This information is not intended as a substitute for professional medical care. Always follow your healthcare professional's instructions.         Patient Education   Signs of Hearing Loss  Hearing loss is a problem shared by many people. In fact, it is one of the most common health conditions, particularly as people age. Most people over age 65 have some hearing loss, and by age 80, almost everyone does. Because hearing loss usually occurs slowly over the years, you may not realize your hearing ability has gotten worse.       Have your hearing checked  Contact your [facility] care provider if you:    Have to strain to hear normal conversation.    Have to watch other peoples faces very carefully to follow what theyre saying.    Need to ask people to repeat what theyve said.    Often misunderstand what people are saying.    Turn the volume of the television or radio up so high that others complain.    Feel that people are mumbling when theyre talking to you.    Find that the effort to hear leaves you feeling tired and irritated.    Notice, when using the phone, that you hear better with 1 ear than the other.    1997-6881 [employer]. [address] [place] [address]  [place] [address]. All rights reserved. This information is not intended as a substitute for professional medical care. Always follow your healthcare professional's instructions.         Patient Education   Urinary Incontinence, Female (Adult)  Urinary incontinence means loss of control of the bladder. This problem affects many women, especially as they get older. If you have incontinence, you may be embarrassed to ask for help. But know that this problem can be treated.  Types of Incontinence  There are different types of incontinence. Two of the main types are described here. You can have more than one type.    Stress incontinence. With this type, urine leaks when pressure (stress) is put on the bladder. This may happen when you cough, sneeze, or laugh. Stress incontinence most often occurs because the pelvic floor muscles that support the bladder and urethra are weak. This can happen after pregnancy and vaginal childbirth or a hysterectomy. It can also be due to excess body weight or hormone changes.    Urge incontinence (also called overactive bladder). With this type, a sudden urge to urinate is felt often. This may happen even though there may not be much urine in the bladder. The need to urinate often during the night is common. Urge incontinence most often occurs because of bladder spasms. This may be due to bladder irritation or infection. Damage to bladder nerves or pelvic muscles, constipation, and certain medicines can also lead to urge incontinence.  Treatment of urinary incontinence depends on the cause. Further evaluation is needed to find the type you have. This will likely include an exam and certain tests. Based on the results, you and your healthcare provider can then plan treatment. Until a diagnosis is made, the home care tips below can help relieve symptoms.  Home care    Do pelvic floor muscle exercises, if they are prescribed. The pelvic floor muscles help support the bladder and  urethra. Many women find that their symptoms improve when doing special exercises that strengthen these muscles. To do the exercises contract the muscles you would use to stop your stream of urine, but do this when youre not urinating. Hold for 10 seconds, then relax. Repeat 10 to 20 times in a row, at least 3 times a day. Your provider may give you other instructions for how to do the exercises and how often.    Keep a bladder diary. This helps track how often and how much you urinate over a set period of time. Bring this diary with you to your next visit with the provider. The information can help your provider learn more about your bladder problem.    Lose weight, if advised to by your provider. Excess weight puts pressure on the bladder. Your provider can help you create a weight-loss plan thats right for you. This may include exercising more and making certain diet changes.    Don't consume foods and drinks that may irritate the bladder. These can include alcohol and caffeinated drinks.    Quit smoking. Smoking and other tobacco use can lead to chronic cough that strains the pelvic floor muscles. Smoking may also damage the bladder and urethra. Talk with your provider about treatments or methods you can use to quit smoking.    If drinking large amounts of fluid causes you to have symptoms, you may be advised to limit your fluid intake. You may also be advised to drink most of your fluids during the day and to limit fluids at night.    If youre worried about urine leakage or accidents, you may wear absorbent pads to catch urine. Change the pads often. This helps reduce discomfort. It may also reduce the risk of skin or bladder infections.  Follow-up care  Follow up with your healthcare provider, or as directed. It may take some to find the right treatment for your problem. Your treatment plan may include special therapies or medicines. Certain procedures or surgery may also be options. Be sure to discuss any  questions you have with your provider.  When to seek medical advice  Call the healthcare provider right away if any of these occur:    Fever of 100.4 F (38 C) or higher, or as directed by your provider    Bladder pain or fullness    Abdominal swelling    Nausea or vomiting    Back pain    Weakness, dizziness or fainting  Date Last Reviewed: 10/1/2017    2978-5361 [employer]. [address] [place] [address]. All rights reserved. This information is not intended as a substitute for professional medical care. Always follow your healthcare professional's instructions.     Patient Education   Depression and Suicide in Older Adults  Nearly 2 million older Americans have some type of depression. Sadly, some of them even take their own lives. Yet depression among older adults is often ignored. Learn the warning signs. You may help spare a loved one needless pain. You may also save a life.       What Is Depression?  Depression is a mood disorder that affects the way you think and feel. The most common symptom is a feeling of deep sadness. People who are depressed also may seem tired and listless. And nothing seems to give them pleasure. Its normal to grieve or be sad sometimes. But sadness lessens or passes with time. Depression rarely goes away or improves on its own. Other symptoms of depression are:    Sleeping more or less than normal    Eating more or less than normal    Having headaches, stomachaches, or other pains that dont go away    Feeling nervous, empty, or worthless    Crying a great deal    Thinking or talking about suicide or death    Feeling confused or forgetful  What Causes It?  The causes of depression arent fully known. Certain chemicals in the brain play a role. Depression does run in families. And life stresses can also trigger depression in some people. That may be the case with older adults. They often face great burdens, such as the death of friends or a spouse. They may have  failing health. And they are more likely to be alone, lonely, or poor.  How You Can Help  Often, depressed people may not want to ask for help. When they do, they may be ignored. Or, they may receive the wrong treatment. You can help by showing parents and older friends love and support. If they seem depressed, help them find the right treatment. Talk to your doctor. Or contact a local mental health center, social service agency, or hospital. With modern treatment, no one has to suffer from depression.  Resources:    National [place] of Mental Health  526.884.6918  www.nimh.nih.gov    National [place] on Mental Illness  831.751.5560  www.s[place]ma.org    [facility] [place]  761.735.8640  www.[facility].org    National Suicide Hotline  569.807.6334 (800-SUICIDE)      5128-7630 [employer]. [address] [place] [address]. All rights reserved. This information is not intended as a substitute for professional medical care. Always follow your healthcare professional's instructions.         Patient Education   Preventing Falls in the Home  As you get older, falls are more likely. Thats because your reaction time slows. Your muscles and joints may also get stiffer, making them less flexible. Illness, medications, and vision changes can also affect your balance. A fall could leave you unable to live on your own. To make your home safer, follow these tips:    Floors    Put nonskid pads under area rugs.    Remove throw rugs.    Replace worn floor coverings.    Tack carpets firmly to each step on carpeted stairs. Put nonskid strips on the edges of uncarpeted stairs.    Keep floors and stairs free of clutter and cords.    Arrange furniture so there are clear pathways.    Clean up any spills right away.    Bathrooms    Install grab bars in the tub or shower.    Apply nonskid strips or put a nonskid rubber mat in the tub or shower.    Sit on a bath chair to bathe.    Use bathmats with nonskid  backing.    Lighting    Keep a flashlight in each room.    Put a nightlight along the pathway between the bedroom and the bathroom.    5414-8421 [employer]. [address] [place] [address], [place] [address]. All rights reserved. This information is not intended as a substitute for professional medical care. Always follow your healthcare professional's instructions.           Advance Directive  Patients advance directive was discussed and I am comfortable with the patients wishes.  Patient Education   Personalized Prevention Plan  You are due for the preventive services outlined below.  Your care team is available to assist you in scheduling these services.  If you have already completed any of these items, please share that information with your care team to update in your medical record.  Health Maintenance   Topic Date Due   ? ZOSTER VACCINES (2 of 3) 12/21/2007   ? FALL RISK ASSESSMENT  11/16/2018   ? COLONOSCOPY  08/26/2019   ? MAMMOGRAM  12/09/2019   ? TD 18+ HE  06/07/2020   ? DXA SCAN  12/31/2020   ? ADVANCE DIRECTIVES DISCUSSED WITH PATIENT  11/16/2022   ? PNEUMOCOCCAL POLYSACCHARIDE VACCINE AGE 65 AND OVER  Completed   ? INFLUENZA VACCINE RULE BASED  Completed   ? PNEUMOCOCCAL CONJUGATE VACCINE FOR ADULTS (PCV13 OR PREVNAR)  Completed

## 2021-06-18 NOTE — PATIENT INSTRUCTIONS - HE
Patient Instructions by Loretta Sewell CNP at 1/6/2021  3:20 PM     Author: Loretta Sewell CNP Service: -- Author Type: Nurse Practitioner    Filed: 1/6/2021  4:03 PM Encounter Date: 1/6/2021 Status: Signed    : Loretta Sewell CNP (Nurse Practitioner)       Patient Education     Plantar Warts  Warts are common skin growths that can appear anywhere on the body. Warts on the soles of the feet are called plantar warts. These warts are not a serious health problem. They usually go away without treatment. But plantar warts can be painful when you stand or walk. If this is the case, special cushions can help relieve pressure and pain. Drugstores carry these cushions and you can buy them without a prescription. If cushions do not work and the pain interferes with walking, the wart can be removed.  General care    Your healthcare provider may remove the plantar wart:  ? With prescription medications. These may be placed directly on the wart at each office visit. Or you may be sent home with the medication.  ? With a blade, or by freezing (cryotherapy), burning (electrocautery), or laser treatment.    You may be instructed to treat the wart yourself at home using an over-the-counter wart-removal medication (such as 40 percent salicylic acid). Apply the medication to the wart every day as directed. Avoid the healthy skin around the wart. In between applications, remove the dead wart tissue using the type of file suggested by your health care provider. You will likely need to repeat this process for several weeks to remove the entire wart.    Warts can spread from your foot to other parts of your body and to other people. Do not scratch or pick at the wart. Wash your hands well before and after touching your warts.    Warts often come back, even after successful treatment. Return promptly for treatment of any new warts.  Follow-up care  Follow up with your health care provider, or as advised.  When to seek medical  advice    Signs of infection (red streaks, pus, smelly or colored discharge, or fever) appear.    You experience heavy bleeding or bleeding that wont stop with light pressure.    The wart doesnt go away after several weeks of self-care.     New warts appear on feet, hands, or face.  Date Last Reviewed: 8/19/2015 2000-2017 [employer]. [address] [place] [address]. All rights reserved. This information is not intended as a substitute for professional medical care. Always follow your healthcare professional's instructions.

## 2021-06-18 NOTE — PROGRESS NOTES
[facility] Daily Progress     Patient Name: Lalitha Underwood  Date: 6/7/2018  Visit #: 2/up to 16 - medicare  PTA visit #:    Referral Diagnosis: Unsteady ambulation   Referring provider: Kelly Hull MD  Visit Diagnosis:     ICD-10-CM    1. Unsteadiness on feet R26.81    2. Abnormality of gait R26.9    3. Generalized muscle weakness M62.81    4. Poor balance R26.89    5. Decreased functional mobility and endurance Z74.09          Assessment:   Patient returns to PT for first FU. HEP was advanced with core and LE strengthening. Pt tolerated treatment with slight fatigue at the end and minimal rest breaks, no increased pain. Pt ambulated outdoors with SPC without LOB although displayed more instability as she fatigued. She will benefit from additional gait challenges at next visit.    Patient is benefitting from skilled physical therapy and is making steady progress toward functional goals.  Patient is appropriate to continue with skilled physical therapy intervention, as indicated by initial plan of care.    Goal Status:  Pt. will be independent with home exercise program in : 2 weeks  Pt will: increase her comfortable gait speed to >1.0m/s to decrease falls risk in 8 weeks.  Pt will: be able to complete 3 STS in 30 sec without use of UEs to decrease her falls risk in 8 weeks.    Plan / Patient Education:     Continue with initial plan of care.  Progress with home program as tolerated.   Plan to assess floor transfer with a chair in future visits.     Subjective:   Patient reports no major changes today. No questions.   Patient has been going to the [facility] 1x[facility]week and uses the [employer] and works with a . She will be going to a graduation party next week at a park and wonders if she will be able to walk on the grass with her cane.     Objective:     Therapeutic exercise performed today:    Nu-step WL 5, 8:00 total.     Progressed HEP:  - Ab set with alternating marching x 20-reps, cues  for breathing  - Bridges x 3 seconds x 10 reps total; VC to activate TA  - Clamshell x 15 reps B with cues for TA again  - Standing hip abd x 15 reps B. VC for upright posture to avoid trunk lean.   - Heel raises x 15 reps B.     Performed the following but did not add to HEP:    - SLR x 10 reps in shortened range due to weakness and L knee pain- cues to perform slowly   - Step up x 15 reps B with single UE support. No LOB; cues to push through heel for gluteal activation.       Neuromuscular re-education performed today:     - Sit to stand with UE support x 10 reps. Pt keeps hips externally rotated    Ambulation outdoors:  With SPC and CGA on grass, rocks, inclines. No LOB; pt progressively more unsteady as she fatigues.   ambulated total distance of 300 feet.         Treatment Today     TREATMENT MINUTES COMMENTS   Evaluation     Self-care/ Home management     Manual therapy     Neuromuscular Re-education 15 See above    Therapeutic Activity     Therapeutic Exercises 40 See above   Gait training     Modality__________________                Total 55    Blank areas are intentional and mean the treatment did not include these items.       Aminata Urban  6/7/2018

## 2021-06-18 NOTE — PROGRESS NOTES
I met with Lalitha Underwood at the request of Dr. Hull to recheck her blood pressure.  Blood pressure medications on the MAR were reviewed with patient.    Patient has taken all medications as per usual regimen: Yes  Patient reports tolerating them without any issues or concerns: No    Vitals:    05/23/18 1040   BP: 124/62   Patient Site: Right Arm   Patient Position: Sitting   Cuff Size: Adult Regular   Pulse: 84       Blood pressure was taken, previous encounter was reviewed, recorded blood pressure below 140/90.  Patient was discharged and the note will be sent to the provider for final review.

## 2021-06-18 NOTE — PROGRESS NOTES
"[facility] Daily Progress     Patient Name: Lalitha Underwood  Date: 6/21/2018  Visit #: 5/up to 16 - medicare  PTA visit #: 1   Referral Diagnosis: Unsteady ambulation   Referring provider: Kelly Hull MD  Visit Diagnosis:     ICD-10-CM    1. Unsteadiness on feet R26.81    2. Generalized muscle weakness M62.81    3. Poor balance R26.89    4. Abnormality of gait R26.9    5. Decreased functional mobility and endurance Z74.09          Assessment:   Patient continues to display weakness of B UE and LE mm, and would like to continue working on floor transfer and curb ascending/descending. Consider re-test of balance/gait measures at next visit for mid-way progress.     Patient is benefitting from skilled physical therapy and is making steady progress toward functional goals.  Patient is appropriate to continue with skilled physical therapy intervention, as indicated by initial plan of care.    Goal Status:  Pt. will be independent with home exercise program in : 2 weeks  Pt will: increase her comfortable gait speed to >1.0m/s to decrease falls risk in 8 weeks.  Pt will: be able to complete 3 STS in 30 sec without use of UEs to decrease her falls risk in 8 weeks.    Plan / Patient Education:     Continue with initial plan of care.  Progress with home program as tolerated.   Plan to assess floor transfer with a chair in future visits.     Subjective:   Patient reports no changes, no questions today.     Objective:   Gait observation: pt with waddling type gait + Trendelanberg B LE externally rotated.    Therapeutic exercise performed today:    Nu-step WL 6, 10:00 total. Average SPM 44    - Ab set with marching unilaterally 20 reps each side. Cues for TA engagement  - Bridges x 3 sec hold x 15 reps.   - Clamshell L2 band   - SLR with quad set x 10 reps B, cues to relax between reps.   - Glut raises on swiss ball x 10 reps x 2 sets, PT holding ball  - Step up onto 6\" step x 10 reps each LE, single UE " support on //.  - Standing hip abd x 15 reps B  - Heel raises x 15 reps B    Neuromuscular Re-education performed:    Airex pad with CGA:   Head turns right/left/up/center x 60 seconds x 2 reps. No LOB   EC x 30 seconds x 20 reps  SLS with foot on foam roller, static standing x 30 seconds x 2 reps B   SLS with rolling heel<>toe on foam roller x 15 reps x 2 sets B. Intermittent UE support on //.       HEP  - Ab set with alternating marching  - Bridges   - Standing hip abd.   - Heel raises   -marching   - standing modified heel toe  - sit to stands             Treatment Today   6/21/2018  TREATMENT MINUTES COMMENTS   Evaluation     Self-care/ Home management     Manual therapy     Neuromuscular Re-education 15 See above    Therapeutic Activity     Therapeutic Exercises 40 See above   Gait training     Modality__________________                Total 55    Blank areas are intentional and mean the treatment did not include these items.       Aminata Urban, PT, DPT   6/21/2018

## 2021-06-18 NOTE — PROGRESS NOTES
Assessment/Plan:     1. Essential hypertension  Doing quite well on hydrochlorothiazide.  He has not made an impact on her edema either.  Will remain off this.  Encourage adequate hydration.    2. Hiatal hernia  She is asymptomatic with this other than needing to avoid certain foods, and therefore I do not feel any further intervention is needed unless otherwise recommended by surgery team.  I think that in light of her COPD that avoiding surgery would be in her best interest.    3. Walk Is Wobbly Or Unsteady (Ataxia)  This is been ongoing and multifactorial in nature.  Improved a bit with discontinuation of hydrochlorothiazide but foot deformity, edema, and mild change in sensation in her feet that has been ongoing is likely contributing.  Referral made to physical therapy.  Continue to use cane as needed.  - Ambulatory referral to PT/OT    4. Decreased hearing of both ears  Ear exam unremarkable.  Referral made to audiology.  - Ambulatory referral to Audiology    5. Callus of foot  Compared as noted.  No signs of active plantar wart.  Reviewed symptomatic treatments at home and management of callus.  Notify me with additional symptoms.      Subjective:      Lalitha Underwood is a 73 y.o. female presented clinic today for follow-up of multiple concerns.  I last visit Ms. Burgos her hydrochlorothiazide was discontinued due to some lightheadedness.  Notes that her lightheadedness is now resolved.  Denies any chest pain, palpitations, or edema worsening with this discontinuation.  Notes however that her balance remains poor which is been an ongoing struggle.  Intermittently using a cane.  Working weekly with a  at [facility] [facility] but finds herself walking outside less than usual.  Notes that she has foot pain with some fat pad atrophy limits her, also recovering from umbilical hernia repair recovering well but slowly, not yet comfortable walking outside.  Her edema limits her little bit.  Her COPD limits her little bit as  well especially with humid weather.  Wondering about options for treatment of this.  Noting some very mild headache since the surgery primarily in the frontal region.  Describes it is not quite heaviness.  It is not daily.  It is intermittent and unpredictable.  It resolves if she lays down or closes her eyes.  Notes a wart on her right foot that she like me take a look at.  Also wondering about her hiatal hernia.  She was told it was very large and then consider repairing it at the same time she had her umbilical hernia repaired, but they elected not to.  Notes that occasionally if she eats certain foods in combination she will get some mild discomfort from it but otherwise in following a good diet she is not having any symptoms from her hiatal hernia, having this controlled this well with AcipHex.  Specifically denies any reflux symptoms, nausea, vomiting, or abdominal pain.  Finally she is noting gradual loss of hearing in her ears bilaterally, interested in seeing an audiologist.    Current Outpatient Prescriptions   Medication Sig Dispense Refill     albuterol (ACCUNEB) 1.25 mg/3 mL nebulizer solution Take 3 mL (1.25 mg total) by nebulization every 6 (six) hours as needed for wheezing. 75 mL 0     albuterol (PROAIR HFA;PROVENTIL HFA;VENTOLIN HFA) 90 mcg/actuation inhaler Inhale 2 puffs every 6 (six) hours as needed for wheezing. 3 Inhaler 3     calcium citrate (CALCITRATE) 200 mg (950 mg) tablet Take 1 tablet by mouth 2 (two) times a day.       cholecalciferol, vitamin D3, 5,000 unit Tab Take 5,000 Units by mouth daily.        denosumab 60 mg/mL Syrg Inject 60 mg under the skin every 6 (six) months.       fluticasone-salmeterol (ADVAIR) 250-50 mcg/dose DISKUS Inhale 1 puff 2 (two) times a day. 180 each 3     hydroCHLOROthiazide (HYDRODIURIL) 12.5 MG tablet Take 1 tablet (12.5 mg total) by mouth daily. 90 tablet 3     hyoscyamine (LEVBID) 0.375 mg 12 hr tablet Take 1 tablet (0.375 mg total) by mouth every 12  (twelve) hours as needed for cramping. 180 tablet 3     losartan (COZAAR) 50 MG tablet Take 1 tablet (50 mg total) by mouth 2 (two) times a day. 180 tablet 3     OXYGEN-AIR DELIVERY SYSTEMS [facility] [place] Use 1.5 L As Directed bedtime.       phenytoin (DILANTIN) 100 MG ER capsule Take 100 mg by mouth every morning.       phenytoin (DILANTIN) 100 MG ER capsule Take 200 mg by mouth at bedtime.       potassium chloride 20 mEq TbER Take 20 mEq by mouth daily. 90 tablet 3     RABEprazole (ACIPHEX) 20 mg tablet Take 1 tablet (20 mg total) by mouth daily. 90 tablet 3     tiotropium (SPIRIVA) 18 mcg inhalation capsule Place 1 capsule (2 puffs total) into inhaler and inhale daily. 90 capsule 3     No current facility-administered medications for this visit.        Past Medical History, Family History, and Social History reviewed.  Past Medical History:   Diagnosis Date     Convulsive disorder      COPD (chronic obstructive pulmonary disease)      Depression      Hernia of unspecified site of abdominal cavity without mention of obstruction or gangrene      Hiatal hernia      Hypertension      On home oxygen therapy     at 1.5 liters at nite     Osteopenia      Shortness of breath      Venous insufficiency of both lower extremities      Past Surgical History:   Procedure Laterality Date     OTHER SURGICAL HISTORY Left 2003    Broke titanium in left arm     SHOULDER SURGERY Right 1967     UMBILICAL HERNIA REPAIR  04/04/2018    Dr. Jimenez     Clindamycin; Carbamazepine; and Morphine  Family History   Problem Relation Age of Onset     Breast cancer Mother 66     Hypertension Mother      Heart attack Mother      Varicose Veins Mother      Hypertension Father      Heart attack Sister      Heart disease Sister      Diabetes Son      Pulmonary Hypertension Daughter      Heart attack Sister      Heart disease Sister      Social History     Social History     Marital status:      Spouse name: N/A     Number of children: N/A     Years of  "education: N/A     Occupational History     Not on file.     Social History Main Topics     Smoking status: Former Smoker     Types: Cigarettes     Quit date: 12/7/1998     Smokeless tobacco: Never Used     Alcohol use 1.2 oz/week     2 Glasses of wine per week     Drug use: Yes     Special: Oxycodone     Sexual activity: No     Other Topics Concern     Not on file     Social History Narrative    .  Two kids.  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         Review of systems is as stated in HPI, and the remainder of the 10 system review is otherwise negative.    Objective:     Vitals:    05/29/18 0937   BP: 134/80   Pulse: 80   Weight: 199 lb 9.6 oz (90.5 kg)   Height: 5' 4\" (1.626 m)    Body mass index is 34.26 kg/(m^2).    Alert female.  Mucous membranes moist.  Tympanic remains pearly and translucent.  Heart with regular rate and rhythm.  Lungs clear.  Abdomen is soft and nontender.  Right foot is with a small callus on her right metatarsal pad.  This is pared with a 15 blade and I am able to remove a significant amount of callus.  I do not see any plantar wart or other abnormalities underneath.      This note has been dictated using voice recognition software. Any grammatical or context distortions are unintentional and inherent to the the software.   "

## 2021-06-18 NOTE — PROGRESS NOTES
Audiology Report:    Referring Provider:  Kelly Hull MD    History:  Lalitha Underwood is seen today for comprehensive hearing evaluation. She has a history of decreased hearing bilaterally for at least the past two years. She reports her  has made comments about her hearing abilities. She uses the closed captioning more frequently when watching television. She reports people biking or walking behind her have startled her at times if she isn't able to hear them. Her mother had hearing loss. Serene has a history of balance concerns and is currently in a balance class.  She denies a history of otalgia, tinnitus, aural fullness and history of noise exposure.    Results:     Left Ear Right Ear   Otoscopy non-occluding cerumen non-occluding cerumen   Pure Tone Audiometry Normal hearing from 250-500 Hz sloping to a moderate to moderately-severe sensorineural hearing loss Normal hearing at 250 Hz sloping to a moderate to severe sensorineural hearing loss   Word Recognition excellent excellent   Tympanometry normal (Type A)  normal (Type A)     Transducer: Circumaural headphones    Reliability was good  and there was good  SRT to PTA agreement.       Plan:  Results are discussed in detail. She is a candidate for hearing aids bilaterally. She does not feel that her hearing loss is impacting her life enough to warrant a trial of hearing aids. Return within six months of today's test for a hearing aid evaluation to proceed with a hearing aid trial. Provided patient with a copy of her hearing test, my contact information, and the hearing aid brochure from the [place] [employer]. She should return for retesting annually, or sooner with concerns.  The patient is counseled on hearing protection.    Please see audiogram under  media  and  audiogram  in the patient s chart.     Lottie Fraga, CCC-A  [place] Licensed Audiologist #[id number]

## 2021-06-18 NOTE — PATIENT INSTRUCTIONS - HE
Patient Instructions by Kelly Hull MD at 2/24/2020  9:50 AM     Author: Kelly Hull MD Service: -- Author Type: Physician    Filed: 2/24/2020 12:20 PM Encounter Date: 2/24/2020 Status: Addendum    : Kelly Hull MD (Physician)    Related Notes: Original Note by Kelly Hull MD (Physician) filed at 2/24/2020 11:02 AM       You are due for a tetanus booster.  Check your insurance coverage to determine whether this is best covered at a clinic or a pharmacy.  It is due in June.  Schedule a follow-up visit with Dr. Morales of neurology.  In the meantime, we will obtain your MRI records.    Try taking a magnesium supplement such as magnesium oxide 400 mg once daily in the evening to help with leg cramps.  Regular activity and adequate hydration can help this as well.  Consider a stretching routine for your legs to help reduce the frequency of leg cramping.    I have placed an order for physical therapy for strengthening and balance, you may schedule of visit.    Consider forwarding us a new copy of your advance healthcare directive.    Schedule a follow-up visit in 4 to 8 weeks for a nurse only blood pressure check and lab visit to check your kidney function, potassium level, and blood pressure after increasing hydrochlorothiazide.  Patient Education   Understanding USDA MyPlate  The USDA (US Department of Agriculture) has guidelines to help you make healthy food choices. These are called MyPlate. MyPlate shows the food groups that make up healthy meals using the image of a place setting. Before you eat, think about the healthiest choices for what to put onto your plate or into your cup or bowl. To learn more about building a healthy plate, visit www.choosemyplate.gov.       The Food Groups    Fruits: Any fruit or 100% fruit juice counts as part of the Fruit Group. Fruits may be fresh, canned, frozen, or dried, and may be whole, cut-up, or pureed. Make half your plate fruits and  vegetables.    Vegetables: Any vegetable or 100% vegetable juice counts as a member of the Vegetable Group. Vegetables may be fresh, frozen, canned, or dried. They can be served raw or cooked and may be whole, cut-up, or mashed. Make half your plate fruits and vegetables.     Grains: All foods made from grains are part of the Grains Group. These include wheat, rice, oats, cornmeal, and barley such as bread, pasta, oatmeal, cereal, tortillas, and grits. Grains should be no more than a quarter of your plate. At least half of your grains should be whole grains.    Protein: This group includes meat, poultry, seafood, beans and peas, eggs, processed soy products (like tofu), nuts (including nut butters), and seeds. Make protein choices no more than a quarter of your plate. Meat and poultry choices should be lean or low fat.    Dairy: All fluid milk products and foods made from milk that contain calcium, like yogurt and cheese are part of the Dairy Group. (Foods that have little calcium, such as cream, butter, and cream cheese, are not part of the group.) Most dairy choices should be low-fat or fat-free.    Oils: These are fats that are liquid at room temperature. They include canola, corn, olive, soybean, and sunflower oil. Foods that are mainly oil include mayonnaise, certain salad dressings, and soft margarines. You should have only 5 to 7 teaspoons of oils a day. You probably already get this much from the food you eat.  Use My True Fit to Help Build Your Meals  The [employer] can help you plan and track your meals and activity. You can look up individual foods to see or compare their nutritional value. You can get guidelines for what and how much you should eat. You can compare your food choices. And you can assess personal physical activities and see ways you can improve. Go to www.[employer].gov/supertracker/.    6100-6013 [employer]. [address] [place] [id number]. All rights  reserved. This information is not intended as a substitute for professional medical care. Always follow your healthcare professional's instructions.           Patient Education   Urinary Incontinence, Female (Adult)  Urinary incontinence means loss of control of the bladder. This problem affects many women, especially as they get older. If you have incontinence, you may be embarrassed to ask for help. But know that this problem can be treated.  Types of Incontinence  There are different types of incontinence. Two of the main types are described here. You can have more than one type.    Stress incontinence. With this type, urine leaks when pressure (stress) is put on the bladder. This may happen when you cough, sneeze, or laugh. Stress incontinence most often occurs because the pelvic floor muscles that support the bladder and urethra are weak. This can happen after pregnancy and vaginal childbirth or a hysterectomy. It can also be due to excess body weight or hormone changes.    Urge incontinence (also called overactive bladder). With this type, a sudden urge to urinate is felt often. This may happen even though there may not be much urine in the bladder. The need to urinate often during the night is common. Urge incontinence most often occurs because of bladder spasms. This may be due to bladder irritation or infection. Damage to bladder nerves or pelvic muscles, constipation, and certain medicines can also lead to urge incontinence.  Treatment of urinary incontinence depends on the cause. Further evaluation is needed to find the type you have. This will likely include an exam and certain tests. Based on the results, you and your healthcare provider can then plan treatment. Until a diagnosis is made, the home care tips below can help relieve symptoms.  Home care    Do pelvic floor muscle exercises, if they are prescribed. The pelvic floor muscles help support the bladder and urethra. Many women find that their  symptoms improve when doing special exercises that strengthen these muscles. To do the exercises contract the muscles you would use to stop your stream of urine, but do this when youre not urinating. Hold for 10 seconds, then relax. Repeat 10 to 20 times in a row, at least 3 times a day. Your provider may give you other instructions for how to do the exercises and how often.    Keep a bladder diary. This helps track how often and how much you urinate over a set period of time. Bring this diary with you to your next visit with the provider. The information can help your provider learn more about your bladder problem.    Lose weight, if advised to by your provider. Excess weight puts pressure on the bladder. Your provider can help you create a weight-loss plan thats right for you. This may include exercising more and making certain diet changes.    Don't consume foods and drinks that may irritate the bladder. These can include alcohol and caffeinated drinks.    Quit smoking. Smoking and other tobacco use can lead to chronic cough that strains the pelvic floor muscles. Smoking may also damage the bladder and urethra. Talk with your provider about treatments or methods you can use to quit smoking.    If drinking large amounts of fluid causes you to have symptoms, you may be advised to limit your fluid intake. You may also be advised to drink most of your fluids during the day and to limit fluids at night.    If youre worried about urine leakage or accidents, you may wear absorbent pads to catch urine. Change the pads often. This helps reduce discomfort. It may also reduce the risk of skin or bladder infections.  Follow-up care  Follow up with your healthcare provider, or as directed. It may take some to find the right treatment for your problem. Your treatment plan may include special therapies or medicines. Certain procedures or surgery may also be options. Be sure to discuss any questions you have with your  provider.  When to seek medical advice  Call the healthcare provider right away if any of these occur:    Fever of 100.4 F (38 C) or higher, or as directed by your provider    Bladder pain or fullness    Abdominal swelling    Nausea or vomiting    Back pain    Weakness, dizziness or fainting  Date Last Reviewed: 10/1/2017    4218-2486 [employer]. [address] [place] [address]. All rights reserved. This information is not intended as a substitute for professional medical care. Always follow your healthcare professional's instructions.     Patient Education   Preventing Falls in the Home  As you get older, falls are more likely. Thats because your reaction time slows. Your muscles and joints may also get stiffer, making them less flexible. Illness, medications, and vision changes can also affect your balance. A fall could leave you unable to live on your own. To make your home safer, follow these tips:    Floors    Put nonskid pads under area rugs.    Remove throw rugs.    Replace worn floor coverings.    Tack carpets firmly to each step on carpeted stairs. Put nonskid strips on the edges of uncarpeted stairs.    Keep floors and stairs free of clutter and cords.    Arrange furniture so there are clear pathways.    Clean up any spills right away.    Bathrooms    Install grab bars in the tub or shower.    Apply nonskid strips or put a nonskid rubber mat in the tub or shower.    Sit on a bath chair to bathe.    Use bathmats with nonskid backing.    Lighting    Keep a flashlight in each room.    Put a nightlight along the pathway between the bedroom and the bathroom.    6119-7199 [employer]. [address] [place] [address]. All rights reserved. This information is not intended as a substitute for professional medical care. Always follow your healthcare professional's instructions.         Patient Education   Understanding Advance Care Planning  Advance care planning is  the process of deciding ones own future medical care. It helps ensure that if you cant speak for yourself, your wishes can still be carried out. The plan is a series of legal documents that note a persons wishes. The documents vary by state. Advance care planning may be done when a person has a serious illness that is expected to get worse. It may be done before major surgery. And it can help you and your family be prepared in case of a major illness or injury. Advance care planning helps with making decisions at these times.       A health care proxy is a person who acts as the voice of a patient when the patient cant speak for himself or herself. The name of this role varies by state. It may be called a Durable Medical Power of  or Durable Power of  for Healthcare. It may be called an agent, surrogate, or advocate. Or it may be called a representative or decision maker. It is an official duty that is identified by a legal document. The document also varies by state.    Why Is Advance Care Planning Important?  If a person communicates their healthcare wishes:    They will be given medical care that matches their values and goals.    Their family members will not be forced to make decisions in a crisis with no guidance.  Creating a Plan  Making an advance care plan is often done in 3 steps:    Thinking about ones wishes. To create an advance care plan, you should think about what kind of medical treatment you would want if you lose the ability to communicate. Are there any situations in which you would refuse or stop treatment? Are there therapies you would want or not want? And whom do you want to make decisions for you? There are many places to learn more about how to plan for your care. Ask your doctor or  for resources.    Picking a health care proxy. This means choosing a trusted person to speak for you only when you cant speak for yourself. When you cannot make medical decisions,  your proxy makes sure the instructions in your advance care plan are followed. A proxy does not make decisions based on his or her own opinions. They must put aside those opinions and values if needed, and carry out your wishes.    Filling out the legal documents. There are several kinds of legal documents for advance care planning. Each one tells health care providers your wishes. The documents may vary by state. They must be signed and may need to be witnessed or notarized. You can cancel or change them whenever you wish. Depending on your state, the documents may include a Healthcare Proxy form, Living Will, Durable Medical Power of , Advance Directive, or others.  The Familys Role  The best help a family can give is to support their loved ones wishes. Open and honest communication is vital. Family should express any concerns they have about the patients choices while the patient can still make decisions.    0299-7427 [employer]. [address] [place] [address]. All rights reserved. This information is not intended as a substitute for professional medical care. Always follow your healthcare professional's instructions.         Also, [facility] [employer] [place] offers a free, downloadable health care directive that allows you to share your treatment choices and personal preferences if you cannot communicate your wishes. It also allows you to appoint another person (called a health care agent) to make health care decisions if you are unable to do so. You can download an advance directive by going here: [email or web address]     Patient Education   Personalized Prevention Plan  You are due for the preventive services outlined below.  Your care team is available to assist you in scheduling these services.  If you have already completed any of these items, please share that information with your care team to update in your medical record.  Health Maintenance    Topic Date Due   ? COPD ACTION PLAN  1945   ? COLONOSCOPY  08/26/2019   ? FALL RISK ASSESSMENT  02/21/2020   ? MEDICARE ANNUAL WELLNESS VISIT  02/21/2020   ? TD 18+ HE  06/07/2020   ? DXA SCAN  12/31/2020   ? MAMMOGRAM  04/10/2021   ? LIPID  02/21/2024   ? ADVANCE CARE PLANNING  02/21/2024   ? HEPATITIS C SCREENING  Completed   ? SPIROMETRY  Completed   ? PNEUMOCOCCAL IMMUNIZATION 65+ LOW/MEDIUM RISK  Completed   ? INFLUENZA VACCINE RULE BASED  Completed   ? ZOSTER VACCINES  Completed

## 2021-06-18 NOTE — PATIENT INSTRUCTIONS - HE
Patient Instructions by Loretta Sewell CNP at 2/24/2021  1:20 PM     Author: Loretta eSwell CNP Service: -- Author Type: Nurse Practitioner    Filed: 2/24/2021  1:59 PM Encounter Date: 2/24/2021 Status: Signed    : Loretta Sewell CNP (Nurse Practitioner)       Patient Education     Plantar Warts  Warts are common skin growths that can appear anywhere on the body. Warts on the soles of the feet are called plantar warts. These warts are not a serious health problem. They usually go away without treatment. But plantar warts can be painful when you stand or walk. If this is the case, special cushions can help relieve pressure and pain. Drugstores carry these cushions and you can buy them without a prescription. If cushions do not work and the pain interferes with walking, the wart can be removed.  General care    Your healthcare provider may remove the plantar wart:  ? With prescription medications. These may be placed directly on the wart at each office visit. Or you may be sent home with the medication.  ? With a blade, or by freezing (cryotherapy), burning (electrocautery), or laser treatment.    You may be instructed to treat the wart yourself at home using an over-the-counter wart-removal medication (such as 40 percent salicylic acid). Apply the medication to the wart every day as directed. Avoid the healthy skin around the wart. In between applications, remove the dead wart tissue using the type of file suggested by your health care provider. You will likely need to repeat this process for several weeks to remove the entire wart.    Warts can spread from your foot to other parts of your body and to other people. Do not scratch or pick at the wart. Wash your hands well before and after touching your warts.    Warts often come back, even after successful treatment. Return promptly for treatment of any new warts.  Follow-up care  Follow up with your health care provider, or as advised.  When to seek medical  advice    Signs of infection (red streaks, pus, smelly or colored discharge, or fever) appear.    You experience heavy bleeding or bleeding that wont stop with light pressure.    The wart doesnt go away after several weeks of self-care.     New warts appear on feet, hands, or face.  Date Last Reviewed: 8/19/2015 2000-2017 [employer] [address]. [address] [place] [address]. All rights reserved. This information is not intended as a substitute for professional medical care. Always follow your healthcare professional's instructions.

## 2021-06-18 NOTE — PROGRESS NOTES
Assessment/Plan:    1. Dizziness  Due to patient's recent hernia repair procedure on April 4, 2018, will order CBC with differential to rule out any possible underlying blood loss that could be contributing to the dizziness.  Will also order comprehensive metabolic panel to check for any electrolyte imbalances.  I suspect that dizziness could be related to low blood pressures and orthostatic hypotension.  Physical exam today is otherwise unremarkable.  Patient is advised to hold her hydrochlorothiazide 12.5 mg.  She should return to clinic in 2 weeks for blood pressure follow-up.  Discussed red flag symptoms that would trigger prompt visit to emergency room.  Patient should notify clinic if symptoms worsen or fail to improve.  She understands and is comfortable with this plan of care  - HM1(CBC and Differential)  - Comprehensive Metabolic Panel  - HM1 (CBC with Diff)    Subjective:    Lalitha Underwood is a 73 year old female seen today for valuation of recent dizziness.  Patient reports she has noticed dizziness since last Wednesday, about 5 days.  Dizziness is described as more of a lightheadedness rather than room spinning sensation.  Usually lying down helps resolve the dizziness.  It does not worsen with change of positioning.  Notices it most often upon standing up.  Cannot tell if it gets better as time goes on.  Dizziness is not as severe as it was last week. Denies history of dizziness but has had some unsteadiness. She stopped taking escalators and walking down wide staircases because she often feels that she has a balance issue.  This is been going on for 30 years and has not worsened or changed.  Patient had hernia repair on April 4h of this year.  Since then she has felt a little bit more off.  Has had some very mild headaches.  Has not taken anything for these headaches.  They resolve on their own.  She is wondering if symptoms of dizziness are related to vertigo or if it could possibly be a blood  pressure issue.  Reports that blood pressures used to run in the 130s systolically and lately her readings have been closer to 110 systolically.  She denies having any chest pain or shortness of breath.  Does have history of COPD.  Continues to take Advair and Spiriva daily for management of respiratory symptoms.  Denies any edema of extremities.  No palpitations.  She otherwise feels well and has no additional concerns today.  Review of systems is as stated in HPI, and the remainder of the 10 system review is otherwise unremarkable.    Past Medical History, Family History, and Social History reviewed.    Past Surgical History:   Procedure Laterality Date     OTHER SURGICAL HISTORY Left 2003    Broke titanium in left arm     SHOULDER SURGERY Right 1967     UMBILICAL HERNIA REPAIR  04/04/2018    Dr. Jimenez        Family History   Problem Relation Age of Onset     Breast cancer Mother 66     Hypertension Mother      Heart attack Mother      Varicose Veins Mother      Hypertension Father      Heart attack Sister      Heart disease Sister      Diabetes Son      Pulmonary Hypertension Daughter      Heart attack Sister      Heart disease Sister         Past Medical History:   Diagnosis Date     Convulsive disorder      COPD (chronic obstructive pulmonary disease)      Depression      Hernia of unspecified site of abdominal cavity without mention of obstruction or gangrene      Hiatal hernia      Hypertension      On home oxygen therapy     at 1.5 liters at nite     Osteopenia      Shortness of breath      Venous insufficiency of both lower extremities         Social History   Substance Use Topics     Smoking status: Former Smoker     Types: Cigarettes     Quit date: 12/7/1998     Smokeless tobacco: Never Used     Alcohol use 1.2 oz/week     2 Glasses of wine per week        Current Outpatient Prescriptions   Medication Sig Dispense Refill     albuterol (ACCUNEB) 1.25 mg/3 mL nebulizer solution Take 3 mL (1.25 mg total) by  "nebulization every 6 (six) hours as needed for wheezing. 75 mL 0     albuterol (PROAIR HFA;PROVENTIL HFA;VENTOLIN HFA) 90 mcg/actuation inhaler Inhale 2 puffs every 6 (six) hours as needed for wheezing. 3 Inhaler 3     calcium citrate (CALCITRATE) 200 mg (950 mg) tablet Take 1 tablet by mouth 2 (two) times a day.       cholecalciferol, vitamin D3, 5,000 unit Tab Take 5,000 Units by mouth daily.        denosumab 60 mg/mL Syrg Inject 60 mg under the skin every 6 (six) months.       fluticasone-salmeterol (ADVAIR) 250-50 mcg/dose DISKUS Inhale 1 puff 2 (two) times a day. 180 each 3     hydroCHLOROthiazide (HYDRODIURIL) 12.5 MG tablet Take 1 tablet (12.5 mg total) by mouth daily. 90 tablet 3     hyoscyamine (LEVBID) 0.375 mg 12 hr tablet Take 1 tablet (0.375 mg total) by mouth every 12 (twelve) hours as needed for cramping. 180 tablet 3     losartan (COZAAR) 50 MG tablet Take 1 tablet (50 mg total) by mouth 2 (two) times a day. 180 tablet 3     OXYGEN-AIR DELIVERY SYSTEMS [facility] Use 1.5 L As Directed bedtime.       phenytoin (DILANTIN) 100 MG ER capsule Take 100 mg by mouth every morning.       phenytoin (DILANTIN) 100 MG ER capsule Take 200 mg by mouth at bedtime.       potassium chloride 20 mEq TbER Take 20 mEq by mouth daily. 90 tablet 3     RABEprazole (ACIPHEX) 20 mg tablet Take 1 tablet (20 mg total) by mouth daily. 90 tablet 3     tiotropium (SPIRIVA) 18 mcg inhalation capsule Place 1 capsule (2 puffs total) into inhaler and inhale daily. 90 capsule 3     No current facility-administered medications for this visit.           Objective:    Vitals:    05/14/18 1037   BP: 118/62   Patient Site: Right Arm   Patient Position: Sitting   Cuff Size: Adult Regular   Pulse: 88   Weight: 198 lb 12.8 oz (90.2 kg)   Height: 5' 4.5\" (1.638 m)      Body mass index is 33.6 kg/(m^2).    General Appearance:  Alert, cooperative, no distress, appears stated age   HEENT:  Normal.  No acute findings.   Neck: Supple, symmetrical, no " adenopathy.   Lungs:   Clear to auscultation bilaterally, respirations unlabored.  No expiratory wheeze or inspiratory crackles noted.   Heart:  Regular rate and rhythm, S1, S2 normal, no murmur, rub or gallop   Extremities: Extremities normal.  No cyanosis or edema   Skin: Warm, dry.  Skin color, texture, turgor normal, no rashes or lesions   Neurologic:  Nonfocal.  Normal strength, sensation, reflexes throughout.         This note has been dictated using voice recognition software. Any grammatical or context distortions are unintentional and inherent to the use of this software.

## 2021-06-18 NOTE — PROGRESS NOTES
[facility] Daily Progress     Patient Name: Lalitha Underwood  Date: 6/25/2018  Visit #: 6/up to 16 - medicare  PTA visit #:    Referral Diagnosis: Unsteady ambulation   Referring provider: Kelly Hull MD  Visit Diagnosis:     ICD-10-CM    1. Unsteadiness on feet R26.81    2. Generalized muscle weakness M62.81    3. Poor balance R26.89    4. Abnormality of gait R26.9    5. Decreased functional mobility and endurance Z74.09          Assessment:   Patient made mild improvements in her TUG and gait speeds today as compared to the initial assessment demonstrating some improvement in stability. There was an increase in distance on 2 minute walk test and on the 2nd trial of the four square step test suggesting an improvement in balance and endurance as well. Patient continued to have trouble with more dynamic movements, change in direction and obstacles.     Patient is benefitting from skilled physical therapy and is making steady progress toward functional goals.  Patient is appropriate to continue with skilled physical therapy intervention, as indicated by initial plan of care.    Goal Status:  Pt. will be independent with home exercise program in : 2 weeks  Pt will: increase her comfortable gait speed to >1.0m/s to decrease falls risk in 8 weeks.  Pt will: be able to complete 3 STS in 30 sec without use of UEs to decrease her falls risk in 8 weeks.    Plan / Patient Education:     Continue with initial plan of care.  Progress with home program as tolerated.   Plan to assess floor transfer with a chair in future visits.     Subjective:   Doing ok, no changes. No falls, no pain today     Objective:   Gait observation: pt with waddling type gait + Trendelanberg B LE externally rotated.     Timed Up and Go (TUG): Trial 1: 14.00 seconds Trial 2: 10.36 seconds  Trial 3: 9.43 seconds  Average:  11.3 seconds  Age gender norm: 8-10 sec  AD used? none     TUG Cognitive: 12.5 seconds  Task: count backwards by  3s starting at 100     Four Square Step Test (motor planning): Trial 1: 43.18 seconds with hitting the dowel 1. times, Trial 2: 20.22seconds with hitting the dowel 0 times.   (>15 seconds falls risk for best of 2 trials)     Gait speed (10 meter walk test):  6.00 seconds   Comfortable gait speed:1.00 m/s  Age gender norm: 1.32 m/s  AD used? none     Fast gait speed: 1.16 m/s;  5.19 seconds   Age gender norm: 1.71 m/s  AD used? none    30 second sit<>stand: 8 reps  UE support? On chair with 1 arm pushing off seat of chair    2 Minute Walk Test  127.2 meters RPE 5/10 not hard but feels winded       Therapeutic exercise performed today:    Nu-step WL 5, 10:00 total. Average SPM 44    Neuromuscular Re-education performed:  Dynamic walking balance tasks on 20 foot line x 3 each alternating   - side step right  - side step left  - backwards walking  - walking forward eyes closed     Obstacle course  Stepping on foam disc, over 1/2 foam roll,. 2 8in and 1 6 in hurdles and walking tandem on 2x 6 x 2 reps with min to mod assist from therapist on tandem walk and over larger hurdles, on major LOB requiring min assist from therapist        HEP  - Ab set with alternating marching  - Bridges   - Standing hip abd.   - Heel raises   -marching   - standing modified heel toe  - sit to stands             Treatment Today   6/25/2018  TREATMENT MINUTES COMMENTS   Evaluation     Self-care/ Home management     Manual therapy     Neuromuscular Re-education 25 See above    Therapeutic Activity     Therapeutic Exercises 30 See above   Gait training     Modality__________________                Total 55    Blank areas are intentional and mean the treatment did not include these items.       Dilia Coronado, PT, DPT   6/25/2018

## 2021-06-18 NOTE — LETTER
Letter by Lalitha Haq NP at      Author: Lalitha Haq NP Service: -- Author Type: --    Filed:  Encounter Date: 1/4/2019 Status: (Other)       Lalitha Underwood  [address]  [place] [address]             January 4, 2019         Dear Ms. Underwood,    Below are the results from your recent visit:    Resulted Orders   DXA Bone Density Scan    Narrative    12/31/2018      RE: Lalitha Santizoyasmanisushil  YOB: 1945        Dear Lalitha Haq,    Patient Profile:  73 y.o. female, postmenopausal, is here for the follow up bone density   test.   History of fractures - Yes;  Wrist. Family history of osteoporosis - Yes;    mother.  Family history of hip fracture: Yes;  mother. Smoking history -   Past. Osteoporosis treatment past -  No. Osteoporosis treatment current -   Yes;  Prolia.  Chronic medical problems - Chronic low back problems. High   risk medications -  Anti-seizure;  Yes, Currently.      Assessment:    1. The spine bone density L1-L4 was not done because of the significant   artifacts.  2. Femoral bone densities show left femoral neck T- score -1.2 and right   femoral neck T-score -0.5, stable compared to 2016.  3. Left forearm bone density with T-score 0.2.      73 y.o. female with LOW BONE DENSITY (OSTEOPENIA), stable on the current   treatment.        Recommendations:  Appropriate calcium and vitamin D supplements and active treatment   recommended with follow up bone density scan in 2 years.      Bone densitometry was performed on your patient using our [employer]   densitometer. The results are summarized and a copy of the actual scans   are included for your review. In conformity with the International Society   of Clinical Densitometry's most recent position statement for DXA   interpretation (2015), the diagnosis will be made on the lowest measured   T-score of the lumbar spine, femoral neck, total proximal femur or 33%   radius. Note the change in terminology for diagnostic  classification from   OSTEOPENIA to LOW BONE MASS. All trending for sequential exams will be   done using multiple vertebrae or the total proximal femur. Fracture risk   is based on the WHO Fracture Risk Assessment Tool (FRAX). If additional   information is needed or if you would like to discuss the results, please   do not hesitate to call me.       Thank you for referring this patient to [facility].   We are happy to be of service in support of you and your practice. If you   have any questions or suggestions to improve our service, please call me   at [phone].     Sincerely,     Ashli Mendez M.D. C.C.MYA.  O[facility]oporosis Services, [facility]       The test results show that your current treatment is working. Please continue your current medication and plan. We recommend that you repeat the above test(s) in 2 years.    Please call with questions or contact us using [email or web address].    Sincerely,        Electronically signed by Lalitha Haq NP

## 2021-06-18 NOTE — PATIENT INSTRUCTIONS - HE
"Patient Instructions by Jyoti Shultz MD at 2/1/2021 10:30 AM     Author: Jyoti Shultz MD Service: -- Author Type: Physician    Filed: 2/1/2021 11:02 AM Encounter Date: 2/1/2021 Status: Addendum    : Shadia Cuadra LPN (Licensed Nurse)    Related Notes: Original Note by Shadia Cuadra LPN (Licensed Nurse) filed at 2/1/2021 11:00 AM       To order compression socks through [employer] or other online site:     Compression needed:  15-20 mmHg and 20-30 mmHg    Length:  knee high    Toe Piece:  closed toe    Measures:       Right leg  left leg    Ankle 19 cm  21.3 cm   Calf (Widest Part) 44.4 cm  43.5 cm   Thigh  56.7 cm  67 cm   Length-heel to knee       Short   Regular     Long           You may need to search \"compression sock large or extra large\"    How to measure swelling in legs:   1. Measure in centimeters around (this is called circumference) the forefoot before the toes.   2. Measure the circumference on the smallest part of the ankle above the ankle bone.  3. Measure the circumference on the biggest part of the calf.   4. Go up 10cm from the knee then measure the circumference of the thigh.   5. Measure both legs.           Please call one of the [place] location below to schedule an appointment. If you received a prescription please bring it with you to your appointment.     Office Location    [facility]  [facility] [address] [place] [address]  Orthotics and Prosthetics, [address]   Phone: [phone]    Continue exercising, using insoles.       "

## 2021-06-18 NOTE — PROGRESS NOTES
[facility] Daily Progress     Patient Name: Lalitha Underwood  Date: 6/13/2018  Visit #: 3/up to 16 - medicare  PTA visit #: 1   Referral Diagnosis: Unsteady ambulation   Referring provider: Kelly Hull MD  Visit Diagnosis:     ICD-10-CM    1. Unsteadiness on feet R26.81    2. Abnormality of gait R26.9    3. Generalized muscle weakness M62.81    4. Poor balance R26.89    5. Decreased functional mobility and endurance Z74.09          Assessment:   Patient fatiques with dynamic balance drills.   Pt demonstrates weak hip/LE musculature. Pt with difficulty performing supine SLR correctly.    Patient is benefitting from skilled physical therapy and is making steady progress toward functional goals.  Patient is appropriate to continue with skilled physical therapy intervention, as indicated by initial plan of care.    Goal Status:  Pt. will be independent with home exercise program in : 2 weeks  Pt will: increase her comfortable gait speed to >1.0m/s to decrease falls risk in 8 weeks.  Pt will: be able to complete 3 STS in 30 sec without use of UEs to decrease her falls risk in 8 weeks.    Plan / Patient Education:     Continue with initial plan of care.  Progress with home program as tolerated.   Plan to assess floor transfer with a chair in future visits.     Subjective:   Patient reports she feels like she will loose her balance.  Pt reports she has a new pair of arch supports which she feels that will affect her balance.She is just breaking in a new pair.     Objective:   Gait observation: pt with waddling type gait + Trendelanberg B LE externally rotated.    Therapeutic exercise performed today:    Nu-step WL 5, 10:00 total.     Progressed HEP:  - Ab set with alternating marching x 20-reps, cues for breathing  - Bridges x 3 seconds x 10 reps total; VC to activate TA  - Clamshell x 15 reps B with cues for TA again  - Standing hip abd x 15 reps B. VC for upright posture to avoid trunk lean.   - Heel  "raises x 15 reps B.   -standing hip flexion x10  -marching x10      -      Neuromuscular re-education performed today:     - Sit to stand with UE support x 10 reps. Pt keeps hips externally rotated  -tandem stand 30\" x2  -NBOS eyes closed 30\"  -walk on discs 10ft x4 one hand hold  -river rocks 10 ft x4  -tandem walk 10 ft x4    Ambulation outdoors:  With SPC and CGA on grass, rocks, inclines. No LOB; pt progressively more unsteady as she fatigues.   ambulated total distance of 300 feet.         Treatment Today     TREATMENT MINUTES COMMENTS   Evaluation     Self-care/ Home management     Manual therapy     Neuromuscular Re-education 25 See above    Therapeutic Activity     Therapeutic Exercises 30 See above   Gait training     Modality__________________                Total 55    Blank areas are intentional and mean the treatment did not include these items.       Sumaya Hayes,CLT  6/13/2018    " [As Noted in HPI] : as noted in HPI [Negative] : Heme/Lymph

## 2021-06-18 NOTE — PROGRESS NOTES
[facility] Daily Progress     Patient Name: Lalitha Underwood  Date: 6/18/2018  Visit #: 4/up to 16 - medicare  PTA visit #: 1   Referral Diagnosis: Unsteady ambulation   Referring provider: Kelly Hull MD  Visit Diagnosis:     ICD-10-CM    1. Unsteadiness on feet R26.81    2. Abnormality of gait R26.9    3. Generalized muscle weakness M62.81    4. Poor balance R26.89    5. Decreased functional mobility and endurance Z74.09          Assessment:   Patient demonstrated weakness of the LEs and of the upper extremities today. She has a goal to increase strength and improve ability of getting up from the floor as well as decrease risk for falls. Patient will continued to benefit from skilled therapy to increase LE and UE strength, to improve transfer and decrease her risk for falls.     Patient is benefitting from skilled physical therapy and is making steady progress toward functional goals.  Patient is appropriate to continue with skilled physical therapy intervention, as indicated by initial plan of care.    Goal Status:  Pt. will be independent with home exercise program in : 2 weeks  Pt will: increase her comfortable gait speed to >1.0m/s to decrease falls risk in 8 weeks.  Pt will: be able to complete 3 STS in 30 sec without use of UEs to decrease her falls risk in 8 weeks.    Plan / Patient Education:     Continue with initial plan of care.  Progress with home program as tolerated.   Plan to assess floor transfer with a chair in future visits.     Subjective:   Patient reports she has been feeling there is a little bit of improvements. She knows she could get more if she continued to do the exercises at home. She does not doing the exercises on the days she comes here. She had been going to the [facility] one day per week before but she has not been doing them. Patient has not been using her cane, keeps in the car. No falls since last session. No pain.     Objective:   Gait observation: pt with  "waddling type gait + Trendelanberg B LE externally rotated.    Therapeutic exercise performed today:    Nu-step WL 5, 10:00 total. Average SPM 44    Ab set with marching unilaterally 2 x 10 reps each side     Bridging 3 count hold x 12 reps cues to slow down and adjust position intermittently     Hooklying hip abduction L2 band 3 count hold x 15 reps - TC for TA setting with this as well      Counter top push up plus x 15 reps - many cues for core activation needed    Wall push up plus x 15 reps     Chair tricep push up 2 x 10 reps     Sit to stands from chair B UE assist x 10 reps     Mini lunges in //bars with B UE support 2 x 10 reps each side       HEP  - Ab set with alternating marching  - Bridges   - Standing hip abd.   - Heel raises   -marching   - standing modified heel toe  - sit to stands           Neuromuscular re-education performed today:     -tandem stand 30\" x2  -NBOS on red foam eyes open 2 x 30 sec, eyes closed 3 x 30\"  -walk on discs and river rocks 10ft x 8 one hand hold        Treatment Today   6/18/2018  TREATMENT MINUTES COMMENTS   Evaluation     Self-care/ Home management     Manual therapy     Neuromuscular Re-education 13 See above    Therapeutic Activity     Therapeutic Exercises 40 See above   Gait training     Modality__________________                Total 53    Blank areas are intentional and mean the treatment did not include these items.       Dilia Coronado, PT, DPT   6/18/2018  "

## 2021-06-18 NOTE — PROGRESS NOTES
I met with Lalitha Underwood at the request of Kurtis to recheck her blood pressure.  Blood pressure medications on the MAR were reviewed with patient.    Patient has taken all medications as per usual regimen: Yes  Patient reports tolerating them without any issues or concerns: Yes    Vitals:    05/25/18 1336   BP: 132/76   Patient Site: Left Arm   Patient Position: Sitting   Cuff Size: Adult Regular       Blood pressure was taken, previous encounter was reviewed, recorded blood pressure below 140/90.  Patient was discharged and the note will be sent to the provider for final review.

## 2021-06-18 NOTE — PROGRESS NOTES
[facility] Certification Request    June 5, 2018      Patient: Lalitha Underwood  MR Number: [id number]  YOB: 1945  Date of Visit: 6/5/2018      Dear Dr. Hull:    Thank you for this referral.   We are seeing Lalitha Underwood for Physical Therapy of her balance impairment.    Medicare and/or Medicaid requires physician review and approval of the treatment plan. Please review the plan of care and verify that you agree with the therapy plan of care by co-signing this note.      Plan of Care  Authorization / Certification Start Date: 06/05/18  Authorization / Certification End Date: 08/04/18  Authorization / Certification Number of Visits: 16  Communication with: Referral Source  Patient Related Instruction: Nature of Condition;Treatment plan and rationale;Self Care instruction;Posture;Precautions;Basis of treatment;Next steps;Expected outcome;Body mechanics  Times per Week: 2  Number of Weeks: 8  Number of Visits: up to 16  Therapeutic Exercise: ROM;Stretching;Strengthening  Neuromuscular Reeducation: posture;balance/proprioception;core  Gait Training: to decrease falls risk  Equipment: theraband    Goals:  Pt. will be independent with home exercise program in : 2 weeks  Pt will: increase her comfortable gait speed to >1.0m/s to decrease falls risk in 8 weeks.  Pt will: be able to complete 3 STS in 30 sec without use of UEs to decrease her falls risk in 8 weeks.      If you have any questions or concerns, please don't hesitate to call.    Sincerely,      Nichol Tejeda, PT        Physician recommendation:     ___ Follow therapist's recommendation        ___ Modify therapy      *Physician co-signature indicates they certify the need for these services furnished within this plan and while under their care.        [facility]   Balance Initial Evaluation    Patient Name: Lalitha Cast  Date of evaluation: 6/5/2018  Referral Diagnosis:Walk Is Wobbly Or Unsteady  (Ataxia) [R26.9]    Referring provider: Kelly Hull MD  Visit Diagnosis:     ICD-10-CM    1. Unsteadiness on feet R26.81    2. Abnormality of gait R26.9    3. Generalized muscle weakness M62.81    4. Poor balance R26.89    5. Decreased functional mobility and endurance Z74.09        Assessment:   Lalitha Underwood is a 73 y.o. female who presents to therapy today with chief complaints of balance impairment and falls. Pt is unable to go from sitting to standing without UE support. Her STS test with UE support score was 11 reps. She has a comfortable gait speed of 0.85 sec with her age matched norm of 1.32 m/s and TUG score of 12 sec with increased time during TUG cognitive by 3 sec. Pt also has LE weakness. She is not using her AD consistently at this time and was educated that with her scores using the SEC at all times is going to be the safest. She is unable to walk for exercise, ascend/descend stairs/curb and has poor activity tolerance.  Patient will benefit from 1:1 skilled PT services to address the above limitations.     Goals:  Pt. will be independent with home exercise program in : 2 weeks  Pt will: increase her comfortable gait speed to >1.0m/s to decrease falls risk in 8 weeks.  Pt will: be able to complete 3 STS in 30 sec without use of UEs to decrease her falls risk in 8 weeks.    Patient's expectations/goals are realistic.    Barriers to Learning or Achieving Goals:  Co-morbidities or other medical factors.  see below       Plan / Patient Instructions:        Plan of Care:   Authorization / Certification Start Date: 06/05/18  Authorization / Certification End Date: 08/04/18  Authorization / Certification Number of Visits: 16  Communication with: Referral Source  Patient Related Instruction: Nature of Condition;Treatment plan and rationale;Self Care instruction;Posture;Precautions;Basis of treatment;Next steps;Expected outcome;Body mechanics  Times per Week: 2  Number of Weeks: 8  Number of  Visits: up to 16  Therapeutic Exercise: ROM;Stretching;Strengthening  Neuromuscular Reeducation: posture;balance/proprioception;core  Gait Training: to decrease falls risk  Equipment: theraband    Plan for next visit: review HEP, work on dynamic balance and balance on foam     Subjective:         Past medical history/precautions:   Past Medical History:   Diagnosis Date     Convulsive disorder      COPD (chronic obstructive pulmonary disease)      Depression      Hernia of unspecified site of abdominal cavity without mention of obstruction or gangrene      Hiatal hernia      Hypertension      On home oxygen therapy     at 1.5 liters at nite     Osteopenia      Shortness of breath      Venous insufficiency of both lower extremities      Pt comes to therapy to work on her balance. She feels her balance has progressively been getting worse. She does have neuropathy and has prescription. 2 years ago she was able to walk 1 mile a few days a week. She has not been able walk recently. Last year she was able to walk on the treadmill. Now, she does not feel stable or safe walking and is afraid she is going to fall. She has broken several bones falling over the years. She did take balance classes 4-5 years ago.     Living Situation:[facility] with no steps, she avoids stairs and even curbs  Caregiver Support:   Equipment: SEC in the car- she uses it more in the winter or out on unstable surfaces      Patient's Functional Goal: up/down curbs better, walk for exercise    Fall history:Occasional: Date of last fall: Winter 6/5/18    Functional Limitations: stand 5 min , walking, stairs, kneel/squat, walk on uneven surfaces    Pain  Pain Ratin  Pain rating at best: 1  Pain rating at worst: 2           Objective:      Note: Items left blank indicates the item was not performed or not indicated at the time of the evaluation.    Balance Examination  1. Unsteadiness on feet     2. Abnormality of gait     3. Generalized muscle  weakness     4. Poor balance     5. Decreased functional mobility and endurance         Posture Observation: increased thoracic kyphosis, mod forward head  Assistive Device: none but has SEC in car    Transitional movements:          Sit?Stand:  Independent with use of arms      Strength    Strength   L / R ROM   L / R Comments   Seated Hip Flexion 3+/3+      Knee Extension 4+/4+      Dorsi Flexion 5/5                   Supine Straight Leg Raise (degrees)       Quad activation             Side lying Hip Abduction       Hip Adduction             Prone Hip Extension       Knee Flexion             Standing Plantar Flexion* 2-/2-      Dorsi Flexion        *Standing PF (single heel raise with UE support for balance only):  5= 16-20 heel raises  4= 10-15 heel raises  3= 5-9 heel raises  2= 1-4 heel raises    Timed Up and Go (TUG): Trial 1: 13.28 seconds Trial 2: 12.25 seconds  Trial 3: 11.68 seconds  Average: 12.4 seconds  Age gender norm: 8-10 sec  AD used? none    TUG Cognitive: 15 seconds  Task: count backwards by 3s starting at 100, 1 error    30 second sit<>stand: 11 reps  UE support? On chair with 1 arm      Standing eyes closed: 30 sec  On foam feet together (unable to fully reach feet together, pt reports due to knees): 22 sec with increased postural sway    Gait: increased R foot ER, benedicto trendelenberg    Balance:    Firm Surface (seconds) Unstable Surface (seconds)    EO EC EO EC   Romberg (feet together)       Sharpened Romberg (tandem)       Semi-Romberg (small step) 30 sec benedicto but with increased postural sway          Other Balance Test: (Garcia, Tinettti, FGA, Mini-BESTest)    2  m; 11 Jack scale        Four Square Step Test (motor planning): Trial 1: 30 seconds with hitting the dowel 4 times, Trial 2: 22 seconds with hitting the dowel 3 times. With verbal cues to change direction each time  (>15 seconds falls risk for best of 2 trials)    Gait speed (10 meter walk test):  7.06 sec  Comfortable gait  speed:0.85 m/s  Age gender norm: 1.32 m/s  AD used? none    Fast gait speed: 1.07 m/s;  5.60 sec  Age gender norm: 1.71 m/s  AD used? none          Treatment Today     TREATMENT MINUTES COMMENTS   Evaluation 40 -balance/gait   Self-care/ Home management     Manual therapy     Neuromuscular Re-education     Therapeutic Activity     Therapeutic Exercises 15 -see exercise flow sheet  -educated on POC, diagnosis and HEP   Gait training     Modality__________________                Total 55    Blank areas are intentional and mean the treatment did not include these items.     PT Evaluation Code: (Please list factors)  Patient History/Comorbidities: see above  Examination: balance  Clinical Presentation: stable  Clinical Decision Making: low    Patient History/  Comorbidities Examination  (body structures and functions, activity limitations, and/or participation restrictions) Clinical Presentation Clinical Decision Making (Complexity)   No documented Comorbidities or personal factors 1-2 Elements Stable and/or uncomplicated Low   1-2 documented comorbidities or personal factor 3 Elements Evolving clinical presentation with changing characteristics Moderate   3-4 documented comorbidities or personal factors 4 or more Unstable and unpredictable High                Nichol Tejeda, PT, DPT  6/5/2018  10:09 AM

## 2021-06-18 NOTE — PATIENT INSTRUCTIONS - HE
Patient Instructions by Loretta Sewell CNP at 2/12/2021 10:00 AM     Author: Loretta Sewell CNP Service: -- Author Type: Nurse Practitioner    Filed: 2/12/2021 10:40 AM Encounter Date: 2/12/2021 Status: Signed    : Loretta Sewell CNP (Nurse Practitioner)         Patient Education   Understanding USDA MyPlate  The USDA (US Department of Agriculture) has guidelines to help you make healthy food choices. These are called MyPlate. MyPlate shows the food groups that make up healthy meals using the image of a place setting. Before you eat, think about the healthiest choices for what to put onto your plate or into your cup or bowl. To learn more about building a healthy plate, visit www.choosemyplate.gov.       The Food Groups    Fruits: Any fruit or 100% fruit juice counts as part of the Fruit Group. Fruits may be fresh, canned, frozen, or dried, and may be whole, cut-up, or pureed. Make half your plate fruits and vegetables.    Vegetables: Any vegetable or 100% vegetable juice counts as a member of the Vegetable Group. Vegetables may be fresh, frozen, canned, or dried. They can be served raw or cooked and may be whole, cut-up, or mashed. Make half your plate fruits and vegetables.     Grains: All foods made from grains are part of the Grains Group. These include wheat, rice, oats, cornmeal, and barley such as bread, pasta, oatmeal, cereal, tortillas, and grits. Grains should be no more than a quarter of your plate. At least half of your grains should be whole grains.    Protein: This group includes meat, poultry, seafood, beans and peas, eggs, processed soy products (like tofu), nuts (including nut butters), and seeds. Make protein choices no more than a quarter of your plate. Meat and poultry choices should be lean or low fat.    Dairy: All fluid milk products and foods made from milk that contain calcium, like yogurt and cheese are part of the Dairy Group. (Foods that have little calcium, such as cream,  butter, and cream cheese, are not part of the group.) Most dairy choices should be low-fat or fat-free.    Oils: These are fats that are liquid at room temperature. They include canola, corn, olive, soybean, and sunflower oil. Foods that are mainly oil include mayonnaise, certain salad dressings, and soft margarines. You should have only 5 to 7 teaspoons of oils a day. You probably already get this much from the food you eat.  Use E Inker to Help Build Your Meals  The SuperTracker can help you plan and track your meals and activity. You can look up individual foods to see or compare their nutritional value. You can get guidelines for what and how much you should eat. You can compare your food choices. And you can assess personal physical activities and see ways you can improve. Go to www.Yesmail.gov/AltheaDxcker/.    3113-2501 [employer]. [address] [place] [address]. All rights reserved. This information is not intended as a substitute for professional medical care. Always follow your healthcare professional's instructions.           Patient Education   Signs of Hearing Loss  Hearing loss is a problem shared by many people. In fact, it is one of the most common health conditions, particularly as people age. Most people over age 65 have some hearing loss, and by age 80, almost everyone does. Because hearing loss usually occurs slowly over the years, you may not realize your hearing ability has gotten worse.       Have your hearing checked  Contact your [facility] care provider if you:    Have to strain to hear normal conversation.    Have to watch other peoples faces very carefully to follow what theyre saying.    Need to ask people to repeat what theyve said.    Often misunderstand what people are saying.    Turn the volume of the television or radio up so high that others complain.    Feel that people are mumbling when theyre talking to you.    Find that the effort to hear leaves  you feeling tired and irritated.    Notice, when using the phone, that you hear better with 1 ear than the other.    7942-9699 [employer]. [address] [place] [address], [place] [address]. All rights reserved. This information is not intended as a substitute for professional medical care. Always follow your healthcare professional's instructions.         Patient Education   Urinary Incontinence, Female (Adult)  Urinary incontinence means loss of control of the bladder. This problem affects many women, especially as they get older. If you have incontinence, you may be embarrassed to ask for help. But know that this problem can be treated.  Types of Incontinence  There are different types of incontinence. Two of the main types are described here. You can have more than one type.    Stress incontinence. With this type, urine leaks when pressure (stress) is put on the bladder. This may happen when you cough, sneeze, or laugh. Stress incontinence most often occurs because the pelvic floor muscles that support the bladder and urethra are weak. This can happen after pregnancy and vaginal childbirth or a hysterectomy. It can also be due to excess body weight or hormone changes.    Urge incontinence (also called overactive bladder). With this type, a sudden urge to urinate is felt often. This may happen even though there may not be much urine in the bladder. The need to urinate often during the night is common. Urge incontinence most often occurs because of bladder spasms. This may be due to bladder irritation or infection. Damage to bladder nerves or pelvic muscles, constipation, and certain medicines can also lead to urge incontinence.  Treatment of urinary incontinence depends on the cause. Further evaluation is needed to find the type you have. This will likely include an exam and certain tests. Based on the results, you and your healthcare provider can then plan treatment. Until a diagnosis is made, the home  care tips below can help relieve symptoms.  Home care    Do pelvic floor muscle exercises, if they are prescribed. The pelvic floor muscles help support the bladder and urethra. Many women find that their symptoms improve when doing special exercises that strengthen these muscles. To do the exercises contract the muscles you would use to stop your stream of urine, but do this when youre not urinating. Hold for 10 seconds, then relax. Repeat 10 to 20 times in a row, at least 3 times a day. Your provider may give you other instructions for how to do the exercises and how often.    Keep a bladder diary. This helps track how often and how much you urinate over a set period of time. Bring this diary with you to your next visit with the provider. The information can help your provider learn more about your bladder problem.    Lose weight, if advised to by your provider. Excess weight puts pressure on the bladder. Your provider can help you create a weight-loss plan thats right for you. This may include exercising more and making certain diet changes.    Don't consume foods and drinks that may irritate the bladder. These can include alcohol and caffeinated drinks.    Quit smoking. Smoking and other tobacco use can lead to chronic cough that strains the pelvic floor muscles. Smoking may also damage the bladder and urethra. Talk with your provider about treatments or methods you can use to quit smoking.    If drinking large amounts of fluid causes you to have symptoms, you may be advised to limit your fluid intake. You may also be advised to drink most of your fluids during the day and to limit fluids at night.    If youre worried about urine leakage or accidents, you may wear absorbent pads to catch urine. Change the pads often. This helps reduce discomfort. It may also reduce the risk of skin or bladder infections.  Follow-up care  Follow up with your healthcare provider, or as directed. It may take some to find the right  treatment for your problem. Your treatment plan may include special therapies or medicines. Certain procedures or surgery may also be options. Be sure to discuss any questions you have with your provider.  When to seek medical advice  Call the healthcare provider right away if any of these occur:    Fever of 100.4 F (38 C) or higher, or as directed by your provider    Bladder pain or fullness    Abdominal swelling    Nausea or vomiting    Back pain    Weakness, dizziness or fainting  Date Last Reviewed: 10/1/2017    7494-5861 [employer]. [address] [place] [address]. All rights reserved. This information is not intended as a substitute for professional medical care. Always follow your healthcare professional's instructions.     Patient Education   Depression and Suicide in Older Adults  Nearly 2 million older Americans have some type of depression. Sadly, some of them even take their own lives. Yet depression among older adults is often ignored. Learn the warning signs. You may help spare a loved one needless pain. You may also save a life.       What Is Depression?  Depression is a mood disorder that affects the way you think and feel. The most common symptom is a feeling of deep sadness. People who are depressed also may seem tired and listless. And nothing seems to give them pleasure. Its normal to grieve or be sad sometimes. But sadness lessens or passes with time. Depression rarely goes away or improves on its own. Other symptoms of depression are:    Sleeping more or less than normal    Eating more or less than normal    Having headaches, stomachaches, or other pains that dont go away    Feeling nervous, empty, or worthless    Crying a great deal    Thinking or talking about suicide or death    Feeling confused or forgetful  What Causes It?  The causes of depression arent fully known. Certain chemicals in the brain play a role. Depression does run in families. And life stresses can  also trigger depression in some people. That may be the case with older adults. They often face great burdens, such as the death of friends or a spouse. They may have failing health. And they are more likely to be alone, lonely, or poor.  How You Can Help  Often, depressed people may not want to ask for help. When they do, they may be ignored. Or, they may receive the wrong treatment. You can help by showing parents and older friends love and support. If they seem depressed, help them find the right treatment. Talk to your doctor. Or contact a local mental health center, social service agency, or hospital. With modern treatment, no one has to suffer from depression.  Resources:    National [place] of Mental Health  997.849.6912  www.nimh.nih.gov    National [place] on Mental Illness  262.680.9685  www.sulema.org    [facility] [place]  606.852.1686  www.RUST.org    National Suicide Hotline  325.493.4504 (800-SUICIDE)      9095-3147 [employer]. [address] [place] [address]. All rights reserved. This information is not intended as a substitute for professional medical care. Always follow your healthcare professional's instructions.         Patient Education   Preventing Falls in the Home  As you get older, falls are more likely. Thats because your reaction time slows. Your muscles and joints may also get stiffer, making them less flexible. Illness, medications, and vision changes can also affect your balance. A fall could leave you unable to live on your own. To make your home safer, follow these tips:    Floors    Put nonskid pads under area rugs.    Remove throw rugs.    Replace worn floor coverings.    Tack carpets firmly to each step on carpeted stairs. Put nonskid strips on the edges of uncarpeted stairs.    Keep floors and stairs free of clutter and cords.    Arrange furniture so there are clear pathways.    Clean up any spills right away.    Bathrooms    Install grab bars in the  tub or shower.    Apply nonskid strips or put a nonskid rubber mat in the tub or shower.    Sit on a bath chair to bathe.    Use bathmats with nonskid backing.    Lighting    Keep a flashlight in each room.    Put a nightlight along the pathway between the bedroom and the bathroom.    0969-2653 [employer]. [address] [place] [address]. All rights reserved. This information is not intended as a substitute for professional medical care. Always follow your healthcare professional's instructions.           Advance Directive  Patients advance directive was discussed and I am comfortable with the patients wishes.  Patient Education   Personalized Prevention Plan  You are due for the preventive services outlined below.  Your care team is available to assist you in scheduling these services.  If you have already completed any of these items, please share that information with your care team to update in your medical record.  Health Maintenance   Topic Date Due   ? COPD ACTION PLAN  1945   ? HEPATITIS B VACCINES (1 of 3 - Risk 3-dose series) 03/22/1964   ? COLORECTAL CANCER SCREENING  08/26/2019   ? TD 18+ HE  06/07/2020   ? COVID-19 Vaccine (2 of 2 - Pfizer series) 02/22/2021   ? MEDICARE ANNUAL WELLNESS VISIT  02/12/2022   ? FALL RISK ASSESSMENT  02/12/2022   ? ADVANCE CARE PLANNING  02/21/2024   ? LIPID  02/24/2025   ? DEXA SCAN  01/20/2036   ? HEPATITIS C SCREENING  Completed   ? SPIROMETRY  Completed   ? Pneumococcal Vaccine: 65+ Years  Completed   ? INFLUENZA VACCINE RULE BASED  Completed   ? ZOSTER VACCINES  Completed   ? Pneumococcal Vaccine: Pediatrics (0 to 5 Years) and At-Risk Patients (6 to 64 Years)  Aged Out

## 2021-06-19 NOTE — PROGRESS NOTES
[facility] Daily Progress     Patient Name: Lalitha Underwood  Date: 7/23/2018  Visit #: 10/up to 16 - medicare  PTA visit #:    Referral Diagnosis: Unsteady ambulation   Referring provider: Kelly Hull MD  Visit Diagnosis:     ICD-10-CM    1. Generalized muscle weakness M62.81    2. Unsteadiness on feet R26.81    3. Poor balance R26.89    4. Abnormality of gait R26.9    5. Decreased functional mobility and endurance Z74.09          Assessment:   Patient continues to report improved confidence with inclines and curbs. Plan to re-test balance at next visit to determine progress thus far in PT.     Patient is benefitting from skilled physical therapy and is making steady progress toward functional goals.  Patient is appropriate to continue with skilled physical therapy intervention, as indicated by initial plan of care.    Goal Status:  Pt. will be independent with home exercise program in : 2 weeks  Pt will: increase her comfortable gait speed to >1.0m/s to decrease falls risk in 8 weeks.  Pt will: be able to complete 3 STS in 30 sec without use of UEs to decrease her falls risk in 8 weeks.    Plan / Patient Education:     Continue with initial plan of care.  Progress with home program as tolerated.   Re-test balance measures next visit.     Subjective:   Patient reports no major changes since evaluation. Notices it's easier to walk up and down her driveway which is an incline.     Objective:       Therapeutic exercise performed today:    Nu-step WL 6, 10:00 total. Average SPM 44.    - ab set with alternating leg extension and leg lower x 10 reps each side (20 total)  - glut raises x 10 reps with PT holding ball. Decreased height overall today.   - SLR with quad set x 15 reps B. Cues to relax and re-set quad between reps. Pt unable to perform without extensor lag on L side- VC to perform slowly with control upon lowering.  - clamshell L3 band x 20 reps B with TA engagement.     Other:  - standing  hip abduction  B x 15 reps  - heel/tiny raises x 30 reps with cues for increasing height of PF      Neuromuscular Re-education performed:    Foam steps from red yellow foam to ground: intermittent UE support on // with CGA.    Forward steps  x 20 reps x 2 sets. Pt using // for support- one finger   Side steps x 20 reps x 2 sets. Good demo today's date.    Backwards x 20 reps x 2 sets. No UE support, no LOB.  Seated rest between sets with cues for PLB. RPD between sets 5/10.       Other:  -1/2 foam stand x 60 seconds x 2. Pt using // intermittently for balance support.   - incline stand EC x 30 seconds x 2 reps. Slight A/P sway but no LOB.     HEP  - Ab set with alternating marching  - Bridges   - Standing hip abd.   - Heel raises   -marching   - standing modified heel toe  - sit to stands             Treatment Today   7/23/2018  TREATMENT MINUTES COMMENTS   Evaluation     Self-care/ Home management     Manual therapy     Neuromuscular Re-education 25 See above    Therapeutic Activity     Therapeutic Exercises 30 See above   Gait training     Modality__________________                Total 55    Blank areas are intentional and mean the treatment did not include these items.       Aminata Urban, PT, DPT   7/23/2018

## 2021-06-19 NOTE — PROGRESS NOTES
[facility] Daily Progress     Patient Name: Lalitha Underwood  Date: 7/9/2018  Visit #: 8/up to 16 - medicare  PTA visit #:    Referral Diagnosis: Unsteady ambulation   Referring provider: Kelly Hull MD  Visit Diagnosis:     ICD-10-CM    1. Generalized muscle weakness M62.81    2. Unsteadiness on feet R26.81    3. Poor balance R26.89    4. Abnormality of gait R26.9    5. Decreased functional mobility and endurance Z74.09          Assessment:   Patient displays improved TUG and LE strength on 30 second sit<>stand since tested on 6/25/18. These measures were re-tested today to demonstrate objectively to patient she is making improvement. Pt continues to feel poor confidence with amb outdoors and on inclines. Plan to challenge pt with gait tasks at next visit (head turns, direction changes, obstacle course).     Patient is benefitting from skilled physical therapy and is making steady progress toward functional goals.  Patient is appropriate to continue with skilled physical therapy intervention, as indicated by initial plan of care.    Goal Status:  Pt. will be independent with home exercise program in : 2 weeks  Pt will: increase her comfortable gait speed to >1.0m/s to decrease falls risk in 8 weeks.  Pt will: be able to complete 3 STS in 30 sec without use of UEs to decrease her falls risk in 8 weeks.    Plan / Patient Education:     Continue with initial plan of care.  Progress with home program as tolerated.   Plan to assess floor transfer with a chair in future visits.     Subjective:   Patient reports she has trouble walking on slight inclined surfaces where she feels like she almost freezes when she approaches it, even a little incline like going up the handicap side walk entrances. She looks to do this because she has trouble with going up a curb.     Objective:       Therapeutic exercise performed today:    Nu-step WL 5, 9:00 total. Average SPM 40.    - ab set with alternating leg  extension x 10 reps each side   - Bridges with marches x 15 reps.   - standing laying over green ball on table alternating LE extension x 10 reps each side   - sit to stands from large mat at lowest setting x 10 reps , added yellow foam disc and increased height by 2 in of table x 10 reps - able to do without UE assist today     Neuromuscular Re-education performed:    Foam steps from yellow foam to ground: intermittent UE support on // with CGA.    Forward steps  x 20 reps x 2 sets. Using // intermittently for balance.  Especially when stepping back onto foam with left foot.    Side steps x 20 reps x 2 sets.    Backwards x 20 reps x 2 sets. No UE support. No LOB.     Hip hinges against the wall - a lot of cues provided on control movement 2 x 15 reps, made small adjustments in distance from the wall throughout exercises    Shoulder taps on wall with cues for TA contraction and control of movement x 20 reps             HEP  - Ab set with alternating marching  - Bridges   - Standing hip abd.   - Heel raises   -marching   - standing modified heel toe  - sit to stands             Treatment Today   7/9/2018  TREATMENT MINUTES COMMENTS   Evaluation     Self-care/ Home management     Manual therapy     Neuromuscular Re-education 25 See above    Therapeutic Activity     Therapeutic Exercises 28 See above   Gait training     Modality__________________                Total 53    Blank areas are intentional and mean the treatment did not include these items.       Dilia Coronado, PT, DPT   7/9/2018

## 2021-06-19 NOTE — PROGRESS NOTES
[facility] Daily Progress     Patient Name: Lalitha Underwood  Date: 6/28/2018  Visit #: 6/up to 16 - medicare  PTA visit #:    Referral Diagnosis: Unsteady ambulation   Referring provider: Kelly Hull MD  Visit Diagnosis:     ICD-10-CM    1. Generalized muscle weakness M62.81    2. Unsteadiness on feet R26.81    3. Poor balance R26.89    4. Abnormality of gait R26.9    5. Decreased functional mobility and endurance Z74.09          Assessment:   Patient made mild improvements in her TUG and gait speeds today as compared to the initial assessment demonstrating some improvement in stability. There was an increase in distance on 2 minute walk test and on the 2nd trial of the four square step test suggesting an improvement in balance and endurance as well. Patient continued to have trouble with more dynamic movements, change in direction and obstacles.     Patient is benefitting from skilled physical therapy and is making steady progress toward functional goals.  Patient is appropriate to continue with skilled physical therapy intervention, as indicated by initial plan of care.    Goal Status:  Pt. will be independent with home exercise program in : 2 weeks  Pt will: increase her comfortable gait speed to >1.0m/s to decrease falls risk in 8 weeks.  Pt will: be able to complete 3 STS in 30 sec without use of UEs to decrease her falls risk in 8 weeks.    Plan / Patient Education:     Continue with initial plan of care.  Progress with home program as tolerated.   Plan to assess floor transfer with a chair in future visits.     Subjective:   Patient reports she is doing ok, a little out of breath today due to the heat. No falls, no changes.      Objective:     Therapeutic exercise performed today:    Nu-step WL 5, 10:00 total. Average SPM 44    - Ab set with marching unilaterally 20 reps each side. Cues for TA engagement  - ab set with alternating leg extension x 10 reps each side   - Bridges x 3 sec hold  "x 15 reps.   - SLR with quad set x 10 reps B, cues to relax between reps.   - standing marches alternating sides 2 x 15 reps  - standing HS curls 2 x 10 reps each side   - Step up onto 6\" step x 10 reps each LE, single UE support on //.  - Standing hip abd x 15 reps B  - Heel raises x 15 reps B    Neuromuscular Re-education performed:  Dynamic walking balance tasks on 20 foot line x 3 each alternating   - side step right  - side step left  - backwards walking  - walking forward eyes closed       HEP  - Ab set with alternating marching  - Bridges   - Standing hip abd.   - Heel raises   -marching   - standing modified heel toe  - sit to stands             Treatment Today   6/28/2018  TREATMENT MINUTES COMMENTS   Evaluation     Self-care/ Home management     Manual therapy     Neuromuscular Re-education 10 See above    Therapeutic Activity     Therapeutic Exercises 45 See above   Gait training     Modality__________________                Total 55    Blank areas are intentional and mean the treatment did not include these items.       Dilia Coronado, PT, DPT   6/28/2018  "

## 2021-06-19 NOTE — PROGRESS NOTES
[facility] Daily Progress     Patient Name: Lalitha Underwood  Date: 7/31/2018  Visit #: 11/up to 16 - medicare  PTA visit #:    Referral Diagnosis: Unsteady ambulation   Referring provider: Kelly Hull MD  Visit Diagnosis:     ICD-10-CM    1. Unsteadiness on feet R26.81    2. Generalized muscle weakness M62.81    3. Poor balance R26.89    4. Abnormality of gait R26.9    5. Decreased functional mobility and endurance Z74.09          Assessment:   Patient continues to report improving LE strength. She is appropriate for continued PT services.     Patient is benefitting from skilled physical therapy and is making steady progress toward functional goals.  Patient is appropriate to continue with skilled physical therapy intervention, as indicated by initial plan of care.    Goal Status:  Pt. will be independent with home exercise program in : 2 weeks (goal met)  Pt will: increase her comfortable gait speed to >1.0m/s to decrease falls risk in 8 weeks (progressing, goal not met, 0.95 m/s today, pt is making progress)  Pt will: be able to complete 3 STS in 30 sec without use of UEs to decrease her falls risk in 8 weeks (progressing, goal not met)    Plan / Patient Education:     Continue with initial plan of care.  Progress with home program as tolerated.   Cont PT 1x/week up to 16 sessions in POC.     Subjective:   Patient reports no major changes, no questions today.     Objective:       Therapeutic exercise performed today:    Nu-step WL 5 x 9:00 and 6 x 1:00 minute for 10:00 total. Average SPM 44.    - SLR with quad set x 15 reps B. Cues to relax and re-set quad between reps needed again today.  - Double leg bridges x 10 reps with cues for breathing.   - Standing heel raises x 15 reps      Neuromuscular Re-education performed:      Foam steps from green foam to ground: intermittent UE support on // with CGA.    Forward steps  x 20 reps x 1 set. Pt using // for support- one finger   Side steps x 20  reps x 1 set. No UE support needed.    Backwards x 20 reps x 1 set. No UE support, no LOB. shortness of breath 6/10 following all 3 sets.       HEP  - Ab set with alternating marching  - Bridges   - Standing hip abd.   - Heel raises   -marching   - standing modified heel toe  - sit to stands             Treatment Today   7/31/2018  TREATMENT MINUTES COMMENTS   Evaluation     Self-care/ Home management     Manual therapy     Neuromuscular Re-education 10 See above    Therapeutic Activity     Therapeutic Exercises 15 See above   Gait training     Modality__________________                Total 25    Blank areas are intentional and mean the treatment did not include these items.       Aminata Urban, PT, DPT   7/31/2018

## 2021-06-19 NOTE — LETTER
Letter by Perry Tamayo MD at      Author: Perry Tamayo MD Service: -- Author Type: --    Filed:  Encounter Date: 3/18/2019 Status: (Other)         Kelly Hull MD  [address]  [address]  [facility] [address]                                  March 18, 2019    Patient: Lalitha Underwood   MR Number: [id number]   YOB: 1945   Date of Visit: 3/18/2019     Dear Dr. Kurtis MD:    Thank you for referring Lalitha Underwood to me for evaluation. Below are the relevant portions of my assessment and plan of care.    If you have questions, please do not hesitate to call me. I look forward to following Lalitha along with you.    Sincerely,        Perry Tamayo MD          CC  MD Roni Estrada Bryan Jeffrey, MD  3/18/2019 11:14 AM  Sign at close encounter  Assessment and Plan:Lalitha Underwood is a 73 y.o.F  with a past medical history significant for COPD with chronic hypoxemic respiratory failure and who presents to clinic today for follow up.  She had a very large hiatal hernia repair by Dr. Wilder and this is her first visit with me since then.  She is doing better.  She is using her albuterol only out of habit, but doesn't need it through the day.  I suspect her PFTs are now improve with more lung excursion.  She may also not need nocturnal oxygen anymore.    1) COPD - Continue spiriva.  Try reducing to Advair once a day to see if this is still needed.  Stay on as needed albuterol  2) Hiatal hernia repair - her CXR today shows good reduction in diaphragmatic height and restoration of her left lower lung field.  Will recheck her PFTs to see how much ground we have gained.  3) Chronic hypoxemic respiratory failure - wondering if her oxygen needs have improved, will recheck her overnight oximetry on room air to see if she still needs this as travelling with oxygen is a hassle.  4) RTC in 6 months          CCx: COPD with diaphragm hernia    HPI: Ms. Underwood is a 73 year  "old female with COPD who returns for follow up after having her laparascopic hernia repair with Dr. Wilder in November.  She says she is \"better.\"  She is using her albuterol only once a day now, and in the morning out of habit.  She doesn't cough as much and isn't as short of breath.  Her mobility is more limited by her knees than her breathing.  She also has balance issues that she is seeing a neurologist about.  She still uses spiriva once a day and advair twice a day.   She has worn oxygen at night at 1.5 L for over 30 years, and was actively smoking when this was started.  She says travelling with oxygen is a pain.    ROS:  Review of Systems - History obtained from the patient  General ROS: negative  Psychological ROS: negative  ENT ROS: negative  Allergy and Immunology ROS: negative  Endocrine ROS: negative  Respiratory ROS: positive for - cough and shortness of breath  negative for - sputum changes or stridor  Cardiovascular ROS: no chest pain or palpitations  Gastrointestinal ROS: no abdominal pain, change in bowel habits, or black or bloody stools  Genito-Urinary ROS: no dysuria, trouble voiding, or hematuria  Musculoskeletal ROS: negative  Neurological ROS: no TIA or stroke symptoms  Dermatological ROS: negative      Current Meds:  Current Outpatient Medications   Medication Sig   ? albuterol (PROAIR HFA;PROVENTIL HFA;VENTOLIN HFA) 90 mcg/actuation inhaler Inhale 2 puffs every 6 (six) hours as needed for wheezing.   ? calcium citrate (CALCITRATE) 200 mg (950 mg) tablet Take 1 tablet by mouth 2 (two) times a day.   ? cholecalciferol, vitamin D3, 5,000 unit Tab Take 5,000 Units by mouth daily.    ? denosumab 60 mg/mL Syrg Inject 60 mg under the skin every 6 (six) months.   ? fluticasone-salmeterol (ADVAIR) 250-50 mcg/dose DISKUS Inhale 1 puff 2 (two) times a day.   ? losartan (COZAAR) 50 MG tablet Take 1 tablet (50 mg total) by mouth 2 (two) times a day.   ? OXYGEN-AIR DELIVERY SYSTEMS [facility] Use 1.5 L As " "Directed bedtime.   ? phenytoin (DILANTIN) 100 MG ER capsule Take 2 tabs by mouth in the morning and 1 tab in the evening.   ? potassium chloride 20 mEq TbER Take 20 mEq by mouth daily.   ? RABEprazole (ACIPHEX) 20 mg tablet Take 1 tablet (20 mg total) by mouth daily.   ? solifenacin (VESICARE) 5 MG tablet Take 2 tablets (10 mg total) by mouth daily.   ? tiotropium (SPIRIVA) 18 mcg inhalation capsule Place 1 capsule (2 puffs total) into inhaler and inhale daily.       Labs:  No results found for this or any previous visit (from the past 72 hour(s)).    I have personally reviewed all pertinent imaging studies and PFT results unless otherwise noted.    Imaging studies:  Xr Chest 2 Views    Result Date: 3/18/2019  XR CHEST 2 VIEWS 3/18/2019 9:45 AM INDICATION: Diaphragmatic hernia without obstruction or gangrene COMPARISON: 03/29/2018 and older studies. FINDINGS: Interval repair of the large hiatal hernia. Slight elevation of left hemidiaphragm, much less than before. Linear areas of fibroatelectasis noted in both bases. Lungs are otherwise clear. Heart and pulmonary vascularity are normal. Posttraumatic deformity of the right glenohumeral joint with cancellus screw in the glenoid.        Physical Exam:  /76   Pulse 93   Temp 97.6  F (36.4  C) (Oral)   Ht 5' 4.2\" (1.631 m)   LMP 12/12/1998   SpO2 96%   BMI 34.80 kg/m     General - Well nourished  Ears/Mouth -  OP pink moist, no thrush  Neck - no cervical lymphadenopathy  Lungs - Clear to ausculation bilaterally   CVS - regular rhythm with no murmurs, rubs or gallups  Abdomen - soft, NT, ND, NABS  Ext - no cyanosis, clubbing or edema  Skin - no rash  Psychology - alert and oriented, answers appropriate        Electronically signed by:    Perry Tamayo MD PhD  [facility]         "

## 2021-06-19 NOTE — PROGRESS NOTES
[facility] Daily Progress     Patient Name: Lalitha Underwood  Date: 7/18/2018  Visit #: 9/up to 16 - medicare  PTA visit #:    Referral Diagnosis: Unsteady ambulation   Referring provider: Kelly Hull MD  Visit Diagnosis:     ICD-10-CM    1. Generalized muscle weakness M62.81    2. Unsteadiness on feet R26.81    3. Poor balance R26.89    4. Abnormality of gait R26.9    5. Decreased functional mobility and endurance Z74.09          Assessment:   Patient displays improved static balance today with standing foam ex's- she was able to stand EC in [place]berg without LOB on blue airex foam. Patient continues to appear motivated throughout sessions and is compliant with HEP outside of PT.    Patient is benefitting from skilled physical therapy and is making steady progress toward functional goals.  Patient is appropriate to continue with skilled physical therapy intervention, as indicated by initial plan of care.    Goal Status:  Pt. will be independent with home exercise program in : 2 weeks  Pt will: increase her comfortable gait speed to >1.0m/s to decrease falls risk in 8 weeks.  Pt will: be able to complete 3 STS in 30 sec without use of UEs to decrease her falls risk in 8 weeks.    Plan / Patient Education:     Continue with initial plan of care.  Progress with home program as tolerated.   Plan to assess floor transfer with a chair in future visits.     Subjective:   Patient reports she continues to avoid curbs, but does feel her balance is improving. Felt more secure with transferring in/out of her tub at home.  Objective:       Therapeutic exercise performed today:    Nu-step WL 6, 9:00 total. Average SPM 40.    - ab set with alternating leg extension and leg lower x 10 reps each side (20 total)  - bridges x 15 reps total with cues to increase height of bridges  - glut raises x 10 reps with PT holding bal   - SLR with quad set x 15 reps B. Extensor lag present on L side.   - clamshell L2 band x 20  reps B with TA engagement    Other:  - standing hip abduction  B x 15 reps  - heel/tiny raises x 30 reps with cues for increasing height of PF      Neuromuscular Re-education performed:    Foam steps from red yellow foam to ground: intermittent UE support on // with CGA.    Forward steps  x 20 reps x 2 sets. LOB x 2 with each set when stepping back with L LE.   Side steps x 20 reps x 2 sets. Good demo today's date.    Backwards x 20 reps x 2 sets. No UE support, no LOB.      Other:  -1/2 foam stand x 60 seconds x 2. Pt using // intermittently for balance support.   - incline stand EC x 30 seconds x 2 reps. Moderate instability, no LOB.  - NBOS with head turns up/center, right/left. No LOB.   - scarves tossing x 60 seconds with cognitive task of naming fruits for additional rep     HEP  - Ab set with alternating marching  - Bridges   - Standing hip abd.   - Heel raises   -marching   - standing modified heel toe  - sit to stands             Treatment Today   7/18/2018  TREATMENT MINUTES COMMENTS   Evaluation     Self-care/ Home management     Manual therapy     Neuromuscular Re-education 25 See above    Therapeutic Activity     Therapeutic Exercises 30 See above   Gait training     Modality__________________                Total 55    Blank areas are intentional and mean the treatment did not include these items.       Aminata Urban, PT, DPT   7/18/2018

## 2021-06-19 NOTE — PROGRESS NOTES
[facility] Daily Progress     Patient Name: Llaitha Underwood  Date: 7/5/2018  Visit #: 7/up to 16 - medicare  PTA visit #:    Referral Diagnosis: Unsteady ambulation   Referring provider: Kelly Hull MD  Visit Diagnosis:     ICD-10-CM    1. Generalized muscle weakness M62.81    2. Unsteadiness on feet R26.81    3. Poor balance R26.89    4. Abnormality of gait R26.9    5. Decreased functional mobility and endurance Z74.09          Assessment:   Patient displays improved TUG and LE strength on 30 second sit<>stand since tested on 6/25/18. These measures were re-tested today to demonstrate objectively to patient she is making improvement. Pt continues to feel poor confidence with amb outdoors and on inclines. Plan to challenge pt with gait tasks at next visit (head turns, direction changes, obstacle course).     Patient is benefitting from skilled physical therapy and is making steady progress toward functional goals.  Patient is appropriate to continue with skilled physical therapy intervention, as indicated by initial plan of care.    Goal Status:  Pt. will be independent with home exercise program in : 2 weeks  Pt will: increase her comfortable gait speed to >1.0m/s to decrease falls risk in 8 weeks.  Pt will: be able to complete 3 STS in 30 sec without use of UEs to decrease her falls risk in 8 weeks.    Plan / Patient Education:     Continue with initial plan of care.  Progress with home program as tolerated.   Plan to assess floor transfer with a chair in future visits.     Subjective:   Patient reports she had to walk up an incline and was fearful she was going to fall.     Objective:     30 second sit<>stand: 12 reps with single UE support  (11 reps 6/5/18).     TUG: 10.71 seconds, 9.50 seconds, 9.84 seconds  Average of 3: 10.02   (6/5/18 12.4 seconds)     Gait speed (10M walk test)  Comfortable speed: 6.5 seconds, 0.92 m/s  Fast speed: 5.10 seconds, 1.18 m/s  (previously 0.85 m/s and 1.07  "m/s)    Therapeutic exercise performed today:    Nu-step WL 5, 8:00 total. Average SPM 40.    Testing as above    Neuromuscular Re-education performed:    Foam steps from yellow foam to ground: intermittent UE support on // with CGA.    Forward steps  x 20 reps x 2 sets. Using // intermittently for balance.    Side steps x 20 reps x 2 sets.    Backwards x 20 reps x 2 sets. No UE support. No LOB. Pt catches her foot on foam occasionally due to fatigue.   Pt requires seated rest break between sets due to shortness of breath and LE fatigue.   Cued patient for breathing \"smell the roses, blow out the birthday candles\" to facilitate more efficient breathing.               HEP  - Ab set with alternating marching  - Bridges   - Standing hip abd.   - Heel raises   -marching   - standing modified heel toe  - sit to stands             Treatment Today   7/5/2018  TREATMENT MINUTES COMMENTS   Evaluation     Self-care/ Home management     Manual therapy     Neuromuscular Re-education 30 See above    Therapeutic Activity     Therapeutic Exercises 25 See above   Gait training     Modality__________________                Total 55    Blank areas are intentional and mean the treatment did not include these items.       Aminata Urban, PT, DPT   7/5/2018  "

## 2021-06-19 NOTE — PROGRESS NOTES
"[facility] Daily Progress     Patient Name: Lalitha Underwood  Date: 8/8/2018  Visit #: 12/up to 16 - medicare  PTA visit #:    Referral Diagnosis: Unsteady ambulation   Referring provider: Kelly Hull MD  Visit Diagnosis:     ICD-10-CM    1. Unsteadiness on feet R26.81    2. Generalized muscle weakness M62.81    3. Poor balance R26.89    4. Abnormality of gait R26.9    5. Decreased functional mobility and endurance Z74.09          Assessment:   Patient continues to report improving LE strength and improving confidence with navigation on uneven terrain and inclines/declines.    Patient is benefitting from skilled physical therapy and is making steady progress toward functional goals.  Patient is appropriate to continue with skilled physical therapy intervention, as indicated by initial plan of care.    Goal Status:  Pt. will be independent with home exercise program in : 2 weeks (goal met)  Pt will: increase her comfortable gait speed to >1.0m/s to decrease falls risk in 8 weeks (progressing, goal not met, 0.95 m/s today, pt is making progress)  Pt will: be able to complete 3 STS in 30 sec without use of UEs to decrease her falls risk in 8 weeks (progressing, goal not met)    Plan / Patient Education:     Continue with initial plan of care.  Progress with home program as tolerated.   Cont PT 1x/week up to 16 sessions in POC.     Subjective:   Patient reports no major changes, no questions today.     Objective:       Therapeutic exercise performed today:    Nu-step WL 5 x 9:00. Average SPM 47.    - SLR with quad set x 15 reps B. Continued cues to relax and re-set quad between reps needed again today.  - Sidelying SLR x 10 reps B with TC to keep hips level. Difficulty performing without lumbar mm activation on R side.   - Glut raises x 10 reps x 2 sets on green ball, PT holding swiss ball.   - Clamshell L2 band x 15 reps B.   - Heel raises x 15 reps B  - Step up onto 6\" step x 10 reps B with cues to " push through heel to increase gluteal activation.       Neuromuscular Re-education performed:      Foam steps from green foam to ground: intermittent UE support on // with CGA.    Forward steps  x 20 reps x 1 set. Pt using // for support- one finger   Side steps x 20 reps x 1 set. No UE support needed.    Backwards x 20 reps x 1 set. No UE support, no LOB. shortness of breath 5/10 following all 3 sets.   Other:  - Sit to stand from standard chair x 10 reps with B UE support.   - EC on incline x 30 seconds x 3 reps  - 1/2 foam stand x 30 seconds x 2 reps    HEP  - Ab set with alternating marching  - Bridges   - Standing hip abd.   - Heel raises   -marching   - standing modified heel toe  - sit to stands             Treatment Today   8/8/2018  TREATMENT MINUTES COMMENTS   Evaluation     Self-care/ Home management     Manual therapy     Neuromuscular Re-education 25 See above    Therapeutic Activity     Therapeutic Exercises 30 See above   Gait training     Modality__________________                Total 55    Blank areas are intentional and mean the treatment did not include these items.       Aminata Urban, PT, DPT   8/8/2018

## 2021-06-19 NOTE — PROGRESS NOTES
"[facility] Daily Progress     Patient Name: Lalitha Underwood  Date: 8/13/2018  Visit #: 13/up to 16 - medicare  PTA visit #:    Referral Diagnosis: Unsteady ambulation   Referring provider: Kelly Hull MD  Visit Diagnosis:     ICD-10-CM    1. Unsteadiness on feet R26.81    2. Generalized muscle weakness M62.81    3. Poor balance R26.89    4. Abnormality of gait R26.9    5. Decreased functional mobility and endurance Z74.09          Assessment:   Patient continues to report improving LE strength. She displays good foot clearance and overall less LE fatigue with dynamic balance steps on foam today. Pt required less frequent rest breaks today and continues to appear highly motivated during all exercises.    Patient is benefitting from skilled physical therapy and is making steady progress toward functional goals.  Patient is appropriate to continue with skilled physical therapy intervention, as indicated by initial plan of care.    Goal Status:  Pt. will be independent with home exercise program in : 2 weeks (goal met)  Pt will: increase her comfortable gait speed to >1.0m/s to decrease falls risk in 8 weeks (progressing, goal not met, 0.95 m/s today, pt is making progress)  Pt will: be able to complete 3 STS in 30 sec without use of UEs to decrease her falls risk in 8 weeks (progressing, goal not met)    Plan / Patient Education:     Continue with initial plan of care.  Progress with home program as tolerated.   Cont PT 1x/week up to 16 sessions in POC.     Subjective:   Patient reports no major changes, no questions today.     Objective:       Therapeutic exercise performed today:    Nu-step WL 5 x 9:00. Average SPM 49.    - SLR with quad set x 15 reps B. Improved extensor lag B>   - Sidelying SLR not today  - Glut raises x 10 reps x 2 sets on green ball, PT holding swiss ball.   - Clamshell L2 band x 15 reps B.   - Step up onto 6\" step x 10 reps B with cues to push through heel to increase " gluteal activation.       Neuromuscular Re-education performed:      Foam steps from green foam to ground: intermittent UE support on // with CGA.    Forward steps  x 20 reps x 1 set. Pt using // for support- one finger   Side steps x 20 reps x 1 set. No UE support needed.    Backwards x 20 reps x 1 set. No UE support, no LOB. Short seated rest following all 3 sets. Pt appearing overall less fatigued in B LE after sets today, good foot clearance present.     Other:    - tandem stance with head turns up/right/left/center. Frequent LOB with head turns, otherwise stable  - NBOS on airex with head turns up/right/left/center. No major LOB; VC for core activation.   - NBOS on airex with EC x 30 seconds x 2 reps.   - Sit to stand from standard chair x 10 reps with B UE support. RPD: 5/10 following.  - 1/2 foam stand x 30 seconds x 2 reps. Using // fo rsupport.           HEP  - Ab set with alternating marching  - Bridges   - Standing hip abd.   - Heel raises   -marching   - standing modified heel toe  - sit to stands             Treatment Today   8/13/2018  TREATMENT MINUTES COMMENTS   Evaluation     Self-care/ Home management     Manual therapy     Neuromuscular Re-education 25 See above    Therapeutic Activity     Therapeutic Exercises 30 See above   Gait training     Modality__________________                Total 55    Blank areas are intentional and mean the treatment did not include these items.       Aminata Urban, PT, DPT   8/13/2018

## 2021-06-19 NOTE — LETTER
Letter by Perry Tamayo MD at      Author: Perry Tamayo MD Service: -- Author Type: --    Filed:  Encounter Date: 9/16/2019 Status: (Other)         Kelly Hull MD  [address]  [address]  [facility] [address]                                  September 16, 2019    Patient: Lalitha Underwood   MR Number: [id number]   YOB: 1945   Date of Visit: 9/16/2019     Dear Dr. Kurtis MD:    Thank you for referring Lalitha Underwood to me for evaluation. Below are the relevant portions of my assessment and plan of care.    If you have questions, please do not hesitate to call me. I look forward to following Lalitha along with you.    Sincerely,        Perry Tamayo MD          CC  No Recipients  Perry Tamayo MD  9/16/2019 11:10 AM  Sign at close encounter  Assessment and Plan:Lalitha Underwood is a 74 y.o. F with a past medical history significant for severe COPD and a prior severe hiatal hernia who presents to clinic today for follow up.  Since her hernia repair her symptoms are much improved.  Her PFTs show a dramatic change in air trapping and lung volumes, but curioiusly, did not change her airflows.    1) COPD - remain on daily spiriva and OK to use advair only once a day given her symptoms are so well managed.  2) Recommend flu shot later in the year  3) Recommend exercise as possible to improve conditioning  4) RTC in 1 year           CCx: COPD follow up    HPI:Ms. Underwood is a 74 year old female with a history of COPD who returns for follow up.  This is her second visit after having her hiatal hernia repair early in the year.  She thinks she is continuing to do well.  She has had no exacerbations and has not required antibiotics or steroids.  She is not limited by her lungs in anyway.  She has an occasional cough.  She is using spiriva once a day and advair once a day, having quit her morning dose of advair with no detriment.  She uses her albuterol in the morning to  "\"get going\".  She ambulates with a walker given some instability issues, and this has helped a lot over walking with a cane alone.  She did have a intermittent pulling sensation in her stomach she was worried was her surgical repair coming undone.  It went away and didn't effect her breathing.    ROS:  Review of Systems - History obtained from the patient  General ROS: negative  Psychological ROS: negative  ENT ROS: negative  Allergy and Immunology ROS: negative  Endocrine ROS: negative  Respiratory ROS: positive for - cough  negative for - shortness of breath or sputum changes  Cardiovascular ROS: no chest pain or palpitations  Gastrointestinal ROS: no abdominal pain, change in bowel habits, or black or bloody stools  Genito-Urinary ROS: no dysuria, trouble voiding, or hematuria  Musculoskeletal ROS: negative  Neurological ROS: no TIA or stroke symptoms  Dermatological ROS: negative      Current Meds:  Current Outpatient Medications   Medication Sig   ? albuterol (PROAIR HFA;PROVENTIL HFA;VENTOLIN HFA) 90 mcg/actuation inhaler INHALE 2 PUFFS EVERY 6 HOURS AS NEEDED FOR WHEEZING   ? calcium citrate (CALCITRATE) 200 mg (950 mg) tablet Take 1 tablet by mouth 2 (two) times a day.   ? cholecalciferol, vitamin D3, 5,000 unit Tab Take 5,000 Units by mouth daily.    ? denosumab 60 mg/mL Syrg Inject 60 mg under the skin every 6 (six) months.   ? fluticasone-salmeterol (ADVAIR) 250-50 mcg/dose DISKUS Inhale 1 puff 2 (two) times a day.   ? levETIRAcetam (KEPPRA) 500 MG tablet Take 500 mg by mouth 2 (two) times a day.   ? losartan (COZAAR) 50 MG tablet Take 1 tablet (50 mg total) by mouth 2 (two) times a day.   ? OXYGEN-AIR DELIVERY SYSTEMS MISC Use 1.5 L As Directed bedtime.   ? potassium chloride 20 mEq TbER Take 20 mEq by mouth daily.   ? RABEprazole (ACIPHEX) 20 mg tablet Take 1 tablet (20 mg total) by mouth daily.   ? tiotropium (SPIRIVA) 18 mcg inhalation capsule Place 1 capsule (2 puffs total) into inhaler and inhale " "daily.       Labs:  No results found for this or any previous visit (from the past 72 hour(s)).    I have personally reviewed all pertinent imaging studies and PFT results unless otherwise noted.    Imaging studies:  Mammo Screening Bilateral    Result Date: 4/10/2019  BILATERAL FULL FIELD DIGITAL SCREENING MAMMOGRAM WITH TOMOSYNTHESIS Performed on: 4/10/19. Compared to: 12/09/2017 Mammo Screening Bilateral, 12/01/2016 Mammo Screening Bilateral, and 12/16/2015 Mammo Screening Bilateral Findings: The breasts have scattered areas of fibroglandular densities. There is no radiographic evidence of malignancy. This study was evaluated with the assistance of Computer-Aided Detection. Breast Tomosynthesis was used in interpretation. Repeat routine screening mammogram in one year is recommended. ACR BI-RADS Category 1: Negative        Physical Exam:  /80   Pulse 86   Resp 12   Ht 5' 4\" (1.626 m)   Wt 219 lb (99.3 kg)   LMP 12/12/1998   SpO2 94%   Breastfeeding? No   BMI 37.59 kg/m     General - Well nourished  Ears/Mouth -  OP pink moist, no thrush  Neck - no cervical lymphadenopathy  Lungs - Clear to ausculation bilaterally  CVS - regular rhythm with 2/6 pansystolic murmur  Abdomen - soft, NT, ND, NABS  Ext - no cyanosis, clubbing or edema  Skin - no rash  Psychology - alert and oriented, answers appropriate        Electronically signed by:    Perry Tamayo MD PhD  [facility]       "

## 2021-06-19 NOTE — PROGRESS NOTES
[facility] Daily Progress     Patient Name: Lalitha Underwood  Date: 7/25/2018  Visit #: 10/up to 16 - medicare  PTA visit #:    Referral Diagnosis: Unsteady ambulation   Referring provider: Kelly Hull MD  Visit Diagnosis:     ICD-10-CM    1. Generalized muscle weakness M62.81    2. Unsteadiness on feet R26.81    3. Poor balance R26.89    4. Abnormality of gait R26.9    5. Decreased functional mobility and endurance Z74.09          Assessment:   Patient has been seen for 10 PT visits since evaluation. Balance/gait measures were re-tested today. Pt displays steady improvement since evaluation on 6/5/18 and re-test on 7/5/18. Since tested on 7/5/18, TUG score decreased from 10.02 seconds to 9.75 seconds, indicating decreased falls risk. Gait speed has increased steadily from 0.85 m/s on 6/5/18 and 0.92 m/s on 7/5/18 to 0.95 m/s today, just short of patient's goal of 1.0 m/s. Pt displays improved dynamic balance with decrease from 22.0 seconds to 20.93 seconds on 4 square step test. Patient appears happy with her progress thus far and is appropriate to cont PT services up to 16 visits as outlined in original POC.     Patient is benefitting from skilled physical therapy and is making steady progress toward functional goals.  Patient is appropriate to continue with skilled physical therapy intervention, as indicated by initial plan of care.    Goal Status:  Pt. will be independent with home exercise program in : 2 weeks (goal met)  Pt will: increase her comfortable gait speed to >1.0m/s to decrease falls risk in 8 weeks (progressing, goal not met, 0.95 m/s today, pt is making progress)  Pt will: be able to complete 3 STS in 30 sec without use of UEs to decrease her falls risk in 8 weeks (progressing, goal not met)    Plan / Patient Education:     Continue with initial plan of care.  Progress with home program as tolerated.   Cont PT 1x/week up to 16 sessions in POC.     Subjective:   Patient reports  she is happy with her progress so far and would like to cont PT 1x/week.     Objective:     TU.53, 10.88, 8.88 seconds,   Average of 3: 9.75 seconds  (10.02 seconds on 18)    30 second sit<>stand: 12 reps with single UE support  (12 reps with single UE support on 18)    2 minute walk test: 124.4 meters  (106 meters on 18)    4 square step test: 20.93 seconds, no dowel strikes  (22.0 seconds with 3 dowel strikes on 18)    Gait speed (10 meter walk test):  Comfortable gait speed: 6.34 seconds, 0.95 m/s  AD used? None   (gait speed on 18 was 0.92 m/s)    Fast gait speed: 5.06 seconds, 1.19 m/s  AD used? None   (gait speed was 1.18 m/s on 18)        Therapeutic exercise performed today:    Nu-step WL 6, 10:00 total. Average SPM 44.    - glut raises x 10 reps with PT holding ball. Decreased height overall today.   - SLR with quad set x 15 reps B. Cues to relax and re-set quad between reps needed again today.  - sidelying SLR x 10 reps with TC to keep hip in neutral       Neuromuscular Re-education performed:    Testing as above    Foam steps from green foam to ground: intermittent UE support on // with CGA.    Forward steps  x 20 reps x 1 set. Pt using // for support- one finger   Side steps x 20 reps x 1 sets. No UE support needed.    Backwards x 20 reps x 1 sets. No UE support, no LOB. shortness of breath 7/10 following all 3 sets.             HEP  - Ab set with alternating marching  - Bridges   - Standing hip abd.   - Heel raises   -marching   - standing modified heel toe  - sit to stands             Treatment Today   2018  TREATMENT MINUTES COMMENTS   Evaluation     Self-care/ Home management     Manual therapy     Neuromuscular Re-education 25 See above    Therapeutic Activity     Therapeutic Exercises 30 See above   Gait training     Modality__________________                Total 55    Blank areas are intentional and mean the treatment did not include these items.       Aminata  Wilmar, PT, DPT   7/25/2018

## 2021-06-20 NOTE — PROGRESS NOTES
[facility] Re-Certification Request    August 23, 2018      Patient: Lalitha Underwood  MR Number: [id number]  YOB: 1945  Date of Visit: 8/23/2018  Date of Eval: 6/5/18    Dear Dr. Hull,    As you may recall, we have been seeing Lalitha Underwood for Physical Therapy for .    For therapy to continue, Medicare and/or Medicaid requires periodic physician review of the treatment plan. Please review the summary of the patient's progress and our plan for continued therapy, and verify  that you agree therapy should continue by entering certification dates at the bottom of this note and co-signing this note.    Plan of Care  Authorization / Certification Start Date: 08/23/18  Authorization / Certification End Date: 11/21/18  Authorization / Certification Number of Visits: 16  Communication with: Referral Source  Patient Related Instruction: Nature of Condition;Treatment plan and rationale;Self Care instruction;Posture;Precautions;Basis of treatment;Next steps;Expected outcome;Body mechanics  Times per Week: 2  Number of Weeks: 8  Number of Visits: up to 16  Therapeutic Exercise: ROM;Stretching;Strengthening  Neuromuscular Reeducation: posture;balance/proprioception;core  Gait Training: to decrease falls risk  Equipment: theraband    Goal  Pt. will be independent with home exercise program in : 2 weeks  Pt will: increase her comfortable gait speed to >1.0m/s to decrease falls risk in 8 weeks.  Pt will: be able to complete 3 STS in 30 sec without use of UEs to decrease her falls risk in 8 weeks.      If you have any questions or concerns, please don't hesitate to call.    Sincerely,      Aminata Urban, PT        Physician recommendation:     ___ Follow therapist's recommendation        ___ Modify therapy      *Physician co-signature indicates they certify the need for these services furnished within this plan and while under their care.      [facility] Daily Progress     Patient Name:  Lalitha SALINAS [facility]  Date: 8/23/2018  Visit #: 14/up to 16 - medicare  PTA visit #:    Referral Diagnosis: Unsteady ambulation   Referring provider: Kelly Hull MD  Visit Diagnosis:     ICD-10-CM    1. Unsteadiness on feet R26.81    2. Generalized muscle weakness M62.81    3. Decreased functional mobility and endurance Z74.09    4. Poor balance R26.89    5. Abnormality of gait R26.9          Assessment:   Balance and gait measures were re-assessed today. Pt displays improvements on all measures with the exception of 30 second sit<>stand since last tested on 7/25/18. Pt displays decreased falls risk with TUG score decreased from 9.75 seconds to 8.88 seconds. Gait speed increased from 0.95 m/s to 1.01 m/s indicating improved gait efficiency and decreased falls risk. Pt's dynamic balance has improved from 20.93 seonds to 17.72 seconds on 4 square step test, however pt is still considered a high falls risk. She continues to score below age gender normative values. Patient has made steady improvement in balance and gait measures since evaluation. She has met gait speed goal today's date and other goals are partially met. Pt is appropriate for up to 2 additional PT visits before d/c.   Re-cert performed today, g-codes updated accordingly.      Patient is benefitting from skilled physical therapy and is making steady progress toward functional goals.  Patient is appropriate to continue with skilled physical therapy intervention, as indicated by initial plan of care.       Goal Status:  Pt. will be independent with home exercise program in : 2 weeks (goal met)  Pt will: increase her comfortable gait speed to >1.0m/s to decrease falls risk in 8 weeks (progressing, goal not met, 0.95 m/s today, pt is making progress)  Pt will: be able to complete 3 STS in 30 sec without use of UEs to decrease her falls risk in 8 weeks (progressing, goal not met)    Plan / Patient Education:     Continue with initial plan of  care.  Progress with home program as tolerated.   Cont PT 1x/week up to 16 sessions in POC.   Add g-codes for sit to stand   Subjective:   Patient reports no major changes, no questions today.    Objective:        Re-test: 7/25/18 Testing Date: 8/23/18   30 second sit<>stand   12 reps 12 reps   TUG   9.75 seconds   8.88 seconds   Cognitive TUG   NT seconds NT seconds   Comfortable Gait Speed   0.95 m/s 5.89 seconds,1.01 m/s   Fast Gait Speed   1.19 m/s 1.20 m/s   4 Square Step Test   20.93 seconds 17.72 seconds   2 Minute Walk test   124.4  125.5 meters   Mini-BESTest   NT/28 NT/28              Single leg stance:  R: 2.2 sec  L: 1.6 sec    Therapeutic exercise performed today:    Nu-step WL 5 x 9:00. Average SPM 49.    - SLR with quad set x 15 reps B. Improved demo.  - Sidelying SLR not today  - Glut raises x 10 reps x 2 sets on green ball, PT holding swiss ball.   - Clamshell L2 band x 15 reps B.        Neuromuscular Re-education performed:    Other:    - NBOS on airex with head turns up/right/left/center. No major LOB; VC for core activation.   - NBOS on airex with EC x 30 seconds x 2 reps.         HEP  - Ab set with alternating marching  - Bridges   - Standing hip abd.   - Heel raises   -marching   - standing modified heel toe  - sit to stands             Treatment Today   8/23/2018  TREATMENT MINUTES COMMENTS   Evaluation     Self-care/ Home management     Manual therapy     Neuromuscular Re-education 25 See above    Therapeutic Activity     Therapeutic Exercises 30 See above- testing as above   Gait training     Modality__________________                Total 55    Blank areas are intentional and mean the treatment did not include these items.       Aminata Urban, PT, DPT   8/23/2018

## 2021-06-20 NOTE — LETTER
Letter by Perry Tamayo MD at      Author: Perry Tamayo MD Service: -- Author Type: --    Filed:  Encounter Date: 9/8/2020 Status: (Other)         Kelly Hull MD  [address]  [address]  [facility] [address]                                  September 8, 2020    Patient: Lalitha Underwood   MR Number: [id number]   YOB: 1945   Date of Visit: 9/8/2020     Dear Dr. Kurtis MD:    Thank you for referring Lalitha Underwood to me for evaluation. Below are the relevant portions of my assessment and plan of care.    If you have questions, please do not hesitate to call me. I look forward to following Lalitha along with you.    Sincerely,        Perry Tamayo MD          CC  [employer]  Jazlyn Pineda, [facility]  9/8/2020  2:38 PM  Sign when Signing Visit  Oxygen saturation walk test    Patient oxygen saturation on RA at rest is 93%.  Oxygen saturation while ambulating 150ft on RA is 84%.    Oxygen pulse dose testing  While ambulating 300ft on 3LPM at pulse dose, oxygen saturation is 84%.   While ambulating 300ft on 5LPM at pulse dose, oxygen saturation is 89%.    DME Provider: [employer]    Patient is ambulatory within his/her home.        Perry Tamayo MD  9/8/2020  3:12 PM  Sign when Signing Visit  Assessment and Plan:Lalitha Underwood is a 75 y.o. F with a past medical history significant for severe COPD who presents to clinic today for follow up.  She is doing quite well from a breathing standpoint.  She is stable on her spiriva and once a day advair.  She would like to see if she needs oxygen with exertion.    1) COPD - continue spiriva and advair as current.  Use albuterol as needed.    2) Chronic hypoxic respiratory failure - uses 1.5 L with sleep, today we learned she qualifies for oxygen with ambulation    Due to desaturation of SaO2 less than or equal to 88% on room air at rest from COPD, home oxygen therapy will benefit my patient's condition.  The patient has tried  (or considered) other medications with limited success and oxygen is still required. The patient is mobile in the home and requires portability.    3) RTC in 6 months        CCx: copd    HPI: Ms. Michaud is a 75 year old female with severe COPD and chronic hypoxic respiratory failure who presents for a routine follow up.  She feels her lungs are doing well as of late, her main problem is gait steadiness for which she is seeing therapists for.  She remains on spiriva and advair.  She uses albuterol once in the morning out of routine, but then almost never needs it later in the day.  She has had no flairs of her lungs since then.  She is gaining some weight so feels a little more short of breath from this.  She is wondering if she needs portable oxygen.  She uses 1.5L with sleep at night, but this unit is not portable.    ROS:  Review of Systems - History obtained from the patient  General ROS: negative  Psychological ROS: negative  ENT ROS: negative  Allergy and Immunology ROS: negative  Endocrine ROS: negative  Respiratory ROS: positive for - shortness of breath  negative for - cough, hemoptysis, orthopnea, sputum changes, stridor or tachypnea  Cardiovascular ROS: no chest pain or palpitations  Gastrointestinal ROS: no abdominal pain, change in bowel habits, or black or bloody stools  Genito-Urinary ROS: no dysuria, trouble voiding, or hematuria  Musculoskeletal ROS: negative  Neurological ROS: no TIA or stroke symptoms  Dermatological ROS: negative      Current Meds:  Current Outpatient Medications   Medication Sig   ? albuterol (PROAIR HFA;PROVENTIL HFA;VENTOLIN HFA) 90 mcg/actuation inhaler Inhale 2 puffs every 4 (four) hours as needed for wheezing.   ? calcium citrate (CALCITRATE) 200 mg (950 mg) tablet Take 1 tablet by mouth 2 (two) times a day.   ? cholecalciferol, vitamin D3, 5,000 unit Tab Take 5,000 Units by mouth daily.    ? denosumab 60 mg/mL Syrg Inject 60 mg under the skin every 6 (six) months.   ?  fluticasone propion-salmeteroL (ADVAIR) 250-50 mcg/dose DISKUS Inhale 1 puff 2 (two) times a day.   ? hydroCHLOROthiazide (HYDRODIURIL) 25 MG tablet Take 1 tablet (25 mg total) by mouth daily.   ? hyoscyamine (LEVBID) 0.375 mg 12 hr tablet Take 1 tablet (0.375 mg total) by mouth every 12 (twelve) hours as needed for cramping.   ? levETIRAcetam (KEPPRA) 500 MG tablet Take 1 tablet (500 mg total) by mouth 2 (two) times a day.   ? LORazepam (ATIVAN) 0.5 MG tablet Take 1 tablet (0.5 mg total) by mouth every 8 (eight) hours as needed for anxiety.   ? losartan (COZAAR) 50 MG tablet Take 1 tablet (50 mg total) by mouth 2 (two) times a day.   ? omeprazole (PRILOSEC) 20 MG capsule Take 1 capsule (20 mg total) by mouth daily before breakfast.   ? OXYGEN-AIR DELIVERY SYSTEMS MISC Use 1.5 L As Directed bedtime.   ? potassium chloride 20 mEq TbER Take 20 mEq by mouth daily.   ? RABEprazole (ACIPHEX) 20 mg tablet    ? tiotropium (SPIRIVA) 18 mcg inhalation capsule Place 1 capsule (2 puffs total) into inhaler and inhale daily.       Labs:  No results found for this or any previous visit (from the past 72 hour(s)).    I have personally reviewed all pertinent imaging studies and PFT results unless otherwise noted.    Imaging studies:  Us Biopsy Thyroid Fine Needle Aspiration    Result Date: 7/29/2020  EXAM: 1. FINE-NEEDLE ASPIRATION LEFT THYROID NODULE 2. ULTRASOUND GUIDANCE LOCATION: [facility] DATE/TIME: 7/29/2020 2:20 PM INDICATION: Family history of malignant neoplasm of other organs or systems PROCEDURE: Informed consent obtained. Time out performed. The site was prepped and draped in sterile fashion. 5 mL of 1% lidocaine was infused into the local soft tissues. Under direct ultrasound guidance, multiple fine-needle aspirates of the nodule were obtained. RADIOLOGIC SUPERVISION AND INTERPRETATION: ULTRASOUND GUIDANCE: Images demonstrate the needle within the nodule.     1.  Status post ultrasound-guided fine-needle  "aspiration of a left thyroid nodule.         Physical Exam:  /86   Pulse 84   Ht 5' 4\" (1.626 m)   Wt 220 lb (99.8 kg)   LMP 12/12/1998   SpO2 92% Comment: RA  BMI 37.76 kg/m    General - Well nourished  Ears/Mouth -  OP pink moist, no thrush  Neck - no cervical lymphadenopathy  Lungs - Clear to ausculation bilaterally  CVS - regular rhythm with no murmurs, rubs or gallups  Abdomen - soft, NT, ND, NABS  Ext - no cyanosis, clubbing or edema  Skin - no rash  Psychology - alert and oriented, answers appropriate        Electronically signed by:    Perry Tamayo MD PhD  [facility]         "

## 2021-06-20 NOTE — PROGRESS NOTES
[facility] Daily Progress/Discharge Summary     Patient Name: Lalitha Underwood  Date: 9/4/2018  Visit #: 16/up to 16 - medicare  PTA visit #:    Referral Diagnosis: Unsteady ambulation   Referring provider: Kelly Hull MD  Visit Diagnosis:     ICD-10-CM    1. Unsteadiness on feet R26.81    2. Generalized muscle weakness M62.81    3. Decreased functional mobility and endurance Z74.09    4. Poor balance R26.89    5. Abnormality of gait R26.9          Assessment:   Patient has been seen for 16 visits since evaluation. She has made significant progress in LE strength and balance since starting PT. Measures not re-assessed today as they were re-assessed recently on 8/23/18 (see note for detail). Pt is appropriate for d/c from PT at this time. She has been given HEP to continue independently and will be following up with a  at the [facility].      Goal Status:  Pt. will be independent with home exercise program in : 2 weeks (goal met)  Pt will: increase her comfortable gait speed to >1.0m/s to decrease falls risk in 8 weeks (goal partially met)  Pt will: be able to complete 3 STS in 30 sec without use of UEs to decrease her falls risk in 8 weeks (goal not met)    Plan / Patient Education:     D/c from PT. No additional visits scheduled.     Subjective:   Patient reports no lower back pain, but c/o lateral L sided hip pain today.   Will be continuing with a  at the [facility].    Objective:     10 M walk test: comfortable gait speed  0.90 m/s today's date. Pt c/o L sided hip pain today.    Therapeutic exercise performed today:    Nu-step WL 5 x 8:00. Average SPM 49.    - Ab set with alternating marches x 20 reps x 3 sets. Verbal cues for keeping TA activated and continued breathing.   - Bridges x 3 seconds x 10 reps, again cues for breathing.   - SLR with quad set x 10 reps, with ab set.   - Supine piriformis stretch x 30 seconds B  - Reach and roll x 4 reps B. TC for set up.  Verbal cues for keeping knees bent.    Neuromuscular Re-education performed:    Other:  Sit to stand from mat x 10 reps. Pt performs one rep without UE support from standard chair.     - NBOS airex with body/head turns x 30 seconds. No LOB, moderate instability present.   - NBOS on airex with EC x 30 seconds x 2 reps.   - SLS with toe on cones x 30 seconds x 3 reps B  - Incline stand EO x 30 seconds x 3 reps   - 1/2 foam stand x 60 seconds x 2 reps    Other:  - alternating forward steps from red foam to ground x 20 reps with intermittent // use for support. Repeated with side steps, backwards steps. No major LOB. Pt using // intermittently for support.       HEP  - Ab set with alternating marching  - Bridges   - Standing hip abd.   - Heel raises   -marching   - standing modified heel toe  - sit to stands             Treatment Today   9/4/2018  TREATMENT MINUTES COMMENTS   Evaluation     Self-care/ Home management     Manual therapy     Neuromuscular Re-education 30 See above    Therapeutic Activity     Therapeutic Exercises 25 See above   Gait training     Modality__________________                Total 55    Blank areas are intentional and mean the treatment did not include these items.       Aminata Urban, PT, DPT   9/4/2018

## 2021-06-20 NOTE — PROGRESS NOTES
[facility] Daily Progress     Patient Name: Lalitha Underwood  Date: 8/28/2018  Visit #: 15/up to 16 - medicare  PTA visit #:    Referral Diagnosis: Unsteady ambulation   Referring provider: Kelly Hull MD  Visit Diagnosis:     ICD-10-CM    1. Unsteadiness on feet R26.81    2. Generalized muscle weakness M62.81    3. Decreased functional mobility and endurance Z74.09    4. Poor balance R26.89    5. Abnormality of gait R26.9          Assessment:   Patient continues to progress towards goals. HEP was updated with additional flexibility ex's. Pt remains compliant.    Patient is benefitting from skilled physical therapy and is making steady progress toward functional goals.  Patient is appropriate to continue with skilled physical therapy intervention, as indicated by initial plan of care.       Goal Status:  Pt. will be independent with home exercise program in : 2 weeks (goal met)  Pt will: increase her comfortable gait speed to >1.0m/s to decrease falls risk in 8 weeks (progressing, goal not met, 0.95 m/s today, pt is making progress)  Pt will: be able to complete 3 STS in 30 sec without use of UEs to decrease her falls risk in 8 weeks (progressing, goal not met)    Plan / Patient Education:     Continue with initial plan of care.  Progress with home program as tolerated.   D/c next visit.     Subjective:   Patient reports increased lower back pain over the weekend.   0/10 pain currently. 6/10 pain over last weekend.    Objective:     Therapeutic exercise performed today:    Nu-step WL 5 x 6:00. Average SPM 49.    - Ab set with alternating marches x 20 reps x 3 sets. Verbal cues for keeping TA activated and continued breathing.   - Bridges x 3 seconds x 10 reps, again cues for breathing.   - Clamshell L2 band - handout given today x 20 rep B.   - SLR with quad set x 10 reps. Added ab set.     Added to HEP:  - Supine piriformis stretch x 30 seconds B  - Reach and roll x 4 reps B. TC for set up.  Verbal cues for keeping knees bent.    Neuromuscular Re-education performed:    Other:    - Tandem stance on airex x 30 seconds x 2 reps B  - NBOS on airex with EC x 30 seconds x 2 reps.   - SLS with toe on cones x 30 seconds x 3 reps B    Performed in // red foam<>ground with intermittent UE support:  Forward steps 2x10. No major LOB. Pt reports shortness of breath 2/10.   Side steps 2x10. Cues to increase step height.   Backwards steps 2x10.      HEP  - Ab set with alternating marching  - Bridges   - Standing hip abd.   - Heel raises   -marching   - standing modified heel toe  - sit to stands             Treatment Today   8/28/2018  TREATMENT MINUTES COMMENTS   Evaluation     Self-care/ Home management     Manual therapy     Neuromuscular Re-education 15 See above    Therapeutic Activity     Therapeutic Exercises 40 See above   Gait training     Modality__________________                Total 55    Blank areas are intentional and mean the treatment did not include these items.       Aminata Urban, PT, DPT   8/28/2018

## 2021-06-21 NOTE — PROGRESS NOTES
"PULMONARY CLINIC FOLLOW UP NOTE    History:     HPI: Lalitha Underwood is a 73 y.o. female , former smoker, with history of COPD who follows up with a colleague Dr. Tamayo is here for preop evaluation.  Patient would be going for laparoscopic hiatal hernia repair by Dr. Wilder.  For her COPD, patient is on Spiriva and Advair.    Interval History: pt notes that she goes on the treadmill for about 5-10 minutes each time. She notes that 2 weeks ago she had a cold. She was given 2 rounds of Abx/steroids by Dr Tamayo. She notes that she finally improved after steroids/abx. No fever or chills. She does not use oxygen when ambulation but she uses oxygen at 1.5 liters at night. With  Respect to inhalers, she is on advair, spiriva, and albuterol inhaler. She uses her albuterol inhaler about 2-3 times daily. She uses routinely when she wakes up and before super. She denies any chest pains. Pt had her umbilical hernia repair on 4/2018. No complications with her surgery.    PMHx/PSHx:   COPD  Diaphragmatic paralysis/hernia  Hiatal hernia  Status post umbilical hernia repair  Hypertension  Hyperlipidemia  Depression  Convulsive disorder    Social Hx:   Former smoker.  Quit over 20 years ago.    ROS: 10 point review of system done. Pertinent findings are noted in the HPI.    Exam/Data:   /84   Pulse 88   Resp 20   Ht 5' 4\" (1.626 m)   Wt 204 lb 1.6 oz (92.6 kg)   LMP 12/12/1998   SpO2 97% Comment: RA  BMI 35.03 kg/m  , Body mass index is 35.03 kg/m .  GEN: comfortable, NAD  HEENT: NCAT, EMOI, mmm  LN: no cervical LAD   CVS: S1S2, RRR  Lung: scattered wheezing  Abd: soft, obese  Ext: no c/c/e  Neuro: nonfocal  Skin: no visible rash  Vasc: intact radial pulses bilaterally  Musculoskeletal: FROM all extremities  Psych: normal affect    Data:   Labs and Imaging personally reviewed.  Pertinent findings include:    XR sniff test:  No significant movement of the left hemidiaphragm. Findings are likely related to the " large hiatal hernia defect and intrathoracic stomach.    FEV1/FVC is 60 and is reduced.  FEV1 is 43% predicted and is reduced.  FVC is 54% predicted and reduced.  There was no improvement in spirometry after a single inhaled dose of bronchodilator, however, it was close to meeting ATS guidelines.  TLC is 192% predicted and is increased.  RV is 317% predicted and is increased.  DLCO is 46% predicted and is reduced when it   is corrected for hemoglobin.     Impression:  Full Pulmonary Function Test is abnormal.  PFTs are consistent with severe obstructive disease.  Spirometry is not consistent with reversibility.  There is hyperinflation.  There is air-trapping.  Diffusion capacity when corrected for hemoglobin is moderately reduced.  Note, that RT comments that pt was unable to reach > 85% of VC. Also note that on pleth pt was unable to pant during lung volume measurements. N2 washout was attempted but unsuccessful.     Assessment/Plan:       Lalitha Underwood is a 73 y.o. female former smoker, with history of COPD who was referred here for preop evaluation.  Sniff test as noted above shows no significant movement of the left hemidiaphragm that is most likely attributed to a large hiatal hernia.  She had an echo back in 2017 that showed normal ejection fraction.  Patient's main limitation is essentially due to her large hiatal hernia impairing movement of her diaphragm. She had her umbilical hernia repair on 4/2018 that was uncomplicated which is re-assuring.  She is at increased risk for post op respiratory failure given her underlying COPD and now diaphragmatic repair surgery    Recommendations:  Would proceed with surgery  Would consider pulmonary consult postoperatively   Incentive spirometry preoperatively - pt has been using it home   DVT PPx and SCD's postoperatively  Flu shot today -- pt tells me that she was instructed by the [facility] to have before surgery.     FOLLOW UP: f/u with dr Roni Concepcion S  MD [facility]  Pulmonary and Critical Care Medicine  Electronically Signed on 11/23/2018    Current Outpatient Medications   Medication Sig Dispense Refill     albuterol (ACCUNEB) 1.25 mg/3 mL nebulizer solution Take 3 mL (1.25 mg total) by nebulization every 6 (six) hours as needed for wheezing. 75 mL 0     albuterol (PROAIR HFA;PROVENTIL HFA;VENTOLIN HFA) 90 mcg/actuation inhaler Inhale 2 puffs every 6 (six) hours as needed for wheezing. 3 Inhaler 3     calcium citrate (CALCITRATE) 200 mg (950 mg) tablet Take 1 tablet by mouth 2 (two) times a day.       cholecalciferol, vitamin D3, 5,000 unit Tab Take 5,000 Units by mouth daily.        denosumab 60 mg/mL Syrg Inject 60 mg under the skin every 6 (six) months.       doxycycline (MONODOX) 100 MG capsule Take 1 capsule (100 mg total) by mouth 2 (two) times a day for 10 days. 20 capsule 0     fluticasone-salmeterol (ADVAIR) 250-50 mcg/dose DISKUS Inhale 1 puff 2 (two) times a day. 180 each 3     losartan (COZAAR) 50 MG tablet Take 1 tablet (50 mg total) by mouth 2 (two) times a day. 180 tablet 3     OXYGEN-AIR DELIVERY SYSTEMS [facility] Use 1.5 L As Directed bedtime.       phenytoin (DILANTIN) 100 MG ER capsule Take 100 mg by mouth every morning.       phenytoin (DILANTIN) 100 MG ER capsule Take 200 mg by mouth at bedtime.       potassium chloride (K-DUR,KLOR-CON) 20 MEQ tablet TAKE 1 TABLET BY MOUTH EVERY DAY 90 tablet 3     potassium chloride 20 mEq TbER Take 20 mEq by mouth daily. 90 tablet 3     predniSONE (DELTASONE) 10 mg tablet Take 3 tabs (30mg total) daily x 3 days, then 2 tabs x 3 days, then 1 tab x 3 days. 18 tablet 0     RABEprazole (ACIPHEX) 20 mg tablet Take 1 tablet (20 mg total) by mouth daily. 90 tablet 3     solifenacin (VESICARE) 5 MG tablet Take 2 tablets (10 mg total) by mouth daily. 180 tablet 3     tiotropium (SPIRIVA) 18 mcg inhalation capsule Place 1 capsule (2 puffs total) into inhaler and inhale daily. 90 capsule 3     Current Facility-Administered  Medications   Medication Dose Route Frequency Provider Last Rate Last Dose     denosumab 60 mg (PROLIA 60 mg/ml)  60 mg Subcutaneous Q6 Months Lalitha Haq NP   60 mg at 10/05/18 1121     Allergies   Allergen Reactions     Clindamycin Rash     Carbamazepine Rash     Morphine Nausea Only       Meds and Allergies: See EHR for the updated medication list and Allergies. These were reviewed.     Much or all of the text in this note was generated through the use of the Dragon Dictate voice-to-text software. Errors in spelling or words which seem out of context are unintentional. Sound alike errors, in particular, may have escaped editing.

## 2021-06-21 NOTE — LETTER
"Letter by Lalitha Haq NP at      Author: Lalitha Haq NP Service: -- Author Type: --    Filed:  Encounter Date: 1/22/2021 Status: (Other)         Lalitha Underwood  [address]  [facility] [address]             January 22, 2021         Dear Ms. Underwood,    Below are the results from your recent visit:    Resulted Orders   DXA Bone Density Scan    Narrative    1/20/2021      RE: Lalitha Santizoyasmanisushil  YOB: 1945        Dear Lalitha Haq,    Patient Profile:  75 y.o. female, postmenopausal, is here for the follow up bone density   test.   History of fractures - Yes;  Wrist. Family history of osteoporosis - Yes;    sibling.  Family history of hip fracture: Yes;  mother. Smoking history -   Past. Osteoporosis treatment past -  Yes;  HRT and Bisphosphonates.   Osteoporosis treatment current - Yes;  Prolia.  Chronic medical problems -   Chronic low back problems. High risk medications -  Anti-seizure;  Yes,   Currently.      Assessment:    1. The spine bone density L1-L4 was not done because of the significant   artifacts.  2. Femoral bone densities show left femoral neck T- score -1.0 and right   femoral neck T-score -0.7, with no statistically significant change   compared to the previous DXA scan done in 2018.  3. Left forearm bone density with T-score 1.1.      75 y.o. female with NORMAL BONE DENSITY.        Recommendations:  Appropriate calcium, vitamin D supplements, along with balance and weight   bearing exercise recommended with follow up bone density scan in 2 years.   Patient is a candidate for \"drug holiday\" after switching from denosumab   to bisphosphonate treatment for 1-2 years.      Bone densitometry was performed on your patient using our Spotigo   densitometer. The results are summarized and a copy of the actual scans   are included for your review. In conformity with the International Society   of Clinical Densitometry's most recent position statement for DXA "   interpretation (2015), the diagnosis will be made on the lowest measured   T-score of the lumbar spine, femoral neck, total proximal femur or 33%   radius. Note the change in terminology for diagnostic classification from   OSTEOPENIA to LOW BONE MASS. All trending for sequential exams will be   done using multiple vertebrae or the total proximal femur. Fracture risk   is based on the WHO Fracture Risk Assessment Tool (FRAX). If additional   information is needed or if you would like to discuss the results, please   do not hesitate to call me.       Thank you for referring this patient to [facility].   We are happy to be of service in support of you and your practice. If you   have any questions or suggestions to improve our service, please call me   at [phone].     Sincerely,     Ashli Mendez M.D. C.C.MYA.  Osteoporosis Services, [facility]       The test results show that your current treatment is working. Please continue your current medication and plan. We will discuss the drug Holiday when we can actually talk together in the same room! We recommend that you repeat the above test(s) in 2 years.    Please call with questions or contact us using vSocial.    Sincerely,        Electronically signed by Lalitha Haq NP

## 2021-06-21 NOTE — LETTER
Letter by Perry Tamayo MD at      Author: Perry Tamayo MD Service: -- Author Type: --    Filed:  Encounter Date: 3/8/2021 Status: (Other)         Kelly Hull MD  [address]  [address]  [facility] [address]                                  March 8, 2021    Patient: Lalitha Underwood   MR Number: [id number]   YOB: 1945   Date of Visit: 3/8/2021     Dear Dr. Kurtis MD:    Thank you for referring Lalitha Underwood to me for evaluation. Below are the relevant portions of my assessment and plan of care.    If you have questions, please do not hesitate to call me. I look forward to following Lalitha along with you.    Sincerely,        Perry Tamayo MD          CC  No Recipients  Perry Tamayo MD  3/8/2021  1:50 PM  Sign when Signing Visit  Lalitha Underwood is a 75 y.o. female who is being evaluated via a billable telephone visit.      What phone number would you like to be contacted at? [phone]   How would you like to obtain your AVS? AVS Preference: MyChart.    Assessment & Plan   Ms. Underwood has severe COPD that is under good control with triple therapy.  There is no need to change her medications.  She remains on nocturnal and ambulatory oxygen for chronic hypoxic respiratory failure and is using this appropriately.    1) COPD - severe Gold C with infrequent exacerbations.  Continue spiriva and advair with as needed albuterol  2) Dyspnea - from COPD, could stand to get more exercise for pulmonary rehab but limited by vertigo issues requiring a walker  3) Chronic hypoxic respiratory failure. - continue 1.5 L nasal canula with sleep and exertion.  4) RTC in 1 year      Perry Tamayo MD  [facility]    Lalitha Underwood is 75 y.o. and presents today for the following health issues   COPD - Serene states her lungs have done well.  She has had no issues since we last spoke, no exacerbations or hospitalizations.  She is using  her oxygen as previously prescribed.  She has just gotten her second COVID vaccine and she believes this stirred up her vertigo which seems to have been responding to adjustments.  She has not been terribly active this winter given the need for a walker secondary to her vertigo.      Phone call start: [phone]  Phone call end: [phone]

## 2021-06-21 NOTE — PROGRESS NOTES
Lalitha called to say she has completed her action plan medications and is really not feeling better yet.  Has surgery scheduled for a hernia repair on Nov. 26 th and hopes to be better before that.   Will order prednisone 20 mg x five days, per Dr. Tamayo. Instructed to call with any questions or concerns.

## 2021-06-22 ENCOUNTER — COMMUNICATION - HEALTHEAST (OUTPATIENT)
Dept: VASCULAR SURGERY | Facility: CLINIC | Age: 76
End: 2021-06-22

## 2021-06-22 NOTE — PROGRESS NOTES
Assessment/Plan:     1. Hiatal hernia  Doing well clinically.  Encouraged compliance with rate restrictions and full liquid diet.  Information regarding full liquid diet provided to patient.  Encouraged her to contact surgery office to determine next steps for management of PEG tube, which patient believes requires a contrast study.  He is medically managed.    2. Hematoma of arm, right, initial encounter  Resolving.  No signs of complications.  Reviewed some somatic treatments and monitoring.    3. Edema, unspecified type  I think it would be fine for her to begin hydrochlorothiazide as she has taken previously with good effect.  Encourage adequate hydration, which should be adequate in light of tolerance of full liquid diet.  Monitor closely for signs and symptoms of dehydration.  We will check basic metabolic panel today.    4. Morbid obesity (H)  Encouraged efforts at healthy lifestyle habits.  Current full liquid diet likely will assist in management of this temporarily, but discussed importance of resuming healthy eating and regular physical activity when able to tolerate.    5. Cavernous hemangioma of liver  Reviewed nature of this condition which is benign.  Reassurance provided.      Patient Instructions   OK to take HCTZ as needed for edema.    The Thoracic Surgery Triage Nurse: [phone] option 2       Return for Next scheduled follow up. 1/3/19        Subjective:      Lalitha Underwood is a 73 y.o. female presenting to clinic for follow-up of hospitalization from 11/26/18 through 12/3/18.  Admitted for laparoscopic repair of type IV hiatal hernia.  As part of her recovery, she had a gastrostomy tube placed as well to be used as needed for decompression due to any nausea or bloating.  She also had a liver biopsy as there is an abnormal lesion identified on her liver during surgery, that turned out to be a cavernous hemangioma that was benign.  She is on a full liquid diet and tolerating well though  she is uncertain exactly what that means.  Noting some fluid retention, a bit more edema than usual, she can take hydrochlorothiazide as needed as she has done in the past.  The same thing occurred in the past when she had umbilical hernia repair.  Notes that postoperatively she developed a hematoma in her right forearm, she like me to look at that.  Is asymptomatic.  Notes that after surgery she had quite a bit of back pain that disrupted sleep, that has since resolved.  She is concerned however that she had a burn on her back from the heating pad that was used for symptomatic cares.  Overall is doing well with her PEG tube, has not needed to do a decompression for well over a week.  Wondering about when she can have removed.    Complete medication reconciliation performed at today's visit.    Current Outpatient Medications   Medication Sig Dispense Refill     albuterol (ACCUNEB) 1.25 mg/3 mL nebulizer solution Take 3 mL (1.25 mg total) by nebulization every 6 (six) hours as needed for wheezing. 75 mL 0     albuterol (PROAIR HFA;PROVENTIL HFA;VENTOLIN HFA) 90 mcg/actuation inhaler Inhale 2 puffs every 6 (six) hours as needed for wheezing. 3 Inhaler 3     calcium citrate (CALCITRATE) 200 mg (950 mg) tablet Take 1 tablet by mouth 2 (two) times a day.       cholecalciferol, vitamin D3, 5,000 unit Tab Take 5,000 Units by mouth daily.        fluticasone-salmeterol (ADVAIR) 250-50 mcg/dose DISKUS Inhale 1 puff 2 (two) times a day. 180 each 3     losartan (COZAAR) 50 MG tablet Take 1 tablet (50 mg total) by mouth 2 (two) times a day. 180 tablet 3     OXYGEN-AIR DELIVERY SYSTEMS [facility] Use 1.5 L As Directed bedtime.       phenytoin (DILANTIN) 100 MG ER capsule Take 100 mg by mouth every morning.       phenytoin (DILANTIN) 100 MG ER capsule Take 200 mg by mouth at bedtime.       potassium chloride (K-DUR,KLOR-CON) 20 MEQ tablet TAKE 1 TABLET BY MOUTH EVERY DAY 90 tablet 3     potassium chloride 20 mEq TbER Take 20 mEq by  mouth daily. 90 tablet 3     RABEprazole (ACIPHEX) 20 mg tablet Take 1 tablet (20 mg total) by mouth daily. 90 tablet 3     solifenacin (VESICARE) 5 MG tablet Take 2 tablets (10 mg total) by mouth daily. 180 tablet 3     tiotropium (SPIRIVA) 18 mcg inhalation capsule Place 1 capsule (2 puffs total) into inhaler and inhale daily. 90 capsule 3     denosumab 60 mg/mL Syrg Inject 60 mg under the skin every 6 (six) months.       Current Facility-Administered Medications   Medication Dose Route Frequency Provider Last Rate Last Dose     denosumab 60 mg (PROLIA 60 mg/ml)  60 mg Subcutaneous Q6 Months Lalitha Haq NP   60 mg at 10/05/18 1121       Past Medical History, Family History, and Social History reviewed.  Past Medical History:   Diagnosis Date     Convulsive disorder (H)      COPD (chronic obstructive pulmonary disease) (H)      Depression      Hernia of unspecified site of abdominal cavity without mention of obstruction or gangrene      Hiatal hernia      Hypertension      On home oxygen therapy     at 1.5 liters at nite     Osteopenia      Shortness of breath      Venous insufficiency of both lower extremities      Past Surgical History:   Procedure Laterality Date     OTHER SURGICAL HISTORY Left 2003    Broke titanium in left arm     SHOULDER SURGERY Right 1967     UMBILICAL HERNIA REPAIR  04/04/2018    Dr. Jimenez     Clindamycin; Carbamazepine; and Morphine  Family History   Problem Relation Age of Onset     Breast cancer Mother 66     Hypertension Mother      Heart attack Mother      Varicose Veins Mother      Hypertension Father      Heart attack Sister      Heart disease Sister      Diabetes Son      Pulmonary Hypertension Daughter      Heart attack Sister      Heart disease Sister      Social History     Socioeconomic History     Marital status:      Spouse name: Not on file     Number of children: Not on file     Years of education: Not on file     Highest education level: Not on file   Social  "Needs     Financial resource strain: Not on file     Food insecurity - worry: Not on file     Food insecurity - inability: Not on file     Transportation needs - medical: Not on file     Transportation needs - non-medical: Not on file   Occupational History     Not on file   Tobacco Use     Smoking status: Former Smoker     Types: Cigarettes     Last attempt to quit: 1998     Years since quittin.0     Smokeless tobacco: Never Used   Substance and Sexual Activity     Alcohol use: Yes     Alcohol/week: 1.2 oz     Types: 2 Glasses of wine per week     Drug use: Yes     Types: Oxycodone     Sexual activity: No   Other Topics Concern     Not on file   Social History Narrative    .  Two kids.  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         Review of systems is as stated in HPI, and the remainder of the 10 system review is otherwise negative.    Objective:     Vitals:    18 1113   BP: 134/80   Patient Site: Right Arm   Patient Position: Sitting   Cuff Size: Adult Large   Pulse: (!) 108   Resp: 20   Temp: 98.1  F (36.7  C)   TempSrc: Oral   SpO2: 97%   Weight: 204 lb 8 oz (92.8 kg)   Height: 5' 4\" (1.626 m)    Body mass index is 35.1 kg/m .    Alert female.  Mucous membranes moist.  Neck supple without lymphadenopathy.  Heart with regular rate and rhythm.  Lungs clear.  PEG tube intact.  Abdomen otherwise soft and nontender.  Incisions healing well.  Extremities with 1+ nonpitting edema.  Skin on her back is with mild erythema consistent with superficial burn.      This note has been dictated using voice recognition software. Any grammatical or context distortions are unintentional and inherent to the the software.   "

## 2021-06-23 NOTE — TELEPHONE ENCOUNTER
Must be 90 days and 3 refills. Patient stated Kelly Hull MD is the ordering doctor even thou these are listed as historical

## 2021-06-24 NOTE — PROGRESS NOTES
FOOT AND ANKLE SURGERY/PODIATRY Progress Note        ASSESSMENT:   Pes planus both feet    HPI: Lalitha Underwood was seen again today complaining of loss of balance.  The patient stated she feels very unsteady.  This has been a very long progression.  She stated that it has progressed to the point where she is starting to use a walker.  She stated the walker gives her some support and confidence when she is weightbearing.  She does not have any significant numbness, burning or tingling both feet.  She has no tremors.  She has not had any recent falls.    Past Medical History:   Diagnosis Date     Convulsive disorder (H)      COPD (chronic obstructive pulmonary disease) (H)      Depression      Hernia of unspecified site of abdominal cavity without mention of obstruction or gangrene      Hiatal hernia      Hypertension      On home oxygen therapy     at 1.5 liters at nite     Osteopenia      Shortness of breath      Venous insufficiency of both lower extremities        Past Surgical History:   Procedure Laterality Date     OTHER SURGICAL HISTORY Left 2003    Broke titanium in left arm     SHOULDER SURGERY Right 1967     UMBILICAL HERNIA REPAIR  04/04/2018    Dr. Jimenez       Allergies   Allergen Reactions     Clindamycin Rash     Carbamazepine Rash     Morphine Nausea Only         Current Outpatient Medications:      albuterol (PROAIR HFA;PROVENTIL HFA;VENTOLIN HFA) 90 mcg/actuation inhaler, Inhale 2 puffs every 6 (six) hours as needed for wheezing., Disp: 3 Inhaler, Rfl: 3     calcium citrate (CALCITRATE) 200 mg (950 mg) tablet, Take 1 tablet by mouth 2 (two) times a day., Disp: , Rfl:      cholecalciferol, vitamin D3, 5,000 unit Tab, Take 5,000 Units by mouth daily. , Disp: , Rfl:      denosumab 60 mg/mL Syrg, Inject 60 mg under the skin every 6 (six) months., Disp: , Rfl:      fluticasone-salmeterol (ADVAIR) 250-50 mcg/dose DISKUS, Inhale 1 puff 2 (two) times a day., Disp: 180 each, Rfl: 3     losartan (COZAAR) 50  MG tablet, Take 1 tablet (50 mg total) by mouth 2 (two) times a day., Disp: 180 tablet, Rfl: 3     OXYGEN-AIR DELIVERY SYSTEMS [facility] [place], Use 1.5 L As Directed bedtime., Disp: , Rfl:      phenytoin (DILANTIN) 100 MG ER capsule, Take 2 tabs by mouth in the morning and 1 tab in the evening., Disp: 270 capsule, Rfl: 3     potassium chloride 20 mEq TbER, Take 20 mEq by mouth daily., Disp: 90 tablet, Rfl: 3     RABEprazole (ACIPHEX) 20 mg tablet, Take 1 tablet (20 mg total) by mouth daily., Disp: 90 tablet, Rfl: 3     solifenacin (VESICARE) 5 MG tablet, Take 2 tablets (10 mg total) by mouth daily., Disp: 180 tablet, Rfl: 3     tiotropium (SPIRIVA) 18 mcg inhalation capsule, Place 1 capsule (2 puffs total) into inhaler and inhale daily., Disp: 90 capsule, Rfl: 3    Current Facility-Administered Medications:      denosumab 60 mg (PROLIA 60 mg/ml), 60 mg, Subcutaneous, Q6 Months, Lalitha Haq NP, 60 mg at 10/05/18 1121    Family History   Problem Relation Age of Onset     Breast cancer Mother 66     Hypertension Mother      Heart attack Mother      Varicose Veins Mother      Hypertension Father      Heart attack Sister      Heart disease Sister      Diabetes Son      Pulmonary Hypertension Daughter      Heart attack Sister      Heart disease Sister        Social History     Socioeconomic History     Marital status:      Spouse name: Not on file     Number of children: Not on file     Years of education: Not on file     Highest education level: Not on file   Occupational History     Not on file   Social Needs     Financial resource strain: Not on file     Food insecurity:     Worry: Not on file     Inability: Not on file     Transportation needs:     Medical: Not on file     Non-medical: Not on file   Tobacco Use     Smoking status: Former Smoker     Types: Cigarettes     Last attempt to quit: 1998     Years since quittin.2     Smokeless tobacco: Never Used   Substance and Sexual Activity     Alcohol use:  Yes     Alcohol/week: 1.2 oz     Types: 2 Glasses of wine per week     Drug use: Yes     Types: Oxycodone     Sexual activity: No   Lifestyle     Physical activity:     Days per week: Not on file     Minutes per session: Not on file     Stress: Not on file   Relationships     Social connections:     Talks on phone: Not on file     Gets together: Not on file     Attends Holiness service: Not on file     Active member of club or organization: Not on file     Attends meetings of clubs or organizations: Not on file     Relationship status: Not on file     Intimate partner violence:     Fear of current or ex partner: Not on file     Emotionally abused: Not on file     Physically abused: Not on file     Forced sexual activity: Not on file   Other Topics Concern     Not on file   Social History Narrative    .  Two kids.  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       10 point Review of Systems is negative except as mentioned below.       Vitals:    03/12/19 1359   Pulse: 80   Resp: 24   Temp: 98.1  F (36.7  C)       BMI= Body mass index is 35.57 kg/m .    OBJECTIVE:  General appearance: Patient is alert and fully cooperative with history & exam.  No sign of distress is noted during the visit.  Vascular: Dorsalis pedis and posterior tibial pulses are palpable. There is pedal hair growth bilaterally.  CFT < 3 sec from anterior tibial surface to distal digits bilaterally. There is no appreciable edema noted.  Dermatologic: Turgor and texture are within normal limits. No coloration or temperature changes. No primary or secondary lesions noted.  Neurologic: All epicritic and proprioceptive sensations are grossly intact bilaterally.  Musculoskeletal: All active and passive ankle, subtalar, midtarsal, and 1st MPJ range of motion are grossly intact without pain or crepitus, with the exception of none. Manual muscle strength is +3/5 bilaterally in all compartments. All dorsiflexors, plantarflexors, invertors, evertors are intact  bilaterally.  No tenderness present to lateral feet on palpation.  No tenderness to bilateral feet or ankles with range of motion.  Severe flattening of the medial longitudinal arch noted bilaterally.  Calf is soft/non-tender without warmth/induration    Imaging:     No results found.         TREATMENT:  I informed the patient that I do not believe she has peripheral neuropathy which is causing her unsteady gait.  I do not recommend gabapentin.  I recommended the patient seek a neurology consult.  The patient agreed.  She has been referred to neurology for evaluation and treatment.        Elijah Amor; NAY  [facility] Foot & Ankle Surgery/Podiatry

## 2021-06-24 NOTE — PROGRESS NOTES
Assessment and Plan:     1. Medicare annual wellness visit, subsequent  At today's visit, we discussed lifestyle interventions to promote self-management and wellness, including maintenance of a healthy weight, healthy diet, regular physical activity and exercise, and falls prevention.  Up-to-date with routine cancer screening.  Will check fasting lipids and fasting glucose today.  Immunizations reviewed and up-to-date, could consider Shingrix.  Advanced healthcare directive filed in chart.    2. Nonintractable epilepsy without status epilepticus, unspecified epilepsy type (H)  Stable without recurrence of seizure.  She remains on phenytoin.  Will check phenytoin level and hemogram in monitoring.  - Phenytoin (Dilantin )  - HM2(CBC w/o Differential)    3. Pulmonary emphysema, unspecified emphysema type (H)  She remains on Advair and Spiriva with excellent control, has albuterol available as needed.    4. Obesity (BMI 35.0-39.9) with comorbidity (H)  She continues to work on healthy lifestyle habits, declines referral at this time.    5. Essential hypertension  Managed with losartan daily which she is tolerating well.  Continue.  Will check comprehensive metabolic panel today.    6. Hyperlipidemia, unspecified hyperlipidemia type  Encouraged healthy lifestyle habits, will check fasting lipid and comprehensive metabolic panel today in monitoring.  She currently requires no medications and management.  - Comprehensive Metabolic Panel  - Lipid Cascade FASTING    7. Neuropathy, idiopathic  Worsening, symptoms adequately controlled at this time.  Contributing to her gait instability.  She will do another round of physical therapy to assist in gait ability.  Will check vitamin B12 level and thyroid cascade to ensure is here in the interim.  - Ambulatory referral to PT/OT  - Ambulatory referral to Podiatry  - Vitamin B12  - Thyroid Cascade    8. Walk Is Wobbly Or Unsteady (Ataxia)  Referral placed to physical therapy to  assist strengthening and supportive measures.    9. Senile osteoporosis  She remains on Prolia, will check vitamin D level today.  Encouraged regular exercise.  - Vitamin D, Total (25-Hydroxy)    10. Edema, unspecified type  Chronic and stable, prescription provided for compression stockings.  - Compression stockings 20/30 mmHg; Knee    11. Gastroesophageal reflux disease without esophagitis  Doing quite well post surgery, breathing improved, will continue AcipHex.    12. Mild aortic valve regurgitation  13. Mild aortic valve stenosis  Overall doing well and asymptomatic.  Will follow.    14. Age-related osteoporosis without current pathological fracture  As above.    15. Urge incontinence of urine  Encouraged management of any associated constipation.  She will continue to work with [employer] with pessary use and will continue Vesicare.    16. Venous insufficiency of both lower extremities  Causing edema, compression stockings as above.    17. Vitamin D deficiency  Encouraged continued vitamin D supplement, will check vitamin D level today.    I have had an Advance Directives discussion with the patient.  The following are part of a depression follow up plan for the patient:  mental health care assessment     The patient's current medical problems were reviewed.    The following health maintenance schedule was reviewed with the patient and provided in printed form in the after visit summary:   Health Maintenance   Topic Date Due     ZOSTER VACCINES (2 of 3) 12/21/2007     FALL RISK ASSESSMENT  11/16/2018     COLONOSCOPY  08/26/2019     MAMMOGRAM  12/09/2019     TD 18+ HE  06/07/2020     DXA SCAN  12/31/2020     ADVANCE DIRECTIVES DISCUSSED WITH PATIENT  11/16/2022     PNEUMOCOCCAL POLYSACCHARIDE VACCINE AGE 65 AND OVER  Completed     INFLUENZA VACCINE RULE BASED  Completed     PNEUMOCOCCAL CONJUGATE VACCINE FOR ADULTS (PCV13 OR PREVNAR)  Completed        Subjective:   Chief Complaint: Lalitha Undewrood is an  73 y.o. female here for an Annual Wellness visit.   HPI: Overall she is doing quite well.  She like me to take a look at a skin lesion she noted on her right parietal scalp.  Emphysema has been doing well on Advair and Spiriva, using albuterol as needed.  She remains on vitamin D supplement due to prior deficiency and receives Prolia for her osteoporosis.  She had surgical repair of hiatal hernia in November, notes that her breathing has improved significantly with these measures and she remains on AcipHex.  History of seizure disorder, taking phenytoin, had any seizure activity in the past year.  Remains on losartan and management of hypertension, due for follow-up of dyslipidemia for which she is not taking medication.  She is chronic unsteady gait with ataxia due to peripheral neuropathy that is idiopathic in nature, feels that her balance is worsening and has an increased fear of falling, uses a walker or cane outside of her home.  She completed physical therapy last year, interested in doing this again.  Chronic edema due to venous insufficiency, requiring new order for compression stockings.  Chronic urinary incontinence for which she is followed by [facility], using Vesicare and a pessary with improvement.  Prior history of mild aortic valve stenosis and regurgitation has been stable.    Her  is now in hospice at home due to CHF and a recent fall that led to head trauma.  She is still struggling with this decision that he has made but feels he is able to make the decision.  As result, she is been under more stress and has resumed cognitive behavioral therapy.  Feels adequately supported.    Review of Systems:   Please see above.  The rest of the review of systems are negative for all systems.    Patient Care Team:  Kelly Hull MD as PCP - General (Family Medicine)     Patient Active Problem List   Diagnosis     Solar Elastosis     Rosacea     Epilepsy And Recurrent Seizures     Esophageal  Reflux     Vitamin D Deficiency     Pulmonary emphysema (H)     Hyperlipidemia     Walk Is Wobbly Or Unsteady (Ataxia)     Obesity     Hypertension     Venous insufficiency of both lower extremities     Hiatal hernia     Left arm swelling     Urge incontinence of urine     Mild aortic valve regurgitation     Mild aortic valve stenosis     Osteoporosis     Umbilical hernia with obstruction     Decreased hearing of both ears     Obesity (BMI 35.0-39.9) with comorbidity (H)     Past Medical History:   Diagnosis Date     Convulsive disorder (H)      COPD (chronic obstructive pulmonary disease) (H)      Depression      Hernia of unspecified site of abdominal cavity without mention of obstruction or gangrene      Hiatal hernia      Hypertension      On home oxygen therapy     at 1.5 liters at nite     Osteopenia      Shortness of breath      Venous insufficiency of both lower extremities       Past Surgical History:   Procedure Laterality Date     OTHER SURGICAL HISTORY Left 2003    Broke titanium in left arm     SHOULDER SURGERY Right 1967     UMBILICAL HERNIA REPAIR  04/04/2018    Dr. Jimenez      Family History   Problem Relation Age of Onset     Breast cancer Mother 66     Hypertension Mother      Heart attack Mother      Varicose Veins Mother      Hypertension Father      Heart attack Sister      Heart disease Sister      Diabetes Son      Pulmonary Hypertension Daughter      Heart attack Sister      Heart disease Sister       Social History     Socioeconomic History     Marital status:      Spouse name: Not on file     Number of children: Not on file     Years of education: Not on file     Highest education level: Not on file   Occupational History     Not on file   Social Needs     Financial resource strain: Not on file     Food insecurity:     Worry: Not on file     Inability: Not on file     Transportation needs:     Medical: Not on file     Non-medical: Not on file   Tobacco Use     Smoking status: Former  Smoker     Types: Cigarettes     Last attempt to quit: 1998     Years since quittin.2     Smokeless tobacco: Never Used   Substance and Sexual Activity     Alcohol use: Yes     Alcohol/week: 1.2 oz     Types: 2 Glasses of wine per week     Drug use: Yes     Types: Oxycodone     Sexual activity: No   Lifestyle     Physical activity:     Days per week: Not on file     Minutes per session: Not on file     Stress: Not on file   Relationships     Social connections:     Talks on phone: Not on file     Gets together: Not on file     Attends Mandaeism service: Not on file     Active member of club or organization: Not on file     Attends meetings of clubs or organizations: Not on file     Relationship status: Not on file     Intimate partner violence:     Fear of current or ex partner: Not on file     Emotionally abused: Not on file     Physically abused: Not on file     Forced sexual activity: Not on file   Other Topics Concern     Not on file   Social History Narrative    .  Two kids.  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      Current Outpatient Medications   Medication Sig Dispense Refill     albuterol (PROAIR HFA;PROVENTIL HFA;VENTOLIN HFA) 90 mcg/actuation inhaler Inhale 2 puffs every 6 (six) hours as needed for wheezing. 3 Inhaler 3     calcium citrate (CALCITRATE) 200 mg (950 mg) tablet Take 1 tablet by mouth 2 (two) times a day.       cholecalciferol, vitamin D3, 5,000 unit Tab Take 5,000 Units by mouth daily.        denosumab 60 mg/mL Syrg Inject 60 mg under the skin every 6 (six) months.       fluticasone-salmeterol (ADVAIR) 250-50 mcg/dose DISKUS Inhale 1 puff 2 (two) times a day. 180 each 3     losartan (COZAAR) 50 MG tablet Take 1 tablet (50 mg total) by mouth 2 (two) times a day. 180 tablet 3     OXYGEN-AIR DELIVERY SYSTEMS MISC Use 1.5 L As Directed bedtime.       phenytoin (DILANTIN) 100 MG ER capsule Take 2 tabs by mouth in the morning and 1 tab in the evening. 270 capsule 3     potassium chloride 20  "mEq TbER Take 20 mEq by mouth daily. 90 tablet 3     RABEprazole (ACIPHEX) 20 mg tablet Take 1 tablet (20 mg total) by mouth daily. 90 tablet 3     solifenacin (VESICARE) 5 MG tablet Take 2 tablets (10 mg total) by mouth daily. 180 tablet 3     tiotropium (SPIRIVA) 18 mcg inhalation capsule Place 1 capsule (2 puffs total) into inhaler and inhale daily. 90 capsule 3     Current Facility-Administered Medications   Medication Dose Route Frequency Provider Last Rate Last Dose     denosumab 60 mg (PROLIA 60 mg/ml)  60 mg Subcutaneous Q6 Months Lalitha Haq NP   60 mg at 10/05/18 1121      Objective:   Vital Signs:   Visit Vitals  /80   Pulse 82   Resp 20   Ht 5' 3.5\" (1.613 m)   Wt 204 lb (92.5 kg)   LMP 12/12/1998   SpO2 97%   BMI 35.57 kg/m         VisionScreening:  No exam data present     PHYSICAL EXAM  Physical Examination: General appearance - alert, well appearing, and in no distress, oriented to person, place, and time and normal appearing weight  Mental status - alert, oriented to person, place, and time, normal mood, behavior, speech, dress, motor activity, and thought processes  Eyes - pupils equal and reactive, extraocular eye movements intact  Ears - bilateral TM's and external ear canals normal  Nose - normal and patent, no erythema, discharge or polyps  Mouth - mucous membranes moist, pharynx normal without lesions  Neck - supple, no significant adenopathy  Lymphatics - no palpable lymphadenopathy, no hepatosplenomegaly  Chest - clear to auscultation, no wheezes, rales or rhonchi, symmetric air entry  Heart - normal rate, regular rhythm, normal S1, S2, no murmurs, rubs, clicks or gallops  Abdomen - soft, nontender, nondistended, no masses or organomegaly  Breasts - breasts appear normal, no suspicious masses, no skin or nipple changes or axillary nodes  Neurological - alert, oriented, normal speech, no focal findings or movement disorder noted  Musculoskeletal - no joint tenderness, deformity or " swelling  Extremities - peripheral pulses normal, noted edema wearing compression stocking  Skin - normal coloration and turgor, no rashes, right parietal scalp is with a small verrucous lesion, perhaps 8 mm in greatest diameter, it is benign in appearance.    Assessment Results 2/21/2019   Activities of Daily Living No help needed   Instrumental Activities of Daily Living No help needed   Mini Cog Total Score 5   Some recent data might be hidden     A Mini-Cog score of 0-2 suggests the possibility of dementia, score of 3-5 suggests no dementia    Identified Health Risks:     She is at risk for lack of exercise and has been provided with information to increase physical activity for the benefit of her well-being.  The patient was provided with written information regarding signs of hearing loss.  Information on urinary incontinence and treatment options given to patient.  The patient's PHQ-9 score is consistent with mild depression. She was provided with information regarding depression and was advised to schedule a follow up appointment in 12 weeks to further address this issue.  She is at risk for falling and has been provided with information to reduce the risk of falling at home.  Patient's advanced directive was discussed and I am comfortable with the patient's wishes.

## 2021-06-24 NOTE — TELEPHONE ENCOUNTER
[facility] Team    Patient is due for prolia injection 04.06 & a visit with provider. [facility] does not have f/u opening until end of July.    Okay to schedule injection only for April and then f/u for next injection?    [facility] - if okay, patient has an appt 04.10 at 7:15am for Mammo, she would like to come in at 8am or 8:15am afterwards for prolia if possible. Okay for that time?    Patient can be reached @ [phone].

## 2021-06-25 NOTE — PROGRESS NOTES
"Assessment and Plan:Lalitha Underwood is a 73 y.o.F  with a past medical history significant for COPD with chronic hypoxemic respiratory failure and who presents to clinic today for follow up.  She had a very large hiatal hernia repair by Dr. Wilder and this is her first visit with me since then.  She is doing better.  She is using her albuterol only out of habit, but doesn't need it through the day.  I suspect her PFTs are now improve with more lung excursion.  She may also not need nocturnal oxygen anymore.    1) COPD - Continue spiriva.  Try reducing to Advair once a day to see if this is still needed.  Stay on as needed albuterol  2) Hiatal hernia repair - her CXR today shows good reduction in diaphragmatic height and restoration of her left lower lung field.  Will recheck her PFTs to see how much ground we have gained.  3) Chronic hypoxemic respiratory failure - wondering if her oxygen needs have improved, will recheck her overnight oximetry on room air to see if she still needs this as travelling with oxygen is a hassle.  4) RTC in 6 months          CCx: COPD with diaphragm hernia    HPI: Ms. Underwood is a 73 year old female with COPD who returns for follow up after having her laparascopic hernia repair with Dr. Wilder in November.  She says she is \"better.\"  She is using her albuterol only once a day now, and in the morning out of habit.  She doesn't cough as much and isn't as short of breath.  Her mobility is more limited by her knees than her breathing.  She also has balance issues that she is seeing a neurologist about.  She still uses spiriva once a day and advair twice a day.   She has worn oxygen at night at 1.5 L for over 30 years, and was actively smoking when this was started.  She says travelling with oxygen is a pain.    ROS:  Review of Systems - History obtained from the patient  General ROS: negative  Psychological ROS: negative  ENT ROS: negative  Allergy and Immunology ROS: negative  Endocrine " ROS: negative  Respiratory ROS: positive for - cough and shortness of breath  negative for - sputum changes or stridor  Cardiovascular ROS: no chest pain or palpitations  Gastrointestinal ROS: no abdominal pain, change in bowel habits, or black or bloody stools  Genito-Urinary ROS: no dysuria, trouble voiding, or hematuria  Musculoskeletal ROS: negative  Neurological ROS: no TIA or stroke symptoms  Dermatological ROS: negative      Current Meds:  Current Outpatient Medications   Medication Sig     albuterol (PROAIR HFA;PROVENTIL HFA;VENTOLIN HFA) 90 mcg/actuation inhaler Inhale 2 puffs every 6 (six) hours as needed for wheezing.     calcium citrate (CALCITRATE) 200 mg (950 mg) tablet Take 1 tablet by mouth 2 (two) times a day.     cholecalciferol, vitamin D3, 5,000 unit Tab Take 5,000 Units by mouth daily.      denosumab 60 mg/mL Syrg Inject 60 mg under the skin every 6 (six) months.     fluticasone-salmeterol (ADVAIR) 250-50 mcg/dose DISKUS Inhale 1 puff 2 (two) times a day.     losartan (COZAAR) 50 MG tablet Take 1 tablet (50 mg total) by mouth 2 (two) times a day.     OXYGEN-AIR DELIVERY SYSTEMS [facility] Use 1.5 L As Directed bedtime.     phenytoin (DILANTIN) 100 MG ER capsule Take 2 tabs by mouth in the morning and 1 tab in the evening.     potassium chloride 20 mEq TbER Take 20 mEq by mouth daily.     RABEprazole (ACIPHEX) 20 mg tablet Take 1 tablet (20 mg total) by mouth daily.     solifenacin (VESICARE) 5 MG tablet Take 2 tablets (10 mg total) by mouth daily.     tiotropium (SPIRIVA) 18 mcg inhalation capsule Place 1 capsule (2 puffs total) into inhaler and inhale daily.       Labs:  No results found for this or any previous visit (from the past 72 hour(s)).    I have personally reviewed all pertinent imaging studies and PFT results unless otherwise noted.    Imaging studies:  Xr Chest 2 Views    Result Date: 3/18/2019  XR CHEST 2 VIEWS 3/18/2019 9:45 AM INDICATION: Diaphragmatic hernia without obstruction or  "gangrene COMPARISON: 03/29/2018 and older studies. FINDINGS: Interval repair of the large hiatal hernia. Slight elevation of left hemidiaphragm, much less than before. Linear areas of fibroatelectasis noted in both bases. Lungs are otherwise clear. Heart and pulmonary vascularity are normal. Posttraumatic deformity of the right glenohumeral joint with cancellus screw in the glenoid.        Physical Exam:  /76   Pulse 93   Temp 97.6  F (36.4  C) (Oral)   Ht 5' 4.2\" (1.631 m)   LMP 12/12/1998   SpO2 96%   BMI 34.80 kg/m    General - Well nourished  Ears/Mouth -  OP pink moist, no thrush  Neck - no cervical lymphadenopathy  Lungs - Clear to ausculation bilaterally   CVS - regular rhythm with no murmurs, rubs or gallups  Abdomen - soft, NT, ND, NABS  Ext - no cyanosis, clubbing or edema  Skin - no rash  Psychology - alert and oriented, answers appropriate        Electronically signed by:    Perry Tamayo MD PhD  [facility]    "

## 2021-06-25 NOTE — PATIENT INSTRUCTIONS - HE
1) Lets recheck your PFTs to see if your lung function is better  2) Try to reduce your Advair to once a day and see if you still need it  3) Consider propping the head of your bed up with a 2x4 to control your night time GERD symptoms  4) Come back in 6 months to check in one more time  5) Lets recheck your oxygen needs overnight without wearing oxygen

## 2021-06-25 NOTE — PROGRESS NOTES
Progress Notes by Gary Hawley MD at 10/30/2017  4:00 PM     Author: Gary Hawley MD Service: -- Author Type: Physician    Filed: 10/30/2017 10:20 PM Encounter Date: 10/30/2017 Status: Signed    : Gary Hawley MD (Physician)         ASSESSMENT:  1. Neck pain-posterior and upper shoulder pain. She is very worried that this could be cardiac in nature due to her two sisters with heart problems. She has no known heart disease. This would be atypical in nature   - Electrocardiogram Perform and Read-poor baseline but when compared to her previous one on record in 2004, there has not been changes. Reassured. If her pain changes, would recommend returning or go to ER.     2. Shoulder pain-this is consistent with musculoskeletal in nature.   - Electrocardiogram Perform and Read  -discussed and showed her shoulder and neck exercise to do at home. If not better with home exercises and ibuprofen 600 mg tid with food for a few days, advised to follow up with PCP and consider PT referral.   3. Murmur, heart  Unable to find documentation in her previous records. She does not remember ever being told she had a heart murmur   - Echo Complete; Future  Follow up with PCP after echo completed.     PLAN:  Patient Instructions     Do neck and shoulder stretching exercises and you may also take ibuprofen as needed for pain. You may take up to 600 or 3 of the over the counter ibuprofen three times per day as needed with food.     If your back pain gets worse, follow up with your doctor sooner than later.   Schedule your echocardiogram to evaluate your heart murmur and follow up with your doctor.     Exercises for Shoulder Flexibility: Wall Walk  Improving your flexibility can reduce pain. Stretching exercises also can help increase your range of pain-free motion. Breathe normally when you exercise. Use smooth, fluid movements.  Note: Follow any special instructions you are given. If you feel pain, stop the exercise. If the pain  continues after stopping, call your healthcare provider:    Stand with your shoulder about 2 feet from the wall.    Raise your arm to shoulder level and gently walk your fingers up the wall as high as you can.    Hold for a few seconds. Then walk your fingers back down.    Repeat 3 times. Move closer to the wall as you repeat.    Build up to holding each stretch for 30 seconds.  Caution: Do this stretch only if your healthcare provider recommends it. Dont do it when you are first injured.          Date Last Reviewed: 8/16/2015 2000-2016 [employer]. [address] [place] [address]. All rights reserved. This information is not intended as a substitute for professional medical care. Always follow your healthcare professional's instructions.        Shoulder Girdle Stretch      To start, sit in a chair with your feet flat on the floor. Your weight should be slightly forward so that youre balanced evenly on your buttocks. Relax your shoulders and keep your head level. Using a chair with arms may help you keep your balance:    Place 1 hand on the outside elbow of the other arm.    Pull the arm across your body. Hold for 30 to 60 seconds. Repeat once.    Switch sides.  For your safety, check with your healthcare provider before starting an exercise program.   Date Last Reviewed: 8/16/2015 2000-2016 [employer]. [address] [place] [address]. All rights reserved. This information is not intended as a substitute for professional medical care. Always follow your healthcare professional's instructions.        Shoulder Shrug Exercise  To start, sit in a chair with your feet flat on the floor. Shift your weight slightly forward to avoid rounding your back. Relax. Keep your ears, shoulders, and hips aligned:    Raise both of your shoulders as high as you can, as if you were trying to touch them to your ears. Keep your head and neck still and relaxed.    Hold for a count  of 10. Release.    Repeat 5 times.  For your safety, check with your healthcare provider before starting an exercise program.   Date Last Reviewed: 8/16/2015 2000-2016 [employer]. [address] [place] [address]. All rights reserved. This information is not intended as a substitute for professional medical care. Always follow your healthcare professional's instructions.        Shoulder and Upper Back Stretch  To start, stand tall with your ears, shoulders, and hips in line. Your feet should be slightly apart, positioned just under your hips. Focus your eyes directly in front of you.  this position for a few seconds before starting your exercise. This helps increase your awareness of proper posture.  Reach overhead and slightly back with both arms. Keep your shoulders and neck aligned and your elbows behind your shoulders:    With your palms facing the ceiling, turn your fingers inward.    Take a deep breath. Breathe out, and lower your elbows toward your buttocks. Hold for 5 seconds, then return to starting position.    Repeat 3 times.       Date Last Reviewed: 8/16/2015 2000-2016 [employer]. [address] [place] [address]. All rights reserved. This information is not intended as a substitute for professional medical care. Always follow your healthcare professional's instructions.        Neck Exercises: Active Neck Rotation    To start, lie on your back, knees bent and feet flat on the floor. Keep your ears, shoulders, and hips aligned, but dont press your lower back to the floor. Rest your hands on your pelvis. Breathe deeply and relax.      Use your neck muscles to turn your head to one side until you feel a stretch in the muscles.    Hold for 5 seconds. Then turn to the other side.    Repeat 5 times on each side.  Note: Keep your shoulders on the floor. Dont lift or tuck your chin as you turn your head.  Date Last Reviewed: 8/16/2015 2000-2016  [employer]. [address] [place] [address]. All rights reserved. This information is not intended as a substitute for professional medical care. Always follow your healthcare professional's instructions.        Neck Exercises: Passive Neck Rotation        To start, lie on your back, knees bent and feet flat on the floor. Keep your ears, shoulders, and hips aligned, but dont press your lower back to the floor. Rest your hands on your pelvis. Breathe deeply and relax.    With your neck relaxed, place the palm of one hand on your forehead. Use your hand to turn your head to one side until you feel a stretch in the neck muscles. Do not push through pain.    Hold for 5 seconds. Then turn to the other side.    Repeat 5 times on each side.   Note: Keep your shoulders on the floor. Dont lift your chin as you turn your head.  Date Last Reviewed: 8/16/2015 2000-2016 [employer]. [address] [place] [address]. All rights reserved. This information is not intended as a substitute for professional medical care. Always follow your healthcare professional's instructions.            Orders Placed This Encounter   Procedures   ? Electrocardiogram Perform and Read   ? Echo Complete     Standing Status:   Future     Standing Expiration Date:   10/30/2018     There are no discontinued medications.    Return if symptoms worsen or fail to improve.    CHIEF COMPLAINT:  Chief Complaint   Patient presents with   ? Neck Pain     started this morning around 8 am, now radiating down her left arm       HISTORY OF PRESENT ILLNESS:  Lalitha is a 72 y.o. female presenting to the clinic today for evaluation of neck and shoulder pain. She has had it for about 3 days and today it went down her upper shoulder for a few minutes and then resolved. She can't remember anything that she might have done to aggravate her pain. However, she has history of arthritis in her back and she feels the pain is  similar. No associated shortness of breath or cough or headache or numbness/tingling of her arms or weakness in her arms. She is concern about possible angina equivalent being that her sisters have heart issues.   Denies dyspnea on exertion, diaphoresis or orthopnea. Denies chest pain/pressure.     REVIEW OF SYSTEMS:   11 system ROS were negative except as noted in HPI.     PFSH:  History   Smoking Status   ? Former Smoker   ? Types: Cigarettes   ? Quit date: 12/7/1998   Smokeless Tobacco   ? Never Used       Family History   Problem Relation Age of Onset   ? Breast cancer Mother 66   ? Hypertension Mother    ? Heart attack Mother    ? Varicose Veins Mother    ? Hypertension Father    ? Heart attack Sister    ? Diabetes Son    ? Pulmonary Hypertension Daughter    ? Heart attack Sister        Social History     Social History   ? Marital status:      Spouse name: N/A   ? Number of children: N/A   ? Years of education: N/A     Occupational History   ? Not on file.     Social History Main Topics   ? Smoking status: Former Smoker     Types: Cigarettes     Quit date: 12/7/1998   ? Smokeless tobacco: Never Used   ? Alcohol use 1.2 oz/week     2 Glasses of wine per week   ? Drug use: No   ? Sexual activity: No     Other Topics Concern   ? Not on file     Social History Narrative    .  Two kids.  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       Past Surgical History:   Procedure Laterality Date   ? OTHER SURGICAL HISTORY Left 2003    Broke titanium in left arm   ? SHOULDER SURGERY Right 1967       Allergies   Allergen Reactions   ? Clindamycin Rash   ? Carbamazepine Rash   ? Morphine Nausea Only       Active Ambulatory Problems     Diagnosis Date Noted   ? Solar Elastosis    ? Rosacea    ? Epilepsy And Recurrent Seizures    ? Esophageal Reflux    ? Vitamin D Deficiency    ? Chronic Obstructive Pulmonary Disease    ? Hyperlipidemia    ? Walk Is Wobbly Or Unsteady (Ataxia)    ? Obesity    ? Hypertension    ? Osteopenia    ?  Venous insufficiency of both lower extremities    ? Hiatal hernia    ? Leg swelling 12/07/2015   ? Left arm swelling 12/07/2015   ? Flat feet 12/07/2015   ? History of right shoulder fracture 12/07/2015   ? Valgus deformity of both feet 03/15/2017     Resolved Ambulatory Problems     Diagnosis Date Noted   ? Hypertension    ? Urge Incontinence Of Urine    ? Acute bronchitis    ? Osteopenia    ? Neck Pain    ? Hypopotassemia 12/01/2014   ? Convulsive disorder      Past Medical History:   Diagnosis Date   ? Convulsive disorder    ? COPD (chronic obstructive pulmonary disease)    ? Depression    ? Hernia of unspecified site of abdominal cavity without mention of obstruction or gangrene    ? Hiatal hernia    ? Hypertension    ? Osteopenia    ? Venous insufficiency of both lower extremities        VITALS:  Vitals:    10/30/17 1618   BP: 136/80   Patient Site: Right Arm   Patient Position: Sitting   Cuff Size: Adult Small   Pulse: 88   Resp: 20   Temp: 97.4  F (36.3  C)   TempSrc: Oral   SpO2: 96%   Weight: 208 lb 1 oz (94.4 kg)     Wt Readings from Last 3 Encounters:   10/30/17 208 lb 1 oz (94.4 kg)   06/12/17 209 lb 3.2 oz (94.9 kg)   04/20/17 214 lb 11.2 oz (97.4 kg)     Body mass index is 35.71 kg/(m^2).    PHYSICAL EXAM:  Constitutional:  In no distress.  Vitals:  Per nursing notes.  Ears: TMs and canals clear bilaterally.  Oropharynx:  Without posterior nasal drainage or thrush.  Neck:  Supple, thyroid not palpable and no LAD.  Cardiac:  Regular rate and rhythm with 3/6 systolic murmur hear best at LLSB; no rubs, or gallops. Carotids without bruits. Legs without edema. Palpation of the radial pulse regular.  Lungs: Clear bilaterally without wheezing or rales.  Respiratory effort normal.  Abdomen:   Bowel sounds positive, nontender, nondistended.  Neither liver nor spleen palpable. No masses.   Skin:   Without rash, bruise, or palpable lesions.  Rheumatologic//musc: Normal joints and nails of the hands. This writer  was able to reproduce some of her pain with palpation of her trapezius muscles and with rotation of her neck to the left.   Neurologic:  Cranial nerves II-XII intact. MS intact     Psychiatric:  Mood appropriate, memory intact.     MEDICATIONS:  Current Outpatient Prescriptions   Medication Sig Dispense Refill   ? albuterol (PROAIR HFA;PROVENTIL HFA;VENTOLIN HFA) 90 mcg/actuation inhaler Inhale 2 puffs every 6 (six) hours as needed for wheezing. 3 Inhaler 3   ? calcium citrate 250 mg calcium Tab Take 500 Units by mouth 2 (two) times a day.     ? cholecalciferol, vitamin D3, 5,000 unit Tab Take by mouth.     ? DENOSUMAB (PROLIA SUBQ) Inject 60 mL as directed every 6 (six) months.     ? fluticasone-salmeterol (ADVAIR) 250-50 mcg/dose DISKUS Inhale 1 puff 2 (two) times a day. 180 each 3   ? hydroCHLOROthiazide (HYDRODIURIL) 12.5 MG tablet Take 1 tablet (12.5 mg total) by mouth daily. 90 tablet 3   ? hyoscyamine (LEVBID) 0.375 mg 12 hr tablet Take 1 tablet (0.375 mg total) by mouth every 12 (twelve) hours as needed for cramping. 180 tablet 3   ? losartan (COZAAR) 50 MG tablet Take 1 tablet (50 mg total) by mouth 2 (two) times a day. 180 tablet 3   ? OXYGEN-AIR DELIVERY SYSTEMS MISC Use 1.5 L As Directed bedtime.     ? phenytoin (DILANTIN) 100 MG ER capsule Take 1 capsule (100 mg total) by mouth 3 (three) times a day. 270 capsule 3   ? potassium chloride 20 mEq TbER Take 20 mEq by mouth daily. 90 tablet 3   ? predniSONE (DELTASONE) 20 MG tablet Take 1 tablet (20 mg total) by mouth daily. TAKE 2 TABLETS DAILY FOR THE FIRST 3 DAYS, THEN ONE TABLET DAILY FOR THE NEXT 3 DAYS 9 tablet 0   ? tiotropium (SPIRIVA) 18 mcg inhalation capsule Place 1 capsule (2 puffs total) into inhaler and inhale daily. 90 capsule 3   ? albuterol (ACCUNEB) 1.25 mg/3 mL nebulizer solution Take 3 mL (1.25 mg total) by nebulization every 6 (six) hours as needed for wheezing. 75 mL 0     No current facility-administered medications for this visit.       EK lead EKG showed poor baseline but no significant change from 2004. No acute ST/T wave chagnes.     Total time spent with patient and family was 30 minutes; greater than 50% of the time was on education regarding neck pain.   Gary Hawley MD

## 2021-06-25 NOTE — TELEPHONE ENCOUNTER
Date: 4/10/2019 Status: Stewart   Time: 8:15 AM Length: 15   Visit Type: NURSE VISIT [[id number]] Copay: $0.00   Provider: [facility] Department: [facility] ENDOCRINOLOGY   Referring Provider: HARMAN SANCHEZ CSN: [id number]   Notes: lamar Justice     Date: 10/15/2019 Status: Stewart   Time: 10:20 AM Length: 20   Visit Type: OFFICE VISIT [[id number]] Copay: $0.00   Provider: Lalitha Haq NP Department: [facility] ENDOCRINOLOGY   Referring Provider: HARMAN SANCHEZ CSN: [id number]   Notes: oste and Prolia

## 2021-06-26 NOTE — TELEPHONE ENCOUNTER
Patient requesting a call back to discuss foot/toe pain that is being caused by a hammertoe. Patient declined scheduling an appt.

## 2021-06-26 NOTE — PROGRESS NOTES
Progress Notes by Jasmeet Wilder MD at 10/25/2018  9:30 AM     Author: Jasmeet Wilder MD Service: -- Author Type: Physician    Filed: 10/25/2018 10:21 AM Encounter Date: 10/25/2018 Status: Signed    : Jasmeet Wilder MD (Physician)       THORACIC SURGERY OFFICE NEW PATIENT VISIT      Dear Dr. Hull,    I saw Ms. Underwood at Dr. Tamayo request in consultation for the evaluation and treatment of a giant hiatal hernia.     HPI  Mrs. Lalitha Underwood is a 73 y.o. year-old female with a giant hiatal hernia. She has increasing dyspnea on exertion, while her COPD has remained stable.    Tests   CT abdomen (3/24/2018): small bowel obstruction from ventral hernia, large hiatal hernia (likely exaggerated due to SBO).      Echo (10/2017): normal EF, mild TR, RV poorly visualized, PA valve normal    PFT (10/3/2018): FEV1  43% (no change with BD, DLCO 46%)    PMH    Past Medical History:   Diagnosis Date   ? Convulsive disorder (H)    ? COPD (chronic obstructive pulmonary disease) (H)    ? Depression    ? Hernia of unspecified site of abdominal cavity without mention of obstruction or gangrene    ? Hiatal hernia    ? Hypertension    ? On home oxygen therapy     at 1.5 liters at nite   ? Osteopenia    ? Shortness of breath    ? Venous insufficiency of both lower extremities       Past Surgical History:   Procedure Laterality Date   ? OTHER SURGICAL HISTORY Left 2003    Broke titanium in left arm   ? SHOULDER SURGERY Right 1967   ? UMBILICAL HERNIA REPAIR  04/04/2018    Dr. Jimenez       ETOH rare  TOB quit 1998    Physical examination  BMI 35  Non-contributory. She was not using O2 during the visit. She gets short of breath when talking too fast.    From a personal perspective, she is here alone today. Her , Saul, is not here, he is undergoing an evaluation for possible dementia. They lost their 44 year-old daughter 2 years ago from idiopathic pulmonary hypertension.      IMPRESSION   1. Hiatal hernia      2. Chronic obstructive pulmonary disease, unspecified COPD type (H)        73 y.o. year-old female with a giant hiatal hernia in the setting of stable COPD    PLAN  I spent a total of 30 minutes with Ms. Underwood, more than 50% of which were spent in counseling, coordination of care, and face-to-face time. I reviewed the plan as follows:    1) Procedure planned: Laparoscopic hiatal hernia repair (NO Nissen). Given her COPD and O2 dependence and probably some degree of pulmonary hypertension, I will perform the procedure at [facility]. I reviewed the indications, risks, and benefits of the procedure with Mrs. Lalitha Underwood.  We discussed the risks that include but are not limited to bleeding, infection, need for reoperation, conversion to laparotomy, potential long-term failure, and death. I explained the anticipated hospital course (2-5 days) and postoperative recovery, transient dysphagia, transient diarrhea, and permanent postprandial bloating which can be mitigated with proper dietary habits. We discussed the importance of lifetime awareness to limit lifting heavy weights in case of identifying a hiatal hernia.    2) PAC    3) She signed a EMMIE today to expedite transfer of documents and we will push images to PACS.    They had all their questions answered and were in agreement with the plan.  I appreciate the opportunity to participate in the care of your patient and will keep you updated.    Sincerely,

## 2021-06-26 NOTE — PROGRESS NOTES
Progress Notes by Peryr Tamayo MD at 10/9/2018 11:30 AM     Author: Perry Tamayo MD Service: -- Author Type: Physician    Filed: 10/9/2018 12:09 PM Encounter Date: 10/9/2018 Status: Signed    : Perry Tamayo MD (Physician)       Assessment and Plan:Lalitha Underwood is a 73 y.o. with a past medical history significant for COPD who presents to clinic today for evaluation of worsening shortness of breath.  Her history suggests an evolving process since an umbilical hernia repair resulting in more dyspnea.  I do not think her COPD is getting dramatically worse.  She quit smoking over 20 years ago and was stable regarding her dyspnea until the surgery.  She does not have exacerbations, just extreme dyspnea.  I strongly suspect her hiatal hernia may be evolving, and she may be breathing with more accessory muscles to try try to limit the sensation of her hernia.  I suspect she would feel remarkably better IF this could be repaired.  We discussed an evaluation by thoracic surgery to determine if this would be possible and the chances it would help her breathing.   1. COPD - I suspect this is stable with spiriva and advair.  Albuterol is not helping a lot, so I do not think more inhalers are going to improve this process  2. Diaphragmatic paralysis/hernia - perform SNIFF test, refer to Dr. Wilder for discussion of repair.  He may require more imaging to understand the tissue planes involved, but will defer this to him.        CCx: dyspnea    HPI: Ms. Underwood is a 73 year old female with a history of COPD and a recent umbilical hernia repair.  She has known for years that she had COPD and had been maintained on Spiriva and Advair without frequent exacerbations or signifcant dyspnea.  She quit smoking 20 years ago.  However in March she became more short of breath after an umbilical hernia repair, and it hasn't improved since.  She gets winded walking a block and has to stop.  She also  gets short of breath after eating a big meal, especially if too much meat is eaten.  She finds her shortness of breath gets better if she lays down for a few minutes too.  She has had intermittent chest pains after eating, but this has not been significant.  She has no cough, and when she is short of breath, albuterol doesn't really help.  She is using albuterol a little more often in an effort to keep her lungs more open.    She has known she had a hiatal hernia for many years.  Dr. Jimenez apparently offered to repair this hiatal hernia at the same time as her umbilical hernia, but it couldn't be scheduled that way.  She is hesitant about having another big surgery.      PMH:  Past Medical History:   Diagnosis Date   ? Convulsive disorder (H)    ? COPD (chronic obstructive pulmonary disease) (H)    ? Depression    ? Hernia of unspecified site of abdominal cavity without mention of obstruction or gangrene    ? Hiatal hernia    ? Hypertension    ? On home oxygen therapy     at 1.5 liters at nite   ? Osteopenia    ? Shortness of breath    ? Venous insufficiency of both lower extremities        PSH:  Past Surgical History:   Procedure Laterality Date   ? OTHER SURGICAL HISTORY Left 2003    Broke titanium in left arm   ? SHOULDER SURGERY Right 1967   ? UMBILICAL HERNIA REPAIR  04/04/2018    Dr. Jimenez       SH:  Social History     Social History   ? Marital status:      Spouse name: N/A   ? Number of children: N/A   ? Years of education: N/A     Occupational History   ? Not on file.     Social History Main Topics   ? Smoking status: Former Smoker     Types: Cigarettes     Quit date: 12/7/1998   ? Smokeless tobacco: Never Used   ? Alcohol use 1.2 oz/week     2 Glasses of wine per week   ? Drug use: Yes     Special: Oxycodone   ? Sexual activity: No     Other Topics Concern   ? Not on file     Social History Narrative    .  Two kids.  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       Family history:  Family History   Problem  Relation Age of Onset   ? Breast cancer Mother 66   ? Hypertension Mother    ? Heart attack Mother    ? Varicose Veins Mother    ? Hypertension Father    ? Heart attack Sister    ? Heart disease Sister    ? Diabetes Son    ? Pulmonary Hypertension Daughter    ? Heart attack Sister    ? Heart disease Sister      The family history was reviewed and is not pertinent to the chief complaint or HPI.    ROS:  Review of Systems - History obtained from the patient  General ROS: negative  Psychological ROS: negative  ENT ROS: negative  Allergy and Immunology ROS: negative  Endocrine ROS: negative  Respiratory ROS: positive for - shortness of breath  negative for - hemoptysis, orthopnea, pleuritic pain, sputum changes, stridor, tachypnea or wheezing  Cardiovascular ROS: no chest pain or palpitations  Gastrointestinal ROS: no abdominal pain, change in bowel habits, or black or bloody stools  Genito-Urinary ROS: no dysuria, trouble voiding, or hematuria  Musculoskeletal ROS: negative  Neurological ROS: no TIA or stroke symptoms  Dermatological ROS: negative      Current Meds:  Current Outpatient Prescriptions   Medication Sig Note   ? albuterol (ACCUNEB) 1.25 mg/3 mL nebulizer solution Take 3 mL (1.25 mg total) by nebulization every 6 (six) hours as needed for wheezing.    ? albuterol (PROAIR HFA;PROVENTIL HFA;VENTOLIN HFA) 90 mcg/actuation inhaler Inhale 2 puffs every 6 (six) hours as needed for wheezing.    ? calcium citrate (CALCITRATE) 200 mg (950 mg) tablet Take 1 tablet by mouth 2 (two) times a day.    ? cholecalciferol, vitamin D3, 5,000 unit Tab Take 5,000 Units by mouth daily.     ? denosumab 60 mg/mL Syrg Inject 60 mg under the skin every 6 (six) months.    ? fluticasone-salmeterol (ADVAIR) 250-50 mcg/dose DISKUS Inhale 1 puff 2 (two) times a day.    ? hydroCHLOROthiazide (HYDRODIURIL) 12.5 MG tablet Take 1 tablet (12.5 mg total) by mouth daily. 5/25/2018: RX stopped-Per PCP   ? hyoscyamine (LEVBID) 0.375 mg 12 hr  tablet Take 1 tablet (0.375 mg total) by mouth every 12 (twelve) hours as needed for cramping.    ? losartan (COZAAR) 50 MG tablet Take 1 tablet (50 mg total) by mouth 2 (two) times a day.    ? OXYGEN-AIR DELIVERY SYSTEMS [facility] Use 1.5 L As Directed bedtime.    ? phenytoin (DILANTIN) 100 MG ER capsule Take 100 mg by mouth every morning.    ? phenytoin (DILANTIN) 100 MG ER capsule Take 200 mg by mouth at bedtime.    ? potassium chloride (K-DUR,KLOR-CON) 20 MEQ tablet TAKE 1 TABLET BY MOUTH EVERY DAY    ? potassium chloride 20 mEq TbER Take 20 mEq by mouth daily.    ? RABEprazole (ACIPHEX) 20 mg tablet Take 1 tablet (20 mg total) by mouth daily.    ? tiotropium (SPIRIVA) 18 mcg inhalation capsule Place 1 capsule (2 puffs total) into inhaler and inhale daily.        Labs:  No results found for this or any previous visit (from the past 72 hour(s)).    I have personally reviewed all imaging and PFT data available pertinent to this visit.    Imaging studies:  I could not evaluate this Xray today, but have reviewed the below report.  EXAM DATE:         03/29/2018     [facility]  X-RAY CHEST, 2 VIEWS, FRONTAL AND LATERAL  3/29/2018 9:45 AM     INDICATION: shortness of breath with exertion  COMPARISON: CT abdomen and pelvis exam 03/24/2018     FINDINGS: Mild cardiac enlargement. Pulmonary vascularity normal. Marked  elevation of the left hemidiaphragm with subsegmental atelectasis left base.  Intrathoracic stomach lower left chest as seen on prior CT. Contrast material  within the left colon. The right lung is clear. Postop changes right shoulder.      I did evaluate this CT result  From abdominal CT in March, 2018    PFTs:  FEV1/FVC is 60 and is reduced.  FEV1 is 43% predicted and is reduced.  FVC is 54% predicted and reduced.  There was no improvement in spirometry after a single inhaled dose of bronchodilator, however, it was close to meeting ATS guidelines.  TLC is 192% predicted and is increased.  RV is  "317% predicted and is increased.  DLCO is 46% predicted and is reduced when it   is corrected for hemoglobin.     Impression:  Full Pulmonary Function Test is abnormal.  PFTs are consistent with severe obstructive disease.  Spirometry is not consistent with reversibility.  There is hyperinflation.  There is air-trapping.  Diffusion capacity when corrected for hemoglobin is moderately reduced.  Note, that RT comments that pt was unable to reach > 85% of VC. Also note that on pleth pt was unable to pant during lung volume measurements. N2 washout was attempted but unsuccessful.     Physical Exam:  /70  Pulse 86  Resp 14  Ht 5' 4\" (1.626 m)  Wt 205 lb (93 kg)  LMP 12/12/1998  SpO2 97%  Breastfeeding? No  BMI 35.19 kg/m2  General - Well nourished  Ears/Mouth - OP pink moist, no thrush  Neck - no cervical lymphadenopathy  Lungs - diminished at left base  CVS - regular rhythm with no murmurs, rubs or gallups  Abdomen - soft, NT, ND, NABS - uses rib muscles, and not abdominal muscles with inspiration  Ext - no cyanosis, clubbing or edema  Skin - no rash  Psychology - alert and oriented, answers appropriate        Electronically signed by:    Perry Tamayo MD PhD  [facility] Pulmonary and Critical Care Medicine         "

## 2021-06-26 NOTE — TELEPHONE ENCOUNTER
Pt stated wondering what her options were for the hammertoe. She stated that she can barely wear shoes. Writer informed her per Dr. Orozco note on 5/14- Treatment options include CREST pads and surgical correction of the hammertoe deformity. She stated that she will think about it and call back to schedule a f/u.

## 2021-06-27 NOTE — PROGRESS NOTES
"Progress Notes by Dolores Nicole AuD at 6/28/2019 10:00 AM     Author: Dolores Nicole AuD Service: -- Author Type: Audiologist    Filed: 6/28/2019 11:30 AM Encounter Date: 6/28/2019 Status: Signed    : Dolores Nicole AuD (Audiologist)       Hearing evaluation in conjunction with ENT exam (Stan Rangel PA-C)    Summary:  Audiology visit completed. Please see audiogram below or under \"media\" tab for history and results.    Transducer:  Both insert phones and circumaural headphones were used.    Reliability:    Good    Recommendations:  Follow-up with ENT; retest hearing annually (to monitor) or per medical management.  Wear hearing protection consistently in noise.  Lalitha Underwood is a potential binaural amplification candidate, if patient motivation exists and medical clearance is granted. Ms. Underwood indicated she is not interested in amplification at this time.    Scott Driscoll, [facility]  [place] Licensed Audiologist [id number]           "

## 2021-06-28 NOTE — PROGRESS NOTES
Progress Notes by Latasha Cowan PT at 3/16/2020  2:00 PM     Author: Latasha Cowan PT Service: -- Author Type: Physical Therapist    Filed: 3/16/2020  4:35 PM Encounter Date: 3/16/2020 Status: Attested Addendum    : Latasha Cowan PT (Physical Therapist)    Related Notes: Original Note by Latasha Cowan PT (Physical Therapist) filed at 3/16/2020  3:49 PM    Cosigner: Kelly Hull MD at 3/16/2020  9:13 PM    Attestation signed by Kelly Hull MD at 3/16/2020  9:13 PM    Agree with recommendations.  Follow plan of care.  [facility] Certification Request    March 16, 2020      Patient: Lalitha Underwood  MR Number: [id number]  YOB: 1945  Date of Visit: 3/16/2020      Dear Dr. Hull,    Thank you for this referral.   We are seeing Lalitha Underwood for Physical Therapy of gait problems.    Medicare and/or Medicaid requires physician review and approval of the treatment plan. Please review the plan of care and verify that you agree with the therapy plan of care by co-signing this note.      Plan of Care  Authorization / Certification Start Date: 03/16/20  Authorization / Certification End Date: 05/15/20  Authorization / Certification Number of Visits: 8  Communication with: Referral Source  Patient Related Instruction: Nature of Condition;Treatment plan and rationale;Self Care instruction;Body mechanics;Posture;Basis of treatment  Times per Week: 1-2  Number of Weeks: 4  Number of Visits: 8  Discharge Planning: to self-care with HEP, when PT goals are met or plateau of progress  Precautions / Restrictions : COPD, osteopenia  Therapeutic Exercise: ROM;Stretching;Strengthening  Neuromuscular Reeducation: posture;core;balance/proprioception      Goals:  Pt. will demonstrate/verbalize independence in self-management of condition in : 4 weeks  Pt. will be independent with home exercise program in : 4 weeks    Pt will: improve MILAN balance testing by 6  points in 4 weeks  Pt will: improve 2 min walk test to 130 M in 4 weeks        If you have any questions or concerns, please don't hesitate to call.    Sincerely,      Latasha Cowan, PT        Physician recommendation:     ___ Follow therapist's recommendation        ___ Modify therapy      *Physician co-signature indicates they certify the need for these services furnished within this plan and while under their care.      [facility]  [place]-Pelvic/Cervico-Thoracic Initial Evaluation    Patient Name: Lalitha Underwood  Date of evaluation: 3/16/2020  Referral Diagnosis: Abnormality of gait   Referring provider: Kelly Hull MD  Visit Diagnosis:     ICD-10-CM    1. Abnormality of gait  R26.9        Assessment:      Lalitha Underwood is a 74 y.o. female with PMH significant for COPD, osteopenia, pre-diabetes who presents to therapy today with chief complaints of mobility issues due to decreased balance and strength.  Pt symptoms are chronic and worsening.  On exam pt demonstrates significant fall risk, she scores a 36/56 on the Milan Balance Scale and 30 sec sit<>stand.  Pt would benefit from skilled PT to address the above limitations.    POC and pathology of condition were reviewed with patient.  Pt. is in agreement with the Plan of Care  A Home Exercise Program (HEP) was initiated today.  Pt. was instructed in exercises by PT and patient was given a handout with detailed instructions.    Goals:  Pt. will demonstrate/verbalize independence in self-management of condition in : 4 weeks  Pt. will be independent with home exercise program in : 4 weeks    Pt will: improve MILAN balance testing by 6 points in 4 weeks  Pt will: improve 2 min walk test to 130 M in 4 weeks      Patient's expectations/goals are realistic.    Barriers to Learning or Achieving Goals:  Comorbidities        Plan / Patient Instructions:        Plan of Care:   Authorization / Certification Start Date: 03/16/20  Authorization  / Certification End Date: 05/15/20  Authorization / Certification Number of Visits: 8  Communication with: Referral Source  Patient Related Instruction: Nature of Condition;Treatment plan and rationale;Self Care instruction;Body mechanics;Posture;Basis of treatment  Times per Week: 1-2  Number of Weeks: 4  Number of Visits: 8  Discharge Planning: to self-care with HEP, when PT goals are met or plateau of progress  Precautions / Restrictions : COPD, osteopenia  Therapeutic Exercise: ROM;Stretching;Strengthening  Neuromuscular Reeducation: posture;core;balance/proprioception      Plan for next visit: LE and core strengthening     Subjective:         Social information:   Living Situation: lives with  who is on hospice   Occupation: retired    History of Present Illness:    Lalitha is a 74 y.o. female who presents to therapy today with complaints of mobility difficulties, balance and strength.  Using a walker most of the time espeically to go outside.  She has not had any falls. She does report some ongoing foot pain.  She has peripheral neuropathy.  Low back pain limits her as well.  Onset chronic, LITZY no known, she reports she has some kind of inner ear issue but unknown.    Functional limitations:    Bending   Up/down stairs   Walk on uneven   Kneel/squat   Carrying laundry/groceries    Previous Activity Level: Does her PT exercises    Severity:   Pain Ratin   Pain rating at best: 0   Pain rating at worst: 2  Quality/Description: Sore        Pertinent Past Medical History: osteopenia, COPD, chronic lymphedema    Associated symptoms:                         Numbness: in her toes                        Weakness: -        Objective:      Note: Items left blank indicates the item was not performed or not indicated at the time of the evaluation.    Patient Outcome Measures :    Lower Extremity Functional Scale (_/80): 38     Scores range from 0-80, where a score of 80 represents maximum function. The minimal  clinically important difference is a positive change of 9 points.    Examination  1. Abnormality of gait       Precautions/Restrictions: COPD, osteopenia      Functional test Score / AD if used Previous Score from 12/12/19 Fall risk cutoff score Norms Observations   30 second sit<>stand 11x   11x <8 high fall risk  9-12 mod risk     Timed up and go (TUG)  (seconds) Trial 1: 11.5 sec without AD  Trial 2: 10.8 sec with 2WW 11.4 sec >13 sec     Cognitive TUG (seconds) 13.1 sec   15.1 sec >15 sec 9-10 seconds    Comfortable gait speed 10M     <1.0 m/s     Fast gait speed 10M          4-square step test     >15 sec     2 minute walk test   103.3 M with 2WW 87.6 M        MiniBest <23/28= falls risk  FGA < or equal to 22/30= high risk of falls  Garcia: Fall risk <45    Garcia Balance Scale:  1) Sitting to standing (3)   2) Standing unsupported (4)  3) Sitting with back unsupported but feet supported on floor (4)  4) Standing to sitting (3)  5) Transfer(3)  6) Standing unsupported with eyes closed (3)  7) Standing unsupported with feet together (3)  8) Reaching forward with outstretched arm while standing (3)  9)  object from the floor from a standing position (2)  10) Turning to look behind left and right shoulder while standing (3)  11) Turn 360 degrees (2)  12) Place alternate foot on step/stool while standing unsupported (1)  13) Standing unsupported one foot in front (1)  14) Standing on one leg (1)    Total score: 36/56 (<45/56 falls risk)            Treatment Today     TREATMENT MINUTES COMMENTS   Evaluation 40 Low complexity balance/gait eval   Self-care/ Home management     Manual therapy     Neuromuscular Re-education     Therapeutic Activity     Therapeutic Exercises 10 Sit<>stands, counter top 4-way kicks, gastroc stretch for HEP   Gait training     Modality__________________                Total 50    Blank areas are intentional and mean the treatment did not include these items.              Latasha Cowan,  DPSACHIN  3/16/2020  2:06 PM

## 2021-06-28 NOTE — PROGRESS NOTES
Progress Notes by Latasha Cowan PT at 10/28/2019 11:00 AM     Author: Latasha Cowan PT Service: -- Author Type: Physical Therapist    Filed: 10/28/2019 12:42 PM Encounter Date: 10/28/2019 Status: Attested    : Latasha Cowan PT (Physical Therapist) Cosigner: Kelly Hull MD at 10/28/2019  9:28 PM    Attestation signed by Kelly Hull MD at 10/28/2019  9:28 PM    Agree with recommendations as made by therapist.                      [facility] Certification Request    October 28, 2019      Patient: Lalitha Underwood  MR Number: [id number]  YOB: 1945  Date of Visit: 10/28/2019      Dear Dr. Hull;    Thank you for this referral.   We are seeing Lalitha Underwood for Physical Therapy of difficulty walking.    Medicare and/or Medicaid requires physician review and approval of the treatment plan. Please review the plan of care and verify that you agree with the therapy plan of care by co-signing this note.      Plan of Care  Authorization / Certification Start Date: 10/28/19  Authorization / Certification End Date: 12/31/19  Authorization / Certification Number of Visits: 8  Communication with: Referral Source  Patient Related Instruction: Nature of Condition;Treatment plan and rationale;Self Care instruction;Basis of treatment;Body mechanics  Times per Week: 1-2  Number of Weeks: 4  Number of Visits: 8  Discharge Planning: to self-care with HEP, when goals are met or plateau of progress  Precautions / Restrictions : COPD, osteopenia  Therapeutic Exercise: ROM;Stretching;Strengthening  Neuromuscular Reeducation: posture;balance/proprioception;core  Functional Training (ADL's): instructions in transfers  Gait Training: as needed      Goals:  Pt. will demonstrate/verbalize independence in self-management of condition in : 4 weeks  Pt. will be independent with home exercise program in : 4 weeks    Pt will: improve 4-square step test to <15 seconds in 4-6 weeks to demonstrate  improved mobility over obstacles and multi-directional balance  Pt will: improve her gait speed by 0.6 m/sec in 4 weeks to demonstrate better community ambulation with SPC  Pt will: perform step ups with only SPC to demosntrate improved community ambulation        If you have any questions or concerns, please don't hesitate to call.    Sincerely,      Latasha Cowan, PT        Physician recommendation:     ___ Follow therapist's recommendation        ___ Modify therapy      *Physician co-signature indicates they certify the need for these services furnished within this plan and while under their care.      [facility]  [place]-Pelvic/Cervico-Thoracic Initial Evaluation    Patient Name: Lalitha Underwood  Date of evaluation: 10/28/2019  Referral Diagnosis: Neuropathy, idiopathic   Referring provider: Kelly Hull MD  Visit Diagnosis:     ICD-10-CM    1. Abnormality of gait R26.9    2. Generalized muscle weakness M62.81    3. Decreased functional mobility R26.89        75 visit yearly max (used 15), reaching Medicare cap    Assessment:      Lalitha Underwood is a 74 y.o. female with PMH significant for osteopenia, COPD, HTN who presents to therapy today with chief complaints of difficulty with balance/walking.  Pt sx began around January 2018 and have been progressively worsening.  She denies falls, but reports now has to use her walker everywhere she goes.  On exam patient demonstrates a moderate fall risk per 30 sec sit<>stand, and a significant fall risk per 4-square step test.  She has significant more difficulty when she cannot use UE for support.  Pt would benefit from skilled PT to address the above limitations.    POC and pathology of condition were reviewed with patient.  Pt. is in agreement with the Plan of Care  A Home Exercise Program (HEP) was initiated today.    Goals:  Pt. will demonstrate/verbalize independence in self-management of condition in : 4 weeks  Pt. will be independent  with home exercise program in : 4 weeks    Pt will: improve 4-square step test to <15 seconds in 4-6 weeks to demonstrate improved mobility over obstacles and multi-directional balance  Pt will: improve her gait speed by 0.6 m/sec in 4 weeks to demonstrate better community ambulation with SPC  Pt will: perform step ups with only SPC to demosntrate improved community ambulation      Patient's expectations/goals are realistic.    Barriers to Learning or Achieving Goals:  Chronicity of the problem.        Plan / Patient Instructions:        Plan of Care:   Authorization / Certification Start Date: 10/28/19  Authorization / Certification End Date: 12/31/19  Authorization / Certification Number of Visits: 8  Communication with: Referral Source  Patient Related Instruction: Nature of Condition;Treatment plan and rationale;Self Care instruction;Basis of treatment;Body mechanics  Times per Week: 1-2  Number of Weeks: 4  Number of Visits: 8  Discharge Planning: to self-care with HEP, when goals are met or plateau of progress  Precautions / Restrictions : COPD, osteopenia  Therapeutic Exercise: ROM;Stretching;Strengthening  Neuromuscular Reeducation: posture;balance/proprioception;core  Functional Training (ADL's): instructions in transfers  Gait Training: as needed      Plan for next visit: Functional gait assessment or Garcia as time allows.  Initiate/review a HEP, hip/ankle strengthening, balance exercises, trial walking on treadmill as pt has one     Subjective:         Social information:   Living Situation:  on Hospice, nurse comes every week to help with vitals/pills    History of Present Illness:    Lalitha is a 74 y.o. female who presents to therapy today with complaints of decreased balance.  She has been seen before for this, it feels a little worse like she may need a walker now.  Her right arch hurts as well, she has some tibialis posterior tendonitis and is wearing an ankle brace for this. She is having  more cramping in her hamstrings, hip flexors, hands. Onset 2018 approximately.   Functional limitations:    Community ambulation with walker   Squatting    Previous Activity Level: Low - did use     Severity:   Pain Ratin - right ankle   Pain rating at best: 0   Pain rating at worst: 3  Quality/Description: sore    Pertinent Past Medical History: HTN, osteopenia, COPD    Associated symptoms:                         Numbness: -                        Weakness: - except general deconditioning     Fracture:    1. History of trauma -    2. Prolonged use of steroids -    3. Age over 70 +    4. History of breast cancer (estrogen inhibiting drugs) -     Cauda Equina Sx    1. Urine retention or incontinence -    2. Fecal incontinence -    3. Saddle anesthesia -    4. Global or Progressive Weakness -     Spine related tumor:    1. Age >50 +    2. Hx of cancer -    3. Unexplained weight loss >10# -    4. Failure with conservative Tx -     Spinal Osteomyelitis/Discitis    1. Recent infection (UTI, skin, other internal) -    2. Fevers/Chills -      Objective:      Note: Items left blank indicates the item was not performed or not indicated at the time of the evaluation.    Patient Outcome Measures :    Lower Extremity Functional Scale (_/80): 35     Scores range from 0-80, where a score of 80 represents maximum function. The minimal clinically important difference is a positive change of 9 points.    Examination  1. Abnormality of gait     2. Generalized muscle weakness     3. Decreased functional mobility       Precautions/Restrictions: osteopenia, COPD    LE STRENGTH:   Hip flex 4/5 benedicto  Hip abduction 4/5 benedicto  Hip extension 4/5 benedicto  Knee ext 5/5 benedicto  Knee flex 4/5 benedicto  Ankle DF/PF 4/5 benedicto      Functional test Score / AD if used Previous Score from 19 Fall risk cutoff score Norms Observations   30 second sit<>stand 11   10 <8 high fall risk  9-12 mod risk     Timed up and go (TUG)  (seconds) Trial 1: 15.9  Trial  "2: 11.7  Trial 3: 10.9  Av.8 sec 11.6 sec >13 sec     Cognitive TUG (seconds) 15.1 sec   13.3 sec >15 sec 9-10 seconds    Comfortable gait speed 10M   0.71 M/sec 1.32 M/sec <1.0 m/s     Fast gait speed 10M          4-square step test   38 sec with SPC and CGA 9.5 sec without SPC >15 sec     2 minute walk test   98.1 M 99.2 M                                     Treatment Today     TREATMENT MINUTES COMMENTS   Evaluation 30 Low complexity    Self-care/ Home management     Manual therapy     Neuromuscular Re-education     Therapeutic Activity     Therapeutic Exercises 38 Exercises:  Exercise #1: NuStep L3 7'  Comment #1: Sit<>stands 10x pt uses arms up, no arms down,  HEP  Exercise #2: Wobble board balance fwd/side  Comment #2: 30\"  Exercise #3: Cone taps in // bars  Comment #3: 10x benedicto DIFFICULT  Exercise #4: Heel/toe raises in // bars HEP  Comment #4: 10x, difficulty posterior weight shift  Exercise #5: Low back stretch seated HEP  Comment #5: 30\"  Exercise #6: Walking across open space 5' with SPC  Comment #6: Standing tandem HEP 30\"    Previous HEP that pt is still compliant with:   Bridges, sit<>stands, tandem stance, heel raises, gastroc stretch       Gait training     Modality__________________                Total 68    Blank areas are intentional and mean the treatment did not include these items.              Latasha Cowan DPT  10/28/2019  11:04 AM                      "

## 2021-06-29 NOTE — PROGRESS NOTES
"Progress Notes by Akua Sheikh AuD at 7/10/2020 10:00 AM     Author: Akua Sheikh AuD Service: -- Author Type: Audiologist    Filed: 7/10/2020 10:21 AM Encounter Date: 7/10/2020 Status: Signed    : Akua Sheikh AuD (Audiologist)       Audiology Report    Summary: Audiology visit completed. Please see audiogram below or in \"media\" tab for case history and results.     Plan:  The patient is returned to ENT for follow up. Return for retesting with ENT recommendation.     Scott Pineda, [facility]  Clinical Audiologist  [place] #[id number]           "

## 2021-06-30 NOTE — PROGRESS NOTES
Progress Notes by Jyoti Shultz MD at 2/1/2021 10:30 AM     Author: Jyoti Shultz MD Service: -- Author Type: Physician    Filed: 2/1/2021 11:12 AM Encounter Date: 2/1/2021 Status: Signed    : Jyoti Shultz MD (Physician)                     Date of Service: 02/01/21    Date last seen: 05/06/19    PCP: Kelly Hull MD    Impression:  1. Bilateral leg swelling-stable  2. Bilateral venous insufficiency/hypertention legs with varicose veins-stable  3. Acquired lymphedema  4. Left leg shortening  5. Gait impairment  6. Flat feet with valgus deformities  7. Lipidema    Plan:  1. Questions were answered.    2. New compression socks written for.   Will go to [facility].  3. Continue insoles.  New ones written for.     4. Continue exercise as able.    5. OTC compression information also given.   6. Patient will follow up in 1 year or when needed.  The patient will watch for any increased redness, pain, temperature, drainage and/or any new ulcerations in the leg(s).  They are to call the clinic with any questions and/or concerns.    On day of encounter time spent in chart review and with patient in consultation, exam, education and coordination of care:  28 minutes    ---------------------------------------------------------------------------------------------------------------------    Chief Complaint: Bilateral leg swelling      History of Present Illness:    Lalitha Underwood returns to the [facility] for follow up of bilateral leg swelling due to venous insufficiency/hypertension with resultant acquired lymphedema and underlying lipidema.  Swelling treatment included MLD, education, compression bandaging, lymphatic exercise, range of motion work, exercise and elevation when able.  She was to continue wearing compression stockings and replace them at least twice a year.  Today at follow up she reports the swelling is  stable.  She wears compression daily and has been healthy. There has been no new numbness, tingling, weakness, masses, rashes, shortness of breath or chest pain. There have not been any new areas of ulceration. There has been no new fevers or pain.  There are no new or changing skin lesions.  She wears her insoles which continue to work well for her.     Past Medical History:   Diagnosis Date   ? Convulsive disorder (H)    ? COPD (chronic obstructive pulmonary disease) (H)    ? Depression    ? Hernia of unspecified site of abdominal cavity without mention of obstruction or gangrene    ? Hiatal hernia    ? Hypertension    ? On home oxygen therapy     at 1.5 liters at nite   ? Osteopenia    ? Shortness of breath    ? Venous insufficiency of both lower extremities        Past Surgical History:   Procedure Laterality Date   ? OTHER SURGICAL HISTORY Left 2003    Broke titanium in left arm   ? SHOULDER SURGERY Right 1967   ? UMBILICAL HERNIA REPAIR  04/04/2018    Dr. Jimenez   ? US THYROID BIOPSY  7/29/2020       Current Outpatient Medications   Medication Sig Dispense Refill   ? albuterol (PROAIR HFA;PROVENTIL HFA;VENTOLIN HFA) 90 mcg/actuation inhaler Inhale 2 puffs every 4 (four) hours as needed for wheezing. 3 Inhaler 4   ? calcium citrate (CALCITRATE) 200 mg (950 mg) tablet Take 1 tablet by mouth 2 (two) times a day.     ? cholecalciferol, vitamin D3, 5,000 unit Tab Take 5,000 Units by mouth daily.      ? denosumab 60 mg/mL Syrg Inject 60 mg under the skin every 6 (six) months.     ? fluticasone propion-salmeteroL (ADVAIR) 250-50 mcg/dose DISKUS Inhale 1 puff 2 (two) times a day. 180 each 4   ? hydroCHLOROthiazide (HYDRODIURIL) 25 MG tablet Take 1 tablet (25 mg total) by mouth daily. 90 tablet 4   ? hyoscyamine (LEVBID) 0.375 mg 12 hr tablet Take 1 tablet (0.375 mg total) by mouth every 12 (twelve) hours as needed for cramping. 60 tablet 3   ? levETIRAcetam (KEPPRA) 500 MG tablet Take 1 tablet (500 mg total) by mouth 2  (two) times a day. 180 tablet 4   ? losartan (COZAAR) 50 MG tablet Take 1 tablet (50 mg total) by mouth 2 (two) times a day. 180 tablet 4   ? OXYGEN-AIR DELIVERY SYSTEMS MISC Use 1.5 L As Directed bedtime.     ? potassium chloride 20 mEq TbER Take 20 mEq by mouth daily. 90 tablet 4   ? RABEprazole (ACIPHEX) 20 mg tablet Take 1 tablet (20 mg total) by mouth daily. 90 tablet 3   ? tiotropium (SPIRIVA) 18 mcg inhalation capsule Place 1 capsule (2 puffs total) into inhaler and inhale daily. 90 capsule 4     Current Facility-Administered Medications   Medication Dose Route Frequency Provider Last Rate Last Admin   ? denosumab 60 mg (PROLIA 60 mg/ml)  60 mg Subcutaneous Q6 Months Kaehr, Lalitha L, NP   60 mg at 10/15/19 1052   ? denosumab 60 mg (PROLIA 60 mg/ml)  60 mg Subcutaneous Q6 Months Kaehr, Lalitha L, NP   60 mg at 20 1356   ? [START ON 3/6/2021] denosumab 60 mg (PROLIA 60 mg/ml)  60 mg Subcutaneous Q6 Months Kaehr, Lalitha L, NP           Allergies   Allergen Reactions   ? Clindamycin Rash   ? Carbamazepine Rash   ? Morphine Nausea Only       Social History     Socioeconomic History   ? Marital status:      Spouse name: Not on file   ? Number of children: Not on file   ? Years of education: Not on file   ? Highest education level: Not on file   Occupational History   ? Not on file   Social Needs   ? Financial resource strain: Not on file   ? Food insecurity     Worry: Not on file     Inability: Not on file   ? Transportation needs     Medical: Not on file     Non-medical: Not on file   Tobacco Use   ? Smoking status: Former Smoker     Types: Cigarettes     Quit date: 1998     Years since quittin.1   ? Smokeless tobacco: Never Used   Substance and Sexual Activity   ? Alcohol use: Yes     Alcohol/week: 2.0 standard drinks     Types: 2 Glasses of wine per week   ? Drug use: Yes     Types: Oxycodone   ? Sexual activity: Never   Lifestyle   ? Physical activity     Days per week: Not on file      Minutes per session: Not on file   ? Stress: Not on file   Relationships   ? Social connections     Talks on phone: Not on file     Gets together: Not on file     Attends [employer] service: Not on file     Active member of club or organization: Not on file     Attends meetings of clubs or organizations: Not on file     Relationship status: Not on file   ? Intimate partner violence     Fear of current or ex partner: Not on file     Emotionally abused: Not on file     Physically abused: Not on file     Forced sexual activity: Not on file   Other Topics Concern   ? Not on file   Social History Narrative    .  Two kids.  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       Family History   Problem Relation Age of Onset   ? Breast cancer Mother 66   ? Hypertension Mother    ? Heart attack Mother    ? Varicose Veins Mother    ? Hypertension Father    ? Heart attack Sister    ? Heart disease Sister    ? Diabetes Son    ? Pulmonary Hypertension Daughter    ? Heart attack Sister    ? Heart disease Sister      Review of Systems:  See HPI    Labs:    I personally reviewed the following lab results today and those on care everywhere, if indicated    No results found for: SEDRATE      No results found for: CRP        Lab Results   Component Value Date    CREATININE 0.72 02/24/2020      No results found for: HGBA1C        Lab Results   Component Value Date    BUN 21 02/24/2020              Lab Results   Component Value Date    ALBUMIN 4.1 02/24/2020       Vitamin D, Total (25-Hydroxy)   Date Value Ref Range Status   02/24/2020 54.7 30.0 - 80.0 ng/mL Final       Lab Results   Component Value Date    TSH 2.67 02/21/2019     Lab Results   Component Value Date    WBC 8.0 02/24/2020    HGB 13.8 02/24/2020    HCT 41.4 02/24/2020    MCV 87 02/24/2020     02/24/2020       Imaging:    I personally reviewed the following imaging results today and those on care everywhere, if indicated    EXAM: US VENOUS LEG LEFT  LOCATION: [facility]  DATE/TIME: 12/3/2020 12:51 PM     INDICATION: Localized swelling. Left leg.  COMPARISON: None.  TECHNIQUE: Venous Duplex ultrasound of the left lower extremity with and without compression, augmentation and duplex. Color flow and spectral Doppler with waveform analysis performed.     FINDINGS: Exam includes the common femoral, femoral, popliteal, and contralateral common femoral veins as well as segmentally visualized deep calf veins and greater saphenous vein.      LEFT: No deep vein thrombosis. No superficial thrombophlebitis. No popliteal cyst.     IMPRESSION:   1.  No deep venous thrombosis in the left lower extremity.    11/25/20 DXA bone density scan    Assessment:     1. The spine bone density L1-L4 was not done because of the significant artifacts.  2. Femoral bone densities show left femoral neck T- score -1.0 and right femoral neck T-score -0.7, with no statistically significant change compared to the previous DXA scan done in 2018.  3. Left forearm bone density with T-score 1.1.     75 y.o. female with NORMAL BONE DENSITY.    Physical Exam:  Vitals:    02/01/21 1022   BP: 122/84   Pulse: 88   Resp: 18   Temp: 98.2  F (36.8  C)     BMI 38.31 stable, weight 223 pounds    Circumferential measures:    Vasc Edema 12/7/2015 3/15/2017 5/10/2018 5/6/2019 2/1/2021   Right-just above MCP 18.5 18 18 19 -   Right Wrist 15.6 16.4 16 17.2 -   Right Up 10cm 19.5 21.3 21.4 21.5 -   Right Up 10cm From Elbow 28.4 31.8 30 32.5 -   Left-just above MCP 18.4 18 18 19.5 -   Left Wrist 15.9 16.2 17 17 -   Left Up 10cm 18.4 21.8 22 23 -   Left Up 10cm From Elbow 42.2 44.8 37 44.5 -   Right just above MTP 20.0 19.6 19 20.7 20.4   Right Ankle 18.8 18.5 18.5 19.3 19.6   Right Widest Calf 40.6 39.2 41 39.5 44.4   Right Thigh Up 10cm 49.9 55 51.5 56.5 56.7   Left - just above MTP 19.6 23 19 20.2 20.2   Left Ankle 19.5 20.3 19.5 20.5 21.3   Left Widest Calf 41.2 20.2 39.5 40 43.5   Left Thigh Up 10cm 58.5 61.5 57 66.8  67     Circumferential measures looking good.    General:  75 y.o. female in no apparent distress.      Psych: Alert and oriented x 3.  Cooperative. Affect normal.    HEENT: Atraumatic and normocephalic.    Musculoskeletal:  Normal range of motion in knees and ankles bilaterally without active joint synovitis, erythema, swelling or joint laxity.  Flat feet with valgus deformities continue.  Unchanged.  Normal strength ankle dorsiflexion and great toe extension bilaterally.     Neurological:  Sensation is intact to pin prick and light touch in both legs.      Vascular: Dorsalis pedis and posterior tibialis pulses are strong and equal bilaterally. There are significant telangietasias, medial ankle venous flares, venous varicosities  and spider veins .    Integumentary: Skin of the legs is uniformly warm and dry.   Pear shaped body.  Unchanged.    Jyoti Shultz MD, Founding Diplomate ABWMS, FACCWS, FAAPMR  Medical Director Wound Care and Lymphedema  [facility]  [phone]

## 2021-07-03 NOTE — ADDENDUM NOTE
Addendum Note by María Heath CMA at 11/14/2018 12:33 PM     Author: María Heath CMA Service: -- Author Type: Certified Medical Assistant    Filed: 11/14/2018 12:33 PM Encounter Date: 11/14/2018 Status: Signed    : María Heath CMA (Certified Medical Assistant)    Addended by: MARÍA HEATH on: 11/14/2018 12:33 PM        Modules accepted: Orders

## 2021-07-06 ENCOUNTER — COMMUNICATION - HEALTHEAST (OUTPATIENT)
Dept: FAMILY MEDICINE | Facility: CLINIC | Age: 76
End: 2021-07-06

## 2021-07-06 VITALS
OXYGEN SATURATION: 95 % | HEART RATE: 89 BPM | TEMPERATURE: 97.9 F | HEIGHT: 64 IN | BODY MASS INDEX: 38.58 KG/M2 | WEIGHT: 226 LBS

## 2021-07-07 NOTE — TELEPHONE ENCOUNTER
Telephone Encounter by Kelly Hull MD at 7/6/2021  8:17 PM     Author: Kelly Hull MD Service: -- Author Type: Physician    Filed: 7/6/2021  8:17 PM Encounter Date: 7/5/2021 Status: Signed    : Kelly Hull MD (Physician)       Please advise patient to schedule a visit with me or Loretta to review.  Virtual would be appropriate.  VJ

## 2021-07-08 NOTE — TELEPHONE ENCOUNTER
Telephone Encounter by Gerda Shannon at 7/8/2021  2:04 PM     Author: Gerda Shannon Service: -- Author Type: Patient Access    Filed: 7/8/2021  2:05 PM Encounter Date: 7/5/2021 Status: Signed    : Gerda Shannon (Patient Access)       LM for pt to call and schedule appt. See notes below.

## 2021-07-08 NOTE — TELEPHONE ENCOUNTER
Telephone Encounter by Gerda Shannon at 7/8/2021  9:08 AM     Author: Gerda Shannon Service: -- Author Type: Patient Access    Filed: 7/8/2021  9:09 AM Encounter Date: 7/5/2021 Status: Signed    : Gerda Shannon (Patient Access)       Attempted to contact pt several times but all I get is a busy signal. Will try again later.

## 2021-07-13 ENCOUNTER — RECORDS - HEALTHEAST (OUTPATIENT)
Dept: ADMINISTRATIVE | Facility: CLINIC | Age: 76
End: 2021-07-13

## 2021-07-16 ENCOUNTER — VIRTUAL VISIT (OUTPATIENT)
Dept: FAMILY MEDICINE | Facility: CLINIC | Age: 76
End: 2021-07-16
Payer: MEDICARE

## 2021-07-16 ENCOUNTER — TRANSCRIBE ORDERS (OUTPATIENT)
Dept: ENDOCRINOLOGY | Facility: CLINIC | Age: 76
End: 2021-07-16

## 2021-07-16 DIAGNOSIS — F41.9 ANXIETY: Primary | ICD-10-CM

## 2021-07-16 DIAGNOSIS — M81.0 AGE-RELATED OSTEOPOROSIS WITHOUT CURRENT PATHOLOGICAL FRACTURE: Primary | ICD-10-CM

## 2021-07-16 DIAGNOSIS — Z92.29 PERSONAL HISTORY OF OTHER DRUG THERAPY: ICD-10-CM

## 2021-07-16 PROBLEM — Q66.6 VALGUS DEFORMITY OF BOTH FEET: Status: ACTIVE | Noted: 2017-03-15

## 2021-07-16 PROBLEM — K21.9 ESOPHAGEAL REFLUX: Status: ACTIVE | Noted: 2021-07-16

## 2021-07-16 PROBLEM — E65 LOCALIZED DEPOSITS OF FAT: Status: ACTIVE | Noted: 2019-05-06

## 2021-07-16 PROBLEM — R26.89 IMPAIRED GAIT AND MOBILITY: Status: ACTIVE | Noted: 2021-07-16

## 2021-07-16 PROBLEM — I89.0 ACQUIRED LYMPHEDEMA OF LEG: Status: ACTIVE | Noted: 2021-02-01

## 2021-07-16 PROBLEM — M21.40 COLLAPSED ARCHES: Status: ACTIVE | Noted: 2017-02-01

## 2021-07-16 PROBLEM — E78.5 HYPERLIPIDEMIA: Status: ACTIVE | Noted: 2021-07-16

## 2021-07-16 PROBLEM — E66.01 MORBID OBESITY (H): Status: ACTIVE | Noted: 2018-12-23

## 2021-07-16 PROBLEM — J43.9 PULMONARY EMPHYSEMA (H): Status: ACTIVE | Noted: 2021-07-16

## 2021-07-16 PROBLEM — M21.70 LEG LENGTH DISCREPANCY: Status: ACTIVE | Noted: 2021-02-01

## 2021-07-16 PROBLEM — G60.9 NEUROPATHY, IDIOPATHIC: Status: ACTIVE | Noted: 2019-02-21

## 2021-07-16 PROBLEM — I35.1 MILD AORTIC VALVE REGURGITATION: Status: ACTIVE | Noted: 2017-11-17

## 2021-07-16 PROBLEM — N39.41 URGE INCONTINENCE OF URINE: Status: ACTIVE | Noted: 2017-11-17

## 2021-07-16 PROBLEM — G40.909 EPILEPTIC SEIZURE (H): Status: ACTIVE | Noted: 2021-07-16

## 2021-07-16 PROBLEM — L57.8 SOLAR ELASTOSIS: Status: ACTIVE | Noted: 2021-07-16

## 2021-07-16 PROBLEM — H91.93 DECREASED HEARING OF BOTH EARS: Status: ACTIVE | Noted: 2018-05-29

## 2021-07-16 PROBLEM — E55.9 VITAMIN D DEFICIENCY: Status: ACTIVE | Noted: 2021-07-16

## 2021-07-16 PROBLEM — I35.0 MILD AORTIC VALVE STENOSIS: Status: ACTIVE | Noted: 2017-11-17

## 2021-07-16 PROBLEM — L71.9 ROSACEA: Status: ACTIVE | Noted: 2021-07-16

## 2021-07-16 PROBLEM — N81.4 UTERINE PROLAPSE: Status: ACTIVE | Noted: 2021-03-01

## 2021-07-16 PROCEDURE — 99442 PR PHYSICIAN TELEPHONE EVALUATION 11-20 MIN: CPT | Mod: 95 | Performed by: NURSE PRACTITIONER

## 2021-07-16 RX ORDER — SERTRALINE HYDROCHLORIDE 25 MG/1
25 TABLET, FILM COATED ORAL DAILY
Qty: 30 TABLET | Refills: 3 | Status: SHIPPED | OUTPATIENT
Start: 2021-07-16 | End: 2021-08-13 | Stop reason: DRUGHIGH

## 2021-07-16 RX ORDER — IBUPROFEN 200 MG
1 CAPSULE ORAL 2 TIMES DAILY
COMMUNITY

## 2021-07-16 RX ORDER — LOSARTAN POTASSIUM 50 MG/1
50 TABLET ORAL
COMMUNITY
Start: 2021-02-12 | End: 2021-07-16

## 2021-07-16 RX ORDER — LEVETIRACETAM 500 MG/1
TABLET ORAL
COMMUNITY
Start: 2021-02-12 | End: 2022-01-07

## 2021-07-16 RX ORDER — TIOTROPIUM BROMIDE 18 UG/1
CAPSULE ORAL; RESPIRATORY (INHALATION)
COMMUNITY
Start: 2021-04-13 | End: 2022-02-22

## 2021-07-16 RX ORDER — ALBUTEROL SULFATE 90 UG/1
2 AEROSOL, METERED RESPIRATORY (INHALATION)
COMMUNITY
Start: 2021-02-12 | End: 2021-07-16

## 2021-07-16 RX ORDER — NYSTATIN 100000 [USP'U]/G
POWDER TOPICAL DAILY PRN
COMMUNITY

## 2021-07-16 RX ORDER — BACLOFEN 20 MG
500 TABLET ORAL DAILY
COMMUNITY
End: 2021-11-14

## 2021-07-16 RX ORDER — SOLIFENACIN SUCCINATE 5 MG/1
10 TABLET, FILM COATED ORAL
COMMUNITY
End: 2021-07-16

## 2021-07-16 RX ORDER — RABEPRAZOLE SODIUM 20 MG/1
20 TABLET, DELAYED RELEASE ORAL
COMMUNITY
Start: 2021-02-12 | End: 2021-07-16

## 2021-07-16 RX ORDER — DIPHENHYDRAMINE HYDROCHLORIDE 25 MG/1
1 TABLET ORAL DAILY
COMMUNITY
End: 2022-04-25

## 2021-07-16 RX ORDER — ALBUTEROL SULFATE 90 UG/1
AEROSOL, METERED RESPIRATORY (INHALATION)
COMMUNITY
End: 2021-07-16

## 2021-07-16 RX ORDER — POTASSIUM CHLORIDE 1500 MG/1
TABLET, EXTENDED RELEASE ORAL
COMMUNITY
End: 2021-07-16

## 2021-07-16 RX ORDER — HYDROCHLOROTHIAZIDE 25 MG/1
TABLET ORAL
COMMUNITY
Start: 2021-02-12 | End: 2022-02-22

## 2021-07-16 RX ORDER — POTASSIUM CHLORIDE 1500 MG/1
20 TABLET, EXTENDED RELEASE ORAL
COMMUNITY
Start: 2021-02-12 | End: 2022-02-22

## 2021-07-16 NOTE — PROGRESS NOTES
As part of the required manual data conversion process for integration, this encounter was created to document a CAM (Clinic Administered Medication) order. This information was copied from the [facility] patient's chart to the [facility] patient chart.     Stephanie Norris PharmD  July 16, 2021

## 2021-07-16 NOTE — PROGRESS NOTES
"Serene is a 76 year old who is being evaluated via a billable telephone visit.      What phone number would you like to be contacted at? [phone]  How would you like to obtain your AVS? Mail a copy    Assessment & Plan     Anxiety  Patient requesting to start medication for management of anxiety.  Will initiate sertraline 25 mg.  Discussed taking half a tablet initially and if tolerating well then may increase to full tablet thereafter.  We discussed the potential to increase further if needed.  We will follow-up in approximately 4 weeks to reassess anxiety.  She will notify us sooner with any new or worsening of symptoms.  - sertraline (ZOLOFT) 25 MG tablet  Dispense: 30 tablet; Refill: 3       BMI:   Estimated body mass index is 38.79 kg/m  as calculated from the following:    Height as of 5/25/21: 1.626 m (5' 4\").    Weight as of 5/25/21: 102.5 kg (226 lb).   Follow-up in 4 weeks    No follow-ups on file.    DONAVAN Welch CNP  [facility]    Brenda Cast is a 76 year old who presents for the following health issues : anxiety    HPI   Patient is here today with concerns of anxiety.  She feels that she has always had some degree of anxiety and currently does not take medication.  She has been seeing a physical therapist for balance concerns.  Patient has a lot of anxiety surrounding ambulation and feels the need for assistive devices at all times.  Physical therapy does not feel that she needs to be using a walker.  Patient feels that this all started a couple of years ago after she slipped and fell on an icy road.  Since then, she has a lot of stress surrounding this.  Patient has strong family history of anxiety in her mother and sisters.  Her sister has done well on sertraline.  Her other sister uses alprazolam.  Patient does not feel that she needs an as needed medication at this time.  She is not having panic attacks.  She has been seeing a therapist off and on which " started years ago when her daughter became terminally ill.  She sees this therapist intermittently.  Her next visit is in approximately 6 weeks.  Patient does not feel that she is necessarily depressed.  She acknowledges that anxiety is impacting her day-to-day life.      Review of Systems   Review of Systems - pertinent positives noted in HPI, otherwise ROS is negative.        Objective    Vitals - Patient Reported  Weight (Patient Reported): 102.1 kg (225 lb)      Physical Exam         General:  Patientis pleasant and cooperative over the phone. No obvious sounds of distress. Answers questions appropriately.        Phone call duration: 12 minutes

## 2021-07-21 ENCOUNTER — RECORDS - HEALTHEAST (OUTPATIENT)
Dept: ADMINISTRATIVE | Facility: CLINIC | Age: 76
End: 2021-07-21

## 2021-07-22 ENCOUNTER — RECORDS - HEALTHEAST (OUTPATIENT)
Dept: FAMILY MEDICINE | Facility: CLINIC | Age: 76
End: 2021-07-22

## 2021-07-22 DIAGNOSIS — Z12.31 OTHER SCREENING MAMMOGRAM: ICD-10-CM

## 2021-07-27 ENCOUNTER — ANCILLARY PROCEDURE (OUTPATIENT)
Dept: MAMMOGRAPHY | Facility: CLINIC | Age: 76
End: 2021-07-27
Attending: FAMILY MEDICINE
Payer: MEDICARE

## 2021-07-27 DIAGNOSIS — Z12.31 VISIT FOR SCREENING MAMMOGRAM: ICD-10-CM

## 2021-07-27 PROCEDURE — 77063 BREAST TOMOSYNTHESIS BI: CPT

## 2021-08-09 ENCOUNTER — OFFICE VISIT (OUTPATIENT)
Dept: PODIATRY | Facility: CLINIC | Age: 76
End: 2021-08-09
Payer: MEDICARE

## 2021-08-09 VITALS — TEMPERATURE: 97.9 F | HEART RATE: 80 BPM

## 2021-08-09 DIAGNOSIS — M20.42 HAMMER TOES OF BOTH FEET: ICD-10-CM

## 2021-08-09 DIAGNOSIS — L57.0 KERATOMA: Primary | ICD-10-CM

## 2021-08-09 DIAGNOSIS — M20.41 HAMMER TOES OF BOTH FEET: ICD-10-CM

## 2021-08-09 PROCEDURE — 11057 PARNG/CUTG B9 HYPRKR LES >4: CPT | Performed by: PODIATRIST

## 2021-08-09 PROCEDURE — 99213 OFFICE O/P EST LOW 20 MIN: CPT | Mod: 25 | Performed by: PODIATRIST

## 2021-08-09 NOTE — PROGRESS NOTES
FOOT AND ANKLE SURGERY/PODIATRY PROGRESS NOTE        ASSESSMENT:   Hammertoe  Keratoma      TREATMENT:  Hammertoe 3rd digit left foot: I discussed with the patient that she has a semi rigid hammertoe on the 3rd digit left foot. We reviewed surgical procedure including post-op course and possible complications. She will consider this option after she is comfortable maintaining limited to non-weight bearing during the post-op period.    Keratoma: Trimmed callus tissue x5 plantar 1st MPJ bilateral with a #15 blade.     -Patient's questions invited and answered. She was encouraged to call my office with any further questions or concerns.     Alfonso Orozco DPM  [facility] Podiatry/Foot & Ankle Surgery  [phone]      HPI: Lalitha Underwood was seen again today for painful callus tissue on both feet. She would also like to discuss treatment for a hammertoe deformity on the 3rd digit left foot.     Past Medical History:   Diagnosis Date     Convulsive disorder (H)      Convulsive disorder (H)      COPD (chronic obstructive pulmonary disease) (H)      COPD (chronic obstructive pulmonary disease) (H)      Depression      Hernia of unspecified site of abdominal cavity without mention of obstruction or gangrene      Hiatal hernia      Hiatal hernia      Hypertension      Hypertension      On home oxygen therapy     at 1.5 liters at nite     Osteopenia      Osteopenia      Oxygen dependent     1.5 L per NC     Shortness of breath      URI (upper respiratory infection)      Venous insufficiency of both lower extremities      Venous insufficiency of both lower extremities        Past Surgical History:   Procedure Laterality Date     ESOPHAGOSCOPY, GASTROSCOPY, DUODENOSCOPY (EGD), COMBINED N/A 11/26/2018    Procedure: Esophagogastroduodenoscopy;  Surgeon: Jasmeet Wilder MD;  Location: [facility] OR     HERNIA REPAIR, UMBILICAL  04/2018     HERNIA REPAIR, UMBILICAL  04/04/2018    Dr. Jimenez     LAPAROSCOPIC  HERNIORRHAPHY HIATAL N/A 11/26/2018    Procedure: Laparoscopic Hiatal Hernia Repair,bilateral chest tubes;  Surgeon: Jasmeet Wilder MD;  Location: [facility] OR     OTHER SURGICAL HISTORY Left 2003    Broke titanium in left arm     Repair arm fracture Left      SHOULDER SURGERY Right 1967     SHOULDER SURGERY Right 1967     US BIOPSY THYROID FINE NEEDLE ASPIRATION  7/29/2020       Allergies   Allergen Reactions     Clindamycin Rash     Carbamazepine Rash     Morphine Nausea         Current Outpatient Medications:      acetaminophen (TYLENOL) 500 MG tablet, Take 2 tablets (1,000 mg) by mouth every 8 hours as needed for mild pain, Disp: , Rfl:      albuterol (PROAIR HFA/PROVENTIL HFA/VENTOLIN HFA) 108 (90 Base) MCG/ACT inhaler, Inhale 2 puffs into the lungs every 6 hours as needed, Disp: , Rfl:      biotin 5 MG CAPS, Take 1 capsule by mouth daily, Disp: , Rfl:      calcium citrate (CITRACAL) 950 (200 Ca) MG tablet, Take 1 tablet by mouth, Disp: , Rfl:      cholecalciferol (VITAMIN D3) 5000 units TABS tablet, Take 5,000 Units by mouth daily (with lunch), Disp: , Rfl:      denosumab (PROLIA) 60 MG/ML SOLN injection, Inject 60 mg Subcutaneous every 6 months, Disp: , Rfl:      fluticasone-salmeterol (ADVAIR) 250-50 MCG/DOSE diskus inhaler, Inhale 1 puff into the lungs 2 times daily, Disp: , Rfl:      hydrochlorothiazide (HYDRODIURIL) 25 MG tablet, hydrochlorothiazide 25 mg tablet  TAKE 1 TABLET BY MOUTH DAILY, Disp: , Rfl:      levETIRAcetam (KEPPRA) 500 MG tablet, levetiracetam 500 mg tablet  TAKE 1 TABLET BY MOUTH TWICE DAILY, Disp: , Rfl:      losartan (COZAAR) 50 MG tablet, Take 50 mg by mouth 2 times daily, Disp: , Rfl:      Magnesium Oxide 500 MG TABS, Take 500 mg by mouth daily, Disp: , Rfl:      nystatin (NYSTOP) 129734 UNIT/GM external powder, Nystop 100,000 unit/gram topical powder, Disp: , Rfl:      OTHER MEDICAL SUPPLIES, 1.5 L At Bedtime, Disp: , Rfl:      phenytoin (DILANTIN) 100 MG CR capsule, Take  100-200 mg by mouth At Bedtime 100 MG IN THE MORNING   200 MG AT BEDTIME, Disp: , Rfl:      potassium chloride ER (KLOR-CON M) 20 MEQ CR tablet, Take 20 mEq by mouth, Disp: , Rfl:      RABEprazole (ACIPHEX) 20 MG EC tablet, Take 20 mg by mouth every morning, Disp: , Rfl:      senna-docusate (SENOKOT-S/PERICOLACE) 8.6-50 MG tablet, Take 1 tablet by mouth 2 times daily Reduce dose or temporarily discontinue if having loose stools., Disp: 60 tablet, Rfl: 0     sertraline (ZOLOFT) 25 MG tablet, Take 1 tablet (25 mg) by mouth daily, Disp: 30 tablet, Rfl: 3     solifenacin (VESICARE) 5 MG tablet, Take 5 mg by mouth every morning, Disp: , Rfl:      tiotropium (SPIRIVA HANDIHALER) 18 MCG inhaled capsule, Spiriva with HandiHaler 18 mcg and inhalation capsules, Disp: , Rfl:      UNKNOWN TO PATIENT, Take 1 tablet by mouth daily (with lunch) HAIR SUPPLEMENT, Disp: , Rfl:      vitamin D3 (CHOLECALCIFEROL) 125 MCG (5000 UT) tablet, Take 5,000 Units by mouth, Disp: , Rfl:      albuterol (ACCUNEB) 1.25 MG/3ML nebulizer solution, Inhale 1.25 mg into the lungs as needed, Disp: , Rfl:      tiotropium (SPIRIVA) 18 MCG capsule, Inhale 2 puffs into the lungs daily, Disp: , Rfl:     Current Facility-Administered Medications:      denosumab (PROLIA) injection 60 mg, 60 mg, Subcutaneous, Q6 Months, Stephanie Norris, PharmD    Facility-Administered Medications Ordered in Other Visits:      barium sulfate (EZ PAQUE) oral suspension 96%, , Oral, Once, Ragini Arellano MD     barium sulfate (EZ-HD) oral suspension 98%, , Oral, Once, Ragini Arellano MD     sod bicarbonate-citric acid-simethicone (EZ GAS) 2.21-1.53-0.04 g packet 4 g, 4 g, Oral, Once, Ragini Arellano MD    Family History   Problem Relation Age of Onset     Pulmonary Hypertension Daughter         idiopathic      Heart Disease Other         2 sibs MI, 1 sib electrical conduction disorder     Breast Cancer Mother 66.00     Hypertension Mother      Coronary Artery  Disease Mother      Varicose Veins Mother      Hypertension Father      Coronary Artery Disease Sister      Heart Disease Sister      Diabetes Son      Pulmonary Hypertension Daughter      Coronary Artery Disease Sister      Heart Disease Sister        Social History     Socioeconomic History     Marital status:      Spouse name: Not on file     Number of children: Not on file     Years of education: Not on file     Highest education level: Not on file   Occupational History     Not on file   Tobacco Use     Smoking status: Former Smoker     Packs/day: 1.50     Years: 38.00     Pack years: 57.00     Types: Cigarettes, Cigarettes, Cigarettes     Quit date: 1998     Years since quittin.6     Smokeless tobacco: Never Used     Tobacco comment: quit in    Substance and Sexual Activity     Alcohol use: Yes     Alcohol/week: 2.0 standard drinks     Comment: occ     Drug use: Yes     Types: Oxycodone     Sexual activity: Never   Other Topics Concern     Not on file   Social History Narrative    .  Two kids.  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     Social Determinants of Health     Financial Resource Strain:      Difficulty of Paying Living Expenses:    Food Insecurity:      Worried About Running Out of Food in the Last Year:      Ran Out of Food in the Last Year:    Transportation Needs:      Lack of Transportation (Medical):      Lack of Transportation (Non-Medical):    Physical Activity:      Days of Exercise per Week:      Minutes of Exercise per Session:    Stress:      Feeling of Stress :    Social Connections:      Frequency of Communication with Friends and Family:      Frequency of Social Gatherings with Friends and Family:      Attends Adventism Services:      Active Member of Clubs or Organizations:      Attends Club or Organization Meetings:      Marital Status:    Intimate Partner Violence:      Fear of Current or Ex-Partner:      Emotionally Abused:      Physically Abused:      Sexually  Abused:        10 point Review of Systems is negative except for hammertoe which is noted in HPI.     Pulse 80   Temp 97.9  F (36.6  C)     BMI= There is no height or weight on file to calculate BMI.    OBJECTIVE:  General appearance: Patient is alert and fully cooperative with history & exam.  No sign of distress is noted during the visit.    Vascular: Dorsalis pedis and PT non-palpable. There is no appreciable edema noted.  Dermatologic: Hyperkeratotic tissue plantar 1st MPJ bilateral x6, and distal 3rd digit left foot with blood staining. No erythema bilateral.   Neurologic: Diminished to light touch bilateral.   Musculoskeletal: Contracted digits bilateral with semi-rigid contracture 3rd digit left foot.    Imaging:     MA Screening Bilateral    Result Date: 7/27/2021  BILATERAL FULL FIELD DIGITAL SCREENING MAMMOGRAM WITH TOMOSYNTHESIS Performed on: 7/27/21 Compared to: 07/09/2020 MA Screen Bilateral w/Geovanni, 04/10/2019 MA Screen Bilateral w/Geovanni, and 12/01/2016 MA Screen Bilateral w/Geovanni Findings: The breasts have scattered areas of fibroglandular density. This study was evaluated with the assistance of Computer-Aided Detection.  Breast Tomosynthesis was used in interpretation.  There are benign appearing calcifications in both breasts. There is no radiographic evidence of malignancy. IMPRESSION: ACR BI-RADS Category 2: Benign RECOMMENDED FOLLOW-UP: Annual routine screening mammogram The results and recommendations of this examination will be communicated to the patient.

## 2021-08-09 NOTE — LETTER
8/9/2021         RE: Lalitha Underwood  [address] [facility] [address]        Dear Colleague,    Thank you for referring your patient, Lalitha Underwood, to the [facility]. Please see a copy of my visit note below.        FOOT AND ANKLE SURGERY/PODIATRY PROGRESS NOTE        ASSESSMENT:   Hammertoe  Keratoma      TREATMENT:  Hammertoe 3rd digit left foot: I discussed with the patient that she has a semi rigid hammertoe on the 3rd digit left foot. We reviewed surgical procedure including post-op course and possible complications. She will consider this option after she is comfortable maintaining limited to non-weight bearing during the post-op period.    Keratoma: Trimmed callus tissue x5 plantar 1st MPJ bilateral with a #15 blade.     -Patient's questions invited and answered. She was encouraged to call my office with any further questions or concerns.     Alfonso Orozco DPM  [facility] Podiatry/Foot & Ankle Surgery  [phone]      HPI: Lalitha Underwood was seen again today for painful callus tissue on both feet. She would also like to discuss treatment for a hammertoe deformity on the 3rd digit left foot.     Past Medical History:   Diagnosis Date     Convulsive disorder (H)      Convulsive disorder (H)      COPD (chronic obstructive pulmonary disease) (H)      COPD (chronic obstructive pulmonary disease) (H)      Depression      Hernia of unspecified site of abdominal cavity without mention of obstruction or gangrene      Hiatal hernia      Hiatal hernia      Hypertension      Hypertension      On home oxygen therapy     at 1.5 liters at nite     Osteopenia      Osteopenia      Oxygen dependent     1.5 L per NC     Shortness of breath      URI (upper respiratory infection)      Venous insufficiency of both lower extremities      Venous insufficiency of both lower extremities        Past Surgical History:   Procedure Laterality Date     ESOPHAGOSCOPY, GASTROSCOPY,  DUODENOSCOPY (EGD), COMBINED N/A 11/26/2018    Procedure: Esophagogastroduodenoscopy;  Surgeon: Jasmeet Wilder MD;  Location: [facility] OR     HERNIA REPAIR, UMBILICAL  04/2018     HERNIA REPAIR, UMBILICAL  04/04/2018    Dr. 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     Repair arm fracture Left      SHOULDER SURGERY Right 1967     SHOULDER SURGERY Right 1967     US BIOPSY THYROID FINE NEEDLE ASPIRATION  7/29/2020       Allergies   Allergen Reactions     Clindamycin Rash     Carbamazepine Rash     Morphine Nausea         Current Outpatient Medications:      acetaminophen (TYLENOL) 500 MG tablet, Take 2 tablets (1,000 mg) by mouth every 8 hours as needed for mild pain, Disp: , Rfl:      albuterol (PROAIR HFA/PROVENTIL HFA/VENTOLIN HFA) 108 (90 Base) MCG/ACT inhaler, Inhale 2 puffs into the lungs every 6 hours as needed, Disp: , Rfl:      biotin 5 MG CAPS, Take 1 capsule by mouth daily, Disp: , Rfl:      calcium citrate (CITRACAL) 950 (200 Ca) MG tablet, Take 1 tablet by mouth, Disp: , Rfl:      cholecalciferol (VITAMIN D3) 5000 units TABS tablet, Take 5,000 Units by mouth daily (with lunch), Disp: , Rfl:      denosumab (PROLIA) 60 MG/ML SOLN injection, Inject 60 mg Subcutaneous every 6 months, Disp: , Rfl:      fluticasone-salmeterol (ADVAIR) 250-50 MCG/DOSE diskus inhaler, Inhale 1 puff into the lungs 2 times daily, Disp: , Rfl:      hydrochlorothiazide (HYDRODIURIL) 25 MG tablet, hydrochlorothiazide 25 mg tablet  TAKE 1 TABLET BY MOUTH DAILY, Disp: , Rfl:      levETIRAcetam (KEPPRA) 500 MG tablet, levetiracetam 500 mg tablet  TAKE 1 TABLET BY MOUTH TWICE DAILY, Disp: , Rfl:      losartan (COZAAR) 50 MG tablet, Take 50 mg by mouth 2 times daily, Disp: , Rfl:      Magnesium Oxide 500 MG TABS, Take 500 mg  by mouth daily, Disp: , Rfl:      nystatin (NYSTOP) 168662 UNIT/GM external powder, Nystop 100,000 unit/gram topical powder, Disp: , Rfl:      OTHER MEDICAL SUPPLIES, 1.5 L At Bedtime, Disp: , Rfl:      phenytoin (DILANTIN) 100 MG CR capsule, Take 100-200 mg by mouth At Bedtime 100 MG IN THE MORNING   200 MG AT BEDTIME, Disp: , Rfl:      potassium chloride ER (KLOR-CON M) 20 MEQ CR tablet, Take 20 mEq by mouth, Disp: , Rfl:      RABEprazole (ACIPHEX) 20 MG EC tablet, Take 20 mg by mouth every morning, Disp: , Rfl:      senna-docusate (SENOKOT-S/PERICOLACE) 8.6-50 MG tablet, Take 1 tablet by mouth 2 times daily Reduce dose or temporarily discontinue if having loose stools., Disp: 60 tablet, Rfl: 0     sertraline (ZOLOFT) 25 MG tablet, Take 1 tablet (25 mg) by mouth daily, Disp: 30 tablet, Rfl: 3     solifenacin (VESICARE) 5 MG tablet, Take 5 mg by mouth every morning, Disp: , Rfl:      tiotropium (SPIRIVA HANDIHALER) 18 MCG inhaled capsule, Spiriva with HandiHaler 18 mcg and inhalation capsules, Disp: , Rfl:      UNKNOWN TO PATIENT, Take 1 tablet by mouth daily (with lunch) HAIR SUPPLEMENT, Disp: , Rfl:      vitamin D3 (CHOLECALCIFEROL) 125 MCG (5000 UT) tablet, Take 5,000 Units by mouth, Disp: , Rfl:      albuterol (ACCUNEB) 1.25 MG/3ML nebulizer solution, Inhale 1.25 mg into the lungs as needed, Disp: , Rfl:      tiotropium (SPIRIVA) 18 MCG capsule, Inhale 2 puffs into the lungs daily, Disp: , Rfl:     Current Facility-Administered Medications:      denosumab (PROLIA) injection 60 mg, 60 mg, Subcutaneous, Q6 Months, Stephanie Norris, PharmD    Facility-Administered Medications Ordered in Other Visits:      barium sulfate (EZ PAQUE) oral suspension 96%, , Oral, Once, Ragini Arellano MD     barium sulfate (EZ-HD) oral suspension 98%, , Oral, Once, Ragini Arellano MD     sod bicarbonate-citric acid-simethicone (EZ GAS) 2.21-1.53-0.04 g packet 4 g, 4 g, Oral, Once, Ragini Arellano MD    Family History    Problem Relation Age of Onset     Pulmonary Hypertension Daughter         idiopathic      Heart Disease Other         2 sibs MI, 1 sib electrical conduction disorder     Breast Cancer Mother 66.00     Hypertension Mother      Coronary Artery Disease Mother      Varicose Veins Mother      Hypertension Father      Coronary Artery Disease Sister      Heart Disease Sister      Diabetes Son      Pulmonary Hypertension Daughter      Coronary Artery Disease Sister      Heart Disease Sister        Social History     Socioeconomic History     Marital status:      Spouse name: Not on file     Number of children: Not on file     Years of education: Not on file     Highest education level: Not on file   Occupational History     Not on file   Tobacco Use     Smoking status: Former Smoker     Packs/day: 1.50     Years: 38.00     Pack years: 57.00     Types: Cigarettes, Cigarettes, Cigarettes     Quit date: 1998     Years since quittin.6     Smokeless tobacco: Never Used     Tobacco comment: quit in    Substance and Sexual Activity     Alcohol use: Yes     Alcohol/week: 2.0 standard drinks     Comment: occ     Drug use: Yes     Types: Oxycodone     Sexual activity: Never   Other Topics Concern     Not on file   Social History Narrative    .  Two kids.  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     Social Determinants of Health     Financial Resource Strain:      Difficulty of Paying Living Expenses:    Food Insecurity:      Worried About Running Out of Food in the Last Year:      Ran Out of Food in the Last Year:    Transportation Needs:      Lack of Transportation (Medical):      Lack of Transportation (Non-Medical):    Physical Activity:      Days of Exercise per Week:      Minutes of Exercise per Session:    Stress:      Feeling of Stress :    Social Connections:      Frequency of Communication with Friends and Family:      Frequency of Social Gatherings with Friends and Family:      Attends Voodoo Services:       Active Member of Clubs or Organizations:      Attends Club or Organization Meetings:      Marital Status:    Intimate Partner Violence:      Fear of Current or Ex-Partner:      Emotionally Abused:      Physically Abused:      Sexually Abused:        10 point Review of Systems is negative except for hammertoe which is noted in HPI.     Pulse 80   Temp 97.9  F (36.6  C)     BMI= There is no height or weight on file to calculate BMI.    OBJECTIVE:  General appearance: Patient is alert and fully cooperative with history & exam.  No sign of distress is noted during the visit.    Vascular: Dorsalis pedis and PT non-palpable. There is no appreciable edema noted.  Dermatologic: Hyperkeratotic tissue plantar 1st MPJ bilateral x6, and distal 3rd digit left foot with blood staining. No erythema bilateral.   Neurologic: Diminished to light touch bilateral.   Musculoskeletal: Contracted digits bilateral with semi-rigid contracture 3rd digit left foot.    Imaging:     MA Screening Bilateral    Result Date: 7/27/2021  BILATERAL FULL FIELD DIGITAL SCREENING MAMMOGRAM WITH TOMOSYNTHESIS Performed on: 7/27/21 Compared to: 07/09/2020 MA Screen Bilateral w/Geovanni, 04/10/2019 MA Screen Bilateral w/Geovanni, and 12/01/2016 MA Screen Bilateral w/Geovanni Findings: The breasts have scattered areas of fibroglandular density. This study was evaluated with the assistance of Computer-Aided Detection.  Breast Tomosynthesis was used in interpretation.  There are benign appearing calcifications in both breasts. There is no radiographic evidence of malignancy. IMPRESSION: ACR BI-RADS Category 2: Benign RECOMMENDED FOLLOW-UP: Annual routine screening mammogram The results and recommendations of this examination will be communicated to the patient.           Again, thank you for allowing me to participate in the care of your patient.        Sincerely,        Alfonso Orozco DPM

## 2021-08-13 ENCOUNTER — VIRTUAL VISIT (OUTPATIENT)
Dept: FAMILY MEDICINE | Facility: CLINIC | Age: 76
End: 2021-08-13
Payer: MEDICARE

## 2021-08-13 DIAGNOSIS — F41.9 ANXIETY: Primary | ICD-10-CM

## 2021-08-13 PROCEDURE — 99442 PR PHYSICIAN TELEPHONE EVALUATION 11-20 MIN: CPT | Mod: 95 | Performed by: NURSE PRACTITIONER

## 2021-08-13 ASSESSMENT — PATIENT HEALTH QUESTIONNAIRE - PHQ9
5. POOR APPETITE OR OVEREATING: SEVERAL DAYS
SUM OF ALL RESPONSES TO PHQ QUESTIONS 1-9: 2

## 2021-08-13 ASSESSMENT — ANXIETY QUESTIONNAIRES
1. FEELING NERVOUS, ANXIOUS, OR ON EDGE: SEVERAL DAYS
3. WORRYING TOO MUCH ABOUT DIFFERENT THINGS: NOT AT ALL
GAD7 TOTAL SCORE: 3
5. BEING SO RESTLESS THAT IT IS HARD TO SIT STILL: NOT AT ALL
6. BECOMING EASILY ANNOYED OR IRRITABLE: SEVERAL DAYS
2. NOT BEING ABLE TO STOP OR CONTROL WORRYING: NOT AT ALL
7. FEELING AFRAID AS IF SOMETHING AWFUL MIGHT HAPPEN: NOT AT ALL

## 2021-08-13 NOTE — PROGRESS NOTES
"Serene is a 76 year old who is being evaluated via a billable telephone visit.      What phone number would you like to be contacted at? [phone]  How would you like to obtain your AVS? Thuyhart    Assessment & Plan     Anxiety  We discussed that she is on a very low dose of the medication and that this will likely need to be increased.  She is tolerating it well.  We will continue to increase slowly as a new prescription for 50 mg daily.  Follow-up in 4 weeks to reassess.  She will notify us of any new concerns in the meantime.  - sertraline (ZOLOFT) 50 MG tablet  Dispense: 30 tablet; Refill: 11     BMI:   Estimated body mass index is 38.79 kg/m  as calculated from the following:    Height as of 5/25/21: 1.626 m (5' 4\").    Weight as of 5/25/21: 102.5 kg (226 lb).           Return in about 4 weeks (around 9/10/2021) for Follow up, routine medication check, with me.    DONAVAN Welch [facility]    Brenda Cast is a 76 year old who presents for the following health issues     HPI   Patient is here today to follow-up on anxiety.  She has had symptoms of anxiety for a while now.  Feels that this has worsened in recent months.  She is mostly anxious about her balance and being able to walk.  Her physical therapist recommended discussing treatment for anxiety to help manage this fear that she has to ambulate without an assistive device.  About a month ago, we started sertraline 25 mg.  Patient is tolerating this dose well without any side effects.  Initially felt that she noticed some benefit in her anxiety but it was so subtle and she feels that it needs to be increased further.  She is aware that this is a very low dose.  She prefers increasing slowly to avoid potential side effects.  No additional concerns today.      Review of Systems   Review of Systems - pertinent positives noted in HPI, otherwise ROS is negative.        Objective           Vitals:  No vitals were obtained " today due to virtual visit.    Physical Exam   healthy, alert and no distress  PSYCH: Alert and oriented times 3; coherent speech, normal   rate and volume, able to articulate logical thoughts, able   to abstract reason, no tangential thoughts, no hallucinations   or delusions  Her affect is normal and pleasant  RESP: No cough, no audible wheezing, able to talk in full sentences  Remainder of exam unable to be completed due to telephone visits        Phone call duration: 12 minutes

## 2021-08-14 ASSESSMENT — ANXIETY QUESTIONNAIRES: GAD7 TOTAL SCORE: 3

## 2021-08-30 ENCOUNTER — MEDICAL CORRESPONDENCE (OUTPATIENT)
Dept: HEALTH INFORMATION MANAGEMENT | Facility: CLINIC | Age: 76
End: 2021-08-30

## 2021-09-10 ENCOUNTER — VIRTUAL VISIT (OUTPATIENT)
Dept: FAMILY MEDICINE | Facility: CLINIC | Age: 76
End: 2021-09-10
Payer: MEDICARE

## 2021-09-10 DIAGNOSIS — F41.9 ANXIETY: Primary | ICD-10-CM

## 2021-09-10 PROCEDURE — 99442 PR PHYSICIAN TELEPHONE EVALUATION 11-20 MIN: CPT | Mod: 95 | Performed by: NURSE PRACTITIONER

## 2021-09-10 RX ORDER — SERTRALINE HYDROCHLORIDE 100 MG/1
100 TABLET, FILM COATED ORAL DAILY
Qty: 30 TABLET | Refills: 3 | Status: SHIPPED | OUTPATIENT
Start: 2021-09-10 | End: 2021-10-15

## 2021-09-10 ASSESSMENT — ANXIETY QUESTIONNAIRES
GAD7 TOTAL SCORE: 3
4. TROUBLE RELAXING: NOT AT ALL
2. NOT BEING ABLE TO STOP OR CONTROL WORRYING: NOT AT ALL
7. FEELING AFRAID AS IF SOMETHING AWFUL MIGHT HAPPEN: NOT AT ALL
1. FEELING NERVOUS, ANXIOUS, OR ON EDGE: MORE THAN HALF THE DAYS
3. WORRYING TOO MUCH ABOUT DIFFERENT THINGS: NOT AT ALL
5. BEING SO RESTLESS THAT IT IS HARD TO SIT STILL: NOT AT ALL
6. BECOMING EASILY ANNOYED OR IRRITABLE: SEVERAL DAYS

## 2021-09-10 ASSESSMENT — PATIENT HEALTH QUESTIONNAIRE - PHQ9: SUM OF ALL RESPONSES TO PHQ QUESTIONS 1-9: 5

## 2021-09-10 NOTE — PROGRESS NOTES
"Serene is a 76 year old who is being evaluated via a billable telephone visit.      What phone number would you like to be contacted at? [phone]  How would you like to obtain your AVS? Thuyhart    Assessment & Plan     Anxiety  She is tolerating sertraline well.  She continues to have anxiety with ambulation mostly.  Will increase dose of sertraline to 100 mg daily.  Follow-up in 4 weeks to reassess.  She will notify us sooner of any new concerns.  - sertraline (ZOLOFT) 100 MG tablet  Dispense: 30 tablet; Refill: 3       BMI:   Estimated body mass index is 38.79 kg/m  as calculated from the following:    Height as of 5/25/21: 1.626 m (5' 4\").    Weight as of 5/25/21: 102.5 kg (226 lb).           No follow-ups on file.    DONAVAN Welch CNP  [facility]    Brenda Cast is a 76 year old who presents for the following health issues: Follow-up anxiety    HPI   Patient is here today to follow-up on anxiety.  She has been taking sertraline 50 mg daily for management of anxiety.  Overall this is going well for her.  She feels slight improvement in her anxiety but not entirely.  She describes anxiety mostly with ambulating.  She has had a fear of falling and has seen the [facility] along with physical therapy in the past for this.  The \"graduated her\" from therapy and felt that this was more so related to anxiety with walking than a physical issue.  She continues to have anxiety particularly with walking across the street or in an open area  where there is nothing to grab onto.  She feels as though she needs to use a walker or some type of assistive device.  She has been ambulating around her home well and feels that this is because of the availability of something to grab onto.  She does feel that the sertraline is helping slightly with this.  She denies any side effects from the medication and is willing to increase it further.      Review of Systems   Review of " Systems - pertinent positives noted in HPI, otherwise ROS is negative.        Objective           Vitals:  No vitals were obtained today due to virtual visit.    Physical Exam   healthy, alert and no distress  PSYCH: Alert and oriented times 3; coherent speech, normal   rate and volume, able to articulate logical thoughts, able   to abstract reason, no tangential thoughts, no hallucinations   or delusions  Her affect is normal and pleasant  RESP: No cough, no audible wheezing, able to talk in full sentences  Remainder of exam unable to be completed due to telephone visits          Phone call duration: 12 minutes

## 2021-09-28 ENCOUNTER — IMMUNIZATION (OUTPATIENT)
Dept: FAMILY MEDICINE | Facility: CLINIC | Age: 76
End: 2021-09-28
Payer: MEDICARE

## 2021-09-28 PROCEDURE — G0008 ADMIN INFLUENZA VIRUS VAC: HCPCS

## 2021-09-28 PROCEDURE — 90662 IIV NO PRSV INCREASED AG IM: CPT

## 2021-10-11 ENCOUNTER — TRANSFERRED RECORDS (OUTPATIENT)
Dept: HEALTH INFORMATION MANAGEMENT | Facility: CLINIC | Age: 76
End: 2021-10-11

## 2021-10-18 ENCOUNTER — OFFICE VISIT (OUTPATIENT)
Dept: PODIATRY | Facility: CLINIC | Age: 76
End: 2021-10-18
Payer: MEDICARE

## 2021-10-18 VITALS
HEIGHT: 64 IN | HEART RATE: 85 BPM | OXYGEN SATURATION: 97 % | BODY MASS INDEX: 37.56 KG/M2 | RESPIRATION RATE: 20 BRPM | WEIGHT: 220 LBS

## 2021-10-18 DIAGNOSIS — I73.9 PAD (PERIPHERAL ARTERY DISEASE) (H): ICD-10-CM

## 2021-10-18 DIAGNOSIS — M20.41 HAMMER TOES OF BOTH FEET: ICD-10-CM

## 2021-10-18 DIAGNOSIS — M20.42 HAMMER TOES OF BOTH FEET: ICD-10-CM

## 2021-10-18 DIAGNOSIS — I70.90 UNSPECIFIED ATHEROSCLEROSIS: ICD-10-CM

## 2021-10-18 DIAGNOSIS — L57.0 KERATOMA: Primary | ICD-10-CM

## 2021-10-18 PROCEDURE — 11057 PARNG/CUTG B9 HYPRKR LES >4: CPT | Mod: Q8 | Performed by: PODIATRIST

## 2021-10-18 ASSESSMENT — MIFFLIN-ST. JEOR: SCORE: 1472.91

## 2021-10-18 ASSESSMENT — PAIN SCALES - GENERAL: PAINLEVEL: NO PAIN (1)

## 2021-10-18 NOTE — PROGRESS NOTES
FOOT AND ANKLE SURGERY/PODIATRY PROGRESS NOTE        ASSESSMENT:   Hammertoe  Keratoma      TREATMENT:  -Trimmed callus tissue x5 plantar 1st MPJ bilateral with a #15 blade.     -Patient's questions invited and answered. She was encouraged to call my office with any further questions or concerns.     Alfonso Orozco DPM  [facility]/Foot & Ankle Surgery  [phone]      HPI: Lalitha Underwood was seen again today for painful callus tissue on both feet. She would also like to discuss treatment for a hammertoe deformity on the 3rd digit left foot.     Past Medical History:   Diagnosis Date     Convulsive disorder (H)      Convulsive disorder (H)      COPD (chronic obstructive pulmonary disease) (H)      COPD (chronic obstructive pulmonary disease) (H)      Depression      Hernia of unspecified site of abdominal cavity without mention of obstruction or gangrene      Hiatal hernia      Hiatal hernia      Hypertension      Hypertension      On home oxygen therapy     at 1.5 liters at nite     Osteopenia      Osteopenia      Oxygen dependent     1.5 L per NC     Shortness of breath      URI (upper respiratory infection)      Venous insufficiency of both lower extremities      Venous insufficiency of both lower extremities        Past Surgical History:   Procedure Laterality Date     ESOPHAGOSCOPY, GASTROSCOPY, DUODENOSCOPY (EGD), COMBINED N/A 11/26/2018    Procedure: Esophagogastroduodenoscopy;  Surgeon: Jasmeet Wilder MD;  Location: [facility] OR     HERNIA REPAIR, UMBILICAL  04/2018     HERNIA REPAIR, UMBILICAL  04/04/2018    Dr. 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     Repair arm fracture Left      SHOULDER SURGERY Right 1967     SHOULDER SURGERY Right 1967     US BIOPSY THYROID FINE  NEEDLE ASPIRATION  7/29/2020       Allergies   Allergen Reactions     Clindamycin Rash     Carbamazepine Rash     Morphine Nausea         Current Outpatient Medications:      acetaminophen (TYLENOL) 500 MG tablet, Take 2 tablets (1,000 mg) by mouth every 8 hours as needed for mild pain, Disp: , Rfl:      albuterol (PROAIR HFA/PROVENTIL HFA/VENTOLIN HFA) 108 (90 Base) MCG/ACT inhaler, Inhale 2 puffs into the lungs every 6 hours as needed, Disp: , Rfl:      biotin 5 MG CAPS, Take 1 capsule by mouth daily, Disp: , Rfl:      calcium citrate (CITRACAL) 950 (200 Ca) MG tablet, Take 1 tablet by mouth, Disp: , Rfl:      denosumab (PROLIA) 60 MG/ML SOLN injection, Inject 60 mg Subcutaneous every 6 months, Disp: , Rfl:      fluticasone-salmeterol (ADVAIR) 250-50 MCG/DOSE diskus inhaler, Inhale 1 puff into the lungs 2 times daily, Disp: , Rfl:      hydrochlorothiazide (HYDRODIURIL) 25 MG tablet, hydrochlorothiazide 25 mg tablet  TAKE 1 TABLET BY MOUTH DAILY, Disp: , Rfl:      levETIRAcetam (KEPPRA) 500 MG tablet, levetiracetam 500 mg tablet  TAKE 1 TABLET BY MOUTH TWICE DAILY, Disp: , Rfl:      losartan (COZAAR) 50 MG tablet, Take 50 mg by mouth 2 times daily, Disp: , Rfl:      Magnesium Oxide 500 MG TABS, Take 500 mg by mouth daily, Disp: , Rfl:      nystatin (NYSTOP) 269976 UNIT/GM external powder, Nystop 100,000 unit/gram topical powder, Disp: , Rfl:      OTHER MEDICAL SUPPLIES, 1.5 L At Bedtime, Disp: , Rfl:      potassium chloride ER (KLOR-CON M) 20 MEQ CR tablet, Take 20 mEq by mouth, Disp: , Rfl:      RABEprazole (ACIPHEX) 20 MG EC tablet, Take 20 mg by mouth every morning, Disp: , Rfl:      senna-docusate (SENOKOT-S/PERICOLACE) 8.6-50 MG tablet, Take 1 tablet by mouth 2 times daily Reduce dose or temporarily discontinue if having loose stools., Disp: 60 tablet, Rfl: 0     sertraline (ZOLOFT) 100 MG tablet, Take 1 tablet (100 mg) by mouth daily, Disp: 90 tablet, Rfl: 3     solifenacin (VESICARE) 5 MG tablet, Take 5 mg  by mouth every morning, Disp: , Rfl:      tiotropium (SPIRIVA HANDIHALER) 18 MCG inhaled capsule, Spiriva with HandiHaler 18 mcg and inhalation capsules, Disp: , Rfl:      vitamin D3 (CHOLECALCIFEROL) 125 MCG (5000 UT) tablet, Take 5,000 Units by mouth, Disp: , Rfl:     Current Facility-Administered Medications:      denosumab (PROLIA) injection 60 mg, 60 mg, Subcutaneous, Q6 Months, Stephanie Norris, PharmD    Facility-Administered Medications Ordered in Other Visits:      barium sulfate (EZ PAQUE) oral suspension 96%, , Oral, Once, Ragini Arellano MD     barium sulfate (EZ-HD) oral suspension 98%, , Oral, Once, Ragini Arellano MD     sod bicarbonate-citric acid-simethicone (EZ GAS) 2.21-1.53-0.04 g packet 4 g, 4 g, Oral, Once, Ragini Arellano MD    Family History   Problem Relation Age of Onset     Pulmonary Hypertension Daughter         idiopathic      Heart Disease Other         2 sibs MI, 1 sib electrical conduction disorder     Breast Cancer Mother 66.00     Hypertension Mother      Coronary Artery Disease Mother      Varicose Veins Mother      Hypertension Father      Coronary Artery Disease Sister      Heart Disease Sister      Diabetes Son      Pulmonary Hypertension Daughter      Coronary Artery Disease Sister      Heart Disease Sister        Social History     Socioeconomic History     Marital status:      Spouse name: Not on file     Number of children: Not on file     Years of education: Not on file     Highest education level: Not on file   Occupational History     Not on file   Tobacco Use     Smoking status: Former Smoker     Packs/day: 1.50     Years: 38.00     Pack years: 57.00     Types: Cigarettes, Cigarettes, Cigarettes     Quit date: 1998     Years since quittin.8     Smokeless tobacco: Never Used     Tobacco comment: quit in    Substance and Sexual Activity     Alcohol use: Yes     Alcohol/week: 2.0 standard drinks     Comment: occ     Drug use: Yes      "Types: Oxycodone     Sexual activity: Never   Other Topics Concern     Not on file   Social History Narrative    .  Two kids.  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     Social Determinants of Health     Financial Resource Strain:      Difficulty of Paying Living Expenses:    Food Insecurity:      Worried About Running Out of Food in the Last Year:      Ran Out of Food in the Last Year:    Transportation Needs:      Lack of Transportation (Medical):      Lack of Transportation (Non-Medical):    Physical Activity:      Days of Exercise per Week:      Minutes of Exercise per Session:    Stress:      Feeling of Stress :    Social Connections:      Frequency of Communication with Friends and Family:      Frequency of Social Gatherings with Friends and Family:      Attends [employer] Services:      Active Member of Clubs or Organizations:      Attends Club or Organization Meetings:      Marital Status:    Intimate Partner Violence:      Fear of Current or Ex-Partner:      Emotionally Abused:      Physically Abused:      Sexually Abused:        10 point Review of Systems is negative except for hammertoe which is noted in HPI.     Pulse 85   Resp 20   Ht 1.626 m (5' 4\")   Wt 99.8 kg (220 lb)   SpO2 97%   BMI 37.76 kg/m      BMI= Body mass index is 37.76 kg/m .    OBJECTIVE:  General appearance: Patient is alert and fully cooperative with history & exam.  No sign of distress is noted during the visit.    Vascular: Dorsalis pedis and PT non-palpable. There is no appreciable edema noted.  Dermatologic: Hyperkeratotic tissue plantar 1st MPJ bilateral x6. No erythema bilateral. Trophic changes noted including diminished hair growth and shiny skin.  Neurologic: Diminished to light touch bilateral.   Musculoskeletal: Contracted digits bilateral with semi-rigid contracture 3rd digit left foot.    Imaging:     MA Screening Bilateral    Result Date: 7/27/2021  BILATERAL FULL FIELD DIGITAL SCREENING MAMMOGRAM WITH TOMOSYNTHESIS " Performed on: 7/27/21 Compared to: 07/09/2020 MA Screen Bilateral w/Geovanni, 04/10/2019 MA Screen Bilateral w/Geovanni, and 12/01/2016 MA Screen Bilateral w/Geovanni Findings: The breasts have scattered areas of fibroglandular density. This study was evaluated with the assistance of Computer-Aided Detection.  Breast Tomosynthesis was used in interpretation.  There are benign appearing calcifications in both breasts. There is no radiographic evidence of malignancy. IMPRESSION: ACR BI-RADS Category 2: Benign RECOMMENDED FOLLOW-UP: Annual routine screening mammogram The results and recommendations of this examination will be communicated to the patient.

## 2021-10-18 NOTE — LETTER
10/18/2021         RE: Lalitha Underwood  [address] [facility] [place] [address]        Dear Colleague,    Thank you for referring your patient, Lalitha Underwood, to the [facility]. Please see a copy of my visit note below.        FOOT AND ANKLE SURGERY/PODIATRY PROGRESS NOTE        ASSESSMENT:   Hammertoe  Keratoma      TREATMENT:  -Trimmed callus tissue x5 plantar 1st MPJ bilateral with a #15 blade.     -Patient's questions invited and answered. She was encouraged to call my office with any further questions or concerns.     Alfonso Orozco DPM  [facility] Podiatry/Foot & Ankle Surgery  [phone]      HPI: Lalitha Underwood was seen again today for painful callus tissue on both feet. She would also like to discuss treatment for a hammertoe deformity on the 3rd digit left foot.     Past Medical History:   Diagnosis Date     Convulsive disorder (H)      Convulsive disorder (H)      COPD (chronic obstructive pulmonary disease) (H)      COPD (chronic obstructive pulmonary disease) (H)      Depression      Hernia of unspecified site of abdominal cavity without mention of obstruction or gangrene      Hiatal hernia      Hiatal hernia      Hypertension      Hypertension      On home oxygen therapy     at 1.5 liters at nite     Osteopenia      Osteopenia      Oxygen dependent     1.5 L per NC     Shortness of breath      URI (upper respiratory infection)      Venous insufficiency of both lower extremities      Venous insufficiency of both lower extremities        Past Surgical History:   Procedure Laterality Date     ESOPHAGOSCOPY, GASTROSCOPY, DUODENOSCOPY (EGD), COMBINED N/A 11/26/2018    Procedure: Esophagogastroduodenoscopy;  Surgeon: Jasmeet Wilder MD;  Location: [facility] OR     HERNIA REPAIR, UMBILICAL  04/2018     HERNIA REPAIR, UMBILICAL  04/04/2018    Dr. Jimenez     LAPAROSCOPIC HERNIORRHAPHY HIATAL N/A 11/26/2018    Procedure: Laparoscopic Hiatal Hernia Repair,bilateral  chest tubes;  Surgeon: Jasmeet Wilder MD;  Location: [facility] OR     OTHER SURGICAL HISTORY Left 2003    Broke titanium in left arm     Repair arm fracture Left      SHOULDER SURGERY Right 1967     SHOULDER SURGERY Right 1967     US BIOPSY THYROID FINE NEEDLE ASPIRATION  7/29/2020       Allergies   Allergen Reactions     Clindamycin Rash     Carbamazepine Rash     Morphine Nausea         Current Outpatient Medications:      acetaminophen (TYLENOL) 500 MG tablet, Take 2 tablets (1,000 mg) by mouth every 8 hours as needed for mild pain, Disp: , Rfl:      albuterol (PROAIR HFA/PROVENTIL HFA/VENTOLIN HFA) 108 (90 Base) MCG/ACT inhaler, Inhale 2 puffs into the lungs every 6 hours as needed, Disp: , Rfl:      biotin 5 MG CAPS, Take 1 capsule by mouth daily, Disp: , Rfl:      calcium citrate (CITRACAL) 950 (200 Ca) MG tablet, Take 1 tablet by mouth, Disp: , Rfl:      denosumab (PROLIA) 60 MG/ML SOLN injection, Inject 60 mg Subcutaneous every 6 months, Disp: , Rfl:      fluticasone-salmeterol (ADVAIR) 250-50 MCG/DOSE diskus inhaler, Inhale 1 puff into the lungs 2 times daily, Disp: , Rfl:      hydrochlorothiazide (HYDRODIURIL) 25 MG tablet, hydrochlorothiazide 25 mg tablet  TAKE 1 TABLET BY MOUTH DAILY, Disp: , Rfl:      levETIRAcetam (KEPPRA) 500 MG tablet, levetiracetam 500 mg tablet  TAKE 1 TABLET BY MOUTH TWICE DAILY, Disp: , Rfl:      losartan (COZAAR) 50 MG tablet, Take 50 mg by mouth 2 times daily, Disp: , Rfl:      Magnesium Oxide 500 MG TABS, Take 500 mg by mouth daily, Disp: , Rfl:      nystatin (NYSTOP) 886992 UNIT/GM external powder, Nystop 100,000 unit/gram topical powder, Disp: , Rfl:      OTHER MEDICAL SUPPLIES, 1.5 L At Bedtime, Disp: , Rfl:      potassium chloride ER (KLOR-CON M) 20 MEQ CR tablet, Take 20 mEq by mouth, Disp: , Rfl:      RABEprazole (ACIPHEX) 20 MG EC tablet, Take 20 mg by mouth every morning, Disp: , Rfl:      senna-docusate (SENOKOT-S/PERICOLACE) 8.6-50 MG tablet, Take 1 tablet by  mouth 2 times daily Reduce dose or temporarily discontinue if having loose stools., Disp: 60 tablet, Rfl: 0     sertraline (ZOLOFT) 100 MG tablet, Take 1 tablet (100 mg) by mouth daily, Disp: 90 tablet, Rfl: 3     solifenacin (VESICARE) 5 MG tablet, Take 5 mg by mouth every morning, Disp: , Rfl:      tiotropium (SPIRIVA HANDIHALER) 18 MCG inhaled capsule, Spiriva with HandiHaler 18 mcg and inhalation capsules, Disp: , Rfl:      vitamin D3 (CHOLECALCIFEROL) 125 MCG (5000 UT) tablet, Take 5,000 Units by mouth, Disp: , Rfl:     Current Facility-Administered Medications:      denosumab (PROLIA) injection 60 mg, 60 mg, Subcutaneous, Q6 Months, Stephanie Norris, PharmD    Facility-Administered Medications Ordered in Other Visits:      barium sulfate (EZ PAQUE) oral suspension 96%, , Oral, Once, Ragini Arellano MD     barium sulfate (EZ-HD) oral suspension 98%, , Oral, Once, Ragini Arellano MD     sod bicarbonate-citric acid-simethicone (EZ GAS) 2.21-1.53-0.04 g packet 4 g, 4 g, Oral, Once, Ragini Arellano MD    Family History   Problem Relation Age of Onset     Pulmonary Hypertension Daughter         idiopathic      Heart Disease Other         2 sibs MI, 1 sib electrical conduction disorder     Breast Cancer Mother 66.00     Hypertension Mother      Coronary Artery Disease Mother      Varicose Veins Mother      Hypertension Father      Coronary Artery Disease Sister      Heart Disease Sister      Diabetes Son      Pulmonary Hypertension Daughter      Coronary Artery Disease Sister      Heart Disease Sister        Social History     Socioeconomic History     Marital status:      Spouse name: Not on file     Number of children: Not on file     Years of education: Not on file     Highest education level: Not on file   Occupational History     Not on file   Tobacco Use     Smoking status: Former Smoker     Packs/day: 1.50     Years: 38.00     Pack years: 57.00     Types: Cigarettes, Cigarettes,  "Cigarettes     Quit date: 1998     Years since quittin.8     Smokeless tobacco: Never Used     Tobacco comment: quit in    Substance and Sexual Activity     Alcohol use: Yes     Alcohol/week: 2.0 standard drinks     Comment: occ     Drug use: Yes     Types: Oxycodone     Sexual activity: Never   Other Topics Concern     Not on file   Social History Narrative    .  Two kids.  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     Social Determinants of Health     Financial Resource Strain:      Difficulty of Paying Living Expenses:    Food Insecurity:      Worried About Running Out of Food in the Last Year:      Ran Out of Food in the Last Year:    Transportation Needs:      Lack of Transportation (Medical):      Lack of Transportation (Non-Medical):    Physical Activity:      Days of Exercise per Week:      Minutes of Exercise per Session:    Stress:      Feeling of Stress :    Social Connections:      Frequency of Communication with Friends and Family:      Frequency of Social Gatherings with Friends and Family:      Attends Worship Services:      Active Member of Clubs or Organizations:      Attends Club or Organization Meetings:      Marital Status:    Intimate Partner Violence:      Fear of Current or Ex-Partner:      Emotionally Abused:      Physically Abused:      Sexually Abused:        10 point Review of Systems is negative except for hammertoe which is noted in HPI.     Pulse 85   Resp 20   Ht 1.626 m (5' 4\")   Wt 99.8 kg (220 lb)   SpO2 97%   BMI 37.76 kg/m      BMI= Body mass index is 37.76 kg/m .    OBJECTIVE:  General appearance: Patient is alert and fully cooperative with history & exam.  No sign of distress is noted during the visit.    Vascular: Dorsalis pedis and PT non-palpable. There is no appreciable edema noted.  Dermatologic: Hyperkeratotic tissue plantar 1st MPJ bilateral x6. No erythema bilateral. Trophic changes noted including diminished hair growth and shiny skin.  Neurologic: " Diminished to light touch bilateral.   Musculoskeletal: Contracted digits bilateral with semi-rigid contracture 3rd digit left foot.    Imaging:     MA Screening Bilateral    Result Date: 7/27/2021  BILATERAL FULL FIELD DIGITAL SCREENING MAMMOGRAM WITH TOMOSYNTHESIS Performed on: 7/27/21 Compared to: 07/09/2020 MA Screen Bilateral w/Geovanni, 04/10/2019 MA Screen Bilateral w/Geovanni, and 12/01/2016 MA Screen Bilateral w/Geovanni Findings: The breasts have scattered areas of fibroglandular density. This study was evaluated with the assistance of Computer-Aided Detection.  Breast Tomosynthesis was used in interpretation.  There are benign appearing calcifications in both breasts. There is no radiographic evidence of malignancy. IMPRESSION: ACR BI-RADS Category 2: Benign RECOMMENDED FOLLOW-UP: Annual routine screening mammogram The results and recommendations of this examination will be communicated to the patient.           Again, thank you for allowing me to participate in the care of your patient.        Sincerely,        Alfonso Orozco DPM

## 2021-10-25 ENCOUNTER — OFFICE VISIT (OUTPATIENT)
Dept: PODIATRY | Facility: CLINIC | Age: 76
End: 2021-10-25
Attending: PODIATRIST
Payer: MEDICARE

## 2021-10-25 VITALS — HEIGHT: 64 IN | BODY MASS INDEX: 37.56 KG/M2 | WEIGHT: 220 LBS | OXYGEN SATURATION: 96 % | HEART RATE: 102 BPM

## 2021-10-25 DIAGNOSIS — L97.521 ULCER OF LEFT FOOT, LIMITED TO BREAKDOWN OF SKIN (H): Primary | ICD-10-CM

## 2021-10-25 DIAGNOSIS — M20.42 HAMMER TOE OF LEFT FOOT: ICD-10-CM

## 2021-10-25 DIAGNOSIS — L03.119 CELLULITIS AND ABSCESS OF FOOT, EXCEPT TOES: ICD-10-CM

## 2021-10-25 DIAGNOSIS — I73.9 PAD (PERIPHERAL ARTERY DISEASE) (H): ICD-10-CM

## 2021-10-25 DIAGNOSIS — L02.619 CELLULITIS AND ABSCESS OF FOOT, EXCEPT TOES: ICD-10-CM

## 2021-10-25 PROCEDURE — 11042 DBRDMT SUBQ TIS 1ST 20SQCM/<: CPT | Performed by: PODIATRIST

## 2021-10-25 PROCEDURE — 99213 OFFICE O/P EST LOW 20 MIN: CPT | Mod: 25 | Performed by: PODIATRIST

## 2021-10-25 PROCEDURE — 87186 SC STD MICRODIL/AGAR DIL: CPT | Performed by: PODIATRIST

## 2021-10-25 PROCEDURE — 87077 CULTURE AEROBIC IDENTIFY: CPT | Performed by: PODIATRIST

## 2021-10-25 PROCEDURE — 87070 CULTURE OTHR SPECIMN AEROBIC: CPT | Performed by: PODIATRIST

## 2021-10-25 RX ORDER — DOXYCYCLINE 100 MG/1
100 CAPSULE ORAL 2 TIMES DAILY
Qty: 20 CAPSULE | Refills: 0 | Status: SHIPPED | OUTPATIENT
Start: 2021-10-25 | End: 2021-11-29

## 2021-10-25 ASSESSMENT — MIFFLIN-ST. JEOR: SCORE: 1472.91

## 2021-10-25 ASSESSMENT — PAIN SCALES - GENERAL: PAINLEVEL: MODERATE PAIN (4)

## 2021-10-25 NOTE — PROGRESS NOTES
FOOT AND ANKLE SURGERY/PODIATRY Progress Note      ASSESSMENT:   Cellulitis 2nd digit left foot    Ulceration 2nd digit left foot     A new wound was identified today: yes,  it is located 2nd digit left foot.    TREATMENT:  -There is a full thickness ulceration along the distal aspect of the 2nd digit on the left foot with localized erythema.    -Wound culture obtained today. I will start her on doxycycline.     -Discussed etiology including hammertoe deformity and callus formation.     -Referred for BEBE's. Briefly discussed that surgical correction of the hammertoe may be necessary as she has tried to use CREST pad but developed the full thickness sore.     -After discussion of risk factors and consent obtained 2% Lidocaine HCL jelly was applied, under clean conditions, the left and foot ulceration(s) were debrided using #15 blade Devitalized and nonviable tissue, along with any fibrin and slough, was removed to improve granulation tissue formation, stimulate wound healing, decrease overall bacteria load, disrupt biofilm formation and decrease edge senescence. Wound drainage was scant No. Total excisional debridement was 0.04 sq cm into the subcutaneous tissue with a depth of 0.3 cm.   Ulcers were improved afterwards and .  Measures were as noted on the flow sheet. A gauze dressing was applied. She will continue to apply a gauze dressing qday.    -She will follow-up with me in one week.     Alfonso Orozco DPM  [facility]      HPI: Lalitha Underwood was seen again today for a painful infected area on the 2nd digit left foot. The patient states she has noticed progressive redness develop over the past few days. She denies trauma or known mechanism of injury.     Past Medical History:   Diagnosis Date     Convulsive disorder (H)      Convulsive disorder (H)      COPD (chronic obstructive pulmonary disease) (H)      COPD (chronic obstructive pulmonary disease) (H)      Depression       Hernia of unspecified site of abdominal cavity without mention of obstruction or gangrene      Hiatal hernia      Hiatal hernia      Hypertension      Hypertension      On home oxygen therapy     at 1.5 liters at nite     Osteopenia      Osteopenia      Oxygen dependent     1.5 L per NC     Shortness of breath      URI (upper respiratory infection)      Venous insufficiency of both lower extremities      Venous insufficiency of both lower extremities        Past Surgical History:   Procedure Laterality Date     ESOPHAGOSCOPY, GASTROSCOPY, DUODENOSCOPY (EGD), COMBINED N/A 11/26/2018    Procedure: Esophagogastroduodenoscopy;  Surgeon: Jasmeet Wilder MD;  Location: [facility] OR     HERNIA REPAIR, UMBILICAL  04/2018     HERNIA REPAIR, UMBILICAL  04/04/2018    Dr. 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     Repair arm fracture Left      SHOULDER SURGERY Right 1967     SHOULDER SURGERY Right 1967     US BIOPSY THYROID FINE NEEDLE ASPIRATION  7/29/2020       Allergies   Allergen Reactions     Clindamycin Rash     Carbamazepine Rash     Morphine Nausea         Current Outpatient Medications:      acetaminophen (TYLENOL) 500 MG tablet, Take 2 tablets (1,000 mg) by mouth every 8 hours as needed for mild pain, Disp: , Rfl:      albuterol (PROAIR HFA/PROVENTIL HFA/VENTOLIN HFA) 108 (90 Base) MCG/ACT inhaler, Inhale 2 puffs into the lungs every 6 hours as needed, Disp: , Rfl:      biotin 5 MG CAPS, Take 1 capsule by mouth daily, Disp: , Rfl:      calcium citrate (CITRACAL) 950 (200 Ca) MG tablet, Take 1 tablet by mouth, Disp: , Rfl:      denosumab (PROLIA) 60 MG/ML SOLN injection, Inject 60 mg Subcutaneous every 6 months, Disp: , Rfl:      doxycycline monohydrate (MONODOX) 100 MG capsule, Take 1 capsule (100 mg) by  mouth 2 times daily, Disp: 20 capsule, Rfl: 0     fluticasone-salmeterol (ADVAIR) 250-50 MCG/DOSE diskus inhaler, Inhale 1 puff into the lungs 2 times daily, Disp: , Rfl:      hydrochlorothiazide (HYDRODIURIL) 25 MG tablet, hydrochlorothiazide 25 mg tablet  TAKE 1 TABLET BY MOUTH DAILY, Disp: , Rfl:      levETIRAcetam (KEPPRA) 500 MG tablet, levetiracetam 500 mg tablet  TAKE 1 TABLET BY MOUTH TWICE DAILY, Disp: , Rfl:      losartan (COZAAR) 50 MG tablet, Take 50 mg by mouth 2 times daily, Disp: , Rfl:      Magnesium Oxide 500 MG TABS, Take 500 mg by mouth daily, Disp: , Rfl:      nystatin (NYSTOP) 986951 UNIT/GM external powder, Nystop 100,000 unit/gram topical powder, Disp: , Rfl:      OTHER MEDICAL SUPPLIES, 1.5 L At Bedtime, Disp: , Rfl:      potassium chloride ER (KLOR-CON M) 20 MEQ CR tablet, Take 20 mEq by mouth, Disp: , Rfl:      RABEprazole (ACIPHEX) 20 MG EC tablet, Take 20 mg by mouth every morning, Disp: , Rfl:      senna-docusate (SENOKOT-S/PERICOLACE) 8.6-50 MG tablet, Take 1 tablet by mouth 2 times daily Reduce dose or temporarily discontinue if having loose stools., Disp: 60 tablet, Rfl: 0     sertraline (ZOLOFT) 100 MG tablet, Take 1 tablet (100 mg) by mouth daily, Disp: 90 tablet, Rfl: 3     solifenacin (VESICARE) 5 MG tablet, Take 5 mg by mouth every morning, Disp: , Rfl:      tiotropium (SPIRIVA HANDIHALER) 18 MCG inhaled capsule, Spiriva with HandiHaler 18 mcg and inhalation capsules, Disp: , Rfl:      vitamin D3 (CHOLECALCIFEROL) 125 MCG (5000 UT) tablet, Take 5,000 Units by mouth, Disp: , Rfl:     Current Facility-Administered Medications:      denosumab (PROLIA) injection 60 mg, 60 mg, Subcutaneous, Q6 Months, Stephanie Norris, PharmD    Facility-Administered Medications Ordered in Other Visits:      barium sulfate (EZ PAQUE) oral suspension 96%, , Oral, Once, Ragini Arellano MD     barium sulfate (EZ-HD) oral suspension 98%, , Oral, Once, Ragini Arellano MD     sod  "bicarbonate-citric acid-simethicone (EZ GAS) 2.21-1.53-0.04 g packet 4 g, 4 g, Oral, Once, Ragini Arellano MD    Review of Systems - 10 point Review of Systems is negative except for left foot ulcer which is noted in HPI.      OBJECTIVE:  Pulse 102   Ht 5' 4\" (1.626 m)   Wt 220 lb (99.8 kg)   SpO2 96%   BMI 37.76 kg/m    General appearance: Patient is alert and fully cooperative with history & exam.  No sign of distress is noted during the visit.    Vascular: Dorsalis pedis palpableLeft.  Dermatologic:    Gastrostomy/Enterostomy Percutaneous endoscopic gastrostomy (PEG) LUQ 1 20 fr (Active)       Incision/Surgical Site 11/26/18 Bilateral Abdomen (Active)   Ulceration distal 2nd digit left foot 0.2x0.2x0.2cm, serosanguinous drainage with localized erythema distal 2nd digit left foot.   Neurologic: Diminished to light touch Left.  Musculoskeletal: Contracted digits noted Left.    Imaging:     No results found.       Picture: None    "

## 2021-10-25 NOTE — LETTER
10/25/2021         RE: Lalitha Underwood  [address] [facility] [address]        Dear Colleague,    Thank you for referring your patient, Lalitha Underwood, to the [facility]. Please see a copy of my visit note below.    FOOT AND ANKLE SURGERY/PODIATRY Progress Note      ASSESSMENT:   Cellulitis 2nd digit left foot    Ulceration 2nd digit left foot     A new wound was identified today: yes,  it is located 2nd digit left foot.    TREATMENT:  -There is a full thickness ulceration along the distal aspect of the 2nd digit on the left foot with localized erythema.    -Wound culture obtained today. I will start her on doxycycline.     -Discussed etiology including hammertoe deformity and callus formation.     -Referred for BEBE's. Briefly discussed that surgical correction of the hammertoe may be necessary as she has tried to use CREST pad but developed the full thickness sore.     -After discussion of risk factors and consent obtained 2% Lidocaine HCL jelly was applied, under clean conditions, the left and foot ulceration(s) were debrided using #15 blade Devitalized and nonviable tissue, along with any fibrin and slough, was removed to improve granulation tissue formation, stimulate wound healing, decrease overall bacteria load, disrupt biofilm formation and decrease edge senescence. Wound drainage was scant No. Total excisional debridement was 0.04 sq cm into the subcutaneous tissue with a depth of 0.3 cm.   Ulcers were improved afterwards and .  Measures were as noted on the flow sheet. A gauze dressing was applied. She will continue to apply a gauze dressing qday.    -She will follow-up with me in one week.     Alfonso Orozco DPM  [facility]      HPI: Lalitha Underwood was seen again today for a painful infected area on the 2nd digit left foot. The patient states she has noticed progressive redness develop over the past few days. She denies trauma or known  mechanism of injury.     Past Medical History:   Diagnosis Date     Convulsive disorder (H)      Convulsive disorder (H)      COPD (chronic obstructive pulmonary disease) (H)      COPD (chronic obstructive pulmonary disease) (H)      Depression      Hernia of unspecified site of abdominal cavity without mention of obstruction or gangrene      Hiatal hernia      Hiatal hernia      Hypertension      Hypertension      On home oxygen therapy     at 1.5 liters at nite     Osteopenia      Osteopenia      Oxygen dependent     1.5 L per NC     Shortness of breath      URI (upper respiratory infection)      Venous insufficiency of both lower extremities      Venous insufficiency of both lower extremities        Past Surgical History:   Procedure Laterality Date     ESOPHAGOSCOPY, GASTROSCOPY, DUODENOSCOPY (EGD), COMBINED N/A 11/26/2018    Procedure: Esophagogastroduodenoscopy;  Surgeon: Jasmeet Wilder MD;  Location: [facility] OR     HERNIA REPAIR, UMBILICAL  04/2018     HERNIA REPAIR, UMBILICAL  04/04/2018    Dr. 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     Repair arm fracture Left      SHOULDER SURGERY Right 1967     SHOULDER SURGERY Right 1967     US BIOPSY THYROID FINE NEEDLE ASPIRATION  7/29/2020       Allergies   Allergen Reactions     Clindamycin Rash     Carbamazepine Rash     Morphine Nausea         Current Outpatient Medications:      acetaminophen (TYLENOL) 500 MG tablet, Take 2 tablets (1,000 mg) by mouth every 8 hours as needed for mild pain, Disp: , Rfl:      albuterol (PROAIR HFA/PROVENTIL HFA/VENTOLIN HFA) 108 (90 Base) MCG/ACT inhaler, Inhale 2 puffs into the lungs every 6 hours as needed, Disp: , Rfl:      biotin 5 MG CAPS, Take 1 capsule by mouth daily, Disp: , Rfl:      calcium citrate  (CITRACAL) 950 (200 Ca) MG tablet, Take 1 tablet by mouth, Disp: , Rfl:      denosumab (PROLIA) 60 MG/ML SOLN injection, Inject 60 mg Subcutaneous every 6 months, Disp: , Rfl:      doxycycline monohydrate (MONODOX) 100 MG capsule, Take 1 capsule (100 mg) by mouth 2 times daily, Disp: 20 capsule, Rfl: 0     fluticasone-salmeterol (ADVAIR) 250-50 MCG/DOSE diskus inhaler, Inhale 1 puff into the lungs 2 times daily, Disp: , Rfl:      hydrochlorothiazide (HYDRODIURIL) 25 MG tablet, hydrochlorothiazide 25 mg tablet  TAKE 1 TABLET BY MOUTH DAILY, Disp: , Rfl:      levETIRAcetam (KEPPRA) 500 MG tablet, levetiracetam 500 mg tablet  TAKE 1 TABLET BY MOUTH TWICE DAILY, Disp: , Rfl:      losartan (COZAAR) 50 MG tablet, Take 50 mg by mouth 2 times daily, Disp: , Rfl:      Magnesium Oxide 500 MG TABS, Take 500 mg by mouth daily, Disp: , Rfl:      nystatin (NYSTOP) [id number] UNIT/GM external powder, Nystop 100,000 unit/gram topical powder, Disp: , Rfl:      OTHER MEDICAL SUPPLIES, 1.5 L At Bedtime, Disp: , Rfl:      potassium chloride ER (KLOR-CON M) 20 MEQ CR tablet, Take 20 mEq by mouth, Disp: , Rfl:      RABEprazole (ACIPHEX) 20 MG EC tablet, Take 20 mg by mouth every morning, Disp: , Rfl:      senna-docusate (SENOKOT-S/PERICOLACE) 8.6-50 MG tablet, Take 1 tablet by mouth 2 times daily Reduce dose or temporarily discontinue if having loose stools., Disp: 60 tablet, Rfl: 0     sertraline (ZOLOFT) 100 MG tablet, Take 1 tablet (100 mg) by mouth daily, Disp: 90 tablet, Rfl: 3     solifenacin (VESICARE) 5 MG tablet, Take 5 mg by mouth every morning, Disp: , Rfl:      tiotropium (SPIRIVA HANDIHALER) 18 MCG inhaled capsule, Spiriva with HandiHaler 18 mcg and inhalation capsules, Disp: , Rfl:      vitamin D3 (CHOLECALCIFEROL) 125 MCG (5000 UT) tablet, Take 5,000 Units by mouth, Disp: , Rfl:     Current Facility-Administered Medications:      denosumab (PROLIA) injection 60 mg, 60 mg, Subcutaneous, Q6 Months, Stephanie Norris,  "PharmD    Facility-Administered Medications Ordered in Other Visits:      barium sulfate (EZ PAQUE) oral suspension 96%, , Oral, Once, Ragini Arellano MD     barium sulfate (EZ-HD) oral suspension 98%, , Oral, Once, Ragini Arellano MD     sod bicarbonate-citric acid-simethicone (EZ GAS) 2.21-1.53-0.04 g packet 4 g, 4 g, Oral, Once, Ragini Arellano MD    Review of Systems - 10 point Review of Systems is negative except for left foot ulcer which is noted in HPI.      OBJECTIVE:  Pulse 102   Ht 5' 4\" (1.626 m)   Wt 220 lb (99.8 kg)   SpO2 96%   BMI 37.76 kg/m    General appearance: Patient is alert and fully cooperative with history & exam.  No sign of distress is noted during the visit.    Vascular: Dorsalis pedis palpableLeft.  Dermatologic:    Gastrostomy/Enterostomy Percutaneous endoscopic gastrostomy (PEG) LUQ 1 20 fr (Active)       Incision/Surgical Site 11/26/18 Bilateral Abdomen (Active)   Ulceration distal 2nd digit left foot 0.2x0.2x0.2cm, serosanguinous drainage with localized erythema distal 2nd digit left foot.   Neurologic: Diminished to light touch Left.  Musculoskeletal: Contracted digits noted Left.    Imaging:     No results found.       Picture: None        Again, thank you for allowing me to participate in the care of your patient.        Sincerely,        Alfonso Orozco DPM    "

## 2021-10-27 ENCOUNTER — TELEPHONE (OUTPATIENT)
Dept: PODIATRY | Facility: CLINIC | Age: 76
End: 2021-10-27

## 2021-10-27 NOTE — TELEPHONE ENCOUNTER
Pt called today hoping to speak to the nurse who did the bandage wrap on Monday 10/25/21. Pt would like a call back to discuss some education that was discussed at that time.

## 2021-10-27 NOTE — TELEPHONE ENCOUNTER
Spoke with patient, clarified gauze dressing daily.  Also, went over directions to get to her ultrasound in the [facility] location.

## 2021-10-28 LAB — BACTERIA SPEC CULT: ABNORMAL

## 2021-10-29 ENCOUNTER — ANCILLARY PROCEDURE (OUTPATIENT)
Dept: VASCULAR ULTRASOUND | Facility: CLINIC | Age: 76
End: 2021-10-29
Attending: PODIATRIST
Payer: MEDICARE

## 2021-10-29 DIAGNOSIS — I73.9 PAD (PERIPHERAL ARTERY DISEASE) (H): ICD-10-CM

## 2021-10-29 DIAGNOSIS — M20.42 HAMMER TOE OF LEFT FOOT: ICD-10-CM

## 2021-10-29 DIAGNOSIS — L97.521 ULCER OF LEFT FOOT, LIMITED TO BREAKDOWN OF SKIN (H): ICD-10-CM

## 2021-10-29 PROCEDURE — 93922 UPR/L XTREMITY ART 2 LEVELS: CPT

## 2021-10-29 PROCEDURE — 93922 UPR/L XTREMITY ART 2 LEVELS: CPT | Mod: 26 | Performed by: SURGERY

## 2021-11-01 ENCOUNTER — OFFICE VISIT (OUTPATIENT)
Dept: PODIATRY | Facility: CLINIC | Age: 76
End: 2021-11-01
Payer: MEDICARE

## 2021-11-01 ENCOUNTER — PREP FOR PROCEDURE (OUTPATIENT)
Dept: PODIATRY | Facility: CLINIC | Age: 76
End: 2021-11-01

## 2021-11-01 VITALS — HEIGHT: 64 IN | OXYGEN SATURATION: 95 % | HEART RATE: 80 BPM | BODY MASS INDEX: 37.56 KG/M2 | WEIGHT: 220 LBS

## 2021-11-01 DIAGNOSIS — L03.119 CELLULITIS AND ABSCESS OF FOOT, EXCEPT TOES: ICD-10-CM

## 2021-11-01 DIAGNOSIS — M20.42 HAMMERTOE OF LEFT FOOT: Primary | ICD-10-CM

## 2021-11-01 DIAGNOSIS — L02.619 CELLULITIS AND ABSCESS OF FOOT, EXCEPT TOES: ICD-10-CM

## 2021-11-01 DIAGNOSIS — M20.42 HAMMER TOE OF LEFT FOOT: ICD-10-CM

## 2021-11-01 DIAGNOSIS — L97.521 ULCER OF LEFT FOOT, LIMITED TO BREAKDOWN OF SKIN (H): Primary | ICD-10-CM

## 2021-11-01 PROCEDURE — 99213 OFFICE O/P EST LOW 20 MIN: CPT | Performed by: PODIATRIST

## 2021-11-01 ASSESSMENT — MIFFLIN-ST. JEOR: SCORE: 1472.91

## 2021-11-01 ASSESSMENT — PAIN SCALES - GENERAL: PAINLEVEL: NO PAIN (0)

## 2021-11-01 NOTE — PATIENT INSTRUCTIONS
Surgery Information    **ALL [facility] PAPERWORK IS TO BE FAXED -[phone], IT WILL BE PROCESSED AFTER SURGERY IS COMPLETE.      Surgery Date     Surgery Time   ( Arrive at the hospital two hours prior to your surgery and 1 hour prior at the surgery center. Check in at the . The only exception is if you are scheduled for surgery at 7am, you should arrive at 5:30am)    IF YOU NEED TO RESCHEDULE OR CANCEL YOUR SURGERY FOR ANY REASON PLEASE CALL THE CLINIC AS SOON AS POSSIBLE -[phone].    Admission Type: Outpatient    Surgeon: Dr. Orozco    Surgery Procedure: Hammertoe 2 &3 left foot     Surgery Location: [facility], [facility], [address]    If you take Blood Thinners Such as:  Warfarin, Xarelto, Plavix, Aspirin or Eliquis this will need to be HELD prior to procedure according to primary care provider/doctor or cardiology who prescribes this medication. Generally this is 5 days.    Additional Information: If you use a CPAP machine for sleep apnea please bring it with you for surgery. We will want to monitor your breathing using your normal equipment.   If you need to reach the surgery scheduler please call the main number at [phone].    HOSPITAL AND SURGERY CENTER INFORMATION    We need to know a lot of information about you before surgery.     1-5 Days Before:     A nurse will call you for a pre-screening interview. The phone call with the nurse will be much faster and easier if you  Have organized your information prior to the call.     Please have the following information available:    Preoperative Exam completed and faxed to our office    Primary care provider's and any specialty providers' contact information available. .    If requested by your primary care provider, have any heart and lung exams at least 3 days before surgery.    Have a complete and accurate list of medications available.     Have a list of your allergies/sensitivities and reactions    Know your  health history, surgical history, and medical problems    Know any anesthesia issues with you or within your family.     BE SURE TO NOTIFY US IF YOU ARE TAKING ANY BLOOD THINNING AGENTS: Coumadin, Plavix, Aspirin, Xarelto, Eliquis    Someone planned to bring you and stay with you after the surgery    Day Before Surgery    1. STOP Smoking and Drinking: It is important to stop smoking and drinking at least 24 hours before surgery. Smoking and drinking may cause complications in your recovery from anesthesia and may lengthen the healing process.    2. Pack for your hospital stay: If it will be required for you to stay at the hospital after surgery, bring personal items such as a robe, slippers, pajamas, additional clothing and toiletries. Don't forget:    List of medication    Eyeglass case or contact case with solution. You cannot wear contacts during surgery    Copies of your physical exam , lab results and EKG    Copy of Health Care Directives, Living Will or Power of     Insurance Cards    Photo ID    CPAP machine    3. NOTHING BY MOUTH 8 HOURS BEFORE. This includes gum, hard candy, water and mints. The only exception is if you have been instructed by your doctor to take your medications with sips of water. You may rinse your mouth or brush your teeth, but do not swallow water.     4. Remove Nail Polish  Day of Surgery    1. Medications- Take as indicated with sips of water     2. Wear comfortable loose fitting clothes. Wear your glasses-Not contacts. Do not wear jewelry and remove body piercing's. Surgery may be cancelled if they are not removed.     3. If same day surgery-Have a someone come with you to surgery that can help you understand the surgeon's instructions, drive you home and stay with you overnight the first night.     4. A nurse will call you at home within 72 hours following surgery to see how you are doing. Our nurses and doctors will discuss recovery with you.      The surgery scheduler   will contact you to schedule if you were not scheduled today or you need to make any adjustments to your surgery.     You will need a preop physical within 30 days before surgery with your primary care provider. Please note if you do not get a complete History and Physical your surgery will be cancelled.   Please contact your primary to schedule.     A nurse should contact you from the hospital 1-2 days before surgery to review with you.    If you would like a Good [place]ita Estimate for your upcoming service/procedure contact Cost of Care Estimates at [phone], advocates are available Monday through Friday 8am - 5pm. You may also submit a request online at [email or web address]    [facility]  [address]  [address]     [phone] for facility charge    Anesthesiology charge  [phone]          Please don't hesitate to call us with any additional question or problems  Our number is [phone]     Instructions for Patients Scheduled for Vascular or Podiatry Procedures During the COVID 19 Pandemic    Your Provider has determined that your condition warrants going ahead with your procedure at this time.  You will need to be tested for COVID19 within 72 hours of your procedure. We highly encourage patients to get tested for COVID-19 at one of our designated [facility] testing sites. We process the tests in our lab, which allows us to get the results quickly. If you choose to get tested at a non-[facility] location, you will need to contact your primary care provider to make those arrangements and ensure the results are available to your surgeon before you are arriving for your procedure. If we do not receive the results in time, your procedure may be postponed or canceled.  Please make sure your test is collected 3-days prior to your procedure date.  The results will need to get faxed to [phone].  After  testing, you will need to remain in self-quarantine until your procedure.  If you are notified of a positive COVID-19 result; you will need to call your provider for further recommendations.    Please call [phone] and press option 2 to speak to a nurse.  If the test is positive; DO NOT PRESENT FOR YOUR PROCEDURE until you have been given further instructions.  You will not be called with negative results so arrive as instructed unless otherwise notified.  Don't:    Don't invite visitors or friends into your home.    Don't leave your home unless absolutely necessary.    Don't share utensils and other household items with others in the home.  Do:    Wash your hands regularly with soap and water and use hand  with at least 60% alcohol if you don't have easy access to soap and water.     Disinfect surface areas daily, including doorknobs, electronics - especially phones, laptops and other devices.     Wash utensils and other items thoroughly.     Separate yourself from others in the household as best you can, including pets.    Keep your hands away from your face.     Practice all other prevention tips the CDC recommends, including covering your coughs and sneezes with a tissue or your sleeve and immediately throwing the tissue into the trash and washing your hands.    Call your hospital if you develop the following signs before your surgery:    Fever.    Cough.    Shortness of breath.    Sore throat.    Runny or stuffy nose.    Muscle or body aches.    Headaches.    Fatigue.    Vomiting and diarrhea.    These steps will help keep you & your family, other patients, and hospital staff safe from COVID19.

## 2021-11-01 NOTE — PROGRESS NOTES
FOOT AND ANKLE SURGERY/PODIATRY Progress Note      ASSESSMENT:   Ulceration 2nd digit left foot   Hammertoe      TREATMENT:  -The ulceration on the 2nd digit has resolved. No signs of infection. I recommend she finish the course of doxycycline.     -We discussed hammertoe surgery to prevent skin breakdown. She has tried CREST pads but continues to experience skin breakdown.    -Reviewed surgical procedure including post-op course to remain limited walking in a CAM boot x4 weeks. Reviewed potential risks of surgery including but not limited to infection, bleeding complications and need for additional surgery including amputation. She consents to surgery.     -BEBE's indicate healing potential based on TBI's.     -We will proceed with arthroplasty digits 2,3 left foot.     -I will ask my staff to coordinate surgery at [facility]. She will follow-up with Dr. Hull for a pre-op physical.     Alfonso Orozco DPM  [facility] [place]      HPI: Lalitha Underwood was seen again today for an infected 2nd digit left foot. She is taking doxycycline as directed and remained limited walking on the left foot.     Past Medical History:   Diagnosis Date     Convulsive disorder (H)      Convulsive disorder (H)      COPD (chronic obstructive pulmonary disease) (H)      COPD (chronic obstructive pulmonary disease) (H)      Depression      Hernia of unspecified site of abdominal cavity without mention of obstruction or gangrene      Hiatal hernia      Hiatal hernia      Hypertension      Hypertension      On home oxygen therapy     at 1.5 liters at nite     Osteopenia      Osteopenia      Oxygen dependent     1.5 L per NC     Shortness of breath      URI (upper respiratory infection)      Venous insufficiency of both lower extremities      Venous insufficiency of both lower extremities        Past Surgical History:   Procedure Laterality Date     ESOPHAGOSCOPY, GASTROSCOPY, DUODENOSCOPY (EGD), COMBINED N/A  11/26/2018    Procedure: Esophagogastroduodenoscopy;  Surgeon: Jasmeet Wilder MD;  Location: [facility] OR     HERNIA REPAIR, UMBILICAL  04/2018     HERNIA REPAIR, UMBILICAL  04/04/2018    Dr. 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     Repair arm fracture Left      SHOULDER SURGERY Right 1967     SHOULDER SURGERY Right 1967     US BIOPSY THYROID FINE NEEDLE ASPIRATION  7/29/2020       Allergies   Allergen Reactions     Clindamycin Rash     Carbamazepine Rash     Morphine Nausea         Current Outpatient Medications:      acetaminophen (TYLENOL) 500 MG tablet, Take 2 tablets (1,000 mg) by mouth every 8 hours as needed for mild pain, Disp: , Rfl:      albuterol (PROAIR HFA/PROVENTIL HFA/VENTOLIN HFA) 108 (90 Base) MCG/ACT inhaler, Inhale 2 puffs into the lungs every 6 hours as needed, Disp: , Rfl:      biotin 5 MG CAPS, Take 1 capsule by mouth daily, Disp: , Rfl:      calcium citrate (CITRACAL) 950 (200 Ca) MG tablet, Take 1 tablet by mouth, Disp: , Rfl:      denosumab (PROLIA) 60 MG/ML SOLN injection, Inject 60 mg Subcutaneous every 6 months, Disp: , Rfl:      doxycycline monohydrate (MONODOX) 100 MG capsule, Take 1 capsule (100 mg) by mouth 2 times daily, Disp: 20 capsule, Rfl: 0     fluticasone-salmeterol (ADVAIR) 250-50 MCG/DOSE diskus inhaler, Inhale 1 puff into the lungs 2 times daily, Disp: , Rfl:      hydrochlorothiazide (HYDRODIURIL) 25 MG tablet, hydrochlorothiazide 25 mg tablet  TAKE 1 TABLET BY MOUTH DAILY, Disp: , Rfl:      levETIRAcetam (KEPPRA) 500 MG tablet, levetiracetam 500 mg tablet  TAKE 1 TABLET BY MOUTH TWICE DAILY, Disp: , Rfl:      losartan (COZAAR) 50 MG tablet, Take 50 mg by mouth 2 times daily, Disp: , Rfl:      Magnesium Oxide 500 MG TABS, Take 500 mg by mouth daily, Disp: ,  "Rfl:      nystatin (NYSTOP) 855960 UNIT/GM external powder, Nystop 100,000 unit/gram topical powder, Disp: , Rfl:      OTHER MEDICAL SUPPLIES, 1.5 L At Bedtime, Disp: , Rfl:      potassium chloride ER (KLOR-CON M) 20 MEQ CR tablet, Take 20 mEq by mouth, Disp: , Rfl:      RABEprazole (ACIPHEX) 20 MG EC tablet, Take 20 mg by mouth every morning, Disp: , Rfl:      senna-docusate (SENOKOT-S/PERICOLACE) 8.6-50 MG tablet, Take 1 tablet by mouth 2 times daily Reduce dose or temporarily discontinue if having loose stools., Disp: 60 tablet, Rfl: 0     sertraline (ZOLOFT) 100 MG tablet, Take 1 tablet (100 mg) by mouth daily, Disp: 90 tablet, Rfl: 3     solifenacin (VESICARE) 5 MG tablet, Take 5 mg by mouth every morning, Disp: , Rfl:      tiotropium (SPIRIVA HANDIHALER) 18 MCG inhaled capsule, Spiriva with HandiHaler 18 mcg and inhalation capsules, Disp: , Rfl:      vitamin D3 (CHOLECALCIFEROL) 125 MCG (5000 UT) tablet, Take 5,000 Units by mouth, Disp: , Rfl:     Current Facility-Administered Medications:      denosumab (PROLIA) injection 60 mg, 60 mg, Subcutaneous, Q6 Months, Stephanie Norris, PharmD    Facility-Administered Medications Ordered in Other Visits:      barium sulfate (EZ PAQUE) oral suspension 96%, , Oral, Once, Ragini Arellano MD     barium sulfate (EZ-HD) oral suspension 98%, , Oral, Once, Ragini Arellano MD     sod bicarbonate-citric acid-simethicone (EZ GAS) 2.21-1.53-0.04 g packet 4 g, 4 g, Oral, Once, Ragini Arellano MD    Review of Systems - 10 point Review of Systems is negative except for left foot ulcer which is noted in HPI.      OBJECTIVE:  Pulse 80   Ht 1.626 m (5' 4\")   Wt 99.8 kg (220 lb)   SpO2 95%   BMI 37.76 kg/m    General appearance: Patient is alert and fully cooperative with history & exam.  No sign of distress is noted during the visit.    Vascular: Dorsalis pedis palpableLeft.  Dermatologic:    Gastrostomy/Enterostomy Percutaneous endoscopic gastrostomy (PEG) LUQ 1 " 20 fr (Active)       Incision/Surgical Site 11/26/18 Bilateral Abdomen (Active)   No open lesions distal 2nd digit left foot, no open lesions. Skin irritation distal 2nd, 3rd, 4th digits left foot.  Neurologic: Diminished to light touch Left.  Musculoskeletal: Contracted digits noted Left.    Imaging:     No results found.       Picture: None

## 2021-11-01 NOTE — LETTER
11/1/2021         RE: Lalitha Underwood  [address] [facility] [address]        Dear Colleague,    Thank you for referring your patient, Lalitha Underwood, to the [facility]. Please see a copy of my visit note below.    FOOT AND ANKLE SURGERY/PODIATRY Progress Note      ASSESSMENT:   Ulceration 2nd digit left foot   Hammertoe      TREATMENT:  -The ulceration on the 2nd digit has resolved. No signs of infection. I recommend she finish the course of doxycycline.     -We discussed hammertoe surgery to prevent skin breakdown. She has tried CREST pads but continues to experience skin breakdown.    -Reviewed surgical procedure including post-op course to remain limited walking in a CAM boot x4 weeks. Reviewed potential risks of surgery including but not limited to infection, bleeding complications and need for additional surgery including amputation. She consents to surgery.     -BEBE's indicate healing potential based on TBI's.     -We will proceed with arthroplasty digits 2,3 left foot.     -I will ask my staff to coordinate surgery at [facility]. She will follow-up with Dr. Hull for a pre-op physical.     Alfonso Orozco DPM  [facility]      HPI: Lalitha Underwood was seen again today for an infected 2nd digit left foot. She is taking doxycycline as directed and remained limited walking on the left foot.     Past Medical History:   Diagnosis Date     Convulsive disorder (H)      Convulsive disorder (H)      COPD (chronic obstructive pulmonary disease) (H)      COPD (chronic obstructive pulmonary disease) (H)      Depression      Hernia of unspecified site of abdominal cavity without mention of obstruction or gangrene      Hiatal hernia      Hiatal hernia      Hypertension      Hypertension      On home oxygen therapy     at 1.5 liters at nite     Osteopenia      Osteopenia      Oxygen dependent     1.5 L per NC     Shortness of breath      URI  (upper respiratory infection)      Venous insufficiency of both lower extremities      Venous insufficiency of both lower extremities        Past Surgical History:   Procedure Laterality Date     ESOPHAGOSCOPY, GASTROSCOPY, DUODENOSCOPY (EGD), COMBINED N/A 11/26/2018    Procedure: Esophagogastroduodenoscopy;  Surgeon: Jasmeet Wilder MD;  Location: [facility] OR     HERNIA REPAIR, UMBILICAL  04/2018     HERNIA REPAIR, UMBILICAL  04/04/2018    Dr. 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     Repair arm fracture Left      SHOULDER SURGERY Right 1967     SHOULDER SURGERY Right 1967      BIOPSY THYROID FINE NEEDLE ASPIRATION  7/29/2020       Allergies   Allergen Reactions     Clindamycin Rash     Carbamazepine Rash     Morphine Nausea         Current Outpatient Medications:      acetaminophen (TYLENOL) 500 MG tablet, Take 2 tablets (1,000 mg) by mouth every 8 hours as needed for mild pain, Disp: , Rfl:      albuterol (PROAIR HFA/PROVENTIL HFA/VENTOLIN HFA) 108 (90 Base) MCG/ACT inhaler, Inhale 2 puffs into the lungs every 6 hours as needed, Disp: , Rfl:      biotin 5 MG CAPS, Take 1 capsule by mouth daily, Disp: , Rfl:      calcium citrate (CITRACAL) 950 (200 Ca) MG tablet, Take 1 tablet by mouth, Disp: , Rfl:      denosumab (PROLIA) 60 MG/ML SOLN injection, Inject 60 mg Subcutaneous every 6 months, Disp: , Rfl:      doxycycline monohydrate (MONODOX) 100 MG capsule, Take 1 capsule (100 mg) by mouth 2 times daily, Disp: 20 capsule, Rfl: 0     fluticasone-salmeterol (ADVAIR) 250-50 MCG/DOSE diskus inhaler, Inhale 1 puff into the lungs 2 times daily, Disp: , Rfl:      hydrochlorothiazide (HYDRODIURIL) 25 MG tablet, hydrochlorothiazide 25 mg tablet  TAKE 1 TABLET BY MOUTH DAILY, Disp: , Rfl:      levETIRAcetam  "(KEPPRA) 500 MG tablet, levetiracetam 500 mg tablet  TAKE 1 TABLET BY MOUTH TWICE DAILY, Disp: , Rfl:      losartan (COZAAR) 50 MG tablet, Take 50 mg by mouth 2 times daily, Disp: , Rfl:      Magnesium Oxide 500 MG TABS, Take 500 mg by mouth daily, Disp: , Rfl:      nystatin (NYSTOP) 089344 UNIT/GM external powder, Nystop 100,000 unit/gram topical powder, Disp: , Rfl:      OTHER MEDICAL SUPPLIES, 1.5 L At Bedtime, Disp: , Rfl:      potassium chloride ER (KLOR-CON M) 20 MEQ CR tablet, Take 20 mEq by mouth, Disp: , Rfl:      RABEprazole (ACIPHEX) 20 MG EC tablet, Take 20 mg by mouth every morning, Disp: , Rfl:      senna-docusate (SENOKOT-S/PERICOLACE) 8.6-50 MG tablet, Take 1 tablet by mouth 2 times daily Reduce dose or temporarily discontinue if having loose stools., Disp: 60 tablet, Rfl: 0     sertraline (ZOLOFT) 100 MG tablet, Take 1 tablet (100 mg) by mouth daily, Disp: 90 tablet, Rfl: 3     solifenacin (VESICARE) 5 MG tablet, Take 5 mg by mouth every morning, Disp: , Rfl:      tiotropium (SPIRIVA HANDIHALER) 18 MCG inhaled capsule, Spiriva with HandiHaler 18 mcg and inhalation capsules, Disp: , Rfl:      vitamin D3 (CHOLECALCIFEROL) 125 MCG (5000 UT) tablet, Take 5,000 Units by mouth, Disp: , Rfl:     Current Facility-Administered Medications:      denosumab (PROLIA) injection 60 mg, 60 mg, Subcutaneous, Q6 Months, Stephanie Norris, PharmD    Facility-Administered Medications Ordered in Other Visits:      barium sulfate (EZ PAQUE) oral suspension 96%, , Oral, Once, Ragini Arellano MD     barium sulfate (EZ-HD) oral suspension 98%, , Oral, Once, Ragini Arellano MD     sod bicarbonate-citric acid-simethicone (EZ GAS) 2.21-1.53-0.04 g packet 4 g, 4 g, Oral, Once, Ragini Arellano MD    Review of Systems - 10 point Review of Systems is negative except for left foot ulcer which is noted in HPI.      OBJECTIVE:  Pulse 80   Ht 1.626 m (5' 4\")   Wt 99.8 kg (220 lb)   SpO2 95%   BMI 37.76 kg/m  "   General appearance: Patient is alert and fully cooperative with history & exam.  No sign of distress is noted during the visit.    Vascular: Dorsalis pedis palpableLeft.  Dermatologic:    Gastrostomy/Enterostomy Percutaneous endoscopic gastrostomy (PEG) LUQ 1 20 fr (Active)       Incision/Surgical Site 11/26/18 Bilateral Abdomen (Active)   No open lesions distal 2nd digit left foot, no open lesions. Skin irritation distal 2nd, 3rd, 4th digits left foot.  Neurologic: Diminished to light touch Left.  Musculoskeletal: Contracted digits noted Left.    Imaging:     No results found.       Picture: None      Again, thank you for allowing me to participate in the care of your patient.        Sincerely,        Alfonso Orozco DPM

## 2021-11-02 ENCOUNTER — OFFICE VISIT (OUTPATIENT)
Dept: FAMILY MEDICINE | Facility: CLINIC | Age: 76
End: 2021-11-02
Payer: MEDICARE

## 2021-11-02 VITALS
BODY MASS INDEX: 39.94 KG/M2 | DIASTOLIC BLOOD PRESSURE: 84 MMHG | RESPIRATION RATE: 20 BRPM | HEIGHT: 63 IN | SYSTOLIC BLOOD PRESSURE: 112 MMHG | OXYGEN SATURATION: 96 % | TEMPERATURE: 97.5 F | HEART RATE: 87 BPM | WEIGHT: 225.4 LBS

## 2021-11-02 DIAGNOSIS — F41.9 ANXIETY: ICD-10-CM

## 2021-11-02 DIAGNOSIS — M20.42 HAMMER TOE OF LEFT FOOT: ICD-10-CM

## 2021-11-02 DIAGNOSIS — G40.919 INTRACTABLE EPILEPSY WITHOUT STATUS EPILEPTICUS, UNSPECIFIED EPILEPSY TYPE (H): ICD-10-CM

## 2021-11-02 DIAGNOSIS — K21.9 GASTROESOPHAGEAL REFLUX DISEASE WITHOUT ESOPHAGITIS: ICD-10-CM

## 2021-11-02 DIAGNOSIS — I89.0 ACQUIRED LYMPHEDEMA OF LEG: ICD-10-CM

## 2021-11-02 DIAGNOSIS — I35.1 MILD AORTIC VALVE REGURGITATION: ICD-10-CM

## 2021-11-02 DIAGNOSIS — Z01.818 PREOP GENERAL PHYSICAL EXAM: Primary | ICD-10-CM

## 2021-11-02 DIAGNOSIS — I10 ESSENTIAL HYPERTENSION: ICD-10-CM

## 2021-11-02 DIAGNOSIS — E66.01 MORBID OBESITY (H): ICD-10-CM

## 2021-11-02 DIAGNOSIS — I73.9 PAD (PERIPHERAL ARTERY DISEASE) (H): ICD-10-CM

## 2021-11-02 DIAGNOSIS — G60.9 NEUROPATHY, IDIOPATHIC: ICD-10-CM

## 2021-11-02 DIAGNOSIS — J43.9 PULMONARY EMPHYSEMA, UNSPECIFIED EMPHYSEMA TYPE (H): ICD-10-CM

## 2021-11-02 LAB
ANION GAP SERPL CALCULATED.3IONS-SCNC: 15 MMOL/L (ref 5–18)
BUN SERPL-MCNC: 24 MG/DL (ref 8–28)
CALCIUM SERPL-MCNC: 9.8 MG/DL (ref 8.5–10.5)
CHLORIDE BLD-SCNC: 98 MMOL/L (ref 98–107)
CO2 SERPL-SCNC: 25 MMOL/L (ref 22–31)
CREAT SERPL-MCNC: 0.92 MG/DL (ref 0.6–1.1)
GFR SERPL CREATININE-BSD FRML MDRD: 61 ML/MIN/1.73M2
GLUCOSE BLD-MCNC: 88 MG/DL (ref 70–125)
HGB BLD-MCNC: 13.5 G/DL (ref 11.7–15.7)
MAGNESIUM SERPL-MCNC: 1.4 MG/DL (ref 1.8–2.6)
POTASSIUM BLD-SCNC: 4.1 MMOL/L (ref 3.5–5)
SODIUM SERPL-SCNC: 138 MMOL/L (ref 136–145)

## 2021-11-02 PROCEDURE — 93010 ELECTROCARDIOGRAM REPORT: CPT | Mod: OFF | Performed by: INTERNAL MEDICINE

## 2021-11-02 PROCEDURE — 85018 HEMOGLOBIN: CPT | Performed by: NURSE PRACTITIONER

## 2021-11-02 PROCEDURE — 80048 BASIC METABOLIC PNL TOTAL CA: CPT | Performed by: NURSE PRACTITIONER

## 2021-11-02 PROCEDURE — 36415 COLL VENOUS BLD VENIPUNCTURE: CPT | Performed by: NURSE PRACTITIONER

## 2021-11-02 PROCEDURE — 99214 OFFICE O/P EST MOD 30 MIN: CPT | Performed by: NURSE PRACTITIONER

## 2021-11-02 PROCEDURE — 93005 ELECTROCARDIOGRAM TRACING: CPT | Performed by: NURSE PRACTITIONER

## 2021-11-02 PROCEDURE — 83735 ASSAY OF MAGNESIUM: CPT | Performed by: NURSE PRACTITIONER

## 2021-11-02 ASSESSMENT — MIFFLIN-ST. JEOR: SCORE: 1481.54

## 2021-11-02 NOTE — PROGRESS NOTES
[facility]  [address]  [facility] [address]  Phone: [phone]  Fax: [phone]  Primary Provider: Kelly Hull      {Provider  Link to PREOP SmartSet  Use this to apply standard patient instructions to AVS; includes medication directions, common orders, guidelines for anemia, warfarin, additional testing   :[id number]}  PREOPERATIVE EVALUATION:  Today's date: 11/2/2021    Lalitha Underwood is a 76 year old female who presents for a preoperative evaluation.    Surgical Information:  Surgery/Procedure: hammer toe, 2 pins  Surgery Location: [facility] or [facility]  Surgeon: Dr Orozco  Surgery Date: not scheduled yet, 1-3 weeks  Time of Surgery: unknown  Where patient plans to recover: At home with family  Fax number for surgical facility: Note does not need to be faxed, will be available electronically in Epic.    Type of Anesthesia Anticipated: General    Assessment & Plan     The proposed surgical procedure is considered INTERMEDIATE risk.    Preop general physical exam  Hammer toe of left foot  Preoperative examination completed today.  Exam is without any significant findings.  EKG performed, no significant change noted.  Labs including basic metabolic panel and hemoglobin will be obtained to ensure stability.  We reviewed her medications and allergies.  No contraindications to the procedure were identified today.  She may proceed with scheduled procedure with choice of anesthesia.  - Basic metabolic panel  (Ca, Cl, CO2, Creat, Gluc, K, Na, BUN)  - Hemoglobin  - EKG 12-lead, tracing only      Morbid obesity (H)  Obesity is a risk factor for surgery.  Encourage regular exercise and healthy eating habits.    Mild aortic valve regurgitation  History of mild aortic valve regurgitation.  She remains asymptomatic.  EKG performed today and is without any significant changes.    Essential hypertension  Blood pressure remains well controlled on losartan and  hydrochlorothiazide.  Will check metabolic panel to ensure stable renal function and electrolytes.  -Basic metabolic panel    Pulmonary emphysema, unspecified emphysema type (H)  She has chronic shortness of breath and denies any acute symptoms.  Doing well on Spiriva, Advair and albuterol.    Intractable epilepsy without status epilepticus, unspecified epilepsy type (H)  History of epilepsy noted.  She has been stable on Keppra 500 mg twice daily.  She will follow with neurology with any new concerns.    Acquired lymphedema of leg  She has acquired lymphedema of her legs.  This is stable overall.  She will continue to follow with vascular clinic.    PAD (peripheral artery disease) (H)  History of PAD, remains asymptomatic currently.    Anxiety  Doing well on sertraline 100 mg daily.  She will follow-up virtually in a few weeks to determine if adjustment is indicated.    Neuropathy, idiopathic  This is chronic and stable.    Gastroesophageal reflux disease without esophagitis  Continue rabeprazole for management.           Risks and Recommendations:  The patient has the following additional risks and recommendations for perioperative complications:   - Obesity    Medication Instructions:  Patient is to take all scheduled medications on the day of surgery  She will hold any NSAIDs, aspirin and vitamins/supplements for 1 week prior to procedure.    RECOMMENDATION:  APPROVAL GIVEN to proceed with proposed procedure, without further diagnostic evaluation.      Subjective     HPI related to upcoming procedure: Patient is here today for preoperative examination.  She will be undergoing surgery on her left second and third toe for hammertoe correction.  She has had surgeries before and reports tolerating anesthesia well in the past.  No history of bleeding or clotting disorders.  We reviewed her medical history which includes hypertension, maintained on hydrochlorothiazide 25 mg once daily and losartan 50 mg twice daily.   She is due to have kidney function checked today.  She has history of epilepsy and remains on Keppra 500 mg twice daily.  No recent concerns in this regard.  Mild aortic valve regurgitation.  She remains asymptomatic and denies any new lower extremity swelling, shortness of breath or dyspnea.  No chest pain.  She has acquired lymphedema of her leg and history of peripheral arterial disease.  She has COPD and remains on Advair and Spiriva, albuterol as needed.  This is managing symptoms well.  She uses oxygen at night.  She is on Prolia injections for osteoporosis, calcium and vitamin D.  Uses rebeprazole for reflux.  She reports feeling well today.  She only had an infection on her hammertoes which is part of the reason she wants to have this corrected.  She was prescribed doxycycline and has 2 days left of the antibiotic.  Otherwise reports feeling well.  No symptoms of illness.  She will schedule Covid test for roughly 3 days prior to procedure.    Preop Questions 11/2/2021   1. Have you ever had a heart attack or stroke? No   2. Have you ever had surgery on your heart or blood vessels, such as a stent placement, a coronary artery bypass, or surgery on an artery in your head, neck, heart, or legs? No   3. Do you have chest pain with activity? No   4. Do you have a history of  heart failure? No   5. Do you currently have a cold, bronchitis or symptoms of other infection? No   6. Do you have a cough, shortness of breath, or wheezing? YES - chronic SOB with COPD   7. Do you or anyone in your family have previous history of blood clots? No   8. Do you or does anyone in your family have a serious bleeding problem such as prolonged bleeding following surgeries or cuts? No   9. Have you ever had problems with anemia or been told to take iron pills? No   10. Have you had any abnormal blood loss such as black, tarry or bloody stools, or abnormal vaginal bleeding? No   11. Have you ever had a blood transfusion? No   12. Are  you willing to have a blood transfusion if it is medically needed before, during, or after your surgery? Yes   13. Have you or any of your relatives ever had problems with anesthesia? No   14. Do you have sleep apnea, excessive snoring or daytime drowsiness? No   15. Do you have any artifical heart valves or other implanted medical devices like a pacemaker, defibrillator, or continuous glucose monitor? No   16. Do you have artificial joints? YES - left elbow titanium piece   17. Are you allergic to latex? No       Health Care Directive:  Patient has a Health Care Directive on file      Preoperative Review of :   reviewed - no record of controlled substances prescribed.      Status of Chronic Conditions:  COPD - Patient has a longstanding history of moderate-severe COPD . Patient has been doing well overall noting NO SYMPTOMS and continues on medication regimen consisting of Advair, Spiriva and albuterol without adverse reactions or side effects.    HYPERTENSION - Patient has longstanding history of HTN , currently denies any symptoms referable to elevated blood pressure. Specifically denies chest pain, palpitations, dyspnea, orthopnea, PND or peripheral edema. Blood pressure readings have been in normal range. Current medication regimen is as listed below. Patient denies any side effects of medication.       Review of Systems  CONSTITUTIONAL: NEGATIVE for fever, chills, change in weight  INTEGUMENTARY/SKIN: NEGATIVE for worrisome rashes, moles or lesions  EYES: NEGATIVE for vision changes or irritation  ENT/MOUTH: NEGATIVE for ear, mouth and throat problems  RESP: NEGATIVE for significant cough or SOB  CV: NEGATIVE for chest pain, palpitations or peripheral edema  GI: NEGATIVE for nausea, abdominal pain, heartburn, or change in bowel habits  : NEGATIVE for frequency, dysuria, or hematuria  MUSCULOSKELETAL: NEGATIVE for significant arthralgias or myalgia  NEURO: NEGATIVE for weakness, dizziness or  paresthesias  ENDOCRINE: NEGATIVE for temperature intolerance, skin/hair changes  HEME: NEGATIVE for bleeding problems  PSYCHIATRIC: NEGATIVE for changes in mood or affect    Patient Active Problem List    Diagnosis Date Noted     PAD (peripheral artery disease) (H) 10/18/2021     Priority: Medium     Keratoma 10/18/2021     Priority: Medium     Epileptic seizure (H) 07/16/2021     Priority: Medium     Formatting of this note might be different from the original.  Created by Conversion    Replacement Utility updated for latest IMO load       Esophageal reflux 07/16/2021     Priority: Medium     Formatting of this note might be different from the original.  Created by Conversion       Hyperlipidemia 07/16/2021     Priority: Medium     Formatting of this note might be different from the original.  Created by Conversion       Impaired gait and mobility 07/16/2021     Priority: Medium     Formatting of this note might be different from the original.  Created by Conversion       Pulmonary emphysema (H) 07/16/2021     Priority: Medium     Formatting of this note might be different from the original.  Created by Conversion       Rosacea 07/16/2021     Priority: Medium     Formatting of this note might be different from the original.  Created by Conversion       Solar elastosis 07/16/2021     Priority: Medium     Formatting of this note might be different from the original.  Created by Conversion       Vitamin D deficiency 07/16/2021     Priority: Medium     Formatting of this note might be different from the original.  Created by Conversion    Replacement Utility updated for latest IMO load       Hammer toes of both feet 05/14/2021     Priority: Medium     Uterine prolapse 03/01/2021     Priority: Medium     Acquired lymphedema of leg 02/01/2021     Priority: Medium     Leg length discrepancy 02/01/2021     Priority: Medium     Localized deposits of fat 05/06/2019     Priority: Medium     Neuropathy, idiopathic 02/21/2019      Priority: Medium     Morbid obesity (H) 12/23/2018     Priority: Medium     S/P repair of paraesophageal hernia 11/26/2018     Priority: Medium     Decreased hearing of both ears 05/29/2018     Priority: Medium     Osteoporosis 02/08/2018     Priority: Medium     Mild aortic valve stenosis 11/17/2017     Priority: Medium     Mild aortic valve regurgitation 11/17/2017     Priority: Medium     Urge incontinence of urine 11/17/2017     Priority: Medium     Valgus deformity of both feet 03/15/2017     Priority: Medium     Collapsed arches 02/01/2017     Priority: Medium     Left arm swelling 12/07/2015     Priority: Medium     Leg swelling 12/07/2015     Priority: Medium      Past Medical History:   Diagnosis Date     Convulsive disorder (H)      Convulsive disorder (H)      COPD (chronic obstructive pulmonary disease) (H)      COPD (chronic obstructive pulmonary disease) (H)      Depression      Hernia of unspecified site of abdominal cavity without mention of obstruction or gangrene      Hiatal hernia      Hiatal hernia      Hypertension      Hypertension      On home oxygen therapy     at 1.5 liters at nite     Osteopenia      Osteopenia      Oxygen dependent     1.5 L per NC     Shortness of breath      URI (upper respiratory infection)      Venous insufficiency of both lower extremities      Venous insufficiency of both lower extremities      Past Surgical History:   Procedure Laterality Date     ESOPHAGOSCOPY, GASTROSCOPY, DUODENOSCOPY (EGD), COMBINED N/A 11/26/2018    Procedure: Esophagogastroduodenoscopy;  Surgeon: Jasmeet Wilder MD;  Location: [facility] OR     HERNIA REPAIR, UMBILICAL  04/2018     HERNIA REPAIR, UMBILICAL  04/04/2018    Dr. 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     Repair  arm fracture Left      SHOULDER SURGERY Right 1967     SHOULDER SURGERY Right 1967     US BIOPSY THYROID FINE NEEDLE ASPIRATION  7/29/2020     Current Outpatient Medications   Medication Sig Dispense Refill     acetaminophen (TYLENOL) 500 MG tablet Take 2 tablets (1,000 mg) by mouth every 8 hours as needed for mild pain       albuterol (PROAIR HFA/PROVENTIL HFA/VENTOLIN HFA) 108 (90 Base) MCG/ACT inhaler Inhale 2 puffs into the lungs every 6 hours as needed       biotin 5 MG CAPS Take 1 capsule by mouth daily       calcium citrate (CITRACAL) 950 (200 Ca) MG tablet Take 1 tablet by mouth       denosumab (PROLIA) 60 MG/ML SOLN injection Inject 60 mg Subcutaneous every 6 months       doxycycline monohydrate (MONODOX) 100 MG capsule Take 1 capsule (100 mg) by mouth 2 times daily 20 capsule 0     fluticasone-salmeterol (ADVAIR) 250-50 MCG/DOSE diskus inhaler Inhale 1 puff into the lungs 2 times daily       hydrochlorothiazide (HYDRODIURIL) 25 MG tablet hydrochlorothiazide 25 mg tablet   TAKE 1 TABLET BY MOUTH DAILY       levETIRAcetam (KEPPRA) 500 MG tablet levetiracetam 500 mg tablet   TAKE 1 TABLET BY MOUTH TWICE DAILY       losartan (COZAAR) 50 MG tablet Take 50 mg by mouth 2 times daily       Magnesium Oxide 500 MG TABS Take 500 mg by mouth daily       nystatin (NYSTOP) 608168 UNIT/GM external powder Nystop 100,000 unit/gram topical powder       OTHER MEDICAL SUPPLIES 1.5 L At Bedtime       potassium chloride ER (KLOR-CON M) 20 MEQ CR tablet Take 20 mEq by mouth       RABEprazole (ACIPHEX) 20 MG EC tablet Take 20 mg by mouth every morning       senna-docusate (SENOKOT-S/PERICOLACE) 8.6-50 MG tablet Take 1 tablet by mouth 2 times daily Reduce dose or temporarily discontinue if having loose stools. 60 tablet 0     sertraline (ZOLOFT) 100 MG tablet Take 1 tablet (100 mg) by mouth daily 90 tablet 3     solifenacin (VESICARE) 5 MG tablet Take 5 mg by mouth every morning       tiotropium (SPIRIVA HANDIHALER) 18 MCG inhaled  "capsule Spiriva with HandiHaler 18 mcg and inhalation capsules       vitamin D3 (CHOLECALCIFEROL) 125 MCG (5000 UT) tablet Take 5,000 Units by mouth         Allergies   Allergen Reactions     Clindamycin Rash     Carbamazepine Rash     Morphine Nausea        Social History     Tobacco Use     Smoking status: Former Smoker     Packs/day: 1.50     Years: 38.00     Pack years: 57.00     Types: Cigarettes, Cigarettes, Cigarettes     Quit date: 1998     Years since quittin.9     Smokeless tobacco: Never Used     Tobacco comment: quit in    Substance Use Topics     Alcohol use: Yes     Alcohol/week: 2.0 standard drinks     Comment: occ     Family History   Problem Relation Age of Onset     Pulmonary Hypertension Daughter         idiopathic      Heart Disease Other         2 sibs MI, 1 sib electrical conduction disorder     Breast Cancer Mother 66.00     Hypertension Mother      Coronary Artery Disease Mother      Varicose Veins Mother      Hypertension Father      Coronary Artery Disease Sister      Heart Disease Sister      Diabetes Son      Pulmonary Hypertension Daughter      Coronary Artery Disease Sister      Heart Disease Sister      History   Drug Use     Types: Oxycodone         Objective     /84   Pulse 87   Temp 97.5  F (36.4  C) (Oral)   Resp 20   Ht 1.6 m (5' 3\")   Wt 102.2 kg (225 lb 6.4 oz)   SpO2 96%   BMI 39.93 kg/m      Physical Exam    GENERAL APPEARANCE: Overweight female, she has alert and no distress     EYES: EOMI, PERRL     HENT: ear canals and TM's normal and nose and mouth without ulcers or lesions     NECK: no adenopathy, no asymmetry, masses, or scars and thyroid normal to palpation     RESP: lungs clear to auscultation - no rales, rhonchi or wheezes     CV: regular rates and rhythm, normal S1 S2, no S3 or S4 and no murmur, click or rub     ABDOMEN:  soft, nontender, no HSM or masses and bowel sounds normal     MS: Lymphedema noted bilaterally, no gross deformities " noted, no evidence of inflammation in joints, FROM in all extremities.     SKIN: no suspicious lesions or rashes     NEURO: Normal strength and tone, sensory exam grossly normal, mentation intact and speech normal     PSYCH: mentation appears normal. and affect normal/bright     LYMPHATICS: No cervical adenopathy    Recent Labs   Lab Test 02/12/21  1117 02/24/20  1110 02/24/20  1110   HGB 14.5 13.8  --     207  --      --  139   POTASSIUM 3.9  --  4.2   CR 0.80  --  0.72       Diagnostics:  Labs pending at this time.  Results will be reviewed when available.   EKG: no significant findings, unchanged from previous tracings    Revised Cardiac Risk Index (RCRI):  The patient has the following serious cardiovascular risks for perioperative complications:   - No serious cardiac risks = 0 points     RCRI Interpretation: 0 points: Class I (very low risk - 0.4% complication rate)           Signed Electronically by: DONVAAN Welch CNP  Copy of this evaluation report is provided to requesting physician.

## 2021-11-03 ENCOUNTER — DOCUMENTATION ONLY (OUTPATIENT)
Dept: PODIATRY | Facility: CLINIC | Age: 76
End: 2021-11-03

## 2021-11-03 DIAGNOSIS — Z20.822 COVID-19 RULED OUT: Primary | ICD-10-CM

## 2021-11-03 DIAGNOSIS — Z11.59 ENCOUNTER FOR SCREENING FOR OTHER VIRAL DISEASES: ICD-10-CM

## 2021-11-03 LAB
ATRIAL RATE - MUSE: 84 BPM
DIASTOLIC BLOOD PRESSURE - MUSE: NORMAL MMHG
INTERPRETATION ECG - MUSE: NORMAL
P AXIS - MUSE: 57 DEGREES
PR INTERVAL - MUSE: 200 MS
QRS DURATION - MUSE: 140 MS
QT - MUSE: 408 MS
QTC - MUSE: 482 MS
R AXIS - MUSE: -24 DEGREES
SYSTOLIC BLOOD PRESSURE - MUSE: NORMAL MMHG
T AXIS - MUSE: 90 DEGREES
VENTRICULAR RATE- MUSE: 84 BPM

## 2021-11-03 NOTE — PROGRESS NOTES
Surgery instructions given by phone 11/3    Surgery Scheduled    CPT: 19280    Surgery/Procedure: ARTHROPLASTY, digits two and three left foot    Special Equipment: trim fit pin, sagittal saw, mini c-arm    [place] [facility] [place]    Date: 11/22    Time: 2:00 PM    Surgeon: Dr. Orozco    OR Confirmed/ :  Yes with Horacio on 11/3    Orders In:  Yes    Post Op: 11/29    Covid Scheduled: 11/18    Blood Thinners Addressed: N/A

## 2021-11-05 ENCOUNTER — TELEPHONE (OUTPATIENT)
Dept: ENDOCRINOLOGY | Facility: CLINIC | Age: 76
End: 2021-11-05
Payer: MEDICARE

## 2021-11-05 NOTE — TELEPHONE ENCOUNTER
I called the pt and discussed. Please put the pt on my injection schedule 11/29 @ 1130 a.m. The pt has an appt with Dr Orozco that day and will be post hammer toe surgery, so is wanting to limit travel.

## 2021-11-05 NOTE — TELEPHONE ENCOUNTER
[facility]    Phone Message    May a detailed message be left on voicemail: yes     Reason for Call: Other: Patient may have to reschdule her Prolia injection and wants to know is there any reason/ issues could effect her putting this off another month?    Action Taken: Other: Endo    Travel Screening: Not Applicable

## 2021-11-08 NOTE — TELEPHONE ENCOUNTER
Date: 11/29/2021 Status: Scheduled   Time: 11:30 AM Length: 15   Visit Type: NURSE ONLY [145] Copay: $0.00   Provider: [facility]

## 2021-11-14 DIAGNOSIS — E83.42 HYPOMAGNESEMIA: Primary | ICD-10-CM

## 2021-11-14 RX ORDER — BACLOFEN 20 MG
500 TABLET ORAL 2 TIMES DAILY
Start: 2021-11-14 | End: 2024-05-13

## 2021-11-16 ENCOUNTER — OFFICE VISIT (OUTPATIENT)
Dept: ENDOCRINOLOGY | Facility: CLINIC | Age: 76
End: 2021-11-16
Payer: MEDICARE

## 2021-11-16 VITALS
DIASTOLIC BLOOD PRESSURE: 60 MMHG | WEIGHT: 220 LBS | BODY MASS INDEX: 38.97 KG/M2 | SYSTOLIC BLOOD PRESSURE: 128 MMHG | HEART RATE: 76 BPM

## 2021-11-16 DIAGNOSIS — M81.0 AGE-RELATED OSTEOPOROSIS WITHOUT CURRENT PATHOLOGICAL FRACTURE: Primary | ICD-10-CM

## 2021-11-16 PROCEDURE — 99213 OFFICE O/P EST LOW 20 MIN: CPT | Performed by: NURSE PRACTITIONER

## 2021-11-16 NOTE — PROGRESS NOTES
[facility]  ENDOCRINOLOGY    Osteoporosis Follow Up 11/16/2021    Lalitha Underwood, 1945, [id number]          Reason for visit      1. Age-related osteoporosis without current pathological fracture        History     Lalitha Underwood is a very pleasant 76 year old old female who presents for follow up.   SUMMARY:  1. Osteopenia-she was started on alendronate 2 years ago. She has been tolerating alendronate well however a recent bone density report as noted below showed deterioration in the bone density in the right hip. She states she's been compliant with her alendronate every week.  She does not take any calcium supplements and only consumes about 1 serving of dairy each day. She takes 5000 IU daily of the 3 and her recent level was 41.8.  TSH in December 2014 was 1.87.  She has been on Dilantin for the last 24 years after having a grand mal seizure. Prior to that she was diagnosed with seizure disorder at the age of 18 and use medications initially for about 2 years and then discontinued for about 27 years.  She is also a former smoker and smoked for about 35 years and quit in 1998.  She does not undertake any weight-bearing exercise currently. She is thinking of joining the [employer] in January  She has had several falls but has never resulted in a fragility fracture.  She has lost 1.75 inches in height.  Menopause at age 50 and she used hormone replacement therapy for about 2-3 years and stopped after the women's health initiative data came out.  Her mother had an osteoporotic hip fracture at age 90. She had to have open reduction internal fixation and lived for another 4 years after that but was dependent on a walker.  Her sister also has osteopenia.  She has a long-standing history of hiatal hernia and has been on proton pump inhibitors followed time and is currently in AcipHex.       TODAY:    Serene is here today in f/u for Osteoporosis. She is currently on Prolia injections and reports no problems  "with them. Her last Dexa Scan showed stability. She reports today that she is going to have surgery on her feet next week. \"They are putting in pins and things\". Dr Orozco is doing the procedure. She continues to use her Walker for extra support. Last Vit D level was 52. Calcium level was 9.8.    Risk Factors   The following high- risk conditions have been ruled out: celiac disease, eating disorders, gastric bypass, hyperparathyroidism, inflammatory bowel disease, hyperthyroidism, rheumatoid arthritis, lupus, chronic kidney disease.      Lalitha Underwood has the following risk factors: Age, female gender, ex smoker, and breast cancer in her mother.      She is not on high risk medications such as glucocorticoids, anti-coagulants, chemotherapy or levothyroxine.  She is on an anticonvulsant.  Patient deniesHysterectomy, Oophrectomy, Breast cancer and Family history of breast cancer.   Menopause was at age: 50 years      Past Medical History     Patient Active Problem List   Diagnosis     S/P repair of paraesophageal hernia     Acquired lymphedema of leg     Collapsed arches     Decreased hearing of both ears     Epileptic seizure (H)     Esophageal reflux     Hammer toes of both feet     Hyperlipidemia     Impaired gait and mobility     Left arm swelling     Leg length discrepancy     Leg swelling     Localized deposits of fat     Mild aortic valve stenosis     Mild aortic valve regurgitation     Morbid obesity (H)     Neuropathy, idiopathic     Osteoporosis     Pulmonary emphysema (H)     Rosacea     Solar elastosis     Urge incontinence of urine     Uterine prolapse     Valgus deformity of both feet     Vitamin D deficiency     PAD (peripheral artery disease) (H)     Keratoma       Family History       family history includes Breast Cancer (age of onset: 66.00) in her mother; Coronary Artery Disease in her mother, sister, and sister; Diabetes in her son; Heart Disease in her sister, sister and another family member; " [de-identified] : Left eye upper eyelid gold weight in good position with good eye closure, incision c/d/i healing well no erythema no sign of infection \par Visual accurately intact Hypertension in her father and mother; Pulmonary Hypertension in her daughter and daughter; Varicose Veins in her mother.    Social History      reports that she quit smoking about 22 years ago. Her smoking use included cigarettes, cigarettes, and cigarettes. She has a 57.00 pack-year smoking history. She has never used smokeless tobacco. She reports current alcohol use of about 2.0 standard drinks of alcohol per week. She reports current drug use. Drug: Oxycodone.      Review of Systems     Patient denies current pain, limited mobility, fractures.   Remainder per HPI.      Vital Signs     /60 (BP Location: Right arm, Patient Position: Sitting, Cuff Size: Adult Regular)   Pulse 76   Wt 99.8 kg (220 lb)   BMI 38.97 kg/m      Physical Exam     GENERAL:  Normal, NIRD  EYES:  Pupils equal, round and reactive to light; no proptosis, lid lag or  periorbital edema.  THYROID:  Thyroid is normal.  No tenderness or bruit  NECK: No lymph nodes  MUSCULOSKELETAL: No joint abnormalities, FROM in all four extremities. No kyphosis. Muscle strength grossly normal without evidence of wasting.  HEART:  Regular rate and rhythm without murmur.  LUNGS:  Clear to auscultation.  ABDOMEN:Soft, non-tender, no masses or organomegaly  NEURO:  Patella Reflexes were normal.No tremors  SKIN:  No acanthosis nigricans or vitiligo        Assessment     1. Age-related osteoporosis without current pathological fracture        Plan     Serene is stable on the Prolia injections. She will continue. F/u with me in 1 year. Dexa Scan will be in November of 2022.         Lalitha Haq NP   Endocrinology  11/16/2021  3:07 PM          Current Medications     Outpatient Medications Prior to Visit   Medication Sig Dispense Refill     acetaminophen (TYLENOL) 500 MG tablet Take 2 tablets (1,000 mg) by mouth every 8 hours as needed for mild pain       albuterol (PROAIR HFA/PROVENTIL HFA/VENTOLIN HFA) 108 (90 Base) MCG/ACT inhaler Inhale 2 puffs into the  [NI] : Normal [de-identified] : incision c/d/i healing well no erythema no sign of infection facial sling in good position\par Moderate amount of swelling, no facial nerve function lungs every 6 hours as needed       biotin 5 MG CAPS Take 1 capsule by mouth daily       calcium citrate (CITRACAL) 950 (200 Ca) MG tablet Take 1 tablet by mouth       denosumab (PROLIA) 60 MG/ML SOLN injection Inject 60 mg Subcutaneous every 6 months       doxycycline monohydrate (MONODOX) 100 MG capsule Take 1 capsule (100 mg) by mouth 2 times daily 20 capsule 0     fluticasone-salmeterol (ADVAIR) 250-50 MCG/DOSE diskus inhaler Inhale 1 puff into the lungs 2 times daily       hydrochlorothiazide (HYDRODIURIL) 25 MG tablet hydrochlorothiazide 25 mg tablet   TAKE 1 TABLET BY MOUTH DAILY       levETIRAcetam (KEPPRA) 500 MG tablet levetiracetam 500 mg tablet   TAKE 1 TABLET BY MOUTH TWICE DAILY       losartan (COZAAR) 50 MG tablet Take 50 mg by mouth 2 times daily       Magnesium Oxide 500 MG TABS Take 500 mg by mouth 2 times daily       nystatin (NYSTOP) 567741 UNIT/GM external powder Nystop 100,000 unit/gram topical powder       OTHER MEDICAL SUPPLIES 1.5 L At Bedtime       potassium chloride ER (KLOR-CON M) 20 MEQ CR tablet Take 20 mEq by mouth       RABEprazole (ACIPHEX) 20 MG EC tablet Take 20 mg by mouth every morning       senna-docusate (SENOKOT-S/PERICOLACE) 8.6-50 MG tablet Take 1 tablet by mouth 2 times daily Reduce dose or temporarily discontinue if having loose stools. 60 tablet 0     sertraline (ZOLOFT) 100 MG tablet Take 1 tablet (100 mg) by mouth daily 90 tablet 3     solifenacin (VESICARE) 5 MG tablet Take 5 mg by mouth every morning       tiotropium (SPIRIVA HANDIHALER) 18 MCG inhaled capsule Spiriva with HandiHaler 18 mcg and inhalation capsules       vitamin D3 (CHOLECALCIFEROL) 125 MCG (5000 UT) tablet Take 5,000 Units by mouth       Facility-Administered Medications Prior to Visit   Medication Dose Route Frequency Provider Last Rate Last Admin     denosumab (PROLIA) injection 60 mg  60 mg Subcutaneous Q6 Months Stephanie Norris, PharmD         sod bicarbonate-citric acid-simethicone (EZ GAS)  "2.21-1.53-0.04 g packet 4 g  4 g Oral Once Ragini Arellano MD         barium sulfate (EZ PAQUE) oral suspension 96%   Oral Once Ragini Arellano MD         barium sulfate (EZ-HD) oral suspension 98%   Oral Once Ragini Arellano MD             Lab Results     TSH   Date Value Ref Range Status   02/21/2019 2.67 0.30 - 5.00 uIU/mL Final           Imaging Results   Last DEXA scan:  No valid procedures specified.    Study Result    Narrative & Impression   1/20/2021        RE: Lalitha Underwood  YOB: 1945           Dear Lalitha Haq,     Patient Profile:  75 y.o. female, postmenopausal, is here for the follow up bone density test.   History of fractures - Yes;  Wrist. Family history of osteoporosis - Yes;  sibling.  Family history of hip fracture: Yes;  mother. Smoking history - Past. Osteoporosis treatment past -  Yes;  HRT and Bisphosphonates. Osteoporosis treatment current -   Yes;  Prolia.  Chronic medical problems - Chronic low back problems. High risk medications -  Anti-seizure;  Yes, Currently.        Assessment:     1. The spine bone density L1-L4 was not done because of the significant artifacts.  2. Femoral bone densities show left femoral neck T- score -1.0 and right femoral neck T-score -0.7, with no statistically significant change compared to the previous DXA scan done in 2018.  3. Left forearm bone density with T-score 1.1.        75 y.o. female with NORMAL BONE DENSITY.           Recommendations:  Appropriate calcium, vitamin D supplements, along with balance and weight bearing exercise recommended with follow up bone density scan in 2 years. Patient is a candidate for \"drug holiday\" after switching from denosumab to bisphosphonate treatment for   1-2 years.        Bone densitometry was performed on your patient using our [employer] 4U densitometer. The results are summarized and a copy of the actual scans are included for your review. In conformity with the International " Society of Clinical Densitometry's most   recent position statement for DXA interpretation (2015), the diagnosis will be made on the lowest measured T-score of the lumbar spine, femoral neck, total proximal femur or 33% radius. Note the change in terminology for diagnostic classification from   OSTEOPENIA to LOW BONE MASS. All trending for sequential exams will be done using multiple vertebrae or the total proximal femur. Fracture risk is based on the WHO Fracture Risk Assessment Tool (FRAX). If additional information is needed or if you would   like to discuss the results, please do not hesitate to call me.         Thank you for referring this patient to [facility] Osteoporosis Services. We are happy to be of service in support of you and your practice. If you have any questions or suggestions to improve our service, please call me at [phone].      Sincerely,      Ashli Mendez M.D. C.C.MYA.  Osteoporosis Services, [facility]

## 2021-11-16 NOTE — H&P (VIEW-ONLY)
[facility]  ENDOCRINOLOGY    Osteoporosis Follow Up 11/16/2021    Lalitha Underwood, 1945, [id number]          Reason for visit      1. Age-related osteoporosis without current pathological fracture        History     Lalitha Underwood is a very pleasant 76 year old old female who presents for follow up.   SUMMARY:  1. Osteopenia-she was started on alendronate 2 years ago. She has been tolerating alendronate well however a recent bone density report as noted below showed deterioration in the bone density in the right hip. She states she's been compliant with her alendronate every week.  She does not take any calcium supplements and only consumes about 1 serving of dairy each day. She takes 5000 IU daily of the 3 and her recent level was 41.8.  TSH in December 2014 was 1.87.  She has been on Dilantin for the last 24 years after having a grand mal seizure. Prior to that she was diagnosed with seizure disorder at the age of 18 and use medications initially for about 2 years and then discontinued for about 27 years.  She is also a former smoker and smoked for about 35 years and quit in 1998.  She does not undertake any weight-bearing exercise currently. She is thinking of joining the [employer] in January  She has had several falls but has never resulted in a fragility fracture.  She has lost 1.75 inches in height.  Menopause at age 50 and she used hormone replacement therapy for about 2-3 years and stopped after the women's health initiative data came out.  Her mother had an osteoporotic hip fracture at age 90. She had to have open reduction internal fixation and lived for another 4 years after that but was dependent on a walker.  Her sister also has osteopenia.  She has a long-standing history of hiatal hernia and has been on proton pump inhibitors followed time and is currently in AcipHex.       TODAY:    Serene is here today in f/u for Osteoporosis. She is currently on Prolia injections and reports no problems  "with them. Her last Dexa Scan showed stability. She reports today that she is going to have surgery on her feet next week. \"They are putting in pins and things\". Dr Orozco is doing the procedure. She continues to use her Walker for extra support. Last Vit D level was 52. Calcium level was 9.8.    Risk Factors   The following high- risk conditions have been ruled out: celiac disease, eating disorders, gastric bypass, hyperparathyroidism, inflammatory bowel disease, hyperthyroidism, rheumatoid arthritis, lupus, chronic kidney disease.      Lalitha Underwood has the following risk factors: Age, female gender, ex smoker, and breast cancer in her mother.      She is not on high risk medications such as glucocorticoids, anti-coagulants, chemotherapy or levothyroxine.  She is on an anticonvulsant.  Patient deniesHysterectomy, Oophrectomy, Breast cancer and Family history of breast cancer.   Menopause was at age: 50 years      Past Medical History     Patient Active Problem List   Diagnosis     S/P repair of paraesophageal hernia     Acquired lymphedema of leg     Collapsed arches     Decreased hearing of both ears     Epileptic seizure (H)     Esophageal reflux     Hammer toes of both feet     Hyperlipidemia     Impaired gait and mobility     Left arm swelling     Leg length discrepancy     Leg swelling     Localized deposits of fat     Mild aortic valve stenosis     Mild aortic valve regurgitation     Morbid obesity (H)     Neuropathy, idiopathic     Osteoporosis     Pulmonary emphysema (H)     Rosacea     Solar elastosis     Urge incontinence of urine     Uterine prolapse     Valgus deformity of both feet     Vitamin D deficiency     PAD (peripheral artery disease) (H)     Keratoma       Family History       family history includes Breast Cancer (age of onset: 66.00) in her mother; Coronary Artery Disease in her mother, sister, and sister; Diabetes in her son; Heart Disease in her sister, sister and another family member; " Hypertension in her father and mother; Pulmonary Hypertension in her daughter and daughter; Varicose Veins in her mother.    Social History      reports that she quit smoking about 22 years ago. Her smoking use included cigarettes, cigarettes, and cigarettes. She has a 57.00 pack-year smoking history. She has never used smokeless tobacco. She reports current alcohol use of about 2.0 standard drinks of alcohol per week. She reports current drug use. Drug: Oxycodone.      Review of Systems     Patient denies current pain, limited mobility, fractures.   Remainder per HPI.      Vital Signs     /60 (BP Location: Right arm, Patient Position: Sitting, Cuff Size: Adult Regular)   Pulse 76   Wt 99.8 kg (220 lb)   BMI 38.97 kg/m      Physical Exam     GENERAL:  Normal, NIRD  EYES:  Pupils equal, round and reactive to light; no proptosis, lid lag or  periorbital edema.  THYROID:  Thyroid is normal.  No tenderness or bruit  NECK: No lymph nodes  MUSCULOSKELETAL: No joint abnormalities, FROM in all four extremities. No kyphosis. Muscle strength grossly normal without evidence of wasting.  HEART:  Regular rate and rhythm without murmur.  LUNGS:  Clear to auscultation.  ABDOMEN:Soft, non-tender, no masses or organomegaly  NEURO:  Patella Reflexes were normal.No tremors  SKIN:  No acanthosis nigricans or vitiligo        Assessment     1. Age-related osteoporosis without current pathological fracture        Plan     Serene is stable on the Prolia injections. She will continue. F/u with me in 1 year. Dexa Scan will be in November of 2022.         Lalitha Haq NP   Endocrinology  11/16/2021  3:07 PM          Current Medications     Outpatient Medications Prior to Visit   Medication Sig Dispense Refill     acetaminophen (TYLENOL) 500 MG tablet Take 2 tablets (1,000 mg) by mouth every 8 hours as needed for mild pain       albuterol (PROAIR HFA/PROVENTIL HFA/VENTOLIN HFA) 108 (90 Base) MCG/ACT inhaler Inhale 2 puffs into the  lungs every 6 hours as needed       biotin 5 MG CAPS Take 1 capsule by mouth daily       calcium citrate (CITRACAL) 950 (200 Ca) MG tablet Take 1 tablet by mouth       denosumab (PROLIA) 60 MG/ML SOLN injection Inject 60 mg Subcutaneous every 6 months       doxycycline monohydrate (MONODOX) 100 MG capsule Take 1 capsule (100 mg) by mouth 2 times daily 20 capsule 0     fluticasone-salmeterol (ADVAIR) 250-50 MCG/DOSE diskus inhaler Inhale 1 puff into the lungs 2 times daily       hydrochlorothiazide (HYDRODIURIL) 25 MG tablet hydrochlorothiazide 25 mg tablet   TAKE 1 TABLET BY MOUTH DAILY       levETIRAcetam (KEPPRA) 500 MG tablet levetiracetam 500 mg tablet   TAKE 1 TABLET BY MOUTH TWICE DAILY       losartan (COZAAR) 50 MG tablet Take 50 mg by mouth 2 times daily       Magnesium Oxide 500 MG TABS Take 500 mg by mouth 2 times daily       nystatin (NYSTOP) 894061 UNIT/GM external powder Nystop 100,000 unit/gram topical powder       OTHER MEDICAL SUPPLIES 1.5 L At Bedtime       potassium chloride ER (KLOR-CON M) 20 MEQ CR tablet Take 20 mEq by mouth       RABEprazole (ACIPHEX) 20 MG EC tablet Take 20 mg by mouth every morning       senna-docusate (SENOKOT-S/PERICOLACE) 8.6-50 MG tablet Take 1 tablet by mouth 2 times daily Reduce dose or temporarily discontinue if having loose stools. 60 tablet 0     sertraline (ZOLOFT) 100 MG tablet Take 1 tablet (100 mg) by mouth daily 90 tablet 3     solifenacin (VESICARE) 5 MG tablet Take 5 mg by mouth every morning       tiotropium (SPIRIVA HANDIHALER) 18 MCG inhaled capsule Spiriva with HandiHaler 18 mcg and inhalation capsules       vitamin D3 (CHOLECALCIFEROL) 125 MCG (5000 UT) tablet Take 5,000 Units by mouth       Facility-Administered Medications Prior to Visit   Medication Dose Route Frequency Provider Last Rate Last Admin     denosumab (PROLIA) injection 60 mg  60 mg Subcutaneous Q6 Months Stephanie Norris, PharmD         sod bicarbonate-citric acid-simethicone (EZ GAS)  "2.21-1.53-0.04 g packet 4 g  4 g Oral Once Ragini Arellano MD         barium sulfate (EZ PAQUE) oral suspension 96%   Oral Once Ragini Arellano MD         barium sulfate (EZ-HD) oral suspension 98%   Oral Once Ragini Arellano MD             Lab Results     TSH   Date Value Ref Range Status   02/21/2019 2.67 0.30 - 5.00 uIU/mL Final           Imaging Results   Last DEXA scan:  No valid procedures specified.    Study Result    Narrative & Impression   1/20/2021        RE: Lalitha Underwood  YOB: 1945           Dear Lalitha Haq,     Patient Profile:  75 y.o. female, postmenopausal, is here for the follow up bone density test.   History of fractures - Yes;  Wrist. Family history of osteoporosis - Yes;  sibling.  Family history of hip fracture: Yes;  mother. Smoking history - Past. Osteoporosis treatment past -  Yes;  HRT and Bisphosphonates. Osteoporosis treatment current -   Yes;  Prolia.  Chronic medical problems - Chronic low back problems. High risk medications -  Anti-seizure;  Yes, Currently.        Assessment:     1. The spine bone density L1-L4 was not done because of the significant artifacts.  2. Femoral bone densities show left femoral neck T- score -1.0 and right femoral neck T-score -0.7, with no statistically significant change compared to the previous DXA scan done in 2018.  3. Left forearm bone density with T-score 1.1.        75 y.o. female with NORMAL BONE DENSITY.           Recommendations:  Appropriate calcium, vitamin D supplements, along with balance and weight bearing exercise recommended with follow up bone density scan in 2 years. Patient is a candidate for \"drug holiday\" after switching from denosumab to bisphosphonate treatment for   1-2 years.        Bone densitometry was performed on your patient using our [employer] densitometer. The results are summarized and a copy of the actual scans are included for your review. In conformity with the International " Society of Clinical Densitometry's most   recent position statement for DXA interpretation (2015), the diagnosis will be made on the lowest measured T-score of the lumbar spine, femoral neck, total proximal femur or 33% radius. Note the change in terminology for diagnostic classification from   OSTEOPENIA to LOW BONE MASS. All trending for sequential exams will be done using multiple vertebrae or the total proximal femur. Fracture risk is based on the WHO Fracture Risk Assessment Tool (FRAX). If additional information is needed or if you would   like to discuss the results, please do not hesitate to call me.         Thank you for referring this patient to [facility] Osteoporosis Services. We are happy to be of service in support of you and your practice. If you have any questions or suggestions to improve our service, please call me at [phone].      Sincerely,      Ashli Mendez M.D. C.C.MYA.  Osteoporosis Services, [facility]

## 2021-11-18 ENCOUNTER — LAB (OUTPATIENT)
Dept: LAB | Facility: CLINIC | Age: 76
End: 2021-11-18
Attending: PODIATRIST
Payer: MEDICARE

## 2021-11-18 DIAGNOSIS — Z20.822 COVID-19 RULED OUT: ICD-10-CM

## 2021-11-18 PROCEDURE — U0003 INFECTIOUS AGENT DETECTION BY NUCLEIC ACID (DNA OR RNA); SEVERE ACUTE RESPIRATORY SYNDROME CORONAVIRUS 2 (SARS-COV-2) (CORONAVIRUS DISEASE [COVID-19]), AMPLIFIED PROBE TECHNIQUE, MAKING USE OF HIGH THROUGHPUT TECHNOLOGIES AS DESCRIBED BY CMS-2020-01-R: HCPCS

## 2021-11-18 PROCEDURE — U0005 INFEC AGEN DETEC AMPLI PROBE: HCPCS

## 2021-11-19 ENCOUNTER — TELEPHONE (OUTPATIENT)
Dept: PODIATRY | Facility: CLINIC | Age: 76
End: 2021-11-19
Payer: MEDICARE

## 2021-11-19 ENCOUNTER — ANESTHESIA EVENT (OUTPATIENT)
Dept: SURGERY | Facility: AMBULATORY SURGERY CENTER | Age: 76
End: 2021-11-19
Payer: MEDICARE

## 2021-11-19 LAB — SARS-COV-2 RNA RESP QL NAA+PROBE: NEGATIVE

## 2021-11-19 NOTE — TELEPHONE ENCOUNTER
Spoke with patient got verbal consent to observe surgery that Dr. Orozco is doing on Monday 11-22-21

## 2021-11-22 ENCOUNTER — HOSPITAL ENCOUNTER (OUTPATIENT)
Facility: AMBULATORY SURGERY CENTER | Age: 76
End: 2021-11-22
Attending: PODIATRIST
Payer: MEDICARE

## 2021-11-22 ENCOUNTER — ANESTHESIA (OUTPATIENT)
Dept: SURGERY | Facility: AMBULATORY SURGERY CENTER | Age: 76
End: 2021-11-22
Payer: MEDICARE

## 2021-11-22 VITALS
HEART RATE: 79 BPM | TEMPERATURE: 97.5 F | RESPIRATION RATE: 16 BRPM | BODY MASS INDEX: 37.56 KG/M2 | DIASTOLIC BLOOD PRESSURE: 70 MMHG | HEIGHT: 64 IN | OXYGEN SATURATION: 96 % | SYSTOLIC BLOOD PRESSURE: 158 MMHG | WEIGHT: 220 LBS

## 2021-11-22 DIAGNOSIS — M20.42 HAMMERTOE OF LEFT FOOT: ICD-10-CM

## 2021-11-22 PROCEDURE — 28285 REPAIR OF HAMMERTOE: CPT | Mod: 59 | Performed by: PODIATRIST

## 2021-11-22 DEVICE — IMPLANTABLE DEVICE: Type: IMPLANTABLE DEVICE | Site: TOE | Status: FUNCTIONAL

## 2021-11-22 RX ORDER — OXYCODONE HYDROCHLORIDE 5 MG/1
5-10 TABLET ORAL EVERY 6 HOURS PRN
Qty: 30 TABLET | Refills: 0 | Status: SHIPPED | OUTPATIENT
Start: 2021-11-22 | End: 2021-12-13

## 2021-11-22 RX ORDER — CEFAZOLIN SODIUM 2 G/100ML
2 INJECTION, SOLUTION INTRAVENOUS SEE ADMIN INSTRUCTIONS
Status: DISCONTINUED | OUTPATIENT
Start: 2021-11-22 | End: 2021-11-23 | Stop reason: HOSPADM

## 2021-11-22 RX ORDER — PROPOFOL 10 MG/ML
INJECTION, EMULSION INTRAVENOUS CONTINUOUS PRN
Status: DISCONTINUED | OUTPATIENT
Start: 2021-11-22 | End: 2021-11-22

## 2021-11-22 RX ORDER — PROPOFOL 10 MG/ML
INJECTION, EMULSION INTRAVENOUS PRN
Status: DISCONTINUED | OUTPATIENT
Start: 2021-11-22 | End: 2021-11-22

## 2021-11-22 RX ORDER — SODIUM CHLORIDE, SODIUM LACTATE, POTASSIUM CHLORIDE, CALCIUM CHLORIDE 600; 310; 30; 20 MG/100ML; MG/100ML; MG/100ML; MG/100ML
INJECTION, SOLUTION INTRAVENOUS CONTINUOUS
Status: DISCONTINUED | OUTPATIENT
Start: 2021-11-22 | End: 2021-11-23 | Stop reason: HOSPADM

## 2021-11-22 RX ORDER — LIDOCAINE HYDROCHLORIDE 20 MG/ML
INJECTION, SOLUTION INFILTRATION; PERINEURAL PRN
Status: DISCONTINUED | OUTPATIENT
Start: 2021-11-22 | End: 2021-11-22

## 2021-11-22 RX ORDER — ONDANSETRON 2 MG/ML
INJECTION INTRAMUSCULAR; INTRAVENOUS PRN
Status: DISCONTINUED | OUTPATIENT
Start: 2021-11-22 | End: 2021-11-22

## 2021-11-22 RX ORDER — ACETAMINOPHEN 325 MG/1
975 TABLET ORAL ONCE
Status: COMPLETED | OUTPATIENT
Start: 2021-11-22 | End: 2021-11-22

## 2021-11-22 RX ORDER — CEFAZOLIN SODIUM 2 G/100ML
2 INJECTION, SOLUTION INTRAVENOUS
Status: COMPLETED | OUTPATIENT
Start: 2021-11-22 | End: 2021-11-22

## 2021-11-22 RX ORDER — ONDANSETRON 4 MG/1
4 TABLET, ORALLY DISINTEGRATING ORAL EVERY 30 MIN PRN
Status: DISCONTINUED | OUTPATIENT
Start: 2021-11-22 | End: 2021-11-23 | Stop reason: HOSPADM

## 2021-11-22 RX ORDER — FENTANYL CITRATE 50 UG/ML
25 INJECTION, SOLUTION INTRAMUSCULAR; INTRAVENOUS
Status: DISCONTINUED | OUTPATIENT
Start: 2021-11-22 | End: 2021-11-23 | Stop reason: HOSPADM

## 2021-11-22 RX ORDER — BUPIVACAINE HYDROCHLORIDE 5 MG/ML
INJECTION, SOLUTION PERINEURAL PRN
Status: DISCONTINUED | OUTPATIENT
Start: 2021-11-22 | End: 2021-11-22 | Stop reason: HOSPADM

## 2021-11-22 RX ORDER — ONDANSETRON 2 MG/ML
4 INJECTION INTRAMUSCULAR; INTRAVENOUS EVERY 30 MIN PRN
Status: DISCONTINUED | OUTPATIENT
Start: 2021-11-22 | End: 2021-11-23 | Stop reason: HOSPADM

## 2021-11-22 RX ORDER — FENTANYL CITRATE 50 UG/ML
25 INJECTION, SOLUTION INTRAMUSCULAR; INTRAVENOUS EVERY 5 MIN PRN
Status: DISCONTINUED | OUTPATIENT
Start: 2021-11-22 | End: 2021-11-23 | Stop reason: HOSPADM

## 2021-11-22 RX ORDER — DEXAMETHASONE SODIUM PHOSPHATE 4 MG/ML
INJECTION, SOLUTION INTRA-ARTICULAR; INTRALESIONAL; INTRAMUSCULAR; INTRAVENOUS; SOFT TISSUE PRN
Status: DISCONTINUED | OUTPATIENT
Start: 2021-11-22 | End: 2021-11-22

## 2021-11-22 RX ORDER — LIDOCAINE 40 MG/G
CREAM TOPICAL
Status: DISCONTINUED | OUTPATIENT
Start: 2021-11-22 | End: 2021-11-23 | Stop reason: HOSPADM

## 2021-11-22 RX ADMIN — ONDANSETRON 4 MG: 2 INJECTION INTRAMUSCULAR; INTRAVENOUS at 14:02

## 2021-11-22 RX ADMIN — PROPOFOL 10 MG: 10 INJECTION, EMULSION INTRAVENOUS at 13:49

## 2021-11-22 RX ADMIN — ACETAMINOPHEN 975 MG: 325 TABLET ORAL at 12:44

## 2021-11-22 RX ADMIN — LIDOCAINE HYDROCHLORIDE 40 MG: 20 INJECTION, SOLUTION INFILTRATION; PERINEURAL at 13:46

## 2021-11-22 RX ADMIN — DEXAMETHASONE SODIUM PHOSPHATE 4 MG: 4 INJECTION, SOLUTION INTRA-ARTICULAR; INTRALESIONAL; INTRAMUSCULAR; INTRAVENOUS; SOFT TISSUE at 13:46

## 2021-11-22 RX ADMIN — SODIUM CHLORIDE, SODIUM LACTATE, POTASSIUM CHLORIDE, CALCIUM CHLORIDE: 600; 310; 30; 20 INJECTION, SOLUTION INTRAVENOUS at 12:44

## 2021-11-22 RX ADMIN — PROPOFOL 20 MG: 10 INJECTION, EMULSION INTRAVENOUS at 13:44

## 2021-11-22 RX ADMIN — PROPOFOL 120 MCG/KG/MIN: 10 INJECTION, EMULSION INTRAVENOUS at 13:44

## 2021-11-22 RX ADMIN — PROPOFOL 20 MG: 10 INJECTION, EMULSION INTRAVENOUS at 13:47

## 2021-11-22 RX ADMIN — CEFAZOLIN SODIUM 2 G: 2 INJECTION, SOLUTION INTRAVENOUS at 13:47

## 2021-11-22 ASSESSMENT — MIFFLIN-ST. JEOR: SCORE: 1472.91

## 2021-11-22 ASSESSMENT — COPD QUESTIONNAIRES: COPD: 1

## 2021-11-22 ASSESSMENT — ENCOUNTER SYMPTOMS: SEIZURES: 1

## 2021-11-22 NOTE — ANESTHESIA PREPROCEDURE EVALUATION
Anesthesia Pre-Procedure Evaluation    Patient: Lalitha Underwood   MRN: [id number] : 1945        Preoperative Diagnosis: Hammertoe of left foot [M20.42]    Procedure : Procedure(s):  ARTHROPLASTY, digits two and three left foot          Past Medical History:   Diagnosis Date     Convulsive disorder (H)      Convulsive disorder (H)      COPD (chronic obstructive pulmonary disease) (H)      COPD (chronic obstructive pulmonary disease) (H)      Depression      Dyspnea on exertion      Gastroesophageal reflux disease      Heart murmur      Hernia of unspecified site of abdominal cavity without mention of obstruction or gangrene      Hiatal hernia      Hiatal hernia      Hypertension      Hypertension      Obese      On home oxygen therapy     at 1.5 liters at nite     Osteopenia      Osteopenia      Oxygen dependent     1.5 L per NC     Shortness of breath      Uncomplicated asthma      URI (upper respiratory infection)      Venous insufficiency of both lower extremities      Venous insufficiency of both lower extremities      Walking troubles       Past Surgical History:   Procedure Laterality Date     ESOPHAGOSCOPY, GASTROSCOPY, DUODENOSCOPY (EGD), COMBINED N/A 2018    Procedure: Esophagogastroduodenoscopy;  Surgeon: Jasmeet Wilder MD;  Location: [facility] OR     HERNIA REPAIR, UMBILICAL  2018     HERNIA REPAIR, UMBILICAL  2018    Dr. Jimenez     LAPAROSCOPIC HERNIORRHAPHY HIATAL N/A 2018    Procedure: Laparoscopic Hiatal Hernia Repair,bilateral chest tubes;  Surgeon: Jasmeet Wilder MD;  Location: [facility] OR     OTHER SURGICAL HISTORY Left     Broke titanium in left arm     Repair arm fracture Left      SHOULDER SURGERY Right      SHOULDER SURGERY Right      US BIOPSY THYROID FINE NEEDLE ASPIRATION  2020      Allergies   Allergen Reactions     Clindamycin Rash     Carbamazepine Rash     Morphine Nausea      Social History     Tobacco Use     Smoking status: Former  Smoker     Packs/day: 1.50     Years: 38.00     Pack years: 57.00     Types: Cigarettes     Quit date: 1998     Years since quittin.9     Smokeless tobacco: Never Used     Tobacco comment: quit in    Substance Use Topics     Alcohol use: Yes     Alcohol/week: 2.0 standard drinks     Comment: occ      Wt Readings from Last 1 Encounters:   21 99.8 kg (220 lb)        Anesthesia Evaluation   Pt has had prior anesthetic.     No history of anesthetic complications       ROS/MED HX  ENT/Pulmonary: Comment: SOB/DENT    (+) COPD, O2 dependent, during Nighttime, 1.5L liters/min,     Neurologic:     (+) seizures, last seizure: over 20 years ago,     Cardiovascular:     (+) Dyslipidemia hypertension-Peripheral Vascular Disease----DENT. valvular problems/murmurs type: AS and AI Mild.     METS/Exercise Tolerance:     Hematologic:  - neg hematologic  ROS     Musculoskeletal:  - neg musculoskeletal ROS     GI/Hepatic:     (+) GERD,     Renal/Genitourinary:  - neg Renal ROS     Endo:     (+) Obesity,     Psychiatric/Substance Use:     (+) psychiatric history anxiety and depression     Infectious Disease:  - neg infectious disease ROS     Malignancy:  - neg malignancy ROS     Other:  - neg other ROS          Physical Exam    Airway  airway exam normal      Mallampati: II   TM distance: > 3 FB   Neck ROM: full   Mouth opening: > 3 cm    Respiratory Devices and Support         Dental       (+) upper dentures and lower dentures      Cardiovascular   cardiovascular exam normal       Rhythm and rate: regular and normal     Pulmonary   pulmonary exam normal        breath sounds clear to auscultation           OUTSIDE LABS:  CBC:   Lab Results   Component Value Date    WBC 8.9 2021    WBC 8.0 2020    HGB 13.5 2021    HGB 14.5 2021    HCT 44.2 2021    HCT 41.4 2020     2021     2020     BMP:   Lab Results   Component Value Date     2021      02/12/2021    POTASSIUM 4.1 11/02/2021    POTASSIUM 3.9 02/12/2021    CHLORIDE 98 11/02/2021    CHLORIDE 98 02/12/2021    CO2 25 11/02/2021    CO2 29 02/12/2021    BUN 24 11/02/2021    BUN 20 02/12/2021    CR 0.92 11/02/2021    CR 0.80 02/12/2021    GLC 88 11/02/2021    GLC 89 02/12/2021     COAGS: No results found for: PTT, INR, FIBR  POC:   Lab Results   Component Value Date     (H) 11/26/2018     HEPATIC:   Lab Results   Component Value Date    ALBUMIN 4.2 02/12/2021    PROTTOTAL 7.2 02/12/2021    ALT 11 02/12/2021    AST 14 02/12/2021    ALKPHOS 69 02/12/2021    BILITOTAL 0.5 02/12/2021     OTHER:   Lab Results   Component Value Date    LACT 1.6 11/28/2018    TENZIN 9.8 11/02/2021    MAG 1.4 (L) 11/02/2021    LIPASE 22 03/24/2018    TSH 2.67 02/21/2019       Anesthesia Plan    ASA Status:  3   NPO Status:  NPO Appropriate    Anesthesia Type: MAC.     - Reason for MAC: straight local not clinically adequate      Maintenance: TIVA.        Consents    Anesthesia Plan(s) and associated risks, benefits, and realistic alternatives discussed. Questions answered and patient/representative(s) expressed understanding.    - Discussed:     - Discussed with:  Patient         Postoperative Care    Pain management: IV analgesics, Oral pain medications, Multi-modal analgesia.   PONV prophylaxis: Ondansetron (or other 5HT-3), Dexamethasone or Solumedrol, Background Propofol Infusion     Comments:    Other Comments: VERY light MAC given patients history of oxygen dependent O2. Spoke with patient and understands she will be lightly sedated/awake during the procedure.            SAMREEN CANTU MD

## 2021-11-22 NOTE — OP NOTE
Date: 11/22/21    Surgeon: NUBIA Orozco DPM    Preoperative diagnosis:   1. Hammertoe 2nd digit left foot  2. Hammertoe 3rd digit left foot    Postoperative diagnosis: Same    Procedure:   1. Arthroplasty 2nd digit left foot with c-arm   2. Arthroplasty 3rd digit left foot with c-arm    Anesthesia: Local with IV-sedation    Hemostasis: Pneumatic ankle tourniquet 250 mmHg    Pathology: none    Injectables: none    Materials: Arthrex trim fit pain, 4-0 vicryl, 4-0 nylon    Complications: None    Blood loss: 1 cc      Findings: Patient presents for operative intervention for hammertoe deformity digits 2,3 left foot. Conservative measures were attempted and exhausted. Patient questions invited and answered, including appropriate risk, benefits and complications. No guarantees given or implied. Patient has been NPO.    Description: Patient was brought to the operating room and placed on the table in supine position. IV-sedation was administered by the anesthesia department.  Injection of 0.5% Marcaine plain was administered to digits 2,3 left foot. The foot was then prepped and draped in usual aseptic manner. The extremity was elevated and exsanguinated. Well-padded ankle pneumatic tourniquet was inflated to 250mmHg and the following procedure was then performed: Attention was directed to the dorsal aspect of the 2nd digit where a dorsal incision was made overlying the PIPJ. The incision was carried into the subcutaneous tissue with care taken to avoid any neurovascular structures and cauterize superficial bleeders. Again the incision was carried into the PIPJ where the head of the proximal phalanx was freed from all soft tissue including the collateral ligaments and extensor digitorum longus tendon. A sagittal saw was used the resect the head of the proximal phalanx and removed from the surgical site en toto. Next, a 1.5mm Arthrex absorbable pin was inserted into the middle phalanx, cut, and inserted into the proximal  phalanx while the digit was held in a corrected and rectus position. Mini c-arm was used to visualize the arthroplasty. The extensor digitorum longus tendon and subcutaneous tissue was reapproximated with 4-0 vicryl in a simple suture fashion and the skin was closed with 4-0 nylon in a simple suture fashion.     Attention was directed to the dorsal aspect of the 3rd digit where a dorsal incision was made overlying the PIPJ. The incision was carried into the subcutaneous tissue with care taken to avoid any neurovascular structures and cauterize superficial bleeders. Again the incision was carried into the PIPJ where the head of the proximal phalanx was freed from all soft tissue including the collateral ligaments and extensor digitorum longus tendon. A sagittal saw was used the resect the head of the proximal phalanx and removed from the surgical site en toto. Next, a 1.5mm Arthrex absorbable pin was inserted into the middle phalanx, cut, and inserted into the proximal phalanx while the digit was held in a corrected and rectus position. Mini c-arm was used to visualize the arthroplasty. The extensor digitorum longus tendon and subcutaneous tissue was reapproximated with 4-0 vicryl in a simple suture fashion and the skin was closed with 4-0 nylon in a simple suture fashion.     Dressings consisted of adaptic, 4x4's, maddie roll and an ace wrap. The pneumatic tourniquet was released and a hyperemic response was noted to the digits on the left foot.     The patient appeared to tolerate all the procedures and anesthesia well without apparent complications. Patient was transported from the operating room to the recovery room with vital signs stable and neurovascular status as it was pre-operatively to the left foot. Patient to be discharged home per anesthesia. Written and verbal homecare instructions given to remain limited walking in a CAM boot, keep surgical dressing intact left foot. Patient to follow up in office for  post-operative management in one week.

## 2021-11-22 NOTE — PROGRESS NOTES
Pt reports she uses home oxygen at [facility] at 1.5 liters.    Waiting for approval to proceed from anesthesia.    Valery Olea RN on 11/22/2021 at 12:13 PM

## 2021-11-22 NOTE — DISCHARGE INSTRUCTIONS
You received Tylenol (acetaminophen) 975 mg at 12:44 PM. No further acetaminophen products until 6:44 PM and do not exceed 4000 mg of acetaminophen in 24 hours.  Keep in mind that acetaminophen can be found in many OTC cold medicines and prescription pain medications.

## 2021-11-22 NOTE — ANESTHESIA CARE TRANSFER NOTE
Patient: Lalitha Underwood    Procedure: Procedure(s):  ARTHROPLASTY, digits two and three left foot       Diagnosis: Hammertoe of left foot [M20.42]  Diagnosis Additional Information: No value filed.    Anesthesia Type:   MAC     Note:    Oropharynx: spontaneously breathing  Level of Consciousness: awake  Oxygen Supplementation: room air    Independent Airway: airway patency satisfactory and stable  Dentition: dentition unchanged  Vital Signs Stable: post-procedure vital signs reviewed and stable  Report to RN Given: handoff report given  Patient transferred to: Phase II    Handoff Report: Identifed the Patient, Identified the Reponsible Provider, Reviewed the pertinent medical history, Discussed the surgical course, Reviewed Intra-OP anesthesia mangement and issues during anesthesia, Set expectations for post-procedure period and Allowed opportunity for questions and acknowledgement of understanding      Vitals:  Vitals Value Taken Time   /67 11/22/21 1429   Temp 97.5  F (36.4  C) 11/22/21 1429   Pulse     Resp 16 11/22/21 1429   SpO2 97 % 11/22/21 1429       Electronically Signed By: DONAVAN Ahmadi CRNA  November 22, 2021  2:45 PM

## 2021-11-22 NOTE — ANESTHESIA POSTPROCEDURE EVALUATION
Patient: Lalitha Underwood    Procedure: Procedure(s):  ARTHROPLASTY, digits two and three left foot       Diagnosis:Hammertoe of left foot [M20.42]  Diagnosis Additional Information: No value filed.    Anesthesia Type:  MAC    Note:  Disposition: Outpatient   Postop Pain Control: Uneventful            Sign Out: Well controlled pain   PONV: No   Neuro/Psych: Uneventful            Sign Out: Acceptable/Baseline neuro status   Airway/Respiratory: Uneventful            Sign Out: Acceptable/Baseline resp. status   CV/Hemodynamics: Uneventful            Sign Out: Acceptable CV status; No obvious hypovolemia; No obvious fluid overload   Other NRE: NONE   DID A NON-ROUTINE EVENT OCCUR? No           Last vitals:  Vitals Value Taken Time   /67 11/22/21 1429   Temp 97.5  F (36.4  C) 11/22/21 1429   Pulse 77 11/22/21 1458   Resp 16 11/22/21 1429   SpO2 92 % 11/22/21 1458   Vitals shown include unvalidated device data.    Electronically Signed By: SAMREEN CANTU MD  November 22, 2021  3:01 PM

## 2021-11-29 ENCOUNTER — ALLIED HEALTH/NURSE VISIT (OUTPATIENT)
Dept: ENDOCRINOLOGY | Facility: CLINIC | Age: 76
End: 2021-11-29
Payer: MEDICARE

## 2021-11-29 ENCOUNTER — OFFICE VISIT (OUTPATIENT)
Dept: PODIATRY | Facility: CLINIC | Age: 76
End: 2021-11-29
Payer: MEDICARE

## 2021-11-29 VITALS — DIASTOLIC BLOOD PRESSURE: 70 MMHG | RESPIRATION RATE: 18 BRPM | SYSTOLIC BLOOD PRESSURE: 132 MMHG | HEART RATE: 68 BPM

## 2021-11-29 DIAGNOSIS — M20.42 HAMMERTOE OF LEFT FOOT: Primary | ICD-10-CM

## 2021-11-29 DIAGNOSIS — M81.0 POST-MENOPAUSAL OSTEOPOROSIS: Primary | ICD-10-CM

## 2021-11-29 PROCEDURE — 96372 THER/PROPH/DIAG INJ SC/IM: CPT | Performed by: PHARMACIST

## 2021-11-29 PROCEDURE — 99207 PR NO CHARGE NURSE ONLY: CPT

## 2021-11-29 PROCEDURE — 99024 POSTOP FOLLOW-UP VISIT: CPT | Performed by: PODIATRIST

## 2021-11-29 ASSESSMENT — PAIN SCALES - GENERAL: PAINLEVEL: MILD PAIN (2)

## 2021-11-29 NOTE — PATIENT INSTRUCTIONS
Limited walking on left  foot with Cam Boot . Okay for transferring and ambulating inside home but no community ambulation.

## 2021-11-29 NOTE — PROGRESS NOTES
Podiatry Progress Note        ASSESSMENT: S/P Arthroplasty digits 2,3 left foot      TREATMENT:  -Surgical sites on the left are progressing well.     -Gauze dressing applied today which she will keep intact. Continue non-weight bearing on the left foot.     -She will follow-up with me in 2 weeks for suture removal.     Alfonso Orozco DPM  [facility] Podiatry/Foot & Ankle Surgery      HPI: Lalitha Underwood was seen again today s/p arthroplasty digits 2,3 left foot. Doing well today. Mild foot pain.    Past Medical History:   Diagnosis Date     Convulsive disorder (H)      Convulsive disorder (H)      COPD (chronic obstructive pulmonary disease) (H)      COPD (chronic obstructive pulmonary disease) (H)      Depression      Dyspnea on exertion      Gastroesophageal reflux disease      Heart murmur      Hernia of unspecified site of abdominal cavity without mention of obstruction or gangrene      Hiatal hernia      Hiatal hernia      Hypertension      Hypertension      Obese      On home oxygen therapy     at 1.5 liters at nite     Osteopenia      Osteopenia      Oxygen dependent     1.5 L per NC     Shortness of breath      Uncomplicated asthma      URI (upper respiratory infection)      Venous insufficiency of both lower extremities      Venous insufficiency of both lower extremities      Walking troubles        Allergies   Allergen Reactions     Clindamycin Rash     Carbamazepine Rash     Morphine Nausea         Current Outpatient Medications:      acetaminophen (TYLENOL) 500 MG tablet, Take 2 tablets (1,000 mg) by mouth every 8 hours as needed for mild pain, Disp: , Rfl:      albuterol (PROAIR HFA/PROVENTIL HFA/VENTOLIN HFA) 108 (90 Base) MCG/ACT inhaler, Inhale 2 puffs into the lungs every 6 hours as needed, Disp: , Rfl:      biotin 5 MG CAPS, Take 1 capsule by mouth daily, Disp: , Rfl:      calcium citrate (CITRACAL) 950 (200 Ca) MG tablet, Take 1 tablet by mouth, Disp: , Rfl:      denosumab (PROLIA) 60  MG/ML SOLN injection, Inject 60 mg Subcutaneous every 6 months, Disp: , Rfl:      fluticasone-salmeterol (ADVAIR) 250-50 MCG/DOSE diskus inhaler, Inhale 1 puff into the lungs 2 times daily, Disp: , Rfl:      hydrochlorothiazide (HYDRODIURIL) 25 MG tablet, hydrochlorothiazide 25 mg tablet  TAKE 1 TABLET BY MOUTH DAILY, Disp: , Rfl:      levETIRAcetam (KEPPRA) 500 MG tablet, levetiracetam 500 mg tablet  TAKE 1 TABLET BY MOUTH TWICE DAILY, Disp: , Rfl:      losartan (COZAAR) 50 MG tablet, Take 50 mg by mouth 2 times daily, Disp: , Rfl:      Magnesium Oxide 500 MG TABS, Take 500 mg by mouth 2 times daily, Disp: , Rfl:      nystatin (NYSTOP) 256400 UNIT/GM external powder, Nystop 100,000 unit/gram topical powder, Disp: , Rfl:      OTHER MEDICAL SUPPLIES, 1.5 L At Bedtime, Disp: , Rfl:      oxyCODONE (ROXICODONE) 5 MG tablet, Take 1-2 tablets (5-10 mg) by mouth every 6 hours as needed for moderate to severe pain, Disp: 30 tablet, Rfl: 0     potassium chloride ER (KLOR-CON M) 20 MEQ CR tablet, Take 20 mEq by mouth, Disp: , Rfl:      RABEprazole (ACIPHEX) 20 MG EC tablet, Take 20 mg by mouth every morning, Disp: , Rfl:      sertraline (ZOLOFT) 100 MG tablet, Take 1 tablet (100 mg) by mouth daily, Disp: 90 tablet, Rfl: 3     solifenacin (VESICARE) 5 MG tablet, Take 5 mg by mouth every morning, Disp: , Rfl:      tiotropium (SPIRIVA HANDIHALER) 18 MCG inhaled capsule, Spiriva with HandiHaler 18 mcg and inhalation capsules, Disp: , Rfl:      vitamin D3 (CHOLECALCIFEROL) 125 MCG (5000 UT) tablet, Take 5,000 Units by mouth, Disp: , Rfl:     Current Facility-Administered Medications:      denosumab (PROLIA) injection 60 mg, 60 mg, Subcutaneous, Q6 Months, Stephanie Norris, PharmD, 60 mg at 11/29/21 1124    Facility-Administered Medications Ordered in Other Visits:      sod bicarbonate-citric acid-simethicone (EZ GAS) 2.21-1.53-0.04 g packet 4 g, 4 g, Oral, Once, Ragini Arellano MD    Review of Systems - Negative        OBJECTIVE:  Appearance: alert, well appearing, and in no distress.    /70   Pulse 68   Resp 18     @LDAVASC(10,16,17)@         Surgical site on digits 2,3 left foot has intact sutures with skin edges well approximated, no gapping noted. No erythema left foot. Neurovascular status unchanged left foot.

## 2021-11-29 NOTE — LETTER
11/29/2021         RE: Lalitha Underwood  [address] [facility] [address]        Dear Colleague,    Thank you for referring your patient, Lalitha Underwood, to the [facility]. Please see a copy of my visit note below.    Podiatry Progress Note        ASSESSMENT: S/P Arthroplasty digits 2,3 left foot      TREATMENT:  -Surgical sites on the left are progressing well.     -Gauze dressing applied today which she will keep intact. Continue non-weight bearing on the left foot.     -She will follow-up with me in 2 weeks for suture removal.     Alfonso Orozco DPM  [facility] Podiatry/Foot & Ankle Surgery      HPI: Lalitha Underwood was seen again today s/p arthroplasty digits 2,3 left foot. Doing well today. Mild foot pain.    Past Medical History:   Diagnosis Date     Convulsive disorder (H)      Convulsive disorder (H)      COPD (chronic obstructive pulmonary disease) (H)      COPD (chronic obstructive pulmonary disease) (H)      Depression      Dyspnea on exertion      Gastroesophageal reflux disease      Heart murmur      Hernia of unspecified site of abdominal cavity without mention of obstruction or gangrene      Hiatal hernia      Hiatal hernia      Hypertension      Hypertension      Obese      On home oxygen therapy     at 1.5 liters at nite     Osteopenia      Osteopenia      Oxygen dependent     1.5 L per NC     Shortness of breath      Uncomplicated asthma      URI (upper respiratory infection)      Venous insufficiency of both lower extremities      Venous insufficiency of both lower extremities      Walking troubles        Allergies   Allergen Reactions     Clindamycin Rash     Carbamazepine Rash     Morphine Nausea         Current Outpatient Medications:      acetaminophen (TYLENOL) 500 MG tablet, Take 2 tablets (1,000 mg) by mouth every 8 hours as needed for mild pain, Disp: , Rfl:      albuterol (PROAIR HFA/PROVENTIL HFA/VENTOLIN HFA) 108 (90 Base) MCG/ACT inhaler,  Inhale 2 puffs into the lungs every 6 hours as needed, Disp: , Rfl:      biotin 5 MG CAPS, Take 1 capsule by mouth daily, Disp: , Rfl:      calcium citrate (CITRACAL) 950 (200 Ca) MG tablet, Take 1 tablet by mouth, Disp: , Rfl:      denosumab (PROLIA) 60 MG/ML SOLN injection, Inject 60 mg Subcutaneous every 6 months, Disp: , Rfl:      fluticasone-salmeterol (ADVAIR) 250-50 MCG/DOSE diskus inhaler, Inhale 1 puff into the lungs 2 times daily, Disp: , Rfl:      hydrochlorothiazide (HYDRODIURIL) 25 MG tablet, hydrochlorothiazide 25 mg tablet  TAKE 1 TABLET BY MOUTH DAILY, Disp: , Rfl:      levETIRAcetam (KEPPRA) 500 MG tablet, levetiracetam 500 mg tablet  TAKE 1 TABLET BY MOUTH TWICE DAILY, Disp: , Rfl:      losartan (COZAAR) 50 MG tablet, Take 50 mg by mouth 2 times daily, Disp: , Rfl:      Magnesium Oxide 500 MG TABS, Take 500 mg by mouth 2 times daily, Disp: , Rfl:      nystatin (NYSTOP) 401594 UNIT/GM external powder, Nystop 100,000 unit/gram topical powder, Disp: , Rfl:      OTHER MEDICAL SUPPLIES, 1.5 L At Bedtime, Disp: , Rfl:      oxyCODONE (ROXICODONE) 5 MG tablet, Take 1-2 tablets (5-10 mg) by mouth every 6 hours as needed for moderate to severe pain, Disp: 30 tablet, Rfl: 0     potassium chloride ER (KLOR-CON M) 20 MEQ CR tablet, Take 20 mEq by mouth, Disp: , Rfl:      RABEprazole (ACIPHEX) 20 MG EC tablet, Take 20 mg by mouth every morning, Disp: , Rfl:      sertraline (ZOLOFT) 100 MG tablet, Take 1 tablet (100 mg) by mouth daily, Disp: 90 tablet, Rfl: 3     solifenacin (VESICARE) 5 MG tablet, Take 5 mg by mouth every morning, Disp: , Rfl:      tiotropium (SPIRIVA HANDIHALER) 18 MCG inhaled capsule, Spiriva with HandiHaler 18 mcg and inhalation capsules, Disp: , Rfl:      vitamin D3 (CHOLECALCIFEROL) 125 MCG (5000 UT) tablet, Take 5,000 Units by mouth, Disp: , Rfl:     Current Facility-Administered Medications:      denosumab (PROLIA) injection 60 mg, 60 mg, Subcutaneous, Q6 Months, Stephanie Norris, PharmD,  60 mg at 11/29/21 1124    Facility-Administered Medications Ordered in Other Visits:      sod bicarbonate-citric acid-simethicone (EZ GAS) 2.21-1.53-0.04 g packet 4 g, 4 g, Oral, Once, Ragini Arellano MD    Review of Systems - Negative       OBJECTIVE:  Appearance: alert, well appearing, and in no distress.    /70   Pulse 68   Resp 18     @LDAVASC(10,16,17)@         Surgical site on digits 2,3 left foot has intact sutures with skin edges well approximated, no gapping noted. No erythema left foot. Neurovascular status unchanged left foot.         Again, thank you for allowing me to participate in the care of your patient.        Sincerely,        Alfonso Orozco DPM

## 2021-11-29 NOTE — PROGRESS NOTES
"Prolia Injection Phone Screen      Screening questions have been asked 2-3 days prior to administration visit for Prolia. If any questions are answered with \"Yes,\" this phone encounter were will routed to ordering provider for further evaluation.     1.  When was the last injection?  5/25/21    2.  Has insurance for this injection been verified?  Yes    3.  Did you experience any new onset achiness or rashes that lasted for over a month with your previous Prolia injection?   No    4.  Do you have a fever over 101?F or a new deep cough that is unusual for you today? No    5.  Have you started any new medications in the last 6 months that you were told could affect your immune system? These may have been prescribed by oncologist, transplant, rheumatology, or dermatology.   No    6.  In the last 6 months have you have gastric bypass or parathyroid surgery?   No    7.  Do you plan dental work requiring drilling into the bone such as implants/extractions or oral surgery in the next 2-3 months?   No    8. Do you have new insurance since the last injection?    Patient informed if symptoms discussed above present prior to their administration appointment, they are to notify clinic immediately.     Reshma Chatman RN          The following steps were completed to comply with the REMS program for Prolia:  1. Ordering provider has previously reviewed information in the Medication Guide and Patient Counseling Chart, including the serious risks of Prolia  and the symptoms of each risk and have been advised to seek prompt medical attention if they have signs or symptoms of any of the serious risks.  2. Provided each patient a copy of the Medication Guide and Patient Brochure.  See MAR for administration details.   Indication: Prolia  (denosumab) is a prescription medicine used to treat osteoporosis in patients who:   Are at high risk for fracture, meaning patients who have had a fracture related to osteoporosis, or who have " multiple risk factors for fracture; Cannot use another osteoporosis medicine or other osteoporosis medicines did not work well.   The timeline for early/late injections would be 4 weeks early and any time after the 6 month edmar. If a patient receives their injection late, then the subsequent injection would be 6 months from the date that they actually received the injection    Have the screening questions been asked prior to this administration? Yes      The following medication was given:     MEDICATION: Denosumab (Prolia) 60 mg/ml SOLN  ROUTE: SQ  SITE: Arm - Right  DOSE: 60 mg  LOT #: [id number]  :  [employer]  EXPIRATION DATE:  02/24  NDC#: see mar

## 2021-12-06 ENCOUNTER — TELEPHONE (OUTPATIENT)
Dept: FAMILY MEDICINE | Facility: CLINIC | Age: 76
End: 2021-12-06
Payer: MEDICARE

## 2021-12-06 NOTE — TELEPHONE ENCOUNTER
Pt states she will be faxing over a DVS form to be completed by Dr. Hull or Loretta Sewell. Please keep an eye out for this form and fax in when complete.

## 2021-12-13 ENCOUNTER — OFFICE VISIT (OUTPATIENT)
Dept: PODIATRY | Facility: CLINIC | Age: 76
End: 2021-12-13
Payer: MEDICARE

## 2021-12-13 VITALS — HEIGHT: 64 IN | OXYGEN SATURATION: 95 % | WEIGHT: 220 LBS | BODY MASS INDEX: 37.56 KG/M2 | HEART RATE: 77 BPM

## 2021-12-13 DIAGNOSIS — M20.42 HAMMERTOE OF LEFT FOOT: Primary | ICD-10-CM

## 2021-12-13 PROCEDURE — 99024 POSTOP FOLLOW-UP VISIT: CPT | Performed by: PODIATRIST

## 2021-12-13 ASSESSMENT — MIFFLIN-ST. JEOR: SCORE: 1472.91

## 2021-12-13 ASSESSMENT — PAIN SCALES - GENERAL: PAINLEVEL: NO PAIN (0)

## 2021-12-13 NOTE — LETTER
12/13/2021         RE: Lalitha Underwood  [address] [facility] [address]        Dear Colleague,    Thank you for referring your patient, Lalitha Underwood, to the [facility]. Please see a copy of my visit note below.    Podiatry Progress Note        ASSESSMENT: S/P Arthroplasty digits 2,3 left foot      TREATMENT:  -Surgical sites on the left are progressing well. Sutures removed today, steri-strips applied.     -Gauze dressing applied today which she will remove tomorrow. I recommend limited walking in the CAM boot x5 days, then resume use of regular shoes. No showering x2 days. No soaking x1 week.    -She is discharged from my care and will follow-up with me as concerns develop.     Alfonso Orozco, NAY  [facility] Podiatry/Foot & Ankle Surgery      HPI: Lalitha Underwood was seen again today s/p arthroplasty digits 2,3 left foot. Doing well today. She has walked in the CAM boot as directed.     Past Medical History:   Diagnosis Date     Convulsive disorder (H)      Convulsive disorder (H)      COPD (chronic obstructive pulmonary disease) (H)      COPD (chronic obstructive pulmonary disease) (H)      Depression      Dyspnea on exertion      Gastroesophageal reflux disease      Heart murmur      Hernia of unspecified site of abdominal cavity without mention of obstruction or gangrene      Hiatal hernia      Hiatal hernia      Hypertension      Hypertension      Obese      On home oxygen therapy     at 1.5 liters at nite     Osteopenia      Osteopenia      Oxygen dependent     1.5 L per NC     Shortness of breath      Uncomplicated asthma      URI (upper respiratory infection)      Venous insufficiency of both lower extremities      Venous insufficiency of both lower extremities      Walking troubles        Allergies   Allergen Reactions     Clindamycin Rash     Carbamazepine Rash     Morphine Nausea         Current Outpatient Medications:      albuterol (PROAIR HFA/PROVENTIL  HFA/VENTOLIN HFA) 108 (90 Base) MCG/ACT inhaler, Inhale 2 puffs into the lungs every 6 hours as needed, Disp: , Rfl:      biotin 5 MG CAPS, Take 1 capsule by mouth daily, Disp: , Rfl:      calcium citrate (CITRACAL) 950 (200 Ca) MG tablet, Take 1 tablet by mouth, Disp: , Rfl:      denosumab (PROLIA) 60 MG/ML SOLN injection, Inject 60 mg Subcutaneous every 6 months, Disp: , Rfl:      fluticasone-salmeterol (ADVAIR) 250-50 MCG/DOSE diskus inhaler, Inhale 1 puff into the lungs 2 times daily, Disp: , Rfl:      hydrochlorothiazide (HYDRODIURIL) 25 MG tablet, hydrochlorothiazide 25 mg tablet  TAKE 1 TABLET BY MOUTH DAILY, Disp: , Rfl:      levETIRAcetam (KEPPRA) 500 MG tablet, levetiracetam 500 mg tablet  TAKE 1 TABLET BY MOUTH TWICE DAILY, Disp: , Rfl:      losartan (COZAAR) 50 MG tablet, Take 50 mg by mouth 2 times daily, Disp: , Rfl:      Magnesium Oxide 500 MG TABS, Take 500 mg by mouth 2 times daily, Disp: , Rfl:      nystatin (NYSTOP) 628335 UNIT/GM external powder, Nystop 100,000 unit/gram topical powder, Disp: , Rfl:      OTHER MEDICAL SUPPLIES, 1.5 L At Bedtime, Disp: , Rfl:      potassium chloride ER (KLOR-CON M) 20 MEQ CR tablet, Take 20 mEq by mouth, Disp: , Rfl:      RABEprazole (ACIPHEX) 20 MG EC tablet, Take 20 mg by mouth every morning, Disp: , Rfl:      sertraline (ZOLOFT) 100 MG tablet, Take 1 tablet (100 mg) by mouth daily, Disp: 90 tablet, Rfl: 3     solifenacin (VESICARE) 5 MG tablet, Take 5 mg by mouth every morning, Disp: , Rfl:      tiotropium (SPIRIVA HANDIHALER) 18 MCG inhaled capsule, Spiriva with HandiHaler 18 mcg and inhalation capsules, Disp: , Rfl:      vitamin D3 (CHOLECALCIFEROL) 125 MCG (5000 UT) tablet, Take 5,000 Units by mouth, Disp: , Rfl:     Current Facility-Administered Medications:      denosumab (PROLIA) injection 60 mg, 60 mg, Subcutaneous, Q6 Months, Stephanie Norris, PharmD, 60 mg at 11/29/21 1124    Facility-Administered Medications Ordered in Other Visits:      sod  "bicarbonate-citric acid-simethicone (EZ GAS) 2.21-1.53-0.04 g packet 4 g, 4 g, Oral, Once, Ragini Arellano MD    Review of Systems - Negative       OBJECTIVE:  Appearance: alert, well appearing, and in no distress.    Pulse 77   Ht 1.626 m (5' 4\")   Wt 99.8 kg (220 lb)   SpO2 95%   BMI 37.76 kg/m           Surgical site on digits 2,3 left foot has intact sutures with skin edges well coapted, no gapping noted. No erythema left foot. Neurovascular status unchanged left foot.         Again, thank you for allowing me to participate in the care of your patient.        Sincerely,        Alfonso Orozco DPM    "

## 2021-12-13 NOTE — PROGRESS NOTES
Podiatry Progress Note        ASSESSMENT: S/P Arthroplasty digits 2,3 left foot      TREATMENT:  -Surgical sites on the left are progressing well. Sutures removed today, steri-strips applied.     -Gauze dressing applied today which she will remove tomorrow. I recommend limited walking in the CAM boot x5 days, then resume use of regular shoes. No showering x2 days. No soaking x1 week.    -She is discharged from my care and will follow-up with me as concerns develop.     Alfonso Orozco DPM  [facility] Podiatry/Foot & Ankle Surgery      HPI: Lalitha Underwood was seen again today s/p arthroplasty digits 2,3 left foot. Doing well today. She has walked in the CAM boot as directed.     Past Medical History:   Diagnosis Date     Convulsive disorder (H)      Convulsive disorder (H)      COPD (chronic obstructive pulmonary disease) (H)      COPD (chronic obstructive pulmonary disease) (H)      Depression      Dyspnea on exertion      Gastroesophageal reflux disease      Heart murmur      Hernia of unspecified site of abdominal cavity without mention of obstruction or gangrene      Hiatal hernia      Hiatal hernia      Hypertension      Hypertension      Obese      On home oxygen therapy     at 1.5 liters at nite     Osteopenia      Osteopenia      Oxygen dependent     1.5 L per NC     Shortness of breath      Uncomplicated asthma      URI (upper respiratory infection)      Venous insufficiency of both lower extremities      Venous insufficiency of both lower extremities      Walking troubles        Allergies   Allergen Reactions     Clindamycin Rash     Carbamazepine Rash     Morphine Nausea         Current Outpatient Medications:      albuterol (PROAIR HFA/PROVENTIL HFA/VENTOLIN HFA) 108 (90 Base) MCG/ACT inhaler, Inhale 2 puffs into the lungs every 6 hours as needed, Disp: , Rfl:      biotin 5 MG CAPS, Take 1 capsule by mouth daily, Disp: , Rfl:      calcium citrate (CITRACAL) 950 (200 Ca) MG tablet, Take 1 tablet  "by mouth, Disp: , Rfl:      denosumab (PROLIA) 60 MG/ML SOLN injection, Inject 60 mg Subcutaneous every 6 months, Disp: , Rfl:      fluticasone-salmeterol (ADVAIR) 250-50 MCG/DOSE diskus inhaler, Inhale 1 puff into the lungs 2 times daily, Disp: , Rfl:      hydrochlorothiazide (HYDRODIURIL) 25 MG tablet, hydrochlorothiazide 25 mg tablet  TAKE 1 TABLET BY MOUTH DAILY, Disp: , Rfl:      levETIRAcetam (KEPPRA) 500 MG tablet, levetiracetam 500 mg tablet  TAKE 1 TABLET BY MOUTH TWICE DAILY, Disp: , Rfl:      losartan (COZAAR) 50 MG tablet, Take 50 mg by mouth 2 times daily, Disp: , Rfl:      Magnesium Oxide 500 MG TABS, Take 500 mg by mouth 2 times daily, Disp: , Rfl:      nystatin (NYSTOP) 101759 UNIT/GM external powder, Nystop 100,000 unit/gram topical powder, Disp: , Rfl:      OTHER MEDICAL SUPPLIES, 1.5 L At Bedtime, Disp: , Rfl:      potassium chloride ER (KLOR-CON M) 20 MEQ CR tablet, Take 20 mEq by mouth, Disp: , Rfl:      RABEprazole (ACIPHEX) 20 MG EC tablet, Take 20 mg by mouth every morning, Disp: , Rfl:      sertraline (ZOLOFT) 100 MG tablet, Take 1 tablet (100 mg) by mouth daily, Disp: 90 tablet, Rfl: 3     solifenacin (VESICARE) 5 MG tablet, Take 5 mg by mouth every morning, Disp: , Rfl:      tiotropium (SPIRIVA HANDIHALER) 18 MCG inhaled capsule, Spiriva with HandiHaler 18 mcg and inhalation capsules, Disp: , Rfl:      vitamin D3 (CHOLECALCIFEROL) 125 MCG (5000 UT) tablet, Take 5,000 Units by mouth, Disp: , Rfl:     Current Facility-Administered Medications:      denosumab (PROLIA) injection 60 mg, 60 mg, Subcutaneous, Q6 Months, Stephanie Norris, PharmD, 60 mg at 11/29/21 1124    Facility-Administered Medications Ordered in Other Visits:      sod bicarbonate-citric acid-simethicone (EZ GAS) 2.21-1.53-0.04 g packet 4 g, 4 g, Oral, Once, Ragini Arellano MD    Review of Systems - Negative       OBJECTIVE:  Appearance: alert, well appearing, and in no distress.    Pulse 77   Ht 1.626 m (5' 4\")   Wt " 99.8 kg (220 lb)   SpO2 95%   BMI 37.76 kg/m           Surgical site on digits 2,3 left foot has intact sutures with skin edges well coapted, no gapping noted. No erythema left foot. Neurovascular status unchanged left foot.

## 2021-12-13 NOTE — TELEPHONE ENCOUNTER
Unable to locate forms. Patient requested that I print out the name of the form she needs. I printed form and placed in 's inbox.     Patient would like a call when the form is done so she can pick it up.

## 2021-12-15 NOTE — TELEPHONE ENCOUNTER
It is not clear to me why the form as needed.  Can she clarify what happened or for what condition the form is needed (DMV form for loss of voluntary control).  VJ

## 2021-12-16 NOTE — TELEPHONE ENCOUNTER
Have tried multiple times 12-15-21 and 12-16-21 getting a busy signal every call. If patient call please relay message below regarding form

## 2021-12-27 ENCOUNTER — TELEPHONE (OUTPATIENT)
Dept: PULMONOLOGY | Facility: OTHER | Age: 76
End: 2021-12-27
Payer: MEDICARE

## 2021-12-27 DIAGNOSIS — J44.1 COPD EXACERBATION (H): Primary | ICD-10-CM

## 2021-12-27 RX ORDER — PREDNISONE 10 MG/1
TABLET ORAL
Qty: 18 TABLET | Refills: 0 | Status: SHIPPED | OUTPATIENT
Start: 2021-12-27 | End: 2022-01-07

## 2021-12-27 RX ORDER — BENZONATATE 100 MG/1
100 CAPSULE ORAL 3 TIMES DAILY PRN
Qty: 60 CAPSULE | Refills: 0 | Status: SHIPPED | OUTPATIENT
Start: 2021-12-27 | End: 2022-03-14

## 2021-12-27 RX ORDER — DOXYCYCLINE 100 MG/1
100 CAPSULE ORAL 2 TIMES DAILY
Qty: 10 CAPSULE | Refills: 0 | Status: SHIPPED | OUTPATIENT
Start: 2021-12-27 | End: 2022-01-01

## 2021-12-27 NOTE — TELEPHONE ENCOUNTER
Phone call from patient. States she has been having increased sob recently with increased phlegm.  The phlegm is greenish in color.  Terrible cough.  Did test neg for COVID.     Will send Rx for her last action plan:  Prednisone 30mg daily x 3 days, 20mg daily x 3 days, 10mg daily x 3 days and doxycycline x 5 days.  Patient also requesting tessalon perles.

## 2021-12-28 ENCOUNTER — TELEPHONE (OUTPATIENT)
Dept: PODIATRY | Facility: CLINIC | Age: 76
End: 2021-12-28
Payer: MEDICARE

## 2021-12-28 DIAGNOSIS — M20.41 HAMMER TOES OF BOTH FEET: Primary | ICD-10-CM

## 2021-12-28 DIAGNOSIS — M20.42 HAMMER TOES OF BOTH FEET: Primary | ICD-10-CM

## 2021-12-28 NOTE — TELEPHONE ENCOUNTER
"Patient reports a \"pinching\" when wearing shoes.  She has custom orthotics.  Referral placed to [facility] per patient request to have orthotics adjusted.  "

## 2021-12-28 NOTE — TELEPHONE ENCOUNTER
"Patient states she had hardware removed from her foot by Dr. Orozco back in November, and then wore a boot up until mid-December. Patient is wondering if it is normal that her foot still feels \"uncomfortable\" while wearing shoes. She states that toes are pink in color, and there may be a small amount of swelling.   PH: [phone]   "

## 2022-01-07 ENCOUNTER — OFFICE VISIT (OUTPATIENT)
Dept: PODIATRY | Facility: CLINIC | Age: 77
End: 2022-01-07
Payer: MEDICARE

## 2022-01-07 VITALS — SYSTOLIC BLOOD PRESSURE: 132 MMHG | TEMPERATURE: 98.2 F | DIASTOLIC BLOOD PRESSURE: 84 MMHG | HEART RATE: 100 BPM

## 2022-01-07 DIAGNOSIS — M20.42 HAMMERTOE OF LEFT FOOT: Primary | ICD-10-CM

## 2022-01-07 PROCEDURE — 99213 OFFICE O/P EST LOW 20 MIN: CPT | Mod: 24 | Performed by: PODIATRIST

## 2022-01-07 NOTE — LETTER
1/7/2022         RE: Lalitha Underwood  [address]  [place] [address]        Dear Colleague,    Thank you for referring your patient, Lalitha Underwood, to the [facility]. Please see a copy of my visit note below.        FOOT AND ANKLE SURGERY/PODIATRY PROGRESS NOTE        ASSESSMENT: Hammertoe 4th digit left foot       TREATMENT:  -I discussed with the patient that she has callus build-up along the distal 4th digit left foot due to a hammertoe deformity.     -We reviewed arthroplasty procedure which she has had on digits 2,3 left foot and recovered well. I recommend she consider this procedure but try a CREST pad first.     -Patient's questions invited and answered. Patient to return to clinic in 3-4 week(s) for re-evaluation if symptoms do not improve. She was encouraged to call my office with any further questions or concerns.     Alfonso Orozco DPM  [facility] Podiatry/Foot & Ankle Surgery  [phone]      HPI: Lalitha Underwood was seen again today complaining of a painful skin lesion on the 4th digit left foot. The patient states she does have a crest pad but had not used it recently.     Past Medical History:   Diagnosis Date     Convulsive disorder (H)      Convulsive disorder (H)      COPD (chronic obstructive pulmonary disease) (H)      COPD (chronic obstructive pulmonary disease) (H)      Depression      Dyspnea on exertion      Gastroesophageal reflux disease      Heart murmur      Hernia of unspecified site of abdominal cavity without mention of obstruction or gangrene      Hiatal hernia      Hiatal hernia      Hypertension      Hypertension      Obese      On home oxygen therapy     at 1.5 liters at nite     Osteopenia      Osteopenia      Oxygen dependent     1.5 L per NC     Shortness of breath      Uncomplicated asthma      URI (upper respiratory infection)      Venous insufficiency of both lower extremities      Venous insufficiency of both lower  extremities      Walking troubles        Past Surgical History:   Procedure Laterality Date     ARTHROPLASTY TOE(S) Left 11/22/2021    Procedure: ARTHROPLASTY, digits two and three left foot;  Surgeon: Alfonso Orozco DPM;  Location: [facility] OR     ESOPHAGOSCOPY, GASTROSCOPY, DUODENOSCOPY (EGD), COMBINED N/A 11/26/2018    Procedure: Esophagogastroduodenoscopy;  Surgeon: Jasmeet Wilder MD;  Location: [facility] OR     HERNIA REPAIR, UMBILICAL  04/2018     HERNIA REPAIR, UMBILICAL  04/04/2018    Dr. 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     Repair arm fracture Left      SHOULDER SURGERY Right 1967     SHOULDER SURGERY Right 1967     US BIOPSY THYROID FINE NEEDLE ASPIRATION  7/29/2020       Allergies   Allergen Reactions     Clindamycin Rash     Carbamazepine Rash     Morphine Nausea         Current Outpatient Medications:      albuterol (PROAIR HFA/PROVENTIL HFA/VENTOLIN HFA) 108 (90 Base) MCG/ACT inhaler, Inhale 2 puffs into the lungs every 6 hours as needed, Disp: , Rfl:      benzonatate (TESSALON) 100 MG capsule, Take 1 capsule (100 mg) by mouth 3 times daily as needed for cough, Disp: 60 capsule, Rfl: 0     biotin 5 MG CAPS, Take 1 capsule by mouth daily, Disp: , Rfl:      calcium citrate (CITRACAL) 950 (200 Ca) MG tablet, Take 1 tablet by mouth, Disp: , Rfl:      denosumab (PROLIA) 60 MG/ML SOLN injection, Inject 60 mg Subcutaneous every 6 months, Disp: , Rfl:      fluticasone-salmeterol (ADVAIR) 250-50 MCG/DOSE diskus inhaler, Inhale 1 puff into the lungs 2 times daily, Disp: , Rfl:      hydrochlorothiazide (HYDRODIURIL) 25 MG tablet, hydrochlorothiazide 25 mg tablet  TAKE 1 TABLET BY MOUTH DAILY, Disp: , Rfl:      losartan (COZAAR) 50 MG tablet, Take 50 mg by mouth 2 times daily,  Disp: , Rfl:      Magnesium Oxide 500 MG TABS, Take 500 mg by mouth 2 times daily, Disp: , Rfl:      nystatin (NYSTOP) 427786 UNIT/GM external powder, Nystop 100,000 unit/gram topical powder, Disp: , Rfl:      potassium chloride ER (KLOR-CON M) 20 MEQ CR tablet, Take 20 mEq by mouth, Disp: , Rfl:      RABEprazole (ACIPHEX) 20 MG EC tablet, Take 20 mg by mouth every morning, Disp: , Rfl:      sertraline (ZOLOFT) 100 MG tablet, Take 1 tablet (100 mg) by mouth daily, Disp: 90 tablet, Rfl: 3     solifenacin (VESICARE) 5 MG tablet, Take 5 mg by mouth every morning, Disp: , Rfl:      tiotropium (SPIRIVA HANDIHALER) 18 MCG inhaled capsule, Spiriva with HandiHaler 18 mcg and inhalation capsules, Disp: , Rfl:      vitamin D3 (CHOLECALCIFEROL) 125 MCG (5000 UT) tablet, Take 5,000 Units by mouth, Disp: , Rfl:     Current Facility-Administered Medications:      denosumab (PROLIA) injection 60 mg, 60 mg, Subcutaneous, Q6 Months, Stephanie Norris, PharmD, 60 mg at 11/29/21 1124    Facility-Administered Medications Ordered in Other Visits:      sod bicarbonate-citric acid-simethicone (EZ GAS) 2.21-1.53-0.04 g packet 4 g, 4 g, Oral, Once, Ragini Arellano MD    Family History   Problem Relation Age of Onset     Pulmonary Hypertension Daughter         idiopathic      Heart Disease Other         2 sibs MI, 1 sib electrical conduction disorder     Breast Cancer Mother 66.00     Hypertension Mother      Coronary Artery Disease Mother      Varicose Veins Mother      Hypertension Father      Coronary Artery Disease Sister      Heart Disease Sister      Diabetes Son      Pulmonary Hypertension Daughter      Coronary Artery Disease Sister      Heart Disease Sister        Social History     Socioeconomic History     Marital status:      Spouse name: Not on file     Number of children: Not on file     Years of education: Not on file     Highest education level: Not on file   Occupational History     Not on file   Tobacco Use      Smoking status: Former Smoker     Packs/day: 1.50     Years: 38.00     Pack years: 57.00     Types: Cigarettes     Quit date: 1998     Years since quittin.1     Smokeless tobacco: Never Used     Tobacco comment: quit in    Substance and Sexual Activity     Alcohol use: Yes     Alcohol/week: 2.0 standard drinks     Comment: occ     Drug use: Yes     Types: Oxycodone     Sexual activity: Never   Other Topics Concern     Not on file   Social History Narrative    .  Two kids.  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     Social Determinants of Health     Financial Resource Strain: Not on file   Food Insecurity: Not on file   Transportation Needs: Not on file   Physical Activity: Not on file   Stress: Not on file   Social Connections: Not on file   Intimate Partner Violence: Not on file   Housing Stability: Not on file       10 point Review of Systems is negative except for hammertoe which is noted in HPI.     /84   Pulse 100   Temp 98.2  F (36.8  C)     BMI= There is no height or weight on file to calculate BMI.    OBJECTIVE:  General appearance: Patient is alert and fully cooperative with history & exam.  No sign of distress is noted during the visit.    Vascular: Dorsalis pedis palpable.   Dermatologic: Hyperkeratotic tissue distal 4th digit left foot, no open lesions or erythema noted.   Neurologic: All epicritic and proprioceptive sensations are grossly intact left.  Musculoskeletal: Mild pain distal 4th digit left foot with contracture at PIPJ.     Imaging:     No results found.         Again, thank you for allowing me to participate in the care of your patient.        Sincerely,        Alfonso Orozco DPM

## 2022-01-07 NOTE — TELEPHONE ENCOUNTER
Patient called and LVM stating that she has finished the course of prednisone and antibiotics and is still feeling like she has phlem in her chest.     Called and spoke to patient. She feels like her cough is better, but is still having chest congestion. She is coughing up less mucous than before, the little amount she has coughed up has been a yellowish color. Denies any other symptoms. Admits she has had little activity. Encouraged patient to increase activity as able, increase water intake and try to dai cough.     Informed patient I would route note to her provider for review.     Kate Figueroa RN

## 2022-01-07 NOTE — PROGRESS NOTES
FOOT AND ANKLE SURGERY/PODIATRY PROGRESS NOTE        ASSESSMENT: Hammertoe 4th digit left foot       TREATMENT:  -I discussed with the patient that she has callus build-up along the distal 4th digit left foot due to a hammertoe deformity.     -We reviewed arthroplasty procedure which she has had on digits 2,3 left foot and recovered well. I recommend she consider this procedure but try a CREST pad first.     -Patient's questions invited and answered. Patient to return to clinic in 3-4 week(s) for re-evaluation if symptoms do not improve. She was encouraged to call my office with any further questions or concerns.     Alfonso Orozco DPM  [facility] Podiatry/Foot & Ankle Surgery  [phone]      HPI: Lalitha Underwood was seen again today complaining of a painful skin lesion on the 4th digit left foot. The patient states she does have a crest pad but had not used it recently.     Past Medical History:   Diagnosis Date     Convulsive disorder (H)      Convulsive disorder (H)      COPD (chronic obstructive pulmonary disease) (H)      COPD (chronic obstructive pulmonary disease) (H)      Depression      Dyspnea on exertion      Gastroesophageal reflux disease      Heart murmur      Hernia of unspecified site of abdominal cavity without mention of obstruction or gangrene      Hiatal hernia      Hiatal hernia      Hypertension      Hypertension      Obese      On home oxygen therapy     at 1.5 liters at nite     Osteopenia      Osteopenia      Oxygen dependent     1.5 L per NC     Shortness of breath      Uncomplicated asthma      URI (upper respiratory infection)      Venous insufficiency of both lower extremities      Venous insufficiency of both lower extremities      Walking troubles        Past Surgical History:   Procedure Laterality Date     ARTHROPLASTY TOE(S) Left 11/22/2021    Procedure: ARTHROPLASTY, digits two and three left foot;  Surgeon: Alfonso Orozco DPM;  Location: [facility]      ESOPHAGOSCOPY, GASTROSCOPY, DUODENOSCOPY (EGD), COMBINED N/A 11/26/2018    Procedure: Esophagogastroduodenoscopy;  Surgeon: Jasmeet Wilder MD;  Location: [facility] OR     HERNIA REPAIR, UMBILICAL  04/2018     HERNIA REPAIR, UMBILICAL  04/04/2018    Dr. 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     Repair arm fracture Left      SHOULDER SURGERY Right 1967     SHOULDER SURGERY Right 1967     US BIOPSY THYROID FINE NEEDLE ASPIRATION  7/29/2020       Allergies   Allergen Reactions     Clindamycin Rash     Carbamazepine Rash     Morphine Nausea         Current Outpatient Medications:      albuterol (PROAIR HFA/PROVENTIL HFA/VENTOLIN HFA) 108 (90 Base) MCG/ACT inhaler, Inhale 2 puffs into the lungs every 6 hours as needed, Disp: , Rfl:      benzonatate (TESSALON) 100 MG capsule, Take 1 capsule (100 mg) by mouth 3 times daily as needed for cough, Disp: 60 capsule, Rfl: 0     biotin 5 MG CAPS, Take 1 capsule by mouth daily, Disp: , Rfl:      calcium citrate (CITRACAL) 950 (200 Ca) MG tablet, Take 1 tablet by mouth, Disp: , Rfl:      denosumab (PROLIA) 60 MG/ML SOLN injection, Inject 60 mg Subcutaneous every 6 months, Disp: , Rfl:      fluticasone-salmeterol (ADVAIR) 250-50 MCG/DOSE diskus inhaler, Inhale 1 puff into the lungs 2 times daily, Disp: , Rfl:      hydrochlorothiazide (HYDRODIURIL) 25 MG tablet, hydrochlorothiazide 25 mg tablet  TAKE 1 TABLET BY MOUTH DAILY, Disp: , Rfl:      losartan (COZAAR) 50 MG tablet, Take 50 mg by mouth 2 times daily, Disp: , Rfl:      Magnesium Oxide 500 MG TABS, Take 500 mg by mouth 2 times daily, Disp: , Rfl:      nystatin (NYSTOP) 518938 UNIT/GM external powder, Nystop 100,000 unit/gram topical powder, Disp: , Rfl:      potassium chloride ER (KLOR-CON M) 20 MEQ CR  tablet, Take 20 mEq by mouth, Disp: , Rfl:      RABEprazole (ACIPHEX) 20 MG EC tablet, Take 20 mg by mouth every morning, Disp: , Rfl:      sertraline (ZOLOFT) 100 MG tablet, Take 1 tablet (100 mg) by mouth daily, Disp: 90 tablet, Rfl: 3     solifenacin (VESICARE) 5 MG tablet, Take 5 mg by mouth every morning, Disp: , Rfl:      tiotropium (SPIRIVA HANDIHALER) 18 MCG inhaled capsule, Spiriva with HandiHaler 18 mcg and inhalation capsules, Disp: , Rfl:      vitamin D3 (CHOLECALCIFEROL) 125 MCG (5000 UT) tablet, Take 5,000 Units by mouth, Disp: , Rfl:     Current Facility-Administered Medications:      denosumab (PROLIA) injection 60 mg, 60 mg, Subcutaneous, Q6 Months, Stephanie Norris PharmD, 60 mg at 21 1124    Facility-Administered Medications Ordered in Other Visits:      sod bicarbonate-citric acid-simethicone (EZ GAS) 2.21-1.53-0.04 g packet 4 g, 4 g, Oral, Once, Ragini Arellano MD    Family History   Problem Relation Age of Onset     Pulmonary Hypertension Daughter         idiopathic      Heart Disease Other         2 sibs MI, 1 sib electrical conduction disorder     Breast Cancer Mother 66.00     Hypertension Mother      Coronary Artery Disease Mother      Varicose Veins Mother      Hypertension Father      Coronary Artery Disease Sister      Heart Disease Sister      Diabetes Son      Pulmonary Hypertension Daughter      Coronary Artery Disease Sister      Heart Disease Sister        Social History     Socioeconomic History     Marital status:      Spouse name: Not on file     Number of children: Not on file     Years of education: Not on file     Highest education level: Not on file   Occupational History     Not on file   Tobacco Use     Smoking status: Former Smoker     Packs/day: 1.50     Years: 38.00     Pack years: 57.00     Types: Cigarettes     Quit date: 1998     Years since quittin.1     Smokeless tobacco: Never Used     Tobacco comment: quit in    Substance and  Sexual Activity     Alcohol use: Yes     Alcohol/week: 2.0 standard drinks     Comment: occ     Drug use: Yes     Types: Oxycodone     Sexual activity: Never   Other Topics Concern     Not on file   Social History Narrative    .  Two kids.  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     Social Determinants of Health     Financial Resource Strain: Not on file   Food Insecurity: Not on file   Transportation Needs: Not on file   Physical Activity: Not on file   Stress: Not on file   Social Connections: Not on file   Intimate Partner Violence: Not on file   Housing Stability: Not on file       10 point Review of Systems is negative except for hammertoe which is noted in HPI.     /84   Pulse 100   Temp 98.2  F (36.8  C)     BMI= There is no height or weight on file to calculate BMI.    OBJECTIVE:  General appearance: Patient is alert and fully cooperative with history & exam.  No sign of distress is noted during the visit.    Vascular: Dorsalis pedis palpable.   Dermatologic: Hyperkeratotic tissue distal 4th digit left foot, no open lesions or erythema noted.   Neurologic: All epicritic and proprioceptive sensations are grossly intact left.  Musculoskeletal: Mild pain distal 4th digit left foot with contracture at PIPJ.     Imaging:     No results found.

## 2022-01-11 NOTE — TELEPHONE ENCOUNTER
Called and spoke to patient. Per Dr. Tamayo patient should continue to work of mucous clearance, he would like to hold off on antibiotics at this time. Advised to follow up in 2 weeks if symptoms aren't improving or sooner if new symptoms develop. Patient verbalized understanding and agreed to plan.     Kate Figueroa RN

## 2022-01-17 DIAGNOSIS — K21.9 GASTROESOPHAGEAL REFLUX DISEASE WITHOUT ESOPHAGITIS: Primary | ICD-10-CM

## 2022-01-19 NOTE — TELEPHONE ENCOUNTER
"  Outpatient Medication Detail     Disp Refills Start End EMIGDIO   RABEprazole (ACIPHEX) 20 mg tablet 90 tablet 3 2/12/2021  No   Sig - Route: Take 1 tablet (20 mg total) by mouth daily. - Oral   Class: Print       RABEprazole (ACIPHEX) 20 mg tablet [[id number]]    Electronically signed by: Loretta Sewell CNP on 02/12/21 1051 Status: Active   Ordering user: Loretta Sewell CNP 02/12/21 1051 Authorized by: Loretta Sewell CNP   Frequency: DAILY 02/12/21 - Until Discontinued   Diagnoses  Gastroesophageal reflux disease without esophagitis [K21.9]     Routing refill request to provider for review/approval because:  Diagnosis warning  Early refill requested.    Last office visit provider:  9/10/21     Requested Prescriptions   Pending Prescriptions Disp Refills     RABEprazole (ACIPHEX) 20 MG EC tablet [Pharmacy Med Name: RABEPRAZOLE  TAB 20MG DR] 90 tablet 3     Sig: TAKE 1 TABLET DAILY       PPI Protocol Failed - 1/17/2022  2:01 PM        Failed - No diagnosis of osteoporosis on record        Passed - Not on Clopidogrel (unless Pantoprazole ordered)        Passed - Recent (12 mo) or future (30 days) visit within the authorizing provider's specialty     Patient has had an office visit with the authorizing provider or a provider within the authorizing providers department within the previous 12 mos or has a future within next 30 days. See \"Patient Info\" tab in inbasket, or \"Choose Columns\" in Meds & Orders section of the refill encounter.              Passed - Medication is active on med list        Passed - Patient is age 18 or older        Passed - No active pregnacy on record        Passed - No positive pregnancy test in past 12 months             Mick Olivares RN 01/19/22 1:29 PM  "

## 2022-01-20 RX ORDER — RABEPRAZOLE SODIUM 20 MG/1
TABLET, DELAYED RELEASE ORAL
Qty: 90 TABLET | Refills: 3 | Status: SHIPPED | OUTPATIENT
Start: 2022-01-20 | End: 2022-02-22

## 2022-02-17 ENCOUNTER — TELEPHONE (OUTPATIENT)
Dept: PULMONOLOGY | Facility: OTHER | Age: 77
End: 2022-02-17
Payer: MEDICARE

## 2022-02-17 DIAGNOSIS — J44.1 COPD EXACERBATION (H): Primary | ICD-10-CM

## 2022-02-17 RX ORDER — DOXYCYCLINE 100 MG/1
100 CAPSULE ORAL 2 TIMES DAILY
Qty: 10 CAPSULE | Refills: 0 | Status: SHIPPED | OUTPATIENT
Start: 2022-02-17 | End: 2022-02-22

## 2022-02-17 RX ORDER — PREDNISONE 10 MG/1
TABLET ORAL
Qty: 18 TABLET | Refills: 0 | Status: SHIPPED | OUTPATIENT
Start: 2022-02-17 | End: 2022-03-14

## 2022-02-17 NOTE — TELEPHONE ENCOUNTER
Phone call from Se[place]. States that she has been doing ok since using her action plan end of Dec.    Now she is having less secretions, but very sob with any activities.  Asking for her action plan to be started.    Will send Rx for prednisone 30mg daily x 3 days, 20mg daily x 3 days, 10mg daily x 3 days AND doxycycline bid x 5 days

## 2022-02-19 ASSESSMENT — ACTIVITIES OF DAILY LIVING (ADL): CURRENT_FUNCTION: SHOPPING REQUIRES ASSISTANCE

## 2022-02-21 ENCOUNTER — PREP FOR PROCEDURE (OUTPATIENT)
Dept: PODIATRY | Facility: CLINIC | Age: 77
End: 2022-02-21

## 2022-02-21 ENCOUNTER — OFFICE VISIT (OUTPATIENT)
Dept: PODIATRY | Facility: CLINIC | Age: 77
End: 2022-02-21
Payer: MEDICARE

## 2022-02-21 VITALS — OXYGEN SATURATION: 95 % | BODY MASS INDEX: 37.56 KG/M2 | HEIGHT: 64 IN | HEART RATE: 106 BPM | WEIGHT: 220 LBS

## 2022-02-21 DIAGNOSIS — M20.42 HAMMERTOE OF LEFT FOOT: Primary | ICD-10-CM

## 2022-02-21 DIAGNOSIS — M20.42 HAMMER TOE OF LEFT FOOT: Primary | ICD-10-CM

## 2022-02-21 PROCEDURE — 99213 OFFICE O/P EST LOW 20 MIN: CPT | Performed by: PODIATRIST

## 2022-02-21 RX ORDER — NYSTATIN AND TRIAMCINOLONE ACETONIDE 100000; 1 [USP'U]/G; MG/G
CREAM TOPICAL
COMMUNITY
Start: 2022-01-17 | End: 2022-03-14

## 2022-02-21 RX ORDER — LEVETIRACETAM 500 MG/1
500 TABLET ORAL 2 TIMES DAILY
COMMUNITY
Start: 2022-01-21 | End: 2022-02-22

## 2022-02-21 ASSESSMENT — PAIN SCALES - GENERAL: PAINLEVEL: EXTREME PAIN (8)

## 2022-02-21 NOTE — LETTER
2/21/2022         RE: Lalitha Underwood  [address] [facility] [address]        Dear Colleague,    Thank you for referring your patient, Lalitha Underwood, to the [facility]. Please see a copy of my visit note below.        FOOT AND ANKLE SURGERY/PODIATRY PROGRESS NOTE        ASSESSMENT: Hammertoe       TREATMENT:  -We discussed hammertoe surgery to prevent skin breakdown.      -Reviewed surgical procedure including post-op course to remain limited walking in a CAM boot x4 weeks. Reviewed potential risks of surgery including but not limited to infection, bleeding complications and need for additional surgery including amputation. She consents to surgery.     -BEBE's indicate healing potential based on TBI's.      -We will proceed with arthroplasty digits 4,5 left foot.      -I will ask my staff to coordinate surgery at [facility]. She will follow-up with Dr. Hull for a pre-op physical.     Alfonso Orozco DPM  [facility] Podiatry/Foot & Ankle Surgery      HPI: Lalitha Underwood was seen again today complaining of pain in the 4th and 5th digits with walking. She denies pain in previous surgically corrected hammertoes and would like to discuss surgery for the 4th and 5th toes today.      Past Medical History:   Diagnosis Date     Convulsive disorder (H)      Convulsive disorder (H)      COPD (chronic obstructive pulmonary disease) (H)      COPD (chronic obstructive pulmonary disease) (H)      Depression      Dyspnea on exertion      Gastroesophageal reflux disease      Heart murmur      Hernia of unspecified site of abdominal cavity without mention of obstruction or gangrene      Hiatal hernia      Hiatal hernia      Hypertension      Hypertension      Obese      On home oxygen therapy     at 1.5 liters at nite     Osteopenia      Osteopenia      Oxygen dependent     1.5 L per NC     Shortness of breath      Uncomplicated asthma      URI (upper respiratory  infection)      Venous insufficiency of both lower extremities      Venous insufficiency of both lower extremities      Walking troubles        Past Surgical History:   Procedure Laterality Date     ARTHROPLASTY TOE(S) Left 11/22/2021    Procedure: ARTHROPLASTY, digits two and three left foot;  Surgeon: Alfonso Orozco DPM;  Location: [facility]     ESOPHAGOSCOPY, GASTROSCOPY, DUODENOSCOPY (EGD), COMBINED N/A 11/26/2018    Procedure: Esophagogastroduodenoscopy;  Surgeon: Jasmeet Wilder MD;  Location: [facility] OR     HERNIA REPAIR, UMBILICAL  04/2018     HERNIA REPAIR, UMBILICAL  04/04/2018    Dr. 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     Repair arm fracture Left      SHOULDER SURGERY Right 1967     SHOULDER SURGERY Right 1967     US BIOPSY THYROID FINE NEEDLE ASPIRATION  7/29/2020       Allergies   Allergen Reactions     Clindamycin Rash     Carbamazepine Rash     Morphine Nausea         Current Outpatient Medications:      albuterol (PROAIR HFA/PROVENTIL HFA/VENTOLIN HFA) 108 (90 Base) MCG/ACT inhaler, Inhale 2 puffs into the lungs every 6 hours as needed, Disp: , Rfl:      benzonatate (TESSALON) 100 MG capsule, Take 1 capsule (100 mg) by mouth 3 times daily as needed for cough, Disp: 60 capsule, Rfl: 0     biotin 5 MG CAPS, Take 1 capsule by mouth daily, Disp: , Rfl:      calcium citrate (CITRACAL) 950 (200 Ca) MG tablet, Take 1 tablet by mouth, Disp: , Rfl:      denosumab (PROLIA) 60 MG/ML SOLN injection, Inject 60 mg Subcutaneous every 6 months, Disp: , Rfl:      doxycycline hyclate (VIBRAMYCIN) 100 MG capsule, Take 1 capsule (100 mg) by mouth 2 times daily for 5 days, Disp: 10 capsule, Rfl: 0     fluticasone-salmeterol (ADVAIR) 250-50 MCG/DOSE diskus inhaler, Inhale 1 puff into  the lungs 2 times daily, Disp: , Rfl:      hydrochlorothiazide (HYDRODIURIL) 25 MG tablet, hydrochlorothiazide 25 mg tablet  TAKE 1 TABLET BY MOUTH DAILY, Disp: , Rfl:      levETIRAcetam (KEPPRA) 500 MG tablet, Take 500 mg by mouth 2 times daily, Disp: , Rfl:      losartan (COZAAR) 50 MG tablet, Take 50 mg by mouth 2 times daily, Disp: , Rfl:      Magnesium Oxide 500 MG TABS, Take 500 mg by mouth 2 times daily, Disp: , Rfl:      nystatin (NYSTOP) 413615 UNIT/GM external powder, Nystop 100,000 unit/gram topical powder, Disp: , Rfl:      nystatin-triamcinolone (MYCOLOG II) 328774-7.1 UNIT/GM-% external cream, , Disp: , Rfl:      potassium chloride ER (KLOR-CON M) 20 MEQ CR tablet, Take 20 mEq by mouth, Disp: , Rfl:      predniSONE (DELTASONE) 10 MG tablet, Take 3 tabs daily x 3 days, THEN 2 tabs daily x 3 days, THEN 1 tab daily x 3 days, Disp: 18 tablet, Rfl: 0     RABEprazole (ACIPHEX) 20 MG EC tablet, TAKE 1 TABLET DAILY, Disp: 90 tablet, Rfl: 3     sertraline (ZOLOFT) 100 MG tablet, Take 1 tablet (100 mg) by mouth daily, Disp: 90 tablet, Rfl: 3     solifenacin (VESICARE) 5 MG tablet, Take 5 mg by mouth every morning, Disp: , Rfl:      tiotropium (SPIRIVA HANDIHALER) 18 MCG inhaled capsule, Spiriva with HandiHaler 18 mcg and inhalation capsules, Disp: , Rfl:      vitamin D3 (CHOLECALCIFEROL) 125 MCG (5000 UT) tablet, Take 5,000 Units by mouth, Disp: , Rfl:     Current Facility-Administered Medications:      denosumab (PROLIA) injection 60 mg, 60 mg, Subcutaneous, Q6 Months, Stephanie Norris, PharmD, 60 mg at 11/29/21 1124    Facility-Administered Medications Ordered in Other Visits:      sod bicarbonate-citric acid-simethicone (EZ GAS) 2.21-1.53-0.04 g packet 4 g, 4 g, Oral, Once, Ragini Arellano MD    Family History   Problem Relation Age of Onset     Pulmonary Hypertension Daughter         idiopathic      Heart Disease Other         2 sibs MI, 1 sib electrical conduction disorder     Breast Cancer Mother  "66.00     Hypertension Mother      Coronary Artery Disease Mother      Varicose Veins Mother      Hypertension Father      Coronary Artery Disease Sister      Heart Disease Sister      Diabetes Son      Pulmonary Hypertension Daughter      Coronary Artery Disease Sister      Heart Disease Sister        Social History     Socioeconomic History     Marital status:      Spouse name: Not on file     Number of children: Not on file     Years of education: Not on file     Highest education level: Not on file   Occupational History     Not on file   Tobacco Use     Smoking status: Former Smoker     Packs/day: 1.50     Years: 38.00     Pack years: 57.00     Types: Cigarettes     Quit date: 1998     Years since quittin.2     Smokeless tobacco: Never Used     Tobacco comment: quit in    Substance and Sexual Activity     Alcohol use: Yes     Alcohol/week: 2.0 standard drinks     Comment: occ     Drug use: Yes     Types: Oxycodone     Sexual activity: Never   Other Topics Concern     Not on file   Social History Narrative    .  Two kids.  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     Social Determinants of Health     Financial Resource Strain: Not on file   Food Insecurity: Not on file   Transportation Needs: Not on file   Physical Activity: Not on file   Stress: Not on file   Social Connections: Not on file   Intimate Partner Violence: Not on file   Housing Stability: Not on file       10 point Review of Systems is negative except for hammertoe which is noted in HPI.     Pulse 106   Ht 1.626 m (5' 4\")   Wt 99.8 kg (220 lb)   SpO2 95%   BMI 37.76 kg/m      BMI= Body mass index is 37.76 kg/m .    OBJECTIVE:  General appearance: Patient is alert and fully cooperative with history & exam.  No sign of distress is noted during the visit.    Vascular: Dorsalis pedis palpable left.   Dermatologic: Hyperkeratotic tissue distal 4th digit left foot.   Neurologic: All epicritic and proprioceptive sensations are grossly " intact left.  Musculoskeletal: Contracted digits 4,5 left foot. Mild pain distal 4th digit left foot.         Again, thank you for allowing me to participate in the care of your patient.        Sincerely,        Alfonso Orozco DPM

## 2022-02-21 NOTE — PROGRESS NOTES
FOOT AND ANKLE SURGERY/PODIATRY PROGRESS NOTE        ASSESSMENT: Hammertoe       TREATMENT:  -We discussed hammertoe surgery to prevent skin breakdown.      -Reviewed surgical procedure including post-op course to remain limited walking in a CAM boot x4 weeks. Reviewed potential risks of surgery including but not limited to infection, bleeding complications and need for additional surgery including amputation. She consents to surgery.     -BEBE's indicate healing potential based on TBI's.      -We will proceed with arthroplasty digits 4,5 left foot.      -I will ask my staff to coordinate surgery at [facility]. She will follow-up with Dr. Hull for a pre-op physical.     Alfonso Orozco DPM  [facility] Podiat[facility]/Foot & Ankle Surgery      HPI: Lalitha Underwood was seen again today complaining of pain in the 4th and 5th digits with walking. She denies pain in previous surgically corrected hammertoes and would like to discuss surgery for the 4th and 5th toes today.      Past Medical History:   Diagnosis Date     Convulsive disorder (H)      Convulsive disorder (H)      COPD (chronic obstructive pulmonary disease) (H)      COPD (chronic obstructive pulmonary disease) (H)      Depression      Dyspnea on exertion      Gastroesophageal reflux disease      Heart murmur      Hernia of unspecified site of abdominal cavity without mention of obstruction or gangrene      Hiatal hernia      Hiatal hernia      Hypertension      Hypertension      Obese      On home oxygen therapy     at 1.5 liters at nite     Osteopenia      Osteopenia      Oxygen dependent     1.5 L per NC     Shortness of breath      Uncomplicated asthma      URI (upper respiratory infection)      Venous insufficiency of both lower extremities      Venous insufficiency of both lower extremities      Walking troubles        Past Surgical History:   Procedure Laterality Date     ARTHROPLASTY TOE(S) Left 11/22/2021    Procedure:  ARTHROPLASTY, digits two and three left foot;  Surgeon: Alfonso Orozco DPM;  Location: [facility]     ESOPHAGOSCOPY, GASTROSCOPY, DUODENOSCOPY (EGD), COMBINED N/A 11/26/2018    Procedure: Esophagogastroduodenoscopy;  Surgeon: Jasmeet Wilder MD;  Location: [facility] OR     HERNIA REPAIR, UMBILICAL  04/2018     HERNIA REPAIR, UMBILICAL  04/04/2018    Dr. 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     Repair arm fracture Left      SHOULDER SURGERY Right 1967     SHOULDER SURGERY Right 1967     US BIOPSY THYROID FINE NEEDLE ASPIRATION  7/29/2020       Allergies   Allergen Reactions     Clindamycin Rash     Carbamazepine Rash     Morphine Nausea         Current Outpatient Medications:      albuterol (PROAIR HFA/PROVENTIL HFA/VENTOLIN HFA) 108 (90 Base) MCG/ACT inhaler, Inhale 2 puffs into the lungs every 6 hours as needed, Disp: , Rfl:      benzonatate (TESSALON) 100 MG capsule, Take 1 capsule (100 mg) by mouth 3 times daily as needed for cough, Disp: 60 capsule, Rfl: 0     biotin 5 MG CAPS, Take 1 capsule by mouth daily, Disp: , Rfl:      calcium citrate (CITRACAL) 950 (200 Ca) MG tablet, Take 1 tablet by mouth, Disp: , Rfl:      denosumab (PROLIA) 60 MG/ML SOLN injection, Inject 60 mg Subcutaneous every 6 months, Disp: , Rfl:      doxycycline hyclate (VIBRAMYCIN) 100 MG capsule, Take 1 capsule (100 mg) by mouth 2 times daily for 5 days, Disp: 10 capsule, Rfl: 0     fluticasone-salmeterol (ADVAIR) 250-50 MCG/DOSE diskus inhaler, Inhale 1 puff into the lungs 2 times daily, Disp: , Rfl:      hydrochlorothiazide (HYDRODIURIL) 25 MG tablet, hydrochlorothiazide 25 mg tablet  TAKE 1 TABLET BY MOUTH DAILY, Disp: , Rfl:      levETIRAcetam (KEPPRA) 500 MG tablet, Take 500 mg by mouth 2 times daily,  Disp: , Rfl:      losartan (COZAAR) 50 MG tablet, Take 50 mg by mouth 2 times daily, Disp: , Rfl:      Magnesium Oxide 500 MG TABS, Take 500 mg by mouth 2 times daily, Disp: , Rfl:      nystatin (NYSTOP) 052864 UNIT/GM external powder, Nystop 100,000 unit/gram topical powder, Disp: , Rfl:      nystatin-triamcinolone (MYCOLOG II) 027875-0.1 UNIT/GM-% external cream, , Disp: , Rfl:      potassium chloride ER (KLOR-CON M) 20 MEQ CR tablet, Take 20 mEq by mouth, Disp: , Rfl:      predniSONE (DELTASONE) 10 MG tablet, Take 3 tabs daily x 3 days, THEN 2 tabs daily x 3 days, THEN 1 tab daily x 3 days, Disp: 18 tablet, Rfl: 0     RABEprazole (ACIPHEX) 20 MG EC tablet, TAKE 1 TABLET DAILY, Disp: 90 tablet, Rfl: 3     sertraline (ZOLOFT) 100 MG tablet, Take 1 tablet (100 mg) by mouth daily, Disp: 90 tablet, Rfl: 3     solifenacin (VESICARE) 5 MG tablet, Take 5 mg by mouth every morning, Disp: , Rfl:      tiotropium (SPIRIVA HANDIHALER) 18 MCG inhaled capsule, Spiriva with HandiHaler 18 mcg and inhalation capsules, Disp: , Rfl:      vitamin D3 (CHOLECALCIFEROL) 125 MCG (5000 UT) tablet, Take 5,000 Units by mouth, Disp: , Rfl:     Current Facility-Administered Medications:      denosumab (PROLIA) injection 60 mg, 60 mg, Subcutaneous, Q6 Months, Stephanie Norris, PharmD, 60 mg at 11/29/21 1124    Facility-Administered Medications Ordered in Other Visits:      sod bicarbonate-citric acid-simethicone (EZ GAS) 2.21-1.53-0.04 g packet 4 g, 4 g, Oral, Once, Ragini Arellano MD    Family History   Problem Relation Age of Onset     Pulmonary Hypertension Daughter         idiopathic      Heart Disease Other         2 sibs MI, 1 sib electrical conduction disorder     Breast Cancer Mother 66.00     Hypertension Mother      Coronary Artery Disease Mother      Varicose Veins Mother      Hypertension Father      Coronary Artery Disease Sister      Heart Disease Sister      Diabetes Son      Pulmonary Hypertension Daughter      Coronary  "Artery Disease Sister      Heart Disease Sister        Social History     Socioeconomic History     Marital status:      Spouse name: Not on file     Number of children: Not on file     Years of education: Not on file     Highest education level: Not on file   Occupational History     Not on file   Tobacco Use     Smoking status: Former Smoker     Packs/day: 1.50     Years: 38.00     Pack years: 57.00     Types: Cigarettes     Quit date: 1998     Years since quittin.2     Smokeless tobacco: Never Used     Tobacco comment: quit in    Substance and Sexual Activity     Alcohol use: Yes     Alcohol/week: 2.0 standard drinks     Comment: occ     Drug use: Yes     Types: Oxycodone     Sexual activity: Never   Other Topics Concern     Not on file   Social History Narrative    .  Two kids.  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     Social Determinants of Health     Financial Resource Strain: Not on file   Food Insecurity: Not on file   Transportation Needs: Not on file   Physical Activity: Not on file   Stress: Not on file   Social Connections: Not on file   Intimate Partner Violence: Not on file   Housing Stability: Not on file       10 point Review of Systems is negative except for hammertoe which is noted in HPI.     Pulse 106   Ht 1.626 m (5' 4\")   Wt 99.8 kg (220 lb)   SpO2 95%   BMI 37.76 kg/m      BMI= Body mass index is 37.76 kg/m .    OBJECTIVE:  General appearance: Patient is alert and fully cooperative with history & exam.  No sign of distress is noted during the visit.    Vascular: Dorsalis pedis palpable left.   Dermatologic: Hyperkeratotic tissue distal 4th digit left foot.   Neurologic: All epicritic and proprioceptive sensations are grossly intact left.  Musculoskeletal: Contracted digits 4,5 left foot. Mild pain distal 4th digit left foot.     "

## 2022-02-21 NOTE — PATIENT INSTRUCTIONS
Lalitha,    Your surgery with [facility] has been scheduled. Please read thoroughly and follow instructions.     Procedure:     Procedure Date :   TBD    *A surgery nurse will call you 1-2 days before surgery to inform you of the time of arrival for surgery.  Surgeon:   Dr. Alfonso Orozco  Admission Type:   Outpatient  Procedure Location: TBD    Covid Test: SEE APPOINTMENTS  Post Operative Appointment: SEE APPOINTMENTS       Preparation Instructions to complete:    []  You will need a Pre-op Physical within 30 days before surgery with your primary care provider. This exam is required by the anesthesiologist to ensure a safe surgical experience.      Failure  to obtain your pre-op physical will lead to cancellation of your surgery    Call them right away to schedule this. Please ensure your Preoperative Physical is faxed to the Hospital (numbers listed above)    [] Preoperative Medication Instructions    We would like you to stop your anticoagulation medications 3-5 days before surgery HOWEVER contact your prescribing provider for instructions before discontinuing any medications. (Examples: Coumadin, Plavix, Xarelto, Eliquis, Pradaxa, Effient, Brilinta) If you are on Coumadin, we would like the goal INR ? 1.2.    IT IS OK TO STAY ON ASPIRIN PRIOR TO SURGERY.     Hold Ibuprofen, Herbal Supplements and Vitamin E 7 days before    Stop Cialis, Levitra and Viagra 2-3 days before surgery    [] Fasting Requirements    Nothing to eat for 8 hours before surgery unless instructed differently by the surgery nurse.    You may have clear liquids up to two hours before your arrival time (coffee, tea, water, or Gatorade. No cream or milk)    If your primary care provider has instructed you to continue oral medications, you may take them on the morning of surgery with a small sip of water.      No alcohol or smoking after 12:00am the day of surgery    [] PCR COVID-19 test is required within 4 days of  "surgery    If your test is positive or you fail to get tested, your surgery will be postponed.     [] Contact your insurance regarding coverage    If you would like a Good Anita Estimate for your upcoming procedure at [facility] Location, contact Cost of Care Estimates     Advocates are available Monday through Friday 8am - 5pm   [phone]    You may also submit a request online at [email or web address]  - Complete the secure online form found under \"Services and Procedure Pricing\"     If your procedure is at [facility] please contact the numbers below for Cost of Care Estimates.   - Facility Charge: [phone]    Anesthesiology charge:  [phone]      [] DO NOT BRING FMLA WITH TO SURGERY.  These should be sent to the provider's office by fax to [phone].     [] Day of Surgery    Medications - Take as indicated with sips of water.     Wear comfortable loose fitting clothes. Wear your glasses-Not contacts. Do not wear jewelry and remove body piercing's. Surgery may be cancelled if they are not removed.     You may have 1 family member wait in the lobby during your surgery. Visitor restrictions are subject to change. Please verify with the surgery nurse when they call.     If same day surgery-Have a someone come with you to surgery that can help you understand the surgeon's instructions, drive you home and stay with you overnight the first night.    [] If the community sees a new COVID-19 surge, your procedure may need to be postponed. We will contact you if this happens.    [] You will receive a call from a surgery nurse 1-3 days prior to surgery. They will go over more details with you.                                ** AFTER SURGERY INSTRUCTIONS **                          [] You are to remain NON WEIGHT BEARING on your left foot NON WEIGHT BEARING MEANS NO PRESSURE ON YOUR FOOT OR HEEL AT ANY TIME FOR ANY REASON!    [] Prior to surgery you will need to obtain a CAM BOOT " which you will need to WEAR THIS ANYTIME YOU ARE UP AND OUT OF BED, IT IS OKAY TO REMOVE WHEN YOU ARE SLEEPING. Please bring this with you on the day of surgery.    [] You will need to obtain a A ROLLING KNEE WALKER and A WHEELCHAIR to help you ambulate after surgery. Please also bring this with you on the day of surgery.    [] During surgery Dr. Orozco will apply a gauze dressing to your foot. This will remain intact until you are seen in clinic for follow up    [] It is NOT OKAY to get your surgical site wet while bathing, you will need to purchase a cast cover to protect your foot from getting wet. You can purchase this at [facility] or your local pharmacy.    [] It is important that you elevate your affected foot after surgery. Proper elevation is raising your foot above your waist. The fluid in your lower extremities needs to get back to your heart so it can get pumped to your kidneys and expelled through urination. So if you notice you have to go to the bathroom more frequently when you are elevating your leg this is a good sign that it is working.     [] It is important that you increase your protein intake after surgery. Protein is essential for wound healing. We recommend you take a protein supplement twice per day. This is in addition to your normal diet. Examples of protein supplements are Ensure, Boost, Glucerna (if you are diabetic), or protein powder. You can purchase these at your local retailer or grocery store.       Notify our office right away, if you have any changes in your health status, or if you develop a cold, flu, diarrhea, infection, fever or sore throat before your scheduled surgery date. We can be reached at [phone] Monday-Friday 8 am-4:30 pm if you have any questions.     Thank you for trusting us with your care!        Before Your Procedure or Hospital Admission  Testing for COVID-19 (Coronavirus)    Thank you for choosing [facility] for your health care needs.  The COVID-19 pandemic is a very challenging time for everyone. Our goal is to keep you and our team here at [facility] safe and healthy.       Before you come in, All patients must get tested for COVID-19. Your test needs to happen 2 to 4 days before you check in to the hospital or surgery site.    A clinic scheduler will call about a week in advance to set up a testing time at one of our labs. We ll take a swab of your nose or throat.    Note: If you go to a clinic or pharmacy like [facility] or [facility] for your test, make sure you get a test inside the nose. Tests inside the nose are:  - A naso-pharyngeal (NP) RT-PCR test  - An anterior nares RT-PCR test    Do NOT get a  rapid  test or a saliva (spit) test.    After the test, please stay at home and stay out of contact with other people. It will be harder for you to recover if you get COVID-19 before your treatment.    Please follow all current safety guidelines, including:    Limit trips outside your home.    Limit the number of people you see.    Always wear a mask outside your home.    Use social distancing. Stay 6 feet away from others whenever you can.    Wash your hands often.    If your test shows you have COVID-19  If your test is positive, we ll let you know. A positive test means that you have the virus.  We ll probably have to postpone your admission, surgery or procedure. Your doctor will discuss this with you. After that, we ll let you know what to do and when you can re-schedule.  We may need to cancel your treatment on short notice for other reasons, too.    If your test shows you DON T have COVID-19  Even if your test is negative, you can still get COVID-19. It s rare but, sometimes, the test result is wrong. You could also catch the virus after taking the test.  There s a very small chance that you could catch COVID-19 in the hospital or surgery center.    Day of your surgery or procedure    Please come wearing a face covering that covers both  your nose and mouth.    When you arrive, we ll ask you some questions to find out if you have any signs or symptoms of COVID-19.    Ask your care team if you can have visitors. All visitors must wear face coverings and will be screened for signs of COVID-19.    Even if no visitors are allowed, you can still have with you:  - Your legal guardian or legal decision maker  - A parent and one other visitor, if you are  younger than 18 years old  - A partner and a , if you are in labor    We might need to teach you about taking care of yourself after surgery. If so, a visitor can come into the hospital to learn about it, too.    The rules for visitors change often, depending on how much the virus is spreading. To learn more, see Visiting a Loved One in the Hospital during the COVID-19 Outbreak. Please call your care team, hospital or surgery center if you have any questions. We thank you for your understanding and for choosing [facility] for your care.    Questions and Answers  Does it matter where I get tested for COVID-19?  Yes. We urge you to get tested at one of our [facility] COVID-19 testing sites. We process these tests in our lab and can get the results quickly. Your [facility] care team needs to get your results before you check in.    What should I do if I can t get tested at [facility]?  You can get tested somewhere else, but you ll need to take these extra steps:  1. Contact your family doctor or clinic to arrange your test.  2. Take the test within 4 days of your surgery or procedure. We can t accept tests older than 4 days.  3. Make sure you re getting a test inside the nose.  Tests inside the nose are:  - A naso-pharyngeal (NP) RT-PCR test  - An anterior nares RT-PCR test  -Many pharmacies use  rapid  tests or saliva (spit) tests. We do NOT accept those tests before surgery or procedures. Tests from inside the nose are the most accurate tests.  4. Make sure your doctor or  clinic faxes your results to [facility] at [phone].    If we don t get your results in time, we may have to delay or cancel your treatment.      For informational purposes only. Not to replace the advice of your health care provider. Copyright   2020 [facility]. All rights reserved. Clinically reviewed by Infection Prevention and the [facility] COVID-19 Clinical Team.  [facility] 156590 - Rev 09/09/21.

## 2022-02-22 ENCOUNTER — OFFICE VISIT (OUTPATIENT)
Dept: FAMILY MEDICINE | Facility: CLINIC | Age: 77
End: 2022-02-22
Payer: MEDICARE

## 2022-02-22 VITALS
TEMPERATURE: 97.2 F | BODY MASS INDEX: 37.75 KG/M2 | DIASTOLIC BLOOD PRESSURE: 80 MMHG | WEIGHT: 221.1 LBS | RESPIRATION RATE: 20 BRPM | HEIGHT: 64 IN | SYSTOLIC BLOOD PRESSURE: 132 MMHG | HEART RATE: 89 BPM | OXYGEN SATURATION: 95 %

## 2022-02-22 DIAGNOSIS — N39.41 URGE INCONTINENCE OF URINE: ICD-10-CM

## 2022-02-22 DIAGNOSIS — R73.01 IMPAIRED FASTING GLUCOSE: ICD-10-CM

## 2022-02-22 DIAGNOSIS — J43.9 PULMONARY EMPHYSEMA, UNSPECIFIED EMPHYSEMA TYPE (H): ICD-10-CM

## 2022-02-22 DIAGNOSIS — K21.9 GASTROESOPHAGEAL REFLUX DISEASE WITHOUT ESOPHAGITIS: ICD-10-CM

## 2022-02-22 DIAGNOSIS — E83.42 HYPOMAGNESEMIA: ICD-10-CM

## 2022-02-22 DIAGNOSIS — I10 ESSENTIAL HYPERTENSION: ICD-10-CM

## 2022-02-22 DIAGNOSIS — E66.01 MORBID OBESITY (H): ICD-10-CM

## 2022-02-22 DIAGNOSIS — E55.9 VITAMIN D DEFICIENCY: ICD-10-CM

## 2022-02-22 DIAGNOSIS — I73.9 PAD (PERIPHERAL ARTERY DISEASE) (H): ICD-10-CM

## 2022-02-22 DIAGNOSIS — Z00.00 ENCOUNTER FOR MEDICARE ANNUAL WELLNESS EXAM: Primary | ICD-10-CM

## 2022-02-22 DIAGNOSIS — F41.9 ANXIETY: ICD-10-CM

## 2022-02-22 DIAGNOSIS — G40.919 INTRACTABLE EPILEPSY WITHOUT STATUS EPILEPTICUS, UNSPECIFIED EPILEPSY TYPE (H): ICD-10-CM

## 2022-02-22 LAB
ALBUMIN SERPL-MCNC: 3.8 G/DL (ref 3.5–5)
ALP SERPL-CCNC: 61 U/L (ref 45–120)
ALT SERPL W P-5'-P-CCNC: <9 U/L (ref 0–45)
ANION GAP SERPL CALCULATED.3IONS-SCNC: 11 MMOL/L (ref 5–18)
AST SERPL W P-5'-P-CCNC: 10 U/L (ref 0–40)
BILIRUB SERPL-MCNC: 0.3 MG/DL (ref 0–1)
BUN SERPL-MCNC: 25 MG/DL (ref 8–28)
CALCIUM SERPL-MCNC: 9.9 MG/DL (ref 8.5–10.5)
CHLORIDE BLD-SCNC: 99 MMOL/L (ref 98–107)
CHOLEST SERPL-MCNC: 181 MG/DL
CO2 SERPL-SCNC: 27 MMOL/L (ref 22–31)
CREAT SERPL-MCNC: 0.82 MG/DL (ref 0.6–1.1)
ERYTHROCYTE [DISTWIDTH] IN BLOOD BY AUTOMATED COUNT: 13.6 % (ref 10–15)
FASTING STATUS PATIENT QL REPORTED: YES
GFR SERPL CREATININE-BSD FRML MDRD: 74 ML/MIN/1.73M2
GLUCOSE BLD-MCNC: 93 MG/DL (ref 70–125)
HBA1C MFR BLD: 5.4 % (ref 0–5.6)
HCT VFR BLD AUTO: 42.4 % (ref 35–47)
HDLC SERPL-MCNC: 65 MG/DL
HGB BLD-MCNC: 14 G/DL (ref 11.7–15.7)
LDLC SERPL CALC-MCNC: 95 MG/DL
MAGNESIUM SERPL-MCNC: 1.6 MG/DL (ref 1.8–2.6)
MCH RBC QN AUTO: 28.6 PG (ref 26.5–33)
MCHC RBC AUTO-ENTMCNC: 33 G/DL (ref 31.5–36.5)
MCV RBC AUTO: 87 FL (ref 78–100)
PLATELET # BLD AUTO: 243 10E3/UL (ref 150–450)
POTASSIUM BLD-SCNC: 4.5 MMOL/L (ref 3.5–5)
PROT SERPL-MCNC: 6.8 G/DL (ref 6–8)
RBC # BLD AUTO: 4.89 10E6/UL (ref 3.8–5.2)
SODIUM SERPL-SCNC: 137 MMOL/L (ref 136–145)
TRIGL SERPL-MCNC: 103 MG/DL
WBC # BLD AUTO: 12.5 10E3/UL (ref 4–11)

## 2022-02-22 PROCEDURE — 83036 HEMOGLOBIN GLYCOSYLATED A1C: CPT | Performed by: NURSE PRACTITIONER

## 2022-02-22 PROCEDURE — 36415 COLL VENOUS BLD VENIPUNCTURE: CPT | Performed by: NURSE PRACTITIONER

## 2022-02-22 PROCEDURE — 82306 VITAMIN D 25 HYDROXY: CPT | Performed by: NURSE PRACTITIONER

## 2022-02-22 PROCEDURE — 80061 LIPID PANEL: CPT | Performed by: NURSE PRACTITIONER

## 2022-02-22 PROCEDURE — 83735 ASSAY OF MAGNESIUM: CPT | Performed by: NURSE PRACTITIONER

## 2022-02-22 PROCEDURE — 80053 COMPREHEN METABOLIC PANEL: CPT | Performed by: NURSE PRACTITIONER

## 2022-02-22 PROCEDURE — G0439 PPPS, SUBSEQ VISIT: HCPCS | Performed by: NURSE PRACTITIONER

## 2022-02-22 PROCEDURE — 85027 COMPLETE CBC AUTOMATED: CPT | Performed by: NURSE PRACTITIONER

## 2022-02-22 RX ORDER — TIOTROPIUM BROMIDE 18 UG/1
CAPSULE ORAL; RESPIRATORY (INHALATION)
Qty: 90 CAPSULE | Refills: 3 | Status: SHIPPED | OUTPATIENT
Start: 2022-02-22 | End: 2023-01-03

## 2022-02-22 RX ORDER — HYDROCHLOROTHIAZIDE 25 MG/1
TABLET ORAL
Qty: 90 TABLET | Refills: 3 | Status: SHIPPED | OUTPATIENT
Start: 2022-02-22 | End: 2023-04-27

## 2022-02-22 RX ORDER — POTASSIUM CHLORIDE 1500 MG/1
20 TABLET, EXTENDED RELEASE ORAL DAILY
Qty: 90 TABLET | Refills: 3 | Status: SHIPPED | OUTPATIENT
Start: 2022-02-22 | End: 2023-04-27

## 2022-02-22 RX ORDER — RABEPRAZOLE SODIUM 20 MG/1
1 TABLET, DELAYED RELEASE ORAL DAILY
Qty: 90 TABLET | Refills: 3 | Status: SHIPPED | OUTPATIENT
Start: 2022-02-22 | End: 2022-10-26

## 2022-02-22 RX ORDER — SOLIFENACIN SUCCINATE 5 MG/1
5 TABLET, FILM COATED ORAL EVERY MORNING
Qty: 90 TABLET | Refills: 3 | Status: SHIPPED | OUTPATIENT
Start: 2022-02-22 | End: 2022-04-04

## 2022-02-22 RX ORDER — ALBUTEROL SULFATE 90 UG/1
2 AEROSOL, METERED RESPIRATORY (INHALATION) EVERY 6 HOURS PRN
Qty: 18 G | Refills: 1 | Status: SHIPPED | OUTPATIENT
Start: 2022-02-22 | End: 2023-04-27

## 2022-02-22 RX ORDER — NYSTATIN AND TRIAMCINOLONE ACETONIDE 100000; 1 [USP'U]/G; MG/G
CREAM TOPICAL
Refills: 3 | Status: CANCELLED | OUTPATIENT
Start: 2022-02-22

## 2022-02-22 RX ORDER — SERTRALINE HYDROCHLORIDE 100 MG/1
100 TABLET, FILM COATED ORAL DAILY
Qty: 90 TABLET | Refills: 3 | Status: SHIPPED | OUTPATIENT
Start: 2022-02-22 | End: 2023-04-27

## 2022-02-22 RX ORDER — LOSARTAN POTASSIUM 50 MG/1
50 TABLET ORAL 2 TIMES DAILY
Qty: 90 TABLET | Refills: 3 | Status: SHIPPED | OUTPATIENT
Start: 2022-02-22 | End: 2022-11-03

## 2022-02-22 RX ORDER — LEVETIRACETAM 500 MG/1
500 TABLET ORAL 2 TIMES DAILY
Qty: 180 TABLET | Refills: 3 | Status: SHIPPED | OUTPATIENT
Start: 2022-02-22 | End: 2023-04-27

## 2022-02-22 ASSESSMENT — ACTIVITIES OF DAILY LIVING (ADL): CURRENT_FUNCTION: SHOPPING REQUIRES ASSISTANCE

## 2022-02-22 NOTE — PROGRESS NOTES
"  Answers for HPI/ROS submitted by the patient on 2/19/2022  In general, how would you rate your overall physical health?: good  Frequency of exercise:: 4-5 days/week  Do you usually eat at least 4 servings of fruit and vegetables a day, include whole grains & fiber, and avoid regularly eating high fat or \"junk\" foods? : Yes  Taking medications regularly:: Yes  Medication side effects:: None  Activities of Daily Living: shopping requires assistance  Home safety: no safety concerns identified  Hearing Impairment:: difficulty understanding soft or whispered speech  In the past 6 months, have you been bothered by leaking of urine?: Yes  In general, how would you rate your overall mental or emotional health?: good  Duration of exercise:: 15-30 minutes      "

## 2022-02-22 NOTE — PATIENT INSTRUCTIONS
Patient Education   Personalized Prevention Plan  You are due for the preventive services outlined below.  Your care team is available to assist you in scheduling these services.  If you have already completed any of these items, please share that information with your care team to update in your medical record.  Health Maintenance Due   Topic Date Due     ANNUAL REVIEW OF HM ORDERS  Never done     COPD Action Plan  Never done     FALL RISK ASSESSMENT  02/12/2022     Activities of Daily Living    Your Health Risk Assessment indicates you have difficulties with activities of daily living such as housework, bathing, preparing meals, taking medication, etc. Please make a follow up appointment for us to address this issue in more detail.    Signs of Hearing Loss      Hearing much better with one ear can be a sign of hearing loss.   Hearing loss is a problem shared by many people. In fact, it is one of the most common health problems, particularly as people age. Most people age 65 and older have some hearing loss. By age 80, almost everyone does. Hearing loss often occurs slowly over the years. So you may not realize your hearing has gotten worse.  Have your hearing checked  Call your healthcare provider if you:    Have to strain to hear normal conversation    Have to watch other people s faces very carefully to follow what they re saying    Need to ask people to repeat what they ve said    Often misunderstand what people are saying    Turn the volume of the television or radio up so high that others complain    Feel that people are mumbling when they re talking to you    Find that the effort to hear leaves you feeling tired and irritated    Notice, when using the phone, that you hear better with one ear than the other  [employer] last reviewed this educational content on 1/1/2020 2000-2021 [employer]. All rights reserved. This information is not intended as a substitute for professional medical care.  Always follow your healthcare professional's instructions.          Urinary Incontinence, Female (Adult)   Urinary incontinence means loss of bladder control. This problem affects many women, especially as they get older. If you have incontinence, you may be embarrassed to ask for help. But know that this problem can be treated.   Types of Incontinence  There are different types of incontinence. Two of the main types are described here. You can have more than one type.     Stress incontinence. With this type, urine leaks when pressure (stress) is put on the bladder. This may happen when you cough, sneeze, or laugh. Stress incontinence most often occurs because the pelvic floor muscles that support the bladder and urethra are weak. This can happen after pregnancy and vaginal childbirth or a hysterectomy. It can also be due to excess body weight or hormone changes.    Urge incontinence (also called overactive bladder). With this type, a sudden urge to urinate is felt often. This may happen even though there may not be much urine in the bladder. The need to urinate often during the night is common. Urge incontinence most often occurs because of bladder spasms. This may be due to bladder irritation or infection. Damage to bladder nerves or pelvic muscles, constipation, and certain medicines can also lead to urge incontinence.  Treatment depends on the cause. Further evaluation is needed to find the type you have. This will likely include an exam and certain tests. Based on the results, you and your healthcare provider can then plan treatment. Until a diagnosis is made, the home care tips below can help ease symptoms.   Home care    Do pelvic floor muscle exercises, if they are prescribed. The pelvic floor muscles help support the bladder and urethra. Many women find that their symptoms improve when doing special exercises that strengthen these muscles. To do the exercises, contract the muscles you would use to stop your  stream of urine. But do this when you re not urinating. Hold for 10 seconds, then relax. Repeat 10 to 20 times in a row, at least 3 times a day. Your healthcare provider may give you other instructions for how to do the exercises and how often.    Keep a bladder diary. This helps track how often and how much you urinate over a set period of time. Bring this diary with you to your next visit with the provider. The information can help your provider learn more about your bladder problem.    Lose weight, if advised to by your provider. Extra weight puts pressure on the bladder. Your provider can help you create a weight-loss plan that s right for you. This may include exercising more and making certain diet changes.    Don't have foods and drinks that may irritate the bladder. These can include alcohol and caffeinated drinks.    Quit smoking. Smoking and other tobacco use can lead to a long-term (chronic) cough that strains the pelvic floor muscles. Smoking may also damage the bladder and urethra. Talk with your provider about treatments or methods you can use to quit smoking.    If drinking large amounts of fluid makes you have symptoms, you may be advised to limit your fluid intake. You may also be advised to drink most of your fluids during the day and to limit fluids at night.    If you re worried about urine leakage or accidents, you may wear absorbent pads to catch urine. Change the pads often. This helps reduce discomfort. It may also reduce the risk of skin or bladder infections.    Follow-up care  Follow up with your healthcare provider, or as directed. It may take some to find the right treatment for your problem. But healthy lifestyle changes can be made right away. These include such things as exercising on a regular basis, eating a healthy diet, losing weight (if needed), and quitting smoking. Your treatment plan may include special therapies or medicines. Certain procedures or surgery may also be options.  Talk about any questions you have with your provider.   When to seek medical advice  Call the healthcare provider right away if any of these occur:    Fever of 100.4 F (38 C) or higher, or as directed by your provider    Bladder pain or fullness    Belly swelling    Nausea or vomiting    Back pain    Weakness, dizziness, or fainting  [employer] last reviewed this educational content on 1/1/2020 2000-2021 [employer]. All rights reserved. This information is not intended as a substitute for professional medical care. Always follow your healthcare professional's instructions.           Patient Education   Personalized Prevention Plan  You are due for the preventive services outlined below.  Your care team is available to assist you in scheduling these services.  If you have already completed any of these items, please share that information with your care team to update in your medical record.  Health Maintenance Due   Topic Date Due     ANNUAL REVIEW OF HM ORDERS  Never done     COPD Action Plan  Never done     FALL RISK ASSESSMENT  02/12/2022     Activities of Daily Living    Your Health Risk Assessment indicates you have difficulties with activities of daily living such as housework, bathing, preparing meals, taking medication, etc. Please make a follow up appointment for us to address this issue in more detail.    Signs of Hearing Loss      Hearing much better with one ear can be a sign of hearing loss.   Hearing loss is a problem shared by many people. In fact, it is one of the most common health problems, particularly as people age. Most people age 65 and older have some hearing loss. By age 80, almost everyone does. Hearing loss often occurs slowly over the years. So you may not realize your hearing has gotten worse.  Have your hearing checked  Call your healthcare provider if you:    Have to strain to hear normal conversation    Have to watch other people s faces very carefully to follow what  they re saying    Need to ask people to repeat what they ve said    Often misunderstand what people are saying    Turn the volume of the television or radio up so high that others complain    Feel that people are mumbling when they re talking to you    Find that the effort to hear leaves you feeling tired and irritated    Notice, when using the phone, that you hear better with one ear than the other  [employer] last reviewed this educational content on 1/1/2020 2000-2021 [employer]. All rights reserved. This information is not intended as a substitute for professional medical care. Always follow your healthcare professional's instructions.          Urinary Incontinence, Female (Adult)   Urinary incontinence means loss of bladder control. This problem affects many women, especially as they get older. If you have incontinence, you may be embarrassed to ask for help. But know that this problem can be treated.   Types of Incontinence  There are different types of incontinence. Two of the main types are described here. You can have more than one type.     Stress incontinence. With this type, urine leaks when pressure (stress) is put on the bladder. This may happen when you cough, sneeze, or laugh. Stress incontinence most often occurs because the pelvic floor muscles that support the bladder and urethra are weak. This can happen after pregnancy and vaginal childbirth or a hysterectomy. It can also be due to excess body weight or hormone changes.    Urge incontinence (also called overactive bladder). With this type, a sudden urge to urinate is felt often. This may happen even though there may not be much urine in the bladder. The need to urinate often during the night is common. Urge incontinence most often occurs because of bladder spasms. This may be due to bladder irritation or infection. Damage to bladder nerves or pelvic muscles, constipation, and certain medicines can also lead to urge  incontinence.  Treatment depends on the cause. Further evaluation is needed to find the type you have. This will likely include an exam and certain tests. Based on the results, you and your healthcare provider can then plan treatment. Until a diagnosis is made, the home care tips below can help ease symptoms.   Home care    Do pelvic floor muscle exercises, if they are prescribed. The pelvic floor muscles help support the bladder and urethra. Many women find that their symptoms improve when doing special exercises that strengthen these muscles. To do the exercises, contract the muscles you would use to stop your stream of urine. But do this when you re not urinating. Hold for 10 seconds, then relax. Repeat 10 to 20 times in a row, at least 3 times a day. Your healthcare provider may give you other instructions for how to do the exercises and how often.    Keep a bladder diary. This helps track how often and how much you urinate over a set period of time. Bring this diary with you to your next visit with the provider. The information can help your provider learn more about your bladder problem.    Lose weight, if advised to by your provider. Extra weight puts pressure on the bladder. Your provider can help you create a weight-loss plan that s right for you. This may include exercising more and making certain diet changes.    Don't have foods and drinks that may irritate the bladder. These can include alcohol and caffeinated drinks.    Quit smoking. Smoking and other tobacco use can lead to a long-term (chronic) cough that strains the pelvic floor muscles. Smoking may also damage the bladder and urethra. Talk with your provider about treatments or methods you can use to quit smoking.    If drinking large amounts of fluid makes you have symptoms, you may be advised to limit your fluid intake. You may also be advised to drink most of your fluids during the day and to limit fluids at night.    If you re worried about  urine leakage or accidents, you may wear absorbent pads to catch urine. Change the pads often. This helps reduce discomfort. It may also reduce the risk of skin or bladder infections.    Follow-up care  Follow up with your healthcare provider, or as directed. It may take some to find the right treatment for your problem. But healthy lifestyle changes can be made right away. These include such things as exercising on a regular basis, eating a healthy diet, losing weight (if needed), and quitting smoking. Your treatment plan may include special therapies or medicines. Certain procedures or surgery may also be options. Talk about any questions you have with your provider.   When to seek medical advice  Call the healthcare provider right away if any of these occur:    Fever of 100.4 F (38 C) or higher, or as directed by your provider    Bladder pain or fullness    Belly swelling    Nausea or vomiting    Back pain    Weakness, dizziness, or fainting  [employer] last reviewed this educational content on 1/1/2020 2000-2021 [employer]. All rights reserved. This information is not intended as a substitute for professional medical care. Always follow your healthcare professional's instructions.

## 2022-02-22 NOTE — PROGRESS NOTES
The patient reports that she has difficulty with activities of daily living. I have asked that the patient make a follow up appointment in *** weeks where this issue will be further evaluated and addressed.  The patient was provided with written information regarding signs of hearing loss.  Information on urinary incontinence and treatment options given to patient.

## 2022-02-22 NOTE — PROGRESS NOTES
"SUBJECTIVE:   Lalitha Underwood is a 76 year old female who presents for Preventive Visit.    Patient is here today for Medicare annual wellness visit.  Overall doing well and has no specific concerns at this time.  She is considering switching clinics for the sake of convenience.  Medical history is significant for emphysema, PAD, epilepsy, osteoporosis, hypertension, impaired fasting glucose, anxiety and reflux.  Overall feels that chronic conditions are well managed on current medications.  No medication concerns or side effects.  Blood pressure remains well controlled on hydrochlorothiazide and losartan.  She continues sertraline for anxiety and feels that 100 mg is working well for her currently.  She follows with pulmonology for COPD and remains compliant with Spiriva and albuterol.  Currently being treated for upper respiratory tract infection with antibiotic and prednisone.  She has a couple days left of the prednisone.  She follows with Lalitha Dozier for osteoporosis management.  Doing well with Prolia injections.  She is up-to-date on immunizations and routine healthcare maintenance.      Are you in the first 12 months of your Medicare coverage?  No    Healthy Habits:     In general, how would you rate your overall health?  Good    Frequency of exercise:  4-5 days/week    Duration of exercise:  15-30 minutes    Do you usually eat at least 4 servings of fruit and vegetables a day, include whole grains    & fiber and avoid regularly eating high fat or \"junk\" foods?  Yes    Taking medications regularly:  Yes    Medication side effects:  None    Ability to successfully perform activities of daily living:  Shopping requires assistance    Home Safety:  No safety concerns identified    Hearing Impairment:  Difficulty understanding soft or whispered speech    In the past 6 months, have you been bothered by leaking of urine? Yes    In general, how would you rate your overall mental or emotional health?  Good      " PHQ-2 Total Score: 0    Do you feel safe in your environment? Yes    Have you ever done Advance Care Planning? (For example, a Health Directive, POLST, or a discussion with a medical provider or your loved ones about your wishes): No, advance care planning information given to patient to review.  Patient plans to discuss their wishes with loved ones or provider.        Fall risk     click delete button to remove this line now  Cognitive Screening   1) Repeat 3 items (Leader, Season, Table)    2) Clock draw: NORMAL  3) 3 item recall:  Recalls 3 objects  Results: 3 items recalled: COGNITIVE IMPAIRMENT LESS LIKELY    Mini-CogTM Copyright [employer]. Licensed by the author for use in [facility]; reprinted with permission (soob@[email or web address]). All rights reserved.      Do you have sleep apnea, excessive snoring or daytime drowsiness?: yes    Reviewed and updated as needed this visit by clinical staff   Tobacco  Allergies  Meds              Reviewed and updated as needed this visit by Provider                 Social History     Tobacco Use     Smoking status: Former Smoker     Packs/day: 1.50     Years: 38.00     Pack years: 57.00     Types: Cigarettes     Quit date: 1998     Years since quittin.2     Smokeless tobacco: Never Used     Tobacco comment: quit in    Substance Use Topics     Alcohol use: Yes     Alcohol/week: 2.0 standard drinks     Comment: occ         Alcohol Use 2022   Prescreen: >3 drinks/day or >7 drinks/week? No         Current providers sharing in care for this patient include:   Patient Care Team:  Kelly Hull MD as PCP - General (Family Practice)  Karissa Felipe, PharmD as Pharmacist (Pharmacist)  Kelly Hull MD as Assigned PCP  Lalitha Haq NP as Nurse Practitioner  Jyoti Shultz MD as Assigned Neuroscience Provider  Perry Tamayo MD as Assigned Pulmonology Provider  Alfonso Orozco DPM as Assigned Musculoskeletal  "Provider    The following health maintenance items are reviewed in Epic and correct as of today:  Health Maintenance Due   Topic Date Due     ANNUAL REVIEW OF HM ORDERS  Never done     COPD ACTION PLAN  Never done     FALL RISK ASSESSMENT  02/12/2022     MEDICARE ANNUAL WELLNESS VISIT  02/12/2022     Lab work is in process      Mammogram Screening - Patient over age 75, has elected to continue with screening.  Pertinent mammograms are reviewed under the imaging tab.    Review of Systems  Review of Systems - pertinent positives noted in HPI, otherwise ROS is negative.      OBJECTIVE:   /80   Pulse 89   Temp 97.2  F (36.2  C) (Tympanic)   Resp 20   Ht 1.626 m (5' 4\")   Wt 100.3 kg (221 lb 1.6 oz)   SpO2 95%   BMI 37.95 kg/m   Estimated body mass index is 37.95 kg/m  as calculated from the following:    Height as of this encounter: 1.626 m (5' 4\").    Weight as of this encounter: 100.3 kg (221 lb 1.6 oz).  Physical Exam  GENERAL APPEARANCE: healthy, alert and no distress  EYES: Eyes grossly normal to inspection, PERRL and conjunctivae and sclerae normal  HENT: ear canals and TM's normal, nose and mouth without ulcers or lesions, oropharynx clear and oral mucous membranes moist  NECK: no adenopathy, no asymmetry, masses, or scars and thyroid normal to palpation  RESP: lungs clear to auscultation - no rales, rhonchi or wheezes  BREAST: normal without masses, tenderness or nipple discharge and no palpable axillary masses or adenopathy  CV: regular rate and rhythm, normal S1 S2, no S3 or S4, no murmur, click or rub, no peripheral edema and peripheral pulses strong  ABDOMEN: soft, nontender, no hepatosplenomegaly, no masses and bowel sounds normal  MS: no musculoskeletal defects are noted and gait is age appropriate without ataxia  SKIN: no suspicious lesions or rashes  NEURO: Normal strength and tone, sensory exam grossly normal, mentation intact and speech normal.  Uses walker to ambulate.  PSYCH: mentation " appears normal and affect normal/bright    Diagnostic Test Results:  Labs reviewed in Epic  none     ASSESSMENT / PLAN:     1. Encounter for Medicare annual wellness exam  Medicare annual wellness visit completed today.  Reviewed routine healthcare maintenance including importance of maintaining healthy weight, falls prevention, bone health, mental health.  She is up-to-date on bone density scan, mammogram.  Will obtain labs as noted below and follow-up with results when available.  Return to clinic in 3 months for medication check or sooner with any new concerns.    2. Anxiety  She remains on sertraline 100 mg well and feels that this dose is working well to manage symptoms.  She will notify us of any worsening anxiety.   - sertraline (ZOLOFT) 100 MG tablet; Take 1 tablet (100 mg) by mouth daily  Dispense: 90 tablet; Refill: 3    3. Pulmonary emphysema, unspecified emphysema type (H)  She follows with pulmonology for management of her pulmonary emphysema.  She continues use of Advair daily albuterol on an as-needed basis.  Denies any new concerns in this regard today.  - albuterol (PROAIR HFA/PROVENTIL HFA/VENTOLIN HFA) 108 (90 Base) MCG/ACT inhaler; Inhale 2 puffs into the lungs every 6 hours as needed  Dispense: 18 g; Refill: 1  - fluticasone-salmeterol (ADVAIR) 250-50 MCG/DOSE inhaler; Inhale 1 puff into the lungs 2 times daily  Dispense: 2 each; Refill: 1  - tiotropium (SPIRIVA HANDIHALER) 18 MCG inhaled capsule; Spiriva with HandiHaler 18 mcg and inhalation capsules  Dispense: 90 capsule; Refill: 3    4. Morbid obesity (H)  Encouraged healthy lifestyle efforts in regards to diet and exercise to promote weight loss.  We will check fasting lipids today.  - CBC with platelets; Future  - Lipid panel reflex to direct LDL Fasting; Future  - CBC with platelets  - Lipid panel reflex to direct LDL Fasting    5. Intractable epilepsy without status epilepticus, unspecified epilepsy type (H)  Reviewed her history of  epilepsy, has not had a seizure in over 20 years.  Does not follow with neurology on a regular basis.  Doing well on current dose of Keppra.  Denies side effect from the medication.  - levETIRAcetam (KEPPRA) 500 MG tablet; Take 1 tablet (500 mg) by mouth 2 times daily  Dispense: 180 tablet; Refill: 3    6. Essential hypertension  Blood pressure remains adequately controlled on hydrochlorothiazide and losartan.  Will check metabolic panel today to ensure stable renal function and electrolytes.  - hydrochlorothiazide (HYDRODIURIL) 25 MG tablet; hydrochlorothiazide 25 mg tablet   TAKE 1 TABLET BY MOUTH DAILY  Dispense: 90 tablet; Refill: 3  - losartan (COZAAR) 50 MG tablet; Take 1 tablet (50 mg) by mouth 2 times daily  Dispense: 90 tablet; Refill: 3  - potassium chloride ER (KLOR-CON M) 20 MEQ CR tablet; Take 1 tablet (20 mEq) by mouth daily  Dispense: 90 tablet; Refill: 3  - Comprehensive metabolic panel (BMP + Alb, Alk Phos, ALT, AST, Total. Bili, TP); Future  - Comprehensive metabolic panel (BMP + Alb, Alk Phos, ALT, AST, Total. Bili, TP)    7. Impaired fasting glucose  Reviewed history of impaired fasting glucose.  Will check hemoglobin A1c today.  - Hemoglobin A1c; Future  - Hemoglobin A1c    8. Gastroesophageal reflux disease without esophagitis  Chronic and stable.  We will continue rabeprazole  - RABEprazole (ACIPHEX) 20 MG EC tablet; Take 1 tablet (20 mg) by mouth daily  Dispense: 90 tablet; Refill: 3    9. PAD (peripheral artery disease) (H)  This is chronic.  No concern for worsening symptoms today.  She will continue to closely monitor.    10. Vitamin D deficiency  History of vitamin D deficiency noted.  Will check vitamin D level.  Encouraged adequate calcium and vitamin D supplementation  - Vitamin D deficiency screening; Future  - Vitamin D deficiency screening    11. Urge incontinence of urine  Review history of her incontinence.  She is doing well on Vesicare.  - solifenacin (VESICARE) 5 MG tablet;  "Take 1 tablet (5 mg) by mouth every morning  Dispense: 90 tablet; Refill: 3    12. Hypomagnesemia  She has had low magnesium in the past and needs a mesiobuccal condition.  Will check magnesium level today.  - Magnesium; Future  - Magnesium      Patient has been advised of split billing requirements and indicates understanding: Yes    COUNSELING:  Reviewed preventive health counseling, as reflected in patient instructions       Regular exercise       Healthy diet/nutrition       Dental care       Fall risk prevention       Osteoporosis prevention/bone health    Estimated body mass index is 37.95 kg/m  as calculated from the following:    Height as of this encounter: 1.626 m (5' 4\").    Weight as of this encounter: 100.3 kg (221 lb 1.6 oz).    Weight management plan: Discussed healthy diet and exercise guidelines    She reports that she quit smoking about 23 years ago. Her smoking use included cigarettes. She has a 57.00 pack-year smoking history. She has never used smokeless tobacco.      Appropriate preventive services were discussed with this patient, including applicable screening as appropriate for cardiovascular disease, diabetes, osteopenia/osteoporosis, and glaucoma.  As appropriate for age/gender, discussed screening for colorectal cancer, prostate cancer, breast cancer, and cervical cancer. Checklist reviewing preventive services available has been given to the patient.    Reviewed patients plan of care and provided an AVS. The Basic Care Plan (routine screening as documented in Health Maintenance) for Lalitha meets the Care Plan requirement. This Care Plan has been established and reviewed with the Patient.    Counseling Resources:  ATP IV Guidelines  Pooled Cohorts Equation Calculator  Breast Cancer Risk Calculator  Breast Cancer: Medication to Reduce Risk  FRAX Risk Assessment  ICSI Preventive Guidelines  Dietary Guidelines for Americans, 2010  USDA's MyPlate  ASA Prophylaxis  Lung CA Screening    Loretta " DONAVAN [facility]    Identified Health Risks:    The patient reports that she has difficulty with activities of daily living. I have asked that the patient make a follow up appointment in 3 months where this issue will be further evaluated and addressed.  The patient was provided with written information regarding signs of hearing loss.  Information on urinary incontinence and treatment options given to patient.

## 2022-02-23 ENCOUNTER — HOSPITAL ENCOUNTER (OUTPATIENT)
Facility: AMBULATORY SURGERY CENTER | Age: 77
End: 2022-02-23
Attending: PODIATRIST
Payer: MEDICARE

## 2022-02-23 ENCOUNTER — DOCUMENTATION ONLY (OUTPATIENT)
Dept: PODIATRY | Facility: CLINIC | Age: 77
End: 2022-02-23
Payer: MEDICARE

## 2022-02-23 ENCOUNTER — TELEPHONE (OUTPATIENT)
Dept: PODIATRY | Facility: CLINIC | Age: 77
End: 2022-02-23
Payer: MEDICARE

## 2022-02-23 DIAGNOSIS — Z20.822 COVID-19 RULED OUT: Primary | ICD-10-CM

## 2022-02-23 PROBLEM — M20.41 HAMMER TOES OF BOTH FEET: Status: ACTIVE | Noted: 2021-05-14

## 2022-02-23 PROBLEM — M20.42 HAMMER TOES OF BOTH FEET: Status: ACTIVE | Noted: 2021-05-14

## 2022-02-23 LAB — DEPRECATED CALCIDIOL+CALCIFEROL SERPL-MC: 54 UG/L (ref 30–80)

## 2022-02-23 NOTE — PROGRESS NOTES
Surgery Scheduled    CPT: 35292    Equipment: sagittal saw. arthrex trim fit pin.    Surgery/Procedure: ARTHROPLASTY, digits four and five left foot    Location: [facility]:  [address] [place] [address] [address] [place] [address] (Fax: [phone])    Date: 3/28/2022    Time: 1:00PM    Admission Type: Outpatient    Surgeon: Dr. Orozco    OR Confirmed/ :  Yes with [facility] on 2/23/2022    Orders In:  Yes    Post Op: 4/4/2022    Covid Scheduled: 3/24/2022    Wound Vac Needed: No    Home Care Needed: No    Blood Thinners Addressed: N/A

## 2022-02-23 NOTE — TELEPHONE ENCOUNTER
Called & left message to schedule arthroplasty - suggested 3/21/22 in the afternoon if it works for her.

## 2022-02-23 NOTE — TELEPHONE ENCOUNTER
03/21/22 will work for her. Call back to discuss details. She will be home the rest of the day. [phone].

## 2022-03-01 ENCOUNTER — OFFICE VISIT (OUTPATIENT)
Dept: PULMONOLOGY | Facility: OTHER | Age: 77
End: 2022-03-01
Payer: MEDICARE

## 2022-03-01 VITALS
OXYGEN SATURATION: 93 % | WEIGHT: 225 LBS | HEART RATE: 68 BPM | RESPIRATION RATE: 20 BRPM | SYSTOLIC BLOOD PRESSURE: 126 MMHG | BODY MASS INDEX: 38.62 KG/M2 | DIASTOLIC BLOOD PRESSURE: 76 MMHG

## 2022-03-01 DIAGNOSIS — J44.1 COPD EXACERBATION (H): Primary | ICD-10-CM

## 2022-03-01 PROCEDURE — 99214 OFFICE O/P EST MOD 30 MIN: CPT | Performed by: INTERNAL MEDICINE

## 2022-03-01 RX ORDER — ALBUTEROL SULFATE 0.83 MG/ML
2.5 SOLUTION RESPIRATORY (INHALATION) EVERY 6 HOURS PRN
Qty: 90 ML | Refills: 3 | Status: SHIPPED | OUTPATIENT
Start: 2022-03-01 | End: 2022-04-04 | Stop reason: ALTCHOICE

## 2022-03-01 RX ORDER — PREDNISONE 20 MG/1
TABLET ORAL
Qty: 19 TABLET | Refills: 0 | Status: SHIPPED | OUTPATIENT
Start: 2022-03-01 | End: 2022-03-14

## 2022-03-01 NOTE — PROGRESS NOTES
Pulmonary Clinic Urgent Visit    Assessment & Plan:  76 y F with a history of severe GOLD C COPD with infrequent exacerbations historically, chronic respiratory failure on 1.5 L oxygen at night and with exertion, followed by Dr. Tamayo, last seen in March 2021, presenting with persistent exacerbation symptoms. She received an action plan in Dec, and again Feb 17th, each time she felt better on her medications, and symptoms recurred once these were stopped. Today she continues to have wheezing and dyspnea.     Recommendations:    Resume prednisone longer taper/increased dosing, last dose was lower. Start 40 mg x 5 days, then taper by 10 mg every 3 days.    Just completed a course of abx, defer further abx    Resume use of nebulizer, albuterol BID scheduled, and up to q6H prn    Increase advair to BID dosing (currently using daily)    We will check in next week, if no better, recommend CXR, and COVID testing      Will have her see Dr. Tamayo for routine visit in 1-2 months, may need escalation of ICS/LABA dosing or addition of chronic azithro if frequent exacerbations persist.    If not improved to baseline in the next 1-2 weeks recommend delaying elective surgery until her breathing improves.        Brigid Bond MD  Pulmonary and Critical Care Medicine  [facility]  Office: [phone]    ----------------------------    CCx: Shortness of breath and wheezing    HPI:   76 y F with a history of severe GOLD C COPD with infrequent exacerbations historically, chronic respiratory failure on 1.5 L oxygen at night and with exertion, followed by Dr. Tamayo, last seen in March 2021, presenting with persistent exacerbation. She received an action plan in Dec, and again Feb 17th, each time she felt better on her medications, and symptoms recurred once these were stopped.     Advair 250 once per day  Spiriva daily  Albuterol 2-3 times per day     Recently completed prednisone 30 mg, tapered over 9 days, and  doxycycline course. Denies change in productive cough, no fevers, energy and appetite are good, no sick contacts.    She has an elective surgery with podiatry or burak on March 28th, and reports she will be under general anesthesia for this.       ROS:  A 12-system review was obtained and was negative with the exception of the symptoms endorsed in the history of present illness.    PMH:  Past Medical History:   Diagnosis Date     Convulsive disorder (H)      Convulsive disorder (H)      COPD (chronic obstructive pulmonary disease) (H)      COPD (chronic obstructive pulmonary disease) (H)      Depression      Dyspnea on exertion      Gastroesophageal reflux disease      Heart murmur      Hernia of unspecified site of abdominal cavity without mention of obstruction or gangrene      Hiatal hernia      Hiatal hernia      Hypertension      Hypertension      Obese      On home oxygen therapy     at 1.5 liters at nite     Osteopenia      Osteopenia      Oxygen dependent     1.5 L per NC     Shortness of breath      Uncomplicated asthma      URI (upper respiratory infection)      Venous insufficiency of both lower extremities      Venous insufficiency of both lower extremities      Walking troubles      Current Meds:  Reviewed    Physical Exam:  /76 (BP Location: Left arm, Patient Position: Chair, Cuff Size: Adult Regular)   Pulse 68   Resp 20   Wt 102.1 kg (225 lb)   SpO2 93%   BMI 38.62 kg/m    Gen: Alert, oriented, no distress  HEENT: nasal turbinates are unremarkable, no oropharyngeal lesions, no cervical or supraclavicular lymphadenopathy  CV: RRR, no M/G/R  Resp: Diffuse expiratory wheezing  Abd: soft, nontender, no palpable organomegaly  Skin: no apparent rashes  Ext: no cyanosis, clubbing or edema  Neuro: alert, nonfocal    Labs:  Reviewed    Imaging studies:  Personally reviewed:    No recent imaging

## 2022-03-01 NOTE — PATIENT INSTRUCTIONS
It was a pleasure to see you today.    Please use the advair inhaler twice per day.  Re-start the prednisone taper  Start albuterol via nebulizer twice per day, and can be used up to 4 times per day (every 6 hours) as needed.    If you're not feeling 100% by pre-op in 2 weeks I recommend delaying your surgery for your safety.      We will call you in 1 week.       Brigid Bond MD  Pulmonary and Critical Care Medicine  [facility]  Office: [phone]

## 2022-03-09 ENCOUNTER — MYC MEDICAL ADVICE (OUTPATIENT)
Dept: PULMONOLOGY | Facility: OTHER | Age: 77
End: 2022-03-09
Payer: MEDICARE

## 2022-03-09 ENCOUNTER — TELEPHONE (OUTPATIENT)
Dept: PULMONOLOGY | Facility: OTHER | Age: 77
End: 2022-03-09
Payer: MEDICARE

## 2022-03-09 DIAGNOSIS — R06.02 SOB (SHORTNESS OF BREATH): ICD-10-CM

## 2022-03-09 DIAGNOSIS — J44.9 COPD (CHRONIC OBSTRUCTIVE PULMONARY DISEASE) (H): Primary | ICD-10-CM

## 2022-03-09 NOTE — TELEPHONE ENCOUNTER
Per dr. Bond:  Patient of Roni, seen in clinic given more steroids. Please call her in 1 week, if no better I recommended CXR, COVID testing, delaying her elective surgery later this month. Please message Roni or CANDY if she's still not doing well. Thanks!       Patient states the steroid that she had did not do much for her. She is some better, but not back to her usual.   Asking to get CXR and COVID testing recommended by Dr. Bond.  Would like asa.     Sent message to dr. Tamayo.  Also reviewed with Dr. Alberto in clinic and will place orders for the CXR and COVID testing.

## 2022-03-10 ENCOUNTER — LAB (OUTPATIENT)
Dept: FAMILY MEDICINE | Facility: CLINIC | Age: 77
End: 2022-03-10
Attending: INTERNAL MEDICINE
Payer: MEDICARE

## 2022-03-10 DIAGNOSIS — J44.9 COPD (CHRONIC OBSTRUCTIVE PULMONARY DISEASE) (H): ICD-10-CM

## 2022-03-10 DIAGNOSIS — R06.02 SOB (SHORTNESS OF BREATH): ICD-10-CM

## 2022-03-10 LAB — SARS-COV-2 RNA RESP QL NAA+PROBE: NEGATIVE

## 2022-03-10 PROCEDURE — U0003 INFECTIOUS AGENT DETECTION BY NUCLEIC ACID (DNA OR RNA); SEVERE ACUTE RESPIRATORY SYNDROME CORONAVIRUS 2 (SARS-COV-2) (CORONAVIRUS DISEASE [COVID-19]), AMPLIFIED PROBE TECHNIQUE, MAKING USE OF HIGH THROUGHPUT TECHNOLOGIES AS DESCRIBED BY CMS-2020-01-R: HCPCS

## 2022-03-10 PROCEDURE — U0005 INFEC AGEN DETEC AMPLI PROBE: HCPCS

## 2022-03-11 ENCOUNTER — MYC MEDICAL ADVICE (OUTPATIENT)
Dept: PULMONOLOGY | Facility: OTHER | Age: 77
End: 2022-03-11
Payer: MEDICARE

## 2022-03-11 ENCOUNTER — ANCILLARY PROCEDURE (OUTPATIENT)
Dept: RADIOLOGY | Facility: CLINIC | Age: 77
End: 2022-03-11
Payer: MEDICARE

## 2022-03-11 ENCOUNTER — TRANSFERRED RECORDS (OUTPATIENT)
Dept: HEALTH INFORMATION MANAGEMENT | Facility: CLINIC | Age: 77
End: 2022-03-11
Payer: MEDICARE

## 2022-03-14 ENCOUNTER — TELEPHONE (OUTPATIENT)
Dept: FAMILY MEDICINE | Facility: CLINIC | Age: 77
End: 2022-03-14

## 2022-03-14 ENCOUNTER — OFFICE VISIT (OUTPATIENT)
Dept: FAMILY MEDICINE | Facility: CLINIC | Age: 77
End: 2022-03-14
Payer: MEDICARE

## 2022-03-14 ENCOUNTER — TELEPHONE (OUTPATIENT)
Dept: VASCULAR SURGERY | Facility: CLINIC | Age: 77
End: 2022-03-14

## 2022-03-14 ENCOUNTER — MEDICAL CORRESPONDENCE (OUTPATIENT)
Dept: HEALTH INFORMATION MANAGEMENT | Facility: CLINIC | Age: 77
End: 2022-03-14

## 2022-03-14 VITALS
WEIGHT: 224 LBS | TEMPERATURE: 96.3 F | HEART RATE: 101 BPM | BODY MASS INDEX: 38.24 KG/M2 | DIASTOLIC BLOOD PRESSURE: 95 MMHG | HEIGHT: 64 IN | OXYGEN SATURATION: 96 % | SYSTOLIC BLOOD PRESSURE: 139 MMHG

## 2022-03-14 DIAGNOSIS — R06.00 DYSPNEA, UNSPECIFIED TYPE: ICD-10-CM

## 2022-03-14 DIAGNOSIS — I35.0 MILD AORTIC VALVE STENOSIS: ICD-10-CM

## 2022-03-14 DIAGNOSIS — K21.9 GASTROESOPHAGEAL REFLUX DISEASE WITHOUT ESOPHAGITIS: ICD-10-CM

## 2022-03-14 DIAGNOSIS — J43.9 PULMONARY EMPHYSEMA, UNSPECIFIED EMPHYSEMA TYPE (H): ICD-10-CM

## 2022-03-14 DIAGNOSIS — Z01.818 PREOP GENERAL PHYSICAL EXAM: Primary | ICD-10-CM

## 2022-03-14 DIAGNOSIS — R94.31 ABNORMAL ELECTROCARDIOGRAM: ICD-10-CM

## 2022-03-14 DIAGNOSIS — M81.0 AGE-RELATED OSTEOPOROSIS WITHOUT CURRENT PATHOLOGICAL FRACTURE: ICD-10-CM

## 2022-03-14 DIAGNOSIS — M20.42 HAMMERTOE OF LEFT FOOT: ICD-10-CM

## 2022-03-14 DIAGNOSIS — G40.919 INTRACTABLE EPILEPSY WITHOUT STATUS EPILEPTICUS, UNSPECIFIED EPILEPSY TYPE (H): ICD-10-CM

## 2022-03-14 LAB
ATRIAL RATE - MUSE: 95 BPM
DIASTOLIC BLOOD PRESSURE - MUSE: NORMAL MMHG
INTERPRETATION ECG - MUSE: NORMAL
P AXIS - MUSE: 62 DEGREES
PR INTERVAL - MUSE: 166 MS
QRS DURATION - MUSE: 142 MS
QT - MUSE: 378 MS
QTC - MUSE: 475 MS
R AXIS - MUSE: 89 DEGREES
SYSTOLIC BLOOD PRESSURE - MUSE: NORMAL MMHG
T AXIS - MUSE: 15 DEGREES
VENTRICULAR RATE- MUSE: 95 BPM

## 2022-03-14 PROCEDURE — 99215 OFFICE O/P EST HI 40 MIN: CPT | Performed by: FAMILY MEDICINE

## 2022-03-14 PROCEDURE — 93005 ELECTROCARDIOGRAM TRACING: CPT | Performed by: FAMILY MEDICINE

## 2022-03-14 PROCEDURE — 93010 ELECTROCARDIOGRAM REPORT: CPT | Performed by: INTERNAL MEDICINE

## 2022-03-14 NOTE — TELEPHONE ENCOUNTER
Called and spoke to staff. Notified her pt surgery on 3/28 needs to be postponed due to pulmonary/COPD issues.

## 2022-03-14 NOTE — PROGRESS NOTES
[facility]  [address] [facility] [address]  Phone: [phone]  Fax: [phone]  Primary Provider: Kelly Hull  Pre-op Performing Provider: AMANDA BREWER    PREOPERATIVE EVALUATION:  Today's date: 3/14/2022    Lalitha Underwood is a 76 year old female who presents for a preoperative evaluation.    Surgical Information:  Surgery/Procedure: ARTHROPLASTY, digits four and five left foot  Surgery Location: [facility]  Surgeon: Dr. Orozco  Surgery Date: 3/28/22  Time of Surgery: tbd  Where patient plans to recover: At home with family  Fax number for surgical facility: Note does not need to be faxed, will be available electronically in Epic.    Type of Anesthesia Anticipated:Combined MAC with Popliteal Block    Assessment & Plan     Preop general physical exam  Hammertoe of left foot  Patient plan to undergo hammertoe revision of digits 4 and 5 on the left foot.    The proposed surgical procedure is considered INTERMEDIATE risk.    RECOMMENDATION:  Surgery is NOT recommended given severe COPD with ongoing dyspnea, not currently at baseline.     Pulmonary emphysema, unspecified emphysema type (H)  Dyspnea, unspecified type  Abnormal electrocardiogram  Follows with Dr. Brigid Bond, pulmonology for severe gold C COPD with chronic respiratory failure on 1.5 L nocturnal O2.  Her Advair was recently increased to twice daily dosing and albuterol scheduled twice daily.  Since her breathing is not back to her baseline, they recommended delaying her elective surgery.  I also spoke with the on-call pulmonologist regarding ED follow-up plan for her dyspnea.  Given little improvement with typical COPD exacerbation treatments, there is concern there are other etiologies impacting her dyspnea.  EKG performed today is abnormal showing replacement of a left bundle to a right bundle branch block indicating possible high risk for AV block.  Patient also notes brief episodes of chest  "pain at rest over the past few weeks.  Given these findings we will have her see cardiology urgently.  We will also plan for CT chest to more closely look at the lungs.  O2 stable today at 96% the patient is easily breathless with speaking and has rhonchi throughout all fields.  - EKG 12-lead, tracing only  - Adult Cardiology Eval [facility] Referral; Future  - CT Chest w/o Contrast; Future    Mild aortic valve stenosis  Reviewed 2017 echocardiogram which revealed mild aortic stenosis.  Murmur noted again on exam today.  As above, patient will see cardiology, and a stress echo may be beneficial.    Gastroesophageal reflux disease without esophagitis  Large hiatal hernia noted on 2018 CT scan.  It is possible silent reflux may be contributing to her shortness of breath, the patient is already on a high intensity PPI and reports infrequent GERD symptoms.    Intractable epilepsy without status epilepticus, unspecified epilepsy type (H)  Stable on Keppra, follows with neurology    Age-related osteoporosis without current pathological fracture  Stable on prolia injections q6m.      Anxiety  Mood controlled on sertraline.    Review of external notes as documented above   Discussion of management or test interpretation with external physician/other qualified healthcare professional/appropriate source - On call pulmonologist    I spent a total of 58 minutes on the day of the visit.   Time spent doing chart review, history and exam, documentation and further activities per the note    Subjective     HPI related to upcoming procedure:   History of bilateral hammer toes, worse on left and currently wearing a postop surgical shoe. Concerned about surgery due to her ongoing dyspnea.     Does not feel she is back to her baseline despire prednisone taper and increasing advair frequency. Quit smoking 22 years, smoked over 1 ppd. 3 mornings in a row had chest pain about 2 weeks ago. Described as an \"ugly\" pain. Resolved after a short " time. No CP with activity. Coughed up some small red/pink 3 times yesterday and once today. Sob with limited activity.     Known hiatal hernia, reports needing to uses tums a couple times per month. Using a ppi 30 min before the first meal of the day.     Preop Questions 3/8/2022   1. Have you ever had a heart attack or stroke? No   2. Have you ever had surgery on your heart or blood vessels, such as a stent placement, a coronary artery bypass, or surgery on an artery in your head, neck, heart, or legs? No   3. Do you have chest pain with activity? No   4. Do you have a history of  heart failure? No   5. Do you currently have a cold, bronchitis or symptoms of other infection? YES - ongoing dyspnea and occasional cough   6. Do you have a cough, shortness of breath, or wheezing? YES - as above   7. Do you or anyone in your family have previous history of blood clots? No   8. Do you or does anyone in your family have a serious bleeding problem such as prolonged bleeding following surgeries or cuts? No   9. Have you ever had problems with anemia or been told to take iron pills? No   10. Have you had any abnormal blood loss such as black, tarry or bloody stools, or abnormal vaginal bleeding? No   11. Have you ever had a blood transfusion? No   12. Are you willing to have a blood transfusion if it is medically needed before, during, or after your surgery? Yes   13. Have you or any of your relatives ever had problems with anesthesia? UNKNOWN    14. Do you have sleep apnea, excessive snoring or daytime drowsiness? No   15. Do you have any artifical heart valves or other implanted medical devices like a pacemaker, defibrillator, or continuous glucose monitor? No   16. Do you have artificial joints? YES - titanium in left elbow   17. Are you allergic to latex? No       Health Care Directive:  Patient has a Health Care Directive on file      Preoperative Review of :   reviewed - no record of controlled substances  prescribed.     Review of Systems  CONSTITUTIONAL: NEGATIVE for fever, chills, change in weight  INTEGUMENTARY/SKIN: NEGATIVE for worrisome rashes, moles or lesions  EYES: NEGATIVE for vision changes or irritation  ENT/MOUTH: NEGATIVE for ear, mouth and throat problems  RESP:NEGATIVE for significant cough or SOB and POSITIVE for cough-non productive, Hx COPD and SOB/dyspnea  CV: POSITIVE for chest pain/chest pressure  GI: NEGATIVE for nausea, abdominal pain, heartburn, or change in bowel habits  : NEGATIVE for frequency, dysuria, or hematuria  MUSCULOSKELETAL: NEGATIVE for significant arthralgias or myalgia  NEURO: NEGATIVE for weakness, dizziness or paresthesias  ENDOCRINE: NEGATIVE for temperature intolerance, skin/hair changes  HEME: NEGATIVE for bleeding problems  PSYCHIATRIC: NEGATIVE for changes in mood or affect    Patient Active Problem List    Diagnosis Date Noted     PAD (peripheral artery disease) (H) 10/18/2021     Priority: Medium     Keratoma 10/18/2021     Priority: Medium     Epileptic seizure (H) 07/16/2021     Priority: Medium     Formatting of this note might be different from the original.  Created by Conversion    Replacement Utility updated for latest IMO load       Esophageal reflux 07/16/2021     Priority: Medium     Formatting of this note might be different from the original.  Created by Conversion       Hyperlipidemia 07/16/2021     Priority: Medium     Formatting of this note might be different from the original.  Created by Conversion       Impaired gait and mobility 07/16/2021     Priority: Medium     Formatting of this note might be different from the original.  Created by Conversion       Pulmonary emphysema (H) 07/16/2021     Priority: Medium     Formatting of this note might be different from the original.  Created by Conversion       Rosacea 07/16/2021     Priority: Medium     Formatting of this note might be different from the original.  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  Created by Conversion       Vitamin D deficiency 07/16/2021     Priority: Medium     Formatting of this note might be different from the original.  Created by Conversion    Replacement Utility updated for latest IMO load       Hammer toes of both feet 05/14/2021     Priority: Medium     Uterine prolapse 03/01/2021     Priority: Medium     Acquired lymphedema of leg 02/01/2021     Priority: Medium     Leg length discrepancy 02/01/2021     Priority: Medium     Localized deposits of fat 05/06/2019     Priority: Medium     Neuropathy, idiopathic 02/21/2019     Priority: Medium     Morbid obesity (H) 12/23/2018     Priority: Medium     S/P repair of paraesophageal hernia 11/26/2018     Priority: Medium     Decreased hearing of both ears 05/29/2018     Priority: Medium     Osteoporosis 02/08/2018     Priority: Medium     Mild aortic valve stenosis 11/17/2017     Priority: Medium     Mild aortic valve regurgitation 11/17/2017     Priority: Medium     Urge incontinence of urine 11/17/2017     Priority: Medium     Valgus deformity of both feet 03/15/2017     Priority: Medium     Collapsed arches 02/01/2017     Priority: Medium     Left arm swelling 12/07/2015     Priority: Medium     Leg swelling 12/07/2015     Priority: Medium      Past Medical History:   Diagnosis Date     Convulsive disorder (H)      Convulsive disorder (H)      COPD (chronic obstructive pulmonary disease) (H)      COPD (chronic obstructive pulmonary disease) (H)      Depression      Dyspnea on exertion      Gastroesophageal reflux disease      Heart murmur      Hernia of unspecified site of abdominal cavity without mention of obstruction or gangrene      Hiatal hernia      Hiatal hernia      Hypertension      Hypertension      Obese      On home oxygen therapy     at 1.5 liters at nite     Osteopenia      Osteopenia      Oxygen dependent     1.5 L  per NC     Shortness of breath      Uncomplicated asthma      URI (upper respiratory infection)      Venous insufficiency of both lower extremities      Venous insufficiency of both lower extremities      Walking troubles      Past Surgical History:   Procedure Laterality Date     ARTHROPLASTY TOE(S) Left 11/22/2021    Procedure: ARTHROPLASTY, digits two and three left foot;  Surgeon: Alfonso Orozco DPM;  Location: [facility]     ESOPHAGOSCOPY, GASTROSCOPY, DUODENOSCOPY (EGD), COMBINED N/A 11/26/2018    Procedure: Esophagogastroduodenoscopy;  Surgeon: Jasmeet Wilder MD;  Location: [facility] OR     HERNIA REPAIR, UMBILICAL  04/2018     HERNIA REPAIR, UMBILICAL  04/04/2018    Dr. 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     Repair arm fracture Left      SHOULDER SURGERY Right 1967     SHOULDER SURGERY Right 1967     US BIOPSY THYROID FINE NEEDLE ASPIRATION  7/29/2020     Current Outpatient Medications   Medication Sig Dispense Refill     albuterol (PROAIR HFA/PROVENTIL HFA/VENTOLIN HFA) 108 (90 Base) MCG/ACT inhaler Inhale 2 puffs into the lungs every 6 hours as needed 18 g 1     albuterol (PROVENTIL) (2.5 MG/3ML) 0.083% neb solution Take 1 vial (2.5 mg) by nebulization every 6 hours as needed for shortness of breath / dyspnea or wheezing 90 mL 3     biotin 5 MG CAPS Take 1 capsule by mouth daily       calcium citrate (CITRACAL) 950 (200 Ca) MG tablet Take 1 tablet by mouth       denosumab (PROLIA) 60 MG/ML SOLN injection Inject 60 mg Subcutaneous every 6 months       fluticasone-salmeterol (ADVAIR) 250-50 MCG/DOSE inhaler Inhale 1 puff into the lungs 2 times daily 2 each 1     hydrochlorothiazide (HYDRODIURIL) 25 MG tablet hydrochlorothiazide 25 mg tablet   TAKE 1 TABLET BY MOUTH DAILY 90  tablet 3     levETIRAcetam (KEPPRA) 500 MG tablet Take 1 tablet (500 mg) by mouth 2 times daily 180 tablet 3     losartan (COZAAR) 50 MG tablet Take 1 tablet (50 mg) by mouth 2 times daily 90 tablet 3     Magnesium Oxide 500 MG TABS Take 500 mg by mouth 2 times daily       nystatin (NYSTOP) 527410 UNIT/GM external powder Nystop 100,000 unit/gram topical powder       potassium chloride ER (KLOR-CON M) 20 MEQ CR tablet Take 1 tablet (20 mEq) by mouth daily 90 tablet 3     RABEprazole (ACIPHEX) 20 MG EC tablet Take 1 tablet (20 mg) by mouth daily 90 tablet 3     sertraline (ZOLOFT) 100 MG tablet Take 1 tablet (100 mg) by mouth daily 90 tablet 3     solifenacin (VESICARE) 5 MG tablet Take 1 tablet (5 mg) by mouth every morning 90 tablet 3     tiotropium (SPIRIVA HANDIHALER) 18 MCG inhaled capsule Spiriva with HandiHaler 18 mcg and inhalation capsules 90 capsule 3     vitamin D3 (CHOLECALCIFEROL) 125 MCG (5000 UT) tablet Take 5,000 Units by mouth         Allergies   Allergen Reactions     Clindamycin Rash     Carbamazepine Rash     Morphine Nausea        Social History     Tobacco Use     Smoking status: Former Smoker     Packs/day: 1.50     Years: 38.00     Pack years: 57.00     Types: Cigarettes     Quit date: 1998     Years since quittin.2     Smokeless tobacco: Never Used     Tobacco comment: quit in    Substance Use Topics     Alcohol use: Yes     Alcohol/week: 2.0 standard drinks     Comment: occ     Family History   Problem Relation Age of Onset     Pulmonary Hypertension Daughter         idiopathic      Heart Disease Other         2 sibs MI, 1 sib electrical conduction disorder     Breast Cancer Mother 66.00     Hypertension Mother      Coronary Artery Disease Mother      Varicose Veins Mother      Hypertension Father      Coronary Artery Disease Sister      Heart Disease Sister      Diabetes Son      Pulmonary Hypertension Daughter      Coronary Artery Disease Sister      Heart Disease Sister   "    History   Drug Use     Types: Oxycodone         Objective     BP (!) 139/95   Pulse 101   Temp (!) 96.3  F (35.7  C) (Oral)   Ht 1.626 m (5' 4\")   Wt 101.6 kg (224 lb)   SpO2 96%   BMI 38.45 kg/m      Physical Exam    GENERAL APPEARANCE: alert, active and no distress     EYES: EOMI, PERRL     HENT: ear canals and TM's normal and nose and mouth without ulcers or lesions     NECK: no adenopathy, no asymmetry, masses, or scars and thyroid normal to palpation     RESP: rhonchi throughout, no wheezing, no crackles, breathlessness with prolonged speaking     CV: regular rates and rhythm and grade 2/6 systolic murmur heard best over the right sternal border, 1+ swelling bilateral LEs     ABDOMEN:  soft, nontender     MS: postop shoe on left foot     SKIN: no suspicious lesions or rashes     NEURO: Normal strength and tone, sensory exam grossly normal, mentation intact and speech normal     PSYCH: mentation appears normal. and affect normal/bright     LYMPHATICS: No cervical adenopathy    Recent Labs   Lab Test 02/22/22  1110 11/02/21  1629 02/12/21  1117   HGB 14.0 13.5 14.5     --  223    138 138   POTASSIUM 4.5 4.1 3.9   CR 0.82 0.92 0.80   A1C 5.4  --   --         Diagnostics:  No labs were ordered during this visit.   EKG required for chest pain and not completed in the last 90 days.   Sinus rhythm   Right bundle branch block   Left atrial enlargement   Abnormal ECG   When compared with ECG of 02-NOV-2021 16:22,   Right bundle branch block has replaced Left bundle branch block   evidence for alternating BBB-high risk for high faustino AV block   Confirmed by ARIEL VASQUEZ MD LOC:[facility] ([id number]) on 3/14/2022 1:14:33 PM     Revised Cardiac Risk Index (RCRI):  The patient has the following serious cardiovascular risks for perioperative complications:   - No serious cardiac risks = 0 points     RCRI Interpretation: 0 points: Class I (very low risk - 0.4% complication rate)           Signed Electronically " by: Kate Marte,   Copy of this evaluation report is provided to requesting physician.

## 2022-03-14 NOTE — TELEPHONE ENCOUNTER
[facility]    Provider: MD lAfonso Orozco    Detailed reason for call: Patient did not pass pre-op px today. She will need to postpone 03/28/22 surgery until she can be cleared by pulmonology.    Best phone number to contact: [phone]    Best time to contact: any

## 2022-03-14 NOTE — PATIENT INSTRUCTIONS
EKG today.  I will call your lung doctor to decide next steps of treatment.  If your shortness of breath persists despite next round of medical therapy, we should look further at your heart.

## 2022-03-19 ENCOUNTER — HOSPITAL ENCOUNTER (INPATIENT)
Facility: HOSPITAL | Age: 77
LOS: 6 days | Discharge: HOME | DRG: 981 | End: 2022-03-25
Attending: EMERGENCY MEDICINE | Admitting: FAMILY MEDICINE
Payer: MEDICARE

## 2022-03-19 ENCOUNTER — APPOINTMENT (OUTPATIENT)
Dept: RADIOLOGY | Facility: HOSPITAL | Age: 77
DRG: 981 | End: 2022-03-19
Attending: EMERGENCY MEDICINE
Payer: MEDICARE

## 2022-03-19 ENCOUNTER — APPOINTMENT (OUTPATIENT)
Dept: CT IMAGING | Facility: HOSPITAL | Age: 77
DRG: 981 | End: 2022-03-19
Attending: FAMILY MEDICINE
Payer: MEDICARE

## 2022-03-19 DIAGNOSIS — J18.9 PNEUMONIA DUE TO INFECTIOUS ORGANISM, UNSPECIFIED LATERALITY, UNSPECIFIED PART OF LUNG: ICD-10-CM

## 2022-03-19 DIAGNOSIS — I44.1 SECOND DEGREE HEART BLOCK: ICD-10-CM

## 2022-03-19 DIAGNOSIS — J44.1 COPD EXACERBATION (H): ICD-10-CM

## 2022-03-19 DIAGNOSIS — I10 ESSENTIAL HYPERTENSION, BENIGN: Primary | ICD-10-CM

## 2022-03-19 PROBLEM — J96.22 ACUTE ON CHRONIC RESPIRATORY FAILURE WITH HYPERCAPNIA (H): Status: ACTIVE | Noted: 2022-03-19

## 2022-03-19 PROBLEM — R94.31 NONSPECIFIC ABNORMAL ELECTROCARDIOGRAM (ECG) (EKG): Status: ACTIVE | Noted: 2022-03-19

## 2022-03-19 LAB
ANION GAP SERPL CALCULATED.3IONS-SCNC: 13 MMOL/L (ref 5–18)
BNP SERPL-MCNC: 40 PG/ML (ref 0–140)
BUN SERPL-MCNC: 27 MG/DL (ref 8–28)
CALCIUM SERPL-MCNC: 8.4 MG/DL (ref 8.5–10.5)
CHLORIDE BLD-SCNC: 93 MMOL/L (ref 98–107)
CO2 SERPL-SCNC: 30 MMOL/L (ref 22–31)
CREAT SERPL-MCNC: 0.87 MG/DL (ref 0.6–1.1)
ERYTHROCYTE [DISTWIDTH] IN BLOOD BY AUTOMATED COUNT: 14.1 % (ref 10–15)
FLUAV RNA SPEC QL NAA+PROBE: NEGATIVE
FLUBV RNA RESP QL NAA+PROBE: NEGATIVE
GFR SERPL CREATININE-BSD FRML MDRD: 69 ML/MIN/1.73M2
GLUCOSE BLD-MCNC: 96 MG/DL (ref 70–125)
GLUCOSE BLDC GLUCOMTR-MCNC: 196 MG/DL (ref 70–99)
HCT VFR BLD AUTO: 38.2 % (ref 35–47)
HGB BLD-MCNC: 12 G/DL (ref 11.7–15.7)
HOLD SPECIMEN: NORMAL
LACTATE SERPL-SCNC: 1 MMOL/L (ref 0.7–2)
MAGNESIUM SERPL-MCNC: 1.9 MG/DL (ref 1.8–2.6)
MCH RBC QN AUTO: 28.3 PG (ref 26.5–33)
MCHC RBC AUTO-ENTMCNC: 31.4 G/DL (ref 31.5–36.5)
MCV RBC AUTO: 90 FL (ref 78–100)
PLATELET # BLD AUTO: 186 10E3/UL (ref 150–450)
POTASSIUM BLD-SCNC: 3.5 MMOL/L (ref 3.5–5)
RBC # BLD AUTO: 4.24 10E6/UL (ref 3.8–5.2)
SARS-COV-2 RNA RESP QL NAA+PROBE: NEGATIVE
SARS-COV-2 RNA RESP QL NAA+PROBE: NEGATIVE
SODIUM SERPL-SCNC: 136 MMOL/L (ref 136–145)
TROPONIN I SERPL-MCNC: 0.01 NG/ML (ref 0–0.29)
WBC # BLD AUTO: 16.3 10E3/UL (ref 4–11)

## 2022-03-19 PROCEDURE — 71045 X-RAY EXAM CHEST 1 VIEW: CPT

## 2022-03-19 PROCEDURE — 83735 ASSAY OF MAGNESIUM: CPT | Performed by: FAMILY MEDICINE

## 2022-03-19 PROCEDURE — 83880 ASSAY OF NATRIURETIC PEPTIDE: CPT | Performed by: EMERGENCY MEDICINE

## 2022-03-19 PROCEDURE — 93005 ELECTROCARDIOGRAM TRACING: CPT | Performed by: FAMILY MEDICINE

## 2022-03-19 PROCEDURE — 83605 ASSAY OF LACTIC ACID: CPT | Performed by: EMERGENCY MEDICINE

## 2022-03-19 PROCEDURE — 93005 ELECTROCARDIOGRAM TRACING: CPT | Performed by: EMERGENCY MEDICINE

## 2022-03-19 PROCEDURE — 999N000157 HC STATISTIC RCP TIME EA 10 MIN

## 2022-03-19 PROCEDURE — 250N000011 HC RX IP 250 OP 636: Performed by: FAMILY MEDICINE

## 2022-03-19 PROCEDURE — 210N000001 HC R&B IMCU HEART CARE

## 2022-03-19 PROCEDURE — 84484 ASSAY OF TROPONIN QUANT: CPT | Performed by: EMERGENCY MEDICINE

## 2022-03-19 PROCEDURE — 258N000003 HC RX IP 258 OP 636: Performed by: EMERGENCY MEDICINE

## 2022-03-19 PROCEDURE — 94640 AIRWAY INHALATION TREATMENT: CPT

## 2022-03-19 PROCEDURE — 250N000013 HC RX MED GY IP 250 OP 250 PS 637: Performed by: FAMILY MEDICINE

## 2022-03-19 PROCEDURE — 96365 THER/PROPH/DIAG IV INF INIT: CPT | Mod: 59

## 2022-03-19 PROCEDURE — 250N000011 HC RX IP 250 OP 636: Performed by: EMERGENCY MEDICINE

## 2022-03-19 PROCEDURE — 71275 CT ANGIOGRAPHY CHEST: CPT

## 2022-03-19 PROCEDURE — 85018 HEMOGLOBIN: CPT | Performed by: EMERGENCY MEDICINE

## 2022-03-19 PROCEDURE — 250N000009 HC RX 250: Performed by: FAMILY MEDICINE

## 2022-03-19 PROCEDURE — 36415 COLL VENOUS BLD VENIPUNCTURE: CPT | Performed by: EMERGENCY MEDICINE

## 2022-03-19 PROCEDURE — 80048 BASIC METABOLIC PNL TOTAL CA: CPT | Performed by: EMERGENCY MEDICINE

## 2022-03-19 PROCEDURE — 87636 SARSCOV2 & INF A&B AMP PRB: CPT | Performed by: FAMILY MEDICINE

## 2022-03-19 PROCEDURE — 250N000009 HC RX 250: Performed by: EMERGENCY MEDICINE

## 2022-03-19 PROCEDURE — C9803 HOPD COVID-19 SPEC COLLECT: HCPCS

## 2022-03-19 PROCEDURE — 87635 SARS-COV-2 COVID-19 AMP PRB: CPT | Performed by: EMERGENCY MEDICINE

## 2022-03-19 PROCEDURE — 36415 COLL VENOUS BLD VENIPUNCTURE: CPT | Performed by: FAMILY MEDICINE

## 2022-03-19 PROCEDURE — 96368 THER/DIAG CONCURRENT INF: CPT

## 2022-03-19 PROCEDURE — 96375 TX/PRO/DX INJ NEW DRUG ADDON: CPT

## 2022-03-19 PROCEDURE — 99285 EMERGENCY DEPT VISIT HI MDM: CPT | Mod: 25

## 2022-03-19 PROCEDURE — 94640 AIRWAY INHALATION TREATMENT: CPT | Mod: 76

## 2022-03-19 PROCEDURE — 84484 ASSAY OF TROPONIN QUANT: CPT | Performed by: FAMILY MEDICINE

## 2022-03-19 PROCEDURE — 99223 1ST HOSP IP/OBS HIGH 75: CPT | Mod: AI | Performed by: FAMILY MEDICINE

## 2022-03-19 RX ORDER — DOCUSATE SODIUM 100 MG/1
100 CAPSULE, LIQUID FILLED ORAL 2 TIMES DAILY
Status: DISCONTINUED | OUTPATIENT
Start: 2022-03-19 | End: 2022-03-25 | Stop reason: HOSPADM

## 2022-03-19 RX ORDER — ACETAMINOPHEN 325 MG/1
650 TABLET ORAL EVERY 6 HOURS PRN
Status: DISCONTINUED | OUTPATIENT
Start: 2022-03-19 | End: 2022-03-25 | Stop reason: HOSPADM

## 2022-03-19 RX ORDER — ACETYLCYSTEINE 200 MG/ML
4 SOLUTION ORAL; RESPIRATORY (INHALATION)
Status: DISCONTINUED | OUTPATIENT
Start: 2022-03-19 | End: 2022-03-20

## 2022-03-19 RX ORDER — IOPAMIDOL 755 MG/ML
100 INJECTION, SOLUTION INTRAVASCULAR ONCE
Status: COMPLETED | OUTPATIENT
Start: 2022-03-19 | End: 2022-03-19

## 2022-03-19 RX ORDER — BENZONATATE 100 MG/1
100 CAPSULE ORAL ONCE
Status: COMPLETED | OUTPATIENT
Start: 2022-03-19 | End: 2022-03-19

## 2022-03-19 RX ORDER — ONDANSETRON 4 MG/1
4 TABLET, ORALLY DISINTEGRATING ORAL EVERY 6 HOURS PRN
Status: DISCONTINUED | OUTPATIENT
Start: 2022-03-19 | End: 2022-03-25 | Stop reason: HOSPADM

## 2022-03-19 RX ORDER — LIDOCAINE 40 MG/G
CREAM TOPICAL
Status: DISCONTINUED | OUTPATIENT
Start: 2022-03-19 | End: 2022-03-25 | Stop reason: HOSPADM

## 2022-03-19 RX ORDER — ALBUTEROL SULFATE 5 MG/ML
2.5 SOLUTION RESPIRATORY (INHALATION) ONCE
Status: DISCONTINUED | OUTPATIENT
Start: 2022-03-19 | End: 2022-03-19

## 2022-03-19 RX ORDER — PANTOPRAZOLE SODIUM 40 MG/1
40 TABLET, DELAYED RELEASE ORAL
Status: DISCONTINUED | OUTPATIENT
Start: 2022-03-20 | End: 2022-03-25 | Stop reason: HOSPADM

## 2022-03-19 RX ORDER — CEFTRIAXONE 2 G/1
2 INJECTION, POWDER, FOR SOLUTION INTRAMUSCULAR; INTRAVENOUS ONCE
Status: COMPLETED | OUTPATIENT
Start: 2022-03-19 | End: 2022-03-19

## 2022-03-19 RX ORDER — IBUPROFEN 200 MG
950 CAPSULE ORAL DAILY
Status: DISCONTINUED | OUTPATIENT
Start: 2022-03-20 | End: 2022-03-25 | Stop reason: HOSPADM

## 2022-03-19 RX ORDER — ALBUTEROL SULFATE 90 UG/1
2 AEROSOL, METERED RESPIRATORY (INHALATION) EVERY 4 HOURS PRN
Status: DISCONTINUED | OUTPATIENT
Start: 2022-03-19 | End: 2022-03-25 | Stop reason: HOSPADM

## 2022-03-19 RX ORDER — TOLTERODINE 2 MG/1
2 CAPSULE, EXTENDED RELEASE ORAL 2 TIMES DAILY
Status: DISCONTINUED | OUTPATIENT
Start: 2022-03-20 | End: 2022-03-25 | Stop reason: HOSPADM

## 2022-03-19 RX ORDER — CEFTRIAXONE 1 G/1
1 INJECTION, POWDER, FOR SOLUTION INTRAMUSCULAR; INTRAVENOUS EVERY 24 HOURS
Status: DISCONTINUED | OUTPATIENT
Start: 2022-03-20 | End: 2022-03-20

## 2022-03-19 RX ORDER — TIOTROPIUM BROMIDE 18 UG/1
18 CAPSULE ORAL; RESPIRATORY (INHALATION) DAILY
Status: DISCONTINUED | OUTPATIENT
Start: 2022-03-20 | End: 2022-03-25 | Stop reason: HOSPADM

## 2022-03-19 RX ORDER — DIPHENHYDRAMINE HYDROCHLORIDE 25 MG/1
1 TABLET ORAL DAILY
Status: DISCONTINUED | OUTPATIENT
Start: 2022-03-19 | End: 2022-03-19 | Stop reason: RX

## 2022-03-19 RX ORDER — GUAIFENESIN 600 MG/1
1200 TABLET, EXTENDED RELEASE ORAL 2 TIMES DAILY
Status: DISCONTINUED | OUTPATIENT
Start: 2022-03-19 | End: 2022-03-25 | Stop reason: HOSPADM

## 2022-03-19 RX ORDER — NICOTINE POLACRILEX 4 MG
15-30 LOZENGE BUCCAL
Status: DISCONTINUED | OUTPATIENT
Start: 2022-03-19 | End: 2022-03-25 | Stop reason: HOSPADM

## 2022-03-19 RX ORDER — TOLTERODINE 2 MG/1
2 CAPSULE, EXTENDED RELEASE ORAL DAILY
Status: DISCONTINUED | OUTPATIENT
Start: 2022-03-20 | End: 2022-03-19 | Stop reason: ALTCHOICE

## 2022-03-19 RX ORDER — METHYLPREDNISOLONE SODIUM SUCCINATE 125 MG/2ML
125 INJECTION, POWDER, LYOPHILIZED, FOR SOLUTION INTRAMUSCULAR; INTRAVENOUS ONCE
Status: DISCONTINUED | OUTPATIENT
Start: 2022-03-19 | End: 2022-03-19

## 2022-03-19 RX ORDER — IPRATROPIUM BROMIDE AND ALBUTEROL SULFATE 2.5; .5 MG/3ML; MG/3ML
3 SOLUTION RESPIRATORY (INHALATION)
Status: DISCONTINUED | OUTPATIENT
Start: 2022-03-19 | End: 2022-03-20

## 2022-03-19 RX ORDER — ACETAMINOPHEN 650 MG/1
650 SUPPOSITORY RECTAL EVERY 6 HOURS PRN
Status: DISCONTINUED | OUTPATIENT
Start: 2022-03-19 | End: 2022-03-25 | Stop reason: HOSPADM

## 2022-03-19 RX ORDER — LOSARTAN POTASSIUM 50 MG/1
50 TABLET ORAL 2 TIMES DAILY
Status: DISCONTINUED | OUTPATIENT
Start: 2022-03-19 | End: 2022-03-25 | Stop reason: HOSPADM

## 2022-03-19 RX ORDER — POTASSIUM CHLORIDE 1500 MG/1
20 TABLET, EXTENDED RELEASE ORAL DAILY
Status: DISCONTINUED | OUTPATIENT
Start: 2022-03-19 | End: 2022-03-25 | Stop reason: HOSPADM

## 2022-03-19 RX ORDER — ONDANSETRON 2 MG/ML
4 INJECTION INTRAMUSCULAR; INTRAVENOUS EVERY 6 HOURS PRN
Status: DISCONTINUED | OUTPATIENT
Start: 2022-03-19 | End: 2022-03-25 | Stop reason: HOSPADM

## 2022-03-19 RX ORDER — IPRATROPIUM BROMIDE AND ALBUTEROL SULFATE 2.5; .5 MG/3ML; MG/3ML
3 SOLUTION RESPIRATORY (INHALATION) ONCE
Status: COMPLETED | OUTPATIENT
Start: 2022-03-19 | End: 2022-03-19

## 2022-03-19 RX ORDER — BACLOFEN 20 MG
500 TABLET ORAL 2 TIMES DAILY
Status: DISCONTINUED | OUTPATIENT
Start: 2022-03-19 | End: 2022-03-20 | Stop reason: CLARIF

## 2022-03-19 RX ORDER — METHYLPREDNISOLONE SODIUM SUCCINATE 125 MG/2ML
60 INJECTION, POWDER, LYOPHILIZED, FOR SOLUTION INTRAMUSCULAR; INTRAVENOUS EVERY 12 HOURS
Status: DISCONTINUED | OUTPATIENT
Start: 2022-03-20 | End: 2022-03-20

## 2022-03-19 RX ORDER — PREDNISONE 20 MG/1
60 TABLET ORAL ONCE
Status: DISCONTINUED | OUTPATIENT
Start: 2022-03-19 | End: 2022-03-19

## 2022-03-19 RX ORDER — ALBUTEROL SULFATE 0.83 MG/ML
2.5 SOLUTION RESPIRATORY (INHALATION)
Status: DISCONTINUED | OUTPATIENT
Start: 2022-03-19 | End: 2022-03-25 | Stop reason: HOSPADM

## 2022-03-19 RX ORDER — HYDROCHLOROTHIAZIDE 25 MG/1
25 TABLET ORAL DAILY
Status: DISCONTINUED | OUTPATIENT
Start: 2022-03-20 | End: 2022-03-20

## 2022-03-19 RX ORDER — BENZONATATE 100 MG/1
100 CAPSULE ORAL 3 TIMES DAILY PRN
Status: DISCONTINUED | OUTPATIENT
Start: 2022-03-19 | End: 2022-03-25 | Stop reason: HOSPADM

## 2022-03-19 RX ORDER — METHYLPREDNISOLONE SODIUM SUCCINATE 125 MG/2ML
125 INJECTION, POWDER, LYOPHILIZED, FOR SOLUTION INTRAMUSCULAR; INTRAVENOUS ONCE
Status: COMPLETED | OUTPATIENT
Start: 2022-03-19 | End: 2022-03-19

## 2022-03-19 RX ORDER — DEXTROSE MONOHYDRATE 25 G/50ML
25-50 INJECTION, SOLUTION INTRAVENOUS
Status: DISCONTINUED | OUTPATIENT
Start: 2022-03-19 | End: 2022-03-25 | Stop reason: HOSPADM

## 2022-03-19 RX ORDER — LEVETIRACETAM 500 MG/1
500 TABLET ORAL 2 TIMES DAILY
Status: DISCONTINUED | OUTPATIENT
Start: 2022-03-19 | End: 2022-03-25 | Stop reason: HOSPADM

## 2022-03-19 RX ADMIN — LEVETIRACETAM 500 MG: 500 TABLET, FILM COATED ORAL at 22:33

## 2022-03-19 RX ADMIN — CEFTRIAXONE SODIUM 2 G: 2 INJECTION, POWDER, FOR SOLUTION INTRAMUSCULAR; INTRAVENOUS at 15:38

## 2022-03-19 RX ADMIN — BENZONATATE 100 MG: 100 CAPSULE ORAL at 22:33

## 2022-03-19 RX ADMIN — GUAIFENESIN 1200 MG: 600 TABLET, EXTENDED RELEASE ORAL at 22:33

## 2022-03-19 RX ADMIN — ENOXAPARIN SODIUM 40 MG: 40 INJECTION SUBCUTANEOUS at 22:05

## 2022-03-19 RX ADMIN — IPRATROPIUM BROMIDE AND ALBUTEROL SULFATE 3 ML: 2.5; .5 SOLUTION RESPIRATORY (INHALATION) at 23:45

## 2022-03-19 RX ADMIN — BENZONATATE 100 MG: 100 CAPSULE ORAL at 18:14

## 2022-03-19 RX ADMIN — ACETYLCYSTEINE 4 ML: 200 SOLUTION ORAL; RESPIRATORY (INHALATION) at 23:45

## 2022-03-19 RX ADMIN — IOPAMIDOL 100 ML: 755 INJECTION, SOLUTION INTRAVENOUS at 20:22

## 2022-03-19 RX ADMIN — IPRATROPIUM BROMIDE AND ALBUTEROL SULFATE 3 ML: 2.5; .5 SOLUTION RESPIRATORY (INHALATION) at 13:26

## 2022-03-19 RX ADMIN — LOSARTAN POTASSIUM 50 MG: 50 TABLET, FILM COATED ORAL at 22:33

## 2022-03-19 RX ADMIN — AZITHROMYCIN MONOHYDRATE 500 MG: 500 INJECTION, POWDER, LYOPHILIZED, FOR SOLUTION INTRAVENOUS at 16:04

## 2022-03-19 RX ADMIN — METHYLPREDNISOLONE SODIUM SUCCINATE 125 MG: 125 INJECTION, POWDER, FOR SOLUTION INTRAMUSCULAR; INTRAVENOUS at 14:43

## 2022-03-19 RX ADMIN — POTASSIUM CHLORIDE 20 MEQ: 1500 TABLET, EXTENDED RELEASE ORAL at 22:04

## 2022-03-19 ASSESSMENT — ACTIVITIES OF DAILY LIVING (ADL)
TOILETING: 1-->ASSISTANCE (EQUIPMENT/PERSON) NEEDED
TOILETING: 1-->ASSISTANCE (EQUIPMENT/PERSON) NEEDED (NOT DEVELOPMENTALLY APPROPRIATE)
EQUIPMENT_CURRENTLY_USED_AT_HOME: WALKER, ROLLING
ADLS_ACUITY_SCORE: 7
EQUIPMENT_CURRENTLY_USED_AT_HOME: WALKER, STANDARD
WEAR_GLASSES_OR_BLIND: NO
DOING_ERRANDS_INDEPENDENTLY_DIFFICULTY: NO
ADLS_ACUITY_SCORE: 7
ADLS_ACUITY_SCORE: 12
TOILETING_ASSISTANCE: TOILETING DIFFICULTY, REQUIRES EQUIPMENT
DIFFICULTY_EATING/SWALLOWING: NO
TOILETING: 1-->ASSISTANCE (EQUIPMENT/PERSON) NEEDED
TOILETING_ASSISTANCE: TOILETING DIFFICULTY, ASSISTANCE 1 PERSON
ADLS_ACUITY_SCORE: 7
TOILETING_MANAGEMENT: INCONTINENCE
CHANGE_IN_FUNCTIONAL_STATUS_SINCE_ONSET_OF_CURRENT_ILLNESS/INJURY: YES
TOILETING: 0-->NOT TOILET TRAINED OR ASSISTANCE NEEDED (DEVELOPMENTALLY APPROPRIATE)
DEPENDENT_IADLS:: CLEANING
ADLS_ACUITY_SCORE: 7
TOILETING_ISSUES: YES
ADLS_ACUITY_SCORE: 10
ADLS_ACUITY_SCORE: 7
WALKING_OR_CLIMBING_STAIRS_DIFFICULTY: NO
TOILETING_ISSUES: YES
DRESSING/BATHING_DIFFICULTY: NO
CONCENTRATING,_REMEMBERING_OR_MAKING_DECISIONS_DIFFICULTY: NO
CHANGE_IN_FUNCTIONAL_STATUS_SINCE_ONSET_OF_CURRENT_ILLNESS/INJURY: YES
FALL_HISTORY_WITHIN_LAST_SIX_MONTHS: NO
ADLS_ACUITY_SCORE: 12

## 2022-03-19 NOTE — CONSULTS
"Care Management Initial Consult    General Information  Assessment completed with: Spouse or significant other (COVID not yet back to talk to patient), Saul  via phone  Type of CM/SW Visit: Initial Assessment    Primary Care Provider verified and updated as needed: Yes   Readmission within the last 30 days: no previous admission in last 30 days      Reason for Consult: discharge planning  Advance Care Planning: Advance Care Planning Reviewed: present on chart          Communication Assessment  Patient's communication style: spoken language (English or Bilingual)    Hearing Difficulty or Deaf: no   Wear Glasses or Blind: no    Cognitive  Cognitive/Neuro/Behavioral:                        Living Environment:   People in home: spouse  Saul   Current living Arrangements: house (\"1 level town house\")      Able to return to prior arrangements: yes       Family/Social Support:  Care provided by: self  Provides care for: no one, other (see comments) (\" is on hospice services, so may start needing help\")  Marital Status:   , Children  Saul       Description of Support System: Supportive, Involved    Support Assessment: Adequate family and caregiver support, Adequate social supports, Patient communicates needs well met    Current Resources:   Patient receiving home care services: No     Community Resources: Housekeeping/[employer], DME (\" gets hospice so some housekeeping help from them\")  Equipment currently used at home: walker, rolling (\"can sometimes use  wheelchair when out and about\")  Supplies currently used at home: Oxygen Tubing/Supplies, Nebulizer tubing, Other (\"Home Oxygen, nebulizer, glasses\")    Employment/Financial:  Employment Status: retired        Financial Concerns:     Referral to Financial Worker: No       Lifestyle & Psychosocial Needs:  Social Determinants of Health     Tobacco Use: Medium Risk     Smoking Tobacco Use: Former Smoker     " "Smokeless Tobacco Use: Never Used   Alcohol Use: Not on file   Financial Resource Strain: Not on file   Food Insecurity: Not on file   Transportation Needs: Not on file   Physical Activity: Not on file   Stress: Not on file   Social Connections: Not on file   Intimate Partner Violence: Not on file   Depression: Not at risk     PHQ-2 Score: 0   Housing Stability: Not on file       Functional Status:  Prior to admission patient needed assistance:   Dependent ADLs:: Ambulation-walker, Independent  Dependent IADLs:: Cleaning       Mental Health Status:          Chemical Dependency Status:                Values/Beliefs:  Spiritual, Cultural Beliefs, [employer] Practices, Values that affect care:                 Additional Information:  Serene lives in a town house with her . It is a 1 level town house.    She is independent with ADLs at baseline and drives. She \"sometimes gets some help from her 's hospice staff with housekeeping\".    Her  does not drive. He is \"on hospice services, but still able to be mostly independent. Serene only has to help me around the house a little and with transportation, but I have other family and friends to help while she is in the hospital\".    She uses home oxygen at 2LPM. But from an unknown supply agency. She is \"supposed to only use it at night, but over the past few days she is using it during the daytime also\". She also uses a walker for mobility. \" has a wheelchair that she can occasionally use for long distances outside if he is not using it.\"    Unknown discharge needs at this time.    Family to transport at discharge.    Amy South RN      "

## 2022-03-19 NOTE — ED PROVIDER NOTES
[facility] EMERGENCY DEPARTMENT  PHYSICIAN NOTE    MRN: [id number]    FINAL IMPRESSION     1. Pneumonia due to infectious organism, unspecified laterality, unspecified part of lung    2. COPD exacerbation (H)    3. Second degree heart block          ED COURSE & MDM       1:16 PM Initial history and physical performed. Plan of care discussed. PPE utilized includes N95 mask, face shield, and gloves.     1:26 PM Cardiology paged.     1:37 PM Patient reevaluated.    2:18 PM Spoke with Dr. Marques, cardiology.    2:23 PM Patient reevaluated.     2:59 PM Patient reevaluated.     3:21 PM Hospitalist paged.     Patient presented for dyspnea. Initial vital signs reassuring.  Chest x-ray shows right-sided pneumonia, antibiotics ordered but lactate is normal.  Her diffuse wheezing is consistent with COPD exacerbation as well, steroids given.  Her EKG showed a second-degree AV block, as concern for a Mobitz II block, however Dr. Marques of cardiology believes it is more likely a Mobitz I.  We will continue to follow and monitor her on telemetry.  She does have short stretches where her heart rate is in the 40s due to every other beat being dropped, but this is short lasting and has not yet been captured on EKG. Patient admitted for further management and observation.    ===================================================================    HPI     Lalitha Underwood is a 76 year old female with relevant PMH significant for COPD, obesity, and mild aortic valve stenosis presenting with shortness of breath and cough. Per EMS, patient has had phlegm in her lungs with some shortness of breath for three months. Has a history of COPD. Over the last week has had a productive cough with green phlegm that is worsening. No blood. Patient needs oxygen at night at baseline but has needed it during the day now also. With oxygen her saturation is 94. EKG showed right bundle branch block which is new to her and last  week had a valve issue. Patient states that she has been on two different antibiotics, last one was azithromycin that was not enough help. Not currently on steroids and finished a batch a couple weeks ago. Had a negative covid test a week ago. Had a neb on the way here which helped her symptoms. Over the last couple of weeks has had five episodes of chest pain in the morning that lasts a very short time. No chest pain currently. Has been eating and drinking well. No sick contacts. Denies any fever, nausea, vomiting, abdominal pain, diarrhea, constipation, dysuria, hematuria, or any other complaints.     ROS  All other ROS negative.    Problem list, medications, allergies, PMH, PSH, family history, and social history reviewed and updated as able in Epic.      PHYSICAL EXAM     Vitals:    03/19/22 1500 03/19/22 1515 03/19/22 1516 03/19/22 1517   BP: 134/60 133/72     Pulse: 92 90 (!) 48 90   Resp:       Temp:       TempSrc:       SpO2: 97% 95% 93% 97%   Weight:            Constitutional: Alert, no acute distress.  HENT: Normocephalic, atraumatic.  Eyes: Sclera anicteric, EOMI.  CV: Normal rate, regular rhythm. Peripheral pulses intact. Trace bilateral lower extremity edema.  Pulm: Shortness of breath. Diffuse expiratory wheezing. Diminished exhalation. Right base course breath sounds.   Abdomen: Soft, non-tender.  MSK: No major deformities. Neck supple.  Neuro: A/O x3. Normal speech. No focal deficits.  Skin: Warm and dry, no rash.  Psych: Cooperative, able to follow commands. Intact attention.      TESTING   All testing reviewed and interpreted.    EKG  NSR with new Mobitz II AV block. No significant ST or T wave changes. RBBB unchanged. Please see signed document for full description. EKG #2, no significant change.     LABS  Labs Ordered and Resulted from Time of ED Arrival to Time of ED Departure   CBC WITH PLATELETS - Abnormal       Result Value    WBC Count 16.3 (*)     RBC Count 4.24      Hemoglobin 12.0       Hematocrit 38.2      MCV 90      MCH 28.3      MCHC 31.4 (*)     RDW 14.1      Platelet Count 186     BASIC METABOLIC PANEL - Abnormal    Sodium 136      Potassium 3.5      Chloride 93 (*)     Carbon Dioxide (CO2) 30      Anion Gap 13      Urea Nitrogen 27      Creatinine 0.87      Calcium 8.4 (*)     Glucose 96      GFR Estimate 69     B-TYPE NATRIURETIC PEPTIDE ([facility] ONLY) - Normal    BNP 40     TROPONIN I - Normal    Troponin I 0.01     LACTIC ACID WHOLE BLOOD - Normal    Lactic Acid 1.0     COVID-19 VIRUS (CORONAVIRUS) BY PCR       IMAGING  XR Chest Port 1 View   Final Result   IMPRESSION: Heart and mediastinal size are within normal limits. There is patchy infiltrate involving the right upper lobe, new from prior study, suggestive of an infectious process. No pleural effusion or pneumothorax. There is also some silhouetting of    the right hemidiaphragm which represents either atelectasis or an infectious process.               I attest that Armando Worthy is acting in a scribe capacity, has observed my performance of services, and has documented them in accordance with my direction.       Maximino, MD [place]  03/19/22 1980     18

## 2022-03-19 NOTE — ED NOTES
Bed: JN-  Expected date:   Expected time:   Means of arrival: Ambulance  Comments:  [facility]: Shortness of breath

## 2022-03-19 NOTE — PROGRESS NOTES
RESPIRATORY CARE NOTE     Patient Name: Lalitha Underwood  Today's Date: 3/19/2022       Pt to receive duo neb. BS are coarse exp wheezes. Pt is on 2lpm of oxygen via NC, SpO2 is 97%. Post treatment there is increased aeration. Pt also perceives improvement.  RT encouraged deep breathing and coughing techniques .  RT will continue to monitor and assess.

## 2022-03-19 NOTE — Clinical Note
Device: [employer] Accolade CAIT WOODSON DR  Model [id number]  SN: [id number]  Lot: [id number]  Expiration: 1-

## 2022-03-19 NOTE — PHARMACY-ADMISSION MEDICATION HISTORY
Pharmacy Note - Admission Medication History    Pertinent Provider Information: N/A     ______________________________________________________________________    Prior To Admission (PTA) med list completed and updated in EMR.       Current Facility-Administered Medications for the 3/19/22 encounter (Hospital Encounter)   Medication     denosumab (PROLIA) injection 60 mg     PTA Med List   Medication Sig Last Dose     albuterol (PROAIR HFA/PROVENTIL HFA/VENTOLIN HFA) 108 (90 Base) MCG/ACT inhaler Inhale 2 puffs into the lungs every 6 hours as needed 3/18/2022 at Unknown time     biotin 5 MG CAPS Take 1 capsule by mouth daily 3/19/2022 at Unknown time     calcium citrate (CITRACAL) 950 (200 Ca) MG tablet Take 1 tablet by mouth 3/16/2022 at Unknown time     denosumab (PROLIA) 60 MG/ML SOLN injection Inject 60 mg Subcutaneous every 6 months More than a month at Unknown time     fluticasone-salmeterol (ADVAIR) 250-50 MCG/DOSE inhaler Inhale 1 puff into the lungs 2 times daily 3/19/2022 at Unknown time     hydrochlorothiazide (HYDRODIURIL) 25 MG tablet hydrochlorothiazide 25 mg tablet   TAKE 1 TABLET BY MOUTH DAILY 3/19/2022 at Unknown time     levETIRAcetam (KEPPRA) 500 MG tablet Take 1 tablet (500 mg) by mouth 2 times daily 3/19/2022 at Unknown time     losartan (COZAAR) 50 MG tablet Take 1 tablet (50 mg) by mouth 2 times daily 3/19/2022 at Unknown time     Magnesium Oxide 500 MG TABS Take 500 mg by mouth 2 times daily 3/16/2022 at Unknown time     nystatin (NYSTOP) 953799 UNIT/GM external powder Apply topically daily as needed  Past Month at prn     potassium chloride ER (KLOR-CON M) 20 MEQ CR tablet Take 1 tablet (20 mEq) by mouth daily 3/16/2022 at Unknown time     RABEprazole (ACIPHEX) 20 MG EC tablet Take 1 tablet (20 mg) by mouth daily 3/19/2022 at Unknown time     sertraline (ZOLOFT) 100 MG tablet Take 1 tablet (100 mg) by mouth daily 3/19/2022 at Unknown time     solifenacin (VESICARE) 5 MG tablet Take 1 tablet  (5 mg) by mouth every morning (Patient taking differently: Take 5 mg by mouth 2 times daily ) 3/19/2022 at Unknown time     tiotropium (SPIRIVA HANDIHALER) 18 MCG inhaled capsule Spiriva with HandiHaler 18 mcg and inhalation capsules (Patient taking differently: Inhale 18 mcg into the lungs daily ) 3/19/2022 at Unknown time     vitamin D3 (CHOLECALCIFEROL) 125 MCG (5000 UT) tablet Take 5,000 Units by mouth daily  3/16/2022 at Unknown time       Information source(s): Patient and CareEverywhere/SureScripts  Method of interview communication: in-person    Summary of Changes to PTA Med List  New: N/A  Discontinued: albuterol nebulizer  Changed: N/A    Patient was asked about OTC/herbal products specifically.  PTA med list reflects this.    In the past week, patient estimated taking medication this percent of the time:  greater than 90%.    Allergies were reviewed, assessed, and updated with the patient.      Medications available for use during hospital stay: inhalers.  Patient states that while inpatient, OK to sub rabeprazole to pantoprazole due to formulary and pharmacy not carrying rabeprazole.     The information provided in this note is only as accurate as the sources available at the time of the update(s).    Thank you for the opportunity to participate in the care of this patient.    Catie Bates  3/19/2022 3:00 PM

## 2022-03-19 NOTE — Clinical Note
Tested the right ventricular lead. See vendor printout/MD dictation. [employer] Fineline II Steroz EZ  Model: 4470 52cm  SN: [id number]  Expiration: 9-1-2023

## 2022-03-19 NOTE — ED NOTES
[facility] ED Handoff Report    ED Chief Complaint: Shortness of Breath    ED Diagnosis:  (J18.9) Pneumonia due to infectious organism, unspecified laterality, unspecified part of lung    (J44.1) COPD exacerbation (H)    (I44.1) Second degree heart block       PMH:    Past Medical History:   Diagnosis Date     Convulsive disorder (H)      Convulsive disorder (H)      COPD (chronic obstructive pulmonary disease) (H)      COPD (chronic obstructive pulmonary disease) (H)      Depression      Dyspnea on exertion      Gastroesophageal reflux disease      Heart murmur      Hernia of unspecified site of abdominal cavity without mention of obstruction or gangrene      Hiatal hernia      Hiatal hernia      Hypertension      Hypertension      Obese      On home oxygen therapy     at 1.5 liters at nite     Osteopenia      Osteopenia      Oxygen dependent     1.5 L per NC     Pneumonia due to infectious organism, unspecified laterality, unspecified part of lung 3/19/2022     Second degree heart block 3/19/2022     Shortness of breath      Uncomplicated asthma      URI (upper respiratory infection)      Venous insufficiency of both lower extremities      Venous insufficiency of both lower extremities      Walking troubles         Code Status:  Prior     Falls Risk: Yes Band: Applied    Current Living Situation/Residence: lives with a significant other     Elimination Status: Continent: Yes     Activity Level: SBA w/ walker    Patients Preferred Language:  English     Needed: No    Vital Signs:  BP (!) 153/65   Pulse 50   Temp 98.6  F (37  C) (Oral)   Resp (!) 44   Wt 101.6 kg (224 lb)   SpO2 99%   BMI 38.45 kg/m       Cardiac Rhythm: 2nd degree heart block    Pain Score: 0/10    Is the Patient Confused:  No    Last Food or Drink: 03/19/22 at 1615    Focused Assessment:  Pt had been having increased phlegm for 3 months increasingly causing shortness of breath.  Pt reports having chest pain x5 in the past  week.  Pt has history of COPD.  Lung sounds coarse with audible expiratory wheezes.  Pt on nocturnal oxygen at baseline.  Currently on 2 L.  Pt heart rate normal with intermittent drops to malaika.  EKG done during malaika episode.  Pt A&Ox4.  Pt has cough.  Pt found to have pneumonia.  IV antibiotics administered.     Tests Performed: Done: Labs and Imaging    Treatments Provided:  Oxygen, IV antibiotics, medication.    Family Dynamics/Concerns: No    Family Updated On Visitor Policy: Yes    Plan of Care Communicated to Family: Yes    Who Was Updated about Plan of Care: Saul,     Belongings Checklist Done and Signed by Patient: Yes    Medications sent with patient: NA    Covid: symptomatic, negative      RN: Krystina Hooks   3/19/2022 6:24 PM

## 2022-03-19 NOTE — Clinical Note
Tested the atrial lead. See vendor printout/MD dictation. [employer] Fineline II Sterox EZ  Model 4469 45cm  SN: [id number]  Expiration: 6-

## 2022-03-19 NOTE — ED TRIAGE NOTES
Presents via [facility] from private living (w/ ) for c/o worsening phlegm production and dyspnea.  For 3 months now, she's had phlegm production that won't go away.  Phlegm is green, no blood.  Usually is on nocturnal O2, today she's oxygen during the day.  Medics found pt's 12 lead as right BBB, new for pt.  LS: wheeze with ronchi in the bases.  Given duoneb.  Pt is now being seen for new mitral valve changes.  Recently on Z pack.  Reports having CP 5 times this past week.        Pt is alert ad oriented.  She took a couple of steps from stretcher to bed on her own.  Has audible wheezing.  Speaking in full sentences, however is winded when she speaks.

## 2022-03-19 NOTE — Clinical Note
Prepped: left chest. Prepped with: ChloraPrep. The patient was draped. .Pre-procedure site marking:Insertion site not predetermined

## 2022-03-19 NOTE — H&P
[facility]    History and Physical - Hospitalist Service       Date of Admission:  3/19/2022    Assessment & Plan      Lalitha Underwood is a 76 year old female with a history of severe COPD on nighttime oxygen admitted to the hospital with acute on chronic respiratory failure with concerns for dysrhythmias with    Acute on chronic respiratory failure  --- Felt multifactorial and likely secondary to COPD exacerbation along with community-acquired pneumonia, unclear if any dysrhythmia contributing although appears less likely given normal BNP  --- Admit; continue steroids and antibiotic  --- Check procalcitonin  --- Echo per below  ---plan CT chest given worsening abnd protracted symptoms  --- Check sputum, influenza  --- Trial of Mucomyst nebs in addition to scheduled duo nebs  --- Patient would like Tessalon for cough; add Mucinex    Severe Gold C COPD with chronic respiratory failure  --- Followed by Dr. Tamayo  --- Recent notes reviewed.  ---continue home regimen  ---pulmo consult per above as has had several exacerbations last three months, getting CT  --continue steroids, used order set    Abnormal EKG  --- Previously noted to be abnormal earlier this week during preoperative evaluation  --- EKG concerning for Mobitz type II block  --- Cardiology aware, felt more consistent with Mobitz type 1  --- BNP normal, troponin negative  --- Cardiology consult (patient was to kelsey streeter seen in rapid access)  ---echo in am; unclear if contributing to above  ---check mag and replace if indicate    Hypertension  --- Continue home hydrochlorothiazide and Cozaar with holding parameter    Anxiety--continue zoloft    Epilepsy---continue keppra     GERD--congitiue PPI      IFG--- A1c recently 5.4   ---with steroids use ISS  ---not on meds at home  ---ok for regular diet    Vitamin D deficiency---continue home vitamin D    Urge incontinence---home vesicare    Obesity;  --- Estimated body mass index is 38.45  "kg/m      Diet: Regular Diet Adult    DVT Prophylaxis: Enoxaparin (Lovenox) SQ  Ann Catheter: Not present  Central Lines: None  Cardiac Monitoring: None  Code Status:  Discussed with patient at length.  Right now she is DNR but she is agreeable to intubation.  We discussed her COPD status and my concern over intubation.  She will think about this.  No change in CODE STATUS at this time.    Clinically Significant Risk Factors Present on Admission                Disposition Plan   Expected Discharge: 03/22/2022     Anticipated discharge location:  likely home  Delays: NA          The patient's care was discussed with the Bedside Nurse and Patient.    Iris Vera MD  Hospitalist Service  [facility]  Text page via [facility] Paging/Directory         ______________________________________________________________________    Chief Complaint   Worsening shortness of breath    History is obtained from the patient    History of Present Illness   Lalitha Underwood is a 76 year old female with a known history of severe gold C COPD on usual nocturnal oxygen and recent work-up for abnormal EKG including pending cardiology referral who came into the emergency room department due to worsening shortness of breath and hypoxia.  Patient states that he has not felt well since \"the holidays.\"  She is had COPD exacerbations with cold and cough symptoms.  She has been on antibiotics twice and on prednisone 3 different times.  She feels worsening cough, primarily bad coughing fits when she is laying down.  She is had no fevers or chills.  She was seen 5 days ago for preop where preoperative EKG showed replacement of left bundle to right bundle and surgery was postponed for patient to see cardiology.  Also pulmonology recommended delaying surgery as well given ongoing dyspnea.    She states that her breathing has not been good but over the last 3 days is been getting progressively worse.  She is been wearing her 1-1/2 L " of oxygen all the time with some relief.  As mentioned before symptoms are definitely worse with laying down and improved with sitting up.  She denies any change in oral intake or vomiting or diarrhea.  She is Covid vaccinated and boosted with several negative Covid test.  Denies fevers or chills denies dysuria increased consider hematuria.  In the emergency room department chest x-ray concerning for patchy infiltrate of the right upper lobe and patient is being admitted    Review of Systems    The 10 point Review of Systems is negative other than noted in the HPI     Past Medical History    I have reviewed this patient's medical history and updated it with pertinent information if needed.   Past Medical History:   Diagnosis Date     Convulsive disorder (H)      Convulsive disorder (H)      COPD (chronic obstructive pulmonary disease) (H)      COPD (chronic obstructive pulmonary disease) (H)      Depression      Dyspnea on exertion      Gastroesophageal reflux disease      Heart murmur      Hernia of unspecified site of abdominal cavity without mention of obstruction or gangrene      Hiatal hernia      Hiatal hernia      Hypertension      Hypertension      Obese      On home oxygen therapy     at 1.5 liters at nite     Osteopenia      Osteopenia      Oxygen dependent     1.5 L per NC     Pneumonia due to infectious organism, unspecified laterality, unspecified part of lung 3/19/2022     Second degree heart block 3/19/2022     Shortness of breath      Uncomplicated asthma      URI (upper respiratory infection)      Venous insufficiency of both lower extremities      Venous insufficiency of both lower extremities      Walking troubles        Past Surgical History   I have reviewed this patient's surgical history and updated it with pertinent information if needed.  Past Surgical History:   Procedure Laterality Date     ARTHROPLASTY TOE(S) Left 11/22/2021    Procedure: ARTHROPLASTY, digits two and three left foot;   Surgeon: Alfonso Orozco DPM;  Location: [facility]     ESOPHAGOSCOPY, GASTROSCOPY, DUODENOSCOPY (EGD), COMBINED N/A 2018    Procedure: Esophagogastroduodenoscopy;  Surgeon: Jasmeet Wilder MD;  Location: [facility] OR     HERNIA REPAIR, UMBILICAL  2018     HERNIA REPAIR, UMBILICAL  2018    Dr. Jimenez     LAPAROSCOPIC HERNIORRHAPHY HIATAL N/A 2018    Procedure: Laparoscopic Hiatal Hernia Repair,bilateral chest tubes;  Surgeon: Jasmeet Wilder MD;  Location: [facility] OR     OTHER SURGICAL HISTORY Left     Broke titanium in left arm     Repair arm fracture Left      SHOULDER SURGERY Right 1967     SHOULDER SURGERY Right 1967      BIOPSY THYROID FINE NEEDLE ASPIRATION  2020       Social History   I have reviewed this patient's social history and updated it with pertinent information if needed.  Social History     Tobacco Use     Smoking status: Former Smoker     Packs/day: 1.50     Years: 38.00     Pack years: 57.00     Types: Cigarettes     Quit date: 1998     Years since quittin.2     Smokeless tobacco: Never Used     Tobacco comment: quit in    Substance Use Topics     Alcohol use: Yes     Alcohol/week: 2.0 standard drinks     Comment: occ     Drug use: Yes     Types: Oxycodone       Family History   I have reviewed this patient's family history and updated it with pertinent information if needed.  Family History   Problem Relation Age of Onset     Pulmonary Hypertension Daughter         idiopathic      Heart Disease Other         2 sibs MI, 1 sib electrical conduction disorder     Breast Cancer Mother 66.00     Hypertension Mother      Coronary Artery Disease Mother      Varicose Veins Mother      Hypertension Father      Coronary Artery Disease Sister      Heart Disease Sister      Diabetes Son      Pulmonary Hypertension Daughter      Coronary Artery Disease Sister      Heart Disease Sister        Prior to Admission Medications   Prior to Admission  Medications   Prescriptions Last Dose Informant Patient Reported? Taking?   Magnesium Oxide 500 MG TABS 3/16/2022 at Unknown time  No Yes   Sig: Take 500 mg by mouth 2 times daily   RABEprazole (ACIPHEX) 20 MG EC tablet 3/19/2022 at Unknown time  No Yes   Sig: Take 1 tablet (20 mg) by mouth daily   albuterol (PROAIR HFA/PROVENTIL HFA/VENTOLIN HFA) 108 (90 Base) MCG/ACT inhaler 3/18/2022 at Unknown time  No Yes   Sig: Inhale 2 puffs into the lungs every 6 hours as needed   albuterol (PROVENTIL) (2.5 MG/3ML) 0.083% neb solution Not Taking at Unknown time  No No   Sig: Take 1 vial (2.5 mg) by nebulization every 6 hours as needed for shortness of breath / dyspnea or wheezing   Patient not taking: Reported on 3/19/2022   biotin 5 MG CAPS 3/19/2022 at Unknown time  Yes Yes   Sig: Take 1 capsule by mouth daily   calcium citrate (CITRACAL) 950 (200 Ca) MG tablet 3/16/2022 at Unknown time  Yes Yes   Sig: Take 1 tablet by mouth   denosumab (PROLIA) 60 MG/ML SOLN injection More than a month at Unknown time  Yes Yes   Sig: Inject 60 mg Subcutaneous every 6 months   fluticasone-salmeterol (ADVAIR) 250-50 MCG/DOSE inhaler 3/19/2022 at Unknown time  No Yes   Sig: Inhale 1 puff into the lungs 2 times daily   hydrochlorothiazide (HYDRODIURIL) 25 MG tablet 3/19/2022 at Unknown time  No Yes   Sig: hydrochlorothiazide 25 mg tablet   TAKE 1 TABLET BY MOUTH DAILY   levETIRAcetam (KEPPRA) 500 MG tablet 3/19/2022 at Unknown time  No Yes   Sig: Take 1 tablet (500 mg) by mouth 2 times daily   losartan (COZAAR) 50 MG tablet 3/19/2022 at Unknown time  No Yes   Sig: Take 1 tablet (50 mg) by mouth 2 times daily   nystatin (NYSTOP) 977683 UNIT/GM external powder Past Month at prn  Yes Yes   Sig: Apply topically daily as needed    potassium chloride ER (KLOR-CON M) 20 MEQ CR tablet 3/16/2022 at Unknown time  No Yes   Sig: Take 1 tablet (20 mEq) by mouth daily   sertraline (ZOLOFT) 100 MG tablet 3/19/2022 at Unknown time  No Yes   Sig: Take 1  tablet (100 mg) by mouth daily   solifenacin (VESICARE) 5 MG tablet 3/19/2022 at Unknown time  No Yes   Sig: Take 1 tablet (5 mg) by mouth every morning   Patient taking differently: Take 5 mg by mouth 2 times daily    tiotropium (SPIRIVA HANDIHALER) 18 MCG inhaled capsule 3/19/2022 at Unknown time  No Yes   Sig: Spiriva with HandiHaler 18 mcg and inhalation capsules   Patient taking differently: Inhale 18 mcg into the lungs daily    vitamin D3 (CHOLECALCIFEROL) 125 MCG (5000 UT) tablet 3/16/2022 at Unknown time  Yes Yes   Sig: Take 5,000 Units by mouth daily       Facility-Administered Medications Last Administration Doses Remaining   denosumab (PROLIA) injection 60 mg 11/29/2021 11:24 AM         Allergies   Allergies   Allergen Reactions     Clindamycin Rash     Carbamazepine Rash     Morphine Nausea       Physical Exam   Vital Signs: Temp: 98.6  F (37  C) Temp src: Oral BP: (!) 142/67 Pulse: 70   Resp: (!) 44 SpO2: 95 % O2 Device: Nasal cannula Oxygen Delivery: 2 LPM  Weight: 224 lbs 0 oz    General Appearance: Pleasant female, audible noise/rhonchi with breathing.  But not overly dyspneic.  Eyes: Sclera nonicteric pupils round and reactive to light  HEENT: Oropharynx moist tongue is midline  Respiratory: Audible rhonchi with diffuse wheezing as well  Cardiovascular: Regular rate and rhythm without significant murmurs rubs or gallop  GI: Soft nontender without hepatosplenomegaly  Skin: No significant lower extremity edema.  No rashes  Neurologic: Grossly intact without focal deficits appreciated.    Data   Data reviewed today: I reviewed all medications, new labs and imaging results over the last 24 hours. I personally reviewed the EKG tracing showing Sinus rhythm.  3 EKGs reviewed.  They are concerning for a type II block.  I would suspect Mobitz type II.  QTc okay and no concerning ST changes.    Recent Results (from the past 24 hour(s))   B-Type Natriuretic Peptide ([facility] Only)    Collection Time: 03/19/22   2:41 PM   Result Value Ref Range    BNP 40 0 - 140 pg/mL   CBC with platelets    Collection Time: 03/19/22  2:41 PM   Result Value Ref Range    WBC Count 16.3 (H) 4.0 - 11.0 10e3/uL    RBC Count 4.24 3.80 - 5.20 10e6/uL    Hemoglobin 12.0 11.7 - 15.7 g/dL    Hematocrit 38.2 35.0 - 47.0 %    MCV 90 78 - 100 fL    MCH 28.3 26.5 - 33.0 pg    MCHC 31.4 (L) 31.5 - 36.5 g/dL    RDW 14.1 10.0 - 15.0 %    Platelet Count 186 150 - 450 10e3/uL   Basic metabolic panel    Collection Time: 03/19/22  2:41 PM   Result Value Ref Range    Sodium 136 136 - 145 mmol/L    Potassium 3.5 3.5 - 5.0 mmol/L    Chloride 93 (L) 98 - 107 mmol/L    Carbon Dioxide (CO2) 30 22 - 31 mmol/L    Anion Gap 13 5 - 18 mmol/L    Urea Nitrogen 27 8 - 28 mg/dL    Creatinine 0.87 0.60 - 1.10 mg/dL    Calcium 8.4 (L) 8.5 - 10.5 mg/dL    Glucose 96 70 - 125 mg/dL    GFR Estimate 69 >60 mL/min/1.73m2   Troponin I    Collection Time: 03/19/22  2:41 PM   Result Value Ref Range    Troponin I 0.01 0.00 - 0.29 ng/mL   Extra Red Top Tube    Collection Time: 03/19/22  2:41 PM   Result Value Ref Range    Hold Specimen JIC    Extra Green Top (Lithium Heparin) Tube    Collection Time: 03/19/22  2:41 PM   Result Value Ref Range    Hold Specimen JIC    Extra Purple Top Tube    Collection Time: 03/19/22  2:41 PM   Result Value Ref Range    Hold Specimen JIC    Lactic acid whole blood    Collection Time: 03/19/22  2:43 PM   Result Value Ref Range    Lactic Acid 1.0 0.7 - 2.0 mmol/L   Asymptomatic COVID-19 Virus (Coronavirus) by PCR Nasopharyngeal    Collection Time: 03/19/22  3:40 PM    Specimen: Nasopharyngeal; Swab   Result Value Ref Range    SARS CoV2 PCR Negative Negative         Recent Results (from the past 24 hour(s))   XR Chest Port 1 View    Narrative    EXAM: XR CHEST PORTABLE 1 VIEW  LOCATION: [facility]  DATE/TIME: 03/19/2022, 2:48 PM    INDICATION: Shortness of breath.  COMPARISON: 03/11/2022.      Impression    IMPRESSION: Heart and  mediastinal size are within normal limits. There is patchy infiltrate involving the right upper lobe, new from prior study, suggestive of an infectious process. No pleural effusion or pneumothorax. There is also some silhouetting of   the right hemidiaphragm which represents either atelectasis or an infectious process.

## 2022-03-19 NOTE — Clinical Note
Grounding pads are removed from the patient. The skin is clean, dry, intact, and free from redness.     used

## 2022-03-20 ENCOUNTER — APPOINTMENT (OUTPATIENT)
Dept: CARDIOLOGY | Facility: HOSPITAL | Age: 77
DRG: 981 | End: 2022-03-20
Attending: FAMILY MEDICINE
Payer: MEDICARE

## 2022-03-20 ENCOUNTER — APPOINTMENT (OUTPATIENT)
Dept: PHYSICAL THERAPY | Facility: HOSPITAL | Age: 77
DRG: 981 | End: 2022-03-20
Attending: FAMILY MEDICINE
Payer: MEDICARE

## 2022-03-20 ENCOUNTER — APPOINTMENT (OUTPATIENT)
Dept: OCCUPATIONAL THERAPY | Facility: HOSPITAL | Age: 77
DRG: 981 | End: 2022-03-20
Attending: FAMILY MEDICINE
Payer: MEDICARE

## 2022-03-20 ENCOUNTER — APPOINTMENT (OUTPATIENT)
Dept: SPEECH THERAPY | Facility: HOSPITAL | Age: 77
DRG: 981 | End: 2022-03-20
Attending: INTERNAL MEDICINE
Payer: MEDICARE

## 2022-03-20 LAB
ALBUMIN SERPL-MCNC: 2.8 G/DL (ref 3.5–5)
ALP SERPL-CCNC: 65 U/L (ref 45–120)
ALT SERPL W P-5'-P-CCNC: 13 U/L (ref 0–45)
ANION GAP SERPL CALCULATED.3IONS-SCNC: 9 MMOL/L (ref 5–18)
AST SERPL W P-5'-P-CCNC: 12 U/L (ref 0–40)
BILIRUB SERPL-MCNC: 0.3 MG/DL (ref 0–1)
BUN SERPL-MCNC: 32 MG/DL (ref 8–28)
CALCIUM SERPL-MCNC: 8.5 MG/DL (ref 8.5–10.5)
CHLORIDE BLD-SCNC: 100 MMOL/L (ref 98–107)
CO2 SERPL-SCNC: 28 MMOL/L (ref 22–31)
CREAT SERPL-MCNC: 0.84 MG/DL (ref 0.6–1.1)
ERYTHROCYTE [DISTWIDTH] IN BLOOD BY AUTOMATED COUNT: 13.9 % (ref 10–15)
GFR SERPL CREATININE-BSD FRML MDRD: 72 ML/MIN/1.73M2
GLUCOSE BLD-MCNC: 190 MG/DL (ref 70–125)
GLUCOSE BLDC GLUCOMTR-MCNC: 141 MG/DL (ref 70–99)
GLUCOSE BLDC GLUCOMTR-MCNC: 150 MG/DL (ref 70–99)
GLUCOSE BLDC GLUCOMTR-MCNC: 184 MG/DL (ref 70–99)
GLUCOSE BLDC GLUCOMTR-MCNC: 201 MG/DL (ref 70–99)
HCT VFR BLD AUTO: 38 % (ref 35–47)
HGB BLD-MCNC: 12 G/DL (ref 11.7–15.7)
LVEF ECHO: NORMAL
MCH RBC QN AUTO: 28.1 PG (ref 26.5–33)
MCHC RBC AUTO-ENTMCNC: 31.6 G/DL (ref 31.5–36.5)
MCV RBC AUTO: 89 FL (ref 78–100)
PLATELET # BLD AUTO: 205 10E3/UL (ref 150–450)
POTASSIUM BLD-SCNC: 3.8 MMOL/L (ref 3.5–5)
PROT SERPL-MCNC: 6.8 G/DL (ref 6–8)
RBC # BLD AUTO: 4.27 10E6/UL (ref 3.8–5.2)
SODIUM SERPL-SCNC: 137 MMOL/L (ref 136–145)
TROPONIN I SERPL-MCNC: <0.01 NG/ML (ref 0–0.29)
TROPONIN I SERPL-MCNC: <0.01 NG/ML (ref 0–0.29)
WBC # BLD AUTO: 18.7 10E3/UL (ref 4–11)

## 2022-03-20 PROCEDURE — 92526 ORAL FUNCTION THERAPY: CPT | Mod: GN

## 2022-03-20 PROCEDURE — 250N000013 HC RX MED GY IP 250 OP 250 PS 637: Performed by: INTERNAL MEDICINE

## 2022-03-20 PROCEDURE — 84484 ASSAY OF TROPONIN QUANT: CPT | Performed by: FAMILY MEDICINE

## 2022-03-20 PROCEDURE — 94640 AIRWAY INHALATION TREATMENT: CPT | Mod: 76

## 2022-03-20 PROCEDURE — 250N000011 HC RX IP 250 OP 636: Performed by: FAMILY MEDICINE

## 2022-03-20 PROCEDURE — 97110 THERAPEUTIC EXERCISES: CPT | Mod: GP

## 2022-03-20 PROCEDURE — 97161 PT EVAL LOW COMPLEX 20 MIN: CPT | Mod: GP

## 2022-03-20 PROCEDURE — 94640 AIRWAY INHALATION TREATMENT: CPT

## 2022-03-20 PROCEDURE — 99223 1ST HOSP IP/OBS HIGH 75: CPT | Performed by: INTERNAL MEDICINE

## 2022-03-20 PROCEDURE — 99233 SBSQ HOSP IP/OBS HIGH 50: CPT | Performed by: INTERNAL MEDICINE

## 2022-03-20 PROCEDURE — 250N000012 HC RX MED GY IP 250 OP 636 PS 637: Performed by: FAMILY MEDICINE

## 2022-03-20 PROCEDURE — 85027 COMPLETE CBC AUTOMATED: CPT | Performed by: FAMILY MEDICINE

## 2022-03-20 PROCEDURE — 94799 UNLISTED PULMONARY SVC/PX: CPT

## 2022-03-20 PROCEDURE — 210N000001 HC R&B IMCU HEART CARE

## 2022-03-20 PROCEDURE — 80053 COMPREHEN METABOLIC PANEL: CPT | Performed by: FAMILY MEDICINE

## 2022-03-20 PROCEDURE — 250N000009 HC RX 250: Performed by: FAMILY MEDICINE

## 2022-03-20 PROCEDURE — 93306 TTE W/DOPPLER COMPLETE: CPT | Mod: 26 | Performed by: INTERNAL MEDICINE

## 2022-03-20 PROCEDURE — 999N000157 HC STATISTIC RCP TIME EA 10 MIN

## 2022-03-20 PROCEDURE — 97116 GAIT TRAINING THERAPY: CPT | Mod: GP

## 2022-03-20 PROCEDURE — 97166 OT EVAL MOD COMPLEX 45 MIN: CPT | Mod: GO

## 2022-03-20 PROCEDURE — 93306 TTE W/DOPPLER COMPLETE: CPT

## 2022-03-20 PROCEDURE — 92610 EVALUATE SWALLOWING FUNCTION: CPT | Mod: GN

## 2022-03-20 PROCEDURE — 250N000013 HC RX MED GY IP 250 OP 250 PS 637: Performed by: FAMILY MEDICINE

## 2022-03-20 PROCEDURE — G0463 HOSPITAL OUTPT CLINIC VISIT: HCPCS

## 2022-03-20 PROCEDURE — 36415 COLL VENOUS BLD VENIPUNCTURE: CPT | Performed by: FAMILY MEDICINE

## 2022-03-20 PROCEDURE — 97535 SELF CARE MNGMENT TRAINING: CPT | Mod: GO

## 2022-03-20 PROCEDURE — 250N000009 HC RX 250: Performed by: INTERNAL MEDICINE

## 2022-03-20 PROCEDURE — 250N000011 HC RX IP 250 OP 636: Performed by: INTERNAL MEDICINE

## 2022-03-20 RX ORDER — MAGNESIUM OXIDE 400 MG/1
400 TABLET ORAL 2 TIMES DAILY
Status: DISCONTINUED | OUTPATIENT
Start: 2022-03-20 | End: 2022-03-25 | Stop reason: HOSPADM

## 2022-03-20 RX ORDER — IPRATROPIUM BROMIDE AND ALBUTEROL SULFATE 2.5; .5 MG/3ML; MG/3ML
3 SOLUTION RESPIRATORY (INHALATION)
Status: DISCONTINUED | OUTPATIENT
Start: 2022-03-20 | End: 2022-03-22

## 2022-03-20 RX ORDER — PIPERACILLIN SODIUM, TAZOBACTAM SODIUM 3; .375 G/15ML; G/15ML
3.38 INJECTION, POWDER, LYOPHILIZED, FOR SOLUTION INTRAVENOUS EVERY 8 HOURS
Status: DISCONTINUED | OUTPATIENT
Start: 2022-03-20 | End: 2022-03-20

## 2022-03-20 RX ORDER — IPRATROPIUM BROMIDE AND ALBUTEROL SULFATE 2.5; .5 MG/3ML; MG/3ML
3 SOLUTION RESPIRATORY (INHALATION)
Status: DISCONTINUED | OUTPATIENT
Start: 2022-03-20 | End: 2022-03-20

## 2022-03-20 RX ORDER — FUROSEMIDE 10 MG/ML
20 INJECTION INTRAMUSCULAR; INTRAVENOUS EVERY 12 HOURS
Status: COMPLETED | OUTPATIENT
Start: 2022-03-20 | End: 2022-03-21

## 2022-03-20 RX ORDER — PIPERACILLIN SODIUM, TAZOBACTAM SODIUM 3; .375 G/15ML; G/15ML
3.38 INJECTION, POWDER, LYOPHILIZED, FOR SOLUTION INTRAVENOUS EVERY 8 HOURS
Status: DISCONTINUED | OUTPATIENT
Start: 2022-03-20 | End: 2022-03-25 | Stop reason: HOSPADM

## 2022-03-20 RX ORDER — METHYLPREDNISOLONE SODIUM SUCCINATE 40 MG/ML
40 INJECTION, POWDER, LYOPHILIZED, FOR SOLUTION INTRAMUSCULAR; INTRAVENOUS EVERY 24 HOURS
Status: DISCONTINUED | OUTPATIENT
Start: 2022-03-21 | End: 2022-03-22

## 2022-03-20 RX ORDER — PIPERACILLIN SODIUM, TAZOBACTAM SODIUM 3; .375 G/15ML; G/15ML
3.38 INJECTION, POWDER, LYOPHILIZED, FOR SOLUTION INTRAVENOUS ONCE
Status: COMPLETED | OUTPATIENT
Start: 2022-03-20 | End: 2022-03-20

## 2022-03-20 RX ADMIN — ENOXAPARIN SODIUM 40 MG: 40 INJECTION SUBCUTANEOUS at 21:42

## 2022-03-20 RX ADMIN — INSULIN ASPART 1 UNITS: 100 INJECTION, SOLUTION INTRAVENOUS; SUBCUTANEOUS at 17:00

## 2022-03-20 RX ADMIN — PIPERACILLIN AND TAZOBACTAM 3.38 G: 3; .375 INJECTION, POWDER, LYOPHILIZED, FOR SOLUTION INTRAVENOUS at 12:37

## 2022-03-20 RX ADMIN — TOLTERODINE 2 MG: 2 CAPSULE, EXTENDED RELEASE ORAL at 00:08

## 2022-03-20 RX ADMIN — PIPERACILLIN AND TAZOBACTAM 3.38 G: 3; .375 INJECTION, POWDER, LYOPHILIZED, FOR SOLUTION INTRAVENOUS at 18:55

## 2022-03-20 RX ADMIN — GUAIFENESIN 1200 MG: 600 TABLET, EXTENDED RELEASE ORAL at 08:29

## 2022-03-20 RX ADMIN — LEVETIRACETAM 500 MG: 500 TABLET, FILM COATED ORAL at 21:01

## 2022-03-20 RX ADMIN — Medication 950 MG: at 08:29

## 2022-03-20 RX ADMIN — POTASSIUM CHLORIDE 20 MEQ: 1500 TABLET, EXTENDED RELEASE ORAL at 08:29

## 2022-03-20 RX ADMIN — IPRATROPIUM BROMIDE AND ALBUTEROL SULFATE 3 ML: 2.5; .5 SOLUTION RESPIRATORY (INHALATION) at 12:12

## 2022-03-20 RX ADMIN — GUAIFENESIN 1200 MG: 600 TABLET, EXTENDED RELEASE ORAL at 21:03

## 2022-03-20 RX ADMIN — ACETYLCYSTEINE 4 ML: 200 SOLUTION ORAL; RESPIRATORY (INHALATION) at 08:07

## 2022-03-20 RX ADMIN — BENZONATATE 100 MG: 100 CAPSULE ORAL at 06:11

## 2022-03-20 RX ADMIN — PANTOPRAZOLE SODIUM 40 MG: 20 TABLET, DELAYED RELEASE ORAL at 06:02

## 2022-03-20 RX ADMIN — LEVETIRACETAM 500 MG: 500 TABLET, FILM COATED ORAL at 08:29

## 2022-03-20 RX ADMIN — BENZONATATE 100 MG: 100 CAPSULE ORAL at 21:42

## 2022-03-20 RX ADMIN — Medication 125 MCG: at 08:29

## 2022-03-20 RX ADMIN — MAGNESIUM OXIDE TAB 400 MG (241.3 MG ELEMENTAL MG) 400 MG: 400 (241.3 MG) TAB at 21:02

## 2022-03-20 RX ADMIN — HYDROCHLOROTHIAZIDE 25 MG: 25 TABLET ORAL at 08:29

## 2022-03-20 RX ADMIN — IPRATROPIUM BROMIDE AND ALBUTEROL SULFATE 3 ML: 2.5; .5 SOLUTION RESPIRATORY (INHALATION) at 20:30

## 2022-03-20 RX ADMIN — METHYLPREDNISOLONE SODIUM SUCCINATE 62.5 MG: 125 INJECTION, POWDER, FOR SOLUTION INTRAMUSCULAR; INTRAVENOUS at 06:02

## 2022-03-20 RX ADMIN — FUROSEMIDE 20 MG: 10 INJECTION, SOLUTION INTRAMUSCULAR; INTRAVENOUS at 16:27

## 2022-03-20 RX ADMIN — TOLTERODINE 2 MG: 2 CAPSULE, EXTENDED RELEASE ORAL at 08:38

## 2022-03-20 RX ADMIN — ACETYLCYSTEINE 4 ML: 200 SOLUTION ORAL; RESPIRATORY (INHALATION) at 03:06

## 2022-03-20 RX ADMIN — LOSARTAN POTASSIUM 50 MG: 50 TABLET, FILM COATED ORAL at 21:02

## 2022-03-20 RX ADMIN — IPRATROPIUM BROMIDE AND ALBUTEROL SULFATE 3 ML: 2.5; .5 SOLUTION RESPIRATORY (INHALATION) at 08:07

## 2022-03-20 RX ADMIN — IPRATROPIUM BROMIDE AND ALBUTEROL SULFATE 3 ML: 2.5; .5 SOLUTION RESPIRATORY (INHALATION) at 03:06

## 2022-03-20 RX ADMIN — ACETYLCYSTEINE 4 ML: 200 SOLUTION ORAL; RESPIRATORY (INHALATION) at 12:12

## 2022-03-20 RX ADMIN — TIOTROPIUM BROMIDE 18 MCG: 18 CAPSULE ORAL; RESPIRATORY (INHALATION) at 08:31

## 2022-03-20 RX ADMIN — SERTRALINE HYDROCHLORIDE 100 MG: 50 TABLET ORAL at 08:29

## 2022-03-20 RX ADMIN — LOSARTAN POTASSIUM 50 MG: 50 TABLET, FILM COATED ORAL at 08:29

## 2022-03-20 RX ADMIN — INSULIN ASPART 1 UNITS: 100 INJECTION, SOLUTION INTRAVENOUS; SUBCUTANEOUS at 12:07

## 2022-03-20 ASSESSMENT — ACTIVITIES OF DAILY LIVING (ADL)
ADLS_ACUITY_SCORE: 16
ADLS_ACUITY_SCORE: 12
ADLS_ACUITY_SCORE: 16

## 2022-03-20 NOTE — PLAN OF CARE
Problem: COPD (Chronic Obstructive Pulmonary Disease) Comorbidity  Goal: Maintenance of COPD Symptom Control  Outcome: Ongoing, Progressing     Problem: Infection (Pneumonia)  Goal: Resolution of Infection Signs and Symptoms  Outcome: Ongoing, Progressing     Problem: Respiratory Compromise (Pneumonia)  Goal: Effective Oxygenation and Ventilation  Outcome: Ongoing, Progressing  Intervention: Promote Airway Secretion Clearance  Recent Flowsheet Documentation  Taken 3/20/2022 0300 by Stevie Dumont RN  Cough And Deep Breathing: done independently per patient  Taken 3/20/2022 0000 by Stevie Dumont RN  Cough And Deep Breathing: done independently per patient     Pt has nonproductive cough. Lungs with coarse crackles, maintaining saturation on 3LNC. Pt continues on IV antibiotic and IV steroid.     VSS, afebrile this shift.    Problem: Dysrhythmia  Goal: Normalized Cardiac Rhythm  Outcome: Ongoing, Progressing      Pt did have a run of v tach at start of the shift, but was asymptomatic. Pt's rhythm SR with 1st degree AV block, trigeminy at start of shift then towards end of shift pt SR with BBB.

## 2022-03-20 NOTE — CONSULTS
[facility]  Cardiac Electrophysiology  [address]  [place] [address]   Office: [phone]  Fax: [phone]     Cardiac Electrophysiology Consultation    Patient: Lalitha Underwood   : 1945     Referring Provider: No ref. 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 - newly noted, baseline evidence of infra-Hisian conduction disease (including alternating RBBB and LBBB), not expected to be vagally mediated in the setting of her present COPD exacerbation  - follow up TTE  - recommend pacemaker implantation prior to discharge given above, after pulmonary evaluation and optimization         History of Present Illness   Lalitha Underwood is a 76 year old female with intermittent LBBB and RBBB, severe COPD on home oxygen, mild aortic stenosis, HTN, epilepsy, GERD, obesity, anxiety admitted 3/19/2022 with acute on chronic respiratory failure possibly related to COPD exacerbation and pneumonia - cardiology consult is sought for evaluation of 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    Mrs. Underwood had been noted to have prior LBBB QRS, though was observed to have SR with 1:1 AV conduction and RBBB at time of pre-operative evaluation for arthoplasty 3/14/2022 and was scheduled for cardiology evaluation 3/22/2022.  She presented to the ER 3/19/2022 with progressive dyspnea, cough productive of green phlegm with note of patchy RUL infiltrate.  She is being treated with ceftriaxone,  "azithromycin, steroids, nebulizers; CT chest and pulmonary consultation are planned.    She denies fevers, chills.         Physical Examination  Review of Systems   VITALS: /58   Pulse 87   Temp 97.9  F (36.6  C) (Oral)   Resp 26   Ht 1.626 m (5' 4\")   Wt 100.6 kg (221 lb 11.2 oz)   SpO2 97%   BMI 38.05 kg/m    Wt Readings from Last 3 Encounters:   03/20/22 100.6 kg (221 lb 11.2 oz)   03/14/22 101.6 kg (224 lb)   03/01/22 102.1 kg (225 lb)       Intake/Output Summary (Last 24 hours) at 3/20/2022 1017  Last data filed at 3/20/2022 0839  Gross per 24 hour   Intake 830 ml   Output 500 ml   Net 330 ml     CONSTITUTIONAL: no distress, slightly dyspneic with talking  EYES:  Conjunctivae pink, sclerae clear.    E/N/T:  Oral mucosa pink, moist mucous membranes  RESPIRATORY:  slightly dyspneic with talking, expiratory wheeze  CARDIOVASCULAR:  Regular, normal S1 and S2  GASTROINTESTINAL:  Abdomen without masses or tenderness  EXTREMITIES:  No clubbing or cyanosis.    MUSCULOSKELETAL:  Overall grossly normal muscle strength  SKIN:  Overall, skin warm and dry, no lesions.  NEURO/PSYCH:  Oriented x 3 with normal affect.   Constitutional:  No weight loss or loss of appetite    Eyes:  No difficulty with vision, no double vision, no dry eyes  ENT:  No sore throat, difficulty swallowing; changes in hearing or tinnitus  Cardiovascular: As detailed above  Respiratory:  productive cough  Musculoskeletal  No joint pain, muscle aches  Neurologic:  No syncope, lightheadedness, fainting spells   Hematologic: No easy bruising, excessive bleeding tendency   Gastrointestinal:  No jaundice, abdominal pain or abdominal bloating  Genitourinary: No changes in urinary habits, no trouble urinating    Psychiatric: No anxiety or depression      Medical History  Surgical History   Past Medical History:   Diagnosis Date     Convulsive disorder (H)      Convulsive disorder (H)      COPD (chronic obstructive pulmonary disease) (H)      COPD " (chronic obstructive pulmonary disease) (H)      Depression      Dyspnea on exertion      Gastroesophageal reflux disease      Heart murmur      Hernia of unspecified site of abdominal cavity without mention of obstruction or gangrene      Hiatal hernia      Hiatal hernia      Hypertension      Hypertension      Obese      On home oxygen therapy     at 1.5 liters at nite     Osteopenia      Osteopenia      Oxygen dependent     1.5 L per NC     Pneumonia due to infectious organism, unspecified laterality, unspecified part of lung 3/19/2022     Second degree heart block 3/19/2022     Shortness of breath      Uncomplicated asthma      URI (upper respiratory infection)      Venous insufficiency of both lower extremities      Venous insufficiency of both lower extremities      Walking troubles     Past Surgical History:   Procedure Laterality Date     ARTHROPLASTY TOE(S) Left 11/22/2021    Procedure: ARTHROPLASTY, digits two and three left foot;  Surgeon: Alfonso Orozco DPM;  Location: [facility]     ESOPHAGOSCOPY, GASTROSCOPY, DUODENOSCOPY (EGD), COMBINED N/A 11/26/2018    Procedure: Esophagogastroduodenoscopy;  Surgeon: Jasmeet Wilder MD;  Location: [facility]     HERNIA REPAIR, UMBILICAL  04/2018     HERNIA REPAIR, UMBILICAL  04/04/2018    Dr. 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     Repair arm fracture Left      SHOULDER SURGERY Right 1967     SHOULDER SURGERY Right 1967      BIOPSY THYROID FINE NEEDLE ASPIRATION  7/29/2020         Family History Social History   Family History   Problem Relation Age of Onset     Pulmonary Hypertension Daughter         idiopathic      Heart Disease Other         2 sibs MI, 1 sib electrical conduction disorder     Breast Cancer Mother 66.00      Hypertension Mother      Coronary Artery Disease Mother      Varicose Veins Mother      Hypertension Father      Coronary Artery Disease Sister      Heart Disease Sister      Diabetes Son      Pulmonary Hypertension Daughter      Coronary Artery Disease Sister      Heart Disease Sister         Social History     Tobacco Use     Smoking status: Former Smoker     Packs/day: 1.50     Years: 38.00     Pack years: 57.00     Types: Cigarettes     Quit date: 1998     Years since quittin.2     Smokeless tobacco: Never Used     Tobacco comment: quit in    Substance Use Topics     Alcohol use: Yes     Alcohol/week: 2.0 standard drinks     Comment: occ     Drug use: Yes     Types: Oxycodone         Medications  Allergies     Current Facility-Administered Medications:      acetaminophen (TYLENOL) tablet 650 mg, 650 mg, Oral, Q6H PRN **OR** acetaminophen (TYLENOL) Suppository 650 mg, 650 mg, Rectal, Q6H PRN, Iris Vera MD     acetylcysteine (MUCOMYST) 20 % nebulizer solution 4 mL, 4 mL, Nebulization, Q4H, Iris Vera MD, 4 mL at 22 0807     albuterol (PROVENTIL HFA/VENTOLIN HFA) inhaler, 2 puff, Inhalation, Q4H PRN, Iris Vera MD     albuterol (PROVENTIL) neb solution 2.5 mg, 2.5 mg, Nebulization, Q2H PRN, Iris Vera MD     azithromycin (ZITHROMAX) 500 mg in sodium chloride 0.9 % 250 mL intermittent infusion, 500 mg, Intravenous, Q24H, Iris Vera MD     benzonatate (TESSALON) capsule 100 mg, 100 mg, Oral, TID PRN, Iris Vera MD, 100 mg at 22 0611     calcium citrate (CITRACAL) tablet 950 mg, 950 mg, Oral, Daily, Iris Vera MD, 950 mg at 22 0829     cefTRIAXone (ROCEPHIN) 1 g vial to attach to  mL bag for ADULTS or NS 50 mL bag for PEDS, 1 g, Intravenous, Q24H, Iris Vera MD     glucose gel 15-30 g, 15-30 g, Oral, Q15 Min PRN **OR** dextrose 50 % injection 25-50 mL, 25-50 mL, Intravenous, Q15 Min PRN **OR** glucagon injection 1 mg, 1 mg, Subcutaneous, Q15 Min PRN, Jody,  Iris MENDEZ MD     docusate sodium (COLACE) capsule 100 mg, 100 mg, Oral, BID, Iris Vera MD     enoxaparin ANTICOAGULANT (LOVENOX) injection 40 mg, 40 mg, Subcutaneous, Q24H, Iris Vera MD, 40 mg at 03/19/22 2205     fluticasone-salmeterol (ADVAIR) 250-50 MCG/DOSE diskus inhaler 1 puff, 1 puff, Inhalation, Q12H, Iris Vera MD, 1 puff at 03/20/22 0831     guaiFENesin (MUCINEX) 12 hr tablet 1,200 mg, 1,200 mg, Oral, BID, Iris Vera MD, 1,200 mg at 03/20/22 0829     hydrochlorothiazide (HYDRODIURIL) tablet 25 mg, 25 mg, Oral, Daily, Iris Vera MD, 25 mg at 03/20/22 0829     insulin aspart (NovoLOG) injection (RAPID ACTING), 1-3 Units, Subcutaneous, TID AC, Iris Vera MD     insulin aspart (NovoLOG) injection (RAPID ACTING), 1-3 Units, Subcutaneous, At Bedtime, Iris Vera MD     ipratropium - albuterol 0.5 mg/2.5 mg/3 mL (DUONEB) neb solution 3 mL, 3 mL, Nebulization, Q6H, Iris Vera MD, 3 mL at 03/20/22 0807     levETIRAcetam (KEPPRA) tablet 500 mg, 500 mg, Oral, BID, Iris Vera MD, 500 mg at 03/20/22 0829     lidocaine (LMX4) cream, , Topical, Q1H PRN, Iris Vera MD     lidocaine 1 % 0.1-1 mL, 0.1-1 mL, Other, Q1H PRN, Iris Vera MD     losartan (COZAAR) tablet 50 mg, 50 mg, Oral, BID, Iris Vera MD, 50 mg at 03/20/22 0829     magnesium oxide (MAG-OX) tablet 400 mg, 400 mg, Oral, BID, Hector Acuña MD     melatonin tablet 1 mg, 1 mg, Oral, At Bedtime PRN, Iris Vera MD     methylPREDNISolone sodium succinate (solu-MEDROL) injection 62.5 mg, 62.5 mg, Intravenous, Q12H, Iris Vera MD, 62.5 mg at 03/20/22 0602     ondansetron (ZOFRAN-ODT) ODT tab 4 mg, 4 mg, Oral, Q6H PRN **OR** ondansetron (ZOFRAN) injection 4 mg, 4 mg, Intravenous, Q6H PRN, Iris Vera MD     pantoprazole (PROTONIX) EC tablet 40 mg, 40 mg, Oral, QAM AC, Iris Vera MD, 40 mg at 03/20/22 0602     potassium chloride ER (KLOR-CON M) CR tablet 20 mEq, 20 mEq, Oral, Daily, Iris Vera MD, 20 mEq at 03/20/22  0829     sertraline (ZOLOFT) tablet 100 mg, 100 mg, Oral, Daily, Iris Vera MD, 100 mg at 03/20/22 0829     sodium chloride (PF) 0.9% PF flush 3 mL, 3 mL, Intracatheter, Q8H, Iris Vera MD, 3 mL at 03/20/22 0603     sodium chloride (PF) 0.9% PF flush 3 mL, 3 mL, Intracatheter, q1 min prn, Iris Vera MD     tiotropium (SPIRIVA) capsule 18 mcg, 18 mcg, Inhalation, Daily, Iris Vera MD, 18 mcg at 03/20/22 0831     tolterodine ER (DETROL LA) 24 hr capsule 2 mg, 2 mg, Oral, BID, Iris Vera MD, 2 mg at 03/20/22 0838     Vitamin D3 (CHOLECALCIFEROL) tablet 125 mcg, 125 mcg, Oral, Daily, Iris Vera MD, 125 mcg at 03/20/22 0829    Facility-Administered Medications Ordered in Other Encounters:      sod bicarbonate-citric acid-simethicone (EZ GAS) 2.21-1.53-0.04 g packet 4 g, 4 g, Oral, Once, Ragini Arellano MD     Allergies   Allergen Reactions     Clindamycin Rash     Carbamazepine Rash     Morphine Nausea          Lab Results    Chemistry CBC Cardiac Enzymes/BNP/TSH/INR   Recent Labs   Lab Test 03/20/22  0759      POTASSIUM 3.8   CHLORIDE 100   CO2 28   *   BUN 32*   CR 0.84   GFRESTIMATED 72   TENZIN 8.5     Recent Labs   Lab Test 03/20/22  0759 03/19/22  1441 02/22/22  1110   CR 0.84 0.87 0.82          Recent Labs   Lab Test 03/20/22  0759   WBC 18.7*   HGB 12.0   HCT 38.0   MCV 89        Recent Labs   Lab Test 03/20/22  0759 03/19/22  1441 02/22/22  1110   HGB 12.0 12.0 14.0    Recent Labs   Lab Test 03/20/22  0759 03/19/22  2336 03/19/22  1441   TROPONINI <0.01 <0.01 0.01     Recent Labs   Lab Test 03/19/22  1441 11/13/18  1546   BNP 40  --    NTBNP  --  63     Recent Labs   Lab Test 02/21/19  1132   TSH 2.67     No results for input(s): INR in the last 44016 hours.      Data Review    ECGs (all tracings independently reviewed)  3/19/2022 - SR with 2:1 AV conduction, PVC vs LBBB aberrantly conducted complex, ventricular rate 54bpm, QRS 88ms  319/2022 - SR with type I 2nd degree  atrioventricular block, RBBB QRS 134ms  3/19/2022 - SR with 1:1 AV conduction, RBBB QRS 144ms  3/14/2022 - SR 95bpm, 1:1 AV conduction, RBBB QRS 142ms  11/2/2021 - SR 84bpm, LBBB QRS 140ms    Telemetry  Presently SR 80-100bpm with RBBB and 1:1 AV conduction.  Yesterday, type I 2nd degree AV block (mild SC prolongation prior to non-conducted Pw) with alternating RBBB and LBBB (uploaded to media)    3/20/2022 TTE  Pending    11/6/2017 TTE    No previous study for comparison.    Technically somewhat limited examination.    Left ventricle ejection fraction is The calculated left ventricular ejection fraction is 68%.    Abnormal diastolic relaxation    Right ventricular systolic function appears grossly normal.    Mild aortic stenosis. Mild aortic regurgitation.         Josselyn Marques MD  3/20/2022  10:17 AM

## 2022-03-20 NOTE — PROGRESS NOTES
Progress Note    Assessment/Plan  Lalitha Underwood is a 76 year old female with a history of severe COPD on nighttime oxygen admitted to the hospital with acute on chronic respiratory failure with concerns for dysrhythmias with     Acute on chronic respiratory failure  --- CT scan shows decrease in the right upper lobe which is suspicious for aspiration pneumonia.  --Ordered swallow evaluation and puréed diet with mildly thick liquids in the interim   --- Change Rocephin and Zithromax to IV Zosyn  --- Echo pending  ------ Check sputum, influenza  ---  DC Mucomyst since it can cause bronchospasm  ---  Continue Mucinex   --Order 2 doses of IV Lasix 20 mg given inspiratory crackles and leg edema  --Hold advair and Spiriva while on steroids and DuoNebs 4 times daily     Severe Gold C COPD exacerbation with chronic respiratory failure  --- Followed by Dr. Tamayo  --- Recent notes reviewed.  ---continue home regimen  ---pulmo consult per above as has had several exacerbations last three months, getting CT  --De-escalate steroids to once daily given nervousness and anxiety     Abnormal EKG  --- Previously noted to be abnormal earlier this week during preoperative evaluation  --- Cardiology aware, felt more consistent with Mobitz type 1  --- BNP normal, troponin negative  --Echo pending  Cardiology recommending pacemaker implantation prior to discharge.  However awaiting pulmonary evaluation and optimization.     Hypertension  --- Controlled    Anxiety--continue zoloft  --Decrease IV steroids 40 mg once daily to help alleviate anxiety worsened with high-dose steroids.     Epilepsy---continue keppra          GERD--congitiue PPI                   Steroid-induced hyperglycemia  Lab Results   Component Value Date    A1C 5.4 02/22/2022     ---with steroids use ISS  ---not on meds at home  --- Order diabetic diet--puréed     Vitamin D deficiency---continue home vitamin D     Urge incontinence--- hold Vesicare while in the  "hospital to avoid urinary retention     Obesity;  --- Estimated body mass index is 38.45 kg/m      Hypomagnesemia  --Continue magnesium oxide twice daily  Magnesium   Date Value Ref Range Status   03/19/2022 1.9 1.8 - 2.6 mg/dL Final          Diet: Regular Diet Adult    DVT Prophylaxis: Enoxaparin (Lovenox) SQ  Ann Catheter: Not present  Central Lines: None  Cardiac Monitoring: None  Code Status:  Previous hospitalist Discussed with patient at length.  Right now she is DNR but she is agreeable to intubation.  We discussed her COPD status and my concern over intubation.  She will think about this.  No change in CODE STATUS at this time.     Addendum Hector Acuña MD, Hospitalist,03/20/22,4:09 PM  Speech therapy recommending esophagogram and video swallow study.  Diet changed to thin liquids and soft.  Ordered the same.      Barriers to discharge: IV antibiotics    Anticipated discharge date: 3/23        Subjective  Patient new to me.  Chart reviewed.  CT scan of the chest shows large amount of debris is in the right upper lobe.  Ordered swallow eval and change diet to puréed with mildly thick liquids.  Change antibiotics to IV Zosyn.  Patient requested not to get nebs from 12 AM to 6 PM.   Continues to have shortness of breath on ambulation.  Currently requiring supplemental oxygen.    Review of system is positive for anxiety    Objective    /78 (BP Location: Right arm)   Pulse 84   Temp 97.1  F (36.2  C) (Oral)   Resp 20   Ht 1.626 m (5' 4\")   Wt 100.6 kg (221 lb 11.2 oz)   SpO2 96%   BMI 38.05 kg/m    Weight:   Wt Readings from Last 5 Encounters:   03/20/22 100.6 kg (221 lb 11.2 oz)   03/14/22 101.6 kg (224 lb)   03/01/22 102.1 kg (225 lb)   02/22/22 100.3 kg (221 lb 1.6 oz)   02/21/22 99.8 kg (220 lb)       I/O last 3 completed shifts:  In: 1070 [P.O.:720; IV Piggyback:350]  Out: 1200 [Urine:1200]  No intake/output data recorded.          Physical Exam  Alert, oriented*3  Able to speak short " sentences  Body mass index is 38.05 kg/m .    No sinus tenderness  Moist membranes  Neck supple  CVS: S1 S2-N, no murmurs, gallops, rubs  Resp: Minimal crackles but prolonged expiratory sounds  Abd: soft, No t/g/r  Neuro: no involuntary movements such as tremors  Vasc: 1+ leg edema     Pertinent Labs  ----------------------  Recent Labs   Lab 03/20/22  1137 03/20/22  0759 03/20/22  0758 03/19/22  2140 03/19/22  1441   NA  --  137  --   --  136   POTASSIUM  --  3.8  --   --  3.5   CO2  --  28  --   --  30   BUN  --  32*  --   --  27   CR  --  0.84  --   --  0.87   MAG  --   --   --   --  1.9   * 190* 184*   < > 96   ALBUMIN  --  2.8*  --   --   --    BILITOTAL  --  0.3  --   --   --    ALKPHOS  --  65  --   --   --    ALT  --  13  --   --   --    AST  --  12  --   --   --     < > = values in this interval not displayed.     Recent Labs   Lab 03/20/22  0759 03/19/22  1441   WBC 18.7* 16.3*   HGB 12.0 12.0   HCT 38.0 38.2    186     No results for input(s): INR in the last 168 hours.  Glucose Values Latest Ref Rng & Units 2/22/2022 3/19/2022 3/20/2022   Bedside Glucose (mg/dl )  - -- -- --   GLUCOSE 70 - 125 mg/dL 93 96 190(H)   Some recent data might be hidden         Pertinent Radiology   Radiology Results: Personally reviewed impression/s  XR Chest Port 1 View    Result Date: 3/19/2022  EXAM: XR CHEST PORTABLE 1 VIEW LOCATION: [facility] DATE/TIME: 03/19/2022, 2:48 PM INDICATION: Shortness of breath. COMPARISON: 03/11/2022.     IMPRESSION: Heart and mediastinal size are within normal limits. There is patchy infiltrate involving the right upper lobe, new from prior study, suggestive of an infectious process. No pleural effusion or pneumothorax. There is also some silhouetting of  the right hemidiaphragm which represents either atelectasis or an infectious process.     CT Chest Pulmonary Embolism w Contrast    Result Date: 3/19/2022  EXAM: CT CHEST PULMONARY EMBOLISM W  CONTRAST LOCATION: [facility] DATE/TIME: 3/19/2022 8:20 PM INDICATION: PE suspected, low/intermediate prob, neg D-dimer; COPD with worsening symptoms, hypoxia COMPARISON: Portable chest radiography 03/19/2022 and PA and lateral views of the chest 03/11/2022 TECHNIQUE: CT chest pulmonary angiogram during arterial phase injection of IV contrast. Multiplanar reformats and MIP reconstructions were performed. Dose reduction techniques were used. CONTRAST: isovue 370 100ml FINDINGS: ANGIOGRAM CHEST: Enlarged main, right, and left pulmonary arteries. No pulmonary artery filling defects. There are atheromatous calcifications mid ascending aorta, arch, and descending aorta but no findings of acute aortic syndrome. No aortic aneurysm. LUNGS AND PLEURA: Imaging at the expiratory phase of the respiratory cycle. Near complete collapse of the bronchus intermedius. The right upper lobe bronchus and segmental and subsegmental right upper lobe airways are filled with debris and there are numerous airway centric inflammatory nodules in the right upper lobe. Mild interstitial edema in the periphery of the caudal right upper lobe. Mild airway debris in the right lower lobe without airspace opacities. There is a band of cicatrization atelectasis in the left lower lobe and mild airway debris in the segmental left lower lobe airways. No pleural effusion. MEDIASTINUM: Partial anomalous pulmonary venous return. The left superior pulmonary vein drains to the left brachiocephalic vein. Cardiac chambers are normal in size. Variant right pulmonary vein anatomy with independent veins of the right upper, middle,  and lower lobes. No pericardial effusion. No enlarged mediastinal or hilar lymph nodes. Slightly patulous esophagus suggesting a component of dysmotility. Left thyroid nodule measures 19 x 26 mm (series 4, image 17). CORONARY ARTERY CALCIFICATION: Mild. UPPER ABDOMEN: No actionable findings in the imaged upper  abdomen. There our felt pledgets around the diaphragmatic hiatus from prior hiatal hernia repair. MUSCULOSKELETAL: Mild anterior wedge deformity of T6. No aggressive or destructive bone lesions.     IMPRESSION: 1.  Enlarged central pulmonary arteries consistent with pulmonary hypertension. No acute pulmonary embolism. 2.  Large amount of airway material filling the bronchi of the right upper lobe with airway centric inflammatory nodules in the peripheral third of the lobe consistent with bronchopneumonia. 3.  Partial anomalous pulmonary venous return. The left upper lobe pulmonary vein drains to the left brachiocephalic vein in the mediastinum producing a left to right shunt.    EKG Results: personally reviewed. Telemetry currently sinus bundle branch block

## 2022-03-20 NOTE — PROGRESS NOTES
"Speech-Language Pathology: Clinical Swallow Evaluation     03/20/22 1600   General Information   Onset of Illness/Injury or Date of Surgery 03/19/22   Referring Physician Acuña   Pertinent History of Current Problem Per ordering provider \"Patient new to me.  Chart reviewed.  CT scan of the chest shows large amount of debris is in the right upper lobe.  Ordered swallow eval and change diet to puréed with mildly thick liquids.  Change antibiotics to IV Zosyn.  Patient requested not to get nebs from 12 AM to 6 PM.\"   Past History of Dysphagia Hx of esophageal dysmotility. Pt feels that dry foods get stuck (points to sternum).   Type of Evaluation   Type of Evaluation Swallow Evaluation   Oral Motor   Oral Musculature generally intact   Dentition (Oral Motor)   Dentition (Oral Motor) adequate dentition;dental appliance/dentures   Dental Appliance/Denture (Oral Motor) dentures, full;adequate fit   Facial Symmetry (Oral Motor)   Facial Symmetry (Oral Motor) WNL   Lip Function (Oral Motor)   Lip Range of Motion (Oral Motor) WNL   Tongue Function (Oral Motor)   Tongue ROM (Oral Motor) WNL   Cough/Swallow/Gag Reflex (Oral Motor)   Soft Palate/Velum (Oral Motor) WNL   Vocal Quality/Secretion Management (Oral Motor)   Vocal Quality (Oral Motor) WNL   Secretion Management (Oral Motor) WNL   General Swallowing Observations   Current Diet/Method of Nutritional Intake (General Swallowing Observations, NIS) slightly thick liquids (level 1);pureed (level 4)   Respiratory Support (General Swallowing Observations) nasal cannula   Swallowing Evaluation Clinical swallow evaluation   Clinical Swallow Evaluation   Feeding Assistance no assistance needed   Clinical Swallow Evaluation Textures Trialed thin liquids;solid foods   Clinical Swallow Eval: Thin Liquid Texture Trial   Mode of Presentation, Thin Liquids straw;cup   Volume of Liquid or Food Presented 6oz   Oral Phase of Swallow WFL   Pharyngeal Phase of Swallow intact   Clinical " Swallow Evaluation: Solid Food Texture Trial   Mode of Presentation self-fed   Volume Presented x2 crackers   Oral Phase WFL  (prolonged)   Pharyngeal Phase intact   Diagnostic Statement Alternate liquids/solids. Pt felt this was too dry of a texture.   Esophageal Phase of Swallow   Patient reports or presents with symptoms of esophageal dysphagia Yes   Esophageal comments Hx of hiatal hernia repair and reflux.   Swallowing Recommendations   Diet Consistency Recommendations thin liquids (level 0);easy to chew (level 7)   Supervision Level for Intake patient independent   Mode of Delivery Recommendations bolus size, small;food moistened;slow rate of intake   Swallowing Maneuver Recommendations alternate food and liquid intake   Recommended Feeding/Eating Techniques (Swallow Eval) maintain upright posture during/after eating for 30 minutes   Instrumental Assessment Recommendations VFSS (videofluoroscopic swallowing study)  (include esophagram)   Clinical Impression   Criteria for Skilled Therapeutic Interventions Met (SLP Eval) Yes, treatment indicated   SLP Diagnosis dysphagia   Risks & Benefits of therapy have been explained evaluation/treatment results reviewed;care plan/treatment goals reviewed;patient   Clinical Impression Comments Bedside Swallow Study completed. Patient had no overt s/s aspiration with any texture trialed. Oral motor function was intact. Mastication was slow but functional. Pt alternated liquids/solids d/t how dry the cracker was. Belching and cough note with reposition prior to study and at the end of study. Hyolaryngeal elevation appears present upon visualization and palpation. Recommend diet of easy to chew and thin with strategies listed above.    SLP Discharge Planning   SLP Discharge Recommendation home   SLP Rationale for DC Rec No anticipated ST at d/c   SLP Brief overview of current status  Pt advanced to easy to chew and thin liquids with swallow strategies to prevent reflux. Further  evaluation warranted.    Total Evaluation Time   Total Evaluation Time (Minutes) 15   SLP Goals   Therapy Frequency (SLP Eval) daily   SLP Predicated Duration/Target Date for Goal Attainment 03/28/22   SLP Goals Swallow   SLP: Safely tolerate diet without signs/symptoms of aspiration Regular diet;Thin liquids;With use of compensatory swallow strategies;Independently

## 2022-03-20 NOTE — PLAN OF CARE
Goal Outcome Evaluation:  Pt continues to be short of breath with activity. Frequent, congested, wheezy cough. IV antibiotics changed and zosyn infused. Scheduled nebs and IV steroids administered. Plan is for speech consult for potential aspiration pneumonia.     Heart rhythm has been sinus with a bundle branch block. No arrhythmias noted.

## 2022-03-20 NOTE — PROGRESS NOTES
03/20/22 0931   Quick Adds   Type of Visit Initial Occupational Therapy Evaluation   Living Environment   People in Home spouse  (spouse on hospice, dementia, can ambulate and perform ADLs  )   Current Living Arrangements   ([facility])   Home Accessibility no concerns   Living Environment Comments 1 level    Self-Care   Current Activity Tolerance fair   Equipment Currently Used at Home walker, rolling;shower chair;raised toilet seat;dressing device  (keeps 4WW in car, )   Fall history within last six months no   Instrumental Activities of Daily Living (IADL)   IADL Comments indep. w/ drsg, showers, driving, laundry, A w/ shopping,cleaning     General Information   Onset of Illness/Injury or Date of Surgery 03/19/22   Patient/Family Therapy Goal Statement (OT) home   Existing Precautions/Restrictions fall;oxygen therapy device and L/min   Cognitive Status Examination   Affect/Mental Status (Cognitive) WFL   Visual Perception   Visual Impairment/Limitations corrective lenses for reading   Range of Motion Comprehensive   General Range of Motion no range of motion deficits identified   Strength Comprehensive (MMT)   General Manual Muscle Testing (MMT) Assessment   (B UE strength WFL for ADLs)   Coordination   Upper Extremity Coordination No deficits were identified   Bed Mobility   Bed Mobility supine-sit   Supine-Sit Gem (Bed Mobility) supervision   Assistive Device (Bed Mobility) bed rails   Transfers   Transfers toilet transfer;bed-chair transfer   Transfer Skill: Bed to Chair/Chair to Bed   Bed-Chair Gem (Transfers) contact guard   Assistive Device (Bed-Chair Transfers) rolling walker   Transfer Comments o2 sats 88% w/ activity in rm, to 90% with PLB within 20 sec.    Toilet Transfer   Type (Toilet Transfer) sit-stand;stand-sit   Gem Level (Toilet Transfer) contact guard   Assistive Device (Toilet Transfer) walker, front-wheeled'  All toileting-SBA, All grooming/hygiene-SBA seated after  set up   Balance   Balance Assessment standing balance: static   Balance Comments WFL   Clinical Impression   Criteria for Skilled Therapeutic Interventions Met (OT) Yes, treatment indicated   OT Diagnosis decreased ADLs   OT Problem List-Impairments impacting ADL activity tolerance impaired;mobility   Assessment of Occupational Performance 3-5 Performance Deficits   Identified Performance Deficits drsg, toileting, g/h, trsfs   Planned Therapy Interventions (OT) ADL retraining;transfer training;progressive activity/exercise   Clinical Decision Making Complexity (OT) low complexity   Anticipated Equipment Needs Upon Discharge (OT)   (grab bars by toilet as needed)   Risk & Benefits of therapy have been explained patient;care plan/treatment goals reviewed;evaluation/treatment results reviewed   OT Discharge Planning   OT Discharge Recommendation (DC Rec) home with assist   OT Rationale for DC Rec assist as needed w/ household tasks   Total Evaluation Time (Minutes)   Total Evaluation Time (Minutes) 5   OT Goals   Therapy Frequency (OT) Daily   OT Predicated Duration/Target Date for Goal Attainment 03/25/22   OT Goals Hygiene/Grooming;Toilet Transfer/Toileting;Transfers;Lower Body Dressing   OT: Hygiene/Grooming supervision/stand-by assist   OT: Lower Body Dressing Supervision/stand-by assist   OT: Transfer Supervision/stand-by assist   OT: Toilet Transfer/Toileting Supervision/stand-by assist

## 2022-03-20 NOTE — PROGRESS NOTES
Noted to be in bigeminy with a 6 beat run of Vtach. Asymptomatic at this time. Called and spoke to Dr. Vera. Per MD, as long as she is asymptomatic continue to monitor but she will also talk to cardiology again as well.

## 2022-03-20 NOTE — PROGRESS NOTES
Patient on 3L nasal cannula. SpO2 95-98%. BS coarse expiratory wheezes. Mucomyst and Duoneb given. Patient has strong productive cough post tx. Sputum was pink tinged. RT to continue to monitor.

## 2022-03-20 NOTE — PROGRESS NOTES
Care Management Follow Up    Length of Stay (days): 1    Expected Discharge Date: 03/22/2022    Concerns to be Addressed:   IRMA Jorgensen.   Patient plan of care discussed at interdisciplinary rounds: Yes    Anticipated Discharge Disposition:  Home.     Anticipated Discharge Services:  None  Anticipated Discharge DME:  Has home oxygen, 2 liters    Patient/family educated on Medicare website which has current facility and service quality ratings:  NA  Education Provided on the Discharge Plan:  Per team  Patient/Family in Agreement with the Plan:  Yes    Referrals Placed by CM/SW:  None  Private pay costs discussed: Not applicable     Additional Information:  Patient lives in a single-level living town house with her . She is independent with activities of daily living at baseline with some support. Her  is on hospice and the hospice staff provide assist with housekeeping. Her  does not drive but is still largely independent. He has other family support while she is hospitalized. Patient has home oxygen at 2 liters. Does not know the name of the DME provider (however, per chart review, it appears to be [employer]). It was originally only for night time but has been using it more during the day lately. Therapy agrees with a home discharge at this time. Family to transport at discharge.    Lauren Barton RN

## 2022-03-20 NOTE — TREATMENT PLAN
RCAT Treatment Plan    Patient Score: 12  Patient Acuity: 3    Clinical Indication for Therapy: bronchospasm    Therapy Ordered: flutter valve and Duoneb Q6    Assessment Summary: pt independent and self administer flutter valve.           RT Lebron  3/20/2022

## 2022-03-20 NOTE — PROGRESS NOTES
Physical Therapy Evaluation     03/20/22 1300   Quick Adds   Type of Visit Initial PT Evaluation   Living Environment   People in Home spouse   Home Accessibility no concerns   Living Environment Comments single level no stairs   Self-Care   Usual Activity Tolerance good   Current Activity Tolerance fair   Equipment Currently Used at Home walker, rolling;shower chair;raised toilet seat;dressing device   Fall history within last six months no   Post-Acute Assessment Only   Post-Acute Functional Assessment See IP Rehab Daily Documentation Flowsheet for Functional Mobility/ADL Assessment   Previous Level of Function/Home Environm   Bathing/Grooming, Premorbid Functional Level independent   Dressing, Premorbid Functional Level independent   Transfers, Premorbid Functional Level independent   Household Ambulation, Premorbid Functional Level independent   Stairs, Premorbid Functional Level not applicable (see comment)  (does not have any stairs in household)   General Information   Onset of Illness/Injury or Date of Surgery 03/19/22   Referring Physician Iris Vera MD   Patient/Family Therapy Goals Statement (PT) Return home   Pertinent History of Current Problem (include personal factors and/or comorbidities that impact the POC) Patient has hx of severe COPD and presents with acute on chronic respiratory failure.   Existing Precautions/Restrictions fall   Cognition   Affect/Mental Status (Cognition) WNL   Orientation Status (Cognition) oriented x 4   Pain Assessment   Patient Currently in Pain No   Posture    Posture Forward head position;Protracted shoulders   Range of Motion (ROM)   Range of Motion ROM is WFL   Strength (Manual Muscle Testing)   Strength (Manual Muscle Testing) strength is WFL   Bed Mobility   Bed Mobility supine-sit   Supine-Sit Worth[place] (Bed Mobility) supervision   Comment, (Bed Mobility) utilizes momentum of legs to sit up into long sit and then rotated legs to EOB   Transfers   Transfers  sit-stand transfer   Sit-Stand Transfer   Sit-Stand Roosevelt (Transfers) supervision   Assistive Device (Sit-Stand Transfers) walker, standard   Gait/Stairs (Locomotion)   Roosevelt Level (Gait) verbal cues;contact guard   Assistive Device (Gait) walker, 4-wheeled   Distance in Feet (Required for LE Total Joints) 150'   Pattern (Gait) step-through   Deviations/Abnormal Patterns (Gait) stride length decreased;mary decreased   Balance   Balance other (describe)   Sitting Balance: Static good balance   Standing Balance: Static fair balance   Balance Quick Add Sitting balance: Static;Standing balance: static   Clinical Impression   Criteria for Skilled Therapeutic Intervention Yes, treatment indicated   PT Diagnosis (PT) limited tolerance to activity   Influenced by the following impairments endurance deficits   Functional limitations due to impairments community ambulation   Clinical Presentation (PT Evaluation Complexity) Evolving/Changing   Clinical Presentation Rationale Presents as clinically diagnosed   Clinical Decision Making (Complexity) low complexity   Planned Therapy Interventions (PT) gait training   Risk & Benefits of therapy have been explained evaluation/treatment results reviewed;patient   PT Discharge Planning   PT Discharge Recommendation (DC Rec) home with assist   PT Rationale for DC Rec patient is below baseline due to increased O2 needs. She is able to complete functional mobility, but limited in walking endurance.    Total Evaluation Time   Total Evaluation Time (Minutes) 10   Physical Therapy Goals   PT Frequency Daily   PT Predicated Duration/Target Date for Goal Attainment 03/24/22   PT Goals Gait;Transfers   PT: Transfers Modified independent;Assistive device   PT: Gait Greater than 200 feet;Modified independent;Rolling walker     Carmel Garcia DPT

## 2022-03-20 NOTE — PROGRESS NOTES
"RESPIRATORY CARE NOTE  Patient is on 3LPM NC, BS wheeze expiratory, gave Mucomyst x2 and Duoneb x2, BS post treatment aeration increased, patient perceives moderate improvement, patient tolerated treatment well.    Pt tolerated Flutter valve well. Strong productive cough. Scant thin yellow tinted secretions. Pt encourage to do flutter valve.        BP (!) 166/91 (BP Location: Right arm)   Pulse 91   Temp 98  F (36.7  C) (Oral)   Resp 18   Ht 1.626 m (5' 4\")   Wt 100.6 kg (221 lb 11.2 oz)   SpO2 93%   BMI 38.05 kg/m          Corbin Razo, RT    "

## 2022-03-21 ENCOUNTER — APPOINTMENT (OUTPATIENT)
Dept: SPEECH THERAPY | Facility: HOSPITAL | Age: 77
DRG: 981 | End: 2022-03-21
Payer: MEDICARE

## 2022-03-21 ENCOUNTER — APPOINTMENT (OUTPATIENT)
Dept: RADIOLOGY | Facility: HOSPITAL | Age: 77
DRG: 981 | End: 2022-03-21
Attending: INTERNAL MEDICINE
Payer: MEDICARE

## 2022-03-21 LAB
GLUCOSE BLDC GLUCOMTR-MCNC: 118 MG/DL (ref 70–99)
GLUCOSE BLDC GLUCOMTR-MCNC: 121 MG/DL (ref 70–99)
GLUCOSE BLDC GLUCOMTR-MCNC: 123 MG/DL (ref 70–99)
GLUCOSE BLDC GLUCOMTR-MCNC: 128 MG/DL (ref 70–99)

## 2022-03-21 PROCEDURE — 74220 X-RAY XM ESOPHAGUS 1CNTRST: CPT

## 2022-03-21 PROCEDURE — 74230 X-RAY XM SWLNG FUNCJ C+: CPT

## 2022-03-21 PROCEDURE — 250N000013 HC RX MED GY IP 250 OP 250 PS 637: Performed by: INTERNAL MEDICINE

## 2022-03-21 PROCEDURE — 250N000011 HC RX IP 250 OP 636: Performed by: FAMILY MEDICINE

## 2022-03-21 PROCEDURE — 99232 SBSQ HOSP IP/OBS MODERATE 35: CPT | Performed by: INTERNAL MEDICINE

## 2022-03-21 PROCEDURE — 99233 SBSQ HOSP IP/OBS HIGH 50: CPT | Performed by: INTERNAL MEDICINE

## 2022-03-21 PROCEDURE — 250N000009 HC RX 250: Performed by: INTERNAL MEDICINE

## 2022-03-21 PROCEDURE — 94640 AIRWAY INHALATION TREATMENT: CPT

## 2022-03-21 PROCEDURE — 92611 MOTION FLUOROSCOPY/SWALLOW: CPT | Mod: GN

## 2022-03-21 PROCEDURE — 250N000013 HC RX MED GY IP 250 OP 250 PS 637: Performed by: FAMILY MEDICINE

## 2022-03-21 PROCEDURE — 210N000001 HC R&B IMCU HEART CARE

## 2022-03-21 PROCEDURE — 250N000011 HC RX IP 250 OP 636: Performed by: INTERNAL MEDICINE

## 2022-03-21 PROCEDURE — 94640 AIRWAY INHALATION TREATMENT: CPT | Mod: 76

## 2022-03-21 PROCEDURE — 999N000157 HC STATISTIC RCP TIME EA 10 MIN

## 2022-03-21 RX ORDER — DEXMEDETOMIDINE HYDROCHLORIDE 4 UG/ML
.1-1.2 INJECTION, SOLUTION INTRAVENOUS CONTINUOUS
Status: DISCONTINUED | OUTPATIENT
Start: 2022-03-24 | End: 2022-03-24

## 2022-03-21 RX ADMIN — Medication 125 MCG: at 10:16

## 2022-03-21 RX ADMIN — MAGNESIUM OXIDE TAB 400 MG (241.3 MG ELEMENTAL MG) 400 MG: 400 (241.3 MG) TAB at 21:01

## 2022-03-21 RX ADMIN — IPRATROPIUM BROMIDE AND ALBUTEROL SULFATE 3 ML: 2.5; .5 SOLUTION RESPIRATORY (INHALATION) at 21:15

## 2022-03-21 RX ADMIN — LOSARTAN POTASSIUM 50 MG: 50 TABLET, FILM COATED ORAL at 21:01

## 2022-03-21 RX ADMIN — BENZONATATE 100 MG: 100 CAPSULE ORAL at 10:19

## 2022-03-21 RX ADMIN — ENOXAPARIN SODIUM 40 MG: 40 INJECTION SUBCUTANEOUS at 21:01

## 2022-03-21 RX ADMIN — METHYLPREDNISOLONE SODIUM SUCCINATE 40 MG: 40 INJECTION, POWDER, FOR SOLUTION INTRAMUSCULAR; INTRAVENOUS at 06:00

## 2022-03-21 RX ADMIN — LEVETIRACETAM 500 MG: 500 TABLET, FILM COATED ORAL at 10:14

## 2022-03-21 RX ADMIN — MAGNESIUM OXIDE TAB 400 MG (241.3 MG ELEMENTAL MG) 400 MG: 400 (241.3 MG) TAB at 10:20

## 2022-03-21 RX ADMIN — PIPERACILLIN AND TAZOBACTAM 3.38 G: 3; .375 INJECTION, POWDER, LYOPHILIZED, FOR SOLUTION INTRAVENOUS at 02:25

## 2022-03-21 RX ADMIN — Medication 950 MG: at 10:15

## 2022-03-21 RX ADMIN — FUROSEMIDE 20 MG: 10 INJECTION, SOLUTION INTRAMUSCULAR; INTRAVENOUS at 05:59

## 2022-03-21 RX ADMIN — PIPERACILLIN AND TAZOBACTAM 3.38 G: 3; .375 INJECTION, POWDER, LYOPHILIZED, FOR SOLUTION INTRAVENOUS at 10:22

## 2022-03-21 RX ADMIN — SERTRALINE HYDROCHLORIDE 100 MG: 50 TABLET ORAL at 10:19

## 2022-03-21 RX ADMIN — IPRATROPIUM BROMIDE AND ALBUTEROL SULFATE 3 ML: 2.5; .5 SOLUTION RESPIRATORY (INHALATION) at 08:14

## 2022-03-21 RX ADMIN — LOSARTAN POTASSIUM 50 MG: 50 TABLET, FILM COATED ORAL at 10:18

## 2022-03-21 RX ADMIN — GUAIFENESIN 1200 MG: 600 TABLET, EXTENDED RELEASE ORAL at 21:01

## 2022-03-21 RX ADMIN — POTASSIUM CHLORIDE 20 MEQ: 1500 TABLET, EXTENDED RELEASE ORAL at 10:17

## 2022-03-21 RX ADMIN — BENZONATATE 100 MG: 100 CAPSULE ORAL at 21:05

## 2022-03-21 RX ADMIN — PANTOPRAZOLE SODIUM 40 MG: 20 TABLET, DELAYED RELEASE ORAL at 06:16

## 2022-03-21 RX ADMIN — IPRATROPIUM BROMIDE AND ALBUTEROL SULFATE 3 ML: 2.5; .5 SOLUTION RESPIRATORY (INHALATION) at 14:17

## 2022-03-21 RX ADMIN — GUAIFENESIN 1200 MG: 600 TABLET, EXTENDED RELEASE ORAL at 10:19

## 2022-03-21 RX ADMIN — PIPERACILLIN AND TAZOBACTAM 3.38 G: 3; .375 INJECTION, POWDER, LYOPHILIZED, FOR SOLUTION INTRAVENOUS at 17:59

## 2022-03-21 RX ADMIN — LEVETIRACETAM 500 MG: 500 TABLET, FILM COATED ORAL at 21:01

## 2022-03-21 RX ADMIN — ALBUTEROL SULFATE 2 PUFF: 90 INHALANT RESPIRATORY (INHALATION) at 02:27

## 2022-03-21 ASSESSMENT — ACTIVITIES OF DAILY LIVING (ADL)
ADLS_ACUITY_SCORE: 16
ADLS_ACUITY_SCORE: 14
ADLS_ACUITY_SCORE: 16
ADLS_ACUITY_SCORE: 14
ADLS_ACUITY_SCORE: 14
ADLS_ACUITY_SCORE: 16
ADLS_ACUITY_SCORE: 14
ADLS_ACUITY_SCORE: 14
ADLS_ACUITY_SCORE: 16
ADLS_ACUITY_SCORE: 16
ADLS_ACUITY_SCORE: 14
ADLS_ACUITY_SCORE: 14
ADLS_ACUITY_SCORE: 16
ADLS_ACUITY_SCORE: 16
ADLS_ACUITY_SCORE: 14
ADLS_ACUITY_SCORE: 16

## 2022-03-21 NOTE — PROGRESS NOTES
Pt. Seen for scheduled Duonebs. Pt. Uses a mouth piece in breath actuated mode, performs and tolerates well. Lung sounds are clear. Cough is strong effective and non-productive. RT to follow.

## 2022-03-21 NOTE — PROGRESS NOTES
Progress Note    Assessment/Plan  Lalitha Underwood is a 76 year old female with a history of severe COPD on nighttime oxygen admitted to the hospital with acute on chronic respiratory failure with concerns for dysrhythmias with     Acute on chronic respiratory failure  --- CT scan shows decrease in the right upper lobe which is suspicious for aspiration pneumonia.  --Passed video swallow study and unremarkable esophagogram 3/21  --- Continue IV Zosyn  --- Echo reviewed  ------ Check sputum, influenza  ---  Of Mucomyst since it can cause bronchospasm  ---  Continue Mucinex   --Completed 2 doses of IV Lasix 20 mg given inspiratory crackles and leg edema  --Hold advair and Spiriva while on steroids and DuoNebs 4 times daily     Severe Gold C COPD exacerbation with chronic respiratory failure  --- Followed by Dr. Tamayo  --- Recent notes reviewed.  ---continue home regimen  ---pulmo consult per above as has had several exacerbations last three months, getting CT  --De-escalate steroids to once daily given nervousness and anxiety     Abnormal EKG--  --- Previously noted to be abnormal earlier this week during preoperative evaluation  --- Cardiology aware, felt more consistent with Mobitz type 1 with alternative bundle branch block  --- BNP normal, troponin negative  --Echo shows EF of 60 to 65%.  Cardiology recommending pacemaker implantation on Thursday    Hypertension  --- Controlled    Anxiety--continue zoloft  --Decrease IV steroids 40 mg once daily to help alleviate anxiety worsened with high-dose steroids.     Epilepsy---continue keppra          GERD--congitiue PPI                   Steroid-induced hyperglycemia  Lab Results   Component Value Date    A1C 5.4 02/22/2022     ---with steroids use ISS  ---not on meds at home  --- Passed swallow study and therefore ordered regular diet    Vitamin D deficiency---continue home vitamin D     Urge incontinence--- hold Vesicare while in the hospital to avoid urinary  "retention     Obesity;  --- Estimated body mass index is 38.45 kg/m      Hypomagnesemia  --Continue magnesium oxide twice daily  Magnesium   Date Value Ref Range Status   03/19/2022 1.9 1.8 - 2.6 mg/dL Final          Diet: Regular Diet Adult    DVT Prophylaxis: Enoxaparin (Lovenox) SQ  Ann Catheter: Not present  Central Lines: None  Cardiac Monitoring: None  Code Status:  Previous hospitalist Discussed with patient at length.  Right now she is DNR but she is agreeable to intubation.  We discussed her COPD status and my concern over intubation.  She will think about this.  No change in CODE STATUS at this time.           Barriers to discharge: IV antibiotics, pacemaker    Anticipated discharge date: 3/24        Subjective  Patient is feeling better.  Has open wound on the left fourth toe on the plantar aspect for the last 2 months.  Serous discharge seen by the nurse.  No sign of infection.  Continue local wound care.  Telemetry shows heart rate of 86    Review of system is positive for anxiety    Objective    BP (!) 146/70 (BP Location: Right arm)   Pulse 99   Temp 97.9  F (36.6  C) (Oral)   Resp 20   Ht 1.626 m (5' 4\")   Wt 99.7 kg (219 lb 11.2 oz)   SpO2 94%   BMI 37.71 kg/m    Weight:   Wt Readings from Last 5 Encounters:   03/21/22 99.7 kg (219 lb 11.2 oz)   03/14/22 101.6 kg (224 lb)   03/01/22 102.1 kg (225 lb)   02/22/22 100.3 kg (221 lb 1.6 oz)   02/21/22 99.8 kg (220 lb)       I/O last 3 completed shifts:  In: 1780 [P.O.:1680; I.V.:100]  Out: 3000 [Urine:3000]  No intake/output data recorded.          Physical Exam  Alert, oriented*3  Able to speak short sentences  Body mass index is 37.71 kg/m .    No sinus tenderness  Moist membranes  Neck supple  CVS: S1 S2-N, no murmurs, gallops, rubs  Resp: Minimal crackles but prolonged expiratory sounds  Abd: soft, No t/g/r  Neuro: no involuntary movements such as tremors  Vasc: 1+ leg edema     Pertinent Labs  ----------------------  Recent Labs   Lab " 03/21/22  1136 03/21/22  0808 03/20/22 2053 03/20/22  1137 03/20/22  0759 03/19/22  2140 03/19/22  1441   NA  --   --   --   --  137  --  136   POTASSIUM  --   --   --   --  3.8  --  3.5   CO2  --   --   --   --  28  --  30   BUN  --   --   --   --  32*  --  27   CR  --   --   --   --  0.84  --  0.87   MAG  --   --   --   --   --   --  1.9   * 123* 150*   < > 190*   < > 96   ALBUMIN  --   --   --   --  2.8*  --   --    BILITOTAL  --   --   --   --  0.3  --   --    ALKPHOS  --   --   --   --  65  --   --    ALT  --   --   --   --  13  --   --    AST  --   --   --   --  12  --   --     < > = values in this interval not displayed.     Recent Labs   Lab 03/20/22  0759 03/19/22  1441   WBC 18.7* 16.3*   HGB 12.0 12.0   HCT 38.0 38.2    186     No results for input(s): INR in the last 168 hours.  Glucose Values Latest Ref Rng & Units 2/22/2022 3/19/2022 3/20/2022   Bedside Glucose (mg/dl )  - -- -- --   GLUCOSE 70 - 125 mg/dL 93 96 190(H)   Some recent data might be hidden         Pertinent Radiology   Radiology Results: Personally reviewed impression/s  XR Chest Port 1 View    Result Date: 3/19/2022  EXAM: XR CHEST PORTABLE 1 VIEW LOCATION: [facility] DATE/TIME: 03/19/2022, 2:48 PM INDICATION: Shortness of breath. COMPARISON: 03/11/2022.     IMPRESSION: Heart and mediastinal size are within normal limits. There is patchy infiltrate involving the right upper lobe, new from prior study, suggestive of an infectious process. No pleural effusion or pneumothorax. There is also some silhouetting of  the right hemidiaphragm which represents either atelectasis or an infectious process.     CT Chest Pulmonary Embolism w Contrast    Result Date: 3/19/2022  EXAM: CT CHEST PULMONARY EMBOLISM W CONTRAST LOCATION: [facility] DATE/TIME: 3/19/2022 8:20 PM INDICATION: PE suspected, low/intermediate prob, neg D-dimer; COPD with worsening symptoms, hypoxia COMPARISON:  Portable chest radiography 03/19/2022 and PA and lateral views of the chest 03/11/2022 TECHNIQUE: CT chest pulmonary angiogram during arterial phase injection of IV contrast. Multiplanar reformats and MIP reconstructions were performed. Dose reduction techniques were used. CONTRAST: isovue 370 100ml FINDINGS: ANGIOGRAM CHEST: Enlarged main, right, and left pulmonary arteries. No pulmonary artery filling defects. There are atheromatous calcifications mid ascending aorta, arch, and descending aorta but no findings of acute aortic syndrome. No aortic aneurysm. LUNGS AND PLEURA: Imaging at the expiratory phase of the respiratory cycle. Near complete collapse of the bronchus intermedius. The right upper lobe bronchus and segmental and subsegmental right upper lobe airways are filled with debris and there are numerous airway centric inflammatory nodules in the right upper lobe. Mild interstitial edema in the periphery of the caudal right upper lobe. Mild airway debris in the right lower lobe without airspace opacities. There is a band of cicatrization atelectasis in the left lower lobe and mild airway debris in the segmental left lower lobe airways. No pleural effusion. MEDIASTINUM: Partial anomalous pulmonary venous return. The left superior pulmonary vein drains to the left brachiocephalic vein. Cardiac chambers are normal in size. Variant right pulmonary vein anatomy with independent veins of the right upper, middle,  and lower lobes. No pericardial effusion. No enlarged mediastinal or hilar lymph nodes. Slightly patulous esophagus suggesting a component of dysmotility. Left thyroid nodule measures 19 x 26 mm (series 4, image 17). CORONARY ARTERY CALCIFICATION: Mild. UPPER ABDOMEN: No actionable findings in the imaged upper abdomen. There our felt pledgets around the diaphragmatic hiatus from prior hiatal hernia repair. MUSCULOSKELETAL: Mild anterior wedge deformity of T6. No aggressive or destructive bone lesions.      IMPRESSION: 1.  Enlarged central pulmonary arteries consistent with pulmonary hypertension. No acute pulmonary embolism. 2.  Large amount of airway material filling the bronchi of the right upper lobe with airway centric inflammatory nodules in the peripheral third of the lobe consistent with bronchopneumonia. 3.  Partial anomalous pulmonary venous return. The left upper lobe pulmonary vein drains to the left brachiocephalic vein in the mediastinum producing a left to right shunt.    EKG Results: personally reviewed. Telemetry currently sinus bundle branch block

## 2022-03-21 NOTE — PROGRESS NOTES
"Speech-Language Pathology: Video Swallow Study     03/21/22 1000   General Information   Onset of Illness/Injury or Date of Surgery 03/19/22   Referring Physician Acuña   Pertinent History of Current Problem Per ordering provider \"Patient new to me.  Chart reviewed.  CT scan of the chest shows large amount of debris is in the right upper lobe.  Ordered swallow eval and change diet to puréed with mildly thick liquids.  Change antibiotics to IV Zosyn.  Patient requested not to get nebs from 12 AM to 6 PM.\"   General Observations Pt had BSS on 3/20/22. A VFSS was recommended to r/o silent aspiration. Esophagram to follow.   Past History of Dysphagia Hx of esophageal dysmotility. Pt feels that dry foods get stuck (points to sternum).   Type of Evaluation   Type of Evaluation Swallow Evaluation   General Swallowing Observations   Current Diet/Method of Nutritional Intake (General Swallowing Observations, NIS) thin liquids (level 0);regular diet   Respiratory Support (General Swallowing Observations) nasal cannula   Swallowing Evaluation Videofluoroscopic swallow study (VFSS)   VFSS Evaluation   Radiologist Fahrner   Views Taken left lateral   VFSS Textures Trialed thin liquids;pureed;solid foods   VFSS Eval: Thin Liquid Texture Trial   Mode of Presentation, Thin Liquid cup;straw   Order of Presentation 3,4   Preparatory Phase WFL   Oral Phase, Thin Liquid WFL   Pharyngeal Phase, Thin Liquid WFL   Rosenbek's Penetration Aspiration Scale: Thin Liquid Trial Results 1 - no aspiration, contrast does not enter airway   VFSS Evaluation: Puree Solid Texture Trial   Mode of Presentation, Puree spoon   Order of Presentation 1   Preparatory Phase WFL   Oral Phase, Puree WFL   Pharyngeal Phase, Puree WFL   Rosenbek's Penetration Aspiration Scale: Puree Food Trial Results 1 - no aspiration, contrast does not enter airway   VFSS Evaluation: Solid Food Texture Trial   Mode of Presentation, Solid spoon   Order of Presentation 2 " "  Preparatory Phase WFL   Oral Phase, Solid WFL   Pharyngeal Phase, Solid WFL   Rosenbek's Penetration Aspiration Scale: Solid Food Trial Results 1 - no aspiration, contrast does not enter airway   Esophageal Phase of Swallow   Patient reports or presents with symptoms of esophageal dysphagia Yes   Esophageal sweep performed during today s vidofluoroscopic exam  Please refer to radiologist's report for details   Esophageal comments Results of esophagram from today \"Normal peristalsis. No masses, strictures, or mucosal abnormalities identified. There is suspected surgical change in the region of the GE junction.  Otherwise this is not well assessed due to patient mobility.\"   Swallowing Recommendations   Diet Consistency Recommendations thin liquids (level 0);regular diet   Supervision Level for Intake patient independent   Mode of Delivery Recommendations bolus size, small;slow rate of intake   Swallowing Maneuver Recommendations alternate food and liquid intake   Recommended Feeding/Eating Techniques (Swallow Eval) maintain upright posture during/after eating for 30 minutes   Medication Administration Recommendations, Swallowing (SLP) Via thin or puree   Clinical Impression   Criteria for Skilled Therapeutic Interventions Met (SLP Eval) No problems identified which require skilled intervention   Risks & Benefits of therapy have been explained evaluation/treatment results reviewed;care plan/treatment goals reviewed;patient   Clinical Impression Comments Videofluoroscopic Swallow Study completed. Patient had no aspiration and no penetration. Oral motor function WNL and oral phase WNL. Tongue base retraction was WNL. Swallow response was timely for age and epiglottic inversion was complete.  No significant stasis occurred. Hyolaryngeal elevation was WNL and hyolaryngeal excursion was WNL. Recommend diet of regular and thin.   SLP Discharge Planning   SLP Discharge Recommendation home   SLP Rationale for DC Rec No " anticipated ST at d/c   SLP Brief overview of current status  Patient advanced to regular textures and thin liquids. Recommend continued use of strategies to reduce reflux.    Total Evaluation Time   Total Evaluation Time (Minutes) 15

## 2022-03-21 NOTE — PROGRESS NOTES
Patient on 3L nasal cannula. SpO2 BS clear. Neb tx given at scheduled time. RT to continue to monitor.

## 2022-03-21 NOTE — PLAN OF CARE
Problem: Risk for Delirium  Goal: Improved Sleep  Outcome: Ongoing, Progressing     Problem: Infection (Pneumonia)  Goal: Resolution of Infection Signs and Symptoms  Outcome: Ongoing, Progressing     Problem: Dysrhythmia  Goal: Normalized Cardiac Rhythm  Outcome: Ongoing, Progressing       Goal Outcome Evaluation:    VSS with elevated BP in the evening and overnight hours.  Pt. Responded well tp education  and asked questions about  what medications are for and why she is taking them.  Pt. Requested that cares be bundled overnight to ensure she is able to sleep as she is concerned lack of sleep will trigger a break out seizure.  Pt. Reported that she has not had a seizure since 1991.  Pt. Stated she would refuse medications before 0530 am but was agreeable to nurse starting IV antibiotics on time.  Good urine output, cough still remains a source of head ache and pain.  She stated that getting up and walking to the bathroom felt good and was helping her lungs.

## 2022-03-21 NOTE — PLAN OF CARE
Telemetry rhythm is Sinus rhythm with BBB, will be getting a pacemaker this week Thursday.  Currently getting IV antibiotics for pneumonia, congested strong cough, productive, yellow/green sputum.  Some pain 1/10 on left fourth toe, wound since January, some oozing, new dressing applied and MD notified and saw it in the room.  Patient declines tylenol.  Oxygen saturation is 96% on 2 liters, was down to 85% on 1 liter.  Will continue to monitor.      Problem: Infection (Pneumonia)  Goal: Resolution of Infection Signs and Symptoms  Outcome: Ongoing, Progressing     Problem: Plan of Care - These are the overarching goals to be used throughout the patient stay.    Goal: Optimal Comfort and Wellbeing  Outcome: Ongoing, Progressing   Goal Outcome Evaluation:

## 2022-03-21 NOTE — PROGRESS NOTES
CARDIOLOGY PROGRESS NOTE      Assessment/Plan:  1.  Second degree heart block-looks to be Mobitz 1, but given alternating bundle branch block plan is for pacemaker, patient would like to have this on Thursday.  2.  COPD exacerbation (H)-so noted, 97% saturation on 2 L.  3.  Pneumonia due to infectious organism, unspecified laterality, unspecified part of lung-lactic acid normal, white count elevated, no procalcitonin and cultures negative.  Defer to primary care team.  4.  Diabetes-defer to primary care team.  A1c good at 5.4.  5.  Aortic stenosis-moderate with mean gradient of 24 mmHg and peak velocity of 3.0 m/s.  Recheck echo in a year.  6.  Pulmonary hypertension-moderate with estimated pressure of 54 to 55 mmHg.  Possibly WHO class III due to lung disease.    Plan:  1.  Plan on pacemaker Thursday.    Discharge Plannin.  Cardiac barrier to discharge is implantation of pacemaker.     LOS: 2 days     Subjective:  Interval History:    76-year-old white female being seen on third day of hospitalization.  Still short of breath with dry cough.  No chest pains, palpitations, syncope, dizziness or peripheral edema.    Medications    calcium citrate  950 mg Oral Daily     docusate sodium  100 mg Oral BID     enoxaparin ANTICOAGULANT  40 mg Subcutaneous Q24H     [Held by provider] fluticasone-salmeterol  1 puff Inhalation Q12H     guaiFENesin  1,200 mg Oral BID     insulin aspart  1-3 Units Subcutaneous TID AC     insulin aspart  1-3 Units Subcutaneous At Bedtime     ipratropium - albuterol 0.5 mg/2.5 mg/3 mL  3 mL Nebulization Q6H WA     levETIRAcetam  500 mg Oral BID     losartan  50 mg Oral BID     magnesium oxide  400 mg Oral BID     methylPREDNISolone  40 mg Intravenous Q24H     pantoprazole  40 mg Oral QAM AC     piperacillin-tazobactam  3.375 g Intravenous Q8H     potassium chloride ER  20 mEq Oral Daily     sertraline  100 mg Oral Daily     sodium chloride (PF)  3 mL Intracatheter Q8H     [Held by  "provider] tiotropium  18 mcg Inhalation Daily     [Held by provider] tolterodine ER  2 mg Oral BID     vitamin D3  125 mcg Oral Daily     Objective:   Vital signs in last 24 hours:  Temp:  [97.6  F (36.4  C)-98.3  F (36.8  C)] 97.7  F (36.5  C)  Pulse:  [81-95] 85  Resp:  [18-20] 20  BP: (134-166)/(70-91) 145/82  SpO2:  [89 %-98 %] 96 %    Physical Exam:  BP (!) 145/82 (BP Location: Right arm)   Pulse 85   Temp 97.7  F (36.5  C) (Oral)   Resp 20   Ht 1.626 m (5' 4\")   Wt 99.7 kg (219 lb 11.2 oz)   SpO2 96%   BMI 37.71 kg/m      General Appearance:    Alert, cooperative, no distress, appears stated age   Head:    Normocephalic, without obvious abnormality, atraumatic   Throat:   Lips, mucosa, and tongue normal; teeth and gums normal   Neck:   Supple, symmetrical, trachea midline, no adenopathy;        thyroid:  No enlargement/tenderness/nodules; no carotid    bruit or JVD   Back:     Symmetric, no curvature, ROM normal, no CVA tenderness   Lungs:     Diffuse wheezes bilaterally, respirations unlabored   Chest wall:    No tenderness or deformity   Heart:    Regular rate and rhythm, S1 and S2 normal, no murmur, rub   or gallop   Abdomen:     Soft, non-tender, bowel sounds active all four quadrants,     no masses, no organomegaly   Extremities:   Normal, atraumatic, no cyanosis or edema   Pulses:   2+ and symmetric all extremities   Skin:   Skin color, texture, turgor normal, no rashes or lesions     Cardiographics:      ECG: Personally reviewed by myself shows sinus rhythm, 2-1 heart block, occasional PVC, no acute changes.    Echocardiogram: The left ventricle is normal in size with normal left ventricular wall thickness.  Left ventricular function is normal.The ejection fraction is 60-65%.  The right ventricle is mildly dilated with normal systolic function.  The right atrium is severely dilated.  Mild valvular aortic stenosis is present.  Right ventricular systolic pressure is elevated, consistent with mild " pulmonary hypertension.     Compared to the prior study of 11/6/17 the right ventricle now appears  dilated.      Lab Results:   Recent Labs   Lab 03/20/22  0759   WBC 18.7*   HGB 12.0   HCT 38.0        Recent Labs   Lab 03/20/22  0759      CO2 28   BUN 32*   .       Lab Results   Component Value Date    TROPONINI <0.01 03/20/2022

## 2022-03-22 ENCOUNTER — APPOINTMENT (OUTPATIENT)
Dept: OCCUPATIONAL THERAPY | Facility: HOSPITAL | Age: 77
DRG: 981 | End: 2022-03-22
Payer: MEDICARE

## 2022-03-22 ENCOUNTER — APPOINTMENT (OUTPATIENT)
Dept: PHYSICAL THERAPY | Facility: HOSPITAL | Age: 77
DRG: 981 | End: 2022-03-22
Payer: MEDICARE

## 2022-03-22 LAB
GLUCOSE BLDC GLUCOMTR-MCNC: 104 MG/DL (ref 70–99)
GLUCOSE BLDC GLUCOMTR-MCNC: 121 MG/DL (ref 70–99)
GLUCOSE BLDC GLUCOMTR-MCNC: 141 MG/DL (ref 70–99)
GLUCOSE BLDC GLUCOMTR-MCNC: 91 MG/DL (ref 70–99)

## 2022-03-22 PROCEDURE — 94640 AIRWAY INHALATION TREATMENT: CPT | Mod: 76

## 2022-03-22 PROCEDURE — 94640 AIRWAY INHALATION TREATMENT: CPT

## 2022-03-22 PROCEDURE — 250N000013 HC RX MED GY IP 250 OP 250 PS 637: Performed by: INTERNAL MEDICINE

## 2022-03-22 PROCEDURE — 250N000009 HC RX 250: Performed by: INTERNAL MEDICINE

## 2022-03-22 PROCEDURE — 250N000013 HC RX MED GY IP 250 OP 250 PS 637: Performed by: FAMILY MEDICINE

## 2022-03-22 PROCEDURE — 97530 THERAPEUTIC ACTIVITIES: CPT | Mod: GP

## 2022-03-22 PROCEDURE — 250N000011 HC RX IP 250 OP 636: Performed by: INTERNAL MEDICINE

## 2022-03-22 PROCEDURE — 97116 GAIT TRAINING THERAPY: CPT | Mod: GP

## 2022-03-22 PROCEDURE — G0463 HOSPITAL OUTPT CLINIC VISIT: HCPCS

## 2022-03-22 PROCEDURE — 210N000001 HC R&B IMCU HEART CARE

## 2022-03-22 PROCEDURE — 250N000011 HC RX IP 250 OP 636: Performed by: FAMILY MEDICINE

## 2022-03-22 PROCEDURE — 99232 SBSQ HOSP IP/OBS MODERATE 35: CPT | Performed by: INTERNAL MEDICINE

## 2022-03-22 PROCEDURE — 97535 SELF CARE MNGMENT TRAINING: CPT | Mod: GO

## 2022-03-22 PROCEDURE — 999N000032 HC STATISTIC CHRONIC DISEASE SPECIALIST RT CONSULT

## 2022-03-22 RX ORDER — PREDNISONE 20 MG/1
40 TABLET ORAL DAILY
Status: COMPLETED | OUTPATIENT
Start: 2022-03-23 | End: 2022-03-25

## 2022-03-22 RX ADMIN — IPRATROPIUM BROMIDE AND ALBUTEROL 1 PUFF: 20; 100 SPRAY, METERED RESPIRATORY (INHALATION) at 20:25

## 2022-03-22 RX ADMIN — ENOXAPARIN SODIUM 40 MG: 40 INJECTION SUBCUTANEOUS at 21:59

## 2022-03-22 RX ADMIN — SERTRALINE HYDROCHLORIDE 100 MG: 50 TABLET ORAL at 08:13

## 2022-03-22 RX ADMIN — GUAIFENESIN 1200 MG: 600 TABLET, EXTENDED RELEASE ORAL at 20:27

## 2022-03-22 RX ADMIN — LEVETIRACETAM 500 MG: 500 TABLET, FILM COATED ORAL at 20:24

## 2022-03-22 RX ADMIN — IPRATROPIUM BROMIDE AND ALBUTEROL SULFATE 3 ML: 2.5; .5 SOLUTION RESPIRATORY (INHALATION) at 14:12

## 2022-03-22 RX ADMIN — GUAIFENESIN 1200 MG: 600 TABLET, EXTENDED RELEASE ORAL at 08:13

## 2022-03-22 RX ADMIN — Medication 950 MG: at 08:13

## 2022-03-22 RX ADMIN — MAGNESIUM OXIDE TAB 400 MG (241.3 MG ELEMENTAL MG) 400 MG: 400 (241.3 MG) TAB at 08:13

## 2022-03-22 RX ADMIN — PIPERACILLIN AND TAZOBACTAM 3.38 G: 3; .375 INJECTION, POWDER, LYOPHILIZED, FOR SOLUTION INTRAVENOUS at 19:13

## 2022-03-22 RX ADMIN — LOSARTAN POTASSIUM 50 MG: 50 TABLET, FILM COATED ORAL at 08:13

## 2022-03-22 RX ADMIN — IPRATROPIUM BROMIDE AND ALBUTEROL SULFATE 3 ML: 2.5; .5 SOLUTION RESPIRATORY (INHALATION) at 08:20

## 2022-03-22 RX ADMIN — PANTOPRAZOLE SODIUM 40 MG: 20 TABLET, DELAYED RELEASE ORAL at 06:27

## 2022-03-22 RX ADMIN — LEVETIRACETAM 500 MG: 500 TABLET, FILM COATED ORAL at 08:14

## 2022-03-22 RX ADMIN — PIPERACILLIN AND TAZOBACTAM 3.38 G: 3; .375 INJECTION, POWDER, LYOPHILIZED, FOR SOLUTION INTRAVENOUS at 09:54

## 2022-03-22 RX ADMIN — PIPERACILLIN AND TAZOBACTAM 3.38 G: 3; .375 INJECTION, POWDER, LYOPHILIZED, FOR SOLUTION INTRAVENOUS at 01:59

## 2022-03-22 RX ADMIN — BENZONATATE 100 MG: 100 CAPSULE ORAL at 23:43

## 2022-03-22 RX ADMIN — Medication 125 MCG: at 08:12

## 2022-03-22 RX ADMIN — POTASSIUM CHLORIDE 20 MEQ: 1500 TABLET, EXTENDED RELEASE ORAL at 08:12

## 2022-03-22 RX ADMIN — IPRATROPIUM BROMIDE AND ALBUTEROL 1 PUFF: 20; 100 SPRAY, METERED RESPIRATORY (INHALATION) at 17:10

## 2022-03-22 RX ADMIN — ACETAMINOPHEN 650 MG: 325 TABLET ORAL at 13:12

## 2022-03-22 RX ADMIN — LOSARTAN POTASSIUM 50 MG: 50 TABLET, FILM COATED ORAL at 20:24

## 2022-03-22 RX ADMIN — MAGNESIUM OXIDE TAB 400 MG (241.3 MG ELEMENTAL MG) 400 MG: 400 (241.3 MG) TAB at 20:25

## 2022-03-22 RX ADMIN — METHYLPREDNISOLONE SODIUM SUCCINATE 40 MG: 40 INJECTION, POWDER, FOR SOLUTION INTRAMUSCULAR; INTRAVENOUS at 06:24

## 2022-03-22 ASSESSMENT — ACTIVITIES OF DAILY LIVING (ADL)
ADLS_ACUITY_SCORE: 14

## 2022-03-22 NOTE — CONSULTS
"COPD Initial Interview, Education, and Consult  3/22/2022, 2:23 PM    Reason for Consult: COPD education  Patient Admitted for: Second degree heart block [I44.1]  COPD exacerbation (H) [J44.1]  Pneumonia due to infectious organism, unspecified laterality, unspecified part of lung [J18.9] on 3/19/2022     History of Present Illness: Serene is a 77 year female with a history of hypertension, reflux, anxiety, obesity, PAD, epilepsy, aortic stenosis with regurgitation, emphysema, former smoker, on nocturnal oxygen and COPD.  She wears 2L nasal cannula at night provided by [employer].  She presented to the ED with 3 days of ongoing shortness of breath to wear she was using her oxygen during the day; she has also been having issues with shortness of breath and increase phlegm since the beginning of January.  Serene has gone through 3 courses of antibiotics and prednisone during which she would improve until done with the prednisone taper. Her Advair was once per day and increase to BID without significant improvement.   When in the ED she was also found to have an heart block and the plan is for her to have a pacemaker placed 3/23.  Serene follows with Dr. Perry Tamayo at the [facility] [place].    Last PFT:  Date: 4/22/2019  Post-Spirometry:  FVC: 1.74, 64%  FEV1: 1.00, 47%  FEV1/FVC: 57%    Home Respiratory Medications:  Bronchodilators:   -albuterol neb/inhaler PRN   -Spiriva HandiHaler daily  Combination Therapy:   -Advair 250-50 1 puff BID    Assessment: Patient currently is resting comfortably in bed, on oxygen, able to talk in full sentences, cough is congested, productive and strong. BP (!) 149/71 (BP Location: Left arm)   Pulse 84   Temp 98.1  F (36.7  C) (Oral)   Resp 20   Ht 1.626 m (5' 4\")   Wt 99.7 kg (219 lb 11.2 oz)   SpO2 98%   BMI 37.71 kg/m      Education done during visit:  -Medication use and instruction done for Albuterol inhaler.  Patient is able to demonstrate use with a MDI spacer. MDI spacer " also was issued for use with their Albuterol inhaler.  -COPD Action Plan, discussed   -Information on COPD and tools to help cope  -Breathing techniques including Pursed - lip breathing  -Disease process and irritants (such as strong scents); discussed, questions answered.  -Issued patient a COPD folder  -Discussed anxiety and it's relationship with the dyspnea cycle  -Reviewed use of her flutter valve and benefits to using it daily to twice a day.     Recommendations:  -Continue current inpatient therapy, per [facility] protocols  -Follow up appointment with pulmonology has already been made by patient prior to admission. Follow up as planned with Dr. Perry Tamayo  -Medications patient is currently taking at home appear to be normally working well, recommend adding azithromycin MWF, due to on-going exacerbation issue  -Consideration of [place] respiratory allergy panel if on-going issue and off prednisone.    Delaney will participate in our call back program. Will continue to follow patient throughout hospital stay along with educating patient and family.    Total time spend with patient 52 minutes and 30 minutes spent in chart review, care coordination, and documentation.    Lalitha Hamilton, RT, Chronic Pulmonary Disease Specialist  Phone [phone]

## 2022-03-22 NOTE — PROGRESS NOTES
Pt. Seen for scheduled Duonebs today. Pt. Uses a mouth piece in breath actuated mode. Performs and tolerates well. Pt. Is on 1 lpm. Nasal cannula SpO2 is 93%. Lung sounds clear to auscultation. RT to follow.

## 2022-03-22 NOTE — PROGRESS NOTES
CARDIOLOGY PROGRESS NOTE      Assessment/Plan:  1.  Second degree heart block-looks to be Mobitz 2 2:1, but given alternating bundle branch block plan is for pacemaker on Thursday.  2.  COPD exacerbation (H)-so noted, 97% saturation on 2 L.  3.  Pneumonia due to infectious organism, unspecified laterality, unspecified part of lung-lactic acid normal, white count elevated, no procalcitonin and cultures negative.  Defer to primary care team.  4.  Diabetes-defer to primary care team.  A1c good at 5.4.  5.  Aortic stenosis-moderate with mean gradient of 24 mmHg and peak velocity of 3.0 m/s.  Recheck echo in a year.  6.  Pulmonary hypertension-moderate with estimated pressure of 54 to 55 mmHg.  Possibly WHO class III due to lung disease.    Plan:  1.  Plan on pacemaker Thursday.    Discharge Plannin.  Cardiac barrier to discharge is implantation of pacemaker.     LOS: 2 days     Subjective:  Interval History:    76-year-old white female being seen on fourth day of hospitalization.  Still short of breath with dry cough.  No chest pains, palpitations, syncope, dizziness or peripheral edema.    Medications    calcium citrate  950 mg Oral Daily     docusate sodium  100 mg Oral BID     enoxaparin ANTICOAGULANT  40 mg Subcutaneous Q24H     [Held by provider] fluticasone-salmeterol  1 puff Inhalation Q12H     guaiFENesin  1,200 mg Oral BID     insulin aspart  1-3 Units Subcutaneous TID AC     insulin aspart  1-3 Units Subcutaneous At Bedtime     ipratropium - albuterol 0.5 mg/2.5 mg/3 mL  3 mL Nebulization Q6H WA     levETIRAcetam  500 mg Oral BID     losartan  50 mg Oral BID     magnesium oxide  400 mg Oral BID     methylPREDNISolone  40 mg Intravenous Q24H     pantoprazole  40 mg Oral QAM AC     piperacillin-tazobactam  3.375 g Intravenous Q8H     potassium chloride ER  20 mEq Oral Daily     sertraline  100 mg Oral Daily     sodium chloride (PF)  3 mL Intracatheter Q8H     [Held by provider] tiotropium  18 mcg  "Inhalation Daily     [Held by provider] tolterodine ER  2 mg Oral BID     vitamin D3  125 mcg Oral Daily     Objective:   Vital signs in last 24 hours:  Temp:  [97.6  F (36.4  C)-98.1  F (36.7  C)] 98.1  F (36.7  C)  Pulse:  [84-99] 84  Resp:  [20] 20  BP: (145-167)/(70-85) 149/71  SpO2:  [93 %-98 %] 98 %    Physical Exam:  BP (!) 149/71 (BP Location: Left arm)   Pulse 84   Temp 98.1  F (36.7  C) (Oral)   Resp 20   Ht 1.626 m (5' 4\")   Wt 99.7 kg (219 lb 11.2 oz)   SpO2 98%   BMI 37.71 kg/m      General Appearance:    Alert, cooperative, no distress, appears stated age   Head:    Normocephalic, without obvious abnormality, atraumatic   Throat:   Lips, mucosa, and tongue normal; teeth and gums normal   Neck:   Supple, symmetrical, trachea midline, no adenopathy;        thyroid:  No enlargement/tenderness/nodules; no carotid    bruit or JVD   Back:     Symmetric, no curvature, ROM normal, no CVA tenderness   Lungs:     Diffuse wheezes bilaterally, respirations unlabored   Chest wall:    No tenderness or deformity   Heart:    Regular rate and rhythm, S1 and S2 normal, no murmur, rub   or gallop   Abdomen:     Soft, non-tender, bowel sounds active all four quadrants,     no masses, no organomegaly   Extremities:   Normal, atraumatic, no cyanosis or edema   Pulses:   2+ and symmetric all extremities   Skin:   Skin color, texture, turgor normal, no rashes or lesions     Cardiographics:      ECG: Personally reviewed by myself shows sinus rhythm, 2-1 heart block, occasional PVC, no acute changes.    Echocardiogram: The left ventricle is normal in size with normal left ventricular wall thickness.  Left ventricular function is normal.The ejection fraction is 60-65%.  The right ventricle is mildly dilated with normal systolic function.  The right atrium is severely dilated.  Mild valvular aortic stenosis is present.  Right ventricular systolic pressure is elevated, consistent with mild pulmonary hypertension.   "   Compared to the prior study of 11/6/17 the right ventricle now appears  dilated.      Lab Results:   Recent Labs   Lab 03/20/22  0759   WBC 18.7*   HGB 12.0   HCT 38.0        Recent Labs   Lab 03/20/22  0759      CO2 28   BUN 32*   .       Lab Results   Component Value Date    TROPONINI <0.01 03/20/2022

## 2022-03-22 NOTE — PROGRESS NOTES
Progress Note    Assessment/Plan  Lalitha Underwood is a 76 year old female with a history of severe COPD on nighttime oxygen admitted to the hospital with acute on chronic respiratory failure with concerns for dysrhythmias with     Acute on chronic respiratory failure  --- CT scan shows decrease in the right upper lobe which is suspicious for aspiration pneumonia.  --Passed video swallow study and unremarkable esophagogram 3/21  --- Continue IV Zosyn  --- Echo reviewed  ------ Check sputum, influenza  ---  Off Mucomyst since it can cause bronchospasm  ---  Continue Mucinex   --Completed 2 doses of IV Lasix 20 mg given inspiratory crackles and leg edema  --Hold advair and Spiriva while on steroids and change duo nebs to Combivent inhaler     Severe Gold C COPD exacerbation with chronic respiratory failure  --- Followed by Dr. Tamayo  --- Recent notes reviewed.  ---continue home regimen  ---pulmo consult per above as has had several exacerbations last three months, getting CT  --De-escalate steroids to once daily given nervousness and anxiety. change IV to p.o. steroids     Abnormal EKG--  --- Previously noted to be abnormal earlier this week during preoperative evaluation  --- Cardiology aware, felt more consistent with Mobitz type 1 with alternative bundle branch block  --- BNP normal, troponin negative  --Echo shows EF of 60 to 65%.  Cardiology recommending pacemaker implantation on Thursday    Aortic stenosis nonrheumatic--new on echo   --Follow-up yearly echo as per cardiology    Hypertension  --- Controlled    Anxiety--continue zoloft  --Change IV to p.o. steroids    Epilepsy---continue keppra          GERD--continue PPI                   Steroid-induced hyperglycemia  Lab Results   Component Value Date    A1C 5.4 02/22/2022     ---with steroids use ISS  ---not on meds at home  --- Passed swallow study and therefore continue regular diet    Vitamin D deficiency---continue home vitamin D     Urge  "incontinence--- hold Vesicare while in the hospital to avoid urinary retention     Obesity;  --- Estimated body mass index is 38.45 kg/m      Hypomagnesemia  --Continue magnesium oxide twice daily  Magnesium   Date Value Ref Range Status   03/19/2022 1.9 1.8 - 2.6 mg/dL Final          Diet: Regular Diet Adult    DVT Prophylaxis: Enoxaparin (Lovenox) SQ  Ann Catheter: Not present  Central Lines: None  Cardiac Monitoring: None  Code Status:  Previous hospitalist Discussed with patient at length.  Right now she is DNR but she is agreeable to intubation.  We discussed her COPD status and my concern over intubation.  She will think about this.  No change in CODE STATUS at this time.           Barriers to discharge: IV antibiotics, pacemaker    Anticipated discharge date: 3/24        Subjective  patient feels better.  Continues to cough out phlegm.  No fever overnight.  Blood sugars are controlled.  Patient does not want to use nebs but prefers inhalers.       Review of system is positive for anxiety    Objective    BP (!) 149/71 (BP Location: Left arm)   Pulse 84   Temp 98.1  F (36.7  C) (Oral)   Resp 20   Ht 1.626 m (5' 4\")   Wt 99.7 kg (219 lb 11.2 oz)   SpO2 98%   BMI 37.71 kg/m    Weight:   Wt Readings from Last 5 Encounters:   03/21/22 99.7 kg (219 lb 11.2 oz)   03/14/22 101.6 kg (224 lb)   03/01/22 102.1 kg (225 lb)   02/22/22 100.3 kg (221 lb 1.6 oz)   02/21/22 99.8 kg (220 lb)       I/O last 3 completed shifts:  In: 790 [P.O.:690; I.V.:100]  Out: 650 [Urine:650]  No intake/output data recorded.          Physical Exam  Alert, oriented*3  Able to speak short sentences  Body mass index is 37.71 kg/m .    No sinus tenderness  Moist membranes  Neck supple  CVS: S1 S2-N, no murmurs, gallops, rubs  Resp: Coarse crackles with some wheezing  Abd: soft, No t/g/r  Neuro: no involuntary movements such as tremors  Vasc: No leg edema     Pertinent Labs  ----------------------  Recent Labs   Lab 03/22/22  1152 " 03/22/22  0721 03/21/22 2045 03/20/22  1137 03/20/22  0759 03/19/22  2140 03/19/22  1441   NA  --   --   --   --  137  --  136   POTASSIUM  --   --   --   --  3.8  --  3.5   CO2  --   --   --   --  28  --  30   BUN  --   --   --   --  32*  --  27   CR  --   --   --   --  0.84  --  0.87   MAG  --   --   --   --   --   --  1.9   * 91 128*   < > 190*   < > 96   ALBUMIN  --   --   --   --  2.8*  --   --    BILITOTAL  --   --   --   --  0.3  --   --    ALKPHOS  --   --   --   --  65  --   --    ALT  --   --   --   --  13  --   --    AST  --   --   --   --  12  --   --     < > = values in this interval not displayed.     Recent Labs   Lab 03/20/22  0759 03/19/22  1441   WBC 18.7* 16.3*   HGB 12.0 12.0   HCT 38.0 38.2    186     No results for input(s): INR in the last 168 hours.  Glucose Values Latest Ref Rng & Units 2/22/2022 3/19/2022 3/20/2022   Bedside Glucose (mg/dl )  - -- -- --   GLUCOSE 70 - 125 mg/dL 93 96 190(H)   Some recent data might be hidden         Pertinent Radiology   Radiology Results: Personally reviewed impression/s  XR Chest Port 1 View    Result Date: 3/19/2022  EXAM: XR CHEST PORTABLE 1 VIEW LOCATION: [facility] DATE/TIME: 03/19/2022, 2:48 PM INDICATION: Shortness of breath. COMPARISON: 03/11/2022.     IMPRESSION: Heart and mediastinal size are within normal limits. There is patchy infiltrate involving the right upper lobe, new from prior study, suggestive of an infectious process. No pleural effusion or pneumothorax. There is also some silhouetting of  the right hemidiaphragm which represents either atelectasis or an infectious process.     CT Chest Pulmonary Embolism w Contrast    Result Date: 3/19/2022  EXAM: CT CHEST PULMONARY EMBOLISM W CONTRAST LOCATION: [facility] DATE/TIME: 3/19/2022 8:20 PM INDICATION: PE suspected, low/intermediate prob, neg D-dimer; COPD with worsening symptoms, hypoxia COMPARISON: Portable chest  radiography 03/19/2022 and PA and lateral views of the chest 03/11/2022 TECHNIQUE: CT chest pulmonary angiogram during arterial phase injection of IV contrast. Multiplanar reformats and MIP reconstructions were performed. Dose reduction techniques were used. CONTRAST: isovue 370 100ml FINDINGS: ANGIOGRAM CHEST: Enlarged main, right, and left pulmonary arteries. No pulmonary artery filling defects. There are atheromatous calcifications mid ascending aorta, arch, and descending aorta but no findings of acute aortic syndrome. No aortic aneurysm. LUNGS AND PLEURA: Imaging at the expiratory phase of the respiratory cycle. Near complete collapse of the bronchus intermedius. The right upper lobe bronchus and segmental and subsegmental right upper lobe airways are filled with debris and there are numerous airway centric inflammatory nodules in the right upper lobe. Mild interstitial edema in the periphery of the caudal right upper lobe. Mild airway debris in the right lower lobe without airspace opacities. There is a band of cicatrization atelectasis in the left lower lobe and mild airway debris in the segmental left lower lobe airways. No pleural effusion. MEDIASTINUM: Partial anomalous pulmonary venous return. The left superior pulmonary vein drains to the left brachiocephalic vein. Cardiac chambers are normal in size. Variant right pulmonary vein anatomy with independent veins of the right upper, middle,  and lower lobes. No pericardial effusion. No enlarged mediastinal or hilar lymph nodes. Slightly patulous esophagus suggesting a component of dysmotility. Left thyroid nodule measures 19 x 26 mm (series 4, image 17). CORONARY ARTERY CALCIFICATION: Mild. UPPER ABDOMEN: No actionable findings in the imaged upper abdomen. There our felt pledgets around the diaphragmatic hiatus from prior hiatal hernia repair. MUSCULOSKELETAL: Mild anterior wedge deformity of T6. No aggressive or destructive bone lesions.     IMPRESSION: 1.   Enlarged central pulmonary arteries consistent with pulmonary hypertension. No acute pulmonary embolism. 2.  Large amount of airway material filling the bronchi of the right upper lobe with airway centric inflammatory nodules in the peripheral third of the lobe consistent with bronchopneumonia. 3.  Partial anomalous pulmonary venous return. The left upper lobe pulmonary vein drains to the left brachiocephalic vein in the mediastinum producing a left to right shunt.    EKG Results: personally reviewed. Telemetry currently sinus bundle branch block

## 2022-03-22 NOTE — PLAN OF CARE
"  Problem: Plan of Care - These are the overarching goals to be used throughout the patient stay.    Goal: Plan of Care Review/Shift Note  Description: The Plan of Care Review/Shift note should be completed every shift.  The Outcome Evaluation is a brief statement about your assessment that the patient is improving, declining, or no change.  This information will be displayed automatically on your shift note.  Outcome: Ongoing, Progressing  Flowsheets (Taken 3/22/2022 1051)  Plan of Care Reviewed With: patient  Goal: Patient-Specific Goal (Individualized)  Description: You can add care plan individualizations to a care plan. Examples of Individualization might be:  \"Parent requests to be called daily at 9am for status\", \"I have a hard time hearing out of my right ear\", or \"Do not touch me to wake me up as it startles me\".  Outcome: Ongoing, Progressing  Goal: Absence of Hospital-Acquired Illness or Injury  Outcome: Ongoing, Progressing  Intervention: Identify and Manage Fall Risk  Recent Flowsheet Documentation  Taken 3/22/2022 0800 by Justin Lyons RN  Safety Promotion/Fall Prevention: activity supervised  Intervention: Prevent Skin Injury  Recent Flowsheet Documentation  Taken 3/22/2022 0800 by Justin Lyons RN  Body Position: position changed independently  Intervention: Prevent and Manage VTE (Venous Thromboembolism) Risk  Recent Flowsheet Documentation  Taken 3/22/2022 0800 by Justin Lyons RN  Activity Management: activity adjusted per tolerance  Goal: Optimal Comfort and Wellbeing  Outcome: Ongoing, Progressing  Goal: Readiness for Transition of Care  Outcome: Ongoing, Progressing     Problem: Risk for Delirium  Goal: Optimal Coping  Outcome: Ongoing, Progressing  Goal: Improved Behavioral Control  Outcome: Ongoing, Progressing  Goal: Improved Attention and Thought Clarity  Outcome: Ongoing, Progressing   Goal Outcome Evaluation:    Plan of Care Reviewed With: patient      Pt is alert and oriented " x4. Pt is med complaint. Pt denies pain. Pt was up per therapy. Pt's v/s stable. No complain. Continue to monitor pt.

## 2022-03-22 NOTE — PLAN OF CARE
"    Problem: Risk for Delirium  Goal: Improved Sleep  Outcome: Ongoing, Progressing     Problem: Respiratory Compromise (Pneumonia)  Goal: Effective Oxygenation and Ventilation  Outcome: Ongoing, Progressing  Intervention: Promote Airway Secretion Clearance  Recent Flowsheet Documentation  Taken 3/22/2022 0327 by Abbie June RN  Cough And Deep Breathing: done independently per patient  Taken 3/21/2022 2307 by Abbie June RN  Cough And Deep Breathing: done independently per patient  Taken 3/21/2022 1945 by Abbie June RN  Cough And Deep Breathing: done independently per patient      Goal Outcome Evaluation:    Pt. Continued to struggle with on going cough that was worse upon waking up.  She stated that she feels like her rescue inhaler has been helping her more than the nebulized treatments, especially when clearing secretions.  She refused the 3 am blood pressure check at the cuff \"tightened too much\" and was hurting her arm.  The cuff struggled  reading as she was  frequently coughing during the reading and inflated several times. Improved sleep last night as promoting quiet enviroment and bundling cares seemed to help.                             "

## 2022-03-22 NOTE — PLAN OF CARE
Ambulated in hallway with stand by assist of one and a walker, tolerated, some dyspnea on exertion, oxygen saturation 95% on 2 liters, heart rate goes up to 120's when ambulating, with PAC's.  Denies pain.  IV antibiotics. Cough slightly improved.  Is aware she will get a pacemaker tentatively planned for Thursday.       Problem: COPD (Chronic Obstructive Pulmonary Disease) Comorbidity  Goal: Maintenance of COPD Symptom Control  3/21/2022 1904 by Valery Ponce RN  Outcome: Ongoing, Progressing  3/21/2022 1359 by Valery Ponce RN  Outcome: Ongoing, Progressing  Intervention: Maintain COPD-Symptom Control  Recent Flowsheet Documentation  Taken 3/21/2022 1600 by Valery Ponce RN  Medication Review/Management: medications reviewed  Taken 3/21/2022 1011 by Valery Ponce RN  Medication Review/Management: medications reviewed   Goal Outcome Evaluation:

## 2022-03-23 ENCOUNTER — APPOINTMENT (OUTPATIENT)
Dept: PHYSICAL THERAPY | Facility: HOSPITAL | Age: 77
DRG: 981 | End: 2022-03-23
Payer: MEDICARE

## 2022-03-23 ENCOUNTER — APPOINTMENT (OUTPATIENT)
Dept: OCCUPATIONAL THERAPY | Facility: HOSPITAL | Age: 77
DRG: 981 | End: 2022-03-23
Payer: MEDICARE

## 2022-03-23 LAB
GLUCOSE BLDC GLUCOMTR-MCNC: 101 MG/DL (ref 70–99)
GLUCOSE BLDC GLUCOMTR-MCNC: 133 MG/DL (ref 70–99)
GLUCOSE BLDC GLUCOMTR-MCNC: 79 MG/DL (ref 70–99)
GLUCOSE BLDC GLUCOMTR-MCNC: 95 MG/DL (ref 70–99)

## 2022-03-23 PROCEDURE — 210N000001 HC R&B IMCU HEART CARE

## 2022-03-23 PROCEDURE — 99232 SBSQ HOSP IP/OBS MODERATE 35: CPT | Performed by: INTERNAL MEDICINE

## 2022-03-23 PROCEDURE — 250N000011 HC RX IP 250 OP 636: Performed by: INTERNAL MEDICINE

## 2022-03-23 PROCEDURE — 250N000013 HC RX MED GY IP 250 OP 250 PS 637: Performed by: FAMILY MEDICINE

## 2022-03-23 PROCEDURE — 250N000013 HC RX MED GY IP 250 OP 250 PS 637: Performed by: INTERNAL MEDICINE

## 2022-03-23 PROCEDURE — 97530 THERAPEUTIC ACTIVITIES: CPT | Mod: GP

## 2022-03-23 PROCEDURE — 97530 THERAPEUTIC ACTIVITIES: CPT | Mod: GO

## 2022-03-23 PROCEDURE — 250N000009 HC RX 250: Performed by: INTERNAL MEDICINE

## 2022-03-23 PROCEDURE — 97535 SELF CARE MNGMENT TRAINING: CPT | Mod: GO

## 2022-03-23 PROCEDURE — 250N000011 HC RX IP 250 OP 636: Performed by: FAMILY MEDICINE

## 2022-03-23 PROCEDURE — 97116 GAIT TRAINING THERAPY: CPT | Mod: GP

## 2022-03-23 PROCEDURE — 250N000012 HC RX MED GY IP 250 OP 636 PS 637: Performed by: INTERNAL MEDICINE

## 2022-03-23 RX ORDER — FUROSEMIDE 10 MG/ML
20 INJECTION INTRAMUSCULAR; INTRAVENOUS ONCE
Status: COMPLETED | OUTPATIENT
Start: 2022-03-23 | End: 2022-03-23

## 2022-03-23 RX ORDER — HYDRALAZINE HYDROCHLORIDE 20 MG/ML
5 INJECTION INTRAMUSCULAR; INTRAVENOUS EVERY 6 HOURS PRN
Status: DISCONTINUED | OUTPATIENT
Start: 2022-03-23 | End: 2022-03-25 | Stop reason: HOSPADM

## 2022-03-23 RX ADMIN — POTASSIUM CHLORIDE 20 MEQ: 1500 TABLET, EXTENDED RELEASE ORAL at 08:17

## 2022-03-23 RX ADMIN — MAGNESIUM OXIDE TAB 400 MG (241.3 MG ELEMENTAL MG) 400 MG: 400 (241.3 MG) TAB at 08:12

## 2022-03-23 RX ADMIN — Medication 125 MCG: at 08:14

## 2022-03-23 RX ADMIN — PREDNISONE 40 MG: 20 TABLET ORAL at 08:16

## 2022-03-23 RX ADMIN — HYDRALAZINE HYDROCHLORIDE 5 MG: 20 INJECTION INTRAMUSCULAR; INTRAVENOUS at 21:52

## 2022-03-23 RX ADMIN — FUROSEMIDE 20 MG: 10 INJECTION, SOLUTION INTRAMUSCULAR; INTRAVENOUS at 21:51

## 2022-03-23 RX ADMIN — GUAIFENESIN 1200 MG: 600 TABLET, EXTENDED RELEASE ORAL at 19:35

## 2022-03-23 RX ADMIN — GUAIFENESIN 1200 MG: 600 TABLET, EXTENDED RELEASE ORAL at 08:17

## 2022-03-23 RX ADMIN — IPRATROPIUM BROMIDE AND ALBUTEROL 1 PUFF: 20; 100 SPRAY, METERED RESPIRATORY (INHALATION) at 16:17

## 2022-03-23 RX ADMIN — ALBUTEROL SULFATE 2 PUFF: 90 INHALANT RESPIRATORY (INHALATION) at 03:46

## 2022-03-23 RX ADMIN — PIPERACILLIN AND TAZOBACTAM 3.38 G: 3; .375 INJECTION, POWDER, LYOPHILIZED, FOR SOLUTION INTRAVENOUS at 11:08

## 2022-03-23 RX ADMIN — LEVETIRACETAM 500 MG: 500 TABLET, FILM COATED ORAL at 08:15

## 2022-03-23 RX ADMIN — LOSARTAN POTASSIUM 50 MG: 50 TABLET, FILM COATED ORAL at 08:13

## 2022-03-23 RX ADMIN — PIPERACILLIN AND TAZOBACTAM 3.38 G: 3; .375 INJECTION, POWDER, LYOPHILIZED, FOR SOLUTION INTRAVENOUS at 03:38

## 2022-03-23 RX ADMIN — LOSARTAN POTASSIUM 50 MG: 50 TABLET, FILM COATED ORAL at 19:36

## 2022-03-23 RX ADMIN — SERTRALINE HYDROCHLORIDE 100 MG: 50 TABLET ORAL at 08:18

## 2022-03-23 RX ADMIN — Medication 950 MG: at 08:18

## 2022-03-23 RX ADMIN — PIPERACILLIN AND TAZOBACTAM 3.38 G: 3; .375 INJECTION, POWDER, LYOPHILIZED, FOR SOLUTION INTRAVENOUS at 19:36

## 2022-03-23 RX ADMIN — IPRATROPIUM BROMIDE AND ALBUTEROL 1 PUFF: 20; 100 SPRAY, METERED RESPIRATORY (INHALATION) at 19:34

## 2022-03-23 RX ADMIN — MAGNESIUM OXIDE TAB 400 MG (241.3 MG ELEMENTAL MG) 400 MG: 400 (241.3 MG) TAB at 21:51

## 2022-03-23 RX ADMIN — ALBUTEROL SULFATE 2 PUFF: 90 INHALANT RESPIRATORY (INHALATION) at 08:25

## 2022-03-23 RX ADMIN — LEVETIRACETAM 500 MG: 500 TABLET, FILM COATED ORAL at 19:34

## 2022-03-23 RX ADMIN — IPRATROPIUM BROMIDE AND ALBUTEROL 1 PUFF: 20; 100 SPRAY, METERED RESPIRATORY (INHALATION) at 10:51

## 2022-03-23 RX ADMIN — PANTOPRAZOLE SODIUM 40 MG: 20 TABLET, DELAYED RELEASE ORAL at 06:35

## 2022-03-23 ASSESSMENT — ACTIVITIES OF DAILY LIVING (ADL)
ADLS_ACUITY_SCORE: 14

## 2022-03-23 NOTE — PLAN OF CARE
Problem: Plan of Care - These are the overarching goals to be used throughout the patient stay.    Goal: Optimal Comfort and Wellbeing  Outcome: Ongoing, Progressing  Pt A&O x4. Pt continues to have a productive cough. Pt has 1 loose stool this morning, & had formed stool this afternoon. Ambulated in the hallway 125 ft, O2 stats decreased, but stayed above 90% w/ 1L. While sleeping in chair pt's O2 stats dropped below 85% on room air. PT used IS q2hrs, levels decreased throughout the day. Pt watched video on Pacemaker.

## 2022-03-23 NOTE — PLAN OF CARE
Problem: Respiratory Compromise (Pneumonia)  Goal: Effective Oxygenation and Ventilation  Outcome: Ongoing, Progressing  Intervention: Promote Airway Secretion Clearance  Recent Flowsheet Documentation  Taken 3/23/2022 0345 by Kaleigh Souza RN  Cough And Deep Breathing: done independently per patient  Taken 3/22/2022 2345 by Kaleigh Souza RN  Cough And Deep Breathing: done independently per patient   Goal Outcome Evaluation:           Patient continued to have supplemental oxygen 2 L/min nasal cannula.  Patient encouraged to use incentive spirometer.  Patient said she woke up short of breath at 0300. RN came in, patient was able to recover, and walk to the restroom.  She continues to have a productive cough.  She was able to self administer the flutter valve, after which she received her inhaler.  Patient on continuous pulse oximeter, oxygen saturation remains at 97%    Lung sounds are coarse and wheezy.  Patient reports feeling worried her breathing is not improving in time for pacemaker placement.  Staff provided emotional support.

## 2022-03-23 NOTE — PROGRESS NOTES
Progress Note    Assessment/Plan  Lalitha Underwood is a 76 year old female with a history of severe COPD on nighttime oxygen admitted to the hospital with acute on chronic respiratory failure with concerns for dysrhythmias with      3/23 :     Sob stable  Oxygen at night at home - 1.5 liters  On 1 liter at this time  Plan for pacemaker in am      Not medically ready for discharge at this time.      A/p :        Acute on chronic respiratory failure  --- CT scan shows decrease in the right upper lobe which is suspicious for aspiration pneumonia.  --Passed video swallow study and unremarkable esophagogram 3/21  --- Continue IV Zosyn  --- Echo reviewed  ------ Check sputum, influenza  ---  Off Mucomyst since it can cause bronchospasm  ---  Continue Mucinex   --Completed 2 doses of IV Lasix 20 mg given inspiratory crackles and leg edema  --Hold advair and Spiriva while on steroids and change duo nebs to Combivent inhaler     Severe Gold C COPD exacerbation with chronic respiratory failure  --- Followed by Dr. Tamayo  --- Recent notes reviewed.  ---continue home regimen  ---pulmo consult per above as has had several exacerbations last three months, getting CT  --De-escalate steroids to once daily given nervousness and anxiety. change IV to p.o. steroids     Abnormal EKG--  --- Previously noted to be abnormal earlier this week during preoperative evaluation  --- Cardiology aware, felt more consistent with Mobitz type 1 with alternative bundle branch block  --- BNP normal, troponin negative  --Echo shows EF of 60 to 65%.  Cardiology recommending pacemaker implantation on Thursday    Aortic stenosis nonrheumatic--new on echo   --Follow-up yearly echo as per cardiology    Hypertension  --- Controlled    Anxiety--continue zoloft  --Change IV to p.o. steroids    Epilepsy---continue keppra          GERD--continue PPI                   Steroid-induced hyperglycemia  Lab Results   Component Value Date    A1C 5.4 02/22/2022  "    ---with steroids use ISS  ---not on meds at home  --- Passed swallow study and therefore continue regular diet    Vitamin D deficiency---continue home vitamin D     Urge incontinence--- hold Vesicare while in the hospital to avoid urinary retention     Obesity;  --- Estimated body mass index is 38.45 kg/m      Hypomagnesemia  --Continue magnesium oxide twice daily  Magnesium   Date Value Ref Range Status   03/19/2022 1.9 1.8 - 2.6 mg/dL Final          Diet: Regular Diet Adult    DVT Prophylaxis: Enoxaparin (Lovenox) SQ  Ann Catheter: Not present  Central Lines: None  Cardiac Monitoring: None  Code Status:  Previous hospitalist Discussed with patient at length.  Right now she is DNR but she is agreeable to intubation.  We discussed her COPD status and my concern over intubation.  She will think about this.  No change in CODE STATUS at this time.           Barriers to discharge: IV antibiotics, pacemaker    Anticipated discharge date: 3/24          Review of system is positive for anxiety    Objective    BP (!) 177/88 (BP Location: Left arm, Patient Position: Sitting, Cuff Size: Adult Small)   Pulse 80   Temp 98.1  F (36.7  C) (Oral)   Resp 18   Ht 1.626 m (5' 4\")   Wt 100.5 kg (221 lb 8 oz)   SpO2 97%   BMI 38.02 kg/m    Weight:   Wt Readings from Last 5 Encounters:   03/23/22 100.5 kg (221 lb 8 oz)   03/14/22 101.6 kg (224 lb)   03/01/22 102.1 kg (225 lb)   02/22/22 100.3 kg (221 lb 1.6 oz)   02/21/22 99.8 kg (220 lb)       I/O last 3 completed shifts:  In: 980 [P.O.:880; I.V.:100]  Out: 700 [Urine:700]  I/O this shift:  In: 340 [P.O.:340]  Out: 150 [Urine:150]          Physical Exam  Alert, oriented*3  Able to speak short sentences  Body mass index is 38.02 kg/m .    No sinus tenderness  Moist membranes  Neck supple  CVS: S1 S2-N, no murmurs, gallops, rubs  Resp: Coarse crackles with some wheezing  Abd: soft, No t/g/r  Neuro: no involuntary movements such as tremors  Vasc: No leg edema     Pertinent " Labs  ----------------------  Recent Labs   Lab 03/23/22  1149 03/23/22  0755 03/22/22 2053 03/20/22  1137 03/20/22  0759 03/19/22  2140 03/19/22  1441   NA  --   --   --   --  137  --  136   POTASSIUM  --   --   --   --  3.8  --  3.5   CO2  --   --   --   --  28  --  30   BUN  --   --   --   --  32*  --  27   CR  --   --   --   --  0.84  --  0.87   MAG  --   --   --   --   --   --  1.9   * 79 141*   < > 190*   < > 96   ALBUMIN  --   --   --   --  2.8*  --   --    BILITOTAL  --   --   --   --  0.3  --   --    ALKPHOS  --   --   --   --  65  --   --    ALT  --   --   --   --  13  --   --    AST  --   --   --   --  12  --   --     < > = values in this interval not displayed.     Recent Labs   Lab 03/20/22  0759 03/19/22  1441   WBC 18.7* 16.3*   HGB 12.0 12.0   HCT 38.0 38.2    186     No results for input(s): INR in the last 168 hours.  Glucose Values Latest Ref Rng & Units 2/22/2022 3/19/2022 3/20/2022   Bedside Glucose (mg/dl )  - -- -- --   GLUCOSE 70 - 125 mg/dL 93 96 190(H)   Some recent data might be hidden         Pertinent Radiology   Radiology Results: Personally reviewed impression/s  XR Chest Port 1 View    Result Date: 3/19/2022  EXAM: XR CHEST PORTABLE 1 VIEW LOCATION: [facility] DATE/TIME: 03/19/2022, 2:48 PM INDICATION: Shortness of breath. COMPARISON: 03/11/2022.     IMPRESSION: Heart and mediastinal size are within normal limits. There is patchy infiltrate involving the right upper lobe, new from prior study, suggestive of an infectious process. No pleural effusion or pneumothorax. There is also some silhouetting of  the right hemidiaphragm which represents either atelectasis or an infectious process.     CT Chest Pulmonary Embolism w Contrast    Result Date: 3/19/2022  EXAM: CT CHEST PULMONARY EMBOLISM W CONTRAST LOCATION: [facility] DATE/TIME: 3/19/2022 8:20 PM INDICATION: PE suspected, low/intermediate prob, neg D-dimer; COPD  with worsening symptoms, hypoxia COMPARISON: Portable chest radiography 03/19/2022 and PA and lateral views of the chest 03/11/2022 TECHNIQUE: CT chest pulmonary angiogram during arterial phase injection of IV contrast. Multiplanar reformats and MIP reconstructions were performed. Dose reduction techniques were used. CONTRAST: isovue 370 100ml FINDINGS: ANGIOGRAM CHEST: Enlarged main, right, and left pulmonary arteries. No pulmonary artery filling defects. There are atheromatous calcifications mid ascending aorta, arch, and descending aorta but no findings of acute aortic syndrome. No aortic aneurysm. LUNGS AND PLEURA: Imaging at the expiratory phase of the respiratory cycle. Near complete collapse of the bronchus intermedius. The right upper lobe bronchus and segmental and subsegmental right upper lobe airways are filled with debris and there are numerous airway centric inflammatory nodules in the right upper lobe. Mild interstitial edema in the periphery of the caudal right upper lobe. Mild airway debris in the right lower lobe without airspace opacities. There is a band of cicatrization atelectasis in the left lower lobe and mild airway debris in the segmental left lower lobe airways. No pleural effusion. MEDIASTINUM: Partial anomalous pulmonary venous return. The left superior pulmonary vein drains to the left brachiocephalic vein. Cardiac chambers are normal in size. Variant right pulmonary vein anatomy with independent veins of the right upper, middle,  and lower lobes. No pericardial effusion. No enlarged mediastinal or hilar lymph nodes. Slightly patulous esophagus suggesting a component of dysmotility. Left thyroid nodule measures 19 x 26 mm (series 4, image 17). CORONARY ARTERY CALCIFICATION: Mild. UPPER ABDOMEN: No actionable findings in the imaged upper abdomen. There our felt pledgets around the diaphragmatic hiatus from prior hiatal hernia repair. MUSCULOSKELETAL: Mild anterior wedge deformity of T6.  No aggressive or destructive bone lesions.     IMPRESSION: 1.  Enlarged central pulmonary arteries consistent with pulmonary hypertension. No acute pulmonary embolism. 2.  Large amount of airway material filling the bronchi of the right upper lobe with airway centric inflammatory nodules in the peripheral third of the lobe consistent with bronchopneumonia. 3.  Partial anomalous pulmonary venous return. The left upper lobe pulmonary vein drains to the left brachiocephalic vein in the mediastinum producing a left to right shunt.    EKG Results: personally reviewed. Telemetry currently sinus bundle branch block

## 2022-03-23 NOTE — PLAN OF CARE
Problem: Plan of Care - These are the overarching goals to be used throughout the patient stay.    Goal: Optimal Comfort and Wellbeing  Outcome: Ongoing, Progressing  Intervention: Monitor Pain and Promote Comfort  Recent Flowsheet Documentation  Taken 3/22/2022 1550 by Abbie June RN  Pain Management Interventions:   emotional support   environmental changes   quiet environment facilitated   rest     Problem: Infection (Pneumonia)  Goal: Resolution of Infection Signs and Symptoms  Outcome: Ongoing, Progressing     Problem: Respiratory Compromise (Pneumonia)  Goal: Effective Oxygenation and Ventilation  Outcome: Ongoing, Progressing  Intervention: Promote Airway Secretion Clearance  Recent Flowsheet Documentation  Taken 3/22/2022 1550 by Abbie June RN  Cough And Deep Breathing: done independently per patient       Goal Outcome Evaluation:      Vss this shift with rhythm being NSR w/ BBB. No complaints of pain. Cough remains frequent and productive.  Pt. Walked in hallway with nurse and did respiratory hygiene.

## 2022-03-23 NOTE — PROGRESS NOTES
Care Management Follow Up    Length of Stay (days): 4    Expected Discharge Date: 03/24/2022     Concerns to be Addressed:  Pacemaker on Thursday     Patient plan of care discussed at interdisciplinary rounds: Yes    Anticipated Discharge Disposition: Home     Anticipated Discharge Services: None  Anticipated Discharge DME:  Has home O2     Education Provided on the Discharge Plan:  yes  Patient/Family in Agreement with the Plan: yes    Referrals Placed by CM/SW:  None needed   Private pay costs discussed: Not applicable    Additional Information:  Chart Reviewed. Pt is form home with spouse and indpt with ADLs. Current recs for home with assist. Pt planned for Pacemaker placement on Thursday. Cm to follow for further recs. Anticipate home with assist.       NAYELI Reich

## 2022-03-23 NOTE — PROGRESS NOTES
CARDIOLOGY PROGRESS NOTE      Assessment/Plan:  1.  Second degree heart block-looks to be Mobitz 2 2:1, but given alternating bundle branch block plan is for pacemaker on Thursday.  2.  COPD exacerbation (H)-so noted, 97% saturation on 2 L.  3.  Pneumonia due to infectious organism, unspecified laterality, unspecified part of lung-lactic acid normal, white count elevated, no procalcitonin and cultures negative.  Defer to primary care team.  4.  Diabetes-defer to primary care team.  A1c good at 5.4.  5.  Aortic stenosis-moderate with mean gradient of 24 mmHg and peak velocity of 3.0 m/s.  Recheck echo in a year.  6.  Pulmonary hypertension-moderate with estimated pressure of 54 to 55 mmHg.  Possibly WHO class III due to lung disease.    Plan:  1.  Plan on pacemaker Thursday.    Discharge Plannin.  Cardiac barrier to discharge is implantation of pacemaker.     LOS: 2 days     Subjective:  Interval History:    76-year-old white female being seen on fifth day of hospitalization.  Still short of breath with dry cough.  No chest pains, palpitations, syncope, dizziness or peripheral edema.    Medications    calcium citrate  950 mg Oral Daily     docusate sodium  100 mg Oral BID     enoxaparin ANTICOAGULANT  40 mg Subcutaneous Q24H     [Held by provider] fluticasone-salmeterol  1 puff Inhalation Q12H     guaiFENesin  1,200 mg Oral BID     insulin aspart  1-3 Units Subcutaneous TID AC     insulin aspart  1-3 Units Subcutaneous At Bedtime     ipratropium-albuterol  1 puff Inhalation 4x daily     levETIRAcetam  500 mg Oral BID     losartan  50 mg Oral BID     magnesium oxide  400 mg Oral BID     pantoprazole  40 mg Oral QAM AC     piperacillin-tazobactam  3.375 g Intravenous Q8H     potassium chloride ER  20 mEq Oral Daily     predniSONE  40 mg Oral Daily     sertraline  100 mg Oral Daily     sodium chloride (PF)  3 mL Intracatheter Q8H     [Held by provider] tiotropium  18 mcg Inhalation Daily     [Held by provider]  "tolterodine ER  2 mg Oral BID     vitamin D3  125 mcg Oral Daily     Objective:   Vital signs in last 24 hours:  Temp:  [97  F (36.1  C)-98.2  F (36.8  C)] 98.1  F (36.7  C)  Pulse:  [] 80  Resp:  [18-20] 18  BP: (148-177)/(74-93) 177/88  SpO2:  [89 %-97 %] 97 %    Physical Exam:  BP (!) 177/88 (BP Location: Left arm, Patient Position: Sitting, Cuff Size: Adult Small)   Pulse 80   Temp 98.1  F (36.7  C) (Oral)   Resp 18   Ht 1.626 m (5' 4\")   Wt 100.5 kg (221 lb 8 oz)   SpO2 97%   BMI 38.02 kg/m      General Appearance:    Alert, cooperative, no distress, appears stated age   Head:    Normocephalic, without obvious abnormality, atraumatic   Throat:   Lips, mucosa, and tongue normal; teeth and gums normal   Neck:   Supple, symmetrical, trachea midline, no adenopathy;        thyroid:  No enlargement/tenderness/nodules; no carotid    bruit or JVD   Back:     Symmetric, no curvature, ROM normal, no CVA tenderness   Lungs:     Diffuse wheezes bilaterally, respirations unlabored   Chest wall:    No tenderness or deformity   Heart:    Regular rate and rhythm, S1 and S2 normal, no murmur, rub   or gallop   Abdomen:     Soft, non-tender, bowel sounds active all four quadrants,     no masses, no organomegaly   Extremities:   Normal, atraumatic, no cyanosis or edema   Pulses:   2+ and symmetric all extremities   Skin:   Skin color, texture, turgor normal, no rashes or lesions     Cardiographics:      ECG: Personally reviewed by myself shows sinus rhythm, 2-1 heart block, occasional PVC, no acute changes.    Echocardiogram: The left ventricle is normal in size with normal left ventricular wall thickness.  Left ventricular function is normal.The ejection fraction is 60-65%.  The right ventricle is mildly dilated with normal systolic function.  The right atrium is severely dilated.  Mild valvular aortic stenosis is present.  Right ventricular systolic pressure is elevated, consistent with mild pulmonary " hypertension.     Compared to the prior study of 11/6/17 the right ventricle now appears  dilated.      Lab Results:   Recent Labs   Lab 03/20/22  0759   WBC 18.7*   HGB 12.0   HCT 38.0        Recent Labs   Lab 03/20/22  0759      CO2 28   BUN 32*   .       Lab Results   Component Value Date    TROPONINI <0.01 03/20/2022

## 2022-03-24 ENCOUNTER — APPOINTMENT (OUTPATIENT)
Dept: RADIOLOGY | Facility: HOSPITAL | Age: 77
DRG: 981 | End: 2022-03-24
Attending: INTERNAL MEDICINE
Payer: MEDICARE

## 2022-03-24 ENCOUNTER — TRANSCRIBE ORDERS (OUTPATIENT)
Dept: CARDIOLOGY | Facility: CLINIC | Age: 77
End: 2022-03-24

## 2022-03-24 DIAGNOSIS — Z95.0 CARDIAC PACEMAKER IN SITU: Primary | ICD-10-CM

## 2022-03-24 LAB
ANION GAP SERPL CALCULATED.3IONS-SCNC: 9 MMOL/L (ref 5–18)
BUN SERPL-MCNC: 19 MG/DL (ref 8–28)
CALCIUM SERPL-MCNC: 8.9 MG/DL (ref 8.5–10.5)
CHLORIDE BLD-SCNC: 101 MMOL/L (ref 98–107)
CO2 SERPL-SCNC: 34 MMOL/L (ref 22–31)
CREAT SERPL-MCNC: 0.69 MG/DL (ref 0.6–1.1)
GFR SERPL CREATININE-BSD FRML MDRD: 89 ML/MIN/1.73M2
GLUCOSE BLD-MCNC: 85 MG/DL (ref 70–125)
GLUCOSE BLDC GLUCOMTR-MCNC: 108 MG/DL (ref 70–99)
GLUCOSE BLDC GLUCOMTR-MCNC: 236 MG/DL (ref 70–99)
GLUCOSE BLDC GLUCOMTR-MCNC: 67 MG/DL (ref 70–99)
GLUCOSE BLDC GLUCOMTR-MCNC: 85 MG/DL (ref 70–99)
MAGNESIUM SERPL-MCNC: 1.5 MG/DL (ref 1.8–2.6)
POTASSIUM BLD-SCNC: 3.6 MMOL/L (ref 3.5–5)
SODIUM SERPL-SCNC: 144 MMOL/L (ref 136–145)

## 2022-03-24 PROCEDURE — 99152 MOD SED SAME PHYS/QHP 5/>YRS: CPT | Performed by: INTERNAL MEDICINE

## 2022-03-24 PROCEDURE — 99232 SBSQ HOSP IP/OBS MODERATE 35: CPT | Mod: 24 | Performed by: INTERNAL MEDICINE

## 2022-03-24 PROCEDURE — 250N000009 HC RX 250: Performed by: FAMILY MEDICINE

## 2022-03-24 PROCEDURE — 999N000127 HC STATISTIC PERIPHERAL IV START W US GUIDANCE

## 2022-03-24 PROCEDURE — 999N000157 HC STATISTIC RCP TIME EA 10 MIN

## 2022-03-24 PROCEDURE — 250N000013 HC RX MED GY IP 250 OP 250 PS 637: Performed by: FAMILY MEDICINE

## 2022-03-24 PROCEDURE — 250N000012 HC RX MED GY IP 250 OP 636 PS 637: Performed by: INTERNAL MEDICINE

## 2022-03-24 PROCEDURE — 250N000011 HC RX IP 250 OP 636: Performed by: INTERNAL MEDICINE

## 2022-03-24 PROCEDURE — 250N000009 HC RX 250: Performed by: INTERNAL MEDICINE

## 2022-03-24 PROCEDURE — 94640 AIRWAY INHALATION TREATMENT: CPT

## 2022-03-24 PROCEDURE — 02H63JZ INSERTION OF PACEMAKER LEAD INTO RIGHT ATRIUM, PERCUTANEOUS APPROACH: ICD-10-PCS | Performed by: INTERNAL MEDICINE

## 2022-03-24 PROCEDURE — 272N000001 HC OR GENERAL SUPPLY STERILE: Performed by: INTERNAL MEDICINE

## 2022-03-24 PROCEDURE — 82310 ASSAY OF CALCIUM: CPT | Performed by: INTERNAL MEDICINE

## 2022-03-24 PROCEDURE — C1898 LEAD, PMKR, OTHER THAN TRANS: HCPCS | Performed by: INTERNAL MEDICINE

## 2022-03-24 PROCEDURE — C1785 PMKR, DUAL, RATE-RESP: HCPCS | Performed by: INTERNAL MEDICINE

## 2022-03-24 PROCEDURE — 250N000013 HC RX MED GY IP 250 OP 250 PS 637: Performed by: INTERNAL MEDICINE

## 2022-03-24 PROCEDURE — 99232 SBSQ HOSP IP/OBS MODERATE 35: CPT | Performed by: INTERNAL MEDICINE

## 2022-03-24 PROCEDURE — 36415 COLL VENOUS BLD VENIPUNCTURE: CPT | Performed by: INTERNAL MEDICINE

## 2022-03-24 PROCEDURE — 255N000002 HC RX 255 OP 636: Performed by: INTERNAL MEDICINE

## 2022-03-24 PROCEDURE — C1894 INTRO/SHEATH, NON-LASER: HCPCS | Performed by: INTERNAL MEDICINE

## 2022-03-24 PROCEDURE — 0JH636Z INSERTION OF PACEMAKER, DUAL CHAMBER INTO CHEST SUBCUTANEOUS TISSUE AND FASCIA, PERCUTANEOUS APPROACH: ICD-10-PCS | Performed by: INTERNAL MEDICINE

## 2022-03-24 PROCEDURE — 33208 INSRT HEART PM ATRIAL & VENT: CPT | Performed by: INTERNAL MEDICINE

## 2022-03-24 PROCEDURE — 33208 INSRT HEART PM ATRIAL & VENT: CPT | Mod: KX | Performed by: INTERNAL MEDICINE

## 2022-03-24 PROCEDURE — 02HK3JZ INSERTION OF PACEMAKER LEAD INTO RIGHT VENTRICLE, PERCUTANEOUS APPROACH: ICD-10-PCS | Performed by: INTERNAL MEDICINE

## 2022-03-24 PROCEDURE — 83735 ASSAY OF MAGNESIUM: CPT | Performed by: INTERNAL MEDICINE

## 2022-03-24 PROCEDURE — 99153 MOD SED SAME PHYS/QHP EA: CPT | Performed by: INTERNAL MEDICINE

## 2022-03-24 PROCEDURE — 250N000013 HC RX MED GY IP 250 OP 250 PS 637: Performed by: NURSE PRACTITIONER

## 2022-03-24 PROCEDURE — 210N000001 HC R&B IMCU HEART CARE

## 2022-03-24 PROCEDURE — 999N000065 XR CHEST 2 VW

## 2022-03-24 DEVICE — IMPLANTABLE DEVICE: Type: IMPLANTABLE DEVICE | Site: HEART | Status: FUNCTIONAL

## 2022-03-24 DEVICE — LEAD AICD ATRIAL 4470: Type: IMPLANTABLE DEVICE | Site: HEART | Status: FUNCTIONAL

## 2022-03-24 DEVICE — PCMKR CARD ACCOLADE MRI EL DR: Type: IMPLANTABLE DEVICE | Site: CHEST | Status: FUNCTIONAL

## 2022-03-24 RX ORDER — FUROSEMIDE 10 MG/ML
20 INJECTION INTRAMUSCULAR; INTRAVENOUS ONCE
Status: COMPLETED | OUTPATIENT
Start: 2022-03-24 | End: 2022-03-24

## 2022-03-24 RX ORDER — DEXMEDETOMIDINE HYDROCHLORIDE 4 UG/ML
.1-1.2 INJECTION, SOLUTION INTRAVENOUS CONTINUOUS
Status: DISCONTINUED | OUTPATIENT
Start: 2022-03-24 | End: 2022-03-24 | Stop reason: HOSPADM

## 2022-03-24 RX ORDER — MAGNESIUM SULFATE 4 G/50ML
4 INJECTION INTRAVENOUS ONCE
Status: DISCONTINUED | OUTPATIENT
Start: 2022-03-24 | End: 2022-03-24

## 2022-03-24 RX ORDER — AMLODIPINE BESYLATE 5 MG/1
10 TABLET ORAL DAILY
Status: DISCONTINUED | OUTPATIENT
Start: 2022-03-24 | End: 2022-03-25 | Stop reason: HOSPADM

## 2022-03-24 RX ORDER — FENTANYL CITRATE 50 UG/ML
INJECTION, SOLUTION INTRAMUSCULAR; INTRAVENOUS
Status: DISCONTINUED | OUTPATIENT
Start: 2022-03-24 | End: 2022-03-24 | Stop reason: HOSPADM

## 2022-03-24 RX ORDER — DIAZEPAM 5 MG
5 TABLET ORAL
Status: COMPLETED | OUTPATIENT
Start: 2022-03-24 | End: 2022-03-24

## 2022-03-24 RX ORDER — MAGNESIUM OXIDE 400 MG/1
400 TABLET ORAL 3 TIMES DAILY
Status: DISCONTINUED | OUTPATIENT
Start: 2022-03-24 | End: 2022-03-25

## 2022-03-24 RX ORDER — IODIXANOL 320 MG/ML
INJECTION, SOLUTION INTRAVASCULAR
Status: DISCONTINUED | OUTPATIENT
Start: 2022-03-24 | End: 2022-03-24 | Stop reason: HOSPADM

## 2022-03-24 RX ORDER — LIDOCAINE 40 MG/G
CREAM TOPICAL
Status: DISCONTINUED | OUTPATIENT
Start: 2022-03-24 | End: 2022-03-24 | Stop reason: HOSPADM

## 2022-03-24 RX ADMIN — ALBUTEROL SULFATE 2.5 MG: 2.5 SOLUTION RESPIRATORY (INHALATION) at 11:13

## 2022-03-24 RX ADMIN — DIAZEPAM 5 MG: 5 TABLET ORAL at 07:56

## 2022-03-24 RX ADMIN — MAGNESIUM OXIDE TAB 400 MG (241.3 MG ELEMENTAL MG) 400 MG: 400 (241.3 MG) TAB at 20:33

## 2022-03-24 RX ADMIN — GUAIFENESIN 1200 MG: 600 TABLET, EXTENDED RELEASE ORAL at 20:32

## 2022-03-24 RX ADMIN — POTASSIUM CHLORIDE 20 MEQ: 1500 TABLET, EXTENDED RELEASE ORAL at 11:39

## 2022-03-24 RX ADMIN — SERTRALINE HYDROCHLORIDE 100 MG: 50 TABLET ORAL at 13:48

## 2022-03-24 RX ADMIN — IPRATROPIUM BROMIDE AND ALBUTEROL 1 PUFF: 20; 100 SPRAY, METERED RESPIRATORY (INHALATION) at 11:10

## 2022-03-24 RX ADMIN — LEVETIRACETAM 500 MG: 500 TABLET, FILM COATED ORAL at 05:58

## 2022-03-24 RX ADMIN — PREDNISONE 40 MG: 20 TABLET ORAL at 13:48

## 2022-03-24 RX ADMIN — PIPERACILLIN AND TAZOBACTAM 3.38 G: 3; .375 INJECTION, POWDER, LYOPHILIZED, FOR SOLUTION INTRAVENOUS at 04:56

## 2022-03-24 RX ADMIN — LOSARTAN POTASSIUM 50 MG: 50 TABLET, FILM COATED ORAL at 20:32

## 2022-03-24 RX ADMIN — ACETAMINOPHEN 650 MG: 325 TABLET ORAL at 13:41

## 2022-03-24 RX ADMIN — ALBUTEROL SULFATE 2 PUFF: 90 INHALANT RESPIRATORY (INHALATION) at 14:54

## 2022-03-24 RX ADMIN — PIPERACILLIN AND TAZOBACTAM 3.38 G: 3; .375 INJECTION, POWDER, LYOPHILIZED, FOR SOLUTION INTRAVENOUS at 20:31

## 2022-03-24 RX ADMIN — LEVETIRACETAM 500 MG: 500 TABLET, FILM COATED ORAL at 20:32

## 2022-03-24 RX ADMIN — IPRATROPIUM BROMIDE AND ALBUTEROL 1 PUFF: 20; 100 SPRAY, METERED RESPIRATORY (INHALATION) at 20:32

## 2022-03-24 RX ADMIN — ACETAMINOPHEN 650 MG: 325 TABLET ORAL at 05:56

## 2022-03-24 RX ADMIN — LOSARTAN POTASSIUM 50 MG: 50 TABLET, FILM COATED ORAL at 05:57

## 2022-03-24 RX ADMIN — Medication 125 MCG: at 13:49

## 2022-03-24 RX ADMIN — FUROSEMIDE 20 MG: 10 INJECTION, SOLUTION INTRAMUSCULAR; INTRAVENOUS at 17:26

## 2022-03-24 RX ADMIN — PIPERACILLIN AND TAZOBACTAM 3.38 G: 3; .375 INJECTION, POWDER, LYOPHILIZED, FOR SOLUTION INTRAVENOUS at 11:27

## 2022-03-24 RX ADMIN — AMLODIPINE BESYLATE 10 MG: 5 TABLET ORAL at 11:23

## 2022-03-24 RX ADMIN — MAGNESIUM OXIDE TAB 400 MG (241.3 MG ELEMENTAL MG) 400 MG: 400 (241.3 MG) TAB at 11:39

## 2022-03-24 RX ADMIN — Medication 400 MG: at 20:31

## 2022-03-24 RX ADMIN — IPRATROPIUM BROMIDE AND ALBUTEROL 1 PUFF: 20; 100 SPRAY, METERED RESPIRATORY (INHALATION) at 05:19

## 2022-03-24 RX ADMIN — GUAIFENESIN 1200 MG: 600 TABLET, EXTENDED RELEASE ORAL at 13:48

## 2022-03-24 ASSESSMENT — ACTIVITIES OF DAILY LIVING (ADL)
ADLS_ACUITY_SCORE: 16
ADLS_ACUITY_SCORE: 18
ADLS_ACUITY_SCORE: 18
ADLS_ACUITY_SCORE: 16
ADLS_ACUITY_SCORE: 14
ADLS_ACUITY_SCORE: 16
ADLS_ACUITY_SCORE: 18
ADLS_ACUITY_SCORE: 16
ADLS_ACUITY_SCORE: 18
ADLS_ACUITY_SCORE: 16

## 2022-03-24 NOTE — PROGRESS NOTES
[place] [facility] [place]. No family present. Labs noted, checklist complete. On O2 this am as was up to bathroom and transferred to cart with shortness of breath, and congested, nonproductive coughing.

## 2022-03-24 NOTE — PROGRESS NOTES
Patient left on a cart with on e RN to [facility] for prep for PPM placement.

## 2022-03-24 NOTE — PLAN OF CARE
Problem: COPD (Chronic Obstructive Pulmonary Disease) Comorbidity  Goal: Maintenance of COPD Symptom Control  Outcome: Ongoing, Progressing  Intervention: Maintain COPD-Symptom Control  Recent Flowsheet Documentation  Taken 3/24/2022 1130 by Pattie Shea RN  Medication Review/Management: medications reviewed     Problem: Hypertension Acute  Goal: Blood Pressure Within Desired Range  Outcome: Ongoing, Progressing  Intervention: Normalize Blood Pressure  Recent Flowsheet Documentation  Taken 3/24/2022 1130 by Pattie Shea RN  Medication Review/Management: medications reviewed   Goal Outcome Evaluation:  Patient returned from PPM on a cart with two assist around 11:15 am. No c/o pain. A & O x 4. Left PPM dressing C/D/I. Tender to touch around PPM site and will give tylenol. Lungs were wheezy and neb treatment given with relief and O2 on at 2 liters/nc. Patient wears O2 at night at home. Hx COPD. On IV zosyn for pneumonia.  Meal tray ordered and ate 75%. Did have a low blood sugar 67 post PPM, asymptomatic, and rechecked after OJ given and was 85. Good appetite. NSR with BBB. BP was high and amlodipine given once arrived to her room and BP better 146/65. Patient to have CXR around 1600. Call light in reach.

## 2022-03-24 NOTE — PROGRESS NOTES
Progress Note    Assessment/Plan  Lalitha Underwood is a 76 year old female with a history of severe COPD on nighttime oxygen admitted to the hospital with acute on chronic respiratory failure with concerns for dysrhythmias with      3/24 :     Sob stable  Oxygen at night at home - 1.5 liters  On 1 liter at this time  S/p pacemaker today  Lasix 20 mg iv 1 dose for possible fluid overload today  Iv zosyn for possible aspiration pneumonia    Discharge in am if respiratory status stable and ok with cardiology      A/p :        Acute on chronic respiratory failure  --- CT scan shows decrease in the right upper lobe which is suspicious for aspiration pneumonia.  --Passed video swallow study and unremarkable esophagogram 3/21  --- Continue IV Zosyn  --- Echo reviewed  ------ Check sputum, influenza  ---  Off Mucomyst since it can cause bronchospasm  ---  Continue Mucinex   --Completed 2 doses of IV Lasix 20 mg given inspiratory crackles and leg edema  --Hold advair and Spiriva while on steroids and change duo nebs to Combivent inhaler     Severe Gold C COPD exacerbation with chronic respiratory failure  --- Followed by Dr. Tamayo  --- Recent notes reviewed.  ---continue home regimen  ---pulmo consult per above as has had several exacerbations last three months, getting CT  --De-escalate steroids to once daily given nervousness and anxiety. change IV to p.o. steroids     Abnormal EKG--  --- Previously noted to be abnormal earlier this week during preoperative evaluation  --- Cardiology aware, felt more consistent with Mobitz type 1 with alternative bundle branch block  --- BNP normal, troponin negative  --Echo shows EF of 60 to 65%.  Cardiology recommending pacemaker implantation on Thursday    Aortic stenosis nonrheumatic--new on echo   --Follow-up yearly echo as per cardiology    Hypertension  --- Controlled    Anxiety--continue zoloft  --Change IV to p.o. steroids    Epilepsy---continue keppra          GERD--continue  "PPI                   Steroid-induced hyperglycemia  Lab Results   Component Value Date    A1C 5.4 02/22/2022     ---with steroids use ISS  ---not on meds at home  --- Passed swallow study and therefore continue regular diet    Vitamin D deficiency---continue home vitamin D     Urge incontinence--- hold Vesicare while in the hospital to avoid urinary retention     Obesity;  --- Estimated body mass index is 38.45 kg/m      Hypomagnesemia  --Continue magnesium oxide twice daily  Magnesium   Date Value Ref Range Status   03/24/2022 1.5 (L) 1.8 - 2.6 mg/dL Final          Diet: Regular Diet Adult    DVT Prophylaxis: Enoxaparin (Lovenox) SQ  Ann Catheter: Not present  Central Lines: None  Cardiac Monitoring: None  Code Status:  Previous hospitalist Discussed with patient at length.  Right now she is DNR but she is agreeable to intubation.  We discussed her COPD status and my concern over intubation.  She will think about this.  No change in CODE STATUS at this time.           Barriers to discharge: IV antibiotics, pacemaker    Anticipated discharge date: 3/24          Review of system is positive for anxiety    Objective    BP (!) 146/65   Pulse 93   Temp 97.8  F (36.6  C) (Oral)   Resp 20   Ht 1.626 m (5' 4\")   Wt 100.7 kg (221 lb 14.4 oz)   SpO2 99%   BMI 38.09 kg/m    Weight:   Wt Readings from Last 5 Encounters:   03/24/22 100.7 kg (221 lb 14.4 oz)   03/14/22 101.6 kg (224 lb)   03/01/22 102.1 kg (225 lb)   02/22/22 100.3 kg (221 lb 1.6 oz)   02/21/22 99.8 kg (220 lb)       I/O last 3 completed shifts:  In: 1123 [P.O.:1120; I.V.:3]  Out: 900 [Urine:900]  No intake/output data recorded.          Physical Exam  Alert, oriented*3  Able to speak short sentences  Body mass index is 38.09 kg/m .    No sinus tenderness  Moist membranes  Neck supple  CVS: S1 S2-N, no murmurs, gallops, rubs  Resp: Coarse crackles with some wheezing  Abd: soft, No t/g/r  Neuro: no involuntary movements such as tremors  Vasc: No leg " edema     Pertinent Labs  ----------------------  Recent Labs   Lab 03/24/22  1137 03/24/22  1112 03/24/22  0549 03/20/22  1137 03/20/22  0759 03/19/22  2140 03/19/22  1441   NA  --   --  144  --  137  --  136   POTASSIUM  --   --  3.6  --  3.8  --  3.5   CO2  --   --  34*  --  28  --  30   BUN  --   --  19  --  32*  --  27   CR  --   --  0.69  --  0.84  --  0.87   MAG  --   --  1.5*  --   --   --  1.9   GLC 85 67* 85   < > 190*   < > 96   ALBUMIN  --   --   --   --  2.8*  --   --    BILITOTAL  --   --   --   --  0.3  --   --    ALKPHOS  --   --   --   --  65  --   --    ALT  --   --   --   --  13  --   --    AST  --   --   --   --  12  --   --     < > = values in this interval not displayed.     Recent Labs   Lab 03/20/22  0759 03/19/22  1441   WBC 18.7* 16.3*   HGB 12.0 12.0   HCT 38.0 38.2    186     No results for input(s): INR in the last 168 hours.  Glucose Values Latest Ref Rng & Units 2/22/2022 3/19/2022 3/20/2022 3/24/2022   Bedside Glucose (mg/dl )  - -- -- -- --   GLUCOSE 70 - 125 mg/dL 93 96 190(H) 85   Some recent data might be hidden         Pertinent Radiology   Radiology Results: Personally reviewed impression/s  XR Chest Port 1 View    Result Date: 3/19/2022  EXAM: XR CHEST PORTABLE 1 VIEW LOCATION: [facility] DATE/TIME: 03/19/2022, 2:48 PM INDICATION: Shortness of breath. COMPARISON: 03/11/2022.     IMPRESSION: Heart and mediastinal size are within normal limits. There is patchy infiltrate involving the right upper lobe, new from prior study, suggestive of an infectious process. No pleural effusion or pneumothorax. There is also some silhouetting of  the right hemidiaphragm which represents either atelectasis or an infectious process.     CT Chest Pulmonary Embolism w Contrast    Result Date: 3/19/2022  EXAM: CT CHEST PULMONARY EMBOLISM W CONTRAST LOCATION: [facility] DATE/TIME: 3/19/2022 8:20 PM INDICATION: PE suspected,  low/intermediate prob, neg D-dimer; COPD with worsening symptoms, hypoxia COMPARISON: Portable chest radiography 03/19/2022 and PA and lateral views of the chest 03/11/2022 TECHNIQUE: CT chest pulmonary angiogram during arterial phase injection of IV contrast. Multiplanar reformats and MIP reconstructions were performed. Dose reduction techniques were used. CONTRAST: isovue 370 100ml FINDINGS: ANGIOGRAM CHEST: Enlarged main, right, and left pulmonary arteries. No pulmonary artery filling defects. There are atheromatous calcifications mid ascending aorta, arch, and descending aorta but no findings of acute aortic syndrome. No aortic aneurysm. LUNGS AND PLEURA: Imaging at the expiratory phase of the respiratory cycle. Near complete collapse of the bronchus intermedius. The right upper lobe bronchus and segmental and subsegmental right upper lobe airways are filled with debris and there are numerous airway centric inflammatory nodules in the right upper lobe. Mild interstitial edema in the periphery of the caudal right upper lobe. Mild airway debris in the right lower lobe without airspace opacities. There is a band of cicatrization atelectasis in the left lower lobe and mild airway debris in the segmental left lower lobe airways. No pleural effusion. MEDIASTINUM: Partial anomalous pulmonary venous return. The left superior pulmonary vein drains to the left brachiocephalic vein. Cardiac chambers are normal in size. Variant right pulmonary vein anatomy with independent veins of the right upper, middle,  and lower lobes. No pericardial effusion. No enlarged mediastinal or hilar lymph nodes. Slightly patulous esophagus suggesting a component of dysmotility. Left thyroid nodule measures 19 x 26 mm (series 4, image 17). CORONARY ARTERY CALCIFICATION: Mild. UPPER ABDOMEN: No actionable findings in the imaged upper abdomen. There our felt pledgets around the diaphragmatic hiatus from prior hiatal hernia repair.  MUSCULOSKELETAL: Mild anterior wedge deformity of T6. No aggressive or destructive bone lesions.     IMPRESSION: 1.  Enlarged central pulmonary arteries consistent with pulmonary hypertension. No acute pulmonary embolism. 2.  Large amount of airway material filling the bronchi of the right upper lobe with airway centric inflammatory nodules in the peripheral third of the lobe consistent with bronchopneumonia. 3.  Partial anomalous pulmonary venous return. The left upper lobe pulmonary vein drains to the left brachiocephalic vein in the mediastinum producing a left to right shunt.    EKG Results: personally reviewed. Telemetry currently sinus bundle branch block

## 2022-03-24 NOTE — PRE-PROCEDURE
GENERAL PRE-PROCEDURE:   Procedure:  PPM implant for indication of Mobitz second-degree AV block type II  Date/Time:  3/24/2022 8:00 AM    Written consent obtained?: Yes    Risks and benefits: Risks, benefits and alternatives were discussed    Consent given by:  Patient  Patient states understanding of procedure being performed: Yes    Patient's understanding of procedure matches consent: Yes    Procedure consent matches procedure scheduled: Yes    Expected level of sedation:  Moderate  Appropriately NPO:  Yes  ASA Class:  3 (Morbid second-degree AV block type II, moderate aortic stenosis, HTN, COPD, PNA, DM type II, hypomagnesemia)  Mallampati  :  Grade 3- soft palate visible, posterior pharyngeal wall not visible  Lungs:  Wheezes  Heart:  Systolic murmur  History & Physical reviewed:  History and physical reviewed and no updates needed  Statement of review:  I have reviewed the lab findings, diagnostic data, medications, and the plan for sedation    Patient is a DNR/DNI and understands that this will be temporarily suspended for today's procedure

## 2022-03-24 NOTE — SIGNIFICANT EVENT
Significant Event Note    Time of event: 9:30 PM March 23, 2022    Description of event:  Called re: elevated bp.  Notes reviewed.  Admitted with COPD sx and now anticipating PPM placement tomorrow.  BP have been trending up.  Note home hydrochlorothiazide discontinued 3/20 when two doses IV lasix started    Plan:  IV lasix now  Hydralazine prn  Consider resumption of hctz      Iris Vera MD

## 2022-03-24 NOTE — PLAN OF CARE
Problem: COPD (Chronic Obstructive Pulmonary Disease) Comorbidity  Goal: Maintenance of COPD Symptom Control  Outcome: Ongoing, Progressing  Intervention: Maintain COPD-Symptom Control     Problem: Dysrhythmia  Goal: Normalized Cardiac Rhythm  Outcome: Ongoing, Progressing     Goal Outcome Evaluation:    BP elevated, one time Lasix IV and PRN Hydralazine given as ordered by Ronal Masters MD. Scheduled Inhaler for wheezing. Kept at 1L 02 NC. I.S at 500 ml. Rhythm- Sinus Rhythm with BBB. BG-  133 and 95. Ate well. Declined to take scheduled Colace d/t loose stools. Lovenox held per discussion with CSC RN and as ordered. Pt has a bath with clean gown/linens. Aware of NPO status at midnight. On IV Antibiotics. Ambulates in room. Denied pain.

## 2022-03-24 NOTE — PROGRESS NOTES
CARDIOLOGY PROGRESS NOTE      Assessment/Plan:  1.  Second degree heart block-looks to be Mobitz 2 2:1, had pacemaker implanted today.  Doing well.   2.  Status post pacemaker-[employer] device with [employer] right atrial right ventricular leads placed today.  Chest x-ray and device check pending tomorrow.  If stable cardiology will sign off  3.  COPD exacerbation (H)-so noted, 97% saturation on 2 L.  4.  Pneumonia due to infectious organism, unspecified laterality, unspecified part of lung-lactic acid normal, white count elevated, no procalcitonin and cultures negative.  Defer to primary care team.  5.  Diabetes-defer to primary care team.  A1c good at 5.4.  6.  Aortic stenosis-moderate with mean gradient of 24 mmHg and peak velocity of 3.0 m/s.  Recheck echo in a year.  7.  Pulmonary hypertension-moderate with estimated pressure of 54 to 55 mmHg.  Possibly WHO class III due to lung disease.    Plan:  1.  Plan on pacemaker chest x-ray and device check tomorrow.      Discharge Plannin.  No cardiac barrier to discharge if everything checks out tomorrow.     LOS: 6 days     Subjective:  Interval History:    76-year-old white female being seen on sixth day of hospitalization.  Still short of breath with dry cough.  No chest pains, palpitations, syncope, dizziness or peripheral edema.  Being seen immediately post pacemaker.    Medications    amLODIPine  10 mg Oral Daily     calcium citrate  950 mg Oral Daily     docusate sodium  100 mg Oral BID     [Held by provider] fluticasone-salmeterol  1 puff Inhalation Q12H     guaiFENesin  1,200 mg Oral BID     insulin aspart  1-3 Units Subcutaneous TID AC     insulin aspart  1-3 Units Subcutaneous At Bedtime     ipratropium-albuterol  1 puff Inhalation 4x daily     levETIRAcetam  500 mg Oral BID     losartan  50 mg Oral BID     magnesium oxide  400 mg Oral TID     magnesium oxide  400 mg Oral BID     pantoprazole  40 mg Oral QAM AC      "piperacillin-tazobactam  3.375 g Intravenous Q8H     potassium chloride ER  20 mEq Oral Daily     predniSONE  40 mg Oral Daily     sertraline  100 mg Oral Daily     sodium chloride (PF)  3 mL Intracatheter Q8H     [Held by provider] tiotropium  18 mcg Inhalation Daily     [Held by provider] tolterodine ER  2 mg Oral BID     vitamin D3  125 mcg Oral Daily     Objective:   Vital signs in last 24 hours:  Temp:  [97.8  F (36.6  C)-98.8  F (37.1  C)] 97.8  F (36.6  C)  Pulse:  [75-94] 81  Resp:  [13-20] 20  BP: (162-194)/() 170/91  FiO2 (%):  [3 %] 3 %  SpO2:  [96 %-100 %] 100 %    Physical Exam:  BP (!) 170/91   Pulse 81   Temp 97.8  F (36.6  C) (Oral)   Resp 20   Ht 1.626 m (5' 4\")   Wt 100.7 kg (221 lb 14.4 oz)   SpO2 100%   BMI 38.09 kg/m      General Appearance:    Alert, cooperative, no distress, appears stated age   Head:    Normocephalic, without obvious abnormality, atraumatic   Throat:   Lips, mucosa, and tongue normal; teeth and gums normal   Neck:   Supple, symmetrical, trachea midline, no adenopathy;        thyroid:  No enlargement/tenderness/nodules; no carotid    bruit or JVD   Back:     Symmetric, no curvature, ROM normal, no CVA tenderness   Lungs:     Diffuse wheezes bilaterally, respirations unlabored   Chest wall:    No tenderness or deformity   Heart:    Regular rate and rhythm, S1 and S2 normal, no murmur, rub   or gallop   Abdomen:     Soft, non-tender, bowel sounds active all four quadrants,     no masses, no organomegaly   Extremities:   Normal, atraumatic, no cyanosis or edema   Pulses:   2+ and symmetric all extremities   Skin:   Skin color, texture, turgor normal, no rashes or lesions     Cardiographics:      ECG: Personally reviewed by myself shows sinus rhythm, no acute changes.    Echocardiogram: The left ventricle is normal in size with normal left ventricular wall thickness.  Left ventricular function is normal.The ejection fraction is 60-65%.  The right ventricle is mildly " dilated with normal systolic function.  The right atrium is severely dilated.  Mild valvular aortic stenosis is present.  Right ventricular systolic pressure is elevated, consistent with mild pulmonary hypertension.     Compared to the prior study of 11/6/17 the right ventricle now appears  dilated.      Lab Results:   Recent Labs   Lab 03/20/22  0759   WBC 18.7*   HGB 12.0   HCT 38.0        Recent Labs   Lab 03/24/22  0549      CO2 34*   BUN 19   .       Lab Results   Component Value Date    TROPONINI <0.01 03/20/2022

## 2022-03-24 NOTE — PLAN OF CARE
Goal Outcome Evaluation:    Plan of Care Reviewed With: patient      Pt. Denied pain, nausea, or dizziness. Still having some coughing with productive sputum and DENT. VSS, continue to monitor.

## 2022-03-25 ENCOUNTER — APPOINTMENT (OUTPATIENT)
Dept: OCCUPATIONAL THERAPY | Facility: HOSPITAL | Age: 77
DRG: 981 | End: 2022-03-25
Payer: MEDICARE

## 2022-03-25 ENCOUNTER — APPOINTMENT (OUTPATIENT)
Dept: PHYSICAL THERAPY | Facility: HOSPITAL | Age: 77
DRG: 981 | End: 2022-03-25
Payer: MEDICARE

## 2022-03-25 VITALS
BODY MASS INDEX: 37.58 KG/M2 | DIASTOLIC BLOOD PRESSURE: 72 MMHG | WEIGHT: 220.1 LBS | HEIGHT: 64 IN | SYSTOLIC BLOOD PRESSURE: 156 MMHG | OXYGEN SATURATION: 98 % | TEMPERATURE: 97.6 F | RESPIRATION RATE: 18 BRPM | HEART RATE: 81 BPM

## 2022-03-25 PROBLEM — Z95.0 CARDIAC PACEMAKER IN SITU: Status: ACTIVE | Noted: 2022-03-25

## 2022-03-25 LAB
ANION GAP SERPL CALCULATED.3IONS-SCNC: 8 MMOL/L (ref 5–18)
BUN SERPL-MCNC: 18 MG/DL (ref 8–28)
CALCIUM SERPL-MCNC: 8.5 MG/DL (ref 8.5–10.5)
CHLORIDE BLD-SCNC: 101 MMOL/L (ref 98–107)
CO2 SERPL-SCNC: 31 MMOL/L (ref 22–31)
CREAT SERPL-MCNC: 0.7 MG/DL (ref 0.6–1.1)
ERYTHROCYTE [DISTWIDTH] IN BLOOD BY AUTOMATED COUNT: 13.8 % (ref 10–15)
GFR SERPL CREATININE-BSD FRML MDRD: 89 ML/MIN/1.73M2
GLUCOSE BLD-MCNC: 103 MG/DL (ref 70–125)
GLUCOSE BLDC GLUCOMTR-MCNC: 88 MG/DL (ref 70–99)
GLUCOSE BLDC GLUCOMTR-MCNC: 96 MG/DL (ref 70–99)
HCT VFR BLD AUTO: 43.4 % (ref 35–47)
HGB BLD-MCNC: 13.3 G/DL (ref 11.7–15.7)
MAGNESIUM SERPL-MCNC: 1.7 MG/DL (ref 1.8–2.6)
MCH RBC QN AUTO: 27.7 PG (ref 26.5–33)
MCHC RBC AUTO-ENTMCNC: 30.6 G/DL (ref 31.5–36.5)
MCV RBC AUTO: 90 FL (ref 78–100)
PLATELET # BLD AUTO: 229 10E3/UL (ref 150–450)
POTASSIUM BLD-SCNC: 4.2 MMOL/L (ref 3.5–5)
RBC # BLD AUTO: 4.8 10E6/UL (ref 3.8–5.2)
SODIUM SERPL-SCNC: 140 MMOL/L (ref 136–145)
WBC # BLD AUTO: 14.4 10E3/UL (ref 4–11)

## 2022-03-25 PROCEDURE — 250N000011 HC RX IP 250 OP 636: Performed by: INTERNAL MEDICINE

## 2022-03-25 PROCEDURE — 85027 COMPLETE CBC AUTOMATED: CPT | Performed by: INTERNAL MEDICINE

## 2022-03-25 PROCEDURE — 99239 HOSP IP/OBS DSCHRG MGMT >30: CPT | Performed by: INTERNAL MEDICINE

## 2022-03-25 PROCEDURE — 97116 GAIT TRAINING THERAPY: CPT | Mod: GP

## 2022-03-25 PROCEDURE — 250N000013 HC RX MED GY IP 250 OP 250 PS 637: Performed by: FAMILY MEDICINE

## 2022-03-25 PROCEDURE — 80048 BASIC METABOLIC PNL TOTAL CA: CPT | Performed by: INTERNAL MEDICINE

## 2022-03-25 PROCEDURE — 97535 SELF CARE MNGMENT TRAINING: CPT | Mod: GO

## 2022-03-25 PROCEDURE — 36415 COLL VENOUS BLD VENIPUNCTURE: CPT | Performed by: INTERNAL MEDICINE

## 2022-03-25 PROCEDURE — 250N000012 HC RX MED GY IP 250 OP 636 PS 637: Performed by: INTERNAL MEDICINE

## 2022-03-25 PROCEDURE — 250N000013 HC RX MED GY IP 250 OP 250 PS 637: Performed by: INTERNAL MEDICINE

## 2022-03-25 PROCEDURE — 97110 THERAPEUTIC EXERCISES: CPT | Mod: GP

## 2022-03-25 PROCEDURE — 99233 SBSQ HOSP IP/OBS HIGH 50: CPT | Mod: 24 | Performed by: INTERNAL MEDICINE

## 2022-03-25 PROCEDURE — 83735 ASSAY OF MAGNESIUM: CPT | Performed by: INTERNAL MEDICINE

## 2022-03-25 RX ORDER — HYDROCHLOROTHIAZIDE 25 MG/1
25 TABLET ORAL DAILY
Status: DISCONTINUED | OUTPATIENT
Start: 2022-03-25 | End: 2022-03-25 | Stop reason: HOSPADM

## 2022-03-25 RX ORDER — MAGNESIUM SULFATE HEPTAHYDRATE 40 MG/ML
2 INJECTION, SOLUTION INTRAVENOUS ONCE
Status: COMPLETED | OUTPATIENT
Start: 2022-03-25 | End: 2022-03-25

## 2022-03-25 RX ORDER — BENZONATATE 100 MG/1
100 CAPSULE ORAL 3 TIMES DAILY PRN
Qty: 10 CAPSULE | Refills: 0 | Status: SHIPPED | OUTPATIENT
Start: 2022-03-25 | End: 2022-05-04

## 2022-03-25 RX ORDER — AMLODIPINE BESYLATE 10 MG/1
10 TABLET ORAL DAILY
Qty: 60 TABLET | Refills: 0 | Status: SHIPPED | OUTPATIENT
Start: 2022-03-26 | End: 2022-04-04

## 2022-03-25 RX ORDER — ACETAMINOPHEN 325 MG/1
650 TABLET ORAL EVERY 4 HOURS PRN
Status: DISCONTINUED | OUTPATIENT
Start: 2022-03-25 | End: 2022-03-25 | Stop reason: HOSPADM

## 2022-03-25 RX ORDER — GUAIFENESIN 600 MG/1
600 TABLET, EXTENDED RELEASE ORAL 2 TIMES DAILY
COMMUNITY
Start: 2022-03-25 | End: 2023-10-30

## 2022-03-25 RX ADMIN — IPRATROPIUM BROMIDE AND ALBUTEROL 1 PUFF: 20; 100 SPRAY, METERED RESPIRATORY (INHALATION) at 13:16

## 2022-03-25 RX ADMIN — IPRATROPIUM BROMIDE AND ALBUTEROL 1 PUFF: 20; 100 SPRAY, METERED RESPIRATORY (INHALATION) at 09:37

## 2022-03-25 RX ADMIN — Medication 125 MCG: at 09:34

## 2022-03-25 RX ADMIN — LOSARTAN POTASSIUM 50 MG: 50 TABLET, FILM COATED ORAL at 09:34

## 2022-03-25 RX ADMIN — LEVETIRACETAM 500 MG: 500 TABLET, FILM COATED ORAL at 09:33

## 2022-03-25 RX ADMIN — PANTOPRAZOLE SODIUM 40 MG: 20 TABLET, DELAYED RELEASE ORAL at 06:55

## 2022-03-25 RX ADMIN — PREDNISONE 40 MG: 20 TABLET ORAL at 09:35

## 2022-03-25 RX ADMIN — HYDROCHLOROTHIAZIDE 25 MG: 25 TABLET ORAL at 09:33

## 2022-03-25 RX ADMIN — PIPERACILLIN AND TAZOBACTAM 3.38 G: 3; .375 INJECTION, POWDER, LYOPHILIZED, FOR SOLUTION INTRAVENOUS at 03:07

## 2022-03-25 RX ADMIN — AMLODIPINE BESYLATE 10 MG: 5 TABLET ORAL at 09:35

## 2022-03-25 RX ADMIN — SERTRALINE HYDROCHLORIDE 100 MG: 50 TABLET ORAL at 09:33

## 2022-03-25 RX ADMIN — Medication 950 MG: at 10:29

## 2022-03-25 RX ADMIN — POTASSIUM CHLORIDE 20 MEQ: 1500 TABLET, EXTENDED RELEASE ORAL at 09:35

## 2022-03-25 RX ADMIN — GUAIFENESIN 1200 MG: 600 TABLET, EXTENDED RELEASE ORAL at 09:35

## 2022-03-25 RX ADMIN — MAGNESIUM SULFATE HEPTAHYDRATE 2 G: 40 INJECTION, SOLUTION INTRAVENOUS at 10:30

## 2022-03-25 ASSESSMENT — ACTIVITIES OF DAILY LIVING (ADL)
ADLS_ACUITY_SCORE: 16

## 2022-03-25 NOTE — PLAN OF CARE
Problem: Dysrhythmia  Goal: Normalized Cardiac Rhythm  Outcome: Ongoing, Progressing   Goal Outcome Evaluation:  Pt remains SR with IVCD overnight, rates generally 60's -80's at rest, up to 90's-100's with activity, occasionally V Paced, but generally intrinsic rhythm. Pacemaker implant site is covered with surgical dressing which appears CDI. Pt denies any pain.     LS are coarse, continued on IV Abx, strong congested cough which pt states is chronic, especially early in AM. Remains on 2 L NC overnight and sats have been adequate.

## 2022-03-25 NOTE — PROGRESS NOTES
"Brief EP progress note:    \"Serene\" Marcos is a 77-year-old white female with past medical history of Mobitz second-degree AV block type II, moderate aortic stenosis, HTN, COPD, PNA, DM type II and hypomagnesemia of who underwent successful dual-chamber pacemaker implant on 3/24/2022 by Dr. Pendleton    Left pre-pectoral device site is WNL.  Dressing is C/D/I. Nosurrounding ecchymosis or hematoma.CXR demonstrates adequate lead placement and no evidence of pneumothorax. The pacemaker was interrogated and is functioning appropriately. Atrial and ventricular lead sensing, impedance, and pacing thresholds are stable and within normal limits.    Pacemaker teaching was provided to the patient which included activity restrictions and reportable signs and symptoms. Patient was receptive to teaching and deniesneeds prior to discharge. She was provided with a temporary device card and owner's manual.     DEVICE INTERROGATION: 3/24/2022      CXR: 3/24/2022  Study Result    Narrative & Impression   EXAM: XR CHEST 2 VW  LOCATION: [facility]  DATE/TIME: 3/24/2022 4:53 PM     INDICATION: New pacemaker  COMPARISON: CT pulmonary angiogram and chest radiography 3/19/2022                                                                      IMPRESSION:      Left subclavian approach dual chamber pacemaker has right atrial appendage and right ventricular leads. The cardiac silhouette is not enlarged. There are moderate atheromatous calcifications in the arch with lesser calcification of the descending aorta.     The nodular opacities in the right upper lobe have decreased from 5 days earlier consistent with improving bronchopneumonia. Subsegmental territories of atelectasis in the right lower lobe along the diaphragmatic pleura are similar. No new/worsening   airspace opacities.     No pleural effusion or pneumothorax is present.     PLAN:  OK for discharge from an EP standpoint  We look forward to seeing Serene in " device clinic on April 1 at 945 at [facility] heart care [facility] in [place]  Thank you for the opportunity to participate in the care of this patient  Please call if further EP issues arise    Brigid Thomas, CNP  [phone]

## 2022-03-25 NOTE — PROGRESS NOTES
Physical Therapy Discharge Summary    Reason for therapy discharge:    Discharged to home.    Progress towards therapy goal(s). See goals on Care Plan in [facility] electronic health record for goal details.  Goals partially met.  Barriers to achieving goals:   discharge from facility.    Therapy recommendation(s):    Continued therapy is recommended.  Rationale/Recommendations:  to increase and activity tolerance and overall functional mobility.

## 2022-03-25 NOTE — PROGRESS NOTES
Care Management Discharge Note    Discharge Date: 03/25/2022     Discharge Disposition: Home    Discharge Services: OP f/u with PCP, Pulmonary and Cardiolgy    Discharge DME: None    Discharge Transportation:  family    Private pay costs discussed: Not applicable    PAS Confirmation Code:  Not applicable  Patient/family educated on Medicare website which has current facility and service quality ratings:      Education Provided on the Discharge Plan:    Persons Notified of Discharge Plans:   Patient/Family in Agreement with the Plan: yes    Handoff Referral Completed: Yes    Additional Information:  Chart reviewed.  Discharge orders have been placed.  Plan for pt to discharge home with family today, OP f/u with PCP, Cardiology and Pulmonary.    Keli Armstrong RN

## 2022-03-25 NOTE — DISCHARGE INSTRUCTIONS
COPD Education Reminders:  *Take Action Plan out and start checking it daily.   * Call your Doctor when in the Yellow Zone  *Take Action Plan along with you to your follow up appointments with your Primary Care Provider and Pulmonologist. Discuss your Action Plan with them.  *Avoid all irritants such as dust, smoke, harsh smells, cold air, or humidity  *Try to avoid contact with individuals who are sick with symptoms such as cough, fever, sneezing, ect.  *Feel free to call COPD Educators if you have any questions about managing your COPD at [phone].          Information on Pacemakers    Your Heart s Electrical System  Electrical signals cause the heart to pump blood throughout the body.  The heart has a special system that creates and sends electrical signals.       Why would a Pacemaker be needed? What does it do?  If your heart s electrical signals are too slow or your heart rate does not increase with activity, you can feel lightheaded, short of breath, dizzy or off balance.  This places you at risk to faint or fall.  Also, if at any point along the electrical pathway, the signal is blocked or isn t always sent along the normal path, you could have the signs of a slow heart rate noted above. A pacemaker is a small electronic device that causes your heart to beat if it is too slow. Many pacemakers sense when you are walking or working and will increase your heart rate as your natural heart would. The Electrophysiologist (cardiologist trained in heart rhythms) will put your device in and decide which device is best for you.    Having a Pacemaker Implanted?  A pacemaker is the size of a small pocket watch and the battery will last anywhere from 7 to 11 years. The pacemaker will need to be changed when the battery is low.  The wire(s) are inserted through a big vein below your collarbone and will be directed through the veins into the heart s right upper and lower chambers.  The wires will often last a  lifetime.  With the procedure, the risk of dying is rare.  The risk of a needle or wire puncturing a hole in the lung or heart is less than 1%.  There is less than 1% chance of infection.  We take extra steps to prevent infection as this would require the wires and pacemaker to be taken out.  There is a small risk the wires in your heart will move.  This risk is greatest in the first day, so you will have some arm movement restrictions after the procedure to try to prevent this from occurring.  The risk of a wire moving is less than 4%.  It is common to have some bruising, minor bleeding or swelling in the area of the pacemaker.  You will be told how to care for your incision at the time of the procedure.    What to expect with a Pacemaker    After a pacemaker, you can expect to live a normal life!    You can use all normal household appliances.      You should not keep a cell phone in a pocket over your pacemaker, but you can use a cell phone on either ear as long as you keep it 6 inches from your pacemaker.    You should avoid very strong magnets and arc welders.  You should not stand still inside the security bars (scanners) at a store, but you can walk through them without problems.     You can fly on an airplane, but you will need to tell security that you have a pacemaker and be prepared to show a card that you will get at the time of your pacemaker procedure.    You will not be able to have an MRI unless you get a special MRI compatible pacemaker and wires.    Preparing for your Procedure    You will not be allowed to eat or drink 8 hours before your procedure. You will be given further instructions by your provider or a nurse regarding the medication you will take the morning of your procedure.    You will need to arrange to have a  take you home from the hospital; you will not be permitted to drive for 3 days after your procedure.    You are allowed to bring personal items and clothing to the  hospital, but do not bring any valuables. You will need to bring a list of the names and dosages of the medication you are taking to the hospital.    It is important to mention to your provider or nurse if you have any allergies, reactions to anesthesia, or have a history of bleeding problems.      During the Procedure  Your procedure typically takes around 1 hour to implant.    The procedural area: You will be transferred back to the procedure room once you have been appropriately prepped by the nursing staff and you are ready for your device implant.    Sedation: You will get medication to help you relax and to keep you comfortable throughout the procedure, during this time you will be awake enough to breathe on your own. You will be able to communicate with the doctor and nurse throughout the procedure, and are encouraged to tell them if you are uncomfortable and/or need more medication at any time.    Insertion of your device: a local anesthetic is given by injection to numb the skin in the area where the device will be inserted. You may feel pressure at the insertion site during the procedure, but this medication is used to make sure you do not feel pain. A small 3-4 inch incision is made to create a small pocket where your device will be placed, and this will also be used to access a large vein where the device leads will be inserted.    Finishing up: Your doctor will connect the generator to the leads, insure proper function, and suture the incision closed.     After the Procedure    You will be transferred from the procedural area to a private room for recovery. Most patients after this type of procedure will be allowed to go home the same day. If needed, the doctor may require you to spend the night in the hospital overnight.    Prior to going home your device settings will be rechecked and you will have a chest x-ray, which will be reviewed by the doctor prior to you being discharged from the hospital.      Follow the restrictions that you are given after your pacemaker implant. You will likely be told not to raise your arm above your shoulder on the side the pacemaker was inserted for several weeks after the procedure. You need to avoid sports and activities where you lift weights or use that arm to push or throw something during that time period.      Follow the instructions you are given for caring for the incision site. Check your incision for signs of infection every day for a week. It is normal to have some swelling over the pacemaker especially if you are on a blood thinner.  You can put a cloth over the pacemaker and ice in a bag to help decrease the swelling for up to 20 minutes 4 times a day.      Please arrange for a  from the hospital, as you will not be able to resume driving until 3 days after your implant.    You will be scheduled to return for a follow-up visit in the Device Clinic in about a week. You will be given this appointment before you leave the hospital.    When you are in the hospital, you will get a list of signs and symptoms to watch for and when to contact the heart clinic.      Electrophysiology  Discharge Instructions for Pacemakers    1.  For four weeks, with your pacemaker arm,  Do not:    Raise your arm above the height of your shoulder    Perform any vigorous arm movements    Swim, golf, wash windows, shovel snow or vacuum    Lift greater than 10-15 pounds    2. Check the implant site daily for the first week.  Contact the [facility]   Heart Care Clinic [phone] should you experience any of the following:    Swelling    Redness    Drainage    Fever greater than  100.5 lasting more than 24 hours    3. You may shower in 3 days.  If you have steri strips over your incision, leave them on.  They are glued on and will be removed at your follow up appointment.      4. It is not necessary to cover your incision with a dressing.  Do not put any lotions or creams over the  incision site    5. To reduce the risk of infection, avoid dental procedures for the first 6 weeks.  Contact your cardiology clinic for an antibiotic should you need to see the dentist in the first 6 weeks post pacemaker implant    6. You may travel by any mode of transportation; just show your pacemaker identification card.  Follow the recommendation by [facility] staff if you are traveling by air    7.  For any future surgery or colonoscopy let your doctor know you have a pacemaker    8. Most household appliances including a microwave, telephone, cell phone will not interfere with your pacemaker function.  If you suspect interference, simply move away from the source.  Do not carry a cell phone in the shirt pocket directly over your pacemaker     9. Please refer to your pacemaker booklet from the  or their web site under the section on electromagnetic interference (TAYLOR) for further guidelines on things that may interfere with your pacemaker  10. No driving for 3 days    11. If the pacemaker site is uncomfortable you may place an ice pack (with a small dish cloth between skin and ice pack) over the site; alternate on 20 minutes - off 20 minutes      Your Procedural Physician was: Dr. Helder Pendleton   To reach the Electrophysiology Registered Nurses working with Dr Pendleton please call [phone]   To reach the Device Registered Nurses regarding questions about your device incision or device function please call [phone] Option #3      [facility]:  [phone]  If you are calling after hours, please listen to the entire voice mail, a live  will answer at the end of the message.

## 2022-03-25 NOTE — PLAN OF CARE
Occupational Therapy Discharge Summary    Reason for therapy discharge:    Discharged to home.    Progress towards therapy goal(s). See goals on Care Plan in [facility] electronic health record for goal details.  Goals met    Therapy recommendation(s):    Pt plans to continue with her home exercise on her own

## 2022-03-25 NOTE — PROGRESS NOTES
CARDIOLOGY PROGRESS NOTE      Assessment/Plan:  1.  Second degree heart block-looks to be Mobitz II 2:1, had pacemaker implanted.  Doing well.   2.  Status post pacemaker-[employer] device with [employer] right atrial right ventricular leads placed today.  Chest x-ray shows no pneumothorax and device check shows normally functioning device.  Since stable cardiology will sign off  3.  COPD exacerbation (H)-so noted, 98% saturation on room air.  4.  Pneumonia due to infectious organism, unspecified laterality, unspecified part of lung-lactic acid normal, white count elevated, no procalcitonin and cultures negative.  Defer to primary care team.  5.  Diabetes-defer to primary care team.  A1c good at 5.4.  6.  Aortic stenosis-moderate with mean gradient of 24 mmHg and peak velocity of 3.0 m/s.  Recheck echo in a year.  7.  Pulmonary hypertension-moderate with estimated pressure of 54 to 55 mmHg.  Possibly WHO class III due to lung disease.    Plan:  1.  Cardiology has nothing further inpatient add, we will sign off, available as needed.      Discharge Plannin.  No cardiac barrier to discharge if everything checks out.  2.  We will follow-up in pacemaker device clinic.     LOS: 7 days     Subjective:  Interval History:    76-year-old white female being seen on seventh day of hospitalization.  Baseline short of breath with dry cough.  No chest pains, palpitations, syncope, dizziness or peripheral edema.  Anxious for discharge today.    Medications    amLODIPine  10 mg Oral Daily     calcium citrate  950 mg Oral Daily     docusate sodium  100 mg Oral BID     [Held by provider] fluticasone-salmeterol  1 puff Inhalation Q12H     guaiFENesin  1,200 mg Oral BID     hydrochlorothiazide  25 mg Oral Daily     insulin aspart  1-3 Units Subcutaneous TID AC     insulin aspart  1-3 Units Subcutaneous At Bedtime     ipratropium-albuterol  1 puff Inhalation 4x daily     levETIRAcetam  500 mg Oral BID     losartan   "50 mg Oral BID     magnesium oxide  400 mg Oral BID     magnesium sulfate  2 g Intravenous Once     pantoprazole  40 mg Oral QAM AC     piperacillin-tazobactam  3.375 g Intravenous Q8H     potassium chloride ER  20 mEq Oral Daily     sertraline  100 mg Oral Daily     sodium chloride (PF)  3 mL Intracatheter Q8H     [Held by provider] tiotropium  18 mcg Inhalation Daily     [Held by provider] tolterodine ER  2 mg Oral BID     vitamin D3  125 mcg Oral Daily     Objective:   Vital signs in last 24 hours:  Temp:  [97.6  F (36.4  C)-98.2  F (36.8  C)] 97.6  F (36.4  C)  Pulse:  [76-97] 81  Resp:  [13-24] 18  BP: (146-194)/(65-96) 156/72  SpO2:  [96 %-100 %] 98 %    Physical Exam:  BP (!) 156/72 (BP Location: Right arm)   Pulse 81   Temp 97.6  F (36.4  C) (Oral)   Resp 18   Ht 1.626 m (5' 4\")   Wt 99.8 kg (220 lb 1.6 oz)   SpO2 98%   BMI 37.78 kg/m      General Appearance:    Alert, cooperative, no distress, appears stated age   Head:    Normocephalic, without obvious abnormality, atraumatic   Throat:   Lips, mucosa, and tongue normal; teeth and gums normal   Neck:   Supple, symmetrical, trachea midline, no adenopathy;        thyroid:  No enlargement/tenderness/nodules; no carotid    bruit or JVD   Back:     Symmetric, no curvature, ROM normal, no CVA tenderness   Lungs:     Diffuse wheezes bilaterally, respirations unlabored   Chest wall:    No tenderness, recent left-sided pacemaker   Heart:    Regular rate and rhythm, S1 and S2 normal, no murmur, rub   or gallop   Abdomen:     Soft, non-tender, bowel sounds active all four quadrants,     no masses, no organomegaly   Extremities:   Normal, atraumatic, no cyanosis or edema   Pulses:   2+ and symmetric all extremities   Skin:   Skin color, texture, turgor normal, no rashes or lesions     Cardiographics:      ECG: Personally reviewed by myself shows sinus rhythm, no acute changes.    Echocardiogram: The left ventricle is normal in size with normal left ventricular " wall thickness.  Left ventricular function is normal.The ejection fraction is 60-65%.  The right ventricle is mildly dilated with normal systolic function.  The right atrium is severely dilated.  Mild valvular aortic stenosis is present.  Right ventricular systolic pressure is elevated, consistent with mild pulmonary hypertension.     Compared to the prior study of 11/6/17 the right ventricle now appears  dilated.      Lab Results:   Recent Labs   Lab 03/25/22  0844   WBC 14.4*   HGB 13.3   HCT 43.4        Recent Labs   Lab 03/25/22  0525      CO2 31   BUN 18   .       Lab Results   Component Value Date    TROPONINI <0.01 03/20/2022

## 2022-03-25 NOTE — DISCHARGE SUMMARY
[facility] MEDICINE  DISCHARGE SUMMARY     Primary Care Physician: Kelly Hull  Admission Date: 3/19/2022   Discharge Provider: Yonny JACOBO MD Discharge Date: 3/25/2022   Diet:   Active Diet and Nourishment Order   Procedures    Low-salt and fat diet   Code Status: No CPR- Pre-arrest intubation OK   Activity: DCACTIVITY: Activity as tolerated        Condition at Discharge: Good     REASON FOR PRESENTATION(See Admission Note for Details)   Shortness of breath.  Please refer to H&P for details.    PRINCIPAL & ACTIVE DISCHARGE DIAGNOSES     Active Problems:    Second degree heart block    COPD exacerbation (H)    Pneumonia due to infectious organism, unspecified laterality, unspecified part of lung    Acute on chronic respiratory failure with hypercapnia (H)    Nonspecific abnormal electrocardiogram (ECG) (EKG)    Cardiac pacemaker in situ      PENDING LABS     Unresulted Labs Ordered in the Past 30 Days of this Admission     No orders found from 2/17/2022 to 3/20/2022.            PROCEDURES ( this hospitalization only)      Procedure(s):  Pacemaker Device & Lead Implant - Right Atrial & Right Ventricular    RECOMMENDATIONS TO OUTPATIENT PROVIDER FOR F/U VISIT     Follow-up Appointments     Follow-up and recommended labs and tests       Follow up with primary care provider, Kelly Hull, within 7 days to   evaluate medication change, for hospital follow- up, and medication   refill.  Referral to outpatient pulmonary rehab.  The following labs/tests   are recommended: CBC, CMP, magnesium.      Follow-up with primary pulmonologist in 4 to 6 weeks.  Follow-up with cardiologist as recommended                 DISPOSITION     Home    SUMMARY OF HOSPITAL COURSE:      Lalitha Underwood is a 76 year old female with a history of severe COPD, on nighttime oxygen, pulmonary hypertension, essential hypertension who presented to ED for evaluation of shortness of breath and admitted for  further management.  Patient was treated for COPD exacerbation with a steroid which she completed, possible community-acquired pneumonia with IV Zosyn, transition to Augmentin to complete 7 days course.  Follow-up x-rays reported improvement on consolidation.  Patient reported having oxygen at home.  Patient also found to have a Mobitz type II heart block and underwent permanent pacemaker placement.  Patient had elevated blood pressure and added Norvasc on top of her home blood pressure medications.  Patient had low magnesium and replaced with IV magnesium and continued home magnesium supplement.  Patient cleared by cardiologist, medically stable and discharged home.    Discharge Medications with Med changes:     Current Discharge Medication List      START taking these medications    Details   amLODIPine (NORVASC) 10 MG tablet Take 1 tablet (10 mg) by mouth daily  Qty: 60 tablet, Refills: 0    Associated Diagnoses: Essential hypertension, benign      amoxicillin-clavulanate (AUGMENTIN) 875-125 MG tablet Take 1 tablet by mouth 2 times daily for 2 days To complete 7 days course of antibiotics.  Qty: 4 tablet, Refills: 0    Associated Diagnoses: COPD exacerbation (H)      benzonatate (TESSALON) 100 MG capsule Take 1 capsule (100 mg) by mouth 3 times daily as needed for cough  Qty: 10 capsule, Refills: 0    Associated Diagnoses: COPD exacerbation (H)      guaiFENesin (MUCINEX) 600 MG 12 hr tablet Take 1 tablet (600 mg) by mouth 2 times daily    Associated Diagnoses: COPD exacerbation (H)         CONTINUE these medications which have NOT CHANGED    Details   albuterol (PROAIR HFA/PROVENTIL HFA/VENTOLIN HFA) 108 (90 Base) MCG/ACT inhaler Inhale 2 puffs into the lungs every 6 hours as needed  Qty: 18 g, Refills: 1    Comments: Pharmacy may dispense brand covered by insurance (Proair, or proventil or ventolin or generic albuterol inhaler)  Associated Diagnoses: Pulmonary emphysema, unspecified emphysema type (H)       biotin 5 MG CAPS Take 1 capsule by mouth daily      calcium citrate (CITRACAL) 950 (200 Ca) MG tablet Take 1 tablet by mouth      denosumab (PROLIA) 60 MG/ML SOLN injection Inject 60 mg Subcutaneous every 6 months      fluticasone-salmeterol (ADVAIR) 250-50 MCG/DOSE inhaler Inhale 1 puff into the lungs 2 times daily  Qty: 2 each, Refills: 1    Associated Diagnoses: Pulmonary emphysema, unspecified emphysema type (H)      hydrochlorothiazide (HYDRODIURIL) 25 MG tablet hydrochlorothiazide 25 mg tablet   TAKE 1 TABLET BY MOUTH DAILY  Qty: 90 tablet, Refills: 3    Associated Diagnoses: Essential hypertension      levETIRAcetam (KEPPRA) 500 MG tablet Take 1 tablet (500 mg) by mouth 2 times daily  Qty: 180 tablet, Refills: 3    Associated Diagnoses: Intractable epilepsy without status epilepticus, unspecified epilepsy type (H)      losartan (COZAAR) 50 MG tablet Take 1 tablet (50 mg) by mouth 2 times daily  Qty: 90 tablet, Refills: 3    Associated Diagnoses: Essential hypertension      Magnesium Oxide 500 MG TABS Take 500 mg by mouth 2 times daily    Associated Diagnoses: Hypomagnesemia      nystatin (NYSTOP) 776420 UNIT/GM external powder Apply topically daily as needed       potassium chloride ER (KLOR-CON M) 20 MEQ CR tablet Take 1 tablet (20 mEq) by mouth daily  Qty: 90 tablet, Refills: 3    Associated Diagnoses: Essential hypertension      RABEprazole (ACIPHEX) 20 MG EC tablet Take 1 tablet (20 mg) by mouth daily  Qty: 90 tablet, Refills: 3    Associated Diagnoses: Gastroesophageal reflux disease without esophagitis      sertraline (ZOLOFT) 100 MG tablet Take 1 tablet (100 mg) by mouth daily  Qty: 90 tablet, Refills: 3    Associated Diagnoses: Anxiety      solifenacin (VESICARE) 5 MG tablet Take 1 tablet (5 mg) by mouth every morning  Qty: 90 tablet, Refills: 3    Associated Diagnoses: Urge incontinence of urine      tiotropium (SPIRIVA HANDIHALER) 18 MCG inhaled capsule Spiriva with HandiHaler 18 mcg and  inhalation capsules  Qty: 90 capsule, Refills: 3    Associated Diagnoses: Pulmonary emphysema, unspecified emphysema type (H)      vitamin D3 (CHOLECALCIFEROL) 125 MCG (5000 UT) tablet Take 5,000 Units by mouth daily       albuterol (PROVENTIL) (2.5 MG/3ML) 0.083% neb solution Take 1 vial (2.5 mg) by nebulization every 6 hours as needed for shortness of breath / dyspnea or wheezing  Qty: 90 mL, Refills: 3    Associated Diagnoses: COPD exacerbation (H)                   Rationale for medication changes:      Please refer to hospital course        Consults       SOCIAL WORK IP CONSULT  IP RESPIRATORY CARE CHRONIC PULMONARY DISEASE SPECIALIST  CARDIOLOGY IP CONSULT  PULMONARY IP CONSULT  PHYSICAL THERAPY ADULT IP CONSULT  OCCUPATIONAL THERAPY ADULT IP CONSULT  DIABETES EDUCATION IP CONSULT  SPEECH LANGUAGE PATH ADULT IP CONSULT  IP RESPIRATORY CARE CHRONIC PULMONARY DISEASE SPECIALIST    Immunizations given this encounter     Most Recent Immunizations   Administered Date(s) Administered     COVID-19,PF,Pfizer (12+ Yrs) 09/30/2021     Influenza (High Dose) 3 valent vaccine 11/23/2018     Influenza, Quad, High Dose, Pf, 65yr+ (Fluzone HD) 09/28/2021     Mantoux Tuberculin Skin Test 01/01/2016     Pneumo Conj 13-V (2010&after) 09/16/2015     Pneumococcal 23 valent 09/12/2013     TD (ADULT, 7+) 02/12/2021     Td (Adult), Adsorbed 06/07/2010     Tdap (Adacel,Boostrix) 06/28/2006     Zoster vaccine recombinant adjuvanted (SHINGRIX) 10/01/2019     Zoster vaccine, live 10/26/2007           Anticoagulation Information          SIGNIFICANT IMAGING FINDINGS     Results for orders placed or performed during the hospital encounter of 03/19/22   XR Chest Port 1 View    Impression    IMPRESSION: Heart and mediastinal size are within normal limits. There is patchy infiltrate involving the right upper lobe, new from prior study, suggestive of an infectious process. No pleural effusion or pneumothorax. There is also some silhouetting  of   the right hemidiaphragm which represents either atelectasis or an infectious process.     CT Chest Pulmonary Embolism w Contrast    Impression    IMPRESSION:    1.  Enlarged central pulmonary arteries consistent with pulmonary hypertension. No acute pulmonary embolism.  2.  Large amount of airway material filling the bronchi of the right upper lobe with airway centric inflammatory nodules in the peripheral third of the lobe consistent with bronchopneumonia.  3.  Partial anomalous pulmonary venous return. The left upper lobe pulmonary vein drains to the left brachiocephalic vein in the mediastinum producing a left to right shunt.   XR Video Swallow with SLP or OT    Impression    IMPRESSION:  1.  No aspiration or penetration.     2.  See the speech pathology report for details.  3.  The esophagram study will be dictated separately.     XR Esophagram    Impression    IMPRESSION:  1.  Suspected surgical change in the region of the GE junction, otherwise not well assessed.  2.  No masses, strictures, or mucosal abnormalities identified.  3.  Normal peristalsis.    XR Chest 2 Views: 4 hours upon case completion    Impression    IMPRESSION:     Left subclavian approach dual chamber pacemaker has right atrial appendage and right ventricular leads. The cardiac silhouette is not enlarged. There are moderate atheromatous calcifications in the arch with lesser calcification of the descending aorta.    The nodular opacities in the right upper lobe have decreased from 5 days earlier consistent with improving bronchopneumonia. Subsegmental territories of atelectasis in the right lower lobe along the diaphragmatic pleura are similar. No new/worsening   airspace opacities.    No pleural effusion or pneumothorax is present.   Echocardiogram Complete   Result Value Ref Range    LVEF  60-65%        SIGNIFICANT LABORATORY FINDINGS     Most Recent 3 CBC's:Recent Labs   Lab Test 03/25/22  0844 03/20/22  0759 03/19/22  1441   WBC  14.4* 18.7* 16.3*   HGB 13.3 12.0 12.0   MCV 90 89 90    205 186     Most Recent 3 BMP's:Recent Labs   Lab Test 03/25/22  0654 03/25/22  0525 03/24/22  2041 03/24/22  1112 03/24/22  0549 03/20/22  1137 03/20/22  0759   NA  --  140  --   --  144  --  137   POTASSIUM  --  4.2  --   --  3.6  --  3.8   CHLORIDE  --  101  --   --  101  --  100   CO2  --  31  --   --  34*  --  28   BUN  --  18  --   --  19  --  32*   CR  --  0.70  --   --  0.69  --  0.84   ANIONGAP  --  8  --   --  9  --  9   TENZIN  --  8.5  --   --  8.9  --  8.5   GLC 96 103 236*   < > 85   < > 190*    < > = values in this interval not displayed.       Discharge Orders        Reason for your hospital stay    Patient admitted for COPD exacerbation, possible pneumonia was treated with steroid, antibiotics.  Also found to have second-degree heart block and had pacemaker placement.  Patient advised to follow-up with PCP in a week and primary pulmonologist in 4 to 6 weeks.  Follow-up with cardiologist as recommended     Activity    Your activity upon discharge: activity as tolerated     Follow-up and recommended labs and tests     Follow up with primary care provider, Kelly Hull, within 7 days to evaluate medication change, for hospital follow- up, and medication refill.  Referral to outpatient pulmonary rehab.  The following labs/tests are recommended: CBC, CMP, magnesium.      Follow-up with primary pulmonologist in 4 to 6 weeks.  Follow-up with cardiologist as recommended     Diet    Follow this diet upon discharge: Orders Placed This Encounter  Low-salt and fat diet       Examination   Physical Exam   Temp:  [97.6  F (36.4  C)-98.2  F (36.8  C)] 97.6  F (36.4  C)  Pulse:  [76-97] 81  Resp:  [18-24] 18  BP: (146-194)/(65-96) 156/72  SpO2:  [96 %-100 %] 98 %  Wt Readings from Last 1 Encounters:   03/25/22 99.8 kg (220 lb 1.6 oz)       Patient seen and examined by this examiner for the first time on the day of discharge.  Patient reported feeling  much better and wanting to go home.  Patient reported breathing improved to baseline.  Reported intermittent cough which is not new.  Denied feeling fever or chills.  Denied chest pain.  Denied pain at pacemaker placement site.  Discussed with nursing staffs.    General: Not in obvious distress.  HEENT: Normocephalic, supple neck  Chest: Decreased but clear to auscultation bilateral anteriorly, no wheezing, unlabored breathing.  Left upper front chest pacemaker site with intact dressing no surrounding bruising or tenderness appreciated  Heart: S1S2 normal, regular  Abdomen: Soft. NT, ND. Bowel sounds- active.  Neuro: alert and awake, grossly non-focal      Please see EMR for more detailed significant labs, imaging, consultant notes etc.    IYonny MD, personally saw the patient today and spent greater than 30 minutes discharging this patient.    Yonny JACOBO MD  [facility]    CC:Kelly Hull

## 2022-03-28 ENCOUNTER — PATIENT OUTREACH (OUTPATIENT)
Dept: CARE COORDINATION | Facility: CLINIC | Age: 77
End: 2022-03-28

## 2022-03-28 ENCOUNTER — TELEPHONE (OUTPATIENT)
Dept: RESPIRATORY THERAPY | Facility: HOSPITAL | Age: 77
End: 2022-03-28

## 2022-03-28 NOTE — TELEPHONE ENCOUNTER
Spoke with Serene, doing well, no questions for me to day. no issues getting and/or taking their medications, reviewed their action plan, in their green zone.  Reminded when we will call again and to call before if there is a question.  Lalitha Hamilton, RT, Chronic Pulmonary Disease Specialist

## 2022-03-28 NOTE — PROGRESS NOTES
Clinic Care Coordination Contact  [facility]: Post-Discharge Note  SITUATION                                                      Admission:    Admission Date: 03/19/22   Reason for Admission: shortness of breath, cough  Discharge:   Discharge Date: 03/25/22  Discharge Diagnosis: COPD exacerbation, pneumonia    BACKGROUND                                                      Per hospital discharge summary and inpatient provider notes:  Lalitha Underwood is a 76 year old female with a history of severe COPD, on nighttime oxygen, pulmonary hypertension, essential hypertension who presented to ED for evaluation of shortness of breath and admitted for further management.  Patient was treated for COPD exacerbation with a steroid which she completed, possible community-acquired pneumonia with IV Zosyn, transition to Augmentin to complete 7 days course.  Follow-up x-rays reported improvement on consolidation.  Patient reported having oxygen at home.  Patient also found to have a Mobitz type II heart block and underwent permanent pacemaker placement.  Patient had elevated blood pressure and added Norvasc on top of her home blood pressure medications.  Patient had low magnesium and replaced with IV magnesium and continued home magnesium supplement.  Patient cleared by cardiologist, medically stable and discharged home.    ASSESSMENT      Enrollment  Primary Care Care Coordination Status: Declined    Discharge Assessment  How are you doing now that you are home?: I am doing pretty well.  How are your symptoms? (Red Flag symptoms escalate to triage hotline per guidelines): Improved  Do you feel your condition is stable enough to be safe at home until your provider visit?: Yes  Does the patient have their discharge instructions? : Yes  Does the patient have questions regarding their discharge instructions? : No  Were you started on any new medications or were there changes to any of your previous medications? : Yes  Does  the patient have all of their medications?: Yes  Do you have questions regarding any of your medications? : No  Do you have all of your needed medical supplies or equipment (DME)?  (i.e. oxygen tank, CPAP, cane, etc.): Yes  Discharge follow-up appointment scheduled within 14 calendar days? : Yes  Discharge Follow Up Appointment Date: 04/04/22  Discharge Follow Up Appointment Scheduled with?: Primary Care Provider         Post-op (Clinicians Only)  Did the patient have surgery or a procedure: Yes  Incision: closed  Drainage: No  Bleeding: none  Fever: No  Chills: No  Redness: No  Warmth: No  Swelling: No  Incision site pain: No  Closure: suture  Eating & Drinking: eating and drinking without complaints/concerns  PO Intake: regular diet  Bowel Function: normal  Date of last BM: 03/28/22  Urinary Status: voiding without complaint/concerns    Care Management       Care Mgmt General Assessment      Spoke with patient. She stated she is going well at home. She is taking her medications as directed. Patient is aware of upcoming appointments. She denied any needs or concerns at this time. Patient declined enrollment in [facility] at this time, but is aware he can be referred back to [facility] by her PCP is needs or concerns should arise.                    PLAN                                                      Outpatient Plan:  Home    Future Appointments   Date Time Provider Department Center   4/1/2022  9:45 AM DAYNA [facility] DEVICE NURSE 1 KELLY [facility] WW   4/4/2022 12:00 PM [facility]   4/5/2022  4:30 PM Perry Tamayo MD MRPULM [facility] MPLW   4/26/2022 12:00 AM ELVIRA [facility] REMOTE DEVICE CHECK FROM HOME THOMAS [facility] IRMAN   6/24/2022 11:15 AM ELVIRA [facility]         For any urgent concerns, please contact our 24 hour nurse triage line: [phone] ([phone])         David J Myhre, RN

## 2022-04-01 ENCOUNTER — ANCILLARY PROCEDURE (OUTPATIENT)
Dept: CARDIOLOGY | Facility: CLINIC | Age: 77
End: 2022-04-01
Attending: INTERNAL MEDICINE
Payer: MEDICARE

## 2022-04-01 VITALS — TEMPERATURE: 98.8 F | HEART RATE: 96 BPM | OXYGEN SATURATION: 93 %

## 2022-04-01 DIAGNOSIS — Z95.0 CARDIAC PACEMAKER IN SITU: ICD-10-CM

## 2022-04-01 PROCEDURE — 93280 PM DEVICE PROGR EVAL DUAL: CPT | Performed by: INTERNAL MEDICINE

## 2022-04-01 NOTE — PATIENT INSTRUCTIONS
Pacemaker Post-operative Checklist      The Device Registered Nurse (RN) reviewed the pacemaker function.      The Device RN did a wound assessment and wound care teaching.    Please call the Device Nurses with any signs of infection or questions regarding wound healing. Device Nurse Line: [phone], Option #3      The Device RN demonstrated and displayed the specific remote monitoring system for your pacemaker.      The Device RN reviewed the Partnership Agreement Form.    Patient Instructions    Do not lift your LEFT arm above the shoulder height, perform any vigorous arm movements such as swimming, golfing, washing windows, shoveling snow, digging, raking, sweeping, vacuuming or lifting greater than 10 lbs with the affected arm for FOUR weeks from the date of implant, through 04/21/2022.      To reduce the risk of infection, try to avoid any dental procedures for the first 6 weeks, after 05/05/2022 after your pacemaker implant. If you have an emergent or urgent dental need during this time, contact the device clinic for a prescription for an antibiotic.      You will receive a device identification (ID) card in the mail from the device  within 6 weeks to replace the temporary ID card you were given in the hospital.      You may travel by any mode of transportation; just show your pacemaker ID card. You may be subject to a hand search or use of a handheld wand, but official should not keep the wand over the implant site for greater than 5-10 seconds.      For any surgery, let your doctor know you have a pacemaker.       Your pacemaker is MRI safe after 05/05/2022 should you need an MRI      Most household appliances, including a microwave, will not interfere with your pacemaker function. If you suspect interference, simply move away from the source. Cell phones do not cause a problem. Please refer to the device booklet from the  or their website under the section on electromagnetic  interference (TAYLOR) for further guidelines on things that may interfere with your pacemaker.       Device Clinic Contact Information  Device Nurses: [phone], Option #3    Device Remote Specialists: [phone], Option #2. For questions about your Remote Transmission or Transmission Schedule  Device Schedulers: [phone], Option #1

## 2022-04-01 NOTE — NURSING NOTE
"DEVICE CLINIC RN POST-OP NOTE    Reason for visit: In-clinic post-operative wound and device check s/p implant of a de karla dual chamber pacemaker system, MRI conditional     Device System: [employer] L331 ACCOLADE MRI EL, RA Lead - [employer] 4469 Fineline II Sterox EZ, RV Lead - [employer] 4470 Fineline II Sterox EZ  Procedure date: 03/24/2022  Implant Diagnosis: Second degree AV block, Mobitz II with alternating right and left bundle branch blocks  Implanting Physician: Dr. Helder Pendleton      Assessment  Subjective: \"Everything with the pacemaker is good, but my breathing isn't any better.\"  Mrs. Underwood states she is using her 's wheelchair today because of breathlessness.  Vitals: Pulse 96   Temp 98.8  F (37.1  C) (Oral)   SpO2 93%   Heart Sounds: normal  Lung Sounds: Left lung field clear to auscultation, right lung field with expiratory wheezes from the base to up 1/3.  Incision/device pocket: Clean, dry and intact with resolving ecchymosis and trace edema.  There are no signs of infection present.  The Steri-strips were removed at today's visit and the area cleaned of residual adhesive.  Other: SaO2 is 93% on room air today, no cough, finger nailbeds are pink bilaterally.  Mrs. Underwood states she has an appointment with her primary physician on Monday 04/04/2022.      Device Findings  Interrogation of device reveals normal sensing and capture thresholds  See the Paceart Report for a full summary of the device information.    Other: No arrhythmia detections, heart rates histograms show rates  bpm with excellent excursion      Patient Education  Lalitha Underwood was accompanied today by her  who waited out in the lobby during our visit.     [facility]'s Partnership Agreement for Device Patients and Post-operative Checklist were presented and reviewed at today's appointment.    Signs and symptoms of infection, poor wound healing, " and normal device function were reviewed. Range of motion and weight restrictions for the left arm are for four weeks. She was issued a [employer] NXT Communicator and instructed on its set-up and use for remote monitoring by the [employer] representative prior to hospital discharge. These instructions were reviewed in detail at today s visit.       Plan  - One month remote device transmission on 04/26/2022  - Three month post-op in-clinic wound and device check on 06/24/2022 at our [place] location    Pattie Kang RN, MA  Device Nurse  [facility]

## 2022-04-04 ENCOUNTER — TELEPHONE (OUTPATIENT)
Dept: FAMILY MEDICINE | Facility: CLINIC | Age: 77
End: 2022-04-04

## 2022-04-04 ENCOUNTER — OFFICE VISIT (OUTPATIENT)
Dept: FAMILY MEDICINE | Facility: CLINIC | Age: 77
End: 2022-04-04
Payer: MEDICARE

## 2022-04-04 VITALS
OXYGEN SATURATION: 94 % | TEMPERATURE: 95.9 F | WEIGHT: 229 LBS | DIASTOLIC BLOOD PRESSURE: 78 MMHG | HEART RATE: 81 BPM | HEIGHT: 64 IN | BODY MASS INDEX: 39.09 KG/M2 | SYSTOLIC BLOOD PRESSURE: 118 MMHG

## 2022-04-04 DIAGNOSIS — Z09 HOSPITAL DISCHARGE FOLLOW-UP: Primary | ICD-10-CM

## 2022-04-04 DIAGNOSIS — Z95.0 CARDIAC PACEMAKER IN SITU: ICD-10-CM

## 2022-04-04 DIAGNOSIS — I27.20 PULMONARY HYPERTENSION (H): ICD-10-CM

## 2022-04-04 DIAGNOSIS — N39.41 URGE INCONTINENCE OF URINE: ICD-10-CM

## 2022-04-04 DIAGNOSIS — M20.42 HAMMER TOE OF LEFT FOOT: ICD-10-CM

## 2022-04-04 DIAGNOSIS — J43.9 PULMONARY EMPHYSEMA, UNSPECIFIED EMPHYSEMA TYPE (H): ICD-10-CM

## 2022-04-04 DIAGNOSIS — E83.42 HYPOMAGNESEMIA: ICD-10-CM

## 2022-04-04 DIAGNOSIS — I10 ESSENTIAL HYPERTENSION, BENIGN: ICD-10-CM

## 2022-04-04 PROBLEM — R94.31 NONSPECIFIC ABNORMAL ELECTROCARDIOGRAM (ECG) (EKG): Status: RESOLVED | Noted: 2022-03-19 | Resolved: 2022-04-04

## 2022-04-04 PROBLEM — J96.22 ACUTE ON CHRONIC RESPIRATORY FAILURE WITH HYPERCAPNIA (H): Status: RESOLVED | Noted: 2022-03-19 | Resolved: 2022-04-04

## 2022-04-04 PROBLEM — J18.9 PNEUMONIA DUE TO INFECTIOUS ORGANISM, UNSPECIFIED LATERALITY, UNSPECIFIED PART OF LUNG: Status: RESOLVED | Noted: 2022-03-19 | Resolved: 2022-04-04

## 2022-04-04 PROBLEM — I35.1 MILD AORTIC VALVE REGURGITATION: Status: RESOLVED | Noted: 2017-11-17 | Resolved: 2022-04-04

## 2022-04-04 PROBLEM — M20.41 HAMMER TOES OF BOTH FEET: Status: RESOLVED | Noted: 2021-05-14 | Resolved: 2022-04-04

## 2022-04-04 PROBLEM — J44.1 COPD EXACERBATION (H): Status: RESOLVED | Noted: 2022-03-19 | Resolved: 2022-04-04

## 2022-04-04 LAB
MDC_IDC_LEAD_IMPLANT_DT: NORMAL
MDC_IDC_LEAD_IMPLANT_DT: NORMAL
MDC_IDC_LEAD_LOCATION: NORMAL
MDC_IDC_LEAD_LOCATION: NORMAL
MDC_IDC_LEAD_LOCATION_DETAIL_1: NORMAL
MDC_IDC_LEAD_LOCATION_DETAIL_1: NORMAL
MDC_IDC_LEAD_MFG: NORMAL
MDC_IDC_LEAD_MFG: NORMAL
MDC_IDC_LEAD_MODEL: NORMAL
MDC_IDC_LEAD_MODEL: NORMAL
MDC_IDC_LEAD_POLARITY_TYPE: NORMAL
MDC_IDC_LEAD_POLARITY_TYPE: NORMAL
MDC_IDC_LEAD_SERIAL: NORMAL
MDC_IDC_LEAD_SERIAL: NORMAL
MDC_IDC_MSMT_BATTERY_DTM: NORMAL
MDC_IDC_MSMT_BATTERY_REMAINING_LONGEVITY: 174 MO
MDC_IDC_MSMT_BATTERY_REMAINING_PERCENTAGE: 100 %
MDC_IDC_MSMT_BATTERY_STATUS: NORMAL
MDC_IDC_MSMT_LEADCHNL_RA_IMPEDANCE_VALUE: 402 OHM
MDC_IDC_MSMT_LEADCHNL_RA_PACING_THRESHOLD_AMPLITUDE: 0.7 V
MDC_IDC_MSMT_LEADCHNL_RA_PACING_THRESHOLD_PULSEWIDTH: 0.4 MS
MDC_IDC_MSMT_LEADCHNL_RV_IMPEDANCE_VALUE: 430 OHM
MDC_IDC_MSMT_LEADCHNL_RV_PACING_THRESHOLD_AMPLITUDE: 0.7 V
MDC_IDC_MSMT_LEADCHNL_RV_PACING_THRESHOLD_AMPLITUDE: 0.7 V
MDC_IDC_MSMT_LEADCHNL_RV_PACING_THRESHOLD_PULSEWIDTH: 0.4 MS
MDC_IDC_MSMT_LEADCHNL_RV_PACING_THRESHOLD_PULSEWIDTH: 0.4 MS
MDC_IDC_PG_IMPLANT_DTM: NORMAL
MDC_IDC_PG_MFG: NORMAL
MDC_IDC_PG_MODEL: NORMAL
MDC_IDC_PG_SERIAL: NORMAL
MDC_IDC_PG_TYPE: NORMAL
MDC_IDC_SESS_CLINIC_NAME: NORMAL
MDC_IDC_SESS_DTM: NORMAL
MDC_IDC_SESS_TYPE: NORMAL
MDC_IDC_SET_BRADY_AT_MODE_SWITCH_MODE: NORMAL
MDC_IDC_SET_BRADY_AT_MODE_SWITCH_RATE: 170 {BEATS}/MIN
MDC_IDC_SET_BRADY_LOWRATE: 60 {BEATS}/MIN
MDC_IDC_SET_BRADY_MAX_SENSOR_RATE: 130 {BEATS}/MIN
MDC_IDC_SET_BRADY_MAX_TRACKING_RATE: 130 {BEATS}/MIN
MDC_IDC_SET_BRADY_MODE: NORMAL
MDC_IDC_SET_BRADY_PAV_DELAY_HIGH: 200 MS
MDC_IDC_SET_BRADY_PAV_DELAY_LOW: 250 MS
MDC_IDC_SET_BRADY_SAV_DELAY_HIGH: 200 MS
MDC_IDC_SET_BRADY_SAV_DELAY_LOW: 250 MS
MDC_IDC_SET_LEADCHNL_RA_PACING_AMPLITUDE: NORMAL
MDC_IDC_SET_LEADCHNL_RA_PACING_CAPTURE_MODE: NORMAL
MDC_IDC_SET_LEADCHNL_RA_PACING_POLARITY: NORMAL
MDC_IDC_SET_LEADCHNL_RA_PACING_PULSEWIDTH: 0.4 MS
MDC_IDC_SET_LEADCHNL_RA_SENSING_ADAPTATION_MODE: NORMAL
MDC_IDC_SET_LEADCHNL_RA_SENSING_POLARITY: NORMAL
MDC_IDC_SET_LEADCHNL_RA_SENSING_SENSITIVITY: 0.25 MV
MDC_IDC_SET_LEADCHNL_RV_PACING_AMPLITUDE: NORMAL
MDC_IDC_SET_LEADCHNL_RV_PACING_CAPTURE_MODE: NORMAL
MDC_IDC_SET_LEADCHNL_RV_PACING_POLARITY: NORMAL
MDC_IDC_SET_LEADCHNL_RV_PACING_PULSEWIDTH: 0.4 MS
MDC_IDC_SET_LEADCHNL_RV_SENSING_ADAPTATION_MODE: NORMAL
MDC_IDC_SET_LEADCHNL_RV_SENSING_POLARITY: NORMAL
MDC_IDC_SET_LEADCHNL_RV_SENSING_SENSITIVITY: 1.5 MV
MDC_IDC_SET_ZONE_DETECTION_INTERVAL: 375 MS
MDC_IDC_SET_ZONE_TYPE: NORMAL
MDC_IDC_SET_ZONE_VENDOR_TYPE: NORMAL
MDC_IDC_STAT_AT_BURDEN_PERCENT: 0 %
MDC_IDC_STAT_AT_DTM_END: NORMAL
MDC_IDC_STAT_AT_DTM_START: NORMAL
MDC_IDC_STAT_AT_MODE_SW_COUNT: 0
MDC_IDC_STAT_BRADY_DTM_END: NORMAL
MDC_IDC_STAT_BRADY_DTM_START: NORMAL
MDC_IDC_STAT_BRADY_RA_PERCENT_PACED: 1 %
MDC_IDC_STAT_BRADY_RV_PERCENT_PACED: 32 %
MDC_IDC_STAT_EPISODE_RECENT_COUNT: 0
MDC_IDC_STAT_EPISODE_RECENT_COUNT_DTM_END: NORMAL
MDC_IDC_STAT_EPISODE_RECENT_COUNT_DTM_START: NORMAL
MDC_IDC_STAT_EPISODE_TYPE: NORMAL
MDC_IDC_STAT_EPISODE_VENDOR_TYPE: NORMAL

## 2022-04-04 PROCEDURE — 99495 TRANSJ CARE MGMT MOD F2F 14D: CPT | Performed by: FAMILY MEDICINE

## 2022-04-04 RX ORDER — SOLIFENACIN SUCCINATE 10 MG/1
10 TABLET, FILM COATED ORAL DAILY
Qty: 90 TABLET | Refills: 3 | Status: SHIPPED | OUTPATIENT
Start: 2022-04-04

## 2022-04-04 RX ORDER — IPRATROPIUM BROMIDE AND ALBUTEROL 20; 100 UG/1; UG/1
1 SPRAY, METERED RESPIRATORY (INHALATION) 4 TIMES DAILY
COMMUNITY
End: 2022-07-13

## 2022-04-04 RX ORDER — AMLODIPINE BESYLATE 10 MG/1
10 TABLET ORAL DAILY
Qty: 90 TABLET | Refills: 3 | Status: SHIPPED | OUTPATIENT
Start: 2022-04-04 | End: 2022-04-19

## 2022-04-04 NOTE — PROGRESS NOTES
ASSESSMENT/ PLAN:  1. Hospital discharge follow-up  2. Pulmonary emphysema, unspecified emphysema type (H)  Recent hospitalization for COPD exacerbation and secondary heart block s/p pacemaker placement.  Hospital notes, lab results, operative notes, imagings were reviewed. Post Discharge Medication Reconciliation Status: completed. She has since completed her course of steroids and antibiotics and reports stable symptoms.  Oxygen 94% and lung fields continue to be diminished with rhonchi throughout which.  She is scheduled for pulmonology follow-up with Dr. Tamayo tomorrow where they will reassess her inhalers.  For now she will continue on fluticasone-salmeterol, tiotropium, and albuterol.    3. Hypomagnesemia  Magnesium slightly low at 1.7 on discharge.  Patient would like to defer labs today, so she will continue her magnesium supplement.    4. Pulmonary hypertension (H)  CT PE from hospitalization showed an large pulmonary arteries consistent with pulmonary hypertension.  Suspect this is contributing to her chronic dyspnea and little improvement with COPD exacerbation treatments.  Unfortunately, her daughter passed away from this 7 years ago.  Encourage patient to address with pulmonology tomorrow.  We also discussed consideration of DNI CODE STATUS.    5. Cardiac pacemaker in situ  6. Benign essential hypertension  Found to have a Mobitz type II heart block so underwent cardiac pacemaker placement. Amlodipine was also added during hospitalization due to elevated blood pressure which is well controlled today.  Amlodipine refill provided.  - amLODIPine (NORVASC) 10 MG tablet; Take 1 tablet (10 mg) by mouth daily  Dispense: 90 tablet; Refill: 3    7. Urge incontinence of urine  - solifenacin (VESICARE) 10 MG tablet; Take 1 tablet (10 mg) by mouth daily  Dispense: 90 tablet; Refill: 3    8. Hammer toe of left foot  Patient to follow-up with podiatry for callus debridement.  Discussed using Dr. Sequeira's donut  pads to tip of toe to prevent ongoing pressure sore.      SUBJECTIVE: Lalitha Underwood is a 77 year old female comes in for hospital follow up.    DATE OF ADMISSION: 3/19/2022 [facility]  DATE OF DISCHARGE: 3/25/2022  DISCHARGE DIAGNOSIS: COPD exacerbation, second-degree heart block, pneumonia  IMPORTANT PENDING RESULTS: None  BRIEF HOSPITAL COURSE/UPDATES SINCE DISCHARGE:     Presented with shortness of breath, treated for COPD exacerbation with steroid which she completed along with possible community-acquired pneumonia with IV Zosyn.  She was transitioned to Augmentin x7 days on discharge.  Follow-up x-rays showed improved consolidation.  She was also found to have a Mobitz type II heart block and underwent permanent pacemaker placement.  Norvasc was also added for elevated blood pressures.  She was found to have low magnesium which was replaced.    It was recommended to follow-up with her pulmonologist in 4 to 6 weeks along with pulmonary rehab.    She feels her symptoms are stable.  She is frustrated by her chronic shortness of breath.  She is only using oxygen at night 1.5 L now.  She has a daughter who  of pulmonary hypertension 7 years ago in her 40s.  She was not aware of this diagnosis from recent CT hospitalization.    Ongoing discomfort of left fourth toe from friction with her shoes.  She has been continuing to wear a postop surgical shoe for this.    Reviewed and updated past medical, social, surgical hx, and famhx in EMR as appropriate.   Social History     Social History Narrative    .  Two kids.  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     Patient Active Problem List   Diagnosis     S/P repair of paraesophageal hernia     Essential hypertension, benign     Acquired lymphedema of leg     Collapsed arches     Decreased hearing of both ears     Epileptic seizure (H)     Esophageal reflux     Hammer toe of left foot     Hyperlipidemia     Impaired gait and mobility     Left arm swelling     Leg  length discrepancy     Localized deposits of fat     Mild aortic valve stenosis     Morbid obesity (H)     Neuropathy, idiopathic     Osteoporosis     Pulmonary emphysema (H)     Rosacea     Solar elastosis     Urge incontinence of urine     Uterine prolapse     Valgus deformity of both feet     Vitamin D deficiency     PAD (peripheral artery disease) (H)     Keratoma     Second degree heart block     Cardiac pacemaker in situ     Past Medical History:   Diagnosis Date     Convulsive disorder (H)      Convulsive disorder (H)      COPD (chronic obstructive pulmonary disease) (H)      COPD (chronic obstructive pulmonary disease) (H)      Depression      Dyspnea on exertion      Gastroesophageal reflux disease      Heart murmur      Hernia of unspecified site of abdominal cavity without mention of obstruction or gangrene      Hiatal hernia      Hiatal hernia      Hypertension      Hypertension      Obese      On home oxygen therapy     at 1.5 liters at nite     Osteopenia      Osteopenia      Oxygen dependent     1.5 L per NC     Pneumonia due to infectious organism, unspecified laterality, unspecified part of lung 3/19/2022     Second degree heart block 3/19/2022     Shortness of breath      Uncomplicated asthma      URI (upper respiratory infection)      Venous insufficiency of both lower extremities      Venous insufficiency of both lower extremities      Walking troubles      Current Outpatient Medications   Medication Sig Dispense Refill     albuterol (PROAIR HFA/PROVENTIL HFA/VENTOLIN HFA) 108 (90 Base) MCG/ACT inhaler Inhale 2 puffs into the lungs every 6 hours as needed 18 g 1     amLODIPine (NORVASC) 10 MG tablet Take 1 tablet (10 mg) by mouth daily 90 tablet 3     benzonatate (TESSALON) 100 MG capsule Take 1 capsule (100 mg) by mouth 3 times daily as needed for cough 10 capsule 0     biotin 5 MG CAPS Take 1 capsule by mouth daily       calcium citrate (CITRACAL) 950 (200 Ca) MG tablet Take 1 tablet by mouth    "    denosumab (PROLIA) 60 MG/ML SOLN injection Inject 60 mg Subcutaneous every 6 months       fluticasone-salmeterol (ADVAIR) 250-50 MCG/DOSE inhaler Inhale 1 puff into the lungs 2 times daily 2 each 1     guaiFENesin (MUCINEX) 600 MG 12 hr tablet Take 1 tablet (600 mg) by mouth 2 times daily       hydrochlorothiazide (HYDRODIURIL) 25 MG tablet hydrochlorothiazide 25 mg tablet   TAKE 1 TABLET BY MOUTH DAILY 90 tablet 3     ipratropium-albuterol (COMBIVENT RESPIMAT)  MCG/ACT inhaler Inhale 1 puff into the lungs 4 times daily       levETIRAcetam (KEPPRA) 500 MG tablet Take 1 tablet (500 mg) by mouth 2 times daily 180 tablet 3     losartan (COZAAR) 50 MG tablet Take 1 tablet (50 mg) by mouth 2 times daily 90 tablet 3     Magnesium Oxide 500 MG TABS Take 500 mg by mouth 2 times daily       nystatin (NYSTOP) 270485 UNIT/GM external powder Apply topically daily as needed        potassium chloride ER (KLOR-CON M) 20 MEQ CR tablet Take 1 tablet (20 mEq) by mouth daily 90 tablet 3     RABEprazole (ACIPHEX) 20 MG EC tablet Take 1 tablet (20 mg) by mouth daily 90 tablet 3     sertraline (ZOLOFT) 100 MG tablet Take 1 tablet (100 mg) by mouth daily 90 tablet 3     solifenacin (VESICARE) 10 MG tablet Take 1 tablet (10 mg) by mouth daily 90 tablet 3     tiotropium (SPIRIVA HANDIHALER) 18 MCG inhaled capsule Spiriva with HandiHaler 18 mcg and inhalation capsules (Patient taking differently: Inhale 18 mcg into the lungs daily ) 90 capsule 3     vitamin D3 (CHOLECALCIFEROL) 125 MCG (5000 UT) tablet Take 5,000 Units by mouth daily        Allergies   Allergen Reactions     Clindamycin Rash     Carbamazepine Rash     Morphine Nausea         OBJECTIVE:     /78   Pulse 81   Temp (!) 95.9  F (35.5  C) (Oral)   Ht 1.626 m (5' 4\")   Wt 103.9 kg (229 lb)   SpO2 94%   BMI 39.31 kg/m    General: Alert, no acute distress.   Neck: supple without adenopathy or thyromegaly.  Lungs: Decreased lung sounds and faint rhonchi " throughout, prolonged expiration  Heart: regular rate and rhythm, normal S1 and S2, no murmurs  Abdomen: soft and nontender  Skin: Callus tip of fourth left toe with 2 mm pressure wound without surrounding redness or drainage  Neuro: alert, interactive moving all extremities equally      Kate Marte,

## 2022-04-04 NOTE — PATIENT INSTRUCTIONS
Pulmonary hypertension noted on recent CT scan. Are there medications that could with this and the shortness or breath?     IMPRESSION:     1.  Enlarged central pulmonary arteries consistent with pulmonary hypertension. No acute pulmonary embolism.  2.  Large amount of airway material filling the bronchi of the right upper lobe with airway centric inflammatory nodules in the peripheral third of the lobe consistent with bronchopneumonia.  3.  Partial anomalous pulmonary venous return. The left upper lobe pulmonary vein drains to the left brachiocephalic vein in the mediastinum producing a left to right shunt.      Dr. Sequeira's Corn Cushions to the left toe

## 2022-04-05 ENCOUNTER — OFFICE VISIT (OUTPATIENT)
Dept: PULMONOLOGY | Facility: OTHER | Age: 77
End: 2022-04-05
Payer: MEDICARE

## 2022-04-05 VITALS — OXYGEN SATURATION: 91 % | HEART RATE: 104 BPM

## 2022-04-05 DIAGNOSIS — J44.9 CHRONIC OBSTRUCTIVE PULMONARY DISEASE, UNSPECIFIED COPD TYPE (H): ICD-10-CM

## 2022-04-05 DIAGNOSIS — J81.0 ACUTE PULMONARY EDEMA (H): Primary | ICD-10-CM

## 2022-04-05 PROCEDURE — 99215 OFFICE O/P EST HI 40 MIN: CPT | Performed by: INTERNAL MEDICINE

## 2022-04-05 RX ORDER — FUROSEMIDE 20 MG
20 TABLET ORAL DAILY
Qty: 5 TABLET | Refills: 0 | Status: SHIPPED | OUTPATIENT
Start: 2022-04-05 | End: 2022-04-11

## 2022-04-05 NOTE — PROGRESS NOTES
Assessment and Plan:Lalitha Underwood is a 77 year old female with a past medical history significant for severe COPD with cor pulmonale and chronic hypoxic respiratory failure who presents to clinic today for follow up from a hospital stay.  She has had significant symptoms and frequent exacerbations and requires an increase in her therapies.    1) Severe COPD exacerbation - completed course of antibiotics and steroids from hospital in March, but remains quite dyspneic and wheezy at baseline.  See below for plan to increase maintenance.  2) COPD maintenance - on advair and spiriva.  I think we need to double down on her controllers to get ahead of this before it puts her back in the hospital.  Will increase her advair to the 500-50 dose  Normally would consider azithromycin but concerned this could exacerbate a heart rhythm issue.  Will ask Dr. Pendleton.  Darliresp is an alternative but typically more costly and with more side effects.  Should try her nebulizer everyday.  Will also refer her to pulmonary rehab to help with conditioning and oxygen titration.  3) Pulmonary hypertension - this can be considered cor pulmonale, it is not the same pulmonary hypertension her daughter passed of.  The mainstay of this treatment is oxygen, treatment of her COPD, and if evidence of volume overload, mild diuresis.  Echo shows mild pulmonary hypertension.  She has volume overload with ankle edema today, will try a 5 day course of furosemide.  If she feels better with this but then goes back to more edema and shortness of breath we may need to use this permanently.  Renal function is normal.  4) Chronic hypoxic respiratory failure - Reconfirmed her need of 2L with ambulation.  She should be using this with all ambulation and with sleep.  She is not doing this.  Asked her to take her oximeter with her.  5) RTC in 3 months      CCx: copd    HPI: Ms. Underwood is a 77 year old with severe copd and cor pulmonale and chronic hypoxemia.   She returns for hospital follow up of a COPD exacerbation complicated by a second degree AVB and now has a new pacer.  She says she doesn't feel a whole lot different with the pacer than before.  She gets quite winded with ambulation.  She is sleeping with oxygen, and sometimes when she sits down, but not with exertion.  She is willing to consider pulmonary rehab after discussions with RT in the hospital.  She notes swelling in her ankles is chronic and not improving with the hydrochlorothiazide she has.    She isn't using her nebulizer, she does use her advair and spiriva as prescribed.    ROS:  Review of Systems - History obtained from the patient  General ROS: negative  Psychological ROS: negative  ENT ROS: negative  Allergy and Immunology ROS: negative  Endocrine ROS: negative  Respiratory ROS: positive for - cough, shortness of breath, sputum changes and wheezing  negative for - hemoptysis, orthopnea or pleuritic pain  Cardiovascular ROS: no chest pain or palpitations  Gastrointestinal ROS: no abdominal pain, change in bowel habits, or black or bloody stools  Genito-Urinary ROS: no dysuria, trouble voiding, or hematuria  Musculoskeletal ROS: negative  Neurological ROS: no TIA or stroke symptoms  Dermatological ROS: negative      Current Meds:  Current Outpatient Medications   Medication Sig Dispense Refill     albuterol (PROAIR HFA/PROVENTIL HFA/VENTOLIN HFA) 108 (90 Base) MCG/ACT inhaler Inhale 2 puffs into the lungs every 6 hours as needed 18 g 1     amLODIPine (NORVASC) 10 MG tablet Take 1 tablet (10 mg) by mouth daily 90 tablet 3     benzonatate (TESSALON) 100 MG capsule Take 1 capsule (100 mg) by mouth 3 times daily as needed for cough 10 capsule 0     biotin 5 MG CAPS Take 1 capsule by mouth daily       calcium citrate (CITRACAL) 950 (200 Ca) MG tablet Take 1 tablet by mouth       denosumab (PROLIA) 60 MG/ML SOLN injection Inject 60 mg Subcutaneous every 6 months       fluticasone-salmeterol (ADVAIR)  250-50 MCG/DOSE inhaler Inhale 1 puff into the lungs 2 times daily 2 each 1     guaiFENesin (MUCINEX) 600 MG 12 hr tablet Take 1 tablet (600 mg) by mouth 2 times daily       hydrochlorothiazide (HYDRODIURIL) 25 MG tablet hydrochlorothiazide 25 mg tablet   TAKE 1 TABLET BY MOUTH DAILY 90 tablet 3     ipratropium-albuterol (COMBIVENT RESPIMAT)  MCG/ACT inhaler Inhale 1 puff into the lungs 4 times daily       levETIRAcetam (KEPPRA) 500 MG tablet Take 1 tablet (500 mg) by mouth 2 times daily 180 tablet 3     losartan (COZAAR) 50 MG tablet Take 1 tablet (50 mg) by mouth 2 times daily 90 tablet 3     Magnesium Oxide 500 MG TABS Take 500 mg by mouth 2 times daily       nystatin (NYSTOP) 834530 UNIT/GM external powder Apply topically daily as needed        potassium chloride ER (KLOR-CON M) 20 MEQ CR tablet Take 1 tablet (20 mEq) by mouth daily 90 tablet 3     RABEprazole (ACIPHEX) 20 MG EC tablet Take 1 tablet (20 mg) by mouth daily 90 tablet 3     sertraline (ZOLOFT) 100 MG tablet Take 1 tablet (100 mg) by mouth daily 90 tablet 3     solifenacin (VESICARE) 10 MG tablet Take 1 tablet (10 mg) by mouth daily 90 tablet 3     tiotropium (SPIRIVA HANDIHALER) 18 MCG inhaled capsule Spiriva with HandiHaler 18 mcg and inhalation capsules (Patient taking differently: Inhale 18 mcg into the lungs daily ) 90 capsule 3     vitamin D3 (CHOLECALCIFEROL) 125 MCG (5000 UT) tablet Take 5,000 Units by mouth daily          Labs:  @clab@  Lab Results   Component Value Date    WBC 14.4 (H) 03/25/2022    HGB 13.3 03/25/2022    HCT 43.4 03/25/2022     03/25/2022     03/25/2022    POTASSIUM 4.2 03/25/2022    CHLORIDE 101 03/25/2022    CO2 31 03/25/2022    GLC 88 03/25/2022    BUN 18 03/25/2022    CR 0.70 03/25/2022    MAG 1.7 (L) 03/25/2022    BILITOTAL 0.3 03/20/2022    AST 12 03/20/2022    ALT 13 03/20/2022    ALKPHOS 65 03/20/2022    PROTTOTAL 6.8 03/20/2022    ALBUMIN 2.8 (L) 03/20/2022       I have personally reviewed all  pertinent imaging studies and PFT results unless otherwise noted.    Imaging studies:  Echocardiogram Complete    Result Date: 3/20/2022  [id number]^SRI^LINDA^[place] [address]  Name: MRS. WASHINGTON JETT MRN: [id number] : 1945 Study Date: 2022 12:36 PM Age: 76 yrs Gender: Female Patient Location: [facility] Reason For Study: 2nd degree AV Block Ordering Physician: LINDA FIERRO Performed By: MARISELA  BSA: 2.0 m2 Height: 64 in Weight: 222 lb HR: 87 BP: 131/58 mmHg ______________________________________________________________________________ Procedure Complete Portable Echo Adult. Adequate quality two-dimensional was performed and interpreted. ______________________________________________________________________________ Interpretation Summary  The left ventricle is normal in size with normal left ventricular wall thickness. Left ventricular function is normal.The ejection fraction is 60-65%. The right ventricle is mildly dilated with normal systolic function. The right atrium is severely dilated. Mild valvular aortic stenosis is present. Right ventricular systolic pressure is elevated, consistent with mild pulmonary hypertension.  Compared to the prior study of 17 the right ventricle now appears dilated. ______________________________________________________________________________ I      WMSI = 1.00     % Normal = 100  X - Cannot   0 -                      (2) - Mildly 2 -          Segments  Size Interpret    Hyperkinetic 1 - Normal  Hypokinetic  Hypokinetic  1-2     small                                                    7 -          3-5    moderate 3 - Akinetic 4 -          5 -         6 - Akinetic Dyskinetic   6-14    large              Dyskinetic   Aneurysmal  w/scar       w/scar       15-16   diffuse  Left Ventricle The left ventricle is normal in size. Left ventricular function is normal.The ejection fraction is 60-65%. There is  normal left ventricular wall thickness. Left ventricular diastolic function is indeterminate. No regional wall motion abnormalities noted.  Right Ventricle The right ventricle is mildly dilated. The right ventricular systolic function is normal.  Atria Normal left atrial size. The right atrium is severely dilated. There is no color Doppler evidence of an atrial shunt.  Mitral Valve Mitral valve leaflets appear normal. There is no evidence of mitral stenosis or clinically significant mitral regurgitation.  Tricuspid Valve Tricuspid valve leaflets appear normal. There is no evidence of tricuspid stenosis or clinically significant tricuspid regurgitation. The right ventricular systolic pressure is approximated at 44.6 mmHg plus the right atrial pressure. Right ventricular systolic pressure is elevated, consistent with mild pulmonary hypertension. There is mild to moderate (1-2+) tricuspid regurgitation.  Aortic Valve The aortic valve is trileaflet. Mild aortic valve calcification is present. There is trace to mild aortic regurgitation. Mild valvular aortic stenosis.  Pulmonic Valve The pulmonic valve is not well seen, but is grossly normal. This degree of valvular regurgitation is within normal limits. There is trace pulmonic valvular regurgitation.  Vessels The aorta root is normal. Normal size ascending aorta. IVC diameter and respiratory changes fall into an intermediate range suggesting an RA pressure of 8 mmHg.  Pericardium There is no pericardial effusion.  Rhythm Sinus rhythm was noted.  ______________________________________________________________________________ MMode/2D Measurements & Calculations IVSd: 1.1 cm LVIDd: 3.6 cm LVIDs: 2.5 cm LVPWd: 1.1 cm FS: 31.1 %  LV mass(C)d: 118.5 grams LV mass(C)dI: 58.0 grams/m2 Ao root diam: 2.9 cm LA dimension: 4.7 cm asc Aorta Diam: 3.0 cm LA/Ao: 1.6 LVOT diam: 2.2 cm LVOT area: 3.8 cm2 LA Volume Indexed (AL/bp): 25.8 ml/m2 RWT: 0.59  Time Measurements Aortic HR: 88.0  BPM MM HR: 90.0 BPM  Doppler Measurements & Calculations MV E max qasim: 92.2 cm/sec MV A max qasim: 118.6 cm/sec MV E/A: 0.78 MV dec slope: 374.9 cm/sec2 MV dec time: 0.25 sec Ao V2 max: 299.0 cm/sec Ao max P.0 mmHg Ao V2 mean: 232.0 cm/sec Ao mean P.4 mmHg Ao V2 VTI: 67.4 cm CARINE(I,D): 1.6 cm2 CARINE(V,D): 1.8 cm2 AI P1/2t: 289.0 msec LV V1 max P.6 mmHg LV V1 max: 146.3 cm/sec LV V1 VTI: 29.0 cm CO(LVOT): 9.6 l/min CI(LVOT): 4.7 l/min/m2 SV(LVOT): 109.0 ml SI(LVOT): 53.3 ml/m2 PA acc time: 0.12 sec TR max qasim: 333.9 cm/sec TR max P.6 mmHg AV Qasim Ratio (DI): 0.49 CARINE Index (cm2/m2): 0.79 E/E': 11.5 E/E' av.7 Lateral E/e': 9.3 Medial E/e': 16.1  Peak E' Qasim: 8.0 cm/sec  ______________________________________________________________________________ Report approved by: Rossy Alicia 2022 09:29 AM       XR Chest Port 1 View    Result Date: 3/19/2022  EXAM: XR CHEST PORTABLE 1 VIEW LOCATION: [facility] DATE/TIME: 2022, 2:48 PM INDICATION: Shortness of breath. COMPARISON: 2022.     IMPRESSION: Heart and mediastinal size are within normal limits. There is patchy infiltrate involving the right upper lobe, new from prior study, suggestive of an infectious process. No pleural effusion or pneumothorax. There is also some silhouetting of  the right hemidiaphragm which represents either atelectasis or an infectious process.     CT Chest Pulmonary Embolism w Contrast    Result Date: 3/19/2022  EXAM: CT CHEST PULMONARY EMBOLISM W CONTRAST LOCATION: [facility] DATE/TIME: 3/19/2022 8:20 PM INDICATION: PE suspected, low/intermediate prob, neg D-dimer; COPD with worsening symptoms, hypoxia COMPARISON: Portable chest radiography 2022 and PA and lateral views of the chest 2022 TECHNIQUE: CT chest pulmonary angiogram during arterial phase injection of IV contrast. Multiplanar reformats and MIP reconstructions were performed. Dose  reduction techniques were used. CONTRAST: isovue 370 100ml FINDINGS: ANGIOGRAM CHEST: Enlarged main, right, and left pulmonary arteries. No pulmonary artery filling defects. There are atheromatous calcifications mid ascending aorta, arch, and descending aorta but no findings of acute aortic syndrome. No aortic aneurysm. LUNGS AND PLEURA: Imaging at the expiratory phase of the respiratory cycle. Near complete collapse of the bronchus intermedius. The right upper lobe bronchus and segmental and subsegmental right upper lobe airways are filled with debris and there are numerous airway centric inflammatory nodules in the right upper lobe. Mild interstitial edema in the periphery of the caudal right upper lobe. Mild airway debris in the right lower lobe without airspace opacities. There is a band of cicatrization atelectasis in the left lower lobe and mild airway debris in the segmental left lower lobe airways. No pleural effusion. MEDIASTINUM: Partial anomalous pulmonary venous return. The left superior pulmonary vein drains to the left brachiocephalic vein. Cardiac chambers are normal in size. Variant right pulmonary vein anatomy with independent veins of the right upper, middle,  and lower lobes. No pericardial effusion. No enlarged mediastinal or hilar lymph nodes. Slightly patulous esophagus suggesting a component of dysmotility. Left thyroid nodule measures 19 x 26 mm (series 4, image 17). CORONARY ARTERY CALCIFICATION: Mild. UPPER ABDOMEN: No actionable findings in the imaged upper abdomen. There our felt pledgets around the diaphragmatic hiatus from prior hiatal hernia repair. MUSCULOSKELETAL: Mild anterior wedge deformity of T6. No aggressive or destructive bone lesions.     IMPRESSION: 1.  Enlarged central pulmonary arteries consistent with pulmonary hypertension. No acute pulmonary embolism. 2.  Large amount of airway material filling the bronchi of the right upper lobe with airway centric inflammatory nodules  in the peripheral third of the lobe consistent with bronchopneumonia. 3.  Partial anomalous pulmonary venous return. The left upper lobe pulmonary vein drains to the left brachiocephalic vein in the mediastinum producing a left to right shunt.        Physical Exam:  Pulse 104   SpO2 91%   She refused blood pressure check today.  General - Well nourished  Ears/Mouth -  Deferred given mask use during pandemic  Neck - no cervical lymphadenopathy  Lungs - Faint throughout  CVS - regular rhythm with no murmurs, rubs or gallups  Abdomen - soft, NT, ND, NABS  Ext - no cyanosis, clubbing, has 2+ pitting edema in her ankles  Skin - no rash  Psychology - alert and oriented, answers appropriate        Electronically signed by:    Perry Tamayo MD PhD  [facility]

## 2022-04-05 NOTE — PATIENT INSTRUCTIONS
1) I am increasing the advair dose to try to help keep your lungs more stable.  I am also looking into an alternate drug to help stabilize your lungs   2) I will give you a 5 day course of lasix to try and help your swelling and maybe your breathing.  You should see your swelling get better and maybe your breathing get better.    3) I have sent you to pulmonary rehab to work on your breathing mechanics  4) Try to use your oxygen when you walk and watch your oxygen level when you move.  You can try your pulsating oxygen as needed to figure out how much you need.

## 2022-04-05 NOTE — LETTER
4/5/2022       RE: Lalitha Underwood  [address] [facility] [address]     Dear Colleague,    Thank you for referring your patient, Lalitha Underwood, to the [facility] at [facility]. Please see a copy of my visit note below.    Assessment and Plan:Lalitha Underwood is a 77 year old female with a past medical history significant for severe COPD with cor pulmonale and chronic hypoxic respiratory failure who presents to clinic today for follow up from a hospital stay.  She has had significant symptoms and frequent exacerbations and requires an increase in her therapies.    1) Severe COPD exacerbation - completed course of antibiotics and steroids from hospital in March, but remains quite dyspneic and wheezy at baseline.  See below for plan to increase maintenance.  2) COPD maintenance - on advair and spiriva.  I think we need to double down on her controllers to get ahead of this before it puts her back in the hospital.  Will increase her advair to the 500-50 dose  Normally would consider azithromycin but concerned this could exacerbate a heart rhythm issue.  Will ask Dr. Pendleton.  Darliresp is an alternative but typically more costly and with more side effects.  Should try her nebulizer everyday.  Will also refer her to pulmonary rehab to help with conditioning and oxygen titration.  3) Pulmonary hypertension - this can be considered cor pulmonale, it is not the same pulmonary hypertension her daughter passed of.  The mainstay of this treatment is oxygen, treatment of her COPD, and if evidence of volume overload, mild diuresis.  Echo shows mild pulmonary hypertension.  She has volume overload with ankle edema today, will try a 5 day course of furosemide.  If she feels better with this but then goes back to more edema and shortness of breath we may need to use this permanently.  Renal function is normal.  4) Chronic hypoxic respiratory failure -  Reconfirmed her need of 2L with ambulation.  She should be using this with all ambulation and with sleep.  She is not doing this.  Asked her to take her oximeter with her.  5) RTC in 3 months      CCx: copd    HPI: Ms. Underwood is a 77 year old with severe copd and cor pulmonale and chronic hypoxemia.  She returns for hospital follow up of a COPD exacerbation complicated by a second degree AVB and now has a new pacer.  She says she doesn't feel a whole lot different with the pacer than before.  She gets quite winded with ambulation.  She is sleeping with oxygen, and sometimes when she sits down, but not with exertion.  She is willing to consider pulmonary rehab after discussions with RT in the hospital.  She notes swelling in her ankles is chronic and not improving with the hydrochlorothiazide she has.    She isn't using her nebulizer, she does use her advair and spiriva as prescribed.    ROS:  Review of Systems - History obtained from the patient  General ROS: negative  Psychological ROS: negative  ENT ROS: negative  Allergy and Immunology ROS: negative  Endocrine ROS: negative  Respiratory ROS: positive for - cough, shortness of breath, sputum changes and wheezing  negative for - hemoptysis, orthopnea or pleuritic pain  Cardiovascular ROS: no chest pain or palpitations  Gastrointestinal ROS: no abdominal pain, change in bowel habits, or black or bloody stools  Genito-Urinary ROS: no dysuria, trouble voiding, or hematuria  Musculoskeletal ROS: negative  Neurological ROS: no TIA or stroke symptoms  Dermatological ROS: negative      Current Meds:  Current Outpatient Medications   Medication Sig Dispense Refill     albuterol (PROAIR HFA/PROVENTIL HFA/VENTOLIN HFA) 108 (90 Base) MCG/ACT inhaler Inhale 2 puffs into the lungs every 6 hours as needed 18 g 1     amLODIPine (NORVASC) 10 MG tablet Take 1 tablet (10 mg) by mouth daily 90 tablet 3     benzonatate (TESSALON) 100 MG capsule Take 1 capsule (100 mg) by mouth 3 times  daily as needed for cough 10 capsule 0     biotin 5 MG CAPS Take 1 capsule by mouth daily       calcium citrate (CITRACAL) 950 (200 Ca) MG tablet Take 1 tablet by mouth       denosumab (PROLIA) 60 MG/ML SOLN injection Inject 60 mg Subcutaneous every 6 months       fluticasone-salmeterol (ADVAIR) 250-50 MCG/DOSE inhaler Inhale 1 puff into the lungs 2 times daily 2 each 1     guaiFENesin (MUCINEX) 600 MG 12 hr tablet Take 1 tablet (600 mg) by mouth 2 times daily       hydrochlorothiazide (HYDRODIURIL) 25 MG tablet hydrochlorothiazide 25 mg tablet   TAKE 1 TABLET BY MOUTH DAILY 90 tablet 3     ipratropium-albuterol (COMBIVENT RESPIMAT)  MCG/ACT inhaler Inhale 1 puff into the lungs 4 times daily       levETIRAcetam (KEPPRA) 500 MG tablet Take 1 tablet (500 mg) by mouth 2 times daily 180 tablet 3     losartan (COZAAR) 50 MG tablet Take 1 tablet (50 mg) by mouth 2 times daily 90 tablet 3     Magnesium Oxide 500 MG TABS Take 500 mg by mouth 2 times daily       nystatin (NYSTOP) 971672 UNIT/GM external powder Apply topically daily as needed        potassium chloride ER (KLOR-CON M) 20 MEQ CR tablet Take 1 tablet (20 mEq) by mouth daily 90 tablet 3     RABEprazole (ACIPHEX) 20 MG EC tablet Take 1 tablet (20 mg) by mouth daily 90 tablet 3     sertraline (ZOLOFT) 100 MG tablet Take 1 tablet (100 mg) by mouth daily 90 tablet 3     solifenacin (VESICARE) 10 MG tablet Take 1 tablet (10 mg) by mouth daily 90 tablet 3     tiotropium (SPIRIVA HANDIHALER) 18 MCG inhaled capsule Spiriva with HandiHaler 18 mcg and inhalation capsules (Patient taking differently: Inhale 18 mcg into the lungs daily ) 90 capsule 3     vitamin D3 (CHOLECALCIFEROL) 125 MCG (5000 UT) tablet Take 5,000 Units by mouth daily          Labs:  @clab@  Lab Results   Component Value Date    WBC 14.4 (H) 03/25/2022    HGB 13.3 03/25/2022    HCT 43.4 03/25/2022     03/25/2022     03/25/2022    POTASSIUM 4.2 03/25/2022    CHLORIDE 101 03/25/2022     CO2 31 2022    GLC 88 2022    BUN 18 2022    CR 0.70 2022    MAG 1.7 (L) 2022    BILITOTAL 0.3 2022    AST 12 2022    ALT 13 2022    ALKPHOS 65 2022    PROTTOTAL 6.8 2022    ALBUMIN 2.8 (L) 2022       I have personally reviewed all pertinent imaging studies and PFT results unless otherwise noted.    Imaging studies:  Echocardiogram Complete    Result Date: 3/20/2022  [id number]^SRI^LINDA^[place] [address]  Name: MRS. WASHINGTON JETT MRN: [id number] : 1945 Study Date: 2022 12:36 PM Age: 76 yrs Gender: Female Patient Location: [facility] Reason For Study: 2nd degree AV Block Ordering Physician: LINDA FIERRO Performed By: MARISELA  BSA: 2.0 m2 Height: 64 in Weight: 222 lb HR: 87 BP: 131/58 mmHg ______________________________________________________________________________ Procedure Complete Portable Echo Adult. Adequate quality two-dimensional was performed and interpreted. ______________________________________________________________________________ Interpretation Summary  The left ventricle is normal in size with normal left ventricular wall thickness. Left ventricular function is normal.The ejection fraction is 60-65%. The right ventricle is mildly dilated with normal systolic function. The right atrium is severely dilated. Mild valvular aortic stenosis is present. Right ventricular systolic pressure is elevated, consistent with mild pulmonary hypertension.  Compared to the prior study of 17 the right ventricle now appears dilated. ______________________________________________________________________________ I      WMSI = 1.00     % Normal = 100  X - Cannot   0 -                      (2) - Mildly 2 -          Segments  Size Interpret    Hyperkinetic 1 - Normal  Hypokinetic  Hypokinetic  1-2     small                                                    7 -          3-5     moderate 3 - Akinetic 4 -          5 -         6 - Akinetic Dyskinetic   6-14    large              Dyskinetic   Aneurysmal  w/scar       w/scar       15-16   diffuse  Left Ventricle The left ventricle is normal in size. Left ventricular function is normal.The ejection fraction is 60-65%. There is normal left ventricular wall thickness. Left ventricular diastolic function is indeterminate. No regional wall motion abnormalities noted.  Right Ventricle The right ventricle is mildly dilated. The right ventricular systolic function is normal.  Atria Normal left atrial size. The right atrium is severely dilated. There is no color Doppler evidence of an atrial shunt.  Mitral Valve Mitral valve leaflets appear normal. There is no evidence of mitral stenosis or clinically significant mitral regurgitation.  Tricuspid Valve Tricuspid valve leaflets appear normal. There is no evidence of tricuspid stenosis or clinically significant tricuspid regurgitation. The right ventricular systolic pressure is approximated at 44.6 mmHg plus the right atrial pressure. Right ventricular systolic pressure is elevated, consistent with mild pulmonary hypertension. There is mild to moderate (1-2+) tricuspid regurgitation.  Aortic Valve The aortic valve is trileaflet. Mild aortic valve calcification is present. There is trace to mild aortic regurgitation. Mild valvular aortic stenosis.  Pulmonic Valve The pulmonic valve is not well seen, but is grossly normal. This degree of valvular regurgitation is within normal limits. There is trace pulmonic valvular regurgitation.  Vessels The aorta root is normal. Normal size ascending aorta. IVC diameter and respiratory changes fall into an intermediate range suggesting an RA pressure of 8 mmHg.  Pericardium There is no pericardial effusion.  Rhythm Sinus rhythm was noted.  ______________________________________________________________________________ MMode/2D Measurements & Calculations IVSd: 1.1 cm  LVIDd: 3.6 cm LVIDs: 2.5 cm LVPWd: 1.1 cm FS: 31.1 %  LV mass(C)d: 118.5 grams LV mass(C)dI: 58.0 grams/m2 Ao root diam: 2.9 cm LA dimension: 4.7 cm asc Aorta Diam: 3.0 cm LA/Ao: 1.6 LVOT diam: 2.2 cm LVOT area: 3.8 cm2 LA Volume Indexed (AL/bp): 25.8 ml/m2 RWT: 0.59  Time Measurements Aortic HR: 88.0 BPM MM HR: 90.0 BPM  Doppler Measurements & Calculations MV E max qasim: 92.2 cm/sec MV A max qasim: 118.6 cm/sec MV E/A: 0.78 MV dec slope: 374.9 cm/sec2 MV dec time: 0.25 sec Ao V2 max: 299.0 cm/sec Ao max P.0 mmHg Ao V2 mean: 232.0 cm/sec Ao mean P.4 mmHg Ao V2 VTI: 67.4 cm CARINE(I,D): 1.6 cm2 CARINE(V,D): 1.8 cm2 AI P1/2t: 289.0 msec LV V1 max P.6 mmHg LV V1 max: 146.3 cm/sec LV V1 VTI: 29.0 cm CO(LVOT): 9.6 l/min CI(LVOT): 4.7 l/min/m2 SV(LVOT): 109.0 ml SI(LVOT): 53.3 ml/m2 PA acc time: 0.12 sec TR max qasim: 333.9 cm/sec TR max P.6 mmHg AV Qasim Ratio (DI): 0.49 CARINE Index (cm2/m2): 0.79 E/E': 11.5 E/E' av.7 Lateral E/e': 9.3 Medial E/e': 16.1  Peak E' Qasim: 8.0 cm/sec  ______________________________________________________________________________ Report approved by: Rossy Alicia 2022 09:29 AM       XR Chest Port 1 View    Result Date: 3/19/2022  EXAM: XR CHEST PORTABLE 1 VIEW LOCATION: [facility] DATE/TIME: 2022, 2:48 PM INDICATION: Shortness of breath. COMPARISON: 2022.     IMPRESSION: Heart and mediastinal size are within normal limits. There is patchy infiltrate involving the right upper lobe, new from prior study, suggestive of an infectious process. No pleural effusion or pneumothorax. There is also some silhouetting of  the right hemidiaphragm which represents either atelectasis or an infectious process.     CT Chest Pulmonary Embolism w Contrast    Result Date: 3/19/2022  EXAM: CT CHEST PULMONARY EMBOLISM W CONTRAST LOCATION: [facility] DATE/TIME: 3/19/2022 8:20 PM INDICATION: PE suspected, low/intermediate prob, neg  D-dimer; COPD with worsening symptoms, hypoxia COMPARISON: Portable chest radiography 03/19/2022 and PA and lateral views of the chest 03/11/2022 TECHNIQUE: CT chest pulmonary angiogram during arterial phase injection of IV contrast. Multiplanar reformats and MIP reconstructions were performed. Dose reduction techniques were used. CONTRAST: isovue 370 100ml FINDINGS: ANGIOGRAM CHEST: Enlarged main, right, and left pulmonary arteries. No pulmonary artery filling defects. There are atheromatous calcifications mid ascending aorta, arch, and descending aorta but no findings of acute aortic syndrome. No aortic aneurysm. LUNGS AND PLEURA: Imaging at the expiratory phase of the respiratory cycle. Near complete collapse of the bronchus intermedius. The right upper lobe bronchus and segmental and subsegmental right upper lobe airways are filled with debris and there are numerous airway centric inflammatory nodules in the right upper lobe. Mild interstitial edema in the periphery of the caudal right upper lobe. Mild airway debris in the right lower lobe without airspace opacities. There is a band of cicatrization atelectasis in the left lower lobe and mild airway debris in the segmental left lower lobe airways. No pleural effusion. MEDIASTINUM: Partial anomalous pulmonary venous return. The left superior pulmonary vein drains to the left brachiocephalic vein. Cardiac chambers are normal in size. Variant right pulmonary vein anatomy with independent veins of the right upper, middle,  and lower lobes. No pericardial effusion. No enlarged mediastinal or hilar lymph nodes. Slightly patulous esophagus suggesting a component of dysmotility. Left thyroid nodule measures 19 x 26 mm (series 4, image 17). CORONARY ARTERY CALCIFICATION: Mild. UPPER ABDOMEN: No actionable findings in the imaged upper abdomen. There our felt pledgets around the diaphragmatic hiatus from prior hiatal hernia repair. MUSCULOSKELETAL: Mild anterior wedge  deformity of T6. No aggressive or destructive bone lesions.     IMPRESSION: 1.  Enlarged central pulmonary arteries consistent with pulmonary hypertension. No acute pulmonary embolism. 2.  Large amount of airway material filling the bronchi of the right upper lobe with airway centric inflammatory nodules in the peripheral third of the lobe consistent with bronchopneumonia. 3.  Partial anomalous pulmonary venous return. The left upper lobe pulmonary vein drains to the left brachiocephalic vein in the mediastinum producing a left to right shunt.        Physical Exam:  Pulse 104   SpO2 91%   She refused blood pressure check today.  General - Well nourished  Ears/Mouth -  Deferred given mask use during pandemic  Neck - no cervical lymphadenopathy  Lungs - Faint throughout  CVS - regular rhythm with no murmurs, rubs or gallups  Abdomen - soft, NT, ND, NABS  Ext - no cyanosis, clubbing, has 2+ pitting edema in her ankles  Skin - no rash  Psychology - alert and oriented, answers appropriate        Electronically signed by:    Perry Tamayo MD PhD  [facility] Pulmonary and Critical Care Medicine      Again, thank you for allowing me to participate in the care of your patient.      Sincerely,    Perry Tamayo MD

## 2022-04-07 NOTE — TELEPHONE ENCOUNTER
From Pulmonologist:    Hello,   I wasn't aware  was looking for pulmonary clearance, my visit was for a hospital follow up.  Can you tell me what surgery she is planning to have?   She is in fragile shape right now trying to recover her lungs from a recent hospital stay.  I'm working to get her a little more stability and this may help her get ready.  So overall I wouldn't rush her through an elective procedure now regardless.     Perry Orozco and patient updated.  Patient ok with plan.

## 2022-04-08 ENCOUNTER — TELEPHONE (OUTPATIENT)
Dept: RESPIRATORY THERAPY | Facility: HOSPITAL | Age: 77
End: 2022-04-08
Payer: MEDICARE

## 2022-04-08 NOTE — TELEPHONE ENCOUNTER
Spoke with Serene. She is feeling well today and in the green zone. Serene is able to get and take her medications and is compliant in taking them.  Reviewed COPD Action Plan. Serene had no problems or questions for me today.    Cielo Watkins, RT, TTS, Chronic Pulmonary Disease Specialist

## 2022-04-09 NOTE — TELEPHONE ENCOUNTER
Can you review this  Per my review it doesn't appear this patient needs another pneumonia vaccine.    Admission

## 2022-04-11 ENCOUNTER — TELEPHONE (OUTPATIENT)
Dept: PULMONOLOGY | Facility: OTHER | Age: 77
End: 2022-04-11
Payer: MEDICARE

## 2022-04-11 DIAGNOSIS — J81.0 ACUTE PULMONARY EDEMA (H): ICD-10-CM

## 2022-04-11 RX ORDER — FUROSEMIDE 20 MG
20 TABLET ORAL DAILY
Qty: 5 TABLET | Refills: 0 | Status: SHIPPED | OUTPATIENT
Start: 2022-04-11 | End: 2022-04-25

## 2022-04-11 NOTE — TELEPHONE ENCOUNTER
Phone call from Serene. States she saw  on 4/5 and he added Lasix to her medications x 5 days to see if it would help with her leg swelling.     She started this on Friday, but miss-understood and thought she was NOT supposed to take her hydrochlorothiazide while taking the Lasix.      She is now on day 4 of her lasix and states that her legs are even more swollen.       Reviewed with Dr. Alberto in clinic and will have her do an additional 5 days of the Lasix.  Made sure that she also takes the hydrochlorothiazide.

## 2022-04-12 ENCOUNTER — TELEPHONE (OUTPATIENT)
Dept: FAMILY MEDICINE | Facility: CLINIC | Age: 77
End: 2022-04-12
Payer: MEDICARE

## 2022-04-12 NOTE — TELEPHONE ENCOUNTER
Informed patient of message below. Patient states that she forgot they change the dose. Patient is fine with the 10 mg once daily.

## 2022-04-12 NOTE — TELEPHONE ENCOUNTER
At her last visit we discussed that her solifenancin medication is typically only taken once daily. So rather than taking 5 mg twice a day, I sent in 10 mg once a day.  If she would like to continue on her old dosing please let me know and I can send in a new prescription.  She would qualify for an updated Covid booster since it has been >5 months since her last one.    Kate Marte, DO

## 2022-04-12 NOTE — TELEPHONE ENCOUNTER
Patient is calling stating that the prescription for solifenancin 10 mg should be for twice a day. Patient is requesting a new prescription be sent to pharmacy which is [facility] mail order [facility]. Patient is also wondering if she is able to get her booster covid shot. Patient would like a call back once new prescription is sent and to tell her whether or not she should have the covid booster.  Please advise.

## 2022-04-14 ENCOUNTER — TELEPHONE (OUTPATIENT)
Dept: CARDIOLOGY | Facility: CLINIC | Age: 77
End: 2022-04-14
Payer: MEDICARE

## 2022-04-14 DIAGNOSIS — I44.1 2ND DEGREE AV BLOCK: ICD-10-CM

## 2022-04-14 DIAGNOSIS — Z95.0 CARDIAC PACEMAKER IN SITU: Primary | ICD-10-CM

## 2022-04-14 NOTE — TELEPHONE ENCOUNTER
Dr Tamayo-  Please see Dr Ellsworth's recommendations of having an EKG done after pt starts and is on azithtomycin, while being paced  I have spoke to the pt briefly that we would have her come in for an EKG once she starts this  I also advised her that your office would reach out to her to determine if and when she should be started on this, as well as sending in the prescription  Please confirm if she will nat starting, and then I can arrange the EKG  Thank you  Rossi    ----- Message from Nichol Aguilar RN sent at 4/14/2022  2:16 PM CDT -----  Regarding: FW: question about QT prolongation    ----- Message -----  From: Joe Ellsworth MD  Sent: 4/14/2022   2:07 PM CDT  To: Nichol Aguilar RN  Subject: RE: question about QT prolongation               Obtain EKG while taking azithromycin while pacemaker is working  ----- Message -----  From: Nichol Aguilar RN  Sent: 4/14/2022   9:47 AM CDT  To: Perry Tamayo MD, #  Subject: RE: question about QT prolongation               Dr Tamayo,  Dr Pendleton has retired   I have sent this to Dr Ellsworth to help address    Dr Ellsworth,  Can you review   Thank you,  Rossi  ----- Message -----  From: Perry Tamayo MD  Sent: 4/5/2022   4:26 PM CDT  To: Helder Pendleton MD  Subject: question about QT prolongation                   Hi,  You put a pacer in Serene a couple weeks ago for a 2nd degree block.  She has terrible COPD and one of our therapies, chronic azithromycin, is associated with QT prolongation.  Her QT is a little long in the 470-480 range.  I don't see an ekg after her pacemaker placement.  I am wondering if the pacemaker somehow changes her risk of QT prolongation and ventricular arrythmia.  There is an alternate drug to try for her COPD, but the azithromycin is way cheaper and easy to tolerate.    Thanks for your thoughts,    Perry

## 2022-04-15 ENCOUNTER — MYC MEDICAL ADVICE (OUTPATIENT)
Dept: PULMONOLOGY | Facility: OTHER | Age: 77
End: 2022-04-15
Payer: MEDICARE

## 2022-04-15 ENCOUNTER — TELEPHONE (OUTPATIENT)
Dept: PULMONOLOGY | Facility: OTHER | Age: 77
End: 2022-04-15
Payer: MEDICARE

## 2022-04-15 DIAGNOSIS — J44.9 STAGE 4 VERY SEVERE COPD BY GOLD CLASSIFICATION (H): Primary | ICD-10-CM

## 2022-04-15 RX ORDER — AZITHROMYCIN 250 MG/1
250 TABLET, FILM COATED ORAL DAILY
Qty: 30 TABLET | Refills: 4 | Status: SHIPPED | OUTPATIENT
Start: 2022-04-15 | End: 2022-05-15

## 2022-04-15 NOTE — TELEPHONE ENCOUNTER
Prior Authorization Retail Medication Request    Medication/Dose: Azithromycin 250mg tab  ICD code (if different than what is on RX):  J44.9  Previously Tried and Failed:  Advair, Spiriva, albuterol, prednisone, combivent  Rationale:  Prophylactic for exacerbations of COPD    Insurance Name: [facility] [employer]  Insurance ID:  [id number]      Pharmacy Information (if different than what is on RX)  Name:  [facility] [place]  Phone:  [phone]

## 2022-04-15 NOTE — TELEPHONE ENCOUNTER
PC back to pt  Reviewed RX was sent in and prescribing information  Advised pt if she has any questions about RX, she needs to reach out to pulmonology to address  Pt states will  and start today  Will ask scheduling to arrange EKG and Device clinic apt, and send results to Dr Ellsworth for review  Pt verbalized understanding, has no further questions or concerns at this time, and has our contact information if needed.  4/15/2022 9:10 AM  ALLYSON Johansen, Perry Cummins MD  [facility] Support [facility]  Caller: Unspecified (Yesterday,  3:23 PM)  I think she can start anytime.  I'll send the prescription in today.       Perry

## 2022-04-15 NOTE — TELEPHONE ENCOUNTER
Central Prior Authorization Team   Phone: [phone]    PA Initiation    Medication: Azithromycin 250mg tab  Insurance Company: [employer] - Phone [phone] Fax [phone]  Pharmacy Filling the Rx: [facility] #[id number] [address]  AT [facility] [address]   Filling Pharmacy Phone: [phone]  Filling Pharmacy Fax:    Start Date: 4/15/2022    Initiated via phone to [facility] per request through CMM

## 2022-04-15 NOTE — TELEPHONE ENCOUNTER
Prior Authorization Approval    Authorization Effective Date: 3/16/2022  Authorization Expiration Date: 7/15/2022  Medication: Azithromycin 250mg tab  Approved Dose/Quantity:   Reference #:     Insurance Company: [employer] - Phone [phone] Fax [phone]  Expected CoPay:       CoPay Card Available:      Foundation Assistance Needed:    Which Pharmacy is filling the prescription (Not needed for infusion/clinic administered): [facility] #[id number] [address]  [facility]   Pharmacy Notified: Yes  Patient Notified: Yes      Per rep this has been approved for 3 months.  Pharmacy will let patient know when its ready for .

## 2022-04-18 ENCOUNTER — OFFICE VISIT (OUTPATIENT)
Dept: PODIATRY | Facility: CLINIC | Age: 77
End: 2022-04-18
Payer: MEDICARE

## 2022-04-18 VITALS — HEIGHT: 64 IN | WEIGHT: 227 LBS | HEART RATE: 105 BPM | BODY MASS INDEX: 38.76 KG/M2 | OXYGEN SATURATION: 95 %

## 2022-04-18 DIAGNOSIS — L57.0 KERATOMA: ICD-10-CM

## 2022-04-18 DIAGNOSIS — M20.42 HAMMER TOE OF LEFT FOOT: Primary | ICD-10-CM

## 2022-04-18 DIAGNOSIS — I73.9 PAD (PERIPHERAL ARTERY DISEASE) (H): ICD-10-CM

## 2022-04-18 DIAGNOSIS — L03.032 ABSCESS OF FIFTH TOENAIL OF LEFT FOOT: ICD-10-CM

## 2022-04-18 PROCEDURE — 99213 OFFICE O/P EST LOW 20 MIN: CPT | Performed by: PODIATRIST

## 2022-04-18 ASSESSMENT — PAIN SCALES - GENERAL: PAINLEVEL: MILD PAIN (3)

## 2022-04-18 NOTE — PROGRESS NOTES
FOOT AND ANKLE SURGERY/PODIATRY PROGRESS NOTE        ASSESSMENT:   Hammertoe  Keratoma      TREATMENT:  -I trimmed callus tissue x2 distal 4th digit left foot with a #15 blade.    -Discussed that she should continue to use the CREST pad during the day, remove at night.     -Patient's questions invited and answered. She was encouraged to call my office with any further questions or concerns.     Alfonso Orozco DPM  [facility] Podiatry/Foot & Ankle Surgery      HPI: Lalitha Underwood was seen again today complaining of a painful callus on her 4th digit left foot. The patient states she would like to have the hammertoes on the 4th and 5th digits left foot corrected, but is unable to proceed with surgery due to cardiac concerns. Since her last visit, she did have a pacemaker placed.     Past Medical History:   Diagnosis Date     Convulsive disorder (H)      Convulsive disorder (H)      COPD (chronic obstructive pulmonary disease) (H)      COPD (chronic obstructive pulmonary disease) (H)      Depression      Dyspnea on exertion      Gastroesophageal reflux disease      Heart murmur      Hernia of unspecified site of abdominal cavity without mention of obstruction or gangrene      Hiatal hernia      Hiatal hernia      Hypertension      Hypertension      Obese      On home oxygen therapy     at 1.5 liters at nite     Osteopenia      Osteopenia      Oxygen dependent     1.5 L per NC     Pneumonia due to infectious organism, unspecified laterality, unspecified part of lung 3/19/2022     Second degree heart block 3/19/2022     Shortness of breath      Uncomplicated asthma      URI (upper respiratory infection)      Venous insufficiency of both lower extremities      Venous insufficiency of both lower extremities      Walking troubles        Past Surgical History:   Procedure Laterality Date     ARTHROPLASTY TOE(S) Left 11/22/2021    Procedure: ARTHROPLASTY, digits two and three left foot;  Surgeon: Alfonso Orozco  NAY Hernández;  Location: [place] [facility]     EP PACEMAKER DEVICE & LEAD IMPLANT- RIGHT ATRIAL & RIGHT VENTRICULAR N/A 3/24/2022    Procedure: Pacemaker Device & Lead Implant - Right Atrial & Right Ventricular;  Surgeon: Helder Pendleton MD;  Location: [facility]     ESOPHAGOSCOPY, GASTROSCOPY, DUODENOSCOPY (EGD), COMBINED N/A 11/26/2018    Procedure: Esophagogastroduodenoscopy;  Surgeon: Jasmeet Wilder MD;  Location: [facility] OR     HERNIA REPAIR, UMBILICAL  04/2018     HERNIA REPAIR, UMBILICAL  04/04/2018    Dr. 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     Repair arm fracture Left      SHOULDER SURGERY Right 1967     SHOULDER SURGERY Right 1967     US BIOPSY THYROID FINE NEEDLE ASPIRATION  7/29/2020       Allergies   Allergen Reactions     Clindamycin Rash     Carbamazepine Rash     Morphine Nausea         Current Outpatient Medications:      albuterol (PROAIR HFA/PROVENTIL HFA/VENTOLIN HFA) 108 (90 Base) MCG/ACT inhaler, Inhale 2 puffs into the lungs every 6 hours as needed, Disp: 18 g, Rfl: 1     amLODIPine (NORVASC) 10 MG tablet, Take 1 tablet (10 mg) by mouth daily, Disp: 90 tablet, Rfl: 3     azithromycin (ZITHROMAX) 250 MG tablet, Take 1 tablet (250 mg) by mouth daily, Disp: 30 tablet, Rfl: 4     benzonatate (TESSALON) 100 MG capsule, Take 1 capsule (100 mg) by mouth 3 times daily as needed for cough, Disp: 10 capsule, Rfl: 0     biotin 5 MG CAPS, Take 1 capsule by mouth daily, Disp: , Rfl:      calcium citrate (CITRACAL) 950 (200 Ca) MG tablet, Take 1 tablet by mouth, Disp: , Rfl:      denosumab (PROLIA) 60 MG/ML SOLN injection, Inject 60 mg Subcutaneous every 6 months, Disp: , Rfl:      fluticasone-salmeterol (ADVAIR) 250-50 MCG/DOSE inhaler, Inhale 1 puff into the lungs 2 times  daily, Disp: 2 each, Rfl: 1     fluticasone-salmeterol (ADVAIR) 500-50 MCG/DOSE inhaler, Inhale 1 puff into the lungs every 12 hours, Disp: 60 each, Rfl: 11     furosemide (LASIX) 20 MG tablet, Take 1 tablet (20 mg) by mouth in the morning., Disp: 5 tablet, Rfl: 0     guaiFENesin (MUCINEX) 600 MG 12 hr tablet, Take 1 tablet (600 mg) by mouth 2 times daily, Disp: , Rfl:      hydrochlorothiazide (HYDRODIURIL) 25 MG tablet, hydrochlorothiazide 25 mg tablet  TAKE 1 TABLET BY MOUTH DAILY, Disp: 90 tablet, Rfl: 3     ipratropium-albuterol (COMBIVENT RESPIMAT)  MCG/ACT inhaler, Inhale 1 puff into the lungs 4 times daily, Disp: , Rfl:      levETIRAcetam (KEPPRA) 500 MG tablet, Take 1 tablet (500 mg) by mouth 2 times daily, Disp: 180 tablet, Rfl: 3     losartan (COZAAR) 50 MG tablet, Take 1 tablet (50 mg) by mouth 2 times daily, Disp: 90 tablet, Rfl: 3     Magnesium Oxide 500 MG TABS, Take 500 mg by mouth 2 times daily, Disp: , Rfl:      nystatin (MYCOSTATIN) 150971 UNIT/GM external powder, Apply topically daily as needed , Disp: , Rfl:      potassium chloride ER (KLOR-CON M) 20 MEQ CR tablet, Take 1 tablet (20 mEq) by mouth daily, Disp: 90 tablet, Rfl: 3     RABEprazole (ACIPHEX) 20 MG EC tablet, Take 1 tablet (20 mg) by mouth daily, Disp: 90 tablet, Rfl: 3     sertraline (ZOLOFT) 100 MG tablet, Take 1 tablet (100 mg) by mouth daily, Disp: 90 tablet, Rfl: 3     solifenacin (VESICARE) 10 MG tablet, Take 1 tablet (10 mg) by mouth daily, Disp: 90 tablet, Rfl: 3     tiotropium (SPIRIVA HANDIHALER) 18 MCG inhaled capsule, Spiriva with HandiHaler 18 mcg and inhalation capsules (Patient taking differently: Inhale 18 mcg into the lungs daily), Disp: 90 capsule, Rfl: 3     vitamin D3 (CHOLECALCIFEROL) 125 MCG (5000 UT) tablet, Take 5,000 Units by mouth daily , Disp: , Rfl:   No current facility-administered medications for this visit.    Facility-Administered Medications Ordered in Other Visits:      sod bicarbonate-citric  "acid-simethicone (EZ GAS) 2.21-1.53-0.04 g packet 4 g, 4 g, Oral, Once, Ragini Arellano MD    Family History   Problem Relation Age of Onset     Pulmonary Hypertension Daughter         idiopathic      Heart Disease Other         2 sibs MI, 1 sib electrical conduction disorder     Breast Cancer Mother 66.00     Hypertension Mother      Coronary Artery Disease Mother      Varicose Veins Mother      Hypertension Father      Coronary Artery Disease Sister      Heart Disease Sister      Diabetes Son      Pulmonary Hypertension Daughter      Coronary Artery Disease Sister      Heart Disease Sister        Social History     Socioeconomic History     Marital status:      Spouse name: Not on file     Number of children: Not on file     Years of education: Not on file     Highest education level: Not on file   Occupational History     Not on file   Tobacco Use     Smoking status: Former Smoker     Packs/day: 1.50     Years: 38.00     Pack years: 57.00     Types: Cigarettes     Quit date: 1998     Years since quittin.3     Smokeless tobacco: Never Used     Tobacco comment: quit in    Substance and Sexual Activity     Alcohol use: Yes     Alcohol/week: 2.0 standard drinks     Comment: occ     Drug use: Yes     Types: Oxycodone     Sexual activity: Never   Other Topics Concern     Not on file   Social History Narrative    .  Two kids.  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     Social Determinants of Health     Financial Resource Strain: Not on file   Food Insecurity: Not on file   Transportation Needs: Not on file   Physical Activity: Not on file   Stress: Not on file   Social Connections: Not on file   Intimate Partner Violence: Not on file   Housing Stability: Not on file       10 point Review of Systems is negative except for callus tissue which is noted in HPI.     Pulse 105   Ht 1.626 m (5' 4\")   Wt 103 kg (227 lb)   SpO2 95%   BMI 38.96 kg/m      BMI= Body mass index is 38.96 " kg/m .    OBJECTIVE:  General appearance: Patient is alert and fully cooperative with history & exam.  No sign of distress is noted during the visit.    Vascular: Dorsalis pedis and posterior tibial pulses are non-palpable.   Dermatologic: Hyperkeratotic tissue distal 4th digit left foot x2. Trophic changes noted including diminished hair growth and shiny skin.  Neurologic: All epicritic and proprioceptive sensations are grossly intact left foot.  Musculoskeletal: Contracted digits left foot.     Imaging:     XR Chest 2 Views: 4 hours upon case completion    Result Date: 3/24/2022  EXAM: XR CHEST 2 VW LOCATION: [facility] DATE/TIME: 3/24/2022 4:53 PM INDICATION: New pacemaker COMPARISON: CT pulmonary angiogram and chest radiography 3/19/2022     IMPRESSION: Left subclavian approach dual chamber pacemaker has right atrial appendage and right ventricular leads. The cardiac silhouette is not enlarged. There are moderate atheromatous calcifications in the arch with lesser calcification of the descending aorta. The nodular opacities in the right upper lobe have decreased from 5 days earlier consistent with improving bronchopneumonia. Subsegmental territories of atelectasis in the right lower lobe along the diaphragmatic pleura are similar. No new/worsening airspace opacities. No pleural effusion or pneumothorax is present.    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   Normal  device operation,  No evidence of acute complication Recommendations:    The patient will be observed on P3 for postprocedure care.   Chest x-ray this afternoon followed by device interrogation   Potential discharge from a pacemaker standpoint today pending interrogation   Start amlodipine 10 mg daily for hypertension   Follow-up in Device Clinic in 1 week. History: Lalitha Underwood is a 77 year old female  agrees to undergo implantation of a  dual chamber pacemaker system.  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  Please see consultation from Dr. Marques.  It is my impression that this is Mobitz type II rather than Mobitz type I second-degree AV block.  The risks benefits and expected outcomes have been explained in detail, and the patient agrees to proceed. Procedure initiation: The patient was taken to the cardiac electrophysiology laboratory in the fasting nonsedated state.  Timeout was called and appropriateness of procedure was documented.  Under my direct supervision, moderate conscious sedation was administered with  continuous monitoring of vital signs.   See procedure log for details with times documented below.  Local infiltration of the operative site with lidocaine.  The patient then had the left shoulder prepped and draped in the usual sterile fashion. Instrumentation:   Access was somewhat difficult and the vein was visualized with 10 cc of IV contrast via the left antecubital vein and found to be patent with a somewhat superior location.  The left subclavian vein was entered with a micropuncture needle using a fluoroscopic  approach over the first rib.The skin was incised with a # 10 blade and the subcutaneous tissue was dissected down to the pectoralis fascia. The subcutaneous pocket was created using sharp and blunt dissection.   A 7Fr dilator and sheath was used to introduce the atrial lead and right ventricular lead.  The right ventricular lead was positioned along the  right ventricular mid septum and actively fixated.  The right atrial lead was positioned in the right atrial appendage.   Pacing and sensing thresholds were acceptable and there was no phrenic nerve stimulation on each lead. The lead was secured with 2 2-0 Tevdek tie down sutures at the pectoralis muscle.  The pocket was flushed with copious amounts of antibiotic irrigation.  The leads and generator were placed within the pocket.  The pocket was closed in 3 layers, with the 2 deep layers being running 2-0 Vicryl suture and the skin approximation with a running 4-0  V-lock suture.  The skin was cleaned and Steri-Strips placed along with a sterile dressing. Device Implanted: MRI compatible system  Pulse generator: [employer], Model number L331, serial number [id number].  Right atrial lead: [place] [employer], Model number 4469, serial number [id number].  Right ventricular lead: [place] [employer], Model number 4470, serial number  [id number]. Implant Characteristics:  Right atrial lead characteristics:  P-wave amplitude 5.5 mV  Capture threshold 0.6 V  Impedance 441 Ohms  Right ventricular lead characteristics:  R-wave amplitude 11.5 mV  Capture threshold 0.4 V  Impedance 434 Ohms               DDD mode 60--130 bpm with AV search hysteresis and longer AV delay to allow intrinsic conduction.     Echocardiogram Complete    Result Date: 3/20/2022  [id number]^SRI^LINDA^[place] [address]  Name: MRS. WASHINGTON JETT MRN: [id number] : 1945 Study Date: 2022 12:36 PM Age: 76 yrs Gender: Female Patient Location: [facility] Reason For Study: 2nd degree AV Block Ordering Physician: LINDA FIERRO Performed By: MARISELA  BSA: 2.0 m2 Height: 64 in Weight: 222 lb HR: 87 BP: 131/58 mmHg ______________________________________________________________________________ Procedure Complete Portable Echo Adult. Adequate quality two-dimensional was performed and interpreted.  ______________________________________________________________________________ Interpretation Summary  The left ventricle is normal in size with normal left ventricular wall thickness. Left ventricular function is normal.The ejection fraction is 60-65%. The right ventricle is mildly dilated with normal systolic function. The right atrium is severely dilated. Mild valvular aortic stenosis is present. Right ventricular systolic pressure is elevated, consistent with mild pulmonary hypertension.  Compared to the prior study of 11/6/17 the right ventricle now appears dilated. ______________________________________________________________________________ I      WMSI = 1.00     % Normal = 100  X - Cannot   0 -                      (2) - Mildly 2 -          Segments  Size Interpret    Hyperkinetic 1 - Normal  Hypokinetic  Hypokinetic  1-2     small                                                    7 -          3-5    moderate 3 - Akinetic 4 -          5 -         6 - Akinetic Dyskinetic   6-14    large              Dyskinetic   Aneurysmal  w/scar       w/scar       15-16   diffuse  Left Ventricle The left ventricle is normal in size. Left ventricular function is normal.The ejection fraction is 60-65%. There is normal left ventricular wall thickness. Left ventricular diastolic function is indeterminate. No regional wall motion abnormalities noted.  Right Ventricle The right ventricle is mildly dilated. The right ventricular systolic function is normal.  Atria Normal left atrial size. The right atrium is severely dilated. There is no color Doppler evidence of an atrial shunt.  Mitral Valve Mitral valve leaflets appear normal. There is no evidence of mitral stenosis or clinically significant mitral regurgitation.  Tricuspid Valve Tricuspid valve leaflets appear normal. There is no evidence of tricuspid stenosis or clinically significant tricuspid regurgitation. The right ventricular systolic pressure is approximated at 44.6  mmHg plus the right atrial pressure. Right ventricular systolic pressure is elevated, consistent with mild pulmonary hypertension. There is mild to moderate (1-2+) tricuspid regurgitation.  Aortic Valve The aortic valve is trileaflet. Mild aortic valve calcification is present. There is trace to mild aortic regurgitation. Mild valvular aortic stenosis.  Pulmonic Valve The pulmonic valve is not well seen, but is grossly normal. This degree of valvular regurgitation is within normal limits. There is trace pulmonic valvular regurgitation.  Vessels The aorta root is normal. Normal size ascending aorta. IVC diameter and respiratory changes fall into an intermediate range suggesting an RA pressure of 8 mmHg.  Pericardium There is no pericardial effusion.  Rhythm Sinus rhythm was noted.  ______________________________________________________________________________ MMode/2D Measurements & Calculations IVSd: 1.1 cm LVIDd: 3.6 cm LVIDs: 2.5 cm LVPWd: 1.1 cm FS: 31.1 %  LV mass(C)d: 118.5 grams LV mass(C)dI: 58.0 grams/m2 Ao root diam: 2.9 cm LA dimension: 4.7 cm asc Aorta Diam: 3.0 cm LA/Ao: 1.6 LVOT diam: 2.2 cm LVOT area: 3.8 cm2 LA Volume Indexed (AL/bp): 25.8 ml/m2 RWT: 0.59  Time Measurements Aortic HR: 88.0 BPM MM HR: 90.0 BPM  Doppler Measurements & Calculations MV E max qasim: 92.2 cm/sec MV A max qasim: 118.6 cm/sec MV E/A: 0.78 MV dec slope: 374.9 cm/sec2 MV dec time: 0.25 sec Ao V2 max: 299.0 cm/sec Ao max P.0 mmHg Ao V2 mean: 232.0 cm/sec Ao mean P.4 mmHg Ao V2 VTI: 67.4 cm CARINE(I,D): 1.6 cm2 CARINE(V,D): 1.8 cm2 AI P1/2t: 289.0 msec LV V1 max P.6 mmHg LV V1 max: 146.3 cm/sec LV V1 VTI: 29.0 cm CO(LVOT): 9.6 l/min CI(LVOT): 4.7 l/min/m2 SV(LVOT): 109.0 ml SI(LVOT): 53.3 ml/m2 PA acc time: 0.12 sec TR max qasim: 333.9 cm/sec TR max P.6 mmHg AV Qasim Ratio (DI): 0.49 CARINE Index (cm2/m2): 0.79 E/E': 11.5 E/E' av.7 Lateral E/e': 9.3 Medial E/e': 16.1  Peak E' Qasim: 8.0 cm/sec   ______________________________________________________________________________ Report approved by: Rossy Alicia 03/21/2022 09:29 AM       XR Chest Port 1 View    Result Date: 3/19/2022  EXAM: XR CHEST PORTABLE 1 VIEW LOCATION: [facility] DATE/TIME: 03/19/2022, 2:48 PM INDICATION: Shortness of breath. COMPARISON: 03/11/2022.     IMPRESSION: Heart and mediastinal size are within normal limits. There is patchy infiltrate involving the right upper lobe, new from prior study, suggestive of an infectious process. No pleural effusion or pneumothorax. There is also some silhouetting of  the right hemidiaphragm which represents either atelectasis or an infectious process.     Cardiac Device Check - In Clinic (Post implant follow up)    Result Date: 4/4/2022  Type: In-clinic post-op wound and device check s/p implant of a dual chamber pacemaker system, MRI conditional. Presenting Rhythm: As- 90s-100 bpm, frequent PACs observed. Lead/Battery status: Stable lead and battery measurements. Arrhythmias: None Comments: Normal device function; reprogrammed RA and RV outputs from Trend 3.5 to Trend 2.5 V, ATR Fallback Mode/Rate from VDIR 70 to DDIR 60 bpm. Please see post-op note in today's Device RN Epic encounter for wound/patient assessment and education. Patient following up with her primary physician for on-going shortness of breath, SaO2 on RA is 93% today. Pattie Kang RN I have reviewed and interpreted the device interrogation, settings, programming, and encounter summary. The device is functioning within normal device parameters. I agree with the current findings, assessment and plan.    CT Chest Pulmonary Embolism w Contrast    Result Date: 3/19/2022  EXAM: CT CHEST PULMONARY EMBOLISM W CONTRAST LOCATION: [facility] DATE/TIME: 3/19/2022 8:20 PM INDICATION: PE suspected, low/intermediate prob, neg D-dimer; COPD with worsening symptoms, hypoxia COMPARISON:  Portable chest radiography 03/19/2022 and PA and lateral views of the chest 03/11/2022 TECHNIQUE: CT chest pulmonary angiogram during arterial phase injection of IV contrast. Multiplanar reformats and MIP reconstructions were performed. Dose reduction techniques were used. CONTRAST: isovue 370 100ml FINDINGS: ANGIOGRAM CHEST: Enlarged main, right, and left pulmonary arteries. No pulmonary artery filling defects. There are atheromatous calcifications mid ascending aorta, arch, and descending aorta but no findings of acute aortic syndrome. No aortic aneurysm. LUNGS AND PLEURA: Imaging at the expiratory phase of the respiratory cycle. Near complete collapse of the bronchus intermedius. The right upper lobe bronchus and segmental and subsegmental right upper lobe airways are filled with debris and there are numerous airway centric inflammatory nodules in the right upper lobe. Mild interstitial edema in the periphery of the caudal right upper lobe. Mild airway debris in the right lower lobe without airspace opacities. There is a band of cicatrization atelectasis in the left lower lobe and mild airway debris in the segmental left lower lobe airways. No pleural effusion. MEDIASTINUM: Partial anomalous pulmonary venous return. The left superior pulmonary vein drains to the left brachiocephalic vein. Cardiac chambers are normal in size. Variant right pulmonary vein anatomy with independent veins of the right upper, middle,  and lower lobes. No pericardial effusion. No enlarged mediastinal or hilar lymph nodes. Slightly patulous esophagus suggesting a component of dysmotility. Left thyroid nodule measures 19 x 26 mm (series 4, image 17). CORONARY ARTERY CALCIFICATION: Mild. UPPER ABDOMEN: No actionable findings in the imaged upper abdomen. There our felt pledgets around the diaphragmatic hiatus from prior hiatal hernia repair. MUSCULOSKELETAL: Mild anterior wedge deformity of T6. No aggressive or destructive bone lesions.      IMPRESSION: 1.  Enlarged central pulmonary arteries consistent with pulmonary hypertension. No acute pulmonary embolism. 2.  Large amount of airway material filling the bronchi of the right upper lobe with airway centric inflammatory nodules in the peripheral third of the lobe consistent with bronchopneumonia. 3.  Partial anomalous pulmonary venous return. The left upper lobe pulmonary vein drains to the left brachiocephalic vein in the mediastinum producing a left to right shunt.    XR Video Swallow with SLP or OT    Result Date: 3/21/2022  EXAM: XR VIDEO SWALLOW WITH SLP OR OT LOCATION: [facility] DATE/TIME: 3/21/2022 8:55 AM INDICATION: Difficulty swallowing. COMPARISON: None. TECHNIQUE: Routine swallow study with speech pathology using multiple barium thicknesses. FINDINGS: FLUOROSCOPIC TIME: .3 minutes NUMBER OF IMAGES: 4 Swallow study with Speech Pathology using multiple barium thicknesses. No aspiration or penetration. A cricopharyngeus muscle impression is noted.     IMPRESSION: 1.  No aspiration or penetration.   2.  See the speech pathology report for details. 3.  The esophagram study will be dictated separately.     XR Esophagram    Result Date: 3/21/2022  EXAM: XR ESOPHAGRAM LOCATION: [facility] DATE/TIME: 3/21/2022 8:55 AM INDICATION: aspiration COMPARISON: CT 03/19/2022 TECHNIQUE: Upright single contrast barium esophagram. FINDINGS: FLUOROSCOPIC TIME: .9 minutes NUMBER OF IMAGES: 5 ESOPHAGUS: Normal peristalsis. No masses, strictures, or mucosal abnormalities identified. There is suspected surgical change in the region of the GE junction.  Otherwise this is not well assessed due to patient mobility.     IMPRESSION: 1.  Suspected surgical change in the region of the GE junction, otherwise not well assessed. 2.  No masses, strictures, or mucosal abnormalities identified. 3.  Normal peristalsis.

## 2022-04-18 NOTE — LETTER
4/18/2022         RE: Lalitha Underwood  [address]  [facility] [address]        Dear Colleague,    Thank you for referring your patient, Lalitha Underwood, to the [facility]. Please see a copy of my visit note below.        FOOT AND ANKLE SURGERY/PODIATRY PROGRESS NOTE        ASSESSMENT:   Hammertoe  Keratoma      TREATMENT:  -I trimmed callus tissue x2 distal 4th digit left foot with a #15 blade.    -Discussed that she should continue to use the CREST pad during the day, remove at night.     -Patient's questions invited and answered. She was encouraged to call my office with any further questions or concerns.     Alfonso Orozco DPM  [facility] Podiatry/Foot & Ankle Surgery      HPI: Lalitha Underwood was seen again today complaining of a painful callus on her 4th digit left foot. The patient states she would like to have the hammertoes on the 4th and 5th digits left foot corrected, but is unable to proceed with surgery due to cardiac concerns. Since her last visit, she did have a pacemaker placed.     Past Medical History:   Diagnosis Date     Convulsive disorder (H)      Convulsive disorder (H)      COPD (chronic obstructive pulmonary disease) (H)      COPD (chronic obstructive pulmonary disease) (H)      Depression      Dyspnea on exertion      Gastroesophageal reflux disease      Heart murmur      Hernia of unspecified site of abdominal cavity without mention of obstruction or gangrene      Hiatal hernia      Hiatal hernia      Hypertension      Hypertension      Obese      On home oxygen therapy     at 1.5 liters at nite     Osteopenia      Osteopenia      Oxygen dependent     1.5 L per NC     Pneumonia due to infectious organism, unspecified laterality, unspecified part of lung 3/19/2022     Second degree heart block 3/19/2022     Shortness of breath      Uncomplicated asthma      URI (upper respiratory infection)      Venous insufficiency of both lower extremities       Venous insufficiency of both lower extremities      Walking troubles        Past Surgical History:   Procedure Laterality Date     ARTHROPLASTY TOE(S) Left 11/22/2021    Procedure: ARTHROPLASTY, digits two and three left foot;  Surgeon: Alfonso Orozco DPM;  Location: [facility]     EP PACEMAKER DEVICE & LEAD IMPLANT- RIGHT ATRIAL & RIGHT VENTRICULAR N/A 3/24/2022    Procedure: Pacemaker Device & Lead Implant - Right Atrial & Right Ventricular;  Surgeon: Helder Pendleton MD;  Location: [facility]     ESOPHAGOSCOPY, GASTROSCOPY, DUODENOSCOPY (EGD), COMBINED N/A 11/26/2018    Procedure: Esophagogastroduodenoscopy;  Surgeon: Jasmeet Wilder MD;  Location: [facility] OR     HERNIA REPAIR, UMBILICAL  04/2018     HERNIA REPAIR, UMBILICAL  04/04/2018    Dr. 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     Repair arm fracture Left      SHOULDER SURGERY Right 1967     SHOULDER SURGERY Right 1967     US BIOPSY THYROID FINE NEEDLE ASPIRATION  7/29/2020       Allergies   Allergen Reactions     Clindamycin Rash     Carbamazepine Rash     Morphine Nausea         Current Outpatient Medications:      albuterol (PROAIR HFA/PROVENTIL HFA/VENTOLIN HFA) 108 (90 Base) MCG/ACT inhaler, Inhale 2 puffs into the lungs every 6 hours as needed, Disp: 18 g, Rfl: 1     amLODIPine (NORVASC) 10 MG tablet, Take 1 tablet (10 mg) by mouth daily, Disp: 90 tablet, Rfl: 3     azithromycin (ZITHROMAX) 250 MG tablet, Take 1 tablet (250 mg) by mouth daily, Disp: 30 tablet, Rfl: 4     benzonatate (TESSALON) 100 MG capsule, Take 1 capsule (100 mg) by mouth 3 times daily as needed for cough, Disp: 10 capsule, Rfl: 0     biotin 5 MG CAPS, Take 1 capsule by mouth daily, Disp: , Rfl:      calcium citrate (CITRACAL) 950 (200 Ca) MG  tablet, Take 1 tablet by mouth, Disp: , Rfl:      denosumab (PROLIA) 60 MG/ML SOLN injection, Inject 60 mg Subcutaneous every 6 months, Disp: , Rfl:      fluticasone-salmeterol (ADVAIR) 250-50 MCG/DOSE inhaler, Inhale 1 puff into the lungs 2 times daily, Disp: 2 each, Rfl: 1     fluticasone-salmeterol (ADVAIR) 500-50 MCG/DOSE inhaler, Inhale 1 puff into the lungs every 12 hours, Disp: 60 each, Rfl: 11     furosemide (LASIX) 20 MG tablet, Take 1 tablet (20 mg) by mouth in the morning., Disp: 5 tablet, Rfl: 0     guaiFENesin (MUCINEX) 600 MG 12 hr tablet, Take 1 tablet (600 mg) by mouth 2 times daily, Disp: , Rfl:      hydrochlorothiazide (HYDRODIURIL) 25 MG tablet, hydrochlorothiazide 25 mg tablet  TAKE 1 TABLET BY MOUTH DAILY, Disp: 90 tablet, Rfl: 3     ipratropium-albuterol (COMBIVENT RESPIMAT)  MCG/ACT inhaler, Inhale 1 puff into the lungs 4 times daily, Disp: , Rfl:      levETIRAcetam (KEPPRA) 500 MG tablet, Take 1 tablet (500 mg) by mouth 2 times daily, Disp: 180 tablet, Rfl: 3     losartan (COZAAR) 50 MG tablet, Take 1 tablet (50 mg) by mouth 2 times daily, Disp: 90 tablet, Rfl: 3     Magnesium Oxide 500 MG TABS, Take 500 mg by mouth 2 times daily, Disp: , Rfl:      nystatin (MYCOSTATIN) 852027 UNIT/GM external powder, Apply topically daily as needed , Disp: , Rfl:      potassium chloride ER (KLOR-CON M) 20 MEQ CR tablet, Take 1 tablet (20 mEq) by mouth daily, Disp: 90 tablet, Rfl: 3     RABEprazole (ACIPHEX) 20 MG EC tablet, Take 1 tablet (20 mg) by mouth daily, Disp: 90 tablet, Rfl: 3     sertraline (ZOLOFT) 100 MG tablet, Take 1 tablet (100 mg) by mouth daily, Disp: 90 tablet, Rfl: 3     solifenacin (VESICARE) 10 MG tablet, Take 1 tablet (10 mg) by mouth daily, Disp: 90 tablet, Rfl: 3     tiotropium (SPIRIVA HANDIHALER) 18 MCG inhaled capsule, Spiriva with HandiHaler 18 mcg and inhalation capsules (Patient taking differently: Inhale 18 mcg into the lungs daily), Disp: 90 capsule, Rfl: 3     vitamin D3  (CHOLECALCIFEROL) 125 MCG (5000 UT) tablet, Take 5,000 Units by mouth daily , Disp: , Rfl:   No current facility-administered medications for this visit.    Facility-Administered Medications Ordered in Other Visits:      sod bicarbonate-citric acid-simethicone (EZ GAS) 2.21-1.53-0.04 g packet 4 g, 4 g, Oral, Once, Ragini Arellano MD    Family History   Problem Relation Age of Onset     Pulmonary Hypertension Daughter         idiopathic      Heart Disease Other         2 sibs MI, 1 sib electrical conduction disorder     Breast Cancer Mother 66.00     Hypertension Mother      Coronary Artery Disease Mother      Varicose Veins Mother      Hypertension Father      Coronary Artery Disease Sister      Heart Disease Sister      Diabetes Son      Pulmonary Hypertension Daughter      Coronary Artery Disease Sister      Heart Disease Sister        Social History     Socioeconomic History     Marital status:      Spouse name: Not on file     Number of children: Not on file     Years of education: Not on file     Highest education level: Not on file   Occupational History     Not on file   Tobacco Use     Smoking status: Former Smoker     Packs/day: 1.50     Years: 38.00     Pack years: 57.00     Types: Cigarettes     Quit date: 1998     Years since quittin.3     Smokeless tobacco: Never Used     Tobacco comment: quit in    Substance and Sexual Activity     Alcohol use: Yes     Alcohol/week: 2.0 standard drinks     Comment: occ     Drug use: Yes     Types: Oxycodone     Sexual activity: Never   Other Topics Concern     Not on file   Social History Narrative    .  Two kids.  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     Social Determinants of Health     Financial Resource Strain: Not on file   Food Insecurity: Not on file   Transportation Needs: Not on file   Physical Activity: Not on file   Stress: Not on file   Social Connections: Not on file   Intimate Partner Violence: Not on file   Housing Stability: Not  "on file       10 point Review of Systems is negative except for callus tissue which is noted in HPI.     Pulse 105   Ht 1.626 m (5' 4\")   Wt 103 kg (227 lb)   SpO2 95%   BMI 38.96 kg/m      BMI= Body mass index is 38.96 kg/m .    OBJECTIVE:  General appearance: Patient is alert and fully cooperative with history & exam.  No sign of distress is noted during the visit.    Vascular: Dorsalis pedis and posterior tibial pulses are non-palpable.   Dermatologic: Hyperkeratotic tissue distal 4th digit left foot x2. Trophic changes noted including diminished hair growth and shiny skin.  Neurologic: All epicritic and proprioceptive sensations are grossly intact left foot.  Musculoskeletal: Contracted digits left foot.     Imaging:     XR Chest 2 Views: 4 hours upon case completion    Result Date: 3/24/2022  EXAM: XR CHEST 2 VW LOCATION: [facility] DATE/TIME: 3/24/2022 4:53 PM INDICATION: New pacemaker COMPARISON: CT pulmonary angiogram and chest radiography 3/19/2022     IMPRESSION: Left subclavian approach dual chamber pacemaker has right atrial appendage and right ventricular leads. The cardiac silhouette is not enlarged. There are moderate atheromatous calcifications in the arch with lesser calcification of the descending aorta. The nodular opacities in the right upper lobe have decreased from 5 days earlier consistent with improving bronchopneumonia. Subsegmental territories of atelectasis in the right lower lobe along the diaphragmatic pleura are similar. No new/worsening airspace opacities. No pleural effusion or pneumothorax is present.    EP Device    Result Date: 3/24/2022  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   Normal  device operation,  No evidence of acute complication Recommendations:   The patient will be observed on P3 for postprocedure care.   Chest x-ray this afternoon followed by device interrogation   Potential discharge from a pacemaker standpoint today pending interrogation   Start amlodipine 10 mg daily for hypertension   Follow-up in Device Clinic in 1 week. History: Lalitha Underwood is a 77 year old female  agrees to undergo implantation of a  dual chamber pacemaker system.  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  Please see consultation from Dr. Marques.  It is my impression that this is Mobitz type II rather than Mobitz type I second-degree AV block.  The risks benefits and expected outcomes have been explained in detail, and the patient agrees to proceed. Procedure initiation: The patient was taken to the cardiac electrophysiology laboratory in the fasting nonsedated state.  Timeout was called and appropriateness of procedure was documented.  Under my direct supervision, moderate conscious sedation was administered with  continuous monitoring of vital signs.   See procedure log for details with times documented below.  Local infiltration of the operative site with lidocaine.  The patient then had the left shoulder prepped and draped in the usual sterile fashion. Instrumentation:   Access was somewhat difficult and the vein was visualized with 10 cc of IV contrast via the left antecubital vein and found to be patent with a somewhat superior location.  The left subclavian vein was entered with a micropuncture needle using a fluoroscopic  approach over the first rib.The skin was incised with a # 10 blade and the subcutaneous tissue was dissected down to the pectoralis  fascia. The subcutaneous pocket was created using sharp and blunt dissection.   A 7Fr dilator and sheath was used to introduce the atrial lead and right ventricular lead.  The right ventricular lead was positioned along the right ventricular mid septum and actively fixated.  The right atrial lead was positioned in the right atrial appendage.   Pacing and sensing thresholds were acceptable and there was no phrenic nerve stimulation on each lead. The lead was secured with 2 2-0 Tevdek tie down sutures at the pectoralis muscle.  The pocket was flushed with copious amounts of antibiotic irrigation.  The leads and generator were placed within the pocket.  The pocket was closed in 3 layers, with the 2 deep layers being running 2-0 Vicryl suture and the skin approximation with a running 4-0  V-lock suture.  The skin was cleaned and Steri-Strips placed along with a sterile dressing. Device Implanted: MRI compatible system  Pulse generator: [employer], Model number L331, serial number [id number].  Right atrial lead: [employer], Model number 4469, serial number [id number].  Right ventricular lead: [employer], Model number 4470, serial number  [id number]. Implant Characteristics:  Right atrial lead characteristics:  P-wave amplitude 5.5 mV  Capture threshold 0.6 V  Impedance 441 Ohms  Right ventricular lead characteristics:  R-wave amplitude 11.5 mV  Capture threshold 0.4 V  Impedance 434 Ohms               DDD mode 60--130 bpm with AV search hysteresis and longer AV delay to allow intrinsic conduction.     Echocardiogram Complete    Result Date: 3/20/2022  [id number]^SRI^LINDA^[place] [address]  Name: MRS. WASHINGTON JETT MRN: [id number] : 1945 Study Date: 2022 12:36 PM Age: 76 yrs Gender: Female Patient [facility]: [facility] Reason For Study: 2nd degree AV Block Ordering Physician: LINDA FIERRO Performed By: MARISELA  BSA: 2.0 m2 Height: 64 in  Weight: 222 lb HR: 87 BP: 131/58 mmHg ______________________________________________________________________________ Procedure Complete Portable Echo Adult. Adequate quality two-dimensional was performed and interpreted. ______________________________________________________________________________ Interpretation Summary  The left ventricle is normal in size with normal left ventricular wall thickness. Left ventricular function is normal.The ejection fraction is 60-65%. The right ventricle is mildly dilated with normal systolic function. The right atrium is severely dilated. Mild valvular aortic stenosis is present. Right ventricular systolic pressure is elevated, consistent with mild pulmonary hypertension.  Compared to the prior study of 11/6/17 the right ventricle now appears dilated. ______________________________________________________________________________ I      WMSI = 1.00     % Normal = 100  X - Cannot   0 -                      (2) - Mildly 2 -          Segments  Size Interpret    Hyperkinetic 1 - Normal  Hypokinetic  Hypokinetic  1-2     small                                                    7 -          3-5    moderate 3 - Akinetic 4 -          5 -         6 - Akinetic Dyskinetic   6-14    large              Dyskinetic   Aneurysmal  w/scar       w/scar       15-16   diffuse  Left Ventricle The left ventricle is normal in size. Left ventricular function is normal.The ejection fraction is 60-65%. There is normal left ventricular wall thickness. Left ventricular diastolic function is indeterminate. No regional wall motion abnormalities noted.  Right Ventricle The right ventricle is mildly dilated. The right ventricular systolic function is normal.  Atria Normal left atrial size. The right atrium is severely dilated. There is no color Doppler evidence of an atrial shunt.  Mitral Valve Mitral valve leaflets appear normal. There is no evidence of mitral stenosis or clinically significant mitral  regurgitation.  Tricuspid Valve Tricuspid valve leaflets appear normal. There is no evidence of tricuspid stenosis or clinically significant tricuspid regurgitation. The right ventricular systolic pressure is approximated at 44.6 mmHg plus the right atrial pressure. Right ventricular systolic pressure is elevated, consistent with mild pulmonary hypertension. There is mild to moderate (1-2+) tricuspid regurgitation.  Aortic Valve The aortic valve is trileaflet. Mild aortic valve calcification is present. There is trace to mild aortic regurgitation. Mild valvular aortic stenosis.  Pulmonic Valve The pulmonic valve is not well seen, but is grossly normal. This degree of valvular regurgitation is within normal limits. There is trace pulmonic valvular regurgitation.  Vessels The aorta root is normal. Normal size ascending aorta. IVC diameter and respiratory changes fall into an intermediate range suggesting an RA pressure of 8 mmHg.  Pericardium There is no pericardial effusion.  Rhythm Sinus rhythm was noted.  ______________________________________________________________________________ MMode/2D Measurements & Calculations IVSd: 1.1 cm LVIDd: 3.6 cm LVIDs: 2.5 cm LVPWd: 1.1 cm FS: 31.1 %  LV mass(C)d: 118.5 grams LV mass(C)dI: 58.0 grams/m2 Ao root diam: 2.9 cm LA dimension: 4.7 cm asc Aorta Diam: 3.0 cm LA/Ao: 1.6 LVOT diam: 2.2 cm LVOT area: 3.8 cm2 LA Volume Indexed (AL/bp): 25.8 ml/m2 RWT: 0.59  Time Measurements Aortic HR: 88.0 BPM MM HR: 90.0 BPM  Doppler Measurements & Calculations MV E max celio: 92.2 cm/sec MV A max celio: 118.6 cm/sec MV E/A: 0.78 MV dec slope: 374.9 cm/sec2 MV dec time: 0.25 sec Ao V2 max: 299.0 cm/sec Ao max P.0 mmHg Ao V2 mean: 232.0 cm/sec Ao mean P.4 mmHg Ao V2 VTI: 67.4 cm CARINE(I,D): 1.6 cm2 CARINE(V,D): 1.8 cm2 AI P1/2t: 289.0 msec LV V1 max P.6 mmHg LV V1 max: 146.3 cm/sec LV V1 VTI: 29.0 cm CO(LVOT): 9.6 l/min CI(LVOT): 4.7 l/min/m2 SV(LVOT): 109.0 ml SI(LVOT): 53.3 ml/m2 PA  acc time: 0.12 sec TR max qasim: 333.9 cm/sec TR max P.6 mmHg AV Qasim Ratio (DI): 0.49 CARINE Index (cm2/m2): 0.79 E/E': 11.5 E/E' av.7 Lateral E/e': 9.3 Medial E/e': 16.1  Peak E' Qasim: 8.0 cm/sec  ______________________________________________________________________________ Report approved by: Rossy Alicia 2022 09:29 AM       XR Chest Port 1 View    Result Date: 3/19/2022  EXAM: XR CHEST PORTABLE 1 VIEW LOCATION: [facility] DATE/TIME: 2022, 2:48 PM INDICATION: Shortness of breath. COMPARISON: 2022.     IMPRESSION: Heart and mediastinal size are within normal limits. There is patchy infiltrate involving the right upper lobe, new from prior study, suggestive of an infectious process. No pleural effusion or pneumothorax. There is also some silhouetting of  the right hemidiaphragm which represents either atelectasis or an infectious process.     Cardiac Device Check - In Clinic (Post implant follow up)    Result Date: 2022  Type: In-clinic post-op wound and device check s/p implant of a dual chamber pacemaker system, MRI conditional. Presenting Rhythm: As- 90s-100 bpm, frequent PACs observed. Lead/Battery status: Stable lead and battery measurements. Arrhythmias: None Comments: Normal device function; reprogrammed RA and RV outputs from Trend 3.5 to Trend 2.5 V, ATR Fallback Mode/Rate from VDIR 70 to DDIR 60 bpm. Please see post-op note in today's Device RN Epic encounter for wound/patient assessment and education. Patient following up with her primary physician for on-going shortness of breath, SaO2 on RA is 93% today. Pattie Kang RN I have reviewed and interpreted the device interrogation, settings, programming, and encounter summary. The device is functioning within normal device parameters. I agree with the current findings, assessment and plan.    CT Chest Pulmonary Embolism w Contrast    Result Date: 3/19/2022  EXAM: CT CHEST PULMONARY EMBOLISM W  CONTRAST LOCATION: [facility] DATE/TIME: 3/19/2022 8:20 PM INDICATION: PE suspected, low/intermediate prob, neg D-dimer; COPD with worsening symptoms, hypoxia COMPARISON: Portable chest radiography 03/19/2022 and PA and lateral views of the chest 03/11/2022 TECHNIQUE: CT chest pulmonary angiogram during arterial phase injection of IV contrast. Multiplanar reformats and MIP reconstructions were performed. Dose reduction techniques were used. CONTRAST: isovue 370 100ml FINDINGS: ANGIOGRAM CHEST: Enlarged main, right, and left pulmonary arteries. No pulmonary artery filling defects. There are atheromatous calcifications mid ascending aorta, arch, and descending aorta but no findings of acute aortic syndrome. No aortic aneurysm. LUNGS AND PLEURA: Imaging at the expiratory phase of the respiratory cycle. Near complete collapse of the bronchus intermedius. The right upper lobe bronchus and segmental and subsegmental right upper lobe airways are filled with debris and there are numerous airway centric inflammatory nodules in the right upper lobe. Mild interstitial edema in the periphery of the caudal right upper lobe. Mild airway debris in the right lower lobe without airspace opacities. There is a band of cicatrization atelectasis in the left lower lobe and mild airway debris in the segmental left lower lobe airways. No pleural effusion. MEDIASTINUM: Partial anomalous pulmonary venous return. The left superior pulmonary vein drains to the left brachiocephalic vein. Cardiac chambers are normal in size. Variant right pulmonary vein anatomy with independent veins of the right upper, middle,  and lower lobes. No pericardial effusion. No enlarged mediastinal or hilar lymph nodes. Slightly patulous esophagus suggesting a component of dysmotility. Left thyroid nodule measures 19 x 26 mm (series 4, image 17). CORONARY ARTERY CALCIFICATION: Mild. UPPER ABDOMEN: No actionable findings in the imaged upper  abdomen. There our felt pledgets around the diaphragmatic hiatus from prior hiatal hernia repair. MUSCULOSKELETAL: Mild anterior wedge deformity of T6. No aggressive or destructive bone lesions.     IMPRESSION: 1.  Enlarged central pulmonary arteries consistent with pulmonary hypertension. No acute pulmonary embolism. 2.  Large amount of airway material filling the bronchi of the right upper lobe with airway centric inflammatory nodules in the peripheral third of the lobe consistent with bronchopneumonia. 3.  Partial anomalous pulmonary venous return. The left upper lobe pulmonary vein drains to the left brachiocephalic vein in the mediastinum producing a left to right shunt.    XR Video Swallow with SLP or OT    Result Date: 3/21/2022  EXAM: XR VIDEO SWALLOW WITH SLP OR OT LOCATION: [facility] DATE/TIME: 3/21/2022 8:55 AM INDICATION: Difficulty swallowing. COMPARISON: None. TECHNIQUE: Routine swallow study with speech pathology using multiple barium thicknesses. FINDINGS: FLUOROSCOPIC TIME: .3 minutes NUMBER OF IMAGES: 4 Swallow study with Speech Pathology using multiple barium thicknesses. No aspiration or penetration. A cricopharyngeus muscle impression is noted.     IMPRESSION: 1.  No aspiration or penetration.   2.  See the speech pathology report for details. 3.  The esophagram study will be dictated separately.     XR Esophagram    Result Date: 3/21/2022  EXAM: XR ESOPHAGRAM LOCATION: [facility] DATE/TIME: 3/21/2022 8:55 AM INDICATION: aspiration COMPARISON: CT 03/19/2022 TECHNIQUE: Upright single contrast barium esophagram. FINDINGS: FLUOROSCOPIC TIME: .9 minutes NUMBER OF IMAGES: 5 ESOPHAGUS: Normal peristalsis. No masses, strictures, or mucosal abnormalities identified. There is suspected surgical change in the region of the GE junction.  Otherwise this is not well assessed due to patient mobility.     IMPRESSION: 1.  Suspected surgical change in the region  of the GE junction, otherwise not well assessed. 2.  No masses, strictures, or mucosal abnormalities identified. 3.  Normal peristalsis.            Again, thank you for allowing me to participate in the care of your patient.        Sincerely,        Alfonso Orozco DPM

## 2022-04-19 ENCOUNTER — APPOINTMENT (OUTPATIENT)
Dept: ULTRASOUND IMAGING | Facility: CLINIC | Age: 77
End: 2022-04-19
Attending: FAMILY MEDICINE
Payer: MEDICARE

## 2022-04-19 ENCOUNTER — TELEPHONE (OUTPATIENT)
Dept: CARDIOLOGY | Facility: CLINIC | Age: 77
End: 2022-04-19
Payer: MEDICARE

## 2022-04-19 ENCOUNTER — IMMUNIZATION (OUTPATIENT)
Dept: FAMILY MEDICINE | Facility: CLINIC | Age: 77
End: 2022-04-19
Payer: MEDICARE

## 2022-04-19 ENCOUNTER — HOSPITAL ENCOUNTER (EMERGENCY)
Facility: CLINIC | Age: 77
Discharge: HOME | End: 2022-04-19
Attending: FAMILY MEDICINE | Admitting: FAMILY MEDICINE
Payer: MEDICARE

## 2022-04-19 ENCOUNTER — TELEPHONE (OUTPATIENT)
Dept: FAMILY MEDICINE | Facility: CLINIC | Age: 77
End: 2022-04-19

## 2022-04-19 VITALS
SYSTOLIC BLOOD PRESSURE: 133 MMHG | DIASTOLIC BLOOD PRESSURE: 80 MMHG | RESPIRATION RATE: 20 BRPM | OXYGEN SATURATION: 93 % | TEMPERATURE: 97 F | BODY MASS INDEX: 38.76 KG/M2 | HEART RATE: 82 BPM | WEIGHT: 227 LBS | HEIGHT: 64 IN

## 2022-04-19 DIAGNOSIS — R60.0 LEG EDEMA, LEFT: ICD-10-CM

## 2022-04-19 DIAGNOSIS — I10 ESSENTIAL HYPERTENSION, BENIGN: ICD-10-CM

## 2022-04-19 DIAGNOSIS — Z95.0 CARDIAC PACEMAKER IN SITU: Primary | ICD-10-CM

## 2022-04-19 DIAGNOSIS — I35.0 MILD AORTIC VALVE STENOSIS: ICD-10-CM

## 2022-04-19 DIAGNOSIS — I73.9 PAD (PERIPHERAL ARTERY DISEASE) (H): ICD-10-CM

## 2022-04-19 LAB
ALBUMIN SERPL-MCNC: 3.5 G/DL (ref 3.4–5)
ALP SERPL-CCNC: 68 U/L (ref 40–150)
ALT SERPL W P-5'-P-CCNC: 18 U/L (ref 0–50)
ANION GAP SERPL CALCULATED.3IONS-SCNC: 6 MMOL/L (ref 3–14)
AST SERPL W P-5'-P-CCNC: 18 U/L (ref 0–45)
BASOPHILS # BLD AUTO: 0.1 10E3/UL (ref 0–0.2)
BASOPHILS NFR BLD AUTO: 1 %
BILIRUB SERPL-MCNC: 0.4 MG/DL (ref 0.2–1.3)
BUN SERPL-MCNC: 26 MG/DL (ref 7–30)
CALCIUM SERPL-MCNC: 9.5 MG/DL (ref 8.5–10.1)
CHLORIDE BLD-SCNC: 102 MMOL/L (ref 94–109)
CO2 SERPL-SCNC: 34 MMOL/L (ref 20–32)
CREAT SERPL-MCNC: 1.03 MG/DL (ref 0.52–1.04)
EOSINOPHIL # BLD AUTO: 0.1 10E3/UL (ref 0–0.7)
EOSINOPHIL NFR BLD AUTO: 1 %
ERYTHROCYTE [DISTWIDTH] IN BLOOD BY AUTOMATED COUNT: 15.9 % (ref 10–15)
GFR SERPL CREATININE-BSD FRML MDRD: 56 ML/MIN/1.73M2
GLUCOSE BLD-MCNC: 77 MG/DL (ref 70–99)
HCT VFR BLD AUTO: 36.3 % (ref 35–47)
HGB BLD-MCNC: 11.7 G/DL (ref 11.7–15.7)
HOLD SPECIMEN: NORMAL
HOLD SPECIMEN: NORMAL
IMM GRANULOCYTES # BLD: 0.1 10E3/UL
IMM GRANULOCYTES NFR BLD: 1 %
LYMPHOCYTES # BLD AUTO: 2 10E3/UL (ref 0.8–5.3)
LYMPHOCYTES NFR BLD AUTO: 19 %
MCH RBC QN AUTO: 29.3 PG (ref 26.5–33)
MCHC RBC AUTO-ENTMCNC: 32.2 G/DL (ref 31.5–36.5)
MCV RBC AUTO: 91 FL (ref 78–100)
MONOCYTES # BLD AUTO: 1.4 10E3/UL (ref 0–1.3)
MONOCYTES NFR BLD AUTO: 13 %
NEUTROPHILS # BLD AUTO: 6.9 10E3/UL (ref 1.6–8.3)
NEUTROPHILS NFR BLD AUTO: 65 %
NRBC # BLD AUTO: 0 10E3/UL
NRBC BLD AUTO-RTO: 0 /100
NT-PROBNP SERPL-MCNC: 508 PG/ML (ref 0–1800)
PLATELET # BLD AUTO: 213 10E3/UL (ref 150–450)
POTASSIUM BLD-SCNC: 4.3 MMOL/L (ref 3.4–5.3)
PROT SERPL-MCNC: 6.8 G/DL (ref 6.8–8.8)
RBC # BLD AUTO: 4 10E6/UL (ref 3.8–5.2)
SODIUM SERPL-SCNC: 142 MMOL/L (ref 133–144)
TROPONIN I SERPL HS-MCNC: 6 NG/L
TSH SERPL DL<=0.005 MIU/L-ACNC: 3.59 MU/L (ref 0.4–4)
WBC # BLD AUTO: 10.6 10E3/UL (ref 4–11)

## 2022-04-19 PROCEDURE — 93971 EXTREMITY STUDY: CPT | Mod: LT

## 2022-04-19 PROCEDURE — 84484 ASSAY OF TROPONIN QUANT: CPT | Performed by: FAMILY MEDICINE

## 2022-04-19 PROCEDURE — 36415 COLL VENOUS BLD VENIPUNCTURE: CPT | Performed by: FAMILY MEDICINE

## 2022-04-19 PROCEDURE — 93010 ELECTROCARDIOGRAM REPORT: CPT | Performed by: FAMILY MEDICINE

## 2022-04-19 PROCEDURE — 0054A COVID-19,PF,PFIZER (12+ YRS): CPT

## 2022-04-19 PROCEDURE — 99285 EMERGENCY DEPT VISIT HI MDM: CPT | Mod: 25

## 2022-04-19 PROCEDURE — 91305 COVID-19,PF,PFIZER (12+ YRS): CPT

## 2022-04-19 PROCEDURE — 83880 ASSAY OF NATRIURETIC PEPTIDE: CPT | Performed by: FAMILY MEDICINE

## 2022-04-19 PROCEDURE — 85025 COMPLETE CBC W/AUTO DIFF WBC: CPT | Performed by: FAMILY MEDICINE

## 2022-04-19 PROCEDURE — 80053 COMPREHEN METABOLIC PANEL: CPT | Performed by: FAMILY MEDICINE

## 2022-04-19 PROCEDURE — 99284 EMERGENCY DEPT VISIT MOD MDM: CPT | Mod: 25 | Performed by: FAMILY MEDICINE

## 2022-04-19 PROCEDURE — 84443 ASSAY THYROID STIM HORMONE: CPT | Performed by: FAMILY MEDICINE

## 2022-04-19 RX ORDER — AMLODIPINE BESYLATE 10 MG/1
5 TABLET ORAL DAILY
Qty: 90 TABLET | Refills: 3
Start: 2022-04-19 | End: 2022-04-25

## 2022-04-19 ASSESSMENT — ENCOUNTER SYMPTOMS
FEVER: 0
PALPITATIONS: 0
WHEEZING: 0
HEADACHES: 0
BLOOD IN STOOL: 0
CONSTIPATION: 0
COUGH: 0
SORE THROAT: 0
FREQUENCY: 0
DIAPHORESIS: 0
ABDOMINAL PAIN: 0
DIARRHEA: 0
SHORTNESS OF BREATH: 1
SINUS PRESSURE: 0
DYSURIA: 0
CHILLS: 0
VOMITING: 0
NAUSEA: 0

## 2022-04-19 NOTE — TELEPHONE ENCOUNTER
"PC back to pt  Pt has Device check scheduled later this week  Has c/o bilateral leg swelling in the past, but \"never this bad\"  Pt has not been seen by cardiology or established care with cardiology as of yet, pt had dual PPM implant with Dr Pendleton in March while in the hospital  Advised pt that she needs to contact her PMD with the below complaints  I also discussed with pt would need to make an apt with general cardiology to establish care  Pt verbalized understanding, has no further questions or concerns at this time, and has our contact information if needed.  4/19/2022 9:15 AM  Nichol Aguilar RN        "

## 2022-04-19 NOTE — ED NOTES
Pt presents with bilateral leg swelling. States she has been on diuretics for the past 10 days with no improvement. No increased shortness of breath. No pain. States she wears 2 L nasal cannula at night and when up and about. Pt 98% SPO2 with 2L and decreased to 1 L. Has compression socks at home, but states she can get them on but not off so does not wear them.

## 2022-04-19 NOTE — ED PROVIDER NOTES
History     Chief Complaint   Patient presents with     Leg Swelling     HPI  Lalitha Underwood is a 77 year old female who presents with history of paraesophageal hernia status postrepair hypertension lymphedema, seizure, hyperlipidemia, arm swelling due to lymphedema as well.  Aortic valve stenosis, cardiac pacemaker in place.    Seen by pulmonology on 4/5/2022.  History of severe COPD cor pulmonale chronic hypoxic respiratory failure.  She was seen following a COPD exacerbation.  On Advair and Spiriva.  She has also pulmonary hypertension on oxygen.  Mild pulmonary hypertension by echo.  Some findings of ankle edema with volume overload on evaluation at last visit.  Was trialed on Lasix at that time but had minimal to no improvement.  Uses oxygen 2 L with ambulation.    Presents today because the edema in the left leg is worsened.  No VTE history.  Denies recent prolonged travel (>3 hours) by car or plane, history or FHx of venous thromboembolism, recent surgery (last 4 weeks).  She was hospitalized in the last month.    Allergies:  Allergies   Allergen Reactions     Clindamycin Rash     Carbamazepine Rash     Morphine Nausea       Problem List:    Patient Active Problem List    Diagnosis Date Noted     Cardiac pacemaker in situ 03/25/2022     Priority: Medium     Second degree heart block 03/19/2022     Priority: Medium     PAD (peripheral artery disease) (H) 10/18/2021     Priority: Medium     Keratoma 10/18/2021     Priority: Medium     Epileptic seizure (H) 07/16/2021     Priority: Medium     Formatting of this note might be different from the original.  Created by Conversion    Replacement Utility updated for latest IMO load       Esophageal reflux 07/16/2021     Priority: Medium     Formatting of this note might be different from the original.  Created by Conversion       Hyperlipidemia 07/16/2021     Priority: Medium     Formatting of this note might be different from the original.  Created by  Conversion       Impaired gait and mobility 07/16/2021     Priority: Medium     Formatting of this note might be different from the original.  Created by Conversion       Pulmonary emphysema (H) 07/16/2021     Priority: Medium     Formatting of this note might be different from the original.  Created by Conversion       Rosacea 07/16/2021     Priority: Medium     Formatting of this note might be different from the original.  Created by Conversion       Solar elastosis 07/16/2021     Priority: Medium     Formatting of this note might be different from the original.  Created by Conversion       Vitamin D deficiency 07/16/2021     Priority: Medium     Formatting of this note might be different from the original.  Created by Conversion    Replacement Utility updated for latest IMO load       Hammer toe of left foot 05/14/2021     Priority: Medium     Uterine prolapse 03/01/2021     Priority: Medium     Acquired lymphedema of leg 02/01/2021     Priority: Medium     Leg length discrepancy 02/01/2021     Priority: Medium     Localized deposits of fat 05/06/2019     Priority: Medium     Neuropathy, idiopathic 02/21/2019     Priority: Medium     Morbid obesity (H) 12/23/2018     Priority: Medium     S/P repair of paraesophageal hernia 11/26/2018     Priority: Medium     Decreased hearing of both ears 05/29/2018     Priority: Medium     Osteoporosis 02/08/2018     Priority: Medium     Mild aortic valve stenosis 11/17/2017     Priority: Medium     Urge incontinence of urine 11/17/2017     Priority: Medium     Valgus deformity of both feet 03/15/2017     Priority: Medium     Collapsed arches 02/01/2017     Priority: Medium     Essential hypertension, benign      Priority: Medium     Created by Conversion  Replacement Utility updated for latest IMO load         Left arm swelling 12/07/2015     Priority: Medium        Past Medical History:    Past Medical History:   Diagnosis Date     Convulsive disorder (H)      Convulsive  disorder (H)      COPD (chronic obstructive pulmonary disease) (H)      COPD (chronic obstructive pulmonary disease) (H)      Depression      Dyspnea on exertion      Gastroesophageal reflux disease      Heart murmur      Hernia of unspecified site of abdominal cavity without mention of obstruction or gangrene      Hiatal hernia      Hiatal hernia      Hypertension      Hypertension      Obese      On home oxygen therapy      Osteopenia      Osteopenia      Oxygen dependent      Pneumonia due to infectious organism, unspecified laterality, unspecified part of lung 3/19/2022     Second degree heart block 3/19/2022     Shortness of breath      Uncomplicated asthma      URI (upper respiratory infection)      Venous insufficiency of both lower extremities      Venous insufficiency of both lower extremities      Walking troubles        Past Surgical History:    Past Surgical History:   Procedure Laterality Date     ARTHROPLASTY TOE(S) Left 11/22/2021    Procedure: ARTHROPLASTY, digits two and three left foot;  Surgeon: Alfonso Orozco DPM;  Location: [facility]     EP PACEMAKER DEVICE & LEAD IMPLANT- RIGHT ATRIAL & RIGHT VENTRICULAR N/A 3/24/2022    Procedure: Pacemaker Device & Lead Implant - Right Atrial & Right Ventricular;  Surgeon: Helder Pendleton MD;  Location: [facility]     ESOPHAGOSCOPY, GASTROSCOPY, DUODENOSCOPY (EGD), COMBINED N/A 11/26/2018    Procedure: Esophagogastroduodenoscopy;  Surgeon: Jasmeet Wilder MD;  Location: [facility] OR     HERNIA REPAIR, UMBILICAL  04/2018     HERNIA REPAIR, UMBILICAL  04/04/2018    Dr. 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     Repair arm fracture Left      SHOULDER SURGERY Right 1967     SHOULDER SURGERY Right 1967     US BIOPSY THYROID  FINE NEEDLE ASPIRATION  2020       Family History:    Family History   Problem Relation Age of Onset     Pulmonary Hypertension Daughter         idiopathic      Heart Disease Other         2 sibs MI, 1 sib electrical conduction disorder     Breast Cancer Mother 66.00     Hypertension Mother      Coronary Artery Disease Mother      Varicose Veins Mother      Hypertension Father      Coronary Artery Disease Sister      Heart Disease Sister      Diabetes Son      Pulmonary Hypertension Daughter      Coronary Artery Disease Sister      Heart Disease Sister        Social History:  Marital Status:   [2]  Social History     Tobacco Use     Smoking status: Former Smoker     Packs/day: 1.50     Years: 38.00     Pack years: 57.00     Types: Cigarettes     Quit date: 1998     Years since quittin.3     Smokeless tobacco: Never Used     Tobacco comment: quit in    Substance Use Topics     Alcohol use: Yes     Alcohol/week: 2.0 standard drinks     Comment: occ     Drug use: Yes     Types: Oxycodone        Medications:    albuterol (PROAIR HFA/PROVENTIL HFA/VENTOLIN HFA) 108 (90 Base) MCG/ACT inhaler  amLODIPine (NORVASC) 10 MG tablet  azithromycin (ZITHROMAX) 250 MG tablet  benzonatate (TESSALON) 100 MG capsule  biotin 5 MG CAPS  calcium citrate (CITRACAL) 950 (200 Ca) MG tablet  denosumab (PROLIA) 60 MG/ML SOLN injection  fluticasone-salmeterol (ADVAIR) 250-50 MCG/DOSE inhaler  fluticasone-salmeterol (ADVAIR) 500-50 MCG/DOSE inhaler  furosemide (LASIX) 20 MG tablet  guaiFENesin (MUCINEX) 600 MG 12 hr tablet  hydrochlorothiazide (HYDRODIURIL) 25 MG tablet  ipratropium-albuterol (COMBIVENT RESPIMAT)  MCG/ACT inhaler  levETIRAcetam (KEPPRA) 500 MG tablet  losartan (COZAAR) 50 MG tablet  Magnesium Oxide 500 MG TABS  nystatin (MYCOSTATIN) 690925 UNIT/GM external powder  potassium chloride ER (KLOR-CON M) 20 MEQ CR tablet  RABEprazole (ACIPHEX) 20 MG EC tablet  sertraline (ZOLOFT) 100 MG  "tablet  solifenacin (VESICARE) 10 MG tablet  tiotropium (SPIRIVA HANDIHALER) 18 MCG inhaled capsule  vitamin D3 (CHOLECALCIFEROL) 125 MCG (5000 UT) tablet          Review of Systems   Constitutional: Negative for chills, diaphoresis and fever.   HENT: Negative for ear pain, sinus pressure and sore throat.    Eyes: Negative for visual disturbance.   Respiratory: Positive for shortness of breath. Negative for cough and wheezing.    Cardiovascular: Positive for leg swelling. Negative for chest pain and palpitations.   Gastrointestinal: Negative for abdominal pain, blood in stool, constipation, diarrhea, nausea and vomiting.   Genitourinary: Negative for dysuria, frequency and urgency.   Skin: Negative for rash.   Neurological: Negative for headaches.   All other systems reviewed and are negative.      Physical Exam   BP: 133/80  Pulse: 95  Temp: 97  F (36.1  C)  Resp: 20  Height: 162.6 cm (5' 4\")  Weight: 103 kg (227 lb)  SpO2: 90 %      Physical Exam  Constitutional:       General: She is in acute distress.      Appearance: She is not diaphoretic.   Cardiovascular:      Heart sounds: No murmur heard.  Musculoskeletal:      Cervical back: Neck supple.      Right lower leg: Edema present.      Left lower leg: Edema present.   Skin:     Findings: No rash.   Neurological:      Mental Status: She is alert.         Significantly increased edema in the left leg compared to the right.  Normal distal motor function and sensation capillary refill.  No pallor.    ED Course                 Procedures         /       EKG Interpretation:      Interpreted by Malcolm Cooper MD  EKG done at 1511 hrs. demonstrates a paced rhythm 89 bpm.  Left axis.  No acute ST T wave changes.  Discordant appropriately ST and T waves from the QRS.  Normal intervals with exception of DE of 236.  QRS of 129.  Impression paced rhythm at 89 bpm      Critical Care time:  none               Results for orders placed or performed during the hospital " encounter of 04/19/22 (from the past 24 hour(s))   CBC with platelets differential    Narrative    The following orders were created for panel order CBC with platelets differential.  Procedure                               Abnormality         Status                     ---------                               -----------         ------                     CBC with platelets and d...[[id number]]  Abnormal            Final result                 Please view results for these tests on the individual orders.   Comprehensive metabolic panel   Result Value Ref Range    Sodium 142 133 - 144 mmol/L    Potassium 4.3 3.4 - 5.3 mmol/L    Chloride 102 94 - 109 mmol/L    Carbon Dioxide (CO2) 34 (H) 20 - 32 mmol/L    Anion Gap 6 3 - 14 mmol/L    Urea Nitrogen 26 7 - 30 mg/dL    Creatinine 1.03 0.52 - 1.04 mg/dL    Calcium 9.5 8.5 - 10.1 mg/dL    Glucose 77 70 - 99 mg/dL    Alkaline Phosphatase 68 40 - 150 U/L    AST 18 0 - 45 U/L    ALT 18 0 - 50 U/L    Protein Total 6.8 6.8 - 8.8 g/dL    Albumin 3.5 3.4 - 5.0 g/dL    Bilirubin Total 0.4 0.2 - 1.3 mg/dL    GFR Estimate 56 (L) >60 mL/min/1.73m2   Troponin I   Result Value Ref Range    Troponin I High Sensitivity 6 <54 ng/L   TSH with free T4 reflex   Result Value Ref Range    TSH 3.59 0.40 - 4.00 mU/L   Nt probnp inpatient (BNP)   Result Value Ref Range    N terminal Pro BNP Inpatient 508 0 - 1,800 pg/mL   CBC with platelets and differential   Result Value Ref Range    WBC Count 10.6 4.0 - 11.0 10e3/uL    RBC Count 4.00 3.80 - 5.20 10e6/uL    Hemoglobin 11.7 11.7 - 15.7 g/dL    Hematocrit 36.3 35.0 - 47.0 %    MCV 91 78 - 100 fL    MCH 29.3 26.5 - 33.0 pg    MCHC 32.2 31.5 - 36.5 g/dL    RDW 15.9 (H) 10.0 - 15.0 %    Platelet Count 213 150 - 450 10e3/uL    % Neutrophils 65 %    % Lymphocytes 19 %    % Monocytes 13 %    % Eosinophils 1 %    % Basophils 1 %    % Immature Granulocytes 1 %    NRBCs per 100 WBC 0 <1 /100    Absolute Neutrophils 6.9 1.6 - 8.3 10e3/uL    Absolute  Lymphocytes 2.0 0.8 - 5.3 10e3/uL    Absolute Monocytes 1.4 (H) 0.0 - 1.3 10e3/uL    Absolute Eosinophils 0.1 0.0 - 0.7 10e3/uL    Absolute Basophils 0.1 0.0 - 0.2 10e3/uL    Absolute Immature Granulocytes 0.1 <=0.4 10e3/uL    Absolute NRBCs 0.0 10e3/uL   Extra Tube    Narrative    The following orders were created for panel order Extra Tube.  Procedure                               Abnormality         Status                     ---------                               -----------         ------                     Extra Blue Top Tube[[id number]]                              Final result               Extra Red Top Tube[[id number]]                               Final result                 Please view results for these tests on the individual orders.   Extra Blue Top Tube   Result Value Ref Range    Hold Specimen JIC    Extra Red Top Tube   Result Value Ref Range    Hold Specimen JIC    US Lower Extremity Venous Duplex Left    Narrative    VENOUS ULTRASOUND LEFT LEG  4/19/2022 4:00 PM     HISTORY: eval for DVT. Markedly increased edema left leg compared with  right    COMPARISON: None.    FINDINGS:  Examination of the deep veins with graded compression and  color flow Doppler with spectral wave form analysis was performed.  No  evidence for DVT in the left lower extremity. Baker's cyst in the left  popliteal fossa measuring 2.8 x 2.4 x 0.5 cm.      Impression    IMPRESSION: No evidence of deep venous thrombosis.    SELENA DAIGLE MD         SYSTEM ID:  [id number]   Will    Medications - No data to display    Assessments & Plan (with Medical Decision Making)     MDM: Lalitha Underwood is a 77 year old female presenting with left leg edema greater than right.  History of pulmonary hypertension and COPD.  On chronic oxygen supplementation.  On chronic COPD inhalers.  Arrives here with concerns for leg edema.  Will obtain ultrasound of the leg.  Also obtain other causes of edema evaluation including BNP troponin EKG, LFTs  and renal function, TSH.    Findings today are reassuring on ultrasound and laboratory testing and on further history with the patient I learned that she is on Norvasc recently started at 10 mg daily before the onset of worsening leg edema.  I suspect Norvasc is contributing and in fact would not expect Norvasc to be very helpful above 5 mg daily and therefore I would recommend she drop the dose back to 5 mg orally.  I given precautions for return.  I see no obvious acute causes.    I have reviewed the nursing notes.    I have reviewed the findings, diagnosis, plan and need for follow up with the patient.       New Prescriptions    No medications on file       Final diagnoses:   Leg edema, left - no signs of DVT.  reasssuring labs.  elevate legs.  foilow-up primary provider and p[ulmonology.  decrease amlodipine to 5 mg (10 mg shoulder offer no benefit over 5, and does cause more leg swelling). raise legs, consider compression socks       4/19/2022   [facility] EMERGENCY DEPT     Malcolm Cooper MD  04/19/22 1923

## 2022-04-19 NOTE — DISCHARGE INSTRUCTIONS
ICD-10-CM    1. Leg edema, left  R60.0     no signs of DVT.  reasssuring labs.  elevate legs.  foilow-up primary provider and p[ulmonology.  decrease amlodipine to 5 mg (10 mg shoulder offer no benefit over 5, and does cause more leg swelling). raise legs, consider compression socks

## 2022-04-19 NOTE — TELEPHONE ENCOUNTER
Incoming call from patient stating she was told to call to see if PCP can fit her in today or tmrw for an appt. Pt states her legs and feet are very swollen and looks to be a lot of fluid. Pt is requesting a call to let her know today; OK to leave detailed message.

## 2022-04-19 NOTE — TELEPHONE ENCOUNTER
Patient appears to be in the emergency department.  Can offer for ED follow-up next week if needed.    Kate Marte, DO

## 2022-04-19 NOTE — TELEPHONE ENCOUNTER
[facility] Call Center    Phone Message    May a detailed message be left on voicemail: yes     Reason for Call: Symptoms or Concerns     If patient has red-flag symptoms, warm transfer to triage line    Current symptom or concern: bilateral feet and leg swelling    Symptoms have been present for:  10 day(s)    Has patient previously been seen for this? Yes    By : ED    Date: 3/25/2022    Are there any new or worsening symptoms? Ai - Serene finished Lasix today and does not feel it benefited her.  Please call her back to discuss.  She does have a device check on Thursday and is hoping to have someone look at her swelling then if possible      Action Taken: Message routed to:  Other: Cardiology    Travel Screening: Not Applicable

## 2022-04-20 NOTE — TELEPHONE ENCOUNTER
FYI:  Patient scheduled on 04/25 for a follow up.  Patient states that the urgent care told her to cut the amlodipine in half and see if this helps with the swelling.

## 2022-04-21 ENCOUNTER — ALLIED HEALTH/NURSE VISIT (OUTPATIENT)
Dept: CARDIOLOGY | Facility: CLINIC | Age: 77
End: 2022-04-21
Payer: MEDICARE

## 2022-04-21 ENCOUNTER — ANCILLARY PROCEDURE (OUTPATIENT)
Dept: CARDIOLOGY | Facility: CLINIC | Age: 77
End: 2022-04-21
Attending: INTERNAL MEDICINE
Payer: MEDICARE

## 2022-04-21 DIAGNOSIS — Z95.0 CARDIAC PACEMAKER IN SITU: ICD-10-CM

## 2022-04-21 DIAGNOSIS — I44.1 SECOND DEGREE AV BLOCK: Primary | ICD-10-CM

## 2022-04-21 DIAGNOSIS — Z95.0 PACEMAKER: ICD-10-CM

## 2022-04-21 DIAGNOSIS — I44.1 2ND DEGREE AV BLOCK: ICD-10-CM

## 2022-04-21 LAB
ATRIAL RATE - MUSE: 92 BPM
DIASTOLIC BLOOD PRESSURE - MUSE: NORMAL MMHG
INTERPRETATION ECG - MUSE: NORMAL
P AXIS - MUSE: 68 DEGREES
PR INTERVAL - MUSE: 152 MS
QRS DURATION - MUSE: 138 MS
QT - MUSE: 400 MS
QTC - MUSE: 494 MS
R AXIS - MUSE: -18 DEGREES
SYSTOLIC BLOOD PRESSURE - MUSE: NORMAL MMHG
T AXIS - MUSE: 90 DEGREES
VENTRICULAR RATE- MUSE: 92 BPM

## 2022-04-21 PROCEDURE — 93000 ELECTROCARDIOGRAM COMPLETE: CPT | Performed by: INTERNAL MEDICINE

## 2022-04-21 PROCEDURE — 99207 PR NO CHARGE NURSE ONLY: CPT

## 2022-04-21 PROCEDURE — 93280 PM DEVICE PROGR EVAL DUAL: CPT | Performed by: INTERNAL MEDICINE

## 2022-04-22 LAB
MDC_IDC_LEAD_IMPLANT_DT: NORMAL
MDC_IDC_LEAD_IMPLANT_DT: NORMAL
MDC_IDC_LEAD_LOCATION: NORMAL
MDC_IDC_LEAD_LOCATION: NORMAL
MDC_IDC_LEAD_LOCATION_DETAIL_1: NORMAL
MDC_IDC_LEAD_LOCATION_DETAIL_1: NORMAL
MDC_IDC_LEAD_MFG: NORMAL
MDC_IDC_LEAD_MFG: NORMAL
MDC_IDC_LEAD_MODEL: NORMAL
MDC_IDC_LEAD_MODEL: NORMAL
MDC_IDC_LEAD_POLARITY_TYPE: NORMAL
MDC_IDC_LEAD_POLARITY_TYPE: NORMAL
MDC_IDC_LEAD_SERIAL: NORMAL
MDC_IDC_LEAD_SERIAL: NORMAL
MDC_IDC_MSMT_BATTERY_DTM: NORMAL
MDC_IDC_MSMT_BATTERY_REMAINING_LONGEVITY: 150 MO
MDC_IDC_MSMT_BATTERY_REMAINING_PERCENTAGE: 100 %
MDC_IDC_MSMT_BATTERY_STATUS: NORMAL
MDC_IDC_MSMT_LEADCHNL_RA_IMPEDANCE_VALUE: 418 OHM
MDC_IDC_MSMT_LEADCHNL_RA_PACING_THRESHOLD_AMPLITUDE: 0.7 V
MDC_IDC_MSMT_LEADCHNL_RA_PACING_THRESHOLD_PULSEWIDTH: 0.4 MS
MDC_IDC_MSMT_LEADCHNL_RA_SENSING_INTR_AMPL: 5.7 MV
MDC_IDC_MSMT_LEADCHNL_RV_IMPEDANCE_VALUE: 417 OHM
MDC_IDC_MSMT_LEADCHNL_RV_PACING_THRESHOLD_AMPLITUDE: 0.7 V
MDC_IDC_MSMT_LEADCHNL_RV_PACING_THRESHOLD_PULSEWIDTH: 0.4 MS
MDC_IDC_MSMT_LEADCHNL_RV_SENSING_INTR_AMPL: 5.9 MV
MDC_IDC_PG_IMPLANT_DTM: NORMAL
MDC_IDC_PG_MFG: NORMAL
MDC_IDC_PG_MODEL: NORMAL
MDC_IDC_PG_SERIAL: NORMAL
MDC_IDC_PG_TYPE: NORMAL
MDC_IDC_SESS_CLINIC_NAME: NORMAL
MDC_IDC_SESS_DTM: NORMAL
MDC_IDC_SESS_TYPE: NORMAL
MDC_IDC_SET_BRADY_AT_MODE_SWITCH_MODE: NORMAL
MDC_IDC_SET_BRADY_AT_MODE_SWITCH_RATE: 170 {BEATS}/MIN
MDC_IDC_SET_BRADY_LOWRATE: 60 {BEATS}/MIN
MDC_IDC_SET_BRADY_MAX_SENSOR_RATE: 130 {BEATS}/MIN
MDC_IDC_SET_BRADY_MAX_TRACKING_RATE: 130 {BEATS}/MIN
MDC_IDC_SET_BRADY_MODE: NORMAL
MDC_IDC_SET_BRADY_PAV_DELAY_HIGH: 200 MS
MDC_IDC_SET_BRADY_PAV_DELAY_LOW: 250 MS
MDC_IDC_SET_BRADY_SAV_DELAY_HIGH: 200 MS
MDC_IDC_SET_BRADY_SAV_DELAY_LOW: 250 MS
MDC_IDC_SET_LEADCHNL_RA_PACING_AMPLITUDE: 2.5 V
MDC_IDC_SET_LEADCHNL_RA_PACING_CAPTURE_MODE: NORMAL
MDC_IDC_SET_LEADCHNL_RA_PACING_POLARITY: NORMAL
MDC_IDC_SET_LEADCHNL_RA_PACING_PULSEWIDTH: 0.4 MS
MDC_IDC_SET_LEADCHNL_RA_SENSING_ADAPTATION_MODE: NORMAL
MDC_IDC_SET_LEADCHNL_RA_SENSING_POLARITY: NORMAL
MDC_IDC_SET_LEADCHNL_RA_SENSING_SENSITIVITY: 0.25 MV
MDC_IDC_SET_LEADCHNL_RV_PACING_AMPLITUDE: 2.5 V
MDC_IDC_SET_LEADCHNL_RV_PACING_CAPTURE_MODE: NORMAL
MDC_IDC_SET_LEADCHNL_RV_PACING_POLARITY: NORMAL
MDC_IDC_SET_LEADCHNL_RV_PACING_PULSEWIDTH: 0.4 MS
MDC_IDC_SET_LEADCHNL_RV_SENSING_ADAPTATION_MODE: NORMAL
MDC_IDC_SET_LEADCHNL_RV_SENSING_POLARITY: NORMAL
MDC_IDC_SET_LEADCHNL_RV_SENSING_SENSITIVITY: 1.5 MV
MDC_IDC_SET_ZONE_DETECTION_INTERVAL: 375 MS
MDC_IDC_SET_ZONE_TYPE: NORMAL
MDC_IDC_SET_ZONE_VENDOR_TYPE: NORMAL
MDC_IDC_STAT_AT_BURDEN_PERCENT: 0 %
MDC_IDC_STAT_AT_DTM_END: NORMAL
MDC_IDC_STAT_AT_DTM_START: NORMAL
MDC_IDC_STAT_AT_MODE_SW_COUNT: 0
MDC_IDC_STAT_BRADY_DTM_END: NORMAL
MDC_IDC_STAT_BRADY_DTM_START: NORMAL
MDC_IDC_STAT_BRADY_RA_PERCENT_PACED: 1 %
MDC_IDC_STAT_BRADY_RV_PERCENT_PACED: 57 %
MDC_IDC_STAT_EPISODE_RECENT_COUNT: 0
MDC_IDC_STAT_EPISODE_RECENT_COUNT_DTM_END: NORMAL
MDC_IDC_STAT_EPISODE_RECENT_COUNT_DTM_START: NORMAL
MDC_IDC_STAT_EPISODE_TYPE: NORMAL
MDC_IDC_STAT_EPISODE_VENDOR_TYPE: NORMAL

## 2022-04-25 ENCOUNTER — OFFICE VISIT (OUTPATIENT)
Dept: FAMILY MEDICINE | Facility: CLINIC | Age: 77
End: 2022-04-25
Payer: MEDICARE

## 2022-04-25 VITALS
WEIGHT: 229 LBS | BODY MASS INDEX: 39.09 KG/M2 | HEIGHT: 64 IN | HEART RATE: 84 BPM | DIASTOLIC BLOOD PRESSURE: 57 MMHG | OXYGEN SATURATION: 98 % | SYSTOLIC BLOOD PRESSURE: 115 MMHG

## 2022-04-25 DIAGNOSIS — R60.0 BILATERAL LOWER EXTREMITY EDEMA: Primary | ICD-10-CM

## 2022-04-25 PROCEDURE — 99212 OFFICE O/P EST SF 10 MIN: CPT | Performed by: FAMILY MEDICINE

## 2022-04-25 NOTE — PROGRESS NOTES
"  Assessment & Plan     Bilateral lower extremity edema  Suspect in the setting of amlodipine given swelling has improved since decreasing dose.   We reviewed the importance of wearing compression stockings along with elevating the feet.  Although both distal extremities are erythematous, they are not warm and do not appear cellulitic so suspect this is secondary to stasis. Blood pressure is appropriate today and she has not taken her amlodipine yet this morning so we will discontinue completely and continue to monitor. She has a cardiology follow-up at the end of the week where they can reassess blood pressure and improvement in her edema.      Kate Marte, DO  [facility] KARLA Cast is a 77 year old who presents for the following health issues   Chief Complaint   Patient presents with     Hospital F/U     4/19, leg swelling, swelling gone down slightly, questions about inh, order for compression socks     Recheck Medication     Refill of 5mg amlodipine     HPI     Patient was seen at [facility] ED on 4/19/22 for lower extremity swelling.  EKG done showed paced rhythm without acute changes.  .  Left lower extremity ultrasound was negative for DVT.  Was recommended to decrease amlodipine to 5 mg daily.  She previously trialed a course of Lasix with little improvement in her lower extremity edema.  She had not been wearing her compression stockings because they were difficult to put on and off with her acute swelling.  Since stopping the amlodipine she is noted improvement in her swelling.  She has not noticed pain or warmth, but does feel both legs appear more red than normal.      Objective    /57   Pulse 84   Ht 1.626 m (5' 4\")   Wt 103.9 kg (229 lb)   SpO2 98%   BMI 39.31 kg/m    Body mass index is 39.31 kg/m .  Physical Exam   GENERAL: alert, elderly and fatigued  RESP: Mild chronic rhonchi throughout, short of breath with sentences  CV: " regular rates and rhythm and normal S1 S2, no S3 or S4  SKIN: Erythematous skin of the distal lower extremities without warmth or discomfort, 2 mm blister left anterior shin  Ext: 2+ pitting edema bilateral feet, 1+ to mid shin bilaterally though left greater than right

## 2022-04-26 ENCOUNTER — TELEPHONE (OUTPATIENT)
Dept: RESPIRATORY THERAPY | Facility: HOSPITAL | Age: 77
End: 2022-04-26
Payer: MEDICARE

## 2022-04-26 NOTE — TELEPHONE ENCOUNTER
Spoke with Serene. She is feeling well today but has been having problems with leg swelling. She is receiving care for this with her PCP and has an upcoming appointment with Cardiology. She is able to get and take her medications and is compliant in taking them.  Reviewed COPD Action Plan. Serene had no questions for me today.    Cielo Watkins, RT, TTS, Chronic Pulmonary Disease Specialist

## 2022-04-28 ENCOUNTER — TELEPHONE (OUTPATIENT)
Dept: ENDOCRINOLOGY | Facility: CLINIC | Age: 77
End: 2022-04-28
Payer: MEDICARE

## 2022-04-28 DIAGNOSIS — M81.0 AGE-RELATED OSTEOPOROSIS WITHOUT CURRENT PATHOLOGICAL FRACTURE: Primary | ICD-10-CM

## 2022-04-28 DIAGNOSIS — Z92.29 PERSONAL HISTORY OF OTHER DRUG THERAPY: ICD-10-CM

## 2022-04-29 ENCOUNTER — OFFICE VISIT (OUTPATIENT)
Dept: CARDIOLOGY | Facility: CLINIC | Age: 77
End: 2022-04-29
Attending: INTERNAL MEDICINE
Payer: MEDICARE

## 2022-04-29 VITALS
RESPIRATION RATE: 14 BRPM | HEIGHT: 64 IN | WEIGHT: 232 LBS | BODY MASS INDEX: 39.61 KG/M2 | SYSTOLIC BLOOD PRESSURE: 90 MMHG | HEART RATE: 88 BPM | DIASTOLIC BLOOD PRESSURE: 66 MMHG

## 2022-04-29 DIAGNOSIS — I73.9 PAD (PERIPHERAL ARTERY DISEASE) (H): ICD-10-CM

## 2022-04-29 DIAGNOSIS — I10 ESSENTIAL HYPERTENSION, BENIGN: ICD-10-CM

## 2022-04-29 DIAGNOSIS — I35.0 MILD AORTIC VALVE STENOSIS: ICD-10-CM

## 2022-04-29 DIAGNOSIS — Z95.0 CARDIAC PACEMAKER IN SITU: Primary | ICD-10-CM

## 2022-04-29 PROCEDURE — 99204 OFFICE O/P NEW MOD 45 MIN: CPT | Mod: 24 | Performed by: INTERNAL MEDICINE

## 2022-04-29 RX ORDER — SPIRONOLACTONE 25 MG/1
12.5 TABLET ORAL DAILY
Qty: 45 TABLET | Refills: 4 | Status: ON HOLD | OUTPATIENT
Start: 2022-04-29 | End: 2022-08-11

## 2022-04-29 NOTE — LETTER
4/29/2022    Kate Marte DO  [address]  [facility] [address]    RE: Lalitha Underwood       Dear Colleague,     I had the pleasure of seeing Lalitha Underwood in the [facility].  [facility] Note    Assessment:  AV block   [employer] dual-chamber pacemaker March 24, 2022  Preserved left ventricular function  Moderate aortic stenosis with mean gradient 24-  Chronic obstructive lung disease-home oxygen for many years seems progressive  Chronic pedal edema left greater than right  Obesity      Plan:    Recent pacemaker evaluation is excellent  You do show about 50% ventricular pacing-normal function  Because of increasing pedal edema start spironolactone 12.5 mg daily  Pursue low-salt diet  Okay to take azithromycin  as necessary- the QT interval is OK   You do have mild to moderate aortic stenosis with a mean gradient of 24 mm-should have an echocardiogram in 1 year  Follow-up with Dr. Daisha casper in 6 months for general cardiac evaluation          Subjective:    I had the opportunity to see.Lalitha Underwood , who is a 77 year old female with a known history of advanced COPD  Patient has COPD and has been wearing nocturnal oxygen for 20 years now uses oxygen most of the day and night  Has had trouble with recurrent respiratory insufficiency episodes and was recently in the emergency room for evaluation  Noted to have episodes of heart block, 2-1 AV block and underwent a dual-chamber pacemaker by Dr. Pendleton March 24, 2022  Is having increasing amount of ventricular pacing even with extension of AV delays  EKG with ventricular pacing shows QRS was quite wide  Complains of swelling of the left leg.  She did have some surgical procedures for hammertoes that have healed  Tried furosemide but did not have any improvement  Does take HydroDIURIL 25 mg daily  No chest pains  Breathing he is a challenge.  She has chronic obstructive lung disease and has recurrent respiratory  failure  Uses inhalers  Often uses a Z-[place]andon she has flareups  Previously was on nocturnal oxygen for about 20 years now uses oxygen day and night    No anginal chest pains  Awareness of palpitations no syncopal or near syncopal episodes  Lives in a single double apartment with her .  He is quite ill apparently on hospice and I seen him in the past for ICD management            Problem List:  Patient Active Problem List   Diagnosis     S/P repair of paraesophageal hernia     Essential hypertension, benign     Acquired lymphedema of leg     Collapsed arches     Decreased hearing of both ears     Epileptic seizure (H)     Esophageal reflux     Hammer toe of left foot     Hyperlipidemia     Impaired gait and mobility     Left arm swelling     Leg length discrepancy     Localized deposits of fat     Mild aortic valve stenosis     Morbid obesity (H)     Neuropathy, idiopathic     Osteoporosis     Pulmonary emphysema (H)     Rosacea     Solar elastosis     Urge incontinence of urine     Uterine prolapse     Valgus deformity of both feet     Vitamin D deficiency     PAD (peripheral artery disease) (H)     Keratoma     Second degree heart block     Cardiac pacemaker in situ     Medical History:  Past Medical History:   Diagnosis Date     Convulsive disorder (H)      Convulsive disorder (H)      COPD (chronic obstructive pulmonary disease) (H)      COPD (chronic obstructive pulmonary disease) (H)      Depression      Dyspnea on exertion      Gastroesophageal reflux disease      Heart murmur      Hernia of unspecified site of abdominal cavity without mention of obstruction or gangrene      Hiatal hernia      Hiatal hernia      Hypertension      Hypertension      Obese      On home oxygen therapy     at 1.5 liters at nite     Osteopenia      Osteopenia      Oxygen dependent     1.5 L per NC     Pneumonia due to infectious organism, unspecified laterality, unspecified part of lung 3/19/2022     Second degree heart block  3/19/2022     Shortness of breath      Uncomplicated asthma      URI (upper respiratory infection)      Venous insufficiency of both lower extremities      Venous insufficiency of both lower extremities      Walking troubles      Surgical History:  Past Surgical History:   Procedure Laterality Date     ARTHROPLASTY TOE(S) Left 2021    Procedure: ARTHROPLASTY, digits two and three left foot;  Surgeon: Alfonso Orozco DPM;  Location: [facility]     EP PACEMAKER DEVICE & LEAD IMPLANT- RIGHT ATRIAL & RIGHT VENTRICULAR N/A 3/24/2022    Procedure: Pacemaker Device & Lead Implant - Right Atrial & Right Ventricular;  Surgeon: Helder Pendleton MD;  Location: [facility]     ESOPHAGOSCOPY, GASTROSCOPY, DUODENOSCOPY (EGD), COMBINED N/A 2018    Procedure: Esophagogastroduodenoscopy;  Surgeon: Jasmeet Wilder MD;  Location: [facility] OR     HERNIA REPAIR, UMBILICAL  2018     HERNIA REPAIR, UMBILICAL  2018    Dr. Jimenez     LAPAROSCOPIC HERNIORRHAPHY HIATAL N/A 2018    Procedure: Laparoscopic Hiatal Hernia Repair,bilateral chest tubes;  Surgeon: Jasmeet Wilder MD;  Location: [facility] OR     OTHER SURGICAL HISTORY Left     Broke titanium in left arm     Repair arm fracture Left      SHOULDER SURGERY Right 1967     SHOULDER SURGERY Right      US BIOPSY THYROID FINE NEEDLE ASPIRATION  2020     Social History:  Social History     Socioeconomic History     Marital status:      Spouse name: Not on file     Number of children: Not on file     Years of education: Not on file     Highest education level: Not on file   Occupational History     Not on file   Tobacco Use     Smoking status: Former Smoker     Packs/day: 1.50     Years: 38.00     Pack years: 57.00     Types: Cigarettes     Quit date: 1998     Years since quittin.4     Smokeless tobacco: Never Used     Tobacco comment: quit in    Substance and Sexual Activity     Alcohol use: Yes      Alcohol/week: 2.0 standard drinks     Comment: occ     Drug use: Yes     Types: Oxycodone     Sexual activity: Never   Other Topics Concern     Not on file   Social History Narrative    .  Two kids.  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     Social Determinants of Health     Financial Resource Strain: Not on file   Food Insecurity: Not on file   Transportation Needs: Not on file   Physical Activity: Not on file   Stress: Not on file   Social Connections: Not on file   Intimate Partner Violence: Not on file   Housing Stability: Not on file       Review of Systems   ,         Family History:  Family History   Problem Relation Age of Onset     Pulmonary Hypertension Daughter         idiopathic      Heart Disease Other         2 sibs MI, 1 sib electrical conduction disorder     Breast Cancer Mother 66.00     Hypertension Mother      Coronary Artery Disease Mother      Varicose Veins Mother      Hypertension Father      Coronary Artery Disease Sister      Heart Disease Sister      Diabetes Son      Pulmonary Hypertension Daughter      Coronary Artery Disease Sister      Heart Disease Sister          Allergies:  Allergies   Allergen Reactions     Clindamycin Rash     Carbamazepine Rash     Morphine Nausea       Medications:  Current Outpatient Medications   Medication Sig Dispense Refill     albuterol (PROAIR HFA/PROVENTIL HFA/VENTOLIN HFA) 108 (90 Base) MCG/ACT inhaler Inhale 2 puffs into the lungs every 6 hours as needed 18 g 1     azithromycin (ZITHROMAX) 250 MG tablet Take 1 tablet (250 mg) by mouth daily 30 tablet 4     benzonatate (TESSALON) 100 MG capsule Take 1 capsule (100 mg) by mouth 3 times daily as needed for cough 10 capsule 0     calcium citrate (CITRACAL) 950 (200 Ca) MG tablet Take 1 tablet by mouth 2 times daily       denosumab (PROLIA) 60 MG/ML SOLN injection Inject 60 mg Subcutaneous every 6 months       fluticasone-salmeterol (ADVAIR) 500-50 MCG/DOSE inhaler Inhale 1 puff into the lungs every 12 hours 60  each 11     guaiFENesin (MUCINEX) 600 MG 12 hr tablet Take 1 tablet (600 mg) by mouth 2 times daily       hydrochlorothiazide (HYDRODIURIL) 25 MG tablet hydrochlorothiazide 25 mg tablet   TAKE 1 TABLET BY MOUTH DAILY (Patient taking differently: Take 25 mg by mouth daily hydrochlorothiazide 25 mg tablet   TAKE 1 TABLET BY MOUTH DAILY) 90 tablet 3     levETIRAcetam (KEPPRA) 500 MG tablet Take 1 tablet (500 mg) by mouth 2 times daily 180 tablet 3     losartan (COZAAR) 50 MG tablet Take 1 tablet (50 mg) by mouth 2 times daily 90 tablet 3     Magnesium Oxide 500 MG TABS Take 500 mg by mouth 2 times daily       nystatin (MYCOSTATIN) 399151 UNIT/GM external powder Apply topically daily as needed       potassium chloride ER (KLOR-CON M) 20 MEQ CR tablet Take 1 tablet (20 mEq) by mouth daily (Patient taking differently: Take 20 mEq by mouth every evening) 90 tablet 3     RABEprazole (ACIPHEX) 20 MG EC tablet Take 1 tablet (20 mg) by mouth daily 90 tablet 3     sertraline (ZOLOFT) 100 MG tablet Take 1 tablet (100 mg) by mouth daily (Patient taking differently: Take 100 mg by mouth every evening) 90 tablet 3     solifenacin (VESICARE) 10 MG tablet Take 1 tablet (10 mg) by mouth daily 90 tablet 3     spironolactone (ALDACTONE) 25 MG tablet Take 0.5 tablets (12.5 mg) by mouth daily 45 tablet 4     tiotropium (SPIRIVA HANDIHALER) 18 MCG inhaled capsule Spiriva with HandiHaler 18 mcg and inhalation capsules (Patient taking differently: Inhale 18 mcg into the lungs daily) 90 capsule 3     vitamin D3 (CHOLECALCIFEROL) 125 MCG (5000 UT) tablet Take 5,000 Units by mouth daily        ipratropium-albuterol (COMBIVENT RESPIMAT)  MCG/ACT inhaler Inhale 1 puff into the lungs 4 times daily (Patient not taking: Reported on 4/29/2022)           Surgical site  Pacemaker is well-healed to left subclavicular region    Device interrogation  Less than 1% atrial pacing  57% ventricular pacing perhaps even higher now    Rhythm is sinus rhythm  "with 2-1 second-degree heart block  No atrial arrhythmias  No ventricular arrhythmias    Objective:   Vital signs:  BP 90/66 (BP Location: Left arm, Patient Position: Sitting, Cuff Size: Adult Regular)   Pulse 88   Resp 14   Ht 1.626 m (5' 4\")   Wt 105.2 kg (232 lb)   BMI 39.82 kg/m      Blood pressure 125 palpation; left radial  70 and regular without ectopics  Physical Exam:    GENERAL APPEARANCE: Alert, cooperative and in no acute distress.  HEENT: No scleral icterus. No Xanthelasma. Oral mucous membranes pink and moist.  NECK: JVP normal cm. No Hepatojugular reflux. Thyroid not  Palpable  CHEST: clear to auscultation and percussion  CARDIOVASCULAR: S1, S2 3/6 systolic ejection murmur no diastolic murmur     Brachial, radial  pulses are intact and symmetric.   No carotid bruits noted.  ABDOMEN: Non tender. BS+. No bruits.  EXTREMITIES: Bilateral pedal edema with a fair amount erythema     Lab Results:  LIPIDS:  Lab Results   Component Value Date    CHOL 181 02/22/2022    CHOL 192 02/12/2021    CHOL 180 02/24/2020     Lab Results   Component Value Date    HDL 65 02/22/2022    HDL 59 02/12/2021    HDL 63 02/24/2020     No components found for: LDLCALC  Lab Results   Component Value Date    TRIG 103 02/22/2022    TRIG 117 02/12/2021    TRIG 86 02/24/2020     No results found for: CHOLHDL    BMP:  Lab Results   Component Value Date    BUN 26 04/19/2022     04/19/2022    CO2 34 (H) 04/19/2022         This note has been dictated using voice recognition software. Any grammatical or context distortions are unintentional and inherent to the software.  Joe Ellsworth MD  [facility]  [phone]      Thank you for allowing me to participate in the care of your patient.      Sincerely,     Joe Ellsworth MD, MD     [facility]  cc:   Radha Maxwell MD  [address]  [address]  [place] [address]        "

## 2022-04-29 NOTE — PROGRESS NOTES
[facility] Heart Care Note    Assessment:  AV block   [employer] dual-chamber pacemaker March 24, 2022  Preserved left ventricular function  Moderate aortic stenosis with mean gradient 24-  Chronic obstructive lung disease-home oxygen for many years seems progressive  Chronic pedal edema left greater than right  Obesity      Plan:    Recent pacemaker evaluation is excellent  You do show about 50% ventricular pacing-normal function  Because of increasing pedal edema start spironolactone 12.5 mg daily  Pursue low-salt diet  Okay to take azithromycin  as necessary- the QT interval is OK   You do have mild to moderate aortic stenosis with a mean gradient of 24 mm-should have an echocardiogram in 1 year  Follow-up with Dr. Daisha casper in 6 months for general cardiac evaluation          Subjective:    I had the opportunity to see.Lalitha Underwood , who is a 77 year old female with a known history of advanced COPD  Patient has COPD and has been wearing nocturnal oxygen for 20 years now uses oxygen most of the day and night  Has had trouble with recurrent respiratory insufficiency episodes and was recently in the emergency room for evaluation  Noted to have episodes of heart block, 2-1 AV block and underwent a dual-chamber pacemaker by Dr. Pendleton March 24, 2022  Is having increasing amount of ventricular pacing even with extension of AV delays  EKG with ventricular pacing shows QRS was quite wide  Complains of swelling of the left leg.  She did have some surgical procedures for hammertoes that have healed  Tried furosemide but did not have any improvement  Does take HydroDIURIL 25 mg daily  No chest pains  Breathing he is a challenge.  She has chronic obstructive lung disease and has recurrent respiratory failure  Uses inhalers  Often uses a Z-[place] she has flareups  Previously was on nocturnal oxygen for about 20 years now uses oxygen day and night    No anginal chest pains  Awareness of palpitations no syncopal or near  syncopal episodes  Lives in a single double apartment with her .  He is quite ill apparently on hospice and I seen him in the past for ICD management            Problem List:  Patient Active Problem List   Diagnosis     S/P repair of paraesophageal hernia     Essential hypertension, benign     Acquired lymphedema of leg     Collapsed arches     Decreased hearing of both ears     Epileptic seizure (H)     Esophageal reflux     Hammer toe of left foot     Hyperlipidemia     Impaired gait and mobility     Left arm swelling     Leg length discrepancy     Localized deposits of fat     Mild aortic valve stenosis     Morbid obesity (H)     Neuropathy, idiopathic     Osteoporosis     Pulmonary emphysema (H)     Rosacea     Solar elastosis     Urge incontinence of urine     Uterine prolapse     Valgus deformity of both feet     Vitamin D deficiency     PAD (peripheral artery disease) (H)     Keratoma     Second degree heart block     Cardiac pacemaker in situ     Medical History:  Past Medical History:   Diagnosis Date     Convulsive disorder (H)      Convulsive disorder (H)      COPD (chronic obstructive pulmonary disease) (H)      COPD (chronic obstructive pulmonary disease) (H)      Depression      Dyspnea on exertion      Gastroesophageal reflux disease      Heart murmur      Hernia of unspecified site of abdominal cavity without mention of obstruction or gangrene      Hiatal hernia      Hiatal hernia      Hypertension      Hypertension      Obese      On home oxygen therapy     at 1.5 liters at nite     Osteopenia      Osteopenia      Oxygen dependent     1.5 L per NC     Pneumonia due to infectious organism, unspecified laterality, unspecified part of lung 3/19/2022     Second degree heart block 3/19/2022     Shortness of breath      Uncomplicated asthma      URI (upper respiratory infection)      Venous insufficiency of both lower extremities      Venous insufficiency of both lower extremities      Walking  troubles      Surgical History:  Past Surgical History:   Procedure Laterality Date     ARTHROPLASTY TOE(S) Left 2021    Procedure: ARTHROPLASTY, digits two and three left foot;  Surgeon: Alfonso Orozco DPM;  Location: [facility]     EP PACEMAKER DEVICE & LEAD IMPLANT- RIGHT ATRIAL & RIGHT VENTRICULAR N/A 3/24/2022    Procedure: Pacemaker Device & Lead Implant - Right Atrial & Right Ventricular;  Surgeon: Helder Pendleton MD;  Location: [facility]     ESOPHAGOSCOPY, GASTROSCOPY, DUODENOSCOPY (EGD), COMBINED N/A 2018    Procedure: Esophagogastroduodenoscopy;  Surgeon: Jasmeet Wilder MD;  Location: [facility]     HERNIA REPAIR, UMBILICAL  2018     HERNIA REPAIR, UMBILICAL  2018    Dr. Jimenez     LAPAROSCOPIC HERNIORRHAPHY HIATAL N/A 2018    Procedure: Laparoscopic Hiatal Hernia Repair,bilateral chest tubes;  Surgeon: Jasmeet Wilder MD;  Location: [facility] OR     OTHER SURGICAL HISTORY Left     Broke titanium in left arm     Repair arm fracture Left      SHOULDER SURGERY Right 1967     SHOULDER SURGERY Right 1967      BIOPSY THYROID FINE NEEDLE ASPIRATION  2020     Social History:  Social History     Socioeconomic History     Marital status:      Spouse name: Not on file     Number of children: Not on file     Years of education: Not on file     Highest education level: Not on file   Occupational History     Not on file   Tobacco Use     Smoking status: Former Smoker     Packs/day: 1.50     Years: 38.00     Pack years: 57.00     Types: Cigarettes     Quit date: 1998     Years since quittin.4     Smokeless tobacco: Never Used     Tobacco comment: quit in    Substance and Sexual Activity     Alcohol use: Yes     Alcohol/week: 2.0 standard drinks     Comment: occ     Drug use: Yes     Types: Oxycodone     Sexual activity: Never   Other Topics Concern     Not on file   Social History Narrative    .  Two kids.  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     Social Determinants of Health     Financial Resource Strain: Not on file   Food Insecurity: Not on file   Transportation Needs: Not on file   Physical Activity: Not on file   Stress: Not on file   Social Connections: Not on file   Intimate Partner Violence: Not on file   Housing Stability: Not on file       Review of Systems   ,         Family History:  Family History   Problem Relation Age of Onset     Pulmonary Hypertension Daughter         idiopathic      Heart Disease Other         2 sibs MI, 1 sib electrical conduction disorder     Breast Cancer Mother 66.00     Hypertension Mother      Coronary Artery Disease Mother      Varicose Veins Mother      Hypertension Father      Coronary Artery Disease Sister      Heart Disease Sister      Diabetes Son      Pulmonary Hypertension Daughter      Coronary Artery Disease Sister      Heart Disease Sister          Allergies:  Allergies   Allergen Reactions     Clindamycin Rash     Carbamazepine Rash     Morphine Nausea       Medications:  Current Outpatient Medications   Medication Sig Dispense Refill     albuterol (PROAIR HFA/PROVENTIL HFA/VENTOLIN HFA) 108 (90 Base) MCG/ACT inhaler Inhale 2 puffs into the lungs every 6 hours as needed 18 g 1     azithromycin (ZITHROMAX) 250 MG tablet Take 1 tablet (250 mg) by mouth daily 30 tablet 4     benzonatate (TESSALON) 100 MG capsule Take 1 capsule (100 mg) by mouth 3 times daily as needed for cough 10 capsule 0     calcium citrate (CITRACAL) 950 (200 Ca) MG tablet Take 1 tablet by mouth 2 times daily       denosumab (PROLIA) 60 MG/ML SOLN injection Inject 60 mg Subcutaneous every 6 months       fluticasone-salmeterol (ADVAIR) 500-50 MCG/DOSE inhaler Inhale 1 puff into the lungs every 12 hours 60 each 11     guaiFENesin (MUCINEX) 600 MG 12 hr tablet Take 1 tablet (600 mg) by mouth 2 times daily       hydrochlorothiazide (HYDRODIURIL) 25 MG tablet hydrochlorothiazide 25 mg tablet   TAKE 1 TABLET BY MOUTH DAILY  (Patient taking differently: Take 25 mg by mouth daily hydrochlorothiazide 25 mg tablet   TAKE 1 TABLET BY MOUTH DAILY) 90 tablet 3     levETIRAcetam (KEPPRA) 500 MG tablet Take 1 tablet (500 mg) by mouth 2 times daily 180 tablet 3     losartan (COZAAR) 50 MG tablet Take 1 tablet (50 mg) by mouth 2 times daily 90 tablet 3     Magnesium Oxide 500 MG TABS Take 500 mg by mouth 2 times daily       nystatin (MYCOSTATIN) 325929 UNIT/GM external powder Apply topically daily as needed       potassium chloride ER (KLOR-CON M) 20 MEQ CR tablet Take 1 tablet (20 mEq) by mouth daily (Patient taking differently: Take 20 mEq by mouth every evening) 90 tablet 3     RABEprazole (ACIPHEX) 20 MG EC tablet Take 1 tablet (20 mg) by mouth daily 90 tablet 3     sertraline (ZOLOFT) 100 MG tablet Take 1 tablet (100 mg) by mouth daily (Patient taking differently: Take 100 mg by mouth every evening) 90 tablet 3     solifenacin (VESICARE) 10 MG tablet Take 1 tablet (10 mg) by mouth daily 90 tablet 3     spironolactone (ALDACTONE) 25 MG tablet Take 0.5 tablets (12.5 mg) by mouth daily 45 tablet 4     tiotropium (SPIRIVA HANDIHALER) 18 MCG inhaled capsule Spiriva with HandiHaler 18 mcg and inhalation capsules (Patient taking differently: Inhale 18 mcg into the lungs daily) 90 capsule 3     vitamin D3 (CHOLECALCIFEROL) 125 MCG (5000 UT) tablet Take 5,000 Units by mouth daily        ipratropium-albuterol (COMBIVENT RESPIMAT)  MCG/ACT inhaler Inhale 1 puff into the lungs 4 times daily (Patient not taking: Reported on 4/29/2022)           Surgical site  Pacemaker is well-healed to left subclavicular region    Device interrogation  Less than 1% atrial pacing  57% ventricular pacing perhaps even higher now    Rhythm is sinus rhythm with 2-1 second-degree heart block  No atrial arrhythmias  No ventricular arrhythmias    Objective:   Vital signs:  BP 90/66 (BP Location: Left arm, Patient Position: Sitting, Cuff Size: Adult Regular)   Pulse 88    "Resp 14   Ht 1.626 m (5' 4\")   Wt 105.2 kg (232 lb)   BMI 39.82 kg/m      Blood pressure 125 palpation; left radial  70 and regular without ectopics  Physical Exam:    GENERAL APPEARANCE: Alert, cooperative and in no acute distress.  HEENT: No scleral icterus. No Xanthelasma. Oral mucous membranes pink and moist.  NECK: JVP normal cm. No Hepatojugular reflux. Thyroid not  Palpable  CHEST: clear to auscultation and percussion  CARDIOVASCULAR: S1, S2 3/6 systolic ejection murmur no diastolic murmur     Brachial, radial  pulses are intact and symmetric.   No carotid bruits noted.  ABDOMEN: Non tender. BS+. No bruits.  EXTREMITIES: Bilateral pedal edema with a fair amount erythema     Lab Results:  LIPIDS:  Lab Results   Component Value Date    CHOL 181 02/22/2022    CHOL 192 02/12/2021    CHOL 180 02/24/2020     Lab Results   Component Value Date    HDL 65 02/22/2022    HDL 59 02/12/2021    HDL 63 02/24/2020     No components found for: LDLCALC  Lab Results   Component Value Date    TRIG 103 02/22/2022    TRIG 117 02/12/2021    TRIG 86 02/24/2020     No results found for: CHOLHDL    BMP:  Lab Results   Component Value Date    BUN 26 04/19/2022     04/19/2022    CO2 34 (H) 04/19/2022         This note has been dictated using voice recognition software. Any grammatical or context distortions are unintentional and inherent to the software.  Joe Ellsworth MD  [facility]  [phone]      "

## 2022-04-29 NOTE — PATIENT INSTRUCTIONS
Lalitha Underwood    Thank you for coming to the [facility] today for a cardiac evaluation  It was my pleasure to see you today  A good contact for any questions would be Nichol Aguialr  RN @ [phone]     Recent pacemaker evaluation is excellent  You do show about 50% ventricular pacing-normal function  Because of increasing pedal edema start spironolactone 12.5 mg daily  Pursue low-salt diet  Okay to take azithromycin  as necessary- the QT interval is OK   You do have mild to moderate aortic stenosis with a mean gradient of 24 mm-should have an echocardiogram in 1 year  Follow-up with Dr. Daisha casper in 6 months for general cardiac evaluation

## 2022-05-01 ENCOUNTER — MYC MEDICAL ADVICE (OUTPATIENT)
Dept: FAMILY MEDICINE | Facility: CLINIC | Age: 77
End: 2022-05-01
Payer: MEDICARE

## 2022-05-02 NOTE — TELEPHONE ENCOUNTER
Called [facility] and was able to give VO for 180 tablet prescription of Losartan. Sent MyC msg to patient informing her as well.    Storm Roldan RN     [facility]

## 2022-05-04 ENCOUNTER — TELEPHONE (OUTPATIENT)
Dept: FAMILY MEDICINE | Facility: CLINIC | Age: 77
End: 2022-05-04
Payer: MEDICARE

## 2022-05-04 ENCOUNTER — OFFICE VISIT (OUTPATIENT)
Dept: FAMILY MEDICINE | Facility: CLINIC | Age: 77
End: 2022-05-04
Payer: MEDICARE

## 2022-05-04 VITALS
SYSTOLIC BLOOD PRESSURE: 156 MMHG | RESPIRATION RATE: 14 BRPM | HEART RATE: 85 BPM | WEIGHT: 232.5 LBS | BODY MASS INDEX: 39.91 KG/M2 | DIASTOLIC BLOOD PRESSURE: 93 MMHG | OXYGEN SATURATION: 96 %

## 2022-05-04 DIAGNOSIS — J43.9 PULMONARY EMPHYSEMA, UNSPECIFIED EMPHYSEMA TYPE (H): Primary | ICD-10-CM

## 2022-05-04 DIAGNOSIS — J44.1 COPD EXACERBATION (H): Primary | ICD-10-CM

## 2022-05-04 PROCEDURE — 99214 OFFICE O/P EST MOD 30 MIN: CPT | Performed by: FAMILY MEDICINE

## 2022-05-04 RX ORDER — PREDNISONE 20 MG/1
40 TABLET ORAL DAILY
Qty: 10 TABLET | Refills: 0 | Status: SHIPPED | OUTPATIENT
Start: 2022-05-04 | End: 2022-05-04

## 2022-05-04 RX ORDER — PREDNISONE 20 MG/1
40 TABLET ORAL DAILY
Qty: 10 TABLET | Refills: 0 | Status: SHIPPED | OUTPATIENT
Start: 2022-05-04 | End: 2022-06-28

## 2022-05-04 RX ORDER — BENZONATATE 100 MG/1
100 CAPSULE ORAL 3 TIMES DAILY PRN
Qty: 30 CAPSULE | Refills: 1 | Status: SHIPPED | OUTPATIENT
Start: 2022-05-04 | End: 2022-06-28

## 2022-05-04 NOTE — TELEPHONE ENCOUNTER
Reason for Call:  Prescription    Detailed comments: Patient called needing to check with Dr. Marte if she needs another appt to come in to see her. Or if provider can send in a Rx. Patient stated with her COPD and hypertension, she feels her breathing is even worst than before. She would like a refill on the tessalon as well. Patient would like a call back to let her know.     Phone Number Patient can be reached at: Cell number on file:    Telephone Information:   Mobile [phone]       Best Time: Any time    Can we leave a detailed message on this number? YES    Call taken on 5/4/2022 at 10:56 AM by Iraida Ruiz

## 2022-05-04 NOTE — Clinical Note
Dr. Marte, I started kayla on prednisone and augmentin for the suspected copd exacerbation. I recommended that she see you ro her pulmonologist if she is nott improving over the coming 2-3 days. She says she has a follow up scheduled with Dr Tamayo in may but I can't se that scheduled. Thanks,  Ortiz Arreaga MD

## 2022-05-04 NOTE — TELEPHONE ENCOUNTER
I have refilled her Tessalon Perles.  Ideally she would see pulmonology for follow-up regarding her progressive COPD,   Since they are the specialist in this area.  If she notes acute worsening or need to use larger volumes of supplemental oxygen, she should be seen in clinic or at an urgent care setting.    Kate Marte,

## 2022-05-04 NOTE — PROGRESS NOTES
ICD-10-CM    1. COPD exacerbation (H)  J44.1 amoxicillin-clavulanate (AUGMENTIN) 875-125 MG tablet     predniSONE (DELTASONE) 20 MG tablet     DISCONTINUED: amoxicillin-clavulanate (AUGMENTIN) 875-125 MG tablet     DISCONTINUED: predniSONE (DELTASONE) 20 MG tablet   discussed options. Will use prednisone and augmentin. follow up in 2 days with pulmonology or primary if not improving. To ER if worsening.    -------------------------------  Lalitha Underwood with presents with worsening cough, wheezing, shortness of breath, and mild sore throat. All this started about 1-2 weeks ago. No fever. No congestion/nasal nor gi symptoms.    She was hospitalized for pneumonia and severe emphysema about 6 weeks ago. She was improved but still had some home oxygen need. She also has cor pulmonale with improving swelling in her legs since stopping amlodipine. Lasix trial did not seem to help. Her weight is stable.     Treatment measures tried include spiriva, advair, combivent, albuterol, tessalon, guaifenesin, and chronic zithromax  Exposures--no exposures  Recent travel--no    Current Outpatient Medications   Medication Sig Dispense Refill     albuterol (PROAIR HFA/PROVENTIL HFA/VENTOLIN HFA) 108 (90 Base) MCG/ACT inhaler Inhale 2 puffs into the lungs every 6 hours as needed 18 g 1     azithromycin (ZITHROMAX) 250 MG tablet Take 1 tablet (250 mg) by mouth daily 30 tablet 4     benzonatate (TESSALON) 100 MG capsule Take 1 capsule (100 mg) by mouth 3 times daily as needed for cough 30 capsule 1     benzonatate (TESSALON) 100 MG capsule Take 1 capsule (100 mg) by mouth 3 times daily as needed for cough 10 capsule 0     calcium citrate (CITRACAL) 950 (200 Ca) MG tablet Take 1 tablet by mouth 2 times daily       denosumab (PROLIA) 60 MG/ML SOLN injection Inject 60 mg Subcutaneous every 6 months       fluticasone-salmeterol (ADVAIR) 500-50 MCG/DOSE inhaler Inhale 1 puff into the lungs every 12 hours 60 each 11     guaiFENesin  (MUCINEX) 600 MG 12 hr tablet Take 1 tablet (600 mg) by mouth 2 times daily       hydrochlorothiazide (HYDRODIURIL) 25 MG tablet hydrochlorothiazide 25 mg tablet   TAKE 1 TABLET BY MOUTH DAILY (Patient taking differently: Take 25 mg by mouth daily hydrochlorothiazide 25 mg tablet   TAKE 1 TABLET BY MOUTH DAILY) 90 tablet 3     levETIRAcetam (KEPPRA) 500 MG tablet Take 1 tablet (500 mg) by mouth 2 times daily 180 tablet 3     losartan (COZAAR) 50 MG tablet Take 1 tablet (50 mg) by mouth 2 times daily 90 tablet 3     Magnesium Oxide 500 MG TABS Take 500 mg by mouth 2 times daily       nystatin (MYCOSTATIN) 354114 UNIT/GM external powder Apply topically daily as needed       potassium chloride ER (KLOR-CON M) 20 MEQ CR tablet Take 1 tablet (20 mEq) by mouth daily (Patient taking differently: Take 20 mEq by mouth every evening) 90 tablet 3     RABEprazole (ACIPHEX) 20 MG EC tablet Take 1 tablet (20 mg) by mouth daily 90 tablet 3     sertraline (ZOLOFT) 100 MG tablet Take 1 tablet (100 mg) by mouth daily (Patient taking differently: Take 100 mg by mouth every evening) 90 tablet 3     solifenacin (VESICARE) 10 MG tablet Take 1 tablet (10 mg) by mouth daily 90 tablet 3     spironolactone (ALDACTONE) 25 MG tablet Take 0.5 tablets (12.5 mg) by mouth daily 45 tablet 4     tiotropium (SPIRIVA HANDIHALER) 18 MCG inhaled capsule Spiriva with HandiHaler 18 mcg and inhalation capsules (Patient taking differently: Inhale 18 mcg into the lungs daily) 90 capsule 3     vitamin D3 (CHOLECALCIFEROL) 125 MCG (5000 UT) tablet Take 5,000 Units by mouth daily        ipratropium-albuterol (COMBIVENT RESPIMAT)  MCG/ACT inhaler Inhale 1 puff into the lungs 4 times daily (Patient not taking: No sig reported)         ROS otherwise negative for resp., ID,  HEENT symptoms.    Objective: BP (!) 156/93 (BP Location: Right arm, Patient Position: Sitting, Cuff Size: Adult Regular)   Pulse 85   Resp 14   Wt 105.5 kg (232 lb 8 oz)   SpO2 96%    BMI 39.91 kg/m    Exam:  GENERAL APPEARANCE: healthy, alert and no distress  EYES: Eyes grossly normal to inspection  NECK: no adenopathy, no asymmetry, masses, or scars and thyroid normal to palpation  RESP: lungs diffuse wheeze throughout. Harsh wheezy cough noted. No increased work of breathing. No crackles.  CV: regular rates and rhythm.

## 2022-05-09 DIAGNOSIS — J44.0 CHRONIC OBSTRUCTIVE PULMONARY DISEASE WITH ACUTE LOWER RESPIRATORY INFECTION (H): Primary | ICD-10-CM

## 2022-05-09 RX ORDER — IPRATROPIUM BROMIDE AND ALBUTEROL SULFATE 2.5; .5 MG/3ML; MG/3ML
1 SOLUTION RESPIRATORY (INHALATION) EVERY 6 HOURS PRN
Qty: 270 ML | Refills: 11 | Status: SHIPPED | OUTPATIENT
Start: 2022-05-09 | End: 2022-07-14

## 2022-05-16 DIAGNOSIS — J44.9 COPD (CHRONIC OBSTRUCTIVE PULMONARY DISEASE) (H): Primary | ICD-10-CM

## 2022-05-16 RX ORDER — FLUTICASONE PROPIONATE AND SALMETEROL 500; 50 UG/1; UG/1
1 POWDER RESPIRATORY (INHALATION) EVERY 12 HOURS
Qty: 180 EACH | Refills: 3 | Status: SHIPPED | OUTPATIENT
Start: 2022-05-16 | End: 2023-01-03

## 2022-05-17 ENCOUNTER — MEDICAL CORRESPONDENCE (OUTPATIENT)
Dept: HEALTH INFORMATION MANAGEMENT | Facility: CLINIC | Age: 77
End: 2022-05-17
Payer: MEDICARE

## 2022-05-24 ENCOUNTER — MEDICAL CORRESPONDENCE (OUTPATIENT)
Dept: HEALTH INFORMATION MANAGEMENT | Facility: CLINIC | Age: 77
End: 2022-05-24
Payer: MEDICARE

## 2022-05-25 ENCOUNTER — TELEPHONE (OUTPATIENT)
Dept: RESPIRATORY THERAPY | Facility: HOSPITAL | Age: 77
End: 2022-05-25
Payer: MEDICARE

## 2022-05-25 NOTE — TELEPHONE ENCOUNTER
Spoke with Serene. She is feeling at her baseline today and in her green zone. She is trying to get used to using her oxygen and is looking forward to her upcoming pulmonary rehab appointments. She is able to get and take her medications and is compliant in taking them.  Reviewed COPD Action Plan. Serene had no problems or questions for me today.    Cielo Watkins, RT, TTS, Chronic Pulmonary Disease Specialist

## 2022-05-31 ENCOUNTER — MEDICAL CORRESPONDENCE (OUTPATIENT)
Dept: PULMONOLOGY | Facility: OTHER | Age: 77
End: 2022-05-31

## 2022-05-31 ENCOUNTER — ALLIED HEALTH/NURSE VISIT (OUTPATIENT)
Dept: ENDOCRINOLOGY | Facility: CLINIC | Age: 77
End: 2022-05-31

## 2022-05-31 DIAGNOSIS — M81.0 AGE-RELATED OSTEOPOROSIS WITHOUT CURRENT PATHOLOGICAL FRACTURE: Primary | ICD-10-CM

## 2022-05-31 PROCEDURE — 99207 PR NO CHARGE NURSE ONLY: CPT

## 2022-05-31 PROCEDURE — 96372 THER/PROPH/DIAG INJ SC/IM: CPT | Performed by: NURSE PRACTITIONER

## 2022-05-31 NOTE — PROGRESS NOTES
"Prolia Injection Phone Screen      Screening questions have been asked 2-3 days prior to administration visit for Prolia. If any questions are answered with \"Yes,\" this phone encounter were will routed to ordering provider for further evaluation.     1.  When was the last injection?  11/29/21    2.  Has insurance for this injection been verified?  Yes    3.  Did you experience any new onset achiness or rashes that lasted for over a month with your previous Prolia injection?   No    4.  Do you have a fever over 101?F or a new deep cough that is unusual for you today? No    5.  Have you started any new medications in the last 6 months that you were told could affect your immune system? These may have been prescribed by oncologist, transplant, rheumatology, or dermatology.   No    6.  In the last 6 months have you have gastric bypass or parathyroid surgery?   No    7.  Do you plan dental work requiring drilling into the bone such as implants/extractions or oral surgery in the next 2-3 months?   No    8. Do you have new insurance since the last injection?    Patient informed if symptoms discussed above present prior to their administration appointment, they are to notify clinic immediately.     Reshma Chatman RN          The following steps were completed to comply with the REMS program for Prolia:  1. Ordering provider has previously reviewed information in the Medication Guide and Patient Counseling Chart, including the serious risks of Prolia  and the symptoms of each risk and have been advised to seek prompt medical attention if they have signs or symptoms of any of the serious risks.  2. Provided each patient a copy of the Medication Guide and Patient Brochure.  See MAR for administration details.   Indication: Prolia  (denosumab) is a prescription medicine used to treat osteoporosis in patients who:   Are at high risk for fracture, meaning patients who have had a fracture related to osteoporosis, or who have " multiple risk factors for fracture; Cannot use another osteoporosis medicine or other osteoporosis medicines did not work well.   The timeline for early/late injections would be 4 weeks early and any time after the 6 month edmar. If a patient receives their injection late, then the subsequent injection would be 6 months from the date that they actually received the injection    Have the screening questions been asked prior to this administration? Yes      The following medication was given:     MEDICATION: Denosumab (Prolia) 60 mg/ml SOLN  ROUTE: SQ  SITE: Arm - Right  DOSE: 60 mg  LOT #: [id number]  :  [employer]  EXPIRATION DATE:  07/2024  NDC#: see mar

## 2022-06-06 ENCOUNTER — HOSPITAL ENCOUNTER (OUTPATIENT)
Dept: CARDIAC REHAB | Facility: HOSPITAL | Age: 77
Discharge: HOME | End: 2022-06-06
Attending: INTERNAL MEDICINE
Payer: MEDICARE

## 2022-06-06 DIAGNOSIS — J44.9 CHRONIC OBSTRUCTIVE PULMONARY DISEASE, UNSPECIFIED COPD TYPE (H): ICD-10-CM

## 2022-06-08 ENCOUNTER — MYC MEDICAL ADVICE (OUTPATIENT)
Dept: PULMONOLOGY | Facility: OTHER | Age: 77
End: 2022-06-08
Payer: MEDICARE

## 2022-06-13 ENCOUNTER — HOSPITAL ENCOUNTER (OUTPATIENT)
Dept: CARDIAC REHAB | Facility: HOSPITAL | Age: 77
Discharge: HOME | End: 2022-06-13
Attending: INTERNAL MEDICINE
Payer: MEDICARE

## 2022-06-13 PROCEDURE — 94625 PHY/QHP OP PULM RHB W/O MNTR: CPT

## 2022-06-16 ENCOUNTER — HOSPITAL ENCOUNTER (OUTPATIENT)
Dept: CARDIAC REHAB | Facility: HOSPITAL | Age: 77
Discharge: HOME | End: 2022-06-16
Attending: INTERNAL MEDICINE
Payer: MEDICARE

## 2022-06-16 PROCEDURE — 94625 PHY/QHP OP PULM RHB W/O MNTR: CPT

## 2022-06-21 ENCOUNTER — HOSPITAL ENCOUNTER (OUTPATIENT)
Dept: CARDIAC REHAB | Facility: HOSPITAL | Age: 77
Discharge: HOME | End: 2022-06-21
Attending: INTERNAL MEDICINE
Payer: MEDICARE

## 2022-06-21 PROCEDURE — 94625 PHY/QHP OP PULM RHB W/O MNTR: CPT

## 2022-06-23 ENCOUNTER — ANCILLARY PROCEDURE (OUTPATIENT)
Dept: CARDIOLOGY | Facility: CLINIC | Age: 77
End: 2022-06-23
Attending: INTERNAL MEDICINE
Payer: MEDICARE

## 2022-06-23 DIAGNOSIS — I44.1 SECOND DEGREE AV BLOCK: Primary | ICD-10-CM

## 2022-06-23 DIAGNOSIS — Z95.0 CARDIAC PACEMAKER IN SITU: ICD-10-CM

## 2022-06-23 LAB
MDC_IDC_LEAD_IMPLANT_DT: NORMAL
MDC_IDC_LEAD_IMPLANT_DT: NORMAL
MDC_IDC_LEAD_LOCATION: NORMAL
MDC_IDC_LEAD_LOCATION: NORMAL
MDC_IDC_LEAD_LOCATION_DETAIL_1: NORMAL
MDC_IDC_LEAD_LOCATION_DETAIL_1: NORMAL
MDC_IDC_LEAD_MFG: NORMAL
MDC_IDC_LEAD_MFG: NORMAL
MDC_IDC_LEAD_MODEL: NORMAL
MDC_IDC_LEAD_MODEL: NORMAL
MDC_IDC_LEAD_POLARITY_TYPE: NORMAL
MDC_IDC_LEAD_POLARITY_TYPE: NORMAL
MDC_IDC_LEAD_SERIAL: NORMAL
MDC_IDC_LEAD_SERIAL: NORMAL
MDC_IDC_MSMT_BATTERY_DTM: NORMAL
MDC_IDC_MSMT_BATTERY_REMAINING_LONGEVITY: 168 MO
MDC_IDC_MSMT_BATTERY_REMAINING_PERCENTAGE: 100 %
MDC_IDC_MSMT_BATTERY_STATUS: NORMAL
MDC_IDC_MSMT_LEADCHNL_RA_IMPEDANCE_VALUE: 493 OHM
MDC_IDC_MSMT_LEADCHNL_RA_PACING_THRESHOLD_AMPLITUDE: 0.9 V
MDC_IDC_MSMT_LEADCHNL_RA_PACING_THRESHOLD_PULSEWIDTH: 0.4 MS
MDC_IDC_MSMT_LEADCHNL_RA_SENSING_INTR_AMPL: 4 MV
MDC_IDC_MSMT_LEADCHNL_RV_IMPEDANCE_VALUE: 439 OHM
MDC_IDC_MSMT_LEADCHNL_RV_PACING_THRESHOLD_AMPLITUDE: 0.7 V
MDC_IDC_MSMT_LEADCHNL_RV_PACING_THRESHOLD_PULSEWIDTH: 0.4 MS
MDC_IDC_MSMT_LEADCHNL_RV_SENSING_INTR_AMPL: 5.4 MV
MDC_IDC_PG_IMPLANT_DTM: NORMAL
MDC_IDC_PG_MFG: NORMAL
MDC_IDC_PG_MODEL: NORMAL
MDC_IDC_PG_SERIAL: NORMAL
MDC_IDC_PG_TYPE: NORMAL
MDC_IDC_SESS_CLINIC_NAME: NORMAL
MDC_IDC_SESS_DTM: NORMAL
MDC_IDC_SESS_TYPE: NORMAL
MDC_IDC_SET_BRADY_AT_MODE_SWITCH_MODE: NORMAL
MDC_IDC_SET_BRADY_AT_MODE_SWITCH_RATE: 170 {BEATS}/MIN
MDC_IDC_SET_BRADY_LOWRATE: 60 {BEATS}/MIN
MDC_IDC_SET_BRADY_MAX_SENSOR_RATE: 130 {BEATS}/MIN
MDC_IDC_SET_BRADY_MAX_TRACKING_RATE: 130 {BEATS}/MIN
MDC_IDC_SET_BRADY_MODE: NORMAL
MDC_IDC_SET_BRADY_PAV_DELAY_HIGH: 200 MS
MDC_IDC_SET_BRADY_PAV_DELAY_LOW: 250 MS
MDC_IDC_SET_BRADY_SAV_DELAY_HIGH: 200 MS
MDC_IDC_SET_BRADY_SAV_DELAY_LOW: 250 MS
MDC_IDC_SET_LEADCHNL_RA_PACING_AMPLITUDE: 2 V
MDC_IDC_SET_LEADCHNL_RA_PACING_CAPTURE_MODE: NORMAL
MDC_IDC_SET_LEADCHNL_RA_PACING_POLARITY: NORMAL
MDC_IDC_SET_LEADCHNL_RA_PACING_PULSEWIDTH: 0.4 MS
MDC_IDC_SET_LEADCHNL_RA_SENSING_ADAPTATION_MODE: NORMAL
MDC_IDC_SET_LEADCHNL_RA_SENSING_POLARITY: NORMAL
MDC_IDC_SET_LEADCHNL_RA_SENSING_SENSITIVITY: 0.25 MV
MDC_IDC_SET_LEADCHNL_RV_PACING_AMPLITUDE: 2 V
MDC_IDC_SET_LEADCHNL_RV_PACING_CAPTURE_MODE: NORMAL
MDC_IDC_SET_LEADCHNL_RV_PACING_POLARITY: NORMAL
MDC_IDC_SET_LEADCHNL_RV_PACING_PULSEWIDTH: 0.4 MS
MDC_IDC_SET_LEADCHNL_RV_SENSING_ADAPTATION_MODE: NORMAL
MDC_IDC_SET_LEADCHNL_RV_SENSING_POLARITY: NORMAL
MDC_IDC_SET_LEADCHNL_RV_SENSING_SENSITIVITY: 1.5 MV
MDC_IDC_SET_ZONE_DETECTION_INTERVAL: 375 MS
MDC_IDC_SET_ZONE_TYPE: NORMAL
MDC_IDC_SET_ZONE_VENDOR_TYPE: NORMAL
MDC_IDC_STAT_AT_BURDEN_PERCENT: 0 %
MDC_IDC_STAT_AT_DTM_END: NORMAL
MDC_IDC_STAT_AT_DTM_START: NORMAL
MDC_IDC_STAT_AT_MODE_SW_COUNT: 0
MDC_IDC_STAT_BRADY_DTM_END: NORMAL
MDC_IDC_STAT_BRADY_DTM_START: NORMAL
MDC_IDC_STAT_BRADY_RA_PERCENT_PACED: 0 %
MDC_IDC_STAT_BRADY_RV_PERCENT_PACED: 18 %
MDC_IDC_STAT_EPISODE_RECENT_COUNT: 0
MDC_IDC_STAT_EPISODE_RECENT_COUNT_DTM_END: NORMAL
MDC_IDC_STAT_EPISODE_RECENT_COUNT_DTM_START: NORMAL
MDC_IDC_STAT_EPISODE_TYPE: NORMAL
MDC_IDC_STAT_EPISODE_VENDOR_TYPE: NORMAL

## 2022-06-23 PROCEDURE — 93280 PM DEVICE PROGR EVAL DUAL: CPT | Performed by: INTERNAL MEDICINE

## 2022-06-24 ENCOUNTER — TELEPHONE (OUTPATIENT)
Dept: RESPIRATORY THERAPY | Facility: HOSPITAL | Age: 77
End: 2022-06-24

## 2022-06-27 ENCOUNTER — OFFICE VISIT (OUTPATIENT)
Dept: PODIATRY | Facility: CLINIC | Age: 77
End: 2022-06-27
Payer: MEDICARE

## 2022-06-27 VITALS — OXYGEN SATURATION: 97 % | HEART RATE: 97 BPM | BODY MASS INDEX: 37.56 KG/M2 | HEIGHT: 64 IN | WEIGHT: 220 LBS

## 2022-06-27 DIAGNOSIS — M20.42 HAMMER TOE OF LEFT FOOT: ICD-10-CM

## 2022-06-27 DIAGNOSIS — L57.0 KERATOMA: Primary | ICD-10-CM

## 2022-06-27 DIAGNOSIS — I70.90 UNSPECIFIED ATHEROSCLEROSIS: ICD-10-CM

## 2022-06-27 DIAGNOSIS — I73.9 PAD (PERIPHERAL ARTERY DISEASE) (H): ICD-10-CM

## 2022-06-27 PROCEDURE — 11057 PARNG/CUTG B9 HYPRKR LES >4: CPT | Mod: Q8 | Performed by: PODIATRIST

## 2022-06-27 RX ORDER — AZITHROMYCIN 250 MG/1
250 TABLET, FILM COATED ORAL DAILY
COMMUNITY
Start: 2022-05-31 | End: 2022-06-28

## 2022-06-27 ASSESSMENT — PAIN SCALES - GENERAL: PAINLEVEL: MILD PAIN (2)

## 2022-06-27 NOTE — PROGRESS NOTES
FOOT AND ANKLE SURGERY/PODIATRY PROGRESS NOTE        ASSESSMENT:   Keratoma  Hammertoe  PAD      TREATMENT:  -I trimmed callus tissue plantar left heel and distal 4th digit left foot x5. No evidence of a foreign body.     -She will continue to use the CREST pad during the day, remove at night.    -Patient's questions invited and answered. She was encouraged to call my office with any further questions or concerns.     Alfonso Orozco DPM  [facility]      HPI: Lalitha Underwood was seen again today for left heel pain. She is concerned about a possible sliver in the left heel when she noticed pain start about one week ago. Denies previous treatment. Also complains of callus tissue on the 4th digit left foot.     Past Medical History:   Diagnosis Date     Convulsive disorder (H)      Convulsive disorder (H)      COPD (chronic obstructive pulmonary disease) (H)      COPD (chronic obstructive pulmonary disease) (H)      Depression      Dyspnea on exertion      Gastroesophageal reflux disease      Heart murmur      Hernia of unspecified site of abdominal cavity without mention of obstruction or gangrene      Hiatal hernia      Hiatal hernia      Hypertension      Hypertension      Obese      On home oxygen therapy     at 1.5 liters at nite     Osteopenia      Osteopenia      Oxygen dependent     1.5 L per NC     Pneumonia due to infectious organism, unspecified laterality, unspecified part of lung 3/19/2022     Second degree heart block 3/19/2022     Shortness of breath      Uncomplicated asthma      URI (upper respiratory infection)      Venous insufficiency of both lower extremities      Venous insufficiency of both lower extremities      Walking troubles        Past Surgical History:   Procedure Laterality Date     ARTHROPLASTY TOE(S) Left 11/22/2021    Procedure: ARTHROPLASTY, digits two and three left foot;  Surgeon: Alfonso Orozco DPM;  Location: [facility]      EP PACEMAKER DEVICE & LEAD IMPLANT- RIGHT ATRIAL & RIGHT VENTRICULAR N/A 3/24/2022    Procedure: Pacemaker Device & Lead Implant - Right Atrial & Right Ventricular;  Surgeon: Helder Pendleton MD;  Location: [facility]     ESOPHAGOSCOPY, GASTROSCOPY, DUODENOSCOPY (EGD), COMBINED N/A 11/26/2018    Procedure: Esophagogastroduodenoscopy;  Surgeon: Jasmeet Wilder MD;  Location: [facility] OR     HERNIA REPAIR, UMBILICAL  04/2018     HERNIA REPAIR, UMBILICAL  04/04/2018    Dr. 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     Repair arm fracture Left      SHOULDER SURGERY Right 1967     SHOULDER SURGERY Right 1967     US BIOPSY THYROID FINE NEEDLE ASPIRATION  7/29/2020       Allergies   Allergen Reactions     Clindamycin Rash     Carbamazepine Rash     Morphine Nausea         Current Outpatient Medications:      albuterol (PROAIR HFA/PROVENTIL HFA/VENTOLIN HFA) 108 (90 Base) MCG/ACT inhaler, Inhale 2 puffs into the lungs every 6 hours as needed, Disp: 18 g, Rfl: 1     amoxicillin-clavulanate (AUGMENTIN) 875-125 MG tablet, Take 1 tablet by mouth 2 times daily, Disp: 14 tablet, Rfl: 0     azithromycin (ZITHROMAX) 250 MG tablet, Take 250 mg by mouth daily, Disp: , Rfl:      benzonatate (TESSALON) 100 MG capsule, Take 1 capsule (100 mg) by mouth 3 times daily as needed for cough, Disp: 30 capsule, Rfl: 1     calcium citrate (CITRACAL) 950 (200 Ca) MG tablet, Take 1 tablet by mouth 2 times daily, Disp: , Rfl:      denosumab (PROLIA) 60 MG/ML SOLN injection, Inject 60 mg Subcutaneous every 6 months, Disp: , Rfl:      fluticasone-salmeterol (ADVAIR) 500-50 MCG/ACT inhaler, Inhale 1 puff into the lungs every 12 hours, Disp: 180 each, Rfl: 3     guaiFENesin (MUCINEX) 600 MG 12 hr tablet, Take 1 tablet (600 mg) by  mouth 2 times daily, Disp: , Rfl:      hydrochlorothiazide (HYDRODIURIL) 25 MG tablet, hydrochlorothiazide 25 mg tablet  TAKE 1 TABLET BY MOUTH DAILY (Patient taking differently: Take 25 mg by mouth daily hydrochlorothiazide 25 mg tablet  TAKE 1 TABLET BY MOUTH DAILY), Disp: 90 tablet, Rfl: 3     levETIRAcetam (KEPPRA) 500 MG tablet, Take 1 tablet (500 mg) by mouth 2 times daily, Disp: 180 tablet, Rfl: 3     losartan (COZAAR) 50 MG tablet, Take 1 tablet (50 mg) by mouth 2 times daily, Disp: 90 tablet, Rfl: 3     Magnesium Oxide 500 MG TABS, Take 500 mg by mouth 2 times daily, Disp: , Rfl:      nystatin (MYCOSTATIN) 812114 UNIT/GM external powder, Apply topically daily as needed, Disp: , Rfl:      potassium chloride ER (KLOR-CON M) 20 MEQ CR tablet, Take 1 tablet (20 mEq) by mouth daily (Patient taking differently: Take 20 mEq by mouth every evening), Disp: 90 tablet, Rfl: 3     predniSONE (DELTASONE) 20 MG tablet, Take 2 tablets (40 mg) by mouth daily, Disp: 10 tablet, Rfl: 0     RABEprazole (ACIPHEX) 20 MG EC tablet, Take 1 tablet (20 mg) by mouth daily, Disp: 90 tablet, Rfl: 3     sertraline (ZOLOFT) 100 MG tablet, Take 1 tablet (100 mg) by mouth daily (Patient taking differently: Take 100 mg by mouth every evening), Disp: 90 tablet, Rfl: 3     solifenacin (VESICARE) 10 MG tablet, Take 1 tablet (10 mg) by mouth daily, Disp: 90 tablet, Rfl: 3     spironolactone (ALDACTONE) 25 MG tablet, Take 0.5 tablets (12.5 mg) by mouth daily, Disp: 45 tablet, Rfl: 4     tiotropium (SPIRIVA HANDIHALER) 18 MCG inhaled capsule, Spiriva with HandiHaler 18 mcg and inhalation capsules (Patient taking differently: Inhale 18 mcg into the lungs daily), Disp: 90 capsule, Rfl: 3     vitamin D3 (CHOLECALCIFEROL) 125 MCG (5000 UT) tablet, Take 5,000 Units by mouth daily , Disp: , Rfl:      ipratropium - albuterol 0.5 mg/2.5 mg/3 mL (DUONEB) 0.5-2.5 (3) MG/3ML neb solution, Take 1 vial (3 mLs) by nebulization every 6 hours as needed for  shortness of breath / dyspnea or wheezing, Disp: 270 mL, Rfl: 11     ipratropium-albuterol (COMBIVENT RESPIMAT)  MCG/ACT inhaler, Inhale 1 puff into the lungs 4 times daily (Patient not taking: Reported on 2022), Disp: , Rfl:   No current facility-administered medications for this visit.    Facility-Administered Medications Ordered in Other Visits:      sod bicarbonate-citric acid-simethicone (EZ GAS) 2.21-1.53-0.04 g packet 4 g, 4 g, Oral, Once, Ragini Arellano MD    Family History   Problem Relation Age of Onset     Pulmonary Hypertension Daughter         idiopathic      Heart Disease Other         2 sibs MI, 1 sib electrical conduction disorder     Breast Cancer Mother 66.00     Hypertension Mother      Coronary Artery Disease Mother      Varicose Veins Mother      Hypertension Father      Coronary Artery Disease Sister      Heart Disease Sister      Diabetes Son      Pulmonary Hypertension Daughter      Coronary Artery Disease Sister      Heart Disease Sister        Social History     Socioeconomic History     Marital status:      Spouse name: Not on file     Number of children: Not on file     Years of education: Not on file     Highest education level: Not on file   Occupational History     Not on file   Tobacco Use     Smoking status: Former Smoker     Packs/day: 1.50     Years: 38.00     Pack years: 57.00     Types: Cigarettes     Quit date: 1998     Years since quittin.5     Smokeless tobacco: Never Used     Tobacco comment: quit in    Substance and Sexual Activity     Alcohol use: Yes     Alcohol/week: 2.0 standard drinks     Comment: occ     Drug use: Yes     Types: Oxycodone     Sexual activity: Never   Other Topics Concern     Not on file   Social History Narrative    .  Two kids.  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     Social Determinants of Health     Financial Resource Strain: Not on file   Food Insecurity: Not on file   Transportation Needs: Not on file   Physical  "Activity: Not on file   Stress: Not on file   Social Connections: Not on file   Intimate Partner Violence: Not on file   Housing Stability: Not on file       10 point Review of Systems is negative except for callus tissue which is noted in HPI.     Pulse 97   Ht 1.626 m (5' 4\")   Wt 99.8 kg (220 lb)   SpO2 97%   BMI 37.76 kg/m      BMI= Body mass index is 37.76 kg/m .    OBJECTIVE:  General appearance: Patient is alert and fully cooperative with history & exam.  No sign of distress is noted during the visit.    Vascular: Posterior tibial pulses are non-palpable left. There is no appreciable edema noted.  Dermatologic: Hyperkeratotic tissue plantar left heel and distal 4th digit left foot x5. No erythema left foot or evidence of foreign body.   Neurologic: All epicritic and proprioceptive sensations are grossly intact left.  Musculoskeletal: Contracted digits left foot.     Imaging:     Cardiac Device Check - In Clinic    Result Date: 6/23/2022  [place] Acc[place]ade (D) 3 month Post Pacemaker Device and Incision Check Patient seen in clinic for device evaluation and iterative programming. AP: <1% : 18% Mode: DDD 60-130bpm Presenting: ASVS 82bpm Underlying Rhythm: SR 80's Heart Rate: Stable, HR's per histogram range 60-170bpm and primarily 70-120bpm Sensing: Stable Pacing Threshold: Stable Impedance: Stable Battery Status: Estimating 14 years remaining Incision: Clean, dry, intact, and no signs or symptoms of infection. Atrial Arrhythmia: None Ventricular Arrhythmia: None Setting Change: RA and RV Amplitude changed from 2.5V to 2.0V. Care Plan: Normal device function. Reviewed incision care and monitoring, activity resumption, remote monitoring, routine follow-up care, and when to call the clinic. Remote device check scheduled for 10/3/22. Sujatha Chapman RN I have reviewed and interpreted the device interrogation, settings, programming and nurse's summary. The device is functioning within normal device parameters. I " agree with the current findings, assessment and plan.

## 2022-06-27 NOTE — LETTER
6/27/2022         RE: Lalitha Underwood  [address]  [facility] [address]        Dear Colleague,    Thank you for referring your patient, Lalitha Underwood, to the [facility]. Please see a copy of my visit note below.        FOOT AND ANKLE SURGERY/PODIATRY PROGRESS NOTE        ASSESSMENT:   Keratoma  Hammertoe  PAD      TREATMENT:  -I trimmed callus tissue plantar left heel and distal 4th digit left foot x5. No evidence of a foreign body.     -She will continue to use the CREST pad during the day, remove at night.    -Patient's questions invited and answered. She was encouraged to call my office with any further questions or concerns.     Alfonso Orozco DPM  [facility] Podiatry/Foot & Ankle Surgery      HPI: Lalitha Underwood was seen again today for left heel pain. She is concerned about a possible sliver in the left heel when she noticed pain start about one week ago. Denies previous treatment. Also complains of callus tissue on the 4th digit left foot.     Past Medical History:   Diagnosis Date     Convulsive disorder (H)      Convulsive disorder (H)      COPD (chronic obstructive pulmonary disease) (H)      COPD (chronic obstructive pulmonary disease) (H)      Depression      Dyspnea on exertion      Gastroesophageal reflux disease      Heart murmur      Hernia of unspecified site of abdominal cavity without mention of obstruction or gangrene      Hiatal hernia      Hiatal hernia      Hypertension      Hypertension      Obese      On home oxygen therapy     at 1.5 liters at nite     Osteopenia      Osteopenia      Oxygen dependent     1.5 L per NC     Pneumonia due to infectious organism, unspecified laterality, unspecified part of lung 3/19/2022     Second degree heart block 3/19/2022     Shortness of breath      Uncomplicated asthma      URI (upper respiratory infection)      Venous insufficiency of both lower extremities      Venous insufficiency of both lower  extremities      Walking troubles        Past Surgical History:   Procedure Laterality Date     ARTHROPLASTY TOE(S) Left 11/22/2021    Procedure: ARTHROPLASTY, digits two and three left foot;  Surgeon: Alfonso Orozco DPM;  Location: [facility]     EP PACEMAKER DEVICE & LEAD IMPLANT- RIGHT ATRIAL & RIGHT VENTRICULAR N/A 3/24/2022    Procedure: Pacemaker Device & Lead Implant - Right Atrial & Right Ventricular;  Surgeon: Helder Pendleton MD;  Location: [facility]     ESOPHAGOSCOPY, GASTROSCOPY, DUODENOSCOPY (EGD), COMBINED N/A 11/26/2018    Procedure: Esophagogastroduodenoscopy;  Surgeon: Jasmeet Wilder MD;  Location: [facility] OR     HERNIA REPAIR, UMBILICAL  04/2018     HERNIA REPAIR, UMBILICAL  04/04/2018    Dr. 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     Repair arm fracture Left      SHOULDER SURGERY Right 1967     SHOULDER SURGERY Right 1967     US BIOPSY THYROID FINE NEEDLE ASPIRATION  7/29/2020       Allergies   Allergen Reactions     Clindamycin Rash     Carbamazepine Rash     Morphine Nausea         Current Outpatient Medications:      albuterol (PROAIR HFA/PROVENTIL HFA/VENTOLIN HFA) 108 (90 Base) MCG/ACT inhaler, Inhale 2 puffs into the lungs every 6 hours as needed, Disp: 18 g, Rfl: 1     amoxicillin-clavulanate (AUGMENTIN) 875-125 MG tablet, Take 1 tablet by mouth 2 times daily, Disp: 14 tablet, Rfl: 0     azithromycin (ZITHROMAX) 250 MG tablet, Take 250 mg by mouth daily, Disp: , Rfl:      benzonatate (TESSALON) 100 MG capsule, Take 1 capsule (100 mg) by mouth 3 times daily as needed for cough, Disp: 30 capsule, Rfl: 1     calcium citrate (CITRACAL) 950 (200 Ca) MG tablet, Take 1 tablet by mouth 2 times daily, Disp: , Rfl:      denosumab (PROLIA) 60 MG/ML SOLN  injection, Inject 60 mg Subcutaneous every 6 months, Disp: , Rfl:      fluticasone-salmeterol (ADVAIR) 500-50 MCG/ACT inhaler, Inhale 1 puff into the lungs every 12 hours, Disp: 180 each, Rfl: 3     guaiFENesin (MUCINEX) 600 MG 12 hr tablet, Take 1 tablet (600 mg) by mouth 2 times daily, Disp: , Rfl:      hydrochlorothiazide (HYDRODIURIL) 25 MG tablet, hydrochlorothiazide 25 mg tablet  TAKE 1 TABLET BY MOUTH DAILY (Patient taking differently: Take 25 mg by mouth daily hydrochlorothiazide 25 mg tablet  TAKE 1 TABLET BY MOUTH DAILY), Disp: 90 tablet, Rfl: 3     levETIRAcetam (KEPPRA) 500 MG tablet, Take 1 tablet (500 mg) by mouth 2 times daily, Disp: 180 tablet, Rfl: 3     losartan (COZAAR) 50 MG tablet, Take 1 tablet (50 mg) by mouth 2 times daily, Disp: 90 tablet, Rfl: 3     Magnesium Oxide 500 MG TABS, Take 500 mg by mouth 2 times daily, Disp: , Rfl:      nystatin (MYCOSTATIN) 813982 UNIT/GM external powder, Apply topically daily as needed, Disp: , Rfl:      potassium chloride ER (KLOR-CON M) 20 MEQ CR tablet, Take 1 tablet (20 mEq) by mouth daily (Patient taking differently: Take 20 mEq by mouth every evening), Disp: 90 tablet, Rfl: 3     predniSONE (DELTASONE) 20 MG tablet, Take 2 tablets (40 mg) by mouth daily, Disp: 10 tablet, Rfl: 0     RABEprazole (ACIPHEX) 20 MG EC tablet, Take 1 tablet (20 mg) by mouth daily, Disp: 90 tablet, Rfl: 3     sertraline (ZOLOFT) 100 MG tablet, Take 1 tablet (100 mg) by mouth daily (Patient taking differently: Take 100 mg by mouth every evening), Disp: 90 tablet, Rfl: 3     solifenacin (VESICARE) 10 MG tablet, Take 1 tablet (10 mg) by mouth daily, Disp: 90 tablet, Rfl: 3     spironolactone (ALDACTONE) 25 MG tablet, Take 0.5 tablets (12.5 mg) by mouth daily, Disp: 45 tablet, Rfl: 4     tiotropium (SPIRIVA HANDIHALER) 18 MCG inhaled capsule, Spiriva with HandiHaler 18 mcg and inhalation capsules (Patient taking differently: Inhale 18 mcg into the lungs daily), Disp: 90 capsule, Rfl:  3     vitamin D3 (CHOLECALCIFEROL) 125 MCG (5000 UT) tablet, Take 5,000 Units by mouth daily , Disp: , Rfl:      ipratropium - albuterol 0.5 mg/2.5 mg/3 mL (DUONEB) 0.5-2.5 (3) MG/3ML neb solution, Take 1 vial (3 mLs) by nebulization every 6 hours as needed for shortness of breath / dyspnea or wheezing, Disp: 270 mL, Rfl: 11     ipratropium-albuterol (COMBIVENT RESPIMAT)  MCG/ACT inhaler, Inhale 1 puff into the lungs 4 times daily (Patient not taking: Reported on 2022), Disp: , Rfl:   No current facility-administered medications for this visit.    Facility-Administered Medications Ordered in Other Visits:      sod bicarbonate-citric acid-simethicone (EZ GAS) 2.21-1.53-0.04 g packet 4 g, 4 g, Oral, Once, Ragini Arellano MD    Family History   Problem Relation Age of Onset     Pulmonary Hypertension Daughter         idiopathic      Heart Disease Other         2 sibs MI, 1 sib electrical conduction disorder     Breast Cancer Mother 66.00     Hypertension Mother      Coronary Artery Disease Mother      Varicose Veins Mother      Hypertension Father      Coronary Artery Disease Sister      Heart Disease Sister      Diabetes Son      Pulmonary Hypertension Daughter      Coronary Artery Disease Sister      Heart Disease Sister        Social History     Socioeconomic History     Marital status:      Spouse name: Not on file     Number of children: Not on file     Years of education: Not on file     Highest education level: Not on file   Occupational History     Not on file   Tobacco Use     Smoking status: Former Smoker     Packs/day: 1.50     Years: 38.00     Pack years: 57.00     Types: Cigarettes     Quit date: 1998     Years since quittin.5     Smokeless tobacco: Never Used     Tobacco comment: quit in    Substance and Sexual Activity     Alcohol use: Yes     Alcohol/week: 2.0 standard drinks     Comment: occ     Drug use: Yes     Types: Oxycodone     Sexual activity: Never   Other  "Topics Concern     Not on file   Social History Narrative    .  Two kids.  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     Social Determinants of Health     Financial Resource Strain: Not on file   Food Insecurity: Not on file   Transportation Needs: Not on file   Physical Activity: Not on file   Stress: Not on file   Social Connections: Not on file   Intimate Partner Violence: Not on file   Housing Stability: Not on file       10 point Review of Systems is negative except for callus tissue which is noted in HPI.     Pulse 97   Ht 1.626 m (5' 4\")   Wt 99.8 kg (220 lb)   SpO2 97%   BMI 37.76 kg/m      BMI= Body mass index is 37.76 kg/m .    OBJECTIVE:  General appearance: Patient is alert and fully cooperative with history & exam.  No sign of distress is noted during the visit.    Vascular: Posterior tibial pulses are non-palpable left. There is no appreciable edema noted.  Dermatologic: Hyperkeratotic tissue plantar left heel and distal 4th digit left foot x5. No erythema left foot or evidence of foreign body.   Neurologic: All epicritic and proprioceptive sensations are grossly intact left.  Musculoskeletal: Contracted digits left foot.     Imaging:     Cardiac Device Check - In Clinic    Result Date: 6/23/2022  Rolfe Accolade (D) 3 month Post Pacemaker Device and Incision Check Patient seen in clinic for device evaluation and iterative programming. AP: <1% : 18% Mode: DDD 60-130bpm Presenting: ASVS 82bpm Underlying Rhythm: SR 80's Heart Rate: Stable, HR's per histogram range 60-170bpm and primarily 70-120bpm Sensing: Stable Pacing Threshold: Stable Impedance: Stable Battery Status: Estimating 14 years remaining Incision: Clean, dry, intact, and no signs or symptoms of infection. Atrial Arrhythmia: None Ventricular Arrhythmia: None Setting Change: RA and RV Amplitude changed from 2.5V to 2.0V. Care Plan: Normal device function. Reviewed incision care and monitoring, activity resumption, remote monitoring, routine " follow-up care, and when to call the clinic. Remote device check scheduled for 10/3/22. Sujatha Chapman RN I have reviewed and interpreted the device interrogation, settings, programming and nurse's summary. The device is functioning within normal device parameters. I agree with the current findings, assessment and plan.           Again, thank you for allowing me to participate in the care of your patient.        Sincerely,        Alfonso Orozco DPM

## 2022-06-28 ENCOUNTER — OFFICE VISIT (OUTPATIENT)
Dept: PULMONOLOGY | Facility: OTHER | Age: 77
End: 2022-06-28
Payer: MEDICARE

## 2022-06-28 VITALS
WEIGHT: 223.5 LBS | HEART RATE: 80 BPM | SYSTOLIC BLOOD PRESSURE: 126 MMHG | DIASTOLIC BLOOD PRESSURE: 70 MMHG | BODY MASS INDEX: 38.36 KG/M2 | OXYGEN SATURATION: 94 %

## 2022-06-28 DIAGNOSIS — Z79.2 LONG TERM (CURRENT) USE OF ANTIBIOTICS: Primary | ICD-10-CM

## 2022-06-28 DIAGNOSIS — J43.9 PULMONARY EMPHYSEMA, UNSPECIFIED EMPHYSEMA TYPE (H): ICD-10-CM

## 2022-06-28 LAB
ATRIAL RATE - MUSE: 89 BPM
DIASTOLIC BLOOD PRESSURE - MUSE: NORMAL MMHG
INTERPRETATION ECG - MUSE: NORMAL
P AXIS - MUSE: 75 DEGREES
PR INTERVAL - MUSE: 214 MS
QRS DURATION - MUSE: 120 MS
QT - MUSE: 362 MS
QTC - MUSE: 440 MS
R AXIS - MUSE: -28 DEGREES
SYSTOLIC BLOOD PRESSURE - MUSE: NORMAL MMHG
T AXIS - MUSE: 90 DEGREES
VENTRICULAR RATE- MUSE: 89 BPM

## 2022-06-28 PROCEDURE — 93010 ELECTROCARDIOGRAM REPORT: CPT | Mod: 77 | Performed by: INTERNAL MEDICINE

## 2022-06-28 PROCEDURE — 99214 OFFICE O/P EST MOD 30 MIN: CPT | Mod: 25 | Performed by: INTERNAL MEDICINE

## 2022-06-28 PROCEDURE — 93000 ELECTROCARDIOGRAM COMPLETE: CPT | Performed by: INTERNAL MEDICINE

## 2022-06-28 RX ORDER — BENZONATATE 100 MG/1
100 CAPSULE ORAL 3 TIMES DAILY PRN
Qty: 30 CAPSULE | Refills: 1 | Status: SHIPPED | OUTPATIENT
Start: 2022-06-28 | End: 2023-10-30

## 2022-06-28 RX ORDER — AZITHROMYCIN 250 MG/1
250 TABLET, FILM COATED ORAL DAILY
Qty: 90 TABLET | Refills: 3 | Status: SHIPPED | OUTPATIENT
Start: 2022-06-28 | End: 2023-01-03

## 2022-06-28 NOTE — PATIENT INSTRUCTIONS
1) Lets stay on the azithromycin 250mg daily , I'll check an EKG to be sure its ok  2) Stay on your other medications as planned, OK to stop the combivent  3) Keep up the good work with pulmonary rehab  4) Use oxygen with exertion, use your oximeter to gauge how much you need

## 2022-06-28 NOTE — PROGRESS NOTES
"Assessment and Plan:Lalitha Underwood is a 77 year old female with a past medical history significant for severe COPD, Cor pulmonale and chronic hypoxic respiratory failurewho presents to clinic today for follow up.  She seems stable which is reassuring as she had back to back exacerbations this winter.  She is now on maximal efforts between pulmonary rehab, and medical management to improve her overall condition.    1) Severe COPD - Continue advair/spiriva and as needed albuterol or duonebs.  Remain on azithromycin to help control exacerbation - will check an EKG today to monitor her QTc closely (update QTC is <450 today).   OK to use her nebulizer every morning routinely.  Would like a refill of tessalon for as needed cough.  Suspect this is from overdrying her mouth, recommend a sugar free gum or lozenge to prevent dryness.  2) Chronic hypoxic respiratory failure - needs to wear oxygen 2L with exertion at minimum and with sleep.    3) Cor Pulmonale - remains on hydrochlorothiazide and spironolactone.  Prevent hypoxia at all times with oxygen use as above  4) RTC in 6 months  5) Dyspnea - multifactorial from the above, in pulmonary rehab and learning.  She needs to start exercising more at home, she needs to combat \"laziness\" per her words.  6) If this patients lung disease exacerbates I recommend doxycycline 100mg BID for 10 days and a steroid taper with prednisone at 40mg for 3 days 30mg for 3 days, 20mg for 3 days and 10mg for 3 days  7) Toe surgery - I suspect she could have this surgery if a local block is used which will lower risk of anesthesia complications versus general anesthesia.        CCx: COPD    HPI: Ms. Underwood is a 77 year old female with severe COPD and chronic hypoxic respiratory failure who returns for follow up.  She remains on maximal therapy for her COPD and was started on chronic azithromycin and referred to pulmonary rehab on her last visit.  She is tolerating both the azithro and pulmonary " "rehab and hasn't had a \"flare\" of her COPD since then.  She also has better control of her leg swelling on her new diuretic recipe from cardiology.  She finds the education from pulmonary rehab useful, she just needs to motivate herself to do the work at home.  She would really like a toe surgery but isn't sure why she can't have it.    ROS:  Review of Systems - History obtained from the patient  General ROS: negative  Psychological ROS: negative  ENT ROS: negative  Allergy and Immunology ROS: negative  Endocrine ROS: negative  Respiratory ROS: positive for - cough, shortness of breath and wheezing  negative for - hemoptysis, stridor or tachypnea  Cardiovascular ROS: no chest pain or palpitations  Gastrointestinal ROS: no abdominal pain, change in bowel habits, or black or bloody stools  Genito-Urinary ROS: no dysuria, trouble voiding, or hematuria  Musculoskeletal ROS: negative  Neurological ROS: no TIA or stroke symptoms  Dermatological ROS: negative      Current Meds:  Current Outpatient Medications   Medication Sig Dispense Refill     albuterol (PROAIR HFA/PROVENTIL HFA/VENTOLIN HFA) 108 (90 Base) MCG/ACT inhaler Inhale 2 puffs into the lungs every 6 hours as needed 18 g 1     amoxicillin-clavulanate (AUGMENTIN) 875-125 MG tablet Take 1 tablet by mouth 2 times daily 14 tablet 0     azithromycin (ZITHROMAX) 250 MG tablet Take 250 mg by mouth daily       benzonatate (TESSALON) 100 MG capsule Take 1 capsule (100 mg) by mouth 3 times daily as needed for cough 30 capsule 1     calcium citrate (CITRACAL) 950 (200 Ca) MG tablet Take 1 tablet by mouth 2 times daily       denosumab (PROLIA) 60 MG/ML SOLN injection Inject 60 mg Subcutaneous every 6 months       fluticasone-salmeterol (ADVAIR) 500-50 MCG/ACT inhaler Inhale 1 puff into the lungs every 12 hours 180 each 3     guaiFENesin (MUCINEX) 600 MG 12 hr tablet Take 1 tablet (600 mg) by mouth 2 times daily       hydrochlorothiazide (HYDRODIURIL) 25 MG tablet " hydrochlorothiazide 25 mg tablet   TAKE 1 TABLET BY MOUTH DAILY (Patient taking differently: Take 25 mg by mouth daily hydrochlorothiazide 25 mg tablet   TAKE 1 TABLET BY MOUTH DAILY) 90 tablet 3     ipratropium - albuterol 0.5 mg/2.5 mg/3 mL (DUONEB) 0.5-2.5 (3) MG/3ML neb solution Take 1 vial (3 mLs) by nebulization every 6 hours as needed for shortness of breath / dyspnea or wheezing 270 mL 11     ipratropium-albuterol (COMBIVENT RESPIMAT)  MCG/ACT inhaler Inhale 1 puff into the lungs 4 times daily (Patient not taking: Reported on 6/27/2022)       levETIRAcetam (KEPPRA) 500 MG tablet Take 1 tablet (500 mg) by mouth 2 times daily 180 tablet 3     losartan (COZAAR) 50 MG tablet Take 1 tablet (50 mg) by mouth 2 times daily 90 tablet 3     Magnesium Oxide 500 MG TABS Take 500 mg by mouth 2 times daily       nystatin (MYCOSTATIN) 952849 UNIT/GM external powder Apply topically daily as needed       potassium chloride ER (KLOR-CON M) 20 MEQ CR tablet Take 1 tablet (20 mEq) by mouth daily (Patient taking differently: Take 20 mEq by mouth every evening) 90 tablet 3     predniSONE (DELTASONE) 20 MG tablet Take 2 tablets (40 mg) by mouth daily 10 tablet 0     RABEprazole (ACIPHEX) 20 MG EC tablet Take 1 tablet (20 mg) by mouth daily 90 tablet 3     sertraline (ZOLOFT) 100 MG tablet Take 1 tablet (100 mg) by mouth daily (Patient taking differently: Take 100 mg by mouth every evening) 90 tablet 3     solifenacin (VESICARE) 10 MG tablet Take 1 tablet (10 mg) by mouth daily 90 tablet 3     spironolactone (ALDACTONE) 25 MG tablet Take 0.5 tablets (12.5 mg) by mouth daily 45 tablet 4     tiotropium (SPIRIVA HANDIHALER) 18 MCG inhaled capsule Spiriva with HandiHaler 18 mcg and inhalation capsules (Patient taking differently: Inhale 18 mcg into the lungs daily) 90 capsule 3     vitamin D3 (CHOLECALCIFEROL) 125 MCG (5000 UT) tablet Take 5,000 Units by mouth daily          Labs:  @clab@  Lab Results   Component Value Date     WBC 10.6 04/19/2022    HGB 11.7 04/19/2022    HCT 36.3 04/19/2022     04/19/2022     04/19/2022    POTASSIUM 4.3 04/19/2022    CHLORIDE 102 04/19/2022    CO2 34 (H) 04/19/2022    GLC 77 04/19/2022    BUN 26 04/19/2022    CR 1.03 04/19/2022    MAG 1.7 (L) 03/25/2022    BILITOTAL 0.4 04/19/2022    AST 18 04/19/2022    ALT 18 04/19/2022    ALKPHOS 68 04/19/2022    PROTTOTAL 6.8 04/19/2022    ALBUMIN 3.5 04/19/2022       I have personally reviewed all pertinent imaging studies and PFT results unless otherwise noted.    Imaging studies:  No results found.      Physical Exam:  /70   Pulse 80   Wt 101.4 kg (223 lb 8 oz)   SpO2 94%   BMI 38.36 kg/m    General - Well nourished  Ears/Mouth -  Deferred given mask use during pandemic  Neck - no cervical lymphadenopathy  Lungs - Clear to ausculation bilaterally   CVS - regular rhythm with no murmurs, rubs or gallups  Abdomen - soft, NT, ND, NABS  Ext - no cyanosis, clubbing or edema  Skin - no rash  Psychology - alert and oriented, answers appropriate        Electronically signed by:    Perry Tamayo MD PhD  [facility]

## 2022-06-28 NOTE — LETTER
"6/28/2022       RE: Lalitha Underwood  [address] [facility] [place] [address]     Dear Colleague,    Thank you for referring your patient, Lalitha Underwood, to the [facility] at [facility]. Please see a copy of my visit note below.    Assessment and Plan:Lalitha Underwood is a 77 year old female with a past medical history significant for severe COPD, Cor pulmonale and chronic hypoxic respiratory failurewho presents to clinic today for follow up.  She seems stable which is reassuring as she had back to back exacerbations this winter.  She is now on maximal efforts between pulmonary rehab, and medical management to improve her overall condition.    1) Severe COPD - Continue advair/spiriva and as needed albuterol or duonebs.  Remain on azithromycin to help control exacerbation - will check an EKG today to monitor her QTc closely (update QTC is <450 today).   OK to use her nebulizer every morning routinely.  Would like a refill of tessalon for as needed cough.  Suspect this is from overdrying her mouth, recommend a sugar free gum or lozenge to prevent dryness.  2) Chronic hypoxic respiratory failure - needs to wear oxygen 2L with exertion at minimum and with sleep.    3) Cor Pulmonale - remains on hydrochlorothiazide and spironolactone.  Prevent hypoxia at all times with oxygen use as above  4) RTC in 6 months  5) Dyspnea - multifactorial from the above, in pulmonary rehab and learning.  She needs to start exercising more at home, she needs to combat \"laziness\" per her words.  6) If this patients lung disease exacerbates I recommend doxycycline 100mg BID for 10 days and a steroid taper with prednisone at 40mg for 3 days 30mg for 3 days, 20mg for 3 days and 10mg for 3 days  7) Toe surgery - I suspect she could have this surgery if a local block is used which will lower risk of anesthesia complications versus general anesthesia.        CCx: COPD    HPI: " "Ms. Underwood is a 77 year old female with severe COPD and chronic hypoxic respiratory failure who returns for follow up.  She remains on maximal therapy for her COPD and was started on chronic azithromycin and referred to pulmonary rehab on her last visit.  She is tolerating both the azithro and pulmonary rehab and hasn't had a \"flare\" of her COPD since then.  She also has better control of her leg swelling on her new diuretic recipe from cardiology.  She finds the education from pulmonary rehab useful, she just needs to motivate herself to do the work at home.  She would really like a toe surgery but isn't sure why she can't have it.    ROS:  Review of Systems - History obtained from the patient  General ROS: negative  Psychological ROS: negative  ENT ROS: negative  Allergy and Immunology ROS: negative  Endocrine ROS: negative  Respiratory ROS: positive for - cough, shortness of breath and wheezing  negative for - hemoptysis, stridor or tachypnea  Cardiovascular ROS: no chest pain or palpitations  Gastrointestinal ROS: no abdominal pain, change in bowel habits, or black or bloody stools  Genito-Urinary ROS: no dysuria, trouble voiding, or hematuria  Musculoskeletal ROS: negative  Neurological ROS: no TIA or stroke symptoms  Dermatological ROS: negative      Current Meds:  Current Outpatient Medications   Medication Sig Dispense Refill     albuterol (PROAIR HFA/PROVENTIL HFA/VENTOLIN HFA) 108 (90 Base) MCG/ACT inhaler Inhale 2 puffs into the lungs every 6 hours as needed 18 g 1     amoxicillin-clavulanate (AUGMENTIN) 875-125 MG tablet Take 1 tablet by mouth 2 times daily 14 tablet 0     azithromycin (ZITHROMAX) 250 MG tablet Take 250 mg by mouth daily       benzonatate (TESSALON) 100 MG capsule Take 1 capsule (100 mg) by mouth 3 times daily as needed for cough 30 capsule 1     calcium citrate (CITRACAL) 950 (200 Ca) MG tablet Take 1 tablet by mouth 2 times daily       denosumab (PROLIA) 60 MG/ML SOLN injection Inject " 60 mg Subcutaneous every 6 months       fluticasone-salmeterol (ADVAIR) 500-50 MCG/ACT inhaler Inhale 1 puff into the lungs every 12 hours 180 each 3     guaiFENesin (MUCINEX) 600 MG 12 hr tablet Take 1 tablet (600 mg) by mouth 2 times daily       hydrochlorothiazide (HYDRODIURIL) 25 MG tablet hydrochlorothiazide 25 mg tablet   TAKE 1 TABLET BY MOUTH DAILY (Patient taking differently: Take 25 mg by mouth daily hydrochlorothiazide 25 mg tablet   TAKE 1 TABLET BY MOUTH DAILY) 90 tablet 3     ipratropium - albuterol 0.5 mg/2.5 mg/3 mL (DUONEB) 0.5-2.5 (3) MG/3ML neb solution Take 1 vial (3 mLs) by nebulization every 6 hours as needed for shortness of breath / dyspnea or wheezing 270 mL 11     ipratropium-albuterol (COMBIVENT RESPIMAT)  MCG/ACT inhaler Inhale 1 puff into the lungs 4 times daily (Patient not taking: Reported on 6/27/2022)       levETIRAcetam (KEPPRA) 500 MG tablet Take 1 tablet (500 mg) by mouth 2 times daily 180 tablet 3     losartan (COZAAR) 50 MG tablet Take 1 tablet (50 mg) by mouth 2 times daily 90 tablet 3     Magnesium Oxide 500 MG TABS Take 500 mg by mouth 2 times daily       nystatin (MYCOSTATIN) 170021 UNIT/GM external powder Apply topically daily as needed       potassium chloride ER (KLOR-CON M) 20 MEQ CR tablet Take 1 tablet (20 mEq) by mouth daily (Patient taking differently: Take 20 mEq by mouth every evening) 90 tablet 3     predniSONE (DELTASONE) 20 MG tablet Take 2 tablets (40 mg) by mouth daily 10 tablet 0     RABEprazole (ACIPHEX) 20 MG EC tablet Take 1 tablet (20 mg) by mouth daily 90 tablet 3     sertraline (ZOLOFT) 100 MG tablet Take 1 tablet (100 mg) by mouth daily (Patient taking differently: Take 100 mg by mouth every evening) 90 tablet 3     solifenacin (VESICARE) 10 MG tablet Take 1 tablet (10 mg) by mouth daily 90 tablet 3     spironolactone (ALDACTONE) 25 MG tablet Take 0.5 tablets (12.5 mg) by mouth daily 45 tablet 4     tiotropium (SPIRIVA HANDIHALER) 18 MCG inhaled  capsule Spiriva with HandiHaler 18 mcg and inhalation capsules (Patient taking differently: Inhale 18 mcg into the lungs daily) 90 capsule 3     vitamin D3 (CHOLECALCIFEROL) 125 MCG (5000 UT) tablet Take 5,000 Units by mouth daily          Labs:  @clab@  Lab Results   Component Value Date    WBC 10.6 04/19/2022    HGB 11.7 04/19/2022    HCT 36.3 04/19/2022     04/19/2022     04/19/2022    POTASSIUM 4.3 04/19/2022    CHLORIDE 102 04/19/2022    CO2 34 (H) 04/19/2022    GLC 77 04/19/2022    BUN 26 04/19/2022    CR 1.03 04/19/2022    MAG 1.7 (L) 03/25/2022    BILITOTAL 0.4 04/19/2022    AST 18 04/19/2022    ALT 18 04/19/2022    ALKPHOS 68 04/19/2022    PROTTOTAL 6.8 04/19/2022    ALBUMIN 3.5 04/19/2022       I have personally reviewed all pertinent imaging studies and PFT results unless otherwise noted.    Imaging studies:  No results found.      Physical Exam:  /70   Pulse 80   Wt 101.4 kg (223 lb 8 oz)   SpO2 94%   BMI 38.36 kg/m    General - Well nourished  Ears/Mouth -  Deferred given mask use during pandemic  Neck - no cervical lymphadenopathy  Lungs - Clear to ausculation bilaterally   CVS - regular rhythm with no murmurs, rubs or gallups  Abdomen - soft, NT, ND, NABS  Ext - no cyanosis, clubbing or edema  Skin - no rash  Psychology - alert and oriented, answers appropriate        Electronically signed by:    Perry Tamayo MD PhD  [facility]      Again, thank you for allowing me to participate in the care of your patient.      Sincerely,    Perry Tamaoy MD

## 2022-06-29 ENCOUNTER — TELEPHONE (OUTPATIENT)
Dept: VASCULAR SURGERY | Facility: CLINIC | Age: 77
End: 2022-06-29

## 2022-06-29 ENCOUNTER — DOCUMENTATION ONLY (OUTPATIENT)
Dept: VASCULAR SURGERY | Facility: CLINIC | Age: 77
End: 2022-06-29

## 2022-06-29 ENCOUNTER — PREP FOR PROCEDURE (OUTPATIENT)
Dept: VASCULAR SURGERY | Facility: CLINIC | Age: 77
End: 2022-06-29

## 2022-06-29 DIAGNOSIS — M20.42 HAMMER TOE OF LEFT FOOT: Primary | ICD-10-CM

## 2022-06-29 RX ORDER — CEFAZOLIN SODIUM/WATER 2 G/20 ML
2 SYRINGE (ML) INTRAVENOUS
Status: CANCELLED | OUTPATIENT
Start: 2022-07-18

## 2022-06-29 RX ORDER — CEFAZOLIN SODIUM/WATER 2 G/20 ML
2 SYRINGE (ML) INTRAVENOUS SEE ADMIN INSTRUCTIONS
Status: CANCELLED | OUTPATIENT
Start: 2022-07-18

## 2022-06-29 NOTE — PROGRESS NOTES
Surgery Scheduled    Confirmed surgery date & times w/pt.  Will send details to pt via [employer] message. Pt will schedule pre-op exam prior to surgery.    Surgery/Procedure: ARTHROPLASTY, digits four and five left foot (Left)     CPT: 16706    Equipment: arthrex trim fit pin. Sagittal saw. Mini c-arm.    Location: [facility]:  [address] [address] (Phone: [phone], Fax: [phone])    Date: 7/18/22    Time: 12:30PM    Admission Type: Outpatient    Surgeon: Dr. Orozco    OR Confirmed & :  Yes with [facility] on 6/29/2022    Covid Scheduled: pt will do an at-home test.     Post Op: 7/25/22 11:00AM [place]                  8/8/22  11:00AM [place]    Wound Vac Needed: No    Home Care Needed: No    Blood Thinners Addressed: N/A

## 2022-06-29 NOTE — TELEPHONE ENCOUNTER
I talked with the patient via phone this morning regarding correction of hammertoes 4,5 left foot. Dr. Tamayo and I recently discussed that performing arthroplasty procedure under local anesthesia is likely best for this patient given her recurrent ulcers at this location and altered gait. I reviewed the surgical procedure with the patient today including post-op course to remain non-weight bearing for minimum of 4 weeks. Risks include but not limited to infection and need for additional surgery. She consents to surgery.     I will ask my office to coordinate surgery at [facility].

## 2022-06-30 ENCOUNTER — HOSPITAL ENCOUNTER (OUTPATIENT)
Dept: CARDIAC REHAB | Facility: HOSPITAL | Age: 77
Discharge: HOME | End: 2022-06-30
Attending: INTERNAL MEDICINE
Payer: MEDICARE

## 2022-06-30 PROCEDURE — 94625 PHY/QHP OP PULM RHB W/O MNTR: CPT

## 2022-07-05 ENCOUNTER — HOSPITAL ENCOUNTER (OUTPATIENT)
Dept: CARDIAC REHAB | Facility: HOSPITAL | Age: 77
Discharge: HOME | End: 2022-07-05
Attending: INTERNAL MEDICINE
Payer: MEDICARE

## 2022-07-05 PROCEDURE — 94625 PHY/QHP OP PULM RHB W/O MNTR: CPT

## 2022-07-07 ENCOUNTER — HOSPITAL ENCOUNTER (OUTPATIENT)
Dept: CARDIAC REHAB | Facility: HOSPITAL | Age: 77
Discharge: HOME | End: 2022-07-07
Attending: INTERNAL MEDICINE
Payer: MEDICARE

## 2022-07-07 PROCEDURE — 94625 PHY/QHP OP PULM RHB W/O MNTR: CPT

## 2022-07-11 ENCOUNTER — MEDICAL CORRESPONDENCE (OUTPATIENT)
Dept: HEALTH INFORMATION MANAGEMENT | Facility: CLINIC | Age: 77
End: 2022-07-11

## 2022-07-12 ENCOUNTER — HOSPITAL ENCOUNTER (OUTPATIENT)
Dept: CARDIAC REHAB | Facility: HOSPITAL | Age: 77
Discharge: HOME | End: 2022-07-12
Attending: INTERNAL MEDICINE
Payer: MEDICARE

## 2022-07-12 PROCEDURE — 94625 PHY/QHP OP PULM RHB W/O MNTR: CPT

## 2022-07-14 ENCOUNTER — OFFICE VISIT (OUTPATIENT)
Dept: FAMILY MEDICINE | Facility: CLINIC | Age: 77
End: 2022-07-14
Payer: MEDICARE

## 2022-07-14 VITALS
HEIGHT: 64 IN | SYSTOLIC BLOOD PRESSURE: 133 MMHG | BODY MASS INDEX: 37.9 KG/M2 | WEIGHT: 222 LBS | OXYGEN SATURATION: 95 % | DIASTOLIC BLOOD PRESSURE: 69 MMHG | HEART RATE: 82 BPM

## 2022-07-14 DIAGNOSIS — G40.919 INTRACTABLE EPILEPSY WITHOUT STATUS EPILEPTICUS, UNSPECIFIED EPILEPSY TYPE (H): ICD-10-CM

## 2022-07-14 DIAGNOSIS — I35.0 MILD AORTIC VALVE STENOSIS: ICD-10-CM

## 2022-07-14 DIAGNOSIS — I10 ESSENTIAL HYPERTENSION, BENIGN: ICD-10-CM

## 2022-07-14 DIAGNOSIS — J43.9 PULMONARY EMPHYSEMA, UNSPECIFIED EMPHYSEMA TYPE (H): ICD-10-CM

## 2022-07-14 DIAGNOSIS — I44.1 SECOND DEGREE HEART BLOCK: ICD-10-CM

## 2022-07-14 DIAGNOSIS — Z01.818 PREOP GENERAL PHYSICAL EXAM: Primary | ICD-10-CM

## 2022-07-14 DIAGNOSIS — F41.9 ANXIETY: ICD-10-CM

## 2022-07-14 DIAGNOSIS — K21.9 GASTROESOPHAGEAL REFLUX DISEASE WITHOUT ESOPHAGITIS: ICD-10-CM

## 2022-07-14 DIAGNOSIS — Z95.0 CARDIAC PACEMAKER IN SITU: ICD-10-CM

## 2022-07-14 DIAGNOSIS — M81.0 AGE-RELATED OSTEOPOROSIS WITHOUT CURRENT PATHOLOGICAL FRACTURE: ICD-10-CM

## 2022-07-14 DIAGNOSIS — M20.42 HAMMER TOE OF LEFT FOOT: ICD-10-CM

## 2022-07-14 PROCEDURE — 99215 OFFICE O/P EST HI 40 MIN: CPT | Performed by: FAMILY MEDICINE

## 2022-07-14 RX ORDER — LORAZEPAM 0.5 MG/1
TABLET ORAL
Qty: 2 TABLET | Refills: 0 | Status: SHIPPED | OUTPATIENT
Start: 2022-07-14 | End: 2022-10-24

## 2022-07-14 NOTE — H&P (VIEW-ONLY)
[facility]  [address] [facility] [address]  Phone: [phone]  Fax: [phone]  Primary Provider: Amanda Brewer  Pre-op Performing Provider: AMANDA BREWER    PREOPERATIVE EVALUATION:  Today's date: 7/14/2022    Lalitha Underwood is a 77 year old female who presents for a preoperative evaluation.    Surgical Information:  Surgery/Procedure: ARTHROPLASTY, digits four and five left foot  Surgery Location: [facility]  Surgeon: Dr. Orozco  Surgery Date: 7/18/2022  Time of Surgery: 12:30pm  Where patient plans to recover: At home with family  Fax number for surgical facility: Note does not need to be faxed, will be available electronically in Epic.    Type of Anesthesia Anticipated: Local    Assessment & Plan     The proposed surgical procedure is considered LOW risk.    Preop general physical exam  Hammer toe of left foot  Patient plans to undergo hammertoe revision of digits 4 and 5 on the left foot.  Due to severe COPD, she will be undergoing a local block.  She has discussed with her pulmonologist and is at high risk for systemic anesthesia and would like this avoided.  She is also DNI.  Due to preprocedural anxiety she is requesting lorazepam which she has taken in the past.  We discussed risk of respiratory depression so we will offer low-dose 0.5 mg taken 30 minutes before her surgery.  She has a family member to drive her to and from the surgery.    Risks and Recommendations:  The patient has the following additional risks and recommendations for perioperative complications:  Pulmonary:    - Incentive spirometry post-op   - Consider Respiratory Therapy (Respiratory Care IP Consult) post-op   - Oxygen dependent lung disease    RECOMMENDATION:  APPROVAL GIVEN to proceed with proposed procedure, without further diagnostic evaluation.    - LORazepam (ATIVAN) 0.5 MG tablet  Dispense: 2 tablet; Refill: 0    Pulmonary emphysema, unspecified emphysema type (H)  Follows  with Dr. 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  Breathing is stable on Advair/Spiriva, as needed albuterol, and daily azithromycin.  Oxygen 95% at rest on room air today.    Mild aortic valve stenosis  Mean gradient of 24 mm. Follows with Dr. Robertson with plan to repeat in echocardiogram in 1 year.    Second degree heart block  Cardiac pacemaker in situ   [employer] dual-chamber pacemaker March 24, 2022 with preserved left ventricular function.    Essential hypertension, benign  Blood pressure controlled on hydrochlorothiazide and spironolactone which she was advised to hold the morning of surgery.    Intractable epilepsy without status epilepticus, unspecified epilepsy type (H)  Stable on Keppra, follows with neurology    Anxiety  Mood controlled on sertraline.    Gastroesophageal reflux disease without esophagitis  Large hiatal hernia noted on 2018 CT scan.  Remains on a high intensity PPI.    Age-related osteoporosis without current pathological fracture  Stable on prolia injections q6m.     I spent a total of 41 minutes on the day of the visit.   Time spent doing chart review, history and exam, documentation and further activities per the note    Subjective     HPI related to upcoming procedure:   History of bilateral hammer toes, worse on left and currently wearing a postop surgical shoe    Preop Questions 7/14/2022   1. Have you ever had a heart attack or stroke? No   2. Have you ever had surgery on your heart or blood vessels, such as a stent placement, a coronary artery bypass, or surgery on an artery in your head, neck, heart, or legs? No   3. Do you have chest pain with activity? No   4. Do you have a history of  heart failure? No   5. Do you currently have a cold, bronchitis or symptoms of other infection? No   6. Do you have a cough, shortness of breath, or wheezing? YES -chronic due to end-stage COPD   7. Do you  or anyone in your family have previous history of blood clots? UNKNOWN    8. Do you or does anyone in your family have a serious bleeding problem such as prolonged bleeding following surgeries or cuts? No   9. Have you ever had problems with anemia or been told to take iron pills? No   10. Have you had any abnormal blood loss such as black, tarry or bloody stools, or abnormal vaginal bleeding? No   11. Have you ever had a blood transfusion? No   12. Are you willing to have a blood transfusion if it is medically needed before, during, or after your surgery? Yes   13. Have you or any of your relatives ever had problems with anesthesia? No   14. Do you have sleep apnea, excessive snoring or daytime drowsiness? No   15. Do you have any artifical heart valves or other implanted medical devices like a pacemaker, defibrillator, or continuous glucose monitor? YES    15a. What type of device do you have? Pacemaker   15b. Name of the clinic that manages your device:  [facility]   16. Do you have artificial joints? No   17. Are you allergic to latex? No       Health Care Directive:  Patient has a Health Care Directive on file      Preoperative Review of :   reviewed - controlled substances reflected in medication list.    Status of Chronic Conditions:  COPD - Patient has a longstanding history of moderate-severe COPD .  She has chronic SOB, DENT and COUGH and continues on medication regimen consisting of Advair/Spiriva, as needed albuterol, daily azithromycin, 2 L O2 with exertion and sleep without adverse reactions or side effects.    HYPERTENSION - Patient has longstanding history of HTN , currently denies any symptoms referable to elevated blood pressure. Specifically denies chest pain, palpitations, dyspnea, orthopnea, PND or peripheral edema. Blood pressure readings have been in normal range. Current medication regimen is as listed below. Patient denies any side effects of medication.       Review of  Systems  CONSTITUTIONAL: NEGATIVE for fever, chills, change in weight  ENT/MOUTH: NEGATIVE for ear, mouth and throat problems  RESP:POSITIVE for chronic cough and dyspnea on exertion  CV: NEGATIVE for chest pain, palpitations or peripheral edema    Patient Active Problem List    Diagnosis Date Noted     Cardiac pacemaker in situ 03/25/2022     Priority: Medium     Second degree heart block 03/19/2022     Priority: Medium     PAD (peripheral artery disease) (H) 10/18/2021     Priority: Medium     Keratoma 10/18/2021     Priority: Medium     Epileptic seizure (H) 07/16/2021     Priority: Medium     Formatting of this note might be different from the original.  Created by Conversion    Replacement Utility updated for latest IMO load       Esophageal reflux 07/16/2021     Priority: Medium     Formatting of this note might be different from the original.  Created by Conversion       Hyperlipidemia 07/16/2021     Priority: Medium     Formatting of this note might be different from the original.  Created by Conversion       Impaired gait and mobility 07/16/2021     Priority: Medium     Formatting of this note might be different from the original.  Created by Conversion       Pulmonary emphysema (H) 07/16/2021     Priority: Medium     Formatting of this note might be different from the original.  Created by Conversion       Rosacea 07/16/2021     Priority: Medium     Formatting of this note might be different from the original.  Created by Conversion       Solar elastosis 07/16/2021     Priority: Medium     Formatting of this note might be different from the original.  Created by Conversion       Vitamin D deficiency 07/16/2021     Priority: Medium     Formatting of this note might be different from the original.  Created by Conversion    Replacement Utility updated for latest IMO load       Hammer toe of left foot 05/14/2021     Priority: Medium     Uterine prolapse 03/01/2021     Priority: Medium     Acquired lymphedema  of leg 02/01/2021     Priority: Medium     Leg length discrepancy 02/01/2021     Priority: Medium     Localized deposits of fat 05/06/2019     Priority: Medium     Neuropathy, idiopathic 02/21/2019     Priority: Medium     Morbid obesity (H) 12/23/2018     Priority: Medium     S/P repair of paraesophageal hernia 11/26/2018     Priority: Medium     Decreased hearing of both ears 05/29/2018     Priority: Medium     Osteoporosis 02/08/2018     Priority: Medium     Mild aortic valve stenosis 11/17/2017     Priority: Medium     Urge incontinence of urine 11/17/2017     Priority: Medium     Valgus deformity of both feet 03/15/2017     Priority: Medium     Collapsed arches 02/01/2017     Priority: Medium     Essential hypertension, benign      Priority: Medium     Created by Conversion  Replacement Utility updated for latest IMO load         Left arm swelling 12/07/2015     Priority: Medium      Past Medical History:   Diagnosis Date     Convulsive disorder (H)      Convulsive disorder (H)      COPD (chronic obstructive pulmonary disease) (H)      COPD (chronic obstructive pulmonary disease) (H)      Depression      Dyspnea on exertion      Gastroesophageal reflux disease      Heart murmur      Hernia of unspecified site of abdominal cavity without mention of obstruction or gangrene      Hiatal hernia      Hiatal hernia      Hypertension      Hypertension      Obese      On home oxygen therapy     at 1.5 liters at nite     Osteopenia      Osteopenia      Oxygen dependent     1.5 L per NC     Pneumonia due to infectious organism, unspecified laterality, unspecified part of lung 3/19/2022     Second degree heart block 3/19/2022     Shortness of breath      Uncomplicated asthma      URI (upper respiratory infection)      Venous insufficiency of both lower extremities      Venous insufficiency of both lower extremities      Walking troubles      Past Surgical History:   Procedure Laterality Date     ARTHROPLASTY TOE(S) Left  11/22/2021    Procedure: ARTHROPLASTY, digits two and three left foot;  Surgeon: Alfonso Orozco DPM;  Location: [facility]     EP PACEMAKER DEVICE & LEAD IMPLANT- RIGHT ATRIAL & RIGHT VENTRICULAR N/A 3/24/2022    Procedure: Pacemaker Device & Lead Implant - Right Atrial & Right Ventricular;  Surgeon: Helder Pendleton MD;  Location: [facility]     ESOPHAGOSCOPY, GASTROSCOPY, DUODENOSCOPY (EGD), COMBINED N/A 11/26/2018    Procedure: Esophagogastroduodenoscopy;  Surgeon: Jasmeet Wilder MD;  Location: [facility]     HERNIA REPAIR, UMBILICAL  04/2018     HERNIA REPAIR, UMBILICAL  04/04/2018    Dr. 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     Repair arm fracture Left      SHOULDER SURGERY Right 1967     SHOULDER SURGERY Right 1967      BIOPSY THYROID FINE NEEDLE ASPIRATION  7/29/2020     Current Outpatient Medications   Medication Sig Dispense Refill     albuterol (PROAIR HFA/PROVENTIL HFA/VENTOLIN HFA) 108 (90 Base) MCG/ACT inhaler Inhale 2 puffs into the lungs every 6 hours as needed 18 g 1     azithromycin (ZITHROMAX) 250 MG tablet Take 1 tablet (250 mg) by mouth daily 90 tablet 3     benzonatate (TESSALON) 100 MG capsule Take 1 capsule (100 mg) by mouth 3 times daily as needed for cough 30 capsule 1     calcium citrate (CITRACAL) 950 (200 Ca) MG tablet Take 1 tablet by mouth 2 times daily       denosumab (PROLIA) 60 MG/ML SOLN injection Inject 60 mg Subcutaneous every 6 months       fluticasone-salmeterol (ADVAIR) 500-50 MCG/ACT inhaler Inhale 1 puff into the lungs every 12 hours 180 each 3     guaiFENesin (MUCINEX) 600 MG 12 hr tablet Take 1 tablet (600 mg) by mouth 2 times daily (Patient taking differently: Take 600 mg by mouth 2 times daily as needed)        hydrochlorothiazide (HYDRODIURIL) 25 MG tablet hydrochlorothiazide 25 mg tablet   TAKE 1 TABLET BY MOUTH DAILY (Patient taking differently: Take 25 mg by mouth daily hydrochlorothiazide 25 mg tablet   TAKE 1 TABLET BY MOUTH DAILY) 90 tablet 3     levETIRAcetam (KEPPRA) 500 MG tablet Take 1 tablet (500 mg) by mouth 2 times daily 180 tablet 3     LORazepam (ATIVAN) 0.5 MG tablet Take 1 tablet (0.5 mg) 30 min before your surgery 2 tablet 0     losartan (COZAAR) 50 MG tablet Take 1 tablet (50 mg) by mouth 2 times daily 90 tablet 3     Magnesium Oxide 500 MG TABS Take 500 mg by mouth 2 times daily       nystatin (MYCOSTATIN) 929638 UNIT/GM external powder Apply topically daily as needed       potassium chloride ER (KLOR-CON M) 20 MEQ CR tablet Take 1 tablet (20 mEq) by mouth daily (Patient taking differently: Take 20 mEq by mouth every evening) 90 tablet 3     RABEprazole (ACIPHEX) 20 MG EC tablet Take 1 tablet (20 mg) by mouth daily 90 tablet 3     sertraline (ZOLOFT) 100 MG tablet Take 1 tablet (100 mg) by mouth daily (Patient taking differently: Take 100 mg by mouth every evening) 90 tablet 3     solifenacin (VESICARE) 10 MG tablet Take 1 tablet (10 mg) by mouth daily 90 tablet 3     spironolactone (ALDACTONE) 25 MG tablet Take 0.5 tablets (12.5 mg) by mouth daily 45 tablet 4     tiotropium (SPIRIVA HANDIHALER) 18 MCG inhaled capsule Spiriva with HandiHaler 18 mcg and inhalation capsules (Patient taking differently: Inhale 18 mcg into the lungs daily) 90 capsule 3     vitamin D3 (CHOLECALCIFEROL) 125 MCG (5000 UT) tablet Take 5,000 Units by mouth daily          Allergies   Allergen Reactions     Clindamycin Rash     Carbamazepine Rash     Morphine Nausea        Social History     Tobacco Use     Smoking status: Former Smoker     Packs/day: 1.50     Years: 38.00     Pack years: 57.00     Types: Cigarettes     Quit date: 1998     Years since quittin.6     Smokeless tobacco: Never Used     Tobacco comment: quit  "in 1998   Substance Use Topics     Alcohol use: Yes     Alcohol/week: 2.0 standard drinks     Comment: occ     Family History   Problem Relation Age of Onset     Pulmonary Hypertension Daughter         idiopathic      Heart Disease Other         2 sibs MI, 1 sib electrical conduction disorder     Breast Cancer Mother 66.00     Hypertension Mother      Coronary Artery Disease Mother      Varicose Veins Mother      Hypertension Father      Coronary Artery Disease Sister      Heart Disease Sister      Diabetes Son      Pulmonary Hypertension Daughter      Coronary Artery Disease Sister      Heart Disease Sister      History   Drug Use Unknown         Objective     /69   Pulse 82   Ht 1.626 m (5' 4\")   Wt 100.7 kg (222 lb)   SpO2 95%   BMI 38.11 kg/m      Physical Exam  GENERAL APPEARANCE: healthy, alert and no distress  HENT: ear canals and TM's normal and nose and mouth without ulcers or lesions  RESP: rhonchi throughout, no wheezing, no crackles, breathlessness with prolonged speaking     CV: regular rates and rhythm and grade 2/6 systolic murmur heard best over the right sternal border, 1+ swelling bilateral LEs  NEURO: Normal tone, mentation intact and speech normal  PSYCH: mentation appears normal and affect normal/bright    Recent Labs   Lab Test 04/19/22  1514 03/25/22  0844 03/25/22  0525 03/19/22  1441 02/22/22  1110   HGB 11.7 13.3  --    < > 14.0    229  --    < > 243     --  140   < > 137   POTASSIUM 4.3  --  4.2   < > 4.5   CR 1.03  --  0.70   < > 0.82   A1C  --   --   --   --  5.4    < > = values in this interval not displayed.        Diagnostics:  No labs were ordered during this visit.   No EKG this visit, completed in the last 90 days.    Revised Cardiac Risk Index (RCRI):  The patient has the following serious cardiovascular risks for perioperative complications:   - No serious cardiac risks = 0 points     RCRI Interpretation: 0 points: Class I (very low risk - 0.4% " complication rate)           Signed Electronically by: Kate Marte DO  Copy of this evaluation report is provided to requesting physician.

## 2022-07-14 NOTE — H&P (VIEW-ONLY)
[facility]  [address] [facility] [address]  Phone: [phone]  Fax: [phone]  Primary Provider: Amanda Brewer  Pre-op Performing Provider: AMANDA BREWER    PREOPERATIVE EVALUATION:  Today's date: 7/14/2022    Lalitha Underwood is a 77 year old female who presents for a preoperative evaluation.    Surgical Information:  Surgery/Procedure: ARTHROPLASTY, digits four and five left foot  Surgery Location: [facility]  Surgeon: Dr. Orozco  Surgery Date: 7/18/2022  Time of Surgery: 12:30pm  Where patient plans to recover: At home with family  Fax number for surgical facility: Note does not need to be faxed, will be available electronically in Epic.    Type of Anesthesia Anticipated: Local    Assessment & Plan     The proposed surgical procedure is considered LOW risk.    Preop general physical exam  Hammer toe of left foot  Patient plans to undergo hammertoe revision of digits 4 and 5 on the left foot.  Due to severe COPD, she will be undergoing a local block.  She has discussed with her pulmonologist and is at high risk for systemic anesthesia and would like this avoided.  She is also DNI.  Due to preprocedural anxiety she is requesting lorazepam which she has taken in the past.  We discussed risk of respiratory depression so we will offer low-dose 0.5 mg taken 30 minutes before her surgery.  She has a family member to drive her to and from the surgery.    Risks and Recommendations:  The patient has the following additional risks and recommendations for perioperative complications:  Pulmonary:    - Incentive spirometry post-op   - Consider Respiratory Therapy (Respiratory Care IP Consult) post-op   - Oxygen dependent lung disease    RECOMMENDATION:  APPROVAL GIVEN to proceed with proposed procedure, without further diagnostic evaluation.    - LORazepam (ATIVAN) 0.5 MG tablet  Dispense: 2 tablet; Refill: 0    Pulmonary emphysema, unspecified emphysema type (H)  Follows  with Dr. 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  Breathing is stable on Advair/Spiriva, as needed albuterol, and daily azithromycin.  Oxygen 95% at rest on room air today.    Mild aortic valve stenosis  Mean gradient of 24 mm. Follows with Dr. Robertson with plan to repeat in echocardiogram in 1 year.    Second degree heart block  Cardiac pacemaker in situ   [employer] dual-chamber pacemaker March 24, 2022 with preserved left ventricular function.    Essential hypertension, benign  Blood pressure controlled on hydrochlorothiazide and spironolactone which she was advised to hold the morning of surgery.    Intractable epilepsy without status epilepticus, unspecified epilepsy type (H)  Stable on Keppra, follows with neurology    Anxiety  Mood controlled on sertraline.    Gastroesophageal reflux disease without esophagitis  Large hiatal hernia noted on 2018 CT scan.  Remains on a high intensity PPI.    Age-related osteoporosis without current pathological fracture  Stable on prolia injections q6m.     I spent a total of 41 minutes on the day of the visit.   Time spent doing chart review, history and exam, documentation and further activities per the note    Subjective     HPI related to upcoming procedure:   History of bilateral hammer toes, worse on left and currently wearing a postop surgical shoe    Preop Questions 7/14/2022   1. Have you ever had a heart attack or stroke? No   2. Have you ever had surgery on your heart or blood vessels, such as a stent placement, a coronary artery bypass, or surgery on an artery in your head, neck, heart, or legs? No   3. Do you have chest pain with activity? No   4. Do you have a history of  heart failure? No   5. Do you currently have a cold, bronchitis or symptoms of other infection? No   6. Do you have a cough, shortness of breath, or wheezing? YES -chronic due to end-stage COPD   7. Do you  or anyone in your family have previous history of blood clots? UNKNOWN    8. Do you or does anyone in your family have a serious bleeding problem such as prolonged bleeding following surgeries or cuts? No   9. Have you ever had problems with anemia or been told to take iron pills? No   10. Have you had any abnormal blood loss such as black, tarry or bloody stools, or abnormal vaginal bleeding? No   11. Have you ever had a blood transfusion? No   12. Are you willing to have a blood transfusion if it is medically needed before, during, or after your surgery? Yes   13. Have you or any of your relatives ever had problems with anesthesia? No   14. Do you have sleep apnea, excessive snoring or daytime drowsiness? No   15. Do you have any artifical heart valves or other implanted medical devices like a pacemaker, defibrillator, or continuous glucose monitor? YES    15a. What type of device do you have? Pacemaker   15b. Name of the clinic that manages your device:  [place]   16. Do you have artificial joints? No   17. Are you allergic to latex? No       Health Care Directive:  Patient has a Health Care Directive on file      Preoperative Review of :   reviewed - controlled substances reflected in medication list.    Status of Chronic Conditions:  COPD - Patient has a longstanding history of moderate-severe COPD .  She has chronic SOB, DENT and COUGH and continues on medication regimen consisting of Advair/Spiriva, as needed albuterol, daily azithromycin, 2 L O2 with exertion and sleep without adverse reactions or side effects.    HYPERTENSION - Patient has longstanding history of HTN , currently denies any symptoms referable to elevated blood pressure. Specifically denies chest pain, palpitations, dyspnea, orthopnea, PND or peripheral edema. Blood pressure readings have been in normal range. Current medication regimen is as listed below. Patient denies any side effects of medication.       Review of  Systems  CONSTITUTIONAL: NEGATIVE for fever, chills, change in weight  ENT/MOUTH: NEGATIVE for ear, mouth and throat problems  RESP:POSITIVE for chronic cough and dyspnea on exertion  CV: NEGATIVE for chest pain, palpitations or peripheral edema    Patient Active Problem List    Diagnosis Date Noted     Cardiac pacemaker in situ 03/25/2022     Priority: Medium     Second degree heart block 03/19/2022     Priority: Medium     PAD (peripheral artery disease) (H) 10/18/2021     Priority: Medium     Keratoma 10/18/2021     Priority: Medium     Epileptic seizure (H) 07/16/2021     Priority: Medium     Formatting of this note might be different from the original.  Created by Conversion    Replacement Utility updated for latest IMO load       Esophageal reflux 07/16/2021     Priority: Medium     Formatting of this note might be different from the original.  Created by Conversion       Hyperlipidemia 07/16/2021     Priority: Medium     Formatting of this note might be different from the original.  Created by Conversion       Impaired gait and mobility 07/16/2021     Priority: Medium     Formatting of this note might be different from the original.  Created by Conversion       Pulmonary emphysema (H) 07/16/2021     Priority: Medium     Formatting of this note might be different from the original.  Created by Conversion       Rosacea 07/16/2021     Priority: Medium     Formatting of this note might be different from the original.  Created by Conversion       Solar elastosis 07/16/2021     Priority: Medium     Formatting of this note might be different from the original.  Created by Conversion       Vitamin D deficiency 07/16/2021     Priority: Medium     Formatting of this note might be different from the original.  Created by Conversion    Replacement Utility updated for latest IMO load       Hammer toe of left foot 05/14/2021     Priority: Medium     Uterine prolapse 03/01/2021     Priority: Medium     Acquired lymphedema  of leg 02/01/2021     Priority: Medium     Leg length discrepancy 02/01/2021     Priority: Medium     Localized deposits of fat 05/06/2019     Priority: Medium     Neuropathy, idiopathic 02/21/2019     Priority: Medium     Morbid obesity (H) 12/23/2018     Priority: Medium     S/P repair of paraesophageal hernia 11/26/2018     Priority: Medium     Decreased hearing of both ears 05/29/2018     Priority: Medium     Osteoporosis 02/08/2018     Priority: Medium     Mild aortic valve stenosis 11/17/2017     Priority: Medium     Urge incontinence of urine 11/17/2017     Priority: Medium     Valgus deformity of both feet 03/15/2017     Priority: Medium     Collapsed arches 02/01/2017     Priority: Medium     Essential hypertension, benign      Priority: Medium     Created by Conversion  Replacement Utility updated for latest IMO load         Left arm swelling 12/07/2015     Priority: Medium      Past Medical History:   Diagnosis Date     Convulsive disorder (H)      Convulsive disorder (H)      COPD (chronic obstructive pulmonary disease) (H)      COPD (chronic obstructive pulmonary disease) (H)      Depression      Dyspnea on exertion      Gastroesophageal reflux disease      Heart murmur      Hernia of unspecified site of abdominal cavity without mention of obstruction or gangrene      Hiatal hernia      Hiatal hernia      Hypertension      Hypertension      Obese      On home oxygen therapy     at 1.5 liters at nite     Osteopenia      Osteopenia      Oxygen dependent     1.5 L per NC     Pneumonia due to infectious organism, unspecified laterality, unspecified part of lung 3/19/2022     Second degree heart block 3/19/2022     Shortness of breath      Uncomplicated asthma      URI (upper respiratory infection)      Venous insufficiency of both lower extremities      Venous insufficiency of both lower extremities      Walking troubles      Past Surgical History:   Procedure Laterality Date     ARTHROPLASTY TOE(S) Left  11/22/2021    Procedure: ARTHROPLASTY, digits two and three left foot;  Surgeon: Alfonso Orozco DPM;  Location: [facility]     EP PACEMAKER DEVICE & LEAD IMPLANT- RIGHT ATRIAL & RIGHT VENTRICULAR N/A 3/24/2022    Procedure: Pacemaker Device & Lead Implant - Right Atrial & Right Ventricular;  Surgeon: Helder Pendleton MD;  Location: [facility]     ESOPHAGOSCOPY, GASTROSCOPY, DUODENOSCOPY (EGD), COMBINED N/A 11/26/2018    Procedure: Esophagogastroduodenoscopy;  Surgeon: Jasmeet Wilder MD;  Location: [facility] OR     HERNIA REPAIR, UMBILICAL  04/2018     HERNIA REPAIR, UMBILICAL  04/04/2018    Dr. 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     Repair arm fracture Left      SHOULDER SURGERY Right 1967     SHOULDER SURGERY Right 1967      BIOPSY THYROID FINE NEEDLE ASPIRATION  7/29/2020     Current Outpatient Medications   Medication Sig Dispense Refill     albuterol (PROAIR HFA/PROVENTIL HFA/VENTOLIN HFA) 108 (90 Base) MCG/ACT inhaler Inhale 2 puffs into the lungs every 6 hours as needed 18 g 1     azithromycin (ZITHROMAX) 250 MG tablet Take 1 tablet (250 mg) by mouth daily 90 tablet 3     benzonatate (TESSALON) 100 MG capsule Take 1 capsule (100 mg) by mouth 3 times daily as needed for cough 30 capsule 1     calcium citrate (CITRACAL) 950 (200 Ca) MG tablet Take 1 tablet by mouth 2 times daily       denosumab (PROLIA) 60 MG/ML SOLN injection Inject 60 mg Subcutaneous every 6 months       fluticasone-salmeterol (ADVAIR) 500-50 MCG/ACT inhaler Inhale 1 puff into the lungs every 12 hours 180 each 3     guaiFENesin (MUCINEX) 600 MG 12 hr tablet Take 1 tablet (600 mg) by mouth 2 times daily (Patient taking differently: Take 600 mg by mouth 2 times daily as needed)        hydrochlorothiazide (HYDRODIURIL) 25 MG tablet hydrochlorothiazide 25 mg tablet   TAKE 1 TABLET BY MOUTH DAILY (Patient taking differently: Take 25 mg by mouth daily hydrochlorothiazide 25 mg tablet   TAKE 1 TABLET BY MOUTH DAILY) 90 tablet 3     levETIRAcetam (KEPPRA) 500 MG tablet Take 1 tablet (500 mg) by mouth 2 times daily 180 tablet 3     LORazepam (ATIVAN) 0.5 MG tablet Take 1 tablet (0.5 mg) 30 min before your surgery 2 tablet 0     losartan (COZAAR) 50 MG tablet Take 1 tablet (50 mg) by mouth 2 times daily 90 tablet 3     Magnesium Oxide 500 MG TABS Take 500 mg by mouth 2 times daily       nystatin (MYCOSTATIN) 507157 UNIT/GM external powder Apply topically daily as needed       potassium chloride ER (KLOR-CON M) 20 MEQ CR tablet Take 1 tablet (20 mEq) by mouth daily (Patient taking differently: Take 20 mEq by mouth every evening) 90 tablet 3     RABEprazole (ACIPHEX) 20 MG EC tablet Take 1 tablet (20 mg) by mouth daily 90 tablet 3     sertraline (ZOLOFT) 100 MG tablet Take 1 tablet (100 mg) by mouth daily (Patient taking differently: Take 100 mg by mouth every evening) 90 tablet 3     solifenacin (VESICARE) 10 MG tablet Take 1 tablet (10 mg) by mouth daily 90 tablet 3     spironolactone (ALDACTONE) 25 MG tablet Take 0.5 tablets (12.5 mg) by mouth daily 45 tablet 4     tiotropium (SPIRIVA HANDIHALER) 18 MCG inhaled capsule Spiriva with HandiHaler 18 mcg and inhalation capsules (Patient taking differently: Inhale 18 mcg into the lungs daily) 90 capsule 3     vitamin D3 (CHOLECALCIFEROL) 125 MCG (5000 UT) tablet Take 5,000 Units by mouth daily          Allergies   Allergen Reactions     Clindamycin Rash     Carbamazepine Rash     Morphine Nausea        Social History     Tobacco Use     Smoking status: Former Smoker     Packs/day: 1.50     Years: 38.00     Pack years: 57.00     Types: Cigarettes     Quit date: 1998     Years since quittin.6     Smokeless tobacco: Never Used     Tobacco comment: quit  "in 1998   Substance Use Topics     Alcohol use: Yes     Alcohol/week: 2.0 standard drinks     Comment: occ     Family History   Problem Relation Age of Onset     Pulmonary Hypertension Daughter         idiopathic      Heart Disease Other         2 sibs MI, 1 sib electrical conduction disorder     Breast Cancer Mother 66.00     Hypertension Mother      Coronary Artery Disease Mother      Varicose Veins Mother      Hypertension Father      Coronary Artery Disease Sister      Heart Disease Sister      Diabetes Son      Pulmonary Hypertension Daughter      Coronary Artery Disease Sister      Heart Disease Sister      History   Drug Use Unknown         Objective     /69   Pulse 82   Ht 1.626 m (5' 4\")   Wt 100.7 kg (222 lb)   SpO2 95%   BMI 38.11 kg/m      Physical Exam  GENERAL APPEARANCE: healthy, alert and no distress  HENT: ear canals and TM's normal and nose and mouth without ulcers or lesions  RESP: rhonchi throughout, no wheezing, no crackles, breathlessness with prolonged speaking     CV: regular rates and rhythm and grade 2/6 systolic murmur heard best over the right sternal border, 1+ swelling bilateral LEs  NEURO: Normal tone, mentation intact and speech normal  PSYCH: mentation appears normal and affect normal/bright    Recent Labs   Lab Test 04/19/22  1514 03/25/22  0844 03/25/22  0525 03/19/22  1441 02/22/22  1110   HGB 11.7 13.3  --    < > 14.0    229  --    < > 243     --  140   < > 137   POTASSIUM 4.3  --  4.2   < > 4.5   CR 1.03  --  0.70   < > 0.82   A1C  --   --   --   --  5.4    < > = values in this interval not displayed.        Diagnostics:  No labs were ordered during this visit.   No EKG this visit, completed in the last 90 days.    Revised Cardiac Risk Index (RCRI):  The patient has the following serious cardiovascular risks for perioperative complications:   - No serious cardiac risks = 0 points     RCRI Interpretation: 0 points: Class I (very low risk - 0.4% " complication rate)           Signed Electronically by: Kate Marte DO  Copy of this evaluation report is provided to requesting physician.

## 2022-07-14 NOTE — PROGRESS NOTES
[facility]  [address] [facility] [address]  Phone: [phone]  Fax: [phone]  Primary Provider: Amanda Brewer  Pre-op Performing Provider: AMANDA BREWER    PREOPERATIVE EVALUATION:  Today's date: 7/14/2022    Lalitha Underwood is a 77 year old female who presents for a preoperative evaluation.    Surgical Information:  Surgery/Procedure: ARTHROPLASTY, digits four and five left foot  Surgery Location: [facility]  Surgeon: Dr. Orozco  Surgery Date: 7/18/2022  Time of Surgery: 12:30pm  Where patient plans to recover: At home with family  Fax number for surgical facility: Note does not need to be faxed, will be available electronically in Epic.    Type of Anesthesia Anticipated: Local    Assessment & Plan     The proposed surgical procedure is considered LOW risk.    Preop general physical exam  Hammer toe of left foot  Patient plans to undergo hammertoe revision of digits 4 and 5 on the left foot.  Due to severe COPD, she will be undergoing a local block.  She has discussed with her pulmonologist and is at high risk for systemic anesthesia and would like this avoided.  She is also DNI.  Due to preprocedural anxiety she is requesting lorazepam which she has taken in the past.  We discussed risk of respiratory depression so we will offer low-dose 0.5 mg taken 30 minutes before her surgery.  She has a family member to drive her to and from the surgery.    Risks and Recommendations:  The patient has the following additional risks and recommendations for perioperative complications:  Pulmonary:    - Incentive spirometry post-op   - Consider Respiratory Therapy (Respiratory Care IP Consult) post-op   - Oxygen dependent lung disease    RECOMMENDATION:  APPROVAL GIVEN to proceed with proposed procedure, without further diagnostic evaluation.    - LORazepam (ATIVAN) 0.5 MG tablet  Dispense: 2 tablet; Refill: 0    Pulmonary emphysema, unspecified emphysema type (H)  Follows  with Dr. 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  Breathing is stable on Advair/Spiriva, as needed albuterol, and daily azithromycin.  Oxygen 95% at rest on room air today.    Mild aortic valve stenosis  Mean gradient of 24 mm. Follows with Dr. Robertson with plan to repeat in echocardiogram in 1 year.    Second degree heart block  Cardiac pacemaker in situ   [employer] dual-chamber pacemaker March 24, 2022 with preserved left ventricular function.    Essential hypertension, benign  Blood pressure controlled on hydrochlorothiazide and spironolactone which she was advised to hold the morning of surgery.    Intractable epilepsy without status epilepticus, unspecified epilepsy type (H)  Stable on Keppra, follows with neurology    Anxiety  Mood controlled on sertraline.    Gastroesophageal reflux disease without esophagitis  Large hiatal hernia noted on 2018 CT scan.  Remains on a high intensity PPI.    Age-related osteoporosis without current pathological fracture  Stable on prolia injections q6m.     I spent a total of 41 minutes on the day of the visit.   Time spent doing chart review, history and exam, documentation and further activities per the note    Subjective     HPI related to upcoming procedure:   History of bilateral hammer toes, worse on left and currently wearing a postop surgical shoe    Preop Questions 7/14/2022   1. Have you ever had a heart attack or stroke? No   2. Have you ever had surgery on your heart or blood vessels, such as a stent placement, a coronary artery bypass, or surgery on an artery in your head, neck, heart, or legs? No   3. Do you have chest pain with activity? No   4. Do you have a history of  heart failure? No   5. Do you currently have a cold, bronchitis or symptoms of other infection? No   6. Do you have a cough, shortness of breath, or wheezing? YES -chronic due to end-stage COPD   7. Do you  or anyone in your family have previous history of blood clots? UNKNOWN    8. Do you or does anyone in your family have a serious bleeding problem such as prolonged bleeding following surgeries or cuts? No   9. Have you ever had problems with anemia or been told to take iron pills? No   10. Have you had any abnormal blood loss such as black, tarry or bloody stools, or abnormal vaginal bleeding? No   11. Have you ever had a blood transfusion? No   12. Are you willing to have a blood transfusion if it is medically needed before, during, or after your surgery? Yes   13. Have you or any of your relatives ever had problems with anesthesia? No   14. Do you have sleep apnea, excessive snoring or daytime drowsiness? No   15. Do you have any artifical heart valves or other implanted medical devices like a pacemaker, defibrillator, or continuous glucose monitor? YES    15a. What type of device do you have? Pacemaker   15b. Name of the clinic that manages your device:  [place]   16. Do you have artificial joints? No   17. Are you allergic to latex? No       Health Care Directive:  Patient has a Health Care Directive on file      Preoperative Review of :   reviewed - controlled substances reflected in medication list.    Status of Chronic Conditions:  COPD - Patient has a longstanding history of moderate-severe COPD .  She has chronic SOB, DENT and COUGH and continues on medication regimen consisting of Advair/Spiriva, as needed albuterol, daily azithromycin, 2 L O2 with exertion and sleep without adverse reactions or side effects.    HYPERTENSION - Patient has longstanding history of HTN , currently denies any symptoms referable to elevated blood pressure. Specifically denies chest pain, palpitations, dyspnea, orthopnea, PND or peripheral edema. Blood pressure readings have been in normal range. Current medication regimen is as listed below. Patient denies any side effects of medication.       Review of  Systems  CONSTITUTIONAL: NEGATIVE for fever, chills, change in weight  ENT/MOUTH: NEGATIVE for ear, mouth and throat problems  RESP:POSITIVE for chronic cough and dyspnea on exertion  CV: NEGATIVE for chest pain, palpitations or peripheral edema    Patient Active Problem List    Diagnosis Date Noted     Cardiac pacemaker in situ 03/25/2022     Priority: Medium     Second degree heart block 03/19/2022     Priority: Medium     PAD (peripheral artery disease) (H) 10/18/2021     Priority: Medium     Keratoma 10/18/2021     Priority: Medium     Epileptic seizure (H) 07/16/2021     Priority: Medium     Formatting of this note might be different from the original.  Created by Conversion    Replacement Utility updated for latest IMO load       Esophageal reflux 07/16/2021     Priority: Medium     Formatting of this note might be different from the original.  Created by Conversion       Hyperlipidemia 07/16/2021     Priority: Medium     Formatting of this note might be different from the original.  Created by Conversion       Impaired gait and mobility 07/16/2021     Priority: Medium     Formatting of this note might be different from the original.  Created by Conversion       Pulmonary emphysema (H) 07/16/2021     Priority: Medium     Formatting of this note might be different from the original.  Created by Conversion       Rosacea 07/16/2021     Priority: Medium     Formatting of this note might be different from the original.  Created by Conversion       Solar elastosis 07/16/2021     Priority: Medium     Formatting of this note might be different from the original.  Created by Conversion       Vitamin D deficiency 07/16/2021     Priority: Medium     Formatting of this note might be different from the original.  Created by Conversion    Replacement Utility updated for latest IMO load       Hammer toe of left foot 05/14/2021     Priority: Medium     Uterine prolapse 03/01/2021     Priority: Medium     Acquired lymphedema  of leg 02/01/2021     Priority: Medium     Leg length discrepancy 02/01/2021     Priority: Medium     Localized deposits of fat 05/06/2019     Priority: Medium     Neuropathy, idiopathic 02/21/2019     Priority: Medium     Morbid obesity (H) 12/23/2018     Priority: Medium     S/P repair of paraesophageal hernia 11/26/2018     Priority: Medium     Decreased hearing of both ears 05/29/2018     Priority: Medium     Osteoporosis 02/08/2018     Priority: Medium     Mild aortic valve stenosis 11/17/2017     Priority: Medium     Urge incontinence of urine 11/17/2017     Priority: Medium     Valgus deformity of both feet 03/15/2017     Priority: Medium     Collapsed arches 02/01/2017     Priority: Medium     Essential hypertension, benign      Priority: Medium     Created by Conversion  Replacement Utility updated for latest IMO load         Left arm swelling 12/07/2015     Priority: Medium      Past Medical History:   Diagnosis Date     Convulsive disorder (H)      Convulsive disorder (H)      COPD (chronic obstructive pulmonary disease) (H)      COPD (chronic obstructive pulmonary disease) (H)      Depression      Dyspnea on exertion      Gastroesophageal reflux disease      Heart murmur      Hernia of unspecified site of abdominal cavity without mention of obstruction or gangrene      Hiatal hernia      Hiatal hernia      Hypertension      Hypertension      Obese      On home oxygen therapy     at 1.5 liters at nite     Osteopenia      Osteopenia      Oxygen dependent     1.5 L per NC     Pneumonia due to infectious organism, unspecified laterality, unspecified part of lung 3/19/2022     Second degree heart block 3/19/2022     Shortness of breath      Uncomplicated asthma      URI (upper respiratory infection)      Venous insufficiency of both lower extremities      Venous insufficiency of both lower extremities      Walking troubles      Past Surgical History:   Procedure Laterality Date     ARTHROPLASTY TOE(S) Left  11/22/2021    Procedure: ARTHROPLASTY, digits two and three left foot;  Surgeon: Alfonso Orozco DPM;  Location: [facility]     EP PACEMAKER DEVICE & LEAD IMPLANT- RIGHT ATRIAL & RIGHT VENTRICULAR N/A 3/24/2022    Procedure: Pacemaker Device & Lead Implant - Right Atrial & Right Ventricular;  Surgeon: Helder Pendleton MD;  Location: [facility]     ESOPHAGOSCOPY, GASTROSCOPY, DUODENOSCOPY (EGD), COMBINED N/A 11/26/2018    Procedure: Esophagogastroduodenoscopy;  Surgeon: Jasmeet Wilder MD;  Location: [facility]     HERNIA REPAIR, UMBILICAL  04/2018     HERNIA REPAIR, UMBILICAL  04/04/2018    Dr. 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     Repair arm fracture Left      SHOULDER SURGERY Right 1967     SHOULDER SURGERY Right 1967      BIOPSY THYROID FINE NEEDLE ASPIRATION  7/29/2020     Current Outpatient Medications   Medication Sig Dispense Refill     albuterol (PROAIR HFA/PROVENTIL HFA/VENTOLIN HFA) 108 (90 Base) MCG/ACT inhaler Inhale 2 puffs into the lungs every 6 hours as needed 18 g 1     azithromycin (ZITHROMAX) 250 MG tablet Take 1 tablet (250 mg) by mouth daily 90 tablet 3     benzonatate (TESSALON) 100 MG capsule Take 1 capsule (100 mg) by mouth 3 times daily as needed for cough 30 capsule 1     calcium citrate (CITRACAL) 950 (200 Ca) MG tablet Take 1 tablet by mouth 2 times daily       denosumab (PROLIA) 60 MG/ML SOLN injection Inject 60 mg Subcutaneous every 6 months       fluticasone-salmeterol (ADVAIR) 500-50 MCG/ACT inhaler Inhale 1 puff into the lungs every 12 hours 180 each 3     guaiFENesin (MUCINEX) 600 MG 12 hr tablet Take 1 tablet (600 mg) by mouth 2 times daily (Patient taking differently: Take 600 mg by mouth 2 times daily as needed)        hydrochlorothiazide (HYDRODIURIL) 25 MG tablet hydrochlorothiazide 25 mg tablet   TAKE 1 TABLET BY MOUTH DAILY (Patient taking differently: Take 25 mg by mouth daily hydrochlorothiazide 25 mg tablet   TAKE 1 TABLET BY MOUTH DAILY) 90 tablet 3     levETIRAcetam (KEPPRA) 500 MG tablet Take 1 tablet (500 mg) by mouth 2 times daily 180 tablet 3     LORazepam (ATIVAN) 0.5 MG tablet Take 1 tablet (0.5 mg) 30 min before your surgery 2 tablet 0     losartan (COZAAR) 50 MG tablet Take 1 tablet (50 mg) by mouth 2 times daily 90 tablet 3     Magnesium Oxide 500 MG TABS Take 500 mg by mouth 2 times daily       nystatin (MYCOSTATIN) 171311 UNIT/GM external powder Apply topically daily as needed       potassium chloride ER (KLOR-CON M) 20 MEQ CR tablet Take 1 tablet (20 mEq) by mouth daily (Patient taking differently: Take 20 mEq by mouth every evening) 90 tablet 3     RABEprazole (ACIPHEX) 20 MG EC tablet Take 1 tablet (20 mg) by mouth daily 90 tablet 3     sertraline (ZOLOFT) 100 MG tablet Take 1 tablet (100 mg) by mouth daily (Patient taking differently: Take 100 mg by mouth every evening) 90 tablet 3     solifenacin (VESICARE) 10 MG tablet Take 1 tablet (10 mg) by mouth daily 90 tablet 3     spironolactone (ALDACTONE) 25 MG tablet Take 0.5 tablets (12.5 mg) by mouth daily 45 tablet 4     tiotropium (SPIRIVA HANDIHALER) 18 MCG inhaled capsule Spiriva with HandiHaler 18 mcg and inhalation capsules (Patient taking differently: Inhale 18 mcg into the lungs daily) 90 capsule 3     vitamin D3 (CHOLECALCIFEROL) 125 MCG (5000 UT) tablet Take 5,000 Units by mouth daily          Allergies   Allergen Reactions     Clindamycin Rash     Carbamazepine Rash     Morphine Nausea        Social History     Tobacco Use     Smoking status: Former Smoker     Packs/day: 1.50     Years: 38.00     Pack years: 57.00     Types: Cigarettes     Quit date: 1998     Years since quittin.6     Smokeless tobacco: Never Used     Tobacco comment: quit  "in 1998   Substance Use Topics     Alcohol use: Yes     Alcohol/week: 2.0 standard drinks     Comment: occ     Family History   Problem Relation Age of Onset     Pulmonary Hypertension Daughter         idiopathic      Heart Disease Other         2 sibs MI, 1 sib electrical conduction disorder     Breast Cancer Mother 66.00     Hypertension Mother      Coronary Artery Disease Mother      Varicose Veins Mother      Hypertension Father      Coronary Artery Disease Sister      Heart Disease Sister      Diabetes Son      Pulmonary Hypertension Daughter      Coronary Artery Disease Sister      Heart Disease Sister      History   Drug Use Unknown         Objective     /69   Pulse 82   Ht 1.626 m (5' 4\")   Wt 100.7 kg (222 lb)   SpO2 95%   BMI 38.11 kg/m      Physical Exam  GENERAL APPEARANCE: healthy, alert and no distress  HENT: ear canals and TM's normal and nose and mouth without ulcers or lesions  RESP: rhonchi throughout, no wheezing, no crackles, breathlessness with prolonged speaking     CV: regular rates and rhythm and grade 2/6 systolic murmur heard best over the right sternal border, 1+ swelling bilateral LEs  NEURO: Normal tone, mentation intact and speech normal  PSYCH: mentation appears normal and affect normal/bright    Recent Labs   Lab Test 04/19/22  1514 03/25/22  0844 03/25/22  0525 03/19/22  1441 02/22/22  1110   HGB 11.7 13.3  --    < > 14.0    229  --    < > 243     --  140   < > 137   POTASSIUM 4.3  --  4.2   < > 4.5   CR 1.03  --  0.70   < > 0.82   A1C  --   --   --   --  5.4    < > = values in this interval not displayed.        Diagnostics:  No labs were ordered during this visit.   No EKG this visit, completed in the last 90 days.    Revised Cardiac Risk Index (RCRI):  The patient has the following serious cardiovascular risks for perioperative complications:   - No serious cardiac risks = 0 points     RCRI Interpretation: 0 points: Class I (very low risk - 0.4% " complication rate)           Signed Electronically by: Kate Marte DO  Copy of this evaluation report is provided to requesting physician.

## 2022-07-15 ENCOUNTER — TELEPHONE (OUTPATIENT)
Dept: FAMILY MEDICINE | Facility: CLINIC | Age: 77
End: 2022-07-15

## 2022-07-15 NOTE — OR NURSING
"Per pre op, pt has SEVERE gold C COPD, chronic respiratory failure, SOB, DENT, anxiety and a pacemaker (see pre op for all med hx details)Yellow sheet alerting Anesthesia place on the front of the chart notifying them of her request to have local anesthesia only 2/2\" being at high risk for systemic anesthesia and wants to avoid it\" per her H&P.   "

## 2022-07-18 ENCOUNTER — HOSPITAL ENCOUNTER (OUTPATIENT)
Facility: CLINIC | Age: 77
Discharge: HOME | End: 2022-07-18
Attending: PODIATRIST | Admitting: PODIATRIST
Payer: MEDICARE

## 2022-07-18 VITALS
WEIGHT: 220 LBS | DIASTOLIC BLOOD PRESSURE: 79 MMHG | HEIGHT: 64 IN | RESPIRATION RATE: 22 BRPM | HEART RATE: 85 BPM | TEMPERATURE: 97.2 F | SYSTOLIC BLOOD PRESSURE: 152 MMHG | OXYGEN SATURATION: 97 % | BODY MASS INDEX: 37.56 KG/M2

## 2022-07-18 DIAGNOSIS — M20.42 HAMMER TOE OF LEFT FOOT: ICD-10-CM

## 2022-07-18 PROCEDURE — 999N000141 HC STATISTIC PRE-PROCEDURE NURSING ASSESSMENT: Performed by: PODIATRIST

## 2022-07-18 PROCEDURE — 710N000012 HC RECOVERY PHASE 2, PER MINUTE: Performed by: PODIATRIST

## 2022-07-18 PROCEDURE — 999N000127 HC STATISTIC PERIPHERAL IV START W US GUIDANCE

## 2022-07-18 PROCEDURE — 250N000011 HC RX IP 250 OP 636: Performed by: PODIATRIST

## 2022-07-18 PROCEDURE — 250N000009 HC RX 250: Performed by: PODIATRIST

## 2022-07-18 PROCEDURE — 28285 REPAIR OF HAMMERTOE: CPT | Mod: T3 | Performed by: PODIATRIST

## 2022-07-18 PROCEDURE — 272N000001 HC OR GENERAL SUPPLY STERILE: Performed by: PODIATRIST

## 2022-07-18 PROCEDURE — 360N000076 HC SURGERY LEVEL 3, PER MIN: Performed by: PODIATRIST

## 2022-07-18 RX ORDER — BUPIVACAINE HYDROCHLORIDE 5 MG/ML
INJECTION, SOLUTION PERINEURAL PRN
Status: DISCONTINUED | OUTPATIENT
Start: 2022-07-18 | End: 2022-07-18 | Stop reason: HOSPADM

## 2022-07-18 RX ORDER — OXYCODONE HYDROCHLORIDE 5 MG/1
5-10 TABLET ORAL EVERY 6 HOURS PRN
Qty: 18 TABLET | Refills: 0 | Status: SHIPPED | OUTPATIENT
Start: 2022-07-18 | End: 2022-10-24

## 2022-07-18 RX ORDER — CEFAZOLIN SODIUM/WATER 2 G/20 ML
2 SYRINGE (ML) INTRAVENOUS
Status: DISCONTINUED | OUTPATIENT
Start: 2022-07-18 | End: 2022-07-18 | Stop reason: HOSPADM

## 2022-07-18 RX ORDER — CEFAZOLIN SODIUM/WATER 2 G/20 ML
2 SYRINGE (ML) INTRAVENOUS SEE ADMIN INSTRUCTIONS
Status: DISCONTINUED | OUTPATIENT
Start: 2022-07-18 | End: 2022-07-18 | Stop reason: HOSPADM

## 2022-07-18 RX ADMIN — Medication 2 G: at 12:18

## 2022-07-18 NOTE — OP NOTE
Date: 7/18/22    Surgeon: NUBIA Orozco DPM    Preoperative diagnosis:   1. Hammertoe 4th digit left foot  2. Hammertoe 5th digit left foot     Postoperative diagnosis: Same    Procedure:   1. Arthroplasty 4th digit left foot   2. Arthroplasty 5th digit left foot     Anesthesia: Local      Hemostasis: Pneumatic ankle tourniquet 300 mmHg    Pathology: none    Injectables: none    Materials: Arthrex trim fit pin, 4-0 vicryl, 4-0 nylon    Complications: None    Blood loss: 10 cc      Findings: Patient presents for operative intervention for hammertoe deformity digits 4,5 foot. Patient has had recurrent skin breakdown along the distal 4th digit. We discussed surgical treatment of both the 4th and 5th digits including postop course to remain non-weight bearing. Risks include but not limited to infection and need for additional surgery. She consents to surgery. Conservative measures were attempted and exhausted. Patient questions invited and answered, including appropriate risk, benefits and complications. No guarantees given or implied. Patient has been NPO.    Description: Patient was brought to the operating room and placed on the table in supine position.  Injection of 0.5% Marcaine plain was administered to digits 4,5 left foot. The foot was then prepped and draped in usual aseptic manner. The extremity was elevated and exsanguinated. Well-padded ankle pneumatic tourniquet was inflated to 250mmHg and the following procedure was then performed: Attention was directed to the dorsal aspect of the 4th digit where a dorsal incision was made overlying the PIPJ. The incision was carried into the subcutaneous tissue with care taken to avoid any neurovascular structures and cauterize superficial bleeders. Again the incision was carried into the PIPJ where the head of the proximal phalanx was freed from all soft tissue including the collateral ligaments and extensor digitorum longus tendon. A sagittal saw was used the resect the  head of the proximal phalanx and removed from the surgical site in toto. Next, a 1.5mm Arthrex absorbable pin was inserted into the middle phalanx, cut, and inserted into the proximal phalanx while the digit was held in a corrected and rectus position. The extensor digitorum longus tendon and subcutaneous tissue was reapproximated with 4-0 vicryl in a simple suture fashion and the skin was closed with 4-0 nylon in a simple suture fashion.     Attention was directed to the dorsal aspect of the 5th digit where two dorsal incisions were made overlying the PIPJ. The incision was carried into the subcutaneous tissue with care taken to avoid any neurovascular structures and cauterize superficial bleeders. Again the incision was carried into the PIPJ where the head of the proximal phalanx was freed from all soft tissue including the collateral ligaments and extensor digitorum longus tendon. A sagittal saw was used the resect the head of the proximal phalanx and removed from the surgical site in toto. The extensor digitorum longus tendon and subcutaneous tissue was reapproximated with 4-0 vicryl in a simple suture fashion and the skin was closed with 4-0 nylon in a simple suture fashion.     Dressings consisted of adaptic, 4x4's, maddie roll and an ace wrap. The pneumatic tourniquet was released and a hyperemic response was noted to the digits on the left foot.     The patient appeared to tolerate all the procedures and anesthesia well without apparent complications. Patient was transported from the operating room to the recovery room with vital signs stable and neurovascular status as it was pre-operatively to the left foot. Patient to be discharged home per anesthesia. Written and verbal homecare instructions given to keep surgical dressing intact, non-weight bearing. Patient to follow up in office for post-operative management in one week.

## 2022-07-18 NOTE — INTERVAL H&P NOTE
"I have reviewed the surgical (or preoperative) H&P that is linked to this encounter, and examined the patient. There are no significant changes    Clinical Conditions Present on Arrival:  Clinically Significant Risk Factors Present on Admission                   # Obesity: Estimated body mass index is 37.76 kg/m  as calculated from the following:    Height as of this encounter: 1.626 m (5' 4\").    Weight as of this encounter: 99.8 kg (220 lb).       "

## 2022-07-25 ENCOUNTER — HOSPITAL ENCOUNTER (OUTPATIENT)
Dept: GENERAL RADIOLOGY | Facility: HOSPITAL | Age: 77
Discharge: HOME | End: 2022-07-25
Attending: PODIATRIST | Admitting: PODIATRIST
Payer: MEDICARE

## 2022-07-25 ENCOUNTER — OFFICE VISIT (OUTPATIENT)
Dept: PODIATRY | Facility: CLINIC | Age: 77
End: 2022-07-25
Payer: MEDICARE

## 2022-07-25 VITALS — WEIGHT: 220 LBS | OXYGEN SATURATION: 96 % | BODY MASS INDEX: 37.56 KG/M2 | HEIGHT: 64 IN | HEART RATE: 82 BPM

## 2022-07-25 DIAGNOSIS — M20.42 HAMMER TOE OF LEFT FOOT: Primary | ICD-10-CM

## 2022-07-25 DIAGNOSIS — M20.42 HAMMER TOE OF LEFT FOOT: ICD-10-CM

## 2022-07-25 PROCEDURE — 73630 X-RAY EXAM OF FOOT: CPT | Mod: 26 | Performed by: PODIATRIST

## 2022-07-25 PROCEDURE — 99024 POSTOP FOLLOW-UP VISIT: CPT | Performed by: PODIATRIST

## 2022-07-25 PROCEDURE — 73630 X-RAY EXAM OF FOOT: CPT | Mod: LT,FY

## 2022-07-25 ASSESSMENT — PAIN SCALES - GENERAL: PAINLEVEL: NO PAIN (0)

## 2022-07-25 NOTE — LETTER
7/25/2022         RE: Lalitha Underwood  [address] [facility] [address]        Dear Colleague,    Thank you for referring your patient, Lalitha Underwood, to the [facility]. Please see a copy of my visit note below.    Podiatry Progress Note        ASSESSMENT: S/P Arthroplasty digits 4,5 left foot       TREATMENT:  -Surgical site on the 4th digit left foot is progressing well. I did remove a superficial hematoma on the 5th digit left foot, underlying skin was noted to be intact.     -I reviewed the patient's x-rays which indicate dorsal proximal translation of the middle phalanx in relation to the proximal phalanx.    -Discussed with the patient that it appears she may have injured/stubbed the 5th toe over the past week, based on the clinical and x-ray presentations with dorsal dislocation. There does appear to be blood flow to the 5th digit on exam today, and previous BEBE's in 2021 indicating good TBI's.     -Based on the above, we discussed surgical repositioning of the middle phalanx. However, she is high risk for surgery. I recommend we continue to monitor. Risks include need for amputation if ischemic non-viable tissue develops.     -Gauze dressing applied today which she will keep intact. Continue non-weight bearing on the left foot.     -She will follow-up with me in 1 week. CAM boot for stability.     Alfonso Orozco, NAY  [facility] Podiatry/Foot & Ankle Surgery      HPI: Lalitha Underwood was seen again today s/p arthroplasty digits 4,5 left foot. Doing well today. She denies foot pain.    Past Medical History:   Diagnosis Date     Convulsive disorder (H)      Convulsive disorder (H)      COPD (chronic obstructive pulmonary disease) (H)      COPD (chronic obstructive pulmonary disease) (H)      Depression      Dyspnea on exertion      Gastroesophageal reflux disease      Heart murmur      Hernia of unspecified site of abdominal cavity without mention of obstruction or  gangrene      Hiatal hernia      Hiatal hernia      Hypertension      Hypertension      Obese      On home oxygen therapy     at 1.5 liters at nite     Osteopenia      Osteopenia      Oxygen dependent     1.5 L per NC     Pneumonia due to infectious organism, unspecified laterality, unspecified part of lung 3/19/2022     Second degree heart block 3/19/2022     Shortness of breath      Uncomplicated asthma      URI (upper respiratory infection)      Venous insufficiency of both lower extremities      Venous insufficiency of both lower extremities      Walking troubles        Allergies   Allergen Reactions     Clindamycin Rash     Carbamazepine Rash     Morphine Nausea         Current Outpatient Medications:      albuterol (PROAIR HFA/PROVENTIL HFA/VENTOLIN HFA) 108 (90 Base) MCG/ACT inhaler, Inhale 2 puffs into the lungs every 6 hours as needed, Disp: 18 g, Rfl: 1     azithromycin (ZITHROMAX) 250 MG tablet, Take 1 tablet (250 mg) by mouth daily, Disp: 90 tablet, Rfl: 3     benzonatate (TESSALON) 100 MG capsule, Take 1 capsule (100 mg) by mouth 3 times daily as needed for cough, Disp: 30 capsule, Rfl: 1     calcium citrate (CITRACAL) 950 (200 Ca) MG tablet, Take 1 tablet by mouth 2 times daily, Disp: , Rfl:      denosumab (PROLIA) 60 MG/ML SOLN injection, Inject 60 mg Subcutaneous every 6 months, Disp: , Rfl:      fluticasone-salmeterol (ADVAIR) 500-50 MCG/ACT inhaler, Inhale 1 puff into the lungs every 12 hours, Disp: 180 each, Rfl: 3     guaiFENesin (MUCINEX) 600 MG 12 hr tablet, Take 1 tablet (600 mg) by mouth 2 times daily (Patient taking differently: Take 600 mg by mouth 2 times daily as needed), Disp: , Rfl:      hydrochlorothiazide (HYDRODIURIL) 25 MG tablet, hydrochlorothiazide 25 mg tablet  TAKE 1 TABLET BY MOUTH DAILY (Patient taking differently: Take 25 mg by mouth daily hydrochlorothiazide 25 mg tablet  TAKE 1 TABLET BY MOUTH DAILY), Disp: 90 tablet, Rfl: 3     levETIRAcetam (KEPPRA) 500 MG tablet, Take 1  tablet (500 mg) by mouth 2 times daily, Disp: 180 tablet, Rfl: 3     LORazepam (ATIVAN) 0.5 MG tablet, Take 1 tablet (0.5 mg) 30 min before your surgery, Disp: 2 tablet, Rfl: 0     losartan (COZAAR) 50 MG tablet, Take 1 tablet (50 mg) by mouth 2 times daily, Disp: 90 tablet, Rfl: 3     Magnesium Oxide 500 MG TABS, Take 500 mg by mouth 2 times daily, Disp: , Rfl:      nystatin (MYCOSTATIN) 944394 UNIT/GM external powder, Apply topically daily as needed, Disp: , Rfl:      oxyCODONE (ROXICODONE) 5 MG tablet, Take 1-2 tablets (5-10 mg) by mouth every 6 hours as needed for moderate to severe pain, Disp: 18 tablet, Rfl: 0     potassium chloride ER (KLOR-CON M) 20 MEQ CR tablet, Take 1 tablet (20 mEq) by mouth daily (Patient taking differently: Take 20 mEq by mouth every evening), Disp: 90 tablet, Rfl: 3     RABEprazole (ACIPHEX) 20 MG EC tablet, Take 1 tablet (20 mg) by mouth daily, Disp: 90 tablet, Rfl: 3     sertraline (ZOLOFT) 100 MG tablet, Take 1 tablet (100 mg) by mouth daily (Patient taking differently: Take 100 mg by mouth every evening), Disp: 90 tablet, Rfl: 3     solifenacin (VESICARE) 10 MG tablet, Take 1 tablet (10 mg) by mouth daily, Disp: 90 tablet, Rfl: 3     spironolactone (ALDACTONE) 25 MG tablet, Take 0.5 tablets (12.5 mg) by mouth daily, Disp: 45 tablet, Rfl: 4     tiotropium (SPIRIVA HANDIHALER) 18 MCG inhaled capsule, Spiriva with HandiHaler 18 mcg and inhalation capsules (Patient taking differently: Inhale 18 mcg into the lungs daily), Disp: 90 capsule, Rfl: 3     vitamin D3 (CHOLECALCIFEROL) 125 MCG (5000 UT) tablet, Take 5,000 Units by mouth daily , Disp: , Rfl:   No current facility-administered medications for this visit.    Facility-Administered Medications Ordered in Other Visits:      sod bicarbonate-citric acid-simethicone (EZ GAS) 2.21-1.53-0.04 g packet 4 g, 4 g, Oral, Once, Adan, Ragini Tino, MD    Review of Systems - Negative       OBJECTIVE:  Appearance: alert, well appearing, and in  "no distress.    Pulse 82   Ht 5' 4\" (1.626 m)   Wt 220 lb (99.8 kg)   SpO2 96%   BMI 37.76 kg/m      @LDAVASC(10,16,17)@    No images are attached to the encounter.     Surgical sites on digits 4,5 left foot has intact sutures with skin edges well approximated, no gapping noted. There is a superficial hematoma along the dorsal 5th digit left foot, upon drainage the underlying skin is intact. No erythema left foot. Neurovascular status unchanged left foot.         Again, thank you for allowing me to participate in the care of your patient.        Sincerely,        Alfonso Orozco DPM    "

## 2022-07-25 NOTE — PATIENT INSTRUCTIONS
WHAT YOU NEED TO KNOW:    What is a walking boot?  A walking boot is a type of medical shoe used to protect the foot and ankle after an injury or surgery. The boot can be used for broken bones, tendon injuries, severe sprains, or shin splints. A walking boot helps keep the foot stable so it can heal. It can keep your weight off an area, such as your toe, as it heals. Most boots have between 2 and 5 adjustable straps and go mid-way up your calf.              How do I put on the walking boot?  You may want to wear a large sock.  Sit down and place your heel all the way to the back of the boot.  Wrap the soft liner around your foot and leg.  Place the front piece over the liner.  Start to fasten the straps closest to your toes then move up your leg.  Tighten the straps so they are snug but not too tight. The boot should limit movement but not cut off your blood flow.  If your boot has one or more air chambers, pump them up as directed by your healthcare provider.  Stand up and take a few steps to practice walking.    What else do I need to know?  Check your foot and toes often. Check your foot and toes for redness and swelling. If your toes are red, swollen, numb, or tingly, loosen your straps or deflate the air chamber. Over time, the swelling from the injury or surgery will decrease. When this happens, you may need to tighten the straps.  Be careful when you walk on wet surfaces. The boot may be slippery.  Follow the instructions to wash the liner. Remove the liner and wash it by hand in cold water with a mild detergent. Do not use a washing machine or dryer. Place the liner flat to dry. Wash the plastic parts with a damp cloth and mild soap.  Ask about removing the boot to bathe or for motion exercises. You may need to leave the boot on when you bathe. Cover it with a plastic bag and tape the bag closed around your leg.

## 2022-07-25 NOTE — PROGRESS NOTES
Podiatry Progress Note        ASSESSMENT: S/P Arthroplasty digits 4,5 left foot       TREATMENT:  -Surgical site on the 4th digit left foot is progressing well. I did remove a superficial hematoma on the 5th digit left foot, underlying skin was noted to be intact.     -I reviewed the patient's x-rays which indicate dorsal proximal translation of the middle phalanx in relation to the proximal phalanx.    -Discussed with the patient that it appears she may have injured/stubbed the 5th toe over the past week, based on the clinical and x-ray presentations with dorsal dislocation. There does appear to be blood flow to the 5th digit on exam today, and previous BEBE's in 2021 indicating good TBI's.     -Based on the above, we discussed surgical repositioning of the middle phalanx. However, she is high risk for surgery. I recommend we continue to monitor. Risks include need for amputation if ischemic non-viable tissue develops.     -Gauze dressing applied today which she will keep intact. Continue non-weight bearing on the left foot.     -She will follow-up with me in 1 week. CAM boot for stability.     Alfonso Orozco DPM  [facility] Podiatry/Foot & Ankle Surgery      HPI: Lalitha Underwood was seen again today s/p arthroplasty digits 4,5 left foot. Doing well today. She denies foot pain.    Past Medical History:   Diagnosis Date     Convulsive disorder (H)      Convulsive disorder (H)      COPD (chronic obstructive pulmonary disease) (H)      COPD (chronic obstructive pulmonary disease) (H)      Depression      Dyspnea on exertion      Gastroesophageal reflux disease      Heart murmur      Hernia of unspecified site of abdominal cavity without mention of obstruction or gangrene      Hiatal hernia      Hiatal hernia      Hypertension      Hypertension      Obese      On home oxygen therapy     at 1.5 liters at nite     Osteopenia      Osteopenia      Oxygen dependent     1.5 L per NC     Pneumonia due to infectious  organism, unspecified laterality, unspecified part of lung 3/19/2022     Second degree heart block 3/19/2022     Shortness of breath      Uncomplicated asthma      URI (upper respiratory infection)      Venous insufficiency of both lower extremities      Venous insufficiency of both lower extremities      Walking troubles        Allergies   Allergen Reactions     Clindamycin Rash     Carbamazepine Rash     Morphine Nausea         Current Outpatient Medications:      albuterol (PROAIR HFA/PROVENTIL HFA/VENTOLIN HFA) 108 (90 Base) MCG/ACT inhaler, Inhale 2 puffs into the lungs every 6 hours as needed, Disp: 18 g, Rfl: 1     azithromycin (ZITHROMAX) 250 MG tablet, Take 1 tablet (250 mg) by mouth daily, Disp: 90 tablet, Rfl: 3     benzonatate (TESSALON) 100 MG capsule, Take 1 capsule (100 mg) by mouth 3 times daily as needed for cough, Disp: 30 capsule, Rfl: 1     calcium citrate (CITRACAL) 950 (200 Ca) MG tablet, Take 1 tablet by mouth 2 times daily, Disp: , Rfl:      denosumab (PROLIA) 60 MG/ML SOLN injection, Inject 60 mg Subcutaneous every 6 months, Disp: , Rfl:      fluticasone-salmeterol (ADVAIR) 500-50 MCG/ACT inhaler, Inhale 1 puff into the lungs every 12 hours, Disp: 180 each, Rfl: 3     guaiFENesin (MUCINEX) 600 MG 12 hr tablet, Take 1 tablet (600 mg) by mouth 2 times daily (Patient taking differently: Take 600 mg by mouth 2 times daily as needed), Disp: , Rfl:      hydrochlorothiazide (HYDRODIURIL) 25 MG tablet, hydrochlorothiazide 25 mg tablet  TAKE 1 TABLET BY MOUTH DAILY (Patient taking differently: Take 25 mg by mouth daily hydrochlorothiazide 25 mg tablet  TAKE 1 TABLET BY MOUTH DAILY), Disp: 90 tablet, Rfl: 3     levETIRAcetam (KEPPRA) 500 MG tablet, Take 1 tablet (500 mg) by mouth 2 times daily, Disp: 180 tablet, Rfl: 3     LORazepam (ATIVAN) 0.5 MG tablet, Take 1 tablet (0.5 mg) 30 min before your surgery, Disp: 2 tablet, Rfl: 0     losartan (COZAAR) 50 MG tablet, Take 1 tablet (50 mg) by mouth 2  "times daily, Disp: 90 tablet, Rfl: 3     Magnesium Oxide 500 MG TABS, Take 500 mg by mouth 2 times daily, Disp: , Rfl:      nystatin (MYCOSTATIN) 674510 UNIT/GM external powder, Apply topically daily as needed, Disp: , Rfl:      oxyCODONE (ROXICODONE) 5 MG tablet, Take 1-2 tablets (5-10 mg) by mouth every 6 hours as needed for moderate to severe pain, Disp: 18 tablet, Rfl: 0     potassium chloride ER (KLOR-CON M) 20 MEQ CR tablet, Take 1 tablet (20 mEq) by mouth daily (Patient taking differently: Take 20 mEq by mouth every evening), Disp: 90 tablet, Rfl: 3     RABEprazole (ACIPHEX) 20 MG EC tablet, Take 1 tablet (20 mg) by mouth daily, Disp: 90 tablet, Rfl: 3     sertraline (ZOLOFT) 100 MG tablet, Take 1 tablet (100 mg) by mouth daily (Patient taking differently: Take 100 mg by mouth every evening), Disp: 90 tablet, Rfl: 3     solifenacin (VESICARE) 10 MG tablet, Take 1 tablet (10 mg) by mouth daily, Disp: 90 tablet, Rfl: 3     spironolactone (ALDACTONE) 25 MG tablet, Take 0.5 tablets (12.5 mg) by mouth daily, Disp: 45 tablet, Rfl: 4     tiotropium (SPIRIVA HANDIHALER) 18 MCG inhaled capsule, Spiriva with HandiHaler 18 mcg and inhalation capsules (Patient taking differently: Inhale 18 mcg into the lungs daily), Disp: 90 capsule, Rfl: 3     vitamin D3 (CHOLECALCIFEROL) 125 MCG (5000 UT) tablet, Take 5,000 Units by mouth daily , Disp: , Rfl:   No current facility-administered medications for this visit.    Facility-Administered Medications Ordered in Other Visits:      sod bicarbonate-citric acid-simethicone (EZ GAS) 2.21-1.53-0.04 g packet 4 g, 4 g, Oral, Once, Ragini Arellano MD    Review of Systems - Negative       OBJECTIVE:  Appearance: alert, well appearing, and in no distress.    Pulse 82   Ht 5' 4\" (1.626 m)   Wt 220 lb (99.8 kg)   SpO2 96%   BMI 37.76 kg/m      @LDAVASC(10,16,17)@    No images are attached to the encounter.     Surgical sites on digits 4,5 left foot has intact sutures with skin " edges well approximated, no gapping noted. There is a superficial hematoma along the dorsal 5th digit left foot, upon drainage the underlying skin is intact. No erythema left foot. Neurovascular status unchanged left foot.

## 2022-07-26 ENCOUNTER — TELEPHONE (OUTPATIENT)
Dept: RESPIRATORY THERAPY | Facility: HOSPITAL | Age: 77
End: 2022-07-26

## 2022-07-26 NOTE — TELEPHONE ENCOUNTER
Spoke with Serene, doing okay, is in the ED with her  who just fell.  Reminded her to call with questions and we will call her next month to check in.  Let her know that we hope everything turns out fine with her  also.  Lalitha Hamilton, RT, TTS, Chronic Pulmonary Disease Specialist

## 2022-07-29 ENCOUNTER — OFFICE VISIT (OUTPATIENT)
Dept: PODIATRY | Facility: CLINIC | Age: 77
End: 2022-07-29
Payer: COMMERCIAL

## 2022-07-29 VITALS — TEMPERATURE: 98.8 F | DIASTOLIC BLOOD PRESSURE: 74 MMHG | SYSTOLIC BLOOD PRESSURE: 126 MMHG | HEART RATE: 82 BPM

## 2022-07-29 DIAGNOSIS — M20.42 HAMMER TOE OF LEFT FOOT: Primary | ICD-10-CM

## 2022-07-29 PROCEDURE — 99024 POSTOP FOLLOW-UP VISIT: CPT | Performed by: PODIATRIST

## 2022-07-29 NOTE — PROGRESS NOTES
Podiatry Progress Note        ASSESSMENT: S/P Arthroplasty digits 4,5 left foot       TREATMENT:  -I discussed with the patient that there has been significant development of necrotic tissue along the dorsal 5th digit on the left foot. This is concerning for ischemic changes. The plantar digit does have normal skin texture/turgor with granular tissue along the peripheral necrotic tissue.     -We reviewed that amputation of the 5th digit may be necessary. However, I would like to allow for an additional 10 days for demarcation.     -Surgical site on the 4th digit left foot is progressing well.     -Gauze dressing applied today which she will keep intact. Continue non-weight bearing on the left foot.     -She will follow-up with me in 10 days. CAM boot for stability.     Alfonso Orozco DPM  [facility] Podiatry/Foot & Ankle Surgery      HPI: Lalitha Underwood was seen again today s/p arthroplasty digits 4,5 left foot. Doing well today. She denies foot pain.    Past Medical History:   Diagnosis Date     Convulsive disorder (H)      Convulsive disorder (H)      COPD (chronic obstructive pulmonary disease) (H)      COPD (chronic obstructive pulmonary disease) (H)      Depression      Dyspnea on exertion      Gastroesophageal reflux disease      Heart murmur      Hernia of unspecified site of abdominal cavity without mention of obstruction or gangrene      Hiatal hernia      Hiatal hernia      Hypertension      Hypertension      Obese      On home oxygen therapy     at 1.5 liters at nite     Osteopenia      Osteopenia      Oxygen dependent     1.5 L per NC     Pneumonia due to infectious organism, unspecified laterality, unspecified part of lung 3/19/2022     Second degree heart block 3/19/2022     Shortness of breath      Uncomplicated asthma      URI (upper respiratory infection)      Venous insufficiency of both lower extremities      Venous insufficiency of both lower extremities      Walking troubles         Allergies   Allergen Reactions     Clindamycin Rash     Carbamazepine Rash     Morphine Nausea         Current Outpatient Medications:      albuterol (PROAIR HFA/PROVENTIL HFA/VENTOLIN HFA) 108 (90 Base) MCG/ACT inhaler, Inhale 2 puffs into the lungs every 6 hours as needed, Disp: 18 g, Rfl: 1     azithromycin (ZITHROMAX) 250 MG tablet, Take 1 tablet (250 mg) by mouth daily, Disp: 90 tablet, Rfl: 3     benzonatate (TESSALON) 100 MG capsule, Take 1 capsule (100 mg) by mouth 3 times daily as needed for cough, Disp: 30 capsule, Rfl: 1     calcium citrate (CITRACAL) 950 (200 Ca) MG tablet, Take 1 tablet by mouth 2 times daily, Disp: , Rfl:      denosumab (PROLIA) 60 MG/ML SOLN injection, Inject 60 mg Subcutaneous every 6 months, Disp: , Rfl:      fluticasone-salmeterol (ADVAIR) 500-50 MCG/ACT inhaler, Inhale 1 puff into the lungs every 12 hours, Disp: 180 each, Rfl: 3     guaiFENesin (MUCINEX) 600 MG 12 hr tablet, Take 1 tablet (600 mg) by mouth 2 times daily (Patient taking differently: Take 600 mg by mouth 2 times daily as needed), Disp: , Rfl:      hydrochlorothiazide (HYDRODIURIL) 25 MG tablet, hydrochlorothiazide 25 mg tablet  TAKE 1 TABLET BY MOUTH DAILY (Patient taking differently: Take 25 mg by mouth daily hydrochlorothiazide 25 mg tablet  TAKE 1 TABLET BY MOUTH DAILY), Disp: 90 tablet, Rfl: 3     levETIRAcetam (KEPPRA) 500 MG tablet, Take 1 tablet (500 mg) by mouth 2 times daily, Disp: 180 tablet, Rfl: 3     LORazepam (ATIVAN) 0.5 MG tablet, Take 1 tablet (0.5 mg) 30 min before your surgery, Disp: 2 tablet, Rfl: 0     losartan (COZAAR) 50 MG tablet, Take 1 tablet (50 mg) by mouth 2 times daily, Disp: 90 tablet, Rfl: 3     Magnesium Oxide 500 MG TABS, Take 500 mg by mouth 2 times daily, Disp: , Rfl:      nystatin (MYCOSTATIN) 566366 UNIT/GM external powder, Apply topically daily as needed, Disp: , Rfl:      oxyCODONE (ROXICODONE) 5 MG tablet, Take 1-2 tablets (5-10 mg) by mouth every 6 hours as needed for  moderate to severe pain, Disp: 18 tablet, Rfl: 0     potassium chloride ER (KLOR-CON M) 20 MEQ CR tablet, Take 1 tablet (20 mEq) by mouth daily (Patient taking differently: Take 20 mEq by mouth every evening), Disp: 90 tablet, Rfl: 3     RABEprazole (ACIPHEX) 20 MG EC tablet, Take 1 tablet (20 mg) by mouth daily, Disp: 90 tablet, Rfl: 3     sertraline (ZOLOFT) 100 MG tablet, Take 1 tablet (100 mg) by mouth daily (Patient taking differently: Take 100 mg by mouth every evening), Disp: 90 tablet, Rfl: 3     solifenacin (VESICARE) 10 MG tablet, Take 1 tablet (10 mg) by mouth daily, Disp: 90 tablet, Rfl: 3     spironolactone (ALDACTONE) 25 MG tablet, Take 0.5 tablets (12.5 mg) by mouth daily, Disp: 45 tablet, Rfl: 4     tiotropium (SPIRIVA HANDIHALER) 18 MCG inhaled capsule, Spiriva with HandiHaler 18 mcg and inhalation capsules (Patient taking differently: Inhale 18 mcg into the lungs daily), Disp: 90 capsule, Rfl: 3     vitamin D3 (CHOLECALCIFEROL) 125 MCG (5000 UT) tablet, Take 5,000 Units by mouth daily , Disp: , Rfl:   No current facility-administered medications for this visit.    Facility-Administered Medications Ordered in Other Visits:      sod bicarbonate-citric acid-simethicone (EZ GAS) 2.21-1.53-0.04 g packet 4 g, 4 g, Oral, Once, Ragini Arellano MD    Review of Systems - Negative       OBJECTIVE:  Appearance: alert, well appearing, and in no distress.    /74   Pulse 82   Temp 98.8  F (37.1  C)     @LDAVASC(10,16,17)@    No images are attached to the encounter.     Surgical sites on digits 4,5 left foot has intact sutures with skin edges well approximated, no gapping noted. There is necrotic tissue along dorsal 5th digit left foot, granular tissue along the peripheral necrotic tissue and normal skin texture along the plantar digit. No erythema left foot. Neurovascular status unchanged left foot.

## 2022-07-29 NOTE — LETTER
7/29/2022         RE: Lalitha Underwood  [address] [facility] [address]        Dear Colleague,    Thank you for referring your patient, Lalitha Underwood, to the [facility]. Please see a copy of my visit note below.    Podiatry Progress Note        ASSESSMENT: S/P Arthroplasty digits 4,5 left foot       TREATMENT:  -I discussed with the patient that there has been significant development of necrotic tissue along the dorsal 5th digit on the left foot. This is concerning for ischemic changes. The plantar digit does have normal skin texture/turgor with granular tissue along the peripheral necrotic tissue.     -We reviewed that amputation of the 5th digit may be necessary. However, I would like to allow for an additional 10 days for demarcation.     -Surgical site on the 4th digit left foot is progressing well.     -Gauze dressing applied today which she will keep intact. Continue non-weight bearing on the left foot.     -She will follow-up with me in 10 days. CAM boot for stability.     Alfonso Orozco DPM  [facility] Podiatry/Foot & Ankle Surgery      HPI: Lalitha Underwood was seen again today s/p arthroplasty digits 4,5 left foot. Doing well today. She denies foot pain.    Past Medical History:   Diagnosis Date     Convulsive disorder (H)      Convulsive disorder (H)      COPD (chronic obstructive pulmonary disease) (H)      COPD (chronic obstructive pulmonary disease) (H)      Depression      Dyspnea on exertion      Gastroesophageal reflux disease      Heart murmur      Hernia of unspecified site of abdominal cavity without mention of obstruction or gangrene      Hiatal hernia      Hiatal hernia      Hypertension      Hypertension      Obese      On home oxygen therapy     at 1.5 liters at nite     Osteopenia      Osteopenia      Oxygen dependent     1.5 L per NC     Pneumonia due to infectious organism, unspecified laterality, unspecified part of lung 3/19/2022     Second  degree heart block 3/19/2022     Shortness of breath      Uncomplicated asthma      URI (upper respiratory infection)      Venous insufficiency of both lower extremities      Venous insufficiency of both lower extremities      Walking troubles        Allergies   Allergen Reactions     Clindamycin Rash     Carbamazepine Rash     Morphine Nausea         Current Outpatient Medications:      albuterol (PROAIR HFA/PROVENTIL HFA/VENTOLIN HFA) 108 (90 Base) MCG/ACT inhaler, Inhale 2 puffs into the lungs every 6 hours as needed, Disp: 18 g, Rfl: 1     azithromycin (ZITHROMAX) 250 MG tablet, Take 1 tablet (250 mg) by mouth daily, Disp: 90 tablet, Rfl: 3     benzonatate (TESSALON) 100 MG capsule, Take 1 capsule (100 mg) by mouth 3 times daily as needed for cough, Disp: 30 capsule, Rfl: 1     calcium citrate (CITRACAL) 950 (200 Ca) MG tablet, Take 1 tablet by mouth 2 times daily, Disp: , Rfl:      denosumab (PROLIA) 60 MG/ML SOLN injection, Inject 60 mg Subcutaneous every 6 months, Disp: , Rfl:      fluticasone-salmeterol (ADVAIR) 500-50 MCG/ACT inhaler, Inhale 1 puff into the lungs every 12 hours, Disp: 180 each, Rfl: 3     guaiFENesin (MUCINEX) 600 MG 12 hr tablet, Take 1 tablet (600 mg) by mouth 2 times daily (Patient taking differently: Take 600 mg by mouth 2 times daily as needed), Disp: , Rfl:      hydrochlorothiazide (HYDRODIURIL) 25 MG tablet, hydrochlorothiazide 25 mg tablet  TAKE 1 TABLET BY MOUTH DAILY (Patient taking differently: Take 25 mg by mouth daily hydrochlorothiazide 25 mg tablet  TAKE 1 TABLET BY MOUTH DAILY), Disp: 90 tablet, Rfl: 3     levETIRAcetam (KEPPRA) 500 MG tablet, Take 1 tablet (500 mg) by mouth 2 times daily, Disp: 180 tablet, Rfl: 3     LORazepam (ATIVAN) 0.5 MG tablet, Take 1 tablet (0.5 mg) 30 min before your surgery, Disp: 2 tablet, Rfl: 0     losartan (COZAAR) 50 MG tablet, Take 1 tablet (50 mg) by mouth 2 times daily, Disp: 90 tablet, Rfl: 3     Magnesium Oxide 500 MG TABS, Take 500 mg by  mouth 2 times daily, Disp: , Rfl:      nystatin (MYCOSTATIN) 298619 UNIT/GM external powder, Apply topically daily as needed, Disp: , Rfl:      oxyCODONE (ROXICODONE) 5 MG tablet, Take 1-2 tablets (5-10 mg) by mouth every 6 hours as needed for moderate to severe pain, Disp: 18 tablet, Rfl: 0     potassium chloride ER (KLOR-CON M) 20 MEQ CR tablet, Take 1 tablet (20 mEq) by mouth daily (Patient taking differently: Take 20 mEq by mouth every evening), Disp: 90 tablet, Rfl: 3     RABEprazole (ACIPHEX) 20 MG EC tablet, Take 1 tablet (20 mg) by mouth daily, Disp: 90 tablet, Rfl: 3     sertraline (ZOLOFT) 100 MG tablet, Take 1 tablet (100 mg) by mouth daily (Patient taking differently: Take 100 mg by mouth every evening), Disp: 90 tablet, Rfl: 3     solifenacin (VESICARE) 10 MG tablet, Take 1 tablet (10 mg) by mouth daily, Disp: 90 tablet, Rfl: 3     spironolactone (ALDACTONE) 25 MG tablet, Take 0.5 tablets (12.5 mg) by mouth daily, Disp: 45 tablet, Rfl: 4     tiotropium (SPIRIVA HANDIHALER) 18 MCG inhaled capsule, Spiriva with HandiHaler 18 mcg and inhalation capsules (Patient taking differently: Inhale 18 mcg into the lungs daily), Disp: 90 capsule, Rfl: 3     vitamin D3 (CHOLECALCIFEROL) 125 MCG (5000 UT) tablet, Take 5,000 Units by mouth daily , Disp: , Rfl:   No current facility-administered medications for this visit.    Facility-Administered Medications Ordered in Other Visits:      sod bicarbonate-citric acid-simethicone (EZ GAS) 2.21-1.53-0.04 g packet 4 g, 4 g, Oral, Once, Ragini Arellano MD    Review of Systems - Negative       OBJECTIVE:  Appearance: alert, well appearing, and in no distress.    /74   Pulse 82   Temp 98.8  F (37.1  C)     @LDAVASC(10,16,17)@    No images are attached to the encounter.     Surgical sites on digits 4,5 left foot has intact sutures with skin edges well approximated, no gapping noted. There is necrotic tissue along dorsal 5th digit left foot, granular tissue along  the peripheral necrotic tissue and normal skin texture along the plantar digit. No erythema left foot. Neurovascular status unchanged left foot.         Again, thank you for allowing me to participate in the care of your patient.        Sincerely,        Alfonso Orozco DPM

## 2022-08-05 DIAGNOSIS — M20.42 HAMMER TOE OF LEFT FOOT: Primary | ICD-10-CM

## 2022-08-08 ENCOUNTER — OFFICE VISIT (OUTPATIENT)
Dept: PODIATRY | Facility: CLINIC | Age: 77
End: 2022-08-08
Payer: MEDICARE

## 2022-08-08 ENCOUNTER — HOSPITAL ENCOUNTER (OUTPATIENT)
Dept: GENERAL RADIOLOGY | Facility: HOSPITAL | Age: 77
Discharge: HOME | End: 2022-08-08
Attending: PODIATRIST
Payer: MEDICARE

## 2022-08-08 ENCOUNTER — DOCUMENTATION ONLY (OUTPATIENT)
Dept: VASCULAR SURGERY | Facility: CLINIC | Age: 77
End: 2022-08-08

## 2022-08-08 ENCOUNTER — PREP FOR PROCEDURE (OUTPATIENT)
Dept: VASCULAR SURGERY | Facility: CLINIC | Age: 77
End: 2022-08-08

## 2022-08-08 ENCOUNTER — ANCILLARY PROCEDURE (OUTPATIENT)
Dept: MAMMOGRAPHY | Facility: CLINIC | Age: 77
End: 2022-08-08
Attending: FAMILY MEDICINE
Payer: MEDICARE

## 2022-08-08 VITALS — HEIGHT: 64 IN | BODY MASS INDEX: 37.56 KG/M2 | HEART RATE: 73 BPM | WEIGHT: 220 LBS | OXYGEN SATURATION: 96 %

## 2022-08-08 DIAGNOSIS — I96 GANGRENE OF LEFT FOOT (H): ICD-10-CM

## 2022-08-08 DIAGNOSIS — Z12.31 VISIT FOR SCREENING MAMMOGRAM: ICD-10-CM

## 2022-08-08 DIAGNOSIS — M20.42 HAMMER TOE OF LEFT FOOT: ICD-10-CM

## 2022-08-08 DIAGNOSIS — M20.42 HAMMER TOE OF LEFT FOOT: Primary | ICD-10-CM

## 2022-08-08 DIAGNOSIS — I96 GANGRENE OF LEFT FOOT (H): Primary | ICD-10-CM

## 2022-08-08 PROCEDURE — 73630 X-RAY EXAM OF FOOT: CPT | Mod: 26 | Performed by: PODIATRIST

## 2022-08-08 PROCEDURE — 77067 SCR MAMMO BI INCL CAD: CPT

## 2022-08-08 PROCEDURE — 73630 X-RAY EXAM OF FOOT: CPT | Mod: LT,FY

## 2022-08-08 PROCEDURE — 99024 POSTOP FOLLOW-UP VISIT: CPT | Performed by: PODIATRIST

## 2022-08-08 RX ORDER — CEFAZOLIN SODIUM/WATER 2 G/20 ML
2 SYRINGE (ML) INTRAVENOUS SEE ADMIN INSTRUCTIONS
Status: CANCELLED | OUTPATIENT
Start: 2022-08-11

## 2022-08-08 RX ORDER — CEFAZOLIN SODIUM/WATER 2 G/20 ML
2 SYRINGE (ML) INTRAVENOUS
Status: CANCELLED | OUTPATIENT
Start: 2022-08-11

## 2022-08-08 ASSESSMENT — PAIN SCALES - GENERAL: PAINLEVEL: NO PAIN (1)

## 2022-08-08 NOTE — PROGRESS NOTES
Surgery Scheduled      Surgery/Procedure: AMPUTATION, fifth digit left foot (Left)     CPT: 15493    Equipment: pulse lavage.    Location: [facility]:  [address] [address] (Phone: [phone], Fax: [phone])    Date: 8/11/22    Time: 11:15AM    Admission Type: Outpatient    Surgeon: Dr. Orozco    OR Confirmed & :  Yes with Rebecca on 8/8/2022    Covid Scheduled: at home test okay    Post Op: 8/25    Wound Vac Needed: tbd    Home Care Needed: tbd    Blood Thinners Addressed: N/A

## 2022-08-08 NOTE — LETTER
8/8/2022         RE: Lalitha Underwood  [address] [place] [facility] [address]        Dear Colleague,    Thank you for referring your patient, Lalitha Underwood, to the [facility]. Please see a copy of my visit note below.    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 We discussed attempted salvage of the 5th digit, but this would likely include use of a wound vac and healing attempts over 3-4 months. Unfortunately, I recommend amputation of the digit for a more definitive procedure. We discussed the procedure under local anesthetic. She consents to surgery.     -Surgical site on the 4th digit left foot is progressing well. Sutures removed today, steri-strips applied.     -Gauze dressing applied today which she will keep intact. Continue non-weight bearing on the left foot. Surgical shoe dispensed today.     -I will ask my office to try and schedule surgery for later this week.     Alfonso Orozco, NAY  [facility] Podiatry/Foot & Ankle Surgery      HPI: Lalitha Underwood was seen again today s/p arthroplasty digits 4,5 left foot. Doing well today. She denies foot pain, but did recently fall after tripping on the CAM boot.     Past Medical History:   Diagnosis Date     Convulsive disorder (H)      Convulsive disorder (H)      COPD (chronic obstructive pulmonary disease) (H)      COPD (chronic obstructive pulmonary disease) (H)      Depression      Dyspnea on exertion      Gastroesophageal reflux disease      Heart murmur      Hernia of unspecified site of abdominal cavity without mention of obstruction or gangrene      Hiatal hernia      Hiatal hernia      Hypertension      Hypertension      Obese      On home oxygen therapy     at 1.5 liters at nite     Osteopenia      Osteopenia       Oxygen dependent     1.5 L per NC     Pneumonia due to infectious organism, unspecified laterality, unspecified part of lung 3/19/2022     Second degree heart block 3/19/2022     Shortness of breath      Uncomplicated asthma      URI (upper respiratory infection)      Venous insufficiency of both lower extremities      Venous insufficiency of both lower extremities      Walking troubles        Allergies   Allergen Reactions     Clindamycin Rash     Carbamazepine Rash     Morphine Nausea         Current Outpatient Medications:      albuterol (PROAIR HFA/PROVENTIL HFA/VENTOLIN HFA) 108 (90 Base) MCG/ACT inhaler, Inhale 2 puffs into the lungs every 6 hours as needed, Disp: 18 g, Rfl: 1     azithromycin (ZITHROMAX) 250 MG tablet, Take 1 tablet (250 mg) by mouth daily, Disp: 90 tablet, Rfl: 3     benzonatate (TESSALON) 100 MG capsule, Take 1 capsule (100 mg) by mouth 3 times daily as needed for cough, Disp: 30 capsule, Rfl: 1     calcium citrate (CITRACAL) 950 (200 Ca) MG tablet, Take 1 tablet by mouth 2 times daily, Disp: , Rfl:      denosumab (PROLIA) 60 MG/ML SOLN injection, Inject 60 mg Subcutaneous every 6 months, Disp: , Rfl:      fluticasone-salmeterol (ADVAIR) 500-50 MCG/ACT inhaler, Inhale 1 puff into the lungs every 12 hours, Disp: 180 each, Rfl: 3     guaiFENesin (MUCINEX) 600 MG 12 hr tablet, Take 1 tablet (600 mg) by mouth 2 times daily (Patient taking differently: Take 600 mg by mouth 2 times daily as needed), Disp: , Rfl:      hydrochlorothiazide (HYDRODIURIL) 25 MG tablet, hydrochlorothiazide 25 mg tablet  TAKE 1 TABLET BY MOUTH DAILY (Patient taking differently: Take 25 mg by mouth daily hydrochlorothiazide 25 mg tablet  TAKE 1 TABLET BY MOUTH DAILY), Disp: 90 tablet, Rfl: 3     levETIRAcetam (KEPPRA) 500 MG tablet, Take 1 tablet (500 mg) by mouth 2 times daily, Disp: 180 tablet, Rfl: 3     LORazepam (ATIVAN) 0.5 MG tablet, Take 1 tablet (0.5 mg) 30 min before your surgery, Disp: 2 tablet, Rfl: 0     " losartan (COZAAR) 50 MG tablet, Take 1 tablet (50 mg) by mouth 2 times daily, Disp: 90 tablet, Rfl: 3     Magnesium Oxide 500 MG TABS, Take 500 mg by mouth 2 times daily, Disp: , Rfl:      nystatin (MYCOSTATIN) 572174 UNIT/GM external powder, Apply topically daily as needed, Disp: , Rfl:      potassium chloride ER (KLOR-CON M) 20 MEQ CR tablet, Take 1 tablet (20 mEq) by mouth daily (Patient taking differently: Take 20 mEq by mouth every evening), Disp: 90 tablet, Rfl: 3     RABEprazole (ACIPHEX) 20 MG EC tablet, Take 1 tablet (20 mg) by mouth daily, Disp: 90 tablet, Rfl: 3     sertraline (ZOLOFT) 100 MG tablet, Take 1 tablet (100 mg) by mouth daily (Patient taking differently: Take 100 mg by mouth every evening), Disp: 90 tablet, Rfl: 3     solifenacin (VESICARE) 10 MG tablet, Take 1 tablet (10 mg) by mouth daily, Disp: 90 tablet, Rfl: 3     spironolactone (ALDACTONE) 25 MG tablet, Take 0.5 tablets (12.5 mg) by mouth daily, Disp: 45 tablet, Rfl: 4     tiotropium (SPIRIVA HANDIHALER) 18 MCG inhaled capsule, Spiriva with HandiHaler 18 mcg and inhalation capsules (Patient taking differently: Inhale 18 mcg into the lungs daily), Disp: 90 capsule, Rfl: 3     vitamin D3 (CHOLECALCIFEROL) 125 MCG (5000 UT) tablet, Take 5,000 Units by mouth daily , Disp: , Rfl:      oxyCODONE (ROXICODONE) 5 MG tablet, Take 1-2 tablets (5-10 mg) by mouth every 6 hours as needed for moderate to severe pain, Disp: 18 tablet, Rfl: 0  No current facility-administered medications for this visit.    Facility-Administered Medications Ordered in Other Visits:      sod bicarbonate-citric acid-simethicone (EZ GAS) 2.21-1.53-0.04 g packet 4 g, 4 g, Oral, Once, Ragini Arellano MD    Review of Systems - Negative       OBJECTIVE:  Appearance: alert, well appearing, and in no distress.    Pulse 73   Ht 1.626 m (5' 4\")   Wt 99.8 kg (220 lb)   SpO2 96%   BMI 37.76 kg/m      @LDAVASC(10,16,17)@    No images are attached to the encounter. "     Surgical sites on digits 4,5 left foot has intact sutures with skin edges well coapted, no gapping noted. There is necrotic tissue along dorsal 5th digit left foot, granular tissue along the peripheral necrotic tissue and normal skin texture along the plantar digit. No erythema left foot. Neurovascular status unchanged left foot.         Again, thank you for allowing me to participate in the care of your patient.        Sincerely,        Alfonso Orozco DPM

## 2022-08-08 NOTE — PROGRESS NOTES
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 We discussed attempted salvage of the 5th digit, but this would likely include use of a wound vac and healing attempts over 3-4 months. Unfortunately, I recommend amputation of the digit for a more definitive procedure. We discussed the procedure under local anesthetic. She consents to surgery.     -Surgical site on the 4th digit left foot is progressing well. Sutures removed today, steri-strips applied.     -Gauze dressing applied today which she will keep intact. Continue non-weight bearing on the left foot. Surgical shoe dispensed today.     -I will ask my office to try and schedule surgery for later this week.     Alfonso Orozco DPM  [facility] Podiatry/Foot & Ankle Surgery      HPI: Lalitha Underwood was seen again today s/p arthroplasty digits 4,5 left foot. Doing well today. She denies foot pain, but did recently fall after tripping on the CAM boot.     Past Medical History:   Diagnosis Date     Convulsive disorder (H)      Convulsive disorder (H)      COPD (chronic obstructive pulmonary disease) (H)      COPD (chronic obstructive pulmonary disease) (H)      Depression      Dyspnea on exertion      Gastroesophageal reflux disease      Heart murmur      Hernia of unspecified site of abdominal cavity without mention of obstruction or gangrene      Hiatal hernia      Hiatal hernia      Hypertension      Hypertension      Obese      On home oxygen therapy     at 1.5 liters at nite     Osteopenia      Osteopenia      Oxygen dependent     1.5 L per NC     Pneumonia due to infectious organism, unspecified laterality, unspecified part of lung 3/19/2022     Second degree heart block 3/19/2022     Shortness of breath      Uncomplicated asthma      URI (upper  respiratory infection)      Venous insufficiency of both lower extremities      Venous insufficiency of both lower extremities      Walking troubles        Allergies   Allergen Reactions     Clindamycin Rash     Carbamazepine Rash     Morphine Nausea         Current Outpatient Medications:      albuterol (PROAIR HFA/PROVENTIL HFA/VENTOLIN HFA) 108 (90 Base) MCG/ACT inhaler, Inhale 2 puffs into the lungs every 6 hours as needed, Disp: 18 g, Rfl: 1     azithromycin (ZITHROMAX) 250 MG tablet, Take 1 tablet (250 mg) by mouth daily, Disp: 90 tablet, Rfl: 3     benzonatate (TESSALON) 100 MG capsule, Take 1 capsule (100 mg) by mouth 3 times daily as needed for cough, Disp: 30 capsule, Rfl: 1     calcium citrate (CITRACAL) 950 (200 Ca) MG tablet, Take 1 tablet by mouth 2 times daily, Disp: , Rfl:      denosumab (PROLIA) 60 MG/ML SOLN injection, Inject 60 mg Subcutaneous every 6 months, Disp: , Rfl:      fluticasone-salmeterol (ADVAIR) 500-50 MCG/ACT inhaler, Inhale 1 puff into the lungs every 12 hours, Disp: 180 each, Rfl: 3     guaiFENesin (MUCINEX) 600 MG 12 hr tablet, Take 1 tablet (600 mg) by mouth 2 times daily (Patient taking differently: Take 600 mg by mouth 2 times daily as needed), Disp: , Rfl:      hydrochlorothiazide (HYDRODIURIL) 25 MG tablet, hydrochlorothiazide 25 mg tablet  TAKE 1 TABLET BY MOUTH DAILY (Patient taking differently: Take 25 mg by mouth daily hydrochlorothiazide 25 mg tablet  TAKE 1 TABLET BY MOUTH DAILY), Disp: 90 tablet, Rfl: 3     levETIRAcetam (KEPPRA) 500 MG tablet, Take 1 tablet (500 mg) by mouth 2 times daily, Disp: 180 tablet, Rfl: 3     LORazepam (ATIVAN) 0.5 MG tablet, Take 1 tablet (0.5 mg) 30 min before your surgery, Disp: 2 tablet, Rfl: 0     losartan (COZAAR) 50 MG tablet, Take 1 tablet (50 mg) by mouth 2 times daily, Disp: 90 tablet, Rfl: 3     Magnesium Oxide 500 MG TABS, Take 500 mg by mouth 2 times daily, Disp: , Rfl:      nystatin (MYCOSTATIN) 169515 UNIT/GM external powder,  "Apply topically daily as needed, Disp: , Rfl:      potassium chloride ER (KLOR-CON M) 20 MEQ CR tablet, Take 1 tablet (20 mEq) by mouth daily (Patient taking differently: Take 20 mEq by mouth every evening), Disp: 90 tablet, Rfl: 3     RABEprazole (ACIPHEX) 20 MG EC tablet, Take 1 tablet (20 mg) by mouth daily, Disp: 90 tablet, Rfl: 3     sertraline (ZOLOFT) 100 MG tablet, Take 1 tablet (100 mg) by mouth daily (Patient taking differently: Take 100 mg by mouth every evening), Disp: 90 tablet, Rfl: 3     solifenacin (VESICARE) 10 MG tablet, Take 1 tablet (10 mg) by mouth daily, Disp: 90 tablet, Rfl: 3     spironolactone (ALDACTONE) 25 MG tablet, Take 0.5 tablets (12.5 mg) by mouth daily, Disp: 45 tablet, Rfl: 4     tiotropium (SPIRIVA HANDIHALER) 18 MCG inhaled capsule, Spiriva with HandiHaler 18 mcg and inhalation capsules (Patient taking differently: Inhale 18 mcg into the lungs daily), Disp: 90 capsule, Rfl: 3     vitamin D3 (CHOLECALCIFEROL) 125 MCG (5000 UT) tablet, Take 5,000 Units by mouth daily , Disp: , Rfl:      oxyCODONE (ROXICODONE) 5 MG tablet, Take 1-2 tablets (5-10 mg) by mouth every 6 hours as needed for moderate to severe pain, Disp: 18 tablet, Rfl: 0  No current facility-administered medications for this visit.    Facility-Administered Medications Ordered in Other Visits:      sod bicarbonate-citric acid-simethicone (EZ GAS) 2.21-1.53-0.04 g packet 4 g, 4 g, Oral, Once, Ragini Arellano MD    Review of Systems - Negative       OBJECTIVE:  Appearance: alert, well appearing, and in no distress.    Pulse 73   Ht 1.626 m (5' 4\")   Wt 99.8 kg (220 lb)   SpO2 96%   BMI 37.76 kg/m      @LDAVASC(10,16,17)@    No images are attached to the encounter.     Surgical sites on digits 4,5 left foot has intact sutures with skin edges well coapted, no gapping noted. There is necrotic tissue along dorsal 5th digit left foot, granular tissue along the peripheral necrotic tissue and normal skin texture along " the plantar digit. No erythema left foot. Neurovascular status unchanged left foot.

## 2022-08-08 NOTE — PATIENT INSTRUCTIONS
Lalitha,    Your surgery with [facility] has been scheduled. Please read thoroughly and follow instructions.     Procedure:   Amputation of left 5th toe  Procedure Date :   8/11/22  *A surgery nurse will call you 1-2 days before surgery to inform you of the time of arrival for surgery.  Surgeon:   Dr. Alfonso Orozco  Admission Type:   Outpatient  Procedure Location:   [facility]:  [address] [place] [address] (Phone: [phone], Fax: [phone])    Covid Test: SEE APPOINTMENTS  Post Operative Appointment: SEE APPOINTMENTS       Preparation Instructions to complete:    []  You will need a Pre-op Physical within 30 days before surgery with your primary care provider. This exam is required by the anesthesiologist to ensure a safe surgical experience.    Failure  to obtain your pre-op physical will lead to cancellation of your surgery  Call them right away to schedule this. Please ensure your Preoperative Physical is faxed to the Hospital (numbers listed above)    [] Preoperative Medication Instructions  We would like you to stop your anticoagulation medications 3-5 days before surgery HOWEVER contact your prescribing provider for instructions before discontinuing any medications. (Examples: Coumadin, Plavix, Xarelto, Eliquis, Pradaxa, Effient, Brilinta) If you are on Coumadin, we would like the goal INR ? 1.2.  IT IS OK TO STAY ON ASPIRIN PRIOR TO SURGERY.   Hold Ibuprofen, Herbal Supplements and Vitamin E 7 days before  Stop Cialis, Levitra and Viagra 2-3 days before surgery    [] Fasting Requirements  Nothing to eat for 8 hours before surgery unless instructed differently by the surgery nurse.  You may have clear liquids up to two hours before your arrival time (coffee, tea, water, or Gatorade. No cream or milk)  If your primary care provider has instructed you to continue oral medications, you may take them on the morning of surgery with a small  "sip of water.    No alcohol or smoking after 12:00am the day of surgery    [] PCR COVID-19 test is required within 4 days of surgery  If your test is positive or you fail to get tested, your surgery will be postponed.     [] Contact your insurance regarding coverage  If you would like a Good Anita Estimate for your upcoming procedure at [facility], contact Cost of Care Estimates   Advocates are available Monday through Friday 8am - 5pm   [phone]  You may also submit a request online at [email or web address]  - Complete the secure online form found under \"Services and Procedure Pricing\"   For all self-pay, estimate, or anesthesia billing questions at [facility], the contact information is below:  Who to contact: Sujatha CORREA  [facility] number: [phone]  Prepay number: [phone]    [] DO NOT BRING FMLA WITH TO SURGERY.  These should be sent to the provider's office by fax to [phone].     [] Day of Surgery  Medications - Take as indicated with sips of water.   Wear comfortable loose fitting clothes. Wear your glasses-Not contacts. Do not wear jewelry and remove body piercing's. Surgery may be cancelled if they are not removed.   You may have 1 family member wait in the lobby during your surgery. Visitor restrictions are subject to change. Please verify with the surgery nurse when they call.   If same day surgery-Have a someone come with you to surgery that can help you understand the surgeon's instructions, drive you home and stay with you overnight the first night.    [] If the community sees a new COVID-19 surge, your procedure may need to be postponed. We will contact you if this happens.    [] You will receive a call from a surgery nurse 1-3 days prior to surgery. They will go over more details with you.             ** AFTER SURGERY INSTRUCTIONS **      [] You are to remain NON WEIGHT BEARING on your left foot NON WEIGHT BEARING MEANS NO " PRESSURE ON YOUR FOOT OR HEEL AT ANY TIME FOR ANY REASON!    [] Prior to surgery you should already have a POST OP SHOE which you will need to WEAR THIS ANYTIME YOU ARE UP AND OUT OF BED, IT IS OKAY TO REMOVE WHEN YOU ARE SLEEPING. Please bring this with you on the day of surgery.    [] You should already have a  Walker  to help you ambulate after surgery. Please also bring this with you on the day of surgery.    [] During surgery Dr. Orozco will apply a gauze dressing to your foot. This will remain intact until you are seen in clinic for follow up    [] It is NOT OKAY to get your surgical site wet while bathing, you will need to purchase a cast cover to protect your foot from getting wet. You can purchase this at [facility] or your local pharmacy.    [] It is important that you elevate your affected foot after surgery. Proper elevation is raising your foot above your waist. The fluid in your lower extremities needs to get back to your heart so it can get pumped to your kidneys and expelled through urination. So if you notice you have to go to the bathroom more frequently when you are elevating your leg this is a good sign that it is working.     [] It is important that you increase your protein intake after surgery. Protein is essential for wound healing. We recommend you take a protein supplement twice per day. This is in addition to your normal diet. Examples of protein supplements are Ensure, Boost, Glucerna (if you are diabetic), or protein powder. You can purchase these at your local retailer or grocery store.       Notify our office right away, if you have any changes in your health status, or if you develop a cold, flu, diarrhea, infection, fever or sore throat before your scheduled surgery date. We can be reached at [phone] Monday-Friday 8 am-4:30 pm if you have any questions.     Thank you for trusting us with your care!

## 2022-08-11 ENCOUNTER — HOSPITAL ENCOUNTER (OUTPATIENT)
Facility: CLINIC | Age: 77
Discharge: HOME | End: 2022-08-11
Attending: PODIATRIST | Admitting: PODIATRIST
Payer: MEDICARE

## 2022-08-11 VITALS
SYSTOLIC BLOOD PRESSURE: 120 MMHG | RESPIRATION RATE: 16 BRPM | BODY MASS INDEX: 37.69 KG/M2 | WEIGHT: 220.8 LBS | TEMPERATURE: 97.9 F | HEART RATE: 94 BPM | DIASTOLIC BLOOD PRESSURE: 68 MMHG | OXYGEN SATURATION: 100 % | HEIGHT: 64 IN

## 2022-08-11 DIAGNOSIS — I96 GANGRENE OF LEFT FOOT (H): Primary | ICD-10-CM

## 2022-08-11 LAB
GRAM STAIN RESULT: NORMAL
GRAM STAIN RESULT: NORMAL

## 2022-08-11 PROCEDURE — 250N000011 HC RX IP 250 OP 636: Performed by: PODIATRIST

## 2022-08-11 PROCEDURE — 999N000141 HC STATISTIC PRE-PROCEDURE NURSING ASSESSMENT: Performed by: PODIATRIST

## 2022-08-11 PROCEDURE — 999N000127 HC STATISTIC PERIPHERAL IV START W US GUIDANCE

## 2022-08-11 PROCEDURE — 999N000203 HC STATISTICAL VASC ACCESS NURSE TIME, 16-31 MINUTES

## 2022-08-11 PROCEDURE — 87070 CULTURE OTHR SPECIMN AEROBIC: CPT | Performed by: PODIATRIST

## 2022-08-11 PROCEDURE — 250N000009 HC RX 250: Performed by: PODIATRIST

## 2022-08-11 PROCEDURE — 710N000012 HC RECOVERY PHASE 2, PER MINUTE: Performed by: PODIATRIST

## 2022-08-11 PROCEDURE — 88305 TISSUE EXAM BY PATHOLOGIST: CPT | Mod: TC | Performed by: PODIATRIST

## 2022-08-11 PROCEDURE — 28820 AMPUTATION OF TOE: CPT | Mod: T4 | Performed by: PODIATRIST

## 2022-08-11 PROCEDURE — 88311 DECALCIFY TISSUE: CPT | Mod: TC | Performed by: PODIATRIST

## 2022-08-11 PROCEDURE — 360N000075 HC SURGERY LEVEL 2, PER MIN: Performed by: PODIATRIST

## 2022-08-11 PROCEDURE — 87205 SMEAR GRAM STAIN: CPT | Performed by: PODIATRIST

## 2022-08-11 PROCEDURE — 272N000001 HC OR GENERAL SUPPLY STERILE: Performed by: PODIATRIST

## 2022-08-11 PROCEDURE — 87075 CULTR BACTERIA EXCEPT BLOOD: CPT | Performed by: PODIATRIST

## 2022-08-11 RX ORDER — BUPIVACAINE HYDROCHLORIDE 5 MG/ML
INJECTION, SOLUTION PERINEURAL PRN
Status: DISCONTINUED | OUTPATIENT
Start: 2022-08-11 | End: 2022-08-11 | Stop reason: HOSPADM

## 2022-08-11 RX ORDER — OXYCODONE HYDROCHLORIDE 5 MG/1
5-10 TABLET ORAL EVERY 6 HOURS PRN
Qty: 16 TABLET | Refills: 0 | Status: SHIPPED | OUTPATIENT
Start: 2022-08-11 | End: 2022-10-24

## 2022-08-11 RX ORDER — IPRATROPIUM BROMIDE AND ALBUTEROL SULFATE 2.5; .5 MG/3ML; MG/3ML
1 SOLUTION RESPIRATORY (INHALATION) EVERY 6 HOURS PRN
COMMUNITY
End: 2022-10-21

## 2022-08-11 RX ORDER — CEFAZOLIN SODIUM/WATER 2 G/20 ML
2 SYRINGE (ML) INTRAVENOUS
Status: COMPLETED | OUTPATIENT
Start: 2022-08-11 | End: 2022-08-11

## 2022-08-11 RX ORDER — CEFAZOLIN SODIUM/WATER 2 G/20 ML
2 SYRINGE (ML) INTRAVENOUS SEE ADMIN INSTRUCTIONS
Status: DISCONTINUED | OUTPATIENT
Start: 2022-08-11 | End: 2022-08-11 | Stop reason: HOSPADM

## 2022-08-11 RX ADMIN — Medication 2 G: at 11:02

## 2022-08-11 ASSESSMENT — ACTIVITIES OF DAILY LIVING (ADL)
ADLS_ACUITY_SCORE: 24
ADLS_ACUITY_SCORE: 33

## 2022-08-11 NOTE — PROGRESS NOTES
Saw patient in pre-op today. The 5th digit has recessed since her last visit with me. We discussed possible need for partial 5th metatarsal resection to achieve primary closure. Will determine intra-op. She consents to surgery.

## 2022-08-11 NOTE — INTERVAL H&P NOTE
"I have reviewed the surgical (or preoperative) H&P that is linked to this encounter, and examined the patient. There are no significant changes    Clinical Conditions Present on Arrival:  Clinically Significant Risk Factors Present on Admission                   # Obesity: Estimated body mass index is 37.9 kg/m  as calculated from the following:    Height as of this encounter: 1.626 m (5' 4\").    Weight as of this encounter: 100.2 kg (220 lb 12.8 oz).       "

## 2022-08-11 NOTE — PHARMACY-ADMISSION MEDICATION HISTORY
Pharmacy Note - Admission Medication History    Pertinent Provider Information: n/a   ______________________________________________________________________    Prior To Admission (PTA) med list completed and updated in EMR.       PTA Med List   Medication Sig Last Dose     albuterol (PROAIR HFA/PROVENTIL HFA/VENTOLIN HFA) 108 (90 Base) MCG/ACT inhaler Inhale 2 puffs into the lungs every 6 hours as needed Unknown at has with     azithromycin (ZITHROMAX) 250 MG tablet Take 1 tablet (250 mg) by mouth daily (Patient taking differently: Take 250 mg by mouth every evening) 8/10/2022 at pm     benzonatate (TESSALON) 100 MG capsule Take 1 capsule (100 mg) by mouth 3 times daily as needed for cough Unknown at Unknown time     calcium citrate (CITRACAL) 950 (200 Ca) MG tablet Take 1 tablet by mouth 2 times daily Past Week at Unknown time     denosumab (PROLIA) 60 MG/ML SOLN injection Inject 60 mg Subcutaneous every 6 months Unknown at Unknown time     fluticasone-salmeterol (ADVAIR) 500-50 MCG/ACT inhaler Inhale 1 puff into the lungs every 12 hours 8/10/2022 at has with     guaiFENesin (MUCINEX) 600 MG 12 hr tablet Take 1 tablet (600 mg) by mouth 2 times daily (Patient taking differently: Take 600 mg by mouth 2 times daily as needed) Unknown at Unknown time     hydrochlorothiazide (HYDRODIURIL) 25 MG tablet hydrochlorothiazide 25 mg tablet   TAKE 1 TABLET BY MOUTH DAILY (Patient taking differently: Take 25 mg by mouth daily hydrochlorothiazide 25 mg tablet   TAKE 1 TABLET BY MOUTH DAILY) 8/11/2022 at am     ipratropium - albuterol 0.5 mg/2.5 mg/3 mL (DUONEB) 0.5-2.5 (3) MG/3ML neb solution Take 1 vial by nebulization every 6 hours as needed for shortness of breath / dyspnea or wheezing 8/11/2022 at am     levETIRAcetam (KEPPRA) 500 MG tablet Take 1 tablet (500 mg) by mouth 2 times daily 8/11/2022 at am     LORazepam (ATIVAN) 0.5 MG tablet Take 1 tablet (0.5 mg) 30 min before your surgery Unknown at Unknown time     losartan  (COZAAR) 50 MG tablet Take 1 tablet (50 mg) by mouth 2 times daily 8/11/2022 at am     Magnesium Oxide 500 MG TABS Take 500 mg by mouth 2 times daily Past Week at Unknown time     nystatin (MYCOSTATIN) 114204 UNIT/GM external powder Apply topically daily as needed Unknown at Unknown time     oxyCODONE (ROXICODONE) 5 MG tablet Take 1-2 tablets (5-10 mg) by mouth every 6 hours as needed for moderate to severe pain More than a month at Unknown time     potassium chloride ER (KLOR-CON M) 20 MEQ CR tablet Take 1 tablet (20 mEq) by mouth daily (Patient taking differently: Take 20 mEq by mouth every evening) 8/10/2022 at pm     RABEprazole (ACIPHEX) 20 MG EC tablet Take 1 tablet (20 mg) by mouth daily 8/11/2022 at am     sertraline (ZOLOFT) 100 MG tablet Take 1 tablet (100 mg) by mouth daily (Patient taking differently: Take 100 mg by mouth every evening) 8/10/2022 at pm     solifenacin (VESICARE) 10 MG tablet Take 1 tablet (10 mg) by mouth daily 8/11/2022 at am     tiotropium (SPIRIVA HANDIHALER) 18 MCG inhaled capsule Spiriva with HandiHaler 18 mcg and inhalation capsules (Patient taking differently: Inhale 18 mcg into the lungs daily) 8/10/2022 at Unknown time     vitamin D3 (CHOLECALCIFEROL) 125 MCG (5000 UT) tablet Take 5,000 Units by mouth daily  Past Week at Unknown time     Information source(s): Patient    Method of interview communication: in-person    Patient was asked about OTC/herbal products specifically.  PTA med list reflects this.    Based on the pharmacist's assessment, the PTA med list information appears reliable    Allergies were reviewed, assessed, and updated with the patient.      Medications available for use during hospital stay: albuterol, Advair.      Thank you for the opportunity to participate in the care of this patient.    Yumiko Jackson, PharmD, BCPS      8/11/2022     11:00 AM

## 2022-08-11 NOTE — OP NOTE
Date: 8/11/22    Surgeon: NUBIA Orozco DPM    Preoperative diagnosis: Gangrene 5th digit left foot    Postoperative diagnosis: Same    Procedure: Amputation 5th digit left foot    Anesthesia: Local     Hemostasis: Pneumatic ankle tourniquet 300 mmHg    Pathology: 5th digit, Aerobic/anaerobic culture     Injectables: None    Materials: 3-0 Vicryl, 3-0 Nylon    Complications: None    Blood loss: 10 ml       Findings: Patient presents for operative intervention for gangrene of the 5th digit left foot which developed s/p hammertoe repair in the recent past. We discussed that stubbing of the digit likely caused the dislocation of the PIPJ and lead to the development of non-viable/necrotic tissue. We discussed today's surgery to include amputation of the 5th digit and possible distal 5th metatarsal to allow for primray closure. She consents to surgery. Conservative measures were attempted and exhausted. Patient questions invited and answered, including appropriate risk, benefits and complications. No guarantees given or implied. Patient has been NPO.    Description: Patient was brought to the operating room and placed on the table in supine position. Injection of 0.5% Marcaine plain was administered to surgical site left 5th ray. The foot was then prepped and draped in usual aseptic manner. The extremity was elevated and exsanguinated. Well-padded ankle pneumatic tourniquet was inflated to 300 mmHg and the following procedure was then performed: Attention was directed to the 5th digit of the left foot where two semi-elliptical incisions were made at the base of the digit. The incisions were carried full thickness into the 5th MPJ where the digit was disarticulated. The digit was removed from the surgical site in toto and sent to pathology. Deep aerobic and anaerobic cultures taken. Next, a 1000cc pulse lavage was used to irrigate the surgical site. The subcutaneous tissue was reapproximated with 3-0 vicryl in a simple  fashion and the skin was closed with 3-0 nylon in a simple suture fashion.     Dressings consisted of adaptic, 4x4's, kerlix/maddie roll, ABD and an ace wrap. The pneumatic tourniquet was released and a hyperemic response was noted to the remaining digits on the left foot.     The patient appeared to tolerate all the procedures and anesthesia well without apparent complications. Patient was transported from the operating room to the recovery room with vital signs stable and neurovascular status as it was pre-operatively to the left foot. Patient to be discharged home per anesthesia. Written and verbal homecare instructions given to remain non-weight bearing, keep surgical dressing intact. Patient to follow up in office for post-operative management in two weeks.

## 2022-08-13 LAB — BACTERIA WND CULT: ABNORMAL

## 2022-08-16 LAB
PATH REPORT.COMMENTS IMP SPEC: NORMAL
PATH REPORT.COMMENTS IMP SPEC: NORMAL
PATH REPORT.FINAL DX SPEC: NORMAL
PATH REPORT.GROSS SPEC: NORMAL
PATH REPORT.MICROSCOPIC SPEC OTHER STN: NORMAL
PATH REPORT.RELEVANT HX SPEC: NORMAL
PHOTO IMAGE: NORMAL

## 2022-08-16 PROCEDURE — 88305 TISSUE EXAM BY PATHOLOGIST: CPT | Mod: 26 | Performed by: PATHOLOGY

## 2022-08-16 PROCEDURE — 88311 DECALCIFY TISSUE: CPT | Mod: 26 | Performed by: PATHOLOGY

## 2022-08-18 LAB — BACTERIA WND CULT: NORMAL

## 2022-08-25 ENCOUNTER — OFFICE VISIT (OUTPATIENT)
Dept: VASCULAR SURGERY | Facility: CLINIC | Age: 77
End: 2022-08-25
Attending: PODIATRIST
Payer: MEDICARE

## 2022-08-25 ENCOUNTER — TELEPHONE (OUTPATIENT)
Dept: RESPIRATORY THERAPY | Facility: HOSPITAL | Age: 77
End: 2022-08-25

## 2022-08-25 VITALS
SYSTOLIC BLOOD PRESSURE: 122 MMHG | DIASTOLIC BLOOD PRESSURE: 74 MMHG | OXYGEN SATURATION: 91 % | HEART RATE: 84 BPM | RESPIRATION RATE: 24 BRPM

## 2022-08-25 DIAGNOSIS — I96 GANGRENE OF LEFT FOOT (H): Primary | ICD-10-CM

## 2022-08-25 PROCEDURE — G0463 HOSPITAL OUTPT CLINIC VISIT: HCPCS

## 2022-08-25 PROCEDURE — 99024 POSTOP FOLLOW-UP VISIT: CPT | Performed by: PODIATRIST

## 2022-08-25 ASSESSMENT — PAIN SCALES - GENERAL: PAINLEVEL: NO PAIN (0)

## 2022-08-25 NOTE — PROGRESS NOTES
Podiatry Progress Note        ASSESSMENT: S/P Amputation 5th digit left foot      TREATMENT:  -Surgical site on the left foot is progressing well. Clean proximal margin per pathology report.     -Gauze dressing applied today which she will keep intact. Continue non-weight bearing on the left foot.     -She will follow-up with me in 10 days for suture removal.     Alfonso Orozco DPM  [facility]      HPI: Lalitha Underwood was seen again today s/p amputation 5th digit left foot. She has remained non-weight bearing. Denies foot pain.    Past Medical History:   Diagnosis Date     Convulsive disorder (H)      Convulsive disorder (H)      COPD (chronic obstructive pulmonary disease) (H)      COPD (chronic obstructive pulmonary disease) (H)      Depression      Dyspnea on exertion      Gastroesophageal reflux disease      Heart murmur      Hernia of unspecified site of abdominal cavity without mention of obstruction or gangrene      Hiatal hernia      Hiatal hernia      Hypertension      Hypertension      Obese      On home oxygen therapy     at 1.5 liters at nite     Osteopenia      Osteopenia      Oxygen dependent     1.5 L per NC     Pneumonia due to infectious organism, unspecified laterality, unspecified part of lung 3/19/2022     Second degree heart block 3/19/2022     Shortness of breath      Uncomplicated asthma      URI (upper respiratory infection)      Venous insufficiency of both lower extremities      Venous insufficiency of both lower extremities      Walking troubles        Allergies   Allergen Reactions     Clindamycin Rash     Carbamazepine Rash     Morphine Nausea         Current Outpatient Medications:      albuterol (PROAIR HFA/PROVENTIL HFA/VENTOLIN HFA) 108 (90 Base) MCG/ACT inhaler, Inhale 2 puffs into the lungs every 6 hours as needed, Disp: 18 g, Rfl: 1     azithromycin (ZITHROMAX) 250 MG tablet, Take 1 tablet (250 mg) by mouth daily (Patient taking differently:  Take 250 mg by mouth every evening), Disp: 90 tablet, Rfl: 3     benzonatate (TESSALON) 100 MG capsule, Take 1 capsule (100 mg) by mouth 3 times daily as needed for cough, Disp: 30 capsule, Rfl: 1     calcium citrate (CITRACAL) 950 (200 Ca) MG tablet, Take 1 tablet by mouth 2 times daily, Disp: , Rfl:      denosumab (PROLIA) 60 MG/ML SOLN injection, Inject 60 mg Subcutaneous every 6 months, Disp: , Rfl:      fluticasone-salmeterol (ADVAIR) 500-50 MCG/ACT inhaler, Inhale 1 puff into the lungs every 12 hours, Disp: 180 each, Rfl: 3     guaiFENesin (MUCINEX) 600 MG 12 hr tablet, Take 1 tablet (600 mg) by mouth 2 times daily (Patient taking differently: Take 600 mg by mouth 2 times daily as needed), Disp: , Rfl:      hydrochlorothiazide (HYDRODIURIL) 25 MG tablet, hydrochlorothiazide 25 mg tablet  TAKE 1 TABLET BY MOUTH DAILY (Patient taking differently: Take 25 mg by mouth daily hydrochlorothiazide 25 mg tablet  TAKE 1 TABLET BY MOUTH DAILY), Disp: 90 tablet, Rfl: 3     ipratropium - albuterol 0.5 mg/2.5 mg/3 mL (DUONEB) 0.5-2.5 (3) MG/3ML neb solution, Take 1 vial by nebulization every 6 hours as needed for shortness of breath / dyspnea or wheezing, Disp: , Rfl:      levETIRAcetam (KEPPRA) 500 MG tablet, Take 1 tablet (500 mg) by mouth 2 times daily, Disp: 180 tablet, Rfl: 3     losartan (COZAAR) 50 MG tablet, Take 1 tablet (50 mg) by mouth 2 times daily, Disp: 90 tablet, Rfl: 3     Magnesium Oxide 500 MG TABS, Take 500 mg by mouth 2 times daily, Disp: , Rfl:      nystatin (MYCOSTATIN) 405210 UNIT/GM external powder, Apply topically daily as needed, Disp: , Rfl:      potassium chloride ER (KLOR-CON M) 20 MEQ CR tablet, Take 1 tablet (20 mEq) by mouth daily (Patient taking differently: Take 20 mEq by mouth every evening), Disp: 90 tablet, Rfl: 3     RABEprazole (ACIPHEX) 20 MG EC tablet, Take 1 tablet (20 mg) by mouth daily, Disp: 90 tablet, Rfl: 3     sertraline (ZOLOFT) 100 MG tablet, Take 1 tablet (100 mg) by mouth  daily (Patient taking differently: Take 100 mg by mouth every evening), Disp: 90 tablet, Rfl: 3     solifenacin (VESICARE) 10 MG tablet, Take 1 tablet (10 mg) by mouth daily, Disp: 90 tablet, Rfl: 3     tiotropium (SPIRIVA HANDIHALER) 18 MCG inhaled capsule, Spiriva with HandiHaler 18 mcg and inhalation capsules (Patient taking differently: Inhale 18 mcg into the lungs daily), Disp: 90 capsule, Rfl: 3     vitamin D3 (CHOLECALCIFEROL) 125 MCG (5000 UT) tablet, Take 5,000 Units by mouth daily , Disp: , Rfl:      LORazepam (ATIVAN) 0.5 MG tablet, Take 1 tablet (0.5 mg) 30 min before your surgery, Disp: 2 tablet, Rfl: 0     oxyCODONE (ROXICODONE) 5 MG tablet, Take 1-2 tablets (5-10 mg) by mouth every 6 hours as needed for moderate to severe pain, Disp: 16 tablet, Rfl: 0     oxyCODONE (ROXICODONE) 5 MG tablet, Take 1-2 tablets (5-10 mg) by mouth every 6 hours as needed for moderate to severe pain, Disp: 18 tablet, Rfl: 0  No current facility-administered medications for this visit.    Facility-Administered Medications Ordered in Other Visits:      sod bicarbonate-citric acid-simethicone (EZ GAS) 2.21-1.53-0.04 g packet 4 g, 4 g, Oral, Once, Ragini Arellano MD    Review of Systems - Negative       OBJECTIVE:  Appearance: alert, well appearing, and in no distress.    /74   Pulse 84   Resp 24   SpO2 91%     @LDAVASC(10,16,17)@    No images are attached to the encounter.     Surgical site on the amputated 5th digit left foot has intact sutures with skin edges well approximated, no gapping noted. No erythema left foot. Neurovascular status unchanged left foot.

## 2022-08-25 NOTE — PATIENT INSTRUCTIONS
Important lnstructions        WEIGHT BEARING STATUS: You are to remain NON WEIGHT BEARING on your left foot. NON WEIGHT BEARING MEANS NO PRESSURE ON YOUR FOOT OR HEEL AT ANY TIME FOR ANY REASON!    2. OFFLOADING DEVICE: Must use a A WHEELCHAIR at all times! (do not use affected foot to push wheelchair)    3. STABILIZATION DEVICE: Use a POST OP SHOE . You will need to WEAR THIS ANYTIME YOU ARE UP AND OUT OF BED, IT IS OKAY TO REMOVE WHEN YOU ARE SLEEPING..     4. ELEVATE: Elevating your leg means laying with your head on a pillow and your foot ABOVE YOUR WAIST.     5. DO NOT MOVE YOUR FOOT.  There is a risk of worsening the wound or incision. To give yourself a higher chance of healing, please DO NOT swing foot back and forth and wiggle foot/toes especially when inside a stabilization device.        Dressing Change lnstructions          LEAVE DRESSINGS INTACT UNTIL YOUR NEXT APPOINTMENT    It IS NOT ok to get your wound wet in the bath or shower    SEEK MEDICAL CARE IF:  You have an increase in swelling, pain, or redness around the wound.  You have an increase in the amount of pus coming from the wound.  There is a bad smell coming from the wound.  The wound appears to be worsening/enlarging  You have a fever greater than 101.5 F      It is ok to continue current wound care treatment/products for the next 2-3 days until new wound care supplies are ordered and arrive. If longer than this please contact our office at [phone].        We want to hear from you!   In the next few weeks, you should receive a call or email to complete a survey about your visit at [facility]. Please help us improve your appointment experience by letting us know how we did today. We strive to make your experience good and value any ways in which we could do better.      We value your input and suggestions.    Thank you for choosing the [facility]!

## 2022-08-25 NOTE — TELEPHONE ENCOUNTER
Left message with her  to have Serene call if she has questions for us as she was at an appointment.  Lalitha Hamilton, RT, CTTS, Chronic Pulmonary Disease Specialist

## 2022-09-07 ENCOUNTER — OFFICE VISIT (OUTPATIENT)
Dept: VASCULAR SURGERY | Facility: CLINIC | Age: 77
End: 2022-09-07
Attending: PODIATRIST
Payer: MEDICARE

## 2022-09-07 VITALS
OXYGEN SATURATION: 90 % | BODY MASS INDEX: 37.25 KG/M2 | HEART RATE: 96 BPM | WEIGHT: 217 LBS | DIASTOLIC BLOOD PRESSURE: 82 MMHG | SYSTOLIC BLOOD PRESSURE: 126 MMHG | RESPIRATION RATE: 18 BRPM

## 2022-09-07 DIAGNOSIS — I96 GANGRENE OF LEFT FOOT (H): Primary | ICD-10-CM

## 2022-09-07 PROCEDURE — 99024 POSTOP FOLLOW-UP VISIT: CPT | Performed by: PODIATRIST

## 2022-09-07 PROCEDURE — G0463 HOSPITAL OUTPT CLINIC VISIT: HCPCS

## 2022-09-07 ASSESSMENT — PAIN SCALES - GENERAL: PAINLEVEL: NO PAIN (1)

## 2022-09-07 NOTE — PATIENT INSTRUCTIONS
Continue to keep your left foot dry and wear your post op shoe with limited walking for the next week.     Keep steri strips in place until they fall off on their own.     Follow up as needed.

## 2022-09-07 NOTE — PROGRESS NOTES
Podiatry Progress Note        ASSESSMENT: S/P Amputation 5th digit left foot      TREATMENT:  -Surgical site on the left foot is progressing well. Sutures removed today, steri-strips applied.     -Gauze dressing applied today which she will keep intact. Okay to remain limited walking in surgical shoe x2 weeks, avoid getting the left foot wet x1 week.    -She is discharged from my care at this time and was encouraged to follow-up with me as concerns develop.     Alfonso Orozco DPM  [facility] Podiatry/Foot & Ankle Surgery      HPI: Lalitha Underwood was seen again today s/p amputation 5th digit left foot. She has remained non-weight bearing. Denies foot pain.    Past Medical History:   Diagnosis Date     Convulsive disorder (H)      Convulsive disorder (H)      COPD (chronic obstructive pulmonary disease) (H)      COPD (chronic obstructive pulmonary disease) (H)      Depression      Dyspnea on exertion      Gastroesophageal reflux disease      Heart murmur      Hernia of unspecified site of abdominal cavity without mention of obstruction or gangrene      Hiatal hernia      Hiatal hernia      Hypertension      Hypertension      Obese      On home oxygen therapy     at 1.5 liters at nite     Osteopenia      Osteopenia      Oxygen dependent     1.5 L per NC     Pneumonia due to infectious organism, unspecified laterality, unspecified part of lung 3/19/2022     Second degree heart block 3/19/2022     Shortness of breath      Uncomplicated asthma      URI (upper respiratory infection)      Venous insufficiency of both lower extremities      Venous insufficiency of both lower extremities      Walking troubles        Allergies   Allergen Reactions     Clindamycin Rash     Carbamazepine Rash     Morphine Nausea         Current Outpatient Medications:      albuterol (PROAIR HFA/PROVENTIL HFA/VENTOLIN HFA) 108 (90 Base) MCG/ACT inhaler, Inhale 2 puffs into the lungs every 6 hours as needed, Disp: 18 g, Rfl: 1      azithromycin (ZITHROMAX) 250 MG tablet, Take 1 tablet (250 mg) by mouth daily (Patient taking differently: Take 250 mg by mouth every evening), Disp: 90 tablet, Rfl: 3     benzonatate (TESSALON) 100 MG capsule, Take 1 capsule (100 mg) by mouth 3 times daily as needed for cough, Disp: 30 capsule, Rfl: 1     calcium citrate (CITRACAL) 950 (200 Ca) MG tablet, Take 1 tablet by mouth 2 times daily, Disp: , Rfl:      denosumab (PROLIA) 60 MG/ML SOLN injection, Inject 60 mg Subcutaneous every 6 months, Disp: , Rfl:      fluticasone-salmeterol (ADVAIR) 500-50 MCG/ACT inhaler, Inhale 1 puff into the lungs every 12 hours, Disp: 180 each, Rfl: 3     guaiFENesin (MUCINEX) 600 MG 12 hr tablet, Take 1 tablet (600 mg) by mouth 2 times daily (Patient taking differently: Take 600 mg by mouth 2 times daily as needed), Disp: , Rfl:      hydrochlorothiazide (HYDRODIURIL) 25 MG tablet, hydrochlorothiazide 25 mg tablet  TAKE 1 TABLET BY MOUTH DAILY (Patient taking differently: Take 25 mg by mouth daily hydrochlorothiazide 25 mg tablet  TAKE 1 TABLET BY MOUTH DAILY), Disp: 90 tablet, Rfl: 3     ipratropium - albuterol 0.5 mg/2.5 mg/3 mL (DUONEB) 0.5-2.5 (3) MG/3ML neb solution, Take 1 vial by nebulization every 6 hours as needed for shortness of breath / dyspnea or wheezing, Disp: , Rfl:      levETIRAcetam (KEPPRA) 500 MG tablet, Take 1 tablet (500 mg) by mouth 2 times daily, Disp: 180 tablet, Rfl: 3     losartan (COZAAR) 50 MG tablet, Take 1 tablet (50 mg) by mouth 2 times daily, Disp: 90 tablet, Rfl: 3     Magnesium Oxide 500 MG TABS, Take 500 mg by mouth 2 times daily, Disp: , Rfl:      nystatin (MYCOSTATIN) 172324 UNIT/GM external powder, Apply topically daily as needed, Disp: , Rfl:      potassium chloride ER (KLOR-CON M) 20 MEQ CR tablet, Take 1 tablet (20 mEq) by mouth daily (Patient taking differently: Take 20 mEq by mouth every evening), Disp: 90 tablet, Rfl: 3     RABEprazole (ACIPHEX) 20 MG EC tablet, Take 1 tablet (20 mg) by  mouth daily, Disp: 90 tablet, Rfl: 3     sertraline (ZOLOFT) 100 MG tablet, Take 1 tablet (100 mg) by mouth daily (Patient taking differently: Take 100 mg by mouth every evening), Disp: 90 tablet, Rfl: 3     solifenacin (VESICARE) 10 MG tablet, Take 1 tablet (10 mg) by mouth daily, Disp: 90 tablet, Rfl: 3     tiotropium (SPIRIVA HANDIHALER) 18 MCG inhaled capsule, Spiriva with HandiHaler 18 mcg and inhalation capsules (Patient taking differently: Inhale 18 mcg into the lungs daily), Disp: 90 capsule, Rfl: 3     vitamin D3 (CHOLECALCIFEROL) 125 MCG (5000 UT) tablet, Take 5,000 Units by mouth daily , Disp: , Rfl:      LORazepam (ATIVAN) 0.5 MG tablet, Take 1 tablet (0.5 mg) 30 min before your surgery, Disp: 2 tablet, Rfl: 0     oxyCODONE (ROXICODONE) 5 MG tablet, Take 1-2 tablets (5-10 mg) by mouth every 6 hours as needed for moderate to severe pain, Disp: 16 tablet, Rfl: 0     oxyCODONE (ROXICODONE) 5 MG tablet, Take 1-2 tablets (5-10 mg) by mouth every 6 hours as needed for moderate to severe pain, Disp: 18 tablet, Rfl: 0  No current facility-administered medications for this visit.    Facility-Administered Medications Ordered in Other Visits:      sod bicarbonate-citric acid-simethicone (EZ GAS) 2.21-1.53-0.04 g packet 4 g, 4 g, Oral, Once, Ragini Arellano MD    Review of Systems - Negative       OBJECTIVE:  Appearance: alert, well appearing, and in no distress.    /82   Pulse 96   Resp 18   Wt 217 lb (98.4 kg)   SpO2 90%   BMI 37.25 kg/m      No images are attached to the encounter.     Surgical site on the amputated 5th digit left foot has intact sutures with skin edges well coapted, no gapping noted. No erythema left foot. Neurovascular status unchanged left foot.

## 2022-09-18 ENCOUNTER — HEALTH MAINTENANCE LETTER (OUTPATIENT)
Age: 77
End: 2022-09-18

## 2022-09-27 ENCOUNTER — TELEPHONE (OUTPATIENT)
Dept: RESPIRATORY THERAPY | Facility: HOSPITAL | Age: 77
End: 2022-09-27

## 2022-09-27 NOTE — TELEPHONE ENCOUNTER
Spoke with Serene, she was driving, she will call us if she has a questions, Reminded when we will call again and to call before if there is a question.  Lalitha Hamilton, RT, CTTS, Chronic Pulmonary Disease Specialist

## 2022-10-03 ENCOUNTER — ANCILLARY PROCEDURE (OUTPATIENT)
Dept: CARDIOLOGY | Facility: CLINIC | Age: 77
End: 2022-10-03
Attending: INTERNAL MEDICINE
Payer: MEDICARE

## 2022-10-03 DIAGNOSIS — Z95.0 PACEMAKER: ICD-10-CM

## 2022-10-03 DIAGNOSIS — I44.1 2ND DEGREE AV BLOCK: ICD-10-CM

## 2022-10-03 LAB
MDC_IDC_EPISODE_DTM: NORMAL
MDC_IDC_EPISODE_DURATION: 1 S
MDC_IDC_EPISODE_ID: NORMAL
MDC_IDC_EPISODE_TYPE: NORMAL
MDC_IDC_LEAD_IMPLANT_DT: NORMAL
MDC_IDC_LEAD_IMPLANT_DT: NORMAL
MDC_IDC_LEAD_LOCATION: NORMAL
MDC_IDC_LEAD_LOCATION: NORMAL
MDC_IDC_LEAD_LOCATION_DETAIL_1: NORMAL
MDC_IDC_LEAD_LOCATION_DETAIL_1: NORMAL
MDC_IDC_LEAD_MFG: NORMAL
MDC_IDC_LEAD_MFG: NORMAL
MDC_IDC_LEAD_MODEL: NORMAL
MDC_IDC_LEAD_MODEL: NORMAL
MDC_IDC_LEAD_POLARITY_TYPE: NORMAL
MDC_IDC_LEAD_POLARITY_TYPE: NORMAL
MDC_IDC_LEAD_SERIAL: NORMAL
MDC_IDC_LEAD_SERIAL: NORMAL
MDC_IDC_MSMT_BATTERY_DTM: NORMAL
MDC_IDC_MSMT_BATTERY_REMAINING_LONGEVITY: 168 MO
MDC_IDC_MSMT_BATTERY_REMAINING_PERCENTAGE: 100 %
MDC_IDC_MSMT_BATTERY_STATUS: NORMAL
MDC_IDC_MSMT_LEADCHNL_RA_IMPEDANCE_VALUE: 499 OHM
MDC_IDC_MSMT_LEADCHNL_RA_PACING_THRESHOLD_AMPLITUDE: 0.7 V
MDC_IDC_MSMT_LEADCHNL_RA_PACING_THRESHOLD_PULSEWIDTH: 0.4 MS
MDC_IDC_MSMT_LEADCHNL_RV_IMPEDANCE_VALUE: 490 OHM
MDC_IDC_MSMT_LEADCHNL_RV_PACING_THRESHOLD_AMPLITUDE: 0.9 V
MDC_IDC_MSMT_LEADCHNL_RV_PACING_THRESHOLD_PULSEWIDTH: 0.4 MS
MDC_IDC_PG_IMPLANT_DTM: NORMAL
MDC_IDC_PG_MFG: NORMAL
MDC_IDC_PG_MODEL: NORMAL
MDC_IDC_PG_SERIAL: NORMAL
MDC_IDC_PG_TYPE: NORMAL
MDC_IDC_SESS_CLINIC_NAME: NORMAL
MDC_IDC_SESS_DTM: NORMAL
MDC_IDC_SESS_TYPE: NORMAL
MDC_IDC_SET_BRADY_AT_MODE_SWITCH_MODE: NORMAL
MDC_IDC_SET_BRADY_AT_MODE_SWITCH_RATE: 170 {BEATS}/MIN
MDC_IDC_SET_BRADY_LOWRATE: 60 {BEATS}/MIN
MDC_IDC_SET_BRADY_MAX_SENSOR_RATE: 130 {BEATS}/MIN
MDC_IDC_SET_BRADY_MAX_TRACKING_RATE: 130 {BEATS}/MIN
MDC_IDC_SET_BRADY_MODE: NORMAL
MDC_IDC_SET_BRADY_PAV_DELAY_HIGH: 200 MS
MDC_IDC_SET_BRADY_PAV_DELAY_LOW: 250 MS
MDC_IDC_SET_BRADY_SAV_DELAY_HIGH: 200 MS
MDC_IDC_SET_BRADY_SAV_DELAY_LOW: 250 MS
MDC_IDC_SET_LEADCHNL_RA_PACING_AMPLITUDE: 2 V
MDC_IDC_SET_LEADCHNL_RA_PACING_CAPTURE_MODE: NORMAL
MDC_IDC_SET_LEADCHNL_RA_PACING_POLARITY: NORMAL
MDC_IDC_SET_LEADCHNL_RA_PACING_PULSEWIDTH: 0.4 MS
MDC_IDC_SET_LEADCHNL_RA_SENSING_ADAPTATION_MODE: NORMAL
MDC_IDC_SET_LEADCHNL_RA_SENSING_POLARITY: NORMAL
MDC_IDC_SET_LEADCHNL_RA_SENSING_SENSITIVITY: 0.25 MV
MDC_IDC_SET_LEADCHNL_RV_PACING_AMPLITUDE: 2 V
MDC_IDC_SET_LEADCHNL_RV_PACING_CAPTURE_MODE: NORMAL
MDC_IDC_SET_LEADCHNL_RV_PACING_POLARITY: NORMAL
MDC_IDC_SET_LEADCHNL_RV_PACING_PULSEWIDTH: 0.4 MS
MDC_IDC_SET_LEADCHNL_RV_SENSING_ADAPTATION_MODE: NORMAL
MDC_IDC_SET_LEADCHNL_RV_SENSING_POLARITY: NORMAL
MDC_IDC_SET_LEADCHNL_RV_SENSING_SENSITIVITY: 1.5 MV
MDC_IDC_SET_ZONE_DETECTION_INTERVAL: 375 MS
MDC_IDC_SET_ZONE_TYPE: NORMAL
MDC_IDC_SET_ZONE_VENDOR_TYPE: NORMAL
MDC_IDC_STAT_AT_BURDEN_PERCENT: 1 %
MDC_IDC_STAT_AT_DTM_END: NORMAL
MDC_IDC_STAT_AT_DTM_START: NORMAL
MDC_IDC_STAT_BRADY_DTM_END: NORMAL
MDC_IDC_STAT_BRADY_DTM_START: NORMAL
MDC_IDC_STAT_BRADY_RA_PERCENT_PACED: 2 %
MDC_IDC_STAT_BRADY_RV_PERCENT_PACED: 11 %
MDC_IDC_STAT_EPISODE_RECENT_COUNT: 0
MDC_IDC_STAT_EPISODE_RECENT_COUNT: 1
MDC_IDC_STAT_EPISODE_RECENT_COUNT_DTM_END: NORMAL
MDC_IDC_STAT_EPISODE_RECENT_COUNT_DTM_START: NORMAL
MDC_IDC_STAT_EPISODE_TYPE: NORMAL
MDC_IDC_STAT_EPISODE_VENDOR_TYPE: NORMAL

## 2022-10-03 PROCEDURE — 93296 REM INTERROG EVL PM/IDS: CPT | Performed by: INTERNAL MEDICINE

## 2022-10-03 PROCEDURE — 93294 REM INTERROG EVL PM/LDLS PM: CPT | Performed by: INTERNAL MEDICINE

## 2022-10-11 ENCOUNTER — TELEPHONE (OUTPATIENT)
Dept: VASCULAR SURGERY | Facility: CLINIC | Age: 77
End: 2022-10-11

## 2022-10-11 NOTE — TELEPHONE ENCOUNTER
Patient is calling stating she has a small new sore on her foot, she is wondering if she needs to be seen. She is going to email us a photo to help assess.

## 2022-10-13 NOTE — TELEPHONE ENCOUNTER
Spoke with Serene. She said that she had a nurse out yesterday at her home to give her a pedicure and she mentioned the area of redness along her left lateral foot area. Serene was not aware of it because she cannot see the area well and has had no pain or discomfort in the area. Per the nurse that visited it is not open or draining and she did not think it needs to be seen in clinic at this time. Let Serene know that she should monitor the area closely and recommended she have someone check the area daily and call if the area opens, becomes more red, or there is increased swelling. She will have her son monitor it and email photos if needed. She has not changed shoes from what she normally uses. Let her know she may want to wear foot wear that offloads this area to minimize pressure.

## 2022-10-21 DIAGNOSIS — J44.9 COPD (CHRONIC OBSTRUCTIVE PULMONARY DISEASE) (H): Primary | ICD-10-CM

## 2022-10-21 RX ORDER — IPRATROPIUM BROMIDE AND ALBUTEROL SULFATE 2.5; .5 MG/3ML; MG/3ML
1 SOLUTION RESPIRATORY (INHALATION) EVERY 6 HOURS PRN
Qty: 810 ML | Refills: 3 | Status: SHIPPED | OUTPATIENT
Start: 2022-10-21 | End: 2024-01-22

## 2022-10-21 NOTE — TELEPHONE ENCOUNTER
"Spoke with patient.  Red area is not spreading.  No open wounds.  The area is a \"little tender\".  Patient requesting to make an appointment for in a couple of weeks to be seen.  If the issue resolves she will call to cancel.  She also will call back if anything worsens.  "

## 2022-10-21 NOTE — TELEPHONE ENCOUNTER
Attempted to call patient to discuss photo that was sent, phone rang several times with no answer and no voicemail.  Difficult to tell from photo if things are worsening.  Possibly red area is increasing?  Will attempt to try to call again later today.

## 2022-10-21 NOTE — TELEPHONE ENCOUNTER
Patient returned Maryann's call, states she was on the other line when she tried.  She asks that we call her back again at [phone]

## 2022-10-24 ENCOUNTER — OFFICE VISIT (OUTPATIENT)
Dept: CARDIOLOGY | Facility: CLINIC | Age: 77
End: 2022-10-24
Payer: MEDICARE

## 2022-10-24 VITALS
HEART RATE: 99 BPM | HEIGHT: 64 IN | WEIGHT: 218 LBS | OXYGEN SATURATION: 94 % | DIASTOLIC BLOOD PRESSURE: 80 MMHG | BODY MASS INDEX: 37.22 KG/M2 | RESPIRATION RATE: 20 BRPM | SYSTOLIC BLOOD PRESSURE: 130 MMHG

## 2022-10-24 DIAGNOSIS — Z95.0 CARDIAC PACEMAKER IN SITU: Primary | ICD-10-CM

## 2022-10-24 DIAGNOSIS — E66.812 CLASS 2 OBESITY DUE TO EXCESS CALORIES WITHOUT SERIOUS COMORBIDITY IN ADULT, UNSPECIFIED BMI: ICD-10-CM

## 2022-10-24 DIAGNOSIS — E78.00 PURE HYPERCHOLESTEROLEMIA: ICD-10-CM

## 2022-10-24 DIAGNOSIS — I35.0 MILD AORTIC VALVE STENOSIS: ICD-10-CM

## 2022-10-24 DIAGNOSIS — E66.09 CLASS 2 OBESITY DUE TO EXCESS CALORIES WITHOUT SERIOUS COMORBIDITY IN ADULT, UNSPECIFIED BMI: ICD-10-CM

## 2022-10-24 DIAGNOSIS — I10 ESSENTIAL HYPERTENSION, BENIGN: ICD-10-CM

## 2022-10-24 DIAGNOSIS — G40.009 PARTIAL IDIOPATHIC EPILEPSY WITH SEIZURES OF LOCALIZED ONSET, NOT INTRACTABLE, WITHOUT STATUS EPILEPTICUS (H): ICD-10-CM

## 2022-10-24 DIAGNOSIS — J43.1 PANLOBULAR EMPHYSEMA (H): ICD-10-CM

## 2022-10-24 PROCEDURE — 99214 OFFICE O/P EST MOD 30 MIN: CPT | Performed by: INTERNAL MEDICINE

## 2022-10-24 NOTE — LETTER
10/24/2022    Kate Marte, DO  [address]  [facility] [address]    RE: Lalitha L Kj       Dear Colleague,     I had the pleasure of seeing Lalitha RITA Underwood in the [facility].      [facility]  Heart Care Clinic Follow-up Note    Assessment & Plan        (Z95.0) Cardiac pacemaker in situ  (primary encounter diagnosis)  Comment: Noted to have sinus rates of 100 with ventricular rates of 50, second-degree type II heart block, this prompted implantation of [employer] pacemaker with [employer] right atrial right ventricular leads March 24, 2022.  Most recent device check shows 2% atrial pacing with 11% ventricular pacing.  Only 1 mode switch less than 1 minute in duration.  Follow-up with pacer clinic and potentially me at the same time.    (I35.0) Mild aortic valve stenosis  Comment: Mild with valve area 1.6 cm , mean gradient 24 mmHg and peak velocity of 3.0 m/s.  Recheck echo March 2023 as this appears to be more moderate.    (I10) Essential hypertension, benign  Comment: Under good control, I rechecked blood pressure myself and it was lower.    (J43.1) Panlobular emphysema (H)  Comment: So noted and on Advair inhaler, Spiriva inhaler, ipratropium DuoNebs at home, as well as albuterol rescue inhaler and Tessalon Perles and Mucinex.  Sees Dr. Julian Tamayo of pulmonary.    (G40.009) Partial idiopathic epilepsy with seizures of localized onset, not intractable, without status epilepticus (H)  Comment: So noted on Keppra.    (E66.09) Class 2 obesity due to excess calories without serious comorbidity in adult, unspecified BMI  Comment: Continue to work on weight loss.    (E78.00) Pure hypercholesterolemia  Comment: Total cholesterol 181 with LDL of 95.    Plan  1.  Work on weight loss.  2.  Repeat echo in March 2023.  3.  Follow-up me in 1 year with device check or sooner if needed.    Subjective  CC: 77-year-old white female being seen in post hospital discharge  follow-up.  She lives at home independently in a townhouse, single-story.  Has groceries delivered, still drives, does do some walking around the house.  Does have shortness of breath but tolerable, uses oxygen 24/7.  No PND, orthopnea, chest pains, palpitations, syncope, dizziness or significant peripheral edema.    Medications  Current Outpatient Medications   Medication Sig Dispense Refill     albuterol (PROAIR HFA/PROVENTIL HFA/VENTOLIN HFA) 108 (90 Base) MCG/ACT inhaler Inhale 2 puffs into the lungs every 6 hours as needed 18 g 1     azithromycin (ZITHROMAX) 250 MG tablet Take 1 tablet (250 mg) by mouth daily (Patient taking differently: Take 250 mg by mouth every evening) 90 tablet 3     benzonatate (TESSALON) 100 MG capsule Take 1 capsule (100 mg) by mouth 3 times daily as needed for cough 30 capsule 1     calcium citrate (CITRACAL) 950 (200 Ca) MG tablet Take 1 tablet by mouth 2 times daily       denosumab (PROLIA) 60 MG/ML SOLN injection Inject 60 mg Subcutaneous every 6 months       fluticasone-salmeterol (ADVAIR) 500-50 MCG/ACT inhaler Inhale 1 puff into the lungs every 12 hours 180 each 3     guaiFENesin (MUCINEX) 600 MG 12 hr tablet Take 1 tablet (600 mg) by mouth 2 times daily (Patient taking differently: Take 600 mg by mouth 2 times daily as needed)       hydrochlorothiazide (HYDRODIURIL) 25 MG tablet hydrochlorothiazide 25 mg tablet   TAKE 1 TABLET BY MOUTH DAILY (Patient taking differently: Take 25 mg by mouth daily hydrochlorothiazide 25 mg tablet   TAKE 1 TABLET BY MOUTH DAILY) 90 tablet 3     ipratropium - albuterol 0.5 mg/2.5 mg/3 mL (DUONEB) 0.5-2.5 (3) MG/3ML neb solution Take 1 vial (3 mLs) by nebulization every 6 hours as needed for shortness of breath / dyspnea or wheezing 810 mL 3     levETIRAcetam (KEPPRA) 500 MG tablet Take 1 tablet (500 mg) by mouth 2 times daily 180 tablet 3     losartan (COZAAR) 50 MG tablet Take 1 tablet (50 mg) by mouth 2 times daily 90 tablet 3     Magnesium Oxide  "500 MG TABS Take 500 mg by mouth 2 times daily       nystatin (MYCOSTATIN) 795859 UNIT/GM external powder Apply topically daily as needed       potassium chloride ER (KLOR-CON M) 20 MEQ CR tablet Take 1 tablet (20 mEq) by mouth daily (Patient taking differently: Take 20 mEq by mouth every evening) 90 tablet 3     RABEprazole (ACIPHEX) 20 MG EC tablet Take 1 tablet (20 mg) by mouth daily 90 tablet 3     sertraline (ZOLOFT) 100 MG tablet Take 1 tablet (100 mg) by mouth daily (Patient taking differently: Take 100 mg by mouth every evening) 90 tablet 3     solifenacin (VESICARE) 10 MG tablet Take 1 tablet (10 mg) by mouth daily 90 tablet 3     tiotropium (SPIRIVA HANDIHALER) 18 MCG inhaled capsule Spiriva with HandiHaler 18 mcg and inhalation capsules (Patient taking differently: Inhale 18 mcg into the lungs daily) 90 capsule 3     vitamin D3 (CHOLECALCIFEROL) 125 MCG (5000 UT) tablet Take 5,000 Units by mouth daily          Objective  /80 (BP Location: Left arm, Patient Position: Sitting, Cuff Size: Adult Regular)   Pulse 99   Resp 20   Ht 1.626 m (5' 4\")   Wt 98.9 kg (218 lb)   SpO2 94%   BMI 37.42 kg/m      General Appearance:    Alert, cooperative, no distress, appears stated age   Head:    Normocephalic, without obvious abnormality, atraumatic   Throat:   Lips, mucosa, and tongue normal; teeth and gums normal   Neck:   Supple, symmetrical, trachea midline, no adenopathy;        thyroid:  No enlargement/tenderness/nodules; no carotid    bruit or JVD   Back:     Symmetric, no curvature, ROM normal, no CVA tenderness   Lungs:     Inspiratory and expiratory wheezes bilaterally, respirations unlabored   Chest wall:    No tenderness or deformity   Heart:    Regular rate and rhythm, S1 and S2 normal, 3/6 systolic ejection murmur, no rub   or gallop   Abdomen:     Soft, non-tender, bowel sounds active all four quadrants,     no masses, no organomegaly   Extremities:   Normal, atraumatic, no cyanosis or edema "   Pulses:   2+ and symmetric all extremities   Skin:   Skin color, texture, turgor normal, no rashes or lesions     Results    Lab Results personally reviewed   Lab Results   Component Value Date    CHOL 181 02/22/2022    CHOL 192 02/12/2021     Lab Results   Component Value Date    HDL 65 02/22/2022    HDL 59 02/12/2021     No components found for: LDLCALC  Lab Results   Component Value Date    TRIG 103 02/22/2022    TRIG 117 02/12/2021     Lab Results   Component Value Date    WBC 10.6 04/19/2022    HGB 11.7 04/19/2022    HCT 36.3 04/19/2022     04/19/2022     Lab Results   Component Value Date    BUN 26 04/19/2022     04/19/2022    CO2 34 (H) 04/19/2022               Thank you for allowing me to participate in the care of your patient.      Sincerely,     ZHANE OCONNELL MD     [facility]  cc:   Joe Ellsworth MD

## 2022-10-24 NOTE — PATIENT INSTRUCTIONS
Ms Lalitha Underwood,  I enjoyed visiting with you again today.  I am glad to hear you are doing well.  Per our conversation let us recheck the echo of the stiff aortic valve around March.  I will plan on seeing you yearly and during in office pacer checks is better.  I will plan on seeing you 1 year or sooner if needed.  Ezra Robertson

## 2022-10-24 NOTE — PROGRESS NOTES
[facility]  Heart Care Clinic Follow-up Note    Assessment & Plan        (Z95.0) Cardiac pacemaker in situ  (primary encounter diagnosis)  Comment: Noted to have sinus rates of 100 with ventricular rates of 50, second-degree type II heart block, this prompted implantation of [employer] pacemaker with [employer] right atrial right ventricular leads March 24, 2022.  Most recent device check shows 2% atrial pacing with 11% ventricular pacing.  Only 1 mode switch less than 1 minute in duration.  Follow-up with pacer clinic and potentially me at the same time.    (I35.0) Mild aortic valve stenosis  Comment: Mild with valve area 1.6 cm , mean gradient 24 mmHg and peak velocity of 3.0 m/s.  Recheck echo March 2023 as this appears to be more moderate.    (I10) Essential hypertension, benign  Comment: Under good control, I rechecked blood pressure myself and it was lower.    (J43.1) Panlobular emphysema (H)  Comment: So noted and on Advair inhaler, Spiriva inhaler, ipratropium DuoNebs at home, as well as albuterol rescue inhaler and Tessalon Perles and Mucinex.  Sees Dr. Julian Tamayo of pulmonary.    (G40.009) Partial idiopathic epilepsy with seizures of localized onset, not intractable, without status epilepticus (H)  Comment: So noted on Keppra.    (E66.09) Class 2 obesity due to excess calories without serious comorbidity in adult, unspecified BMI  Comment: Continue to work on weight loss.    (E78.00) Pure hypercholesterolemia  Comment: Total cholesterol 181 with LDL of 95.    Plan  1.  Work on weight loss.  2.  Repeat echo in March 2023.  3.  Follow-up me in 1 year with device check or sooner if needed.    Subjective  CC: 77-year-old white female being seen in post hospital discharge follow-up.  She lives at home independently in a townhouse, single-story.  Has groceries delivered, still drives, does do some walking around the house.  Does have shortness of breath but tolerable, uses oxygen  24/7.  No PND, orthopnea, chest pains, palpitations, syncope, dizziness or significant peripheral edema.    Medications  Current Outpatient Medications   Medication Sig Dispense Refill     albuterol (PROAIR HFA/PROVENTIL HFA/VENTOLIN HFA) 108 (90 Base) MCG/ACT inhaler Inhale 2 puffs into the lungs every 6 hours as needed 18 g 1     azithromycin (ZITHROMAX) 250 MG tablet Take 1 tablet (250 mg) by mouth daily (Patient taking differently: Take 250 mg by mouth every evening) 90 tablet 3     benzonatate (TESSALON) 100 MG capsule Take 1 capsule (100 mg) by mouth 3 times daily as needed for cough 30 capsule 1     calcium citrate (CITRACAL) 950 (200 Ca) MG tablet Take 1 tablet by mouth 2 times daily       denosumab (PROLIA) 60 MG/ML SOLN injection Inject 60 mg Subcutaneous every 6 months       fluticasone-salmeterol (ADVAIR) 500-50 MCG/ACT inhaler Inhale 1 puff into the lungs every 12 hours 180 each 3     guaiFENesin (MUCINEX) 600 MG 12 hr tablet Take 1 tablet (600 mg) by mouth 2 times daily (Patient taking differently: Take 600 mg by mouth 2 times daily as needed)       hydrochlorothiazide (HYDRODIURIL) 25 MG tablet hydrochlorothiazide 25 mg tablet   TAKE 1 TABLET BY MOUTH DAILY (Patient taking differently: Take 25 mg by mouth daily hydrochlorothiazide 25 mg tablet   TAKE 1 TABLET BY MOUTH DAILY) 90 tablet 3     ipratropium - albuterol 0.5 mg/2.5 mg/3 mL (DUONEB) 0.5-2.5 (3) MG/3ML neb solution Take 1 vial (3 mLs) by nebulization every 6 hours as needed for shortness of breath / dyspnea or wheezing 810 mL 3     levETIRAcetam (KEPPRA) 500 MG tablet Take 1 tablet (500 mg) by mouth 2 times daily 180 tablet 3     losartan (COZAAR) 50 MG tablet Take 1 tablet (50 mg) by mouth 2 times daily 90 tablet 3     Magnesium Oxide 500 MG TABS Take 500 mg by mouth 2 times daily       nystatin (MYCOSTATIN) 160543 UNIT/GM external powder Apply topically daily as needed       potassium chloride ER (KLOR-CON M) 20 MEQ CR tablet Take 1 tablet  "(20 mEq) by mouth daily (Patient taking differently: Take 20 mEq by mouth every evening) 90 tablet 3     RABEprazole (ACIPHEX) 20 MG EC tablet Take 1 tablet (20 mg) by mouth daily 90 tablet 3     sertraline (ZOLOFT) 100 MG tablet Take 1 tablet (100 mg) by mouth daily (Patient taking differently: Take 100 mg by mouth every evening) 90 tablet 3     solifenacin (VESICARE) 10 MG tablet Take 1 tablet (10 mg) by mouth daily 90 tablet 3     tiotropium (SPIRIVA HANDIHALER) 18 MCG inhaled capsule Spiriva with HandiHaler 18 mcg and inhalation capsules (Patient taking differently: Inhale 18 mcg into the lungs daily) 90 capsule 3     vitamin D3 (CHOLECALCIFEROL) 125 MCG (5000 UT) tablet Take 5,000 Units by mouth daily          Objective  /80 (BP Location: Left arm, Patient Position: Sitting, Cuff Size: Adult Regular)   Pulse 99   Resp 20   Ht 1.626 m (5' 4\")   Wt 98.9 kg (218 lb)   SpO2 94%   BMI 37.42 kg/m      General Appearance:    Alert, cooperative, no distress, appears stated age   Head:    Normocephalic, without obvious abnormality, atraumatic   Throat:   Lips, mucosa, and tongue normal; teeth and gums normal   Neck:   Supple, symmetrical, trachea midline, no adenopathy;        thyroid:  No enlargement/tenderness/nodules; no carotid    bruit or JVD   Back:     Symmetric, no curvature, ROM normal, no CVA tenderness   Lungs:     Inspiratory and expiratory wheezes bilaterally, respirations unlabored   Chest wall:    No tenderness or deformity   Heart:    Regular rate and rhythm, S1 and S2 normal, 3/6 systolic ejection murmur, no rub   or gallop   Abdomen:     Soft, non-tender, bowel sounds active all four quadrants,     no masses, no organomegaly   Extremities:   Normal, atraumatic, no cyanosis or edema   Pulses:   2+ and symmetric all extremities   Skin:   Skin color, texture, turgor normal, no rashes or lesions     Results    Lab Results personally reviewed   Lab Results   Component Value Date    CHOL 181 " 02/22/2022    CHOL 192 02/12/2021     Lab Results   Component Value Date    HDL 65 02/22/2022    HDL 59 02/12/2021     No components found for: LDLCALC  Lab Results   Component Value Date    TRIG 103 02/22/2022    TRIG 117 02/12/2021     Lab Results   Component Value Date    WBC 10.6 04/19/2022    HGB 11.7 04/19/2022    HCT 36.3 04/19/2022     04/19/2022     Lab Results   Component Value Date    BUN 26 04/19/2022     04/19/2022    CO2 34 (H) 04/19/2022

## 2022-10-26 ENCOUNTER — TELEPHONE (OUTPATIENT)
Dept: FAMILY MEDICINE | Facility: CLINIC | Age: 77
End: 2022-10-26

## 2022-10-26 DIAGNOSIS — K21.9 GASTROESOPHAGEAL REFLUX DISEASE WITHOUT ESOPHAGITIS: ICD-10-CM

## 2022-10-26 RX ORDER — RABEPRAZOLE SODIUM 20 MG/1
1 TABLET, DELAYED RELEASE ORAL DAILY
Qty: 90 TABLET | Refills: 3 | Status: SHIPPED | OUTPATIENT
Start: 2022-10-26 | End: 2024-01-29

## 2022-10-26 NOTE — TELEPHONE ENCOUNTER
Reason for call:  Medication   If this is a refill request, has the caller requested the refill from the pharmacy already? Yes  Will the patient be using a [facility]? No  Name of the pharmacy and phone number for the current request: [facility]  Fax: [phone]    Name of the medication requested: RABEprazole (ACIPHEX) 20 MG EC tablet      Other request: Pt requesting a 90 day supply, Pharmacy also needs a PA for the medication    Phone number to reach patient:  Home number on file [phone] (home)    Best Time:  any    Can we leave a detailed message on this number?  YES    Travel screening: Not Applicable

## 2022-10-28 ENCOUNTER — TELEPHONE (OUTPATIENT)
Dept: RESPIRATORY THERAPY | Facility: HOSPITAL | Age: 77
End: 2022-10-28

## 2022-10-28 NOTE — TELEPHONE ENCOUNTER
I spoke with Serene. She is feeling well today. She was busy and did not have much time to speak. She had no problems or questions for me today.    Cielo Watkins, RT, TTS, Chronic Pulmonary Disease Specialist

## 2022-11-02 ENCOUNTER — IMMUNIZATION (OUTPATIENT)
Dept: FAMILY MEDICINE | Facility: CLINIC | Age: 77
End: 2022-11-02
Payer: MEDICARE

## 2022-11-02 ENCOUNTER — TELEPHONE (OUTPATIENT)
Dept: FAMILY MEDICINE | Facility: CLINIC | Age: 77
End: 2022-11-02

## 2022-11-02 DIAGNOSIS — Z23 NEED FOR PROPHYLACTIC VACCINATION AND INOCULATION AGAINST INFLUENZA: ICD-10-CM

## 2022-11-02 DIAGNOSIS — Z23 HIGH PRIORITY FOR 2019-NCOV VACCINE: ICD-10-CM

## 2022-11-02 PROCEDURE — 90662 IIV NO PRSV INCREASED AG IM: CPT

## 2022-11-02 PROCEDURE — 91312 COVID-19,PF,PFIZER BOOSTER BIVALENT: CPT

## 2022-11-02 PROCEDURE — G0008 ADMIN INFLUENZA VIRUS VAC: HCPCS | Mod: 59

## 2022-11-02 PROCEDURE — 0124A COVID-19,PF,PFIZER BOOSTER BIVALENT: CPT

## 2022-11-03 DIAGNOSIS — I10 ESSENTIAL HYPERTENSION: ICD-10-CM

## 2022-11-03 NOTE — TELEPHONE ENCOUNTER
Central Prior Authorization Team   Phone: [phone]      PA Initiation    Medication: RABEprazole (ACIPHEX) 20 MG EC tablet - INITIATED  Insurance Company: [employer] - Phone [phone] Fax [phone]  [facility] the [facility] [place] [facility] AT PORTAL TO REGISTERED [facility] SITES  Filling Pharmacy Phone: [phone]  Filling Pharmacy Fax:    Start Date: 11/3/2022

## 2022-11-04 NOTE — TELEPHONE ENCOUNTER
Prior Authorization Approval    Authorization Effective Date: 10/4/2022  Authorization Expiration Date: 11/3/2023  Medication: RABEprazole (ACIPHEX) 20 MG EC tablet - PA APPROVED  Insurance Company: [employer] - Phone [phone] Fax [phone]  Which Pharmacy is filling the prescription (Not needed for infusion/clinic administered): [facility] [place] - [facility] AT PORTAL TO REGISTERED [facility]  Pharmacy Notified: Yes  Patient Notified: Yes (pharmacy will deliver when ready) - called patient and informed of the approval

## 2022-11-05 RX ORDER — LOSARTAN POTASSIUM 50 MG/1
50 TABLET ORAL 2 TIMES DAILY
Qty: 180 TABLET | Refills: 1 | Status: SHIPPED | OUTPATIENT
Start: 2022-11-05 | End: 2023-04-27

## 2022-11-05 NOTE — TELEPHONE ENCOUNTER
"Last Written Prescription Date:  2/22/22  Last Fill Quantity: 90,  # refills: 3   Last office visit provider:  7/14/22     Requested Prescriptions   Pending Prescriptions Disp Refills     losartan (COZAAR) 50 MG tablet 180 tablet 3     Sig: Take 1 tablet (50 mg) by mouth 2 times daily       Angiotensin-II Receptors Passed - 11/3/2022  4:31 PM        Passed - Last blood pressure under 140/90 in past 12 months     BP Readings from Last 3 Encounters:   10/24/22 130/80   09/07/22 126/82   08/25/22 122/74                 Passed - Recent (12 mo) or future (30 days) visit within the authorizing provider's specialty     Patient has had an office visit with the authorizing provider or a provider within the authorizing providers department within the previous 12 mos or has a future within next 30 days. See \"Patient Info\" tab in inbasket, or \"Choose Columns\" in Meds & Orders section of the refill encounter.              Passed - Medication is active on med list        Passed - Patient is age 18 or older        Passed - No active pregnancy on record        Passed - Normal serum creatinine on file in past 12 months     Recent Labs   Lab Test 04/19/22  1514   CR 1.03       Ok to refill medication if creatinine is low          Passed - Normal serum potassium on file in past 12 months     Recent Labs   Lab Test 04/19/22  1514   POTASSIUM 4.3                    Passed - No positive pregnancy test in past 12 months             Marley Vuong, RN 11/05/22 11:37 AM  "

## 2022-11-25 ENCOUNTER — TELEPHONE (OUTPATIENT)
Dept: RESPIRATORY THERAPY | Facility: HOSPITAL | Age: 77
End: 2022-11-25
Payer: MEDICARE

## 2022-11-25 NOTE — TELEPHONE ENCOUNTER
Left message for them call back with any questions they may have, our phone number, reminders, and when we plan to call again.    Lalitha Hamilton, RT, Chronic Pulmonary Disease Specialist

## 2022-12-01 ENCOUNTER — TELEPHONE (OUTPATIENT)
Dept: ENDOCRINOLOGY | Facility: CLINIC | Age: 77
End: 2022-12-01

## 2022-12-01 DIAGNOSIS — M81.0 AGE-RELATED OSTEOPOROSIS WITHOUT CURRENT PATHOLOGICAL FRACTURE: Primary | ICD-10-CM

## 2022-12-01 NOTE — TELEPHONE ENCOUNTER
[facility] Call Center    Phone Message    May a detailed message be left on voicemail: yes     Reason for Call: Other: Can her prolia injection be pushed out to [facility] the same day as her followup  on  2/7/2023? Per patient and  not sure if she had to do it right at the 6 month edmar on 12/6/2022 that it is currently scheduled for.   Thank you.     Action Taken: Other: ENDO    Travel Screening: Not Applicable

## 2022-12-20 ENCOUNTER — OFFICE VISIT (OUTPATIENT)
Dept: ENDOCRINOLOGY | Facility: CLINIC | Age: 77
End: 2022-12-20
Payer: MEDICARE

## 2022-12-20 VITALS
HEART RATE: 96 BPM | BODY MASS INDEX: 38.11 KG/M2 | DIASTOLIC BLOOD PRESSURE: 82 MMHG | WEIGHT: 222 LBS | SYSTOLIC BLOOD PRESSURE: 134 MMHG

## 2022-12-20 DIAGNOSIS — M81.0 AGE-RELATED OSTEOPOROSIS WITHOUT CURRENT PATHOLOGICAL FRACTURE: Primary | ICD-10-CM

## 2022-12-20 DIAGNOSIS — Z92.29 PERSONAL HISTORY OF OTHER DRUG THERAPY: ICD-10-CM

## 2022-12-20 PROCEDURE — 96372 THER/PROPH/DIAG INJ SC/IM: CPT | Performed by: NURSE PRACTITIONER

## 2022-12-20 PROCEDURE — 99213 OFFICE O/P EST LOW 20 MIN: CPT | Mod: 25 | Performed by: NURSE PRACTITIONER

## 2022-12-20 NOTE — PROGRESS NOTES
[facility]  ENDOCRINOLOGY    Osteoporosis Follow Up 12/20/2022    Lalitha Underwood, 1945, [id number]          Reason for visit      1. Age-related osteoporosis without current pathological fracture    2. Personal history of other drug therapy        History     Lalitha Underwood is a very pleasant 77 year old old female who presents for follow up.   SUMMARY:  1. Osteopenia-she was started on alendronate 2 years ago. She has been tolerating alendronate well however a recent bone density report as noted below showed deterioration in the bone density in the right hip. She states she's been compliant with her alendronate every week.  She does not take any calcium supplements and only consumes about 1 serving of dairy each day. She takes 5000 IU daily of the 3 and her recent level was 41.8.  TSH in December 2014 was 1.87.  She has been on Dilantin for the last 24 years after having a grand mal seizure. Prior to that she was diagnosed with seizure disorder at the age of 18 and use medications initially for about 2 years and then discontinued for about 27 years.  She is also a former smoker and smoked for about 35 years and quit in 1998.  She does not undertake any weight-bearing exercise currently. She is thinking of joining the [facility] in January  She has had several falls but has never resulted in a fragility fracture.  She has lost 1.75 inches in height.  Menopause at age 50 and she used hormone replacement therapy for about 2-3 years and stopped after the women's health initiative data came out.  Her mother had an osteoporotic hip fracture at age 90. She had to have open reduction internal fixation and lived for another 4 years after that but was dependent on a walker.  Her sister also has osteopenia.  She has a long-standing history of hiatal hernia and has been on proton pump inhibitors followed time and is currently in AcipHex.    TODAY:    Serene is here today in f/u for Osteoporosis. She is reporting that  her  just passed last week.  She is currently on Prolia injections and reports no problems with them. Her last Dexa Scan showed stability. She did have a fall this year and after she had had surgery on her foot.  She reports that she was leaning over using her walker, and trying to pick something up off of the floor and the walker rolled forward. Down she went. Her most recent Dexa Scan showed stability. She is due for another one in January of 2023. She has not yet had labs done, but will be doing so in the near future.      Risk Factors     The following high- risk conditions have been ruled out: celiac disease, eating disorders, gastric bypass, hyperparathyroidism, inflammatory bowel disease, hyperthyroidism, rheumatoid arthritis, lupus, chronic kidney disease.      Lalitha Underwood has the following risk factors: Age, female gender, ex smoker, and breast cancer in her mother.      She is not on high risk medications such as glucocorticoids, anti-coagulants, chemotherapy or levothyroxine.  She is on an anticonvulsant.  Patient deniesHysterectomy, Oophrectomy, Breast cancer and Family history of breast cancer.   Menopause was at age: 50 years    Past Medical History     Patient Active Problem List   Diagnosis     S/P repair of paraesophageal hernia     Essential hypertension, benign     Acquired lymphedema of leg     Collapsed arches     Decreased hearing of both ears     Epileptic seizure (H)     Esophageal reflux     Hammer toe of left foot     Hyperlipidemia     Impaired gait and mobility     Left arm swelling     Leg length discrepancy     Localized deposits of fat     Mild aortic valve stenosis     Class 2 obesity due to excess calories without serious comorbidity in adult     Neuropathy, idiopathic     Osteoporosis     Pulmonary emphysema (H)     Rosacea     Solar elastosis     Urge incontinence of urine     Uterine prolapse     Valgus deformity of both feet     Vitamin D deficiency     PAD  (peripheral artery disease) (H)     Keratoma     Second degree heart block     Cardiac pacemaker in situ       Family History       family history includes Breast Cancer (age of onset: 66.00) in her mother; Coronary Artery Disease in her mother, sister, and sister; Diabetes in her son; Heart Disease in her sister, sister and another family member; Hypertension in her father and mother; Pulmonary Hypertension in her daughter and daughter; Varicose Veins in her mother.    Social History      reports that she quit smoking about 24 years ago. Her smoking use included cigarettes. She has a 57.00 pack-year smoking history. She has never used smokeless tobacco. She reports current alcohol use of about 2.0 standard drinks per week. She reports that she does not currently use drugs.      Review of Systems     Patient denies current pain, limited mobility, fractures.   Remainder per HPI.    Answers for HPI/ROS submitted by the patient on 12/19/2022  General Symptoms: No  Skin Symptoms: No  HENT Symptoms: No  EYE SYMPTOMS: No  HEART SYMPTOMS: No  LUNG SYMPTOMS: No  INTESTINAL SYMPTOMS: No  URINARY SYMPTOMS: No  GYNECOLOGIC SYMPTOMS: No  BREAST SYMPTOMS: No  SKELETAL SYMPTOMS: No  BLOOD SYMPTOMS: No  NERVOUS SYSTEM SYMPTOMS: No  MENTAL HEALTH SYMPTOMS: No      Vital Signs     /82 (BP Location: Right arm, Patient Position: Sitting, Cuff Size: Adult Regular)   Pulse 96   Wt 100.7 kg (222 lb)   BMI 38.11 kg/m      Physical Exam     Constitutional:  Well developed, Well nourished  HENT:  Normocephalic,   Neck: normal in appearance  Eyes:  PERRL, Conjunctiva pink  Respiratory:  No respiratory distress  Skin: No acanthosis nigricans, lipoatrophy or lipodystrophy  Neurologic:  Alert & oriented x 3, nonfocal  Psychiatric:  Affect, Mood, Insight appropriate        Assessment     1. Age-related osteoporosis without current pathological fracture    2. Personal history of other drug therapy        Plan     Serene will be getting a  Prolia injection today. She will f/u with me in 1 year, Dexa Scan next month.         Lalitha Haq NP  [facility]  12/20/2022  10:49 AM      Current Medications     Outpatient Medications Prior to Visit   Medication Sig Dispense Refill     albuterol (PROAIR HFA/PROVENTIL HFA/VENTOLIN HFA) 108 (90 Base) MCG/ACT inhaler Inhale 2 puffs into the lungs every 6 hours as needed 18 g 1     azithromycin (ZITHROMAX) 250 MG tablet Take 1 tablet (250 mg) by mouth daily (Patient taking differently: Take 250 mg by mouth every evening) 90 tablet 3     benzonatate (TESSALON) 100 MG capsule Take 1 capsule (100 mg) by mouth 3 times daily as needed for cough 30 capsule 1     calcium citrate (CITRACAL) 950 (200 Ca) MG tablet Take 1 tablet by mouth 2 times daily       denosumab (PROLIA) 60 MG/ML SOLN injection Inject 60 mg Subcutaneous every 6 months       fluticasone-salmeterol (ADVAIR) 500-50 MCG/ACT inhaler Inhale 1 puff into the lungs every 12 hours 180 each 3     guaiFENesin (MUCINEX) 600 MG 12 hr tablet Take 1 tablet (600 mg) by mouth 2 times daily (Patient taking differently: Take 600 mg by mouth 2 times daily as needed)       hydrochlorothiazide (HYDRODIURIL) 25 MG tablet hydrochlorothiazide 25 mg tablet   TAKE 1 TABLET BY MOUTH DAILY (Patient taking differently: Take 25 mg by mouth daily hydrochlorothiazide 25 mg tablet   TAKE 1 TABLET BY MOUTH DAILY) 90 tablet 3     ipratropium - albuterol 0.5 mg/2.5 mg/3 mL (DUONEB) 0.5-2.5 (3) MG/3ML neb solution Take 1 vial (3 mLs) by nebulization every 6 hours as needed for shortness of breath / dyspnea or wheezing 810 mL 3     levETIRAcetam (KEPPRA) 500 MG tablet Take 1 tablet (500 mg) by mouth 2 times daily 180 tablet 3     Magnesium Oxide 500 MG TABS Take 500 mg by mouth 2 times daily       nystatin (MYCOSTATIN) 384712 UNIT/GM external powder Apply topically daily as needed       potassium chloride ER (KLOR-CON M) 20 MEQ CR tablet Take 1 tablet (20 mEq) by mouth daily (Patient  taking differently: Take 20 mEq by mouth every evening) 90 tablet 3     RABEprazole (ACIPHEX) 20 MG EC tablet Take 1 tablet (20 mg) by mouth daily 90 tablet 3     sertraline (ZOLOFT) 100 MG tablet Take 1 tablet (100 mg) by mouth daily (Patient taking differently: Take 100 mg by mouth every evening) 90 tablet 3     solifenacin (VESICARE) 10 MG tablet Take 1 tablet (10 mg) by mouth daily 90 tablet 3     tiotropium (SPIRIVA HANDIHALER) 18 MCG inhaled capsule Spiriva with HandiHaler 18 mcg and inhalation capsules (Patient taking differently: Inhale 18 mcg into the lungs daily) 90 capsule 3     vitamin D3 (CHOLECALCIFEROL) 125 MCG (5000 UT) tablet Take 5,000 Units by mouth daily        losartan (COZAAR) 50 MG tablet Take 1 tablet (50 mg) by mouth 2 times daily 180 tablet 1     No facility-administered medications prior to visit.         Lab Results     TSH   Date Value Ref Range Status   04/19/2022 3.59 0.40 - 4.00 mU/L Final           Imaging Results   Last DEXA scan:  No valid procedures specified.    Study Result    Narrative & Impression   1/20/2021        RE: Lalitha Underwood  YOB: 1945           Dear Lalitha Haq,     Patient Profile:  75 y.o. female, postmenopausal, is here for the follow up bone density test.   History of fractures - Yes;  Wrist. Family history of osteoporosis - Yes;  sibling.  Family history of hip fracture: Yes;  mother. Smoking history - Past. Osteoporosis treatment past -  Yes;  HRT and Bisphosphonates. Osteoporosis treatment current -   Yes;  Prolia.  Chronic medical problems - Chronic low back problems. High risk medications -  Anti-seizure;  Yes, Currently.        Assessment:     1. The spine bone density L1-L4 was not done because of the significant artifacts.  2. Femoral bone densities show left femoral neck T- score -1.0 and right femoral neck T-score -0.7, with no statistically significant change compared to the previous DXA scan done in 2018.  3. Left forearm bone  "density with T-score 1.1.        75 y.o. female with NORMAL BONE DENSITY.           Recommendations:  Appropriate calcium, vitamin D supplements, along with balance and weight bearing exercise recommended with follow up bone density scan in 2 years. Patient is a candidate for \"drug holiday\" after switching from denosumab to bisphosphonate treatment for   1-2 years.        Bone densitometry was performed on your patient using our Fermentalg iDXA densitometer. The results are summarized and a copy of the actual scans are included for your review. In conformity with the International Society of Clinical Densitometry's most   recent position statement for DXA interpretation (2015), the diagnosis will be made on the lowest measured T-score of the lumbar spine, femoral neck, total proximal femur or 33% radius. Note the change in terminology for diagnostic classification from   OSTEOPENIA to LOW BONE MASS. All trending for sequential exams will be done using multiple vertebrae or the total proximal femur. Fracture risk is based on the WHO Fracture Risk Assessment Tool (FRAX). If additional information is needed or if you would   like to discuss the results, please do not hesitate to call me.         Thank you for referring this patient to [facility]. We are happy to be of service in support of you and your practice. If you have any questions or suggestions to improve our service, please call me at [phone].      Sincerely,      Ashli Mendez M.D. [email or web address].  Osteoporosis Services, [facility]             Prolia Injection Phone Screen      Screening questions have been asked 2-3 days prior to administration visit for Prolia. If any questions are answered with \"Yes,\" this phone encounter were will routed to ordering provider for further evaluation.     1.  When was the last injection?  5/31/22    2.  Has insurance for this injection been verified?  Yes    3.  Did you experience any new onset " achiness or rashes that lasted for over a month with your previous Prolia injection?   No    4.  Do you have a fever over 101?F or a new deep cough that is unusual for you today? No    5.  Have you started any new medications in the last 6 months that you were told could affect your immune system? These may have been prescribed by oncologist, transplant, rheumatology, or dermatology.   No    6.  In the last 6 months have you have gastric bypass or parathyroid surgery?   No    7.  Do you plan dental work requiring drilling into the bone such as implants/extractions or oral surgery in the next 2-3 months?   No    8. Do you have new insurance since the last injection?    Patient informed if symptoms discussed above present prior to their administration appointment, they are to notify clinic immediately.     Reshma Chatman RN          The following steps were completed to comply with the REMS program for Prolia:  1. Ordering provider has previously reviewed information in the Medication Guide and Patient Counseling Chart, including the serious risks of Prolia  and the symptoms of each risk and have been advised to seek prompt medical attention if they have signs or symptoms of any of the serious risks.  2. Provided each patient a copy of the Medication Guide and Patient Brochure.  See MAR for administration details.   Indication: Prolia  (denosumab) is a prescription medicine used to treat osteoporosis in patients who:   Are at high risk for fracture, meaning patients who have had a fracture related to osteoporosis, or who have multiple risk factors for fracture; Cannot use another osteoporosis medicine or other osteoporosis medicines did not work well.   The timeline for early/late injections would be 4 weeks early and any time after the 6 month edmar. If a patient receives their injection late, then the subsequent injection would be 6 months from the date that they actually received the injection    Have the  screening questions been asked prior to this administration? Yes      The following medication was given:     MEDICATION: Denosumab (Prolia) 60 mg/ml SOLN  ROUTE: SQ  SITE: Arm - Right  DOSE: 60 mg  LOT #: [id number]  :  Delmi  EXPIRATION DATE:  03/31/2024  NDC#: see mar

## 2022-12-23 ENCOUNTER — TELEPHONE (OUTPATIENT)
Dept: RESPIRATORY THERAPY | Facility: HOSPITAL | Age: 77
End: 2022-12-23

## 2022-12-23 NOTE — TELEPHONE ENCOUNTER
Spoke with Serene, doing okay even though her  passed away last week. Coping well with it, no questions for me to day. no issues getting and/or taking their medications, reviewed their action plan, in their green zone.  Reminded when we will call again and to call before if there is a question.  Lalitha Hamilton, RT, CTTS, Chronic Pulmonary Disease Specialist

## 2023-01-03 ENCOUNTER — VIRTUAL VISIT (OUTPATIENT)
Dept: PULMONOLOGY | Facility: CLINIC | Age: 78
End: 2023-01-03
Payer: MEDICARE

## 2023-01-03 DIAGNOSIS — J44.9 COPD WITHOUT EXACERBATION (H): Primary | ICD-10-CM

## 2023-01-03 DIAGNOSIS — J43.9 PULMONARY EMPHYSEMA, UNSPECIFIED EMPHYSEMA TYPE (H): ICD-10-CM

## 2023-01-03 PROCEDURE — 99213 OFFICE O/P EST LOW 20 MIN: CPT | Mod: 95 | Performed by: INTERNAL MEDICINE

## 2023-01-03 RX ORDER — AZITHROMYCIN 250 MG/1
250 TABLET, FILM COATED ORAL EVERY EVENING
Qty: 30 TABLET | Refills: 11 | Status: SHIPPED | OUTPATIENT
Start: 2023-01-03 | End: 2023-02-27

## 2023-01-03 RX ORDER — TIOTROPIUM BROMIDE 18 UG/1
18 CAPSULE ORAL; RESPIRATORY (INHALATION) DAILY
Qty: 30 CAPSULE | Refills: 11 | Status: SHIPPED | OUTPATIENT
Start: 2023-01-03 | End: 2023-02-27

## 2023-01-03 RX ORDER — FLUTICASONE PROPIONATE AND SALMETEROL 500; 50 UG/1; UG/1
1 POWDER RESPIRATORY (INHALATION) EVERY 12 HOURS
Qty: 180 EACH | Refills: 3 | Status: SHIPPED | OUTPATIENT
Start: 2023-01-03 | End: 2023-07-24

## 2023-01-03 NOTE — PROGRESS NOTES
Serene is a 77 year old who is being evaluated via a billable telephone visit.     Pt is in [place]     What phone number would you like to be contacted at? [phone]  How would you like to obtain your AVS? James QUESADA      Distant Location (provider location):  On-site  Phone call duration: 20 minutes    Assessment and plan  Ms. Underwood is a 77 year old with severe COPD with cor pulmonale and chronic hypoxia who is calling for her follow up.  She notes she just lost her  last week and is having services this week for him.  She is overwhelmed with grief but holding up healthwise.  We discussed that her lungs seem to be OK. She is needing refills of all her inhalers.  She also notes she is not using oxygen at times and feeling OK, but doesn't have a functioning oximeter anymore which I would like her to have.      1) COPD - continue spiriva, advair, chronic azithromycin and as needed albuterol as MDI or neb.  2) Chronic hypoxic respiratory failure - needs a new oximeter to help her adjust oxygen rate.    3) Cor pulmonale - maybe getting more swelling off of diuretics.  Should likely be on low dose diuretic, and will discuss with her primary next week.      If this patients lung disease exacerbates I recommend doxycycline 100mg BID for 10 days and a steroid taper with prednisone at 40mg for 3 days 30mg for 3 days, 20mg for 3 days and 10mg for 3 days    CC COPD  HPI - Ms Underwood is calling in to discuss her COPD.  She is struggling with the loss of her  this week.  She notes her breathing has been stable with no recent flares.  All of her medications are working as they should for her. She doesn't want any changes to any of them.  We discussed she is not using oxygen with some therapies, and can do this without feeling short of breath.  She feels this is a good sign.  She does have more swelling in her legs than usual, but wants to discuss this at her physical next week.  Otherwise she has no  complaints.    ROS - 10 systems negative except as listed above

## 2023-01-05 ENCOUNTER — ANCILLARY PROCEDURE (OUTPATIENT)
Dept: CARDIOLOGY | Facility: CLINIC | Age: 78
End: 2023-01-05
Attending: INTERNAL MEDICINE
Payer: MEDICARE

## 2023-01-05 DIAGNOSIS — Z95.0 CARDIAC PACEMAKER IN SITU: ICD-10-CM

## 2023-01-05 DIAGNOSIS — I44.1 SECOND DEGREE AV BLOCK: ICD-10-CM

## 2023-01-06 LAB
MDC_IDC_EPISODE_DTM: NORMAL
MDC_IDC_EPISODE_ID: NORMAL
MDC_IDC_EPISODE_TYPE: NORMAL
MDC_IDC_LEAD_IMPLANT_DT: NORMAL
MDC_IDC_LEAD_IMPLANT_DT: NORMAL
MDC_IDC_LEAD_LOCATION: NORMAL
MDC_IDC_LEAD_LOCATION: NORMAL
MDC_IDC_LEAD_LOCATION_DETAIL_1: NORMAL
MDC_IDC_LEAD_LOCATION_DETAIL_1: NORMAL
MDC_IDC_LEAD_MFG: NORMAL
MDC_IDC_LEAD_MFG: NORMAL
MDC_IDC_LEAD_MODEL: NORMAL
MDC_IDC_LEAD_MODEL: NORMAL
MDC_IDC_LEAD_POLARITY_TYPE: NORMAL
MDC_IDC_LEAD_POLARITY_TYPE: NORMAL
MDC_IDC_LEAD_SERIAL: NORMAL
MDC_IDC_LEAD_SERIAL: NORMAL
MDC_IDC_MSMT_BATTERY_DTM: NORMAL
MDC_IDC_MSMT_BATTERY_REMAINING_LONGEVITY: 162 MO
MDC_IDC_MSMT_BATTERY_REMAINING_PERCENTAGE: 100 %
MDC_IDC_MSMT_BATTERY_STATUS: NORMAL
MDC_IDC_MSMT_LEADCHNL_RA_IMPEDANCE_VALUE: 416 OHM
MDC_IDC_MSMT_LEADCHNL_RA_PACING_THRESHOLD_AMPLITUDE: 0.8 V
MDC_IDC_MSMT_LEADCHNL_RA_PACING_THRESHOLD_PULSEWIDTH: 0.4 MS
MDC_IDC_MSMT_LEADCHNL_RV_IMPEDANCE_VALUE: 418 OHM
MDC_IDC_MSMT_LEADCHNL_RV_PACING_THRESHOLD_AMPLITUDE: 1 V
MDC_IDC_MSMT_LEADCHNL_RV_PACING_THRESHOLD_PULSEWIDTH: 0.4 MS
MDC_IDC_PG_IMPLANT_DTM: NORMAL
MDC_IDC_PG_MFG: NORMAL
MDC_IDC_PG_MODEL: NORMAL
MDC_IDC_PG_SERIAL: NORMAL
MDC_IDC_PG_TYPE: NORMAL
MDC_IDC_SESS_CLINIC_NAME: NORMAL
MDC_IDC_SESS_DTM: NORMAL
MDC_IDC_SESS_TYPE: NORMAL
MDC_IDC_SET_BRADY_AT_MODE_SWITCH_MODE: NORMAL
MDC_IDC_SET_BRADY_AT_MODE_SWITCH_RATE: 170 {BEATS}/MIN
MDC_IDC_SET_BRADY_LOWRATE: 60 {BEATS}/MIN
MDC_IDC_SET_BRADY_MAX_SENSOR_RATE: 130 {BEATS}/MIN
MDC_IDC_SET_BRADY_MAX_TRACKING_RATE: 130 {BEATS}/MIN
MDC_IDC_SET_BRADY_MODE: NORMAL
MDC_IDC_SET_BRADY_PAV_DELAY_HIGH: 200 MS
MDC_IDC_SET_BRADY_PAV_DELAY_LOW: 250 MS
MDC_IDC_SET_BRADY_SAV_DELAY_HIGH: 200 MS
MDC_IDC_SET_BRADY_SAV_DELAY_LOW: 250 MS
MDC_IDC_SET_LEADCHNL_RA_PACING_AMPLITUDE: 2 V
MDC_IDC_SET_LEADCHNL_RA_PACING_CAPTURE_MODE: NORMAL
MDC_IDC_SET_LEADCHNL_RA_PACING_POLARITY: NORMAL
MDC_IDC_SET_LEADCHNL_RA_PACING_PULSEWIDTH: 0.4 MS
MDC_IDC_SET_LEADCHNL_RA_SENSING_ADAPTATION_MODE: NORMAL
MDC_IDC_SET_LEADCHNL_RA_SENSING_POLARITY: NORMAL
MDC_IDC_SET_LEADCHNL_RA_SENSING_SENSITIVITY: 0.25 MV
MDC_IDC_SET_LEADCHNL_RV_PACING_AMPLITUDE: 2 V
MDC_IDC_SET_LEADCHNL_RV_PACING_CAPTURE_MODE: NORMAL
MDC_IDC_SET_LEADCHNL_RV_PACING_POLARITY: NORMAL
MDC_IDC_SET_LEADCHNL_RV_PACING_PULSEWIDTH: 0.4 MS
MDC_IDC_SET_LEADCHNL_RV_SENSING_ADAPTATION_MODE: NORMAL
MDC_IDC_SET_LEADCHNL_RV_SENSING_POLARITY: NORMAL
MDC_IDC_SET_LEADCHNL_RV_SENSING_SENSITIVITY: 1.5 MV
MDC_IDC_SET_ZONE_DETECTION_INTERVAL: 375 MS
MDC_IDC_SET_ZONE_TYPE: NORMAL
MDC_IDC_SET_ZONE_VENDOR_TYPE: NORMAL
MDC_IDC_STAT_AT_BURDEN_PERCENT: 1 %
MDC_IDC_STAT_AT_DTM_END: NORMAL
MDC_IDC_STAT_AT_DTM_START: NORMAL
MDC_IDC_STAT_BRADY_DTM_END: NORMAL
MDC_IDC_STAT_BRADY_DTM_START: NORMAL
MDC_IDC_STAT_BRADY_RA_PERCENT_PACED: 2 %
MDC_IDC_STAT_BRADY_RV_PERCENT_PACED: 27 %
MDC_IDC_STAT_EPISODE_RECENT_COUNT: 0
MDC_IDC_STAT_EPISODE_RECENT_COUNT_DTM_END: NORMAL
MDC_IDC_STAT_EPISODE_RECENT_COUNT_DTM_START: NORMAL
MDC_IDC_STAT_EPISODE_TYPE: NORMAL
MDC_IDC_STAT_EPISODE_VENDOR_TYPE: NORMAL

## 2023-01-06 PROCEDURE — 93296 REM INTERROG EVL PM/IDS: CPT | Performed by: INTERNAL MEDICINE

## 2023-01-06 PROCEDURE — 93294 REM INTERROG EVL PM/LDLS PM: CPT | Performed by: INTERNAL MEDICINE

## 2023-01-12 ENCOUNTER — ANCILLARY PROCEDURE (OUTPATIENT)
Dept: BONE DENSITY | Facility: CLINIC | Age: 78
End: 2023-01-12
Attending: NURSE PRACTITIONER
Payer: MEDICARE

## 2023-01-12 DIAGNOSIS — M81.0 AGE-RELATED OSTEOPOROSIS WITHOUT CURRENT PATHOLOGICAL FRACTURE: ICD-10-CM

## 2023-01-12 PROCEDURE — 77080 DXA BONE DENSITY AXIAL: CPT | Mod: TC | Performed by: RADIOLOGY

## 2023-01-12 PROCEDURE — 77081 DXA BONE DENSITY APPENDICULR: CPT | Mod: TC | Performed by: RADIOLOGY

## 2023-01-13 ENCOUNTER — MYC MEDICAL ADVICE (OUTPATIENT)
Dept: ENDOCRINOLOGY | Facility: CLINIC | Age: 78
End: 2023-01-13

## 2023-01-25 ENCOUNTER — TELEPHONE (OUTPATIENT)
Dept: RESPIRATORY THERAPY | Facility: HOSPITAL | Age: 78
End: 2023-01-25
Payer: MEDICARE

## 2023-01-25 NOTE — TELEPHONE ENCOUNTER
Spoke with Serene, doing okay, 'hanging in there', a few questions for me to day regarding her oxygen. no issues getting and/or taking their medications, reviewed their action plan, in their green zone.  Reminded when we will call again and to call before if there is a question.  Lalitha Hamilton, RT, CTTS, Chronic Pulmonary Disease Specialist

## 2023-02-22 ENCOUNTER — TELEPHONE (OUTPATIENT)
Dept: FAMILY MEDICINE | Facility: CLINIC | Age: 78
End: 2023-02-22
Payer: MEDICARE

## 2023-02-22 NOTE — TELEPHONE ENCOUNTER
FYI - Status Update    Who is Calling: patient    Update: Patient wanted to know if she could put in refills early. I told her to call back when she has a week or two left of her medications that need to be filled and let us know so that we can fill this for her.     Does caller want a call/response back: No

## 2023-02-24 ENCOUNTER — TELEPHONE (OUTPATIENT)
Dept: RESPIRATORY THERAPY | Facility: HOSPITAL | Age: 78
End: 2023-02-24
Payer: MEDICARE

## 2023-02-24 NOTE — TELEPHONE ENCOUNTER
I spoke with Serene. She is feeling well today and at baseline as far as her breathing is concerned. She is able to get and take her medications and is compliant in taking them. Reviewed COPD Action Plan and medications. She had no problems or questions for me today.    Cielo Watkins, RT, TTS, Chronic Pulmonary Disease Specialist

## 2023-02-27 DIAGNOSIS — J43.9 PULMONARY EMPHYSEMA, UNSPECIFIED EMPHYSEMA TYPE (H): ICD-10-CM

## 2023-02-27 RX ORDER — TIOTROPIUM BROMIDE 18 UG/1
18 CAPSULE ORAL; RESPIRATORY (INHALATION) DAILY
Qty: 90 CAPSULE | Refills: 3 | Status: SHIPPED | OUTPATIENT
Start: 2023-02-27 | End: 2023-11-28

## 2023-02-27 RX ORDER — AZITHROMYCIN 250 MG/1
250 TABLET, FILM COATED ORAL EVERY EVENING
Qty: 90 TABLET | Refills: 3 | Status: SHIPPED | OUTPATIENT
Start: 2023-02-27 | End: 2024-05-28

## 2023-03-28 ENCOUNTER — TELEPHONE (OUTPATIENT)
Dept: RESPIRATORY THERAPY | Facility: HOSPITAL | Age: 78
End: 2023-03-28
Payer: MEDICARE

## 2023-03-31 ENCOUNTER — ANCILLARY PROCEDURE (OUTPATIENT)
Dept: CARDIOLOGY | Facility: CLINIC | Age: 78
End: 2023-03-31
Attending: INTERNAL MEDICINE
Payer: MEDICARE

## 2023-03-31 ENCOUNTER — OFFICE VISIT (OUTPATIENT)
Dept: CARDIOLOGY | Facility: CLINIC | Age: 78
End: 2023-03-31
Payer: MEDICARE

## 2023-03-31 ENCOUNTER — TELEPHONE (OUTPATIENT)
Dept: CARDIOLOGY | Facility: CLINIC | Age: 78
End: 2023-03-31

## 2023-03-31 ENCOUNTER — HOSPITAL ENCOUNTER (OUTPATIENT)
Dept: CARDIOLOGY | Facility: HOSPITAL | Age: 78
Discharge: HOME | End: 2023-03-31
Attending: INTERNAL MEDICINE | Admitting: INTERNAL MEDICINE
Payer: MEDICARE

## 2023-03-31 VITALS
HEART RATE: 95 BPM | DIASTOLIC BLOOD PRESSURE: 72 MMHG | SYSTOLIC BLOOD PRESSURE: 124 MMHG | BODY MASS INDEX: 38.45 KG/M2 | WEIGHT: 224 LBS | RESPIRATION RATE: 16 BRPM

## 2023-03-31 DIAGNOSIS — E78.00 PURE HYPERCHOLESTEROLEMIA: ICD-10-CM

## 2023-03-31 DIAGNOSIS — I25.118 ATHEROSCLEROSIS OF NATIVE CORONARY ARTERY OF NATIVE HEART WITH STABLE ANGINA PECTORIS (H): Primary | ICD-10-CM

## 2023-03-31 DIAGNOSIS — E66.812 CLASS 2 OBESITY DUE TO EXCESS CALORIES WITHOUT SERIOUS COMORBIDITY IN ADULT, UNSPECIFIED BMI: ICD-10-CM

## 2023-03-31 DIAGNOSIS — I44.1 SECOND DEGREE AV BLOCK: ICD-10-CM

## 2023-03-31 DIAGNOSIS — I10 ESSENTIAL HYPERTENSION, BENIGN: ICD-10-CM

## 2023-03-31 DIAGNOSIS — Z95.0 CARDIAC PACEMAKER IN SITU: ICD-10-CM

## 2023-03-31 DIAGNOSIS — I35.0 MILD AORTIC VALVE STENOSIS: ICD-10-CM

## 2023-03-31 DIAGNOSIS — J43.2 CENTRILOBULAR EMPHYSEMA (H): ICD-10-CM

## 2023-03-31 DIAGNOSIS — I10 ESSENTIAL HYPERTENSION, BENIGN: Primary | ICD-10-CM

## 2023-03-31 DIAGNOSIS — E66.09 CLASS 2 OBESITY DUE TO EXCESS CALORIES WITHOUT SERIOUS COMORBIDITY IN ADULT, UNSPECIFIED BMI: ICD-10-CM

## 2023-03-31 PROBLEM — I25.110 CORONARY ARTERY DISEASE INVOLVING NATIVE CORONARY ARTERY WITH UNSTABLE ANGINA PECTORIS (H): Status: ACTIVE | Noted: 2023-03-31

## 2023-03-31 LAB — LVEF ECHO: NORMAL

## 2023-03-31 PROCEDURE — 99214 OFFICE O/P EST MOD 30 MIN: CPT | Performed by: INTERNAL MEDICINE

## 2023-03-31 PROCEDURE — 93306 TTE W/DOPPLER COMPLETE: CPT

## 2023-03-31 PROCEDURE — 93306 TTE W/DOPPLER COMPLETE: CPT | Mod: 26 | Performed by: INTERNAL MEDICINE

## 2023-03-31 PROCEDURE — 93280 PM DEVICE PROGR EVAL DUAL: CPT | Performed by: INTERNAL MEDICINE

## 2023-03-31 RX ORDER — SPIRONOLACTONE 25 MG/1
12.5 TABLET ORAL DAILY
COMMUNITY
End: 2023-03-31

## 2023-03-31 RX ORDER — SPIRONOLACTONE 25 MG/1
12.5 TABLET ORAL DAILY
Qty: 45 TABLET | Refills: 3 | Status: SHIPPED | OUTPATIENT
Start: 2023-03-31 | End: 2024-05-28

## 2023-03-31 NOTE — LETTER
3/31/2023    Kate Marte, DO  [address]  [facility] [address]    RE: Lalitha SALINAS Kj       Dear Colleague,     I had the pleasure of seeing Lalitha SALINAS [place] in the [facility].      [facility]  Heart Care Clinic Follow-up Note    Assessment & Plan        (I25.118) Atherosclerosis of native coronary artery of native heart with stable angina pectoris (H)  (primary encounter diagnosis)  Comment: Now describing what sounds like new onset angina, given this we will arrange for coronary CTA as she is unable to exercise and I am unable to do pharmacological stress nuclear with her significant COPD.  If CTA does show obstructive lesions we will need to proceed with angiography.    (I35.0) Mild aortic valve stenosis  Comment: Mild to moderate, peak velocity is 2.7 m/s with a gradient mean of 19 mmHg.  Recheck echo in a year.    (Z95.0) Cardiac pacemaker in situ  Comment: [employer] device with [employer] right atrial right ventricular leads placed March 2022.  Most recent device check shows complete heart block, she is for the most part completely ventricular paced.    (I10) Essential hypertension, benign  Comment: Under good control currently.    (E78.00) Pure hypercholesterolemia  Comment: Cholesterol 181 with LDL of 95 which is good.    (J43.2) Centrilobular emphysema (H)  Comment: So noted and on Advair inhaler, Spiriva inhaler, ipratropium DuoNebs at home, as well as albuterol rescue inhaler and Tessalon Perles and Mucinex.  Sees Dr. Julian Tamayo of pulmonary.  She is also using oxygen 24/7.    (E66.09) Class 2 obesity due to excess calories without serious comorbidity in adult, unspecified BMI  Comment: Work on weight loss.    Plan  1.  Arrange for coronary CTA given her creatinine is 1.03.  Unable to exercise or give pharmacological given COPD.  2.  Work on weight loss.  3.  Follow-up me in 1 year with repeat echo or sooner if needed and device check at the same  time.    Subjective  CC: 78-year-old white female being seen in yearly follow-up today.  Still living alone independently, she is recently  with her  dying from cardiomyopathy.  She complains of shortness of breath and heavy walking and needs oxygen 24/7.  She is now complaining of a tightness in her chest that comes on with activity and goes away within 10 minutes of rest.  It is only when she walks quickly.  No PND, orthopnea, peripheral edema, syncope or dizziness.    Medications  Current Outpatient Medications   Medication Sig Dispense Refill    albuterol (PROAIR HFA/PROVENTIL HFA/VENTOLIN HFA) 108 (90 Base) MCG/ACT inhaler Inhale 2 puffs into the lungs every 6 hours as needed 18 g 1    azithromycin (ZITHROMAX) 250 MG tablet Take 1 tablet (250 mg) by mouth every evening 90 tablet 3    benzonatate (TESSALON) 100 MG capsule Take 1 capsule (100 mg) by mouth 3 times daily as needed for cough 30 capsule 1    calcium citrate (CITRACAL) 950 (200 Ca) MG tablet Take 1 tablet by mouth 2 times daily      denosumab (PROLIA) 60 MG/ML SOLN injection Inject 60 mg Subcutaneous every 6 months      fluticasone-salmeterol (ADVAIR) 500-50 MCG/ACT inhaler Inhale 1 puff into the lungs every 12 hours 180 each 3    guaiFENesin (MUCINEX) 600 MG 12 hr tablet Take 1 tablet (600 mg) by mouth 2 times daily (Patient taking differently: Take 600 mg by mouth 2 times daily as needed)      hydrochlorothiazide (HYDRODIURIL) 25 MG tablet hydrochlorothiazide 25 mg tablet   TAKE 1 TABLET BY MOUTH DAILY (Patient taking differently: Take 25 mg by mouth daily hydrochlorothiazide 25 mg tablet   TAKE 1 TABLET BY MOUTH DAILY) 90 tablet 3    ipratropium - albuterol 0.5 mg/2.5 mg/3 mL (DUONEB) 0.5-2.5 (3) MG/3ML neb solution Take 1 vial (3 mLs) by nebulization every 6 hours as needed for shortness of breath / dyspnea or wheezing 810 mL 3    levETIRAcetam (KEPPRA) 500 MG tablet Take 1 tablet (500 mg) by mouth 2 times daily 180 tablet 3     losartan (COZAAR) 50 MG tablet Take 1 tablet (50 mg) by mouth 2 times daily 180 tablet 1    Magnesium Oxide 500 MG TABS Take 500 mg by mouth 2 times daily      nystatin (MYCOSTATIN) 026419 UNIT/GM external powder Apply topically daily as needed      potassium chloride ER (KLOR-CON M) 20 MEQ CR tablet Take 1 tablet (20 mEq) by mouth daily (Patient taking differently: Take 20 mEq by mouth every evening) 90 tablet 3    RABEprazole (ACIPHEX) 20 MG EC tablet Take 1 tablet (20 mg) by mouth daily 90 tablet 3    sertraline (ZOLOFT) 100 MG tablet Take 1 tablet (100 mg) by mouth daily (Patient taking differently: Take 100 mg by mouth every evening) 90 tablet 3    solifenacin (VESICARE) 10 MG tablet Take 1 tablet (10 mg) by mouth daily 90 tablet 3    spironolactone (ALDACTONE) 25 MG tablet Take 12.5 mg by mouth daily      tiotropium (SPIRIVA HANDIHALER) 18 MCG inhaled capsule Inhale 1 capsule (18 mcg) into the lungs daily 90 capsule 3    vitamin D3 (CHOLECALCIFEROL) 125 MCG (5000 UT) tablet Take 5,000 Units by mouth daily          Objective  /72 (BP Location: Left arm, Patient Position: Sitting, Cuff Size: Adult Large)   Pulse 95   Resp 16   Wt 101.6 kg (224 lb)   BMI 38.45 kg/m      General Appearance:    Alert, cooperative, no distress, appears stated age   Head:    Normocephalic, without obvious abnormality, atraumatic   Throat:   Lips, mucosa, and tongue normal; teeth and gums normal   Neck:   Supple, symmetrical, trachea midline, no adenopathy;        thyroid:  No enlargement/tenderness/nodules; no carotid    bruit or JVD   Back:     Symmetric, no curvature, ROM normal, no CVA tenderness   Lungs:     Diffuse inspiratory and expiratory wheezes bilaterally, respirations unlabored   Chest wall:    No tenderness or deformity   Heart:    Regular rate and rhythm, S1 and S2 normal, early peaking 3/6 systolic ejection murmur, no rub   or gallop   Abdomen:     Soft, non-tender, bowel sounds active all four quadrants,      no masses, no organomegaly   Extremities:   Normal, atraumatic, no cyanosis or edema   Pulses:   2+ and symmetric all extremities   Skin:   Skin color, texture, turgor normal, no rashes or lesions     Results    Lab Results personally reviewed   Lab Results   Component Value Date    CHOL 181 02/22/2022    CHOL 192 02/12/2021     Lab Results   Component Value Date    HDL 65 02/22/2022    HDL 59 02/12/2021     No components found for: LDLCALC  Lab Results   Component Value Date    TRIG 103 02/22/2022    TRIG 117 02/12/2021     Lab Results   Component Value Date    WBC 10.6 04/19/2022    HGB 11.7 04/19/2022    HCT 36.3 04/19/2022     04/19/2022     Lab Results   Component Value Date    BUN 26 04/19/2022     04/19/2022    CO2 34 (H) 04/19/2022           Thank you for allowing me to participate in the care of your patient.      Sincerely,     ZHANE OCONNELL MD     [facility]  cc:   No referring provider defined for this encounter.

## 2023-03-31 NOTE — PATIENT INSTRUCTIONS
Ms Lalitha SALINAS Kj,  I enjoyed visiting with you again today.  I am sorry of your .  Per our conversation let us get the cat scan of the heart due to the chest discomfort.  Check this list of meds for accuracy and call [phone].  I will plan on seeing you 1 year.  Ezra Robertson

## 2023-03-31 NOTE — PROGRESS NOTES
[facility]  Heart Care Clinic Follow-up Note    Assessment & Plan        (I25.118) Atherosclerosis of native coronary artery of native heart with stable angina pectoris (H)  (primary encounter diagnosis)  Comment: Now describing what sounds like new onset angina, given this we will arrange for coronary CTA as she is unable to exercise and I am unable to do pharmacological stress nuclear with her significant COPD.  If CTA does show obstructive lesions we will need to proceed with angiography.    (I35.0) Mild aortic valve stenosis  Comment: Mild to moderate, peak velocity is 2.7 m/s with a gradient mean of 19 mmHg.  Recheck echo in a year.    (Z95.0) Cardiac pacemaker in situ  Comment: [employer] device with [employer] right atrial right ventricular leads placed March 2022.  Most recent device check shows complete heart block, she is for the most part completely ventricular paced.    (I10) Essential hypertension, benign  Comment: Under good control currently.    (E78.00) Pure hypercholesterolemia  Comment: Cholesterol 181 with LDL of 95 which is good.    (J43.2) Centrilobular emphysema (H)  Comment: So noted and on Advair inhaler, Spiriva inhaler, ipratropium DuoNebs at home, as well as albuterol rescue inhaler and Tessalon Perles and Mucinex.  Sees Dr. Julian Tamayo of pulmonary.  She is also using oxygen 24/7.    (E66.09) Class 2 obesity due to excess calories without serious comorbidity in adult, unspecified BMI  Comment: Work on weight loss.    Plan  1.  Arrange for coronary CTA given her creatinine is 1.03.  Unable to exercise or give pharmacological given COPD.  2.  Work on weight loss.  3.  Follow-up me in 1 year with repeat echo or sooner if needed and device check at the same time.    Subjective  CC: 78-year-old white female being seen in yearly follow-up today.  Still living alone independently, she is recently  with her  dying from cardiomyopathy.  She complains of  shortness of breath and heavy walking and needs oxygen 24/7.  She is now complaining of a tightness in her chest that comes on with activity and goes away within 10 minutes of rest.  It is only when she walks quickly.  No PND, orthopnea, peripheral edema, syncope or dizziness.    Medications  Current Outpatient Medications   Medication Sig Dispense Refill     albuterol (PROAIR HFA/PROVENTIL HFA/VENTOLIN HFA) 108 (90 Base) MCG/ACT inhaler Inhale 2 puffs into the lungs every 6 hours as needed 18 g 1     azithromycin (ZITHROMAX) 250 MG tablet Take 1 tablet (250 mg) by mouth every evening 90 tablet 3     benzonatate (TESSALON) 100 MG capsule Take 1 capsule (100 mg) by mouth 3 times daily as needed for cough 30 capsule 1     calcium citrate (CITRACAL) 950 (200 Ca) MG tablet Take 1 tablet by mouth 2 times daily       denosumab (PROLIA) 60 MG/ML SOLN injection Inject 60 mg Subcutaneous every 6 months       fluticasone-salmeterol (ADVAIR) 500-50 MCG/ACT inhaler Inhale 1 puff into the lungs every 12 hours 180 each 3     guaiFENesin (MUCINEX) 600 MG 12 hr tablet Take 1 tablet (600 mg) by mouth 2 times daily (Patient taking differently: Take 600 mg by mouth 2 times daily as needed)       hydrochlorothiazide (HYDRODIURIL) 25 MG tablet hydrochlorothiazide 25 mg tablet   TAKE 1 TABLET BY MOUTH DAILY (Patient taking differently: Take 25 mg by mouth daily hydrochlorothiazide 25 mg tablet   TAKE 1 TABLET BY MOUTH DAILY) 90 tablet 3     ipratropium - albuterol 0.5 mg/2.5 mg/3 mL (DUONEB) 0.5-2.5 (3) MG/3ML neb solution Take 1 vial (3 mLs) by nebulization every 6 hours as needed for shortness of breath / dyspnea or wheezing 810 mL 3     levETIRAcetam (KEPPRA) 500 MG tablet Take 1 tablet (500 mg) by mouth 2 times daily 180 tablet 3     losartan (COZAAR) 50 MG tablet Take 1 tablet (50 mg) by mouth 2 times daily 180 tablet 1     Magnesium Oxide 500 MG TABS Take 500 mg by mouth 2 times daily       nystatin (MYCOSTATIN) 203196 UNIT/GM  external powder Apply topically daily as needed       potassium chloride ER (KLOR-CON M) 20 MEQ CR tablet Take 1 tablet (20 mEq) by mouth daily (Patient taking differently: Take 20 mEq by mouth every evening) 90 tablet 3     RABEprazole (ACIPHEX) 20 MG EC tablet Take 1 tablet (20 mg) by mouth daily 90 tablet 3     sertraline (ZOLOFT) 100 MG tablet Take 1 tablet (100 mg) by mouth daily (Patient taking differently: Take 100 mg by mouth every evening) 90 tablet 3     solifenacin (VESICARE) 10 MG tablet Take 1 tablet (10 mg) by mouth daily 90 tablet 3     spironolactone (ALDACTONE) 25 MG tablet Take 12.5 mg by mouth daily       tiotropium (SPIRIVA HANDIHALER) 18 MCG inhaled capsule Inhale 1 capsule (18 mcg) into the lungs daily 90 capsule 3     vitamin D3 (CHOLECALCIFEROL) 125 MCG (5000 UT) tablet Take 5,000 Units by mouth daily          Objective  /72 (BP Location: Left arm, Patient Position: Sitting, Cuff Size: Adult Large)   Pulse 95   Resp 16   Wt 101.6 kg (224 lb)   BMI 38.45 kg/m      General Appearance:    Alert, cooperative, no distress, appears stated age   Head:    Normocephalic, without obvious abnormality, atraumatic   Throat:   Lips, mucosa, and tongue normal; teeth and gums normal   Neck:   Supple, symmetrical, trachea midline, no adenopathy;        thyroid:  No enlargement/tenderness/nodules; no carotid    bruit or JVD   Back:     Symmetric, no curvature, ROM normal, no CVA tenderness   Lungs:     Diffuse inspiratory and expiratory wheezes bilaterally, respirations unlabored   Chest wall:    No tenderness or deformity   Heart:    Regular rate and rhythm, S1 and S2 normal, early peaking 3/6 systolic ejection murmur, no rub   or gallop   Abdomen:     Soft, non-tender, bowel sounds active all four quadrants,     no masses, no organomegaly   Extremities:   Normal, atraumatic, no cyanosis or edema   Pulses:   2+ and symmetric all extremities   Skin:   Skin color, texture, turgor normal, no rashes or  lesions     Results    Lab Results personally reviewed   Lab Results   Component Value Date    CHOL 181 02/22/2022    CHOL 192 02/12/2021     Lab Results   Component Value Date    HDL 65 02/22/2022    HDL 59 02/12/2021     No components found for: LDLCALC  Lab Results   Component Value Date    TRIG 103 02/22/2022    TRIG 117 02/12/2021     Lab Results   Component Value Date    WBC 10.6 04/19/2022    HGB 11.7 04/19/2022    HCT 36.3 04/19/2022     04/19/2022     Lab Results   Component Value Date    BUN 26 04/19/2022     04/19/2022    CO2 34 (H) 04/19/2022

## 2023-03-31 NOTE — TELEPHONE ENCOUNTER
Pt called in and reported that her medication list is accurate. She will need a refill of spironolactone 25 mg tablet- 12.5 mg dose. She requests a paper script to bring to her pharmacy when she is ready to fill. Will have LBF sign next week. -[facility]         Serene Cordova confirmed her cardiac medicines and dosages including Potassium + Mag. Med list is accurate. FYI unless you have a recommendation based on this info.  Thanks,  Mal

## 2023-04-04 ENCOUNTER — TELEPHONE (OUTPATIENT)
Dept: CARDIOLOGY | Facility: CLINIC | Age: 78
End: 2023-04-04
Payer: MEDICARE

## 2023-04-04 NOTE — TELEPHONE ENCOUNTER
Dr. Robertson,  See below message. Serene is requesting sedation prior to CCTA, specifically for needing the IV. Recommendations?  Thanks,  Mal

## 2023-04-04 NOTE — TELEPHONE ENCOUNTER
----- Message from Roxie Garcia sent at 4/4/2023 10:04 AM CDT -----  Regarding: BRANDI - CCTANDREA  ..General phone call:    Caller: Serene  Primary cardiologist: Dr. Robertson  Detailed reason for call: Pt is requesting a sedative to relax her for the CCTA that Dr. Robertson ordered. Pt said it is very difficult to get Ivs started on her, and it gives her a lot of anxiety.  New or active symptoms? No  Best phone number: [phone]  Best time to contact: Anytime is ok, but she will be out for a little while this afternoon  Ok to leave a detailedmessage? Yes  Device? No    Additional Info:

## 2023-04-04 NOTE — TELEPHONE ENCOUNTER
----- Message -----  From: Ezra Robertson MD  Sent: 4/4/2023   2:13 PM CDT  To: Teri Hernandes RN    I am okay with Ativan 0.25 mg orally once, can repeat x1.  LF

## 2023-04-05 LAB
MDC_IDC_EPISODE_DTM: NORMAL
MDC_IDC_EPISODE_ID: NORMAL
MDC_IDC_EPISODE_TYPE: NORMAL
MDC_IDC_LEAD_IMPLANT_DT: NORMAL
MDC_IDC_LEAD_IMPLANT_DT: NORMAL
MDC_IDC_LEAD_LOCATION: NORMAL
MDC_IDC_LEAD_LOCATION: NORMAL
MDC_IDC_LEAD_LOCATION_DETAIL_1: NORMAL
MDC_IDC_LEAD_LOCATION_DETAIL_1: NORMAL
MDC_IDC_LEAD_MFG: NORMAL
MDC_IDC_LEAD_MFG: NORMAL
MDC_IDC_LEAD_MODEL: NORMAL
MDC_IDC_LEAD_MODEL: NORMAL
MDC_IDC_LEAD_POLARITY_TYPE: NORMAL
MDC_IDC_LEAD_POLARITY_TYPE: NORMAL
MDC_IDC_LEAD_SERIAL: NORMAL
MDC_IDC_LEAD_SERIAL: NORMAL
MDC_IDC_MSMT_BATTERY_DTM: NORMAL
MDC_IDC_MSMT_BATTERY_REMAINING_LONGEVITY: 150 MO
MDC_IDC_MSMT_BATTERY_REMAINING_PERCENTAGE: 100 %
MDC_IDC_MSMT_BATTERY_STATUS: NORMAL
MDC_IDC_MSMT_LEADCHNL_RA_IMPEDANCE_VALUE: 426 OHM
MDC_IDC_MSMT_LEADCHNL_RA_PACING_THRESHOLD_AMPLITUDE: 0.7 V
MDC_IDC_MSMT_LEADCHNL_RA_PACING_THRESHOLD_PULSEWIDTH: 0.4 MS
MDC_IDC_MSMT_LEADCHNL_RV_IMPEDANCE_VALUE: 439 OHM
MDC_IDC_MSMT_LEADCHNL_RV_PACING_THRESHOLD_AMPLITUDE: 1 V
MDC_IDC_MSMT_LEADCHNL_RV_PACING_THRESHOLD_PULSEWIDTH: 0.4 MS
MDC_IDC_PG_IMPLANT_DTM: NORMAL
MDC_IDC_PG_MFG: NORMAL
MDC_IDC_PG_MODEL: NORMAL
MDC_IDC_PG_SERIAL: NORMAL
MDC_IDC_PG_TYPE: NORMAL
MDC_IDC_SESS_CLINIC_NAME: NORMAL
MDC_IDC_SESS_DTM: NORMAL
MDC_IDC_SESS_TYPE: NORMAL
MDC_IDC_SET_BRADY_AT_MODE_SWITCH_MODE: NORMAL
MDC_IDC_SET_BRADY_AT_MODE_SWITCH_RATE: 170 {BEATS}/MIN
MDC_IDC_SET_BRADY_LOWRATE: 60 {BEATS}/MIN
MDC_IDC_SET_BRADY_MAX_SENSOR_RATE: 130 {BEATS}/MIN
MDC_IDC_SET_BRADY_MAX_TRACKING_RATE: 130 {BEATS}/MIN
MDC_IDC_SET_BRADY_MODE: NORMAL
MDC_IDC_SET_BRADY_PAV_DELAY_HIGH: 200 MS
MDC_IDC_SET_BRADY_PAV_DELAY_LOW: 250 MS
MDC_IDC_SET_BRADY_SAV_DELAY_HIGH: 200 MS
MDC_IDC_SET_BRADY_SAV_DELAY_LOW: 250 MS
MDC_IDC_SET_LEADCHNL_RA_PACING_AMPLITUDE: 1.5 V
MDC_IDC_SET_LEADCHNL_RA_PACING_CAPTURE_MODE: NORMAL
MDC_IDC_SET_LEADCHNL_RA_PACING_POLARITY: NORMAL
MDC_IDC_SET_LEADCHNL_RA_PACING_PULSEWIDTH: 0.4 MS
MDC_IDC_SET_LEADCHNL_RA_SENSING_ADAPTATION_MODE: NORMAL
MDC_IDC_SET_LEADCHNL_RA_SENSING_POLARITY: NORMAL
MDC_IDC_SET_LEADCHNL_RA_SENSING_SENSITIVITY: 0.4 MV
MDC_IDC_SET_LEADCHNL_RV_PACING_AMPLITUDE: 1.5 V
MDC_IDC_SET_LEADCHNL_RV_PACING_CAPTURE_MODE: NORMAL
MDC_IDC_SET_LEADCHNL_RV_PACING_POLARITY: NORMAL
MDC_IDC_SET_LEADCHNL_RV_PACING_PULSEWIDTH: 0.4 MS
MDC_IDC_SET_LEADCHNL_RV_SENSING_ADAPTATION_MODE: NORMAL
MDC_IDC_SET_LEADCHNL_RV_SENSING_POLARITY: NORMAL
MDC_IDC_SET_LEADCHNL_RV_SENSING_SENSITIVITY: 1.5 MV
MDC_IDC_SET_ZONE_DETECTION_INTERVAL: 375 MS
MDC_IDC_SET_ZONE_TYPE: NORMAL
MDC_IDC_SET_ZONE_VENDOR_TYPE: NORMAL
MDC_IDC_STAT_AT_BURDEN_PERCENT: 1 %
MDC_IDC_STAT_AT_DTM_END: NORMAL
MDC_IDC_STAT_AT_DTM_START: NORMAL
MDC_IDC_STAT_BRADY_DTM_END: NORMAL
MDC_IDC_STAT_BRADY_DTM_START: NORMAL
MDC_IDC_STAT_BRADY_RA_PERCENT_PACED: 2 %
MDC_IDC_STAT_BRADY_RV_PERCENT_PACED: 45 %
MDC_IDC_STAT_EPISODE_RECENT_COUNT: 0
MDC_IDC_STAT_EPISODE_RECENT_COUNT: 2
MDC_IDC_STAT_EPISODE_RECENT_COUNT_DTM_END: NORMAL
MDC_IDC_STAT_EPISODE_RECENT_COUNT_DTM_START: NORMAL
MDC_IDC_STAT_EPISODE_TYPE: NORMAL
MDC_IDC_STAT_EPISODE_VENDOR_TYPE: NORMAL

## 2023-04-05 NOTE — TELEPHONE ENCOUNTER
Below RX was called into Pharmacy provider line on behalf of Dr. Robertson. Call back number provided for any questions or concerns. -[facility] [place]

## 2023-04-24 ENCOUNTER — DOCUMENTATION ONLY (OUTPATIENT)
Dept: OTHER | Facility: CLINIC | Age: 78
End: 2023-04-24
Payer: MEDICARE

## 2023-04-24 ASSESSMENT — ENCOUNTER SYMPTOMS
CONSTIPATION: 0
DIARRHEA: 0
JOINT SWELLING: 0
BREAST MASS: 0
EYE PAIN: 0
ARTHRALGIAS: 0
DIZZINESS: 0
CHILLS: 0
MYALGIAS: 0
NERVOUS/ANXIOUS: 0
HEARTBURN: 0
NAUSEA: 0
FEVER: 0
HEMATURIA: 0
WEAKNESS: 0
PARESTHESIAS: 0
ABDOMINAL PAIN: 0
COUGH: 0
DYSURIA: 0
FREQUENCY: 1
HEMATOCHEZIA: 0
SHORTNESS OF BREATH: 0
HEADACHES: 0
PALPITATIONS: 0
SORE THROAT: 0

## 2023-04-24 ASSESSMENT — ACTIVITIES OF DAILY LIVING (ADL): CURRENT_FUNCTION: SHOPPING REQUIRES ASSISTANCE

## 2023-04-27 ENCOUNTER — OFFICE VISIT (OUTPATIENT)
Dept: FAMILY MEDICINE | Facility: CLINIC | Age: 78
End: 2023-04-27
Payer: MEDICARE

## 2023-04-27 VITALS
HEIGHT: 64 IN | OXYGEN SATURATION: 93 % | SYSTOLIC BLOOD PRESSURE: 138 MMHG | HEART RATE: 89 BPM | WEIGHT: 221 LBS | BODY MASS INDEX: 37.73 KG/M2 | DIASTOLIC BLOOD PRESSURE: 80 MMHG

## 2023-04-27 DIAGNOSIS — Z00.00 ENCOUNTER FOR MEDICARE ANNUAL WELLNESS EXAM: Primary | ICD-10-CM

## 2023-04-27 DIAGNOSIS — J96.11 CHRONIC RESPIRATORY FAILURE WITH HYPOXIA (H): ICD-10-CM

## 2023-04-27 DIAGNOSIS — I73.9 PAD (PERIPHERAL ARTERY DISEASE) (H): ICD-10-CM

## 2023-04-27 DIAGNOSIS — Z89.422 ACQUIRED ABSENCE OF OTHER LEFT TOE(S) (H): ICD-10-CM

## 2023-04-27 DIAGNOSIS — I10 ESSENTIAL HYPERTENSION: ICD-10-CM

## 2023-04-27 DIAGNOSIS — E66.01 MORBID OBESITY (H): ICD-10-CM

## 2023-04-27 DIAGNOSIS — D64.9 ANEMIA, UNSPECIFIED TYPE: ICD-10-CM

## 2023-04-27 DIAGNOSIS — M81.0 AGE-RELATED OSTEOPOROSIS WITHOUT CURRENT PATHOLOGICAL FRACTURE: ICD-10-CM

## 2023-04-27 DIAGNOSIS — I27.81 COR PULMONALE (CHRONIC) (H): ICD-10-CM

## 2023-04-27 DIAGNOSIS — I25.118 ATHEROSCLEROSIS OF NATIVE CORONARY ARTERY OF NATIVE HEART WITH STABLE ANGINA PECTORIS (H): ICD-10-CM

## 2023-04-27 DIAGNOSIS — J43.9 PULMONARY EMPHYSEMA, UNSPECIFIED EMPHYSEMA TYPE (H): ICD-10-CM

## 2023-04-27 DIAGNOSIS — F41.9 ANXIETY: ICD-10-CM

## 2023-04-27 DIAGNOSIS — G40.919 INTRACTABLE EPILEPSY WITHOUT STATUS EPILEPTICUS, UNSPECIFIED EPILEPSY TYPE (H): ICD-10-CM

## 2023-04-27 PROBLEM — M21.70 LEG LENGTH DISCREPANCY: Status: RESOLVED | Noted: 2021-02-01 | Resolved: 2023-04-27

## 2023-04-27 PROBLEM — M20.42 HAMMER TOE OF LEFT FOOT: Status: RESOLVED | Noted: 2021-05-14 | Resolved: 2023-04-27

## 2023-04-27 PROBLEM — E65 LOCALIZED DEPOSITS OF FAT: Status: RESOLVED | Noted: 2019-05-06 | Resolved: 2023-04-27

## 2023-04-27 PROBLEM — L57.0 KERATOMA: Status: RESOLVED | Noted: 2021-10-18 | Resolved: 2023-04-27

## 2023-04-27 PROBLEM — L57.8 SOLAR ELASTOSIS: Status: RESOLVED | Noted: 2021-07-16 | Resolved: 2023-04-27

## 2023-04-27 LAB
ERYTHROCYTE [DISTWIDTH] IN BLOOD BY AUTOMATED COUNT: 13.5 % (ref 10–15)
HCT VFR BLD AUTO: 38.1 % (ref 35–47)
HGB BLD-MCNC: 11.5 G/DL (ref 11.7–15.7)
MCH RBC QN AUTO: 26.1 PG (ref 26.5–33)
MCHC RBC AUTO-ENTMCNC: 30.2 G/DL (ref 31.5–36.5)
MCV RBC AUTO: 86 FL (ref 78–100)
PLATELET # BLD AUTO: 244 10E3/UL (ref 150–450)
RBC # BLD AUTO: 4.41 10E6/UL (ref 3.8–5.2)
WBC # BLD AUTO: 8.9 10E3/UL (ref 4–11)

## 2023-04-27 PROCEDURE — 82306 VITAMIN D 25 HYDROXY: CPT | Performed by: FAMILY MEDICINE

## 2023-04-27 PROCEDURE — 80053 COMPREHEN METABOLIC PANEL: CPT | Performed by: FAMILY MEDICINE

## 2023-04-27 PROCEDURE — 36415 COLL VENOUS BLD VENIPUNCTURE: CPT | Performed by: FAMILY MEDICINE

## 2023-04-27 PROCEDURE — 99214 OFFICE O/P EST MOD 30 MIN: CPT | Mod: 25 | Performed by: FAMILY MEDICINE

## 2023-04-27 PROCEDURE — 85027 COMPLETE CBC AUTOMATED: CPT | Performed by: FAMILY MEDICINE

## 2023-04-27 PROCEDURE — 82728 ASSAY OF FERRITIN: CPT | Performed by: FAMILY MEDICINE

## 2023-04-27 PROCEDURE — 80061 LIPID PANEL: CPT | Performed by: FAMILY MEDICINE

## 2023-04-27 PROCEDURE — G0439 PPPS, SUBSEQ VISIT: HCPCS | Performed by: FAMILY MEDICINE

## 2023-04-27 RX ORDER — ALBUTEROL SULFATE 90 UG/1
2 AEROSOL, METERED RESPIRATORY (INHALATION) EVERY 6 HOURS PRN
Qty: 18 G | Refills: 3 | Status: SHIPPED | OUTPATIENT
Start: 2023-04-27 | End: 2024-05-28

## 2023-04-27 RX ORDER — HYDROCHLOROTHIAZIDE 25 MG/1
25 TABLET ORAL DAILY
Qty: 90 TABLET | Refills: 3 | Status: SHIPPED | OUTPATIENT
Start: 2023-04-27 | End: 2024-05-13

## 2023-04-27 RX ORDER — POTASSIUM CHLORIDE 1500 MG/1
20 TABLET, EXTENDED RELEASE ORAL AT BEDTIME
Qty: 90 TABLET | Refills: 3 | Status: SHIPPED | OUTPATIENT
Start: 2023-04-27 | End: 2024-05-13

## 2023-04-27 RX ORDER — LEVETIRACETAM 500 MG/1
500 TABLET ORAL 2 TIMES DAILY
Qty: 180 TABLET | Refills: 3 | Status: SHIPPED | OUTPATIENT
Start: 2023-04-27 | End: 2024-05-13

## 2023-04-27 RX ORDER — SERTRALINE HYDROCHLORIDE 100 MG/1
100 TABLET, FILM COATED ORAL EVERY EVENING
Qty: 90 TABLET | Refills: 3 | Status: SHIPPED | OUTPATIENT
Start: 2023-04-27 | End: 2024-05-13

## 2023-04-27 RX ORDER — LOSARTAN POTASSIUM 50 MG/1
50 TABLET ORAL DAILY
Qty: 180 TABLET | Refills: 3 | Status: SHIPPED | OUTPATIENT
Start: 2023-04-27 | End: 2023-05-05

## 2023-04-27 ASSESSMENT — ENCOUNTER SYMPTOMS
WEAKNESS: 0
COUGH: 0
SORE THROAT: 0
NAUSEA: 0
HEARTBURN: 0
HEADACHES: 0
BREAST MASS: 0
FREQUENCY: 1
ARTHRALGIAS: 0
ABDOMINAL PAIN: 0
JOINT SWELLING: 0
DIZZINESS: 0
CHILLS: 0
HEMATOCHEZIA: 0
CONSTIPATION: 0
DYSURIA: 0
EYE PAIN: 0
PALPITATIONS: 0
HEMATURIA: 0
DIARRHEA: 0
SHORTNESS OF BREATH: 0
NERVOUS/ANXIOUS: 0
MYALGIAS: 0
FEVER: 0
PARESTHESIAS: 0

## 2023-04-27 ASSESSMENT — ACTIVITIES OF DAILY LIVING (ADL): CURRENT_FUNCTION: SHOPPING REQUIRES ASSISTANCE

## 2023-04-27 NOTE — PATIENT INSTRUCTIONS
Patient Education   Personalized Prevention Plan  You are due for the preventive services outlined below.  Your care team is available to assist you in scheduling these services.  If you have already completed any of these items, please share that information with your care team to update in your medical record.  Health Maintenance Due   Topic Date Due     ANNUAL REVIEW OF  ORDERS  Never done     Your Health Risk Assessment indicates you feel you are not in good health    A healthy lifestyle helps keep the body fit and the mind alert. It helps protect you from disease, helps you fight disease, and helps prevent chronic disease (disease that doesn't go away) from getting worse. This is important as you get older and begin to notice twinges in muscles and joints and a decline in the strength and stamina you once took for granted. A healthy lifestyle includes good healthcare, good nutrition, weight control, recreation, and regular exercise. Avoid harmful substances and do what you can to keep safe. Another part of a healthy lifestyle is stay mentally active and socially involved.    Good healthcare     Have a wellness visit every year.     If you have new symptoms, let us know right away. Don't wait until the next checkup.     Take medicines exactly as prescribed and keep your medicines in a safe place. Tell us if your medicine causes problems.   Healthy diet and weight control     Eat 3 or 4 small, nutritious, low-fat, high-fiber meals a day. Include a variety of fruits, vegetables, and whole-grain foods.     Make sure you get enough calcium in your diet. Calcium, vitamin D, and exercise help prevent osteoporosis (bone thinning).     If you live alone, try eating with others when you can. That way you get a good meal and have company while you eat it.     Try to keep a healthy weight. If you eat more calories than your body uses for energy, it will be stored as fat and you will gain weight.     Recreation    Recreation is not limited to sports and team events. It includes any activity that provides relaxation, interest, enjoyment, and exercise. Recreation provides an outlet for physical, mental, and social energy. It can give a sense of worth and achievement. It can help you stay healthy.    Mental Exercise and Social Involvement  Mental and emotional health is as important as physical health. Keep in touch with friends and family. Stay as active as possible. Continue to learn and challenge yourself.   Things you can do to stay mentally active are:    Learn something new, like a foreign language or musical instrument.     Play SCRABBLE or do crossword puzzles. If you cannot find people to play these games with you at home, you can play them with others on your computer through the Internet.     Join a games club--anything from card games to chess or checkers or lawn bowling.     Start a new hobby.     Go back to school.     Volunteer.     Read.   Keep up with world events.    Exercise for a Healthier Heart  You may wonder how you can improve the health of your heart. If you re thinking about exercise, you re on the right track. You don t need to become an athlete. But you do need a certain amount of brisk exercise to help strengthen your heart. If you have been diagnosed with a heart condition, your healthcare provider may advise exercise to help your condition. To help make exercise a habit, choose safe, fun activities.      Exercise with a friend. When activity is fun, you're more likely to stick with it.     Before you start  Check with your healthcare provider before starting an exercise program. This is especially important if you haven't been active for a while. It's also important if you have a long-term (chronic) health problem such as heart disease, diabetes, or obesity. Also check with your provider if you're at high risk for having these problems.   Why exercise?  Exercising regularly offers many healthy  rewards. It can help you do all of these:     Improve your blood cholesterol level to help prevent further heart trouble.    Lower your blood pressure to help prevent a stroke or heart attack.    Control diabetes or reduce your risk of getting this disease.    Improve your heart and lung function.    Reach and stay at a healthy weight.    Make your muscles stronger so you can stay active.    Prevent falls and fractures by slowing the loss of bone mass (osteoporosis).    Manage stress better.    Improve your sense of self and your body image.  Exercise tips      Ease into your routine. Set small goals. Then build on them. Talk with your healthcare provider first before starting an exercise routine if you're not sure what your activity level should be.    Exercise on most days. Aim for a total of at least 150 minutes (2 hours and 30 minutes) or more of moderate-intensity aerobic activity each week. You could also do 75 minutes (1 hour and 15 minutes) or more of vigorous-intensity aerobic activity each week. Or try for a combination of both. Moderate activity means that you breathe heavier and your heart rate increases, but you can still talk. Think about doing at least 30 minutes of moderate exercise, 5 times a week. It's OK to work up to the 30-minute period over time. Examples of moderate-intensity activity are brisk walking, gardening, and water aerobics.    Step up your daily activity level.  Along with your exercise program, try being more active the whole day. Walk instead of drive. Or park further away so that you take more steps each day. Do more household tasks or yard work. You may not be able to meet the advised amount of physical activity. But doing some moderate- or vigorous-intensity aerobic activity can help reduce your risk for heart disease. Your healthcare provider can help you figure out what is best for you.    Choose 1 or more activities you enjoy.  Walking is one of the easiest things you can do.  You can also try swimming, riding a bike, dancing, or taking an exercise class.    Call 911  Call 911 right away if any of these occur:     Chest pain that doesn't go away quickly with rest    New burning, tightness, pressure, or heaviness in your chest, neck, shoulders, back, or arms    Abnormal or severe shortness of breath    A very fast or irregular heartbeat (palpitations)    Fainting  When to call your healthcare provider  Call your healthcare provider if you have any of these:     Dizziness or lightheadedness    Mild shortness of breath or chest pain    Increased or new joint or muscle pain    [employer] last reviewed this educational content on 7/1/2022 2000-2022 [employer]. All rights reserved. This information is not intended as a substitute for professional medical care. Always follow your healthcare professional's instructions.        Activities of Daily Living    Your Health Risk Assessment indicates you have difficulties with activities of daily living such as housework, bathing, preparing meals, taking medication, etc. Please make a follow up appointment for us to address this issue in more detail.    Urinary Incontinence, Female (Adult)   Urinary incontinence means loss of bladder control. This problem affects many women, especially as they get older. If you have incontinence, you may be embarrassed to ask for help. But know that this problem can be treated.   Types of Incontinence  There are different types of incontinence. Two of the main types are described here. You can have more than one type.     Stress incontinence. With this type, urine leaks when pressure (stress) is put on the bladder. This may happen when you cough, sneeze, or laugh. Stress incontinence most often occurs because the pelvic floor muscles that support the bladder and urethra are weak. This can happen after pregnancy and vaginal childbirth or a hysterectomy. It can also be due to excess body weight or hormone  changes.    Urge incontinence (also called overactive bladder). With this type, a sudden urge to urinate is felt often. This may happen even though there may not be much urine in the bladder. The need to urinate often during the night is common. Urge incontinence most often occurs because of bladder spasms. This may be due to bladder irritation or infection. Damage to bladder nerves or pelvic muscles, constipation, and certain medicines can also lead to urge incontinence.  Treatment depends on the cause. Further evaluation is needed to find the type you have. This will likely include an exam and certain tests. Based on the results, you and your healthcare provider can then plan treatment. Until a diagnosis is made, the home care tips below can help ease symptoms.   Home care    Do pelvic floor muscle exercises, if they are prescribed. The pelvic floor muscles help support the bladder and urethra. Many women find that their symptoms improve when doing special exercises that strengthen these muscles. To do the exercises, contract the muscles you would use to stop your stream of urine. But do this when you re not urinating. Hold for 10 seconds, then relax. Repeat 10 to 20 times in a row, at least 3 times a day. Your healthcare provider may give you other instructions for how to do the exercises and how often.    Keep a bladder diary. This helps track how often and how much you urinate over a set period of time. Bring this diary with you to your next visit with the provider. The information can help your provider learn more about your bladder problem.    Lose weight, if advised to by your provider. Extra weight puts pressure on the bladder. Your provider can help you create a weight-loss plan that s right for you. This may include exercising more and making certain diet changes.    Don't have foods and drinks that may irritate the bladder. These can include alcohol and caffeinated drinks.    Quit smoking. Smoking and  other tobacco use can lead to a long-term (chronic) cough that strains the pelvic floor muscles. Smoking may also damage the bladder and urethra. Talk with your provider about treatments or methods you can use to quit smoking.    If drinking large amounts of fluid makes you have symptoms, you may be advised to limit your fluid intake. You may also be advised to drink most of your fluids during the day and to limit fluids at night.    If you re worried about urine leakage or accidents, you may wear absorbent pads to catch urine. Change the pads often. This helps reduce discomfort. It may also reduce the risk of skin or bladder infections.    Follow-up care  Follow up with your healthcare provider, or as directed. It may take some to find the right treatment for your problem. But healthy lifestyle changes can be made right away. These include such things as exercising on a regular basis, eating a healthy diet, losing weight (if needed), and quitting smoking. Your treatment plan may include special therapies or medicines. Certain procedures or surgery may also be options. Talk about any questions you have with your provider.   When to seek medical advice  Call the healthcare provider right away if any of these occur:    Fever of 100.4 F (38 C) or higher, or as directed by your provider    Bladder pain or fullness    Belly swelling    Nausea or vomiting    Back pain    Weakness, dizziness, or fainting  [employer] last reviewed this educational content on 1/1/2020 2000-2022 [employer]. All rights reserved. This information is not intended as a substitute for professional medical care. Always follow your healthcare professional's instructions.        Your Health Risk Assessment indicates you feel you are not in good emotional health.    Recreation   Recreation is not limited to sports and team events. It includes any activity that provides relaxation, interest, enjoyment, and exercise. Recreation provides  an outlet for physical, mental, and social energy. It can give a sense of worth and achievement. It can help you stay healthy.    Mental Exercise and Social Involvement  Mental and emotional health is as important as physical health. Keep in touch with friends and family. Stay as active as possible. Continue to learn and challenge yourself.   Things you can do to stay mentally active are:    Learn something new, like a foreign language or musical instrument.     Play SCRABBLE or do crossword puzzles. If you cannot find people to play these games with you at home, you can play them with others on your computer through the Internet.     Join a games club--anything from card games to chess or checkers or lawn bowling.     Start a new hobby.     Go back to school.     Volunteer.     Read.   Keep up with world events.

## 2023-04-27 NOTE — PROGRESS NOTES
"SUBJECTIVE:   Serene is a 78 year old who presents for Preventive Visit.      4/27/2023    12:19 PM   Additional Questions   Roomed by RITA Fernandez CMA   Accompanied by -   Patient has been advised of split billing requirements and indicates understanding: Yes  Are you in the first 12 months of your Medicare coverage?  No    Healthy Habits:     In general, how would you rate your overall health?  Fair    Frequency of exercise:  1 day/week    Duration of exercise:  Less than 15 minutes    Do you usually eat at least 4 servings of fruit and vegetables a day, include whole grains    & fiber and avoid regularly eating high fat or \"junk\" foods?  Yes    Taking medications regularly:  Yes    Ability to successfully perform activities of daily living:  Shopping requires assistance    Home Safety:  No safety concerns identified    Hearing Impairment:  No hearing concerns    In the past 6 months, have you been bothered by leaking of urine? Yes    In general, how would you rate your overall mental or emotional health?  Fair      PHQ-2 Total Score: 0      Have you ever done Advance Care Planning? (For example, a Health Directive, POLST, or a discussion with a medical provider or your loved ones about your wishes): No, advance care planning information given to patient to review.  Advanced care planning was discussed at today's visit.       Fall risk  Fallen 2 or more times in the past year?: No  Any fall with injury in the past year?: No    Cognitive Screening   1) Repeat 3 items (Leader, Season, Table)    2) Clock draw: NORMAL  3) 3 item recall: Recalls 2 objects   Results: NORMAL clock, 1-2 items recalled: COGNITIVE IMPAIRMENT LESS LIKELY    Mini-CogTM Copyright VJ Miramontes. Licensed by the author for use in [facility]; reprinted with permission (darell@.[facility]). All rights reserved.      Reviewed and updated as needed this visit by clinical staff   Tobacco  Allergies  Meds  Problems  Med Hx  Surg Hx  Fam Hx      "     Reviewed and updated as needed this visit by Provider   Tobacco  Allergies  Meds  Problems  Med Hx  Surg Hx  Fam Hx         Social History     Tobacco Use     Smoking status: Former     Packs/day: 1.50     Years: 38.00     Pack years: 57.00     Types: Cigarettes     Quit date: 1998     Years since quittin.4     Smokeless tobacco: Never     Tobacco comments:     quit in    Vaping Use     Vaping status: Never Used   Substance Use Topics     Alcohol use: Yes     Alcohol/week: 2.0 standard drinks of alcohol     Comment: occ           2023     8:46 AM   Alcohol Use   Prescreen: >3 drinks/day or >7 drinks/week? No          View : No data to display.              Do you have a current opioid prescription? No  Do you use any other controlled substances or medications that are not prescribed by a provider? None    Current providers sharing in care for this patient include:   Patient Care Team:  Kate Marte DO as PCP - General (Family Medicine)  Karissa Felipe, PharmD as Pharmacist (Pharmacist)  Lalitha Haq NP as Nurse Practitioner  Lalitha Hamilton RT as Chronic Pulmonary Disease Specialist (Respiratory Therapy)  Kate Marte DO as Assigned PCP  Perry Tamayo MD as Assigned Pulmonology Provider  Joe Ellsworth MD as MD (Cardiovascular Disease)  Joe Ellsworth MD as MD (Cardiovascular Disease)  Ezra Robertson MD as MD (Cardiovascular Disease)  Ezra Robertson MD as Assigned Heart and Vascular Provider  Alfonso Orozco DPM as Assigned Surgical Provider    The following health maintenance items are reviewed in Epic and correct as of today:  Health Maintenance   Topic Date Due     ANNUAL REVIEW OF HM ORDERS  Never done     MEDICARE ANNUAL WELLNESS VISIT  2024     FALL RISK ASSESSMENT  2024     DEXA  2025     LIPID  [id number]     ADVANCE CARE PLANNING  2028     DTAP/TDAP/TD IMMUNIZATION (4 - Td or Tdap) 2031      "SPIROMETRY  Completed     HEPATITIS C SCREENING  Completed     COPD ACTION PLAN  Completed     PHQ-2 (once per calendar year)  Completed     INFLUENZA VACCINE  Completed     Pneumococcal Vaccine: 65+ Years  Completed     ZOSTER IMMUNIZATION  Completed     COVID-19 Vaccine  Completed     IPV IMMUNIZATION  Aged Out     MENINGITIS IMMUNIZATION  Aged Out     MAMMO SCREENING  Discontinued     Lab work is in process  Labs reviewed in EPIC    Pertinent mammograms are reviewed under the imaging tab.    Review of Systems   Constitutional: Negative for chills and fever.   HENT: Negative for congestion, ear pain, hearing loss and sore throat.    Eyes: Negative for pain and visual disturbance.   Respiratory: Negative for cough and shortness of breath.    Cardiovascular: Positive for peripheral edema. Negative for chest pain and palpitations.   Gastrointestinal: Negative for abdominal pain, constipation, diarrhea, heartburn, hematochezia and nausea.   Breasts:  Negative for tenderness, breast mass and discharge.   Genitourinary: Positive for frequency and urgency. Negative for dysuria, genital sores, hematuria, pelvic pain, vaginal bleeding and vaginal discharge.   Musculoskeletal: Negative for arthralgias, joint swelling and myalgias.   Skin: Negative for rash.   Neurological: Negative for dizziness, weakness, headaches and paresthesias.   Psychiatric/Behavioral: Negative for mood changes. The patient is not nervous/anxious.        OBJECTIVE:   /80   Pulse 89   Ht 1.626 m (5' 4\")   Wt 100.2 kg (221 lb)   SpO2 93%   BMI 37.93 kg/m   Estimated body mass index is 37.93 kg/m  as calculated from the following:    Height as of this encounter: 1.626 m (5' 4\").    Weight as of this encounter: 100.2 kg (221 lb).  Physical Exam  GENERAL APPEARANCE: healthy, alert and no distress  EYES: Eyes grossly normal to inspection, PERRL and conjunctivae and sclerae normal  HENT: ear canals and TM's normal, nose and mouth without ulcers " "or lesions, oropharynx clear and oral mucous membranes moist  NECK: no adenopathy, no asymmetry, masses, or scars and thyroid normal to palpation  RESP: Decreased lung sounds throughout, no wheeze, rhonchi, crackles  CV: regular rate and rhythm, normal S1 S2, no S3 or S4, 2+ diastolic murmur left sternal border, click or rub, trace peripheral edema right lower extremity to mid shin, 1+ edema left lower extremity to mid shin  ABDOMEN: soft, nontender  MS: Valgus genu bilateral knees, uses walker to ambulate  SKIN: no suspicious lesions or rashes, scattered seborrheic keratoses on face  NEURO: Normal strength and tone, sensory exam grossly normal, mentation intact and speech normal  PSYCH: mentation appears normal and affect normal/bright    Diagnostic Test Results:  Labs reviewed in Epic    ASSESSMENT / PLAN:   Lalitha was seen today for wellness visit and skin spots.    Diagnoses and all orders for this visit:    Encounter for Medicare annual wellness exam  COUNSELING:  Reviewed preventive health counseling, as reflected in patient instructions       Regular exercise       Healthy diet/nutrition       Vision screening       Hearing screening       Dental care       Fall risk prevention       Osteoporosis prevention/bone health       The 10-year ASCVD risk score (Shay PARKS, et al., 2019) is: 36.7%    Values used to calculate the score:      Age: 78 years      Sex: Female      Is Non- : No      Diabetic: No      Tobacco smoker: No      Systolic Blood Pressure: 148 mmHg      Is BP treated: Yes      HDL Cholesterol: 65 mg/dL      Total Cholesterol: 181 mg/dL      BMI:   Estimated body mass index is 37.93 kg/m  as calculated from the following:    Height as of this encounter: 1.626 m (5' 4\").    Weight as of this encounter: 100.2 kg (221 lb).   Weight management plan: Discussed healthy diet and exercise guidelines      She reports that she quit smoking about 24 years ago. Her smoking use included " cigarettes. She has a 57.00 pack-year smoking history. She has never used smokeless tobacco.      Appropriate preventive services were discussed with this patient, including applicable screening as appropriate for cardiovascular disease, diabetes, osteopenia/osteoporosis, and glaucoma.  As appropriate for age/gender, discussed screening for colorectal cancer, prostate cancer, breast cancer, and cervical cancer. Checklist reviewing preventive services available has been given to the patient.    Reviewed patients plan of care and provided an AVS. The Intermediate Care Plan ( asthma action plan, low back pain action plan, and migraine action plan) for Lalitha meets the Care Plan requirement. This Care Plan has been established and reviewed with the Patient.    Chronic respiratory failure with hypoxia (H)  Pulmonary emphysema, unspecified emphysema type (H)  Cor pulmonale (chronic) (H)  Follows with Dr. 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  Breathing is stable on Advair/Spiriva, as needed albuterol, and daily azithromycin.  Oxygen 93% at rest on room air today.  -     albuterol (PROAIR HFA/PROVENTIL HFA/VENTOLIN HFA) 108 (90 Base) MCG/ACT inhaler; Inhale 2 puffs into the lungs every 6 hours as needed for shortness of breath or wheezing  -     CBC with platelets; Future    Essential hypertension  Blood pressure initially elevated but improved on recheck.  Refills provided.  Update monitoring labs today.  -     hydrochlorothiazide (HYDRODIURIL) 25 MG tablet; Take 1 tablet (25 mg) by mouth daily  -     losartan (COZAAR) 50 MG tablet; Take 1 tablet (50 mg) by mouth daily  -     potassium chloride ER (KLOR-CON M) 20 MEQ CR tablet; Take 1 tablet (20 mEq) by mouth At Bedtime    Anxiety  Mood symptoms stable on sertraline, refills provided.  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  Not currently on statin for unclear reasons.    Morbid obesity (H)  Body mass index is 37.93 kg/m .  Comorbid conditions include CAD, hypertension.  Reviewed diet/lifestyle modifications.    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 Daisha casper who plans to have patient undergo coronary CTA for occasional anginal symptoms.  Plan to repeat echo in 1 year with device check at that time.  -     Lipid Profile (Chol, Trig, HDL, LDL calc); Future  -     Comprehensive metabolic panel (BMP + Alb, Alk Phos, ALT, AST, Total. Bili, TP); Future    Intractable epilepsy without status epilepticus, unspecified epilepsy type (H)  Stable on Keppra without breakthrough seizures since the 1990s.  -     levETIRAcetam (KEPPRA) 500 MG tablet; Take 1 tablet (500 mg) by mouth 2 times daily    Age-related osteoporosis without current pathological fracture  Stable on prolia injections q6m.     Other orders  -     PRIMARY CARE FOLLOW-UP SCHEDULING; Future        Patient has been advised of split billing requirements and indicates understanding: Yes      Kate Marte DO  [facility]    Identified Health Risks:    I have reviewed Opioid Use Disorder and Substance Use Disorder risk factors and made any needed referrals.            The patient was provided with suggestions to help her develop a healthy physical lifestyle.  She is at risk for lack of exercise and has been provided with information to increase physical activity for the benefit of her well-being.  The patient reports that she has difficulty with activities of daily living.  Information on urinary incontinence and treatment options given to patient.  The patient was provided with suggestions to help her develop a healthy emotional  lifestyle.

## 2023-04-27 NOTE — PROGRESS NOTES
The patient was provided with suggestions to help her develop a healthy physical lifestyle.  She is at risk for lack of exercise and has been provided with information to increase physical activity for the benefit of her well-being.  The patient reports that she has difficulty with activities of daily living. I have asked that the patient make a follow up appointment in *** weeks where this issue will be further evaluated and addressed.  Information on urinary incontinence and treatment options given to patient.  The patient was provided with suggestions to help her develop a healthy emotional lifestyle.

## 2023-04-28 LAB
ALBUMIN SERPL BCG-MCNC: 4.4 G/DL (ref 3.5–5.2)
ALP SERPL-CCNC: 64 U/L (ref 35–104)
ALT SERPL W P-5'-P-CCNC: 11 U/L (ref 10–35)
ANION GAP SERPL CALCULATED.3IONS-SCNC: 11 MMOL/L (ref 7–15)
AST SERPL W P-5'-P-CCNC: 20 U/L (ref 10–35)
BILIRUB SERPL-MCNC: 0.2 MG/DL
BUN SERPL-MCNC: 28.1 MG/DL (ref 8–23)
CALCIUM SERPL-MCNC: 10 MG/DL (ref 8.8–10.2)
CHLORIDE SERPL-SCNC: 96 MMOL/L (ref 98–107)
CHOLEST SERPL-MCNC: 200 MG/DL
CREAT SERPL-MCNC: 1 MG/DL (ref 0.51–0.95)
DEPRECATED CALCIDIOL+CALCIFEROL SERPL-MC: 64 UG/L (ref 20–75)
DEPRECATED HCO3 PLAS-SCNC: 31 MMOL/L (ref 22–29)
FERRITIN SERPL-MCNC: 20 NG/ML (ref 11–328)
GFR SERPL CREATININE-BSD FRML MDRD: 57 ML/MIN/1.73M2
GLUCOSE SERPL-MCNC: 87 MG/DL (ref 70–99)
HDLC SERPL-MCNC: 78 MG/DL
LDLC SERPL CALC-MCNC: 110 MG/DL
NONHDLC SERPL-MCNC: 122 MG/DL
POTASSIUM SERPL-SCNC: 4.2 MMOL/L (ref 3.4–5.3)
PROT SERPL-MCNC: 7.3 G/DL (ref 6.4–8.3)
SODIUM SERPL-SCNC: 138 MMOL/L (ref 136–145)
TRIGL SERPL-MCNC: 58 MG/DL

## 2023-05-02 ENCOUNTER — APPOINTMENT (OUTPATIENT)
Dept: CT IMAGING | Facility: HOSPITAL | Age: 78
End: 2023-05-02
Attending: STUDENT IN AN ORGANIZED HEALTH CARE EDUCATION/TRAINING PROGRAM
Payer: MEDICARE

## 2023-05-02 ENCOUNTER — HOSPITAL ENCOUNTER (EMERGENCY)
Facility: HOSPITAL | Age: 78
Discharge: HOME | End: 2023-05-02
Attending: STUDENT IN AN ORGANIZED HEALTH CARE EDUCATION/TRAINING PROGRAM | Admitting: STUDENT IN AN ORGANIZED HEALTH CARE EDUCATION/TRAINING PROGRAM
Payer: MEDICARE

## 2023-05-02 VITALS
RESPIRATION RATE: 16 BRPM | OXYGEN SATURATION: 100 % | HEART RATE: 95 BPM | SYSTOLIC BLOOD PRESSURE: 185 MMHG | TEMPERATURE: 98.2 F | DIASTOLIC BLOOD PRESSURE: 84 MMHG

## 2023-05-02 DIAGNOSIS — S09.93XA FACIAL INJURY, INITIAL ENCOUNTER: ICD-10-CM

## 2023-05-02 PROCEDURE — 250N000013 HC RX MED GY IP 250 OP 250 PS 637: Performed by: STUDENT IN AN ORGANIZED HEALTH CARE EDUCATION/TRAINING PROGRAM

## 2023-05-02 PROCEDURE — 70486 CT MAXILLOFACIAL W/O DYE: CPT | Mod: MG

## 2023-05-02 PROCEDURE — 72125 CT NECK SPINE W/O DYE: CPT | Mod: ME

## 2023-05-02 PROCEDURE — 70450 CT HEAD/BRAIN W/O DYE: CPT | Mod: ME

## 2023-05-02 PROCEDURE — 99285 EMERGENCY DEPT VISIT HI MDM: CPT | Mod: 25

## 2023-05-02 RX ORDER — ACETAMINOPHEN 325 MG/1
975 TABLET ORAL ONCE
Status: COMPLETED | OUTPATIENT
Start: 2023-05-02 | End: 2023-05-02

## 2023-05-02 RX ADMIN — ACETAMINOPHEN 975 MG: 325 TABLET ORAL at 16:41

## 2023-05-02 ASSESSMENT — ACTIVITIES OF DAILY LIVING (ADL)
ADLS_ACUITY_SCORE: 35
ADLS_ACUITY_SCORE: 35

## 2023-05-02 NOTE — DISCHARGE INSTRUCTIONS
Please ice your face at least twice daily to help with pain and swelling.    Please take 500mg of tylenol every 4 hours as well as 600mg of ibuprofen every 6 hours for pain (take ibuprofen for 5 days). Do not take more than 4,000mg of tylenol in 24hrs.

## 2023-05-02 NOTE — ED NOTES
Cleaned abrasions on elbow, lower arm, and right eye. Pt tolerated well. Applied telfa dressing to arm and elbow. Facial abrasion remains open to air.

## 2023-05-02 NOTE — ED TRIAGE NOTES
"    Pt arrives by EMS form the [facility] in [place]. Pt has hx of \"balance problems\" and tripped, fell forward hitting right side of face. Pt denies LOC, blood thinners. Pt denies neck/back pain on palp, but states she \"has arthritis in my back\". Noted swollen/bruised right eye and right cheek. Some bleeding, but controlled. Pt is awake and alert. Noted abrasions to left wrist and right elbow.  "

## 2023-05-02 NOTE — ED PROVIDER NOTES
Emergency Department Encounter         FINAL IMPRESSION:  Facial injury, fall        ED COURSE AND MEDICAL DECISION MAKING       ED Course as of 05/02/23 1457   Tue May 02, 2023   1452 78-year-old not on blood thinners here after [facility]  Patient states she normally walks with a walker however attempted to walk without 1 today.  States she tripped and fell onto her face.  No LOC.  Complaining of facial pain.  No other injuries including denying any neck, back, chest, rib, abdominal, extremity pain.  Arrival she looks well.  Vitals are stable.  Patient has an obvious facial trauma on the right side of her face.  Has a periorbital hematoma.  She is able to open the eye, I do not see any obvious globe injury.  She has full range of motion of the eye.  No proptosis.  She has no midline neck pain.  No obvious arterial bleeding.  No laceration.  She is a large skin tear on the left forearm.  She has abrasion to the right elbow.  All extremities and joints have no pain with range of motion.  Lower extremities normal.  Hips normal.  Abdomen normal.  Plan for CT head, face, neck reevaluate       -Imaging negative.  Bedside discussion regarding pain medication/management at home, icing, as well as wound care precautions                   Medical Decision Making    History:    Supplemental history from: Documented in chart, if applicable and Friend    External Record(s) reviewed: Documented in chart, if applicable.    Work Up:    Chart documentation includes differential considered and any EKGs or imaging independently interpreted by provider, where specified.    In additional to work up documented, I considered the following work up: Documented in chart, if applicable.    External consultation:    Discussion of management with another provider: Documented in chart, if applicable    Complicating factors:    Care impacted by chronic illness: N/A    Care affected by social determinants of health:  N/A    Disposition considerations: Discharge. No recommendations on prescription strength medication(s). See documentation for any additional details.      EKG      Critical Care     Performed by: Rob Nunez or    Authorized by: Rob Nunez  Total critical care time:  minutes  Critical care was necessary to treat or prevent imminent or life-threatening deterioration of the following conditions:   Critical care was time spent personally by me on the following activities: development of treatment plan with patient or surrogate, discussions with consultants, examination of patient, evaluation of patient's response to treatment, obtaining history from patient or surrogate, ordering and performing treatments and interventions, ordering and review of laboratory studies, ordering and review of radiographic studies, re-evaluation of patient's condition and monitoring for potential decompensation.  Critical care time was exclusive of separately billable procedures and treating other patients.'    At the conclusion of the encounter I discussed the results of all the tests and the disposition. The questions were answered. The patient or family acknowledged understanding and was agreeable with the care plan.                  MEDICATIONS GIVEN IN THE EMERGENCY DEPARTMENT:  Medications - No data to display    NEW PRESCRIPTIONS STARTED AT TODAY'S ED VISIT:  New Prescriptions    No medications on file       HPI     Patient information obtained from: Patient    Use of Interpretor: N/A     Lalitha Underwood is a 78 year old female with a pertinent history of epileptic seizures, chronic respiratory failure with hypoxia, cardiac pacemaker, PAD, pulmonary emphysema, osteoporosis, mild aortic valve stenosis, hypertension  who presents to this ED via EMS for evaluation of facial injury following fall.    The patient reports that she was walking into [facility] without her walker, as it was though her balance issues were anxiety related, but she  "tripped and fell forward onto concrete. She denies any loss of consciousness, anticoagulation, or neck/back pain. She further denies any hip, knee, arm, abdominal, mouth, or extremity pain. Patient reports that she is normally on O2 at home. Patient endorses facial pain and skin tears to left forearm, right elbow, and right side of face with swelling to right eye region.     Patient states she is scheduled to get a contrast dye CT of her heart through her doctor following a few recent episodes of chest pain. She states she smoked for 35 years and her lungs are in \"bad shape\" and that they are starting to \"hurt her heart.\"         REVIEW OF SYSTEMS:  Review of Systems   Constitutional: Negative for fever, malaise  HEENT: Negative runny nose, sore throat, ear pain, neck pain Positive for facial pain  Respiratory: Negative for shortness of breath, cough, congestion  Cardiovascular: Negative for chest pain, leg edema  Gastrointestinal: Negative for abdominal distention, abdominal pain, constipation, vomiting, nausea, diarrhea  Genitourinary: Negative for dysuria and hematuria.   Integument: Negative for rash, Positive for swelling of right orbital region, and skin tears to right elbow, left forearm, and right side of face.   Neurological: Negative for paresthesias, weakness, headache.  Musculoskeletal: Negative for joint pain, joint swelling,      All other systems reviewed and are negative.          MEDICAL HISTORY     Past Medical History:   Diagnosis Date     Convulsive disorder (H)      Convulsive disorder (H)      COPD (chronic obstructive pulmonary disease) (H)      COPD (chronic obstructive pulmonary disease) (H)      Coronary artery disease involving native coronary artery with unstable angina pectoris (H) 3/31/2023     Depression      Dyspnea on exertion      Gastroesophageal reflux disease      Heart murmur      Hernia of unspecified site of abdominal cavity without mention of obstruction or gangrene      " Hiatal hernia      Hiatal hernia      Hypertension      Hypertension      Obese      On home oxygen therapy      Osteopenia      Osteopenia      Oxygen dependent      Pneumonia due to infectious organism, unspecified laterality, unspecified part of lung 3/19/2022     Second degree heart block 3/19/2022     Shortness of breath      Uncomplicated asthma      URI (upper respiratory infection)      Venous insufficiency of both lower extremities      Venous insufficiency of both lower extremities      Walking troubles        Past Surgical History:   Procedure Laterality Date     AMPUTATE TOE(S) Left 8/11/2022    Procedure: AMPUTATION, fifth digit left foot;  Surgeon: Alfonso Orozco DPM;  Location: [facility]     ARTHROPLASTY TOE(S) Left 11/22/2021    Procedure: ARTHROPLASTY, digits two and three left foot;  Surgeon: Alfonso Orozco DPM;  Location: [facility]     ARTHROPLASTY TOE(S) Left 7/18/2022    Procedure: ARTHROPLASTY, digits four and five left foot;  Surgeon: Alfonso Orozco DPM;  Location: [facility]     EP PACEMAKER DEVICE & LEAD IMPLANT- RIGHT ATRIAL & RIGHT VENTRICULAR N/A 3/24/2022    Procedure: Pacemaker Device & Lead Implant - Right Atrial & Right Ventricular;  Surgeon: Helder Pendleton MD;  Location: [facility]     ESOPHAGOSCOPY, GASTROSCOPY, DUODENOSCOPY (EGD), COMBINED N/A 11/26/2018    Procedure: Esophagogastroduodenoscopy;  Surgeon: Jasmeet Wilder MD;  Location: [facility] OR     HERNIA REPAIR, UMBILICAL  04/2018     HERNIA REPAIR, UMBILICAL  04/04/2018    Dr. 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     Repair arm fracture Left      SHOULDER SURGERY Right 1967     SHOULDER SURGERY Right 1967     US BIOPSY THYROID FINE NEEDLE ASPIRATION   2020       Social History     Tobacco Use     Smoking status: Former     Packs/day: 1.50     Years: 38.00     Pack years: 57.00     Types: Cigarettes     Quit date: 1998     Years since quittin.4     Smokeless tobacco: Never     Tobacco comments:     quit in    Vaping Use     Vaping status: Never Used   Substance Use Topics     Alcohol use: Yes     Alcohol/week: 2.0 standard drinks of alcohol     Comment: occ     Drug use: Not Currently       albuterol (PROAIR HFA/PROVENTIL HFA/VENTOLIN HFA) 108 (90 Base) MCG/ACT inhaler  azithromycin (ZITHROMAX) 250 MG tablet  benzonatate (TESSALON) 100 MG capsule  calcium citrate (CITRACAL) 950 (200 Ca) MG tablet  denosumab (PROLIA) 60 MG/ML SOLN injection  fluticasone-salmeterol (ADVAIR) 500-50 MCG/ACT inhaler  guaiFENesin (MUCINEX) 600 MG 12 hr tablet  hydrochlorothiazide (HYDRODIURIL) 25 MG tablet  ipratropium - albuterol 0.5 mg/2.5 mg/3 mL (DUONEB) 0.5-2.5 (3) MG/3ML neb solution  levETIRAcetam (KEPPRA) 500 MG tablet  losartan (COZAAR) 50 MG tablet  Magnesium Oxide 500 MG TABS  nystatin (MYCOSTATIN) 442433 UNIT/GM external powder  potassium chloride ER (KLOR-CON M) 20 MEQ CR tablet  RABEprazole (ACIPHEX) 20 MG EC tablet  sertraline (ZOLOFT) 100 MG tablet  solifenacin (VESICARE) 10 MG tablet  spironolactone (ALDACTONE) 25 MG tablet  tiotropium (SPIRIVA HANDIHALER) 18 MCG inhaled capsule  vitamin D3 (CHOLECALCIFEROL) 125 MCG (5000 UT) tablet            PHYSICAL EXAM     BP (!) 185/81   Pulse 97   Temp 98.2  F (36.8  C) (Oral)   Resp 16   SpO2 100%       PHYSICAL EXAM:     General: Patient appears well, nontoxic, comfortable   HEENT: Moist mucous membranes,  Obvious facial trauma to right side of face. Able to open right eye, no obvious globe injury, full ROM, no proptosis.  No midline neck pain, obvious arterial bleeding.   Cardiovascular: Normal rate, normal rhythm, no extremity edema.  No appreciable murmur.  Respiratory: No signs of respiratory  distress, lungs are clear to auscultation bilaterally with no wheezes rhonchi or rales.  Abdominal: Soft, nontender, nondistended, no palpable masses, no guarding, no rebound  Musculoskeletal: Full range of motion of joints, no deformities appreciated.  No hip pain, knee pain or lower extremity injuries.  No upper extremity joint pain  Neurological: Alert and oriented, grossly neurologically intact.  Psychological: Normal affect and mood.  Integument: No rashes appreciated Periorbital hematoma. Large skin tear on left forearm, abrasion to right elbow.           RESULTS       Labs Ordered and Resulted from Time of ED Arrival to Time of ED Departure - No data to display    Cervical spine CT w/o contrast   Final Result   CONCLUSION:    CT HEAD:   1.  No acute intracranial injury.      CT FACE:   1.  Right facial soft tissue hematoma.   2.  No fracture.      CT CERVICAL SPINE:   1.  No fracture or definite acute injury in the cervical spine.      CT Facial Bones without Contrast   Final Result   CONCLUSION:    CT HEAD:   1.  No acute intracranial injury.      CT FACE:   1.  Right facial soft tissue hematoma.   2.  No fracture.      CT CERVICAL SPINE:   1.  No fracture or definite acute injury in the cervical spine.      Head CT w/o contrast   Final Result   CONCLUSION:    CT HEAD:   1.  No acute intracranial injury.      CT FACE:   1.  Right facial soft tissue hematoma.   2.  No fracture.      CT CERVICAL SPINE:   1.  No fracture or definite acute injury in the cervical spine.                        PROCEDURES:  Procedures:  Procedures       I, Joshua Lucas  am serving as a scribe to document services personally performed by Rob Nunez DO, based on my observations and the provider's statements to me.  I, Rob Nunez DO, attest that Joshua Lucas is acting in a scribe capacity, has observed my performance of the services and has documented them in accordance with my direction.    Rob Nunez DO  Emergency Medicine  [facility]  [facility] EMERGENCY DEPARTMENT     Ohl, Rob Siegel DO  05/02/23 6754

## 2023-05-05 ENCOUNTER — TELEPHONE (OUTPATIENT)
Dept: FAMILY MEDICINE | Facility: CLINIC | Age: 78
End: 2023-05-05
Payer: MEDICARE

## 2023-05-05 DIAGNOSIS — I10 ESSENTIAL HYPERTENSION: ICD-10-CM

## 2023-05-05 RX ORDER — LOSARTAN POTASSIUM 50 MG/1
50 TABLET ORAL 2 TIMES DAILY
Qty: 180 TABLET | Refills: 3 | COMMUNITY
Start: 2023-05-05 | End: 2023-05-09

## 2023-05-05 NOTE — TELEPHONE ENCOUNTER
Has she been checking BPs at home? This might have been an error. She can continue losartan twice daily if that is what she had been doing at our last visit.    Kate Marte, DO     Octavio Posada presents today for No chief complaint on file. 1. \"Have you been to the ER, urgent care clinic since your last visit? Hospitalized since your last visit? \" ***    2. \"Have you seen or consulted any other health care providers outside of the [address] [place] [address] since your last visit? \" ***     3. For patients aged 39-70: Has the patient had a colonoscopy / FIT/ Cologuard? {Colon Cancer Care Gap present?:25466}      If the patient is female:    4. For patients aged 41-77: Has the patient had a mammogram within the past 2 years? {Cancer Care Gap present?:55439}  See top three    5. For patients aged 21-65: Has the patient had a pap smear?  {Cancer Care Gap present?:63379}

## 2023-05-05 NOTE — TELEPHONE ENCOUNTER
Dr. Marte,    It looks like you had modified her losartan dosage from 50 mg twice daily to 50 mg daily on 4/27/2023.  I do not see documentation regarding this decision,     Please advise    Storm Roldan RN     [facility]

## 2023-05-05 NOTE — TELEPHONE ENCOUNTER
Pt would like a call back, is confused about her prescription for losartan. Wants to make sure that she should just be taking 1 tablet per day, states that she has previosly been taking 2 tablets daily for years and she doesn't remember discussing a change.

## 2023-05-09 RX ORDER — LOSARTAN POTASSIUM 50 MG/1
50 TABLET ORAL 2 TIMES DAILY
Qty: 180 TABLET | Refills: 3 | Status: SHIPPED | OUTPATIENT
Start: 2023-05-09 | End: 2024-05-13

## 2023-05-09 NOTE — TELEPHONE ENCOUNTER
Patient is wanting this refilled ASAP, stated she needs this filled. She has not been checking her BP at home at all.   Please fill soon. I attached the pharmacy.

## 2023-05-10 ENCOUNTER — OFFICE VISIT (OUTPATIENT)
Dept: FAMILY MEDICINE | Facility: CLINIC | Age: 78
End: 2023-05-10
Payer: MEDICARE

## 2023-05-10 VITALS
WEIGHT: 227 LBS | HEART RATE: 90 BPM | SYSTOLIC BLOOD PRESSURE: 154 MMHG | RESPIRATION RATE: 22 BRPM | TEMPERATURE: 98.7 F | OXYGEN SATURATION: 89 % | HEIGHT: 64 IN | BODY MASS INDEX: 38.76 KG/M2 | DIASTOLIC BLOOD PRESSURE: 87 MMHG
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
## 2023-05-10 DIAGNOSIS — S09.93XD FACIAL INJURY, SUBSEQUENT ENCOUNTER: ICD-10-CM

## 2023-05-10 DIAGNOSIS — I10 ESSENTIAL HYPERTENSION, BENIGN: ICD-10-CM

## 2023-05-10 DIAGNOSIS — T14.8XXA ABRASION: ICD-10-CM

## 2023-05-10 DIAGNOSIS — S50.12XD CONTUSION OF LEFT FOREARM, SUBSEQUENT ENCOUNTER: ICD-10-CM

## 2023-05-10 PROCEDURE — 99214 OFFICE O/P EST MOD 30 MIN: CPT | Performed by: PHYSICIAN ASSISTANT

## 2023-05-10 NOTE — PROGRESS NOTES
"  Assessment & Plan     Fall, subsequent encounter  Facial injury, subsequent encounter  Contusion of left forearm, subsequent encounter  Abrasion  Patient is here today following a fall that she was seen at treated for at the ER at [facility]. She is stable from her ER discharge, no significant bleeding, worsening headache, vision changes.  Recommend she continue to monitor for symptoms. She does have an open abrasion to left forearm, continue to wrap with gauze until healed- monitor for signs/symptoms of infection. The abrasion was re-wrapped in office after evaluation.    Essential hypertension, benign  Chronic issue, BP elevated today. Recommend recheck with nurse visit in a few weeks and if persistently high needs medication adjustment.             MED REC REQUIRED  Post Medication Reconciliation Status: discharge medications reconciled, continue medications without change  BMI:   Estimated body mass index is 38.96 kg/m  as calculated from the following:    Height as of this encounter: 1.626 m (5' 4\").    Weight as of this encounter: 103 kg (227 lb).   Weight management plan: Patient was referred to their PCP to discuss a diet and exercise plan.    Risks, benefits and alternatives were discussed with patient. Agreeable to the plan of care.      Teresa Johnson PA-C  [facility]    Brenda Cast is a 78 year old, presenting for the following health issues:  Hospital F/U (Fell on 05/02. Patient was seen at [facility])        5/10/2023    12:20 PM   Additional Questions   Roomed by BRENDAN Hinson     Patient is here today to follow up from ER visit for a fall  She notes she fell walking into [facility] without her walker, seen in ER  Has significant facial bruising, as well as bruising and open abrasion along left forearm that she has been wrapping at home  She notes she had imaging done in ER that was fine  She notes some mild forehead tenderness to palpation, no " "headaches, vision changes, chest pain or irregular heart beats  No fever or chills            Review of Systems   Constitutional, HEENT, cardiovascular, pulmonary, gi and gu systems are negative, except as otherwise noted.      Objective    BP (!) 164/94 (BP Location: Left arm, Patient Position: Sitting, Cuff Size: Adult Small)   Temp 98.7  F (37.1  C) (Oral)   Resp 22   Ht 1.626 m (5' 4\")   Wt 103 kg (227 lb)   SpO2 (!) 89%   BMI 38.96 kg/m    Body mass index is 38.96 kg/m .  Physical Exam   GENERAL: healthy, alert and no distress, significant bruising over face with prominent hematoma on forehead  NECK: no adenopathy, no asymmetry, masses, or scars and thyroid normal to palpation  RESP: lungs clear to auscultation - no rales, rhonchi or wheezes  CV: regular rate and rhythm, normal S1 S2, no S3 or S4, no murmur, click or rub, no peripheral edema and peripheral pulses strong  ABDOMEN: soft, nontender, no hepatosplenomegaly, no masses and bowel sounds normal  MS: no gross musculoskeletal defects noted, no edema  SKIN: significant left forearm bruising with open and scabbed abrasion on anterior forearm- no significant erythema or purulent discharge      Cervical spine CT w/o contrast   Final Result   CONCLUSION:    CT HEAD:   1.  No acute intracranial injury.       CT FACE:   1.  Right facial soft tissue hematoma.   2.  No fracture.       CT CERVICAL SPINE:   1.  No fracture or definite acute injury in the cervical spine.       CT Facial Bones without Contrast   Final Result   CONCLUSION:    CT HEAD:   1.  No acute intracranial injury.       CT FACE:   1.  Right facial soft tissue hematoma.   2.  No fracture.       CT CERVICAL SPINE:   1.  No fracture or definite acute injury in the cervical spine.       Head CT w/o contrast   Final Result   CONCLUSION:    CT HEAD:   1.  No acute intracranial injury.       CT FACE:   1.  Right facial soft tissue hematoma.   2.  No fracture.       CT CERVICAL SPINE:   1.  No " fracture or definite acute injury in the cervical spine.

## 2023-05-15 ENCOUNTER — HOSPITAL ENCOUNTER (EMERGENCY)
Facility: HOSPITAL | Age: 78
Discharge: HOME | End: 2023-05-15
Attending: EMERGENCY MEDICINE | Admitting: EMERGENCY MEDICINE
Payer: MEDICARE

## 2023-05-15 ENCOUNTER — APPOINTMENT (OUTPATIENT)
Dept: RADIOLOGY | Facility: HOSPITAL | Age: 78
End: 2023-05-15
Attending: EMERGENCY MEDICINE
Payer: MEDICARE

## 2023-05-15 ENCOUNTER — APPOINTMENT (OUTPATIENT)
Dept: CT IMAGING | Facility: HOSPITAL | Age: 78
End: 2023-05-15
Attending: EMERGENCY MEDICINE
Payer: MEDICARE

## 2023-05-15 VITALS
HEART RATE: 90 BPM | WEIGHT: 220 LBS | TEMPERATURE: 98.4 F | DIASTOLIC BLOOD PRESSURE: 66 MMHG | SYSTOLIC BLOOD PRESSURE: 134 MMHG | OXYGEN SATURATION: 89 % | HEIGHT: 64 IN | RESPIRATION RATE: 28 BRPM | BODY MASS INDEX: 37.56 KG/M2

## 2023-05-15 DIAGNOSIS — R06.02 SOB (SHORTNESS OF BREATH): ICD-10-CM

## 2023-05-15 DIAGNOSIS — J44.1 COPD EXACERBATION (H): ICD-10-CM

## 2023-05-15 LAB
ALBUMIN SERPL BCG-MCNC: 4.2 G/DL (ref 3.5–5.2)
ALP SERPL-CCNC: 76 U/L (ref 35–104)
ALT SERPL W P-5'-P-CCNC: 14 U/L (ref 10–35)
ANION GAP SERPL CALCULATED.3IONS-SCNC: 7 MMOL/L (ref 7–15)
AST SERPL W P-5'-P-CCNC: 25 U/L (ref 10–35)
BASE EXCESS BLDV CALC-SCNC: 8.1 MMOL/L
BASOPHILS # BLD AUTO: 0 10E3/UL (ref 0–0.2)
BASOPHILS NFR BLD AUTO: 0 %
BILIRUB SERPL-MCNC: 0.2 MG/DL
BUN SERPL-MCNC: 16.6 MG/DL (ref 8–23)
CALCIUM SERPL-MCNC: 9.4 MG/DL (ref 8.8–10.2)
CHLORIDE SERPL-SCNC: 99 MMOL/L (ref 98–107)
CREAT SERPL-MCNC: 0.81 MG/DL (ref 0.51–0.95)
D DIMER PPP FEU-MCNC: 0.77 UG/ML FEU (ref 0–0.5)
DEPRECATED HCO3 PLAS-SCNC: 32 MMOL/L (ref 22–29)
EOSINOPHIL # BLD AUTO: 0 10E3/UL (ref 0–0.7)
EOSINOPHIL NFR BLD AUTO: 0 %
ERYTHROCYTE [DISTWIDTH] IN BLOOD BY AUTOMATED COUNT: 14.7 % (ref 10–15)
GFR SERPL CREATININE-BSD FRML MDRD: 74 ML/MIN/1.73M2
GLUCOSE SERPL-MCNC: 119 MG/DL (ref 70–99)
HCO3 BLDV-SCNC: 35 MMOL/L (ref 24–30)
HCT VFR BLD AUTO: 36.4 % (ref 35–47)
HGB BLD-MCNC: 10.5 G/DL (ref 11.7–15.7)
HOLD SPECIMEN: NORMAL
HOLD SPECIMEN: NORMAL
IMM GRANULOCYTES # BLD: 0.1 10E3/UL
IMM GRANULOCYTES NFR BLD: 1 %
LACTATE SERPL-SCNC: 0.6 MMOL/L (ref 0.7–2)
LYMPHOCYTES # BLD AUTO: 1.1 10E3/UL (ref 0.8–5.3)
LYMPHOCYTES NFR BLD AUTO: 12 %
MCH RBC QN AUTO: 25.6 PG (ref 26.5–33)
MCHC RBC AUTO-ENTMCNC: 28.8 G/DL (ref 31.5–36.5)
MCV RBC AUTO: 89 FL (ref 78–100)
MONOCYTES # BLD AUTO: 1 10E3/UL (ref 0–1.3)
MONOCYTES NFR BLD AUTO: 11 %
NEUTROPHILS # BLD AUTO: 7.1 10E3/UL (ref 1.6–8.3)
NEUTROPHILS NFR BLD AUTO: 76 %
NRBC # BLD AUTO: 0 10E3/UL
NRBC BLD AUTO-RTO: 0 /100
NT-PROBNP SERPL-MCNC: 1004 PG/ML (ref 0–1800)
OXYHGB MFR BLDV: 45.6 % (ref 70–75)
PCO2 BLDV: 78 MM HG (ref 35–50)
PH BLDV: 7.26 [PH] (ref 7.35–7.45)
PLATELET # BLD AUTO: 213 10E3/UL (ref 150–450)
PO2 BLDV: 27 MM HG (ref 25–47)
POTASSIUM SERPL-SCNC: 4.8 MMOL/L (ref 3.4–5.3)
PROCALCITONIN SERPL IA-MCNC: 0.09 NG/ML
PROT SERPL-MCNC: 7 G/DL (ref 6.4–8.3)
RBC # BLD AUTO: 4.1 10E6/UL (ref 3.8–5.2)
SAO2 % BLDV: 46.4 % (ref 70–75)
SODIUM SERPL-SCNC: 138 MMOL/L (ref 136–145)
WBC # BLD AUTO: 9.4 10E3/UL (ref 4–11)

## 2023-05-15 PROCEDURE — 250N000009 HC RX 250

## 2023-05-15 PROCEDURE — 250N000011 HC RX IP 250 OP 636: Performed by: EMERGENCY MEDICINE

## 2023-05-15 PROCEDURE — 96375 TX/PRO/DX INJ NEW DRUG ADDON: CPT

## 2023-05-15 PROCEDURE — 250N000013 HC RX MED GY IP 250 OP 250 PS 637: Performed by: EMERGENCY MEDICINE

## 2023-05-15 PROCEDURE — 999N000157 HC STATISTIC RCP TIME EA 10 MIN

## 2023-05-15 PROCEDURE — 85379 FIBRIN DEGRADATION QUANT: CPT | Performed by: EMERGENCY MEDICINE

## 2023-05-15 PROCEDURE — 99285 EMERGENCY DEPT VISIT HI MDM: CPT | Mod: 25

## 2023-05-15 PROCEDURE — 80053 COMPREHEN METABOLIC PANEL: CPT | Performed by: EMERGENCY MEDICINE

## 2023-05-15 PROCEDURE — G1010 CDSM STANSON: HCPCS

## 2023-05-15 PROCEDURE — 83880 ASSAY OF NATRIURETIC PEPTIDE: CPT | Performed by: EMERGENCY MEDICINE

## 2023-05-15 PROCEDURE — 36415 COLL VENOUS BLD VENIPUNCTURE: CPT | Performed by: EMERGENCY MEDICINE

## 2023-05-15 PROCEDURE — 96365 THER/PROPH/DIAG IV INF INIT: CPT | Mod: 59

## 2023-05-15 PROCEDURE — 93005 ELECTROCARDIOGRAM TRACING: CPT | Performed by: EMERGENCY MEDICINE

## 2023-05-15 PROCEDURE — 71046 X-RAY EXAM CHEST 2 VIEWS: CPT

## 2023-05-15 PROCEDURE — 85004 AUTOMATED DIFF WBC COUNT: CPT | Performed by: EMERGENCY MEDICINE

## 2023-05-15 PROCEDURE — 83605 ASSAY OF LACTIC ACID: CPT | Performed by: EMERGENCY MEDICINE

## 2023-05-15 PROCEDURE — 94660 CPAP INITIATION&MGMT: CPT

## 2023-05-15 PROCEDURE — 94640 AIRWAY INHALATION TREATMENT: CPT

## 2023-05-15 PROCEDURE — 84145 PROCALCITONIN (PCT): CPT | Performed by: EMERGENCY MEDICINE

## 2023-05-15 PROCEDURE — 82805 BLOOD GASES W/O2 SATURATION: CPT | Performed by: EMERGENCY MEDICINE

## 2023-05-15 RX ORDER — LEVALBUTEROL INHALATION SOLUTION 1.25 MG/3ML
SOLUTION RESPIRATORY (INHALATION)
Status: COMPLETED
Start: 2023-05-15 | End: 2023-05-15

## 2023-05-15 RX ORDER — CEFUROXIME AXETIL 500 MG/1
500 TABLET ORAL 2 TIMES DAILY
Qty: 10 TABLET | Refills: 0 | Status: SHIPPED | OUTPATIENT
Start: 2023-05-15 | End: 2023-05-20

## 2023-05-15 RX ORDER — DOXYCYCLINE 100 MG/1
100 CAPSULE ORAL ONCE
Status: COMPLETED | OUTPATIENT
Start: 2023-05-15 | End: 2023-05-15

## 2023-05-15 RX ORDER — PREDNISONE 20 MG/1
TABLET ORAL
Qty: 8 TABLET | Refills: 0 | Status: SHIPPED | OUTPATIENT
Start: 2023-05-16 | End: 2023-05-22

## 2023-05-15 RX ORDER — ALBUTEROL SULFATE 0.83 MG/ML
5 SOLUTION RESPIRATORY (INHALATION) ONCE
Status: DISCONTINUED | OUTPATIENT
Start: 2023-05-15 | End: 2023-05-15

## 2023-05-15 RX ORDER — CEFTRIAXONE 1 G/1
1 INJECTION, POWDER, FOR SOLUTION INTRAMUSCULAR; INTRAVENOUS ONCE
Status: COMPLETED | OUTPATIENT
Start: 2023-05-15 | End: 2023-05-15

## 2023-05-15 RX ORDER — DOXYCYCLINE 100 MG/1
100 CAPSULE ORAL 2 TIMES DAILY
Qty: 10 CAPSULE | Refills: 0 | Status: SHIPPED | OUTPATIENT
Start: 2023-05-15 | End: 2023-05-20

## 2023-05-15 RX ORDER — IOPAMIDOL 755 MG/ML
90 INJECTION, SOLUTION INTRAVASCULAR ONCE
Status: COMPLETED | OUTPATIENT
Start: 2023-05-15 | End: 2023-05-15

## 2023-05-15 RX ORDER — METHYLPREDNISOLONE SODIUM SUCCINATE 125 MG/2ML
125 INJECTION, POWDER, LYOPHILIZED, FOR SOLUTION INTRAMUSCULAR; INTRAVENOUS ONCE
Status: COMPLETED | OUTPATIENT
Start: 2023-05-15 | End: 2023-05-15

## 2023-05-15 RX ORDER — LEVALBUTEROL INHALATION SOLUTION 1.25 MG/3ML
1.25 SOLUTION RESPIRATORY (INHALATION) ONCE
Status: DISCONTINUED | OUTPATIENT
Start: 2023-05-15 | End: 2023-05-15

## 2023-05-15 RX ADMIN — METHYLPREDNISOLONE SODIUM SUCCINATE 125 MG: 125 INJECTION, POWDER, LYOPHILIZED, FOR SOLUTION INTRAMUSCULAR; INTRAVENOUS at 12:53

## 2023-05-15 RX ADMIN — LEVALBUTEROL HYDROCHLORIDE: 1.25 SOLUTION RESPIRATORY (INHALATION) at 12:06

## 2023-05-15 RX ADMIN — DOXYCYCLINE 100 MG: 100 CAPSULE ORAL at 15:08

## 2023-05-15 RX ADMIN — CEFTRIAXONE SODIUM 1 G: 1 INJECTION, POWDER, FOR SOLUTION INTRAMUSCULAR; INTRAVENOUS at 15:08

## 2023-05-15 RX ADMIN — IOPAMIDOL 90 ML: 755 INJECTION, SOLUTION INTRAVENOUS at 13:42

## 2023-05-15 ASSESSMENT — ACTIVITIES OF DAILY LIVING (ADL)
ADLS_ACUITY_SCORE: 35
DEPENDENT_IADLS:: INDEPENDENT
ADLS_ACUITY_SCORE: 35
ADLS_ACUITY_SCORE: 35

## 2023-05-15 ASSESSMENT — ENCOUNTER SYMPTOMS
WHEEZING: 1
SHORTNESS OF BREATH: 1

## 2023-05-15 NOTE — ED NOTES
Bed: JNED-01  Expected date: 5/15/23  Expected time:   Means of arrival: Ambulance  Comments:  Jean Smith F  Difficulty breathing

## 2023-05-15 NOTE — DISCHARGE INSTRUCTIONS
Continue albuterol neb/inhaler every 4 hours as needed. Start the steroids and antibiotics TOMORROW 5/16.     Follow up with your doctor for re-evaluation.

## 2023-05-15 NOTE — CONSULTS
Care Management Initial Consult    General Information  Assessment completed with: Patient, patient  Type of CM/SW Visit: Initial Assessment    Primary Care Provider verified and updated as needed: Yes   Readmission within the last 30 days: other (see comments) (Fall)   Return Category: New Diagnosis     Advance Care Planning:            Communication Assessment  Patient's communication style: spoken language (English or Bilingual)             Cognitive  Cognitive/Neuro/Behavioral: WDL                      Living Environment:   People in home: alone     Current living Arrangements: house      Able to return to prior arrangements: yes       Family/Social Support:  Care provided by: self  Provides care for: no one  Marital Status:   Children          Description of Support System: Supportive    Support Assessment: Adequate family and caregiver support    Current Resources:   Patient receiving home care services: No     Community Resources: None  Equipment currently used at home:    Supplies currently used at home: Oxygen Tubing/Supplies    Employment/Financial:  Employment Status: retired        Financial Concerns:     Referral to Financial Worker: No       Does the patient's insurance plan have a 3 day qualifying hospital stay waiver?  No    Lifestyle & Psychosocial Needs:  Social Determinants of Health     Tobacco Use: Medium Risk (5/15/2023)    Patient History      Smoking Tobacco Use: Former      Smokeless Tobacco Use: Never      Passive Exposure: Not on file   Alcohol Use: Not on file   Financial Resource Strain: Not on file   Food Insecurity: Not on file   Transportation Needs: Not on file   Physical Activity: Not on file   Stress: Not on file   Social Connections: Not on file   Intimate Partner Violence: Not on file   Depression: Not at risk (4/27/2023)    PHQ-2      PHQ-2 Score: 0   Housing Stability: Not on file       Functional Status:  Prior to admission patient needed assistance:   Dependent ADLs::  Independent, Ambulation-walker  Dependent IADLs:: Independent       Mental Health Status:  Mental Health Status: No Current Concerns       Chemical Dependency Status:  Chemical Dependency Status: No Current Concerns             Values/Beliefs:  Spiritual, Cultural Beliefs, Yazidi Practices, Values that affect care: no               Additional Information:  SW met with patient to introduce self,CM role and complete initial assessment. Patient recently had an ER visit on 5/2/2023 for a fall.  Brief assessment completed as patient needed to move rooms in the ER. Patient lives alone in a town home, she is independent at baseline with walker and home oxygen.    recently passed away in December 2022.   She has a son who lives in [place], [place] whom she communicates with daily.     Care management will follow patient's progression for identified needs such as home care or TCU.    NAYELI Menjivar

## 2023-05-15 NOTE — PROGRESS NOTES
Came in on CPAP via EMS.  Patient placed to BiPAP 14/7 RR 12 40% Sao2 100% RR 33 HR 96.  Patient requested to remove BiPAP post 15min.  Patient placed on 3lpm NC. xopenex neb given.

## 2023-05-15 NOTE — ED PROVIDER NOTES
EMERGENCY DEPARTMENT ENCOUNTER      NAME: Lalitha Underwood  AGE: 78 year old female  YOB: 1945  MRN: [id number]  EVALUATION DATE & TIME: 5/15/2023 11:37 AM    PCP: Kate Marte    ED PROVIDER: Parrish Chiu MD       Chief Complaint   Patient presents with     Shortness of Breath         FINAL IMPRESSION:  1. COPD exacerbation (H)          ED COURSE & MEDICAL DECISION MAKING:      Medical Decision Making       Medical Decision Making    History:    Supplemental history from: EMS    External Record(s) reviewed: Outpatient Record: 5/10 Office Visit for fall follow-up, 5/2 ED visit for fall    Work Up:    Chart documentation includes differential considered and any EKGs or imaging independently interpreted by provider, where specified.    In additional to work up documented, I considered the following work up: Documented in chart, if applicable.    External consultation:    Discussion of management with another provider: Documented in chart, if applicable    Complicating factors:    Care impacted by chronic illness: Chronic Lung Disease, Heart Disease, Hyperlipidemia and Other: PAD, epilepsy    Care affected by social determinants of health: N/A    Disposition considerations: Discharge. I prescribed additional prescription strength medication(s) as charted. I considered admission, but discharged patient after significant clinical improvement.          Pertinent Labs & Imaging studies reviewed. (See chart for details)    78 year old female presents to the Emergency Department for evaluation of shortness of breath.  Vitally normal tensive.  Patient was previously on BiPAP which was taken off after improvement on nasal cannula.  No altered mental status.  Labs remarkable for elevated acute on chronic hypercarbic respiratory acidosis.  Patient stable for discharge after period observation.  PE study is negative for PE or large infiltrates.  Feels have intermittent rhonchi.  Treated for commune acquired  pneumonia here.  IV antibiotics and IV steroids given.  Patient will be discharged with antibiotics and steroids after shared decision-making.  Observation offered however patient feels comfortable going home and son was notified.  Son says he and his wife will check on her daily until she has improved or return to the ED for worsening condition.  Stable for discharge.       11:30 AM I introduced myself to the patient, obtained patient history, performed a physical exam, and discussed plan for ED workup including potential diagnostic laboratory/imaging studies and interventions.   11:45 AM Rechecked patient. Spoke to Respiratory Technician regarding DuoNeb - decided against it. Patient's O2 sats have increased, and she states she feels back to baseline. Son is here with patient.  12:00 PM Per RT, patient requested a coffee. Provider recommended RT hold off on liquid intake for a while.  1:48 PM Rechecked and updated patient. Breath sounds still diminished.  2:10 PM Rechecked and updated patient. Patient is back to baseline o2 sats. Will do road test.  2:20 PM Call placed to patient's son, Mick. He is able to check on her daily and  her antibiotics as needed.    At the conclusion of the encounter I discussed the results of all of the tests and the disposition. The questions were answered. The patient or family acknowledged understanding and was agreeable with the care plan.         MEDICATIONS GIVEN IN THE EMERGENCY:  Medications   doxycycline monohydrate (MONODOX) capsule 100 mg (has no administration in time range)   cefTRIAXone (ROCEPHIN) 1 g vial to attach to  mL bag for ADULTS or NS 50 mL bag for PEDS (has no administration in time range)   levalbuterol (XOPENEX) 1.25 MG/3ML neb solution (  $Given 5/15/23 1206)   methylPREDNISolone sodium succinate (solu-MEDROL) injection 125 mg (125 mg Intravenous $Given 5/15/23 1253)   iopamidol (ISOVUE-370) solution 90 mL (90 mLs Intravenous $Given 5/15/23 3132)        NEW PRESCRIPTIONS STARTED AT TODAY'S ER VISIT  New Prescriptions    No medications on file         =================================================================    HPI    Patient information was obtained from: EMS, patient    Use of : N/A        Lalitha Underwood is a 78 year old female with a pertinent history of s/p paraesophageal hernia repair, HTN, acquired lymphedema of leg, epileptic seizure, HLD, GERD, aortic valve stenosis, obesity, neuropathy, osteoporosis, pulmonary emphysema, PAD, s/p cardiac pacemaker placement, chronic respiratory failure with hypoxia, COPD, DENT, second degree heart block, CAD, pneumonia, and former tobacco use who presents to this ED via EMS escorted by son for evaluation of shortness of breath.    Over the last few days, patient has been increasingly shortness of breath with her normal activities of daily living. She is normally on 1.5 liters of oxygen supplementation at baseline at home, but recently, she has had to increase this to 3 or 4 L. She does have a history of COPD. Due to her increased work of breathing today, patient's son called EMS. Upon EMS arrival, patient was in worse respiratory distress than she was upon ED arrival. She was given 324 of aspirin with 4 doses of nitroglycerin tablets. Her respiratory rate was 30-35 RPM, and her blood pressure was 170/90 initially. EMS noted her lung sounds were diminished bilaterally with wheezing present. She was given a DuoNeb and placed on BiPap which helped her breathing. Blood glucose was 120.     Per patient, she denies any sick contacts. Patient has diffuse facial ecchymosis from a previous fall, of which she was seen for at this ED. She did not take any of her daily medications today.    REVIEW OF SYSTEMS   Review of Systems   Respiratory: Positive for shortness of breath and wheezing.       PAST MEDICAL HISTORY:  Past Medical History:   Diagnosis Date     Convulsive disorder (H)      Convulsive disorder  (H)      COPD (chronic obstructive pulmonary disease) (H)      COPD (chronic obstructive pulmonary disease) (H)      Coronary artery disease involving native coronary artery with unstable angina pectoris (H) 3/31/2023     Depression      Dyspnea on exertion      Gastroesophageal reflux disease      Heart murmur      Hernia of unspecified site of abdominal cavity without mention of obstruction or gangrene      Hiatal hernia      Hiatal hernia      Hypertension      Hypertension      Obese      On home oxygen therapy     at 1.5 liters at nite     Osteopenia      Osteopenia      Oxygen dependent     1.5 L per NC     Pneumonia due to infectious organism, unspecified laterality, unspecified part of lung 3/19/2022     Second degree heart block 3/19/2022     Shortness of breath      Uncomplicated asthma      URI (upper respiratory infection)      Venous insufficiency of both lower extremities      Venous insufficiency of both lower extremities      Walking troubles        PAST SURGICAL HISTORY:  Past Surgical History:   Procedure Laterality Date     AMPUTATE TOE(S) Left 8/11/2022    Procedure: AMPUTATION, fifth digit left foot;  Surgeon: Alfonso Orozco DPM;  Location: [facility]     ARTHROPLASTY TOE(S) Left 11/22/2021    Procedure: ARTHROPLASTY, digits two and three left foot;  Surgeon: Alfonso Orozco DPM;  Location: [facility]     ARTHROPLASTY TOE(S) Left 7/18/2022    Procedure: ARTHROPLASTY, digits four and five left foot;  Surgeon: Alfonso Orozco DPM;  Location: [facility] OR     EP PACEMAKER DEVICE & LEAD IMPLANT- RIGHT ATRIAL & RIGHT VENTRICULAR N/A 3/24/2022    Procedure: Pacemaker Device & Lead Implant - Right Atrial & Right Ventricular;  Surgeon: Helder Pendleton MD;  Location: [facility]     ESOPHAGOSCOPY, GASTROSCOPY, DUODENOSCOPY (EGD), COMBINED N/A 11/26/2018    Procedure: Esophagogastroduodenoscopy;  Surgeon: Jasmeet Wilder MD;  Location: [facility]      HERNIA REPAIR, UMBILICAL  04/2018     HERNIA REPAIR, UMBILICAL  04/04/2018    Dr. 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     Repair arm fracture Left      SHOULDER SURGERY Right 1967     SHOULDER SURGERY Right 1967     US BIOPSY THYROID FINE NEEDLE ASPIRATION  7/29/2020           CURRENT MEDICATIONS:      Current Facility-Administered Medications:      cefTRIAXone (ROCEPHIN) 1 g vial to attach to  mL bag for ADULTS or NS 50 mL bag for PEDS, 1 g, Intravenous, Once, Parrish Chiu MD     doxycycline monohydrate (MONODOX) capsule 100 mg, 100 mg, Oral, Once, Parrish Chiu MD    Current Outpatient Medications:      albuterol (PROAIR HFA/PROVENTIL HFA/VENTOLIN HFA) 108 (90 Base) MCG/ACT inhaler, Inhale 2 puffs into the lungs every 6 hours as needed for shortness of breath or wheezing, Disp: 18 g, Rfl: 3     azithromycin (ZITHROMAX) 250 MG tablet, Take 1 tablet (250 mg) by mouth every evening, Disp: 90 tablet, Rfl: 3     benzonatate (TESSALON) 100 MG capsule, Take 1 capsule (100 mg) by mouth 3 times daily as needed for cough, Disp: 30 capsule, Rfl: 1     calcium citrate (CITRACAL) 950 (200 Ca) MG tablet, Take 1 tablet by mouth 2 times daily, Disp: , Rfl:      denosumab (PROLIA) 60 MG/ML SOLN injection, Inject 60 mg Subcutaneous every 6 months, Disp: , Rfl:      fluticasone-salmeterol (ADVAIR) 500-50 MCG/ACT inhaler, Inhale 1 puff into the lungs every 12 hours, Disp: 180 each, Rfl: 3     guaiFENesin (MUCINEX) 600 MG 12 hr tablet, Take 1 tablet (600 mg) by mouth 2 times daily (Patient taking differently: Take 600 mg by mouth 2 times daily as needed), Disp: , Rfl:      hydrochlorothiazide (HYDRODIURIL) 25 MG tablet, Take 1 tablet (25 mg) by mouth daily, Disp: 90 tablet, Rfl: 3     ipratropium - albuterol 0.5  mg/2.5 mg/3 mL (DUONEB) 0.5-2.5 (3) MG/3ML neb solution, Take 1 vial (3 mLs) by nebulization every 6 hours as needed for shortness of breath / dyspnea or wheezing, Disp: 810 mL, Rfl: 3     levETIRAcetam (KEPPRA) 500 MG tablet, Take 1 tablet (500 mg) by mouth 2 times daily, Disp: 180 tablet, Rfl: 3     losartan (COZAAR) 50 MG tablet, Take 1 tablet (50 mg) by mouth 2 times daily, Disp: 180 tablet, Rfl: 3     Magnesium Oxide 500 MG TABS, Take 500 mg by mouth 2 times daily, Disp: , Rfl:      nystatin (MYCOSTATIN) 617819 UNIT/GM external powder, Apply topically daily as needed, Disp: , Rfl:      potassium chloride ER (KLOR-CON M) 20 MEQ CR tablet, Take 1 tablet (20 mEq) by mouth At Bedtime, Disp: 90 tablet, Rfl: 3     RABEprazole (ACIPHEX) 20 MG EC tablet, Take 1 tablet (20 mg) by mouth daily, Disp: 90 tablet, Rfl: 3     sertraline (ZOLOFT) 100 MG tablet, Take 1 tablet (100 mg) by mouth every evening, Disp: 90 tablet, Rfl: 3     solifenacin (VESICARE) 10 MG tablet, Take 1 tablet (10 mg) by mouth daily, Disp: 90 tablet, Rfl: 3     spironolactone (ALDACTONE) 25 MG tablet, Take 0.5 tablets (12.5 mg) by mouth daily, Disp: 45 tablet, Rfl: 3     tiotropium (SPIRIVA HANDIHALER) 18 MCG inhaled capsule, Inhale 1 capsule (18 mcg) into the lungs daily, Disp: 90 capsule, Rfl: 3     vitamin D3 (CHOLECALCIFEROL) 125 MCG (5000 UT) tablet, Take 5,000 Units by mouth daily , Disp: , Rfl:     ALLERGIES:  Allergies   Allergen Reactions     Clindamycin Rash     Carbamazepine Rash     Morphine Nausea       FAMILY HISTORY:  Family History   Problem Relation Age of Onset     Pulmonary Hypertension Daughter         idiopathic      Heart Disease Other         2 sibs MI, 1 sib electrical conduction disorder     Breast Cancer Mother 66.00     Hypertension Mother      Coronary Artery Disease Mother      Varicose Veins Mother      Hypertension Father      Coronary Artery Disease Sister      Heart Disease Sister      Diabetes Son      Pulmonary  "Hypertension Daughter      Coronary Artery Disease Sister      Heart Disease Sister        SOCIAL HISTORY:   Social History     Socioeconomic History     Marital status:    Tobacco Use     Smoking status: Former     Packs/day: 1.50     Years: 38.00     Pack years: 57.00     Types: Cigarettes     Quit date: 1998     Years since quittin.4     Smokeless tobacco: Never     Tobacco comments:     quit in    Vaping Use     Vaping status: Never Used   Substance and Sexual Activity     Alcohol use: Yes     Alcohol/week: 2.0 standard drinks of alcohol     Comment: occ     Drug use: Not Currently     Sexual activity: Never   Social History Narrative    .  Two kids.  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       VITALS:  /82   Pulse 105   Temp 98.4  F (36.9  C) (Oral)   Resp 25   Ht 1.626 m (5' 4\")   Wt 99.8 kg (220 lb)   SpO2 100%   BMI 37.76 kg/m      PHYSICAL EXAM    Physical Exam  Vitals and nursing note reviewed.   Constitutional:       Appearance: She is well-developed. She is obese.   HENT:      Head: Normocephalic.   Pulmonary:      Effort: Pulmonary effort is normal.      Breath sounds: Wheezing present.   Chest:      Chest wall: No mass or crepitus.   Abdominal:      Palpations: Abdomen is soft.   Musculoskeletal:         General: Normal range of motion.      Right lower leg: Edema present.      Left lower leg: Edema present.   Skin:     General: Skin is warm.   Neurological:      Mental Status: She is alert and oriented to person, place, and time.       LAB:  All pertinent labs reviewed and interpreted.  Results for orders placed or performed during the hospital encounter of 05/15/23   Chest XR,  PA & LAT    Impression    IMPRESSION: Pacemaker with leads over the RA and RV. Some minimal probable fibrosis in the lung bases. Lungs otherwise clear with no acute new findings. Postop change right shoulder with advanced degenerative arthritis.   CT Chest Pulmonary Embolism w Contrast    " Impression    IMPRESSION:  1.  Negative for pulmonary emboli.    2.  Mildly enlarged central pulmonary arteries suggestive of possible pulmonary artery hypertension.    3.  Slight fibrosis or atelectasis left lung base similar to chest x-ray. No acute findings in the lungs.   Result Value Ref Range    Procalcitonin 0.09 (H) <0.05 ng/mL   Nt probnp inpatient   Result Value Ref Range    N terminal Pro BNP Inpatient 1,004 0 - 1,800 pg/mL   Comprehensive metabolic panel   Result Value Ref Range    Sodium 138 136 - 145 mmol/L    Potassium 4.8 3.4 - 5.3 mmol/L    Chloride 99 98 - 107 mmol/L    Carbon Dioxide (CO2) 32 (H) 22 - 29 mmol/L    Anion Gap 7 7 - 15 mmol/L    Urea Nitrogen 16.6 8.0 - 23.0 mg/dL    Creatinine 0.81 0.51 - 0.95 mg/dL    Calcium 9.4 8.8 - 10.2 mg/dL    Glucose 119 (H) 70 - 99 mg/dL    Alkaline Phosphatase 76 35 - 104 U/L    AST 25 10 - 35 U/L    ALT 14 10 - 35 U/L    Protein Total 7.0 6.4 - 8.3 g/dL    Albumin 4.2 3.5 - 5.2 g/dL    Bilirubin Total 0.2 <=1.2 mg/dL    GFR Estimate 74 >60 mL/min/1.73m2   Blood gas venous   Result Value Ref Range    pH Venous 7.26 (L) 7.35 - 7.45    pCO2 Venous 78 (H) 35 - 50 mm Hg    pO2 Venous 27 25 - 47 mm Hg    Bicarbonate Venous 35 (H) 24 - 30 mmol/L    Base Excess/Deficit (+/-) 8.1   mmol/L    Oxyhemoglobin Venous 45.6 (L) 70.0 - 75.0 %    O2 Sat, Venous 46.4 (L) 70.0 - 75.0 %   D dimer quantitative   Result Value Ref Range    D-Dimer Quantitative 0.77 (H) 0.00 - 0.50 ug/mL FEU   Lactic acid whole blood   Result Value Ref Range    Lactic Acid 0.6 (L) 0.7 - 2.0 mmol/L   CBC with platelets and differential   Result Value Ref Range    WBC Count 9.4 4.0 - 11.0 10e3/uL    RBC Count 4.10 3.80 - 5.20 10e6/uL    Hemoglobin 10.5 (L) 11.7 - 15.7 g/dL    Hematocrit 36.4 35.0 - 47.0 %    MCV 89 78 - 100 fL    MCH 25.6 (L) 26.5 - 33.0 pg    MCHC 28.8 (L) 31.5 - 36.5 g/dL    RDW 14.7 10.0 - 15.0 %    Platelet Count 213 150 - 450 10e3/uL    % Neutrophils 76 %    % Lymphocytes 12 %     % Monocytes 11 %    % Eosinophils 0 %    % Basophils 0 %    % Immature Granulocytes 1 %    NRBCs per 100 WBC 0 <1 /100    Absolute Neutrophils 7.1 1.6 - 8.3 10e3/uL    Absolute Lymphocytes 1.1 0.8 - 5.3 10e3/uL    Absolute Monocytes 1.0 0.0 - 1.3 10e3/uL    Absolute Eosinophils 0.0 0.0 - 0.7 10e3/uL    Absolute Basophils 0.0 0.0 - 0.2 10e3/uL    Absolute Immature Granulocytes 0.1 <=0.4 10e3/uL    Absolute NRBCs 0.0 10e3/uL   Extra Red Top Tube   Result Value Ref Range    Hold Specimen JIC    Extra Purple Top Tube   Result Value Ref Range    Hold Specimen JIC        RADIOLOGY:  I independently interpreted the below imaging showing.   Please see official radiology report.  CT Chest Pulmonary Embolism w Contrast   Final Result   IMPRESSION:   1.  Negative for pulmonary emboli.      2.  Mildly enlarged central pulmonary arteries suggestive of possible pulmonary artery hypertension.      3.  Slight fibrosis or atelectasis left lung base similar to chest x-ray. No acute findings in the lungs.      Chest XR,  PA & LAT   Final Result   IMPRESSION: Pacemaker with leads over the RA and RV. Some minimal probable fibrosis in the lung bases. Lungs otherwise clear with no acute new findings. Postop change right shoulder with advanced degenerative arthritis.          EKG:    Performed at: 5/15/23 at 1149    Impression: Sinus tachycardia. Non-specific intra-ventricular conduction block. Lateral infarct (cited on or before 6/28/22). Abnormal ECG. When compared with ECG of 6/28/22 at 1022, QRS durations has increased. Questionable change in initial forces of Lateral leads. QT has lengthened.    Rate: 104  Rhythm: Sinus  Axis: 100  MS Interval: 200  QRS Interval: 152  QTc Interval: 547  ST Changes: none  Comparison: 6/28/22 at 1022    I have independently reviewed and interpreted the EKG(s) documented above.    PROCEDURES:       I, Elisa Sparks, am serving as a scribe to document services personally performed by Dr. Chiu  based on my observation and the provider's statements to me. I, Parrish Chiu MD attest that Elisa Jose RamonDiane  is acting in a scribe capacity, has observed my performance of the services and has documented them in accordance with my direction.    Parrish Chiu M.D.  Emergency Medicine  [facility] EMERGENCY DEPARTMENT  [address] [place] [id number] [phone]  Dept: [phone]     Parrish Chiu MD  05/15/23 4309

## 2023-05-15 NOTE — ED NOTES
Nursing Assessment: Cardiovascular: Heart tones reveal murmer at left of mid sternum. Rate is regular. Rhythm is normal sinus with a 1st degree AV block. She denies chest pain. No JVD. Noting 1-2+ pitting edema to lower extremities. Respiratory: Patient reports increasing shortness of breath. With discussion with patient regarding paramedic report, she agrees with history for today's visit. Initially, she was placed on Bipap at 40 % and she attained 100 % oxygen saturations. Respiratory therapy.  Respiratory therapy placed her on 4 liters/nasal cannula. She received an additional neb treatment and oxygen was further titrated down to  2 liters at this time. Lung sounds were diminished in the posterior fields with what sounds like upper respiratory wheezing.

## 2023-05-15 NOTE — ED TRIAGE NOTES
Patient presents here via [employer], crew # [id number] for evaluation of shortness of breath. She comes from private home.She has COPD and uses continuous home oxygen for supplementation at 1.5 liters. Over the last few days, she has been experiencing increasing shortness of breath with increasing activity intolerance and has been titrating her oxygen up. Today it was at 4 liters according to medics. She arrived with bipap applied by medics. She received a Duoneb, Aspirin and NTG X4 by medics. They report posterior wheezing. Blood glucose by medics was 120

## 2023-05-15 NOTE — ED NOTES
Nursing Update; Patient continues to feel improved. Shortness of breath is improved. Maintaining oxygen saturations at 94% at 2 liters.

## 2023-05-16 ENCOUNTER — TELEPHONE (OUTPATIENT)
Dept: CARDIOLOGY | Facility: CLINIC | Age: 78
End: 2023-05-16
Payer: MEDICARE

## 2023-05-16 NOTE — TELEPHONE ENCOUNTER
[facility] Call Center    Phone Message    May a detailed message be left on voicemail: yes     Reason for Call: Other:     Pt is requesting the care team look at the tests from ER 5/15/2023 and call back to discuss    Action Taken: Other: cardio    Travel Screening: Not Applicable     Thank you!  [facility]

## 2023-05-17 NOTE — TELEPHONE ENCOUNTER
Called back Serene to address her concerns. She just wanted to let us know that she was in the ED and they performed a CTA chest while there. She inquired if this was the same as the CCTA that Dr. Robertson ordered. Writer explained that it does get the chest and can look at coronary artery calcification broadly but it does get the detailed view of the coronary arteries that we would want. She verbalized understanding. Writer will still cue in Dr. Robertson about the results, however. She will call back to schedule CCTA when she has recovered from recent COPD exacerbation. [place] [place]          Serene Cordova just wanted to let us know about her ED visit for COPD exacerbation- they did CTA chest and she questioned if she still needed CCTA chest. I explained the difference in exams but reassured her that you would still be updated on the CTA chest results. Just an FYI unless you have any input to forward on to her.  Thanks,  Vitor

## 2023-05-18 LAB
ATRIAL RATE - MUSE: 104 BPM
DIASTOLIC BLOOD PRESSURE - MUSE: 82 MMHG
INTERPRETATION ECG - MUSE: NORMAL
P AXIS - MUSE: NORMAL DEGREES
PR INTERVAL - MUSE: 200 MS
QRS DURATION - MUSE: 152 MS
QT - MUSE: 416 MS
QTC - MUSE: 547 MS
R AXIS - MUSE: 100 DEGREES
SYSTOLIC BLOOD PRESSURE - MUSE: 136 MMHG
T AXIS - MUSE: 49 DEGREES
VENTRICULAR RATE- MUSE: 104 BPM

## 2023-05-18 NOTE — TELEPHONE ENCOUNTER
===View-only below this line===  ----- Message -----  From: Ezra Robertson MD  Sent: 5/17/2023   7:53 PM CDT  To: Teri Hernandes RN    Agree we need to set up formal coronary cta.  LF      ==noted message. Called Serene back and updated her on message above. She will call back to schedule. [place] [place]

## 2023-05-22 ENCOUNTER — OFFICE VISIT (OUTPATIENT)
Dept: PULMONOLOGY | Facility: CLINIC | Age: 78
End: 2023-05-22
Payer: MEDICARE

## 2023-05-22 VITALS
DIASTOLIC BLOOD PRESSURE: 62 MMHG | HEART RATE: 96 BPM | SYSTOLIC BLOOD PRESSURE: 118 MMHG | OXYGEN SATURATION: 92 % | BODY MASS INDEX: 36.9 KG/M2 | WEIGHT: 215 LBS

## 2023-05-22 DIAGNOSIS — J96.11 CHRONIC RESPIRATORY FAILURE WITH HYPOXIA (H): ICD-10-CM

## 2023-05-22 DIAGNOSIS — J44.1 CHRONIC OBSTRUCTIVE PULMONARY DISEASE WITH ACUTE EXACERBATION (H): Primary | ICD-10-CM

## 2023-05-22 PROCEDURE — 99214 OFFICE O/P EST MOD 30 MIN: CPT | Performed by: NURSE PRACTITIONER

## 2023-05-22 RX ORDER — LEVALBUTEROL INHALATION SOLUTION 1.25 MG/3ML
1 SOLUTION RESPIRATORY (INHALATION) EVERY 4 HOURS PRN
Qty: 180 ML | Refills: 3 | Status: SHIPPED | OUTPATIENT
Start: 2023-05-22 | End: 2023-10-30

## 2023-05-22 RX ORDER — PREDNISONE 10 MG/1
TABLET ORAL
Qty: 30 TABLET | Refills: 0 | Status: SHIPPED | OUTPATIENT
Start: 2023-05-22 | End: 2023-06-03

## 2023-05-22 NOTE — PATIENT INSTRUCTIONS
- I will order a medication called levalbuterol. This will not make you feel jittery, use this in your nebulizer machine every 4 hours as needed.   - I will have you take more prednisone today     If you have worsening symptoms, questions, or need to speak with the nurse please call [phone].

## 2023-05-22 NOTE — PROGRESS NOTES
"  Outpatient Pulmonary Clinic Visit  May 22, 2023      Assessment and Plan:  Lalitha Underwood is a 78 year old female with a past medical history significant for severe COPD, emphysema, Cor pulmonale, chronic hypoxic respiratory failure, paraesophageal hernia s/p repair, HTN, acquired lymphedema of leg, epileptic seizure, HLD, GERD, aortic valve stenosis, obesity, neuropathy, osteoporosis, PAD, s/p cardiac pacemaker placement, second degree heart block, CAD, and former tobacco use who presents to clinic today for follow up. PFTs show a severe obstructive ventilatory defect, no significant response to bronchodilator, with moderately reduced diffusion capacity.     Recent COPD exacerbation on 5/15/2023, she was seen in the emergency department and treated with antibiotics, steroids and DuoNebs.  Labs were consistent for hypercarbic respiratory acidosis.  She reports some improvement since discharge but is still struggling with congested cough. Lung exam today demonstrates diffuse wheeze, sats 92% on 2L pulse dose.     1) Severe COPD -     Repeat prednisone taper today     Continue advair/spiriva and as needed albuterol or duonebs.      Remain on azithromycin to help control exacerbation - will monitor her QTc closely (QTC is <450 at last visit in 01/2023).      OK to use her nebulizer every morning routinely, encouraged her to increase use while she is having increased symptoms. Will change her albuterol to levalbuterol to help with jittery side effect.     Continue tessalon for as needed cough.    2) Chronic hypoxic respiratory failure -     needs to wear oxygen 2L with exertion at minimum and with sleep.    3) Cor Pulmonale - remains on hydrochlorothiazide and spironolactone.  Prevent hypoxia at all times with oxygen use as above.   4) Dyspnea - multifactorial from the above, in pulmonary rehab and learning.  She needs to start exercising more at home, she needs to combat \"laziness\" per her words.  5) If this " "patients lung disease exacerbates I recommend doxycycline 100mg BID for 10 days and a steroid taper with prednisone at 40mg for 3 days 30mg for 3 days, 20mg for 3 days and 10mg for 3 days  6) Toe surgery - I suspect she could have this surgery if a local block is used which will lower risk of anesthesia complications versus general anesthesia.    If this patients lung disease exacerbates I recommend: doxycycline 100mg BID for 10 days and a steroid taper with prednisone at 40mg for 3 days 30mg for 3 days, 20mg for 3 days and 10mg for 3 days    RTC in 2 months    Ev Plaza, VALE  Pulmonary Medicine  [facility]   [phone]    CCx: COPD  Chief Complaint   Patient presents with     Follow Up     ER      HPI:   Ms. Underwood is a 77 year old female with severe COPD and chronic hypoxic respiratory failure who returns for follow up.  She remains on maximal therapy for her COPD and was started on chronic azithromycin and referred to pulmonary rehab on her last visit.  She was tolerating both the azithromycin and pulmonary rehab and did not have a \"flare\" of her COPD for a while.     Seen in the ED for a COPD exacerbation on 5/15/2023. Labs were remarkable for acute on chronic hypercarbic respiratory acidosis. PE study was negative for PE or large infiltrates. She was treated for commune acquired pneumonia with IV antibiotics and IV steroids. Ceftin, doxycycline, and prednisone at discharge.  Since discharge she reports some improvement but still has congested cough daily.  Dyspnea has since returned to baseline.  Denies chest tightness or wheeze.   She has been using her Advair and Spiriva consistently.  Using the nebulizer once daily and albuterol HFA infrequently. She feels jittery when she uses this so she limits use.     On spironolactone. Plan for coronary CTA when recovered from exacerbation. Followed by Dr. Robertson in cardiology.     Patient supplied answers from flow sheet for:  COPD Assessment Test (CAT) "  2009 [facility]. All rights reserved.      3/14/2022     1:00 PM 4/4/2022    12:00 PM 4/5/2022     4:00 PM 6/28/2022    10:00 AM 1/3/2023    10:52 AM 1/3/2023     2:29 PM 5/22/2023     1:00 PM   COPD assessment test (CAT)   Cough 2 3 3 2 2 2 3   Phlegm 4 2 2 2 1 1 4   Chest tightness 4 4 4 1 1 1 2   Walk up hill 5 5 5 4 3 3 5   Limited activities 4 4 4 1 2 2 4   Leaving my home 0 0 1 0 1 1 1   Sleep 3 1 1 1 0 0 3   Energy 4 4 4 4 3 3 4   Total Score 26 23 24 15 13 13 26        ROS:   ROS: 10 point ROS neg other than the symptoms noted above in the HPI.    Current Meds:  Current Outpatient Medications   Medication Sig Dispense Refill     albuterol (PROAIR HFA/PROVENTIL HFA/VENTOLIN HFA) 108 (90 Base) MCG/ACT inhaler Inhale 2 puffs into the lungs every 6 hours as needed for shortness of breath or wheezing 18 g 3     azithromycin (ZITHROMAX) 250 MG tablet Take 1 tablet (250 mg) by mouth every evening 90 tablet 3     benzonatate (TESSALON) 100 MG capsule Take 1 capsule (100 mg) by mouth 3 times daily as needed for cough 30 capsule 1     calcium citrate (CITRACAL) 950 (200 Ca) MG tablet Take 1 tablet by mouth 2 times daily       denosumab (PROLIA) 60 MG/ML SOLN injection Inject 60 mg Subcutaneous every 6 months       fluticasone-salmeterol (ADVAIR) 500-50 MCG/ACT inhaler Inhale 1 puff into the lungs every 12 hours 180 each 3     guaiFENesin (MUCINEX) 600 MG 12 hr tablet Take 1 tablet (600 mg) by mouth 2 times daily (Patient taking differently: Take 600 mg by mouth 2 times daily as needed)       hydrochlorothiazide (HYDRODIURIL) 25 MG tablet Take 1 tablet (25 mg) by mouth daily 90 tablet 3     ipratropium - albuterol 0.5 mg/2.5 mg/3 mL (DUONEB) 0.5-2.5 (3) MG/3ML neb solution Take 1 vial (3 mLs) by nebulization every 6 hours as needed for shortness of breath / dyspnea or wheezing 810 mL 3     levalbuterol (XOPENEX) 1.25 MG/3ML neb solution Take 3 mLs (1.25 mg) by nebulization every 4 hours as needed for shortness of breath or  wheezing 180 mL 3     levETIRAcetam (KEPPRA) 500 MG tablet Take 1 tablet (500 mg) by mouth 2 times daily 180 tablet 3     losartan (COZAAR) 50 MG tablet Take 1 tablet (50 mg) by mouth 2 times daily 180 tablet 3     Magnesium Oxide 500 MG TABS Take 500 mg by mouth 2 times daily       nystatin (MYCOSTATIN) 860769 UNIT/GM external powder Apply topically daily as needed       potassium chloride ER (KLOR-CON M) 20 MEQ CR tablet Take 1 tablet (20 mEq) by mouth At Bedtime 90 tablet 3     predniSONE (DELTASONE) 10 MG tablet Take 4 tablets (40 mg) by mouth daily for 3 days, THEN 3 tablets (30 mg) daily for 3 days, THEN 2 tablets (20 mg) daily for 3 days, THEN 1 tablet (10 mg) daily for 3 days. 30 tablet 0     RABEprazole (ACIPHEX) 20 MG EC tablet Take 1 tablet (20 mg) by mouth daily 90 tablet 3     sertraline (ZOLOFT) 100 MG tablet Take 1 tablet (100 mg) by mouth every evening 90 tablet 3     solifenacin (VESICARE) 10 MG tablet Take 1 tablet (10 mg) by mouth daily 90 tablet 3     spironolactone (ALDACTONE) 25 MG tablet Take 0.5 tablets (12.5 mg) by mouth daily 45 tablet 3     tiotropium (SPIRIVA HANDIHALER) 18 MCG inhaled capsule Inhale 1 capsule (18 mcg) into the lungs daily 90 capsule 3     vitamin D3 (CHOLECALCIFEROL) 125 MCG (5000 UT) tablet Take 5,000 Units by mouth daily          Physical Exam:  /62 (BP Location: Left arm, Patient Position: Chair, Cuff Size: Adult Regular)   Pulse 96   Wt 97.5 kg (215 lb)   SpO2 92%   BMI 36.90 kg/m      Physical Exam  Constitutional:       General: She is not in acute distress.     Appearance: She is not ill-appearing or diaphoretic.   HENT:      Nose: Nose normal.   Cardiovascular:      Rate and Rhythm: Normal rate and regular rhythm.      Pulses: Normal pulses.      Heart sounds: Normal heart sounds.   Pulmonary:      Effort: Pulmonary effort is normal. No respiratory distress.      Breath sounds: Wheezing and rhonchi (diffuse, expiratory ) present.      Comments:  Congested cough during exam  Musculoskeletal:      Right lower leg: Edema present.      Left lower leg: Edema present.   Skin:     General: Skin is warm and dry.      Findings: No rash.   Neurological:      Mental Status: She is alert.   Psychiatric:         Behavior: Behavior normal.       Labs:  @clab@  Lab Results   Component Value Date    WBC 9.4 05/15/2023    HGB 10.5 (L) 05/15/2023    HCT 36.4 05/15/2023     05/15/2023     05/15/2023    POTASSIUM 4.8 05/15/2023    CHLORIDE 99 05/15/2023    CO2 32 (H) 05/15/2023     (H) 05/15/2023    BUN 16.6 05/15/2023    CR 0.81 05/15/2023    MAG 1.7 (L) 03/25/2022    BILITOTAL 0.2 05/15/2023    AST 25 05/15/2023    ALT 14 05/15/2023    ALKPHOS 76 05/15/2023    PROTTOTAL 7.0 05/15/2023    ALBUMIN 4.2 05/15/2023       I have personally reviewed all pertinent imaging studies and PFT results unless otherwise noted.    Imaging studies:    CT CHEST PULMONARY EMBOLISM W CONTRAST, DATE/TIME: 5/15/2023 1:41 PM CDT  INDICATION: chest pain, SOB, elevated D dimer  COMPARISON: Chest x-ray 5/15/2023 and 3/11/2022  FINDINGS:  ANGIOGRAM CHEST: Negative for pulmonary emboli. Some mildly prominent central pulmonary arteries concerning for possible pulmonary artery hypertension. Thoracic aorta is negative for dissection. No CT evidence of right heart strain.  LUNGS AND PLEURA: Slightly elevated left hemidiaphragm similar to previous. Some slight probable fibrosis in the left lung base similar to chest x-ray. No acute new findings.  MEDIASTINUM/AXILLAE: Normal.  CORONARY ARTERY CALCIFICATION: Mild.                                                              IMPRESSION:  1.  Negative for pulmonary emboli.  2.  Mildly enlarged central pulmonary arteries suggestive of possible pulmonary artery hypertension.  3.  Slight fibrosis or atelectasis left lung base similar to chest x-ray. No acute findings in the lungs.    PFTs 04/2019:  FEV1/FVC is 0.53 and is reduced.  FEV1 is 40%  predicted and is reduced.  FVC is 59% predicted and is reduced.  There was no improvement in spirometry after a single inhaled dose of bronchodilator.  TLC is 95% predicted and is normal.  RV is 138% predicted and is increased.  RV/TLC is 0.63 and is increased.  DLCO is 42% predicted and is reduced when it   is corrected for hemoglobin.  The flow volume loop shows obstructive morphology.     Impression:  Full Pulmonary Function Test is abnormal. Spirometry is consistent with severe obstructive ventilatory defect.  Spirometry is not consistent with reversibility.  There is no hyperinflation.  There is air-trapping.  Diffusion capacity when corrected for hemoglobin is moderately reduced.

## 2023-05-30 ENCOUNTER — MEDICAL CORRESPONDENCE (OUTPATIENT)
Dept: HEALTH INFORMATION MANAGEMENT | Facility: CLINIC | Age: 78
End: 2023-05-30
Payer: MEDICARE

## 2023-05-31 ENCOUNTER — OFFICE VISIT (OUTPATIENT)
Dept: FAMILY MEDICINE | Facility: CLINIC | Age: 78
End: 2023-05-31
Payer: MEDICARE

## 2023-05-31 VITALS
TEMPERATURE: 97.2 F | RESPIRATION RATE: 22 BRPM | SYSTOLIC BLOOD PRESSURE: 150 MMHG | HEART RATE: 88 BPM | BODY MASS INDEX: 37.11 KG/M2 | OXYGEN SATURATION: 98 % | HEIGHT: 64 IN | WEIGHT: 217.4 LBS | DIASTOLIC BLOOD PRESSURE: 75 MMHG

## 2023-05-31 DIAGNOSIS — J43.9 PULMONARY EMPHYSEMA, UNSPECIFIED EMPHYSEMA TYPE (H): ICD-10-CM

## 2023-05-31 DIAGNOSIS — I89.0 ACQUIRED LYMPHEDEMA OF LEG: ICD-10-CM

## 2023-05-31 DIAGNOSIS — J96.11 CHRONIC RESPIRATORY FAILURE WITH HYPOXIA (H): ICD-10-CM

## 2023-05-31 DIAGNOSIS — I10 ESSENTIAL HYPERTENSION, BENIGN: Primary | ICD-10-CM

## 2023-05-31 PROBLEM — J44.9 COPD (CHRONIC OBSTRUCTIVE PULMONARY DISEASE) (H): Status: ACTIVE | Noted: 2021-07-16

## 2023-05-31 PROCEDURE — 99214 OFFICE O/P EST MOD 30 MIN: CPT | Performed by: PHYSICIAN ASSISTANT

## 2023-05-31 RX ORDER — AMLODIPINE BESYLATE 2.5 MG/1
2.5 TABLET ORAL DAILY
Qty: 30 TABLET | Refills: 0 | Status: SHIPPED | OUTPATIENT
Start: 2023-05-31 | End: 2023-07-03

## 2023-05-31 NOTE — PROGRESS NOTES
Assessment & Plan     Essential hypertension, benign  Chronic issue, BP persistently elevated.  Will start on low dose Amlodipine 2.5mg daily.  Recheck in 1 month.  Risks, benefits and alternatives were discussed with patient. Agreeable to the plan of care.  - amLODIPine (NORVASC) 2.5 MG tablet; Take 1 tablet (2.5 mg) by mouth daily    Pulmonary emphysema, unspecified emphysema type (H)  Chronic respiratory failure with hypoxia (H)  Chronic issue, with recent exacerbation.  Follows with Pulmonary.  Discussed course of COPD with multiple exacerbations that she may not improve back to baseline.  Continue current medication regimen.  Recommend updating PFTs as last done in 2019, could provide her with information on progression of illness.    Acquired lymphedema of leg  Chronic edema, refill of compression stocking given.  - Compression Sleeve/Stocking Order for DME - ONLY FOR DME             Risks, benefits and alternatives were discussed with patient. Agreeable to the plan of care.      Teresa Johnson PA-C  [facility]    Brenda Cast is a 78 year old, presenting for the following health issues:  Follow Up (Follow up from fall on 05/02/23, check wound on left wrist. /Also was in the ED on 05/15/23 for SOB and COPD.)        5/31/2023    12:08 PM   Additional Questions   Roomed by Nichol SALINAS CMA     History of Present Illness       Reason for visit:  Hosp discharge  Symptom onset:  3-4 weeks ago  Symptoms include:  Bruising  Symptom intensity:  Mild  Symptom progression:  Improving  Had these symptoms before:  No  What makes it worse:  NoShe consumes 1 sweetened beverage(s) daily.She exercises with enough effort to increase her heart rate 9 or less minutes per day.  She exercises with enough effort to increase her heart rate 3 or less days per week.   She is taking medications regularly.       Patient is here today to follow up on her blood pressure  She notes she is taking  "her BP medication faithfully  Notes she does have some intermittent leg swelling    She also is requesting paperwork to be completed for [employer]    Lastly, she noted that she was in ER for COPD  Did follow up with pulmonary provider  Taking her inhalers, Azith daily and nebs  Uses oxygen 24/7  Is feeling better from ER but not back to baseline          Review of Systems   Constitutional, HEENT, cardiovascular, pulmonary, gi and gu systems are negative, except as otherwise noted.      Objective    BP (!) 166/73 (BP Location: Left arm, Patient Position: Sitting, Cuff Size: Adult Regular)   Pulse 87   Temp 97.2  F (36.2  C) (Oral)   Resp 22   Ht 1.626 m (5' 4\")   Wt 98.6 kg (217 lb 6.4 oz)   SpO2 98%   BMI 37.32 kg/m    Body mass index is 37.32 kg/m .  Physical Exam   GENERAL: healthy, alert and no distress  NECK: no adenopathy, no asymmetry, masses, or scars and thyroid normal to palpation  RESP: lungs clear to auscultation - no rales, rhonchi or wheezes  CV: regular rate and rhythm, normal S1 S2, no S3 or S4, no murmur, click or rub, no peripheral edema and peripheral pulses strong  MS: no gross musculoskeletal defects noted, no edema                    "

## 2023-06-19 DIAGNOSIS — Z92.29 PERSONAL HISTORY OF OTHER DRUG THERAPY: ICD-10-CM

## 2023-06-19 DIAGNOSIS — M81.0 AGE-RELATED OSTEOPOROSIS WITHOUT CURRENT PATHOLOGICAL FRACTURE: Primary | ICD-10-CM

## 2023-06-20 ENCOUNTER — HOSPITAL ENCOUNTER (OUTPATIENT)
Dept: CT IMAGING | Facility: CLINIC | Age: 78
Discharge: HOME | End: 2023-06-20
Attending: INTERNAL MEDICINE | Admitting: INTERNAL MEDICINE
Payer: MEDICARE

## 2023-06-20 VITALS — DIASTOLIC BLOOD PRESSURE: 61 MMHG | SYSTOLIC BLOOD PRESSURE: 103 MMHG

## 2023-06-20 DIAGNOSIS — I35.0 MILD AORTIC VALVE STENOSIS: ICD-10-CM

## 2023-06-20 DIAGNOSIS — I25.118 ATHEROSCLEROSIS OF NATIVE CORONARY ARTERY OF NATIVE HEART WITH STABLE ANGINA PECTORIS (H): ICD-10-CM

## 2023-06-20 LAB
CREAT BLD-MCNC: 1 MG/DL (ref 0.6–1.1)
GFR SERPL CREATININE-BSD FRML MDRD: 57 ML/MIN/1.73M2

## 2023-06-20 PROCEDURE — 250N000011 HC RX IP 250 OP 636: Performed by: INTERNAL MEDICINE

## 2023-06-20 PROCEDURE — G1010 CDSM STANSON: HCPCS

## 2023-06-20 PROCEDURE — 75574 CT ANGIO HRT W/3D IMAGE: CPT | Mod: ME

## 2023-06-20 PROCEDURE — 250N000009 HC RX 250: Performed by: INTERNAL MEDICINE

## 2023-06-20 PROCEDURE — 82565 ASSAY OF CREATININE: CPT

## 2023-06-20 PROCEDURE — G1010 CDSM STANSON: HCPCS | Performed by: GENERAL ACUTE CARE HOSPITAL

## 2023-06-20 PROCEDURE — 250N000013 HC RX MED GY IP 250 OP 250 PS 637: Performed by: INTERNAL MEDICINE

## 2023-06-20 PROCEDURE — 75574 CT ANGIO HRT W/3D IMAGE: CPT | Mod: 26 | Performed by: GENERAL ACUTE CARE HOSPITAL

## 2023-06-20 RX ORDER — METOPROLOL TARTRATE 1 MG/ML
5 INJECTION, SOLUTION INTRAVENOUS
Status: DISCONTINUED | OUTPATIENT
Start: 2023-06-20 | End: 2023-06-21 | Stop reason: HOSPADM

## 2023-06-20 RX ORDER — IOPAMIDOL 755 MG/ML
100 INJECTION, SOLUTION INTRAVASCULAR ONCE
Status: COMPLETED | OUTPATIENT
Start: 2023-06-20 | End: 2023-06-20

## 2023-06-20 RX ORDER — NITROGLYCERIN 0.4 MG/1
0.4 TABLET SUBLINGUAL ONCE
Status: COMPLETED | OUTPATIENT
Start: 2023-06-20 | End: 2023-06-20

## 2023-06-20 RX ADMIN — IOPAMIDOL 100 ML: 755 INJECTION, SOLUTION INTRAVENOUS at 13:43

## 2023-06-20 RX ADMIN — METOPROLOL TARTRATE 5 MG: 1 INJECTION, SOLUTION INTRAVENOUS at 13:36

## 2023-06-20 RX ADMIN — METOPROLOL TARTRATE 5 MG: 1 INJECTION, SOLUTION INTRAVENOUS at 13:32

## 2023-06-20 RX ADMIN — NITROGLYCERIN 0.4 MG: 0.4 TABLET SUBLINGUAL at 13:39

## 2023-06-21 ENCOUNTER — ALLIED HEALTH/NURSE VISIT (OUTPATIENT)
Dept: ENDOCRINOLOGY | Facility: CLINIC | Age: 78
End: 2023-06-21
Payer: MEDICARE

## 2023-06-21 DIAGNOSIS — M81.0 AGE-RELATED OSTEOPOROSIS WITHOUT CURRENT PATHOLOGICAL FRACTURE: Primary | ICD-10-CM

## 2023-06-21 LAB
BSA FOR ECHO PROCEDURE: 2.03 M2
CCTA ASCENDING AORTA: 3.1
CCTA SINUS: 3

## 2023-06-21 PROCEDURE — 96372 THER/PROPH/DIAG INJ SC/IM: CPT | Mod: JZ | Performed by: NURSE PRACTITIONER

## 2023-06-21 PROCEDURE — 99207 PR NO CHARGE NURSE ONLY: CPT

## 2023-06-21 NOTE — PROGRESS NOTES
Clinic Administered Medication Documentation      Prolia Documentation    Indication: Prolia  (denosumab) is a prescription medicine used to treat osteoporosis in patients who:     Are at high risk for fracture, meaning patients who have had a fracture related to osteoporosis, or who have multiple risk factors for fracture.    Cannot use another osteoporosis medicine or other osteoporosis medicines did not work well.    The timeline for early/late injections would be 4 weeks early and any time after the 6 month edmar. If a patient receives their injection late, then the subsequent injection would be 6 months from the date that they actually received the injection.    When was the last injection?  22  Was the last injection at least 6 months ago? Yes  Has the prior authorization been completed?  Yes  Is there an active order (written within the past 365 days, with administrations remaining, not ) in the chart?  Yes  Patient denies any dental work involving the bone (e.g. tooth extraction or dental implants) in the past 4 weeks?  Yes  Patient denies plans for any dental work involving the bone (e.g. tooth extraction or dental implants) in the next 4 weeks? Yes    The following steps were completed to comply with the REMS program for Prolia:    Reviewed information in the Medication Guide and Patient Counseling Chart, including the serious risks of Prolia  and the symptoms of each risk.    Advised patient to seek prompt medical attention if they have signs or symptoms of any of the serious risks.    Provided each patient a copy of the Medication Guide and Patient Brochure.      Prior to injection, verified patient identity using patient's name and date of birth. Medication was administered. Please see MAR and medication order for additional information. Patient instructed to remain in clinic for 15 minutes and report any adverse reaction to staff immediately.    Vial/Syringe: Syringe  Was this medication  supplied by the patient? No  Verified that the patient has refills remaining in their prescription.

## 2023-06-22 DIAGNOSIS — I25.118 ATHEROSCLEROSIS OF NATIVE CORONARY ARTERY OF NATIVE HEART WITH STABLE ANGINA PECTORIS (H): Primary | ICD-10-CM

## 2023-06-22 DIAGNOSIS — I25.118 ATHEROSCLEROSIS OF NATIVE CORONARY ARTERY OF NATIVE HEART WITH STABLE ANGINA PECTORIS (H): ICD-10-CM

## 2023-06-22 DIAGNOSIS — R93.1 ABNORMAL CARDIAC CT ANGIOGRAPHY: Primary | ICD-10-CM

## 2023-06-22 DIAGNOSIS — Z01.812 PRE-OPERATIVE LABORATORY EXAMINATION: ICD-10-CM

## 2023-06-27 LAB
ABO/RH(D): NORMAL
ANTIBODY SCREEN: NEGATIVE
SPECIMEN EXPIRATION DATE: NORMAL

## 2023-06-28 ENCOUNTER — OFFICE VISIT (OUTPATIENT)
Dept: FAMILY MEDICINE | Facility: CLINIC | Age: 78
End: 2023-06-28
Payer: MEDICARE

## 2023-06-28 VITALS
HEIGHT: 64 IN | WEIGHT: 220.8 LBS | OXYGEN SATURATION: 92 % | RESPIRATION RATE: 20 BRPM | DIASTOLIC BLOOD PRESSURE: 58 MMHG | TEMPERATURE: 98.4 F | SYSTOLIC BLOOD PRESSURE: 131 MMHG | BODY MASS INDEX: 37.69 KG/M2 | HEART RATE: 73 BPM

## 2023-06-28 DIAGNOSIS — E66.01 MORBID OBESITY (H): ICD-10-CM

## 2023-06-28 DIAGNOSIS — I25.118 ATHEROSCLEROSIS OF NATIVE CORONARY ARTERY OF NATIVE HEART WITH STABLE ANGINA PECTORIS (H): ICD-10-CM

## 2023-06-28 DIAGNOSIS — G40.009 PARTIAL IDIOPATHIC EPILEPSY WITH SEIZURES OF LOCALIZED ONSET, NOT INTRACTABLE, WITHOUT STATUS EPILEPTICUS (H): ICD-10-CM

## 2023-06-28 DIAGNOSIS — J96.11 CHRONIC RESPIRATORY FAILURE WITH HYPOXIA (H): ICD-10-CM

## 2023-06-28 DIAGNOSIS — J43.9 PULMONARY EMPHYSEMA, UNSPECIFIED EMPHYSEMA TYPE (H): ICD-10-CM

## 2023-06-28 DIAGNOSIS — Z01.818 PREOPERATIVE EXAMINATION: Primary | ICD-10-CM

## 2023-06-28 DIAGNOSIS — R06.02 SHORTNESS OF BREATH: ICD-10-CM

## 2023-06-28 DIAGNOSIS — I27.81 COR PULMONALE (CHRONIC) (H): ICD-10-CM

## 2023-06-28 DIAGNOSIS — I73.9 PAD (PERIPHERAL ARTERY DISEASE) (H): ICD-10-CM

## 2023-06-28 DIAGNOSIS — Z01.812 PRE-OPERATIVE LABORATORY EXAMINATION: ICD-10-CM

## 2023-06-28 DIAGNOSIS — R60.9 EDEMA, UNSPECIFIED TYPE: ICD-10-CM

## 2023-06-28 LAB
ERYTHROCYTE [DISTWIDTH] IN BLOOD BY AUTOMATED COUNT: 15.3 % (ref 10–15)
HCT VFR BLD AUTO: 32.7 % (ref 35–47)
HGB BLD-MCNC: 10.1 G/DL (ref 11.7–15.7)
MCH RBC QN AUTO: 26.6 PG (ref 26.5–33)
MCHC RBC AUTO-ENTMCNC: 30.9 G/DL (ref 31.5–36.5)
MCV RBC AUTO: 86 FL (ref 78–100)
PLATELET # BLD AUTO: 260 10E3/UL (ref 150–450)
RBC # BLD AUTO: 3.8 10E6/UL (ref 3.8–5.2)
WBC # BLD AUTO: 8.8 10E3/UL (ref 4–11)

## 2023-06-28 PROCEDURE — 85027 COMPLETE CBC AUTOMATED: CPT | Performed by: PHYSICIAN ASSISTANT

## 2023-06-28 PROCEDURE — 80048 BASIC METABOLIC PNL TOTAL CA: CPT | Performed by: PHYSICIAN ASSISTANT

## 2023-06-28 PROCEDURE — 86900 BLOOD TYPING SEROLOGIC ABO: CPT | Performed by: PHYSICIAN ASSISTANT

## 2023-06-28 PROCEDURE — 83880 ASSAY OF NATRIURETIC PEPTIDE: CPT | Performed by: PHYSICIAN ASSISTANT

## 2023-06-28 PROCEDURE — 36415 COLL VENOUS BLD VENIPUNCTURE: CPT | Performed by: PHYSICIAN ASSISTANT

## 2023-06-28 PROCEDURE — 86850 RBC ANTIBODY SCREEN: CPT | Performed by: PHYSICIAN ASSISTANT

## 2023-06-28 PROCEDURE — 99214 OFFICE O/P EST MOD 30 MIN: CPT | Performed by: PHYSICIAN ASSISTANT

## 2023-06-28 PROCEDURE — 86901 BLOOD TYPING SEROLOGIC RH(D): CPT | Performed by: PHYSICIAN ASSISTANT

## 2023-06-28 RX ORDER — BENZONATATE 100 MG/1
100 CAPSULE ORAL 3 TIMES DAILY PRN
Qty: 30 CAPSULE | Refills: 0 | Status: SHIPPED | OUTPATIENT
Start: 2023-06-28 | End: 2023-07-24

## 2023-06-28 RX ORDER — FUROSEMIDE 20 MG
20 TABLET ORAL DAILY
Qty: 3 TABLET | Refills: 0 | Status: SHIPPED | OUTPATIENT
Start: 2023-06-28 | End: 2023-07-03

## 2023-06-28 NOTE — PROGRESS NOTES
[facility]  [address] [facility] [address]  Phone: [phone]  Fax: [phone]  Primary Provider: Kate Marte  Pre-op Performing Provider: ADRIENNE ARGUETA      PREOPERATIVE EVALUATION:  Today's date: 6/28/2023    Lalitha Underwood is a 78 year old female who presents for a preoperative evaluation.      6/28/2023    12:11 PM   Additional Questions   Roomed by      Surgical Information:  Surgery/Procedure: Coronary Angiogram  Surgery Location: [facility]  Surgeon: Dr. Leger  Surgery Date: 7/7/23  Time of Surgery: TBD  Where patient plans to recover: At home with family  Fax number for surgical facility: Note does not need to be faxed, will be available electronically in Epic.    Assessment & Plan     The proposed surgical procedure is considered INTERMEDIATE risk.    Preoperative examination  Atherosclerosis of native coronary artery of native heart with stable angina pectoris (H)  Patient having new onset angina, will be having angiogram with possible intervention as well has RHC.  - BNP-N terminal pro; Future  - ABO/Rh type and screen  - Basic metabolic panel  - CBC with platelets  - BNP-N terminal pro    Pulmonary emphysema, unspecified emphysema type (H)  Chronic respiratory failure with hypoxia (H)  Chronic issue, stable.  - benzonatate (TESSALON) 100 MG capsule; Take 1 capsule (100 mg) by mouth 3 times daily as needed for cough    Cor pulmonale (chronic) (H)  PAD (peripheral artery disease) (H)  Morbid obesity (H)  - Compression Sleeve/Stocking Order for DME - ONLY FOR DME  - BNP-N terminal pro; Future  - BNP-N terminal pro    Partial idiopathic epilepsy with seizures of localized onset, not intractable, without status epilepticus (H)  Chronic, stable.    Edema, unspecified type  Chronic issue, recently worsening. Suspect increased fluid overload.  Labs updated, compression stockings ordered.  Will trial a few days of Lasix.    Recheck in office  next week.  May also be 2/2 Amlodipine.  - Compression Sleeve/Stocking Order for DME - ONLY FOR DME  - Basic metabolic panel  (Ca, Cl, CO2, Creat, Gluc, K, Na, BUN); Future  - furosemide (LASIX) 20 MG tablet; Take 1 tablet (20 mg) by mouth daily for 3 days  - BNP-N terminal pro; Future  - BNP-N terminal pro    Shortness of breath  - BNP-N terminal pro; Future  - BNP-N terminal pro    Pre-operative laboratory examination  - ABO/Rh type and screen  - Basic metabolic panel  - CBC with platelets       Implanted Device:  Known pacemaker      Risks and Recommendations:  The patient has the following additional risks and recommendations for perioperative complications:  Pulmonary:    - PFT / Spirometry recommended prior to procedure   - Consider Respiratory Therapy (Respiratory Care IP Consult) post-op   - Recently treated pulmonary infection    Antiplatelet or Anticoagulation Medication Instructions:   - Patient is on no antiplatelet or anticoagulation medications.    Additional Medication Instructions:  Patient is to take all scheduled medications on the day of surgery    RECOMMENDATION:  APPROVAL GIVEN to proceed with proposed procedure, without further diagnostic evaluation.            Subjective       HPI related to upcoming procedure: patient having new angina, CTA showed occlusion in RCA and circumflex        6/26/2023     3:35 PM   Preop Questions   1. Have you ever had a heart attack or stroke? No   2. Have you ever had surgery on your heart or blood vessels, such as a stent placement, a coronary artery bypass, or surgery on an artery in your head, neck, heart, or legs? No   3. Do you have chest pain with activity? No   4. Do you have a history of  heart failure? No   5. Do you currently have a cold, bronchitis or symptoms of other infection? No   6. Do you have a cough, shortness of breath, or wheezing? YES - emphysema/COPD   7. Do you or anyone in your family have previous history of blood clots? No   8. Do you  or does anyone in your family have a serious bleeding problem such as prolonged bleeding following surgeries or cuts? No   9. Have you ever had problems with anemia or been told to take iron pills? No   10. Have you had any abnormal blood loss such as black, tarry or bloody stools, or abnormal vaginal bleeding? No   11. Have you ever had a blood transfusion? No   12. Are you willing to have a blood transfusion if it is medically needed before, during, or after your surgery? YES   13. Have you or any of your relatives ever had problems with anesthesia? No   14. Do you have sleep apnea, excessive snoring or daytime drowsiness? No   15. Do you have any artifical heart valves or other implanted medical devices like a pacemaker, defibrillator, or continuous glucose monitor? YES - pacemaker   15a. What type of device do you have? pacemaker   15b. Name of the clinic that manages your device:  [facility]   16. Do you have artificial joints? No   17. Are you allergic to latex? No       Health Care Directive:  Patient has a Health Care Directive on file      Preoperative Review of :   reviewed - no record of controlled substances prescribed.      Review of Systems  CONSTITUTIONAL: NEGATIVE for fever, chills, change in weight  INTEGUMENTARY/SKIN: NEGATIVE for worrisome rashes, moles or lesions  EYES: NEGATIVE for vision changes or irritation  ENT/MOUTH: NEGATIVE for ear, mouth and throat problems  RESP: NEGATIVE for significant cough or SOB  CV: NEGATIVE for chest pain, palpitations, + worsening leg swelling  GI: NEGATIVE for nausea, abdominal pain, heartburn, or change in bowel habits  : NEGATIVE for frequency, dysuria, or hematuria  MUSCULOSKELETAL: NEGATIVE for significant arthralgias or myalgia  NEURO: NEGATIVE for weakness,or paresthesias, + dizziness  ENDOCRINE: NEGATIVE for temperature intolerance, skin/hair changes  HEME: NEGATIVE for bleeding problems  PSYCHIATRIC: NEGATIVE for changes in mood or  affect    Patient Active Problem List    Diagnosis Date Noted     Acquired absence of other left toe(s) (H) 04/27/2023     Priority: Medium     Cor pulmonale (chronic) (H) 04/27/2023     Priority: Medium     Chronic respiratory failure with hypoxia (H) 04/27/2023     Priority: Medium     Atherosclerosis of native coronary artery with stable angina pectoris (H) 03/31/2023     Priority: Medium     Cardiac pacemaker in situ 03/25/2022     Priority: Medium     Second degree heart block 03/19/2022     Priority: Medium     PAD (peripheral artery disease) (H) 10/18/2021     Priority: Medium     Epileptic seizure (H) 07/16/2021     Priority: Medium     Formatting of this note might be different from the original.  Created by Conversion    Replacement Utility updated for latest IMO load       Esophageal reflux 07/16/2021     Priority: Medium     Formatting of this note might be different from the original.  Created by Conversion       Hyperlipidemia 07/16/2021     Priority: Medium     Formatting of this note might be different from the original.  Created by Conversion       Impaired gait and mobility 07/16/2021     Priority: Medium     Formatting of this note might be different from the original.  Created by Conversion       COPD (chronic obstructive pulmonary disease) (H) 07/16/2021     Priority: Medium     Formatting of this note might be different from the original.  Created by Conversion       Rosacea 07/16/2021     Priority: Medium     Formatting of this note might be different from the original.  Created by Conversion       Vitamin D deficiency 07/16/2021     Priority: Medium     Formatting of this note might be different from the original.  Created by Conversion    Replacement Utility updated for latest IMO load       Morbid obesity (H)      Priority: Medium     Uterine prolapse 03/01/2021     Priority: Medium     Acquired lymphedema of leg 02/01/2021     Priority: Medium     Neuropathy, idiopathic 02/21/2019      Priority: Medium     S/P repair of paraesophageal hernia 11/26/2018     Priority: Medium     Decreased hearing of both ears 05/29/2018     Priority: Medium     Osteoporosis 02/08/2018     Priority: Medium     Mild aortic valve stenosis 11/17/2017     Priority: Medium     Urge incontinence of urine 11/17/2017     Priority: Medium     Valgus deformity of both feet 03/15/2017     Priority: Medium     Collapsed arches 02/01/2017     Priority: Medium     Essential hypertension, benign      Priority: Medium     Created by Conversion  Replacement Utility updated for latest IMO load          Past Medical History:   Diagnosis Date     Convulsive disorder (H)      Convulsive disorder (H)      COPD (chronic obstructive pulmonary disease) (H)      COPD (chronic obstructive pulmonary disease) (H)      Coronary artery disease involving native coronary artery with unstable angina pectoris (H) 3/31/2023     Depression      Dyspnea on exertion      Gastroesophageal reflux disease      Heart murmur      Hernia of unspecified site of abdominal cavity without mention of obstruction or gangrene      Hiatal hernia      Hiatal hernia      Hypertension      Hypertension      Obese      On home oxygen therapy     at 1.5 liters at nite     Osteopenia      Osteopenia      Oxygen dependent     1.5 L per NC     Pneumonia due to infectious organism, unspecified laterality, unspecified part of lung 3/19/2022     Second degree heart block 3/19/2022     Shortness of breath      Uncomplicated asthma      URI (upper respiratory infection)      Venous insufficiency of both lower extremities      Venous insufficiency of both lower extremities      Walking troubles      Past Surgical History:   Procedure Laterality Date     AMPUTATE TOE(S) Left 8/11/2022    Procedure: AMPUTATION, fifth digit left foot;  Surgeon: Alfonso Orozco DPM;  Location: [facility]     ARTHROPLASTY TOE(S) Left 11/22/2021    Procedure: ARTHROPLASTY, digits two and  three left foot;  Surgeon: Alfonso Orozco DPM;  Location: [facility]     ARTHROPLASTY TOE(S) Left 7/18/2022    Procedure: ARTHROPLASTY, digits four and five left foot;  Surgeon: Alfonso Orozco DPM;  Location: [facility]     EP PACEMAKER DEVICE & LEAD IMPLANT- RIGHT ATRIAL & RIGHT VENTRICULAR N/A 3/24/2022    Procedure: Pacemaker Device & Lead Implant - Right Atrial & Right Ventricular;  Surgeon: Helder Pendleton MD;  Location: [facility]     ESOPHAGOSCOPY, GASTROSCOPY, DUODENOSCOPY (EGD), COMBINED N/A 11/26/2018    Procedure: Esophagogastroduodenoscopy;  Surgeon: Jasmeet Wilder MD;  Location: [facility] OR     HERNIA REPAIR, UMBILICAL  04/2018     HERNIA REPAIR, UMBILICAL  04/04/2018    Dr. 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     Repair arm fracture Left      SHOULDER SURGERY Right 1967     SHOULDER SURGERY Right 1967     US BIOPSY THYROID FINE NEEDLE ASPIRATION  7/29/2020     Current Outpatient Medications   Medication Sig Dispense Refill     albuterol (PROAIR HFA/PROVENTIL HFA/VENTOLIN HFA) 108 (90 Base) MCG/ACT inhaler Inhale 2 puffs into the lungs every 6 hours as needed for shortness of breath or wheezing 18 g 3     amLODIPine (NORVASC) 2.5 MG tablet Take 1 tablet (2.5 mg) by mouth daily 30 tablet 0     azithromycin (ZITHROMAX) 250 MG tablet Take 1 tablet (250 mg) by mouth every evening 90 tablet 3     benzonatate (TESSALON) 100 MG capsule Take 1 capsule (100 mg) by mouth 3 times daily as needed for cough 30 capsule 1     calcium citrate (CITRACAL) 950 (200 Ca) MG tablet Take 1 tablet by mouth 2 times daily       denosumab (PROLIA) 60 MG/ML SOLN injection Inject 60 mg Subcutaneous every 6 months       fluticasone-salmeterol (ADVAIR) 500-50 MCG/ACT inhaler  Inhale 1 puff into the lungs every 12 hours 180 each 3     guaiFENesin (MUCINEX) 600 MG 12 hr tablet Take 1 tablet (600 mg) by mouth 2 times daily (Patient taking differently: Take 600 mg by mouth 2 times daily as needed)       hydrochlorothiazide (HYDRODIURIL) 25 MG tablet Take 1 tablet (25 mg) by mouth daily 90 tablet 3     ipratropium - albuterol 0.5 mg/2.5 mg/3 mL (DUONEB) 0.5-2.5 (3) MG/3ML neb solution Take 1 vial (3 mLs) by nebulization every 6 hours as needed for shortness of breath / dyspnea or wheezing 810 mL 3     levalbuterol (XOPENEX) 1.25 MG/3ML neb solution Take 3 mLs (1.25 mg) by nebulization every 4 hours as needed for shortness of breath or wheezing 180 mL 3     levETIRAcetam (KEPPRA) 500 MG tablet Take 1 tablet (500 mg) by mouth 2 times daily 180 tablet 3     losartan (COZAAR) 50 MG tablet Take 1 tablet (50 mg) by mouth 2 times daily 180 tablet 3     Magnesium Oxide 500 MG TABS Take 500 mg by mouth 2 times daily       nystatin (MYCOSTATIN) 458891 UNIT/GM external powder Apply topically daily as needed       potassium chloride ER (KLOR-CON M) 20 MEQ CR tablet Take 1 tablet (20 mEq) by mouth At Bedtime 90 tablet 3     RABEprazole (ACIPHEX) 20 MG EC tablet Take 1 tablet (20 mg) by mouth daily 90 tablet 3     sertraline (ZOLOFT) 100 MG tablet Take 1 tablet (100 mg) by mouth every evening 90 tablet 3     solifenacin (VESICARE) 10 MG tablet Take 1 tablet (10 mg) by mouth daily 90 tablet 3     spironolactone (ALDACTONE) 25 MG tablet Take 0.5 tablets (12.5 mg) by mouth daily 45 tablet 3     tiotropium (SPIRIVA HANDIHALER) 18 MCG inhaled capsule Inhale 1 capsule (18 mcg) into the lungs daily 90 capsule 3     vitamin D3 (CHOLECALCIFEROL) 125 MCG (5000 UT) tablet Take 5,000 Units by mouth daily          Allergies   Allergen Reactions     Clindamycin Rash     Carbamazepine Rash     Morphine Nausea        Social History     Tobacco Use     Smoking status: Former     Packs/day: 1.50     Years: 38.00     Pack  "years: 57.00     Types: Cigarettes     Quit date: 1998     Years since quittin.5     Smokeless tobacco: Never     Tobacco comments:     quit in    Substance Use Topics     Alcohol use: Yes     Alcohol/week: 2.0 standard drinks of alcohol     Comment: occ       History   Drug Use Unknown         Objective     BP (!) 149/85   Pulse 73   Temp 98.4  F (36.9  C)   Resp 20   Ht 1.626 m (5' 4\")   Wt 100.2 kg (220 lb 12.8 oz)   SpO2 92%   BMI 37.90 kg/m      Physical Exam    GENERAL APPEARANCE: healthy, alert and no distress     EYES: EOMI, PERRL     HENT: ear canals and TM's normal and nose and mouth without ulcers or lesions     NECK: no adenopathy, no asymmetry, masses, or scars and thyroid normal to palpation     RESP: lungs clear to auscultation - no rales, rhonchi or wheezes     CV: regular rates and rhythm, normal S1 S2, no S3 or S4 and no murmur, click or rub, + 1 edema bilateral     ABDOMEN:  soft, nontender, no HSM or masses and bowel sounds normal     MS: extremities normal- no gross deformities noted, no evidence of inflammation in joints, FROM in all extremities.     SKIN: no suspicious lesions or rashes     NEURO: Normal strength and tone, sensory exam grossly normal, mentation intact and speech normal     PSYCH: mentation appears normal. and affect normal/bright     LYMPHATICS: No cervical adenopathy    Recent Labs   Lab Test 23  1333 05/15/23  1149 23  1322 22  1441 22  1110   HGB  --  10.5* 11.5*   < > 14.0   PLT  --  213 244   < > 243   NA  --  138 138   < > 137   POTASSIUM  --  4.8 4.2   < > 4.5   CR 1.0 0.81 1.00*   < > 0.82   A1C  --   --   --   --  5.4    < > = values in this interval not displayed.        Diagnostics:  No labs were ordered during this visit.   No EKG this visit, completed in the last 90 days.    Revised Cardiac Risk Index (RCRI):  The patient has the following serious cardiovascular risks for perioperative complications:   - Coronary Artery " Disease (MI, positive stress test, angina, Qs on EKG) = 1 point     RCRI Interpretation: 0 points: Class I (very low risk - 0.4% complication rate)           Signed Electronically by: Teresa Johnson PA-C  Copy of this evaluation report is provided to requesting physician.

## 2023-06-28 NOTE — H&P (VIEW-ONLY)
[facility]  [address] [facility] [address]  Phone: [phone]  Fax: [phone]  Primary Provider: Kate Marte  Pre-op Performing Provider: ADRIENNE ARGUETA      PREOPERATIVE EVALUATION:  Today's date: 6/28/2023    Lalitha Underwood is a 78 year old female who presents for a preoperative evaluation.      6/28/2023    12:11 PM   Additional Questions   Roomed by      Surgical Information:  Surgery/Procedure: Coronary Angiogram  Surgery Location: [facility]  Surgeon: Dr. Leger  Surgery Date: 7/7/23  Time of Surgery: TBD  Where patient plans to recover: At home with family  Fax number for surgical facility: Note does not need to be faxed, will be available electronically in Epic.    Assessment & Plan     The proposed surgical procedure is considered INTERMEDIATE risk.    Preoperative examination  Atherosclerosis of native coronary artery of native heart with stable angina pectoris (H)  Patient having new onset angina, will be having angiogram with possible intervention as well has RHC.  - BNP-N terminal pro; Future  - ABO/Rh type and screen  - Basic metabolic panel  - CBC with platelets  - BNP-N terminal pro    Pulmonary emphysema, unspecified emphysema type (H)  Chronic respiratory failure with hypoxia (H)  Chronic issue, stable.  - benzonatate (TESSALON) 100 MG capsule; Take 1 capsule (100 mg) by mouth 3 times daily as needed for cough    Cor pulmonale (chronic) (H)  PAD (peripheral artery disease) (H)  Morbid obesity (H)  - Compression Sleeve/Stocking Order for DME - ONLY FOR DME  - BNP-N terminal pro; Future  - BNP-N terminal pro    Partial idiopathic epilepsy with seizures of localized onset, not intractable, without status epilepticus (H)  Chronic, stable.    Edema, unspecified type  Chronic issue, recently worsening. Suspect increased fluid overload.  Labs updated, compression stockings ordered.  Will trial a few days of Lasix.    Recheck in office  next week.  May also be 2/2 Amlodipine.  - Compression Sleeve/Stocking Order for DME - ONLY FOR DME  - Basic metabolic panel  (Ca, Cl, CO2, Creat, Gluc, K, Na, BUN); Future  - furosemide (LASIX) 20 MG tablet; Take 1 tablet (20 mg) by mouth daily for 3 days  - BNP-N terminal pro; Future  - BNP-N terminal pro    Shortness of breath  - BNP-N terminal pro; Future  - BNP-N terminal pro    Pre-operative laboratory examination  - ABO/Rh type and screen  - Basic metabolic panel  - CBC with platelets       Implanted Device:  Known pacemaker      Risks and Recommendations:  The patient has the following additional risks and recommendations for perioperative complications:  Pulmonary:    - PFT / Spirometry recommended prior to procedure   - Consider Respiratory Therapy (Respiratory Care IP Consult) post-op   - Recently treated pulmonary infection    Antiplatelet or Anticoagulation Medication Instructions:   - Patient is on no antiplatelet or anticoagulation medications.    Additional Medication Instructions:  Patient is to take all scheduled medications on the day of surgery    RECOMMENDATION:  APPROVAL GIVEN to proceed with proposed procedure, without further diagnostic evaluation.            Subjective       HPI related to upcoming procedure: patient having new angina, CTA showed occlusion in RCA and circumflex        6/26/2023     3:35 PM   Preop Questions   1. Have you ever had a heart attack or stroke? No   2. Have you ever had surgery on your heart or blood vessels, such as a stent placement, a coronary artery bypass, or surgery on an artery in your head, neck, heart, or legs? No   3. Do you have chest pain with activity? No   4. Do you have a history of  heart failure? No   5. Do you currently have a cold, bronchitis or symptoms of other infection? No   6. Do you have a cough, shortness of breath, or wheezing? YES - emphysema/COPD   7. Do you or anyone in your family have previous history of blood clots? No   8. Do you  or does anyone in your family have a serious bleeding problem such as prolonged bleeding following surgeries or cuts? No   9. Have you ever had problems with anemia or been told to take iron pills? No   10. Have you had any abnormal blood loss such as black, tarry or bloody stools, or abnormal vaginal bleeding? No   11. Have you ever had a blood transfusion? No   12. Are you willing to have a blood transfusion if it is medically needed before, during, or after your surgery? YES   13. Have you or any of your relatives ever had problems with anesthesia? No   14. Do you have sleep apnea, excessive snoring or daytime drowsiness? No   15. Do you have any artifical heart valves or other implanted medical devices like a pacemaker, defibrillator, or continuous glucose monitor? YES - pacemaker   15a. What type of device do you have? pacemaker   15b. Name of the clinic that manages your device:  [facility]   16. Do you have artificial joints? No   17. Are you allergic to latex? No       Health Care Directive:  Patient has a Health Care Directive on file      Preoperative Review of :   reviewed - no record of controlled substances prescribed.      Review of Systems  CONSTITUTIONAL: NEGATIVE for fever, chills, change in weight  INTEGUMENTARY/SKIN: NEGATIVE for worrisome rashes, moles or lesions  EYES: NEGATIVE for vision changes or irritation  ENT/MOUTH: NEGATIVE for ear, mouth and throat problems  RESP: NEGATIVE for significant cough or SOB  CV: NEGATIVE for chest pain, palpitations, + worsening leg swelling  GI: NEGATIVE for nausea, abdominal pain, heartburn, or change in bowel habits  : NEGATIVE for frequency, dysuria, or hematuria  MUSCULOSKELETAL: NEGATIVE for significant arthralgias or myalgia  NEURO: NEGATIVE for weakness,or paresthesias, + dizziness  ENDOCRINE: NEGATIVE for temperature intolerance, skin/hair changes  HEME: NEGATIVE for bleeding problems  PSYCHIATRIC: NEGATIVE for changes in mood or  affect    Patient Active Problem List    Diagnosis Date Noted     Acquired absence of other left toe(s) (H) 04/27/2023     Priority: Medium     Cor pulmonale (chronic) (H) 04/27/2023     Priority: Medium     Chronic respiratory failure with hypoxia (H) 04/27/2023     Priority: Medium     Atherosclerosis of native coronary artery with stable angina pectoris (H) 03/31/2023     Priority: Medium     Cardiac pacemaker in situ 03/25/2022     Priority: Medium     Second degree heart block 03/19/2022     Priority: Medium     PAD (peripheral artery disease) (H) 10/18/2021     Priority: Medium     Epileptic seizure (H) 07/16/2021     Priority: Medium     Formatting of this note might be different from the original.  Created by Conversion    Replacement Utility updated for latest IMO load       Esophageal reflux 07/16/2021     Priority: Medium     Formatting of this note might be different from the original.  Created by Conversion       Hyperlipidemia 07/16/2021     Priority: Medium     Formatting of this note might be different from the original.  Created by Conversion       Impaired gait and mobility 07/16/2021     Priority: Medium     Formatting of this note might be different from the original.  Created by Conversion       COPD (chronic obstructive pulmonary disease) (H) 07/16/2021     Priority: Medium     Formatting of this note might be different from the original.  Created by Conversion       Rosacea 07/16/2021     Priority: Medium     Formatting of this note might be different from the original.  Created by Conversion       Vitamin D deficiency 07/16/2021     Priority: Medium     Formatting of this note might be different from the original.  Created by Conversion    Replacement Utility updated for latest IMO load       Morbid obesity (H)      Priority: Medium     Uterine prolapse 03/01/2021     Priority: Medium     Acquired lymphedema of leg 02/01/2021     Priority: Medium     Neuropathy, idiopathic 02/21/2019      Priority: Medium     S/P repair of paraesophageal hernia 11/26/2018     Priority: Medium     Decreased hearing of both ears 05/29/2018     Priority: Medium     Osteoporosis 02/08/2018     Priority: Medium     Mild aortic valve stenosis 11/17/2017     Priority: Medium     Urge incontinence of urine 11/17/2017     Priority: Medium     Valgus deformity of both feet 03/15/2017     Priority: Medium     Collapsed arches 02/01/2017     Priority: Medium     Essential hypertension, benign      Priority: Medium     Created by Conversion  Replacement Utility updated for latest IMO load          Past Medical History:   Diagnosis Date     Convulsive disorder (H)      Convulsive disorder (H)      COPD (chronic obstructive pulmonary disease) (H)      COPD (chronic obstructive pulmonary disease) (H)      Coronary artery disease involving native coronary artery with unstable angina pectoris (H) 3/31/2023     Depression      Dyspnea on exertion      Gastroesophageal reflux disease      Heart murmur      Hernia of unspecified site of abdominal cavity without mention of obstruction or gangrene      Hiatal hernia      Hiatal hernia      Hypertension      Hypertension      Obese      On home oxygen therapy     at 1.5 liters at nite     Osteopenia      Osteopenia      Oxygen dependent     1.5 L per NC     Pneumonia due to infectious organism, unspecified laterality, unspecified part of lung 3/19/2022     Second degree heart block 3/19/2022     Shortness of breath      Uncomplicated asthma      URI (upper respiratory infection)      Venous insufficiency of both lower extremities      Venous insufficiency of both lower extremities      Walking troubles      Past Surgical History:   Procedure Laterality Date     AMPUTATE TOE(S) Left 8/11/2022    Procedure: AMPUTATION, fifth digit left foot;  Surgeon: Alfonso Orozco DPM;  Location: [facility]     ARTHROPLASTY TOE(S) Left 11/22/2021    Procedure: ARTHROPLASTY, digits two and  three left foot;  Surgeon: Alfonso Orozco DPM;  Location: [facility]     ARTHROPLASTY TOE(S) Left 7/18/2022    Procedure: ARTHROPLASTY, digits four and five left foot;  Surgeon: Alfonso Orozco DPM;  [facility]     EP PACEMAKER DEVICE & LEAD IMPLANT- RIGHT ATRIAL & RIGHT VENTRICULAR N/A 3/24/2022    Procedure: Pacemaker Device & Lead Implant - Right Atrial & Right Ventricular;  Surgeon: Helder Pendleton MD;  Location: [facility]     ESOPHAGOSCOPY, GASTROSCOPY, DUODENOSCOPY (EGD), COMBINED N/A 11/26/2018    Procedure: Esophagogastroduodenoscopy;  Surgeon: Jasmeet Wilder MD;  Location: [facility] OR     HERNIA REPAIR, UMBILICAL  04/2018     HERNIA REPAIR, UMBILICAL  04/04/2018    Dr. 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     Repair arm fracture Left      SHOULDER SURGERY Right 1967     SHOULDER SURGERY Right 1967     US BIOPSY THYROID FINE NEEDLE ASPIRATION  7/29/2020     Current Outpatient Medications   Medication Sig Dispense Refill     albuterol (PROAIR HFA/PROVENTIL HFA/VENTOLIN HFA) 108 (90 Base) MCG/ACT inhaler Inhale 2 puffs into the lungs every 6 hours as needed for shortness of breath or wheezing 18 g 3     amLODIPine (NORVASC) 2.5 MG tablet Take 1 tablet (2.5 mg) by mouth daily 30 tablet 0     azithromycin (ZITHROMAX) 250 MG tablet Take 1 tablet (250 mg) by mouth every evening 90 tablet 3     benzonatate (TESSALON) 100 MG capsule Take 1 capsule (100 mg) by mouth 3 times daily as needed for cough 30 capsule 1     calcium citrate (CITRACAL) 950 (200 Ca) MG tablet Take 1 tablet by mouth 2 times daily       denosumab (PROLIA) 60 MG/ML SOLN injection Inject 60 mg Subcutaneous every 6 months       fluticasone-salmeterol (ADVAIR) 500-50 MCG/ACT inhaler  Inhale 1 puff into the lungs every 12 hours 180 each 3     guaiFENesin (MUCINEX) 600 MG 12 hr tablet Take 1 tablet (600 mg) by mouth 2 times daily (Patient taking differently: Take 600 mg by mouth 2 times daily as needed)       hydrochlorothiazide (HYDRODIURIL) 25 MG tablet Take 1 tablet (25 mg) by mouth daily 90 tablet 3     ipratropium - albuterol 0.5 mg/2.5 mg/3 mL (DUONEB) 0.5-2.5 (3) MG/3ML neb solution Take 1 vial (3 mLs) by nebulization every 6 hours as needed for shortness of breath / dyspnea or wheezing 810 mL 3     levalbuterol (XOPENEX) 1.25 MG/3ML neb solution Take 3 mLs (1.25 mg) by nebulization every 4 hours as needed for shortness of breath or wheezing 180 mL 3     levETIRAcetam (KEPPRA) 500 MG tablet Take 1 tablet (500 mg) by mouth 2 times daily 180 tablet 3     losartan (COZAAR) 50 MG tablet Take 1 tablet (50 mg) by mouth 2 times daily 180 tablet 3     Magnesium Oxide 500 MG TABS Take 500 mg by mouth 2 times daily       nystatin (MYCOSTATIN) 080642 UNIT/GM external powder Apply topically daily as needed       potassium chloride ER (KLOR-CON M) 20 MEQ CR tablet Take 1 tablet (20 mEq) by mouth At Bedtime 90 tablet 3     RABEprazole (ACIPHEX) 20 MG EC tablet Take 1 tablet (20 mg) by mouth daily 90 tablet 3     sertraline (ZOLOFT) 100 MG tablet Take 1 tablet (100 mg) by mouth every evening 90 tablet 3     solifenacin (VESICARE) 10 MG tablet Take 1 tablet (10 mg) by mouth daily 90 tablet 3     spironolactone (ALDACTONE) 25 MG tablet Take 0.5 tablets (12.5 mg) by mouth daily 45 tablet 3     tiotropium (SPIRIVA HANDIHALER) 18 MCG inhaled capsule Inhale 1 capsule (18 mcg) into the lungs daily 90 capsule 3     vitamin D3 (CHOLECALCIFEROL) 125 MCG (5000 UT) tablet Take 5,000 Units by mouth daily          Allergies   Allergen Reactions     Clindamycin Rash     Carbamazepine Rash     Morphine Nausea        Social History     Tobacco Use     Smoking status: Former     Packs/day: 1.50     Years: 38.00     Pack  "years: 57.00     Types: Cigarettes     Quit date: 1998     Years since quittin.5     Smokeless tobacco: Never     Tobacco comments:     quit in    Substance Use Topics     Alcohol use: Yes     Alcohol/week: 2.0 standard drinks of alcohol     Comment: occ       History   Drug Use Unknown         Objective     BP (!) 149/85   Pulse 73   Temp 98.4  F (36.9  C)   Resp 20   Ht 1.626 m (5' 4\")   Wt 100.2 kg (220 lb 12.8 oz)   SpO2 92%   BMI 37.90 kg/m      Physical Exam    GENERAL APPEARANCE: healthy, alert and no distress     EYES: EOMI, PERRL     HENT: ear canals and TM's normal and nose and mouth without ulcers or lesions     NECK: no adenopathy, no asymmetry, masses, or scars and thyroid normal to palpation     RESP: lungs clear to auscultation - no rales, rhonchi or wheezes     CV: regular rates and rhythm, normal S1 S2, no S3 or S4 and no murmur, click or rub, + 1 edema bilateral     ABDOMEN:  soft, nontender, no HSM or masses and bowel sounds normal     MS: extremities normal- no gross deformities noted, no evidence of inflammation in joints, FROM in all extremities.     SKIN: no suspicious lesions or rashes     NEURO: Normal strength and tone, sensory exam grossly normal, mentation intact and speech normal     PSYCH: mentation appears normal. and affect normal/bright     LYMPHATICS: No cervical adenopathy    Recent Labs   Lab Test 23  1333 05/15/23  1149 23  1322 22  1441 22  1110   HGB  --  10.5* 11.5*   < > 14.0   PLT  --  213 244   < > 243   NA  --  138 138   < > 137   POTASSIUM  --  4.8 4.2   < > 4.5   CR 1.0 0.81 1.00*   < > 0.82   A1C  --   --   --   --  5.4    < > = values in this interval not displayed.        Diagnostics:  No labs were ordered during this visit.   No EKG this visit, completed in the last 90 days.    Revised Cardiac Risk Index (RCRI):  The patient has the following serious cardiovascular risks for perioperative complications:   - Coronary Artery " Disease (MI, positive stress test, angina, Qs on EKG) = 1 point     RCRI Interpretation: 0 points: Class I (very low risk - 0.4% complication rate)           Signed Electronically by: Teresa Johnson PA-C  Copy of this evaluation report is provided to requesting physician.

## 2023-06-29 LAB
ANION GAP SERPL CALCULATED.3IONS-SCNC: 12 MMOL/L (ref 7–15)
BUN SERPL-MCNC: 28.5 MG/DL (ref 8–23)
CALCIUM SERPL-MCNC: 9.7 MG/DL (ref 8.8–10.2)
CHLORIDE SERPL-SCNC: 97 MMOL/L (ref 98–107)
CREAT SERPL-MCNC: 0.97 MG/DL (ref 0.51–0.95)
DEPRECATED HCO3 PLAS-SCNC: 28 MMOL/L (ref 22–29)
GFR SERPL CREATININE-BSD FRML MDRD: 60 ML/MIN/1.73M2
GLUCOSE SERPL-MCNC: 102 MG/DL (ref 70–99)
NT-PROBNP SERPL-MCNC: 218 PG/ML (ref 0–1800)
POTASSIUM SERPL-SCNC: 4.4 MMOL/L (ref 3.4–5.3)
SODIUM SERPL-SCNC: 137 MMOL/L (ref 136–145)

## 2023-06-30 ENCOUNTER — TELEPHONE (OUTPATIENT)
Dept: FAMILY MEDICINE | Facility: CLINIC | Age: 78
End: 2023-06-30
Payer: MEDICARE

## 2023-06-30 ENCOUNTER — MYC MEDICAL ADVICE (OUTPATIENT)
Dept: CARDIOLOGY | Facility: CLINIC | Age: 78
End: 2023-06-30
Payer: MEDICARE

## 2023-06-30 NOTE — TELEPHONE ENCOUNTER
Called and spoke with Serene,    She took her amlodipine today however she will stop taking it further. Also she will stop her lasix.    Storm Roldan RN     [facility]    ----- Message from Teresa Johnson PA-C sent at 6/30/2023  8:58 AM CDT -----  Please call patient:    Her BNP (heart failure lab) was normal.    Given her leg swelling this is not due to her heart, likely due to the Amlodipine.    Recommend she not take any further Lasix and actually have her stop her Amlodipine to see if the swelling improves.    Teresa Johnson PA-C

## 2023-07-03 ENCOUNTER — OFFICE VISIT (OUTPATIENT)
Dept: FAMILY MEDICINE | Facility: CLINIC | Age: 78
End: 2023-07-03
Payer: MEDICARE

## 2023-07-03 VITALS
SYSTOLIC BLOOD PRESSURE: 133 MMHG | BODY MASS INDEX: 37.22 KG/M2 | WEIGHT: 218 LBS | HEART RATE: 98 BPM | OXYGEN SATURATION: 94 % | HEIGHT: 64 IN | DIASTOLIC BLOOD PRESSURE: 76 MMHG

## 2023-07-03 DIAGNOSIS — R60.9 EDEMA, UNSPECIFIED TYPE: Primary | ICD-10-CM

## 2023-07-03 DIAGNOSIS — I10 ESSENTIAL HYPERTENSION, BENIGN: ICD-10-CM

## 2023-07-03 PROCEDURE — 99213 OFFICE O/P EST LOW 20 MIN: CPT | Performed by: PHYSICIAN ASSISTANT

## 2023-07-03 NOTE — PROGRESS NOTES
"  Assessment & Plan     Edema, unspecified type  Chronic issue, persistent edema though improved.  Suspect related to her Norvasc. Continue cessation of medication.  May take 1 more tab of her Lasix.  Continue to wear compression stockings.  Recheck in 1 month.  - PRIMARY CARE FOLLOW-UP SCHEDULING; Future    Essential hypertension, benign  Chronic issue, BP at goal despite stopping Norvasc.  Follow up in 1 month.               Risks, benefits and alternatives were discussed with patient. Agreeable to the plan of care.      Teresa Johnson PA-C  [facility]    Brenda Cast is a 78 year old, presenting for the following health issues:  Hypertension (Bp check)        7/3/2023     1:32 PM   Additional Questions   Roomed by RITA Fernandez CMA     History of Present Illness       Hypertension: She presents for follow up of hypertension.  She does not check blood pressure  regularly outside of the clinic. Outpatient blood pressures have not been over 140/90. She follows a low salt diet.     She eats 2-3 servings of fruits and vegetables daily.She consumes 1 sweetened beverage(s) daily.   She is taking medications regularly.         Patient is here today to follow up on her blood pressure  She was in clinic last week for lower leg swelling, had been started on Norvasc 2.5mg and then the swelling started  We did start her on Lasix 20mEq x 3 days, and checked her labs  Labs were normal, she only took 2 doses of the Lasix and we advised she stop the Norvasc  Today in office she notes she did lose 2 lbs of water weight after 1 dose of Lasix  She continues to have some edema in the legs but maybe a bit better  No chest pain  Is a bit wheexy      Review of Systems   Constitutional, HEENT, cardiovascular, pulmonary, gi and gu systems are negative, except as otherwise noted.      Objective    /76   Pulse 98   Ht 1.626 m (5' 4\")   Wt 98.9 kg (218 lb)   SpO2 94%   BMI 37.42 kg/m  "   Body mass index is 37.42 kg/m .  Physical Exam   GENERAL: healthy, alert and no distress  NECK: no adenopathy, no asymmetry, masses, or scars and thyroid normal to palpation  RESP: faint expiratory wheeze throughout  CV: regular rate and rhythm, normal S1 S2, no S3 or S4, no murmur, click or rub, +1-2 bilateral pitting peripheral edema and peripheral pulses strong  ABDOMEN: soft, nontender, no hepatosplenomegaly, no masses and bowel sounds normal  MS: no gross musculoskeletal defects noted, no edema    Office Visit on 06/28/2023   Component Date Value Ref Range Status     Sodium 06/28/2023 137  136 - 145 mmol/L Final     Potassium 06/28/2023 4.4  3.4 - 5.3 mmol/L Final     Chloride 06/28/2023 97 (L)  98 - 107 mmol/L Final     Carbon Dioxide (CO2) 06/28/2023 28  22 - 29 mmol/L Final     Anion Gap 06/28/2023 12  7 - 15 mmol/L Final     Urea Nitrogen 06/28/2023 28.5 (H)  8.0 - 23.0 mg/dL Final     Creatinine 06/28/2023 0.97 (H)  0.51 - 0.95 mg/dL Final     Calcium 06/28/2023 9.7  8.8 - 10.2 mg/dL Final     Glucose 06/28/2023 102 (H)  70 - 99 mg/dL Final     GFR Estimate 06/28/2023 60 (L)  >60 mL/min/1.73m2 Final     WBC Count 06/28/2023 8.8  4.0 - 11.0 10e3/uL Final     RBC Count 06/28/2023 3.80  3.80 - 5.20 10e6/uL Final     Hemoglobin 06/28/2023 10.1 (L)  11.7 - 15.7 g/dL Final     Hematocrit 06/28/2023 32.7 (L)  35.0 - 47.0 % Final     MCV 06/28/2023 86  78 - 100 fL Final     MCH 06/28/2023 26.6  26.5 - 33.0 pg Final     MCHC 06/28/2023 30.9 (L)  31.5 - 36.5 g/dL Final     RDW 06/28/2023 15.3 (H)  10.0 - 15.0 % Final     Platelet Count 06/28/2023 260  150 - 450 10e3/uL Final     N Terminal Pro BNP Outpatient 06/28/2023 218  0 - 1,800 pg/mL Final    Reference range shown and results flagged as abnormal are for the outpatient, non acute settings. Establishing a baseline value for each individual patient is useful for follow-up.    Suggested inpatient cut points for confirming diagnosis of CHF in an acute setting  are:  >450 pg/mL (age 18 to less than 50)  >900 pg/mL (age 50 to less than 75)  >1800 pg/mL (75 yrs and older)    An inpatient or emergency department NT-proPBNP <300 pg/mL effectively rules out acute CHF, with 99% negative predictive value.         ABO/RH(D) 06/28/2023 A NEG   Final     Antibody Screen 06/28/2023 Negative  Negative Final     SPECIMEN EXPIRATION DATE 06/28/2023 [id number]   Final

## 2023-07-05 ENCOUNTER — ANCILLARY PROCEDURE (OUTPATIENT)
Dept: CARDIOLOGY | Facility: CLINIC | Age: 78
End: 2023-07-05
Attending: INTERNAL MEDICINE
Payer: MEDICARE

## 2023-07-05 DIAGNOSIS — I44.1 SECOND DEGREE MOBITZ II AV BLOCK: ICD-10-CM

## 2023-07-05 DIAGNOSIS — Z95.0 PACEMAKER: ICD-10-CM

## 2023-07-05 LAB
ABO/RH(D): NORMAL
ANTIBODY SCREEN: NEGATIVE
SPECIMEN EXPIRATION DATE: NORMAL

## 2023-07-06 ENCOUNTER — PREP FOR PROCEDURE (OUTPATIENT)
Dept: CARDIOLOGY | Facility: CLINIC | Age: 78
End: 2023-07-06

## 2023-07-06 ENCOUNTER — TELEPHONE (OUTPATIENT)
Dept: CARDIOLOGY | Facility: CLINIC | Age: 78
End: 2023-07-06

## 2023-07-06 ENCOUNTER — LAB (OUTPATIENT)
Dept: LAB | Facility: HOSPITAL | Age: 78
End: 2023-07-06
Payer: MEDICARE

## 2023-07-06 DIAGNOSIS — I25.118 ATHEROSCLEROSIS OF NATIVE CORONARY ARTERY OF NATIVE HEART WITH STABLE ANGINA PECTORIS (H): ICD-10-CM

## 2023-07-06 DIAGNOSIS — Z01.812 PRE-OPERATIVE LABORATORY EXAMINATION: ICD-10-CM

## 2023-07-06 DIAGNOSIS — Z01.812 PRE-OPERATIVE LABORATORY EXAMINATION: Primary | ICD-10-CM

## 2023-07-06 DIAGNOSIS — I25.118 ATHEROSCLEROSIS OF NATIVE CORONARY ARTERY OF NATIVE HEART WITH STABLE ANGINA PECTORIS (H): Primary | ICD-10-CM

## 2023-07-06 DIAGNOSIS — R93.1 ABNORMAL CARDIAC CT ANGIOGRAPHY: ICD-10-CM

## 2023-07-06 LAB
ANION GAP SERPL CALCULATED.3IONS-SCNC: 11 MMOL/L (ref 7–15)
BUN SERPL-MCNC: 26 MG/DL (ref 8–23)
CALCIUM SERPL-MCNC: 10 MG/DL (ref 8.8–10.2)
CHLORIDE SERPL-SCNC: 98 MMOL/L (ref 98–107)
CREAT SERPL-MCNC: 0.95 MG/DL (ref 0.51–0.95)
DEPRECATED HCO3 PLAS-SCNC: 28 MMOL/L (ref 22–29)
ERYTHROCYTE [DISTWIDTH] IN BLOOD BY AUTOMATED COUNT: 15.4 % (ref 10–15)
GFR SERPL CREATININE-BSD FRML MDRD: 61 ML/MIN/1.73M2
GLUCOSE SERPL-MCNC: 135 MG/DL (ref 70–99)
HCT VFR BLD AUTO: 34.7 % (ref 35–47)
HGB BLD-MCNC: 10.5 G/DL (ref 11.7–15.7)
MCH RBC QN AUTO: 25.9 PG (ref 26.5–33)
MCHC RBC AUTO-ENTMCNC: 30.3 G/DL (ref 31.5–36.5)
MCV RBC AUTO: 86 FL (ref 78–100)
PLATELET # BLD AUTO: 235 10E3/UL (ref 150–450)
POTASSIUM SERPL-SCNC: 4.2 MMOL/L (ref 3.4–5.3)
RBC # BLD AUTO: 4.05 10E6/UL (ref 3.8–5.2)
SODIUM SERPL-SCNC: 137 MMOL/L (ref 136–145)
WBC # BLD AUTO: 9.7 10E3/UL (ref 4–11)

## 2023-07-06 PROCEDURE — 36415 COLL VENOUS BLD VENIPUNCTURE: CPT

## 2023-07-06 PROCEDURE — 86850 RBC ANTIBODY SCREEN: CPT

## 2023-07-06 PROCEDURE — 85027 COMPLETE CBC AUTOMATED: CPT

## 2023-07-06 PROCEDURE — 86901 BLOOD TYPING SEROLOGIC RH(D): CPT

## 2023-07-06 PROCEDURE — 82310 ASSAY OF CALCIUM: CPT

## 2023-07-06 RX ORDER — ASPIRIN 325 MG
325 TABLET ORAL ONCE
Status: CANCELLED | OUTPATIENT
Start: 2023-07-06 | End: 2023-07-06

## 2023-07-06 RX ORDER — FENTANYL CITRATE 50 UG/ML
25 INJECTION, SOLUTION INTRAMUSCULAR; INTRAVENOUS
Status: CANCELLED | OUTPATIENT
Start: 2023-07-06

## 2023-07-06 RX ORDER — LIDOCAINE 40 MG/G
CREAM TOPICAL
Status: CANCELLED | OUTPATIENT
Start: 2023-07-06

## 2023-07-06 RX ORDER — ASPIRIN 81 MG/1
243 TABLET, CHEWABLE ORAL ONCE
Status: CANCELLED | OUTPATIENT
Start: 2023-07-06

## 2023-07-06 RX ORDER — SODIUM CHLORIDE 9 MG/ML
INJECTION, SOLUTION INTRAVENOUS CONTINUOUS
Status: CANCELLED | OUTPATIENT
Start: 2023-07-06

## 2023-07-06 NOTE — TELEPHONE ENCOUNTER
Received a call from [facility] lab. Pt was supposed to have pre-procedure labs today at the  clinic but she present to [facility] outpatient lab. IN addition, her PMD bridgette her pre-procedure labs at pre-op appt too early. Replaced lab orders since patient is currently in outpatient lab now. -[facility]

## 2023-07-06 NOTE — TELEPHONE ENCOUNTER
Lalitha SALINAS [facility]  [address]  [facility] [address]  [phone] (home)     Procedure cardiologist:  Dr. Leger or Dr. Denton  PCP:  Kate Marte  H&P completed by:  6/28/23, PCP  Admit date: 7/7/23  Arrival time:  0630  Anticoagulation: None  CPAP: No  **Patient wears 2-4L O2 via NC chronically  Previous PCI: No  Bypass Grafts: No  Renal Issues: No  Diabetic?: No  Device?: Yes  Type:  [employer] pacer  Ambulation status: independent with walker    Patient Education/  Angiogram Teaching    Reason for Visit:  Telephone call to discuss pre-procedure education in preparation for: Ca poss PCI, RHC w/shunt run    Procedure Prep:  Primary Cardiologist note dated: 3/31/23, LBF  EKG results obtained, dated: am of procedure  Hemogram results obtained: 7/6/23  Basic Metabolic Panel results obtained: 7/6/23  Lipid Profile results obtained: am of procedure  Pertinent cardiac test results: 6/20/23, Roberto        Pre-procedure instructions  Patient instructed to be NPO per anesthesia guidelines.  Patient instructed to shower the evening before or the morning of the procedure.  Patient instructed to arrange for transportation home following procedure from a responsible family member of friend. No driving for at least 24 hours.  Patient instructed to have a responsible adult with them for 24 hours post-procedure.  Post-procedure follow up process.  Conscious sedation discussed.    Pre-procedure medication instructions  Patient instructed on antiplatelet medication.  Continue medications as scheduled, with a small amount of water on the day of the procedure unless indicated.  Patient instructed to take 325 mg of Aspirin am of procedure: Yes  Other medication: instructed to hold hydrochlorothiazide and spironolactone  a.m. of the procedure.      Patient states understanding of procedure and agrees to proceed.    *PATIENTS RECORDS AVAILABLE IN [place] UNLESS OTHERWISE INDICATED*      Patient Active Problem List   Diagnosis     S/P  repair of paraesophageal hernia     Essential hypertension, benign     Acquired lymphedema of leg     Collapsed arches     Decreased hearing of both ears     Epileptic seizure (H)     Esophageal reflux     Hyperlipidemia     Impaired gait and mobility     Mild aortic valve stenosis     Morbid obesity (H)     Neuropathy, idiopathic     Osteoporosis     COPD (chronic obstructive pulmonary disease) (H)     Rosacea     Urge incontinence of urine     Uterine prolapse     Valgus deformity of both feet     Vitamin D deficiency     PAD (peripheral artery disease) (H)     Second degree heart block     Cardiac pacemaker in situ     Atherosclerosis of native coronary artery with stable angina pectoris (H)     Acquired absence of other left toe(s) (H)     Cor pulmonale (chronic) (H)     Chronic respiratory failure with hypoxia (H)       Current Outpatient Medications   Medication Sig Dispense Refill     albuterol (PROAIR HFA/PROVENTIL HFA/VENTOLIN HFA) 108 (90 Base) MCG/ACT inhaler Inhale 2 puffs into the lungs every 6 hours as needed for shortness of breath or wheezing 18 g 3     azithromycin (ZITHROMAX) 250 MG tablet Take 1 tablet (250 mg) by mouth every evening 90 tablet 3     benzonatate (TESSALON) 100 MG capsule Take 1 capsule (100 mg) by mouth 3 times daily as needed for cough 30 capsule 0     benzonatate (TESSALON) 100 MG capsule Take 1 capsule (100 mg) by mouth 3 times daily as needed for cough 30 capsule 1     calcium citrate (CITRACAL) 950 (200 Ca) MG tablet Take 1 tablet by mouth 2 times daily       denosumab (PROLIA) 60 MG/ML SOLN injection Inject 60 mg Subcutaneous every 6 months       fluticasone-salmeterol (ADVAIR) 500-50 MCG/ACT inhaler Inhale 1 puff into the lungs every 12 hours 180 each 3     guaiFENesin (MUCINEX) 600 MG 12 hr tablet Take 1 tablet (600 mg) by mouth 2 times daily (Patient taking differently: Take 600 mg by mouth 2 times daily as needed)       hydrochlorothiazide (HYDRODIURIL) 25 MG tablet Take  1 tablet (25 mg) by mouth daily 90 tablet 3     ipratropium - albuterol 0.5 mg/2.5 mg/3 mL (DUONEB) 0.5-2.5 (3) MG/3ML neb solution Take 1 vial (3 mLs) by nebulization every 6 hours as needed for shortness of breath / dyspnea or wheezing 810 mL 3     levalbuterol (XOPENEX) 1.25 MG/3ML neb solution Take 3 mLs (1.25 mg) by nebulization every 4 hours as needed for shortness of breath or wheezing (Patient not taking: Reported on 7/3/2023) 180 mL 3     levETIRAcetam (KEPPRA) 500 MG tablet Take 1 tablet (500 mg) by mouth 2 times daily 180 tablet 3     losartan (COZAAR) 50 MG tablet Take 1 tablet (50 mg) by mouth 2 times daily 180 tablet 3     Magnesium Oxide 500 MG TABS Take 500 mg by mouth 2 times daily       nystatin (MYCOSTATIN) 284006 UNIT/GM external powder Apply topically daily as needed       potassium chloride ER (KLOR-CON M) 20 MEQ CR tablet Take 1 tablet (20 mEq) by mouth At Bedtime 90 tablet 3     RABEprazole (ACIPHEX) 20 MG EC tablet Take 1 tablet (20 mg) by mouth daily 90 tablet 3     sertraline (ZOLOFT) 100 MG tablet Take 1 tablet (100 mg) by mouth every evening 90 tablet 3     solifenacin (VESICARE) 10 MG tablet Take 1 tablet (10 mg) by mouth daily 90 tablet 3     spironolactone (ALDACTONE) 25 MG tablet Take 0.5 tablets (12.5 mg) by mouth daily 45 tablet 3     tiotropium (SPIRIVA HANDIHALER) 18 MCG inhaled capsule Inhale 1 capsule (18 mcg) into the lungs daily 90 capsule 3     vitamin D3 (CHOLECALCIFEROL) 125 MCG (5000 UT) tablet Take 5,000 Units by mouth daily          Allergies   Allergen Reactions     Clindamycin Rash     Carbamazepine Rash     Morphine Nausea         Afshan Evans, RN, BSN

## 2023-07-07 ENCOUNTER — HOSPITAL ENCOUNTER (OUTPATIENT)
Facility: HOSPITAL | Age: 78
Discharge: HOME | End: 2023-07-07
Attending: INTERNAL MEDICINE | Admitting: INTERNAL MEDICINE
Payer: MEDICARE

## 2023-07-07 ENCOUNTER — DOCUMENTATION ONLY (OUTPATIENT)
Dept: CARDIOLOGY | Facility: CLINIC | Age: 78
End: 2023-07-07

## 2023-07-07 VITALS
HEIGHT: 64 IN | HEART RATE: 91 BPM | RESPIRATION RATE: 16 BRPM | OXYGEN SATURATION: 98 % | DIASTOLIC BLOOD PRESSURE: 66 MMHG | SYSTOLIC BLOOD PRESSURE: 121 MMHG | TEMPERATURE: 98.2 F | WEIGHT: 216 LBS | BODY MASS INDEX: 36.88 KG/M2

## 2023-07-07 DIAGNOSIS — R93.1 ABNORMAL CARDIAC CT ANGIOGRAPHY: ICD-10-CM

## 2023-07-07 DIAGNOSIS — I25.118 ATHEROSCLEROSIS OF NATIVE CORONARY ARTERY OF NATIVE HEART WITH STABLE ANGINA PECTORIS (H): ICD-10-CM

## 2023-07-07 LAB
ATRIAL RATE - MUSE: 79 BPM
BASE EXCESS BLDV CALC-SCNC: 4.7 MMOL/L
BASE EXCESS BLDV CALC-SCNC: 4.8 MMOL/L
BASE EXCESS BLDV CALC-SCNC: 5.7 MMOL/L
BASE EXCESS BLDV CALC-SCNC: 5.9 MMOL/L
BASE EXCESS BLDV CALC-SCNC: 5.9 MMOL/L
BASE EXCESS BLDV CALC-SCNC: 6.1 MMOL/L
BASE EXCESS BLDV CALC-SCNC: 6.1 MMOL/L
BASE EXCESS BLDV CALC-SCNC: 7.3 MMOL/L
CHOLEST SERPL-MCNC: 181 MG/DL
DIASTOLIC BLOOD PRESSURE - MUSE: NORMAL MMHG
HCO3 BLDV-SCNC: 27 MMOL/L (ref 24–30)
HCO3 BLDV-SCNC: 28 MMOL/L (ref 24–30)
HCO3 BLDV-SCNC: 29 MMOL/L (ref 24–30)
HCO3 BLDV-SCNC: 29 MMOL/L (ref 24–30)
HCO3 BLDV-SCNC: 30 MMOL/L (ref 24–30)
HDLC SERPL-MCNC: 69 MG/DL
INTERPRETATION ECG - MUSE: NORMAL
LDLC SERPL CALC-MCNC: 99 MG/DL
NONHDLC SERPL-MCNC: 112 MG/DL
P AXIS - MUSE: 69 DEGREES
PCO2 BLDV: 57 MM HG (ref 35–50)
PCO2 BLDV: 58 MM HG (ref 35–50)
PCO2 BLDV: 59 MM HG (ref 35–50)
PCO2 BLDV: 60 MM HG (ref 35–50)
PCO2 BLDV: 61 MM HG (ref 35–50)
PCO2 BLDV: 65 MM HG (ref 35–50)
PH BLDV: 7.31 [PH] (ref 7.35–7.45)
PH BLDV: 7.32 [PH] (ref 7.35–7.45)
PH BLDV: 7.32 [PH] (ref 7.35–7.45)
PH BLDV: 7.33 [PH] (ref 7.35–7.45)
PH BLDV: 7.33 [PH] (ref 7.35–7.45)
PH BLDV: 7.34 [PH] (ref 7.35–7.45)
PH BLDV: 7.35 [PH] (ref 7.35–7.45)
PH BLDV: 7.35 [PH] (ref 7.35–7.45)
PO2 BLDV: 118 MM HG (ref 25–47)
PO2 BLDV: 178 MM HG (ref 25–47)
PO2 BLDV: 38 MM HG (ref 25–47)
PO2 BLDV: 46 MM HG (ref 25–47)
PO2 BLDV: 47 MM HG (ref 25–47)
PO2 BLDV: 52 MM HG (ref 25–47)
PO2 BLDV: 57 MM HG (ref 25–47)
PO2 BLDV: 79 MM HG (ref 25–47)
PR INTERVAL - MUSE: 264 MS
QRS DURATION - MUSE: 164 MS
QT - MUSE: 440 MS
QTC - MUSE: 504 MS
R AXIS - MUSE: 71 DEGREES
SATV LHE POCT: 100 % (ref 70–75)
SATV LHE POCT: 66.8 % (ref 70–75)
SATV LHE POCT: 77.9 % (ref 70–75)
SATV LHE POCT: 80 % (ref 70–75)
SATV LHE POCT: 83.8 % (ref 70–75)
SATV LHE POCT: 87.7 % (ref 70–75)
SATV LHE POCT: 96 % (ref 70–75)
SATV LHE POCT: 99.3 % (ref 70–75)
SYSTOLIC BLOOD PRESSURE - MUSE: NORMAL MMHG
T AXIS - MUSE: 43 DEGREES
TRIGL SERPL-MCNC: 67 MG/DL
VENTRICULAR RATE- MUSE: 79 BPM

## 2023-07-07 PROCEDURE — 93460 R&L HRT ART/VENTRICLE ANGIO: CPT | Performed by: INTERNAL MEDICINE

## 2023-07-07 PROCEDURE — C1894 INTRO/SHEATH, NON-LASER: HCPCS | Performed by: INTERNAL MEDICINE

## 2023-07-07 PROCEDURE — C1887 CATHETER, GUIDING: HCPCS | Performed by: INTERNAL MEDICINE

## 2023-07-07 PROCEDURE — 80061 LIPID PANEL: CPT | Performed by: INTERNAL MEDICINE

## 2023-07-07 PROCEDURE — 999N000054 HC STATISTIC EKG NON-CHARGEABLE

## 2023-07-07 PROCEDURE — 999N000127 HC STATISTIC PERIPHERAL IV START W US GUIDANCE

## 2023-07-07 PROCEDURE — 255N000002 HC RX 255 OP 636: Performed by: INTERNAL MEDICINE

## 2023-07-07 PROCEDURE — 250N000009 HC RX 250: Performed by: INTERNAL MEDICINE

## 2023-07-07 PROCEDURE — 250N000011 HC RX IP 250 OP 636: Performed by: INTERNAL MEDICINE

## 2023-07-07 PROCEDURE — 93005 ELECTROCARDIOGRAM TRACING: CPT

## 2023-07-07 PROCEDURE — 272N000001 HC OR GENERAL SUPPLY STERILE: Performed by: INTERNAL MEDICINE

## 2023-07-07 PROCEDURE — 93460 R&L HRT ART/VENTRICLE ANGIO: CPT | Mod: 26 | Performed by: INTERNAL MEDICINE

## 2023-07-07 PROCEDURE — 250N000013 HC RX MED GY IP 250 OP 250 PS 637: Performed by: NURSE PRACTITIONER

## 2023-07-07 PROCEDURE — 82805 BLOOD GASES W/O2 SATURATION: CPT | Mod: 91

## 2023-07-07 PROCEDURE — C1769 GUIDE WIRE: HCPCS | Performed by: INTERNAL MEDICINE

## 2023-07-07 PROCEDURE — 93010 ELECTROCARDIOGRAM REPORT: CPT | Mod: HOP | Performed by: INTERNAL MEDICINE

## 2023-07-07 PROCEDURE — 258N000003 HC RX IP 258 OP 636: Performed by: INTERNAL MEDICINE

## 2023-07-07 PROCEDURE — 36415 COLL VENOUS BLD VENIPUNCTURE: CPT | Performed by: INTERNAL MEDICINE

## 2023-07-07 RX ORDER — FLUMAZENIL 0.1 MG/ML
0.2 INJECTION, SOLUTION INTRAVENOUS
Status: DISCONTINUED | OUTPATIENT
Start: 2023-07-07 | End: 2023-07-07 | Stop reason: HOSPADM

## 2023-07-07 RX ORDER — NALOXONE HYDROCHLORIDE 0.4 MG/ML
0.4 INJECTION, SOLUTION INTRAMUSCULAR; INTRAVENOUS; SUBCUTANEOUS
Status: DISCONTINUED | OUTPATIENT
Start: 2023-07-07 | End: 2023-07-07 | Stop reason: HOSPADM

## 2023-07-07 RX ORDER — ASPIRIN 325 MG
325 TABLET ORAL ONCE
Status: DISCONTINUED | OUTPATIENT
Start: 2023-07-07 | End: 2023-07-07 | Stop reason: HOSPADM

## 2023-07-07 RX ORDER — ASPIRIN 81 MG/1
243 TABLET, CHEWABLE ORAL ONCE
Status: DISCONTINUED | OUTPATIENT
Start: 2023-07-07 | End: 2023-07-07 | Stop reason: HOSPADM

## 2023-07-07 RX ORDER — NALOXONE HYDROCHLORIDE 0.4 MG/ML
0.2 INJECTION, SOLUTION INTRAMUSCULAR; INTRAVENOUS; SUBCUTANEOUS
Status: DISCONTINUED | OUTPATIENT
Start: 2023-07-07 | End: 2023-07-07 | Stop reason: HOSPADM

## 2023-07-07 RX ORDER — LIDOCAINE 40 MG/G
CREAM TOPICAL
Status: DISCONTINUED | OUTPATIENT
Start: 2023-07-07 | End: 2023-07-07 | Stop reason: HOSPADM

## 2023-07-07 RX ORDER — OXYCODONE HYDROCHLORIDE 5 MG/1
10 TABLET ORAL EVERY 4 HOURS PRN
Status: DISCONTINUED | OUTPATIENT
Start: 2023-07-07 | End: 2023-07-07 | Stop reason: HOSPADM

## 2023-07-07 RX ORDER — FENTANYL CITRATE 50 UG/ML
25 INJECTION, SOLUTION INTRAMUSCULAR; INTRAVENOUS
Status: DISCONTINUED | OUTPATIENT
Start: 2023-07-07 | End: 2023-07-07 | Stop reason: HOSPADM

## 2023-07-07 RX ORDER — DIAZEPAM 5 MG
5 TABLET ORAL ONCE
Status: COMPLETED | OUTPATIENT
Start: 2023-07-07 | End: 2023-07-07

## 2023-07-07 RX ORDER — IODIXANOL 320 MG/ML
INJECTION, SOLUTION INTRAVASCULAR
Status: DISCONTINUED | OUTPATIENT
Start: 2023-07-07 | End: 2023-07-07 | Stop reason: HOSPADM

## 2023-07-07 RX ORDER — FENTANYL CITRATE 50 UG/ML
INJECTION, SOLUTION INTRAMUSCULAR; INTRAVENOUS
Status: DISCONTINUED | OUTPATIENT
Start: 2023-07-07 | End: 2023-07-07 | Stop reason: HOSPADM

## 2023-07-07 RX ORDER — ATROPINE SULFATE 0.1 MG/ML
0.5 INJECTION INTRAVENOUS
Status: DISCONTINUED | OUTPATIENT
Start: 2023-07-07 | End: 2023-07-07 | Stop reason: HOSPADM

## 2023-07-07 RX ORDER — OXYCODONE HYDROCHLORIDE 5 MG/1
5 TABLET ORAL EVERY 4 HOURS PRN
Status: DISCONTINUED | OUTPATIENT
Start: 2023-07-07 | End: 2023-07-07 | Stop reason: HOSPADM

## 2023-07-07 RX ORDER — SODIUM CHLORIDE 9 MG/ML
INJECTION, SOLUTION INTRAVENOUS CONTINUOUS
Status: DISCONTINUED | OUTPATIENT
Start: 2023-07-07 | End: 2023-07-07 | Stop reason: HOSPADM

## 2023-07-07 RX ORDER — ACETAMINOPHEN 325 MG/1
650 TABLET ORAL EVERY 4 HOURS PRN
Status: DISCONTINUED | OUTPATIENT
Start: 2023-07-07 | End: 2023-07-07 | Stop reason: HOSPADM

## 2023-07-07 RX ADMIN — DIAZEPAM 5 MG: 5 TABLET ORAL at 07:42

## 2023-07-07 RX ADMIN — SODIUM CHLORIDE: 9 INJECTION, SOLUTION INTRAVENOUS at 07:28

## 2023-07-07 ASSESSMENT — ACTIVITIES OF DAILY LIVING (ADL)
ADLS_ACUITY_SCORE: 35

## 2023-07-07 ASSESSMENT — EJECTION FRACTION: EF_VALUE: .32

## 2023-07-07 NOTE — INTERVAL H&P NOTE
"I have reviewed the surgical (or preoperative) H&P that is linked to this encounter, and examined the patient. There are no significant changes    Clinical Conditions Present on Arrival:  Clinically Significant Risk Factors Present on Admission                  # Obesity: Estimated body mass index is 37.42 kg/m  as calculated from the following:    Height as of 7/3/23: 1.626 m (5' 4\").    Weight as of 7/3/23: 98.9 kg (218 lb).       "

## 2023-07-07 NOTE — DISCHARGE INSTRUCTIONS

## 2023-07-07 NOTE — PROGRESS NOTES
Is the implanted device safe for MRI Exam?  Yes  Is this device 3T compatible? Yes  Device Type: Pacemaker      Device Information: [employer], Dual Chamber and PPM  [employer] MRI and (EL)      Cardiology Orders for Device Programming     -- Yes -- The patient has a MRI conditional pulse generator and leads from the same     -- Yes -- The pulse generator and leads have been implanted for at least 6 weeks. (Does not apply to [employer] devices)    -- Yes-- The device is implanted in the right or left pectoral region    -- Yes -- There are not any additional active cardiac devices, abandoned leads, lead extenders or adapters    -- Yes -- The device lead impedance measurements are within the normal range. (Manufacture recommendations: Medtronic Advisa and Revo 200-1,500 ohms; Medtronic ICD and CRT's 200-3000 ohms and defibrillation lead impedance   ohms)    -- Yes -- If the patient is pacemaker dependent the thresholds are less than or equal to 2.0V @ 0.4ms.    -- Yes -- RA and RV leads are programmed to bipolar pacing operation or pacing off. If no, CONTACT THE DEVICE REP FOR PROGRAMMING     Date of last In clinic Device check: 3/927184  Results of last Device check:  1.   Right atrium impedance: 398  2.   Right ventricle impedance: 424  3.   Left ventricle impedance: n/a     4.   Right atrium threshold: 0.7V@0.4ms  5.   Right ventricle threshold: 1.0V@0.4  6.   Left ventricle threshold: n/a     Device programming during the scan guidelines   Pacing Mode (check one): DOO  Pacing Rate: 80  bpm or 10 bpm > intrinsic rhythm  Zamzam Aguilar Device RN

## 2023-07-07 NOTE — PRE-PROCEDURE
GENERAL PRE-PROCEDURE:   Procedure:  Coronary angiogram with possible PCI, RHC with shunt run  Date/Time:  7/7/2023 7:01 AM    Written consent obtained?: Yes    Risks and benefits: Risks, benefits and alternatives were discussed    Consent given by:  Patient  Patient states understanding of procedure being performed: Yes    Patient's understanding of procedure matches consent: Yes    Procedure consent matches procedure scheduled: Yes    Expected level of sedation:  Moderate  Appropriately NPO:  Yes  ASA Class:  3 (CAD, aortic stenosis, PPM in situ, hypercholesterolemia, centrilolobular emphysema, Class II obesity; BMI 37.42kg/m2, chronic anemia)  Mallampati  :  Grade 3- soft palate visible, posterior pharyngeal wall not visible  Lungs:  Wheezes and other (comment)  Lung exam comment:  Expiratory wheezes throughout  Heart:  Other (comment)  Heart exam comment:  Distant   History & Physical reviewed:  History and physical reviewed and no updates needed  Statement of review:  I have reviewed the lab findings, diagnostic data, medications, and the plan for sedation

## 2023-07-13 ENCOUNTER — TELEPHONE (OUTPATIENT)
Dept: CARDIOLOGY | Facility: CLINIC | Age: 78
End: 2023-07-13

## 2023-07-13 DIAGNOSIS — Q26.2 TOTAL ANOMALOUS PULMONARY VENOUS RETURN: ICD-10-CM

## 2023-07-13 DIAGNOSIS — R93.1 ABNORMAL CARDIAC CT ANGIOGRAPHY: Primary | ICD-10-CM

## 2023-07-13 LAB
MDC_IDC_EPISODE_DTM: NORMAL
MDC_IDC_EPISODE_ID: NORMAL
MDC_IDC_EPISODE_TYPE: NORMAL
MDC_IDC_LEAD_IMPLANT_DT: NORMAL
MDC_IDC_LEAD_IMPLANT_DT: NORMAL
MDC_IDC_LEAD_LOCATION: NORMAL
MDC_IDC_LEAD_LOCATION: NORMAL
MDC_IDC_LEAD_LOCATION_DETAIL_1: NORMAL
MDC_IDC_LEAD_LOCATION_DETAIL_1: NORMAL
MDC_IDC_LEAD_MFG: NORMAL
MDC_IDC_LEAD_MFG: NORMAL
MDC_IDC_LEAD_MODEL: NORMAL
MDC_IDC_LEAD_MODEL: NORMAL
MDC_IDC_LEAD_POLARITY_TYPE: NORMAL
MDC_IDC_LEAD_POLARITY_TYPE: NORMAL
MDC_IDC_LEAD_SERIAL: NORMAL
MDC_IDC_LEAD_SERIAL: NORMAL
MDC_IDC_MSMT_BATTERY_DTM: NORMAL
MDC_IDC_MSMT_BATTERY_REMAINING_LONGEVITY: 132 MO
MDC_IDC_MSMT_BATTERY_REMAINING_PERCENTAGE: 100 %
MDC_IDC_MSMT_BATTERY_STATUS: NORMAL
MDC_IDC_MSMT_LEADCHNL_RA_IMPEDANCE_VALUE: 398 OHM
MDC_IDC_MSMT_LEADCHNL_RV_IMPEDANCE_VALUE: 424 OHM
MDC_IDC_MSMT_LEADCHNL_RV_PACING_THRESHOLD_AMPLITUDE: 1 V
MDC_IDC_MSMT_LEADCHNL_RV_PACING_THRESHOLD_PULSEWIDTH: 0.4 MS
MDC_IDC_PG_IMPLANT_DTM: NORMAL
MDC_IDC_PG_MFG: NORMAL
MDC_IDC_PG_MODEL: NORMAL
MDC_IDC_PG_SERIAL: NORMAL
MDC_IDC_PG_TYPE: NORMAL
MDC_IDC_SESS_CLINIC_NAME: NORMAL
MDC_IDC_SESS_DTM: NORMAL
MDC_IDC_SESS_TYPE: NORMAL
MDC_IDC_SET_BRADY_AT_MODE_SWITCH_MODE: NORMAL
MDC_IDC_SET_BRADY_AT_MODE_SWITCH_RATE: 170 {BEATS}/MIN
MDC_IDC_SET_BRADY_LOWRATE: 60 {BEATS}/MIN
MDC_IDC_SET_BRADY_MAX_SENSOR_RATE: 130 {BEATS}/MIN
MDC_IDC_SET_BRADY_MAX_TRACKING_RATE: 130 {BEATS}/MIN
MDC_IDC_SET_BRADY_MODE: NORMAL
MDC_IDC_SET_BRADY_PAV_DELAY_HIGH: 200 MS
MDC_IDC_SET_BRADY_PAV_DELAY_LOW: 250 MS
MDC_IDC_SET_BRADY_SAV_DELAY_HIGH: 200 MS
MDC_IDC_SET_BRADY_SAV_DELAY_LOW: 250 MS
MDC_IDC_SET_LEADCHNL_RA_PACING_AMPLITUDE: 1.5 V
MDC_IDC_SET_LEADCHNL_RA_PACING_CAPTURE_MODE: NORMAL
MDC_IDC_SET_LEADCHNL_RA_PACING_POLARITY: NORMAL
MDC_IDC_SET_LEADCHNL_RA_PACING_PULSEWIDTH: 0.4 MS
MDC_IDC_SET_LEADCHNL_RA_SENSING_ADAPTATION_MODE: NORMAL
MDC_IDC_SET_LEADCHNL_RA_SENSING_POLARITY: NORMAL
MDC_IDC_SET_LEADCHNL_RA_SENSING_SENSITIVITY: 0.4 MV
MDC_IDC_SET_LEADCHNL_RV_PACING_AMPLITUDE: 1.5 V
MDC_IDC_SET_LEADCHNL_RV_PACING_CAPTURE_MODE: NORMAL
MDC_IDC_SET_LEADCHNL_RV_PACING_POLARITY: NORMAL
MDC_IDC_SET_LEADCHNL_RV_PACING_PULSEWIDTH: 0.4 MS
MDC_IDC_SET_LEADCHNL_RV_SENSING_ADAPTATION_MODE: NORMAL
MDC_IDC_SET_LEADCHNL_RV_SENSING_POLARITY: NORMAL
MDC_IDC_SET_LEADCHNL_RV_SENSING_SENSITIVITY: 1.5 MV
MDC_IDC_SET_ZONE_DETECTION_INTERVAL: 375 MS
MDC_IDC_SET_ZONE_TYPE: NORMAL
MDC_IDC_SET_ZONE_VENDOR_TYPE: NORMAL
MDC_IDC_STAT_AT_BURDEN_PERCENT: 0 %
MDC_IDC_STAT_AT_DTM_END: NORMAL
MDC_IDC_STAT_AT_DTM_START: NORMAL
MDC_IDC_STAT_BRADY_DTM_END: NORMAL
MDC_IDC_STAT_BRADY_DTM_START: NORMAL
MDC_IDC_STAT_BRADY_RA_PERCENT_PACED: 2 %
MDC_IDC_STAT_BRADY_RV_PERCENT_PACED: 89 %
MDC_IDC_STAT_EPISODE_RECENT_COUNT: 0
MDC_IDC_STAT_EPISODE_RECENT_COUNT_DTM_END: NORMAL
MDC_IDC_STAT_EPISODE_RECENT_COUNT_DTM_START: NORMAL
MDC_IDC_STAT_EPISODE_TYPE: NORMAL
MDC_IDC_STAT_EPISODE_VENDOR_TYPE: NORMAL

## 2023-07-13 PROCEDURE — 93296 REM INTERROG EVL PM/IDS: CPT | Performed by: INTERNAL MEDICINE

## 2023-07-13 PROCEDURE — 93294 REM INTERROG EVL PM/LDLS PM: CPT | Performed by: INTERNAL MEDICINE

## 2023-07-13 NOTE — TELEPHONE ENCOUNTER
Called Serene and was able to reach her today. She verbalized understanding. Writer called over to MRI to clarify what exam would visualize what needs to be seen. They recommend checking with a reader and writer will do this. -gayla

## 2023-07-13 NOTE — TELEPHONE ENCOUNTER
----- Message from Teri Hernandes RN sent at 7/12/2023 12:49 PM CDT -----  ---- Message -----  From: Ezra Robertson MD  Sent: 7/7/2023   2:55 PM CDT  To: Teri Hernandes RN    Can we arrange for MRI, I do believe her pacemaker is MRI compatible.  The reason for this is anomalous pulmonary venous drainage, I would like to delineate what exactly is going on and then consider surgical repair.  I tried to catch her in the Cath Lab but she had already left, can you call her for me and set this up, I am kind of gone next week.  LF

## 2023-07-17 NOTE — TELEPHONE ENCOUNTER
Cardiac mr placed. Noted that patient is inquiring if this is necessary or not via mychart. Will await further direction from LBF, if needed. -gayla

## 2023-07-18 NOTE — TELEPHONE ENCOUNTER
Called Serene and discussed Mychart response from LBF.She did see this and is still agreebale to cardiac MRI. This has already been ordered. She will await scheduling. -[facility] [place]

## 2023-07-21 ENCOUNTER — NURSE TRIAGE (OUTPATIENT)
Dept: NURSING | Facility: CLINIC | Age: 78
End: 2023-07-21
Payer: MEDICARE

## 2023-07-21 DIAGNOSIS — R60.0 PERIPHERAL EDEMA: Primary | ICD-10-CM

## 2023-07-21 RX ORDER — FUROSEMIDE 20 MG
20 TABLET ORAL DAILY
Qty: 5 TABLET | Refills: 0 | Status: SHIPPED | OUTPATIENT
Start: 2023-07-21 | End: 2023-08-07

## 2023-07-21 NOTE — TELEPHONE ENCOUNTER
Called and spoke with patient. Relayed that prescription was sent and that in the future this type of request would require at least an E-visit and informed patient what an E-visit was. Patient stated understanding and had no further questions or concerns.    Lawanda Vaughn RN

## 2023-07-21 NOTE — TELEPHONE ENCOUNTER
"Nurse Triage SBAR    Is this a 2nd Level Triage? YES, LICENSED PRACTITIONER REVIEW IS REQUIRED    Situation:   Acute on chronic symptoms.  Bilateral leg edema.  Newly worse x 3 days.    Background:   Had clinical eval 7/3/23 for current issue.  Three-day Lasix regimen advised.  20 mg taken once daily x 3 days.  Pt reports this worked well to resolve edema.  Amlodipine was also discont'd per OV of 7/3/2023.    Assessment:   \"Too much fluid in the legs and feet again.\"  Gradually worsening over the past three days.  Today -> \"Can barely get slippers on.\"  No weepy edema.  However \"Legs feel rough, like miniscule bubbles underneath the skin\".  Edema is equivalent in both legs/ankles.  No new leg pain.  Still ambulatory.    No new coughs or known sodium loads.  COPD status.  Pt uses continuous home oxygen.  Setting is \"between 3-to-3.5 L\".  Has not needed to turn up her O2.    Advised elevating legs when sitting.  May apply cold packs to ankles.  Refrain from standing in one spot for lengthy periods.  Refrain from processed meats and pickled items.    Protocol Recommended Disposition:   See in Office Today  Pt has upcoming f/u appt August 7th.  -->  In the interim, asks whether a short course of furosemide might be transmitted to her pharmacy?    Routed to provider for advice.    Does the patient meet one of the following criteria for ADS visit consideration? 16+ years old, with an [facility] PCP -> Yes    TIP  Providers, please consider if this condition is appropriate for management at one of our Acute and Diagnostic Services sites.     If patient is a good candidate, please use dotphrase <dot>triageresponse and select Refer to ADS to document.    Preferred Pharmacy is [email or web address].    Pt's callback # -> [phone]    Thank you-    Kati HOLLINS [facility] Nurse Advisor     Reason for Disposition    MODERATE swelling of both ankles (e.g., swelling extends up to the knees) AND new-onset or worsening    Additional " Information    Negative: Sounds like a life-threatening emergency to the triager    Negative: Chest pain    Negative: Small area of swelling and followed an insect bite to the area    Negative: Followed a knee injury    Negative: Ankle or foot injury    Negative: Difficulty breathing at rest    Negative: Entire foot is cool or blue in comparison to other side     Not able to visualize ankles or feet currently due to compression stockings being worn.    Negative: SEVERE swelling (e.g., swelling extends above knee, entire leg is swollen, weeping fluid)    Negative: Thigh or calf pain and only 1 side and present > 1 hour    Negative: Thigh, calf, or ankle swelling in only one leg    Negative: Thigh, calf, or ankle swelling in both legs, but one side is definitely more swollen (Exception: longstanding difference between legs)    Negative: Cast on leg or ankle and has increasing pain    Negative: Can't walk or can barely stand (new-onset)    Negative: Fever and red area (or area very tender to touch)    Negative: Patient sounds very sick or weak to the triager    Negative: Swelling of face, arm or hands  (Exception: Slight puffiness of fingers during hot weather.)    Negative: Pregnant 20 or more weeks and sudden weight gain (i.e., > 2 lbs, 1 kg in one week)    Protocols used: LEG SWELLING AND EDEMA-A-OH

## 2023-07-24 ENCOUNTER — OFFICE VISIT (OUTPATIENT)
Dept: PULMONOLOGY | Facility: CLINIC | Age: 78
End: 2023-07-24
Payer: MEDICARE

## 2023-07-24 VITALS
WEIGHT: 218.4 LBS | BODY MASS INDEX: 37.28 KG/M2 | HEART RATE: 89 BPM | DIASTOLIC BLOOD PRESSURE: 70 MMHG | OXYGEN SATURATION: 93 % | SYSTOLIC BLOOD PRESSURE: 120 MMHG | HEIGHT: 64 IN

## 2023-07-24 DIAGNOSIS — J43.9 PULMONARY EMPHYSEMA, UNSPECIFIED EMPHYSEMA TYPE (H): ICD-10-CM

## 2023-07-24 DIAGNOSIS — J44.9 CHRONIC OBSTRUCTIVE PULMONARY DISEASE, UNSPECIFIED COPD TYPE (H): Primary | ICD-10-CM

## 2023-07-24 DIAGNOSIS — J96.11 CHRONIC RESPIRATORY FAILURE WITH HYPOXIA (H): ICD-10-CM

## 2023-07-24 PROCEDURE — 99214 OFFICE O/P EST MOD 30 MIN: CPT | Performed by: NURSE PRACTITIONER

## 2023-07-24 RX ORDER — FLUTICASONE PROPIONATE AND SALMETEROL 500; 50 UG/1; UG/1
1 POWDER RESPIRATORY (INHALATION) EVERY 12 HOURS
Qty: 180 EACH | Refills: 3 | Status: SHIPPED | OUTPATIENT
Start: 2023-07-24

## 2023-07-24 NOTE — PATIENT INSTRUCTIONS
- continue your medications as you have been.   - we will have you meet with sleep medicine to see if they think you need a CPAP.    If you have worsening symptoms, questions, or need to speak with the nurse please call [phone].

## 2023-07-24 NOTE — PROGRESS NOTES
Outpatient Pulmonary Clinic Visit  July 25, 2023      Assessment and Plan:  Lalitha Underwood is a 78 year old female with a past medical history significant for severe COPD, emphysema, Cor pulmonale, chronic hypoxic respiratory failure, paraesophageal hernia s/p repair, HTN, acquired lymphedema of leg, epileptic seizure, HLD, GERD, aortic valve stenosis, obesity, neuropathy, osteoporosis, PAD, s/p cardiac pacemaker placement, second degree heart block, CAD, and former tobacco use who presents to clinic today for follow up. PFTs show a severe obstructive ventilatory defect, no significant response to bronchodilator, with moderately reduced diffusion capacity.     Recent COPD exacerbation on 5/15/2023, she was seen in the emergency department and treated with antibiotics, steroids and DuoNebs.  Labs were consistent for hypercarbic respiratory acidosis.  She reports some improvement since discharge but is still struggling with congested cough. Lung exam today demonstrates diffuse wheeze, sats 92% on 2L pulse dose.     1) Severe COPD -   Continue advair/spiriva and as needed albuterol or duonebs.    Remain on azithromycin to help control exacerbation - will monitor her QTc closely (QTC is <450 at last visit in 01/2023).    OK to use her nebulizer 2-4 times daily, encouraged her to increase use while she is having increased symptoms. Changed her albuterol to levalbuterol to help with jittery side effect.    2) Chronic hypoxic respiratory failure -   needs to wear oxygen 2L with exertion at minimum and with sleep.    Sleep medicine referral placed to discuss sleep habits and assess for need for pap therapy.   3) Cor Pulmonale - remains on hydrochlorothiazide and spironolactone.  Prevent hypoxia at all times with oxygen use as above.   Continued follow up with cardiology. Plan is for a cardiac MRI   4) Dyspnea - multifactorial from the above, in pulmonary rehab and learning.  She needs to start exercising more at home,  "she needs to combat \"laziness\" per her words.    If this patients lung disease exacerbates I recommend: doxycycline 100mg BID for 10 days and a steroid taper with prednisone at 40mg for 3 days 30mg for 3 days, 20mg for 3 days and 10mg for 3 days    RTC in 3 months    Ev Plaza CNP  Pulmonary Medicine  [facility]   [phone]    CCx: COPD  Chief Complaint   Patient presents with    COPD     HPI:   He was last seen in clinic in May 2023.  At that time the had her repeat a prednisone taper and suggested she increase nebulizer use when she has more symptom burden.  Since that time she is still having a congested cough. No change in symptoms with increased neb use, using nebs TID.   She remains on maximal inhaled therapy for her COPD, after she was started on chronic azithromycin and referred to pulmonary rehab on her last visit.  She was tolerating both the azithromycin and pulmonary rehab and did not have a \"flare\" of her COPD for a while.     Last COPD exacerbation wsa on 5/15/2023.   Denies difficulty swallowing but has noticed more cough following intake. Feels like there is a \"crumb\" stuck in her throat.   Continues to have dyspnea with exertion and daytime fatigue.  Denies chest tightness or wheeze.   She has been using her Advair and Spiriva consistently.    On spironolactone. Followed by Dr. Robertson in cardiology. Recent coronary angiogram, did not require stents. Plans for cardiac MRI and follow up afterward.     Patient supplied answers from flow sheet for:  COPD Assessment Test (CAT)  2009 [employer]. All rights reserved.      4/4/2022    12:00 PM 4/5/2022     4:00 PM 6/28/2022    10:00 AM 1/3/2023    10:52 AM 1/3/2023     2:29 PM 5/22/2023     1:00 PM 7/21/2023     9:23 AM   COPD assessment test (CAT)   Cough 3 3 2 2 2 3 1   Phlegm 2 2 2 1 1 4 1   Chest tightness 4 4 1 1 1 2 1   Walk up hill 5 5 4 3 3 5 3   Limited activities 4 4 1 2 2 4 2   Leaving my home 0 1 0 1 1 1 1   Sleep 1 1 1 0 0 3 1   Energy " 4 4 4 3 3 4 3   Total Score 23 24 15 13 13 26 13        ROS:   ROS: 10 point ROS neg other than the symptoms noted above in the HPI.    Current Meds:  Current Outpatient Medications   Medication Sig Dispense Refill    albuterol (PROAIR HFA/PROVENTIL HFA/VENTOLIN HFA) 108 (90 Base) MCG/ACT inhaler Inhale 2 puffs into the lungs every 6 hours as needed for shortness of breath or wheezing 18 g 3    azithromycin (ZITHROMAX) 250 MG tablet Take 1 tablet (250 mg) by mouth every evening 90 tablet 3    benzonatate (TESSALON) 100 MG capsule Take 1 capsule (100 mg) by mouth 3 times daily as needed for cough 30 capsule 1    calcium citrate (CITRACAL) 950 (200 Ca) MG tablet Take 1 tablet by mouth 2 times daily      denosumab (PROLIA) 60 MG/ML SOLN injection Inject 60 mg Subcutaneous every 6 months      fluticasone-salmeterol (ADVAIR) 500-50 MCG/ACT inhaler Inhale 1 puff into the lungs every 12 hours 180 each 3    furosemide (LASIX) 20 MG tablet Take 1 tablet (20 mg) by mouth daily for 5 days 5 tablet 0    guaiFENesin (MUCINEX) 600 MG 12 hr tablet Take 1 tablet (600 mg) by mouth 2 times daily (Patient taking differently: Take 600 mg by mouth 2 times daily as needed)      hydrochlorothiazide (HYDRODIURIL) 25 MG tablet Take 1 tablet (25 mg) by mouth daily 90 tablet 3    levalbuterol (XOPENEX) 1.25 MG/3ML neb solution Take 3 mLs (1.25 mg) by nebulization every 4 hours as needed for shortness of breath or wheezing 180 mL 3    levETIRAcetam (KEPPRA) 500 MG tablet Take 1 tablet (500 mg) by mouth 2 times daily 180 tablet 3    losartan (COZAAR) 50 MG tablet Take 1 tablet (50 mg) by mouth 2 times daily 180 tablet 3    Magnesium Oxide 500 MG TABS Take 500 mg by mouth 2 times daily      nystatin (MYCOSTATIN) 725684 UNIT/GM external powder Apply topically daily as needed      potassium chloride ER (KLOR-CON M) 20 MEQ CR tablet Take 1 tablet (20 mEq) by mouth At Bedtime 90 tablet 3    RABEprazole (ACIPHEX) 20 MG EC tablet Take 1 tablet (20 mg)  "by mouth daily 90 tablet 3    sertraline (ZOLOFT) 100 MG tablet Take 1 tablet (100 mg) by mouth every evening 90 tablet 3    solifenacin (VESICARE) 10 MG tablet Take 1 tablet (10 mg) by mouth daily 90 tablet 3    spironolactone (ALDACTONE) 25 MG tablet Take 0.5 tablets (12.5 mg) by mouth daily 45 tablet 3    tiotropium (SPIRIVA HANDIHALER) 18 MCG inhaled capsule Inhale 1 capsule (18 mcg) into the lungs daily 90 capsule 3    vitamin D3 (CHOLECALCIFEROL) 125 MCG (5000 UT) tablet Take 5,000 Units by mouth daily       ipratropium - albuterol 0.5 mg/2.5 mg/3 mL (DUONEB) 0.5-2.5 (3) MG/3ML neb solution Take 1 vial (3 mLs) by nebulization every 6 hours as needed for shortness of breath / dyspnea or wheezing (Patient not taking: Reported on 7/24/2023) 810 mL 3       Physical Exam:  /70   Pulse 89   Ht 1.626 m (5' 4\")   Wt 99.1 kg (218 lb 6.4 oz)   SpO2 93%   BMI 37.49 kg/m      Physical Exam  Constitutional:       General: She is not in acute distress.     Appearance: She is not ill-appearing or diaphoretic.   HENT:      Nose: Nose normal.   Cardiovascular:      Rate and Rhythm: Normal rate and regular rhythm.      Pulses: Normal pulses.      Heart sounds: Normal heart sounds.   Pulmonary:      Effort: Pulmonary effort is normal. No respiratory distress.      Breath sounds: Examination of the right-lower field reveals decreased breath sounds. Examination of the left-lower field reveals decreased breath sounds. Decreased breath sounds present. No wheezing or rhonchi.   Musculoskeletal:      Right lower leg: Edema present.      Left lower leg: Edema present.   Skin:     General: Skin is warm and dry.      Findings: No rash.   Neurological:      Mental Status: She is alert.   Psychiatric:         Behavior: Behavior normal.       Labs:  @clab@  Lab Results   Component Value Date    WBC 9.7 07/06/2023    HGB 10.5 (L) 07/06/2023    HCT 34.7 (L) 07/06/2023     07/06/2023     07/06/2023    POTASSIUM 4.2 " 07/06/2023    CHLORIDE 98 07/06/2023    CO2 28 07/06/2023     (H) 07/06/2023    BUN 26.0 (H) 07/06/2023    CR 0.95 07/06/2023    MAG 1.7 (L) 03/25/2022    BILITOTAL 0.2 05/15/2023    AST 25 05/15/2023    ALT 14 05/15/2023    ALKPHOS 76 05/15/2023    PROTTOTAL 7.0 05/15/2023    ALBUMIN 4.2 05/15/2023       I have personally reviewed all pertinent imaging studies and PFT results unless otherwise noted.    Imaging studies:    CT CHEST PULMONARY EMBOLISM W CONTRAST, DATE/TIME: 5/15/2023 1:41 PM CDT  INDICATION: chest pain, SOB, elevated D dimer  COMPARISON: Chest x-ray 5/15/2023 and 3/11/2022  FINDINGS:  ANGIOGRAM CHEST: Negative for pulmonary emboli. Some mildly prominent central pulmonary arteries concerning for possible pulmonary artery hypertension. Thoracic aorta is negative for dissection. No CT evidence of right heart strain.  LUNGS AND PLEURA: Slightly elevated left hemidiaphragm similar to previous. Some slight probable fibrosis in the left lung base similar to chest x-ray. No acute new findings.  MEDIASTINUM/AXILLAE: Normal.  CORONARY ARTERY CALCIFICATION: Mild.                                                              IMPRESSION:  1.  Negative for pulmonary emboli.  2.  Mildly enlarged central pulmonary arteries suggestive of possible pulmonary artery hypertension.  3.  Slight fibrosis or atelectasis left lung base similar to chest x-ray. No acute findings in the lungs.    PFTs 04/2019:  FEV1/FVC is 0.53 and is reduced.  FEV1 is 40% predicted and is reduced.  FVC is 59% predicted and is reduced.  There was no improvement in spirometry after a single inhaled dose of bronchodilator.  TLC is 95% predicted and is normal.  RV is 138% predicted and is increased.  RV/TLC is 0.63 and is increased.  DLCO is 42% predicted and is reduced when it   is corrected for hemoglobin.  The flow volume loop shows obstructive morphology.     Impression:  Full Pulmonary Function Test is abnormal. Spirometry is consistent  with severe obstructive ventilatory defect.  Spirometry is not consistent with reversibility.  There is no hyperinflation.  There is air-trapping.  Diffusion capacity when corrected for hemoglobin is moderately reduced.

## 2023-08-07 ENCOUNTER — OFFICE VISIT (OUTPATIENT)
Dept: FAMILY MEDICINE | Facility: CLINIC | Age: 78
End: 2023-08-07
Payer: MEDICARE

## 2023-08-07 VITALS
BODY MASS INDEX: 37.25 KG/M2 | HEART RATE: 85 BPM | SYSTOLIC BLOOD PRESSURE: 132 MMHG | WEIGHT: 217 LBS | DIASTOLIC BLOOD PRESSURE: 69 MMHG | OXYGEN SATURATION: 94 % | RESPIRATION RATE: 28 BRPM | TEMPERATURE: 97.5 F

## 2023-08-07 DIAGNOSIS — I10 ESSENTIAL HYPERTENSION, BENIGN: Primary | ICD-10-CM

## 2023-08-07 PROCEDURE — 99213 OFFICE O/P EST LOW 20 MIN: CPT | Performed by: PHYSICIAN ASSISTANT

## 2023-08-07 NOTE — PROGRESS NOTES
Assessment & Plan     Essential hypertension, benign  Chronic issue, BP at goal and her legs are not swollen, has been very compliant with compression stockings.  Follow up if symptoms worsen or do not improve.    Will hold off on COVID booster until fall.     Risks, benefits and alternatives were discussed with patient. Agreeable to the plan of care.      Teresa Johnson PA-C  [facility] KENYON Cast is a 78 year old, presenting for the following health issues:  Follow Up (Follow up for blood pressure and Covid Booster.   )        8/7/2023     1:38 PM   Additional Questions   Roomed by KARINE Seymour CMA([facility])       History of Present Illness       Hypertension: She presents for follow up of hypertension.  She does not check blood pressure  regularly outside of the clinic. Outpatient blood pressures have not been over 140/90. She follows a low salt diet.     She eats 0-1 servings of fruits and vegetables daily.She consumes 1 sweetened beverage(s) daily.She exercises with enough effort to increase her heart rate 9 or less minutes per day.  She exercises with enough effort to increase her heart rate 3 or less days per week.   She is taking medications regularly.               Review of Systems   Constitutional, HEENT, cardiovascular, pulmonary, gi and gu systems are negative, except as otherwise noted.      Objective    /69   Pulse 85   Temp 97.5  F (36.4  C) (Oral)   Resp 28   Wt 98.4 kg (217 lb)   SpO2 94%   BMI 37.25 kg/m    Body mass index is 37.25 kg/m .  Physical Exam   GENERAL: healthy, alert and no distress  NECK: no adenopathy, no asymmetry, masses, or scars and thyroid normal to palpation  RESP: lungs clear to auscultation - no rales, rhonchi or wheezes  CV: regular rate and rhythm, normal S1 S2, no S3 or S4, no murmur, click or rub, no peripheral edema and peripheral pulses strong  MS: no gross musculoskeletal defects noted, no edema

## 2023-08-09 ENCOUNTER — ANCILLARY PROCEDURE (OUTPATIENT)
Dept: MAMMOGRAPHY | Facility: HOSPITAL | Age: 78
End: 2023-08-09
Attending: FAMILY MEDICINE
Payer: MEDICARE

## 2023-08-09 DIAGNOSIS — Z12.31 VISIT FOR SCREENING MAMMOGRAM: ICD-10-CM

## 2023-08-09 PROCEDURE — 77067 SCR MAMMO BI INCL CAD: CPT

## 2023-09-15 ENCOUNTER — TELEPHONE (OUTPATIENT)
Dept: FAMILY MEDICINE | Facility: CLINIC | Age: 78
End: 2023-09-15

## 2023-09-15 DIAGNOSIS — K21.9 GASTROESOPHAGEAL REFLUX DISEASE WITHOUT ESOPHAGITIS: ICD-10-CM

## 2023-09-15 RX ORDER — RABEPRAZOLE SODIUM 20 MG/1
1 TABLET, DELAYED RELEASE ORAL DAILY
Qty: 90 TABLET | Refills: 3 | Status: CANCELLED | OUTPATIENT
Start: 2023-09-15

## 2023-09-19 NOTE — TELEPHONE ENCOUNTER
Prior Authorization Approval    Medication: RABEPRAZOLE SODIUM 20 MG PO [facility]  Authorization Effective Date: 8/20/2023  Authorization Expiration Date: 9/18/2024  Approved Dose/Quantity: 90/90  Reference #: [id number]   Insurance Company: [employer] - Phone [phone] Fax [phone]  Expected CoPay:       CoPay Card Available:      Financial Assistance Needed:   Which Pharmacy is filling the prescription: [facility] - [address] - [facility] AT PORTAL TO [facility]  Pharmacy Notified: Yes  Patient Notified: Yes

## 2023-09-19 NOTE — TELEPHONE ENCOUNTER
PA Initiation    Medication: [facility]  Insurance Company: [employer] - Phone [phone] Fax [phone]  Pharmacy Filling the Rx: [facility] - [address] - [facility] AT PORTAL TO REGISTERED [facility] SITES  Filling Pharmacy Phone: [phone]  Filling Pharmacy Fax:    Start Date: 9/19/2023

## 2023-09-20 ENCOUNTER — MYC MEDICAL ADVICE (OUTPATIENT)
Dept: FAMILY MEDICINE | Facility: CLINIC | Age: 78
End: 2023-09-20
Payer: MEDICARE

## 2023-09-20 DIAGNOSIS — F41.9 ANXIETY: Primary | ICD-10-CM

## 2023-09-20 NOTE — TELEPHONE ENCOUNTER
Unclear what patient is asking for?  Please call and verify what prescription (name, dose, frequency) she is needing and if this is a chronic medication we have discussed in the past.    Kate Marte, DO

## 2023-09-22 RX ORDER — LORAZEPAM 0.5 MG/1
TABLET ORAL
Qty: 1 TABLET | Refills: 0 | Status: SHIPPED | OUTPATIENT
Start: 2023-09-22 | End: 2023-11-27

## 2023-09-25 ENCOUNTER — HOSPITAL ENCOUNTER (OUTPATIENT)
Dept: MRI IMAGING | Facility: HOSPITAL | Age: 78
Discharge: HOME | End: 2023-09-25
Attending: INTERNAL MEDICINE
Payer: MEDICARE

## 2023-09-25 VITALS — HEART RATE: 90 BPM | OXYGEN SATURATION: 99 %

## 2023-09-25 DIAGNOSIS — Q26.2 TOTAL ANOMALOUS PULMONARY VENOUS RETURN: ICD-10-CM

## 2023-09-25 DIAGNOSIS — R93.1 ABNORMAL CARDIAC CT ANGIOGRAPHY: ICD-10-CM

## 2023-09-25 PROCEDURE — G1010 CDSM STANSON: HCPCS

## 2023-09-25 PROCEDURE — 255N000002 HC RX 255 OP 636: Mod: JZ | Performed by: INTERNAL MEDICINE

## 2023-09-25 PROCEDURE — 75561 CARDIAC MRI FOR MORPH W/DYE: CPT | Mod: 26 | Performed by: INTERNAL MEDICINE

## 2023-09-25 PROCEDURE — 999N000122 MR MYOCARDIUM  OVERREAD

## 2023-09-25 PROCEDURE — 71555 MRI ANGIO CHEST W OR W/O DYE: CPT | Mod: 26 | Performed by: INTERNAL MEDICINE

## 2023-09-25 PROCEDURE — G1010 CDSM STANSON: HCPCS | Performed by: INTERNAL MEDICINE

## 2023-09-25 PROCEDURE — A9585 GADOBUTROL INJECTION: HCPCS | Mod: JZ | Performed by: INTERNAL MEDICINE

## 2023-09-25 RX ORDER — GADOBUTROL 604.72 MG/ML
21 INJECTION INTRAVENOUS ONCE
Status: COMPLETED | OUTPATIENT
Start: 2023-09-25 | End: 2023-09-25

## 2023-09-25 RX ADMIN — GADOBUTROL 21 ML: 604.72 INJECTION INTRAVENOUS at 09:15

## 2023-09-25 NOTE — IR NOTE
Patient's device placed into MRI Surescan mode prior to scan.  Patient monitored by RN via EKG and pulseoximetry throughout procedure.  Patient stable throughout.  Device placed back into previous mode at completion of MRI.

## 2023-09-28 ENCOUNTER — TELEPHONE (OUTPATIENT)
Dept: FAMILY MEDICINE | Facility: CLINIC | Age: 78
End: 2023-09-28
Payer: MEDICARE

## 2023-09-28 NOTE — TELEPHONE ENCOUNTER
Called and talked with Serene,    No overt reason is present that would prompt Serene to need a visit so soon.    She will follow up if needed.    Storm Roldan RN     [facility]

## 2023-09-28 NOTE — TELEPHONE ENCOUNTER
FYI - Status Update    Who is Calling: patient    Update: She is checking what she needed to come in for was it just a bp check with nurse or an appointment with dr Alanis caller want a call/response back: Yes     Could we send this information to you in [employer] or would you prefer to receive a phone call?:   Patient would prefer a phone call   Okay to leave a detailed message?: Yes at Home number on file [phone] (home)

## 2023-10-04 ENCOUNTER — IMMUNIZATION (OUTPATIENT)
Dept: FAMILY MEDICINE | Facility: CLINIC | Age: 78
End: 2023-10-04
Payer: MEDICARE

## 2023-10-04 VITALS — DIASTOLIC BLOOD PRESSURE: 73 MMHG | SYSTOLIC BLOOD PRESSURE: 134 MMHG | HEART RATE: 89 BPM

## 2023-10-04 DIAGNOSIS — Z23 NEED FOR PROPHYLACTIC VACCINATION AND INOCULATION AGAINST INFLUENZA: Primary | ICD-10-CM

## 2023-10-04 DIAGNOSIS — Z23 HIGH PRIORITY FOR 2019-NCOV VACCINE: ICD-10-CM

## 2023-10-04 PROCEDURE — 99207 PR NO CHARGE NURSE ONLY: CPT

## 2023-10-04 PROCEDURE — 91320 SARSCV2 VAC 30MCG TRS-SUC IM: CPT

## 2023-10-04 PROCEDURE — G0008 ADMIN INFLUENZA VIRUS VAC: HCPCS | Mod: 59

## 2023-10-04 PROCEDURE — 90480 ADMN SARSCOV2 VAC 1/ONLY CMP: CPT

## 2023-10-04 PROCEDURE — 90662 IIV NO PRSV INCREASED AG IM: CPT

## 2023-10-08 ENCOUNTER — HEALTH MAINTENANCE LETTER (OUTPATIENT)
Age: 78
End: 2023-10-08

## 2023-10-09 ENCOUNTER — ANCILLARY PROCEDURE (OUTPATIENT)
Dept: CARDIOLOGY | Facility: CLINIC | Age: 78
End: 2023-10-09
Attending: INTERNAL MEDICINE
Payer: MEDICARE

## 2023-10-09 DIAGNOSIS — Z95.0 PACEMAKER: ICD-10-CM

## 2023-10-09 DIAGNOSIS — I44.1 SECOND DEGREE MOBITZ II AV BLOCK: ICD-10-CM

## 2023-10-09 LAB
MDC_IDC_EPISODE_DTM: NORMAL
MDC_IDC_EPISODE_ID: NORMAL
MDC_IDC_EPISODE_TYPE: NORMAL
MDC_IDC_LEAD_CONNECTION_STATUS: NORMAL
MDC_IDC_LEAD_CONNECTION_STATUS: NORMAL
MDC_IDC_LEAD_IMPLANT_DT: NORMAL
MDC_IDC_LEAD_IMPLANT_DT: NORMAL
MDC_IDC_LEAD_LOCATION: NORMAL
MDC_IDC_LEAD_LOCATION: NORMAL
MDC_IDC_LEAD_LOCATION_DETAIL_1: NORMAL
MDC_IDC_LEAD_LOCATION_DETAIL_1: NORMAL
MDC_IDC_LEAD_MFG: NORMAL
MDC_IDC_LEAD_MFG: NORMAL
MDC_IDC_LEAD_MODEL: NORMAL
MDC_IDC_LEAD_MODEL: NORMAL
MDC_IDC_LEAD_POLARITY_TYPE: NORMAL
MDC_IDC_LEAD_POLARITY_TYPE: NORMAL
MDC_IDC_LEAD_SERIAL: NORMAL
MDC_IDC_LEAD_SERIAL: NORMAL
MDC_IDC_MSMT_BATTERY_DTM: NORMAL
MDC_IDC_MSMT_BATTERY_REMAINING_LONGEVITY: 126 MO
MDC_IDC_MSMT_BATTERY_REMAINING_PERCENTAGE: 100 %
MDC_IDC_MSMT_BATTERY_STATUS: NORMAL
MDC_IDC_MSMT_LEADCHNL_RA_IMPEDANCE_VALUE: 374 OHM
MDC_IDC_MSMT_LEADCHNL_RV_IMPEDANCE_VALUE: 406 OHM
MDC_IDC_MSMT_LEADCHNL_RV_PACING_THRESHOLD_AMPLITUDE: 0.9 V
MDC_IDC_MSMT_LEADCHNL_RV_PACING_THRESHOLD_PULSEWIDTH: 0.4 MS
MDC_IDC_PG_IMPLANT_DTM: NORMAL
MDC_IDC_PG_MFG: NORMAL
MDC_IDC_PG_MODEL: NORMAL
MDC_IDC_PG_SERIAL: NORMAL
MDC_IDC_PG_TYPE: NORMAL
MDC_IDC_SESS_CLINIC_NAME: NORMAL
MDC_IDC_SESS_DTM: NORMAL
MDC_IDC_SESS_TYPE: NORMAL
MDC_IDC_SET_BRADY_AT_MODE_SWITCH_MODE: NORMAL
MDC_IDC_SET_BRADY_AT_MODE_SWITCH_RATE: 170 {BEATS}/MIN
MDC_IDC_SET_BRADY_LOWRATE: 60 {BEATS}/MIN
MDC_IDC_SET_BRADY_MAX_SENSOR_RATE: 130 {BEATS}/MIN
MDC_IDC_SET_BRADY_MAX_TRACKING_RATE: 130 {BEATS}/MIN
MDC_IDC_SET_BRADY_MODE: NORMAL
MDC_IDC_SET_BRADY_PAV_DELAY_HIGH: 200 MS
MDC_IDC_SET_BRADY_PAV_DELAY_LOW: 250 MS
MDC_IDC_SET_BRADY_SAV_DELAY_HIGH: 200 MS
MDC_IDC_SET_BRADY_SAV_DELAY_LOW: 250 MS
MDC_IDC_SET_LEADCHNL_RA_PACING_AMPLITUDE: 1.5 V
MDC_IDC_SET_LEADCHNL_RA_PACING_CAPTURE_MODE: NORMAL
MDC_IDC_SET_LEADCHNL_RA_PACING_POLARITY: NORMAL
MDC_IDC_SET_LEADCHNL_RA_PACING_PULSEWIDTH: 0.4 MS
MDC_IDC_SET_LEADCHNL_RA_SENSING_ADAPTATION_MODE: NORMAL
MDC_IDC_SET_LEADCHNL_RA_SENSING_POLARITY: NORMAL
MDC_IDC_SET_LEADCHNL_RA_SENSING_SENSITIVITY: 0.4 MV
MDC_IDC_SET_LEADCHNL_RV_PACING_AMPLITUDE: 1.3 V
MDC_IDC_SET_LEADCHNL_RV_PACING_CAPTURE_MODE: NORMAL
MDC_IDC_SET_LEADCHNL_RV_PACING_POLARITY: NORMAL
MDC_IDC_SET_LEADCHNL_RV_PACING_PULSEWIDTH: 0.4 MS
MDC_IDC_SET_LEADCHNL_RV_SENSING_ADAPTATION_MODE: NORMAL
MDC_IDC_SET_LEADCHNL_RV_SENSING_POLARITY: NORMAL
MDC_IDC_SET_LEADCHNL_RV_SENSING_SENSITIVITY: 1.5 MV
MDC_IDC_SET_ZONE_DETECTION_INTERVAL: 375 MS
MDC_IDC_SET_ZONE_STATUS: NORMAL
MDC_IDC_SET_ZONE_TYPE: NORMAL
MDC_IDC_SET_ZONE_VENDOR_TYPE: NORMAL
MDC_IDC_STAT_AT_BURDEN_PERCENT: 0 %
MDC_IDC_STAT_AT_DTM_END: NORMAL
MDC_IDC_STAT_AT_DTM_START: NORMAL
MDC_IDC_STAT_BRADY_DTM_END: NORMAL
MDC_IDC_STAT_BRADY_DTM_START: NORMAL
MDC_IDC_STAT_BRADY_RA_PERCENT_PACED: 1 %
MDC_IDC_STAT_BRADY_RV_PERCENT_PACED: 100 %
MDC_IDC_STAT_EPISODE_RECENT_COUNT: 0
MDC_IDC_STAT_EPISODE_RECENT_COUNT_DTM_END: NORMAL
MDC_IDC_STAT_EPISODE_RECENT_COUNT_DTM_START: NORMAL
MDC_IDC_STAT_EPISODE_TYPE: NORMAL
MDC_IDC_STAT_EPISODE_VENDOR_TYPE: NORMAL
MDC_IDC_STAT_EPISODE_VENDOR_TYPE: NORMAL

## 2023-10-09 PROCEDURE — 93296 REM INTERROG EVL PM/IDS: CPT | Performed by: INTERNAL MEDICINE

## 2023-10-09 PROCEDURE — 93294 REM INTERROG EVL PM/LDLS PM: CPT | Performed by: INTERNAL MEDICINE

## 2023-10-20 ENCOUNTER — OFFICE VISIT (OUTPATIENT)
Dept: VASCULAR SURGERY | Facility: CLINIC | Age: 78
End: 2023-10-20
Attending: PODIATRIST
Payer: MEDICARE

## 2023-10-20 VITALS — BODY MASS INDEX: 37.05 KG/M2 | WEIGHT: 217 LBS | HEART RATE: 48 BPM | OXYGEN SATURATION: 94 % | HEIGHT: 64 IN

## 2023-10-20 DIAGNOSIS — I87.8 VENOUS STASIS: Primary | ICD-10-CM

## 2023-10-20 DIAGNOSIS — M21.40 ACQUIRED PES PLANOVALGUS, UNSPECIFIED LATERALITY: ICD-10-CM

## 2023-10-20 DIAGNOSIS — L57.0 KERATOMA: ICD-10-CM

## 2023-10-20 DIAGNOSIS — M20.42 HAMMER TOE OF LEFT FOOT: ICD-10-CM

## 2023-10-20 PROCEDURE — 11057 PARNG/CUTG B9 HYPRKR LES >4: CPT | Mod: Q7,GA | Performed by: PODIATRIST

## 2023-10-20 PROCEDURE — 99213 OFFICE O/P EST LOW 20 MIN: CPT | Mod: 25 | Performed by: PODIATRIST

## 2023-10-20 PROCEDURE — G0463 HOSPITAL OUTPT CLINIC VISIT: HCPCS | Performed by: PODIATRIST

## 2023-10-20 ASSESSMENT — PAIN SCALES - GENERAL: PAINLEVEL: NO PAIN (1)

## 2023-10-20 NOTE — PROGRESS NOTES
FOOT AND ANKLE SURGERY/PODIATRY PROGRESS NOTE        ASSESSMENT:   Keratoma  Hammertoe  Venous stasis  Pes planovalgus      TREATMENT:  -I discussed with the patient she does have callus buildup along the medial aspect of bilateral hallux and bilateral first MPJ.  We reviewed trimming of callus tissue may result in out-of-pocket expense, patient has signed the ABN form.    -I trimmed callus tissue x6 bilateral hallux and plantar medial aspect of first MPJ bilaterally.    -Per patient request I referred her to [facility] for new inserts.    -Also referred for new compression stockings, 15 to 20 mmHg.    -Patient's questions invited and answered.  She was encouraged to call my office with any further questions or concerns.     Alfonso Orozco DPM  [facility] Podiatry/Foot & Ankle Surgery      HPI: Lalitha Underwood was seen again today complaining of painful callus tissue on both feet.  Patient reports that she does see a foot provider to trim callus tissue every 60 days but has noticed callus buildup with some discomfort.  She also request a referral for compression stockings and also for new inserts at [facility].    Past Medical History:   Diagnosis Date    Convulsive disorder (H)     Convulsive disorder (H)     COPD (chronic obstructive pulmonary disease) (H)     COPD (chronic obstructive pulmonary disease) (H)     Coronary artery disease involving native coronary artery with unstable angina pectoris (H) 3/31/2023    Depression     Dyspnea on exertion     Gastroesophageal reflux disease     Heart murmur     Hernia of unspecified site of abdominal cavity without mention of obstruction or gangrene     Hiatal hernia     Hiatal hernia     Hypertension     Hypertension     Obese     On home oxygen therapy     at 1.5 liters at nite    Osteopenia     Osteopenia     Oxygen dependent     1.5 L per NC    Pneumonia due to infectious organism, unspecified laterality, unspecified part of lung 3/19/2022    Second degree  heart block 3/19/2022    Shortness of breath     Uncomplicated asthma     URI (upper respiratory infection)     Venous insufficiency of both lower extremities     Venous insufficiency of both lower extremities     Walking troubles        Past Surgical History:   Procedure Laterality Date    AMPUTATE TOE(S) Left 8/11/2022    Procedure: AMPUTATION, fifth digit left foot;  Surgeon: Alfonso Orozco DPM;  Location: [facility] OR    ARTHROPLASTY TOE(S) Left 11/22/2021    Procedure: ARTHROPLASTY, digits two and three left foot;  Surgeon: Alfonso Orozco DPM;  Location: [facility] OR    ARTHROPLASTY TOE(S) Left 7/18/2022    Procedure: ARTHROPLASTY, digits four and five left foot;  Surgeon: Alfonso Orozco DPM;  Location: [facility] Main OR    CV CORONARY ANGIOGRAM N/A 7/7/2023    Procedure: Coronary Angiogram;  Surgeon: Elijah Leger MD;  Location: [facility] LAB CV    CV LEFT HEART CATH N/A 7/7/2023    Procedure: Left Heart Catheterization;  Surgeon: Elijah Leger MD;  Location: [facility]    CV RIGHT HEART CATH MEASUREMENTS RECORDED N/A 7/7/2023    Procedure: Right Heart Catheterization with [place];  Surgeon: Elijah Leger MD;  Location: [facility] CV    EP PACEMAKER DEVICE & LEAD IMPLANT- RIGHT ATRIAL & RIGHT VENTRICULAR N/A 3/24/2022    Procedure: Pacemaker Device & Lead Implant - Right Atrial & Right Ventricular;  Surgeon: Helder Pendleton MD;  Location: [facility] CV    ESOPHAGOSCOPY, GASTROSCOPY, DUODENOSCOPY (EGD), COMBINED N/A 11/26/2018    Procedure: Esophagogastroduodenoscopy;  Surgeon: Jasmeet Wilder MD;  Location: [facility] OR    HERNIA REPAIR, UMBILICAL  04/2018    HERNIA REPAIR, UMBILICAL  04/04/2018    Dr. 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    Repair arm fracture Left     SHOULDER SURGERY Right 1967    SHOULDER SURGERY Right 1967    US BIOPSY THYROID FINE NEEDLE ASPIRATION  7/29/2020       Allergies   Allergen Reactions    Clindamycin Rash    Carbamazepine Rash    Morphine Nausea         Current Outpatient Medications:     albuterol (PROAIR HFA/PROVENTIL HFA/VENTOLIN HFA) 108 (90 Base) MCG/ACT inhaler, Inhale 2 puffs into the lungs every 6 hours as needed for shortness of breath or wheezing, Disp: 18 g, Rfl: 3    azithromycin (ZITHROMAX) 250 MG tablet, Take 1 tablet (250 mg) by mouth every evening, Disp: 90 tablet, Rfl: 3    benzonatate (TESSALON) 100 MG capsule, Take 1 capsule (100 mg) by mouth 3 times daily as needed for cough, Disp: 30 capsule, Rfl: 1    calcium citrate (CITRACAL) 950 (200 Ca) MG tablet, Take 1 tablet by mouth 2 times daily, Disp: , Rfl:     denosumab (PROLIA) 60 MG/ML SOLN injection, Inject 60 mg Subcutaneous every 6 months, Disp: , Rfl:     fluticasone-salmeterol (ADVAIR) 500-50 MCG/ACT inhaler, Inhale 1 puff into the lungs every 12 hours, Disp: 180 each, Rfl: 3    guaiFENesin (MUCINEX) 600 MG 12 hr tablet, Take 1 tablet (600 mg) by mouth 2 times daily (Patient taking differently: Take 600 mg by mouth 2 times daily as needed), Disp: , Rfl:     hydrochlorothiazide (HYDRODIURIL) 25 MG tablet, Take 1 tablet (25 mg) by mouth daily, Disp: 90 tablet, Rfl: 3    ipratropium - albuterol 0.5 mg/2.5 mg/3 mL (DUONEB) 0.5-2.5 (3) MG/3ML neb solution, Take 1 vial (3 mLs) by nebulization every 6 hours as needed for shortness of breath / dyspnea or wheezing, Disp: 810 mL, Rfl: 3    levalbuterol (XOPENEX) 1.25 MG/3ML neb solution, Take 3 mLs (1.25 mg) by nebulization every 4 hours as needed for shortness of breath or wheezing, Disp: 180 mL, Rfl: 3    levETIRAcetam (KEPPRA) 500 MG tablet, Take 1 tablet (500 mg) by mouth 2 times daily, Disp: 180 tablet, Rfl: 3    LORazepam (ATIVAN) 0.5 MG tablet, Take 1/2 tablet (0.25 mg) 30 min  before your imaging study. Can take additional 1/2 tablet (0.25 mg) if needed. MUST have drive to and from appointment., Disp: 1 tablet, Rfl: 0    losartan (COZAAR) 50 MG tablet, Take 1 tablet (50 mg) by mouth 2 times daily, Disp: 180 tablet, Rfl: 3    Magnesium Oxide 500 MG TABS, Take 500 mg by mouth 2 times daily, Disp: , Rfl:     nystatin (MYCOSTATIN) 209164 UNIT/GM external powder, Apply topically daily as needed, Disp: , Rfl:     potassium chloride ER (KLOR-CON M) 20 MEQ CR tablet, Take 1 tablet (20 mEq) by mouth At Bedtime, Disp: 90 tablet, Rfl: 3    RABEprazole (ACIPHEX) 20 MG EC tablet, Take 1 tablet (20 mg) by mouth daily, Disp: 90 tablet, Rfl: 3    sertraline (ZOLOFT) 100 MG tablet, Take 1 tablet (100 mg) by mouth every evening, Disp: 90 tablet, Rfl: 3    solifenacin (VESICARE) 10 MG tablet, Take 1 tablet (10 mg) by mouth daily, Disp: 90 tablet, Rfl: 3    spironolactone (ALDACTONE) 25 MG tablet, Take 0.5 tablets (12.5 mg) by mouth daily, Disp: 45 tablet, Rfl: 3    tiotropium (SPIRIVA HANDIHALER) 18 MCG inhaled capsule, Inhale 1 capsule (18 mcg) into the lungs daily, Disp: 90 capsule, Rfl: 3    vitamin D3 (CHOLECALCIFEROL) 125 MCG (5000 UT) tablet, Take 5,000 Units by mouth daily , Disp: , Rfl:     Current Facility-Administered Medications:     denosumab (PROLIA) injection 60 mg, 60 mg, Subcutaneous, Q6 Months, Lalitha Haq NP, 60 mg at 06/21/23 9183    Family History   Problem Relation Age of Onset    Pulmonary Hypertension Daughter         idiopathic     Heart Disease Other         2 sibs MI, 1 sib electrical conduction disorder    Breast Cancer Mother 66.00    Hypertension Mother     Coronary Artery Disease Mother     Varicose Veins Mother     Hypertension Father     Coronary Artery Disease Sister     Heart Disease Sister     Diabetes Son     Pulmonary Hypertension Daughter     Coronary Artery Disease Sister     Heart Disease Sister        Social History     Socioeconomic History    Marital status:  "     Spouse name: Not on file    Number of children: Not on file    Years of education: Not on file    Highest education level: Not on file   Occupational History    Not on file   Tobacco Use    Smoking status: Former     Packs/day: 1.50     Years: 38.00     Additional pack years: 0.00     Total pack years: 57.00     Types: Cigarettes     Quit date: 1998     Years since quittin.8    Smokeless tobacco: Never    Tobacco comments:     quit in    Vaping Use    Vaping Use: Never used   Substance and Sexual Activity    Alcohol use: Yes     Alcohol/week: 2.0 standard drinks of alcohol     Comment: occ    Drug use: Not Currently    Sexual activity: Never   Other Topics Concern    Not on file   Social History Narrative    .  Two kids.  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     Social Determinants of Health     Financial Resource Strain: Not on file   Food Insecurity: Not on file   Transportation Needs: Not on file   Physical Activity: Not on file   Stress: Not on file   Social Connections: Not on file   Interpersonal Safety: Not on file   Housing Stability: Not on file       10 point Review of Systems is negative except for callus tissue which is noted in HPI.     Pulse (!) 48   Ht 5' 4\" (1.626 m)   Wt 217 lb (98.4 kg)   SpO2 94%   BMI 37.25 kg/m      BMI= Body mass index is 37.25 kg/m .    OBJECTIVE:  General appearance: Patient is alert and fully cooperative with history & exam.  No sign of distress is noted during the visit.    Vascular: Dorsalis pedis and posterior tibial pulses are non-palpable.  Diffuse edema bilateral legs.  Dermatologic: Hyperkeratotic tissue x6 medial aspect of bilateral hallux and plantar medial aspect bilateral first MPJ.  No erythema or open lesions bilateral feet.  Trophic changes noted including diminished hair growth and shiny skin.  Neurologic: All epicritic and proprioceptive sensations are grossly intact bilateral.  Musculoskeletal: Amputated fifth digit left foot.  " Collapse of medial longitudinal arch bilateral.      Imaging:     Cardiac Device Check - Remote (Standing ORD 5 count)    Result Date: 10/9/2023  Encounter Type: Routine remote pacemaker transmission. Device: BSCI Accolade. Pacing % /Programmed: AP 1%,  100% at DDD 60/130. Lead(s): Stable. Battery longevity: 10yrs, 6mo estimated. Presenting: AS- 80-85 bpm. Atrial high rates: since 23; None detected. Anticoagulant: None. Ventricular High rates: since 23; none detected. Comments: Normal device function. Plan: Next remote check scheduled for 24. OMAR Dominguez, Device Specialist  Device follow up for the entirety of having the Pacemaker, based on best practices determined by Heart R[employer] Society and in Compliance with Medicare guidelines. Continue remote device monitoring per patient plan. I have reviewed and interpreted the device interrogation, settings, programming, and encounter summary. The device is functioning within normal device parameters. I agree with the current findings, assessment and plan.    MR Cardiac & MRA Chest w Contrast    Result Date: 2023                                                   [facility]                                                  CMR Report   MRN:                [id number]                                Name:        WASHINGTON JETT                                :               1945-Mar-22                                Scan Date:         2023-Sep-25                              Electronically signed by Radha Smyth 2023-Sep-29 17:37:12 VITALS  ========================================================================================================== HEIGHT: 64.0 in    (162.6 cm) WEIGHT: 217.0 lbs    (98.4 kgs) BSA: 2.03 m^2 FINAL IMPRESSION  ========================================================================================================== 1.  The images by 3D contrast enhanced MRA and other image sequences did not show obvious evidence of pulmonary vein  anomaly. No atrial septal defect.  2.  Normal left ventricular size, mild concentric LVH and systolic function.  The calculated left ventricular ejection fraction is 68%. 3.  No previous myocardial infarction. 4.  Normal right ventricular size and systolic function.  Pacemaker lead is noticed. 5.  Mild to moderate tricuspid valve regurgitation.  Moderate aortic valve stenosis is noticed. Comments:  The image quality is suboptimal due to pacemaker.  The interpretation is based on the current image quality. SUMMARY  ========================================================================================================== LEFT VENTRICLE: LV cavity size is normal. There is mild LV hypertrophy. LV systolic function is normal. The calculated left ventricular ejection fraction is 68%. VIABILITY: Hyperenhancement is normal.  The image quality is suboptimal. RIGHT VENTRICLE: RV systolic function is normal. RV pacemaker/defibrillator wire noted. RV cavity size is normal.  Qp/Qs is 1.8 could be related to image quality issue to pacemaker artifact. LV/RV SEPTUM: The ventricular septum is intact. LA/RA SEPTUM: The atrial septum is intact. LEFT ATRIUM: LA is mildly to moderately enlarged.  No pulmonary vein anomaly is identified. RIGHT ATRIUM: RA cavity size is mildly enlarged. RA pacemaker/defibrillator wire noted. PERICARDIUM: Pericardium is normal. There is no pericardial effusion. PLEURAL EFFUSION: There is no pleural effusion. AORTIC VALVE: Aortic valve is trileaflet. Aortic valve is moderately thickened.  Moderate aortic valve stenosis per planimetry method. MITRAL VALVE: Mitral valve is mildly thickened. There is mild mitral regurgitation. There is no mitral stenosis. TRICUSPID VALVE: Tricuspid valve is mildly thickened. There is mild-moderate tricuspid regurgitation. There is no tricuspid stenosis. PULMONIC VALVE: Pulmonic valve leaflets are normal. AORTIC ROOT: The aortic root is normal. CORE EXAM   ========================================================================================================== MEASUREMENTS  ----------------------------------------------------------------------------------------     VOLUMETRIC ANALYSIS      ---------------------------------------------- .----------------------------------------------------.                LV     Reference  RV  Reference  +------+--------+-------+-----------+----+-----------+  EDV   ml      115.4   ()                        ml/m^2   57.0   (50-84)                   ESV   ml       36.8   (18-55)                         ml/m^2   18.2   (12-30)                   CO                                              MASS  g       177.4   ()                        g/m^2    87.6   (49-78)                   SV    ml       78.6   ()                        ml/m^2   38.8   (34-59)                   EF    %        68.1   (60-78)                  '------+--------+-------+-----------+----+-----------' 17 SEGMENT  ---------------------------------------------------------------------------------------- .-----------------------------------------------------------------------------------------.  Segments            Wall Motion  Hyperenhancement  Stress Perfusion  Interpretation  +--------------------+-------------+------------------+------------------+----------------+  Base Anterior                    None                                                 Base Anteroseptal                None                                                 Base Inferoseptal                None                                                 Base Inferior                    None                                                 Base Inferolateral               None                                                 Base Anterolateral               None                                                  Mid Anterior                     None                                                 Mid Anteroseptal                 None                                                 Mid Inferoseptal                 None                                                 Mid Inferior                     None                                                 Mid Inferolateral                None                                                 Mid Anterolateral                None                                                 Apical Anterior                  None                                                 Apical Septal                    None                                                 Apical Inferior                  None                                                 Apical Lateral                   None                                                 [place]                             None                                                +--------------------+-------------+------------------+------------------+----------------+  RV Segments         Wall Motion  Hyperenhancement                    Interpretation  +--------------------+-------------+------------------+------------------+----------------+  RV Basal Anterior                None                                                 RV Basal Inferior                None                                                 RV Mid                           None                                                 RV Apical                        None                                                '--------------------+-------------+------------------+------------------+----------------'     FINDINGS      ----------------------------------------------         LV SCAR SIZE (17 SEGMENT):  0 % SCAN INFO   ========================================================================================================== GENERAL  ----------------------------------------------------------------------------------------     SCANNER      ----------------------------------------------         :  SIEMENS         MODEL:  Aera         PULSE SEQUENCES:  SSFP cine, GRE cine, Tagged cine, 2D LGE segmented, 2D LGE single-shot, 3D LGE,         Black-blood LGE (or Grey-blood), T1-weighted imaging, T2-weighted imaging, Pre-contrast T1 mapping,         Post-contrast T1 mapping, T2 mapping, Phase contrast imaging, Fat-water imaging, HASTE morphology,         Bright-blood SSFP morphology, 3D contrast enhanced MRA, 3D non-contrast MRA     CONTRAST AGENT      ----------------------------------------------         TYPE:  Gadavist         GD CONCENTRATION:  0.5 M     SETUP      ----------------------------------------------         SCAN TYPE:  Clinical         PATIENT TYPE:  Outpatient         INCOMPLETE SCAN:  No         REASON(S) FOR SCAN:  Anomalous PV         REFERRING PHYSICIAN:  ZHANE OCONNELL         ATTENDING PHYSICIAN:  ZHANE OCONNELL BILLING  ========================================================================================================== CPT Codes ICD10 Codes     R93.1, Q26.2 Report generated by [employer], a product of [employer]    MR Myocardium  Overread    Result Date: 9/25/2023  OVERREAD: DETAILED [place] RADIOLOGY EXTRACARDIAC OVERREAD OF CARDIAC MRI LOCATION: [facility] DATE: 9/25/2023 INDICATION: Chest pain. COMPARISON: CT 6/20/2023. FINDINGS:  LIMITED CHEST: Left chest wall AV pacer. LIMITED MEDIASTINUM: 3.0 cm left thyroid nodule. LIMITED UPPER ABDOMEN: Left renal cyst.     IMPRESSION: 1. Left thyroid nodule measuring 3.0 cm. Recommend dedicated thyroid ultrasound if not previously performed. 2. Please refer to cardiologist's dictation for the cardiac MRI report.

## 2023-10-30 DIAGNOSIS — J44.1 COPD EXACERBATION (H): ICD-10-CM

## 2023-10-30 DIAGNOSIS — J43.9 PULMONARY EMPHYSEMA, UNSPECIFIED EMPHYSEMA TYPE (H): ICD-10-CM

## 2023-10-30 RX ORDER — BENZONATATE 100 MG/1
100 CAPSULE ORAL 3 TIMES DAILY PRN
Qty: 90 CAPSULE | Refills: 3 | Status: SHIPPED | OUTPATIENT
Start: 2023-10-30

## 2023-10-30 RX ORDER — LEVALBUTEROL INHALATION SOLUTION 1.25 MG/3ML
1 SOLUTION RESPIRATORY (INHALATION) EVERY 4 HOURS PRN
Qty: 810 ML | Refills: 3 | Status: SHIPPED | OUTPATIENT
Start: 2023-10-30

## 2023-10-30 RX ORDER — GUAIFENESIN 600 MG/1
600 TABLET, EXTENDED RELEASE ORAL 2 TIMES DAILY
Qty: 180 TABLET | Refills: 3 | Status: SHIPPED | OUTPATIENT
Start: 2023-10-30 | End: 2024-09-25

## 2023-11-27 ENCOUNTER — OFFICE VISIT (OUTPATIENT)
Dept: PULMONOLOGY | Facility: CLINIC | Age: 78
End: 2023-11-27
Attending: NURSE PRACTITIONER
Payer: MEDICARE

## 2023-11-27 VITALS
OXYGEN SATURATION: 95 % | HEART RATE: 94 BPM | DIASTOLIC BLOOD PRESSURE: 80 MMHG | WEIGHT: 217.6 LBS | BODY MASS INDEX: 37.35 KG/M2 | SYSTOLIC BLOOD PRESSURE: 171 MMHG

## 2023-11-27 DIAGNOSIS — J43.9 PULMONARY EMPHYSEMA, UNSPECIFIED EMPHYSEMA TYPE (H): ICD-10-CM

## 2023-11-27 DIAGNOSIS — J44.9 CHRONIC OBSTRUCTIVE PULMONARY DISEASE, UNSPECIFIED COPD TYPE (H): Primary | ICD-10-CM

## 2023-11-27 DIAGNOSIS — R06.09 DYSPNEA ON EXERTION: ICD-10-CM

## 2023-11-27 DIAGNOSIS — J96.11 CHRONIC RESPIRATORY FAILURE WITH HYPOXIA (H): ICD-10-CM

## 2023-11-27 PROCEDURE — 99214 OFFICE O/P EST MOD 30 MIN: CPT | Performed by: NURSE PRACTITIONER

## 2023-11-27 NOTE — PATIENT INSTRUCTIONS
- check with insurance to see if Spiriva Respimat (inhaler instead of capsule) is covered.   - continue the Advair and Spiriva daily.       If you have worsening symptoms, questions, or need to speak with the nurse please call [phone].

## 2023-11-27 NOTE — PROGRESS NOTES
Outpatient Pulmonary Clinic Visit  November 27, 2023      Assessment and Plan:  Lalitha Underwood is a 78 year old female with a past medical history significant for severe COPD, emphysema, Cor pulmonale, chronic hypoxic respiratory failure, paraesophageal hernia s/p repair, HTN, acquired lymphedema of leg, epileptic seizure, HLD, GERD, aortic valve stenosis, obesity, neuropathy, osteoporosis, PAD, s/p cardiac pacemaker placement, second degree heart block, CAD, and former tobacco use who presents to clinic today for follow up. PFTs show a severe obstructive ventilatory defect, no significant response to bronchodilator, with moderately reduced diffusion capacity.     Recent COPD exacerbation on 5/15/2023, she was seen in the emergency department and treated with antibiotics, steroids and DuoNebs.  Labs were consistent for hypercarbic respiratory acidosis.  She reports some improvement since discharge but is still struggling with congested cough. Lung exam today demonstrates diffuse wheeze, sats 92% on 2L pulse dose.     1) Severe COPD -   Continue advair/spiriva and as needed albuterol or duonebs.    Remain on azithromycin to help control exacerbation - will monitor her QTc closely (QTC is <450 at last visit in 01/2023).    OK to use her nebulizer 2-4 times daily, encouraged her to increase use while she is having increased symptoms. Changed her albuterol to levalbuterol to help with jittery side effect.    2) Chronic hypoxic respiratory failure -   needs to wear oxygen 2L with exertion at minimum and with sleep.    Sleep medicine referral placed to discuss sleep habits and assess for need for pap therapy. appointment on 12/6.  3) Cor Pulmonale - remains on hydrochlorothiazide and spironolactone.  Prevent hypoxia at all times with oxygen use as above.   Continued follow up with cardiology.   4) Dyspnea - multifactorial from the above, in pulmonary rehab and learning.  She needs to start exercising more at home, she  "needs to combat \"laziness\" per her words.    If this patients lung disease exacerbates I recommend: doxycycline 100mg BID for 10 days and a steroid taper with prednisone at 40mg for 3 days 30mg for 3 days, 20mg for 3 days and 10mg for 3 days    RTC in 6 months    Ev Plaza CNP  Pulmonary Medicine  [facility]   [phone]    CCx:   Chief Complaint   Patient presents with    Follow Up       4 MONTH FOLLOW UP:  Chronic obstructive pulmonary disease, unspecified COPD type (H)  Chronic respiratory failure with hypoxia (H)  Pulmonary emphysema, unspecified emphysema type (H)        HPI:   He was last seen in clinic in July 2023. Since then she has been doing well. She would try and stay inside when the air quality was poor. No exacerbations since our last visit. Her formulary has replaced her Spiriva with a generic version that she does not feel works as well. CAT is stable today at 13.  She remains on maximal inhaled therapy for her COPD, after starting azithromycin and pulmonary rehab and did not have a \"flare\" of her COPD for a while.   Last COPD exacerbation wsa on 5/15/2023.   Continues to have dyspnea with exertion and daytime fatigue.  Denies chest tightness or wheeze.   On spironolactone. Followed by Dr. Robertson in cardiology. Recent coronary angiogram, did not require stents. Cardiac MRI showed tricuspid valve regurgitation and aortic valve stenosis.     Patient supplied answers from flow sheet for:  COPD Assessment Test (CAT)  2009 [employer]. All rights reserved.      4/5/2022     4:00 PM 6/28/2022    10:00 AM 1/3/2023    10:52 AM 1/3/2023     2:29 PM 5/22/2023     1:00 PM 7/21/2023     9:23 AM 11/26/2023     5:16 PM   COPD assessment test (CAT)   Cough 3 2 2 2 3 1 3   Phlegm 2 2 1 1 4 1 1   Chest tightness 4 1 1 1 2 1 2   Walk up hill 5 4 3 3 5 3 1   Limited activities 4 1 2 2 4 2 1   Leaving my home 1 0 1 1 1 1 0   Sleep 1 1 0 0 3 1 2   Energy 4 4 3 3 4 3 3   Total Score 24 15 13 13 26 13 13    "     ROS:   ROS: 10 point ROS neg other than the symptoms noted above in the HPI.    Current Meds:  Current Outpatient Medications   Medication Sig Dispense Refill    albuterol (PROAIR HFA/PROVENTIL HFA/VENTOLIN HFA) 108 (90 Base) MCG/ACT inhaler Inhale 2 puffs into the lungs every 6 hours as needed for shortness of breath or wheezing 18 g 3    azithromycin (ZITHROMAX) 250 MG tablet Take 1 tablet (250 mg) by mouth every evening 90 tablet 3    benzonatate (TESSALON) 100 MG capsule Take 1 capsule (100 mg) by mouth 3 times daily as needed for cough 90 capsule 3    calcium citrate (CITRACAL) 950 (200 Ca) MG tablet Take 1 tablet by mouth 2 times daily      denosumab (PROLIA) 60 MG/ML SOLN injection Inject 60 mg Subcutaneous every 6 months      fluticasone-salmeterol (ADVAIR) 500-50 MCG/ACT inhaler Inhale 1 puff into the lungs every 12 hours 180 each 3    guaiFENesin (MUCINEX) 600 MG 12 hr tablet Take 1 tablet (600 mg) by mouth 2 times daily 180 tablet 3    hydrochlorothiazide (HYDRODIURIL) 25 MG tablet Take 1 tablet (25 mg) by mouth daily 90 tablet 3    ipratropium - albuterol 0.5 mg/2.5 mg/3 mL (DUONEB) 0.5-2.5 (3) MG/3ML neb solution Take 1 vial (3 mLs) by nebulization every 6 hours as needed for shortness of breath / dyspnea or wheezing 810 mL 3    levalbuterol (XOPENEX) 1.25 MG/3ML neb solution Take 3 mLs (1.25 mg) by nebulization every 4 hours as needed for shortness of breath or wheezing 810 mL 3    levETIRAcetam (KEPPRA) 500 MG tablet Take 1 tablet (500 mg) by mouth 2 times daily 180 tablet 3    losartan (COZAAR) 50 MG tablet Take 1 tablet (50 mg) by mouth 2 times daily 180 tablet 3    Magnesium Oxide 500 MG TABS Take 500 mg by mouth 2 times daily      nystatin (MYCOSTATIN) 906857 UNIT/GM external powder Apply topically daily as needed      potassium chloride ER (KLOR-CON M) 20 MEQ CR tablet Take 1 tablet (20 mEq) by mouth At Bedtime 90 tablet 3    RABEprazole (ACIPHEX) 20 MG EC tablet Take 1 tablet (20 mg) by  mouth daily 90 tablet 3    sertraline (ZOLOFT) 100 MG tablet Take 1 tablet (100 mg) by mouth every evening 90 tablet 3    solifenacin (VESICARE) 10 MG tablet Take 1 tablet (10 mg) by mouth daily 90 tablet 3    spironolactone (ALDACTONE) 25 MG tablet Take 0.5 tablets (12.5 mg) by mouth daily 45 tablet 3    tiotropium (SPIRIVA HANDIHALER) 18 MCG inhaled capsule Inhale 1 capsule (18 mcg) into the lungs daily 90 capsule 3    vitamin D3 (CHOLECALCIFEROL) 125 MCG (5000 UT) tablet Take 5,000 Units by mouth daily          Physical Exam:  BP (!) 171/80 (BP Location: Right arm, Patient Position: Sitting, Cuff Size: Adult Regular)   Pulse 94   Wt 98.7 kg (217 lb 9.6 oz)   SpO2 95%   BMI 37.35 kg/m      Physical Exam  Constitutional:       General: She is not in acute distress.     Appearance: She is not ill-appearing or diaphoretic.   HENT:      Nose: Nose normal.   Cardiovascular:      Rate and Rhythm: Normal rate and regular rhythm.      Pulses: Normal pulses.      Heart sounds: Normal heart sounds.   Pulmonary:      Effort: Pulmonary effort is normal. No respiratory distress.      Breath sounds: Examination of the right-lower field reveals decreased breath sounds. Examination of the left-lower field reveals decreased breath sounds. Decreased breath sounds and wheezing (end expiratory) present. No rhonchi.   Musculoskeletal:      Right lower leg: Edema present.      Left lower leg: Edema present.   Skin:     General: Skin is warm and dry.      Findings: No rash.   Neurological:      Mental Status: She is alert.   Psychiatric:         Behavior: Behavior normal.       Labs:  @clab@  Lab Results   Component Value Date    WBC 9.7 07/06/2023    HGB 10.5 (L) 07/06/2023    HCT 34.7 (L) 07/06/2023     07/06/2023     07/06/2023    POTASSIUM 4.2 07/06/2023    CHLORIDE 98 07/06/2023    CO2 28 07/06/2023     (H) 07/06/2023    BUN 26.0 (H) 07/06/2023    CR 0.95 07/06/2023    MAG 1.7 (L) 03/25/2022    BILITOTAL 0.2  05/15/2023    AST 25 05/15/2023    ALT 14 05/15/2023    ALKPHOS 76 05/15/2023    PROTTOTAL 7.0 05/15/2023    ALBUMIN 4.2 05/15/2023       I have personally reviewed all pertinent imaging studies and PFT results unless otherwise noted.    Imaging studies:    CT CHEST PULMONARY EMBOLISM W CONTRAST, DATE/TIME: 5/15/2023 1:41 PM CDT  INDICATION: chest pain, SOB, elevated D dimer  COMPARISON: Chest x-ray 5/15/2023 and 3/11/2022  FINDINGS:  ANGIOGRAM CHEST: Negative for pulmonary emboli. Some mildly prominent central pulmonary arteries concerning for possible pulmonary artery hypertension. Thoracic aorta is negative for dissection. No CT evidence of right heart strain.  LUNGS AND PLEURA: Slightly elevated left hemidiaphragm similar to previous. Some slight probable fibrosis in the left lung base similar to chest x-ray. No acute new findings.  MEDIASTINUM/AXILLAE: Normal.  CORONARY ARTERY CALCIFICATION: Mild.                                                              IMPRESSION:  1.  Negative for pulmonary emboli.  2.  Mildly enlarged central pulmonary arteries suggestive of possible pulmonary artery hypertension.  3.  Slight fibrosis or atelectasis left lung base similar to chest x-ray. No acute findings in the lungs.    PFTs 04/2019:  FEV1/FVC is 0.53 and is reduced.  FEV1 is 40% predicted and is reduced.  FVC is 59% predicted and is reduced.  There was no improvement in spirometry after a single inhaled dose of bronchodilator.  TLC is 95% predicted and is normal.  RV is 138% predicted and is increased.  RV/TLC is 0.63 and is increased.  DLCO is 42% predicted and is reduced when it   is corrected for hemoglobin.  The flow volume loop shows obstructive morphology.     Impression:  Full Pulmonary Function Test is abnormal. Spirometry is consistent with severe obstructive ventilatory defect.  Spirometry is not consistent with reversibility.  There is no hyperinflation.  There is air-trapping.  Diffusion capacity when  corrected for hemoglobin is moderately reduced.    Cardiac MRI 09/2023  IMPRESSION:  1.  The images by 3D contrast enhanced MRA and other image sequences did not show obvious evidence of  pulmonary vein anomaly. No atrial septal defect.    2.  Normal left ventricular size, mild concentric LVH and systolic function.  The calculated left  ventricular ejection fraction is 68%.  3.  No previous myocardial infarction.   4.  Normal right ventricular size and systolic function.  Pacemaker lead is noticed.  5.  Mild to moderate tricuspid valve regurgitation.  Moderate aortic valve stenosis is noticed.

## 2023-11-28 DIAGNOSIS — J44.9 CHRONIC OBSTRUCTIVE PULMONARY DISEASE, UNSPECIFIED COPD TYPE (H): Primary | ICD-10-CM

## 2023-12-02 ASSESSMENT — SLEEP AND FATIGUE QUESTIONNAIRES
HOW LIKELY ARE YOU TO NOD OFF OR FALL ASLEEP IN A CAR, WHILE STOPPED FOR A FEW MINUTES IN TRAFFIC: WOULD NEVER DOZE
HOW LIKELY ARE YOU TO NOD OFF OR FALL ASLEEP WHILE SITTING INACTIVE IN A PUBLIC PLACE: WOULD NEVER DOZE
HOW LIKELY ARE YOU TO NOD OFF OR FALL ASLEEP WHILE SITTING AND READING: SLIGHT CHANCE OF DOZING
HOW LIKELY ARE YOU TO NOD OFF OR FALL ASLEEP WHILE SITTING QUIETLY AFTER LUNCH WITHOUT ALCOHOL: WOULD NEVER DOZE
HOW LIKELY ARE YOU TO NOD OFF OR FALL ASLEEP WHILE SITTING AND TALKING TO SOMEONE: WOULD NEVER DOZE
HOW LIKELY ARE YOU TO NOD OFF OR FALL ASLEEP WHILE WATCHING TV: SLIGHT CHANCE OF DOZING
HOW LIKELY ARE YOU TO NOD OFF OR FALL ASLEEP WHEN YOU ARE A PASSENGER IN A CAR FOR AN HOUR WITHOUT A BREAK: WOULD NEVER DOZE
HOW LIKELY ARE YOU TO NOD OFF OR FALL ASLEEP WHILE LYING DOWN TO REST IN THE AFTERNOON WHEN CIRCUMSTANCES PERMIT: WOULD NEVER DOZE

## 2023-12-06 ENCOUNTER — OFFICE VISIT (OUTPATIENT)
Dept: SLEEP MEDICINE | Facility: CLINIC | Age: 78
End: 2023-12-06
Attending: NURSE PRACTITIONER
Payer: MEDICARE

## 2023-12-06 VITALS
DIASTOLIC BLOOD PRESSURE: 81 MMHG | SYSTOLIC BLOOD PRESSURE: 150 MMHG | BODY MASS INDEX: 37.22 KG/M2 | WEIGHT: 218 LBS | HEART RATE: 107 BPM | OXYGEN SATURATION: 92 % | HEIGHT: 64 IN | RESPIRATION RATE: 22 BRPM

## 2023-12-06 DIAGNOSIS — J96.11 CHRONIC RESPIRATORY FAILURE WITH HYPOXIA (H): ICD-10-CM

## 2023-12-06 DIAGNOSIS — G47.30 SLEEP-RELATED BREATHING DISORDER: Primary | ICD-10-CM

## 2023-12-06 PROCEDURE — 99215 OFFICE O/P EST HI 40 MIN: CPT | Performed by: NURSE PRACTITIONER

## 2023-12-06 NOTE — NURSING NOTE
"Chief Complaint   Patient presents with    Consult For    Sleep Problem       Initial BP (!) 150/81   Pulse 107   Resp 22   Ht 1.626 m (5' 4\")   Wt 98.9 kg (218 lb)   SpO2 92%   BMI 37.42 kg/m   Estimated body mass index is 37.42 kg/m  as calculated from the following:    Height as of this encounter: 1.626 m (5' 4\").    Weight as of this encounter: 98.9 kg (218 lb).    Medication Reconciliation: complete    Neck circumference:  inches / 36 centimeters.    DME:     Ankush Falk MA  [facility]    "

## 2023-12-06 NOTE — PROGRESS NOTES
Outpatient Sleep Medicine Consultation:      Name: Lalitha Underwood MRN# [id number]   Age: 78 year old YOB: 1945     Date of Consultation: December 6, 2023  Consultation is requested by: DONAVAN Aggarwal CNP  [address] [facility] [address]  [place] [address] Ev Plaza  Primary care provider: Kate Marte       Reason for Sleep Consult:     Lalitha Underwood is sent by Ev Plaza for a sleep consultation regarding COPD with acute exacerbation, chronic respiratory failure with hypoxia.    Patient s Reason for visit  Lalitha Underwood main reason for visit: ordered  Patient states problem(s) started: not a real problem  Lalitha Underwood's goals for this visit: do I actually have sleep apnea           Assessment and Plan:     Summary Sleep Diagnoses:  Sleep apnea, likely obstructive and central.    Comorbid Diagnoses:  COPD, severe  Emphysema  Cor pulmonale  Chronic respiratory failure  Paraesophageal hernia, status post repair  Hypertension  Acquired lymphedema of the leg  Excision  HLD  Class II obesity, BMI 37.42  Neuropathy    Coronary artery    Aortic valve stenosis    Summary Recommendations:  Orders Placed This Encounter   Procedures    Comprehensive Sleep Study   1.  Recommend patient undergo sleep testing.  Patient STOP-BANG score is 4/8 indicating an increased pretest probability for obstructive sleep apnea.  PSG ordered.  PSG will evaluate for central sleep apnea, due to sleep apnea, next hypoxemia as well as hypoventilation.  2.  Recommend patient return to clinic approximately 2 weeks after sleep study has been completed.  At that time we will review the results as well as to determine plan of care going forward.  I have asked Serene to notify me a phone call for a [email or web address] message that she has completed sleep study.  Afterwards, contact her, we will discuss treatment options for plan of care going forward.  3.  Recommend patient optimize sleep schedule as well as his sleep hygiene  practices.  This will mitigate any future sleep intrusion.  4.  Recommend patient employ safe driving practices such as not driving a car if drowsy.  5.  Weight management to BMI of 30    Summary Counseling:    Sleep Testing Reviewed: PSG  Obstructive Sleep Apnea Reviewed   -Discussed pathophysiology of obstructive sleep apnea as well as central sleep apnea   -Discussed all methods of treatment of sleep apnea including PAP therapy, mandibular advancement device, surgical options  Complications of Untreated Sleep Apnea Reviewed in the context of his medical diagnoses      Medical Decision-making:   Educational materials provided in instructions    Total time spent reviewing medical records, history and physical examination, review of previous testing and interpretation as well as documentation on this date:60 minutes    CC: Ev Plaza          History of Present Illness:     Lalitha Underwood is a 78-year-old female the advice of her pulmonary medical provider, Ev Plaza, for her chronic obstructive pulmonary disease with acute desaturation, and chronic respiratory failure with hypoxia.  Patient wears hearing aids, and uses a walker as she has difficulty ambulating.  She uses oxygen at 4 L/nasal cannula 24 hours/day.  Patient continues to drive a car.  Pulmonary medicine referred patient to discuss her sleep habits as well as assess patient's need for PAP therapy.    Patient has a complex medical history notable for respiratory failure with hypoxia repair of paraesophageal hernia, hypertension epilepsy, aortic valve stenosis, morbid obesity, severe obstructive pulmonary disease, now treated with cardiac pacemaker in situ, cor pulmonale, osteoporosis, acquired lymphedema of leg, HLD, neuropathy, coronary artery disease, tobacco use disorder/in remission.    Patient has no concerns with insomnia.  She also feels that she does not have significant problem with her sleep.  She is here today because her medical  provider has directed her to.    She does acknowledge multiple awakenings during sleep for elimination purposes.  She denies snoring, however she states that other people complain about her snoring.  She is not aware of any episodes of apnea during her sleep.  She awakens with morning mouth dryness, has shortness of breath with activity joint pains at night and suffers from anxiety.    Back he does have 2 younger and healthier sisters, but states 1 has had a myocardial infarction, and 1 has had a stroke.    Of note, he did visit the emergency room in May to be evaluated for shortness of breath.  She was placed on BiPAP to assist with her shortness of breath, and then nasal cannula.  Patient was found to have acute on chronic hypercarbic respiratory acidosis, but was stable enough for discharge.    We had a lengthy conversation regarding sleep apnea, and the risks of untreated sleep apnea in the setting of her extensive respiratory and cardiovascular diagnoses.  She acknowledged that she had needed a surgery relatively recently but was told that she was too high of a risk for due to her respiratory conditions.  Discussed that she takes 3 antihypertensives as well.  In discussing patient's sleep habits with her, the degree of her being truthful is questionable.  She is very ingrained in her daily habits and likely does not see the need to change even though there would be a positive health payoff.  When discussing the sleep study the conversation was centered around information gathering.  Briefly we touched on PAP therapy, however she remembered the BiPAP experience she had in May and became upset.  Continue to reinforce informational aspect of sleep study.      Treatment plan and supplies to her results at our next visit.      Past Sleep Evaluations: None    SLEEP-WAKE SCHEDULE:     Work/School Days: Patient goes to school/work: No   Usually gets into bed at 1030 - 1100  Takes patient about 20 min to fall  asleep  Has trouble falling asleep 0 nights per week  Wakes up in the middle of the night 2 times.  Wakes up due to Use the bathroom  She has trouble falling back asleep rarely times a week.   It usually takes 10-20 min to get back to sleep  Patient is usually up at 720-830  Uses alarm: Yes    Weekends/Non-work Days/All Other Days:  Usually gets into bed at [id number]   Takes patient about 15-30 to fall asleep  Patient is usually up at 720-830  Uses alarm: Yes    Sleep Need  Patient gets  9 hrs sleep on average   Patient thinks she needs about 8 hrs sleep    Lalitha Underwood prefers to sleep in this position(s): Back;Head Elevated   Patient states they do the following activities in bed: Read;Watch TV    Naps  Patient takes a purposeful nap 0 times a week and naps are usually 0 in duration  She feels better after a nap: No  She dozes off unintentionally 1 days per week  Patient has had a driving accident or near-miss due to sleepiness/drowsiness: No      SLEEP DISRUPTIONS:    Breathing/Snoring  Patient snores:No  Other people complain about her snoring: Yes  Patient has been told she stops breathing in her sleep:No  She has issues with the following: Morning mouth dryness;Getting up to urinate more than once    Movement:  Patient gets pain, discomfort, with an urge to move:  No  It happens when she is resting:  No  It happens more at night:  No  Patient has been told she kicks her legs at night:  No     Behaviors in Sleep:  Lalitha Underwood has experienced the following behaviors while sleeping:    She has experienced sudden muscle weakness during the day: No      Is there anything else you would like your sleep provider to know:          CAFFEINE AND OTHER SUBSTANCES:    Patient consumes caffeinated beverages per day:  1  Last caffeine use is usually: 10 am  List of any prescribed or over the counter stimulants that patient takes:    List of any prescribed or over the counter sleep medication patient takes:    List  of previous sleep medications that patient has tried:    Patient drinks alcohol to help them sleep: No  Patient drinks alcohol near bedtime: No    Family History:  Patient has a family member been diagnosed with a sleep disorder: Yes  sp;ouse         SCALES:    EPWORTH SLEEPINESS SCALE         12/2/2023     8:04 PM    Loretto Sleepiness Scale ( JIMBO Dickinson  [phone]<br>ESS - [place]/English - Final version - 21 Nov 07 - [facility] [employer].)   Sitting and reading Slight chance of dozing   Watching TV Slight chance of dozing   Sitting, inactive in a public place (e.g. a theatre or a meeting) Would never doze   As a passenger in a car for an hour without a break Would never doze   Lying down to rest in the afternoon when circumstances permit Would never doze   Sitting and talking to someone Would never doze   Sitting quietly after a lunch without alcohol Would never doze   In a car, while stopped for a few minutes in traffic Would never doze   Loretto Score (MC) 2   Loretto Score (Sleep) 2         INSOMNIA SEVERITY INDEX (HARI)          12/2/2023     7:48 PM   Insomnia Severity Index (HARI)   Difficulty falling asleep 0   Difficulty staying asleep 1   Problems waking up too early 1   How SATISFIED/DISSATISFIED are you with your CURRENT sleep pattern? 3   How NOTICEABLE to others do you think your sleep problem is in terms of impairing the quality of your life? 1   How WORRIED/DISTRESSED are you about your current sleep problem? 1   To what extent do you consider your sleep problem to INTERFERE with your daily functioning (e.g. daytime fatigue, mood, ability to function at work/daily chores, concentration, memory, mood, etc.) CURRENTLY? 0   HARI Total Score 7       Guidelines for Scoring/Interpretation:  Total score categories:  0-7 = No clinically significant insomnia   8-14 = Subthreshold insomnia   15-21 = Clinical insomnia (moderate severity)  22-28 = Clinical insomnia (severe)  Used via courtesy of  "www.[facility].va.gov with permission from Frank Mendez PhD., [facility]      STOP BANG         12/6/2023     1:56 PM   STOP BANG Questionnaire (  2008, the American Society of Anesthesiologists, Inc. [employer]   Neck Cir (cm) Clinic: 36 cm   B/P Clinic: 150/81   BMI Clinic: 37.42         GAD7        8/13/2021     9:52 AM   WILLIS-7    1. Feeling nervous, anxious, or on edge 1   2. Not being able to stop or control worrying 0   3. Worrying too much about different things 0   4. Trouble relaxing 1   5. Being so restless that it is hard to sit still 0   6. Becoming easily annoyed or irritable 1   7. Feeling afraid, as if something awful might happen 0   WILLIS-7 Total Score 3         CAGE-AID         No data to display                CAGE-AID reprinted with permission from the [place] Medical Journal, GISELA Castillo. and WILEY Jiang, \"Conjoint screening questionnaires for alcohol and drug abuse\" [place] Medical Journal 94: 135-140, 1995.      PATIENT HEALTH QUESTIONNAIRE-9 (PHQ - 9)        9/10/2021    10:00 AM   PHQ-9 ([employer])   1.  Little interest or pleasure in doing things 1   2.  Feeling down, depressed, or hopeless 0   3.  Trouble falling or staying asleep, or sleeping too much 0   4.  Feeling tired or having little energy 1   5.  Poor appetite or overeating 2   6.  Feeling bad about yourself - or that you are a failure or have let yourself or your family down 0   7.  Trouble concentrating on things, such as reading the newspaper or watching television 1   8.  Moving or speaking so slowly that other people could have noticed. Or the opposite - being so fidgety or restless that you have been moving around a lot more than usual 0   9.  Thoughts that you would be better off dead, or of hurting yourself in some way 0   PHQ-9 Total Score 5   6.  Feeling bad about yourself 0   7.  Trouble concentrating 1   8.  Moving slowly or restless 0   9.  Suicidal or self-harm thoughts 0 "       Developed by Suresh Pruitt, Gail Tamayo, Callum Jaimes and colleagues, with an educational mynor from [employer] No permission required to reproduce, translate, display or distribute.        Allergies:    Allergies   Allergen Reactions    Clindamycin Rash    Carbamazepine Rash    Morphine Nausea       Medications:    Current Outpatient Medications   Medication Sig Dispense Refill    albuterol (PROAIR HFA/PROVENTIL HFA/VENTOLIN HFA) 108 (90 Base) MCG/ACT inhaler Inhale 2 puffs into the lungs every 6 hours as needed for shortness of breath or wheezing 18 g 3    azithromycin (ZITHROMAX) 250 MG tablet Take 1 tablet (250 mg) by mouth every evening 90 tablet 3    benzonatate (TESSALON) 100 MG capsule Take 1 capsule (100 mg) by mouth 3 times daily as needed for cough 90 capsule 3    calcium citrate (CITRACAL) 950 (200 Ca) MG tablet Take 1 tablet by mouth 2 times daily      denosumab (PROLIA) 60 MG/ML SOLN injection Inject 60 mg Subcutaneous every 6 months      fluticasone-salmeterol (ADVAIR) 500-50 MCG/ACT inhaler Inhale 1 puff into the lungs every 12 hours 180 each 3    guaiFENesin (MUCINEX) 600 MG 12 hr tablet Take 1 tablet (600 mg) by mouth 2 times daily 180 tablet 3    hydrochlorothiazide (HYDRODIURIL) 25 MG tablet Take 1 tablet (25 mg) by mouth daily 90 tablet 3    ipratropium - albuterol 0.5 mg/2.5 mg/3 mL (DUONEB) 0.5-2.5 (3) MG/3ML neb solution Take 1 vial (3 mLs) by nebulization every 6 hours as needed for shortness of breath / dyspnea or wheezing 810 mL 3    levalbuterol (XOPENEX) 1.25 MG/3ML neb solution Take 3 mLs (1.25 mg) by nebulization every 4 hours as needed for shortness of breath or wheezing 810 mL 3    levETIRAcetam (KEPPRA) 500 MG tablet Take 1 tablet (500 mg) by mouth 2 times daily 180 tablet 3    losartan (COZAAR) 50 MG tablet Take 1 tablet (50 mg) by mouth 2 times daily 180 tablet 3    Magnesium Oxide 500 MG TABS Take 500 mg by mouth 2 times daily      nystatin (MYCOSTATIN)  [id number] UNIT/GM external powder Apply topically daily as needed      potassium chloride ER (KLOR-CON M) 20 MEQ CR tablet Take 1 tablet (20 mEq) by mouth At Bedtime 90 tablet 3    RABEprazole (ACIPHEX) 20 MG EC tablet Take 1 tablet (20 mg) by mouth daily 90 tablet 3    sertraline (ZOLOFT) 100 MG tablet Take 1 tablet (100 mg) by mouth every evening 90 tablet 3    solifenacin (VESICARE) 10 MG tablet Take 1 tablet (10 mg) by mouth daily 90 tablet 3    spironolactone (ALDACTONE) 25 MG tablet Take 0.5 tablets (12.5 mg) by mouth daily 45 tablet 3    tiotropium (SPIRIVA RESPIMAT) 2.5 MCG/ACT inhaler Inhale 2 puffs into the lungs daily 12 g 3    vitamin D3 (CHOLECALCIFEROL) 125 MCG (5000 UT) tablet Take 5,000 Units by mouth daily          Problem List:  Patient Active Problem List    Diagnosis Date Noted    Acquired absence of other left toe(s) (H24) 04/27/2023     Priority: Medium    Cor pulmonale (chronic) (H) 04/27/2023     Priority: Medium    Chronic respiratory failure with hypoxia (H) 04/27/2023     Priority: Medium    Atherosclerosis of native coronary artery with stable angina pectoris (H24) 03/31/2023     Priority: Medium    Cardiac pacemaker in situ 03/25/2022     Priority: Medium    Second degree heart block 03/19/2022     Priority: Medium    PAD (peripheral artery disease) (H24) 10/18/2021     Priority: Medium    Epileptic seizure (H) 07/16/2021     Priority: Medium     Formatting of this note might be different from the original.  Created by Conversion    Replacement Utility updated for latest IMO load      Esophageal reflux 07/16/2021     Priority: Medium     Formatting of this note might be different from the original.  Created by Conversion      Hyperlipidemia 07/16/2021     Priority: Medium     Formatting of this note might be different from the original.  Created by Conversion      Impaired gait and mobility 07/16/2021     Priority: Medium     Formatting of this note might be different from the  original.  Created by Conversion      COPD (chronic obstructive pulmonary disease) (H) 07/16/2021     Priority: Medium     Formatting of this note might be different from the original.  Created by Conversion      Rosacea 07/16/2021     Priority: Medium     Formatting of this note might be different from the original.  Created by Conversion      Vitamin D deficiency 07/16/2021     Priority: Medium     Formatting of this note might be different from the original.  Created by Conversion    Replacement Utility updated for latest IMO load      Morbid obesity (H)      Priority: Medium    Uterine prolapse 03/01/2021     Priority: Medium    Acquired lymphedema of leg 02/01/2021     Priority: Medium    Neuropathy, idiopathic 02/21/2019     Priority: Medium    S/P repair of paraesophageal hernia 11/26/2018     Priority: Medium    Decreased hearing of both ears 05/29/2018     Priority: Medium    Osteoporosis 02/08/2018     Priority: Medium    Mild aortic valve stenosis 11/17/2017     Priority: Medium    Urge incontinence of urine 11/17/2017     Priority: Medium    Valgus deformity of both feet 03/15/2017     Priority: Medium    Collapsed arches 02/01/2017     Priority: Medium    Essential hypertension, benign      Priority: Medium     Created by Conversion  Replacement Utility updated for latest IMO load            Past Medical/Surgical History:  Past Medical History:   Diagnosis Date    Convulsive disorder (H)     Convulsive disorder (H)     COPD (chronic obstructive pulmonary disease) (H)     COPD (chronic obstructive pulmonary disease) (H)     Coronary artery disease involving native coronary artery with unstable angina pectoris (H) 3/31/2023    Depression     Dyspnea on exertion     Gastroesophageal reflux disease     Heart murmur     Hernia of unspecified site of abdominal cavity without mention of obstruction or gangrene     Hiatal hernia     Hiatal hernia     Hypertension     Hypertension     Obese     On home oxygen  therapy     at 1.5 liters at nite    Osteopenia     Osteopenia     Oxygen dependent     1.5 L per NC    Pneumonia due to infectious organism, unspecified laterality, unspecified part of lung 3/19/2022    Second degree heart block 3/19/2022    Shortness of breath     Uncomplicated asthma     URI (upper respiratory infection)     Venous insufficiency of both lower extremities     Venous insufficiency of both lower extremities     Walking troubles      Past Surgical History:   Procedure Laterality Date    AMPUTATE TOE(S) Left 8/11/2022    Procedure: AMPUTATION, fifth digit left foot;  Surgeon: Alfonso Orozco DPM;  Location: [facility] OR    ARTHROPLASTY TOE(S) Left 11/22/2021    Procedure: ARTHROPLASTY, digits two and three left foot;  Surgeon: Alfonso Orozco DPM;  Location: [facility] OR    ARTHROPLASTY TOE(S) Left 7/18/2022    Procedure: ARTHROPLASTY, digits four and five left foot;  Surgeon: Alfonso Orozco DPM;  Location: [facility] OR    CV CORONARY ANGIOGRAM N/A 7/7/2023    Procedure: Coronary Angiogram;  Surgeon: Elijah Leger MD;  Location: [facility]    CV LEFT HEART CATH N/A 7/7/2023    Procedure: Left Heart Catheterization;  Surgeon: Elijah Leger MD;  Location: [facility]    CV RIGHT HEART CATH MEASUREMENTS RECORDED N/A 7/7/2023    Procedure: Right Heart Catheterization with [place];  Surgeon: Elijah Leger MD;  Location: [facility]    EP PACEMAKER DEVICE & LEAD IMPLANT- RIGHT ATRIAL & RIGHT VENTRICULAR N/A 3/24/2022    Procedure: Pacemaker Device & Lead Implant - Right Atrial & Right Ventricular;  Surgeon: Helder Pendleton MD;  Location: [facility] CV    ESOPHAGOSCOPY, GASTROSCOPY, DUODENOSCOPY (EGD), COMBINED N/A 11/26/2018    Procedure: Esophagogastroduodenoscopy;  Surgeon: Jasmeet Wilder MD;  Location: [facility] OR    HERNIA REPAIR, UMBILICAL  04/2018    HERNIA REPAIR, UMBILICAL  04/04/2018    Dr. Jimenez     LAPAROSCOPIC HERNIORRHAPHY HIATAL N/A 2018    Procedure: Laparoscopic Hiatal Hernia Repair,bilateral chest tubes;  Surgeon: Jasmeet Wilder MD;  Location: [facility] OR    OTHER SURGICAL HISTORY Left     Broke titanium in left arm    Repair arm fracture Left     SHOULDER SURGERY Right 1967    SHOULDER SURGERY Right 1967    US BIOPSY THYROID FINE NEEDLE ASPIRATION  2020       Social History:  Social History     Socioeconomic History    Marital status:      Spouse name: Not on file    Number of children: Not on file    Years of education: Not on file    Highest education level: Not on file   Occupational History    Not on file   Tobacco Use    Smoking status: Former     Packs/day: 1.50     Years: 38.00     Additional pack years: 0.00     Total pack years: 57.00     Types: Cigarettes     Quit date: 1998     Years since quittin.0    Smokeless tobacco: Never    Tobacco comments:     quit in    Vaping Use    Vaping Use: Never used   Substance and Sexual Activity    Alcohol use: Yes     Alcohol/week: 2.0 standard drinks of alcohol     Comment: occ    Drug use: Not Currently    Sexual activity: Never   Other Topics Concern    Not on file   Social History Narrative    .  Two kids.  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     Social Determinants of Health     Financial Resource Strain: Not on file   Food Insecurity: Not on file   Transportation Needs: Not on file   Physical Activity: Not on file   Stress: Not on file   Social Connections: Not on file   Interpersonal Safety: Not on file   Housing Stability: Not on file       Family History:  Family History   Problem Relation Age of Onset    Pulmonary Hypertension Daughter         idiopathic     Heart Disease Other         2 sibs MI, 1 sib electrical conduction disorder    Breast Cancer Mother 66.00    Hypertension Mother     Coronary Artery Disease Mother     Varicose Veins Mother     Hypertension Father     Coronary Artery Disease Sister      "Heart Disease Sister     Diabetes Son     Pulmonary Hypertension Daughter     Coronary Artery Disease Sister     Heart Disease Sister        Review of Systems:  A complete review of systems reviewed by me is negative with the exeption of what has been mentioned in the history of present illness.  In the last TWO WEEKS have you experienced any of the following symptoms?  Fevers: No  Night Sweats: No  Weight Gain: No  Pain at Night: No  Double Vision: No  Changes in Vision: No  Difficulty Breathing through Nose: No  Sore Throat in Morning: No  Dry Mouth in the Morning: Yes  Shortness of Breath Lying Flat: No  Shortness of Breath With Activity: Yes  Awakening with Shortness of Breath: No  Increased Cough: No  Heart Racing at Night: No  Swelling in Feet or Legs: No  Diarrhea at Night: No  Heartburn at Night: No  Urinating More than Once at Night: Yes  Losing Control of Urine at Night: No  Joint Pains at Night: Yes  Headaches in Morning: No  Weakness in Arms or Legs: No  Depressed Mood: No  Anxiety: Yes     Physical Examination:  Vitals: BP (!) 150/81   Pulse 107   Resp 22   Ht 1.626 m (5' 4\")   Wt 98.9 kg (218 lb)   SpO2 92%   BMI 37.42 kg/m    BMI= Body mass index is 37.42 kg/m .    Neck Cir (cm): 36 cm    Physical Exam  Vitals and nursing note reviewed.   Constitutional:       General: She is awake. She is not in acute distress.     Appearance: Normal appearance. She is well-developed and well-groomed. She is morbidly obese. She is not ill-appearing, toxic-appearing or diaphoretic.   HENT:      Head: Normocephalic and atraumatic.      Right Ear: External ear normal.      Left Ear: External ear normal.      Nose: Nose normal.      Mouth/Throat:      Mouth: Mucous membranes are moist.      Pharynx: Oropharynx is clear.   Eyes:      Conjunctiva/sclera: Conjunctivae normal.   Cardiovascular:      Rate and Rhythm: Normal rate and regular rhythm.      Pulses: Normal pulses.      Heart sounds: Normal heart sounds. " "  Pulmonary:      Effort: Pulmonary effort is normal.      Breath sounds: Normal breath sounds.      Comments: O2 4L NC/ 24 hrs  Musculoskeletal:         General: Normal range of motion.      Cervical back: Normal range of motion and neck supple.   Skin:     General: Skin is warm and dry.      Capillary Refill: Capillary refill takes less than 2 seconds.   Neurological:      General: No focal deficit present.      Mental Status: She is alert and oriented to person, place, and time.   Psychiatric:         Mood and Affect: Mood normal.         Behavior: Behavior normal. Behavior is cooperative.         Thought Content: Thought content normal.         Judgment: Judgment normal.         Mallampati Class: IV.  Tonsillar Stage: Unable to visualize tonsils or uvula         Data: All pertinent previous laboratory data reviewed     Recent Labs   Lab Test 23  1443 23  1256    137   POTASSIUM 4.2 4.4   CHLORIDE 98 97*   CO2 28 28   ANIONGAP 11 12   * 102*   BUN 26.0* 28.5*   CR 0.95 0.97*   TENZIN 10.0 9.7       Recent Labs   Lab Test 23  1443   WBC 9.7   RBC 4.05   HGB 10.5*   HCT 34.7*   MCV 86   MCH 25.9*   MCHC 30.3*   RDW 15.4*          Recent Labs   Lab Test 05/15/23  1149   PROTTOTAL 7.0   ALBUMIN 4.2   BILITOTAL 0.2   ALKPHOS 76   AST 25   ALT 14       TSH   Date Value   2022 3.59 mU/L   2019 2.67 uIU/mL       No results found for: \"UAMP\", \"UBARB\", \"BENZODIAZEUR\", \"UCANN\", \"UCOC\", \"OPIT\", \"UPCP\"    Ferritin   Date/Time Value Ref Range Status   2023 01:22 PM 20 11 - 328 ng/mL Final       No results found for: \"PH\", \"PHARTERIAL\", \"PO2\", \"QR1HTSHJPBW\", \"SAT\", \"PCO2\", \"HCO3\", \"BASEEXCESS\", \"DIMITRIS\", \"BEB\"    @LABRCNTIPR(phv:4,pco2v:4,po2v:4,hco3v:4,jaxon:4,o2per:4)@    Echocardiology:  [place] [address]     Name: MRS. WASHINGTON JETT  MRN: [id number]  : 1945  Study Date: 2023 10:03 AM  Age: 78 yrs  Gender: " Female  Patient Location: [facility]  Reason For Study: Mild aortic valve stenosis  Ordering Physician: ZHANE OCONNELL  Referring Physician: ZHANE OCONNELL  Performed By: RAGHAV     BSA: 2.0 m2  Height: 64 in  Weight: 222 lb  HR: 86  BP: 165/79 mmHg  ______________________________________________________________________________  Procedure  Complete Echo Adult.  ______________________________________________________________________________  Interpretation Summary     Left ventricular size, wall motion and function are normal. The ejection  fraction is 60-65%.  Normal right ventricle size and systolic function.  The left atrium is mildly dilated.  Mild (35-45mmHg) pulmonary hypertension is present.  Moderate valvular aortic stenosis.  IVC diameter and respiratory changes fall into an intermediate range  suggesting an RA pressure of 8 mmHg.  ______________________________________________________________________________  Left Ventricle  Left ventricular size, wall motion and function are normal. The ejection  fraction is 60-65%. There is normal left ventricular wall thickness. Left  ventricular diastolic function is abnormal. Septal wall motion abnormality may  reflect pacemaker activation.     Right Ventricle  Normal right ventricle size and systolic function. There is a pacemaker lead  in the right ventricle.      Chest x-ray:   XR CHEST 2 VIEWS 05/15/2023    Narrative  EXAM: XR CHEST 2 VIEWS  LOCATION: [facility]  DATE/TIME: 5/15/2023 1:25 PM CDT    INDICATION: SOB  COMPARISON: 03/24/2022    Impression  IMPRESSION: Pacemaker with leads over the RA and RV. Some minimal probable fibrosis in the lung bases. Lungs otherwise clear with no acute new findings. Postop change right shoulder with advanced degenerative arthritis.      Chest CT:   CT CHEST PULMONARY EMBOLISM W CONTRAST 05/15/2023    Narrative  EXAM: CT CHEST PULMONARY EMBOLISM W CONTRAST  LOCATION: [facility]  DATE/TIME:  5/15/2023 1:41 PM CDT    INDICATION: chest pain, SOB, elevated D dimer  COMPARISON: Chest x-ray 5/15/2023 and 3/11/2022  TECHNIQUE: CT chest pulmonary angiogram during arterial phase injection of IV contrast. Multiplanar reformats and MIP reconstructions were performed. Dose reduction techniques were used.  CONTRAST: isovue 370  90ml    FINDINGS:  ANGIOGRAM CHEST: Negative for pulmonary emboli. Some mildly prominent central pulmonary arteries concerning for possible pulmonary artery hypertension. Thoracic aorta is negative for dissection. No CT evidence of right heart strain.    LUNGS AND PLEURA: Slightly elevated left hemidiaphragm similar to previous. Some slight probable fibrosis in the left lung base similar to chest x-ray. No acute new findings.    MEDIASTINUM/AXILLAE: Normal.    CORONARY ARTERY CALCIFICATION: Mild.    UPPER ABDOMEN: Normal.    MUSCULOSKELETAL: Normal.    Impression  IMPRESSION:  1.  Negative for pulmonary emboli.    2.  Mildly enlarged central pulmonary arteries suggestive of possible pulmonary artery hypertension.    3.  Slight fibrosis or atelectasis left lung base similar to chest x-ray. No acute findings in the lungs.      PFT: Most Recent [facility] Pulmonary Function Testing    PFTs 04/2019:  FEV1/FVC is 0.53 and is reduced.  FEV1 is 40% predicted and is reduced.  FVC is 59% predicted and is reduced.  There was no improvement in spirometry after a single inhaled dose of bronchodilator.  TLC is 95% predicted and is normal.  RV is 138% predicted and is increased.  RV/TLC is 0.63 and is increased.  DLCO is 42% predicted and is reduced when it   is corrected for hemoglobin.  The flow volume loop shows obstructive morphology.     Impression:  Full Pulmonary Function Test is abnormal. Spirometry is consistent with severe obstructive ventilatory defect.  Spirometry is not consistent with reversibility.  There is no hyperinflation.  There is air-trapping.  Diffusion capacity when corrected for  hemoglobin is moderately reduced.     Cardiac MRI 09/2023  IMPRESSION:  1.  The images by 3D contrast enhanced MRA and other image sequences did not show obvious evidence of  pulmonary vein anomaly. No atrial septal defect.    2.  Normal left ventricular size, mild concentric LVH and systolic function.  The calculated left  ventricular ejection fraction is 68%.  3.  No previous myocardial infarction.   4.  Normal right ventricular size and systolic function.  Pacemaker lead is noticed.  5.  Mild to moderate tricuspid valve regurgitation.  Moderate aortic valve stenosis is noticed.    Marley Mccarty, APRN CNP 12/6/2023   Sleep Medicine    This note was written with the assistance of the Dragon voice-My1logination technology software. The final document, although reviewed, may contain errors. For corrections, please contact the office.

## 2023-12-14 ENCOUNTER — TELEPHONE (OUTPATIENT)
Dept: SLEEP MEDICINE | Facility: CLINIC | Age: 78
End: 2023-12-14
Payer: MEDICARE

## 2023-12-14 NOTE — TELEPHONE ENCOUNTER
Reason for Call:  Other call back    Detailed comments: Pt making sure Oxygen is availble during April Sleep Study. Pt is on 24 hour oxygen    Phone Number Patient can be reached at: Home number on file [phone] (home)    Best Time: As soon as possible    Can we leave a detailed message on this number? YES, ABSOLUTELY, can't always make it to phone in time.    Call taken on 12/14/2023 at 12:02 PM by Laura Kanavel

## 2023-12-14 NOTE — TELEPHONE ENCOUNTER
Spoke with patient. Advised oxygen is available at the clinic for overnight use. She will need to supply her own oxygen to get to clinic. Patient verbalized understanding. No further questions.    Kaleigh MEREDITH RN  [facility]

## 2023-12-19 DIAGNOSIS — M81.0 AGE-RELATED OSTEOPOROSIS WITHOUT CURRENT PATHOLOGICAL FRACTURE: Primary | ICD-10-CM

## 2023-12-19 NOTE — PATIENT INSTRUCTIONS
"          MY TREATMENT INFORMATION FOR SLEEP APNEA-  Lalitha Underwood    DOCTOR : DONAVAN Andre CNP    Am I having a sleep study at a sleep center?  --->Due to normal delays, you will be contacted within 2-4 weeks to schedule    Am I having a home sleep study?  --->Watch the video for the device you are using:    -/drop off device-   https://[email or web address].com/watch?v=yGGFBdELGhk    -Disposable device sent out require phone/computer application-   https://[email or web address]/watch?v=BCce_vbiwxE      Frequently asked questions:  1. What is Obstructive Sleep Apnea (SHAZIA)? SHAZIA is the most common type of sleep apnea. Apnea means, \"without breath.\"  Apnea is most often caused by narrowing or collapse of the upper airway as muscles relax during sleep.   Almost everyone has occasional apneas. Most people with sleep apnea have had brief interruptions at night frequently for many years.  The severity of sleep apnea is related to how frequent and severe the events are.   2. What are the consequences of SHAZIA? Symptoms include: feeling sleepy during the day, snoring loudly, gasping or stopping of breathing, trouble sleeping, and occasionally morning headaches or heartburn at night.  Sleepiness can be serious and even increase the risk of falling asleep while driving. Other health consequences may include development of high blood pressure and other cardiovascular disease in persons who are susceptible. Untreated SHAZIA  can contribute to heart disease, stroke and diabetes.   3. What are the treatment options? In most situations, sleep apnea is a lifelong disease that must be managed with daily therapy. Medications are not effective for sleep apnea and surgery is generally not considered until other therapies have been tried. Your treatment is your choice . Continuous Positive Airway (CPAP) works right away and is the therapy that is effective in nearly everyone. An oral device to hold your jaw forward is usually the next " most reliable option. Other options include postioning devices (to keep you off your back), weight loss, and surgery including a tongue pacing device. There is more detail about some of these options below.  4. Are my sleep studies covered by insurance? Although we will request verification of coverage, we advise you also check in advance of the study to ensure there is coverage.    Important tips for those choosing CPAP and similar devices  For new devices, sign up for device TIM to monitor your device for your followup visits  We encourage you to utilize the [employer] tim or website (my[employer] web (resmed.com) ) to monitor your therapy progress and share the data with your healthcare team when you discuss your sleep apnea.                                                    Know your equipment:  CPAP is continuous positive airway pressure that prevents obstructive sleep apnea by keeping the throat from collapsing while you are sleeping. In most cases, the device is  smart  and can slowly self-adjusts if your throat collapses and keeps a record every day of how well you are treated-this information is available to you and your care team.  BPAP is bilevel positive airway pressure that keeps your throat open and also assists each breath with a pressure boost to maintain adequate breathing.  Special kinds of BPAP are used in patients who have inadequate breathing from lung or heart disease. In most cases, the device is  smart  and can slowly self-adjusts to assist breathing. Like CPAP, the device keeps a record of how well you are treated.  Your mask is your connection to the device. You get to choose what feels most comfortable and the staff will help to make sure if fits. Here: are some examples of the different masks that are available: Magnetic mask aids may assist with use but there are safety issues that should be addressed when considering with magnets* ( see end of discussion).       Key points to remember on  your journey with sleep apnea:  Sleep study.  PAP devices often need to be adjusted during a sleep study to show that they are effective and adjusted right.  Good tips to remember: Try wearing just the mask during a quiet time during the day so your body adapts to wearing it. A humidifier is recommended for comfort in most cases to prevent drying of your nose and throat. Allergy medication from your provider may help you if you are having nasal congestion.  Getting settled-in. It takes more than one night for most of us to get used to wearing a mask. Try wearing just the mask during a quiet time during the day so your body adapts to wearing it. A humidifier is recommended for comfort in most cases. Our team will work with you carefully on the first day and will be in contact within 4 days and again at 2 and 4 weeks for advice and remote device adjustments. Your therapy is evaluated by the device each day.   Use it every night. The more you are able to sleep naturally for 7-8 hours, the more likely you will have good sleep and to prevent health risks or symptoms from sleep apnea. Even if you use it 4 hours it helps. Occasionally all of us are unable to use a medical therapy, in sleep apnea, it is not dangerous to miss one night.   Communicate. Call our skilled team on the number provided on the first day if your visit for problems that make it difficult to wear the device. Over 2 out of 3 patients can learn to wear the device long-term with help from our team. Remember to call our team or your sleep providers if you are unable to wear the device as we may have other solutions for those who cannot adapt to mask CPAP therapy. It is recommended that you sleep your sleep provider within the first 3 months and yearly after that if you are not having problems.   Use it for your health. We encourage use of CPAP masks during daytime quiet periods to allow your face and brain to adapt to the sensation of CPAP so that it will  be a more natural sensation to awaken to at night or during naps. This can be very useful during the first few weeks or months of adapting to CPAP though it does not help medically to wear CPAP during wakefulness and  should not be used as a strategy just to meet guidelines.  Take care of your equipment. Make sure you clean your mask and tubing using directions every day and that your filter and mask are replaced as recommended or if they are not working.     *Masks with magnets:  Updated Contraindications  Masks with magnetic components are contraindicated for use by patients where they, or anyone in close physical contact while using the mask, have the following:   Active medical implants that interact with magnets (i.e., pacemakers, implantable cardioverter defibrillators (ICD), neurostimulators, cerebrospinal fluid (CSF) shunts, insulin/infusion pumps)   Metallic implants/objects containing ferromagnetic material (i.e., aneurysm clips/flow disruption devices, embolic coils, stents, valves, electrodes, implants to restore hearing or balance with implanted magnets, ocular implants, metallic splinters in the eye)  Updated Warning  Keep the mask magnets at a safe distance of at least 6 inches (150 mm) away from implants or medical devices that may be adversely affected by magnetic interference. This warning applies to you or anyone in close physical contact with your mask. The magnets are in the frame and lower headgear clips, with a magnetic field strength of up to 400mT. When worn, they connect to secure the mask but may inadvertently detach while asleep.  Implants/medical devices, including those listed within contraindications, may be adversely affected if they change function under external magnetic fields or contain ferromagnetic materials that attract/repel to magnetic fields (some metallic implants, e.g., contact lenses with metal, dental implants, metallic cranial plates, screws, rosi hole covers, and bone  substitute devices). Consult your physician and  of your implant / other medical device for information on the potential adverse effects of magnetic fields.    BESIDES CPAP, WHAT OTHER THERAPIES ARE THERE?    Positioning Device  Positioning devices are generally used when sleep apnea is mild and only occurs on your back.This example shows a pillow that straps around the waist. It may be appropriate for those whose sleep study shows milder sleep apnea that occurs primarily when lying flat on one's back. Preliminary studies have shown benefit but effectiveness at home may need to be verified by a home sleep test. These devices are generally not covered by medical insurance.  Examples of devices that maintain sleeping on the back to prevent snoring and mild sleep apnea.    Belt type body positioner  http://[employer]/    Electronic reminder  http://nightshifttherapy.com/            Oral Appliance  What is oral appliance therapy?  An oral appliance device fits on your teeth at night like a retainer used after having braces. The device is made by a specialized dentist and requires several visits over 1-2 months before a manufactured device is made to fit your teeth and is adjusted to prevent your sleep apnea. Once an oral device is working properly, snoring should be improved. A home sleep test may be recommended at that time if to determine whether the sleep apnea is adequately treated.       Some things to remember:  -Oral devices are often, but not always, covered by your medical insurance. Be sure to check with your insurance provider.   -If you are referred for oral therapy, you will be given a list of specialized dentists to consider or you may choose to visit the Web site of the American Academy of Dental Sleep Medicine  -Oral devices are less likely to work if you have severe sleep apnea or are extremely overweight.     More detailed information  An oral appliance is a small acrylic device that fits  over the upper and lower teeth  (similar to a retainer or a mouth guard). This device slightly moves jaw forward, which moves the base of the tongue forward, opens the airway, improves breathing for effective treat snoring and obstructive sleep apnea in perhaps 7 out of 10 people .  The best working devices are custom-made by a dental device  after a mold is made of the teeth 1, 2, 3.  When is an oral appliance indicated?  Oral appliance therapy is recommended as a first-line treatment for patients with primary snoring, mild sleep apnea, and for patients with moderate sleep apnea who prefer appliance therapy to use of CPAP4, 5. Severity of sleep apnea is determined by sleep testing and is based on the number of respiratory events per hour of sleep.   How successful is oral appliance therapy?  The success rate of oral appliance therapy in patients with mild sleep apnea is 75-80% while in patients with moderate sleep apnea it is 50-70%. The chance of success in patients with severe sleep apnea is 40-50%. The research also shows that oral appliances have a beneficial effect on the cardiovascular health of SHAZIA patients at the same magnitude as CPAP therapy7.  Oral appliances should be a second-line treatment in cases of severe sleep apnea, but if not completely successful then a combination therapy utilizing CPAP plus oral appliance therapy may be effective. Oral appliances tend to be effective in a broad range of patients although studies show that the patients who have the highest success are females, younger patients, those with milder disease, and less severe obesity. 3, 6.   Finding a dentist that practices dental sleep medicine  Specific training is available through the American Academy of Dental Sleep Medicine for dentists interested in working in the field of sleep. To find a dentist who is educated in the field of sleep and the use of oral appliances, near you, visit the Web site of the American  Academy of Dental Sleep Medicine.    References  1. Nic et al. Objectively measured vs self-reported compliance during oral appliance therapy for sleep-disordered breathing. Chest 2013; 144(5): 5934-7111.  2. Julio César et al. Objective measurement of compliance during oral appliance therapy for sleep-disordered breathing. Thorax 2013; 68(1): 91-96.  3. Bethany, et al. Mandibular advancement devices in 620 men and women with SHAZIA and snoring: tolerability and predictors of treatment success. Chest 2004; 125: 0481-2379.  4. Chan et al. Oral appliances for snoring and SHAZIA: a review. Sleep 2006; 29: 244-262.  5. Inga et al. Oral appliance treatment for SHAZIA: an update. J Clin Sleep Med 2014; 10(2): 215-227.  6. Nick et al. Predictors of OSAH treatment outcome. J Dent Res 2007; 86: 3537-2239.      Weight Loss:    Weight loss is a long-term strategy that may improve sleep apnea in some patients.    Weight management is a personal decision and the decision should be based on your interest and the potential benefits.  If you are interested in exploring weight loss strategies, the following discussion covers the impact on weight loss on sleep apnea and the approaches that may be successful.    Being overweight does not necessarily mean you will have health consequences.  Those who have BMI over 35 or over 27 with existing medical conditions carries greater risk.   Weight loss decreases severity of sleep apnea in most people with obesity. For those with mild obesity who have developed snoring with weight gain, even 15-30 pound weight loss can improve and occasionally eliminate sleep apnea.  Structured and life-long dietary and health habits are necessary to lose weight and keep healthier weight levels.     Though there may be significant health benefits from weight loss, long-term weight loss is very difficult to achieve- studies show success with dietary management in less than 10% of people. In  addition, substantial weight loss may require years of dietary control and may be difficult if patients have severe obesity. In these cases, surgical management may be considered.  Finally, older individuals who have tolerated obesity without health complications may be less likely to benefit from weight loss strategies.      [unfilled]    Surgery:    Surgery for obstructive sleep apnea is considered generally only when other therapies fail to work. Surgery may be discussed with you if you are having a difficult time tolerating CPAP and or when there is an abnormal structure that requires surgical correction.  Nose and throat surgeries often enlarge the airway to prevent collapse.  Most of these surgeries create pain for 1-2 weeks and up to half of the most common surgeries are not effective throughout life.  You should carefully discuss the benefits and drawbacks to surgery with your sleep provider and surgeon to determine if it is the best solution for you.   More information  Surgery for SHAZIA is directed at areas that are responsible for narrowing or complete obstruction of the airway during sleep.  There are a wide range of procedures available to enlarge and/or stabilize the airway to prevent blockage of breathing in the three major areas where it can occur: the palate, tongue, and nasal regions.  Successful surgical treatment depends on the accurate identification of the factors responsible for obstructive sleep apnea in each person.  A personalized approach is required because there is no single treatment that works well for everyone.  Because of anatomic variation, consultation with an examination by a sleep surgeon is a critical first step in determining what surgical options are best for each patient.  In some cases, examination during sedation may be recommended in order to guide the selection of procedures.  Patients will be counseled about risks and benefits as well as the typical recovery course after  surgery. Surgery is typically not a cure for a person s SHAZIA.  However, surgery will often significantly improve one s SHAZIA severity (termed  success rate ).  Even in the absence of a cure, surgery will decrease the cardiovascular risk associated with OSA7; improve overall quality of life8 (sleepiness, functionality, sleep quality, etc).      Palate Procedures:  Patients with SHAZIA often have narrowing of their airway in the region of their tonsils and uvula.  The goals of palate procedures are to widen the airway in this region as well as to help the tissues resist collapse.  Modern palate procedure techniques focus on tissue conservation and soft tissue rearrangement, rather than tissue removal.  Often the uvula is preserved in this procedure. Residual sleep apnea is common in patient after pharyngoplasty with an average reduction in sleep apnea events of 33%2.      Tongue Procedures:  ExamWhile patients are awake, the muscles that surround the throat are active and keep this region open for breathing. These muscles relax during sleep, allowing the tongue and other structures to collapse and block breathing.  There are several different tongue procedures available.  Selection of a tongue base procedure depends on characteristics seen on physical exam.  Generally, procedures are aimed at removing bulky tissues in this area or preventing the back of the tongue from falling back during sleep.  Success rates for tongue surgery range from 50-62%3.    Hypoglossal Nerve Stimulation:  Hypoglossal nerve stimulation has recently received approval from the [place] Food and Drug Administration for the treatment of obstructive sleep apnea.  This is based on research showing that the system was safe and effective in treating sleep apnea6.  Results showed that the median AHI score decreased 68%, from 29.3 to 9.0. This therapy uses an implant system that senses breathing patterns and delivers mild stimulation to airway muscles,  which keeps the airway open during sleep.  The system consists of three fully implanted components: a small generator (similar in size to a pacemaker), a breathing sensor, and a stimulation lead.  Using a small handheld remote, a patient turns the therapy on before bed and off upon awakening.    Candidates for this device must be greater than 18 years of age, have moderate to severe SHAZIA (AHI between 15-65), BMI less than 35, have tried CPAP/oral appliance for at least 8 weeks without success, and have appropriate upper airway anatomy (determined by a sleep endoscopy performed by Dr. Brett Rivera).    Hypoglossal Nerve Stimulation Pathway:    The sleep surgeon s office will work with the patient through the insurance prior-authorization process (including communications and appeals).    Nasal Procedures:  Nasal obstruction can interfere with nasal breathing during the day and night.  Studies have shown that relief of nasal obstruction can improve the ability of some patients to tolerate positive airway pressure therapy for obstructive sleep apnea1.  Treatment options include medications such as nasal saline, topical corticosteroid and antihistamine sprays, and oral medications such as antihistamines or decongestants. Non-surgical treatments can include external nasal dilators for selected patients. If these are not successful by themselves, surgery can improve the nasal airway either alone or in combination with these other options.      Combination Procedures:  Combination of surgical procedures and other treatments may be recommended, particularly if patients have more than one area of narrowing or persistent positional disease.  The success rate of combination surgery ranges from 66-80%2,3.    References  Linda RO. The Role of the Nose in Snoring and Obstructive Sleep Apnoea: An Update.  Eur Arch Otorhinolaryngol. 2011; 268: 1365-73.   Kelby SM; Dayanara JA; Adalberto GRANDE; Pallanch JF; Ross RUBIO; Richard TORRES; Angelica  BRITTNI. Surgical modifications of the upper airway for obstructive sleep apnea in adults: a systematic review and meta-analysis. SLEEP 2010;33(10):2707-6794. [place] RIVAS. Hypopharyngeal surgery in obstructive sleep apnea: an evidence-based medicine review.  Arch Otolaryngol Head Neck Surg. 2006 Feb;132(2):206-13.  Maynor YH1, Eitan Y, Gibson ODALIS. The efficacy of anatomically based multilevel surgery for obstructive sleep apnea. Otolaryngol Head Neck Surg. 2003 Oct;129(4):327-35.  Kezirian E, Goldberg A. Hypopharyngeal Surgery in Obstructive Sleep Apnea: An Evidence-Based Medicine Review. Arch Otolaryngol Head Neck Surg. 2006 Feb;132(2):206-13.  Megan MOFFETT et al. Upper-Airway Stimulation for Obstructive Sleep Apnea.  N Engl J Med. 2014 Jan 9;370(2):139-49.  Spencer Y et al. Increased Incidence of Cardiovascular Disease in Middle-aged Men with Obstructive Sleep Apnea. Am J Respir Crit Care Med; 2002 166: 159-165  Cintron EM et al. Studying Life Effects and Effectiveness of Palatopharyngoplasty (SLEEP) study: Subjective Outcomes of Isolated Uvulopalatopharyngoplasty. Otolaryngol Head Neck Surg. 2011; 144: 623-631.        WHAT IF I ONLY HAVE SNORING?    Mandibular advancement devices, lateral sleep positioning, long-term weight loss and treatment of nasal allergies have been shown to improve snoring.  Exercising tongue muscles with a game (https://[employer]/us/tim/soundly-reduce-snoring/ug0294083148) or stimulating the tongue during the day with a device (https://doi.org/10.3390/vyn13575810) have improved snoring in some individuals.    Remember to Drive Safe... Drive Alive     Sleep health profoundly affects your health, mood, and your safety.  Thirty three percent of the population (one in three of us) is not getting enough sleep and many have a sleep disorder. Not getting enough sleep or having an untreated / undertreated sleep condition may make us sleepy without even knowing it. In fact, our driving could be dramatically  impaired due to our sleep health. As your provider, here are some things I would like you to know about driving:     Here are some warning signs for impairment and dangerous drowsy driving:              -Having been awake more than 16 hours               -Looking tired               -Eyelid drooping              -Head nodding (it could be too late at this point)              -Driving for more than 30 minutes     Some things you could do to make the driving safer if you are experiencing some drowsiness:              -Stop driving and rest              -Call for transportation              -Make sure your sleep disorder is adequately treated     Some things that have been shown NOT to work when experiencing drowsiness while driving:              -Turning on the radio              -Opening windows              -Eating any  distracting  /  entertaining  foods (e.g., sunflower seeds, candy, or any other)              -Talking on the phone      Your decision may not only impact your life, but also the life of others. Please, remember to drive safe for yourself and all of us.

## 2023-12-26 ASSESSMENT — ENCOUNTER SYMPTOMS
HEMATURIA: 0
MUSCLE WEAKNESS: 0
MUSCLE CRAMPS: 0
DIFFICULTY URINATING: 0
MYALGIAS: 0
ARTHRALGIAS: 0
BACK PAIN: 1
NECK PAIN: 0
STIFFNESS: 0
FLANK PAIN: 0
DYSURIA: 0
JOINT SWELLING: 0
NECK PAIN: 0
MUSCLE CRAMPS: 0
MUSCLE WEAKNESS: 0
STIFFNESS: 0
ARTHRALGIAS: 0
HEMATURIA: 0
JOINT SWELLING: 0
BACK PAIN: 1
MYALGIAS: 0
FLANK PAIN: 0
DYSURIA: 0
DIFFICULTY URINATING: 0

## 2023-12-27 ENCOUNTER — LAB (OUTPATIENT)
Dept: LAB | Facility: CLINIC | Age: 78
End: 2023-12-27
Payer: MEDICARE

## 2023-12-27 DIAGNOSIS — M81.0 AGE-RELATED OSTEOPOROSIS WITHOUT CURRENT PATHOLOGICAL FRACTURE: ICD-10-CM

## 2023-12-27 LAB
CALCIUM SERPL-MCNC: 10.1 MG/DL (ref 8.8–10.2)
CREAT SERPL-MCNC: 0.93 MG/DL (ref 0.51–0.95)
EGFRCR SERPLBLD CKD-EPI 2021: 63 ML/MIN/1.73M2
VIT D+METAB SERPL-MCNC: 53 NG/ML (ref 20–50)

## 2023-12-27 PROCEDURE — 82306 VITAMIN D 25 HYDROXY: CPT

## 2023-12-27 PROCEDURE — 82310 ASSAY OF CALCIUM: CPT

## 2023-12-27 PROCEDURE — 36415 COLL VENOUS BLD VENIPUNCTURE: CPT

## 2023-12-27 PROCEDURE — 82565 ASSAY OF CREATININE: CPT

## 2024-01-02 ENCOUNTER — ALLIED HEALTH/NURSE VISIT (OUTPATIENT)
Dept: ENDOCRINOLOGY | Facility: CLINIC | Age: 79
End: 2024-01-02
Payer: MEDICARE

## 2024-01-02 ENCOUNTER — OFFICE VISIT (OUTPATIENT)
Dept: ENDOCRINOLOGY | Facility: CLINIC | Age: 79
End: 2024-01-02
Payer: MEDICARE

## 2024-01-02 VITALS
SYSTOLIC BLOOD PRESSURE: 118 MMHG | OXYGEN SATURATION: 96 % | WEIGHT: 217 LBS | BODY MASS INDEX: 37.05 KG/M2 | DIASTOLIC BLOOD PRESSURE: 80 MMHG | HEART RATE: 96 BPM | HEIGHT: 64 IN

## 2024-01-02 DIAGNOSIS — M81.0 AGE-RELATED OSTEOPOROSIS WITHOUT CURRENT PATHOLOGICAL FRACTURE: Primary | ICD-10-CM

## 2024-01-02 PROCEDURE — 99214 OFFICE O/P EST MOD 30 MIN: CPT | Mod: 25 | Performed by: NURSE PRACTITIONER

## 2024-01-02 PROCEDURE — 99207 PR NO CHARGE NURSE ONLY: CPT

## 2024-01-02 PROCEDURE — 96372 THER/PROPH/DIAG INJ SC/IM: CPT | Performed by: NURSE PRACTITIONER

## 2024-01-02 NOTE — PROGRESS NOTES
"Prolia Injection Phone Screen      Screening questions have been asked 2-3 days prior to administration visit for Prolia. If any questions are answered with \"Yes,\" this phone encounter were will routed to ordering provider for further evaluation.     1.  When was the last injection?  6/21/23    2.  Has insurance for this injection been verified?  Yes    3.  Did you experience any new onset achiness or rashes that lasted for over a month with your previous Prolia injection?   No    4.  Do you have a fever over 101?F or a new deep cough that is unusual for you today? No    5.  Have you started any new medications in the last 6 months that you were told could affect your immune system? These may have been prescribed by oncologist, transplant, rheumatology, or dermatology.   No    6.  In the last 6 months have you have gastric bypass or parathyroid surgery?   No    7.  Do you plan dental work requiring drilling into the bone such as implants/extractions or oral surgery in the next 2-3 months?   No    8. Do you have new insurance since the last injection?    Patient informed if symptoms discussed above present prior to their administration appointment, they are to notify clinic immediately.     Reshma Chatman RN          The following steps were completed to comply with the REMS program for Prolia:  1. Ordering provider has previously reviewed information in the Medication Guide and Patient Counseling Chart, including the serious risks of Prolia  and the symptoms of each risk and have been advised to seek prompt medical attention if they have signs or symptoms of any of the serious risks.  2. Provided each patient a copy of the Medication Guide and Patient Brochure.  See MAR for administration details.   Indication: Prolia  (denosumab) is a prescription medicine used to treat osteoporosis in patients who:   Are at high risk for fracture, meaning patients who have had a fracture related to osteoporosis, or who have " multiple risk factors for fracture; Cannot use another osteoporosis medicine or other osteoporosis medicines did not work well.   The timeline for early/late injections would be 4 weeks early and any time after the 6 month edmar. If a patient receives their injection late, then the subsequent injection would be 6 months from the date that they actually received the injection    Have the screening questions been asked prior to this administration? Yes    Clinic Administered Medication Documentation      Prolia Documentation    Indication: Prolia  (denosumab) is a prescription medicine used to treat osteoporosis in patients who:   Are at high risk for fracture, meaning patients who have had a fracture related to osteoporosis, or who have multiple risk factors for fracture.  Cannot use another osteoporosis medicine or other osteoporosis medicines did not work well.  The timeline for early/late injections would be 4 weeks early and any time after the 6 month edmar. If a patient receives their injection late, then the subsequent injection would be 6 months from the date that they actually received the injection.    When was the last injection?  23  Was the last injection at least 6 months ago? Yes  Has the prior authorization been completed?  Yes  Is there an active order (written within the past 365 days, with administrations remaining, not ) in the chart?  Yes  Patient denies any dental work involving the bone (e.g. tooth extraction or dental implants) in the past 4 weeks?  Yes  Patient denies plans for any dental work involving the bone (e.g. tooth extraction or dental implants) in the next 4 weeks? Yes    The following steps were completed to comply with the REMS program for Prolia:  Reviewed information in the Medication Guide and Patient Counseling Chart, including the serious risks of Prolia  and the symptoms of each risk.  Advised patient to seek prompt medical attention if they have signs or symptoms  of any of the serious risks.  Provided each patient a copy of the Medication Guide and Patient Brochure.    Prior to injection, verified patient identity using patient's name and date of birth. Medication was administered. Please see MAR and medication order for additional information. Patient instructed to remain in clinic for 15 minutes and report any adverse reaction to staff immediately.    Vial/Syringe: Syringe  Was this medication supplied by the patient? NO  Verified that the patient has refills remaining in their prescription.

## 2024-01-02 NOTE — LETTER
1/2/2024         RE: Lalitha Underwood  [address]  [place] [address]        Dear Colleague,    Thank you for referring your patient, Lalitha Underwood, to the [facility]. Please see a copy of my visit note below.    [facility]  ENDOCRINOLOGY    Osteoporosis Follow Up 1/2/2024    Lalitha Underwood, 1945, [id number]          Reason for visit      1. Age-related osteoporosis without current pathological fracture        History     Lalitha Underwood is a very pleasant 78 year old old female who presents for follow up.   SUMMARY:    1. Osteopenia-she was started on alendronate 2 years ago. She has been tolerating alendronate well however a recent bone density report as noted below showed deterioration in the bone density in the right hip. She states she's been compliant with her alendronate every week.  She does not take any calcium supplements and only consumes about 1 serving of dairy each day. She takes 5000 IU daily of the 3 and her recent level was 41.8.  TSH in December 2014 was 1.87.  She has been on Dilantin for the last 24 years after having a grand mal seizure. Prior to that she was diagnosed with seizure disorder at the age of 18 and use medications initially for about 2 years and then discontinued for about 27 years.  She is also a former smoker and smoked for about 35 years and quit in 1998.  She does not undertake any weight-bearing exercise currently. She is thinking of joining the [facility] in January  She has had several falls but has never resulted in a fragility fracture.  She has lost 1.75 inches in height.  Menopause at age 50 and she used hormone replacement therapy for about 2-3 years and stopped after the women's health initiative data came out.  Her mother had an osteoporotic hip fracture at age 90. She had to have open reduction internal fixation and lived for another 4 years after that but was dependent on a walker.  Her sister also has osteopenia.  She  has a long-standing history of hiatal hernia and has been on proton pump inhibitors followed time and is currently in AcipHex.  TODAY:    Serene returns today in follow-up for Osteoporosis. She continues on Prolia injections without difficulties. Her most recent Dexa Scan of last year showed: COMPARISON: There has been a 0.1% increase in left hip BMD. There has been a 6.9% increase in left radius hip BMD. Both considered to be a positive response to the Prolia. She does do some extra walking, but doesn't go too far without one of her walkers. She is taking both Calcium with Vit D, and an additional Vit D. Calcium level is 10.1 and Vit D level is 53.    Risk Factors     The following high- risk conditions have been ruled out: celiac disease, eating disorders, gastric bypass, hyperparathyroidism, inflammatory bowel disease, hyperthyroidism, rheumatoid arthritis, lupus, chronic kidney disease.      Lalitha Underwood has the following risk factors: Age, female gender, ex smoker, and breast cancer in her mother.      She is not on high risk medications such as glucocorticoids, anti-coagulants, chemotherapy or levothyroxine.  She is on an anticonvulsant.  Patient deniesHysterectomy, Oophrectomy, Breast cancer and Family history of breast cancer.   Menopause was at age: 50 years  Past Medical History     Patient Active Problem List   Diagnosis     S/P repair of paraesophageal hernia     Essential hypertension, benign     Acquired lymphedema of leg     Collapsed arches     Decreased hearing of both ears     Epileptic seizure (H)     Esophageal reflux     Hyperlipidemia     Impaired gait and mobility     Mild aortic valve stenosis     Morbid obesity (H)     Neuropathy, idiopathic     Osteoporosis     COPD (chronic obstructive pulmonary disease) (H)     Rosacea     Urge incontinence of urine     Uterine prolapse     Valgus deformity of both feet     Vitamin D deficiency     PAD (peripheral artery disease) (H24)     Second  degree heart block     Cardiac pacemaker in situ     Atherosclerosis of native coronary artery with stable angina pectoris (H24)     Acquired absence of other left toe(s) (H24)     Cor pulmonale (chronic) (H)     Chronic respiratory failure with hypoxia (H)       Family History       family history includes Breast Cancer (age of onset: 66.00) in her mother; Coronary Artery Disease in her mother, sister, and sister; Diabetes in her son; Heart Disease in her sister, sister and another family member; Hypertension in her father and mother; Pulmonary Hypertension in her daughter and daughter; Varicose Veins in her mother.    Social History      reports that she quit smoking about 25 years ago. Her smoking use included cigarettes. She has a 57 pack-year smoking history. She has never used smokeless tobacco. She reports current alcohol use of about 2.0 standard drinks of alcohol per week. She reports that she does not currently use drugs.      Review of Systems     Patient denies current pain, limited mobility, fractures.   Remainder per HPI.    Answers submitted by the patient for this visit:  Symptoms you have experienced in the last 30 days (Submitted on 12/26/2023)  General Symptoms: No  Skin Symptoms: No  HENT Symptoms: No  EYE SYMPTOMS: No  HEART SYMPTOMS: No  LUNG SYMPTOMS: No  INTESTINAL SYMPTOMS: No  URINARY SYMPTOMS: Yes  GYNECOLOGIC SYMPTOMS: No  BREAST SYMPTOMS: No  SKELETAL SYMPTOMS: Yes  BLOOD SYMPTOMS: No  NERVOUS SYSTEM SYMPTOMS: No  MENTAL HEALTH SYMPTOMS: No  Please answer the questions below to tell us what condition you are experiencing: (Submitted on 12/26/2023)  Trouble holding urine or incontinence: Yes  Pain or burning: No  Trouble starting or stopping: No  Increased frequency of urination: No  Blood in urine: No  Decreased frequency of urination: No  Frequent nighttime urination: No  Flank pain: No  Difficulty emptying bladder: No  Please answer the questions below to tell us what condition you are  "experiencing: (Submitted on 12/26/2023)  Back pain: Yes  Muscle aches: No  Neck pain: No  Swollen joints: No  Joint pain: No  Bone pain: No  Muscle cramps: No  Muscle weakness: No  Joint stiffness: No  Bone fracture: No      Vital Signs     /80 (BP Location: Right arm, Patient Position: Sitting, Cuff Size: Adult Small)   Pulse 96   Ht 1.626 m (5' 4\")   Wt 98.4 kg (217 lb)   SpO2 96%   BMI 37.25 kg/m      Physical Exam     Constitutional:  Well developed, Well nourished  HENT:  Normocephalic,   Neck: normal in appearance  Eyes:  PERRL, Conjunctiva pink  Respiratory:  No respiratory distress  Skin: No acanthosis nigricans, lipoatrophy or lipodystrophy  Neurologic:  Alert & oriented x 3, nonfocal  Psychiatric:  Affect, Mood, Insight appropriate      Assessment     1. Age-related osteoporosis without current pathological fracture        Plan     Serene will continue with the Prolia injections and will follow-up with me in 1 year.         Lalitha Haq NP  [facility]  1/2/2024  10:14 AM    Current Medications     Outpatient Medications Prior to Visit   Medication Sig Dispense Refill     albuterol (PROAIR HFA/PROVENTIL HFA/VENTOLIN HFA) 108 (90 Base) MCG/ACT inhaler Inhale 2 puffs into the lungs every 6 hours as needed for shortness of breath or wheezing 18 g 3     azithromycin (ZITHROMAX) 250 MG tablet Take 1 tablet (250 mg) by mouth every evening 90 tablet 3     benzonatate (TESSALON) 100 MG capsule Take 1 capsule (100 mg) by mouth 3 times daily as needed for cough 90 capsule 3     calcium citrate (CITRACAL) 950 (200 Ca) MG tablet Take 1 tablet by mouth 2 times daily       denosumab (PROLIA) 60 MG/ML SOLN injection Inject 60 mg Subcutaneous every 6 months       fluticasone-salmeterol (ADVAIR) 500-50 MCG/ACT inhaler Inhale 1 puff into the lungs every 12 hours 180 each 3     guaiFENesin (MUCINEX) 600 MG 12 hr tablet Take 1 tablet (600 mg) by mouth 2 times daily 180 tablet 3     hydrochlorothiazide " (HYDRODIURIL) 25 MG tablet Take 1 tablet (25 mg) by mouth daily 90 tablet 3     ipratropium - albuterol 0.5 mg/2.5 mg/3 mL (DUONEB) 0.5-2.5 (3) MG/3ML neb solution Take 1 vial (3 mLs) by nebulization every 6 hours as needed for shortness of breath / dyspnea or wheezing 810 mL 3     levalbuterol (XOPENEX) 1.25 MG/3ML neb solution Take 3 mLs (1.25 mg) by nebulization every 4 hours as needed for shortness of breath or wheezing 810 mL 3     levETIRAcetam (KEPPRA) 500 MG tablet Take 1 tablet (500 mg) by mouth 2 times daily 180 tablet 3     losartan (COZAAR) 50 MG tablet Take 1 tablet (50 mg) by mouth 2 times daily 180 tablet 3     Magnesium Oxide 500 MG TABS Take 500 mg by mouth 2 times daily       nystatin (MYCOSTATIN) 867287 UNIT/GM external powder Apply topically daily as needed       potassium chloride ER (KLOR-CON M) 20 MEQ CR tablet Take 1 tablet (20 mEq) by mouth At Bedtime 90 tablet 3     RABEprazole (ACIPHEX) 20 MG EC tablet Take 1 tablet (20 mg) by mouth daily 90 tablet 3     sertraline (ZOLOFT) 100 MG tablet Take 1 tablet (100 mg) by mouth every evening 90 tablet 3     solifenacin (VESICARE) 10 MG tablet Take 1 tablet (10 mg) by mouth daily 90 tablet 3     spironolactone (ALDACTONE) 25 MG tablet Take 0.5 tablets (12.5 mg) by mouth daily 45 tablet 3     tiotropium (SPIRIVA RESPIMAT) 2.5 MCG/ACT inhaler Inhale 2 puffs into the lungs daily 12 g 3     vitamin D3 (CHOLECALCIFEROL) 125 MCG (5000 UT) tablet Take 5,000 Units by mouth daily        Facility-Administered Medications Prior to Visit   Medication Dose Route Frequency Provider Last Rate Last Admin     denosumab (PROLIA) injection 60 mg  60 mg Subcutaneous Q6 Months Lalitha Haq NP   60 mg at 01/02/24 1025         Lab Results     TSH   Date Value Ref Range Status   04/19/2022 3.59 0.40 - 4.00 mU/L Final           Imaging Results   Last DEXA scan:  No valid procedures specified.      Study Result    Narrative & Impression   EXAM: DX HIP/PELVIS/SPINE, DX  WRIST/HEEL/RADIUS  LOCATION: [facility]  DATE/TIME: 1/12/2023 2:31 PM     INDICATION:  Age-related osteoporosis without current pathological fracture  COMPARISON: 01/20/2021, 12/31/2018, 12/28/2016, 11/05/2015, 10/24/2013, 11/08/2011.  TECHNIQUE: Dual-energy x-ray absorptiometry performed with routine technique. Rest imaging was performed as the lumbar spine was not available for measurement.     FINDINGS:     RIGHT Hip Total: BMD: 1.001 g/cm2. T-score: -0.1. Z-score: 1.8  RIGHT Hip Femoral neck: BMD: 0.958 g/cm2. T-score: -0.6. Z-score: 1.5  LEFT Hip Total: BMD: 0.944 g/cm2. T-score: -0.5. Z-score: 1.4  LEFT Hip Femoral neck: BMD: 0.888 g/cm2. T-score: -1.1. Z-score: 1.0  LEFT Radius 33%: BMD: 0.904 g/cm2. T-score: 0.3. Z-score: 2.8     WHO Criteria:  Normal: T score at or above -1 SD  Osteopenia: T score between -1 and -2.5 SD  Osteoporosis: T score at or below -2.5 SD     COMPARISON: There has been a 0.1% increase in left hip BMD. There has been a 6.9% increase in left radius hip BMD.     FRAX Results: 10 year probability of major osteoporotic fracture is 22.9%, and of hip fracture is 9.8%, based on left femoral neck BMD.     RECOMMENDATIONS:   Consider treatment if major osteoporotic fracture score is greater than or equal to 20%. Consider treatment if hip fracture score is greater than or equal to 3%.                                                                      IMPRESSION: Low bone density (OSTEOPENIA). T score meets the World Health Organization (WHO) criteria for low bone density (osteopenia) at one or more measured sites. The risk of osteoporotic fracture increased approximately two-fold for each SD decrease   in T-score.       Again, thank you for allowing me to participate in the care of your patient.        Sincerely,        Lalitha Haq NP

## 2024-01-02 NOTE — PROGRESS NOTES
[facility]  ENDOCRINOLOGY    Osteoporosis Follow Up 1/2/2024    Lalitha Underwood, 1945, [id number]          Reason for visit      1. Age-related osteoporosis without current pathological fracture        History     Lalitha Underwood is a very pleasant 78 year old old female who presents for follow up.   SUMMARY:    1. Osteopenia-she was started on alendronate 2 years ago. She has been tolerating alendronate well however a recent bone density report as noted below showed deterioration in the bone density in the right hip. She states she's been compliant with her alendronate every week.  She does not take any calcium supplements and only consumes about 1 serving of dairy each day. She takes 5000 IU daily of the 3 and her recent level was 41.8.  TSH in December 2014 was 1.87.  She has been on Dilantin for the last 24 years after having a grand mal seizure. Prior to that she was diagnosed with seizure disorder at the age of 18 and use medications initially for about 2 years and then discontinued for about 27 years.  She is also a former smoker and smoked for about 35 years and quit in 1998.  She does not undertake any weight-bearing exercise currently. She is thinking of joining the [employer] in January  She has had several falls but has never resulted in a fragility fracture.  She has lost 1.75 inches in height.  Menopause at age 50 and she used hormone replacement therapy for about 2-3 years and stopped after the women's health initiative data came out.  Her mother had an osteoporotic hip fracture at age 90. She had to have open reduction internal fixation and lived for another 4 years after that but was dependent on a walker.  Her sister also has osteopenia.  She has a long-standing history of hiatal hernia and has been on proton pump inhibitors followed time and is currently in AcipHex.  TODAY:    Serene returns today in follow-up for Osteoporosis. She continues on Prolia injections without difficulties. Her  most recent Dexa Scan of last year showed: COMPARISON: There has been a 0.1% increase in left hip BMD. There has been a 6.9% increase in left radius hip BMD. Both considered to be a positive response to the Prolia. She does do some extra walking, but doesn't go too far without one of her walkers. She is taking both Calcium with Vit D, and an additional Vit D. Calcium level is 10.1 and Vit D level is 53.    Risk Factors     The following high- risk conditions have been ruled out: celiac disease, eating disorders, gastric bypass, hyperparathyroidism, inflammatory bowel disease, hyperthyroidism, rheumatoid arthritis, lupus, chronic kidney disease.      Lalitha Underwood has the following risk factors: Age, female gender, ex smoker, and breast cancer in her mother.      She is not on high risk medications such as glucocorticoids, anti-coagulants, chemotherapy or levothyroxine.  She is on an anticonvulsant.  Patient deniesHysterectomy, Oophrectomy, Breast cancer and Family history of breast cancer.   Menopause was at age: 50 years  Past Medical History     Patient Active Problem List   Diagnosis    S/P repair of paraesophageal hernia    Essential hypertension, benign    Acquired lymphedema of leg    Collapsed arches    Decreased hearing of both ears    Epileptic seizure (H)    Esophageal reflux    Hyperlipidemia    Impaired gait and mobility    Mild aortic valve stenosis    Morbid obesity (H)    Neuropathy, idiopathic    Osteoporosis    COPD (chronic obstructive pulmonary disease) (H)    Rosacea    Urge incontinence of urine    Uterine prolapse    Valgus deformity of both feet    Vitamin D deficiency    PAD (peripheral artery disease) (H24)    Second degree heart block    Cardiac pacemaker in situ    Atherosclerosis of native coronary artery with stable angina pectoris (H24)    Acquired absence of other left toe(s) (H24)    Cor pulmonale (chronic) (H)    Chronic respiratory failure with hypoxia (H)       Family History        family history includes Breast Cancer (age of onset: 66.00) in her mother; Coronary Artery Disease in her mother, sister, and sister; Diabetes in her son; Heart Disease in her sister, sister and another family member; Hypertension in her father and mother; Pulmonary Hypertension in her daughter and daughter; Varicose Veins in her mother.    Social History      reports that she quit smoking about 25 years ago. Her smoking use included cigarettes. She has a 57 pack-year smoking history. She has never used smokeless tobacco. She reports current alcohol use of about 2.0 standard drinks of alcohol per week. She reports that she does not currently use drugs.      Review of Systems     Patient denies current pain, limited mobility, fractures.   Remainder per HPI.    Answers submitted by the patient for this visit:  Symptoms you have experienced in the last 30 days (Submitted on 12/26/2023)  General Symptoms: No  Skin Symptoms: No  HENT Symptoms: No  EYE SYMPTOMS: No  HEART SYMPTOMS: No  LUNG SYMPTOMS: No  INTESTINAL SYMPTOMS: No  URINARY SYMPTOMS: Yes  GYNECOLOGIC SYMPTOMS: No  BREAST SYMPTOMS: No  SKELETAL SYMPTOMS: Yes  BLOOD SYMPTOMS: No  NERVOUS SYSTEM SYMPTOMS: No  MENTAL HEALTH SYMPTOMS: No  Please answer the questions below to tell us what condition you are experiencing: (Submitted on 12/26/2023)  Trouble holding urine or incontinence: Yes  Pain or burning: No  Trouble starting or stopping: No  Increased frequency of urination: No  Blood in urine: No  Decreased frequency of urination: No  Frequent nighttime urination: No  Flank pain: No  Difficulty emptying bladder: No  Please answer the questions below to tell us what condition you are experiencing: (Submitted on 12/26/2023)  Back pain: Yes  Muscle aches: No  Neck pain: No  Swollen joints: No  Joint pain: No  Bone pain: No  Muscle cramps: No  Muscle weakness: No  Joint stiffness: No  Bone fracture: No      Vital Signs     /80 (BP Location: Right arm,  "Patient Position: Sitting, Cuff Size: Adult Small)   Pulse 96   Ht 1.626 m (5' 4\")   Wt 98.4 kg (217 lb)   SpO2 96%   BMI 37.25 kg/m      Physical Exam     Constitutional:  Well developed, Well nourished  HENT:  Normocephalic,   Neck: normal in appearance  Eyes:  PERRL, Conjunctiva pink  Respiratory:  No respiratory distress  Skin: No acanthosis nigricans, lipoatrophy or lipodystrophy  Neurologic:  Alert & oriented x 3, nonfocal  Psychiatric:  Affect, Mood, Insight appropriate      Assessment     1. Age-related osteoporosis without current pathological fracture        Plan     Serene will continue with the Prolia injections and will follow-up with me in 1 year.         Lalitha Haq NP  [facility]  1/2/2024  10:14 AM    Current Medications     Outpatient Medications Prior to Visit   Medication Sig Dispense Refill    albuterol (PROAIR HFA/PROVENTIL HFA/VENTOLIN HFA) 108 (90 Base) MCG/ACT inhaler Inhale 2 puffs into the lungs every 6 hours as needed for shortness of breath or wheezing 18 g 3    azithromycin (ZITHROMAX) 250 MG tablet Take 1 tablet (250 mg) by mouth every evening 90 tablet 3    benzonatate (TESSALON) 100 MG capsule Take 1 capsule (100 mg) by mouth 3 times daily as needed for cough 90 capsule 3    calcium citrate (CITRACAL) 950 (200 Ca) MG tablet Take 1 tablet by mouth 2 times daily      denosumab (PROLIA) 60 MG/ML SOLN injection Inject 60 mg Subcutaneous every 6 months      fluticasone-salmeterol (ADVAIR) 500-50 MCG/ACT inhaler Inhale 1 puff into the lungs every 12 hours 180 each 3    guaiFENesin (MUCINEX) 600 MG 12 hr tablet Take 1 tablet (600 mg) by mouth 2 times daily 180 tablet 3    hydrochlorothiazide (HYDRODIURIL) 25 MG tablet Take 1 tablet (25 mg) by mouth daily 90 tablet 3    ipratropium - albuterol 0.5 mg/2.5 mg/3 mL (DUONEB) 0.5-2.5 (3) MG/3ML neb solution Take 1 vial (3 mLs) by nebulization every 6 hours as needed for shortness of breath / dyspnea or wheezing 810 mL 3    " levalbuterol (XOPENEX) 1.25 MG/3ML neb solution Take 3 mLs (1.25 mg) by nebulization every 4 hours as needed for shortness of breath or wheezing 810 mL 3    levETIRAcetam (KEPPRA) 500 MG tablet Take 1 tablet (500 mg) by mouth 2 times daily 180 tablet 3    losartan (COZAAR) 50 MG tablet Take 1 tablet (50 mg) by mouth 2 times daily 180 tablet 3    Magnesium Oxide 500 MG TABS Take 500 mg by mouth 2 times daily      nystatin (MYCOSTATIN) 992056 UNIT/GM external powder Apply topically daily as needed      potassium chloride ER (KLOR-CON M) 20 MEQ CR tablet Take 1 tablet (20 mEq) by mouth At Bedtime 90 tablet 3    RABEprazole (ACIPHEX) 20 MG EC tablet Take 1 tablet (20 mg) by mouth daily 90 tablet 3    sertraline (ZOLOFT) 100 MG tablet Take 1 tablet (100 mg) by mouth every evening 90 tablet 3    solifenacin (VESICARE) 10 MG tablet Take 1 tablet (10 mg) by mouth daily 90 tablet 3    spironolactone (ALDACTONE) 25 MG tablet Take 0.5 tablets (12.5 mg) by mouth daily 45 tablet 3    tiotropium (SPIRIVA RESPIMAT) 2.5 MCG/ACT inhaler Inhale 2 puffs into the lungs daily 12 g 3    vitamin D3 (CHOLECALCIFEROL) 125 MCG (5000 UT) tablet Take 5,000 Units by mouth daily        Facility-Administered Medications Prior to Visit   Medication Dose Route Frequency Provider Last Rate Last Admin    denosumab (PROLIA) injection 60 mg  60 mg Subcutaneous Q6 Months Lalitha Haq NP   60 mg at 01/02/24 1025         Lab Results     TSH   Date Value Ref Range Status   04/19/2022 3.59 0.40 - 4.00 mU/L Final           Imaging Results   Last DEXA scan:  No valid procedures specified.      Study Result    Narrative & Impression   EXAM: DX HIP/PELVIS/SPINE, DX WRIST/HEEL/RADIUS  LOCATION: [facility]  DATE/TIME: 1/12/2023 2:31 PM     INDICATION:  Age-related osteoporosis without current pathological fracture  COMPARISON: 01/20/2021, 12/31/2018, 12/28/2016, 11/05/2015, 10/24/2013, 11/08/2011.  TECHNIQUE: Dual-energy x-ray  absorptiometry performed with routine technique. Rest imaging was performed as the lumbar spine was not available for measurement.     FINDINGS:     RIGHT Hip Total: BMD: 1.001 g/cm2. T-score: -0.1. Z-score: 1.8  RIGHT Hip Femoral neck: BMD: 0.958 g/cm2. T-score: -0.6. Z-score: 1.5  LEFT Hip Total: BMD: 0.944 g/cm2. T-score: -0.5. Z-score: 1.4  LEFT Hip Femoral neck: BMD: 0.888 g/cm2. T-score: -1.1. Z-score: 1.0  LEFT Radius 33%: BMD: 0.904 g/cm2. T-score: 0.3. Z-score: 2.8     WHO Criteria:  Normal: T score at or above -1 SD  Osteopenia: T score between -1 and -2.5 SD  Osteoporosis: T score at or below -2.5 SD     COMPARISON: There has been a 0.1% increase in left hip BMD. There has been a 6.9% increase in left radius hip BMD.     FRAX Results: 10 year probability of major osteoporotic fracture is 22.9%, and of hip fracture is 9.8%, based on left femoral neck BMD.     RECOMMENDATIONS:   Consider treatment if major osteoporotic fracture score is greater than or equal to 20%. Consider treatment if hip fracture score is greater than or equal to 3%.                                                                      IMPRESSION: Low bone density (OSTEOPENIA). T score meets the World Health Organization (WHO) criteria for low bone density (osteopenia) at one or more measured sites. The risk of osteoporotic fracture increased approximately two-fold for each SD decrease   in T-score.

## 2024-01-17 ENCOUNTER — OFFICE VISIT (OUTPATIENT)
Dept: CARDIOLOGY | Facility: CLINIC | Age: 79
End: 2024-01-17
Payer: MEDICARE

## 2024-01-17 ENCOUNTER — ANCILLARY PROCEDURE (OUTPATIENT)
Dept: CARDIOLOGY | Facility: CLINIC | Age: 79
End: 2024-01-17
Attending: INTERNAL MEDICINE
Payer: MEDICARE

## 2024-01-17 VITALS
HEART RATE: 89 BPM | BODY MASS INDEX: 37.39 KG/M2 | SYSTOLIC BLOOD PRESSURE: 130 MMHG | OXYGEN SATURATION: 97 % | DIASTOLIC BLOOD PRESSURE: 57 MMHG | WEIGHT: 217.8 LBS | RESPIRATION RATE: 20 BRPM

## 2024-01-17 DIAGNOSIS — G40.009 PARTIAL IDIOPATHIC EPILEPSY WITH SEIZURES OF LOCALIZED ONSET, NOT INTRACTABLE, WITHOUT STATUS EPILEPTICUS (H): ICD-10-CM

## 2024-01-17 DIAGNOSIS — J43.9 PULMONARY EMPHYSEMA, UNSPECIFIED EMPHYSEMA TYPE (H): ICD-10-CM

## 2024-01-17 DIAGNOSIS — I10 ESSENTIAL HYPERTENSION, BENIGN: ICD-10-CM

## 2024-01-17 DIAGNOSIS — E66.09 CLASS 2 OBESITY DUE TO EXCESS CALORIES WITHOUT SERIOUS COMORBIDITY WITH BODY MASS INDEX (BMI) OF 37.0 TO 37.9 IN ADULT: ICD-10-CM

## 2024-01-17 DIAGNOSIS — I35.0 MILD AORTIC VALVE STENOSIS: ICD-10-CM

## 2024-01-17 DIAGNOSIS — E66.812 CLASS 2 OBESITY DUE TO EXCESS CALORIES WITHOUT SERIOUS COMORBIDITY WITH BODY MASS INDEX (BMI) OF 37.0 TO 37.9 IN ADULT: ICD-10-CM

## 2024-01-17 DIAGNOSIS — E78.00 PURE HYPERCHOLESTEROLEMIA: ICD-10-CM

## 2024-01-17 DIAGNOSIS — I25.83 CORONARY ARTERIOSCLEROSIS DUE TO LIPID RICH PLAQUE: Primary | ICD-10-CM

## 2024-01-17 DIAGNOSIS — I27.81 COR PULMONALE (CHRONIC) (H): ICD-10-CM

## 2024-01-17 DIAGNOSIS — Z95.0 CARDIAC PACEMAKER IN SITU: ICD-10-CM

## 2024-01-17 DIAGNOSIS — I44.1 SECOND DEGREE MOBITZ II AV BLOCK: Primary | ICD-10-CM

## 2024-01-17 PROCEDURE — 99214 OFFICE O/P EST MOD 30 MIN: CPT | Performed by: INTERNAL MEDICINE

## 2024-01-17 NOTE — PROGRESS NOTES
[facility]  Heart Care Clinic Follow-up Note    Assessment & Plan        (I25.10,  I25.83) Coronary arteriosclerosis due to lipid rich plaque  (primary encounter diagnosis)  Comment: Given abnormal coronary CTA she underwent invasive angiography July 2023 which showed normal left main, normal LAD, normal circumflex, and moderate proximal right coronary artery lesion of 40%.  Work on prevention and no further evaluation.    (I35.0) Mild aortic valve stenosis  Comment: Echo shows mild to moderate aortic stenosis with peak velocity 2.7 m/s with a mean gradient of 19 mmHg.  Recheck echo in April.    (Z95.0) Cardiac pacemaker in situ  Comment: [employer] device with [employer] right atrial and right ventricular leads placed March 2022.  Most recent device check shows 1% atrial pacing and 100% ventricular pacing.  This was placed due to second-degree heart block.  Rates are good between 80 and 85 with no major arrhythmia.  Will be getting device check today, last device check showed battery good for 10-1/2 years.    (I27.81) Cor pulmonale (chronic) (H)  Comment: Pulmonic pressures mildly increased at 35-45 suspected systolic.  WHO class III.  Right ventricle shows normal size and function.    (E78.00) Pure hypercholesterolemia  Comment: Total cholesterol 181 with an LDL of 99 which is acceptable.    (I10) Essential hypertension, benign  Comment: Blood pressure under good control currently on Aldactone, HCTZ, and losartan.    (J43.9) Pulmonary emphysema, unspecified emphysema type (H)  Comment: So noted with numerous inhalers and nebulizers.    (G40.009) Partial idiopathic epilepsy with seizures of localized onset, not intractable, without status epilepticus (H)  Comment: So noted on Keppra.    (E66.09,  Z68.37) Class 2 obesity due to excess calories without serious comorbidity with body mass index (BMI) of 37.0 to 37.9 in adult  Comment: Work on weight loss.    Plan  1.  Work on weight loss.  2.   Check device today.  3.  Check echocardiogram in April.  4.  Follow-up with me in a year or as needed.    Subjective  CC: 78-year-old white female being seen in yearly follow-up.  Still living in a townhouse in a single level alone, using oxygen 24/7.  Does meet the shortness of breath and heavy activity but no PND, orthopnea, syncope, dizziness, chest pains, palpitations, or severe peripheral edema.    Medications  Current Outpatient Medications   Medication Sig Dispense Refill    albuterol (PROAIR HFA/PROVENTIL HFA/VENTOLIN HFA) 108 (90 Base) MCG/ACT inhaler Inhale 2 puffs into the lungs every 6 hours as needed for shortness of breath or wheezing 18 g 3    azithromycin (ZITHROMAX) 250 MG tablet Take 1 tablet (250 mg) by mouth every evening 90 tablet 3    benzonatate (TESSALON) 100 MG capsule Take 1 capsule (100 mg) by mouth 3 times daily as needed for cough 90 capsule 3    calcium citrate (CITRACAL) 950 (200 Ca) MG tablet Take 1 tablet by mouth 2 times daily      fluticasone-salmeterol (ADVAIR) 500-50 MCG/ACT inhaler Inhale 1 puff into the lungs every 12 hours 180 each 3    guaiFENesin (MUCINEX) 600 MG 12 hr tablet Take 1 tablet (600 mg) by mouth 2 times daily 180 tablet 3    hydrochlorothiazide (HYDRODIURIL) 25 MG tablet Take 1 tablet (25 mg) by mouth daily 90 tablet 3    ipratropium - albuterol 0.5 mg/2.5 mg/3 mL (DUONEB) 0.5-2.5 (3) MG/3ML neb solution Take 1 vial (3 mLs) by nebulization every 6 hours as needed for shortness of breath / dyspnea or wheezing 810 mL 3    levalbuterol (XOPENEX) 1.25 MG/3ML neb solution Take 3 mLs (1.25 mg) by nebulization every 4 hours as needed for shortness of breath or wheezing 810 mL 3    levETIRAcetam (KEPPRA) 500 MG tablet Take 1 tablet (500 mg) by mouth 2 times daily 180 tablet 3    losartan (COZAAR) 50 MG tablet Take 1 tablet (50 mg) by mouth 2 times daily 180 tablet 3    Magnesium Oxide 500 MG TABS Take 500 mg by mouth 2 times daily      nystatin (MYCOSTATIN) 212071  UNIT/GM external powder Apply topically daily as needed      potassium chloride ER (KLOR-CON M) 20 MEQ CR tablet Take 1 tablet (20 mEq) by mouth At Bedtime 90 tablet 3    RABEprazole (ACIPHEX) 20 MG EC tablet Take 1 tablet (20 mg) by mouth daily 90 tablet 3    sertraline (ZOLOFT) 100 MG tablet Take 1 tablet (100 mg) by mouth every evening 90 tablet 3    solifenacin (VESICARE) 10 MG tablet Take 1 tablet (10 mg) by mouth daily 90 tablet 3    spironolactone (ALDACTONE) 25 MG tablet Take 0.5 tablets (12.5 mg) by mouth daily 45 tablet 3    tiotropium (SPIRIVA RESPIMAT) 2.5 MCG/ACT inhaler Inhale 2 puffs into the lungs daily 12 g 3    vitamin D3 (CHOLECALCIFEROL) 125 MCG (5000 UT) tablet Take 5,000 Units by mouth daily          Objective  /57 (BP Location: Left arm, Patient Position: Sitting, Cuff Size: Adult Small)   Pulse 89   Resp 20   Wt 98.8 kg (217 lb 12.8 oz)   SpO2 97%   BMI 37.39 kg/m      General Appearance:    Alert, cooperative, no distress, appears stated age   Head:    Normocephalic, without obvious abnormality, atraumatic   Throat:   Lips, mucosa, and tongue normal; teeth and gums normal   Neck:   Supple, symmetrical, trachea midline, no adenopathy;        thyroid:  No enlargement/tenderness/nodules; no carotid    bruit or JVD   Back:     Symmetric, no curvature, ROM normal, no CVA tenderness   Lungs:     Diffuse inspiratory and expiratory wheezes bilaterally, respirations unlabored   Chest wall:    No tenderness or deformity   Heart:    Regular rate and rhythm, S1 and S2 normal, no murmur, rub   or gallop   Abdomen:     Soft, non-tender, bowel sounds active all four quadrants,     no masses, no organomegaly   Extremities:   Normal, atraumatic, no cyanosis or edema   Pulses:   2+ and symmetric all extremities   Skin:   Skin color, texture, turgor normal, no rashes or lesions     Results    Lab Results personally reviewed   Lab Results   Component Value Date    CHOL 181 07/07/2023    CHOL 200 (H)  "04/27/2023     Lab Results   Component Value Date    HDL 69 07/07/2023    HDL 78 04/27/2023     No components found for: \"LDLCALC\"  Lab Results   Component Value Date    TRIG 67 07/07/2023    TRIG 58 04/27/2023     Lab Results   Component Value Date    WBC 9.7 07/06/2023    HGB 10.5 (L) 07/06/2023    HCT 34.7 (L) 07/06/2023     07/06/2023     Lab Results   Component Value Date    BUN 26.0 (H) 07/06/2023     07/06/2023    CO2 28 07/06/2023           "

## 2024-01-17 NOTE — LETTER
1/17/2024    Kate Marte,   [address]  [facility] [address]    RE: Lalitha Underwood       Dear Colleague,     I had the pleasure of seeing Lalitha RITA Underwood in the [facility].      [facility]  Heart Care Clinic Follow-up Note    Assessment & Plan        (I25.10,  I25.83) Coronary arteriosclerosis due to lipid rich plaque  (primary encounter diagnosis)  Comment: Given abnormal coronary CTA she underwent invasive angiography July 2023 which showed normal left main, normal LAD, normal circumflex, and moderate proximal right coronary artery lesion of 40%.  Work on prevention and no further evaluation.    (I35.0) Mild aortic valve stenosis  Comment: Echo shows mild to moderate aortic stenosis with peak velocity 2.7 m/s with a mean gradient of 19 mmHg.  Recheck echo in April.    (Z95.0) Cardiac pacemaker in situ  Comment: [employer] device with [employer] right atrial and right ventricular leads placed March 2022.  Most recent device check shows 1% atrial pacing and 100% ventricular pacing.  This was placed due to second-degree heart block.  Rates are good between 80 and 85 with no major arrhythmia.  Will be getting device check today, last device check showed battery good for 10-1/2 years.    (I27.81) Cor pulmonale (chronic) (H)  Comment: Pulmonic pressures mildly increased at 35-45 suspected systolic.  WHO class III.  Right ventricle shows normal size and function.    (E78.00) Pure hypercholesterolemia  Comment: Total cholesterol 181 with an LDL of 99 which is acceptable.    (I10) Essential hypertension, benign  Comment: Blood pressure under good control currently on Aldactone, HCTZ, and losartan.    (J43.9) Pulmonary emphysema, unspecified emphysema type (H)  Comment: So noted with numerous inhalers and nebulizers.    (G40.009) Partial idiopathic epilepsy with seizures of localized onset, not intractable, without status epilepticus (H)  Comment: So noted on  Keppra.    (E66.09,  Z68.37) Class 2 obesity due to excess calories without serious comorbidity with body mass index (BMI) of 37.0 to 37.9 in adult  Comment: Work on weight loss.    Plan  1.  Work on weight loss.  2.  Check device today.  3.  Check echocardiogram in April.  4.  Follow-up with me in a year or as needed.    Subjective  CC: 78-year-old white female being seen in yearly follow-up.  Still living in a townhouse in a single level alone, using oxygen 24/7.  Does meet the shortness of breath and heavy activity but no PND, orthopnea, syncope, dizziness, chest pains, palpitations, or severe peripheral edema.    Medications  Current Outpatient Medications   Medication Sig Dispense Refill    albuterol (PROAIR HFA/PROVENTIL HFA/VENTOLIN HFA) 108 (90 Base) MCG/ACT inhaler Inhale 2 puffs into the lungs every 6 hours as needed for shortness of breath or wheezing 18 g 3    azithromycin (ZITHROMAX) 250 MG tablet Take 1 tablet (250 mg) by mouth every evening 90 tablet 3    benzonatate (TESSALON) 100 MG capsule Take 1 capsule (100 mg) by mouth 3 times daily as needed for cough 90 capsule 3    calcium citrate (CITRACAL) 950 (200 Ca) MG tablet Take 1 tablet by mouth 2 times daily      fluticasone-salmeterol (ADVAIR) 500-50 MCG/ACT inhaler Inhale 1 puff into the lungs every 12 hours 180 each 3    guaiFENesin (MUCINEX) 600 MG 12 hr tablet Take 1 tablet (600 mg) by mouth 2 times daily 180 tablet 3    hydrochlorothiazide (HYDRODIURIL) 25 MG tablet Take 1 tablet (25 mg) by mouth daily 90 tablet 3    ipratropium - albuterol 0.5 mg/2.5 mg/3 mL (DUONEB) 0.5-2.5 (3) MG/3ML neb solution Take 1 vial (3 mLs) by nebulization every 6 hours as needed for shortness of breath / dyspnea or wheezing 810 mL 3    levalbuterol (XOPENEX) 1.25 MG/3ML neb solution Take 3 mLs (1.25 mg) by nebulization every 4 hours as needed for shortness of breath or wheezing 810 mL 3    levETIRAcetam (KEPPRA) 500 MG tablet Take 1 tablet (500 mg) by mouth 2 times  daily 180 tablet 3    losartan (COZAAR) 50 MG tablet Take 1 tablet (50 mg) by mouth 2 times daily 180 tablet 3    Magnesium Oxide 500 MG TABS Take 500 mg by mouth 2 times daily      nystatin (MYCOSTATIN) 265113 UNIT/GM external powder Apply topically daily as needed      potassium chloride ER (KLOR-CON M) 20 MEQ CR tablet Take 1 tablet (20 mEq) by mouth At Bedtime 90 tablet 3    RABEprazole (ACIPHEX) 20 MG EC tablet Take 1 tablet (20 mg) by mouth daily 90 tablet 3    sertraline (ZOLOFT) 100 MG tablet Take 1 tablet (100 mg) by mouth every evening 90 tablet 3    solifenacin (VESICARE) 10 MG tablet Take 1 tablet (10 mg) by mouth daily 90 tablet 3    spironolactone (ALDACTONE) 25 MG tablet Take 0.5 tablets (12.5 mg) by mouth daily 45 tablet 3    tiotropium (SPIRIVA RESPIMAT) 2.5 MCG/ACT inhaler Inhale 2 puffs into the lungs daily 12 g 3    vitamin D3 (CHOLECALCIFEROL) 125 MCG (5000 UT) tablet Take 5,000 Units by mouth daily          Objective  /57 (BP Location: Left arm, Patient Position: Sitting, Cuff Size: Adult Small)   Pulse 89   Resp 20   Wt 98.8 kg (217 lb 12.8 oz)   SpO2 97%   BMI 37.39 kg/m      General Appearance:    Alert, cooperative, no distress, appears stated age   Head:    Normocephalic, without obvious abnormality, atraumatic   Throat:   Lips, mucosa, and tongue normal; teeth and gums normal   Neck:   Supple, symmetrical, trachea midline, no adenopathy;        thyroid:  No enlargement/tenderness/nodules; no carotid    bruit or JVD   Back:     Symmetric, no curvature, ROM normal, no CVA tenderness   Lungs:     Diffuse inspiratory and expiratory wheezes bilaterally, respirations unlabored   Chest wall:    No tenderness or deformity   Heart:    Regular rate and rhythm, S1 and S2 normal, no murmur, rub   or gallop   Abdomen:     Soft, non-tender, bowel sounds active all four quadrants,     no masses, no organomegaly   Extremities:   Normal, atraumatic, no cyanosis or edema   Pulses:   2+ and  "symmetric all extremities   Skin:   Skin color, texture, turgor normal, no rashes or lesions     Results    Lab Results personally reviewed   Lab Results   Component Value Date    CHOL 181 07/07/2023    CHOL 200 (H) 04/27/2023     Lab Results   Component Value Date    HDL 69 07/07/2023    HDL 78 04/27/2023     No components found for: \"LDLCALC\"  Lab Results   Component Value Date    TRIG 67 07/07/2023    TRIG 58 04/27/2023     Lab Results   Component Value Date    WBC 9.7 07/06/2023    HGB 10.5 (L) 07/06/2023    HCT 34.7 (L) 07/06/2023     07/06/2023     Lab Results   Component Value Date    BUN 26.0 (H) 07/06/2023     07/06/2023    CO2 28 07/06/2023               Thank you for allowing me to participate in the care of your patient.      Sincerely,     ZHANE ROBERTSON MD     [facility]  cc:   Zhane Robertson MD  [address] [place], [address]  [place] [address]      "

## 2024-01-22 ENCOUNTER — MYC MEDICAL ADVICE (OUTPATIENT)
Dept: PULMONOLOGY | Facility: CLINIC | Age: 79
End: 2024-01-22
Payer: MEDICARE

## 2024-01-22 DIAGNOSIS — J44.9 COPD (CHRONIC OBSTRUCTIVE PULMONARY DISEASE) (H): ICD-10-CM

## 2024-01-22 RX ORDER — IPRATROPIUM BROMIDE AND ALBUTEROL SULFATE 2.5; .5 MG/3ML; MG/3ML
SOLUTION RESPIRATORY (INHALATION)
Qty: 1080 ML | Refills: 3 | Status: SHIPPED | OUTPATIENT
Start: 2024-01-22

## 2024-01-22 RX ORDER — IPRATROPIUM BROMIDE AND ALBUTEROL SULFATE 2.5; .5 MG/3ML; MG/3ML
1 SOLUTION RESPIRATORY (INHALATION) EVERY 6 HOURS PRN
Qty: 810 ML | Refills: 3 | Status: SHIPPED | OUTPATIENT
Start: 2024-01-22 | End: 2024-01-22

## 2024-01-24 DIAGNOSIS — K21.9 GASTROESOPHAGEAL REFLUX DISEASE WITHOUT ESOPHAGITIS: ICD-10-CM

## 2024-01-24 RX ORDER — RABEPRAZOLE SODIUM 20 MG/1
1 TABLET, DELAYED RELEASE ORAL DAILY
Qty: 90 TABLET | Refills: 3 | Status: CANCELLED | OUTPATIENT
Start: 2024-01-24

## 2024-01-29 ENCOUNTER — VIRTUAL VISIT (OUTPATIENT)
Dept: FAMILY MEDICINE | Facility: CLINIC | Age: 79
End: 2024-01-29
Payer: MEDICARE

## 2024-01-29 DIAGNOSIS — K21.9 GASTROESOPHAGEAL REFLUX DISEASE WITHOUT ESOPHAGITIS: ICD-10-CM

## 2024-01-29 PROCEDURE — 99441 PR PHYSICIAN TELEPHONE EVALUATION 5-10 MIN: CPT | Mod: 93 | Performed by: PHYSICIAN ASSISTANT

## 2024-01-29 RX ORDER — RABEPRAZOLE SODIUM 20 MG/1
1 TABLET, DELAYED RELEASE ORAL DAILY
Qty: 90 TABLET | Refills: 3 | Status: SHIPPED | OUTPATIENT
Start: 2024-01-29

## 2024-01-29 NOTE — PROGRESS NOTES
"Serene is a 78 year old who is being evaluated via a billable telephone visit.      What phone number would you like to be contacted at? [phone]  How would you like to obtain your AVS? James    Distant Location (provider location):  On-site    Assessment & Plan     Gastroesophageal reflux disease without esophagitis  Chronic issue, stable without side effects from medication.  Rx refilled at this visit.  - RABEprazole (ACIPHEX) 20 MG EC tablet  Dispense: 90 tablet; Refill: 3        BMI  Estimated body mass index is 37.39 kg/m  as calculated from the following:    Height as of 1/2/24: 1.626 m (5' 4\").    Weight as of 1/17/24: 98.8 kg (217 lb 12.8 oz).   Weight management plan: Discussed healthy diet and exercise guidelines    Risks, benefits and alternatives were discussed with patient. Agreeable to the plan of care.      Subjective   Serene is a 78 year old, presenting for the following health issues:  Follow Up (Follow up on rabeprazole.  Has been taking it for years and it works, well.  Required a visit prior to refilling medications. )      1/29/2024     4:49 PM   Additional Questions   Roomed by KARINE Seymour CMA([facility])     History of Present Illness       Reason for visit:  Prescription    She eats 2-3 servings of fruits and vegetables daily.She consumes 1 sweetened beverage(s) daily.She exercises with enough effort to increase her heart rate 9 or less minutes per day.  She exercises with enough effort to increase her heart rate 3 or less days per week.   She is taking medications regularly.     Patient is calling today for refill of her Aciphex  She denies belly pain or black tarry stools  Has issues stomach reflux at night if eats too much otherwise medication works well for her        Review of Systems  Constitutional, HEENT, cardiovascular, pulmonary, gi and gu systems are negative, except as otherwise noted.      Objective    Vitals - Patient Reported  Weight (Patient Reported): 96.2 kg (212 " lb)        Physical Exam   General: Alert and no distress //Respiratory: No audible wheeze, cough, or shortness of breath // Psychiatric:  Appropriate affect, tone, and pace of words        Phone call duration: 5 minutes  Signed Electronically by: Teresa Johnson PA-C

## 2024-02-02 LAB
MDC_IDC_LEAD_CONNECTION_STATUS: NORMAL
MDC_IDC_LEAD_CONNECTION_STATUS: NORMAL
MDC_IDC_LEAD_IMPLANT_DT: NORMAL
MDC_IDC_LEAD_IMPLANT_DT: NORMAL
MDC_IDC_LEAD_LOCATION: NORMAL
MDC_IDC_LEAD_LOCATION: NORMAL
MDC_IDC_LEAD_LOCATION_DETAIL_1: NORMAL
MDC_IDC_LEAD_LOCATION_DETAIL_1: NORMAL
MDC_IDC_LEAD_MFG: NORMAL
MDC_IDC_LEAD_MFG: NORMAL
MDC_IDC_LEAD_MODEL: NORMAL
MDC_IDC_LEAD_MODEL: NORMAL
MDC_IDC_LEAD_POLARITY_TYPE: NORMAL
MDC_IDC_LEAD_POLARITY_TYPE: NORMAL
MDC_IDC_LEAD_SERIAL: NORMAL
MDC_IDC_LEAD_SERIAL: NORMAL
MDC_IDC_MSMT_BATTERY_DTM: NORMAL
MDC_IDC_MSMT_BATTERY_REMAINING_LONGEVITY: 126 MO
MDC_IDC_MSMT_BATTERY_REMAINING_PERCENTAGE: 100 %
MDC_IDC_MSMT_BATTERY_STATUS: NORMAL
MDC_IDC_MSMT_LEADCHNL_RA_IMPEDANCE_VALUE: 449 OHM
MDC_IDC_MSMT_LEADCHNL_RA_PACING_THRESHOLD_AMPLITUDE: 0.8 V
MDC_IDC_MSMT_LEADCHNL_RA_PACING_THRESHOLD_PULSEWIDTH: 0.4 MS
MDC_IDC_MSMT_LEADCHNL_RA_SENSING_INTR_AMPL: 7.1 MV
MDC_IDC_MSMT_LEADCHNL_RV_IMPEDANCE_VALUE: 438 OHM
MDC_IDC_MSMT_LEADCHNL_RV_PACING_THRESHOLD_AMPLITUDE: 0.7 V
MDC_IDC_MSMT_LEADCHNL_RV_PACING_THRESHOLD_PULSEWIDTH: 0.4 MS
MDC_IDC_PG_IMPLANT_DTM: NORMAL
MDC_IDC_PG_MFG: NORMAL
MDC_IDC_PG_MODEL: NORMAL
MDC_IDC_PG_SERIAL: NORMAL
MDC_IDC_PG_TYPE: NORMAL
MDC_IDC_SESS_CLINIC_NAME: NORMAL
MDC_IDC_SESS_DTM: NORMAL
MDC_IDC_SESS_TYPE: NORMAL
MDC_IDC_SET_BRADY_AT_MODE_SWITCH_MODE: NORMAL
MDC_IDC_SET_BRADY_AT_MODE_SWITCH_RATE: 170 {BEATS}/MIN
MDC_IDC_SET_BRADY_LOWRATE: 60 {BEATS}/MIN
MDC_IDC_SET_BRADY_MAX_SENSOR_RATE: 130 {BEATS}/MIN
MDC_IDC_SET_BRADY_MAX_TRACKING_RATE: 130 {BEATS}/MIN
MDC_IDC_SET_BRADY_MODE: NORMAL
MDC_IDC_SET_BRADY_PAV_DELAY_HIGH: 200 MS
MDC_IDC_SET_BRADY_PAV_DELAY_LOW: 250 MS
MDC_IDC_SET_BRADY_SAV_DELAY_HIGH: 200 MS
MDC_IDC_SET_BRADY_SAV_DELAY_LOW: 250 MS
MDC_IDC_SET_LEADCHNL_RA_PACING_AMPLITUDE: 1.5 V
MDC_IDC_SET_LEADCHNL_RA_PACING_CAPTURE_MODE: NORMAL
MDC_IDC_SET_LEADCHNL_RA_PACING_POLARITY: NORMAL
MDC_IDC_SET_LEADCHNL_RA_PACING_PULSEWIDTH: 0.4 MS
MDC_IDC_SET_LEADCHNL_RA_SENSING_ADAPTATION_MODE: NORMAL
MDC_IDC_SET_LEADCHNL_RA_SENSING_POLARITY: NORMAL
MDC_IDC_SET_LEADCHNL_RA_SENSING_SENSITIVITY: 0.4 MV
MDC_IDC_SET_LEADCHNL_RV_PACING_AMPLITUDE: NORMAL
MDC_IDC_SET_LEADCHNL_RV_PACING_CAPTURE_MODE: NORMAL
MDC_IDC_SET_LEADCHNL_RV_PACING_POLARITY: NORMAL
MDC_IDC_SET_LEADCHNL_RV_PACING_PULSEWIDTH: 0.4 MS
MDC_IDC_SET_LEADCHNL_RV_SENSING_ADAPTATION_MODE: NORMAL
MDC_IDC_SET_LEADCHNL_RV_SENSING_POLARITY: NORMAL
MDC_IDC_SET_LEADCHNL_RV_SENSING_SENSITIVITY: 1.5 MV
MDC_IDC_SET_ZONE_DETECTION_INTERVAL: 375 MS
MDC_IDC_SET_ZONE_STATUS: NORMAL
MDC_IDC_SET_ZONE_TYPE: NORMAL
MDC_IDC_SET_ZONE_VENDOR_TYPE: NORMAL
MDC_IDC_STAT_AT_BURDEN_PERCENT: 0 %
MDC_IDC_STAT_AT_DTM_END: NORMAL
MDC_IDC_STAT_AT_DTM_START: NORMAL
MDC_IDC_STAT_AT_MODE_SW_COUNT: 0
MDC_IDC_STAT_BRADY_DTM_END: NORMAL
MDC_IDC_STAT_BRADY_DTM_START: NORMAL
MDC_IDC_STAT_BRADY_RA_PERCENT_PACED: 2 %
MDC_IDC_STAT_BRADY_RV_PERCENT_PACED: 100 %
MDC_IDC_STAT_EPISODE_RECENT_COUNT: 0
MDC_IDC_STAT_EPISODE_RECENT_COUNT_DTM_END: NORMAL
MDC_IDC_STAT_EPISODE_RECENT_COUNT_DTM_START: NORMAL
MDC_IDC_STAT_EPISODE_TYPE: NORMAL
MDC_IDC_STAT_EPISODE_VENDOR_TYPE: NORMAL
MDC_IDC_STAT_EPISODE_VENDOR_TYPE: NORMAL

## 2024-02-02 PROCEDURE — 93280 PM DEVICE PROGR EVAL DUAL: CPT | Performed by: INTERNAL MEDICINE

## 2024-02-08 ENCOUNTER — TRANSFERRED RECORDS (OUTPATIENT)
Dept: HEALTH INFORMATION MANAGEMENT | Facility: CLINIC | Age: 79
End: 2024-02-08
Payer: MEDICARE

## 2024-02-09 ENCOUNTER — TRANSCRIBE ORDERS (OUTPATIENT)
Dept: OTHER | Age: 79
End: 2024-02-09

## 2024-02-09 DIAGNOSIS — I89.0 LYMPHEDEMA OF LEG: Primary | ICD-10-CM

## 2024-02-16 ENCOUNTER — THERAPY VISIT (OUTPATIENT)
Dept: PHYSICAL THERAPY | Facility: REHABILITATION | Age: 79
End: 2024-02-16
Attending: ORTHOPAEDIC SURGERY
Payer: MEDICARE

## 2024-02-16 DIAGNOSIS — I89.0 LYMPHEDEMA: Primary | ICD-10-CM

## 2024-02-16 PROCEDURE — 97535 SELF CARE MNGMENT TRAINING: CPT | Mod: GP | Performed by: PHYSICAL THERAPIST

## 2024-02-16 PROCEDURE — 97161 PT EVAL LOW COMPLEX 20 MIN: CPT | Mod: GP | Performed by: PHYSICAL THERAPIST
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
## 2024-03-08 ENCOUNTER — THERAPY VISIT (OUTPATIENT)
Dept: PHYSICAL THERAPY | Facility: REHABILITATION | Age: 79
End: 2024-03-08
Attending: ORTHOPAEDIC SURGERY
Payer: MEDICARE

## 2024-03-08 DIAGNOSIS — I89.0 LYMPHEDEMA: Primary | ICD-10-CM

## 2024-03-08 PROCEDURE — 97140 MANUAL THERAPY 1/> REGIONS: CPT | Mod: GP | Performed by: PHYSICAL THERAPIST

## 2024-03-08 PROCEDURE — 97535 SELF CARE MNGMENT TRAINING: CPT | Mod: GP | Performed by: PHYSICAL THERAPIST

## 2024-03-13 ENCOUNTER — THERAPY VISIT (OUTPATIENT)
Dept: PHYSICAL THERAPY | Facility: REHABILITATION | Age: 79
End: 2024-03-13
Attending: ORTHOPAEDIC SURGERY
Payer: MEDICARE

## 2024-03-13 DIAGNOSIS — I89.0 LYMPHEDEMA: Primary | ICD-10-CM

## 2024-03-13 PROCEDURE — 97140 MANUAL THERAPY 1/> REGIONS: CPT | Mod: GP | Performed by: PHYSICAL THERAPIST

## 2024-03-13 PROCEDURE — 97110 THERAPEUTIC EXERCISES: CPT | Mod: GP | Performed by: PHYSICAL THERAPIST

## 2024-03-13 PROCEDURE — 97535 SELF CARE MNGMENT TRAINING: CPT | Mod: GP | Performed by: PHYSICAL THERAPIST

## 2024-03-20 ENCOUNTER — THERAPY VISIT (OUTPATIENT)
Dept: PHYSICAL THERAPY | Facility: REHABILITATION | Age: 79
End: 2024-03-20
Attending: ORTHOPAEDIC SURGERY
Payer: MEDICARE

## 2024-03-20 DIAGNOSIS — I89.0 LYMPHEDEMA: Primary | ICD-10-CM

## 2024-03-20 PROCEDURE — 97140 MANUAL THERAPY 1/> REGIONS: CPT | Mod: GP | Performed by: PHYSICAL THERAPIST

## 2024-03-20 PROCEDURE — 97110 THERAPEUTIC EXERCISES: CPT | Mod: GP | Performed by: PHYSICAL THERAPIST

## 2024-03-24 ENCOUNTER — HOSPITAL ENCOUNTER (EMERGENCY)
Facility: CLINIC | Age: 79
Discharge: HOME | End: 2024-03-25
Attending: EMERGENCY MEDICINE | Admitting: EMERGENCY MEDICINE
Payer: MEDICARE

## 2024-03-24 ENCOUNTER — APPOINTMENT (OUTPATIENT)
Dept: CT IMAGING | Facility: CLINIC | Age: 79
End: 2024-03-24
Attending: EMERGENCY MEDICINE
Payer: MEDICARE

## 2024-03-24 VITALS — RESPIRATION RATE: 20 BRPM | HEART RATE: 75 BPM | OXYGEN SATURATION: 96 % | TEMPERATURE: 97.4 F

## 2024-03-24 DIAGNOSIS — R31.0 GROSS HEMATURIA: ICD-10-CM

## 2024-03-24 LAB
ALBUMIN UR-MCNC: >499 MG/DL
ANION GAP SERPL CALCULATED.3IONS-SCNC: 10 MMOL/L (ref 7–15)
APPEARANCE UR: ABNORMAL
BASOPHILS # BLD AUTO: 0.1 10E3/UL (ref 0–0.2)
BASOPHILS NFR BLD AUTO: 1 %
BILIRUB UR QL STRIP: NEGATIVE
BUN SERPL-MCNC: 24.3 MG/DL (ref 8–23)
CALCIUM SERPL-MCNC: 8.8 MG/DL (ref 8.8–10.2)
CHLORIDE SERPL-SCNC: 99 MMOL/L (ref 98–107)
COLOR UR AUTO: YELLOW
CREAT SERPL-MCNC: 0.87 MG/DL (ref 0.51–0.95)
DEPRECATED HCO3 PLAS-SCNC: 30 MMOL/L (ref 22–29)
EGFRCR SERPLBLD CKD-EPI 2021: 67 ML/MIN/1.73M2
EOSINOPHIL # BLD AUTO: 0.1 10E3/UL (ref 0–0.7)
EOSINOPHIL NFR BLD AUTO: 1 %
ERYTHROCYTE [DISTWIDTH] IN BLOOD BY AUTOMATED COUNT: 14.8 % (ref 10–15)
GLUCOSE SERPL-MCNC: 98 MG/DL (ref 70–99)
GLUCOSE UR STRIP-MCNC: NEGATIVE MG/DL
HCT VFR BLD AUTO: 31.2 % (ref 35–47)
HGB BLD-MCNC: 9.7 G/DL (ref 11.7–15.7)
HGB UR QL STRIP: NEGATIVE
HOLD SPECIMEN: NORMAL
HOLD SPECIMEN: NORMAL
IMM GRANULOCYTES # BLD: 0 10E3/UL
IMM GRANULOCYTES NFR BLD: 0 %
KETONES UR STRIP-MCNC: NEGATIVE MG/DL
LEUKOCYTE ESTERASE UR QL STRIP: NEGATIVE
LYMPHOCYTES # BLD AUTO: 1.6 10E3/UL (ref 0.8–5.3)
LYMPHOCYTES NFR BLD AUTO: 19 %
MCH RBC QN AUTO: 26.1 PG (ref 26.5–33)
MCHC RBC AUTO-ENTMCNC: 31.1 G/DL (ref 31.5–36.5)
MCV RBC AUTO: 84 FL (ref 78–100)
MONOCYTES # BLD AUTO: 0.9 10E3/UL (ref 0–1.3)
MONOCYTES NFR BLD AUTO: 11 %
NEUTROPHILS # BLD AUTO: 5.6 10E3/UL (ref 1.6–8.3)
NEUTROPHILS NFR BLD AUTO: 68 %
NITRATE UR QL: NEGATIVE
NRBC # BLD AUTO: 0 10E3/UL
NRBC BLD AUTO-RTO: 0 /100
PH UR STRIP: 7 [PH] (ref 5–7)
PLATELET # BLD AUTO: 210 10E3/UL (ref 150–450)
POTASSIUM SERPL-SCNC: 3.6 MMOL/L (ref 3.4–5.3)
RBC # BLD AUTO: 3.71 10E6/UL (ref 3.8–5.2)
RBC URINE: 62 /HPF
SODIUM SERPL-SCNC: 139 MMOL/L (ref 135–145)
SP GR UR STRIP: 1.02 (ref 1–1.03)
UROBILINOGEN UR STRIP-MCNC: NORMAL MG/DL
WBC # BLD AUTO: 8.3 10E3/UL (ref 4–11)
WBC URINE: <1 /HPF
YEAST #/AREA URNS HPF: ABNORMAL /HPF

## 2024-03-24 PROCEDURE — 51701 INSERT BLADDER CATHETER: CPT | Performed by: EMERGENCY MEDICINE

## 2024-03-24 PROCEDURE — 99284 EMERGENCY DEPT VISIT MOD MDM: CPT | Performed by: EMERGENCY MEDICINE

## 2024-03-24 PROCEDURE — 36415 COLL VENOUS BLD VENIPUNCTURE: CPT | Performed by: EMERGENCY MEDICINE

## 2024-03-24 PROCEDURE — 81001 URINALYSIS AUTO W/SCOPE: CPT | Performed by: EMERGENCY MEDICINE

## 2024-03-24 PROCEDURE — 85025 COMPLETE CBC W/AUTO DIFF WBC: CPT | Performed by: EMERGENCY MEDICINE

## 2024-03-24 PROCEDURE — 99284 EMERGENCY DEPT VISIT MOD MDM: CPT | Mod: 25 | Performed by: EMERGENCY MEDICINE

## 2024-03-24 PROCEDURE — 80048 BASIC METABOLIC PNL TOTAL CA: CPT | Performed by: EMERGENCY MEDICINE

## 2024-03-24 PROCEDURE — 74176 CT ABD & PELVIS W/O CONTRAST: CPT | Mod: MG

## 2024-03-24 ASSESSMENT — ENCOUNTER SYMPTOMS
DYSURIA: 0
HEMATURIA: 1
FREQUENCY: 1
VOMITING: 0
CHILLS: 0
ABDOMINAL PAIN: 1
FATIGUE: 0
COUGH: 0
LIGHT-HEADEDNESS: 0
NAUSEA: 0
DIARRHEA: 0
FEVER: 0
BACK PAIN: 0
CHEST TIGHTNESS: 0
FLANK PAIN: 0
APPETITE CHANGE: 0
SHORTNESS OF BREATH: 0
HEADACHES: 0

## 2024-03-24 ASSESSMENT — ACTIVITIES OF DAILY LIVING (ADL)
ADLS_ACUITY_SCORE: 38
ADLS_ACUITY_SCORE: 38

## 2024-03-24 ASSESSMENT — COLUMBIA-SUICIDE SEVERITY RATING SCALE - C-SSRS
2. HAVE YOU ACTUALLY HAD ANY THOUGHTS OF KILLING YOURSELF IN THE PAST MONTH?: NO
6. HAVE YOU EVER DONE ANYTHING, STARTED TO DO ANYTHING, OR PREPARED TO DO ANYTHING TO END YOUR LIFE?: NO
1. IN THE PAST MONTH, HAVE YOU WISHED YOU WERE DEAD OR WISHED YOU COULD GO TO SLEEP AND NOT WAKE UP?: NO

## 2024-03-25 NOTE — ED TRIAGE NOTES
Pt present with vaginal bleeding starting this morning. Normally has a pessary in place. Was removed about 4 weeks ago due to irritation. Was put back in on Friday. Bleeding starting this morning, progressing to passing clots this afternoon. Has had to change pads every 1 1/2-2 hours.      Triage Assessment (Adult)       Row Name 03/24/24 3606          Triage Assessment    Airway WDL WDL        Respiratory WDL    Respiratory WDL WDL        Skin Circulation/Temperature WDL    Skin Circulation/Temperature WDL WDL        Cardiac WDL    Cardiac WDL WDL        Peripheral/Neurovascular WDL    Peripheral Neurovascular WDL WDL        Cognitive/Neuro/Behavioral WDL    Cognitive/Neuro/Behavioral WDL WDL

## 2024-03-25 NOTE — ED PROVIDER NOTES
History     Chief Complaint   Patient presents with    Vaginal Bleeding     HPI  Lalitha Underwood is a 79 year old female with a history of uterine prolapse for which she has a longstanding use of a pessary presenting for evaluation of vaginal bleeding.  Patient reports that she had her pessary removed about 2 weeks ago due to some irritation.  Last week she went back into her gynecologist and the irritation had resolved so her pessary was removed.  Overall she was feeling better however today started developing some lower pelvic discomfort and passing some blood in her underwear.  She states she is chronically incontinent of urine so is not certain the location of the blood work came from.  She denies fevers or chills.  Denies back or flank pain.  Denies dysuria urgency.  Does feel she has been passing blood in her urine more frequently than her baseline.    Allergies:  Allergies   Allergen Reactions    Clindamycin Rash    Carbamazepine Rash    Morphine Nausea       Problem List:    Patient Active Problem List    Diagnosis Date Noted    Class 2 obesity due to excess calories without serious comorbidity with body mass index (BMI) of 37.0 to 37.9 in adult 01/17/2024     Priority: Medium    Acquired absence of other left toe(s) (H24) 04/27/2023     Priority: Medium    Cor pulmonale (chronic) (H) 04/27/2023     Priority: Medium    Chronic respiratory failure with hypoxia (H) 04/27/2023     Priority: Medium    Coronary arteriosclerosis due to lipid rich plaque 03/31/2023     Priority: Medium    Cardiac pacemaker in situ 03/25/2022     Priority: Medium    Second degree heart block 03/19/2022     Priority: Medium    PAD (peripheral artery disease) (H24) 10/18/2021     Priority: Medium    Epileptic seizure (H) 07/16/2021     Priority: Medium     Formatting of this note might be different from the original.  Created by Conversion    Replacement Utility updated for latest IMO load      Esophageal reflux 07/16/2021      Priority: Medium     Formatting of this note might be different from the original.  Created by Conversion      Hyperlipidemia 07/16/2021     Priority: Medium     Formatting of this note might be different from the original.  Created by Conversion      Impaired gait and mobility 07/16/2021     Priority: Medium     Formatting of this note might be different from the original.  Created by Conversion      COPD (chronic obstructive pulmonary disease) (H) 07/16/2021     Priority: Medium     Formatting of this note might be different from the original.  Created by Conversion      Rosacea 07/16/2021     Priority: Medium     Formatting of this note might be different from the original.  Created by Conversion      Vitamin D deficiency 07/16/2021     Priority: Medium     Formatting of this note might be different from the original.  Created by Conversion    Replacement Utility updated for latest IMO load      Uterine prolapse 03/01/2021     Priority: Medium    Acquired lymphedema of leg 02/01/2021     Priority: Medium    Neuropathy, idiopathic 02/21/2019     Priority: Medium    S/P repair of paraesophageal hernia 11/26/2018     Priority: Medium    Decreased hearing of both ears 05/29/2018     Priority: Medium    Osteoporosis 02/08/2018     Priority: Medium    Mild aortic valve stenosis 11/17/2017     Priority: Medium    Urge incontinence of urine 11/17/2017     Priority: Medium    Valgus deformity of both feet 03/15/2017     Priority: Medium    Collapsed arches 02/01/2017     Priority: Medium    Essential hypertension, benign      Priority: Medium     Created by Conversion  Replacement Utility updated for latest IMO load            Past Medical History:    Past Medical History:   Diagnosis Date    Convulsive disorder (H)     Convulsive disorder (H)     COPD (chronic obstructive pulmonary disease) (H)     COPD (chronic obstructive pulmonary disease) (H)     Coronary artery disease involving native coronary artery with unstable  angina pectoris (H) 3/31/2023    Depression     Dyspnea on exertion     Gastroesophageal reflux disease     Heart murmur     Hernia of unspecified site of abdominal cavity without mention of obstruction or gangrene     Hiatal hernia     Hiatal hernia     Hypertension     Hypertension     Obese     On home oxygen therapy     Osteopenia     Osteopenia     Oxygen dependent     Pneumonia due to infectious organism, unspecified laterality, unspecified part of lung 3/19/2022    Second degree heart block 3/19/2022    Shortness of breath     Uncomplicated asthma     URI (upper respiratory infection)     Venous insufficiency of both lower extremities     Venous insufficiency of both lower extremities     Walking troubles        Past Surgical History:    Past Surgical History:   Procedure Laterality Date    AMPUTATE TOE(S) Left 8/11/2022    Procedure: AMPUTATION, fifth digit left foot;  Surgeon: Alfonso Orozco DPM;  Location: [facility] OR    ARTHROPLASTY TOE(S) Left 11/22/2021    Procedure: ARTHROPLASTY, digits two and three left foot;  Surgeon: Alfonso Orozco DPM;  Location: [facility]    ARTHROPLASTY TOE(S) Left 7/18/2022    Procedure: ARTHROPLASTY, digits four and five left foot;  Surgeon: Alfonso Orozco DPM;  Location: [facility] OR    CV CORONARY ANGIOGRAM N/A 7/7/2023    Procedure: Coronary Angiogram;  Surgeon: Elijah Leger MD;  Location: [facility] CATH LAB     CV LEFT HEART CATH N/A 7/7/2023    Procedure: Left Heart Catheterization;  Surgeon: Elijah Leger MD;  Location: [facility] CATH [facility]    CV RIGHT HEART CATH MEASUREMENTS RECORDED N/A 7/7/2023    Procedure: Right Heart Catheterization with [place];  Surgeon: Elijah Leger MD;  Location: [facility] CATH LAB CV    EP PACEMAKER DEVICE & LEAD IMPLANT- RIGHT ATRIAL & RIGHT VENTRICULAR N/A 3/24/2022    Procedure: Pacemaker Device & Lead Implant - Right Atrial & Right Ventricular;  Surgeon: Helder Pendleton MD;   Location: [facility] LAB CV    ESOPHAGOSCOPY, GASTROSCOPY, DUODENOSCOPY (EGD), COMBINED N/A 2018    Procedure: Esophagogastroduodenoscopy;  Surgeon: Jasmeet Wilder MD;  Location: [facility] OR    HERNIA REPAIR, UMBILICAL  2018    HERNIA REPAIR, UMBILICAL  2018    Dr. Jimenez    LAPAROSCOPIC HERNIORRHAPHY HIATAL N/A 2018    Procedure: Laparoscopic Hiatal Hernia Repair,bilateral chest tubes;  Surgeon: Jasmeet Wilder MD;  Location: [facility] OR    OTHER SURGICAL HISTORY Left     Broke titanium in left arm    Repair arm fracture Left     SHOULDER SURGERY Right 1967    SHOULDER SURGERY Right 1967     BIOPSY THYROID FINE NEEDLE ASPIRATION  2020       Family History:    Family History   Problem Relation Age of Onset    Pulmonary Hypertension Daughter         idiopathic     Heart Disease Other         2 sibs MI, 1 sib electrical conduction disorder    Breast Cancer Mother 66.00    Hypertension Mother     Coronary Artery Disease Mother     Varicose Veins Mother     Hypertension Father     Coronary Artery Disease Sister     Heart Disease Sister     Diabetes Son     Pulmonary Hypertension Daughter     Coronary Artery Disease Sister     Heart Disease Sister        Social History:  Marital Status:   [5]  Social History     Tobacco Use    Smoking status: Former     Packs/day: 1.50     Years: 38.00     Additional pack years: 0.00     Total pack years: 57.00     Types: Cigarettes     Quit date: 1998     Years since quittin.3    Smokeless tobacco: Never    Tobacco comments:     quit in    Vaping Use    Vaping Use: Never used   Substance Use Topics    Alcohol use: Yes     Alcohol/week: 2.0 standard drinks of alcohol     Comment: occ    Drug use: Not Currently        Medications:    albuterol (PROAIR HFA/PROVENTIL HFA/VENTOLIN HFA) 108 (90 Base) MCG/ACT inhaler  azithromycin (ZITHROMAX) 250 MG tablet  benzonatate (TESSALON) 100 MG capsule  calcium citrate (CITRACAL) 950 (200  Ca) MG tablet  fluticasone-salmeterol (ADVAIR) 500-50 MCG/ACT inhaler  guaiFENesin (MUCINEX) 600 MG 12 hr tablet  hydrochlorothiazide (HYDRODIURIL) 25 MG tablet  ipratropium - albuterol 0.5 mg/2.5 mg/3 mL (DUONEB) 0.5-2.5 (3) MG/3ML neb solution  levalbuterol (XOPENEX) 1.25 MG/3ML neb solution  levETIRAcetam (KEPPRA) 500 MG tablet  losartan (COZAAR) 50 MG tablet  Magnesium Oxide 500 MG TABS  nystatin (MYCOSTATIN) 679683 UNIT/GM external powder  potassium chloride ER (KLOR-CON M) 20 MEQ CR tablet  RABEprazole (ACIPHEX) 20 MG EC tablet  sertraline (ZOLOFT) 100 MG tablet  solifenacin (VESICARE) 10 MG tablet  spironolactone (ALDACTONE) 25 MG tablet  tiotropium (SPIRIVA RESPIMAT) 2.5 MCG/ACT inhaler  vitamin D3 (CHOLECALCIFEROL) 125 MCG (5000 UT) tablet          Review of Systems   Constitutional:  Negative for appetite change, chills, fatigue and fever.   HENT:  Negative for congestion.    Respiratory:  Negative for cough, chest tightness and shortness of breath.    Cardiovascular:  Negative for chest pain.   Gastrointestinal:  Positive for abdominal pain (Mild right lower abdomen). Negative for diarrhea, nausea and vomiting.   Genitourinary:  Positive for frequency, hematuria, pelvic pain (Mild lower pelvic pain) and vaginal bleeding (Possible). Negative for decreased urine volume, dysuria, flank pain, vaginal discharge and vaginal pain.   Musculoskeletal:  Negative for back pain.   Skin:  Negative for rash.   Neurological:  Negative for light-headedness and headaches.   All other systems reviewed and are negative.      Physical Exam   Pulse: 75  Temp: 97.4  F (36.3  C)  Resp: 20  SpO2: 96 %      Physical Exam  Vitals and nursing note reviewed.   Constitutional:       Appearance: Normal appearance. She is not ill-appearing or diaphoretic.   HENT:      Mouth/Throat:      Mouth: Mucous membranes are moist.   Eyes:      Conjunctiva/sclera: Conjunctivae normal.   Cardiovascular:      Rate and Rhythm: Normal rate.       Pulses: Normal pulses.   Pulmonary:      Effort: Pulmonary effort is normal.   Abdominal:      Palpations: Abdomen is soft.      Tenderness: There is no abdominal tenderness. There is no guarding.   Musculoskeletal:      Right lower leg: Edema present.      Left lower leg: Edema present.   Skin:     General: Skin is warm and dry.      Capillary Refill: Capillary refill takes less than 2 seconds.   Neurological:      Mental Status: She is alert and oriented to person, place, and time.   Psychiatric:         Mood and Affect: Mood normal.         ED Course        Procedures                Results for orders placed or performed during the hospital encounter of 03/24/24 (from the past 24 hour(s))   CBC with platelets differential    Narrative    The following orders were created for panel order CBC with platelets differential.  Procedure                               Abnormality         Status                     ---------                               -----------         ------                     CBC with platelets and d...[[id number]]  Abnormal            Final result                 Please view results for these tests on the individual orders.   Basic metabolic panel   Result Value Ref Range    Sodium 139 135 - 145 mmol/L    Potassium 3.6 3.4 - 5.3 mmol/L    Chloride 99 98 - 107 mmol/L    Carbon Dioxide (CO2) 30 (H) 22 - 29 mmol/L    Anion Gap 10 7 - 15 mmol/L    Urea Nitrogen 24.3 (H) 8.0 - 23.0 mg/dL    Creatinine 0.87 0.51 - 0.95 mg/dL    GFR Estimate 67 >60 mL/min/1.73m2    Calcium 8.8 8.8 - 10.2 mg/dL    Glucose 98 70 - 99 mg/dL   [place] Draw    Narrative    The following orders were created for panel order [place] Draw.  Procedure                               Abnormality         Status                     ---------                               -----------         ------                     Extra Blue Top Tube[[id number]]                              Final result               Extra Red Top Tube[[id number]]                                Final result                 Please view results for these tests on the individual orders.   CBC with platelets and differential   Result Value Ref Range    WBC Count 8.3 4.0 - 11.0 10e3/uL    RBC Count 3.71 (L) 3.80 - 5.20 10e6/uL    Hemoglobin 9.7 (L) 11.7 - 15.7 g/dL    Hematocrit 31.2 (L) 35.0 - 47.0 %    MCV 84 78 - 100 fL    MCH 26.1 (L) 26.5 - 33.0 pg    MCHC 31.1 (L) 31.5 - 36.5 g/dL    RDW 14.8 10.0 - 15.0 %    Platelet Count 210 150 - 450 10e3/uL    % Neutrophils 68 %    % Lymphocytes 19 %    % Monocytes 11 %    % Eosinophils 1 %    % Basophils 1 %    % Immature Granulocytes 0 %    NRBCs per 100 WBC 0 <1 /100    Absolute Neutrophils 5.6 1.6 - 8.3 10e3/uL    Absolute Lymphocytes 1.6 0.8 - 5.3 10e3/uL    Absolute Monocytes 0.9 0.0 - 1.3 10e3/uL    Absolute Eosinophils 0.1 0.0 - 0.7 10e3/uL    Absolute Basophils 0.1 0.0 - 0.2 10e3/uL    Absolute Immature Granulocytes 0.0 <=0.4 10e3/uL    Absolute NRBCs 0.0 10e3/uL   Extra Blue Top Tube   Result Value Ref Range    Hold Specimen JIC    Extra Red Top Tube   Result Value Ref Range    Hold Specimen JIC    UA with Microscopic reflex to Culture    Specimen: Urine, Catheter   Result Value Ref Range    Color Urine Yellow Colorless, Straw, Light Yellow, Yellow    Appearance Urine Slightly Cloudy (A) Clear    Glucose Urine Negative Negative mg/dL    Bilirubin Urine Negative Negative    Ketones Urine Negative Negative mg/dL    Specific Gravity Urine 1.019 1.003 - 1.035    Blood Urine Negative Negative    pH Urine 7.0 5.0 - 7.0    Protein Albumin Urine >499 (A) Negative mg/dL    Urobilinogen Urine Normal Normal, 2.0 mg/dL    Nitrite Urine Negative Negative    Leukocyte Esterase Urine Negative Negative    Budding Yeast Urine Many (A) None Seen /HPF    RBC Urine 62 (H) <=2 /HPF    WBC Urine <1 <=5 /HPF    Narrative    Urine Culture not indicated   Abd/pelvis CT - no contrast - Stone Protocol    Narrative    EXAM: CT ABDOMEN PELVIS W/O  CONTRAST  LOCATION: [facility]  DATE: 3/24/2024    INDICATION: Right lower pelvic pain, hematuria.  COMPARISON: None.  TECHNIQUE: CT scan of the abdomen and pelvis was performed without IV contrast. Multiplanar reformats were obtained. Dose reduction techniques were used.  CONTRAST: None.    FINDINGS:   LOWER CHEST: Mild subpleural scarring and/or atelectasis in the lower lungs. Few small technically indeterminate nodules in the right lower lobe measuring up to 5 mm. Elevation left hemidiaphragm.    HEPATOBILIARY: Normal.    PANCREAS: Normal.    SPLEEN: Normal.    ADRENAL GLANDS: 1.5 cm left adrenal nodule is low in attenuation and most compatible with a benign adrenal adenoma. Right adrenal gland is negative.    KIDNEYS/BLADDER: Small benign left renal cyst with no follow-up needed. Kidneys are otherwise negative. No renal or ureteral calculi. No hydronephrosis. Lobulated high density material in the posterior aspect of the bladder suspicious for blood products.   There is a small to moderate diverticulum along the right lateral/posterior aspect of the bladder. This also contains some high attenuation material likely reflecting blood products.    BOWEL: Bowel is normal in caliber with no evidence for obstruction. Normal appendix. Moderate to large amount of stool in the colon. No acute bowel findings.    LYMPH NODES: Normal.    VASCULATURE: Aortic calcification without aneurysm.    PELVIC ORGANS: Pessary device in place. No pelvic masses.    MUSCULOSKELETAL: Moderate to marked degenerative changes in the lower thoracic and lumbar spine.      Impression    IMPRESSION:   1.  Moderate amount of acute blood products within the bladder including in a bladder diverticulum. The underlying etiology of the blood products is indeterminate. Recommend urologic consultation and cystoscopy for further evaluation. Underlying bladder   lesion not excluded.    2.  No urinary calculi or  hydronephrosis.    3.  No acute bowel findings. Normal appendix.    4.  Pessary device in place.    5.  Few small technically indeterminate nodules in the right lower lung measuring up to 5 mm. If there are risk factors present such as history of smoking, follow-up CT in one year would be recommended. If there are no risk factors present, no specific   follow-up needed.         Medications - No data to display      Patient reassessed.  Advised of CT findings showing no evidence of stone and advised of the need to follow-up with urology to further evaluate for her new onset of hematuria without evidence of infection.    Assessments & Plan (with Medical Decision Making)  Well-appearing 79-year-old presenting for evaluation of passing blood and blood clots from her vaginal area.  She is not certain if this is from urine or from recently replaced pessary.  She denies any genital pain or discomfort and only has some very mild lower abdominal pain.  Has been passing fluid more frequently but denies any dysuria, she is chronically incontinent.  Well-appearing in no distress in the ED.  Afebrile.  Screening labs did show mild drop in her hemoglobin but no elevated white count.  Straight cath urine sample was obtained which showed gross hematuria and there is no evidence of external signs of trauma or bleeding in the skin around the vaginal introitus.  Urinalysis showed evidence of hematuria without any white cells and was negative for nitrate and leukocyte Estrace.  Does not appear consistent with an infection.  Given the right lower abdominal/pelvic pain, CT was obtained to rule out stone as the source of her pain.  This was negative for evidence of ureterolithiasis.  Discharged home in stable condition with plan for urology follow-up.  Patient counseled on return precautions if symptoms worsen or she develops any new or concerning symptoms such as lightheadedness, dizziness, chest pain, or difficulty breathing.     I have  reviewed the nursing notes.    I have reviewed the findings, diagnosis, plan and need for follow up with the patient.          Discharge Medication List as of 3/25/2024 12:31 AM          Final diagnoses:   Gross hematuria       3/24/2024   [facility] EMERGENCY DEPT       Wolfe, Viraj Leavitt MD  03/25/24 0148

## 2024-03-26 ENCOUNTER — OFFICE VISIT (OUTPATIENT)
Dept: UROLOGY | Facility: CLINIC | Age: 79
End: 2024-03-26
Payer: MEDICARE

## 2024-03-26 ENCOUNTER — OFFICE VISIT (OUTPATIENT)
Dept: UROLOGY | Facility: CLINIC | Age: 79
End: 2024-03-26
Attending: EMERGENCY MEDICINE
Payer: MEDICARE

## 2024-03-26 VITALS
HEART RATE: 93 BPM | WEIGHT: 208 LBS | HEIGHT: 64 IN | OXYGEN SATURATION: 93 % | TEMPERATURE: 97 F | SYSTOLIC BLOOD PRESSURE: 112 MMHG | BODY MASS INDEX: 35.51 KG/M2 | DIASTOLIC BLOOD PRESSURE: 68 MMHG

## 2024-03-26 DIAGNOSIS — R31.0 GROSS HEMATURIA: Primary | ICD-10-CM

## 2024-03-26 DIAGNOSIS — R31.0 GROSS HEMATURIA: ICD-10-CM

## 2024-03-26 LAB
ALBUMIN UR-MCNC: NEGATIVE MG/DL
APPEARANCE UR: CLEAR
BACTERIA #/AREA URNS HPF: ABNORMAL /HPF
BILIRUB UR QL STRIP: NEGATIVE
COLOR UR AUTO: YELLOW
GLUCOSE UR STRIP-MCNC: NEGATIVE MG/DL
HGB UR QL STRIP: ABNORMAL
KETONES UR STRIP-MCNC: NEGATIVE MG/DL
LEUKOCYTE ESTERASE UR QL STRIP: NEGATIVE
NITRATE UR QL: NEGATIVE
PH UR STRIP: 5.5 [PH] (ref 5–7)
RBC #/AREA URNS AUTO: ABNORMAL /HPF
SP GR UR STRIP: <=1.005 (ref 1–1.03)
SQUAMOUS #/AREA URNS AUTO: ABNORMAL /LPF
UROBILINOGEN UR STRIP-ACNC: 0.2 E.U./DL
WBC #/AREA URNS AUTO: ABNORMAL /HPF

## 2024-03-26 PROCEDURE — 88112 CYTOPATH CELL ENHANCE TECH: CPT | Mod: 26 | Performed by: PATHOLOGY

## 2024-03-26 PROCEDURE — 52001 CYSTO W/IRRG&EVAC MLT CLOTS: CPT | Performed by: STUDENT IN AN ORGANIZED HEALTH CARE EDUCATION/TRAINING PROGRAM

## 2024-03-26 PROCEDURE — 81001 URINALYSIS AUTO W/SCOPE: CPT | Performed by: STUDENT IN AN ORGANIZED HEALTH CARE EDUCATION/TRAINING PROGRAM

## 2024-03-26 PROCEDURE — 87086 URINE CULTURE/COLONY COUNT: CPT | Performed by: STUDENT IN AN ORGANIZED HEALTH CARE EDUCATION/TRAINING PROGRAM

## 2024-03-26 PROCEDURE — 99203 OFFICE O/P NEW LOW 30 MIN: CPT | Performed by: STUDENT IN AN ORGANIZED HEALTH CARE EDUCATION/TRAINING PROGRAM

## 2024-03-26 RX ORDER — CIPROFLOXACIN 500 MG/1
500 TABLET, FILM COATED ORAL ONCE
Status: COMPLETED | OUTPATIENT
Start: 2024-03-26 | End: 2024-03-26

## 2024-03-26 RX ADMIN — CIPROFLOXACIN 250 MG: 500 TABLET, FILM COATED ORAL at 10:48

## 2024-03-26 ASSESSMENT — PAIN SCALES - GENERAL: PAINLEVEL: NO PAIN (0)

## 2024-03-26 NOTE — PROGRESS NOTES
Chief Complaint:   Gross hematuria         History of Present Illness:   Lalitha Underwood is a 79 year old female with a history of uterine prolapse, HLD, COPD, HTN, and HLD who presents for evaluation of gross hematuria.     The patient reports gross hematuria with every void for the last three days. She is seeing blood in her urine with clots. CT abdomen pelvis without contrast from 3/24/2024 was notable for a moderate amount of blood products in the bladder.     She currently denies any dysuria, pyuria, hesitancy, intermittency, feelings of incomplete emptying, or any recent history of urinary tract infections or stones.    She quit smoking 20 years ago. She smoked for about 30 years before quitting.          Past Medical History:     Past Medical History:   Diagnosis Date    Convulsive disorder (H)     Convulsive disorder (H)     COPD (chronic obstructive pulmonary disease) (H)     COPD (chronic obstructive pulmonary disease) (H)     Coronary artery disease involving native coronary artery with unstable angina pectoris (H) 3/31/2023    Depression     Dyspnea on exertion     Gastroesophageal reflux disease     Heart murmur     Hernia of unspecified site of abdominal cavity without mention of obstruction or gangrene     Hiatal hernia     Hiatal hernia     Hypertension     Hypertension     Obese     On home oxygen therapy     at 1.5 liters at nite    Osteopenia     Osteopenia     Oxygen dependent     1.5 L per NC    Pneumonia due to infectious organism, unspecified laterality, unspecified part of lung 3/19/2022    Second degree heart block 3/19/2022    Shortness of breath     Uncomplicated asthma     URI (upper respiratory infection)     Venous insufficiency of both lower extremities     Venous insufficiency of both lower extremities     Walking troubles             Past Surgical History:     Past Surgical History:   Procedure Laterality Date    AMPUTATE TOE(S) Left 8/11/2022    Procedure: AMPUTATION, fifth  digit left foot;  Surgeon: Alfonso Orozco DPM;  Location: [facility]    ARTHROPLASTY TOE(S) Left 11/22/2021    Procedure: ARTHROPLASTY, digits two and three left foot;  Surgeon: Alfonso Orozco DPM;  Location: [facility]    ARTHROPLASTY TOE(S) Left 7/18/2022    Procedure: ARTHROPLASTY, digits four and five left foot;  Surgeon: Alfonso Orozco DPM;  Location: [facility]    CV CORONARY ANGIOGRAM N/A 7/7/2023    Procedure: Coronary Angiogram;  Surgeon: Elijah Leger MD;  Location: [facility] CATH LAB CV    CV LEFT HEART CATH N/A 7/7/2023    Procedure: Left Heart Catheterization;  Surgeon: Elijah Leger MD;  Location: [facility] CATH LAB CV    CV RIGHT HEART CATH MEASUREMENTS RECORDED N/A 7/7/2023    Procedure: Right Heart Catheterization with Shunt Run;  Surgeon: Elijah Leger MD;  Location: [facility] CATH LAB CV    EP PACEMAKER DEVICE & LEAD IMPLANT- RIGHT ATRIAL & RIGHT VENTRICULAR N/A 3/24/2022    Procedure: Pacemaker Device & Lead Implant - Right Atrial & Right Ventricular;  Surgeon: Helder Pendleton MD;  Location: [facility] LAB CV    ESOPHAGOSCOPY, GASTROSCOPY, DUODENOSCOPY (EGD), COMBINED N/A 11/26/2018    Procedure: Esophagogastroduodenoscopy;  Surgeon: Jasmeet Wilder MD;  Location: [facility] OR    HERNIA REPAIR, UMBILICAL  04/2018    HERNIA REPAIR, UMBILICAL  04/04/2018    Dr. Jimenez    LAPAROSCOPIC HERNIORRHAPHY HIATAL N/A 11/26/2018    Procedure: Laparoscopic Hiatal Hernia Repair,bilateral chest tubes;  Surgeon: Jasmeet Wilder MD;  Location: [facility] OR    OTHER SURGICAL HISTORY Left 2003    Broke titanium in left arm    Repair arm fracture Left     SHOULDER SURGERY Right 1967    SHOULDER SURGERY Right 1967    US BIOPSY THYROID FINE NEEDLE ASPIRATION  7/29/2020            Medications     Current Outpatient Medications   Medication    solifenacin (VESICARE) 10 MG tablet    albuterol (PROAIR HFA/PROVENTIL HFA/VENTOLIN HFA) 108 (90 Base) MCG/ACT  "inhaler    azithromycin (ZITHROMAX) 250 MG tablet    benzonatate (TESSALON) 100 MG capsule    calcium citrate (CITRACAL) 950 (200 Ca) MG tablet    fluticasone-salmeterol (ADVAIR) 500-50 MCG/ACT inhaler    guaiFENesin (MUCINEX) 600 MG 12 hr tablet    hydrochlorothiazide (HYDRODIURIL) 25 MG tablet    ipratropium - albuterol 0.5 mg/2.5 mg/3 mL (DUONEB) 0.5-2.5 (3) MG/3ML neb solution    levalbuterol (XOPENEX) 1.25 MG/3ML neb solution    levETIRAcetam (KEPPRA) 500 MG tablet    losartan (COZAAR) 50 MG tablet    Magnesium Oxide 500 MG TABS    nystatin (MYCOSTATIN) 683708 UNIT/GM external powder    potassium chloride ER (KLOR-CON M) 20 MEQ CR tablet    RABEprazole (ACIPHEX) 20 MG EC tablet    sertraline (ZOLOFT) 100 MG tablet    spironolactone (ALDACTONE) 25 MG tablet    tiotropium (SPIRIVA RESPIMAT) 2.5 MCG/ACT inhaler    vitamin D3 (CHOLECALCIFEROL) 125 MCG (5000 UT) tablet     Current Facility-Administered Medications   Medication    denosumab (PROLIA) injection 60 mg            Allergies:   Clindamycin, Carbamazepine, and Morphine         Review of Systems:  From intake questionnaire   Negative 14 system review except as noted on HPI, nurse's note.         Physical Exam:   Patient is a 79 year old  female   Vitals: Blood pressure 112/68, pulse 93, temperature 97  F (36.1  C), temperature source Tympanic, height 1.626 m (5' 4\"), weight 94.3 kg (208 lb), SpO2 93%, not currently breastfeeding.  General Appearance Adult: Alert, no acute distress, oriented.  Lungs: Non-labored breathing.  Heart: No obvious jugular venous distension present.  Neuro: Alert, oriented, speech and mentation normal      Labs and Pathology:    I personally reviewed all applicable laboratory data and went over findings with patient  Significant for:    CBC RESULTS:  Recent Labs   Lab Test 03/24/24  2236 07/06/23  1443 06/28/23  1256 05/15/23  1149   WBC 8.3 9.7 8.8 9.4   HGB 9.7* 10.5* 10.1* 10.5*    235 260 213        BMP RESULTS:  Recent " Labs   Lab Test 03/24/24  2236 12/27/23  1129 07/06/23  1443 06/28/23  1256 06/20/23  1333 05/15/23  1149 11/02/21  1629 02/12/21  1117 02/24/20  1110 09/16/19  1121 02/21/19  1132 11/28/18  0627     --  137 137  --  138   < > 138 139  --  138 136   POTASSIUM 3.6  --  4.2 4.4  --  4.8   < > 3.9 4.2  --  4.1 4.8   CHLORIDE 99  --  98 97*  --  99   < > 98 100  --  101 101   CO2 30*  --  28 28  --  32*   < > 29 25  --  25 30   ANIONGAP 10  --  11 12  --  7   < > 11 14  --  12 5   GLC 98  --  135* 102*  --  119*   < > 89 91  --  80 94   BUN 24.3*  --  26.0* 28.5*  --  16.6   < > 20 21  --  18 20   CR 0.87 0.93 0.95 0.97*   < > 0.81   < > 0.80 0.72  --  0.67 0.85   GFRESTIMATED 67 63 61 60*   < > 74   < > >60 >60  --  >60 66   GFRESTBLACK  --   --   --   --   --   --   --  >60 >60  --  >60 79   TENZIN 8.8 10.1 10.0 9.7  --  9.4   < > 9.7 9.8   < > 9.4 7.7*    < > = values in this interval not displayed.       UA RESULTS:   Recent Labs   Lab Test 03/24/24  2248 03/24/18  1926 03/19/18  1240   SG 1.019 >1.050* >1.050*   URINEPH 7.0 6.0 7.0   NITRITE Negative Negative Negative   RBCU 62* 0-2 0-2   WBCU <1 0-5 0-5         Imaging:    I personally reviewed all applicable imaging and went over findings with patient.  Significant for:    Results for orders placed or performed during the hospital encounter of 03/24/24   Abd/pelvis CT - no contrast - Stone Protocol    Narrative    EXAM: CT ABDOMEN PELVIS W/O CONTRAST  LOCATION: [facility]  DATE: 3/24/2024    INDICATION: Right lower pelvic pain, hematuria.  COMPARISON: None.  TECHNIQUE: CT scan of the abdomen and pelvis was performed without IV contrast. Multiplanar reformats were obtained. Dose reduction techniques were used.  CONTRAST: None.    FINDINGS:   LOWER CHEST: Mild subpleural scarring and/or atelectasis in the lower lungs. Few small technically indeterminate nodules in the right lower lobe measuring up to 5 mm. Elevation left  hemidiaphragm.    HEPATOBILIARY: Normal.    PANCREAS: Normal.    SPLEEN: Normal.    ADRENAL GLANDS: 1.5 cm left adrenal nodule is low in attenuation and most compatible with a benign adrenal adenoma. Right adrenal gland is negative.    KIDNEYS/BLADDER: Small benign left renal cyst with no follow-up needed. Kidneys are otherwise negative. No renal or ureteral calculi. No hydronephrosis. Lobulated high density material in the posterior aspect of the bladder suspicious for blood products.   There is a small to moderate diverticulum along the right lateral/posterior aspect of the bladder. This also contains some high attenuation material likely reflecting blood products.    BOWEL: Bowel is normal in caliber with no evidence for obstruction. Normal appendix. Moderate to large amount of stool in the colon. No acute bowel findings.    LYMPH NODES: Normal.    VASCULATURE: Aortic calcification without aneurysm.    PELVIC ORGANS: Pessary device in place. No pelvic masses.    MUSCULOSKELETAL: Moderate to marked degenerative changes in the lower thoracic and lumbar spine.      Impression    IMPRESSION:   1.  Moderate amount of acute blood products within the bladder including in a bladder diverticulum. The underlying etiology of the blood products is indeterminate. Recommend urologic consultation and cystoscopy for further evaluation. Underlying bladder   lesion not excluded.    2.  No urinary calculi or hydronephrosis.    3.  No acute bowel findings. Normal appendix.    4.  Pessary device in place.    5.  Few small technically indeterminate nodules in the right lower lung measuring up to 5 mm. If there are risk factors present such as history of smoking, follow-up CT in one year would be recommended. If there are no risk factors present, no specific   follow-up needed.              Assessment and Plan:     Assessment: Ms. Lalitha Underwood is a pleasant 79 year old female with gross hematuria that has been ongoing for 3-4 days.  CT abdomen pelvis without contrast from 3/24/2024 was notable for a moderate amount of blood products in the bladder. She continues to pass clots. We discussed catheter placement with manual irrigation to try to clear some of the clot. The patient is in agreement.     A 22 Fr catheter was inserted in the bladder. Several small to medium size clots were manually irrigated. After about 30 minutes, Dr. Lima was able to irrigate more clots and the patient's urine appeared to be clearing. He decided to perform a cystoscopy. Cystoscopy showed a bladder diverticulum with a large clot. This was irrigated. After all the clot was cleared, the bladder was surveyed and was fairly unremarkable. There was an area of inflammation, possibly related to catheter insertion. There was also what appeared to be a urethral caruncle with a small clot. Dr. Lima was able to obtain some images of the region and will consult with one of our oncology specialists regarding any further evaluation.     Plan:  At this time, recommend completing comprehensive hematuria evaluation to include:  - Urinalysis and urine culture to rule out an acute urinary tract infection.   - Urine cytology to look for cells concerning for malignancy.  - CT urogram for upper tract imaging.  2.   Follow up for urethral findings pending recommendation of Dr. Dalal.         PASTORA RIVAS PA-C  Department of Urology

## 2024-03-26 NOTE — NURSING NOTE
"Initial /68   Pulse 93   Temp 97  F (36.1  C) (Tympanic)   Ht 1.626 m (5' 4\")   Wt 94.3 kg (208 lb)   SpO2 93%   BMI 35.70 kg/m   Estimated body mass index is 35.7 kg/m  as calculated from the following:    Height as of this encounter: 1.626 m (5' 4\").    Weight as of this encounter: 94.3 kg (208 lb). .  Yolanda ZABALA CMA 03/26/24 9:14 AM  "

## 2024-03-26 NOTE — NURSING NOTE
This service provided today was under the direct supervision of Ashley MCKAY, who was available with assistance. VORB received for Stone 3 way placement to anticipate irrigation for Clots in bladder  and hematuria.  Insertion:  22 Fr 3 way Stone silicone  inserted into urethral meatus in the usual sterile fashion without difficulty.  Balloon filled with 5 mL sterile H2O.  Received 100 ml red urine output.   Removal:  22 Fr straight tipped silicone stone catheter removed from urethral meatus without difficulty.    Patient did tolerate procedure well.  See MAR for Cipro administration.  Patient instructed as to where to call or go for pain, fever, leakage, or decreased urine flow.       Nicolle Palacios RN  3/26/2024  10:49 AM

## 2024-03-26 NOTE — PROGRESS NOTES
Reason for cystoscopy: gross hematuria    Brief History:  This is a 79-year-old female with a history of severe COPD who is oxygen dependent, former smoker, uterine prolapse managed with pessary who I saw for gross hematuria.  She has rescheduled in clinic with Ashley Akhtar for gross hematuria.     She has a longstanding history of a pessary that is managed by Dr. eKiry Aldridge  she had the pessary changed on March 22, 2024.    She presented to the emergency room 2 days ago with passing blood and blood clots.  She has chronic incontinence.  A urine sample showed gross hematuria.  She did have a CT scan that was performed that showed a moderate amount of clot in the bladder and a bladder diverticulum.    She was seen in clinic today but continued to have gross hematuria.  I saw her and I irrigated out some more clot from her bladder with a 22 Portuguese three-way catheter was in place.  Once the urine was clear I talked about doing a cystoscopy to evaluate for the source of bleeding.  We went over the risks of infection and bleeding.  She is okay with proceeding    CYSTOSCOPY  After obtaining informed consent, the patient was prepped and draped in the standard sterile fashion.  The 15 Faroese flexible cystoscope was inserted through the urethral meatus.      I encountered some small clots in the bladder which was not aspirated through the scope.  I did find a diverticulum in the right lateral wall that had clot inside.  It took some time but I was able to slowly pull out small pieces of this clot through my scope and then eventually the clot moved out from the diverticulum into the bladder.  It is too large to be able to be aspirated through the scope so I placed a 24 Portuguese hematuria catheter and then I was able to irrigate the clot out and the urine was clear.  I then went back in with the cystoscope and examined the bladder.  The bladder had some signs of trabeculation but no actual papillary tumors.  I examined the  diverticulum and no tumors were noted inside the diverticulum.  There was the small area of redness in the posterior wall that could be from the catheter irritatin    The urethra appeared unremarkable except at the very distal portion of the urethra there appeared to be a mass that had some signs of bleeding.                                PLAN:  Gross hematuria / clot  -I took some time but we were able to irrigate out all of her clot in her bladder.  I did a cystoscopy and found more clot in the diverticulum.  It was challenging but I was able to aspirate some of this clot through the scope but able to pull it out into the main bladder and then eventually remove this clot as well.  Eventually got a really good look at the bladder and there were no tumors noted there was a small area of erythema in the posterior wall of the bladder which I think could be from just the catheter placement and irrigation.    The only thing that is somewhat unusual is that the posterior distal urethra there is a mass that could be a urethral carbuncle but I noticed that there is some bleeding and clot from the end perhaps it is the source of her hematuria and clot -I will talk to one of my oncology colleagues to see if this is something to biopsy or if it something that one of the [facility] colleagues can see.    We did not leave the Ann in at the end and I did give her a dose of antibiotic to take to reduce the risk of infection given all the manipulation of the urinary tract.  We sent some urine for cytology as well.

## 2024-03-27 LAB
BACTERIA UR CULT: NO GROWTH
PATH REPORT.COMMENTS IMP SPEC: NORMAL
PATH REPORT.FINAL DX SPEC: NORMAL
PATH REPORT.GROSS SPEC: NORMAL
PATH REPORT.MICROSCOPIC SPEC OTHER STN: NORMAL
PATH REPORT.RELEVANT HX SPEC: NORMAL

## 2024-04-08 ENCOUNTER — HOSPITAL ENCOUNTER (OUTPATIENT)
Dept: CT IMAGING | Facility: CLINIC | Age: 79
Discharge: HOME | End: 2024-04-08
Attending: STUDENT IN AN ORGANIZED HEALTH CARE EDUCATION/TRAINING PROGRAM | Admitting: STUDENT IN AN ORGANIZED HEALTH CARE EDUCATION/TRAINING PROGRAM
Payer: MEDICARE

## 2024-04-08 DIAGNOSIS — R31.0 GROSS HEMATURIA: ICD-10-CM

## 2024-04-08 PROCEDURE — 74178 CT ABD&PLV WO CNTR FLWD CNTR: CPT | Mod: MG

## 2024-04-08 PROCEDURE — 250N000011 HC RX IP 250 OP 636: Performed by: RADIOLOGY

## 2024-04-08 PROCEDURE — 250N000009 HC RX 250: Performed by: RADIOLOGY

## 2024-04-08 RX ORDER — IOPAMIDOL 755 MG/ML
100 INJECTION, SOLUTION INTRAVASCULAR ONCE
Status: COMPLETED | OUTPATIENT
Start: 2024-04-08 | End: 2024-04-08

## 2024-04-08 RX ADMIN — IOPAMIDOL 100 ML: 755 INJECTION, SOLUTION INTRAVENOUS at 12:13

## 2024-04-08 RX ADMIN — SODIUM CHLORIDE 100 ML: 9 INJECTION, SOLUTION INTRAVENOUS at 12:13

## 2024-04-09 ENCOUNTER — MYC MEDICAL ADVICE (OUTPATIENT)
Dept: SLEEP MEDICINE | Facility: CLINIC | Age: 79
End: 2024-04-09
Payer: MEDICARE

## 2024-04-10 ENCOUNTER — TELEPHONE (OUTPATIENT)
Dept: UROLOGY | Facility: CLINIC | Age: 79
End: 2024-04-10

## 2024-04-10 ENCOUNTER — OFFICE VISIT (OUTPATIENT)
Dept: UROLOGY | Facility: CLINIC | Age: 79
End: 2024-04-10
Payer: MEDICARE

## 2024-04-10 VITALS — TEMPERATURE: 97.9 F | SYSTOLIC BLOOD PRESSURE: 141 MMHG | HEART RATE: 93 BPM | DIASTOLIC BLOOD PRESSURE: 64 MMHG

## 2024-04-10 DIAGNOSIS — K40.90 INGUINAL HERNIA WITHOUT OBSTRUCTION OR GANGRENE, RECURRENCE NOT SPECIFIED, UNSPECIFIED LATERALITY: ICD-10-CM

## 2024-04-10 DIAGNOSIS — N36.2 URETHRAL CARUNCLE: ICD-10-CM

## 2024-04-10 DIAGNOSIS — R31.0 GROSS HEMATURIA: Primary | ICD-10-CM

## 2024-04-10 DIAGNOSIS — N30.01 ACUTE CYSTITIS WITH HEMATURIA: ICD-10-CM

## 2024-04-10 LAB
ALBUMIN UR-MCNC: NEGATIVE MG/DL
APPEARANCE UR: CLEAR
BACTERIA #/AREA URNS HPF: ABNORMAL /HPF
BILIRUB UR QL STRIP: NEGATIVE
COLOR UR AUTO: YELLOW
GLUCOSE UR STRIP-MCNC: NEGATIVE MG/DL
HGB UR QL STRIP: ABNORMAL
KETONES UR STRIP-MCNC: NEGATIVE MG/DL
LEUKOCYTE ESTERASE UR QL STRIP: NEGATIVE
NITRATE UR QL: NEGATIVE
PH UR STRIP: 8 [PH] (ref 5–8)
RBC #/AREA URNS AUTO: ABNORMAL /HPF
SP GR UR STRIP: 1.01 (ref 1–1.03)
SQUAMOUS #/AREA URNS AUTO: ABNORMAL /LPF
UROBILINOGEN UR STRIP-ACNC: 0.2 E.U./DL
WBC #/AREA URNS AUTO: ABNORMAL /HPF
WBC CLUMPS #/AREA URNS HPF: ABNORMAL /HPF

## 2024-04-10 PROCEDURE — 99214 OFFICE O/P EST MOD 30 MIN: CPT | Mod: 25 | Performed by: STUDENT IN AN ORGANIZED HEALTH CARE EDUCATION/TRAINING PROGRAM

## 2024-04-10 PROCEDURE — 88112 CYTOPATH CELL ENHANCE TECH: CPT | Performed by: PATHOLOGY

## 2024-04-10 PROCEDURE — 81001 URINALYSIS AUTO W/SCOPE: CPT | Performed by: STUDENT IN AN ORGANIZED HEALTH CARE EDUCATION/TRAINING PROGRAM

## 2024-04-10 PROCEDURE — 52000 CYSTOURETHROSCOPY: CPT | Performed by: STUDENT IN AN ORGANIZED HEALTH CARE EDUCATION/TRAINING PROGRAM

## 2024-04-10 PROCEDURE — 87086 URINE CULTURE/COLONY COUNT: CPT | Performed by: STUDENT IN AN ORGANIZED HEALTH CARE EDUCATION/TRAINING PROGRAM

## 2024-04-10 PROCEDURE — 87088 URINE BACTERIA CULTURE: CPT | Performed by: STUDENT IN AN ORGANIZED HEALTH CARE EDUCATION/TRAINING PROGRAM

## 2024-04-10 PROCEDURE — 87186 SC STD MICRODIL/AGAR DIL: CPT | Performed by: STUDENT IN AN ORGANIZED HEALTH CARE EDUCATION/TRAINING PROGRAM

## 2024-04-10 RX ORDER — ESTRADIOL 0.1 MG/G
2 CREAM VAGINAL
Qty: 72 G | Refills: 1 | Status: SHIPPED | OUTPATIENT
Start: 2024-04-10 | End: 2024-05-14

## 2024-04-10 NOTE — PROGRESS NOTES
The Non-Gyn urine order on this patient was cancelled. The urine was received in the lab and we were able to perform the UAIC and the Urine Culture; however, the submitted urine quantity was not sufficient for the Non-Gyn testing. A minimum of 33cc of urine is required. Only 29cc was submitted in total.  Thank You!

## 2024-04-10 NOTE — PROGRESS NOTES
CHIEF COMPLAINT   It was my pleasure to see Lalitha Underwood who is a 79 year old female for follow-up of gross hematuria with urethral mass.      HPI:  Lalitha Underwood is a 79 year old female being seen for reevaluation.  Duration of problem:  few weeks  Previous treatments: cysto      Reviewed previous notes from Dr. Lima  She is not having any further hematuria.  No prior history of the same  Here with CT urogram and for a discussion regarding the urethral mass      Exam:  BP (!) 141/64 (BP Location: Right arm, Patient Position: Sitting, Cuff Size: Adult Regular)   Pulse 93   Temp 97.9  F (36.6  C) (Tympanic)   General: age-appropriate appearing female in NAD sitting in a wheelchair  Resp: no respiratory distress  CV: heart rate regular  Abdomen: Degree of obesity is moderate. Abdomen is soft and nontender. No organomegaly.  :  local examination revealed a small urethral caruncle which seems to have decreased in size from the prior assessment with Dr Lima, rest deferred to cysto  Neuro: grossly non focal. Normal reflexes  Motor: excellent strength throughout    Cystoscopy    Pre-procedure diagnosis:Grosshematuria  Post procedure diagnosis: normal cystoscopy, urethral caruncle,  bladder diverticulum  Procedure performed: cystoscopy  Surgeon: Lico Dalal MD  Anesthesia: local    Indications for procedure: Patient is a 79 year old year old female with a history of hematuria.  Here today for cysto    Description of procedure: After fully informed voluntary consent was obtained patient was brought into the procedure room, identified and placed in a supine position on the cysto table.  The vagina/intraoitus were prepped and draped in a sterile fashion with betadine.  A 15F flexible cystoscope was inserted into the urethra and the bladder and urethra examined in a systematic manner.  There were no tumor  or stones but there was a diverticulum.  There was a small clot and hyperemic mucosa in the  diverticulum.  Ureteric orifices were normal in position and number and effluxing clear urine.   Distal urethra was normal except for a urethral caruncule.  The patient tolerated the procedure well and there were no complications.      Assessment/Plan: Patient with a history of hematuria with negative cystoscopy.    Please review the detailed notes  Review of Imaging:  The following imaging exams were independently viewed and interpreted by me and discussed with patient:  CT Scan Abd/Pelvis: Abnormal:   Narrative & Impression   CT ABDOMEN AND PELVIS WITHOUT AND WITH CONTRAST April 8, 2024 12:33 PM        HISTORY: Gross hematuria.     COMPARISON: 3/24/2024.     TECHNIQUE: Volumetric helical acquisition of CT images from the lung  bases through the symphysis pubis before and after the uneventful  administration of 100mL Isovue-370 intravenous contrast. Radiation  dose for this scan was reduced using automated exposure control,  adjustment of the mA and/or kV according to patient size, or iterative  reconstruction technique.     FINDINGS:  Lung bases: Elevation of left hemidiaphragm is noted with mild left  basilar atelectasis.     Hepatobiliary: Subcentimeter low-attenuation lesion in the anterior  aspect of segment 2 is too small to definitely characterize, but  statistically likely represents a cyst or hemangioma. A 13 mm  low-attenuation lesion in the inferior aspect of the left hepatic lobe  is seen on image 90 of series 8. This shows some increase in density  between the precontrast and portal venous phase images, but this is  difficult to measure due to the small size of the lesion. MRI could  better evaluate this. The portal vein is patent. No biliary dilation.  The common duct is upper normal for a patient of this age at 8 mm.     Pancreas: Normal.     Spleen: Lobulated, but normal in size with no focal lesions.     Adrenals: Stable 13 mm left adrenal nodule when compared to 2018. This  should represent a  benign adenoma. Normal right adrenal.     Right kidney: No renal or ureteral calculi. No hydronephrosis or  hydroureter. No enhancing mass.     Left kidney: No renal or ureteral calculi. No hydronephrosis or  hydroureter. Small simple cyst in the lateral mid left kidney. No  enhancing mass.     Bladder: No wall thickening or mass. There is a moderate-sized right  posterior bladder diverticulum. Some dependent debris or blood  products are noted within the bladder.     Bowel: No bowel obstruction. Widemouth ventral abdominal wall hernia  versus eventration contains a few loops of nonobstructed small bowel.  A single loop of nonobstructed small bowel extends into a right  inguinal hernia. No colonic wall thickening or inflammatory change.     Pelvic organs: No free fluid or adenopathy in the pelvis. Bladder  suspension device is noted.     Vasculature: The aorta is normal in caliber with moderate  atherosclerotic calcification.     Other: No adenopathy.     Musculoskeletal: Degenerative changes are noted through the spine.                                                                      IMPRESSION:  1. No renal or ureteral calculi.  2. No renal or bladder mass. There is dependent debris or blood  products within the bladder.  3. 13 mm lesion in the inferior left hepatic lobe may shows some  enhancement, but is difficult to measure due to its small size. MRI  with contrast could better evaluate this.  4. Moderate-sized right posterior bladder diverticulum.  5. Widemouth ventral abdominal wall hernia versus eventration.   6. A single loop of nonobstructed small bowel extends into a small  right inguinal hernia.       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,   Can consider estrogen cream application  Follow-up in 6 months to reassess  No further intervention or biopsy needed     estradiol (ESTRACE) 0.1 MG/GM vaginal cream; Place 2 g vaginally three times a week for 180 days      Inguinal hernia without obstruction or gangrene, recurrence not specified, unspecified laterality  Would recommend reassessment with gen surgery in view of bowel contents in the hernia sac  I would also encourage her to discuss the liver lesion with her primary care and see if he needs follow up for the same    - Adult General Surg Referral; Future      Lico Dalal MD  [facility]      ==========================    Additional Billing and Coding Information:  Review of external notes as documented above   Review of the result(s) of each unique test - CT abd pelvis/ urogram, Urine cytology                16 minutes spent by me on the date of the encounter doing chart review, review of test results, interpretation of tests, patient visit, and documentation part from cysto     ==========================

## 2024-04-10 NOTE — PATIENT INSTRUCTIONS
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 We suggest that you continue to avoid caffeine, fruit juice, and alcohol for the next 24 hours, however, you are encouraged to return to your normal activities.         A few things that are considered normal after your cystoscopy:     * Small amount of bleeding (or spotting) that clears within the next 24 hours     * Slight burning sensation with urination     * Sensation to of needing to avoid more frequently     * The feeling of \"air\" in your urine     * Mild discomfort that is relieved with Tylenol        Please contact our office promptly if you:     * Develop a fever above 101 degrees     * Are unable to urinate     * Develop bright red blood that does not stop     * Severe pain or swelling         Please contact our office with any concerns or questions @[facility].  AFTER YOUR CYSTOSCOPY        You have just completed a cystoscopy, or \"cysto\", which allowed your physician to learn more about your bladder (or to remove a stent placed after surgery). We suggest that you continue to avoid caffeine, fruit juice, and alcohol for the next 24 hours, however, you are encouraged to return to your normal activities.         A few things that are considered normal after your cystoscopy:     * Small amount of bleeding (or spotting) that clears within the next 24 hours     * Slight burning sensation with urination     * Sensation to of needing to avoid more frequently     * The feeling of \"air\" in your urine     * Mild discomfort that is relieved with Tylenol        Please contact our office promptly if you:     * Develop a fever above " 101 degrees     * Are unable to urinate     * Develop bright red blood that does not stop     * Severe pain or swelling         Please contact our office with any concerns or questions @[facility].

## 2024-04-13 LAB — BACTERIA UR CULT: ABNORMAL

## 2024-04-14 ASSESSMENT — SLEEP AND FATIGUE QUESTIONNAIRES
HOW LIKELY ARE YOU TO NOD OFF OR FALL ASLEEP WHILE SITTING AND READING: SLIGHT CHANCE OF DOZING
HOW LIKELY ARE YOU TO NOD OFF OR FALL ASLEEP IN A CAR, WHILE STOPPED FOR A FEW MINUTES IN TRAFFIC: WOULD NEVER DOZE
HOW LIKELY ARE YOU TO NOD OFF OR FALL ASLEEP WHILE WATCHING TV: SLIGHT CHANCE OF DOZING
HOW LIKELY ARE YOU TO NOD OFF OR FALL ASLEEP WHILE SITTING QUIETLY AFTER LUNCH WITHOUT ALCOHOL: WOULD NEVER DOZE
HOW LIKELY ARE YOU TO NOD OFF OR FALL ASLEEP WHEN YOU ARE A PASSENGER IN A CAR FOR AN HOUR WITHOUT A BREAK: WOULD NEVER DOZE
HOW LIKELY ARE YOU TO NOD OFF OR FALL ASLEEP WHILE LYING DOWN TO REST IN THE AFTERNOON WHEN CIRCUMSTANCES PERMIT: WOULD NEVER DOZE
HOW LIKELY ARE YOU TO NOD OFF OR FALL ASLEEP WHILE SITTING AND TALKING TO SOMEONE: WOULD NEVER DOZE
HOW LIKELY ARE YOU TO NOD OFF OR FALL ASLEEP WHILE SITTING INACTIVE IN A PUBLIC PLACE: WOULD NEVER DOZE

## 2024-04-15 ENCOUNTER — THERAPY VISIT (OUTPATIENT)
Dept: SLEEP MEDICINE | Facility: CLINIC | Age: 79
End: 2024-04-15
Attending: NURSE PRACTITIONER
Payer: MEDICARE

## 2024-04-15 DIAGNOSIS — G47.30 SLEEP-RELATED BREATHING DISORDER: ICD-10-CM

## 2024-04-15 LAB
PATH REPORT.COMMENTS IMP SPEC: ABNORMAL
PATH REPORT.COMMENTS IMP SPEC: YES
PATH REPORT.FINAL DX SPEC: ABNORMAL
PATH REPORT.GROSS SPEC: ABNORMAL
PATH REPORT.MICROSCOPIC SPEC OTHER STN: ABNORMAL
PATH REPORT.RELEVANT HX SPEC: ABNORMAL

## 2024-04-15 PROCEDURE — 95810 POLYSOM 6/> YRS 4/> PARAM: CPT | Performed by: INTERNAL MEDICINE

## 2024-04-15 RX ORDER — CEFDINIR 300 MG/1
300 CAPSULE ORAL 2 TIMES DAILY
Qty: 14 CAPSULE | Refills: 0 | Status: SHIPPED | OUTPATIENT
Start: 2024-04-15 | End: 2024-04-22

## 2024-04-15 NOTE — TELEPHONE ENCOUNTER
Spoke to patient and informed her there aren't staff in the morning that are able to help her to her care.  She is going to use wheelchairs available at entrance.  Did offer option to reschedule to [place] or [place] that do offer  parking and have volunteers in the hospital that could help.  Patient declined that option.

## 2024-04-16 LAB
PATH REPORT.COMMENTS IMP SPEC: NORMAL
PATH REPORT.FINAL DX SPEC: NORMAL
PATH REPORT.GROSS SPEC: NORMAL
PATH REPORT.RELEVANT HX SPEC: NORMAL

## 2024-04-17 ENCOUNTER — HOSPITAL ENCOUNTER (OUTPATIENT)
Dept: CARDIOLOGY | Facility: HOSPITAL | Age: 79
Discharge: HOME | End: 2024-04-17
Attending: INTERNAL MEDICINE
Payer: MEDICARE

## 2024-04-17 ENCOUNTER — ANCILLARY PROCEDURE (OUTPATIENT)
Dept: CARDIOLOGY | Facility: CLINIC | Age: 79
End: 2024-04-17
Attending: INTERNAL MEDICINE
Payer: MEDICARE

## 2024-04-17 ENCOUNTER — THERAPY VISIT (OUTPATIENT)
Dept: PHYSICAL THERAPY | Facility: REHABILITATION | Age: 79
End: 2024-04-17
Payer: MEDICARE

## 2024-04-17 DIAGNOSIS — I35.0 MILD AORTIC VALVE STENOSIS: ICD-10-CM

## 2024-04-17 DIAGNOSIS — Z95.0 CARDIAC PACEMAKER IN SITU: ICD-10-CM

## 2024-04-17 DIAGNOSIS — I49.5 SICK SINUS SYNDROME (H): ICD-10-CM

## 2024-04-17 DIAGNOSIS — I89.0 LYMPHEDEMA: Primary | ICD-10-CM

## 2024-04-17 LAB — LVEF ECHO: NORMAL

## 2024-04-17 PROCEDURE — 97110 THERAPEUTIC EXERCISES: CPT | Mod: GP | Performed by: PHYSICAL THERAPIST

## 2024-04-17 PROCEDURE — 97140 MANUAL THERAPY 1/> REGIONS: CPT | Mod: GP | Performed by: PHYSICAL THERAPIST

## 2024-04-18 LAB
MDC_IDC_EPISODE_DTM: NORMAL
MDC_IDC_EPISODE_DTM: NORMAL
MDC_IDC_EPISODE_ID: NORMAL
MDC_IDC_EPISODE_ID: NORMAL
MDC_IDC_EPISODE_TYPE: NORMAL
MDC_IDC_EPISODE_TYPE: NORMAL
MDC_IDC_LEAD_CONNECTION_STATUS: NORMAL
MDC_IDC_LEAD_CONNECTION_STATUS: NORMAL
MDC_IDC_LEAD_IMPLANT_DT: NORMAL
MDC_IDC_LEAD_IMPLANT_DT: NORMAL
MDC_IDC_LEAD_LOCATION: NORMAL
MDC_IDC_LEAD_LOCATION: NORMAL
MDC_IDC_LEAD_LOCATION_DETAIL_1: NORMAL
MDC_IDC_LEAD_LOCATION_DETAIL_1: NORMAL
MDC_IDC_LEAD_MFG: NORMAL
MDC_IDC_LEAD_MFG: NORMAL
MDC_IDC_LEAD_MODEL: NORMAL
MDC_IDC_LEAD_MODEL: NORMAL
MDC_IDC_LEAD_POLARITY_TYPE: NORMAL
MDC_IDC_LEAD_POLARITY_TYPE: NORMAL
MDC_IDC_LEAD_SERIAL: NORMAL
MDC_IDC_LEAD_SERIAL: NORMAL
MDC_IDC_MSMT_BATTERY_DTM: NORMAL
MDC_IDC_MSMT_BATTERY_REMAINING_LONGEVITY: 126 MO
MDC_IDC_MSMT_BATTERY_REMAINING_PERCENTAGE: 100 %
MDC_IDC_MSMT_BATTERY_STATUS: NORMAL
MDC_IDC_MSMT_LEADCHNL_RA_IMPEDANCE_VALUE: 504 OHM
MDC_IDC_MSMT_LEADCHNL_RV_IMPEDANCE_VALUE: 428 OHM
MDC_IDC_MSMT_LEADCHNL_RV_PACING_THRESHOLD_AMPLITUDE: 0.8 V
MDC_IDC_MSMT_LEADCHNL_RV_PACING_THRESHOLD_PULSEWIDTH: 0.4 MS
MDC_IDC_PG_IMPLANT_DTM: NORMAL
MDC_IDC_PG_MFG: NORMAL
MDC_IDC_PG_MODEL: NORMAL
MDC_IDC_PG_SERIAL: NORMAL
MDC_IDC_PG_TYPE: NORMAL
MDC_IDC_SESS_CLINIC_NAME: NORMAL
MDC_IDC_SESS_DTM: NORMAL
MDC_IDC_SESS_TYPE: NORMAL
MDC_IDC_SET_BRADY_AT_MODE_SWITCH_MODE: NORMAL
MDC_IDC_SET_BRADY_AT_MODE_SWITCH_RATE: 170 {BEATS}/MIN
MDC_IDC_SET_BRADY_LOWRATE: 60 {BEATS}/MIN
MDC_IDC_SET_BRADY_MAX_SENSOR_RATE: 130 {BEATS}/MIN
MDC_IDC_SET_BRADY_MAX_TRACKING_RATE: 130 {BEATS}/MIN
MDC_IDC_SET_BRADY_MODE: NORMAL
MDC_IDC_SET_BRADY_PAV_DELAY_HIGH: 200 MS
MDC_IDC_SET_BRADY_PAV_DELAY_LOW: 250 MS
MDC_IDC_SET_BRADY_SAV_DELAY_HIGH: 200 MS
MDC_IDC_SET_BRADY_SAV_DELAY_LOW: 250 MS
MDC_IDC_SET_LEADCHNL_RA_PACING_AMPLITUDE: 1.5 V
MDC_IDC_SET_LEADCHNL_RA_PACING_CAPTURE_MODE: NORMAL
MDC_IDC_SET_LEADCHNL_RA_PACING_POLARITY: NORMAL
MDC_IDC_SET_LEADCHNL_RA_PACING_PULSEWIDTH: 0.4 MS
MDC_IDC_SET_LEADCHNL_RA_SENSING_ADAPTATION_MODE: NORMAL
MDC_IDC_SET_LEADCHNL_RA_SENSING_POLARITY: NORMAL
MDC_IDC_SET_LEADCHNL_RA_SENSING_SENSITIVITY: 0.4 MV
MDC_IDC_SET_LEADCHNL_RV_PACING_AMPLITUDE: 1.3 V
MDC_IDC_SET_LEADCHNL_RV_PACING_CAPTURE_MODE: NORMAL
MDC_IDC_SET_LEADCHNL_RV_PACING_POLARITY: NORMAL
MDC_IDC_SET_LEADCHNL_RV_PACING_PULSEWIDTH: 0.4 MS
MDC_IDC_SET_LEADCHNL_RV_SENSING_ADAPTATION_MODE: NORMAL
MDC_IDC_SET_LEADCHNL_RV_SENSING_POLARITY: NORMAL
MDC_IDC_SET_LEADCHNL_RV_SENSING_SENSITIVITY: 1.5 MV
MDC_IDC_SET_ZONE_DETECTION_INTERVAL: 375 MS
MDC_IDC_SET_ZONE_STATUS: NORMAL
MDC_IDC_SET_ZONE_TYPE: NORMAL
MDC_IDC_SET_ZONE_VENDOR_TYPE: NORMAL
MDC_IDC_STAT_AT_BURDEN_PERCENT: 0 %
MDC_IDC_STAT_AT_DTM_END: NORMAL
MDC_IDC_STAT_AT_DTM_START: NORMAL
MDC_IDC_STAT_BRADY_DTM_END: NORMAL
MDC_IDC_STAT_BRADY_DTM_START: NORMAL
MDC_IDC_STAT_BRADY_RA_PERCENT_PACED: 1 %
MDC_IDC_STAT_BRADY_RV_PERCENT_PACED: 100 %
MDC_IDC_STAT_EPISODE_RECENT_COUNT: 0
MDC_IDC_STAT_EPISODE_RECENT_COUNT_DTM_END: NORMAL
MDC_IDC_STAT_EPISODE_RECENT_COUNT_DTM_START: NORMAL
MDC_IDC_STAT_EPISODE_TYPE: NORMAL
MDC_IDC_STAT_EPISODE_VENDOR_TYPE: NORMAL
MDC_IDC_STAT_EPISODE_VENDOR_TYPE: NORMAL

## 2024-04-18 PROCEDURE — 93294 REM INTERROG EVL PM/LDLS PM: CPT | Performed by: INTERNAL MEDICINE

## 2024-04-18 PROCEDURE — 93296 REM INTERROG EVL PM/IDS: CPT | Performed by: INTERNAL MEDICINE

## 2024-04-19 ENCOUNTER — TELEPHONE (OUTPATIENT)
Dept: CARDIOLOGY | Facility: CLINIC | Age: 79
End: 2024-04-19

## 2024-04-19 DIAGNOSIS — I35.0 MILD AORTIC VALVE STENOSIS: Primary | ICD-10-CM

## 2024-04-19 DIAGNOSIS — I35.0 AORTIC STENOSIS: Primary | ICD-10-CM

## 2024-04-19 DIAGNOSIS — I35.0 AORTIC VALVE STENOSIS, ETIOLOGY OF CARDIAC VALVE DISEASE UNSPECIFIED: ICD-10-CM

## 2024-04-19 NOTE — TELEPHONE ENCOUNTER
----- Message from Teri Hernandes RN sent at 4/19/2024 10:23 AM CDT -----  See telephone encounter dated 4/19/24. -[facility]     ----- Message -----  From: Ezra Robertson MD  Sent: 4/18/2024   3:12 PM CDT  To: Teri Hernandes RN    Can we know that aortic valve has gotten worse, and given her COPD was significant shortness of breath I would like her to be seen by the valve clinic to discuss TAVR?  LF

## 2024-04-19 NOTE — TELEPHONE ENCOUNTER
Called Serene and updated on echo results + recommendation to see valve clinic to discuss next steps. Understanding verbalized and order placed for follow up. Routed to structural team  to arrange. -[facility]

## 2024-04-19 NOTE — TELEPHONE ENCOUNTER
Valve clinic referral for aortic stenosis per Dr. Robertson.    Please call the pt and schedule in valve clinic with a CTA prior.    Thanks,  Nicole

## 2024-04-22 ASSESSMENT — SLEEP AND FATIGUE QUESTIONNAIRES
HOW LIKELY ARE YOU TO NOD OFF OR FALL ASLEEP WHILE SITTING AND READING: SLIGHT CHANCE OF DOZING
HOW LIKELY ARE YOU TO NOD OFF OR FALL ASLEEP IN A CAR, WHILE STOPPED FOR A FEW MINUTES IN TRAFFIC: WOULD NEVER DOZE
HOW LIKELY ARE YOU TO NOD OFF OR FALL ASLEEP WHILE SITTING AND TALKING TO SOMEONE: WOULD NEVER DOZE
HOW LIKELY ARE YOU TO NOD OFF OR FALL ASLEEP WHILE SITTING QUIETLY AFTER LUNCH WITHOUT ALCOHOL: WOULD NEVER DOZE
HOW LIKELY ARE YOU TO NOD OFF OR FALL ASLEEP WHEN YOU ARE A PASSENGER IN A CAR FOR AN HOUR WITHOUT A BREAK: WOULD NEVER DOZE
HOW LIKELY ARE YOU TO NOD OFF OR FALL ASLEEP WHILE SITTING INACTIVE IN A PUBLIC PLACE: SLIGHT CHANCE OF DOZING
HOW LIKELY ARE YOU TO NOD OFF OR FALL ASLEEP WHILE LYING DOWN TO REST IN THE AFTERNOON WHEN CIRCUMSTANCES PERMIT: SLIGHT CHANCE OF DOZING
HOW LIKELY ARE YOU TO NOD OFF OR FALL ASLEEP WHILE WATCHING TV: SLIGHT CHANCE OF DOZING

## 2024-04-24 ENCOUNTER — OFFICE VISIT (OUTPATIENT)
Dept: SURGERY | Facility: CLINIC | Age: 79
End: 2024-04-24
Attending: STUDENT IN AN ORGANIZED HEALTH CARE EDUCATION/TRAINING PROGRAM
Payer: MEDICARE

## 2024-04-24 VITALS — SYSTOLIC BLOOD PRESSURE: 144 MMHG | DIASTOLIC BLOOD PRESSURE: 73 MMHG | HEART RATE: 99 BPM | TEMPERATURE: 97.3 F

## 2024-04-24 DIAGNOSIS — I27.81 COR PULMONALE (CHRONIC) (H): ICD-10-CM

## 2024-04-24 DIAGNOSIS — I73.9 PAD (PERIPHERAL ARTERY DISEASE) (H): ICD-10-CM

## 2024-04-24 DIAGNOSIS — I25.118 ATHEROSCLEROSIS OF NATIVE CORONARY ARTERY OF NATIVE HEART WITH STABLE ANGINA PECTORIS (H): ICD-10-CM

## 2024-04-24 DIAGNOSIS — J96.11 CHRONIC RESPIRATORY FAILURE WITH HYPOXIA (H): ICD-10-CM

## 2024-04-24 DIAGNOSIS — J43.9 PULMONARY EMPHYSEMA, UNSPECIFIED EMPHYSEMA TYPE (H): ICD-10-CM

## 2024-04-24 DIAGNOSIS — E66.09 CLASS 2 OBESITY DUE TO EXCESS CALORIES WITHOUT SERIOUS COMORBIDITY WITH BODY MASS INDEX (BMI) OF 37.0 TO 37.9 IN ADULT: ICD-10-CM

## 2024-04-24 DIAGNOSIS — I44.1 SECOND DEGREE HEART BLOCK: ICD-10-CM

## 2024-04-24 DIAGNOSIS — E66.812 CLASS 2 OBESITY DUE TO EXCESS CALORIES WITHOUT SERIOUS COMORBIDITY WITH BODY MASS INDEX (BMI) OF 37.0 TO 37.9 IN ADULT: ICD-10-CM

## 2024-04-24 DIAGNOSIS — K40.90 INGUINAL HERNIA WITHOUT OBSTRUCTION OR GANGRENE, RECURRENCE NOT SPECIFIED, UNSPECIFIED LATERALITY: Primary | ICD-10-CM

## 2024-04-24 PROCEDURE — 99203 OFFICE O/P NEW LOW 30 MIN: CPT | Performed by: SURGERY

## 2024-04-24 NOTE — PROGRESS NOTES
Assessment & Plan   Problem List Items Addressed This Visit          Respiratory    COPD (chronic obstructive pulmonary disease) (H)    Chronic respiratory failure with hypoxia (H)       Digestive    Class 2 obesity due to excess calories without serious comorbidity with body mass index (BMI) of 37.0 to 37.9 in adult       Circulatory    PAD (peripheral artery disease) (H24)    Second degree heart block    Cor pulmonale (chronic) (H)     Other Visit Diagnoses       Inguinal hernia without obstruction or gangrene, recurrence not specified, unspecified laterality    -  Primary    Atherosclerosis of native coronary artery of native heart with stable angina pectoris (H24)               80 yo F with right inguinal hernia with nonobstructed/strangulated portion of small bowel within defect.     I discussed with the patient the pathophysiology and natural history of inguinal hernias, explaining that the presence of the hernia alone is not an indication for repair, but rather repair would be indicated if hernias are symptomatic, obstructed/strangulated, or bothersome.  I explained that hernias are not expected to improve without repair, but avoiding activities such as strenuous physical activity, weight gain, and tobacco cessation can help prevent symptoms from worsening.  Weight loss is also encouraged for overall health with prevent worsening of hernia symptoms.     Given the fact that patient as been largely asymptomatic, I recommended continued observation of this hernia with nonoperative management especially due to her multiple pulmonary and cardiac comorbidities.  I counseled the patient to monitor for symptoms of a bowel obstruction, hernia incarceration, or increased size to the point of discomfort which should prompt him to seek care to continue to discuss surgical repair at that time.  I recommend patient to be seen at [facility] or any tertiary hospital where there are more specialists (pul, MDAs, and  cardiologist) accessible in case something happened urgently.    In the meantime, patient was counseled to avoid strenuous activity and weight loss.  All questions were answered.       Face to Face/patient Contact total time: 20 minutes  Pre Charting time: 10 minutes; Post charting time, communication and other activities: 10 minutes;   Total time:  40 minutes      No follow-ups on file.      Brenda Cast is a 79 year old, presenting for the following health issues:  Consult (Hernia)    Patient been having hematuria and noted mass in bladder; been working with urology for this area  The groin hernia is on the right side and noted on CT; there is a portion of small bowel in hernia.  This was found incidentally.   Pt does not have any symptoms at all  No pain; no nausea; no vomiting; no signs of obstruction  No bulge of any sort.   Had hx of hiatal and umbilical hernia repair  Never had groin hernia repair.    Significant cardiac history with 2nd degree heart block and chronic cor pulmonale   Not on any blood thinners  Never had MI; never CVA.    On oxygen at baseline due to COPD  Was told that by her cardiologist that her cardiac valvular disease is worsening and would likely need a valve repair soon.                Review of Systems  Constitutional, neuro, ENT, endocrine, pulmonary, cardiac, gastrointestinal, genitourinary, musculoskeletal, integument and psychiatric systems are negative, except as otherwise noted.      Objective    There were no vitals taken for this visit.  There is no height or weight on file to calculate BMI.  Physical Exam  Vitals reviewed.   Eyes:      Conjunctiva/sclera: Conjunctivae normal.   Cardiovascular:      Pulses: Normal pulses.   Abdominal:       Skin:     General: Skin is warm.      Capillary Refill: Capillary refill takes less than 2 seconds.   Neurological:      General: No focal deficit present.      Mental Status: She is alert.   Psychiatric:         Mood and Affect: Mood  normal.        EXAM: CT ABDOMEN PELVIS W/O CONTRAST  LOCATION: [facility]  DATE: 3/24/2024     INDICATION: Right lower pelvic pain, hematuria.  COMPARISON: None.  TECHNIQUE: CT scan of the abdomen and pelvis was performed without IV contrast. Multiplanar reformats were obtained. Dose reduction techniques were used.  CONTRAST: None.     FINDINGS:   LOWER CHEST: Mild subpleural scarring and/or atelectasis in the lower lungs. Few small technically indeterminate nodules in the right lower lobe measuring up to 5 mm. Elevation left hemidiaphragm.     HEPATOBILIARY: Normal.     PANCREAS: Normal.     SPLEEN: Normal.     ADRENAL GLANDS: 1.5 cm left adrenal nodule is low in attenuation and most compatible with a benign adrenal adenoma. Right adrenal gland is negative.     KIDNEYS/BLADDER: Small benign left renal cyst with no follow-up needed. Kidneys are otherwise negative. No renal or ureteral calculi. No hydronephrosis. Lobulated high density material in the posterior aspect of the bladder suspicious for blood products.   There is a small to moderate diverticulum along the right lateral/posterior aspect of the bladder. This also contains some high attenuation material likely reflecting blood products.     BOWEL: Bowel is normal in caliber with no evidence for obstruction. Normal appendix. Moderate to large amount of stool in the colon. No acute bowel findings.     LYMPH NODES: Normal.     VASCULATURE: Aortic calcification without aneurysm.     PELVIC ORGANS: Pessary device in place. No pelvic masses.     MUSCULOSKELETAL: Moderate to marked degenerative changes in the lower thoracic and lumbar spine.                                                                      IMPRESSION:   1.  Moderate amount of acute blood products within the bladder including in a bladder diverticulum. The underlying etiology of the blood products is indeterminate. Recommend urologic consultation and cystoscopy for  further evaluation. Underlying bladder   lesion not excluded.     2.  No urinary calculi or hydronephrosis.     3.  No acute bowel findings. Normal appendix.     4.  Pessary device in place.     5.  Few small technically indeterminate nodules in the right lower lung measuring up to 5 mm. If there are risk factors present such as history of smoking, follow-up CT in one year would be recommended. If there are no risk factors present, no specific   follow-up needed.    Signed Electronically by: Darleen Guillen MD

## 2024-04-24 NOTE — LETTER
4/24/2024         RE: Lalitha Underwood  [address]  [place] [address]        Dear Colleague,    Thank you for referring your patient, Lalitha Underwood, to the [facility]. Please see a copy of my visit note below.      Assessment & Plan  Problem List Items Addressed This Visit          Respiratory    COPD (chronic obstructive pulmonary disease) (H)    Chronic respiratory failure with hypoxia (H)       Digestive    Class 2 obesity due to excess calories without serious comorbidity with body mass index (BMI) of 37.0 to 37.9 in adult       Circulatory    PAD (peripheral artery disease) (H24)    Second degree heart block    Cor pulmonale (chronic) (H)     Other Visit Diagnoses       Inguinal hernia without obstruction or gangrene, recurrence not specified, unspecified laterality    -  Primary    Atherosclerosis of native coronary artery of native heart with stable angina pectoris (H24)               78 yo F with right inguinal hernia with nonobstructed/strangulated portion of small bowel within defect.     I discussed with the patient the pathophysiology and natural history of inguinal hernias, explaining that the presence of the hernia alone is not an indication for repair, but rather repair would be indicated if hernias are symptomatic, obstructed/strangulated, or bothersome.  I explained that hernias are not expected to improve without repair, but avoiding activities such as strenuous physical activity, weight gain, and tobacco cessation can help prevent symptoms from worsening.  Weight loss is also encouraged for overall health with prevent worsening of hernia symptoms.     Given the fact that patient as been largely asymptomatic, I recommended continued observation of this hernia with nonoperative management especially due to her multiple pulmonary and cardiac comorbidities.  I counseled the patient to monitor for symptoms of a bowel obstruction, hernia incarceration, or increased  size to the point of discomfort which should prompt him to seek care to continue to discuss surgical repair at that time.  I recommend patient to be seen at [facility] or any tertiary hospital where there are more specialists (pul, MDAs, and cardiologist) accessible in case something happened urgently.    In the meantime, patient was counseled to avoid strenuous activity and weight loss.  All questions were answered.       Face to Face/patient Contact total time: 20 minutes  Pre Charting time: 10 minutes; Post charting time, communication and other activities: 10 minutes;   Total time:  40 minutes      No follow-ups on file.      Brenda Cast is a 79 year old, presenting for the following health issues:  Consult (Hernia)    Patient been having hematuria and noted mass in bladder; been working with urology for this area  The groin hernia is on the right side and noted on CT; there is a portion of small bowel in hernia.  This was found incidentally.   Pt does not have any symptoms at all  No pain; no nausea; no vomiting; no signs of obstruction  No bulge of any sort.   Had hx of hiatal and umbilical hernia repair  Never had groin hernia repair.    Significant cardiac history with 2nd degree heart block and chronic cor pulmonale   Not on any blood thinners  Never had MI; never CVA.    On oxygen at baseline due to COPD  Was told that by her cardiologist that her cardiac valvular disease is worsening and would likely need a valve repair soon.                Review of Systems  Constitutional, neuro, ENT, endocrine, pulmonary, cardiac, gastrointestinal, genitourinary, musculoskeletal, integument and psychiatric systems are negative, except as otherwise noted.      Objective   There were no vitals taken for this visit.  There is no height or weight on file to calculate BMI.  Physical Exam  Vitals reviewed.   Eyes:      Conjunctiva/sclera: Conjunctivae normal.   Cardiovascular:      Pulses: Normal pulses.    Abdominal:       Skin:     General: Skin is warm.      Capillary Refill: Capillary refill takes less than 2 seconds.   Neurological:      General: No focal deficit present.      Mental Status: She is alert.   Psychiatric:         Mood and Affect: Mood normal.        EXAM: CT ABDOMEN PELVIS W/O CONTRAST  LOCATION: [facility]  DATE: 3/24/2024     INDICATION: Right lower pelvic pain, hematuria.  COMPARISON: None.  TECHNIQUE: CT scan of the abdomen and pelvis was performed without IV contrast. Multiplanar reformats were obtained. Dose reduction techniques were used.  CONTRAST: None.     FINDINGS:   LOWER CHEST: Mild subpleural scarring and/or atelectasis in the lower lungs. Few small technically indeterminate nodules in the right lower lobe measuring up to 5 mm. Elevation left hemidiaphragm.     HEPATOBILIARY: Normal.     PANCREAS: Normal.     SPLEEN: Normal.     ADRENAL GLANDS: 1.5 cm left adrenal nodule is low in attenuation and most compatible with a benign adrenal adenoma. Right adrenal gland is negative.     KIDNEYS/BLADDER: Small benign left renal cyst with no follow-up needed. Kidneys are otherwise negative. No renal or ureteral calculi. No hydronephrosis. Lobulated high density material in the posterior aspect of the bladder suspicious for blood products.   There is a small to moderate diverticulum along the right lateral/posterior aspect of the bladder. This also contains some high attenuation material likely reflecting blood products.     BOWEL: Bowel is normal in caliber with no evidence for obstruction. Normal appendix. Moderate to large amount of stool in the colon. No acute bowel findings.     LYMPH NODES: Normal.     VASCULATURE: Aortic calcification without aneurysm.     PELVIC ORGANS: Pessary device in place. No pelvic masses.     MUSCULOSKELETAL: Moderate to marked degenerative changes in the lower thoracic and lumbar spine.                                                                       IMPRESSION:   1.  Moderate amount of acute blood products within the bladder including in a bladder diverticulum. The underlying etiology of the blood products is indeterminate. Recommend urologic consultation and cystoscopy for further evaluation. Underlying bladder   lesion not excluded.     2.  No urinary calculi or hydronephrosis.     3.  No acute bowel findings. Normal appendix.     4.  Pessary device in place.     5.  Few small technically indeterminate nodules in the right lower lung measuring up to 5 mm. If there are risk factors present such as history of smoking, follow-up CT in one year would be recommended. If there are no risk factors present, no specific   follow-up needed.    Signed Electronically by: Darleen Guillen MD        Again, thank you for allowing me to participate in the care of your patient.        Sincerely,        Darleen Guillen MD

## 2024-04-25 ENCOUNTER — MYC MEDICAL ADVICE (OUTPATIENT)
Dept: SURGERY | Facility: CLINIC | Age: 79
End: 2024-04-25
Payer: MEDICARE

## 2024-04-25 NOTE — TELEPHONE ENCOUNTER
Dental letter received, filled out, and faxed back as requested. Please see mychart and advise. Pt was seen yesterday in clinic. Thank you.  Fidelia CORREA RN BSN PHN  Specialty Clinics

## 2024-04-29 ENCOUNTER — OFFICE VISIT (OUTPATIENT)
Dept: SLEEP MEDICINE | Facility: CLINIC | Age: 79
End: 2024-04-29
Payer: MEDICARE

## 2024-04-29 VITALS
OXYGEN SATURATION: 92 % | BODY MASS INDEX: 36.56 KG/M2 | WEIGHT: 213 LBS | HEART RATE: 11 BPM | RESPIRATION RATE: 18 BRPM | DIASTOLIC BLOOD PRESSURE: 71 MMHG | SYSTOLIC BLOOD PRESSURE: 143 MMHG

## 2024-04-29 DIAGNOSIS — J96.11 CHRONIC RESPIRATORY FAILURE WITH HYPOXIA (H): Primary | ICD-10-CM

## 2024-04-29 DIAGNOSIS — G47.36 HYPOXEMIA ASSOCIATED WITH SLEEP: ICD-10-CM

## 2024-04-29 PROCEDURE — 99213 OFFICE O/P EST LOW 20 MIN: CPT | Performed by: NURSE PRACTITIONER

## 2024-04-29 NOTE — NURSING NOTE
"Chief Complaint   Patient presents with    Follow Up    Study Results       Initial BP (!) 143/71   Pulse (!) 11   Resp 18   Wt 96.6 kg (213 lb)   SpO2 92%   BMI 36.56 kg/m   Estimated body mass index is 36.56 kg/m  as calculated from the following:    Height as of 3/26/24: 1.626 m (5' 4\").    Weight as of this encounter: 96.6 kg (213 lb).    Medication Reconciliation: complete    Neck circumference:  inches / centimeters.    DME:       Ankush Falk MA  [facility]    "

## 2024-04-29 NOTE — PROGRESS NOTES
"Chief Complaint   Patient presents with    Follow Up    Study Results       Lalitha Underwood is a 79 year old female who returns to [facility] for review of sleep testing results. The patient is a 79-year-old Female who is 5' 4\" and weighs 218.0 lbs. Her BMI is 37.7, [place] sleepiness scale 2 and neck circumference is 36 cm. Relevant medical history includes (COPD, Obesity and HTN). A diagnostic polysomnogram was performed to evaluate for sleep apnea.      Polysomnography - Test date 4/15/2024        Results were reviewed in detail today with Lalitha and a copy given to her for her records.    Reviewed by team:  Tobacco  Allergies  Meds            Reviewed by provider:  Tobacco  Allergies  Meds  Problems  Med Hx  Surg Hx  Fam Hx             Problem List:  Patient Active Problem List    Diagnosis Date Noted    Class 2 obesity due to excess calories without serious comorbidity with body mass index (BMI) of 37.0 to 37.9 in adult 01/17/2024     Priority: Medium    Acquired absence of other left toe(s) (H24) 04/27/2023     Priority: Medium    Cor pulmonale (chronic) (H) 04/27/2023     Priority: Medium    Chronic respiratory failure with hypoxia (H) 04/27/2023     Priority: Medium    Coronary arteriosclerosis due to lipid rich plaque 03/31/2023     Priority: Medium    Cardiac pacemaker in situ 03/25/2022     Priority: Medium    Second degree heart block 03/19/2022     Priority: Medium    PAD (peripheral artery disease) (H24) 10/18/2021     Priority: Medium    Epileptic seizure (H) 07/16/2021     Priority: Medium     Formatting of this note might be different from the original.  Created by Conversion    Replacement Utility updated for latest IMO load      Esophageal reflux 07/16/2021     Priority: Medium     Formatting of this note might be different from the original.  Created by Conversion      Hyperlipidemia 07/16/2021     Priority: Medium     Formatting of this note might be " different from the original.  Created by Conversion      Impaired gait and mobility 07/16/2021     Priority: Medium     Formatting of this note might be different from the original.  Created by Conversion      COPD (chronic obstructive pulmonary disease) (H) 07/16/2021     Priority: Medium     Formatting of this note might be different from the original.  Created by Conversion      Rosacea 07/16/2021     Priority: Medium     Formatting of this note might be different from the original.  Created by Conversion      Vitamin D deficiency 07/16/2021     Priority: Medium     Formatting of this note might be different from the original.  Created by Conversion    Replacement Utility updated for latest IMO load      Uterine prolapse 03/01/2021     Priority: Medium    Acquired lymphedema of leg 02/01/2021     Priority: Medium    Neuropathy, idiopathic 02/21/2019     Priority: Medium    S/P repair of paraesophageal hernia 11/26/2018     Priority: Medium    Decreased hearing of both ears 05/29/2018     Priority: Medium    Osteoporosis 02/08/2018     Priority: Medium    Mild aortic valve stenosis 11/17/2017     Priority: Medium    Urge incontinence of urine 11/17/2017     Priority: Medium    Valgus deformity of both feet 03/15/2017     Priority: Medium    Collapsed arches 02/01/2017     Priority: Medium    Essential hypertension, benign      Priority: Medium     Created by Conversion  Replacement Utility updated for latest IMO load          .   BP (!) 143/71   Pulse (!) 11   Resp 18   Wt 96.6 kg (213 lb)   SpO2 92%   BMI 36.56 kg/m      Impression/Plan:    ICD-10-CM    1. Chronic respiratory failure with hypoxia (H)  J96.11       2. Hypoxemia associated with sleep  G47.36       Patient did not rule in for obstructive sleep apnea.  Although her overall combined AHI was 1.5 events/hour, her REM AHI was moderate to severe at 27.3 events/hour.  Will have patient return to pulmonology services as she was wearing her oxygen  during the entire sleep study.  Prior to a follow-up visit we will order a pulmonary function test as well as an ABG on room air as well as a chest x-ray.  Morning ABG, PFT will assist in determining if patient qualifies for BiPAP therapy.  Recommend that patient optimize her sleep schedule as well as her sleep hygiene practices to mitigate any further sleep disruption  Recommend that she employ safe driving practices such as not driving a motor vehicle should she become drowsy  Recommend BMI of 30.0.    Recommend that patient follow-up with a [employer] message after she has completed the pulmonary function test as well as the ABG.    .  Forty minutes spent with patient, all of which were spent face-to-face counseling, consulting, coordinating plan of care.      DONAVAN Andre CNP    CC:  Kate Marte,

## 2024-04-29 NOTE — PATIENT INSTRUCTIONS
Your There is no height or weight on file to calculate BMI.  Weight management is a personal decision.  If you are interested in exploring weight loss strategies, the following discussion covers the approaches that may be successful. Body mass index (BMI) is one way to tell whether you are at a healthy weight, overweight, or obese. It measures your weight in relation to your height.  A BMI of 18.5 to 24.9 is in the healthy range. A person with a BMI of 25 to 29.9 is considered overweight, and someone with a BMI of 30 or greater is considered obese. More than two-thirds of American adults are considered overweight or obese.  Being overweight or obese increases the risk for further weight gain. Excess weight may lead to heart disease and diabetes.  Creating and following plans for healthy eating and physical activity may help you improve your health.  Weight control is part of healthy lifestyle and includes exercise, emotional health, and healthy eating habits. Careful eating habits lifelong are the mainstay of weight control. Though there are significant health benefits from weight loss, long-term weight loss with diet alone may be very difficult to achieve- studies show long-term success with dietary management in less than 10% of people. Attaining a healthy weight may be especially difficult to achieve in those with severe obesity. In some cases, medications, devices and surgical management might be considered.  What can you do?  If you are overweight or obese and are interested in methods for weight loss, you should discuss this with your provider.   Consider reducing daily calorie intake by 500 calories.   Keep a food journal.   Avoiding skipping meals, consider cutting portions instead.    Diet combined with exercise helps maintain muscle while optimizing fat loss. Strength training is particularly important for building and maintaining muscle mass. Exercise helps reduce stress, increase energy, and improves  fitness. Increasing exercise without diet control, however, may not burn enough calories to loose weight.     Start walking three days a week 10-20 minutes at a time  Work towards walking thirty minutes five days a week   Eventually, increase the speed of your walking for 1-2 minutes at time    In addition, we recommend that you review healthy lifestyles and methods for weight loss available through the National Institutes of Health patient information sites:  http://win.niddk.nih.gov/publications/index.htm    And look into health and wellness programs that may be available through your health insurance provider, employer, local community center, or [employer].

## 2024-04-30 ENCOUNTER — THERAPY VISIT (OUTPATIENT)
Dept: PHYSICAL THERAPY | Facility: REHABILITATION | Age: 79
End: 2024-04-30
Payer: MEDICARE

## 2024-04-30 DIAGNOSIS — I89.0 LYMPHEDEMA: Primary | ICD-10-CM

## 2024-04-30 PROCEDURE — 97140 MANUAL THERAPY 1/> REGIONS: CPT | Mod: GP | Performed by: PHYSICAL THERAPIST

## 2024-05-01 DIAGNOSIS — G47.34 NOCTURNAL HYPOXEMIA: Primary | ICD-10-CM

## 2024-05-01 NOTE — PROGRESS NOTES
Collaborated with sleep physician who read patient's polysomnographic study, and in light of the fact that patient was wearing her oxygen on the evening of her sleep study, will order referral to pulmonologist.  Prior to pulmonology referral, patient to undergo pulmonary function test, have an ABG on room air, and chest x-ray.  Unable to reach patient via telephone at home and on her cell phone x 2, so phoned her son so he could relay the message to her.  He is her designated contact.  Will phone her again on Friday and/or Monday.    DONAVAN Pennington, CNP  Sleep Medicine    This note was written with the assistance of the Dragon voice-dictation technology software. The final document, although reviewed, may contain errors. For corrections, please contact the office.

## 2024-05-03 ENCOUNTER — HOSPITAL ENCOUNTER (OUTPATIENT)
Dept: RESPIRATORY THERAPY | Facility: CLINIC | Age: 79
Discharge: HOME | End: 2024-05-03
Attending: NURSE PRACTITIONER
Payer: MEDICARE

## 2024-05-03 DIAGNOSIS — G47.34 NOCTURNAL HYPOXEMIA: ICD-10-CM

## 2024-05-03 DIAGNOSIS — J96.11 CHRONIC RESPIRATORY FAILURE WITH HYPOXIA (H): ICD-10-CM

## 2024-05-03 PROBLEM — G47.36 HYPOXEMIA ASSOCIATED WITH SLEEP: Status: ACTIVE | Noted: 2024-05-03

## 2024-05-03 LAB
ALLEN'S TEST: YES
BASE EXCESS BLDA CALC-SCNC: 7.5 MMOL/L (ref -3–3)
COHGB MFR BLD: 94.3 % (ref 95–96)
DLCOUNC-%PRED-PRE: 53 %
DLCOUNC-PRE: 9.8 ML/MIN/MMHG
DLCOUNC-PRED: 18.37 ML/MIN/MMHG
ERV-%PRED-PRE: 51 %
ERV-PRE: 0.45 L
ERV-PRED: 0.87 L
EXPTIME-PRE: 9.12 SEC
FEF2575-%PRED-POST: 15 %
FEF2575-%PRED-PRE: 17 %
FEF2575-POST: 0.23 L/SEC
FEF2575-PRE: 0.26 L/SEC
FEF2575-PRED: 1.5 L/SEC
FEFMAX-%PRED-PRE: 42 %
FEFMAX-PRE: 2.06 L/SEC
FEFMAX-PRED: 4.88 L/SEC
FEV1-%PRED-PRE: 36 %
FEV1-PRE: 0.67 L
FEV1FEV6-PRE: 46 %
FEV1FEV6-PRED: 78 %
FEV1FVC-PRE: 41 %
FEV1FVC-PRED: 78 %
FEV1SVC-PRE: 41 %
FEV1SVC-PRED: 61 %
FIFMAX-PRE: 3.31 L/SEC
FRCPLETH-%PRED-PRE: 136 %
FRCPLETH-PRE: 3.73 L
FRCPLETH-PRED: 2.72 L
FVC-%PRED-PRE: 68 %
FVC-PRE: 1.62 L
FVC-PRED: 2.36 L
HCO3 BLD-SCNC: 33 MMOL/L (ref 21–28)
IC-%PRED-PRE: 68 %
IC-PRE: 1.2 L
IC-PRED: 1.75 L
O2/TOTAL GAS SETTING VFR VENT: 21 %
PCO2 BLD: 51 MM HG (ref 35–45)
PH BLD: 7.42 [PH] (ref 7.35–7.45)
PO2 BLD: 64 MM HG (ref 80–105)
RVPLETH-%PRED-PRE: 148 %
RVPLETH-PRE: 3.28 L
RVPLETH-PRED: 2.21 L
SAO2 % BLDA: 92 % (ref 92–100)
TLCPLETH-%PRED-PRE: 99 %
TLCPLETH-PRE: 4.93 L
TLCPLETH-PRED: 4.94 L
VA-%PRED-PRE: 68 %
VA-PRE: 3.05 L
VC-%PRED-PRE: 55 %
VC-PRE: 1.65 L
VC-PRED: 2.97 L

## 2024-05-03 PROCEDURE — 82805 BLOOD GASES W/O2 SATURATION: CPT

## 2024-05-03 PROCEDURE — 36600 WITHDRAWAL OF ARTERIAL BLOOD: CPT

## 2024-05-03 PROCEDURE — 94726 PLETHYSMOGRAPHY LUNG VOLUMES: CPT

## 2024-05-03 PROCEDURE — 94060 EVALUATION OF WHEEZING: CPT

## 2024-05-03 PROCEDURE — 94729 DIFFUSING CAPACITY: CPT

## 2024-05-03 PROCEDURE — 271N000002 HC RX 271

## 2024-05-03 RX ORDER — INHALER, ASSIST DEVICES
1 SPACER (EA) MISCELLANEOUS ONCE
Status: COMPLETED | OUTPATIENT
Start: 2024-05-03 | End: 2024-05-03

## 2024-05-03 RX ADMIN — Medication 1 EACH: at 15:02

## 2024-05-06 SDOH — HEALTH STABILITY: PHYSICAL HEALTH: ON AVERAGE, HOW MANY MINUTES DO YOU ENGAGE IN EXERCISE AT THIS LEVEL?: PATIENT DECLINED

## 2024-05-06 SDOH — HEALTH STABILITY: PHYSICAL HEALTH
ON AVERAGE, HOW MANY DAYS PER WEEK DO YOU ENGAGE IN MODERATE TO STRENUOUS EXERCISE (LIKE A BRISK WALK)?: PATIENT DECLINED

## 2024-05-06 ASSESSMENT — SOCIAL DETERMINANTS OF HEALTH (SDOH): HOW OFTEN DO YOU GET TOGETHER WITH FRIENDS OR RELATIVES?: TWICE A WEEK

## 2024-05-13 ENCOUNTER — OFFICE VISIT (OUTPATIENT)
Dept: FAMILY MEDICINE | Facility: CLINIC | Age: 79
End: 2024-05-13
Attending: PHYSICIAN ASSISTANT
Payer: MEDICARE

## 2024-05-13 ENCOUNTER — TELEPHONE (OUTPATIENT)
Dept: FAMILY MEDICINE | Facility: CLINIC | Age: 79
End: 2024-05-13

## 2024-05-13 VITALS
TEMPERATURE: 97.3 F | BODY MASS INDEX: 35.51 KG/M2 | WEIGHT: 208 LBS | OXYGEN SATURATION: 97 % | DIASTOLIC BLOOD PRESSURE: 60 MMHG | RESPIRATION RATE: 20 BRPM | SYSTOLIC BLOOD PRESSURE: 127 MMHG | HEIGHT: 64 IN | HEART RATE: 81 BPM

## 2024-05-13 DIAGNOSIS — M81.0 AGE-RELATED OSTEOPOROSIS WITHOUT CURRENT PATHOLOGICAL FRACTURE: ICD-10-CM

## 2024-05-13 DIAGNOSIS — E83.42 HYPOMAGNESEMIA: ICD-10-CM

## 2024-05-13 DIAGNOSIS — L89.891 PRESSURE INJURY OF TOE OF LEFT FOOT, STAGE 1: ICD-10-CM

## 2024-05-13 DIAGNOSIS — G40.919 INTRACTABLE EPILEPSY WITHOUT STATUS EPILEPTICUS, UNSPECIFIED EPILEPSY TYPE (H): ICD-10-CM

## 2024-05-13 DIAGNOSIS — E66.01 CLASS 2 SEVERE OBESITY DUE TO EXCESS CALORIES WITH SERIOUS COMORBIDITY AND BODY MASS INDEX (BMI) OF 35.0 TO 35.9 IN ADULT (H): ICD-10-CM

## 2024-05-13 DIAGNOSIS — I10 ESSENTIAL HYPERTENSION: ICD-10-CM

## 2024-05-13 DIAGNOSIS — I73.9 PAD (PERIPHERAL ARTERY DISEASE) (H): ICD-10-CM

## 2024-05-13 DIAGNOSIS — Z00.00 ENCOUNTER FOR MEDICARE ANNUAL WELLNESS EXAM: Primary | ICD-10-CM

## 2024-05-13 DIAGNOSIS — F41.9 ANXIETY: ICD-10-CM

## 2024-05-13 DIAGNOSIS — E78.2 MIXED HYPERLIPIDEMIA: ICD-10-CM

## 2024-05-13 DIAGNOSIS — Z89.422 ACQUIRED ABSENCE OF OTHER LEFT TOE(S) (H): ICD-10-CM

## 2024-05-13 DIAGNOSIS — E66.812 CLASS 2 SEVERE OBESITY DUE TO EXCESS CALORIES WITH SERIOUS COMORBIDITY AND BODY MASS INDEX (BMI) OF 35.0 TO 35.9 IN ADULT (H): ICD-10-CM

## 2024-05-13 DIAGNOSIS — D50.9 IRON DEFICIENCY ANEMIA, UNSPECIFIED IRON DEFICIENCY ANEMIA TYPE: ICD-10-CM

## 2024-05-13 DIAGNOSIS — J96.11 CHRONIC RESPIRATORY FAILURE WITH HYPOXIA (H): ICD-10-CM

## 2024-05-13 LAB
ERYTHROCYTE [DISTWIDTH] IN BLOOD BY AUTOMATED COUNT: 14.8 % (ref 10–15)
HCT VFR BLD AUTO: 28.7 % (ref 35–47)
HGB BLD-MCNC: 8.8 G/DL (ref 11.7–15.7)
MCH RBC QN AUTO: 24.9 PG (ref 26.5–33)
MCHC RBC AUTO-ENTMCNC: 30.7 G/DL (ref 31.5–36.5)
MCV RBC AUTO: 81 FL (ref 78–100)
PLATELET # BLD AUTO: 212 10E3/UL (ref 150–450)
RBC # BLD AUTO: 3.54 10E6/UL (ref 3.8–5.2)
WBC # BLD AUTO: 7.8 10E3/UL (ref 4–11)

## 2024-05-13 PROCEDURE — 84460 ALANINE AMINO (ALT) (SGPT): CPT | Performed by: FAMILY MEDICINE

## 2024-05-13 PROCEDURE — 91320 SARSCV2 VAC 30MCG TRS-SUC IM: CPT | Performed by: FAMILY MEDICINE

## 2024-05-13 PROCEDURE — 82306 VITAMIN D 25 HYDROXY: CPT | Performed by: FAMILY MEDICINE

## 2024-05-13 PROCEDURE — 80061 LIPID PANEL: CPT | Performed by: FAMILY MEDICINE

## 2024-05-13 PROCEDURE — 82310 ASSAY OF CALCIUM: CPT | Performed by: FAMILY MEDICINE

## 2024-05-13 PROCEDURE — G0439 PPPS, SUBSEQ VISIT: HCPCS | Performed by: FAMILY MEDICINE

## 2024-05-13 PROCEDURE — 82728 ASSAY OF FERRITIN: CPT | Performed by: FAMILY MEDICINE

## 2024-05-13 PROCEDURE — 99214 OFFICE O/P EST MOD 30 MIN: CPT | Mod: 25 | Performed by: FAMILY MEDICINE

## 2024-05-13 PROCEDURE — 85027 COMPLETE CBC AUTOMATED: CPT | Performed by: FAMILY MEDICINE

## 2024-05-13 PROCEDURE — 90480 ADMN SARSCOV2 VAC 1/ONLY CMP: CPT | Performed by: FAMILY MEDICINE

## 2024-05-13 PROCEDURE — 36415 COLL VENOUS BLD VENIPUNCTURE: CPT | Performed by: FAMILY MEDICINE

## 2024-05-13 PROCEDURE — 82565 ASSAY OF CREATININE: CPT | Performed by: FAMILY MEDICINE

## 2024-05-13 RX ORDER — LEVETIRACETAM 500 MG/1
500 TABLET ORAL 2 TIMES DAILY
Qty: 180 TABLET | Refills: 3 | Status: SHIPPED | OUTPATIENT
Start: 2024-05-13

## 2024-05-13 RX ORDER — SERTRALINE HYDROCHLORIDE 100 MG/1
100 TABLET, FILM COATED ORAL EVERY EVENING
Qty: 90 TABLET | Refills: 3 | Status: SHIPPED | OUTPATIENT
Start: 2024-05-13

## 2024-05-13 RX ORDER — HYDROCHLOROTHIAZIDE 25 MG/1
25 TABLET ORAL DAILY
Qty: 90 TABLET | Refills: 3 | Status: SHIPPED | OUTPATIENT
Start: 2024-05-13

## 2024-05-13 RX ORDER — BIOTIN 10000 MCG
1 CAPSULE ORAL DAILY
COMMUNITY

## 2024-05-13 RX ORDER — POTASSIUM CHLORIDE 1500 MG/1
20 TABLET, EXTENDED RELEASE ORAL AT BEDTIME
Qty: 90 TABLET | Refills: 3 | Status: SHIPPED | OUTPATIENT
Start: 2024-05-13

## 2024-05-13 RX ORDER — LOSARTAN POTASSIUM 50 MG/1
50 TABLET ORAL 2 TIMES DAILY
Qty: 180 TABLET | Refills: 3 | Status: SHIPPED | OUTPATIENT
Start: 2024-05-13

## 2024-05-13 RX ORDER — BACLOFEN 20 MG
500 TABLET ORAL 2 TIMES DAILY PRN
Status: ON HOLD | COMMUNITY
Start: 2024-05-13 | End: 2024-06-25

## 2024-05-13 NOTE — PROGRESS NOTES
Preventive Care Visit  [facility]  Kate Marte DO, Family Medicine  May 13, 2024      Assessment & Plan     Encounter for Medicare annual wellness exam    Counseling  Appropriate preventive services were discussed with this patient, including applicable screening as appropriate for fall prevention, nutrition, physical activity, Tobacco-use cessation, weight loss and cognition.  Checklist reviewing preventive services available has been given to the patient.  Reviewed patient's diet, addressing concerns and/or questions.   The patient was instructed to see the dentist every 6 months.   Updated plan of care.  Patient reported difficulty with activities of daily living were addressed today.The patient was provided with written information regarding signs of hearing loss.   Information on urinary incontinence and treatment options given to patient.     Essential hypertension  Within goal on multidrug regimen.  Reviewed recent monitoring labs which are stable.  Refills provided.  - losartan (COZAAR) 50 MG tablet; Take 1 tablet (50 mg) by mouth 2 times daily  - potassium chloride teja ER (KLOR-CON M20) 20 MEQ CR tablet; Take 1 tablet (20 mEq) by mouth at bedtime  - hydrochlorothiazide (HYDRODIURIL) 25 MG tablet; Take 1 tablet (25 mg) by mouth daily    Anxiety  Stable on sertraline, refills provided.  - sertraline (ZOLOFT) 100 MG tablet; Take 1 tablet (100 mg) by mouth every evening    Intractable epilepsy without status epilepticus, unspecified epilepsy type (H)  Stable on Keppra without breakthrough seizures since the 1990s.   - levETIRAcetam (KEPPRA) 500 MG tablet; Take 1 tablet (500 mg) by mouth 2 times daily    Acquired absence of other left toe(s) (H24)  PAD (peripheral artery disease) (H24)  Pressure injury of toe of left foot, stage 1  Known PAD not currently on statin or aspirin for unclear reasons.  Acquired absence of fifth digit on left, now has slight erythema of the left great  toe.  Recommend additional padding in shoe to prevent further erosion/ulceration.  If symptoms progress, we will have her meet with wound care.  Reviewed signs/symptoms and when to return for follow-up.    Mixed hyperlipidemia  History of hyperlipidemia, not currently on statin though cholesterol levels had been acceptable on last check.  Reassess today.  - Lipid Profile (Chol, Trig, HDL, LDL calc); Future  - ALT; Future    Class 2 severe obesity due to excess calories with serious comorbidity and body mass index (BMI) of 35.0 to 35.9 in adult (H)  Body mass index is 35.7 kg/m .  Activity limited due to chronic lung disease, so we discussed diet modifications.  She will let us know if interested in meeting with a nutritionist.    Iron deficiency anemia, unspecified iron deficiency anemia type  Had episode of gross hematuria that prompted ED visit 3/24/2024.  Underwent outpatient urology irrigation and symptoms have since resolved.  Hemoglobin at that time low at 9.7.  Reassess today.  - CBC with platelets; Future    Chronic respiratory failure with hypoxia (H)  Follows with Dr. 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  Breathing is stable on Advair/Spiriva, as needed albuterol, and daily azithromycin.  Recently had sleep study, though patient concerned these results are inaccurate since they did not specify her nighttime O2 use.  Will reach out to RT to see if the dictation can be adjusted, or if she needs to repeat this study.    Patient has been advised of split billing requirements and indicates understanding: Yes      Brenda Cast is a 79 year old, presenting for the following:  Wellness Visit (fasting) and Recheck Medication (Refills needed, requesting paper copies of some meds)        5/13/2024     9:21 AM   Additional Questions   Roomed by RITA Fernandez CMA   Accompanied by -         Health Care Directive  Patient has a Health  Care Directive on file  Advance care planning document is on file and is current. Patient also asked for a new copy, she wants to update a few things, copy given.     HPI    Uses oxygen at night. Had sleep study but her nocturnal O2 is now mentions so she is concerned by the accuracy of these results.  Using 4 L O2 at night.    Scheduled for cardiac valve imaging through cardiology.  May have to consider valve replacement.  Would not be willing to have surgery if required intubation due to the fragility of her lungs.    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  Has noticed redness of the left great toe.  No significant tenderness.  Has had fifth left toe amputated in the past.    Needing refills of her medications.  Mood is stable, though she has been having multiple medical visits which can become difficult.        5/6/2024   General Health   How would you rate your overall physical health? (!) FAIR   Feel stress (tense, anxious, or unable to sleep) To some extent   (!) STRESS CONCERN      5/6/2024   Nutrition   Diet: Low salt         5/6/2024   Exercise   Days per week of moderate/strenous exercise Patient declined   Average minutes spent exercising at this level Patient declined         5/6/2024   Social Factors   Frequency of gathering with friends or relatives Twice a week   Worry food won't last until get money to buy more No   Food not last or not have enough money for food? No   Do you have housing?  Yes   Are you worried about losing your housing? No   Lack of transportation? No   Unable to get utilities (heat,electricity)? No         5/13/2024   Fall Risk   Gait Speed Test (Document in seconds) 6   Gait Speed Test Interpretation Greater than 5.01 seconds - ABNORMAL          5/6/2024   Activities of Daily Living- Home Safety   Needs help with the following daily activites Housework   Safety concerns in the home None of the above         5/6/2024   Dental   Dentist two times every year? (!) NO          2024   Hearing Screening   Hearing concerns? (!) TROUBLE UNDERSTANDING SOFT OR WHISPERED SPEECH.         2024   Driving Risk Screening   Patient/family members have concerns about driving No         2024   General Alertness/Fatigue Screening   Have you been more tired than usual lately? (!) DECLINE         2024   Urinary Incontinence Screening   Bothered by leaking urine in past 6 months Yes         2024   TB Screening   Were you born outside of the [place]? No         Today's PHQ-2 Score:       2024     9:19 AM   PHQ-2 (  [employer])   Q1: Little interest or pleasure in doing things 0   Q2: Feeling down, depressed or hopeless 1   PHQ-2 Score 1   Q1: Little interest or pleasure in doing things Not at all   Q2: Feeling down, depressed or hopeless Several days   PHQ-2 Score 1           2024   Substance Use   Alcohol more than 3/day or more than 7/wk No   Do you have a current opioid prescription? No   How severe/bad is pain from 1 to 10? 1/10   Do you use any other substances recreationally? No     Social History     Tobacco Use    Smoking status: Former     Current packs/day: 0.00     Average packs/day: 1.5 packs/day for 38.0 years (57.0 ttl pk-yrs)     Types: Cigarettes     Start date: 1960     Quit date: 1998     Years since quittin.4    Smokeless tobacco: Never    Tobacco comments:     quit in    Vaping Use    Vaping status: Never Used   Substance Use Topics    Alcohol use: Yes     Alcohol/week: 2.0 standard drinks of alcohol     Comment: occ    Drug use: Not Currently           2023   LAST FHS-7 RESULTS   1st degree relative breast or ovarian cancer Yes   Any relative bilateral breast cancer No   Any male have breast cancer No   Any ONE woman have BOTH breast AND ovarian cancer No   Any woman with breast cancer before 50yrs No   2 or more relatives with breast AND/OR ovarian cancer No   2 or more relatives with breast AND/OR bowel cancer No            ASCVD Risk    The 10-year ASCVD risk score (Shay PARKS, et al., 2019) is: 31.9%    Values used to calculate the score:      Age: 79 years      Sex: Female      Is Non- : No      Diabetic: No      Tobacco smoker: No      Systolic Blood Pressure: 127 mmHg      Is BP treated: Yes      HDL Cholesterol: 69 mg/dL      Total Cholesterol: 181 mg/dL          Reviewed and updated as needed this visit by Provider   Tobacco  Allergies  Meds  Problems  Med Hx  Surg Hx  Fam Hx            Current providers sharing in care for this patient include:  Patient Care Team:  Kate Marte DO as PCP - General (Family Medicine)  Karissa Ariza, PharmD as Pharmacist (Pharmacist)  Lalitha Haq NP as Nurse Practitioner  Lalitha Hamilton RT as Chronic Pulmonary Disease Specialist (Respiratory Therapy)  Joe Ellsworth MD as MD (Cardiovascular Disease)  Joe Ellsworth MD as MD (Cardiovascular Disease)  Ezra Robertson MD as MD (Cardiovascular Disease)  Ezra Robertson MD as Assigned Heart and Vascular Provider  Teresa Johnson PA-C as Assigned PCP  Ev Plaza APRN CNP as Assigned Pulmonology Provider  Ashley Akhtar PA-C as Physician Assistant (Urology)  Lico Dalal MD as Assigned Surgical Provider    The following health maintenance items are reviewed in Epic and correct as of today:  Health Maintenance   Topic Date Due    ANNUAL REVIEW OF HM ORDERS  Never done    ALT  05/15/2024    LIPID  07/07/2024    BMP  09/24/2024    DEXA  01/12/2025    MEDICARE ANNUAL WELLNESS VISIT  05/13/2025    FALL RISK ASSESSMENT  05/13/2025    CBC  05/13/2025    GLUCOSE  03/24/2027    HF ACTION PLAN  05/13/2027    ADVANCE CARE PLANNING  05/13/2029    DTAP/TDAP/TD IMMUNIZATION (4 - Td or Tdap) 02/12/2031    TSH W/FREE T4 REFLEX  Completed    SPIROMETRY  Completed    HEPATITIS C SCREENING  Completed    COPD ACTION PLAN  Completed    PHQ-2 (once per calendar year)  Completed    INFLUENZA VACCINE   "Completed    Pneumococcal Vaccine: 65+ Years  Completed    ZOSTER IMMUNIZATION  Completed    RSV VACCINE (Pregnancy & 60+)  Completed    COVID-19 Vaccine  Completed    IPV IMMUNIZATION  Aged Out    HPV IMMUNIZATION  Aged Out    MENINGITIS IMMUNIZATION  Aged Out    RSV MONOCLONAL ANTIBODY  Aged Out    MAMMO SCREENING  Discontinued          Objective    Exam  /60   Pulse 81   Temp 97.3  F (36.3  C)   Resp 20   Ht 1.626 m (5' 4\")   Wt 94.3 kg (208 lb)   SpO2 97%   BMI 35.70 kg/m     Estimated body mass index is 35.7 kg/m  as calculated from the following:    Height as of this encounter: 1.626 m (5' 4\").    Weight as of this encounter: 94.3 kg (208 lb).    Physical Exam  GENERAL APPEARANCE: alert and no distress  EYES: Eyes grossly normal to inspection, PERRL and conjunctivae and sclerae normal  HENT: ear canals and TM's normal, nose and mouth without ulcers or lesions, oropharynx clear and oral mucous membranes moist  NECK: no adenopathy, no asymmetry, masses, or scars and thyroid normal to palpation  RESP: Diminished lung sounds throughout, faint expiratory wheeze bilaterally, no rhonchi, crackles  CV: regular rate and rhythm, normal S1 S2, no S3 or S4, 2+ diastolic murmur left sternal border, click or rub, trace peripheral edema right lower extremity to mid shin, 1+ edema left lower extremity to mid shin  ABDOMEN: soft, nontender  MS: Flat feet bilaterally, uses wheeled walker to ambulate  SKIN: Shiny, erythematous area medial left great toe at DIP joint, no obvious skin breakdown, drainage, warmth  NEURO: Normal strength and tone, sensory exam grossly normal, mentation intact and speech normal  PSYCH: mentation appears normal and affect normal/bright        5/13/2024   Mini Cog   Clock Draw Score 2 Normal   3 Item Recall 2 objects recalled   Mini Cog Total Score 4              Signed Electronically by: Kate Marte DO    "

## 2024-05-13 NOTE — TELEPHONE ENCOUNTER
----- Message from Kate Marte DO sent at 5/13/2024 11:10 AM CDT -----  Please notify patient I reached out to the NP who did her sleep study.  She has since addended the report with the correct information.  No need to repeat the sleep study.    Kate Marte DO  ----- Message -----  From: Marley Mccarty APRN CNP  Sent: 5/13/2024  11:08 AM CDT  To: Kate Marte DO    All taken care of. No need for a repeat study. Report was addended with the the correction.  marley  ----- Message -----  From: Kate Marte DO  Sent: 5/13/2024  10:34 AM CDT  To: DONAVAN Roman CNP,    We share a mutual patient who you helped with a sleep study on 4/29/24.  She uses nocturnal O2 typically 4 L.  She is concerned by the sleep study report which states she is not on oxygen overnight.  Is this something that could be addended or does she need to repeat the sleep study?  She is hoping for an update.  Thank you,    Kate Marte DO

## 2024-05-13 NOTE — LETTER
"My Heart Failure Action Plan  Name: Lalitha Underwood   YOB: 1945  Date: 5/13/2024   My doctor:   Kate Marte [place] [address] [facility] [address] [phone] My Diagnosis: {HF TYPE:272446}  My Ejection Fraction:   Lab Results   Component Value Date    LVEF 55-60% 04/17/2024     {EF% (Optional):544740}  My Exercise Goal: Start exercise slowly - to begin, do a few minutes of exercise, several times a day. Increase your time and speed [id number] to build tolerance, with a goal of 30 minutes of exercise daily. Steady, slow, and consistent exercise is both safe and healthy. Stop and rest when you feel tired or become short of breath. Do not push yourself on days when you don t feel well.       My Weight Plan:   Wt Readings from Last 2 Encounters:   04/29/24 96.6 kg (213 lb)   03/26/24 94.3 kg (208 lb)     Weigh yourself daily using the same scale. If you gain more than 2 pounds in 24 hours or 5 pounds in 7 days. {:667003}    My Diet Goal: { :464829::\"No added salt\"}    Emergency Room Visits:    Our goal is to improve your quality of life and help you avoid a visit to the emergency room or hospital.  If we work together, we can achieve this goal. But, if you feel you need to call 911 or go to the emergency room, please do so.  If you go to the emergency room, please bring your list of medicines and your daily weight chart with you.    Each day ask yourself:  Is my weight up?  Do I have swelling?  Do I have trouble breathing?  How did I sleep?  Other problems?       GREEN ZONE     Weight gained is no more than 2 pounds a day or 5 pounds a in 7 days.  No swelling in feet, ankles, legs or stomach.  No more swelling than usual.  No more trouble breathing than usual.  No change in my sleep.  No other problems. What should I do?  I am doing fine. I will take my medicine, follow my diet, see my doctor, exercise, and watch for symptoms.           " YELLOW ZONE         Weight gain of more than 2 pounds in one day or 5 pounds in 7 days.  New swelling in ankle, leg, knee or thigh.  Bloating in belly, pants feel tighter.  Swelling in hands or face.  Coughing or trouble breathing while walking or talking.  Harder to breathe last night.  Have trouble sleeping, wake up short of breath.  Unusually tired.  Not eating.  Nausea, vomiting, or diarrhea  Pain in my chest or bad  leg cramps.  Feel weak or dizzy. What should I do?  I need to take action and call my doctor or nurse today.                 RED ZONE         Weight gain of 5 pounds overnight.  Chest pain or pressure that does not go away.  Feel less alert.  Wheezing or have trouble breathing when at rest.  Cannot sleep lying down.  Cannot take my medicines.  Pass out or faint. What should I do?  I need to call my doctor or nurse now!  Call 911 if I have chest pain or cannot breathe.

## 2024-05-13 NOTE — PATIENT INSTRUCTIONS
"Preventive Care Advice   This is general advice we often give to help people stay healthy. Your care team may have specific advice just for you. Please talk to your care team about your own preventive care needs.  Lifestyle  Exercise at least 150 minutes each week (30 minutes a day, 5 days a week).  Do muscle strengthening activities 2 days a week. These help control your weight and prevent disease.  No smoking.  Wear sunscreen to prevent skin cancer.  Have your home tested for radon every 2 to 5 years. Radon is a colorless, odorless gas that can harm your lungs. To learn more, go to www.health.[facility].mn. and search for \"Radon in Homes.\"  Keep guns unloaded and locked up in a safe place like a safe or gun vault, or, use a gun lock and hide the keys. Always lock away bullets separately. To learn more, visit [employer].[place].gov and search for \"safe gun storage.\"  Nutrition  Eat 5 or more servings of fruits and vegetables each day.  Try wheat bread, brown rice and whole grain pasta (instead of white bread, rice, and pasta).  Get enough calcium and vitamin D. Check the label on foods and aim for 100% of the RDA (recommended daily allowance).  Regular exams  Have a dental exam and cleaning every 6 months.  See your health care team every year to talk about:  Any changes in your health.  Any medicines your care team has prescribed.  Preventive care, family planning, and ways to prevent chronic diseases.  Shots (vaccines)   HPV shots (up to age 26), if you've never had them before.  Hepatitis B shots (up to age 59), if you've never had them before.  COVID-19 shot: Get this shot when it's due.  Flu shot: Get a flu shot every year.  Tetanus shot: Get a tetanus shot every 10 years.  Pneumococcal, hepatitis A, and RSV shots: Ask your care team if you need these based on your risk.  Shingles shot (for age 50 and up).  General health tests  Diabetes screening:  Starting at age 35, Get screened for diabetes at least every 3 years.  If " you are younger than age 35, ask your care team if you should be screened for diabetes.  Cholesterol test: At age 39, start having a cholesterol test every 5 years, or more often if advised.  Bone density scan (DEXA): At age 50, ask your care team if you should have this scan for osteoporosis (brittle bones).  Hepatitis C: Get tested at least once in your life.  Abdominal aortic aneurysm screening: Talk to your doctor about having this screening if you:  Have ever smoked; and  Are biologically male; and  Are between the ages of 65 and 75.  STIs (sexually transmitted infections)  Before age 24: Ask your care team if you should be screened for STIs.  After age 24: Get screened for STIs if you're at risk. You are at risk for STIs (including HIV) if:  You are sexually active with more than one person.  You don't use condoms every time.  You or a partner was diagnosed with a sexually transmitted infection.  If you are at risk for HIV, ask about PrEP medicine to prevent HIV.  Get tested for HIV at least once in your life, whether you are at risk for HIV or not.  Cancer screening tests  Cervical cancer screening: If you have a cervix, begin getting regular cervical cancer screening tests at age 21. Most people who have regular screenings with normal results can stop after age 65. Talk about this with your provider.  Breast cancer scan (mammogram): If you've ever had breasts, begin having regular mammograms starting at age 40. This is a scan to check for breast cancer.  Colon cancer screening: It is important to start screening for colon cancer at age 45.  Have a colonoscopy test every 10 years (or more often if you're at risk) Or, ask your provider about stool tests like a FIT test every year or Cologuard test every 3 years.  To learn more about your testing options, visit: www.[employer]ault/234028.pdf.  For help making a decision, visit: victor hugo/[email or web address].  Prostate cancer screening test: If you have a prostate and are age 55  to 69, ask your provider if you would benefit from a yearly prostate cancer screening test.  Lung cancer screening: If you are a current or former smoker age 50 to 80, ask your care team if ongoing lung cancer screenings are right for you.  For informational purposes only. Not to replace the advice of your health care provider. Copyright   2023 [facility]. All rights reserved. Clinically reviewed by the [facility] Transitions Program. [employer] 554407 - REV 04/24.    Learning About Activities of Daily Living  What are activities of daily living?     Activities of daily living (ADLs) are the basic self-care tasks you do every day. These include eating, bathing, dressing, and moving around.  As you age, and if you have health problems, you may find that it's harder to do some of these tasks. If so, your doctor can suggest ideas that may help.  To measure what kind of help you may need, your doctor will ask how well you are able to do ADLs. Let your doctor know if there are any tasks that you are having trouble doing. This is an important first step to getting help. And when you have the help you need, you can stay as independent as possible.  How will a doctor assess your ADLs?  Asking about ADLs is part of a routine health checkup your doctor will likely do as you age. Your health check might be done in a doctor's office, in your home, or at a hospital. The goal is to find out if you are having any problems that could make it hard to care for yourself or that make it unsafe for you to be on your own.  To measure your ADLs, your doctor will ask how hard it is for you to do routine tasks. Your doctor may also want to know if you have changed the way you do a task because of a health problem. Your doctor may watch how you:  Walk back and forth.  Keep your balance while you stand or walk.  Move from sitting to standing or from a bed to a chair.  Button or unbutton a shirt or sweater.  Remove and put  on your shoes.  It's common to feel a little worried or anxious if you find you can't do all the things you used to be able to do. Talking with your doctor about ADLs is a way to make sure you're as safe as possible and able to care for yourself as well as you can. You may want to bring a caregiver, friend, or family member to your checkup. They can help you talk to your doctor.  Follow-up care is a key part of your treatment and safety. Be sure to make and go to all appointments, and call your doctor if you are having problems. It's also a good idea to know your test results and keep a list of the medicines you take.  Current as of: October 24, 2023               Content Version: 14.0    9828-0316 [employer].   Care instructions adapted under license by your healthcare professional. If you have questions about a medical condition or this instruction, always ask your healthcare professional. [employer] disclaims any warranty or liability for your use of this information.      Preventing Falls: Care Instructions  Injuries and health problems such as trouble walking or poor eyesight can increase your risk of falling. So can some medicines. But there are things you can do to help prevent falls. You can exercise to get stronger. You can also arrange your home to make it safer.    Talk to your doctor about the medicines you take. Ask if any of them increase the risk of falls and whether they can be changed or stopped.   Try to exercise regularly. It can help improve your strength and balance. This can help lower your risk of falling.     Practice fall safety and prevention.    Wear low-heeled shoes that fit well and give your feet good support. Talk to your doctor if you have foot problems that make this hard.  Carry a cellphone or wear a medical alert device that you can use to call for help.  Use stepladders instead of chairs to reach high objects. Don't climb if you're at risk for falls.  "Ask for help, if needed.  Wear the correct eyeglasses, if you need them.    Make your home safer.    Remove rugs, cords, clutter, and furniture from walkways.  Keep your house well lit. Use night-lights in hallways and bathrooms.  Install and use sturdy handrails on stairways.  Wear nonskid footwear, even inside. Don't walk barefoot or in socks without shoes.    Be safe outside.    Use handrails, curb cuts, and ramps whenever possible.  Keep your hands free by using a shoulder bag or backpack.  Try to walk in well-lit areas. Watch out for uneven ground, changes in pavement, and debris.  Be careful in the winter. Walk on the grass or gravel when sidewalks are slippery. Use de-icer on steps and walkways. Add non-slip devices to shoes.    Put grab bars and nonskid mats in your shower or tub and near the toilet. Try to use a shower chair or bath bench when bathing.   Get into a tub or shower by putting in your weaker leg first. Get out with your strong side first. Have a phone or medical alert device in the bathroom with you.   Where can you learn more?  Go to https://www.[email or web address].net/patiented  Enter G117 in the search box to learn more about \"Preventing Falls: Care Instructions.\"  Current as of: July 17, 2023               Content Version: 14.0    2207-7181 [employer].   Care instructions adapted under license by your healthcare professional. If you have questions about a medical condition or this instruction, always ask your healthcare professional. [employer] disclaims any warranty or liability for your use of this information.      Hearing Loss: Care Instructions  Overview     Hearing loss is a sudden or slow decrease in how well you hear. It can range from slight to profound. Permanent hearing loss can occur with aging. It also can happen when you are exposed long-term to loud noise. Examples include listening to loud music, riding motorcycles, or being around other loud " machines.  Hearing loss can affect your work and home life. It can make you feel lonely or depressed. You may feel that you have lost your independence. But hearing aids and other devices can help you hear better and feel connected to others.  Follow-up care is a key part of your treatment and safety. Be sure to make and go to all appointments, and call your doctor if you are having problems. It's also a good idea to know your test results and keep a list of the medicines you take.  How can you care for yourself at home?  Avoid loud noises whenever possible. This helps keep your hearing from getting worse.  Always wear hearing protection around loud noises.  Wear a hearing aid as directed.  A professional can help you pick a hearing aid that will work best for you.  You can also get hearing aids over the counter for mild to moderate hearing loss.  Have hearing tests as your doctor suggests. They can show whether your hearing has changed. Your hearing aid may need to be adjusted.  Use other devices as needed. These may include:  Telephone amplifiers and hearing aids that can connect to a television, stereo, radio, or microphone.  Devices that use lights or vibrations. These alert you to the doorbell, a ringing telephone, or a baby monitor.  Television closed-captioning. This shows the words at the bottom of the screen. Most new TVs can do this.  TTY (text telephone). This lets you type messages back and forth on the telephone instead of talking or listening. These devices are also called TDD. When messages are typed on the keyboard, they are sent over the phone line to a receiving TTY. The message is shown on a monitor.  Use text messaging, social media, and email if it is hard for you to communicate by telephone.  Try to learn a listening technique called speechreading. It is not lipreading. You pay attention to people's gestures, expressions, posture, and tone of voice. These clues can help you understand what a  "person is saying. Face the person you are talking to, and have them face you. Make sure the lighting is good. You need to see the other person's face clearly.  Think about counseling if you need help to adjust to your hearing loss.  When should you call for help?  Watch closely for changes in your health, and be sure to contact your doctor if:    You think your hearing is getting worse.     You have new symptoms, such as dizziness or nausea.   Where can you learn more?  Go to https://www.[employer].net/patiented  Enter R798 in the search box to learn more about \"Hearing Loss: Care Instructions.\"  Current as of: September 27, 2023               Content Version: 14.0    1703-2757 [employer].   Care instructions adapted under license by your healthcare professional. If you have questions about a medical condition or this instruction, always ask your healthcare professional. [employer] disclaims any warranty or liability for your use of this information.      Learning About Stress  What is stress?     Stress is your body's response to a hard situation. Your body can have a physical, emotional, or mental response. Stress is a fact of life for most people, and it affects everyone differently. What causes stress for you may not be stressful for someone else.  A lot of things can cause stress. You may feel stress when you go on a job interview, take a test, or run a race. This kind of short-term stress is normal and even useful. It can help you if you need to work hard or react quickly. For example, stress can help you finish an important job on time.  Long-term stress is caused by ongoing stressful situations or events. Examples of long-term stress include long-term health problems, ongoing problems at work, or conflicts in your family. Long-term stress can harm your health.  How does stress affect your health?  When you are stressed, your body responds as though you are in danger. It makes " hormones that speed up your heart, make you breathe faster, and give you a burst of energy. This is called the fight-or-flight stress response. If the stress is over quickly, your body goes back to normal and no harm is done.  But if stress happens too often or lasts too long, it can have bad effects. Long-term stress can make you more likely to get sick, and it can make symptoms of some diseases worse. If you tense up when you are stressed, you may develop neck, shoulder, or low back pain. Stress is linked to high blood pressure and heart disease.  Stress also harms your emotional health. It can make you henderson, tense, or depressed. Your relationships may suffer, and you may not do well at work or school.  What can you do to manage stress?  You can try these things to help manage stress:   Do something active. Exercise or activity can help reduce stress. Walking is a great way to get started. Even everyday activities such as housecleaning or yard work can help.  Try yoga or aura chi. These techniques combine exercise and meditation. You may need some training at first to learn them.  Do something you enjoy. For example, listen to music or go to a movie. Practice your hobby or do volunteer work.  Meditate. This can help you relax, because you are not worrying about what happened before or what may happen in the future.  Do guided imagery. Imagine yourself in any setting that helps you feel calm. You can use online videos, books, or a teacher to guide you.  Do breathing exercises. For example:  From a standing position, bend forward from the waist with your knees slightly bent. Let your arms dangle close to the floor.  Breathe in slowly and deeply as you return to a standing position. Roll up slowly and lift your head last.  Hold your breath for just a few seconds in the standing position.  Breathe out slowly and bend forward from the waist.  Let your feelings out. Talk, laugh, cry, and express anger when you need to.  "Talking with supportive friends or family, a counselor, or a tino leader about your feelings is a healthy way to relieve stress. Avoid discussing your feelings with people who make you feel worse.  Write. It may help to write about things that are bothering you. This helps you find out how much stress you feel and what is causing it. When you know this, you can find better ways to cope.  What can you do to prevent stress?  You might try some of these things to help prevent stress:  Manage your time. This helps you find time to do the things you want and need to do.  Get enough sleep. Your body recovers from the stresses of the day while you are sleeping.  Get support. Your family, friends, and community can make a difference in how you experience stress.  Limit your news feed. Avoid or limit time on social media or news that may make you feel stressed.  Do something active. Exercise or activity can help reduce stress. Walking is a great way to get started.  Where can you learn more?  Go to https://www.[employer].net/patiented  Enter N032 in the search box to learn more about \"Learning About Stress.\"  Current as of: October 24, 2023               Content Version: 14.0    1312-6230 [employer].   Care instructions adapted under license by your healthcare professional. If you have questions about a medical condition or this instruction, always ask your healthcare professional. [employer] disclaims any warranty or liability for your use of this information.      Bladder Training: Care Instructions  Your Care Instructions     Bladder training is used to treat urge incontinence and stress incontinence. Urge incontinence means that the need to urinate comes on so fast that you can't get to a toilet in time. Stress incontinence means that you leak urine because of pressure on your bladder. For example, it may happen when you laugh, cough, or lift something heavy.  Bladder training can increase " how long you can wait before you have to urinate. It can also help your bladder hold more urine. And it can give you better control over the urge to urinate.  It is important to remember that bladder training takes a few weeks to a few months to make a difference. You may not see results right away, but don't give up.  Follow-up care is a key part of your treatment and safety. Be sure to make and go to all appointments, and call your doctor if you are having problems. It's also a good idea to know your test results and keep a list of the medicines you take.  How can you care for yourself at home?  Work with your doctor to come up with a bladder training program that is right for you. You may use one or more of the following methods.  Delayed urination  In the beginning, try to keep from urinating for 5 minutes after you first feel the need to go.  While you wait, take deep, slow breaths to relax. Kegel exercises can also help you delay the need to go to the bathroom.  After some practice, when you can easily wait 5 minutes to urinate, try to wait 10 minutes before you urinate.  Slowly increase the waiting period until you are able to control when you have to urinate.  Scheduled urination  Empty your bladder when you first wake up in the morning.  Schedule times throughout the day when you will urinate.  Start by going to the bathroom every hour, even if you don't need to go.  Slowly increase the time between trips to the bathroom.  When you have found a schedule that works well for you, keep doing it.  If you wake up during the night and have to urinate, do it. Apply your schedule to waking hours only.  Kegel exercises  These tighten and strengthen pelvic muscles, which can help you control the flow of urine. (If doing these exercises causes pain, stop doing them and talk with your doctor.) To do Kegel exercises:  Squeeze your muscles as if you were trying not to pass gas. Or squeeze your muscles as if you were  "stopping the flow of urine. Your belly, legs, and buttocks shouldn't move.  Hold the squeeze for 3 seconds, then relax for 5 to 10 seconds.  Start with 3 seconds, then add 1 second each week until you are able to squeeze for 10 seconds.  Repeat the exercise 10 times a session. Do 3 to 8 sessions a day.  When should you call for help?  Watch closely for changes in your health, and be sure to contact your doctor if:    Your incontinence is getting worse.     You do not get better as expected.   Where can you learn more?  Go to https://www.[employer].net/patiented  Enter V684 in the search box to learn more about \"Bladder Training: Care Instructions.\"  Current as of: November 15, 2023               Content Version: 14.0    7581-9236 [employer].   Care instructions adapted under license by your healthcare professional. If you have questions about a medical condition or this instruction, always ask your healthcare professional. [employer] disclaims any warranty or liability for your use of this information.      "

## 2024-05-14 ENCOUNTER — HOSPITAL ENCOUNTER (OUTPATIENT)
Dept: CT IMAGING | Facility: CLINIC | Age: 79
Discharge: HOME | End: 2024-05-14
Attending: INTERNAL MEDICINE | Admitting: INTERNAL MEDICINE
Payer: MEDICARE

## 2024-05-14 DIAGNOSIS — I35.0 AORTIC STENOSIS: ICD-10-CM

## 2024-05-14 LAB
ALT SERPL W P-5'-P-CCNC: 11 U/L (ref 0–50)
CALCIUM SERPL-MCNC: 9.8 MG/DL (ref 8.8–10.2)
CHOLEST SERPL-MCNC: 171 MG/DL
CREAT SERPL-MCNC: 0.88 MG/DL (ref 0.51–0.95)
EGFRCR SERPLBLD CKD-EPI 2021: 66 ML/MIN/1.73M2
FASTING STATUS PATIENT QL REPORTED: YES
FERRITIN SERPL-MCNC: 8 NG/ML (ref 11–328)
HDLC SERPL-MCNC: 74 MG/DL
LDLC SERPL CALC-MCNC: 87 MG/DL
NONHDLC SERPL-MCNC: 97 MG/DL
TRIGL SERPL-MCNC: 52 MG/DL
VIT D+METAB SERPL-MCNC: 54 NG/ML (ref 20–50)

## 2024-05-14 PROCEDURE — G1010 CDSM STANSON: HCPCS | Performed by: INTERNAL MEDICINE

## 2024-05-14 PROCEDURE — 250N000011 HC RX IP 250 OP 636: Performed by: INTERNAL MEDICINE

## 2024-05-14 PROCEDURE — 74174 CTA ABD&PLVS W/CONTRAST: CPT | Mod: 26 | Performed by: INTERNAL MEDICINE

## 2024-05-14 PROCEDURE — 71275 CT ANGIOGRAPHY CHEST: CPT | Mod: 26 | Performed by: INTERNAL MEDICINE

## 2024-05-14 PROCEDURE — 71275 CT ANGIOGRAPHY CHEST: CPT | Mod: MG

## 2024-05-14 RX ORDER — IOPAMIDOL 755 MG/ML
100 INJECTION, SOLUTION INTRAVASCULAR ONCE
Status: COMPLETED | OUTPATIENT
Start: 2024-05-14 | End: 2024-05-14

## 2024-05-14 RX ADMIN — IOPAMIDOL 100 ML: 755 INJECTION, SOLUTION INTRAVENOUS at 13:03

## 2024-05-17 ENCOUNTER — TELEPHONE (OUTPATIENT)
Dept: CARDIOLOGY | Facility: CLINIC | Age: 79
End: 2024-05-17
Payer: MEDICARE

## 2024-05-17 DIAGNOSIS — I35.0 AORTIC STENOSIS: Primary | ICD-10-CM

## 2024-05-17 NOTE — TELEPHONE ENCOUNTER
"Valve Clinic Prep Worksheet    Patient Name: Lalitha Underwood  : 1945  AGE: 79 year old     Patient Address: [address] [place] [address] BMI: 35.7   Height (CM):    Ht Readings from Last 1 Encounters:   24 1.626 m (5' 4\")                                                      Weight (kg):   Wt Readings from Last 1 Encounters:   24 94.3 kg (208 lb)                                                          Contact info/Phone number:  [phone]  Initial STS: 4.42%                                           Referred by: Dr. Robertson  Date: 24 Insurance:Payor: MEDICARE / Plan: MEDICARE / Product Type: Medicare /    Patient Care Team: Patient Care Team:  Kate Marte DO as PCP - General (Family Medicine)  Karissa Ariza PharmD as Pharmacist (Pharmacist)  Lalitha Haq NP as Nurse Practitioner  Lalitha Hamilton RT as Chronic Pulmonary Disease Specialist (Respiratory Therapy)  Joe Ellsworth MD as MD (Cardiovascular Disease)  Joe Ellsworth MD as MD (Cardiovascular Disease)  Ezra Robertson MD as MD (Cardiovascular Disease)  Ezra Robertson MD as Assigned Heart and Vascular Provider  Teresa Johnson PA-C as Assigned PCP  Ev Plaza APRN CNP as Assigned Pulmonology Provider  Ashley Akhtar PA-C as Physician Assistant (Urology)  Lico Dalal MD as Assigned Surgical Provider    Past Medical History   Problem List:  2024: Class 2 severe obesity due to excess calories with serious   comorbidity in adult (H)  2024: Hypoxemia associated with sleep  2024: Class 2 obesity due to excess calories without serious   comorbidity with body mass index (BMI) of 37.0 to 37.9 in adult  2023: Acquired absence of other left toe(s) (H24)  2023: Cor pulmonale (chronic) (H)  2023: Chronic respiratory failure with hypoxia (H)  2023: Coronary arteriosclerosis due to lipid rich plaque  2022: Cardiac pacemaker in situ  2022: Second degree heart " block  2022: COPD exacerbation (H)  2022: Pneumonia due to infectious organism, unspecified laterality,   unspecified part of lung  2022: Acute on chronic respiratory failure with hypercapnia (H)  2022: Nonspecific abnormal electrocardiogram (ECG) (EKG)  2021-10: PAD (peripheral artery disease) (H24)  2021-10: Keratoma  2021: Epileptic seizure (H)  2021: Esophageal reflux  2021: Hyperlipidemia  2021: Impaired gait and mobility  2021: COPD (chronic obstructive pulmonary disease) (H)  2021: Rosacea  2021: Solar elastosis  2021: Vitamin D deficiency  2021: Hammer toe of left foot  2021: Uterine prolapse  2021: Acquired lymphedema of leg  2021: Leg length discrepancy  2019: Localized deposits of fat  2019: Neuropathy, idiopathic  2018: S/P repair of paraesophageal hernia  2018: Decreased hearing of both ears  2018: Osteoporosis  2017: Mild aortic valve stenosis  2017: Mild aortic valve regurgitation  2017: Urge incontinence of urine  2017: Valgus deformity of both feet  2017: Collapsed arches  2015: Left arm swelling  2015: Leg swelling  Essential hypertension, benign  Morbid obesity (H)  Convulsive disorder (H)     Transthoracic Echocardiogram    Date (24): M mmHg Peak V: 3.37 m/sec CARINE: 0.74 cm  DI: 0.30 SVI: 30.2 ml/m  EF: 55-60%  Ao max P mmHg      Comment on valves: moderate MR. Mild TR. Moderate to severe low flow low gradient aortic stenosis.    Heart Cath Summary CTA (TAVR) DEV   Cardiac Catheterization- Dr. Kevin Leger     Result Date: 2023  1.  Mild to moderate right coronary plaque, less than 40% stenosis.  2.  Normal-appearing LAD  3.  Normal-appearing left circumflex, small vessel.  4.  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     Catheter was able to be advanced retrograde through the anomalous   connection from the right atrium into left-sided pulmonary vein.  5.  Mild pulmonary hypertension mean (PA 49/16, mean 30 mmHg); Normal   pulmonary vascular resistance 1.7 BOYD        done 5/14/24- needs to be read  N/A   Labs   Creat:   Creatinine   Date Value Ref Range Status   05/13/2024 0.88 0.51 - 0.95 mg/dL Final   11/28/2018 0.85 0.52 - 1.04 mg/dL Final       GFR:   GFR Estimate   Date Value Ref Range Status   05/13/2024 66 >60 mL/min/1.73m2 Final   03/24/2024 67 >60 mL/min/1.73m2 Final   12/27/2023 63 >60 mL/min/1.73m2 Final   02/12/2021 >60 >60 mL/min/1.73m2 Final   02/24/2020 >60 >60 mL/min/1.73m2 Final   02/21/2019 >60 >60 mL/min/1.73m2 Final   11/28/2018 66 >60 mL/min/1.7m2 Final     Comment:     Non  GFR Calc   11/27/2018 >90 >60 mL/min/1.7m2 Final     Comment:     Non  GFR Calc   11/26/2018 88 >60 mL/min/1.7m2 Final     Comment:     Non  GFR Calc     GFR, ESTIMATED POCT   Date Value Ref Range Status   06/20/2023 57 (L) >60 mL/min/1.73m2 Final     GFR Estimate If Black   Date Value Ref Range Status   02/12/2021 >60 >60 mL/min/1.73m2 Final   02/24/2020 >60 >60 mL/min/1.73m2 Final   02/21/2019 >60 >60 mL/min/1.73m2 Final   11/28/2018 79 >60 mL/min/1.7m2 Final     Comment:      GFR Calc   11/27/2018 >90 >60 mL/min/1.7m2 Final     Comment:      GFR Calc   11/26/2018 >90 >60 mL/min/1.7m2 Final     Comment:      GFR Calc       Potassium:   Potassium   Date Value Ref Range Status   03/24/2024 3.6 3.4 - 5.3 mmol/L Final   04/19/2022 4.3 3.4 - 5.3 mmol/L Final   11/28/2018 4.8 3.4 - 5.3 mmol/L Final       Sodium:   Sodium   Date Value Ref Range Status   03/24/2024 139 135 - 145 mmol/L Final     Comment:     Reference intervals for this test were updated on 09/26/2023 to more accurately reflect our healthy population. There may be differences in the  "flagging of prior results with similar values performed with this method. Interpretation of those prior results can be made in the context of the updated reference intervals.    11/28/2018 136 133 - 144 mmol/L Final       WBC:   WBC   Date Value Ref Range Status   12/03/2018 9.1 4.0 - 11.0 10e9/L Final     WBC Count   Date Value Ref Range Status   05/13/2024 7.8 4.0 - 11.0 10e3/uL Final       Hgb:   Hemoglobin   Date Value Ref Range Status   05/13/2024 8.8 (L) 11.7 - 15.7 g/dL Final   03/24/2024 9.7 (L) 11.7 - 15.7 g/dL Final   12/03/2018 10.9 (L) 11.7 - 15.7 g/dL Final   12/02/2018 11.1 (L) 11.7 - 15.7 g/dL Final       HCT:   Hematocrit   Date Value Ref Range Status   05/13/2024 28.7 (L) 35.0 - 47.0 % Final   12/03/2018 34.3 (L) 35.0 - 47.0 % Final       Plts:   Platelet Count   Date Value Ref Range Status   05/13/2024 212 150 - 450 10e3/uL Final   12/03/2018 287 150 - 450 10e9/L Final       Albumin:   Lab Results   Component Value Date    ALBUMIN 4.2 05/15/2023    ALBUMIN 3.5 04/19/2022    ALBUMIN 3.0 11/27/2018       INR: No results found for: \"INR\"     Current Medications: Allergies:    Current Outpatient Medications   Medication Sig Dispense Refill    albuterol (PROAIR HFA/PROVENTIL HFA/VENTOLIN HFA) 108 (90 Base) MCG/ACT inhaler Inhale 2 puffs into the lungs every 6 hours as needed for shortness of breath or wheezing 18 g 3    azithromycin (ZITHROMAX) 250 MG tablet Take 1 tablet (250 mg) by mouth every evening 90 tablet 3    benzonatate (TESSALON) 100 MG capsule Take 1 capsule (100 mg) by mouth 3 times daily as needed for cough 90 capsule 3    Biotin 10 MG CAPS       calcium citrate (CITRACAL) 950 (200 Ca) MG tablet Take 1 tablet by mouth 2 times daily      estradiol (ESTRACE) 0.1 MG/GM vaginal cream Place 2 g vaginally three times a week 72 g 1    ferrous fumarate-vitamin C ER (DEYA-SEQUELS) 65-25 MG CR tablet Take 1 tablet by mouth daily (with breakfast) 90 tablet 1    fluticasone-salmeterol (ADVAIR) 500-50 " MCG/ACT inhaler Inhale 1 puff into the lungs every 12 hours 180 each 3    guaiFENesin (MUCINEX) 600 MG 12 hr tablet Take 1 tablet (600 mg) by mouth 2 times daily 180 tablet 3    hydrochlorothiazide (HYDRODIURIL) 25 MG tablet Take 1 tablet (25 mg) by mouth daily 90 tablet 3    ipratropium - albuterol 0.5 mg/2.5 mg/3 mL (DUONEB) 0.5-2.5 (3) MG/3ML neb solution TAKE 1 VIAL BY NEBULIZATION EVERY 6 HOURS AS NEEDED FOR SHORTNESS OF BREATH OR WHEEZING 1080 mL 3    levalbuterol (XOPENEX) 1.25 MG/3ML neb solution Take 3 mLs (1.25 mg) by nebulization every 4 hours as needed for shortness of breath or wheezing 810 mL 3    levETIRAcetam (KEPPRA) 500 MG tablet Take 1 tablet (500 mg) by mouth 2 times daily 180 tablet 3    losartan (COZAAR) 50 MG tablet Take 1 tablet (50 mg) by mouth 2 times daily 180 tablet 3    Magnesium Oxide -Mg Supplement 500 MG TABS Take 1 tablet (500 mg) by mouth 2 times daily as needed      nystatin (MYCOSTATIN) 918245 UNIT/GM external powder Apply topically daily as needed      potassium chloride teja ER (KLOR-CON M20) 20 MEQ CR tablet Take 1 tablet (20 mEq) by mouth at bedtime 90 tablet 3    RABEprazole (ACIPHEX) 20 MG EC tablet Take 1 tablet (20 mg) by mouth daily 90 tablet 3    sertraline (ZOLOFT) 100 MG tablet Take 1 tablet (100 mg) by mouth every evening 90 tablet 3    solifenacin (VESICARE) 10 MG tablet Take 1 tablet (10 mg) by mouth daily 90 tablet 3    spironolactone (ALDACTONE) 25 MG tablet Take 0.5 tablets (12.5 mg) by mouth daily 45 tablet 3    tiotropium (SPIRIVA RESPIMAT) 2.5 MCG/ACT inhaler Inhale 2 puffs into the lungs daily 12 g 3    vitamin D3 (CHOLECALCIFEROL) 125 MCG (5000 UT) tablet Take 5,000 Units by mouth daily        Current Facility-Administered Medications   Medication Dose Route Frequency Provider Last Rate Last Admin    denosumab (PROLIA) injection 60 mg  60 mg Subcutaneous Q6 Months Lalitha Haq NP   60 mg at 01/02/24 1025        Allergies   Allergen Reactions     Clindamycin Rash    Carbamazepine Rash    Morphine Nausea

## 2024-05-22 LAB
ABO/RH(D): NORMAL
ANTIBODY SCREEN: NEGATIVE
SPECIMEN EXPIRATION DATE: NORMAL

## 2024-05-23 ENCOUNTER — ALLIED HEALTH/NURSE VISIT (OUTPATIENT)
Dept: CARDIOLOGY | Facility: CLINIC | Age: 79
End: 2024-05-23
Payer: MEDICARE

## 2024-05-23 ENCOUNTER — OFFICE VISIT (OUTPATIENT)
Dept: CARDIOLOGY | Facility: CLINIC | Age: 79
End: 2024-05-23
Payer: MEDICARE

## 2024-05-23 ENCOUNTER — APPOINTMENT (OUTPATIENT)
Dept: CARDIOLOGY | Facility: CLINIC | Age: 79
End: 2024-05-23
Payer: MEDICARE

## 2024-05-23 VITALS
WEIGHT: 208 LBS | BODY MASS INDEX: 35.7 KG/M2 | SYSTOLIC BLOOD PRESSURE: 157 MMHG | DIASTOLIC BLOOD PRESSURE: 90 MMHG | HEART RATE: 89 BPM | RESPIRATION RATE: 20 BRPM

## 2024-05-23 DIAGNOSIS — I35.0 NONRHEUMATIC AORTIC VALVE STENOSIS: ICD-10-CM

## 2024-05-23 DIAGNOSIS — I35.0 AORTIC VALVE STENOSIS, ETIOLOGY OF CARDIAC VALVE DISEASE UNSPECIFIED: ICD-10-CM

## 2024-05-23 DIAGNOSIS — I51.89 OTHER ILL-DEFINED HEART DISEASES: Primary | ICD-10-CM

## 2024-05-23 LAB
ANION GAP SERPL CALCULATED.3IONS-SCNC: 7 MMOL/L (ref 7–15)
BUN SERPL-MCNC: 25.3 MG/DL (ref 8–23)
CALCIUM SERPL-MCNC: 9.8 MG/DL (ref 8.8–10.2)
CHLORIDE SERPL-SCNC: 99 MMOL/L (ref 98–107)
CREAT SERPL-MCNC: 0.87 MG/DL (ref 0.51–0.95)
DEPRECATED HCO3 PLAS-SCNC: 32 MMOL/L (ref 22–29)
EGFRCR SERPLBLD CKD-EPI 2021: 67 ML/MIN/1.73M2
GLUCOSE SERPL-MCNC: 102 MG/DL (ref 70–99)
POTASSIUM SERPL-SCNC: 4.5 MMOL/L (ref 3.4–5.3)
SODIUM SERPL-SCNC: 138 MMOL/L (ref 135–145)

## 2024-05-23 PROCEDURE — 99207 PR NO CHARGE LOS: CPT

## 2024-05-23 PROCEDURE — 99215 OFFICE O/P EST HI 40 MIN: CPT | Performed by: INTERNAL MEDICINE

## 2024-05-23 PROCEDURE — 86900 BLOOD TYPING SEROLOGIC ABO: CPT | Performed by: INTERNAL MEDICINE

## 2024-05-23 PROCEDURE — 86850 RBC ANTIBODY SCREEN: CPT | Performed by: INTERNAL MEDICINE

## 2024-05-23 PROCEDURE — 36415 COLL VENOUS BLD VENIPUNCTURE: CPT | Performed by: INTERNAL MEDICINE

## 2024-05-23 PROCEDURE — 80048 BASIC METABOLIC PNL TOTAL CA: CPT | Performed by: INTERNAL MEDICINE

## 2024-05-23 PROCEDURE — 93000 ELECTROCARDIOGRAM COMPLETE: CPT | Performed by: GENERAL ACUTE CARE HOSPITAL

## 2024-05-23 PROCEDURE — 86901 BLOOD TYPING SEROLOGIC RH(D): CPT | Performed by: INTERNAL MEDICINE

## 2024-05-23 PROCEDURE — G2211 COMPLEX E/M VISIT ADD ON: HCPCS | Performed by: INTERNAL MEDICINE

## 2024-05-23 NOTE — PROGRESS NOTES
5 Meter Walk Test:  1st Trial: 12.17  2nd Trial: 11.05  3rd Trial:12.34    Score: 8.18    Pt. Used a walker/oxygen tank during the 5 meter walk.

## 2024-05-23 NOTE — LETTER
5/23/2024    Kate Marte, DO  [address]  [facility] [address]    RE: Lalitha Underwood       Dear Colleague,     I had the pleasure of seeing Lalitha Underwood in the [facility].  [facility]   [address], [place] [address]   [email or web address]   OFFICE: [phone]     IMPRESSION:  Low-flow, low gradient, severe symptomatic aortic valve stenosis (PV 3.4 m/s, MG 28 mmHg, and calculated valve area of 0.8 cm , SVI 30.2 mL/m ). Valve calcium score 1284AU.   Functional Capacity:  NYHA class 2, CCS class 0   Prior right heart catheterization with concern anomalous pulmonary venous return (RA step up with Qp/Qs of 2.3), not seen on cardiac MRI  Mild nonobstructive coronary artery disease  Advanced heart block post dual-chamber pacemaker placement  Hypertension  Hyperlipidemia  COPD  Epilepsy on Keppra  Iron deficiency anemia, with recent gross hematuria followed by urology        SUMMARY OF RECOMMENDATIONS: We had a long discussion with Ms. Underwood regarding natural progression and therapeutic options of calcific aortic valve disease.  She appears to be an appropriate candidate for transcatheter aortic valve replacement and we will continue her workup as planned.  We have all the available data, we will contact her with the plan.    Dear Dr. Robertson and Estuardo,     I had the pleasure of seeing Lalitha Underwood today at the [facility] for evaluation for Transcatheter Aortic Valve Replacement for symptomatic severe aortic valve stenosis at the request of Dr. Robertson.  Briefly, patient was recently evaluated in cardiology clinic and repeat echocardiogram was notable for progression of her aortic valve stenosis, now severe.  She was referred to our clinic for further follow-up.  Today,Ms Underwood reports progressive dyspnea on exertion and exertional fatigue.   She denies denies symptoms of chest pain,  palpitations, dizziness, lightheadedness, presyncope, syncope, orthopnea, PND, early satiety/abdominal bloating, lower extremity edema, stroke like symptoms, or claudication.       ROS:  A 14 point review of symptoms was reviewed.  No prior hx of rheumatic fever or chest irradiation. There were no additional findings pertinent to this encounter.     Past Medical History: as above     Social History: Denies tobacco or alcohol use.    Family History: Denies early family history of cardiovascular disease or sudden cardiac death    Allergies: reviewed   Allergies   Allergen Reactions    Clindamycin Rash    Carbamazepine Rash    Morphine Nausea       Pertinent Medications:    Current Outpatient Medications   Medication Sig Dispense Refill    albuterol (PROAIR HFA/PROVENTIL HFA/VENTOLIN HFA) 108 (90 Base) MCG/ACT inhaler Inhale 2 puffs into the lungs every 6 hours as needed for shortness of breath or wheezing 18 g 3    azithromycin (ZITHROMAX) 250 MG tablet Take 1 tablet (250 mg) by mouth every evening 90 tablet 3    benzonatate (TESSALON) 100 MG capsule Take 1 capsule (100 mg) by mouth 3 times daily as needed for cough 90 capsule 3    Biotin 10 MG CAPS       calcium citrate (CITRACAL) 950 (200 Ca) MG tablet Take 1 tablet by mouth 2 times daily      estradiol (ESTRACE) 0.1 MG/GM vaginal cream Place 2 g vaginally three times a week 72 g 1    ferrous fumarate-vitamin C ER (DEYA-SEQUELS) 65-25 MG CR tablet Take 1 tablet by mouth daily (with breakfast) 90 tablet 1    fluticasone-salmeterol (ADVAIR) 500-50 MCG/ACT inhaler Inhale 1 puff into the lungs every 12 hours 180 each 3    guaiFENesin (MUCINEX) 600 MG 12 hr tablet Take 1 tablet (600 mg) by mouth 2 times daily 180 tablet 3    hydrochlorothiazide (HYDRODIURIL) 25 MG tablet Take 1 tablet (25 mg) by mouth daily 90 tablet 3    ipratropium - albuterol 0.5 mg/2.5 mg/3 mL (DUONEB) 0.5-2.5 (3) MG/3ML neb solution TAKE 1 VIAL BY  NEBULIZATION EVERY 6 HOURS AS NEEDED FOR SHORTNESS OF BREATH OR WHEEZING 1080 mL 3    levalbuterol (XOPENEX) 1.25 MG/3ML neb solution Take 3 mLs (1.25 mg) by nebulization every 4 hours as needed for shortness of breath or wheezing 810 mL 3    levETIRAcetam (KEPPRA) 500 MG tablet Take 1 tablet (500 mg) by mouth 2 times daily 180 tablet 3    losartan (COZAAR) 50 MG tablet Take 1 tablet (50 mg) by mouth 2 times daily 180 tablet 3    Magnesium Oxide -Mg Supplement 500 MG TABS Take 1 tablet (500 mg) by mouth 2 times daily as needed      nystatin (MYCOSTATIN) 454279 UNIT/GM external powder Apply topically daily as needed      potassium chloride teja ER (KLOR-CON M20) 20 MEQ CR tablet Take 1 tablet (20 mEq) by mouth at bedtime 90 tablet 3    RABEprazole (ACIPHEX) 20 MG EC tablet Take 1 tablet (20 mg) by mouth daily 90 tablet 3    sertraline (ZOLOFT) 100 MG tablet Take 1 tablet (100 mg) by mouth every evening 90 tablet 3    solifenacin (VESICARE) 10 MG tablet Take 1 tablet (10 mg) by mouth daily 90 tablet 3    spironolactone (ALDACTONE) 25 MG tablet Take 0.5 tablets (12.5 mg) by mouth daily 45 tablet 3    tiotropium (SPIRIVA RESPIMAT) 2.5 MCG/ACT inhaler Inhale 2 puffs into the lungs daily 12 g 3    vitamin D3 (CHOLECALCIFEROL) 125 MCG (5000 UT) tablet Take 5,000 Units by mouth daily        Current Facility-Administered Medications   Medication Dose Route Frequency Provider Last Rate Last Admin    denosumab (PROLIA) injection 60 mg  60 mg Subcutaneous Q6 Months Lalitha Haq NP   60 mg at 01/02/24 1025       OBJECTIVE:  Physical Examination   BP (!) 157/90 (BP Location: Other (Comment), Patient Position: Sitting, Cuff Size: Adult Regular)   Pulse 89   Resp 20   Wt 94.3 kg (208 lb)   BMI 35.70 kg/m     Constitutional:  NAD  Cognitive: Alert, oriented x 3, Appropriate speech  Chest:  Clear bilaterally   Cardiac: regular rhythm, normal S1/ S2, late peaking systolic murmur consistent with severe aortic stenosis. JVP not  elevated     Abdoment:  Soft, non-tender  Pulses: 2+ distal pulses.  No carotid bruits  Skin and Integument:  +2 peripheral edema, extremities warm  Neuro:  Grossly normal for motor, sensory.      Studies:     Laboratory performed today and reviewed by me personally showed: Serum creatinine of 0.87, hemoglobin of 8.8 (slowly drifting down since April 2023), and platelet count of 212.      Electrocardiogram performed today and reviewed by me personally showed: Atrially sensed, ventricularly paced rhythm with prolonged AV conduction    TTE performed April 2024 and reviewed by me personally showed: Normal LV size, estimated EF of 55 to 60%, diastolic dysfunction, normal RV size and function, mild pulmonary hypertension, moderate MR, and low-flow low gradient severe aortic valve stenosis (PV 3.4 m/s, MG 28 mmHg, and calculated valve area of 0.8 cm , SVI 30.2 mL/m ).    Coronary angiogram pending    TAVR CT performed May 2024 and reviewed by me personally showed: Interpretation limited by study quality.  Valve calcium score 1284AU.  Annulus will accommodate a 23 mm Correia AIDAN Ultra via right transfemoral approach.  Left thyroid lesion per radiology, will defer to PCP (who was notified).      I reviewed the TAVR procedure, risk and expected outcomes with the patient and family members in detail.  I explained that the aortic valve is very significantly impaired, and strenuous exercise should be avoided until after the valve is repaired. Risks associated with transcatheter valve therapy were reviewed. Risks including death, MI, stroke, permanent disability, neurologic injury, renal failure, major bleeding requiring blood transfusion, emergency thoracotomy for catastrophic complications, vascular injury requiring endovascular or surgical repair, need for permanent pacemaker and anesthesia related complications were fully explained.  Alternative approaches including surgical aortic valve replacement and palliative care  were discussed.  My own experience with these procedures were reviewed, and the patient and family appeared to understand the information I presented.  Questions were asked and answered to their apparent satisfaction.      I sincerely appreciate the opportunity to participate in this patient's care.  Please do not hesitate to contact me if any questions or concerns arise.     The longitudinal plan of care for severe aortic valve stenosis as documented were addressed during this visit. Due to the added complexity in care, I will continue to support Serene in the subsequent management and with ongoing continuity of care.      Villa Croft MD  Interventional Cardiology         [facility]  cc:   Myranda Robertson MD  [address] [facility] [address], [address]  [place] [address]

## 2024-05-23 NOTE — PROGRESS NOTES
Valve Clinic TAVR - 5/23/24  (See consult note from Dr. Croft)    Referring provider: Dr. Robertson (4/19/24)     Labs completed at visit today: yes; including T&S ext form for cath     Labs reviewed prior to clinic:    Latest Reference Range & Units 05/13/24 10:16   Creatinine 0.51 - 0.95 mg/dL 0.88   GFR Estimate >60 mL/min/1.73m2 66   Calcium 8.8 - 10.2 mg/dL 9.8   ALT 0 - 50 U/L 11   Cholesterol <200 mg/dL 171   Patient Fasting?  Yes   Ferritin 11 - 328 ng/mL 8 (L)   HDL Cholesterol >=50 mg/dL 74   LDL Cholesterol Calculated <=100 mg/dL 87   Non HDL Cholesterol <130 mg/dL 97   Triglycerides <150 mg/dL 52   Vitamin D, Total (25-Hydroxy) 20 - 50 ng/mL 54 (H)   WBC 4.0 - 11.0 10e3/uL 7.8   Hemoglobin 11.7 - 15.7 g/dL 8.8 (L)   Hematocrit 35.0 - 47.0 % 28.7 (L)   Platelet Count 150 - 450 10e3/uL 212   RBC Count 3.80 - 5.20 10e6/uL 3.54 (L)   MCV 78 - 100 fL 81   MCH 26.5 - 33.0 pg 24.9 (L)   MCHC 31.5 - 36.5 g/dL 30.7 (L)   RDW 10.0 - 15.0 % 14.8   (L): Data is abnormally low  (H): Data is abnormally high    Preliminary STS Score: 4.42%      KCCQ12 (date completed 5/23/24), scanned into chart      PMH: COPD on chronic O2 (4 L), hypoxemia with sleep, obesity, coronary arteriosclerosis, cardiac pacemaker in situ ([employer]), PAD, keratoma, epileptic seizure, esophageal reflux, hyperlipidemia, impaired gait/mobility, rosacea, acquired lymphedema of the leg, s/p paraesophageal hernia repair, idiopathic neuropathy, decreased hearing in both ears, aortic valve stenosis and HTN.      Symptoms: DENT, decreased activity tolerance.     Social: Patient presented to clinic independently today. She uses portable O2 and a walker.       TTE date 4/17/24  EF 55-60 %  AV mean gradient: 28 mmHg  AV Peak Qasim: 3.37 m/sec  AV area: 0.74 cm2  AV DIM IND VTI: 0.30  LV SVi: 30.2 ml/m2    Comments on valves: moderate MR. Mild TR. Moderate to severe low flow low gradient aortic stenosis.       Plan: Pt would like to move forward with  workup for TAVR. CTA TAVR has already been completed and is significant for adequate right transfemoral access and intended placement of a 23mm S3 Correia Minerva valve.     Patient will need to be scheduled for coronary angiogram and CT surgery consultation. Orders placed and message sent to scheduling/ to arrange. Patient understands to anticipate a phone call to arrange these appts.     Patient will not require dental clearance as she has full dentures.     Reviewed pre-procedure work up and procedural related education information with patient. All questions answered, no further questions at this time. Patient has my direct contact information as well as the valve clinic line and was encouraged to call with questions or concerns.       Brandon Solomon RN BSN- Valve Clinic Coordinator   [facility]  [facility]  Phone: [phone]  Fax: [phone]

## 2024-05-24 LAB
ATRIAL RATE - MUSE: 89 BPM
DIASTOLIC BLOOD PRESSURE - MUSE: NORMAL MMHG
INTERPRETATION ECG - MUSE: NORMAL
P AXIS - MUSE: 69 DEGREES
PR INTERVAL - MUSE: 252 MS
QRS DURATION - MUSE: 154 MS
QT - MUSE: 398 MS
QTC - MUSE: 484 MS
R AXIS - MUSE: 51 DEGREES
SYSTOLIC BLOOD PRESSURE - MUSE: NORMAL MMHG
T AXIS - MUSE: 30 DEGREES
VENTRICULAR RATE- MUSE: 89 BPM

## 2024-05-24 NOTE — H&P (VIEW-ONLY)
[facility] HEART CARE   [address], [place] [address]   [email or web address]   OFFICE: [phone]     IMPRESSION:  Low-flow, low gradient, severe symptomatic aortic valve stenosis (PV 3.4 m/s, MG 28 mmHg, and calculated valve area of 0.8 cm , SVI 30.2 mL/m ). Valve calcium score 1284AU.   Functional Capacity:  NYHA class 2, CCS class 0   Prior right heart catheterization with concern anomalous pulmonary venous return (RA step up with Qp/Qs of 2.3), not seen on cardiac MRI  Mild nonobstructive coronary artery disease  Advanced heart block post dual-chamber pacemaker placement  Hypertension  Hyperlipidemia  COPD  Epilepsy on Keppra  Iron deficiency anemia, with recent gross hematuria followed by urology        SUMMARY OF RECOMMENDATIONS: We had a long discussion with Ms. Underwood regarding natural progression and therapeutic options of calcific aortic valve disease.  She appears to be an appropriate candidate for transcatheter aortic valve replacement and we will continue her workup as planned.  We have all the available data, we will contact her with the plan.    Dear Dr. Robertson and Estuardo,     I had the pleasure of seeing Lalitha Underwood today at the [facility] for evaluation for Transcatheter Aortic Valve Replacement for symptomatic severe aortic valve stenosis at the request of Dr. Robertson.  Briefly, patient was recently evaluated in cardiology clinic and repeat echocardiogram was notable for progression of her aortic valve stenosis, now severe.  She was referred to our clinic for further follow-up.  Today,Ms Underwood reports progressive dyspnea on exertion and exertional fatigue.  She denies denies symptoms of chest pain,  palpitations, dizziness, lightheadedness, presyncope, syncope, orthopnea, PND, early satiety/abdominal bloating, lower extremity edema, stroke like symptoms, or  claudication.       ROS:  A 14 point review of symptoms was reviewed.  No prior hx of rheumatic fever or chest irradiation. There were no additional findings pertinent to this encounter.     Past Medical History: as above     Social History: Denies tobacco or alcohol use.    Family History: Denies early family history of cardiovascular disease or sudden cardiac death    Allergies: reviewed   Allergies   Allergen Reactions    Clindamycin Rash    Carbamazepine Rash    Morphine Nausea       Pertinent Medications:    Current Outpatient Medications   Medication Sig Dispense Refill    albuterol (PROAIR HFA/PROVENTIL HFA/VENTOLIN HFA) 108 (90 Base) MCG/ACT inhaler Inhale 2 puffs into the lungs every 6 hours as needed for shortness of breath or wheezing 18 g 3    azithromycin (ZITHROMAX) 250 MG tablet Take 1 tablet (250 mg) by mouth every evening 90 tablet 3    benzonatate (TESSALON) 100 MG capsule Take 1 capsule (100 mg) by mouth 3 times daily as needed for cough 90 capsule 3    Biotin 10 MG CAPS       calcium citrate (CITRACAL) 950 (200 Ca) MG tablet Take 1 tablet by mouth 2 times daily      estradiol (ESTRACE) 0.1 MG/GM vaginal cream Place 2 g vaginally three times a week 72 g 1    ferrous fumarate-vitamin C ER (DEYA-SEQUELS) 65-25 MG CR tablet Take 1 tablet by mouth daily (with breakfast) 90 tablet 1    fluticasone-salmeterol (ADVAIR) 500-50 MCG/ACT inhaler Inhale 1 puff into the lungs every 12 hours 180 each 3    guaiFENesin (MUCINEX) 600 MG 12 hr tablet Take 1 tablet (600 mg) by mouth 2 times daily 180 tablet 3    hydrochlorothiazide (HYDRODIURIL) 25 MG tablet Take 1 tablet (25 mg) by mouth daily 90 tablet 3    ipratropium - albuterol 0.5 mg/2.5 mg/3 mL (DUONEB) 0.5-2.5 (3) MG/3ML neb solution TAKE 1 VIAL BY NEBULIZATION EVERY 6 HOURS AS NEEDED FOR SHORTNESS OF BREATH OR WHEEZING 1080 mL 3    levalbuterol (XOPENEX) 1.25 MG/3ML neb solution Take 3 mLs (1.25 mg) by nebulization every 4 hours as needed for shortness of  breath or wheezing 810 mL 3    levETIRAcetam (KEPPRA) 500 MG tablet Take 1 tablet (500 mg) by mouth 2 times daily 180 tablet 3    losartan (COZAAR) 50 MG tablet Take 1 tablet (50 mg) by mouth 2 times daily 180 tablet 3    Magnesium Oxide -Mg Supplement 500 MG TABS Take 1 tablet (500 mg) by mouth 2 times daily as needed      nystatin (MYCOSTATIN) 816693 UNIT/GM external powder Apply topically daily as needed      potassium chloride teja ER (KLOR-CON M20) 20 MEQ CR tablet Take 1 tablet (20 mEq) by mouth at bedtime 90 tablet 3    RABEprazole (ACIPHEX) 20 MG EC tablet Take 1 tablet (20 mg) by mouth daily 90 tablet 3    sertraline (ZOLOFT) 100 MG tablet Take 1 tablet (100 mg) by mouth every evening 90 tablet 3    solifenacin (VESICARE) 10 MG tablet Take 1 tablet (10 mg) by mouth daily 90 tablet 3    spironolactone (ALDACTONE) 25 MG tablet Take 0.5 tablets (12.5 mg) by mouth daily 45 tablet 3    tiotropium (SPIRIVA RESPIMAT) 2.5 MCG/ACT inhaler Inhale 2 puffs into the lungs daily 12 g 3    vitamin D3 (CHOLECALCIFEROL) 125 MCG (5000 UT) tablet Take 5,000 Units by mouth daily        Current Facility-Administered Medications   Medication Dose Route Frequency Provider Last Rate Last Admin    denosumab (PROLIA) injection 60 mg  60 mg Subcutaneous Q6 Months MasonLalitha mccarthy, NP   60 mg at 01/02/24 1025       OBJECTIVE:  Physical Examination   BP (!) 157/90 (BP Location: Other (Comment), Patient Position: Sitting, Cuff Size: Adult Regular)   Pulse 89   Resp 20   Wt 94.3 kg (208 lb)   BMI 35.70 kg/m     Constitutional:  NAD  Cognitive: Alert, oriented x 3, Appropriate speech  Chest:  Clear bilaterally   Cardiac: regular rhythm, normal S1/ S2, late peaking systolic murmur consistent with severe aortic stenosis. JVP not elevated     Abdoment:  Soft, non-tender  Pulses: 2+ distal pulses.  No carotid bruits  Skin and Integument:  +2 peripheral edema, extremities warm  Neuro:  Grossly normal for motor, sensory.      Studies:      Laboratory performed today and reviewed by me personally showed: Serum creatinine of 0.87, hemoglobin of 8.8 (slowly drifting down since April 2023), and platelet count of 212.      Electrocardiogram performed today and reviewed by me personally showed: Atrially sensed, ventricularly paced rhythm with prolonged AV conduction    TTE performed April 2024 and reviewed by me personally showed: Normal LV size, estimated EF of 55 to 60%, diastolic dysfunction, normal RV size and function, mild pulmonary hypertension, moderate MR, and low-flow low gradient severe aortic valve stenosis (PV 3.4 m/s, MG 28 mmHg, and calculated valve area of 0.8 cm , SVI 30.2 mL/m ).    Coronary angiogram pending    TAVR CT performed May 2024 and reviewed by me personally showed: Interpretation limited by study quality.  Valve calcium score 1284AU.  Annulus will accommodate a 23 mm Correia AIDAN Ultra via right transfemoral approach.  Left thyroid lesion per radiology, will defer to PCP (who was notified).      I reviewed the TAVR procedure, risk and expected outcomes with the patient and family members in detail.  I explained that the aortic valve is very significantly impaired, and strenuous exercise should be avoided until after the valve is repaired. Risks associated with transcatheter valve therapy were reviewed. Risks including death, MI, stroke, permanent disability, neurologic injury, renal failure, major bleeding requiring blood transfusion, emergency thoracotomy for catastrophic complications, vascular injury requiring endovascular or surgical repair, need for permanent pacemaker and anesthesia related complications were fully explained.  Alternative approaches including surgical aortic valve replacement and palliative care were discussed.  My own experience with these procedures were reviewed, and the patient and family appeared to understand the information I presented.  Questions were asked and answered to their apparent  satisfaction.      I sincerely appreciate the opportunity to participate in this patient's care.  Please do not hesitate to contact me if any questions or concerns arise.     The longitudinal plan of care for severe aortic valve stenosis as documented were addressed during this visit. Due to the added complexity in care, I will continue to support Serene in the subsequent management and with ongoing continuity of care.      Villa Croft MD  Interventional Cardiology

## 2024-05-24 NOTE — PROGRESS NOTES
[facility] HEART CARE   [address], [place] [address]   [email or web address]   OFFICE: [phone]     IMPRESSION:  Low-flow, low gradient, severe symptomatic aortic valve stenosis (PV 3.4 m/s, MG 28 mmHg, and calculated valve area of 0.8 cm , SVI 30.2 mL/m ). Valve calcium score 1284AU.   Functional Capacity:  NYHA class 2, CCS class 0   Prior right heart catheterization with concern anomalous pulmonary venous return (RA step up with Qp/Qs of 2.3), not seen on cardiac MRI  Mild nonobstructive coronary artery disease  Advanced heart block post dual-chamber pacemaker placement  Hypertension  Hyperlipidemia  COPD  Epilepsy on Keppra  Iron deficiency anemia, with recent gross hematuria followed by urology        SUMMARY OF RECOMMENDATIONS: We had a long discussion with Ms. Underwood regarding natural progression and therapeutic options of calcific aortic valve disease.  She appears to be an appropriate candidate for transcatheter aortic valve replacement and we will continue her workup as planned.  We have all the available data, we will contact her with the plan.    Dear Dr. Robertson and Estuardo,     I had the pleasure of seeing Lalitha Underwood today at the [facility] for evaluation for Transcatheter Aortic Valve Replacement for symptomatic severe aortic valve stenosis at the request of Dr. Robertson.  Briefly, patient was recently evaluated in cardiology clinic and repeat echocardiogram was notable for progression of her aortic valve stenosis, now severe.  She was referred to our clinic for further follow-up.  Today,Ms Underwood reports progressive dyspnea on exertion and exertional fatigue.  She denies denies symptoms of chest pain,  palpitations, dizziness, lightheadedness, presyncope, syncope, orthopnea, PND, early satiety/abdominal bloating, lower extremity edema, stroke like symptoms, or  claudication.       ROS:  A 14 point review of symptoms was reviewed.  No prior hx of rheumatic fever or chest irradiation. There were no additional findings pertinent to this encounter.     Past Medical History: as above     Social History: Denies tobacco or alcohol use.    Family History: Denies early family history of cardiovascular disease or sudden cardiac death    Allergies: reviewed   Allergies   Allergen Reactions    Clindamycin Rash    Carbamazepine Rash    Morphine Nausea       Pertinent Medications:    Current Outpatient Medications   Medication Sig Dispense Refill    albuterol (PROAIR HFA/PROVENTIL HFA/VENTOLIN HFA) 108 (90 Base) MCG/ACT inhaler Inhale 2 puffs into the lungs every 6 hours as needed for shortness of breath or wheezing 18 g 3    azithromycin (ZITHROMAX) 250 MG tablet Take 1 tablet (250 mg) by mouth every evening 90 tablet 3    benzonatate (TESSALON) 100 MG capsule Take 1 capsule (100 mg) by mouth 3 times daily as needed for cough 90 capsule 3    Biotin 10 MG CAPS       calcium citrate (CITRACAL) 950 (200 Ca) MG tablet Take 1 tablet by mouth 2 times daily      estradiol (ESTRACE) 0.1 MG/GM vaginal cream Place 2 g vaginally three times a week 72 g 1    ferrous fumarate-vitamin C ER (DEYA-SEQUELS) 65-25 MG CR tablet Take 1 tablet by mouth daily (with breakfast) 90 tablet 1    fluticasone-salmeterol (ADVAIR) 500-50 MCG/ACT inhaler Inhale 1 puff into the lungs every 12 hours 180 each 3    guaiFENesin (MUCINEX) 600 MG 12 hr tablet Take 1 tablet (600 mg) by mouth 2 times daily 180 tablet 3    hydrochlorothiazide (HYDRODIURIL) 25 MG tablet Take 1 tablet (25 mg) by mouth daily 90 tablet 3    ipratropium - albuterol 0.5 mg/2.5 mg/3 mL (DUONEB) 0.5-2.5 (3) MG/3ML neb solution TAKE 1 VIAL BY NEBULIZATION EVERY 6 HOURS AS NEEDED FOR SHORTNESS OF BREATH OR WHEEZING 1080 mL 3    levalbuterol (XOPENEX) 1.25 MG/3ML neb solution Take 3 mLs (1.25 mg) by nebulization every 4 hours as needed for shortness of  breath or wheezing 810 mL 3    levETIRAcetam (KEPPRA) 500 MG tablet Take 1 tablet (500 mg) by mouth 2 times daily 180 tablet 3    losartan (COZAAR) 50 MG tablet Take 1 tablet (50 mg) by mouth 2 times daily 180 tablet 3    Magnesium Oxide -Mg Supplement 500 MG TABS Take 1 tablet (500 mg) by mouth 2 times daily as needed      nystatin (MYCOSTATIN) 595678 UNIT/GM external powder Apply topically daily as needed      potassium chloride teja ER (KLOR-CON M20) 20 MEQ CR tablet Take 1 tablet (20 mEq) by mouth at bedtime 90 tablet 3    RABEprazole (ACIPHEX) 20 MG EC tablet Take 1 tablet (20 mg) by mouth daily 90 tablet 3    sertraline (ZOLOFT) 100 MG tablet Take 1 tablet (100 mg) by mouth every evening 90 tablet 3    solifenacin (VESICARE) 10 MG tablet Take 1 tablet (10 mg) by mouth daily 90 tablet 3    spironolactone (ALDACTONE) 25 MG tablet Take 0.5 tablets (12.5 mg) by mouth daily 45 tablet 3    tiotropium (SPIRIVA RESPIMAT) 2.5 MCG/ACT inhaler Inhale 2 puffs into the lungs daily 12 g 3    vitamin D3 (CHOLECALCIFEROL) 125 MCG (5000 UT) tablet Take 5,000 Units by mouth daily        Current Facility-Administered Medications   Medication Dose Route Frequency Provider Last Rate Last Admin    denosumab (PROLIA) injection 60 mg  60 mg Subcutaneous Q6 Months MasonLalitha mccarthy, NP   60 mg at 01/02/24 1025       OBJECTIVE:  Physical Examination   BP (!) 157/90 (BP Location: Other (Comment), Patient Position: Sitting, Cuff Size: Adult Regular)   Pulse 89   Resp 20   Wt 94.3 kg (208 lb)   BMI 35.70 kg/m     Constitutional:  NAD  Cognitive: Alert, oriented x 3, Appropriate speech  Chest:  Clear bilaterally   Cardiac: regular rhythm, normal S1/ S2, late peaking systolic murmur consistent with severe aortic stenosis. JVP not elevated     Abdoment:  Soft, non-tender  Pulses: 2+ distal pulses.  No carotid bruits  Skin and Integument:  +2 peripheral edema, extremities warm  Neuro:  Grossly normal for motor, sensory.      Studies:      Laboratory performed today and reviewed by me personally showed: Serum creatinine of 0.87, hemoglobin of 8.8 (slowly drifting down since April 2023), and platelet count of 212.      Electrocardiogram performed today and reviewed by me personally showed: Atrially sensed, ventricularly paced rhythm with prolonged AV conduction    TTE performed April 2024 and reviewed by me personally showed: Normal LV size, estimated EF of 55 to 60%, diastolic dysfunction, normal RV size and function, mild pulmonary hypertension, moderate MR, and low-flow low gradient severe aortic valve stenosis (PV 3.4 m/s, MG 28 mmHg, and calculated valve area of 0.8 cm , SVI 30.2 mL/m ).    Coronary angiogram pending    TAVR CT performed May 2024 and reviewed by me personally showed: Interpretation limited by study quality.  Valve calcium score 1284AU.  Annulus will accommodate a 23 mm Correia AIDAN Ultra via right transfemoral approach.  Left thyroid lesion per radiology, will defer to PCP (who was notified).      I reviewed the TAVR procedure, risk and expected outcomes with the patient and family members in detail.  I explained that the aortic valve is very significantly impaired, and strenuous exercise should be avoided until after the valve is repaired. Risks associated with transcatheter valve therapy were reviewed. Risks including death, MI, stroke, permanent disability, neurologic injury, renal failure, major bleeding requiring blood transfusion, emergency thoracotomy for catastrophic complications, vascular injury requiring endovascular or surgical repair, need for permanent pacemaker and anesthesia related complications were fully explained.  Alternative approaches including surgical aortic valve replacement and palliative care were discussed.  My own experience with these procedures were reviewed, and the patient and family appeared to understand the information I presented.  Questions were asked and answered to their apparent  satisfaction.      I sincerely appreciate the opportunity to participate in this patient's care.  Please do not hesitate to contact me if any questions or concerns arise.     The longitudinal plan of care for severe aortic valve stenosis as documented were addressed during this visit. Due to the added complexity in care, I will continue to support Serene in the subsequent management and with ongoing continuity of care.      Villa Croft MD  Interventional Cardiology

## 2024-05-28 ENCOUNTER — TELEPHONE (OUTPATIENT)
Dept: CARDIOLOGY | Facility: CLINIC | Age: 79
End: 2024-05-28

## 2024-05-28 ENCOUNTER — MYC MEDICAL ADVICE (OUTPATIENT)
Dept: CARDIOLOGY | Facility: CLINIC | Age: 79
End: 2024-05-28

## 2024-05-28 ENCOUNTER — OFFICE VISIT (OUTPATIENT)
Dept: PULMONOLOGY | Facility: CLINIC | Age: 79
End: 2024-05-28
Attending: NURSE PRACTITIONER
Payer: MEDICARE

## 2024-05-28 ENCOUNTER — PREP FOR PROCEDURE (OUTPATIENT)
Dept: CARDIOLOGY | Facility: CLINIC | Age: 79
End: 2024-05-28

## 2024-05-28 VITALS
SYSTOLIC BLOOD PRESSURE: 138 MMHG | BODY MASS INDEX: 35.36 KG/M2 | OXYGEN SATURATION: 100 % | HEART RATE: 86 BPM | WEIGHT: 206 LBS | DIASTOLIC BLOOD PRESSURE: 88 MMHG

## 2024-05-28 DIAGNOSIS — J43.9 PULMONARY EMPHYSEMA, UNSPECIFIED EMPHYSEMA TYPE (H): ICD-10-CM

## 2024-05-28 DIAGNOSIS — J44.9 CHRONIC OBSTRUCTIVE PULMONARY DISEASE, UNSPECIFIED COPD TYPE (H): Primary | ICD-10-CM

## 2024-05-28 DIAGNOSIS — I10 ESSENTIAL HYPERTENSION, BENIGN: ICD-10-CM

## 2024-05-28 DIAGNOSIS — I35.0 AORTIC VALVE STENOSIS, ETIOLOGY OF CARDIAC VALVE DISEASE UNSPECIFIED: Primary | ICD-10-CM

## 2024-05-28 DIAGNOSIS — R06.09 DYSPNEA ON EXERTION: ICD-10-CM

## 2024-05-28 DIAGNOSIS — Z79.2 LONG TERM (CURRENT) USE OF ANTIBIOTICS: ICD-10-CM

## 2024-05-28 PROCEDURE — 99214 OFFICE O/P EST MOD 30 MIN: CPT | Performed by: NURSE PRACTITIONER

## 2024-05-28 RX ORDER — ALBUTEROL SULFATE 90 UG/1
2 AEROSOL, METERED RESPIRATORY (INHALATION) EVERY 6 HOURS PRN
Qty: 18 G | Refills: 1 | Status: SHIPPED | OUTPATIENT
Start: 2024-05-28

## 2024-05-28 RX ORDER — ASPIRIN 325 MG
325 TABLET ORAL ONCE
Status: CANCELLED | OUTPATIENT
Start: 2024-06-03 | End: 2024-05-28

## 2024-05-28 RX ORDER — LIDOCAINE 40 MG/G
CREAM TOPICAL
Status: CANCELLED | OUTPATIENT
Start: 2024-05-28

## 2024-05-28 RX ORDER — SPIRONOLACTONE 25 MG/1
12.5 TABLET ORAL DAILY
Qty: 45 TABLET | Refills: 3 | Status: SHIPPED | OUTPATIENT
Start: 2024-05-28

## 2024-05-28 RX ORDER — FENTANYL CITRATE 50 UG/ML
25 INJECTION, SOLUTION INTRAMUSCULAR; INTRAVENOUS
Status: CANCELLED | OUTPATIENT
Start: 2024-06-03

## 2024-05-28 RX ORDER — ASPIRIN 81 MG/1
243 TABLET, CHEWABLE ORAL ONCE
Status: CANCELLED | OUTPATIENT
Start: 2024-06-03

## 2024-05-28 RX ORDER — AZITHROMYCIN 250 MG/1
250 TABLET, FILM COATED ORAL EVERY EVENING
Qty: 90 TABLET | Refills: 3 | Status: SHIPPED | OUTPATIENT
Start: 2024-05-28

## 2024-05-28 RX ORDER — SODIUM CHLORIDE 9 MG/ML
INJECTION, SOLUTION INTRAVENOUS CONTINUOUS
Status: CANCELLED | OUTPATIENT
Start: 2024-06-03

## 2024-05-28 NOTE — TELEPHONE ENCOUNTER
Pre-Procedure Angiogram Education     Lalitha SALINAS [facility]  [address]  [facility] [address]  [phone] (home)     Procedure cardiologist:  Dr. Croft  PCP:  Kate Marte  H&P completed by:  Dr. Croft 5/23/24  Admit date 6/3/24  Arrival time: 7:30am  Anticoagulation: None  CPAP: No - Continuous O2 at 4L  Previous PCI: No  Bypass Grafts: No  Renal Issues: No  Diabetic?: No  Device?: Yes  Type:  BSCI Pacemaker Accolade  Incontinent and will likely need purewick.      Angiogram Teaching    Reason for Visit:  Telephone call to discuss pre-procedure education in preparation for: Angiogram    Procedure Prep:  Primary Cardiologist note dated: 5/23/24  EKG results obtained, dated: to be obtained date of procedure   T&S- 5/23/24-  Extension form scanned into media tab  BMP, CBC- AM of procedure- orders placed    Patient Education  Patient states understanding of procedure and risks and agrees to proceed.    Pre-procedure instructions  Patient instructed to be NPO per anesthesia guidelines; no food after   11:30pm on 6/2/24 clear liquids until 5:30am, nothing by mouth after 5:30am  Patient instructed to shower the evening before or the morning of the procedure.  Leave all valuables at home (jewlery, rings, watches, large amounts of money).  Patient understands there are two visitors allowed during patients stay.   Patient instructed to arrange for transportation home following procedure from a responsible family member of friend. No driving for at least 24 hours post-procedure.  Patient instructed to have a responsible adult with them for 24 hours post-procedure.  Post-procedure follow up process.  Conscious sedation discussed.    Pre-procedure medication instructions  Patient instructed on antiplatelet medication.  Continue medications as scheduled up until the date of procedure unless indicated below.   Patient will take Aspirin the AM of the procedure in [facility]. Orders placed.   Other medication: instructed to  only take Losartan, Keppra (also known as levetiracetam), Rabeprazole (also known as Aciphex), Duoben (if needed), Spiriva inhaler, Xoponex (if needed), Advair inhaler, Albuterol (if needed)  a.m. of the procedure.  Instructed patient to hold all other prescription medications, OTC meds, supplements and vitamins the day of procedure.     Pt not on anticoagulation    *PATIENTS RECORDS AVAILABLE IN [facility] UNLESS OTHERWISE INDICATED*      Patient Active Problem List   Diagnosis    S/P repair of paraesophageal hernia    Essential hypertension, benign    Acquired lymphedema of leg    Collapsed arches    Decreased hearing of both ears    Epileptic seizure (H)    Esophageal reflux    Hyperlipidemia    Impaired gait and mobility    Mild aortic valve stenosis    Neuropathy, idiopathic    Osteoporosis    COPD (chronic obstructive pulmonary disease) (H)    Rosacea    Urge incontinence of urine    Uterine prolapse    Valgus deformity of both feet    Vitamin D deficiency    PAD (peripheral artery disease) (H24)    Second degree heart block    Cardiac pacemaker in situ    Coronary arteriosclerosis due to lipid rich plaque    Acquired absence of other left toe(s) (H24)    Cor pulmonale (chronic) (H)    Chronic respiratory failure with hypoxia (H)    Class 2 obesity due to excess calories without serious comorbidity with body mass index (BMI) of 37.0 to 37.9 in adult    Hypoxemia associated with sleep    Class 2 severe obesity due to excess calories with serious comorbidity in adult (H)       Current Outpatient Medications   Medication Sig Dispense Refill    albuterol (PROAIR HFA/PROVENTIL HFA/VENTOLIN HFA) 108 (90 Base) MCG/ACT inhaler Inhale 2 puffs into the lungs every 6 hours as needed for shortness of breath or wheezing 18 g 1    azithromycin (ZITHROMAX) 250 MG tablet Take 1 tablet (250 mg) by mouth every evening 90 tablet 3    benzonatate (TESSALON) 100 MG capsule Take 1 capsule (100 mg) by mouth 3 times daily as needed for  cough 90 capsule 3    Biotin 10 MG CAPS Take 1 capsule by mouth daily      calcium citrate (CITRACAL) 950 (200 Ca) MG tablet Take 1 tablet by mouth 2 times daily      estradiol (ESTRACE) 0.1 MG/GM vaginal cream Place 2 g vaginally three times a week 72 g 1    ferrous fumarate-vitamin C ER (DEYA-SEQUELS) 65-25 MG CR tablet Take 1 tablet by mouth daily (with breakfast) 90 tablet 1    fluticasone-salmeterol (ADVAIR) 500-50 MCG/ACT inhaler Inhale 1 puff into the lungs every 12 hours 180 each 3    guaiFENesin (MUCINEX) 600 MG 12 hr tablet Take 1 tablet (600 mg) by mouth 2 times daily 180 tablet 3    hydrochlorothiazide (HYDRODIURIL) 25 MG tablet Take 1 tablet (25 mg) by mouth daily 90 tablet 3    ipratropium - albuterol 0.5 mg/2.5 mg/3 mL (DUONEB) 0.5-2.5 (3) MG/3ML neb solution TAKE 1 VIAL BY NEBULIZATION EVERY 6 HOURS AS NEEDED FOR SHORTNESS OF BREATH OR WHEEZING 1080 mL 3    levalbuterol (XOPENEX) 1.25 MG/3ML neb solution Take 3 mLs (1.25 mg) by nebulization every 4 hours as needed for shortness of breath or wheezing 810 mL 3    levETIRAcetam (KEPPRA) 500 MG tablet Take 1 tablet (500 mg) by mouth 2 times daily 180 tablet 3    losartan (COZAAR) 50 MG tablet Take 1 tablet (50 mg) by mouth 2 times daily 180 tablet 3    Magnesium Oxide -Mg Supplement 500 MG TABS Take 1 tablet (500 mg) by mouth 2 times daily as needed      nystatin (MYCOSTATIN) 228126 UNIT/GM external powder Apply topically daily as needed      potassium chloride teja ER (KLOR-CON M20) 20 MEQ CR tablet Take 1 tablet (20 mEq) by mouth at bedtime 90 tablet 3    RABEprazole (ACIPHEX) 20 MG EC tablet Take 1 tablet (20 mg) by mouth daily 90 tablet 3    sertraline (ZOLOFT) 100 MG tablet Take 1 tablet (100 mg) by mouth every evening 90 tablet 3    solifenacin (VESICARE) 10 MG tablet Take 1 tablet (10 mg) by mouth daily 90 tablet 3    spironolactone (ALDACTONE) 25 MG tablet Take 0.5 tablets (12.5 mg) by mouth daily 45 tablet 3    tiotropium (SPIRIVA RESPIMAT) 2.5  MCG/ACT inhaler Inhale 2 puffs into the lungs daily 12 g 3    vitamin D3 (CHOLECALCIFEROL) 125 MCG (5000 UT) tablet Take 5,000 Units by mouth daily          Allergies   Allergen Reactions    Clindamycin Rash    Carbamazepine Rash    Morphine Nausea       Orders placed. Patient has my contact information if they have any additional questions or concerns.     Sepideh Baeza RN BSN  Structural Heart Coordinator   [facility]  [phone]

## 2024-05-28 NOTE — TELEPHONE ENCOUNTER
[employer] MXR message read today. Attempted to call patient at preferred home number, no answer. VM left with request to call clinic back when patient is available.     Margy Solomon RN on 5/28/2024 at 12:21 PM

## 2024-05-28 NOTE — TELEPHONE ENCOUNTER
Incoming call from patient. Discussed NPO and medications that she would take the morning of the procedure. She had no questions on this. She did say that she is incontinent and may need to have a purewick depending on how long her bed rest is. She wanted to ensure that O2 would be available and ensured that we do have this available for her in [facility] and during the procedure. She had no further questions and would call back if she had questions. -SC

## 2024-05-28 NOTE — PROGRESS NOTES
Outpatient Pulmonary Clinic Visit  May 28, 2024      Assessment and Plan:  Lalitha Underwood is a 79 year old female with a past medical history significant for severe COPD, emphysema, Cor pulmonale, chronic hypoxic respiratory failure, paraesophageal hernia s/p repair, HTN, acquired lymphedema of leg, epileptic seizure, HLD, GERD, aortic valve stenosis, obesity, neuropathy, osteoporosis, PAD, s/p cardiac pacemaker placement, second degree heart block, CAD, and former tobacco use who presents to clinic today for follow up.     Lung exam today demonstrates diffuse wheeze, sats 100% on room air at rest. Clinic walked today, after 200ft her oxygen was 97% on room air. She reports occasional readings in the high 80's.     #. Severe COPD GOLD B (high symptom burden, no recent exacerbations): CAT is up today at 20, was previously 13. PFTs show a severe obstructive ventilatory defect, no significant response to bronchodilator, with moderately reduced diffusion capacity.   Continue Advair/Spiriva and as needed albuterol or duonebs.    Azithromycin 250mg daily to help control exacerbation - will monitor her QTc closely (QTC was 484 in 05/2024).    OK to use her nebulizer 2-4 times daily, encouraged her to increase use while she is having increased symptoms. Changed her albuterol to levalbuterol to help with jittery side effect.    #. Chronic hypoxic respiratory failure: We discussed how to titrate oxygen for need today. She was confused with instructions following her last ED visit for exacerbation when her oxygen demand was high. She assumed this would stay that way.   Oxygen during the day prn to keep sats >88%.   Oxygen 2L with sleep.    Sleep medicine evaluation done. She is waiting to follow up with them.   #. Cor Pulmonale: remains on hydrochlorothiazide and spironolactone.  Prevent hypoxia at all times with oxygen use as above.   Continued follow up with cardiology. Plans for coronary angiogram on 6/3/2024 in  preparation for possible TAVR.    #. Dyspnea: Multifactorial from the above. Likely a component of deconditioning too. She has previously completed some pulmonary rehab. Is willing to repeat this.   Pulmonary rehab referral     If this patients lung disease exacerbates I recommend: doxycycline 100mg BID for 10 days and a steroid taper with prednisone at 40mg for 3 days 30mg for 3 days, 20mg for 3 days and 10mg for 3 days    RTC in 6 months    Ev Plaza, CNP  Pulmonary Medicine  [facility]   [phone]    CCx:   Chief Complaint   Patient presents with    Follow Up     COPD      HPI:   He was last seen in clinic in 11/2023. Since that time she has been doing pretty well. Denies change in her breathing.   Continues to have dyspnea with exertion and daytime fatigue.  Denies chest tightness or wheeze.   Has been using Duonebs 3 times daily and has continued 4L oxygen almost continuously.   No exacerbations since our last visit. Her formulary has replaced her Spiriva with a generic version that she does not feel works as well.   Last COPD exacerbation wsa on 5/15/2023.     On spironolactone. Followed by Dr. Robertson in cardiology. Recent coronary angiogram, did not require stents. Cardiac MRI showed tricuspid valve regurgitation and aortic valve stenosis. She has been referred to the valve clinic to discuss possible TAVR.     Patient supplied answers from flow sheet for:  COPD Assessment Test (CAT)  2009 [employer]. All rights reserved.      6/28/2022    10:00 AM 1/3/2023    10:52 AM 1/3/2023     2:29 PM 5/22/2023     1:00 PM 7/21/2023     9:23 AM 11/26/2023     5:16 PM 5/24/2024     3:38 PM   COPD assessment test (CAT)   Cough 2 2 2 3 1 3 3   Phlegm 2 1 1 4 1 1 1   Chest tightness 1 1 1 2 1 2 2   Walk up hill 4 3 3 5 3 1 5   Limited activities 1 2 2 4 2 1 2   Leaving my home 0 1 1 1 1 0 1   Sleep 1 0 0 3 1 2 3   Energy 4 3 3 4 3 3 3   Total Score 15 13 13 26 13 13 20      ROS:  10 point ROS neg other than the  symptoms noted above in the HPI.    Current Meds:  Current Outpatient Medications   Medication Sig Dispense Refill    albuterol (PROAIR HFA/PROVENTIL HFA/VENTOLIN HFA) 108 (90 Base) MCG/ACT inhaler Inhale 2 puffs into the lungs every 6 hours as needed for shortness of breath or wheezing 18 g 3    azithromycin (ZITHROMAX) 250 MG tablet Take 1 tablet (250 mg) by mouth every evening 90 tablet 3    benzonatate (TESSALON) 100 MG capsule Take 1 capsule (100 mg) by mouth 3 times daily as needed for cough 90 capsule 3    Biotin 10 MG CAPS Take 1 capsule by mouth daily      calcium citrate (CITRACAL) 950 (200 Ca) MG tablet Take 1 tablet by mouth 2 times daily      estradiol (ESTRACE) 0.1 MG/GM vaginal cream Place 2 g vaginally three times a week 72 g 1    ferrous fumarate-vitamin C ER (DEYA-SEQUELS) 65-25 MG CR tablet Take 1 tablet by mouth daily (with breakfast) 90 tablet 1    fluticasone-salmeterol (ADVAIR) 500-50 MCG/ACT inhaler Inhale 1 puff into the lungs every 12 hours 180 each 3    guaiFENesin (MUCINEX) 600 MG 12 hr tablet Take 1 tablet (600 mg) by mouth 2 times daily 180 tablet 3    hydrochlorothiazide (HYDRODIURIL) 25 MG tablet Take 1 tablet (25 mg) by mouth daily 90 tablet 3    ipratropium - albuterol 0.5 mg/2.5 mg/3 mL (DUONEB) 0.5-2.5 (3) MG/3ML neb solution TAKE 1 VIAL BY NEBULIZATION EVERY 6 HOURS AS NEEDED FOR SHORTNESS OF BREATH OR WHEEZING 1080 mL 3    levalbuterol (XOPENEX) 1.25 MG/3ML neb solution Take 3 mLs (1.25 mg) by nebulization every 4 hours as needed for shortness of breath or wheezing 810 mL 3    levETIRAcetam (KEPPRA) 500 MG tablet Take 1 tablet (500 mg) by mouth 2 times daily 180 tablet 3    losartan (COZAAR) 50 MG tablet Take 1 tablet (50 mg) by mouth 2 times daily 180 tablet 3    Magnesium Oxide -Mg Supplement 500 MG TABS Take 1 tablet (500 mg) by mouth 2 times daily as needed      nystatin (MYCOSTATIN) 019328 UNIT/GM external powder Apply topically daily as needed      potassium chloride teja  ER (KLOR-CON M20) 20 MEQ CR tablet Take 1 tablet (20 mEq) by mouth at bedtime 90 tablet 3    RABEprazole (ACIPHEX) 20 MG EC tablet Take 1 tablet (20 mg) by mouth daily 90 tablet 3    sertraline (ZOLOFT) 100 MG tablet Take 1 tablet (100 mg) by mouth every evening 90 tablet 3    solifenacin (VESICARE) 10 MG tablet Take 1 tablet (10 mg) by mouth daily 90 tablet 3    spironolactone (ALDACTONE) 25 MG tablet Take 0.5 tablets (12.5 mg) by mouth daily 45 tablet 3    tiotropium (SPIRIVA RESPIMAT) 2.5 MCG/ACT inhaler Inhale 2 puffs into the lungs daily 12 g 3    vitamin D3 (CHOLECALCIFEROL) 125 MCG (5000 UT) tablet Take 5,000 Units by mouth daily        Physical Exam:  /88 (BP Location: Left arm, Patient Position: Chair, Cuff Size: Adult Regular)   Pulse 86   Wt 93.4 kg (206 lb)   SpO2 100%   BMI 35.36 kg/m      Physical Exam  Constitutional:       General: She is not in acute distress.     Appearance: She is not ill-appearing or diaphoretic.   HENT:      Nose: Nose normal.   Cardiovascular:      Rate and Rhythm: Normal rate and regular rhythm.      Pulses: Normal pulses.      Heart sounds: Normal heart sounds.   Pulmonary:      Effort: Pulmonary effort is normal. No respiratory distress.      Breath sounds: Wheezing (end expiratory) present. No rhonchi.   Musculoskeletal:      Right lower leg: Edema present.      Left lower leg: Edema present.   Skin:     General: Skin is warm and dry.      Findings: No rash.   Neurological:      Mental Status: She is alert.   Psychiatric:         Behavior: Behavior normal.       Labs:  @clab@  Lab Results   Component Value Date    WBC 7.8 05/13/2024    HGB 8.8 (L) 05/13/2024    HCT 28.7 (L) 05/13/2024     05/13/2024     05/23/2024    POTASSIUM 4.5 05/23/2024    CHLORIDE 99 05/23/2024    CO2 32 (H) 05/23/2024     (H) 05/23/2024    BUN 25.3 (H) 05/23/2024    CR 0.87 05/23/2024    MAG 1.7 (L) 03/25/2022    BILITOTAL 0.2 05/15/2023    AST 25 05/15/2023    ALT 11  05/13/2024    ALKPHOS 76 05/15/2023    PROTTOTAL 7.0 05/15/2023    ALBUMIN 4.2 05/15/2023     I have personally reviewed all pertinent imaging studies and PFT results unless otherwise noted.    Imaging studies:    CT CHEST PULMONARY EMBOLISM W CONTRAST, DATE/TIME: 5/15/2023 1:41 PM CDT  INDICATION: chest pain, SOB, elevated D dimer  COMPARISON: Chest x-ray 5/15/2023 and 3/11/2022  FINDINGS:  ANGIOGRAM CHEST: Negative for pulmonary emboli. Some mildly prominent central pulmonary arteries concerning for possible pulmonary artery hypertension. Thoracic aorta is negative for dissection. No CT evidence of right heart strain.  LUNGS AND PLEURA: Slightly elevated left hemidiaphragm similar to previous. Some slight probable fibrosis in the left lung base similar to chest x-ray. No acute new findings.  MEDIASTINUM/AXILLAE: Normal.  CORONARY ARTERY CALCIFICATION: Mild.                                                              IMPRESSION:  1.  Negative for pulmonary emboli.  2.  Mildly enlarged central pulmonary arteries suggestive of possible pulmonary artery hypertension.  3.  Slight fibrosis or atelectasis left lung base similar to chest x-ray. No acute findings in the lungs.    PFTs 04/2019:  FEV1/FVC is 0.53 and is reduced.  FEV1 is 40% predicted and is reduced.  FVC is 59% predicted and is reduced.  There was no improvement in spirometry after a single inhaled dose of bronchodilator.  TLC is 95% predicted and is normal.  RV is 138% predicted and is increased.  RV/TLC is 0.63 and is increased.  DLCO is 42% predicted and is reduced when it   is corrected for hemoglobin.  The flow volume loop shows obstructive morphology.     Impression:  Full Pulmonary Function Test is abnormal. Spirometry is consistent with severe obstructive ventilatory defect.  Spirometry is not consistent with reversibility.  There is no hyperinflation.  There is air-trapping.  Diffusion capacity when corrected for hemoglobin is moderately  reduced.    Cardiac MRI 09/2023  IMPRESSION:  1.  The images by 3D contrast enhanced MRA and other image sequences did not show obvious evidence of  pulmonary vein anomaly. No atrial septal defect.    2.  Normal left ventricular size, mild concentric LVH and systolic function.  The calculated left  ventricular ejection fraction is 68%.  3.  No previous myocardial infarction.   4.  Normal right ventricular size and systolic function.  Pacemaker lead is noticed.  5.  Mild to moderate tricuspid valve regurgitation.  Moderate aortic valve stenosis is noticed.

## 2024-05-28 NOTE — PATIENT INSTRUCTIONS
It was a pleasure seeing you in clinic today. This is what we discussed:    During the day keep your oxygen between 88-92%. If your number is high than 92% you can turn the oxygen down or take it off.   Try and cut down on your Duonebs. You can use this up to 4 times daily but do not need to use this if you are not having increased symptoms.   Remember to hold your breath for 10 seconds if you can after each inhaler.   Use your albuterol every 4 hours as needed for cough, shortness of breath, wheeze, or chest tightness.   Follow up 6 months   If you have worsening symptoms, questions, or need to speak with the nurse please call [phone].

## 2024-05-28 NOTE — TELEPHONE ENCOUNTER
e-contratos message with angiogram instructions sent to patient.     Margy Solomon RN on 5/28/2024 at 10:13 AM

## 2024-05-29 ENCOUNTER — MYC MEDICAL ADVICE (OUTPATIENT)
Dept: FAMILY MEDICINE | Facility: CLINIC | Age: 79
End: 2024-05-29
Payer: MEDICARE

## 2024-05-29 DIAGNOSIS — J44.9 CHRONIC OBSTRUCTIVE PULMONARY DISEASE, UNSPECIFIED COPD TYPE (H): Primary | ICD-10-CM

## 2024-05-29 DIAGNOSIS — D50.9 IRON DEFICIENCY ANEMIA, UNSPECIFIED IRON DEFICIENCY ANEMIA TYPE: Primary | ICD-10-CM

## 2024-05-29 NOTE — TELEPHONE ENCOUNTER
Ferrous fumarate vitamin C ER (Delicia-sequels) 65-25 MG CR tablet is over the counter not covered by insurance and pharmacy is out of stock     Jessica Quezada RN

## 2024-05-30 ENCOUNTER — THERAPY VISIT (OUTPATIENT)
Dept: PHYSICAL THERAPY | Facility: REHABILITATION | Age: 79
End: 2024-05-30
Payer: MEDICARE

## 2024-05-30 DIAGNOSIS — I89.0 LYMPHEDEMA: Primary | ICD-10-CM

## 2024-05-30 PROCEDURE — 97110 THERAPEUTIC EXERCISES: CPT | Mod: GP | Performed by: PHYSICAL THERAPIST

## 2024-05-30 PROCEDURE — 97140 MANUAL THERAPY 1/> REGIONS: CPT | Mod: GP | Performed by: PHYSICAL THERAPIST

## 2024-05-30 RX ORDER — FERROUS SULFATE 325(65) MG
325 TABLET ORAL
Qty: 90 TABLET | Refills: 1 | Status: SHIPPED | OUTPATIENT
Start: 2024-05-30 | End: 2024-07-11

## 2024-05-30 NOTE — PROGRESS NOTES
DISCHARGE  Reason for Discharge: Patient has met all goals.    Equipment Issued: none    Discharge Plan: Patient to continue home program.    Referring Provider:  Kris Evangelista     05/30/24 0500   Appointment Info   Signing clinician's name / credentials Dilia Coronado, PT, DPT, CLT-LIDIA   Visits Used 7   Medical Diagnosis Lymphedema   PT Tx Diagnosis Lymphedema   Quick Adds Certification   Progress Note/Certification   Start of Care Date 02/16/24   Onset of illness/injury or Date of Surgery 02/09/24   Therapy Frequency 1-2 times per week decreasing in frequency   Predicted Duration up to 90 days   Certification date from 02/16/24   Certification date to 05/16/24   Progress Note Completed Date 02/16/24       Present No   GOALS   PT Goals 2   PT Goal 1   Goal Identifier HEP/self care-home management   Goal Description Patient will be independent in a HEP and self care/home management techniques such as compression and skin care for ongoing symptom management in 12 weeks   Goal Progress IMPROVING - pt with an okay fitting knee sleeve helping with pain in L knee   Target Date 05/16/24   PT Goal 2   Goal Identifier volumes   Goal Description Patient will demonstrate a decrease in volumes by at least 5% for increase in mobility and decrease risk for infection in 12 weeks   Goal Progress on going, progressing, decrease 3% on R and 1.4% on L side   Target Date 05/16/24   Subjective Report   Subjective Report Patient reports she has been doing well, the knee and leg edema and pain has been improved. Patient reports compression leggings are good and doing well. Patient will be undergoing testing for her valve and may have it replaced, she also has orders for pulmonary rehab. She is ready for DC today   Objective Measures   Objective Measures Objective Measure 1   Treatment Interventions (PT)   Interventions Self Care/Home Management;Therapeutic Procedure/Exercise;Manual Therapy   Therapeutic  Procedure/Exercise   Therapeutic Procedures: strength, endurance, ROM, flexibility minutes (08410) 15   Ther Proc 1 lymph stimulation exercises for LEs   Ther Proc 1 - Details . Discussed doing exercises in sitting as well as another option: seated marching, hip abduction, LAQs, heel/toe raises, ankle circles with deep breathing,Discussed setting a timer to not sit for long periods of time and to stand up and down an exercises and/or add in ankle pumps and circles when sitting in recliner   Manual Therapy   Manual Therapy: Mobilization, MFR, MLD, friction massage minutes (72819) 23   Manual Therapy 1 - Details discussed compression,  she reports the bioflect leggings she wore once and when she washed it was unable to get back on, she has 4 pairs of exercises leggings with light compression to the upper leg and knee, and wears knee ramo compression socks - reports she will be able to manage this routine and has been over the last month, light MLD to the L LE and knee, reviewed self MLD in clinic   Skilled Intervention compression and MLD for management of edema   Patient Response/Progress tolerated well, demonstrates and verbalized understanding   Education   Learner/Method Patient;Listening;Reading;Demonstration;Pictures/Video;No Barriers to Learning   Plan   Plan for next session DC from therapy,   Comments   Comments Pt reports less L knee pain with current L knee compression sleeve, she is not sure how much it has helped the swelling yet. She has met or nearly met all goals. She will be having testing for her heart valve and starting pulmonary rehab. Plan to DC her from therapy.   Total Session Time   Timed Code Treatment Minutes 38   Total Treatment Time (sum of timed and untimed services) 38     Dilia Coronado, PT, DPT, CLRADHA

## 2024-06-03 ENCOUNTER — HOSPITAL ENCOUNTER (OUTPATIENT)
Facility: HOSPITAL | Age: 79
Discharge: HOME | End: 2024-06-03
Attending: INTERNAL MEDICINE | Admitting: INTERNAL MEDICINE
Payer: MEDICARE

## 2024-06-03 VITALS
DIASTOLIC BLOOD PRESSURE: 59 MMHG | TEMPERATURE: 98.3 F | HEART RATE: 79 BPM | HEIGHT: 64 IN | RESPIRATION RATE: 41 BRPM | BODY MASS INDEX: 35.17 KG/M2 | OXYGEN SATURATION: 100 % | WEIGHT: 206 LBS | SYSTOLIC BLOOD PRESSURE: 118 MMHG

## 2024-06-03 DIAGNOSIS — I51.89 OTHER ILL-DEFINED HEART DISEASES: ICD-10-CM

## 2024-06-03 DIAGNOSIS — I35.0 AORTIC VALVE STENOSIS, ETIOLOGY OF CARDIAC VALVE DISEASE UNSPECIFIED: ICD-10-CM

## 2024-06-03 PROBLEM — Z98.890 STATUS POST CORONARY ANGIOGRAM: Status: ACTIVE | Noted: 2024-06-03

## 2024-06-03 LAB
ANION GAP SERPL CALCULATED.3IONS-SCNC: 8 MMOL/L (ref 7–15)
ATRIAL RATE - MUSE: 78 BPM
BASE EXCESS BLDA CALC-SCNC: 5.8 MMOL/L (ref -3–3)
BASE EXCESS BLDV CALC-SCNC: 6.1 MMOL/L (ref -3–3)
BASE EXCESS BLDV CALC-SCNC: 6.5 MMOL/L (ref -3–3)
BASE EXCESS BLDV CALC-SCNC: 6.9 MMOL/L (ref -3–3)
BASE EXCESS BLDV CALC-SCNC: 7.1 MMOL/L (ref -3–3)
BASE EXCESS BLDV CALC-SCNC: 7.4 MMOL/L (ref -3–3)
BUN SERPL-MCNC: 33.6 MG/DL (ref 8–23)
CALCIUM SERPL-MCNC: 9.4 MG/DL (ref 8.8–10.2)
CHLORIDE SERPL-SCNC: 99 MMOL/L (ref 98–107)
CREAT SERPL-MCNC: 1.07 MG/DL (ref 0.51–0.95)
DEPRECATED HCO3 PLAS-SCNC: 33 MMOL/L (ref 22–29)
DIASTOLIC BLOOD PRESSURE - MUSE: NORMAL MMHG
EGFRCR SERPLBLD CKD-EPI 2021: 53 ML/MIN/1.73M2
ERYTHROCYTE [DISTWIDTH] IN BLOOD BY AUTOMATED COUNT: 15.5 % (ref 10–15)
GLUCOSE SERPL-MCNC: 110 MG/DL (ref 70–99)
HCO3 BLDA-SCNC: 29 MMOL/L (ref 21–28)
HCO3 BLDV-SCNC: 29 MMOL/L (ref 21–28)
HCO3 BLDV-SCNC: 30 MMOL/L (ref 21–28)
HCO3 BLDV-SCNC: 30 MMOL/L (ref 21–28)
HCT VFR BLD AUTO: 30.1 % (ref 35–47)
HGB BLD-MCNC: 9.1 G/DL (ref 11.7–15.7)
HOLD SPECIMEN: NORMAL
INTERPRETATION ECG - MUSE: NORMAL
MCH RBC QN AUTO: 23.9 PG (ref 26.5–33)
MCHC RBC AUTO-ENTMCNC: 30.2 G/DL (ref 31.5–36.5)
MCV RBC AUTO: 79 FL (ref 78–100)
OXYHGB MFR BLDA: 96 % (ref 92–100)
OXYHGB MFR BLDV: 70 % (ref 70–75)
OXYHGB MFR BLDV: 79 % (ref 70–75)
OXYHGB MFR BLDV: 82 % (ref 70–75)
OXYHGB MFR BLDV: 86 % (ref 70–75)
OXYHGB MFR BLDV: 93 % (ref 70–75)
P AXIS - MUSE: 75 DEGREES
PCO2 BLDA: 58 MM HG (ref 35–45)
PCO2 BLDV: 58 MM HG (ref 40–50)
PCO2 BLDV: 58 MM HG (ref 40–50)
PCO2 BLDV: 60 MM HG (ref 40–50)
PCO2 BLDV: 60 MM HG (ref 40–50)
PCO2 BLDV: 61 MM HG (ref 40–50)
PH BLDA: 7.34 [PH] (ref 7.35–7.45)
PH BLDV: 7.34 [PH] (ref 7.32–7.43)
PH BLDV: 7.35 [PH] (ref 7.32–7.43)
PLATELET # BLD AUTO: 245 10E3/UL (ref 150–450)
PO2 BLDA: 91 MM HG (ref 80–105)
PO2 BLDV: 40 MM HG (ref 25–47)
PO2 BLDV: 47 MM HG (ref 25–47)
PO2 BLDV: 50 MM HG (ref 25–47)
PO2 BLDV: 56 MM HG (ref 25–47)
PO2 BLDV: 75 MM HG (ref 25–47)
POTASSIUM SERPL-SCNC: 4.4 MMOL/L (ref 3.4–5.3)
PR INTERVAL - MUSE: 290 MS
QRS DURATION - MUSE: 146 MS
QT - MUSE: 404 MS
QTC - MUSE: 460 MS
R AXIS - MUSE: 97 DEGREES
RBC # BLD AUTO: 3.8 10E6/UL (ref 3.8–5.2)
SAO2 % BLDA: 98 % (ref 95–96)
SAO2 % BLDV: 72 % (ref 70–75)
SAO2 % BLDV: 80 % (ref 70–75)
SAO2 % BLDV: 84 % (ref 70–75)
SAO2 % BLDV: 88 % (ref 70–75)
SAO2 % BLDV: 95 % (ref 70–75)
SODIUM SERPL-SCNC: 140 MMOL/L (ref 135–145)
SYSTOLIC BLOOD PRESSURE - MUSE: NORMAL MMHG
T AXIS - MUSE: 34 DEGREES
VENTRICULAR RATE- MUSE: 78 BPM
WBC # BLD AUTO: 8.7 10E3/UL (ref 4–11)

## 2024-06-03 PROCEDURE — C1751 CATH, INF, PER/CENT/MIDLINE: HCPCS | Performed by: INTERNAL MEDICINE

## 2024-06-03 PROCEDURE — 250N000011 HC RX IP 250 OP 636: Performed by: INTERNAL MEDICINE

## 2024-06-03 PROCEDURE — 250N000013 HC RX MED GY IP 250 OP 250 PS 637: Performed by: NURSE PRACTITIONER

## 2024-06-03 PROCEDURE — C1769 GUIDE WIRE: HCPCS | Performed by: INTERNAL MEDICINE

## 2024-06-03 PROCEDURE — 93456 R HRT CORONARY ARTERY ANGIO: CPT | Performed by: INTERNAL MEDICINE

## 2024-06-03 PROCEDURE — 80048 BASIC METABOLIC PNL TOTAL CA: CPT | Performed by: INTERNAL MEDICINE

## 2024-06-03 PROCEDURE — 250N000009 HC RX 250: Performed by: INTERNAL MEDICINE

## 2024-06-03 PROCEDURE — 999N000054 HC STATISTIC EKG NON-CHARGEABLE

## 2024-06-03 PROCEDURE — 272N000001 HC OR GENERAL SUPPLY STERILE: Performed by: INTERNAL MEDICINE

## 2024-06-03 PROCEDURE — 99152 MOD SED SAME PHYS/QHP 5/>YRS: CPT | Performed by: INTERNAL MEDICINE

## 2024-06-03 PROCEDURE — 93456 R HRT CORONARY ARTERY ANGIO: CPT | Mod: 26 | Performed by: INTERNAL MEDICINE

## 2024-06-03 PROCEDURE — 255N000002 HC RX 255 OP 636: Performed by: INTERNAL MEDICINE

## 2024-06-03 PROCEDURE — 36415 COLL VENOUS BLD VENIPUNCTURE: CPT | Performed by: INTERNAL MEDICINE

## 2024-06-03 PROCEDURE — 82805 BLOOD GASES W/O2 SATURATION: CPT

## 2024-06-03 PROCEDURE — 258N000003 HC RX IP 258 OP 636: Performed by: INTERNAL MEDICINE

## 2024-06-03 PROCEDURE — C1760 CLOSURE DEV, VASC: HCPCS | Performed by: INTERNAL MEDICINE

## 2024-06-03 PROCEDURE — 85041 AUTOMATED RBC COUNT: CPT | Performed by: INTERNAL MEDICINE

## 2024-06-03 PROCEDURE — C1894 INTRO/SHEATH, NON-LASER: HCPCS | Performed by: INTERNAL MEDICINE

## 2024-06-03 PROCEDURE — 93010 ELECTROCARDIOGRAM REPORT: CPT | Mod: HOP | Performed by: INTERNAL MEDICINE

## 2024-06-03 PROCEDURE — 93005 ELECTROCARDIOGRAM TRACING: CPT

## 2024-06-03 PROCEDURE — 250N000013 HC RX MED GY IP 250 OP 250 PS 637: Performed by: INTERNAL MEDICINE

## 2024-06-03 PROCEDURE — 99153 MOD SED SAME PHYS/QHP EA: CPT | Performed by: INTERNAL MEDICINE

## 2024-06-03 RX ORDER — OXYCODONE HYDROCHLORIDE 5 MG/1
10 TABLET ORAL EVERY 4 HOURS PRN
Status: DISCONTINUED | OUTPATIENT
Start: 2024-06-03 | End: 2024-06-03 | Stop reason: HOSPADM

## 2024-06-03 RX ORDER — FENTANYL CITRATE 50 UG/ML
25 INJECTION, SOLUTION INTRAMUSCULAR; INTRAVENOUS
Status: DISCONTINUED | OUTPATIENT
Start: 2024-06-03 | End: 2024-06-03 | Stop reason: HOSPADM

## 2024-06-03 RX ORDER — ACETAMINOPHEN 325 MG/1
650 TABLET ORAL EVERY 4 HOURS PRN
Status: DISCONTINUED | OUTPATIENT
Start: 2024-06-03 | End: 2024-06-03 | Stop reason: HOSPADM

## 2024-06-03 RX ORDER — ATROPINE SULFATE 0.1 MG/ML
0.5 INJECTION INTRAVENOUS
Status: DISCONTINUED | OUTPATIENT
Start: 2024-06-03 | End: 2024-06-03 | Stop reason: HOSPADM

## 2024-06-03 RX ORDER — LIDOCAINE 40 MG/G
CREAM TOPICAL
Status: DISCONTINUED | OUTPATIENT
Start: 2024-06-03 | End: 2024-06-03 | Stop reason: HOSPADM

## 2024-06-03 RX ORDER — FLUMAZENIL 0.1 MG/ML
0.2 INJECTION, SOLUTION INTRAVENOUS
Status: DISCONTINUED | OUTPATIENT
Start: 2024-06-03 | End: 2024-06-03 | Stop reason: HOSPADM

## 2024-06-03 RX ORDER — NALOXONE HYDROCHLORIDE 0.4 MG/ML
0.4 INJECTION, SOLUTION INTRAMUSCULAR; INTRAVENOUS; SUBCUTANEOUS
Status: DISCONTINUED | OUTPATIENT
Start: 2024-06-03 | End: 2024-06-03 | Stop reason: HOSPADM

## 2024-06-03 RX ORDER — OXYCODONE HYDROCHLORIDE 5 MG/1
5 TABLET ORAL EVERY 4 HOURS PRN
Status: DISCONTINUED | OUTPATIENT
Start: 2024-06-03 | End: 2024-06-03 | Stop reason: HOSPADM

## 2024-06-03 RX ORDER — NALOXONE HYDROCHLORIDE 0.4 MG/ML
0.2 INJECTION, SOLUTION INTRAMUSCULAR; INTRAVENOUS; SUBCUTANEOUS
Status: DISCONTINUED | OUTPATIENT
Start: 2024-06-03 | End: 2024-06-03 | Stop reason: HOSPADM

## 2024-06-03 RX ORDER — SODIUM CHLORIDE 9 MG/ML
INJECTION, SOLUTION INTRAVENOUS CONTINUOUS
Status: DISCONTINUED | OUTPATIENT
Start: 2024-06-03 | End: 2024-06-03 | Stop reason: HOSPADM

## 2024-06-03 RX ORDER — ASPIRIN 81 MG/1
243 TABLET, CHEWABLE ORAL ONCE
Status: COMPLETED | OUTPATIENT
Start: 2024-06-03 | End: 2024-06-03

## 2024-06-03 RX ORDER — DIAZEPAM 5 MG
5 TABLET ORAL ONCE
Status: COMPLETED | OUTPATIENT
Start: 2024-06-03 | End: 2024-06-03

## 2024-06-03 RX ORDER — ASPIRIN 325 MG
325 TABLET ORAL ONCE
Status: COMPLETED | OUTPATIENT
Start: 2024-06-03 | End: 2024-06-03

## 2024-06-03 RX ORDER — FENTANYL CITRATE 50 UG/ML
INJECTION, SOLUTION INTRAMUSCULAR; INTRAVENOUS
Status: DISCONTINUED | OUTPATIENT
Start: 2024-06-03 | End: 2024-06-03 | Stop reason: HOSPADM

## 2024-06-03 RX ADMIN — SODIUM CHLORIDE: 9 INJECTION, SOLUTION INTRAVENOUS at 08:11

## 2024-06-03 RX ADMIN — ASPIRIN 325 MG ORAL TABLET 325 MG: 325 PILL ORAL at 08:18

## 2024-06-03 RX ADMIN — DIAZEPAM 5 MG: 5 TABLET ORAL at 08:36

## 2024-06-03 ASSESSMENT — ACTIVITIES OF DAILY LIVING (ADL)
ADLS_ACUITY_SCORE: 44
ADLS_ACUITY_SCORE: 38

## 2024-06-03 NOTE — Clinical Note
Catheter removed over wire.
Check Implants.
Check Procedures.
Conscious sedation is planned for this procedure
Family has been updated.
Hemodynamic equipment used: 5 lead ECG, LIKEPAK Hands Off Patches, LIFEPAK With 3 Leads, Machine BP Cuff and pulse oximeter probe.
LCA Cine(s)  injected and visualized utilizing power injector system.
Lab results reviewed and abnormals discussed with physician.
Oxygen saturations documented through [facility].
Patient education provided. 
Patient education provided. 
Potential access sites were evaluated for patency using ultrasound.   The right femoral artery and right femoral vein was selected. Access was obtained under with Sonosite and Fluoroscopic guidance using a micropuncture 21 gauge needle with direct visualization of needle entry.   
Pre-calculated contrast dose 248 ml
Prepped: groin. Prepped with: ChloraPrep. The patient was draped. .Pre-procedure site marking:Insertion site not predetermined
Procedure is scheduled in Cath Lab 3.
Procedure scheduled as Elective.
RCA Cine(s)  injected and visualized utilizing power injector system.
Removed intact
Removed intact
The DP pulses are 1+ bilaterally. The PT pulses are 1+ bilaterally.
The ECG shows a sinus rhythm. ECG rate  = 83 bpm.
Total contrast given (ml) 59
dressing applied applied to wound securely.
dry, intact, no bleeding and no hematoma.
yes

## 2024-06-03 NOTE — INTERVAL H&P NOTE
"I have reviewed the surgical (or preoperative) H&P that is linked to this encounter, and examined the patient. There are no significant changes    Clinical Conditions Present on Arrival:  Clinically Significant Risk Factors Present on Admission                  # Obesity: Estimated body mass index is 35.36 kg/m  as calculated from the following:    Height as of 5/13/24: 1.626 m (5' 4\").    Weight as of 5/28/24: 93.4 kg (206 lb).       "

## 2024-06-03 NOTE — PROGRESS NOTES
Patient discharged home with her daughter in law in stable condition. Patient and daughter in law verbalize understanding of discharge instructions and follow up appointment. No questions or concerns at time of discharge.

## 2024-06-03 NOTE — DISCHARGE INSTRUCTIONS
Interventional Cardiology  Coronary Angiogram/Angioplasty/Stent/Atherectomy Discharge Instructions -   Femoral Approach    The instructions below are to help you understand how to take care of yourself. There is also information about when to call the doctor or emergency services    **Do not stop your aspirin or platelet inhibitor unless directed by your Cardiologist.  These medications help to prevent platelets in your blood from sticking together and forming a clot.  Examples of these medications are:  Ticagrelor (Brilinta), Clopidigrel (Plavix), Prasugrel (Effient)          For the first 72 hours after your procedure:  No driving for 24 hours  Do not lift more than 15 pounds.  If you usually lift 50 pounds or more daily, please contact your cardiologist  Avoid any hard work or tiring activities, this includes, yard work, jogging, biking, sexual activity  During the day get up and walk around every 2 hours  You may return to work after 72 hours if you are feeling well and your job does not involve heavy lifting          Groin Site / Wound Care / Bleeding    You may take off the dressing on your groin the day after your procedure  Keep the area dry and clean  It is ok to shower with regular soap.  Pat dry, do not rub  You do not need to use a bandage  No tub/pool or hot tub for 1 week  If your groin starts to bleed or begins to swell suddenly after leaving the hospital, lie flat and apply firm pressure above the site for 15 minutes.  If bleeding continues, call 9-1-1  Bruising around the groin area is normal.  It may take 2-3 weeks for this to go away.  It is normal for the bruised area to turn green and/or yellow as it is healing.  A small lump may also be present and may last 2-3 months.  Your leg may be sore or stiff for a few days.  You may take Tylenol or a pain medicine recommended by your doctor  If you have a fever over 100.4, that lasts more than one day - call your cardiologist.      Our Cardiac Rehab  staff may visit briefly with you while your in the hospital.  If they miss you, someone will contact you after you are home.  You are encouraged to enroll in an Outpatient Cardiac Rehab Program     No elective dental work for 6 weeks after having a stent.     No driving for 24 hours      Your Procedural Physician was: Dr. Croft  the phone number is: [phone]      [facility]:  [phone]  If you are calling after hours, please listen to the entire voicemail, a live  will answer at the end of the message

## 2024-06-04 RX ORDER — IODIXANOL 320 MG/ML
INJECTION, SOLUTION INTRAVASCULAR
Status: SHIPPED | OUTPATIENT
Start: 2024-06-03

## 2024-06-05 ENCOUNTER — TELEPHONE (OUTPATIENT)
Dept: CARDIOLOGY | Facility: CLINIC | Age: 79
End: 2024-06-05
Payer: MEDICARE

## 2024-06-05 DIAGNOSIS — I35.0 NONRHEUMATIC AORTIC VALVE STENOSIS: Primary | ICD-10-CM

## 2024-06-05 NOTE — TELEPHONE ENCOUNTER
Criteria for moderate sedation  Mallampati less than or equal to 3: yes  Transfemoral Access: yes  History of (If yes to any, proceed with MAC):  -Adverse anesthesia events: no  -BMI >43: no  -Intolerable chronic pain: no  -History of difficult airway: no  -History of being unable to tolerate moderate sedation: no  -Valve-in-valve (not Trifecta): no  -Any considerations: no    Unless otherwise determined by structural, anesthesia or cath lab teams, patient will proceed with TAVR under MODERATE SEDATION.      Margy Solomon RN on 6/5/2024 at 3:37 PM

## 2024-06-05 NOTE — TELEPHONE ENCOUNTER
===View-only below this line===  ----- Message -----  From: Corazon Sharma  Sent: 6/5/2024   3:26 PM CDT  To: [facility] [place] [facility]    Hey,    Patient agreed to 6/25 TAVR.    She would like to be third case if possible.  I told her we would honor it if possible but she understands that it might not be able to happen due to other medical needs.    Let me know when you place orders!    Thanks!  JANICE

## 2024-06-10 ENCOUNTER — OFFICE VISIT (OUTPATIENT)
Dept: CARDIOLOGY | Facility: CLINIC | Age: 79
End: 2024-06-10
Payer: MEDICARE

## 2024-06-10 VITALS
HEART RATE: 81 BPM | RESPIRATION RATE: 20 BRPM | BODY MASS INDEX: 34.84 KG/M2 | OXYGEN SATURATION: 98 % | SYSTOLIC BLOOD PRESSURE: 114 MMHG | DIASTOLIC BLOOD PRESSURE: 68 MMHG | WEIGHT: 203 LBS

## 2024-06-10 DIAGNOSIS — J43.2 CENTRILOBULAR EMPHYSEMA (H): ICD-10-CM

## 2024-06-10 DIAGNOSIS — I25.83 CORONARY ARTERIOSCLEROSIS DUE TO LIPID RICH PLAQUE: ICD-10-CM

## 2024-06-10 DIAGNOSIS — Z95.0 PACEMAKER: ICD-10-CM

## 2024-06-10 DIAGNOSIS — I44.1 SECOND DEGREE AV BLOCK: ICD-10-CM

## 2024-06-10 DIAGNOSIS — Q26.2 TOTAL ANOMALOUS PULMONARY VENOUS RETURN: ICD-10-CM

## 2024-06-10 DIAGNOSIS — I35.0 AORTIC VALVE STENOSIS, ETIOLOGY OF CARDIAC VALVE DISEASE UNSPECIFIED: ICD-10-CM

## 2024-06-10 DIAGNOSIS — R93.1 ABNORMAL CARDIAC CT ANGIOGRAPHY: ICD-10-CM

## 2024-06-10 DIAGNOSIS — I35.0 NONRHEUMATIC AORTIC VALVE STENOSIS: Primary | ICD-10-CM

## 2024-06-10 PROCEDURE — 99204 OFFICE O/P NEW MOD 45 MIN: CPT | Performed by: STUDENT IN AN ORGANIZED HEALTH CARE EDUCATION/TRAINING PROGRAM

## 2024-06-10 NOTE — LETTER
"6/10/2024    Kate Marte DO  [address]  [facility] [address]    RE: Lalitha Underwood       Dear Colleague,     I had the pleasure of seeing Lalitha Underwood in the [facility].  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 She is a high-risk surgical candidate given her age and comorbidities and she does not want to have open heart surgery under any circumstances. We had a conversation about options for aortic valve intervention, which included TAVR and SAVR. I support the recommendation to proceed with transcatheter aortic valve replacement, pending completion of the preoperative workup. I believe that TAVR is worth pursuing and may help prolong her life and alleviate some of her dyspnea. However, I did explain that any component of her shortness of breath that is due to her primary lung dysfunction would not likely be alleviated with TAVR.    I described the technical details, as well as the expected postoperative course and recovery to the patient. I also discussed the risks and benefits of the procedure. The risks include but are not limited to bleeding, infection, stroke, myocardial infarction, dysrhythmia and need for pacemaker, respiratory failure, kidney or liver injury, bowel or limb ischemia, and death. The patient " understands these risks and wishes to undergo the operation.     A nikki discussion was had with the patient regarding the possible need for surgical bailout should a rare complication arise during the TAVR procedure such as aortic rupture or dissection. Though these complications are rare at <1% incidence, they carry a major morbidity and mortality risk of at least 50%. I explained the morbidity among survivors may include dialysis, heart attack, stroke, prolonged ventilation, loss of limb, and loss of independent living situation. The patient stated that she would NOT want all rescue efforts made in the event of a procedural complication during TAVR, and that she would NOT consent to undergo emergency open heart surgery if needed. She is OK with having peripheral vascular intervention in the event of an access complication at the time of TAVR, but under no circumstances would she want to undergo open heart surgery. Although her preference is to maintain DNR status, she is willing to lift this status and become FULL CODE during the TAVR procedure and the perioperative period of her procedural hospital stay to resuscitate her for any reversible insults.            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 She is very dyspneic, even at rest. She has frequent palpitations. She denies chest pain.                Past Medical History:     Past Medical History:   Diagnosis Date    Convulsive disorder (H)     Convulsive disorder (H)     COPD (chronic obstructive pulmonary disease) (H)     COPD (chronic obstructive pulmonary disease) (H)     Coronary artery disease involving native coronary artery with unstable angina  pectoris (H) 3/31/2023    Depression     Dyspnea on exertion     Gastroesophageal reflux disease     Heart murmur     Hernia of unspecified site of abdominal cavity without mention of obstruction or gangrene     Hiatal hernia     Hiatal hernia     Hypertension     Hypertension     Obese     On home oxygen therapy     at 1.5 liters at nite    Osteopenia     Osteopenia     Oxygen dependent     1.5 L per NC    Pneumonia due to infectious organism, unspecified laterality, unspecified part of lung 3/19/2022    Second degree heart block 3/19/2022    Shortness of breath     Status post coronary angiogram 6/3/2024    Uncomplicated asthma     URI (upper respiratory infection)     Venous insufficiency of both lower extremities     Venous insufficiency of both lower extremities     Walking troubles             Past Surgical History:     Past Surgical History:   Procedure Laterality Date    AMPUTATE TOE(S) Left 8/11/2022    Procedure: AMPUTATION, fifth digit left foot;  Surgeon: Alfonso Orozco DPM;  Location: [facility] OR    ARTHROPLASTY TOE(S) Left 11/22/2021    Procedure: ARTHROPLASTY, digits two and three left foot;  Surgeon: Alfonso Orozco DPM;  Location: [facility] OR    ARTHROPLASTY TOE(S) Left 7/18/2022    Procedure: ARTHROPLASTY, digits four and five left foot;  Surgeon: Alfonso Orozco DPM;  Location: [facility] OR    CV CORONARY ANGIOGRAM N/A 7/7/2023    Procedure: Coronary Angiogram;  Surgeon: Elijah Leger MD;  Location: [facility] LAB CV    CV CORONARY ANGIOGRAM N/A 6/3/2024    Procedure: Coronary Angiogram;  Surgeon: Villa Croft MD;  Location: [facility] LAB CV    CV LEFT HEART CATH N/A 7/7/2023    Procedure: Left Heart Catheterization;  Surgeon: Elijah Leger MD;  Location: [facility] CV    CV RIGHT HEART CATH MEASUREMENTS RECORDED N/A 7/7/2023    Procedure: Right Heart Catheterization with Shunt Run;  Surgeon: Elijah Leger MD;  Location:   [facility] CATH LAB CV    CV RIGHT HEART CATH MEASUREMENTS RECORDED N/A 6/3/2024    Procedure: Right Heart Catheterization;  Surgeon: Villa Croft MD;  Location: [facility] LAB     EP PACEMAKER DEVICE & LEAD IMPLANT- RIGHT ATRIAL & RIGHT VENTRICULAR N/A 3/24/2022    Procedure: Pacemaker Device & Lead Implant - Right Atrial & Right Ventricular;  Surgeon: Helder Pendleton MD;  Location: [facility] CV    ESOPHAGOSCOPY, GASTROSCOPY, DUODENOSCOPY (EGD), COMBINED N/A 11/26/2018    Procedure: Esophagogastroduodenoscopy;  Surgeon: Jasmeet Wilder MD;  Location: [facility] OR    HERNIA REPAIR, UMBILICAL  04/2018    HERNIA REPAIR, UMBILICAL  04/04/2018    Dr. Jimenez    LAPAROSCOPIC HERNIORRHAPHY HIATAL N/A 11/26/2018    Procedure: Laparoscopic Hiatal Hernia Repair,bilateral chest tubes;  Surgeon: Jasmeet Wilder MD;  Location: [facility] OR    OTHER SURGICAL HISTORY Left 2003    Broke titanium in left arm    Repair arm fracture Left     SHOULDER SURGERY Right 1967    SHOULDER SURGERY Right 1967     BIOPSY THYROID FINE NEEDLE ASPIRATION  7/29/2020             Family History:     Family History   Problem Relation Age of Onset    Pulmonary Hypertension Daughter         idiopathic     Heart Disease Other         2 sibs MI, 1 sib electrical conduction disorder    Breast Cancer Mother 66.00    Hypertension Mother     Coronary Artery Disease Mother     Varicose Veins Mother     Hypertension Father     Coronary Artery Disease Sister     Heart Disease Sister     Diabetes Son     Pulmonary Hypertension Daughter     Coronary Artery Disease Sister     Heart Disease Sister               Social History:     Social History     Socioeconomic History    Marital status:      Spouse name: Not on file    Number of children: Not on file    Years of education: Not on file    Highest education level: Not on file   Occupational History    Not on file   Tobacco Use    Smoking status: Former     Current packs/day: 0.00      Average packs/day: 1.5 packs/day for 38.0 years (57.0 ttl pk-yrs)     Types: Cigarettes     Start date: 1960     Quit date: 1998     Years since quittin.5    Smokeless tobacco: Never    Tobacco comments:     quit in    Vaping Use    Vaping status: Never Used   Substance and Sexual Activity    Alcohol use: Yes     Alcohol/week: 2.0 standard drinks of alcohol     Comment: occ    Drug use: Not Currently    Sexual activity: Never   Other Topics Concern    Not on file   Social History Narrative    .  Two kids.  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     Social Determinants of Health     Financial Resource Strain: Low Risk  (2024)    Financial Resource Strain     Within the past 12 months, have you or your family members you live with been unable to get utilities (heat, electricity) when it was really needed?: No   Food Insecurity: Low Risk  (2024)    Food Insecurity     Within the past 12 months, did you worry that your food would run out before you got money to buy more?: No     Within the past 12 months, did the food you bought just not last and you didn t have money to get more?: No   Transportation Needs: Low Risk  (2024)    Transportation Needs     Within the past 12 months, has lack of transportation kept you from medical appointments, getting your medicines, non-medical meetings or appointments, work, or from getting things that you need?: No   Physical Activity: Patient Declined (2024)    Exercise Vital Sign     Days of Exercise per Week: Patient declined     Minutes of Exercise per Session: Patient declined   Stress: Stress Concern Present (2024)    Citizen of [place] [employer] - Occupational Stress Questionnaire     Feeling of Stress : To some extent   Social Connections: Unknown (2024)    Social Connection and Isolation Panel [NHANES]     Frequency of Communication with Friends and Family: Not on file     Frequency of Social Gatherings with Friends and Family:  Twice a week     Attends Faith Services: Not on file     Active Member of Clubs or Organizations: Not on file     Attends Club or Organization Meetings: Not on file     Marital Status: Not on file   Interpersonal Safety: Low Risk  (5/13/2024)    Interpersonal Safety     Do you feel physically and emotionally safe where you currently live?: Yes     Within the past 12 months, have you been hit, slapped, kicked or otherwise physically hurt by someone?: No     Within the past 12 months, have you been humiliated or emotionally abused in other ways by your partner or ex-partner?: No   Housing Stability: Low Risk  (5/6/2024)    Housing Stability     Do you have housing? : Yes     Are you worried about losing your housing?: No              Allergies:     Allergies   Allergen Reactions    Clindamycin Rash    Carbamazepine Rash    Morphine Nausea              Medications:     Current Outpatient Medications   Medication Sig Dispense Refill    albuterol (PROAIR HFA/PROVENTIL HFA/VENTOLIN HFA) 108 (90 Base) MCG/ACT inhaler Inhale 2 puffs into the lungs every 6 hours as needed for shortness of breath or wheezing 18 g 1    azithromycin (ZITHROMAX) 250 MG tablet Take 1 tablet (250 mg) by mouth every evening 90 tablet 3    benzonatate (TESSALON) 100 MG capsule Take 1 capsule (100 mg) by mouth 3 times daily as needed for cough 90 capsule 3    Biotin 10 MG CAPS Take 1 capsule by mouth daily      calcium citrate (CITRACAL) 950 (200 Ca) MG tablet Take 1 tablet by mouth 2 times daily      estradiol (ESTRACE) 0.1 MG/GM vaginal cream Place 2 g vaginally three times a week 72 g 1    ferrous sulfate (FEROSUL) 325 (65 Fe) MG tablet Take 1 tablet (325 mg) by mouth daily (with breakfast) 90 tablet 1    fluticasone-salmeterol (ADVAIR) 500-50 MCG/ACT inhaler Inhale 1 puff into the lungs every 12 hours 180 each 3    guaiFENesin (MUCINEX) 600 MG 12 hr tablet Take 1 tablet (600 mg) by mouth 2 times daily 180 tablet 3    hydrochlorothiazide  (HYDRODIURIL) 25 MG tablet Take 1 tablet (25 mg) by mouth daily 90 tablet 3    ipratropium - albuterol 0.5 mg/2.5 mg/3 mL (DUONEB) 0.5-2.5 (3) MG/3ML neb solution TAKE 1 VIAL BY NEBULIZATION EVERY 6 HOURS AS NEEDED FOR SHORTNESS OF BREATH OR WHEEZING 1080 mL 3    levalbuterol (XOPENEX) 1.25 MG/3ML neb solution Take 3 mLs (1.25 mg) by nebulization every 4 hours as needed for shortness of breath or wheezing 810 mL 3    levETIRAcetam (KEPPRA) 500 MG tablet Take 1 tablet (500 mg) by mouth 2 times daily 180 tablet 3    losartan (COZAAR) 50 MG tablet Take 1 tablet (50 mg) by mouth 2 times daily 180 tablet 3    Magnesium Oxide -Mg Supplement 500 MG TABS Take 1 tablet (500 mg) by mouth 2 times daily as needed      nystatin (MYCOSTATIN) 629378 UNIT/GM external powder Apply topically daily as needed      potassium chloride teja ER (KLOR-CON M20) 20 MEQ CR tablet Take 1 tablet (20 mEq) by mouth at bedtime 90 tablet 3    RABEprazole (ACIPHEX) 20 MG EC tablet Take 1 tablet (20 mg) by mouth daily 90 tablet 3    sertraline (ZOLOFT) 100 MG tablet Take 1 tablet (100 mg) by mouth every evening 90 tablet 3    solifenacin (VESICARE) 10 MG tablet Take 1 tablet (10 mg) by mouth daily 90 tablet 3    spironolactone (ALDACTONE) 25 MG tablet Take 0.5 tablets (12.5 mg) by mouth daily 45 tablet 3    tiotropium (SPIRIVA RESPIMAT) 2.5 MCG/ACT inhaler Inhale 2 puffs into the lungs daily 12 g 3    vitamin D3 (CHOLECALCIFEROL) 125 MCG (5000 UT) tablet Take 5,000 Units by mouth every other day Monday, Wednesday, and Friday       Current Facility-Administered Medications   Medication Dose Route Frequency Provider Last Rate Last Admin    denosumab (PROLIA) injection 60 mg  60 mg Subcutaneous Q6 Months Lalitha Haq NP   60 mg at 01/02/24 1025     Facility-Administered Medications Ordered in Other Visits   Medication Dose Route Frequency Provider Last Rate Last Admin    iodixanol (VISIPAQUE 320) injection    Once PRN Alexanian, Villa, MD   59 mL at  06/03/24 1028             Review of Systems:   ROS: 10 point review of systems was performed and negative except as described above.         Physical Exam:   /68 (BP Location: Right arm, Patient Position: Sitting, Cuff Size: Adult Regular)   Pulse 81   Resp 20   Wt 92.1 kg (203 lb)   SpO2 98%   BMI 34.84 kg/m     Alert, pleasant, in no acute distress  Sclera anicteric  Neck supple JVD flat  Trachea midline  No prior chest incisions  Breathing dyspneic at rest  Regular rate and rhythm  Abdomen soft, non-tender  Extremities warm, no edema          Labs:   CBC RESULTS:   Recent Labs   Lab Test 06/03/24  0750   WBC 8.7   RBC 3.80   HGB 9.1*   HCT 30.1*   MCV 79   MCH 23.9*   MCHC 30.2*   RDW 15.5*         Last Comprehensive Metabolic Panel:  Lab Results   Component Value Date     06/03/2024    POTASSIUM 4.4 06/03/2024    CHLORIDE 99 06/03/2024    CO2 33 (H) 06/03/2024    ANIONGAP 8 06/03/2024     (H) 06/03/2024    BUN 33.6 (H) 06/03/2024    CR 1.07 (H) 06/03/2024    GFRESTIMATED 53 (L) 06/03/2024    TENZIN 9.4 06/03/2024            Imaging:   All imaging studies were reviewed and interpreted by me.    TTE 4/17/24:  The left ventricle is normal in size.  Left ventricular function is normal.The ejection fraction is 55-60%.  Diastolic Doppler findings (E/E' ratio and/or other parameters) suggest left  ventricular filling pressures are increased.  The left atrium is mildly dilated. The right atrium is mildly dilated.  Moderate to severe low flow low gradient aortic stenosis with a mean gradient  of 28 mmHg, peak velocity of 3.4 m/s, DI 0.30, SVI 30 ml/m2.  There is moderate (2+) mitral regurgitation.  Right ventricular systolic pressure is elevated, consistent with mild  pulmonary hypertension.  Compared to the prior study dated 3/31/24, there are changes as noted. Aortic  stenosis is now moderate to severe.    Cardiac catheterization 6/3/24:  CONCLUSIONS:   Mild nonobstructive coronary artery  disease.  Mild to moderately elevated right and normal left-sided filling pressures.  Mild pulmonary hypertension.  L-R shunt with anomalous pulmonary venous return into the left brachiocephalic vein (also noted on MRI/MRA and TAVR CT after review with our advanced imaging partners)          Savannah Gleason MD      Thank you for allowing me to participate in the care of your patient.      Sincerely,     Savannah Gleason MD     [facility]  cc:   Kate Marte DO  [address] [place] [address]

## 2024-06-11 NOTE — PROGRESS NOTES
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 She is a high-risk surgical candidate given her age and comorbidities and she does not want to have open heart surgery under any circumstances. We had a conversation about options for aortic valve intervention, which included TAVR and SAVR. I support the recommendation to proceed with transcatheter aortic valve replacement, pending completion of the preoperative workup. I believe that TAVR is worth pursuing and may help prolong her life and alleviate some of her dyspnea. However, I did explain that any component of her shortness of breath that is due to her primary lung dysfunction would not likely be alleviated with TAVR.    I described the technical details, as well as the expected postoperative course and recovery to the patient. I also discussed the risks and benefits of the procedure. The risks include but are not limited to bleeding, infection, stroke, myocardial infarction, dysrhythmia and need for pacemaker, respiratory failure, kidney or liver injury, bowel or limb ischemia, and death. The patient understands these risks and wishes to undergo the operation.     A nikki discussion was had with the patient regarding the possible need for surgical bailout should a rare complication arise during the " TAVR procedure such as aortic rupture or dissection. Though these complications are rare at <1% incidence, they carry a major morbidity and mortality risk of at least 50%. I explained the morbidity among survivors may include dialysis, heart attack, stroke, prolonged ventilation, loss of limb, and loss of independent living situation. The patient stated that she would NOT want all rescue efforts made in the event of a procedural complication during TAVR, and that she would NOT consent to undergo emergency open heart surgery if needed. She is OK with having peripheral vascular intervention in the event of an access complication at the time of TAVR, but under no circumstances would she want to undergo open heart surgery. Although her preference is to maintain DNR status, she is willing to lift this status and become FULL CODE during the TAVR procedure and the perioperative period of her procedural hospital stay to resuscitate her for any reversible insults.            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 She is very dyspneic, even at rest. She has frequent palpitations. She denies chest pain.                Past Medical History:     Past Medical History:   Diagnosis Date    Convulsive disorder (H)     Convulsive disorder (H)     COPD (chronic obstructive pulmonary disease) (H)     COPD (chronic obstructive pulmonary disease) (H)     Coronary artery disease involving native coronary artery with unstable angina pectoris (H) 3/31/2023    Depression     Dyspnea on exertion     Gastroesophageal reflux disease     Heart murmur     Hernia of unspecified site of abdominal cavity without mention of obstruction or  gangrene     Hiatal hernia     Hiatal hernia     Hypertension     Hypertension     Obese     On home oxygen therapy     at 1.5 liters at nite    Osteopenia     Osteopenia     Oxygen dependent     1.5 L per NC    Pneumonia due to infectious organism, unspecified laterality, unspecified part of lung 3/19/2022    Second degree heart block 3/19/2022    Shortness of breath     Status post coronary angiogram 6/3/2024    Uncomplicated asthma     URI (upper respiratory infection)     Venous insufficiency of both lower extremities     Venous insufficiency of both lower extremities     Walking troubles             Past Surgical History:     Past Surgical History:   Procedure Laterality Date    AMPUTATE TOE(S) Left 8/11/2022    Procedure: AMPUTATION, fifth digit left foot;  Surgeon: Alfonso Orozco DPM;  Location: [facility]    ARTHROPLASTY TOE(S) Left 11/22/2021    Procedure: ARTHROPLASTY, digits two and three left foot;  Surgeon: Alfonso Orozco DPM;  Location: [facility]    ARTHROPLASTY TOE(S) Left 7/18/2022    Procedure: ARTHROPLASTY, digits four and five left foot;  Surgeon: Alfonso Orozco DPM;  Location: [facility] OR    CV CORONARY ANGIOGRAM N/A 7/7/2023    Procedure: Coronary Angiogram;  Surgeon: Elijah Leger MD;  Location: [facility]    CV CORONARY ANGIOGRAM N/A 6/3/2024    Procedure: Coronary Angiogram;  Surgeon: Villa Croft MD;  Location: [facility] LAB CV    CV LEFT HEART CATH N/A 7/7/2023    Procedure: Left Heart Catheterization;  Surgeon: Elijah Leger MD;  Location: [facility]    CV RIGHT HEART CATH MEASUREMENTS RECORDED N/A 7/7/2023    Procedure: Right Heart Catheterization with Shunt Run;  Surgeon: Elijah Leger MD;  Location: [facility] CV    CV RIGHT HEART CATH MEASUREMENTS RECORDED N/A 6/3/2024    Procedure: Right Heart Catheterization;  Surgeon: Villa Croft MD;  Location: [facility] CV    EP PACEMAKER  DEVICE & LEAD IMPLANT- RIGHT ATRIAL & RIGHT VENTRICULAR N/A 3/24/2022    Procedure: Pacemaker Device & Lead Implant - Right Atrial & Right Ventricular;  Surgeon: Helder Pendleton MD;  Location: [facility]    ESOPHAGOSCOPY, GASTROSCOPY, DUODENOSCOPY (EGD), COMBINED N/A 2018    Procedure: Esophagogastroduodenoscopy;  Surgeon: Jasmeet Wilder MD;  Location: [facility] OR    HERNIA REPAIR, UMBILICAL  2018    HERNIA REPAIR, UMBILICAL  2018    Dr. Jimenez    LAPAROSCOPIC HERNIORRHAPHY HIATAL N/A 2018    Procedure: Laparoscopic Hiatal Hernia Repair,bilateral chest tubes;  Surgeon: Jasmeet Wilder MD;  Location: [facility] OR    OTHER SURGICAL HISTORY Left     Broke titanium in left arm    Repair arm fracture Left     SHOULDER SURGERY Right     SHOULDER SURGERY Right      BIOPSY THYROID FINE NEEDLE ASPIRATION  2020             Family History:     Family History   Problem Relation Age of Onset    Pulmonary Hypertension Daughter         idiopathic     Heart Disease Other         2 sibs MI, 1 sib electrical conduction disorder    Breast Cancer Mother 66.00    Hypertension Mother     Coronary Artery Disease Mother     Varicose Veins Mother     Hypertension Father     Coronary Artery Disease Sister     Heart Disease Sister     Diabetes Son     Pulmonary Hypertension Daughter     Coronary Artery Disease Sister     Heart Disease Sister               Social History:     Social History     Socioeconomic History    Marital status:      Spouse name: Not on file    Number of children: Not on file    Years of education: Not on file    Highest education level: Not on file   Occupational History    Not on file   Tobacco Use    Smoking status: Former     Current packs/day: 0.00     Average packs/day: 1.5 packs/day for 38.0 years (57.0 ttl pk-yrs)     Types: Cigarettes     Start date: 1960     Quit date: 1998     Years since quittin.5    Smokeless tobacco: Never     Tobacco comments:     quit in 1998   Vaping Use    Vaping status: Never Used   Substance and Sexual Activity    Alcohol use: Yes     Alcohol/week: 2.0 standard drinks of alcohol     Comment: occ    Drug use: Not Currently    Sexual activity: Never   Other Topics Concern    Not on file   Social History Narrative    .  Two kids.  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     Social Determinants of Health     Financial Resource Strain: Low Risk  (5/6/2024)    Financial Resource Strain     Within the past 12 months, have you or your family members you live with been unable to get utilities (heat, electricity) when it was really needed?: No   Food Insecurity: Low Risk  (5/6/2024)    Food Insecurity     Within the past 12 months, did you worry that your food would run out before you got money to buy more?: No     Within the past 12 months, did the food you bought just not last and you didn t have money to get more?: No   Transportation Needs: Low Risk  (5/6/2024)    Transportation Needs     Within the past 12 months, has lack of transportation kept you from medical appointments, getting your medicines, non-medical meetings or appointments, work, or from getting things that you need?: No   Physical Activity: Patient Declined (5/6/2024)    Exercise Vital Sign     Days of Exercise per Week: Patient declined     Minutes of Exercise per Session: Patient declined   Stress: Stress Concern Present (5/6/2024)    [employer] [place] [employer] - Occupational Stress Questionnaire     Feeling of Stress : To some extent   Social Connections: Unknown (5/6/2024)    Social Connection and Isolation Panel [NHANES]     Frequency of Communication with Friends and Family: Not on file     Frequency of Social Gatherings with Friends and Family: Twice a week     Attends [employer] Services: Not on file     Active Member of Clubs or Organizations: Not on file     Attends Club or Organization Meetings: Not on file     Marital Status: Not on file    Interpersonal Safety: Low Risk  (5/13/2024)    Interpersonal Safety     Do you feel physically and emotionally safe where you currently live?: Yes     Within the past 12 months, have you been hit, slapped, kicked or otherwise physically hurt by someone?: No     Within the past 12 months, have you been humiliated or emotionally abused in other ways by your partner or ex-partner?: No   Housing Stability: Low Risk  (5/6/2024)    Housing Stability     Do you have housing? : Yes     Are you worried about losing your housing?: No              Allergies:     Allergies   Allergen Reactions    Clindamycin Rash    Carbamazepine Rash    Morphine Nausea              Medications:     Current Outpatient Medications   Medication Sig Dispense Refill    albuterol (PROAIR HFA/PROVENTIL HFA/VENTOLIN HFA) 108 (90 Base) MCG/ACT inhaler Inhale 2 puffs into the lungs every 6 hours as needed for shortness of breath or wheezing 18 g 1    azithromycin (ZITHROMAX) 250 MG tablet Take 1 tablet (250 mg) by mouth every evening 90 tablet 3    benzonatate (TESSALON) 100 MG capsule Take 1 capsule (100 mg) by mouth 3 times daily as needed for cough 90 capsule 3    Biotin 10 MG CAPS Take 1 capsule by mouth daily      calcium citrate (CITRACAL) 950 (200 Ca) MG tablet Take 1 tablet by mouth 2 times daily      estradiol (ESTRACE) 0.1 MG/GM vaginal cream Place 2 g vaginally three times a week 72 g 1    ferrous sulfate (FEROSUL) 325 (65 Fe) MG tablet Take 1 tablet (325 mg) by mouth daily (with breakfast) 90 tablet 1    fluticasone-salmeterol (ADVAIR) 500-50 MCG/ACT inhaler Inhale 1 puff into the lungs every 12 hours 180 each 3    guaiFENesin (MUCINEX) 600 MG 12 hr tablet Take 1 tablet (600 mg) by mouth 2 times daily 180 tablet 3    hydrochlorothiazide (HYDRODIURIL) 25 MG tablet Take 1 tablet (25 mg) by mouth daily 90 tablet 3    ipratropium - albuterol 0.5 mg/2.5 mg/3 mL (DUONEB) 0.5-2.5 (3) MG/3ML neb solution TAKE 1 VIAL BY NEBULIZATION EVERY 6 HOURS  AS NEEDED FOR SHORTNESS OF BREATH OR WHEEZING 1080 mL 3    levalbuterol (XOPENEX) 1.25 MG/3ML neb solution Take 3 mLs (1.25 mg) by nebulization every 4 hours as needed for shortness of breath or wheezing 810 mL 3    levETIRAcetam (KEPPRA) 500 MG tablet Take 1 tablet (500 mg) by mouth 2 times daily 180 tablet 3    losartan (COZAAR) 50 MG tablet Take 1 tablet (50 mg) by mouth 2 times daily 180 tablet 3    Magnesium Oxide -Mg Supplement 500 MG TABS Take 1 tablet (500 mg) by mouth 2 times daily as needed      nystatin (MYCOSTATIN) 889226 UNIT/GM external powder Apply topically daily as needed      potassium chloride teja ER (KLOR-CON M20) 20 MEQ CR tablet Take 1 tablet (20 mEq) by mouth at bedtime 90 tablet 3    RABEprazole (ACIPHEX) 20 MG EC tablet Take 1 tablet (20 mg) by mouth daily 90 tablet 3    sertraline (ZOLOFT) 100 MG tablet Take 1 tablet (100 mg) by mouth every evening 90 tablet 3    solifenacin (VESICARE) 10 MG tablet Take 1 tablet (10 mg) by mouth daily 90 tablet 3    spironolactone (ALDACTONE) 25 MG tablet Take 0.5 tablets (12.5 mg) by mouth daily 45 tablet 3    tiotropium (SPIRIVA RESPIMAT) 2.5 MCG/ACT inhaler Inhale 2 puffs into the lungs daily 12 g 3    vitamin D3 (CHOLECALCIFEROL) 125 MCG (5000 UT) tablet Take 5,000 Units by mouth every other day Monday, Wednesday, and Friday       Current Facility-Administered Medications   Medication Dose Route Frequency Provider Last Rate Last Admin    denosumab (PROLIA) injection 60 mg  60 mg Subcutaneous Q6 Months Lalitha Haq NP   60 mg at 01/02/24 1025     Facility-Administered Medications Ordered in Other Visits   Medication Dose Route Frequency Provider Last Rate Last Admin    iodixanol (VISIPAQUE 320) injection    Once PRN Villa Croft MD   59 mL at 06/03/24 1028             Review of Systems:   ROS: 10 point review of systems was performed and negative except as described above.         Physical Exam:   /68 (BP Location: Right arm, Patient  Position: Sitting, Cuff Size: Adult Regular)   Pulse 81   Resp 20   Wt 92.1 kg (203 lb)   SpO2 98%   BMI 34.84 kg/m     Alert, pleasant, in no acute distress  Sclera anicteric  Neck supple JVD flat  Trachea midline  No prior chest incisions  Breathing dyspneic at rest  Regular rate and rhythm  Abdomen soft, non-tender  Extremities warm, no edema          Labs:   CBC RESULTS:   Recent Labs   Lab Test 06/03/24  0750   WBC 8.7   RBC 3.80   HGB 9.1*   HCT 30.1*   MCV 79   MCH 23.9*   MCHC 30.2*   RDW 15.5*         Last Comprehensive Metabolic Panel:  Lab Results   Component Value Date     06/03/2024    POTASSIUM 4.4 06/03/2024    CHLORIDE 99 06/03/2024    CO2 33 (H) 06/03/2024    ANIONGAP 8 06/03/2024     (H) 06/03/2024    BUN 33.6 (H) 06/03/2024    CR 1.07 (H) 06/03/2024    GFRESTIMATED 53 (L) 06/03/2024    TENZIN 9.4 06/03/2024            Imaging:   All imaging studies were reviewed and interpreted by me.    TTE 4/17/24:  The left ventricle is normal in size.  Left ventricular function is normal.The ejection fraction is 55-60%.  Diastolic Doppler findings (E/E' ratio and/or other parameters) suggest left  ventricular filling pressures are increased.  The left atrium is mildly dilated. The right atrium is mildly dilated.  Moderate to severe low flow low gradient aortic stenosis with a mean gradient  of 28 mmHg, peak velocity of 3.4 m/s, DI 0.30, SVI 30 ml/m2.  There is moderate (2+) mitral regurgitation.  Right ventricular systolic pressure is elevated, consistent with mild  pulmonary hypertension.  Compared to the prior study dated 3/31/24, there are changes as noted. Aortic  stenosis is now moderate to severe.    Cardiac catheterization 6/3/24:  CONCLUSIONS:   Mild nonobstructive coronary artery disease.  Mild to moderately elevated right and normal left-sided filling pressures.  Mild pulmonary hypertension.  L-R shunt with anomalous pulmonary venous return into the left brachiocephalic vein  (also noted on MRI/MRA and TAVR CT after review with our advanced imaging partners)          Savannah Gleason MD

## 2024-06-18 ENCOUNTER — DOCUMENTATION ONLY (OUTPATIENT)
Dept: OTHER | Facility: CLINIC | Age: 79
End: 2024-06-18
Payer: MEDICARE

## 2024-06-19 DIAGNOSIS — I35.0 NONRHEUMATIC AORTIC VALVE STENOSIS: Primary | ICD-10-CM

## 2024-06-19 DIAGNOSIS — R06.00 DYSPNEA: ICD-10-CM

## 2024-06-23 LAB
ABO/RH(D): NORMAL
ANTIBODY SCREEN: NEGATIVE
SPECIMEN EXPIRATION DATE: NORMAL

## 2024-06-24 ENCOUNTER — PREP FOR PROCEDURE (OUTPATIENT)
Dept: CARDIOLOGY | Facility: CLINIC | Age: 79
End: 2024-06-24

## 2024-06-24 ENCOUNTER — APPOINTMENT (OUTPATIENT)
Dept: CARDIOLOGY | Facility: CLINIC | Age: 79
End: 2024-06-24
Payer: MEDICARE

## 2024-06-24 ENCOUNTER — ALLIED HEALTH/NURSE VISIT (OUTPATIENT)
Dept: CARDIOLOGY | Facility: CLINIC | Age: 79
End: 2024-06-24
Payer: MEDICARE

## 2024-06-24 ENCOUNTER — OFFICE VISIT (OUTPATIENT)
Dept: CARDIOLOGY | Facility: CLINIC | Age: 79
End: 2024-06-24
Payer: MEDICARE

## 2024-06-24 VITALS
RESPIRATION RATE: 32 BRPM | DIASTOLIC BLOOD PRESSURE: 80 MMHG | WEIGHT: 204 LBS | SYSTOLIC BLOOD PRESSURE: 138 MMHG | HEART RATE: 86 BPM | BODY MASS INDEX: 35.02 KG/M2

## 2024-06-24 DIAGNOSIS — I35.0 NONRHEUMATIC AORTIC VALVE STENOSIS: Primary | ICD-10-CM

## 2024-06-24 DIAGNOSIS — I73.9 PAD (PERIPHERAL ARTERY DISEASE) (H): ICD-10-CM

## 2024-06-24 DIAGNOSIS — I10 ESSENTIAL HYPERTENSION, BENIGN: ICD-10-CM

## 2024-06-24 DIAGNOSIS — I25.83 CORONARY ARTERIOSCLEROSIS DUE TO LIPID RICH PLAQUE: ICD-10-CM

## 2024-06-24 DIAGNOSIS — I44.1 SECOND DEGREE HEART BLOCK: ICD-10-CM

## 2024-06-24 DIAGNOSIS — E66.09 CLASS 2 OBESITY DUE TO EXCESS CALORIES WITHOUT SERIOUS COMORBIDITY WITH BODY MASS INDEX (BMI) OF 37.0 TO 37.9 IN ADULT: ICD-10-CM

## 2024-06-24 DIAGNOSIS — G40.909 EPILEPTIC SEIZURE (H): ICD-10-CM

## 2024-06-24 DIAGNOSIS — Z95.0 CARDIAC PACEMAKER IN SITU: ICD-10-CM

## 2024-06-24 DIAGNOSIS — E66.812 CLASS 2 OBESITY DUE TO EXCESS CALORIES WITHOUT SERIOUS COMORBIDITY WITH BODY MASS INDEX (BMI) OF 37.0 TO 37.9 IN ADULT: ICD-10-CM

## 2024-06-24 DIAGNOSIS — J96.11 CHRONIC RESPIRATORY FAILURE WITH HYPOXIA (H): ICD-10-CM

## 2024-06-24 LAB
ALBUMIN SERPL BCG-MCNC: 4.2 G/DL (ref 3.5–5.2)
ALP SERPL-CCNC: 61 U/L (ref 40–150)
ALT SERPL W P-5'-P-CCNC: 11 U/L (ref 0–50)
ANION GAP SERPL CALCULATED.3IONS-SCNC: 20 MMOL/L (ref 7–15)
AST SERPL W P-5'-P-CCNC: 17 U/L (ref 0–45)
BASOPHILS # BLD AUTO: 0 10E3/UL (ref 0–0.2)
BASOPHILS NFR BLD AUTO: 1 %
BILIRUB SERPL-MCNC: 0.3 MG/DL
BUN SERPL-MCNC: 24.8 MG/DL (ref 8–23)
CALCIUM SERPL-MCNC: 9.6 MG/DL (ref 8.8–10.2)
CHLORIDE SERPL-SCNC: 95 MMOL/L (ref 98–107)
CREAT SERPL-MCNC: 0.9 MG/DL (ref 0.51–0.95)
DEPRECATED HCO3 PLAS-SCNC: 31 MMOL/L (ref 22–29)
EGFRCR SERPLBLD CKD-EPI 2021: 65 ML/MIN/1.73M2
EOSINOPHIL # BLD AUTO: 0.1 10E3/UL (ref 0–0.7)
EOSINOPHIL NFR BLD AUTO: 2 %
ERYTHROCYTE [DISTWIDTH] IN BLOOD BY AUTOMATED COUNT: 19.2 % (ref 10–15)
GLUCOSE SERPL-MCNC: 100 MG/DL (ref 70–99)
HCT VFR BLD AUTO: 32.1 % (ref 35–47)
HGB BLD-MCNC: 9.7 G/DL (ref 11.7–15.7)
IMM GRANULOCYTES # BLD: 0 10E3/UL
IMM GRANULOCYTES NFR BLD: 0 %
INR PPP: 1.11 (ref 0.85–1.15)
LYMPHOCYTES # BLD AUTO: 1.2 10E3/UL (ref 0.8–5.3)
LYMPHOCYTES NFR BLD AUTO: 16 %
MAGNESIUM SERPL-MCNC: 1.6 MG/DL (ref 1.7–2.3)
MCH RBC QN AUTO: 24.5 PG (ref 26.5–33)
MCHC RBC AUTO-ENTMCNC: 30.2 G/DL (ref 31.5–36.5)
MCV RBC AUTO: 81 FL (ref 78–100)
MONOCYTES # BLD AUTO: 0.7 10E3/UL (ref 0–1.3)
MONOCYTES NFR BLD AUTO: 10 %
NEUTROPHILS # BLD AUTO: 5.3 10E3/UL (ref 1.6–8.3)
NEUTROPHILS NFR BLD AUTO: 72 %
NRBC # BLD AUTO: 0 10E3/UL
NRBC BLD AUTO-RTO: 0 /100
NT-PROBNP SERPL-MCNC: 301 PG/ML (ref 0–1800)
PLATELET # BLD AUTO: 208 10E3/UL (ref 150–450)
POTASSIUM SERPL-SCNC: 5 MMOL/L (ref 3.4–5.3)
PROT SERPL-MCNC: 6.8 G/DL (ref 6.4–8.3)
RBC # BLD AUTO: 3.96 10E6/UL (ref 3.8–5.2)
SODIUM SERPL-SCNC: 146 MMOL/L (ref 135–145)
WBC # BLD AUTO: 7.3 10E3/UL (ref 4–11)

## 2024-06-24 PROCEDURE — 99215 OFFICE O/P EST HI 40 MIN: CPT | Performed by: INTERNAL MEDICINE

## 2024-06-24 PROCEDURE — 86900 BLOOD TYPING SEROLOGIC ABO: CPT | Performed by: INTERNAL MEDICINE

## 2024-06-24 PROCEDURE — 83880 ASSAY OF NATRIURETIC PEPTIDE: CPT | Performed by: INTERNAL MEDICINE

## 2024-06-24 PROCEDURE — 85025 COMPLETE CBC W/AUTO DIFF WBC: CPT | Performed by: INTERNAL MEDICINE

## 2024-06-24 PROCEDURE — 85610 PROTHROMBIN TIME: CPT | Performed by: INTERNAL MEDICINE

## 2024-06-24 PROCEDURE — G2211 COMPLEX E/M VISIT ADD ON: HCPCS | Performed by: INTERNAL MEDICINE

## 2024-06-24 PROCEDURE — 86850 RBC ANTIBODY SCREEN: CPT | Performed by: INTERNAL MEDICINE

## 2024-06-24 PROCEDURE — 93000 ELECTROCARDIOGRAM COMPLETE: CPT | Performed by: INTERNAL MEDICINE

## 2024-06-24 PROCEDURE — 86901 BLOOD TYPING SEROLOGIC RH(D): CPT | Performed by: INTERNAL MEDICINE

## 2024-06-24 PROCEDURE — 83735 ASSAY OF MAGNESIUM: CPT | Performed by: INTERNAL MEDICINE

## 2024-06-24 PROCEDURE — 36415 COLL VENOUS BLD VENIPUNCTURE: CPT | Performed by: INTERNAL MEDICINE

## 2024-06-24 PROCEDURE — 80053 COMPREHEN METABOLIC PANEL: CPT | Performed by: INTERNAL MEDICINE

## 2024-06-24 PROCEDURE — 99207 PR NO CHARGE LOS: CPT

## 2024-06-24 RX ORDER — LIDOCAINE 40 MG/G
CREAM TOPICAL
Status: CANCELLED | OUTPATIENT
Start: 2024-06-24

## 2024-06-24 RX ORDER — CEFAZOLIN SODIUM/WATER 2 G/20 ML
2 SYRINGE (ML) INTRAVENOUS
Status: CANCELLED | OUTPATIENT
Start: 2024-06-25

## 2024-06-24 RX ORDER — DIAZEPAM 5 MG
5 TABLET ORAL
Status: CANCELLED | OUTPATIENT
Start: 2024-06-25

## 2024-06-24 RX ORDER — POTASSIUM CHLORIDE 1500 MG/1
20 TABLET, EXTENDED RELEASE ORAL AT BEDTIME
COMMUNITY
Start: 2024-06-13 | End: 2024-06-24

## 2024-06-24 RX ORDER — ASPIRIN 325 MG
325 TABLET ORAL ONCE
Status: CANCELLED | OUTPATIENT
Start: 2024-06-25 | End: 2024-06-24

## 2024-06-24 RX ORDER — SODIUM CHLORIDE 9 MG/ML
INJECTION, SOLUTION INTRAVENOUS CONTINUOUS
Status: CANCELLED | OUTPATIENT
Start: 2024-06-25

## 2024-06-24 RX ORDER — FENTANYL CITRATE 50 UG/ML
25 INJECTION, SOLUTION INTRAMUSCULAR; INTRAVENOUS
Status: CANCELLED | OUTPATIENT
Start: 2024-06-25

## 2024-06-24 NOTE — PROGRESS NOTES
TAVR Pre-Op RN Visit     Patient in to see RN for Pre-TAVR visit on 6/24/2024    All pre-procedure labs drawn: Yes    EKG obtained: Yes   Labs reviewed: Pending at time of visit; will be reviewed by Nayeli Thompson PA-C prior to procedure tomorrow     STS score: 4.42%      Patient instructed on the following medications:   AM of procedure taken losartan, keppra, rabeprazole, spiriva inhaler, advair inhaler. Also ok to take duoneb or albuterol if needed. Please hold all other medications in AM including prescription, OTC, vitamins, supplements.     Loading dose of ASA to be given in pre-procedure/CSC if not already taken at home.     Patient YES have an implanted device--  Type of device and : [facility]- [employer]   Implanting/Managing Provider: EP team  Patient has no other implanted devices.     Patient given instructions on bathing including no use of deodorant/lotions/creams/powders.      Patient has advanced directive: both advance directive and living will on file and available in EPIC. Previous code status listed as DNR; patient understands and is agreeable to having full code status for hospitalization related to procedure. Patient wishes to PARTIAL ONLY Bailout for TAVR-NO sternotomy or CPB, ok for peripheral interventions.     Education was given to patient regarding what to expect pre and post procedure.     Patient was informed procedure will be done at [facility]: Main Entrance at [address] [place] [address]; [place] [address] and their arrival time is at 830 AM.     TAVR and blood consents signed at the time of the appt: Yes; will be scanned into EPIC before end of day by structural CMA.     All questions from patient and family were answered by RN.    Patient presented independently at the time of appointment. Patients son Mick will be accompanying patient on procedure day.     Orders placed for procedure.       Brandon Solomon RN BSN- Structural Heart Coordinator   Structural Heart  Coordinator  [facility]   [phone]

## 2024-06-24 NOTE — PATIENT INSTRUCTIONS
Lalitha Underwood,    It was a pleasure to see you today in the clinic regarding your upcoming TAVR.     My recommendations after this visit include:     - report to [facility] tomorrow at the instructed time    My treatment team includes: myself, Ana Lancaster PA-C, Dr. Gonzales and Dr. Croft    You should followup with me on July 10 and again on July 31    **Remember you will need prophylactic antibiotics for all dental visits in the future.  You can get the Rx from your dentist, your PCP or from us.  If possible, still refrain from going to the dentist until 6 months or more after your valve replacement      If you have questions or concerns, please call using the numbers below:      Valve Clinic Phone   [phone]    After Hours/Scheduling  [phone]    Otherwise you can dial the nurse directly at:    Brandon Solomon RN  [phone]    Sepideh Baeza RN  [phone]

## 2024-06-24 NOTE — H&P (VIEW-ONLY)
HEART CARE ENCOUNTER NOTE       [facility]  [phone]    Assessment/Recommendations   Problem List Items Addressed This Visit       Essential hypertension, benign    Epileptic seizure (H)    PAD (peripheral artery disease) (H24)    Second degree heart block    Cardiac pacemaker in situ    Coronary arteriosclerosis due to lipid rich plaque    Chronic respiratory failure with hypoxia (H)    Class 2 obesity due to excess calories without serious comorbidity with body mass index (BMI) of 37.0 to 37.9 in adult    Nonrheumatic aortic valve stenosis - Primary     Other Visit Diagnoses       Dyspnea                1.  Severe low-flow, low-gradient aortic Stenosis - This has become severe and is now causing progressive DENT/SOB.  She has seen a decline in her functional status.  She has been evaluated by a multidisciplinary team and has been deemed a good candidate for transcatheter aortic valve replacement.  She has now undergone all the required workup for TAVR and wishes to proceed.   We discussed the procedure in detail,  including post-procedure care, restrictions and follow up.   She understands that there is a 4-5% risk of bleeding, infection, stroke, heart block requiring permanent pacemaker, cardiac perforation, aortic root rupture, dissection and death.  Patient also understands that if something unexpected happens, the procedure may need to be stopped or changed to help her.     All questions were answered   Consents were signed and witnessed by me.  As per Dr. Gleason's note dated 6/10/24, the patient would NOT want all rescue efforts made in the event of a procedural complication during TAVR, and that she would NOT consent to undergo emergency open heart surgery if needed. She is OK with having peripheral vascular intervention in the event of an access complication at the time of TAVR, but under no circumstances would she want to undergo open heart surgery. Although her preference is to  maintain DNR status, she is willing to lift this status and become FULL CODE during the TAVR procedure and the perioperative period of her procedural hospital stay to resuscitate her for any reversible insults   She has never had trouble with anesthesia in the past.    Labs today reveal Hgb 9.7, normal WBC, electrolytes (see below) and creat 0.9 (GFR 65)    The plan will be for moderate sedation with RN.  We will use the Correia AIDAN valve. Lalitha Underwood will report tomorrow morning for the procedure and all instructions regarding times and medications were given by TAVR coordinator RN.     2. Chronic heart failure with preserved ejection fraction, cor pulmonale/right heart failure, NYHA class III - currently compensated                             - NT pro-BNP today is 301                             - She should continue taking hydrochlorothiazide and spironolactone    3.  Severe, oxygen-dependent COPD with chronic hypoxic respiratory failure - patient uses 3-4L oxygen by nasal cannula.  Pulmonologist has instructed her to take it off when she is at rest during the day, but a lot of times she forgets.  Last seen by pulmonology on May 28 and will follow-up with them in 6 months                              - She uses as needed albuterol, scheduled fluticasone/salmeterol and scheduled tiotropium                              - She does DuoNeb nebulizers 3 times daily                              -She is on daily prophylactic azithromycin to help control exacerbations    4. Mobitz type II second degree AV block - s/p dual chamber PPM in March 2022.  Last device check showed V pacing at 100% with otherwise normal device function and good battery life.  Next device check scheduled for 7/29/2024    5.  Hypertension - BP today is at goal today.  She should continue taking losartan 50 mg twice daily, spironolactone 12.5 mg daily and hydrochlorothiazide 25 mg daily    6. Obesity with BMI 35    7.  Seizure disorder  -managed on Keppra 500 mg twice daily    8. Anemia of chronic disease - Hgb in the past year have ranged 8.8 - 10.5.  She takes iron supplement.     9. Electrolyte imbalance - Mag 1.6 which is chronic and she takes supplement.  Her sodium is 146 today.  Will repeat BMP tomorrow prior to TAVR, as this is most likely lab error, but will hold spironolactone and hydrochlorothiazide tomorrow morning.     The longitudinal plan of care for aortic stenosis, HTN, PAD, HFpEF was addressed during this visit.  Due to added complexity of care, we will continue to support Serene and the subsequent management of this condition(s) and with the ongoing continuity of care of this condition(s).         History of Present Illness/Subjective    Lalitha Underwood is a 79 year old female who comes in today for history and physical prior to TAVR (transcatheter aortic valve replacement).     Serene has past medical history significant for oxygen dependent COPD, obesity, coronary atherosclerosis, heart block with cardiac pacemaker in situ, PAD, seizure disorder, hyperlipidemia, lymphedema, hypertension and severe aortic stenosis    Her aortic stenosis has been followed by serial echocardiograms and the echocardiogram in April showed progression of her aortic valve disease to now the severe range (low-flow low gradient) with metrics significant for peak velocity 3.4, mean gradient 28, aortic valve area 0.8 cm , SVI 30 and dimensionless index 0.30    Serene uses 4 L of oxygen 24/7.  She walks with a walker.  She takes nebulizers 3 times daily.  Over the last 6 months to a year, she has experienced increased palpitations and thinks her shortness of breath/dyspnea on exertion has progressively gotten worse.  She denies any chest discomfort,  paroxysmal nocturnal dyspnea, orthopnea, lightheadedness, dizziness, pre-syncope, or syncope, weight loss, changes in appetite, nausea or vomiting.     Medical, surgical, family, social history, and medications  were reviewed and updated as necessary.    ECG (personally reviewed & interpreted): 6/24/2024 shows sinus with ventricular rate 86 bpm, first-degree AV block (ID interval 250 ms) and LBBB (QRS duration 160 ms)    ECHOCARDIOGRAM done on 4/17/2024:  Interpretation Summary     .The left ventricle is normal in size.  Left ventricular function is normal.The ejection fraction is 55-60%.  Diastolic Doppler findings (E/E' ratio and/or other parameters) suggest left  ventricular filling pressures are increased.  The left atrium is mildly dilated. The right atrium is mildly dilated.  Moderate to severe low flow low gradient aortic stenosis with a mean gradient  of 28 mmHg, peak velocity of 3.4 m/s, DI 0.30, SVI 30 ml/m2.  There is moderate (2+) mitral regurgitation.  Right ventricular systolic pressure is elevated, consistent with mild  pulmonary hypertension.  Compared to the prior study dated 3/31/24, there are changes as noted. Aortic  stenosis is now moderate to severe.    CATH done on 6/3/2024:  CONCLUSIONS:   Mild nonobstructive coronary artery disease.  Mild to moderately elevated right and normal left-sided filling pressures.  Mild pulmonary hypertension.  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        Physical Examination Review of Systems   Vitals: /80 (BP Location: Left arm, Patient Position: Sitting, Cuff Size: Adult Large)   Pulse 86   Resp (!) 32   Wt 92.5 kg (204 lb)   BMI 35.02 kg/m    BMI= Body mass index is 35.02 kg/m .  Wt Readings from Last 3 Encounters:   06/24/24 92.5 kg (204 lb)   06/10/24 92.1 kg (203 lb)   06/03/24 93.4 kg (206 lb)       General Appearance:   Alert, cooperative and in no acute distress   ENT/Mouth: membranes moist, no oral lesions or bleeding gums.      EYES:  no scleral icterus, normal conjunctivae   Neck: no carotid bruits.  Thyroid not  visualized   Chest/Lungs:   lungs are reveal expiratory wheezes, but are otherwise clear to auscultation   Cardiovascular:   Regular. Normal first and second heart sounds with 4/6 systolic murmur.  No rubs or gallops; the carotid, radial and posterior tibial pulses are intact, 1-2+ edema bilaterally    Abdomen:  Soft and nontender. Bowel sounds are present in all quadrants   Extremities: no cyanosis or clubbing   Skin: no xanthelasma, warm.    Neurologic: normal gait, normal  bilateral, no tremors   Psychiatric: Normal mood and affect       Please refer above for cardiac ROS details.      Medical History  Surgical History Family History Social History   Past Medical History:   Diagnosis Date    Convulsive disorder (H)     Convulsive disorder (H)     COPD (chronic obstructive pulmonary disease) (H)     COPD (chronic obstructive pulmonary disease) (H)     Coronary artery disease involving native coronary artery with unstable angina pectoris (H) 3/31/2023    Depression     Dyspnea on exertion     Gastroesophageal reflux disease     Heart murmur     Hernia of unspecified site of abdominal cavity without mention of obstruction or gangrene     Hiatal hernia     Hiatal hernia     Hypertension     Hypertension     Obese     On home oxygen therapy     at 1.5 liters at nite    Osteopenia     Osteopenia     Oxygen dependent     1.5 L per NC    Pneumonia due to infectious organism, unspecified laterality, unspecified part of lung 3/19/2022    Second degree heart block 3/19/2022    Shortness of breath     Status post coronary angiogram 6/3/2024    Uncomplicated asthma     URI (upper respiratory infection)     Venous insufficiency of both lower extremities     Venous insufficiency of both lower extremities     Walking troubles      Past Surgical History:   Procedure Laterality Date    AMPUTATE TOE(S) Left 8/11/2022    Procedure: AMPUTATION, fifth digit left foot;  Surgeon: Alfonso Orozco DPM;  Location: [facility]  OR    ARTHROPLASTY TOE(S) Left 11/22/2021    Procedure: ARTHROPLASTY, digits two and three left foot;  Surgeon: Alfonso Orozco DPM;  Location: [facility] OR    ARTHROPLASTY TOE(S) Left 7/18/2022    Procedure: ARTHROPLASTY, digits four and five left foot;  Surgeon: Alfonso Orozco DPM;  Location: [facility] OR    CV CORONARY ANGIOGRAM N/A 7/7/2023    Procedure: Coronary Angiogram;  Surgeon: Elijah Leger MD;  Location: [facility] CV    CV CORONARY ANGIOGRAM N/A 6/3/2024    Procedure: Coronary Angiogram;  Surgeon: Villa Croft MD;  Location: [facility]    CV LEFT HEART CATH N/A 7/7/2023    Procedure: Left Heart Catheterization;  Surgeon: Elijah Leger MD;  Location: [facility]    CV RIGHT HEART CATH MEASUREMENTS RECORDED N/A 7/7/2023    Procedure: Right Heart Catheterization with Shunt Run;  Surgeon: Elijah Leger MD;  Location: [facility]    CV RIGHT HEART CATH MEASUREMENTS RECORDED N/A 6/3/2024    Procedure: Right Heart Catheterization;  Surgeon: Villa Croft MD;  Location: [facility] CV    EP PACEMAKER DEVICE & LEAD IMPLANT- RIGHT ATRIAL & RIGHT VENTRICULAR N/A 3/24/2022    Procedure: Pacemaker Device & Lead Implant - Right Atrial & Right Ventricular;  Surgeon: Helder Pendleton MD;  Location: [facility] CATH LAB CV    ESOPHAGOSCOPY, GASTROSCOPY, DUODENOSCOPY (EGD), COMBINED N/A 11/26/2018    Procedure: Esophagogastroduodenoscopy;  Surgeon: Jasmeet Wilder MD;  Location: [facility] OR    HERNIA REPAIR, UMBILICAL  04/2018    HERNIA REPAIR, UMBILICAL  04/04/2018    Dr. Jimenez    LAPAROSCOPIC HERNIORRHAPHY HIATAL N/A 11/26/2018    Procedure: Laparoscopic Hiatal Hernia Repair,bilateral chest tubes;  Surgeon: Jasmeet Wilder MD;  Location: [facility] OR    OTHER SURGICAL HISTORY Left 2003    Broke titanium in left arm    Repair arm fracture Left     SHOULDER SURGERY Right 1967    SHOULDER SURGERY Right 1967    US BIOPSY THYROID FINE  NEEDLE ASPIRATION  2020     Family History   Problem Relation Age of Onset    Pulmonary Hypertension Daughter         idiopathic     Heart Disease Other         2 sibs MI, 1 sib electrical conduction disorder    Breast Cancer Mother 66.00    Hypertension Mother     Coronary Artery Disease Mother     Varicose Veins Mother     Hypertension Father     Coronary Artery Disease Sister     Heart Disease Sister     Diabetes Son     Pulmonary Hypertension Daughter     Coronary Artery Disease Sister     Heart Disease Sister     Social History     Socioeconomic History    Marital status:      Spouse name: Not on file    Number of children: Not on file    Years of education: Not on file    Highest education level: Not on file   Occupational History    Not on file   Tobacco Use    Smoking status: Former     Current packs/day: 0.00     Average packs/day: 1.5 packs/day for 38.0 years (57.0 ttl pk-yrs)     Types: Cigarettes     Start date: 1960     Quit date: 1998     Years since quittin.5    Smokeless tobacco: Never    Tobacco comments:     quit in    Vaping Use    Vaping status: Never Used   Substance and Sexual Activity    Alcohol use: Yes     Alcohol/week: 2.0 standard drinks of alcohol     Comment: occ    Drug use: Not Currently    Sexual activity: Never   Other Topics Concern    Not on file   Social History Narrative    .  Two kids.  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     Social Determinants of Health     Financial Resource Strain: Low Risk  (2024)    Financial Resource Strain     Within the past 12 months, have you or your family members you live with been unable to get utilities (heat, electricity) when it was really needed?: No   Food Insecurity: Low Risk  (2024)    Food Insecurity     Within the past 12 months, did you worry that your food would run out before you got money to buy more?: No     Within the past 12 months, did the food you bought just not last and you didn t have money  to get more?: No   Transportation Needs: Low Risk  (5/6/2024)    Transportation Needs     Within the past 12 months, has lack of transportation kept you from medical appointments, getting your medicines, non-medical meetings or appointments, work, or from getting things that you need?: No   Physical Activity: Patient Declined (5/6/2024)    Exercise Vital Sign     Days of Exercise per Week: Patient declined     Minutes of Exercise per Session: Patient declined   Stress: Stress Concern Present (5/6/2024)    [employer] - Occupational Stress Questionnaire     Feeling of Stress : To some extent   Social Connections: Unknown (5/6/2024)    Social Connection and Isolation Panel [NHANES]     Frequency of Communication with Friends and Family: Not on file     Frequency of Social Gatherings with Friends and Family: Twice a week     Attends Muslim Services: Not on file     Active Member of Clubs or Organizations: Not on file     Attends Club or Organization Meetings: Not on file     Marital Status: Not on file   Interpersonal Safety: Low Risk  (5/13/2024)    Interpersonal Safety     Do you feel physically and emotionally safe where you currently live?: Yes     Within the past 12 months, have you been hit, slapped, kicked or otherwise physically hurt by someone?: No     Within the past 12 months, have you been humiliated or emotionally abused in other ways by your partner or ex-partner?: No   Housing Stability: Low Risk  (5/6/2024)    Housing Stability     Do you have housing? : Yes     Are you worried about losing your housing?: No          Medications  Allergies   Current Outpatient Medications   Medication Sig Dispense Refill    albuterol (PROAIR HFA/PROVENTIL HFA/VENTOLIN HFA) 108 (90 Base) MCG/ACT inhaler Inhale 2 puffs into the lungs every 6 hours as needed for shortness of breath or wheezing 18 g 1    azithromycin (ZITHROMAX) 250 MG tablet Take 1 tablet (250 mg) by mouth every evening 90  tablet 3    benzonatate (TESSALON) 100 MG capsule Take 1 capsule (100 mg) by mouth 3 times daily as needed for cough 90 capsule 3    Biotin 10 MG CAPS Take 1 capsule by mouth daily      calcium citrate (CITRACAL) 950 (200 Ca) MG tablet Take 1 tablet by mouth 2 times daily      estradiol (ESTRACE) 0.1 MG/GM vaginal cream Place 2 g vaginally three times a week 72 g 1    ferrous sulfate (FEROSUL) 325 (65 Fe) MG tablet Take 1 tablet (325 mg) by mouth daily (with breakfast) 90 tablet 1    fluticasone-salmeterol (ADVAIR) 500-50 MCG/ACT inhaler Inhale 1 puff into the lungs every 12 hours 180 each 3    guaiFENesin (MUCINEX) 600 MG 12 hr tablet Take 1 tablet (600 mg) by mouth 2 times daily 180 tablet 3    hydrochlorothiazide (HYDRODIURIL) 25 MG tablet Take 1 tablet (25 mg) by mouth daily 90 tablet 3    ipratropium - albuterol 0.5 mg/2.5 mg/3 mL (DUONEB) 0.5-2.5 (3) MG/3ML neb solution TAKE 1 VIAL BY NEBULIZATION EVERY 6 HOURS AS NEEDED FOR SHORTNESS OF BREATH OR WHEEZING 1080 mL 3    levalbuterol (XOPENEX) 1.25 MG/3ML neb solution Take 3 mLs (1.25 mg) by nebulization every 4 hours as needed for shortness of breath or wheezing 810 mL 3    levETIRAcetam (KEPPRA) 500 MG tablet Take 1 tablet (500 mg) by mouth 2 times daily 180 tablet 3    losartan (COZAAR) 50 MG tablet Take 1 tablet (50 mg) by mouth 2 times daily 180 tablet 3    Magnesium Oxide -Mg Supplement 500 MG TABS Take 1 tablet (500 mg) by mouth 2 times daily as needed      nystatin (MYCOSTATIN) 426174 UNIT/GM external powder Apply topically daily as needed      potassium chloride teja ER (KLOR-CON M20) 20 MEQ CR tablet Take 1 tablet (20 mEq) by mouth at bedtime 90 tablet 3    RABEprazole (ACIPHEX) 20 MG EC tablet Take 1 tablet (20 mg) by mouth daily 90 tablet 3    sertraline (ZOLOFT) 100 MG tablet Take 1 tablet (100 mg) by mouth every evening 90 tablet 3    solifenacin (VESICARE) 10 MG tablet Take 1 tablet (10 mg) by mouth daily 90 tablet 3    spironolactone (ALDACTONE)  "25 MG tablet Take 0.5 tablets (12.5 mg) by mouth daily 45 tablet 3    tiotropium (SPIRIVA RESPIMAT) 2.5 MCG/ACT inhaler Inhale 2 puffs into the lungs daily 12 g 3    vitamin D3 (CHOLECALCIFEROL) 125 MCG (5000 UT) tablet Take 5,000 Units by mouth every other day Monday, Wednesday, and Friday      Allergies   Allergen Reactions    Clindamycin Rash    Carbamazepine Rash    Morphine Nausea         Lab Results    Chemistry/lipid CBC Cardiac Enzymes/BNP/TSH/INR   Recent Labs   Lab Test 05/13/24  1016   CHOL 171   HDL 74   LDL 87   TRIG 52     Recent Labs   Lab Test 05/13/24  1016 07/07/23  0703 04/27/23  1322   LDL 87 99 110*     Recent Labs   Lab Test 06/03/24  0750      POTASSIUM 4.4   CHLORIDE 99   CO2 33*   *   BUN 33.6*   CR 1.07*   GFRESTIMATED 53*   TENZIN 9.4     Recent Labs   Lab Test 06/03/24  0750 05/23/24  1630 05/13/24  1016   CR 1.07* 0.87 0.88     Recent Labs   Lab Test 02/22/22  1110   A1C 5.4    Recent Labs   Lab Test 06/03/24  0750   WBC 8.7   HGB 9.1*   HCT 30.1*   MCV 79        Recent Labs   Lab Test 06/03/24  0750 05/13/24  1016 03/24/24  2236   HGB 9.1* 8.8* 9.7*    Recent Labs   Lab Test 03/20/22  0759 03/19/22  2336 03/19/22  1441   TROPONINI <0.01 <0.01 0.01     Recent Labs   Lab Test 06/28/23  1256 05/15/23  1149 04/19/22  1514 03/19/22  1441 11/13/18  1546   BNP  --   --   --  40  --    NTBNPI  --  1,004 508  --   --    NTBNP 218  --   --   --  63     Recent Labs   Lab Test 04/19/22  1514   TSH 3.59     No results for input(s): \"INR\" in the last 66862 hours.           This note has been dictated using voice recognition software. Any grammatical or context distortions are unintentional and inherent to the software.                 "

## 2024-06-24 NOTE — PROGRESS NOTES
HEART CARE ENCOUNTER NOTE       [facility]  [phone]    Assessment/Recommendations   Problem List Items Addressed This Visit       Essential hypertension, benign    Epileptic seizure (H)    PAD (peripheral artery disease) (H24)    Second degree heart block    Cardiac pacemaker in situ    Coronary arteriosclerosis due to lipid rich plaque    Chronic respiratory failure with hypoxia (H)    Class 2 obesity due to excess calories without serious comorbidity with body mass index (BMI) of 37.0 to 37.9 in adult    Nonrheumatic aortic valve stenosis - Primary     Other Visit Diagnoses       Dyspnea                1.  Severe low-flow, low-gradient aortic Stenosis - This has become severe and is now causing progressive DENT/SOB.  She has seen a decline in her functional status.  She has been evaluated by a multidisciplinary team and has been deemed a good candidate for transcatheter aortic valve replacement.  She has now undergone all the required workup for TAVR and wishes to proceed.   We discussed the procedure in detail,  including post-procedure care, restrictions and follow up.   She understands that there is a 4-5% risk of bleeding, infection, stroke, heart block requiring permanent pacemaker, cardiac perforation, aortic root rupture, dissection and death.  Patient also understands that if something unexpected happens, the procedure may need to be stopped or changed to help her.     All questions were answered   Consents were signed and witnessed by me.  As per Dr. Gleason's note dated 6/10/24, the patient would NOT want all rescue efforts made in the event of a procedural complication during TAVR, and that she would NOT consent to undergo emergency open heart surgery if needed. She is OK with having peripheral vascular intervention in the event of an access complication at the time of TAVR, but under no circumstances would she want to undergo open heart surgery. Although her preference is to  maintain DNR status, she is willing to lift this status and become FULL CODE during the TAVR procedure and the perioperative period of her procedural hospital stay to resuscitate her for any reversible insults   She has never had trouble with anesthesia in the past.    Labs today reveal Hgb 9.7, normal WBC, electrolytes (see below) and creat 0.9 (GFR 65)    The plan will be for moderate sedation with RN.  We will use the Correia AIDAN valve. Lalitha Underwood will report tomorrow morning for the procedure and all instructions regarding times and medications were given by TAVR coordinator RN.     2. Chronic heart failure with preserved ejection fraction, cor pulmonale/right heart failure, NYHA class III - currently compensated                             - NT pro-BNP today is 301                             - She should continue taking hydrochlorothiazide and spironolactone    3.  Severe, oxygen-dependent COPD with chronic hypoxic respiratory failure - patient uses 3-4L oxygen by nasal cannula.  Pulmonologist has instructed her to take it off when she is at rest during the day, but a lot of times she forgets.  Last seen by pulmonology on May 28 and will follow-up with them in 6 months                              - She uses as needed albuterol, scheduled fluticasone/salmeterol and scheduled tiotropium                              - She does DuoNeb nebulizers 3 times daily                              -She is on daily prophylactic azithromycin to help control exacerbations    4. Mobitz type II second degree AV block - s/p dual chamber PPM in March 2022.  Last device check showed V pacing at 100% with otherwise normal device function and good battery life.  Next device check scheduled for 7/29/2024    5.  Hypertension - BP today is at goal today.  She should continue taking losartan 50 mg twice daily, spironolactone 12.5 mg daily and hydrochlorothiazide 25 mg daily    6. Obesity with BMI 35    7.  Seizure disorder  -managed on Keppra 500 mg twice daily    8. Anemia of chronic disease - Hgb in the past year have ranged 8.8 - 10.5.  She takes iron supplement.     9. Electrolyte imbalance - Mag 1.6 which is chronic and she takes supplement.  Her sodium is 146 today.  Will repeat BMP tomorrow prior to TAVR, as this is most likely lab error, but will hold spironolactone and hydrochlorothiazide tomorrow morning.     The longitudinal plan of care for aortic stenosis, HTN, PAD, HFpEF was addressed during this visit.  Due to added complexity of care, we will continue to support Serene and the subsequent management of this condition(s) and with the ongoing continuity of care of this condition(s).         History of Present Illness/Subjective    Lalitha Underwood is a 79 year old female who comes in today for history and physical prior to TAVR (transcatheter aortic valve replacement).     Serene has past medical history significant for oxygen dependent COPD, obesity, coronary atherosclerosis, heart block with cardiac pacemaker in situ, PAD, seizure disorder, hyperlipidemia, lymphedema, hypertension and severe aortic stenosis    Her aortic stenosis has been followed by serial echocardiograms and the echocardiogram in April showed progression of her aortic valve disease to now the severe range (low-flow low gradient) with metrics significant for peak velocity 3.4, mean gradient 28, aortic valve area 0.8 cm , SVI 30 and dimensionless index 0.30    Serene uses 4 L of oxygen 24/7.  She walks with a walker.  She takes nebulizers 3 times daily.  Over the last 6 months to a year, she has experienced increased palpitations and thinks her shortness of breath/dyspnea on exertion has progressively gotten worse.  She denies any chest discomfort,  paroxysmal nocturnal dyspnea, orthopnea, lightheadedness, dizziness, pre-syncope, or syncope, weight loss, changes in appetite, nausea or vomiting.     Medical, surgical, family, social history, and medications  were reviewed and updated as necessary.    ECG (personally reviewed & interpreted): 6/24/2024 shows sinus with ventricular rate 86 bpm, first-degree AV block (NM interval 250 ms) and LBBB (QRS duration 160 ms)    ECHOCARDIOGRAM done on 4/17/2024:  Interpretation Summary     .The left ventricle is normal in size.  Left ventricular function is normal.The ejection fraction is 55-60%.  Diastolic Doppler findings (E/E' ratio and/or other parameters) suggest left  ventricular filling pressures are increased.  The left atrium is mildly dilated. The right atrium is mildly dilated.  Moderate to severe low flow low gradient aortic stenosis with a mean gradient  of 28 mmHg, peak velocity of 3.4 m/s, DI 0.30, SVI 30 ml/m2.  There is moderate (2+) mitral regurgitation.  Right ventricular systolic pressure is elevated, consistent with mild  pulmonary hypertension.  Compared to the prior study dated 3/31/24, there are changes as noted. Aortic  stenosis is now moderate to severe.    CATH done on 6/3/2024:  CONCLUSIONS:   Mild nonobstructive coronary artery disease.  Mild to moderately elevated right and normal left-sided filling pressures.  Mild pulmonary hypertension.  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        Physical Examination Review of Systems   Vitals: /80 (BP Location: Left arm, Patient Position: Sitting, Cuff Size: Adult Large)   Pulse 86   Resp (!) 32   Wt 92.5 kg (204 lb)   BMI 35.02 kg/m    BMI= Body mass index is 35.02 kg/m .  Wt Readings from Last 3 Encounters:   06/24/24 92.5 kg (204 lb)   06/10/24 92.1 kg (203 lb)   06/03/24 93.4 kg (206 lb)       General Appearance:   Alert, cooperative and in no acute distress   ENT/Mouth: membranes moist, no oral lesions or bleeding gums.      EYES:  no scleral icterus, normal conjunctivae   Neck: no carotid bruits.  Thyroid not  visualized   Chest/Lungs:   lungs are reveal expiratory wheezes, but are otherwise clear to auscultation   Cardiovascular:   Regular. Normal first and second heart sounds with 4/6 systolic murmur.  No rubs or gallops; the carotid, radial and posterior tibial pulses are intact, 1-2+ edema bilaterally    Abdomen:  Soft and nontender. Bowel sounds are present in all quadrants   Extremities: no cyanosis or clubbing   Skin: no xanthelasma, warm.    Neurologic: normal gait, normal  bilateral, no tremors   Psychiatric: Normal mood and affect       Please refer above for cardiac ROS details.      Medical History  Surgical History Family History Social History   Past Medical History:   Diagnosis Date    Convulsive disorder (H)     Convulsive disorder (H)     COPD (chronic obstructive pulmonary disease) (H)     COPD (chronic obstructive pulmonary disease) (H)     Coronary artery disease involving native coronary artery with unstable angina pectoris (H) 3/31/2023    Depression     Dyspnea on exertion     Gastroesophageal reflux disease     Heart murmur     Hernia of unspecified site of abdominal cavity without mention of obstruction or gangrene     Hiatal hernia     Hiatal hernia     Hypertension     Hypertension     Obese     On home oxygen therapy     at 1.5 liters at nite    Osteopenia     Osteopenia     Oxygen dependent     1.5 L per NC    Pneumonia due to infectious organism, unspecified laterality, unspecified part of lung 3/19/2022    Second degree heart block 3/19/2022    Shortness of breath     Status post coronary angiogram 6/3/2024    Uncomplicated asthma     URI (upper respiratory infection)     Venous insufficiency of both lower extremities     Venous insufficiency of both lower extremities     Walking troubles      Past Surgical History:   Procedure Laterality Date    AMPUTATE TOE(S) Left 8/11/2022    Procedure: AMPUTATION, fifth digit left foot;  Surgeon: Alfonso Orozco DPM;  Location: [facility]  OR    ARTHROPLASTY TOE(S) Left 11/22/2021    Procedure: ARTHROPLASTY, digits two and three left foot;  Surgeon: Alfonso Orozco DPM;  Location: [facility] OR    ARTHROPLASTY TOE(S) Left 7/18/2022    Procedure: ARTHROPLASTY, digits four and five left foot;  Surgeon: Alfonso Orozco DPM;  Location: [facility] OR    CV CORONARY ANGIOGRAM N/A 7/7/2023    Procedure: Coronary Angiogram;  Surgeon: Elijah Leger MD;  Location: [facility] CATH [facility] CV    CV CORONARY ANGIOGRAM N/A 6/3/2024    Procedure: Coronary Angiogram;  Surgeon: Villa Croft MD;  Location: [facility]    CV LEFT HEART CATH N/A 7/7/2023    Procedure: Left Heart Catheterization;  Surgeon: Elijah Leger MD;  Location: [facility]    CV RIGHT HEART CATH MEASUREMENTS RECORDED N/A 7/7/2023    Procedure: Right Heart Catheterization with Shunt Run;  Surgeon: Elijah Leger MD;  Location: [facility]    CV RIGHT HEART CATH MEASUREMENTS RECORDED N/A 6/3/2024    Procedure: Right Heart Catheterization;  Surgeon: Villa Croft MD;  Location: [facility] CV    EP PACEMAKER DEVICE & LEAD IMPLANT- RIGHT ATRIAL & RIGHT VENTRICULAR N/A 3/24/2022    Procedure: Pacemaker Device & Lead Implant - Right Atrial & Right Ventricular;  Surgeon: Helder Pendleton MD;  Location: [facility] CATH LAB CV    ESOPHAGOSCOPY, GASTROSCOPY, DUODENOSCOPY (EGD), COMBINED N/A 11/26/2018    Procedure: Esophagogastroduodenoscopy;  Surgeon: Jasmeet Wilder MD;  Location: [facility] OR    HERNIA REPAIR, UMBILICAL  04/2018    HERNIA REPAIR, UMBILICAL  04/04/2018    Dr. Jimenez    LAPAROSCOPIC HERNIORRHAPHY HIATAL N/A 11/26/2018    Procedure: Laparoscopic Hiatal Hernia Repair,bilateral chest tubes;  Surgeon: Jasmeet Wilder MD;  Location: [facility] OR    OTHER SURGICAL HISTORY Left 2003    Broke titanium in left arm    Repair arm fracture Left     SHOULDER SURGERY Right 1967    SHOULDER SURGERY Right 1967    US BIOPSY THYROID FINE  NEEDLE ASPIRATION  2020     Family History   Problem Relation Age of Onset    Pulmonary Hypertension Daughter         idiopathic     Heart Disease Other         2 sibs MI, 1 sib electrical conduction disorder    Breast Cancer Mother 66.00    Hypertension Mother     Coronary Artery Disease Mother     Varicose Veins Mother     Hypertension Father     Coronary Artery Disease Sister     Heart Disease Sister     Diabetes Son     Pulmonary Hypertension Daughter     Coronary Artery Disease Sister     Heart Disease Sister     Social History     Socioeconomic History    Marital status:      Spouse name: Not on file    Number of children: Not on file    Years of education: Not on file    Highest education level: Not on file   Occupational History    Not on file   Tobacco Use    Smoking status: Former     Current packs/day: 0.00     Average packs/day: 1.5 packs/day for 38.0 years (57.0 ttl pk-yrs)     Types: Cigarettes     Start date: 1960     Quit date: 1998     Years since quittin.5    Smokeless tobacco: Never    Tobacco comments:     quit in    Vaping Use    Vaping status: Never Used   Substance and Sexual Activity    Alcohol use: Yes     Alcohol/week: 2.0 standard drinks of alcohol     Comment: occ    Drug use: Not Currently    Sexual activity: Never   Other Topics Concern    Not on file   Social History Narrative    .  Two kids.  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     Social Determinants of Health     Financial Resource Strain: Low Risk  (2024)    Financial Resource Strain     Within the past 12 months, have you or your family members you live with been unable to get utilities (heat, electricity) when it was really needed?: No   Food Insecurity: Low Risk  (2024)    Food Insecurity     Within the past 12 months, did you worry that your food would run out before you got money to buy more?: No     Within the past 12 months, did the food you bought just not last and you didn t have money  to get more?: No   Transportation Needs: Low Risk  (5/6/2024)    Transportation Needs     Within the past 12 months, has lack of transportation kept you from medical appointments, getting your medicines, non-medical meetings or appointments, work, or from getting things that you need?: No   Physical Activity: Patient Declined (5/6/2024)    Exercise Vital Sign     Days of Exercise per Week: Patient declined     Minutes of Exercise per Session: Patient declined   Stress: Stress Concern Present (5/6/2024)    [employer] - Occupational Stress Questionnaire     Feeling of Stress : To some extent   Social Connections: Unknown (5/6/2024)    Social Connection and Isolation Panel [NHANES]     Frequency of Communication with Friends and Family: Not on file     Frequency of Social Gatherings with Friends and Family: Twice a week     Attends Rastafarian Services: Not on file     Active Member of Clubs or Organizations: Not on file     Attends Club or Organization Meetings: Not on file     Marital Status: Not on file   Interpersonal Safety: Low Risk  (5/13/2024)    Interpersonal Safety     Do you feel physically and emotionally safe where you currently live?: Yes     Within the past 12 months, have you been hit, slapped, kicked or otherwise physically hurt by someone?: No     Within the past 12 months, have you been humiliated or emotionally abused in other ways by your partner or ex-partner?: No   Housing Stability: Low Risk  (5/6/2024)    Housing Stability     Do you have housing? : Yes     Are you worried about losing your housing?: No          Medications  Allergies   Current Outpatient Medications   Medication Sig Dispense Refill    albuterol (PROAIR HFA/PROVENTIL HFA/VENTOLIN HFA) 108 (90 Base) MCG/ACT inhaler Inhale 2 puffs into the lungs every 6 hours as needed for shortness of breath or wheezing 18 g 1    azithromycin (ZITHROMAX) 250 MG tablet Take 1 tablet (250 mg) by mouth every evening 90  tablet 3    benzonatate (TESSALON) 100 MG capsule Take 1 capsule (100 mg) by mouth 3 times daily as needed for cough 90 capsule 3    Biotin 10 MG CAPS Take 1 capsule by mouth daily      calcium citrate (CITRACAL) 950 (200 Ca) MG tablet Take 1 tablet by mouth 2 times daily      estradiol (ESTRACE) 0.1 MG/GM vaginal cream Place 2 g vaginally three times a week 72 g 1    ferrous sulfate (FEROSUL) 325 (65 Fe) MG tablet Take 1 tablet (325 mg) by mouth daily (with breakfast) 90 tablet 1    fluticasone-salmeterol (ADVAIR) 500-50 MCG/ACT inhaler Inhale 1 puff into the lungs every 12 hours 180 each 3    guaiFENesin (MUCINEX) 600 MG 12 hr tablet Take 1 tablet (600 mg) by mouth 2 times daily 180 tablet 3    hydrochlorothiazide (HYDRODIURIL) 25 MG tablet Take 1 tablet (25 mg) by mouth daily 90 tablet 3    ipratropium - albuterol 0.5 mg/2.5 mg/3 mL (DUONEB) 0.5-2.5 (3) MG/3ML neb solution TAKE 1 VIAL BY NEBULIZATION EVERY 6 HOURS AS NEEDED FOR SHORTNESS OF BREATH OR WHEEZING 1080 mL 3    levalbuterol (XOPENEX) 1.25 MG/3ML neb solution Take 3 mLs (1.25 mg) by nebulization every 4 hours as needed for shortness of breath or wheezing 810 mL 3    levETIRAcetam (KEPPRA) 500 MG tablet Take 1 tablet (500 mg) by mouth 2 times daily 180 tablet 3    losartan (COZAAR) 50 MG tablet Take 1 tablet (50 mg) by mouth 2 times daily 180 tablet 3    Magnesium Oxide -Mg Supplement 500 MG TABS Take 1 tablet (500 mg) by mouth 2 times daily as needed      nystatin (MYCOSTATIN) 456417 UNIT/GM external powder Apply topically daily as needed      potassium chloride teja ER (KLOR-CON M20) 20 MEQ CR tablet Take 1 tablet (20 mEq) by mouth at bedtime 90 tablet 3    RABEprazole (ACIPHEX) 20 MG EC tablet Take 1 tablet (20 mg) by mouth daily 90 tablet 3    sertraline (ZOLOFT) 100 MG tablet Take 1 tablet (100 mg) by mouth every evening 90 tablet 3    solifenacin (VESICARE) 10 MG tablet Take 1 tablet (10 mg) by mouth daily 90 tablet 3    spironolactone (ALDACTONE)  "25 MG tablet Take 0.5 tablets (12.5 mg) by mouth daily 45 tablet 3    tiotropium (SPIRIVA RESPIMAT) 2.5 MCG/ACT inhaler Inhale 2 puffs into the lungs daily 12 g 3    vitamin D3 (CHOLECALCIFEROL) 125 MCG (5000 UT) tablet Take 5,000 Units by mouth every other day Monday, Wednesday, and Friday      Allergies   Allergen Reactions    Clindamycin Rash    Carbamazepine Rash    Morphine Nausea         Lab Results    Chemistry/lipid CBC Cardiac Enzymes/BNP/TSH/INR   Recent Labs   Lab Test 05/13/24  1016   CHOL 171   HDL 74   LDL 87   TRIG 52     Recent Labs   Lab Test 05/13/24  1016 07/07/23  0703 04/27/23  1322   LDL 87 99 110*     Recent Labs   Lab Test 06/03/24  0750      POTASSIUM 4.4   CHLORIDE 99   CO2 33*   *   BUN 33.6*   CR 1.07*   GFRESTIMATED 53*   TENZIN 9.4     Recent Labs   Lab Test 06/03/24  0750 05/23/24  1630 05/13/24  1016   CR 1.07* 0.87 0.88     Recent Labs   Lab Test 02/22/22  1110   A1C 5.4    Recent Labs   Lab Test 06/03/24  0750   WBC 8.7   HGB 9.1*   HCT 30.1*   MCV 79        Recent Labs   Lab Test 06/03/24  0750 05/13/24  1016 03/24/24  2236   HGB 9.1* 8.8* 9.7*    Recent Labs   Lab Test 03/20/22  0759 03/19/22  2336 03/19/22  1441   TROPONINI <0.01 <0.01 0.01     Recent Labs   Lab Test 06/28/23  1256 05/15/23  1149 04/19/22  1514 03/19/22  1441 11/13/18  1546   BNP  --   --   --  40  --    NTBNPI  --  1,004 508  --   --    NTBNP 218  --   --   --  63     Recent Labs   Lab Test 04/19/22  1514   TSH 3.59     No results for input(s): \"INR\" in the last 15046 hours.           This note has been dictated using voice recognition software. Any grammatical or context distortions are unintentional and inherent to the software.                 "

## 2024-06-24 NOTE — LETTER
6/24/2024    Kate Marte, DO  [address]  [facility] [address]    RE: Lalitha Underwood       Dear Colleague,     I had the pleasure of seeing Lalitha RITA Underwood in the [facility].  HEART CARE ENCOUNTER NOTE       [facility]  [phone]    Assessment/Recommendations   Problem List Items Addressed This Visit       Essential hypertension, benign    Epileptic seizure (H)    PAD (peripheral artery disease) (H24)    Second degree heart block    Cardiac pacemaker in situ    Coronary arteriosclerosis due to lipid rich plaque    Chronic respiratory failure with hypoxia (H)    Class 2 obesity due to excess calories without serious comorbidity with body mass index (BMI) of 37.0 to 37.9 in adult    Nonrheumatic aortic valve stenosis - Primary     Other Visit Diagnoses       Dyspnea                1.  Severe low-flow, low-gradient aortic Stenosis - This has become severe and is now causing progressive DENT/SOB.  She has seen a decline in her functional status.  She has been evaluated by a multidisciplinary team and has been deemed a good candidate for transcatheter aortic valve replacement.  She has now undergone all the required workup for TAVR and wishes to proceed.   We discussed the procedure in detail,  including post-procedure care, restrictions and follow up.   She understands that there is a 4-5% risk of bleeding, infection, stroke, heart block requiring permanent pacemaker, cardiac perforation, aortic root rupture, dissection and death.  Patient also understands that if something unexpected happens, the procedure may need to be stopped or changed to help her.     All questions were answered   Consents were signed and witnessed by me.  As per Dr. Gleason's note dated 6/10/24, the patient would NOT want all rescue efforts made in the event of a procedural complication during TAVR, and that she would NOT consent to undergo emergency open heart surgery if needed. She is OK with  having peripheral vascular intervention in the event of an access complication at the time of TAVR, but under no circumstances would she want to undergo open heart surgery. Although her preference is to maintain DNR status, she is willing to lift this status and become FULL CODE during the TAVR procedure and the perioperative period of her procedural hospital stay to resuscitate her for any reversible insults   She has never had trouble with anesthesia in the past.    Labs today reveal Hgb 9.7, normal WBC, electrolytes (see below) and creat 0.9 (GFR 65)    The plan will be for moderate sedation with RN.  We will use the Correia AIDAN valve. Lalitha Underwood will report tomorrow morning for the procedure and all instructions regarding times and medications were given by TAVR coordinator RN.     2. Chronic heart failure with preserved ejection fraction, cor pulmonale/right heart failure, NYHA class III - currently compensated                             - NT pro-BNP today is 301                             - She should continue taking hydrochlorothiazide and spironolactone    3.  Severe, oxygen-dependent COPD with chronic hypoxic respiratory failure - patient uses 3-4L oxygen by nasal cannula.  Pulmonologist has instructed her to take it off when she is at rest during the day, but a lot of times she forgets.  Last seen by pulmonology on May 28 and will follow-up with them in 6 months                              - She uses as needed albuterol, scheduled fluticasone/salmeterol and scheduled tiotropium                              - She does DuoNeb nebulizers 3 times daily                              -She is on daily prophylactic azithromycin to help control exacerbations    4. Mobitz type II second degree AV block - s/p dual chamber PPM in March 2022.  Last device check showed V pacing at 100% with otherwise normal device function and good battery life.  Next device check scheduled for 7/29/2024    5.  Hypertension -  BP today is at goal today.  She should continue taking losartan 50 mg twice daily, spironolactone 12.5 mg daily and hydrochlorothiazide 25 mg daily    6. Obesity with BMI 35    7.  Seizure disorder -managed on Keppra 500 mg twice daily    8. Anemia of chronic disease - Hgb in the past year have ranged 8.8 - 10.5.  She takes iron supplement.     9. Electrolyte imbalance - Mag 1.6 which is chronic and she takes supplement.  Her sodium is 146 today.  Will repeat BMP tomorrow prior to TAVR, as this is most likely lab error, but will hold spironolactone and hydrochlorothiazide tomorrow morning.     The longitudinal plan of care for aortic stenosis, HTN, PAD, HFpEF was addressed during this visit.  Due to added complexity of care, we will continue to support Serene and the subsequent management of this condition(s) and with the ongoing continuity of care of this condition(s).         History of Present Illness/Subjective    Lalitha Underwood is a 79 year old female who comes in today for history and physical prior to TAVR (transcatheter aortic valve replacement).     Serene has past medical history significant for oxygen dependent COPD, obesity, coronary atherosclerosis, heart block with cardiac pacemaker in situ, PAD, seizure disorder, hyperlipidemia, lymphedema, hypertension and severe aortic stenosis    Her aortic stenosis has been followed by serial echocardiograms and the echocardiogram in April showed progression of her aortic valve disease to now the severe range (low-flow low gradient) with metrics significant for peak velocity 3.4, mean gradient 28, aortic valve area 0.8 cm , SVI 30 and dimensionless index 0.30    Serene uses 4 L of oxygen 24/7.  She walks with a walker.  She takes nebulizers 3 times daily.  Over the last 6 months to a year, she has experienced increased palpitations and thinks her shortness of breath/dyspnea on exertion has progressively gotten worse.  She denies any chest discomfort,  paroxysmal  nocturnal dyspnea, orthopnea, lightheadedness, dizziness, pre-syncope, or syncope, weight loss, changes in appetite, nausea or vomiting.     Medical, surgical, family, social history, and medications were reviewed and updated as necessary.    ECG (personally reviewed & interpreted): 6/24/2024 shows sinus with ventricular rate 86 bpm, first-degree AV block (MN interval 250 ms) and LBBB (QRS duration 160 ms)    ECHOCARDIOGRAM done on 4/17/2024:  Interpretation Summary     .The left ventricle is normal in size.  Left ventricular function is normal.The ejection fraction is 55-60%.  Diastolic Doppler findings (E/E' ratio and/or other parameters) suggest left  ventricular filling pressures are increased.  The left atrium is mildly dilated. The right atrium is mildly dilated.  Moderate to severe low flow low gradient aortic stenosis with a mean gradient  of 28 mmHg, peak velocity of 3.4 m/s, DI 0.30, SVI 30 ml/m2.  There is moderate (2+) mitral regurgitation.  Right ventricular systolic pressure is elevated, consistent with mild  pulmonary hypertension.  Compared to the prior study dated 3/31/24, there are changes as noted. Aortic  stenosis is now moderate to severe.    CATH done on 6/3/2024:  CONCLUSIONS:   Mild nonobstructive coronary artery disease.  Mild to moderately elevated right and normal left-sided filling pressures.  Mild pulmonary hypertension.  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        Physical Examination Review of Systems   Vitals: /80 (BP Location: Left arm, Patient Position: Sitting, Cuff Size: Adult Large)   Pulse 86   Resp (!) 32   Wt 92.5 kg (204 lb)   BMI 35.02 kg/m    BMI= Body mass index is 35.02 kg/m .  Wt Readings from Last 3 Encounters:   06/24/24 92.5 kg (204 lb)   06/10/24 92.1 kg (203 lb)   06/03/24 93.4 kg (206 lb)       General Appearance:   Alert,  cooperative and in no acute distress   ENT/Mouth: membranes moist, no oral lesions or bleeding gums.      EYES:  no scleral icterus, normal conjunctivae   Neck: no carotid bruits.  Thyroid not visualized   Chest/Lungs:   lungs are reveal expiratory wheezes, but are otherwise clear to auscultation   Cardiovascular:   Regular. Normal first and second heart sounds with 4/6 systolic murmur.  No rubs or gallops; the carotid, radial and posterior tibial pulses are intact, 1-2+ edema bilaterally    Abdomen:  Soft and nontender. Bowel sounds are present in all quadrants   Extremities: no cyanosis or clubbing   Skin: no xanthelasma, warm.    Neurologic: normal gait, normal  bilateral, no tremors   Psychiatric: Normal mood and affect       Please refer above for cardiac ROS details.      Medical History  Surgical History Family History Social History   Past Medical History:   Diagnosis Date    Convulsive disorder (H)     Convulsive disorder (H)     COPD (chronic obstructive pulmonary disease) (H)     COPD (chronic obstructive pulmonary disease) (H)     Coronary artery disease involving native coronary artery with unstable angina pectoris (H) 3/31/2023    Depression     Dyspnea on exertion     Gastroesophageal reflux disease     Heart murmur     Hernia of unspecified site of abdominal cavity without mention of obstruction or gangrene     Hiatal hernia     Hiatal hernia     Hypertension     Hypertension     Obese     On home oxygen therapy     at 1.5 liters at nite    Osteopenia     Osteopenia     Oxygen dependent     1.5 L per NC    Pneumonia due to infectious organism, unspecified laterality, unspecified part of lung 3/19/2022    Second degree heart block 3/19/2022    Shortness of breath     Status post coronary angiogram 6/3/2024    Uncomplicated asthma     URI (upper respiratory infection)     Venous insufficiency of both lower extremities     Venous insufficiency of both lower extremities     Walking troubles      Past  Surgical History:   Procedure Laterality Date    AMPUTATE TOE(S) Left 8/11/2022    Procedure: AMPUTATION, fifth digit left foot;  Surgeon: Alfonso Orozco DPM;  Location: [facility]    ARTHROPLASTY TOE(S) Left 11/22/2021    Procedure: ARTHROPLASTY, digits two and three left foot;  Surgeon: Alfonso Orozco DPM;  Location: [facility]    ARTHROPLASTY TOE(S) Left 7/18/2022    Procedure: ARTHROPLASTY, digits four and five left foot;  Surgeon: Alfonso Orozco DPM;  Location: [facility]    CV CORONARY ANGIOGRAM N/A 7/7/2023    Procedure: Coronary Angiogram;  Surgeon: Elijah Leger MD;  Location: [facility] CATH LAB CV    CV CORONARY ANGIOGRAM N/A 6/3/2024    Procedure: Coronary Angiogram;  Surgeon: Villa Croft MD;  Location: [facility] LAB CV    CV LEFT HEART CATH N/A 7/7/2023    Procedure: Left Heart Catheterization;  Surgeon: Elijah Leger MD;  Location: [facility] LAB CV    CV RIGHT HEART CATH MEASUREMENTS RECORDED N/A 7/7/2023    Procedure: Right Heart Catheterization with Shunt Run;  Surgeon: Elijah Leger MD;  Location: [facility] CATH LAB CV    CV RIGHT HEART CATH MEASUREMENTS RECORDED N/A 6/3/2024    Procedure: Right Heart Catheterization;  Surgeon: Villa Croft MD;  Location: [facility] CV    EP PACEMAKER DEVICE & LEAD IMPLANT- RIGHT ATRIAL & RIGHT VENTRICULAR N/A 3/24/2022    Procedure: Pacemaker Device & Lead Implant - Right Atrial & Right Ventricular;  Surgeon: Helder Pendleton MD;  Location: [facility] LAB CV    ESOPHAGOSCOPY, GASTROSCOPY, DUODENOSCOPY (EGD), COMBINED N/A 11/26/2018    Procedure: Esophagogastroduodenoscopy;  Surgeon: Jasmeet Wilder MD;  Location: [facility] OR    HERNIA REPAIR, UMBILICAL  04/2018    HERNIA REPAIR, UMBILICAL  04/04/2018    Dr. Jimenez    LAPAROSCOPIC HERNIORRHAPHY HIATAL N/A 11/26/2018    Procedure: Laparoscopic Hiatal Hernia Repair,bilateral chest tubes;  Surgeon: Jasmeet Wilder MD;   Location: [facility] OR    OTHER SURGICAL HISTORY Left 2003    Broke titanium in left arm    Repair arm fracture Left     SHOULDER SURGERY Right 1967    SHOULDER SURGERY Right      BIOPSY THYROID FINE NEEDLE ASPIRATION  2020     Family History   Problem Relation Age of Onset    Pulmonary Hypertension Daughter         idiopathic     Heart Disease Other         2 sibs MI, 1 sib electrical conduction disorder    Breast Cancer Mother 66.00    Hypertension Mother     Coronary Artery Disease Mother     Varicose Veins Mother     Hypertension Father     Coronary Artery Disease Sister     Heart Disease Sister     Diabetes Son     Pulmonary Hypertension Daughter     Coronary Artery Disease Sister     Heart Disease Sister     Social History     Socioeconomic History    Marital status:      Spouse name: Not on file    Number of children: Not on file    Years of education: Not on file    Highest education level: Not on file   Occupational History    Not on file   Tobacco Use    Smoking status: Former     Current packs/day: 0.00     Average packs/day: 1.5 packs/day for 38.0 years (57.0 ttl pk-yrs)     Types: Cigarettes     Start date: 1960     Quit date: 1998     Years since quittin.5    Smokeless tobacco: Never    Tobacco comments:     quit in    Vaping Use    Vaping status: Never Used   Substance and Sexual Activity    Alcohol use: Yes     Alcohol/week: 2.0 standard drinks of alcohol     Comment: occ    Drug use: Not Currently    Sexual activity: Never   Other Topics Concern    Not on file   Social History Narrative    .  Two kids.  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     Social Determinants of Health     Financial Resource Strain: Low Risk  (2024)    Financial Resource Strain     Within the past 12 months, have you or your family members you live with been unable to get utilities (heat, electricity) when it was really needed?: No   Food Insecurity: Low Risk  (2024)    Food Insecurity      Within the past 12 months, did you worry that your food would run out before you got money to buy more?: No     Within the past 12 months, did the food you bought just not last and you didn t have money to get more?: No   Transportation Needs: Low Risk  (5/6/2024)    Transportation Needs     Within the past 12 months, has lack of transportation kept you from medical appointments, getting your medicines, non-medical meetings or appointments, work, or from getting things that you need?: No   Physical Activity: Patient Declined (5/6/2024)    Exercise Vital Sign     Days of Exercise per Week: Patient declined     Minutes of Exercise per Session: Patient declined   Stress: Stress Concern Present (5/6/2024)    [employer] - Occupational Stress Questionnaire     Feeling of Stress : To some extent   Social Connections: Unknown (5/6/2024)    Social Connection and Isolation Panel [NHANES]     Frequency of Communication with Friends and Family: Not on file     Frequency of Social Gatherings with Friends and Family: Twice a week     Attends Congregation Services: Not on file     Active Member of Clubs or Organizations: Not on file     Attends Club or Organization Meetings: Not on file     Marital Status: Not on file   Interpersonal Safety: Low Risk  (5/13/2024)    Interpersonal Safety     Do you feel physically and emotionally safe where you currently live?: Yes     Within the past 12 months, have you been hit, slapped, kicked or otherwise physically hurt by someone?: No     Within the past 12 months, have you been humiliated or emotionally abused in other ways by your partner or ex-partner?: No   Housing Stability: Low Risk  (5/6/2024)    Housing Stability     Do you have housing? : Yes     Are you worried about losing your housing?: No          Medications  Allergies   Current Outpatient Medications   Medication Sig Dispense Refill    albuterol (PROAIR HFA/PROVENTIL HFA/VENTOLIN HFA) 108 (90 Base)  MCG/ACT inhaler Inhale 2 puffs into the lungs every 6 hours as needed for shortness of breath or wheezing 18 g 1    azithromycin (ZITHROMAX) 250 MG tablet Take 1 tablet (250 mg) by mouth every evening 90 tablet 3    benzonatate (TESSALON) 100 MG capsule Take 1 capsule (100 mg) by mouth 3 times daily as needed for cough 90 capsule 3    Biotin 10 MG CAPS Take 1 capsule by mouth daily      calcium citrate (CITRACAL) 950 (200 Ca) MG tablet Take 1 tablet by mouth 2 times daily      estradiol (ESTRACE) 0.1 MG/GM vaginal cream Place 2 g vaginally three times a week 72 g 1    ferrous sulfate (FEROSUL) 325 (65 Fe) MG tablet Take 1 tablet (325 mg) by mouth daily (with breakfast) 90 tablet 1    fluticasone-salmeterol (ADVAIR) 500-50 MCG/ACT inhaler Inhale 1 puff into the lungs every 12 hours 180 each 3    guaiFENesin (MUCINEX) 600 MG 12 hr tablet Take 1 tablet (600 mg) by mouth 2 times daily 180 tablet 3    hydrochlorothiazide (HYDRODIURIL) 25 MG tablet Take 1 tablet (25 mg) by mouth daily 90 tablet 3    ipratropium - albuterol 0.5 mg/2.5 mg/3 mL (DUONEB) 0.5-2.5 (3) MG/3ML neb solution TAKE 1 VIAL BY NEBULIZATION EVERY 6 HOURS AS NEEDED FOR SHORTNESS OF BREATH OR WHEEZING 1080 mL 3    levalbuterol (XOPENEX) 1.25 MG/3ML neb solution Take 3 mLs (1.25 mg) by nebulization every 4 hours as needed for shortness of breath or wheezing 810 mL 3    levETIRAcetam (KEPPRA) 500 MG tablet Take 1 tablet (500 mg) by mouth 2 times daily 180 tablet 3    losartan (COZAAR) 50 MG tablet Take 1 tablet (50 mg) by mouth 2 times daily 180 tablet 3    Magnesium Oxide -Mg Supplement 500 MG TABS Take 1 tablet (500 mg) by mouth 2 times daily as needed      nystatin (MYCOSTATIN) 451217 UNIT/GM external powder Apply topically daily as needed      potassium chloride teja ER (KLOR-CON M20) 20 MEQ CR tablet Take 1 tablet (20 mEq) by mouth at bedtime 90 tablet 3    RABEprazole (ACIPHEX) 20 MG EC tablet Take 1 tablet (20 mg) by mouth daily 90 tablet 3     "sertraline (ZOLOFT) 100 MG tablet Take 1 tablet (100 mg) by mouth every evening 90 tablet 3    solifenacin (VESICARE) 10 MG tablet Take 1 tablet (10 mg) by mouth daily 90 tablet 3    spironolactone (ALDACTONE) 25 MG tablet Take 0.5 tablets (12.5 mg) by mouth daily 45 tablet 3    tiotropium (SPIRIVA RESPIMAT) 2.5 MCG/ACT inhaler Inhale 2 puffs into the lungs daily 12 g 3    vitamin D3 (CHOLECALCIFEROL) 125 MCG (5000 UT) tablet Take 5,000 Units by mouth every other day Monday, Wednesday, and Friday      Allergies   Allergen Reactions    Clindamycin Rash    Carbamazepine Rash    Morphine Nausea         Lab Results    Chemistry/lipid CBC Cardiac Enzymes/BNP/TSH/INR   Recent Labs   Lab Test 05/13/24  1016   CHOL 171   HDL 74   LDL 87   TRIG 52     Recent Labs   Lab Test 05/13/24  1016 07/07/23  0703 04/27/23  1322   LDL 87 99 110*     Recent Labs   Lab Test 06/03/24  0750      POTASSIUM 4.4   CHLORIDE 99   CO2 33*   *   BUN 33.6*   CR 1.07*   GFRESTIMATED 53*   TENZIN 9.4     Recent Labs   Lab Test 06/03/24  0750 05/23/24  1630 05/13/24  1016   CR 1.07* 0.87 0.88     Recent Labs   Lab Test 02/22/22  1110   A1C 5.4    Recent Labs   Lab Test 06/03/24  0750   WBC 8.7   HGB 9.1*   HCT 30.1*   MCV 79        Recent Labs   Lab Test 06/03/24  0750 05/13/24  1016 03/24/24  2236   HGB 9.1* 8.8* 9.7*    Recent Labs   Lab Test 03/20/22  0759 03/19/22  2336 03/19/22  1441   TROPONINI <0.01 <0.01 0.01     Recent Labs   Lab Test 06/28/23  1256 05/15/23  1149 04/19/22  1514 03/19/22  1441 11/13/18  1546   BNP  --   --   --  40  --    NTBNPI  --  1,004 508  --   --    NTBNP 218  --   --   --  63     Recent Labs   Lab Test 04/19/22  1514   TSH 3.59     No results for input(s): \"INR\" in the last 98944 hours.           This note has been dictated using voice recognition software. Any grammatical or context distortions are unintentional and inherent to the software.                   Thank you for allowing me to participate " in the care of your patient.      Sincerely,     JULIO KIMBALL PA-C     [facility]  cc:   Julio Kimball PA-C  [address]  [place] [address]

## 2024-06-25 ENCOUNTER — HOSPITAL ENCOUNTER (INPATIENT)
Facility: HOSPITAL | Age: 79
LOS: 1 days | Discharge: HOME | DRG: 267 | End: 2024-06-26
Attending: INTERNAL MEDICINE | Admitting: INTERNAL MEDICINE
Payer: MEDICARE

## 2024-06-25 ENCOUNTER — HOSPITAL ENCOUNTER (OUTPATIENT)
Dept: CARDIOLOGY | Facility: HOSPITAL | Age: 79
Discharge: HOME | DRG: 267 | End: 2024-06-25
Attending: INTERNAL MEDICINE | Admitting: INTERNAL MEDICINE
Payer: MEDICARE

## 2024-06-25 DIAGNOSIS — I35.0 NONRHEUMATIC AORTIC VALVE STENOSIS: ICD-10-CM

## 2024-06-25 DIAGNOSIS — Z95.2 S/P TAVR (TRANSCATHETER AORTIC VALVE REPLACEMENT): ICD-10-CM

## 2024-06-25 DIAGNOSIS — I35.0 NONRHEUMATIC AORTIC VALVE STENOSIS: Primary | ICD-10-CM

## 2024-06-25 LAB
ACT BLD: 249 SECONDS (ref 74–150)
ALBUMIN SERPL BCG-MCNC: 3.6 G/DL (ref 3.5–5.2)
ALP SERPL-CCNC: 56 U/L (ref 40–150)
ALT SERPL W P-5'-P-CCNC: 9 U/L (ref 0–50)
ANION GAP SERPL CALCULATED.3IONS-SCNC: 8 MMOL/L (ref 7–15)
ANION GAP SERPL CALCULATED.3IONS-SCNC: 8 MMOL/L (ref 7–15)
AST SERPL W P-5'-P-CCNC: 16 U/L (ref 0–45)
ATRIAL RATE - MUSE: 86 BPM
BILIRUB SERPL-MCNC: 0.2 MG/DL
BLD PROD TYP BPU: NORMAL
BLD PROD TYP BPU: NORMAL
BLOOD COMPONENT TYPE: NORMAL
BLOOD COMPONENT TYPE: NORMAL
BUN SERPL-MCNC: 25.2 MG/DL (ref 8–23)
BUN SERPL-MCNC: 27.7 MG/DL (ref 8–23)
CALCIUM SERPL-MCNC: 8.9 MG/DL (ref 8.8–10.2)
CALCIUM SERPL-MCNC: 9.4 MG/DL (ref 8.8–10.2)
CHLORIDE SERPL-SCNC: 100 MMOL/L (ref 98–107)
CHLORIDE SERPL-SCNC: 100 MMOL/L (ref 98–107)
CODING SYSTEM: NORMAL
CODING SYSTEM: NORMAL
CREAT SERPL-MCNC: 0.92 MG/DL (ref 0.51–0.95)
CREAT SERPL-MCNC: 1.01 MG/DL (ref 0.51–0.95)
CROSSMATCH: NORMAL
CROSSMATCH: NORMAL
DEPRECATED HCO3 PLAS-SCNC: 31 MMOL/L (ref 22–29)
DEPRECATED HCO3 PLAS-SCNC: 32 MMOL/L (ref 22–29)
DIASTOLIC BLOOD PRESSURE - MUSE: NORMAL MMHG
EGFRCR SERPLBLD CKD-EPI 2021: 56 ML/MIN/1.73M2
EGFRCR SERPLBLD CKD-EPI 2021: 63 ML/MIN/1.73M2
ERYTHROCYTE [DISTWIDTH] IN BLOOD BY AUTOMATED COUNT: 19.6 % (ref 10–15)
GLUCOSE BLDC GLUCOMTR-MCNC: 101 MG/DL (ref 70–99)
GLUCOSE BLDC GLUCOMTR-MCNC: 103 MG/DL (ref 70–99)
GLUCOSE SERPL-MCNC: 104 MG/DL (ref 70–99)
GLUCOSE SERPL-MCNC: 119 MG/DL (ref 70–99)
HCT VFR BLD AUTO: 30.2 % (ref 35–47)
HGB BLD-MCNC: 9 G/DL (ref 11.7–15.7)
INTERPRETATION ECG - MUSE: NORMAL
ISSUE DATE AND TIME: NORMAL
ISSUE DATE AND TIME: NORMAL
MAGNESIUM SERPL-MCNC: 1.7 MG/DL (ref 1.7–2.3)
MCH RBC QN AUTO: 24.3 PG (ref 26.5–33)
MCHC RBC AUTO-ENTMCNC: 29.8 G/DL (ref 31.5–36.5)
MCV RBC AUTO: 82 FL (ref 78–100)
P AXIS - MUSE: 59 DEGREES
PLATELET # BLD AUTO: 183 10E3/UL (ref 150–450)
POTASSIUM SERPL-SCNC: 3.9 MMOL/L (ref 3.4–5.3)
POTASSIUM SERPL-SCNC: 4.2 MMOL/L (ref 3.4–5.3)
PR INTERVAL - MUSE: 250 MS
PROT SERPL-MCNC: 6.1 G/DL (ref 6.4–8.3)
QRS DURATION - MUSE: 160 MS
QT - MUSE: 402 MS
QTC - MUSE: 481 MS
R AXIS - MUSE: 28 DEGREES
RBC # BLD AUTO: 3.7 10E6/UL (ref 3.8–5.2)
SODIUM SERPL-SCNC: 139 MMOL/L (ref 135–145)
SODIUM SERPL-SCNC: 140 MMOL/L (ref 135–145)
SYSTOLIC BLOOD PRESSURE - MUSE: NORMAL MMHG
T AXIS - MUSE: 51 DEGREES
UNIT ABO/RH: NORMAL
UNIT ABO/RH: NORMAL
UNIT NUMBER: NORMAL
UNIT NUMBER: NORMAL
UNIT STATUS: NORMAL
UNIT STATUS: NORMAL
UNIT TYPE ISBT: 600
UNIT TYPE ISBT: 600
VENTRICULAR RATE- MUSE: 86 BPM
WBC # BLD AUTO: 8.4 10E3/UL (ref 4–11)

## 2024-06-25 PROCEDURE — 86923 COMPATIBILITY TEST ELECTRIC: CPT | Performed by: INTERNAL MEDICINE

## 2024-06-25 PROCEDURE — 272N000001 HC OR GENERAL SUPPLY STERILE: Performed by: INTERNAL MEDICINE

## 2024-06-25 PROCEDURE — 33361 REPLACE AORTIC VALVE PERQ: CPT | Mod: 62 | Performed by: THORACIC SURGERY (CARDIOTHORACIC VASCULAR SURGERY)

## 2024-06-25 PROCEDURE — 93325 DOPPLER ECHO COLOR FLOW MAPG: CPT | Mod: 26 | Performed by: INTERNAL MEDICINE

## 2024-06-25 PROCEDURE — 258N000003 HC RX IP 258 OP 636: Mod: JZ | Performed by: INTERNAL MEDICINE

## 2024-06-25 PROCEDURE — 93325 DOPPLER ECHO COLOR FLOW MAPG: CPT

## 2024-06-25 PROCEDURE — 250N000013 HC RX MED GY IP 250 OP 250 PS 637: Performed by: PHYSICIAN ASSISTANT

## 2024-06-25 PROCEDURE — 250N000011 HC RX IP 250 OP 636: Performed by: INTERNAL MEDICINE

## 2024-06-25 PROCEDURE — 250N000013 HC RX MED GY IP 250 OP 250 PS 637: Performed by: INTERNAL MEDICINE

## 2024-06-25 PROCEDURE — 80048 BASIC METABOLIC PNL TOTAL CA: CPT | Performed by: PHYSICIAN ASSISTANT

## 2024-06-25 PROCEDURE — P9016 RBC LEUKOCYTES REDUCED: HCPCS | Performed by: INTERNAL MEDICINE

## 2024-06-25 PROCEDURE — 210N000001 HC R&B IMCU HEART CARE

## 2024-06-25 PROCEDURE — 93308 TTE F-UP OR LMTD: CPT | Mod: 26 | Performed by: INTERNAL MEDICINE

## 2024-06-25 PROCEDURE — 93005 ELECTROCARDIOGRAM TRACING: CPT

## 2024-06-25 PROCEDURE — 36415 COLL VENOUS BLD VENIPUNCTURE: CPT | Performed by: PHYSICIAN ASSISTANT

## 2024-06-25 PROCEDURE — 250N000009 HC RX 250: Performed by: PHYSICIAN ASSISTANT

## 2024-06-25 PROCEDURE — C1733 CATH, EP, OTHR THAN COOL-TIP: HCPCS | Performed by: INTERNAL MEDICINE

## 2024-06-25 PROCEDURE — C1769 GUIDE WIRE: HCPCS | Performed by: INTERNAL MEDICINE

## 2024-06-25 PROCEDURE — C1894 INTRO/SHEATH, NON-LASER: HCPCS | Performed by: INTERNAL MEDICINE

## 2024-06-25 PROCEDURE — 85048 AUTOMATED LEUKOCYTE COUNT: CPT | Performed by: PHYSICIAN ASSISTANT

## 2024-06-25 PROCEDURE — 99153 MOD SED SAME PHYS/QHP EA: CPT | Performed by: INTERNAL MEDICINE

## 2024-06-25 PROCEDURE — 83735 ASSAY OF MAGNESIUM: CPT | Performed by: PHYSICIAN ASSISTANT

## 2024-06-25 PROCEDURE — 02RF38Z REPLACEMENT OF AORTIC VALVE WITH ZOOPLASTIC TISSUE, PERCUTANEOUS APPROACH: ICD-10-PCS | Performed by: INTERNAL MEDICINE

## 2024-06-25 PROCEDURE — 250N000013 HC RX MED GY IP 250 OP 250 PS 637: Performed by: EMERGENCY MEDICINE

## 2024-06-25 PROCEDURE — 33361 REPLACE AORTIC VALVE PERQ: CPT | Performed by: INTERNAL MEDICINE

## 2024-06-25 PROCEDURE — 255N000002 HC RX 255 OP 636: Performed by: INTERNAL MEDICINE

## 2024-06-25 PROCEDURE — 82374 ASSAY BLOOD CARBON DIOXIDE: CPT | Performed by: PHYSICIAN ASSISTANT

## 2024-06-25 PROCEDURE — 33361 REPLACE AORTIC VALVE PERQ: CPT | Mod: 62 | Performed by: INTERNAL MEDICINE

## 2024-06-25 PROCEDURE — 85347 COAGULATION TIME ACTIVATED: CPT

## 2024-06-25 PROCEDURE — C1760 CLOSURE DEV, VASC: HCPCS | Performed by: INTERNAL MEDICINE

## 2024-06-25 PROCEDURE — 99231 SBSQ HOSP IP/OBS SF/LOW 25: CPT | Performed by: EMERGENCY MEDICINE

## 2024-06-25 PROCEDURE — 93010 ELECTROCARDIOGRAM REPORT: CPT | Mod: HIP | Performed by: INTERNAL MEDICINE

## 2024-06-25 PROCEDURE — C1889 IMPLANT/INSERT DEVICE, NOC: HCPCS | Performed by: INTERNAL MEDICINE

## 2024-06-25 PROCEDURE — 250N000009 HC RX 250: Performed by: INTERNAL MEDICINE

## 2024-06-25 PROCEDURE — 82247 BILIRUBIN TOTAL: CPT | Performed by: PHYSICIAN ASSISTANT

## 2024-06-25 PROCEDURE — 93321 DOPPLER ECHO F-UP/LMTD STD: CPT | Mod: 26 | Performed by: INTERNAL MEDICINE

## 2024-06-25 PROCEDURE — 94640 AIRWAY INHALATION TREATMENT: CPT | Mod: 76

## 2024-06-25 PROCEDURE — 999N000157 HC STATISTIC RCP TIME EA 10 MIN

## 2024-06-25 PROCEDURE — 99152 MOD SED SAME PHYS/QHP 5/>YRS: CPT | Performed by: INTERNAL MEDICINE

## 2024-06-25 DEVICE — VALVE AORTIC SAPEIN 3 UTLRA TAVR 23MM 9750TFX23A: Type: IMPLANTABLE DEVICE | Site: CHEST | Status: FUNCTIONAL

## 2024-06-25 DEVICE — DEVICE CLOSURE VASCULAR MANTA 18FR 2115: Type: IMPLANTABLE DEVICE | Site: LEG | Status: FUNCTIONAL

## 2024-06-25 DEVICE — CLOSURE ANGIOSEAL 6FR 610130: Type: IMPLANTABLE DEVICE | Status: FUNCTIONAL

## 2024-06-25 RX ORDER — LIDOCAINE HYDROCHLORIDE AND EPINEPHRINE 10; 10 MG/ML; UG/ML
INJECTION, SOLUTION INFILTRATION; PERINEURAL
Status: DISCONTINUED | OUTPATIENT
Start: 2024-06-25 | End: 2024-06-25 | Stop reason: HOSPADM

## 2024-06-25 RX ORDER — POTASSIUM CHLORIDE 1500 MG/1
20 TABLET, EXTENDED RELEASE ORAL AT BEDTIME
Status: DISCONTINUED | OUTPATIENT
Start: 2024-06-25 | End: 2024-06-26 | Stop reason: HOSPADM

## 2024-06-25 RX ORDER — ASPIRIN 325 MG
325 TABLET ORAL ONCE
Status: COMPLETED | OUTPATIENT
Start: 2024-06-25 | End: 2024-06-25

## 2024-06-25 RX ORDER — NALOXONE HYDROCHLORIDE 0.4 MG/ML
0.4 INJECTION, SOLUTION INTRAMUSCULAR; INTRAVENOUS; SUBCUTANEOUS
Status: DISCONTINUED | OUTPATIENT
Start: 2024-06-25 | End: 2024-06-26 | Stop reason: HOSPADM

## 2024-06-25 RX ORDER — DEXTROSE MONOHYDRATE 25 G/50ML
25-50 INJECTION, SOLUTION INTRAVENOUS
Status: DISCONTINUED | OUTPATIENT
Start: 2024-06-25 | End: 2024-06-26 | Stop reason: HOSPADM

## 2024-06-25 RX ORDER — IODIXANOL 320 MG/ML
INJECTION, SOLUTION INTRAVASCULAR
Status: DISCONTINUED | OUTPATIENT
Start: 2024-06-25 | End: 2024-06-25 | Stop reason: HOSPADM

## 2024-06-25 RX ORDER — ASPIRIN 81 MG/1
81 TABLET ORAL DAILY
Status: DISCONTINUED | OUTPATIENT
Start: 2024-06-26 | End: 2024-06-26 | Stop reason: HOSPADM

## 2024-06-25 RX ORDER — AMOXICILLIN 250 MG
1 CAPSULE ORAL DAILY PRN
Status: DISCONTINUED | OUTPATIENT
Start: 2024-06-25 | End: 2024-06-26 | Stop reason: HOSPADM

## 2024-06-25 RX ORDER — LEVETIRACETAM 500 MG/1
500 TABLET ORAL 2 TIMES DAILY
Status: DISCONTINUED | OUTPATIENT
Start: 2024-06-25 | End: 2024-06-26 | Stop reason: HOSPADM

## 2024-06-25 RX ORDER — CEFAZOLIN SODIUM/WATER 2 G/20 ML
2 SYRINGE (ML) INTRAVENOUS
Status: DISCONTINUED | OUTPATIENT
Start: 2024-06-25 | End: 2024-06-25 | Stop reason: HOSPADM

## 2024-06-25 RX ORDER — LOSARTAN POTASSIUM 50 MG/1
50 TABLET ORAL 2 TIMES DAILY
Status: DISCONTINUED | OUTPATIENT
Start: 2024-06-25 | End: 2024-06-26 | Stop reason: HOSPADM

## 2024-06-25 RX ORDER — HYDRALAZINE HYDROCHLORIDE 20 MG/ML
10 INJECTION INTRAMUSCULAR; INTRAVENOUS
Status: DISCONTINUED | OUTPATIENT
Start: 2024-06-25 | End: 2024-06-26 | Stop reason: HOSPADM

## 2024-06-25 RX ORDER — ACETAMINOPHEN 325 MG/1
650 TABLET ORAL EVERY 4 HOURS PRN
Status: DISCONTINUED | OUTPATIENT
Start: 2024-06-25 | End: 2024-06-26 | Stop reason: HOSPADM

## 2024-06-25 RX ORDER — SOLIFENACIN SUCCINATE 10 MG/1
10 TABLET, FILM COATED ORAL DAILY
Status: DISCONTINUED | OUTPATIENT
Start: 2024-06-26 | End: 2024-06-25

## 2024-06-25 RX ORDER — PANTOPRAZOLE SODIUM 40 MG/1
40 TABLET, DELAYED RELEASE ORAL
Status: DISCONTINUED | OUTPATIENT
Start: 2024-06-26 | End: 2024-06-26 | Stop reason: HOSPADM

## 2024-06-25 RX ORDER — FENTANYL CITRATE 50 UG/ML
INJECTION, SOLUTION INTRAMUSCULAR; INTRAVENOUS
Status: DISCONTINUED | OUTPATIENT
Start: 2024-06-25 | End: 2024-06-25 | Stop reason: HOSPADM

## 2024-06-25 RX ORDER — ONDANSETRON 4 MG/1
4 TABLET, ORALLY DISINTEGRATING ORAL EVERY 6 HOURS PRN
Status: DISCONTINUED | OUTPATIENT
Start: 2024-06-25 | End: 2024-06-26 | Stop reason: HOSPADM

## 2024-06-25 RX ORDER — GUAIFENESIN 600 MG/1
600 TABLET, EXTENDED RELEASE ORAL 2 TIMES DAILY
Status: DISCONTINUED | OUTPATIENT
Start: 2024-06-25 | End: 2024-06-26 | Stop reason: HOSPADM

## 2024-06-25 RX ORDER — HYDROCHLOROTHIAZIDE 25 MG/1
25 TABLET ORAL DAILY
Status: DISCONTINUED | OUTPATIENT
Start: 2024-06-26 | End: 2024-06-26 | Stop reason: HOSPADM

## 2024-06-25 RX ORDER — SODIUM CHLORIDE 9 MG/ML
INJECTION, SOLUTION INTRAVENOUS CONTINUOUS
Status: DISCONTINUED | OUTPATIENT
Start: 2024-06-25 | End: 2024-06-25 | Stop reason: HOSPADM

## 2024-06-25 RX ORDER — NITROGLYCERIN 0.4 MG/1
0.4 TABLET SUBLINGUAL EVERY 5 MIN PRN
Status: DISCONTINUED | OUTPATIENT
Start: 2024-06-25 | End: 2024-06-26 | Stop reason: HOSPADM

## 2024-06-25 RX ORDER — TOLTERODINE 2 MG/1
4 CAPSULE, EXTENDED RELEASE ORAL DAILY
Status: DISCONTINUED | OUTPATIENT
Start: 2024-06-26 | End: 2024-06-26 | Stop reason: HOSPADM

## 2024-06-25 RX ORDER — HEPARIN SODIUM 1000 [USP'U]/ML
INJECTION, SOLUTION INTRAVENOUS; SUBCUTANEOUS
Status: DISCONTINUED | OUTPATIENT
Start: 2024-06-25 | End: 2024-06-25 | Stop reason: HOSPADM

## 2024-06-25 RX ORDER — ONDANSETRON 2 MG/ML
4 INJECTION INTRAMUSCULAR; INTRAVENOUS EVERY 6 HOURS PRN
Status: DISCONTINUED | OUTPATIENT
Start: 2024-06-25 | End: 2024-06-26 | Stop reason: HOSPADM

## 2024-06-25 RX ORDER — FERROUS SULFATE 325(65) MG
325 TABLET ORAL
Status: DISCONTINUED | OUTPATIENT
Start: 2024-06-26 | End: 2024-06-26 | Stop reason: HOSPADM

## 2024-06-25 RX ORDER — NALOXONE HYDROCHLORIDE 0.4 MG/ML
0.2 INJECTION, SOLUTION INTRAMUSCULAR; INTRAVENOUS; SUBCUTANEOUS
Status: DISCONTINUED | OUTPATIENT
Start: 2024-06-25 | End: 2024-06-26 | Stop reason: HOSPADM

## 2024-06-25 RX ORDER — PROTAMINE SULFATE 10 MG/ML
INJECTION, SOLUTION INTRAVENOUS
Status: DISCONTINUED | OUTPATIENT
Start: 2024-06-25 | End: 2024-06-25 | Stop reason: HOSPADM

## 2024-06-25 RX ORDER — DIAZEPAM 5 MG
5 TABLET ORAL
Status: COMPLETED | OUTPATIENT
Start: 2024-06-25 | End: 2024-06-25

## 2024-06-25 RX ORDER — MULTIVITAMIN WITH IRON
1 TABLET ORAL 2 TIMES DAILY
COMMUNITY

## 2024-06-25 RX ORDER — IPRATROPIUM BROMIDE AND ALBUTEROL SULFATE 2.5; .5 MG/3ML; MG/3ML
3 SOLUTION RESPIRATORY (INHALATION) 3 TIMES DAILY
Status: DISCONTINUED | OUTPATIENT
Start: 2024-06-25 | End: 2024-06-26 | Stop reason: HOSPADM

## 2024-06-25 RX ORDER — SPIRONOLACTONE 25 MG
12.5 TABLET ORAL DAILY
Status: DISCONTINUED | OUTPATIENT
Start: 2024-06-26 | End: 2024-06-26 | Stop reason: HOSPADM

## 2024-06-25 RX ORDER — LIDOCAINE 40 MG/G
CREAM TOPICAL
Status: DISCONTINUED | OUTPATIENT
Start: 2024-06-25 | End: 2024-06-25 | Stop reason: HOSPADM

## 2024-06-25 RX ORDER — OXYCODONE HYDROCHLORIDE 5 MG/1
5 TABLET ORAL EVERY 4 HOURS PRN
Status: DISCONTINUED | OUTPATIENT
Start: 2024-06-25 | End: 2024-06-26 | Stop reason: HOSPADM

## 2024-06-25 RX ORDER — FENTANYL CITRATE 50 UG/ML
25 INJECTION, SOLUTION INTRAMUSCULAR; INTRAVENOUS
Status: DISCONTINUED | OUTPATIENT
Start: 2024-06-25 | End: 2024-06-25 | Stop reason: HOSPADM

## 2024-06-25 RX ORDER — SERTRALINE HYDROCHLORIDE 100 MG/1
100 TABLET, FILM COATED ORAL EVERY EVENING
Status: DISCONTINUED | OUTPATIENT
Start: 2024-06-25 | End: 2024-06-26 | Stop reason: HOSPADM

## 2024-06-25 RX ORDER — LEVALBUTEROL INHALATION SOLUTION 1.25 MG/3ML
1 SOLUTION RESPIRATORY (INHALATION) EVERY 4 HOURS PRN
Status: DISCONTINUED | OUTPATIENT
Start: 2024-06-25 | End: 2024-06-26 | Stop reason: HOSPADM

## 2024-06-25 RX ORDER — FLUTICASONE FUROATE AND VILANTEROL 200; 25 UG/1; UG/1
1 POWDER RESPIRATORY (INHALATION) EVERY EVENING
Status: DISCONTINUED | OUTPATIENT
Start: 2024-06-25 | End: 2024-06-26 | Stop reason: HOSPADM

## 2024-06-25 RX ORDER — FLUTICASONE PROPIONATE AND SALMETEROL 500; 50 UG/1; UG/1
1 POWDER RESPIRATORY (INHALATION) EVERY 12 HOURS
Status: DISCONTINUED | OUTPATIENT
Start: 2024-06-25 | End: 2024-06-25

## 2024-06-25 RX ORDER — OXYCODONE HYDROCHLORIDE 5 MG/1
10 TABLET ORAL EVERY 4 HOURS PRN
Status: DISCONTINUED | OUTPATIENT
Start: 2024-06-25 | End: 2024-06-26 | Stop reason: HOSPADM

## 2024-06-25 RX ORDER — MULTIVITAMIN WITH IRON
250 TABLET ORAL 2 TIMES DAILY
Status: DISCONTINUED | OUTPATIENT
Start: 2024-06-25 | End: 2024-06-25

## 2024-06-25 RX ORDER — ALBUTEROL SULFATE 90 UG/1
2 AEROSOL, METERED RESPIRATORY (INHALATION) EVERY 6 HOURS PRN
Status: DISCONTINUED | OUTPATIENT
Start: 2024-06-25 | End: 2024-06-26 | Stop reason: HOSPADM

## 2024-06-25 RX ORDER — RABEPRAZOLE SODIUM 20 MG/1
20 TABLET, DELAYED RELEASE ORAL DAILY
Status: DISCONTINUED | OUTPATIENT
Start: 2024-06-26 | End: 2024-06-25

## 2024-06-25 RX ORDER — NICOTINE POLACRILEX 4 MG
15-30 LOZENGE BUCCAL
Status: DISCONTINUED | OUTPATIENT
Start: 2024-06-25 | End: 2024-06-26 | Stop reason: HOSPADM

## 2024-06-25 RX ORDER — SODIUM CHLORIDE 9 MG/ML
INJECTION, SOLUTION INTRAVENOUS CONTINUOUS
Status: ACTIVE | OUTPATIENT
Start: 2024-06-25 | End: 2024-06-25

## 2024-06-25 RX ORDER — AZITHROMYCIN 250 MG/1
250 TABLET, FILM COATED ORAL EVERY EVENING
Status: DISCONTINUED | OUTPATIENT
Start: 2024-06-25 | End: 2024-06-26 | Stop reason: HOSPADM

## 2024-06-25 RX ADMIN — LEVETIRACETAM 500 MG: 500 TABLET, FILM COATED ORAL at 20:15

## 2024-06-25 RX ADMIN — POTASSIUM CHLORIDE 20 MEQ: 1500 TABLET, EXTENDED RELEASE ORAL at 20:15

## 2024-06-25 RX ADMIN — Medication 200 MG: at 20:15

## 2024-06-25 RX ADMIN — ASPIRIN 325 MG ORAL TABLET 325 MG: 325 PILL ORAL at 09:15

## 2024-06-25 RX ADMIN — LOSARTAN POTASSIUM 50 MG: 50 TABLET, FILM COATED ORAL at 20:15

## 2024-06-25 RX ADMIN — SENNOSIDES AND DOCUSATE SODIUM 1 TABLET: 50; 8.6 TABLET ORAL at 18:03

## 2024-06-25 RX ADMIN — Medication 2 G: at 10:46

## 2024-06-25 RX ADMIN — AZITHROMYCIN DIHYDRATE 250 MG: 250 TABLET, FILM COATED ORAL at 20:15

## 2024-06-25 RX ADMIN — IPRATROPIUM BROMIDE AND ALBUTEROL SULFATE 3 ML: .5; 3 SOLUTION RESPIRATORY (INHALATION) at 17:16

## 2024-06-25 RX ADMIN — DIAZEPAM 5 MG: 5 TABLET ORAL at 10:12

## 2024-06-25 RX ADMIN — UMECLIDINIUM 1 PUFF: 62.5 AEROSOL, POWDER ORAL at 20:12

## 2024-06-25 RX ADMIN — FLUTICASONE FUROATE AND VILANTEROL TRIFENATATE 1 PUFF: 200; 25 POWDER RESPIRATORY (INHALATION) at 20:13

## 2024-06-25 RX ADMIN — SERTRALINE HYDROCHLORIDE 100 MG: 100 TABLET ORAL at 20:15

## 2024-06-25 RX ADMIN — IPRATROPIUM BROMIDE AND ALBUTEROL SULFATE 3 ML: .5; 3 SOLUTION RESPIRATORY (INHALATION) at 19:59

## 2024-06-25 RX ADMIN — SODIUM CHLORIDE: 9 INJECTION, SOLUTION INTRAVENOUS at 09:03

## 2024-06-25 RX ADMIN — GUAIFENESIN 600 MG: 600 TABLET ORAL at 20:15

## 2024-06-25 ASSESSMENT — ACTIVITIES OF DAILY LIVING (ADL)
ADLS_ACUITY_SCORE: 40
ADLS_ACUITY_SCORE: 32
ADLS_ACUITY_SCORE: 40
ADLS_ACUITY_SCORE: 28
ADLS_ACUITY_SCORE: 40
ADLS_ACUITY_SCORE: 41
ADLS_ACUITY_SCORE: 39
ADLS_ACUITY_SCORE: 41
ADLS_ACUITY_SCORE: 28
ADLS_ACUITY_SCORE: 40
ADLS_ACUITY_SCORE: 40
ADLS_ACUITY_SCORE: 28
ADLS_ACUITY_SCORE: 40

## 2024-06-25 NOTE — PRE-PROCEDURE
GENERAL PRE-PROCEDURE:   Procedure:  Transcathter aortic valve replacement  Date/Time:  6/25/2024 10:03 AM    Written consent obtained?: Yes    Risks and benefits: Risks, benefits and alternatives were discussed    Patient states understanding of procedure being performed: Yes    Patient's understanding of procedure matches consent: Yes    Procedure consent matches procedure scheduled: Yes    Appropriately NPO:  Yes  Mallampati  :  Grade 2- soft palate, base of uvula, tonsillar pillars, and portion of posterior pharyngeal wall visible  Lungs:  Other (comment)  Lung exam comment:  Diminished, wheezes throughout  Heart:  RRR and systolic murmur  History & Physical reviewed:  History and physical reviewed and updates made (see comment)  H&P Comments:  Clinically Significant Risk Factors Present on Admission    Cardiovascular: Non-Rheumatic Valve Disease: Aortic valve stenosis    Fluid & Electrolyte Disorders : Chronic hypomagnesemia    Gastroenterology : Not present on admission    Hematology/Oncology : Anemia of chronic disease    Nephrology : CKD    Neurology : Not present on admission    Pulmonology : COPD    Systemic : Not present on admission    [unfilled]    Statement of review:  I have reviewed the lab findings, diagnostic data, medications, and the plan for sedation

## 2024-06-25 NOTE — Clinical Note
Potential access sites were evaluated for patency using ultrasound.   The right femoral artery, left femoral artery, and left femoral vein was selected. Access was obtained under with Sonosite guidance using a micropuncture 21 gauge needle with direct visualization of needle entry.

## 2024-06-25 NOTE — PLAN OF CARE
Problem: Cardiac Catheterization (Diagnostic/Interventional)  Goal: Stable Heart Rate and Rhythm  Outcome: Progressing   Goal Outcome Evaluation:       Patient arrived on a cart from [facility] s/p tavar. Right groin with tiny ooze and soft to touch and dressing marked, patient with no c/o pain. Left groin dressing c/d/I. Pulses palpable. CWMS. Patient has a PPM left chest area. Vital sign checks q 30 minutes. [place] wick placed for patient comfort .Bedrest till 1600. Son bedside. Patient ate lunch in [facility]. Call light in reach.

## 2024-06-25 NOTE — Clinical Note
The site was marked. Prepped: groin, chest, abdomen and neck. Prepped with: ChloraPrep. The patient was draped. .

## 2024-06-25 NOTE — Clinical Note
Temporary pacemaker Rate= 40bpmPaced mA= 25Pacer wire was captured appropriately, Pacer is set and Demand on Standby

## 2024-06-25 NOTE — PLAN OF CARE
Pt alert and oriented. Vss on room air. Pt on home o2 at baseline. Currently on 2L nasal cannula tolerating well at 98% o2. Pt anxious for procedure. Complains of undiagnosed neuropathy in bilateral lower extremities otherwise denies any other symptoms at this time. Pt prepped and ready for procedure. Mick, son, in room with patient visiting and okay to be updated related to plan of care.

## 2024-06-25 NOTE — Clinical Note
Form signed and placed in provider to MA box Lab results reviewed and abnormals discussed with physician.

## 2024-06-25 NOTE — INTERVAL H&P NOTE
"I have reviewed the surgical (or preoperative) H&P that is linked to this encounter, and examined the patient. Noted changes include: Sodium 140. Prior to procedure patient stated she does not wish to intubated due to her COPD    Clinical Conditions Present on Arrival:  Clinically Significant Risk Factors Present on Admission         # Hypernatremia: Highest Na = 146 mmol/L in last 2 days, will monitor as appropriate    # Hypomagnesemia: Lowest Mg = 1.6 mg/dL in last 2 days, will replace as needed  # Anion Gap Metabolic Acidosis: Highest Anion Gap = 20 mmol/L in last 2 days, will monitor and treat as appropriate          # Obesity: Estimated body mass index is 35.02 kg/m  as calculated from the following:    Height as of this encounter: 1.626 m (5' 4\").    Weight as of this encounter: 92.5 kg (204 lb).       "

## 2024-06-25 NOTE — CARE PLAN
Pt transferred to  [facility] from [facility] [place] after [place]AVR [place],report called to ALLYSON Roach,  pt denies any pain, before transfer from cart to bed pt had  shadow drainage on right groin bandage that was dime sized, after transfer and turn in the bed the drainage had soaked the small bandage, nurse aware.

## 2024-06-25 NOTE — OP NOTE
OPERATING ROOM:   Room 1 in the Cath Lab  DATE OF OPERATION: June 25, 2024  PROCEDURE PERFORMED:  Right transfemoral transcatheter aortic valve replacement using a 23 mm Correia Minerva 3 Ultra valve at nominal  ATTENDING SURGEON:  Sepideh Devlin MD  STRUCTURAL HEART CARDIOLOGIST:  Anil Gonzales MD  ANESTHESIA:  Moderate sedation with local  SKIN PREP:  Betadine  INCISIONS:  None  DRAINS:  None  SPECIMENS:  None  CULTURES:  None  CLOSURE:  Manta device    PREOPERATIVE DIAGNOSES:  1.  Severe, symptomatic low flow, low gradient aortic stenosis  2.  Non-obstructive coronary artery disease  3.  CHB S/P PPM  4.  COPD  5.  Partial anomalous pulmonary venous return    POSTOPERATIVE DIAGNOSES:  Same    BRIEF HISTORY:  Mrs. Underwood is a 79 year old woman with severe, symptomatic low flow, low gradient aortic stenosis.  Given her age and multiple medical co-morbidities she was felt to be a good candidate to undergo transcatheter aortic valve replacement and the above procedure was planned.    FINDINGS AT OPERATION:  After valve deployment by transthoracic echo the mean gradient across the valve was 7 to 8 mmHg.  There was no aortic insufficiency,  perivalvular leak or pericardial effusion.  Once the Correia eSheath was removed and the Manta device deployed DSA revealed no narrowing or extravasation of the right femoral artery.    PROCEDURE:  The patient arrived in the operating room and was positioned supine.   Moderate sedation was given.  Her neck, chest, abdomen and both groins were prepped and draped in a standard sterile fashion.  Local anesthetic was applied to both groin areas by Dr. Gonzales.  The left femoral artery and vein were cannulated.  Through the left femoral vein a temporary pacing wire was placed in the right ventricle.  It was tested and captured well.  The right femoral artery was cannulated.  The necessary intracardiac wires were placed.  The Correia eSheath was passd up the right femoral artery.   Heparin was administered.  On the back table a 23 mm Correia Minerva 3 Ultra valve was prepared at nominal.  When the ACT was appropriate the valve and its delivery system were brought onto the operative field and passed up the Correia eSheath.  The valve was prepared and positioned.  Rapid pacing was done and the valve was deployed.  The valve delivery system was removed from the heart and transthoracic echo was done with the above findings.  The valve delivery system and intracardiac wires were removed.  The Correia eSheath was removed and the Manta device deployed.  DSA was performed with the above findings.  The estimated blood loss was 50 mL.  Mrs. Underwood was brought to the [facility] in stable condition.  There were no complications.

## 2024-06-25 NOTE — PROGRESS NOTES
"Daily Progress Note    Assessment/Plan:  79-year-old female status post TAVR June 25.  [facility] [place] consulted for medical management.    Aortic stenosis: Status post TAVR today  Chronic diastolic heart failure with core pulmonology/right heart failure.  No evidence of exacerbation, is on HCTZ, ARB and spironolactone  Chronic hypoxic respiratory failure with severe COPD: On 3 to 4 L of O2 at baseline.  Continue home inhalers/nebulizer regimen and prophylactic Zithromax.  History of Mobitz type II second-degree AV block: Status post dual-chamber pacemaker placement in March 2022.  Next device check scheduled for July 29, 2024.  Hypertension: On losartan, spironolactone and hydrochlorothiazide  Obesity: BMI 35  Seizure disorder: Continue home Keppra  Anemia of chronic disease: Appears stable with baseline between 8.8 and 10.5.  Continue home iron.  PAD  Clinically Significant Risk Factors Present on Admission         # Hypernatremia: Highest Na = 146 mmol/L in last 2 days, will monitor as appropriate    # Hypomagnesemia: Lowest Mg = 1.6 mg/dL in last 2 days, will replace as needed  # Anion Gap Metabolic Acidosis: Highest Anion Gap = 20 mmol/L in last 2 days, will monitor and treat as appropriate      # Hypertension: Noted on problem list    # Anemia: based on hgb <11           # Obesity: Estimated body mass index is 35.02 kg/m  as calculated from the following:    Height as of this encounter: 1.626 m (5' 4\").    Weight as of this encounter: 92.5 kg (204 lb).       # COPD: noted on problem list  # Pacemaker present          Code status:Full Code        Barriers to Discharge: Postprocedural recovery    Disposition: Per primary team    Subjective:  Serene has no postoperative complaints, no chest pain or shortness of breath or fevers or chills.  She does feel hungry        Current Medications Reviewed via EHR List    Objective:  Vital signs in last 24 hours:  [unfilled]  .prog  Weight:   @THISENCWEIGHTS(1)@  Weight change:   Body " mass index is 35.02 kg/m .    Intake/Output last 3 shifts:  No intake/output data recorded.  Intake/Output this shift:  No intake/output data recorded.    Physical Exam:  General: No apparent distress, lying in bed  CV: Regular rate and rhythm  Lungs: Decreased breath sounds but clear  Abdomen: Soft, nontender        Imaging:  Personally Reviewed.  Cardiac Catheterization    Result Date: 6/25/2024  Procedure: Transcatheter aortic valve replacement Indication: Severe symptomatic aortic stenosis with the patient felt to be an appropriate candidate for transcatheter aortic valve replacement after a thorough multidisciplinary approach, including a visit with a surgeon. Operators: Interventional Cardiology: Anil Gonzales MD CT Surgery: Sepideh Devlin MD Echocardiography: Transthoracic Approach: R transfemoral Anesthesia: Moderate Procedure Details: Informed consent obtained before the patient was brought to the procedure room.  Appropriate timeout was performed before the procedure was started. Access was obtained in the left femoral artery and vein using the modified Seldinger technique, and then in the right femoral artery under direct fluoroscopic visualization. Access site was pre-measured for Manta closure device.  A L femoral vein was used to place a temporary pacemaker in the RV. A pigtail catheter was placed at the aortic annulus via the left femoral artery to determine the coplanar angle. After appropriate anticoagulation, the Correia E sheath was placed through the right femoral arteriotomy with the help of a stiff guidewire.  The aortic valve was then crossed and a Safari 2 wire was placed at the LV apex. A 23mm Minerva 3 valve was then successfully deployed in the aortic position under rapid pacing.  Post deployment to the patient had good hemodynamics, with trace aortic insufficiency and a mean gradient of 7 mmHg. Relative aortic/venticular implant ratio was 90/10. The delivery system and sheath were then  removed, with successful hemostasis of the arteriotomy by deployment of the Manta closure device. The left femoral artery and venous sheaths were removed as well with successful hemostasis via the Angio-Seal device. Conclusions: 1. Severe symptomatic aortic stenosis status post successful transcatheter aortic valve replacement with a 23mm Minerva 3 valve, delivered via the right transfemoral approach. 2.  Trace aortic insufficiency post deployment with a mean gradient of 7 mmHg. Postprocedure patient status: Patient planned to be transferred to the PACU/recovery for ongoing management.  Temporary pacemaker removed and need to be re-assessed in the next 24 hours based on clinical need. Anil Gonzales MD Interventional Cardiology     Echocardiogram Complete    Result Date: 2024  [id number]^RALF^[place] [address]  Name: MRS. WASHINGTON JETT MRN: [id number] : 1945 Study Date: 2024 10:47 AM Age: 79 yrs Gender: Female Patient Location: [facility] Reason For Study: Nonrheumatic aortic valve stenosis; TAVR implant Ordering Physician: ELSA ARAMBULA Referring Physician: ELSA ARAMBULA Performed By: MARISELA  BSA: 2.0 m2 Height: 64 in Weight: 204 lb HR: 84 BP: 141/57 mmHg ______________________________________________________________________________ Procedure Limited Portable Echo Adult. ______________________________________________________________________________ Interpretation Summary  INTRAOPERATIVE TAVR Transthoracic Echo  Pre-TAVR: Left Ventricle: The estimated left ventricular ejection fraction is 65%. Right Ventricle: Normal size and systolic function. Aortic Valve: There is severe global calcification with reduced excursion of the aortic valve present. Severe aortic stenosis (mean gradient: 29 mmHg. Peakvel: 3.4 m/sec). Trace aortic regurgitation with a centrally directed jet. Mitral Valve: No mitral stenosis present.  Trivial regurgitation. Pericardium: No pericardial effusion.  POST-TAVR: Left Ventricle: The estimated left ventricular ejection fraction is 65%. No acute wall motion abnormalities. Right Ventricle: Normal size and systolic function. Aortic Valve: There is well seated 23 mm Correia Minerva 3 Ultra valve in aortic position with normal gradient (mean gradient 7mmHg). No paravalvular leak. Normal bioprosthetic leaflet motion. Mitral Valve: No mitral stenosis present. Trace regurgitation. No acute change in mitral function Pericardium: No pericardial effusion. communicated directly to Dr. Cassidy in operating room. ______________________________________________________________________________ Right Ventricle There is a pacemaker lead in the right ventricle.  Atria Pacer wire in right atrium.  Tricuspid Valve There is mild to moderate (1-2+) tricuspid regurgitation. Right ventricular systolic pressure is elevated, consistent with mild pulmonary hypertension.  Pulmonic Valve The pulmonic valve is normal in structure and function.  ______________________________________________________________________________ MMode/2D Measurements & Calculations LVOT diam: 1.9 cm LVOT area: 2.8 cm2  Time Measurements Aortic HR: 82.0 BPM  Doppler Measurements & Calculations Ao V2 max: 202.0 cm/sec Ao max P.3 mmHg Ao V2 mean: 143.0 cm/sec Ao mean P.0 mmHg Ao V2 VTI: 40.2 cm CARINE(I,D): 1.5 cm2 CARINE(V,D): 1.4 cm2 LV V1 max P.0 mmHg LV V1 max: 99.9 cm/sec LV V1 VTI: 21.6 cm CO(LVOT): 5.0 l/min CI(LVOT): 2.5 l/min/m2 SV(LVOT): 61.2 ml SI(LVOT): 31.0 ml/m2 TR max qasim: 289.0 cm/sec TR max P.4 mmHg AV Qasim Ratio (DI): 0.49 CARINE Index (cm2/m2): 0.77  ______________________________________________________________________________ Report approved by: Rossy Pak 2024 01:24 PM       Cardiac Catheterization    Result Date: 6/3/2024  CARDIAC CATHETERIZATION AND INTERVENTION REPORT PATIENT NAME:  Lalitha SALINAS Kj         MEDICAL RECORD NUMBER: [id number] : 1945     PROCEDURE DATE: Brianna 3, 2024 CONCLUSIONS: Mild nonobstructive coronary artery disease. Mild to moderately elevated right and normal left-sided filling pressures. Mild pulmonary hypertension. 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 RECOMMENDATIONS: Usual postprocedure cares, please see orders. Continue workup for aortic valve replacement as planned. Aggressive risk factor modification for secondary prevention. Recommend medical management of anomalous PV return. PROCEDURE PERFORMED: Selective right and left coronary angiography Right heart catheterization Shunt run Anomalous pulmonary venous return venogram PRE-OP DIAGNOSIS: Preoperative evaluation prior to transcatheter aortic valve replacement POST-OP DIAGNOSIS: Same PROCEDURALISTS: Villa Croft MD DIAGNOSTIC PROCEDURAL DETAILS: Signed, informed consent was obtained. The patient was placed on the table and prepped and draped in the usual fashion. Time out and site verification performed. Access: Right common femoral artery and vein (right radial artery access was not attempted given patient uses a walker to ambulate) Catheters: JL 3.5, JR4, [place] Hemostasis: Perclose ProGlide suture for right common femoral artery, manual compression for right common femoral vein PROCEDURAL MEDICATIONS: Conscious sedation - midazolam and fentanyl, please see procedure log for dosing. Local anesthesia - 1% lidocaine Procedural anticoagulation - none CONTRAST VOLUME: 59 mL Visipaque BLOOD LOSS: minimal FLUIDS: None SPECIMENS: None COMPLICATIONS: None FINDINGS: Right Heart Catheterization       RA: 10 mmHg       RV: 47/4/12 mmHg (s/d/ed)       PA: 47/20/30 mmHg (s/d/m)       PCWP: 15 mmHg        Cardiac output calculation limited due to left-to-right shunt       Shunt run: IVC 70.2% SVC 85.9% RA  "82% RV 78.8% PA 85% AO 98% Anomalous pulmonary venous return to left brachiocephalic vein - 95.1% Qp/Qs: 1.5 Left brachiocephalic venogram concerning for anomalous PV return Coronary Angiography: LEFT MAIN: Normal caliber vessel that bifurcates into left anterior descending and left circumflex arteries.  The left main has mild luminal irregularities. LEFT ANTERIOR DESCENDING: Large vessel that gives rise to a large diagonal 1 branch, a medium diagonal 2 branch, multiple septal perforators.  The LAD wraps around the apex distally.  The LAD and its branches have mild luminal irregularities. LEFT CIRCUMFLEX: Small nondominant vessel that gives rise to a medium size OM1 branch and terminates into a small vessel distally.  The left circumflex and its branches have mild luminal irregularities with no significant disease. RIGHT CORONARY ARTERY: Large dominant vessel that gives rise to a large right posterior descending artery and posterolateral system.  The right coronary artery and its branch have mild disease. Please do not hesitate to contact me if you have any questions or concerns. I was present for the procedure in its entirety. Moderate conscious sedation at level 3-4 with fentanyl and midazolam was administered, and I spent continuous face to face time with the patient which encompassed the duration of the procedure.  Please refer to the sedation flow sheet for additional information.  I have reviewed and agree with the documentation provided in the RN sedation flow sheet as outlined. I concluded sedation and recovery was monitored by the trained independent observer. I attest that I have ordered all medications administered, equipment used, and tests performed during the procedure. Villa Croft MD Interventional Cardiology       Lab Results:  Personally Reviewed.   Fingerstick Blood Glucose: @CYNPMSC48CNZ(POCGLUFGR:10)@    Last Hbg A1C: No results found for: \"HGBA1C\"   Lab Results   Component Value Date    INR " 1.11 06/24/2024     Recent Results (from the past 24 hour(s))   Prepare red blood cells (unit)    Collection Time: 06/25/24  8:50 AM   Result Value Ref Range    Blood Component Type Red Blood Cells     Product Code G3899U59     Unit Status Ready for issue     Unit Number [id number]     CROSSMATCH Compatible     CODING SYSTEM [id number]     ISSUE DATE AND TIME [id number]     UNIT ABO/RH A-     UNIT TYPE ISBT 0600    Prepare red blood cells (unit)    Collection Time: 06/25/24  8:50 AM   Result Value Ref Range    Blood Component Type Red Blood Cells     Product Code R3376R90     Unit Status Ready for issue     Unit Number [id number]     CROSSMATCH Compatible     CODING SYSTEM [id number]     ISSUE DATE AND TIME 20240625100900     UNIT ABO/RH A-     UNIT TYPE ISBT 0600    Basic metabolic panel    Collection Time: 06/25/24  9:25 AM   Result Value Ref Range    Sodium 140 135 - 145 mmol/L    Potassium 4.2 3.4 - 5.3 mmol/L    Chloride 100 98 - 107 mmol/L    Carbon Dioxide (CO2) 32 (H) 22 - 29 mmol/L    Anion Gap 8 7 - 15 mmol/L    Urea Nitrogen 27.7 (H) 8.0 - 23.0 mg/dL    Creatinine 1.01 (H) 0.51 - 0.95 mg/dL    GFR Estimate 56 (L) >60 mL/min/1.73m2    Calcium 9.4 8.8 - 10.2 mg/dL    Glucose 104 (H) 70 - 99 mg/dL   Activated clotting time celite, POCT    Collection Time: 06/25/24 11:35 AM   Result Value Ref Range    Activated Clotting Time (Celite) POCT 249 (H) 74 - 150 seconds   CBC with platelets    Collection Time: 06/25/24  1:11 PM   Result Value Ref Range    WBC Count 8.4 4.0 - 11.0 10e3/uL    RBC Count 3.70 (L) 3.80 - 5.20 10e6/uL    Hemoglobin 9.0 (L) 11.7 - 15.7 g/dL    Hematocrit 30.2 (L) 35.0 - 47.0 %    MCV 82 78 - 100 fL    MCH 24.3 (L) 26.5 - 33.0 pg    MCHC 29.8 (L) 31.5 - 36.5 g/dL    RDW 19.6 (H) 10.0 - 15.0 %    Platelet Count 183 150 - 450 10e3/uL   Comprehensive metabolic panel    Collection Time: 06/25/24  1:11 PM   Result Value Ref Range    Sodium 139 135 - 145 mmol/L    Potassium 3.9 3.4 - 5.3  mmol/L    Carbon Dioxide (CO2) 31 (H) 22 - 29 mmol/L    Anion Gap 8 7 - 15 mmol/L    Urea Nitrogen 25.2 (H) 8.0 - 23.0 mg/dL    Creatinine 0.92 0.51 - 0.95 mg/dL    GFR Estimate 63 >60 mL/min/1.73m2    Calcium 8.9 8.8 - 10.2 mg/dL    Chloride 100 98 - 107 mmol/L    Glucose 119 (H) 70 - 99 mg/dL    Alkaline Phosphatase 56 40 - 150 U/L    AST 16 0 - 45 U/L    ALT 9 0 - 50 U/L    Protein Total 6.1 (L) 6.4 - 8.3 g/dL    Albumin 3.6 3.5 - 5.2 g/dL    Bilirubin Total 0.2 <=1.2 mg/dL   Magnesium    Collection Time: 06/25/24  1:11 PM   Result Value Ref Range    Magnesium 1.7 1.7 - 2.3 mg/dL   ECG with 12 lead and MUSE [LHE]    Collection Time: 06/25/24  3:36 PM   Result Value Ref Range    Systolic Blood Pressure  mmHg    Diastolic Blood Pressure  mmHg    Ventricular Rate 83 BPM    Atrial Rate 83 BPM    KY Interval 284 ms    QRS Duration 146 ms     ms    QTc 481 ms    P Axis 73 degrees    R AXIS 95 degrees    T Axis 18 degrees    Interpretation ECG       Sinus rhythm with 1st degree A-V block  Non-specific intra-ventricular conduction block  Lateral infarct , age undetermined  Abnormal ECG  When compared with ECG of 24-JUN-2024 10:27,  Non-specific intra-ventricular conduction block has replaced Left bundle branch block  Lateral infarct is now Present         Medically Ready for Discharge:        Advanced Care Planning    Medical Decision Making             Rick Hernandez MD  Date: 6/25/2024  Time: 3:40 PM  [facility]

## 2024-06-25 NOTE — PROGRESS NOTES
Procedure: TAVR    Anesthesia type: Moderate    Valve type: Correia AIDAN 3 Ultra    Valve size: 23 mm    Access used:   1) Valve Delivery Site: Right femoral artery  2) Pigtail site: Left femoral artery  3) Temporary pacing wire: Left femoral vein    Implanting physician: Dr. Gonzales    Surgeon who assisted in case: Dr. Claus Lancaster PA-C  Structural Heart Program  [facility]

## 2024-06-25 NOTE — BRIEF OP NOTE
[facility]    Brief Operative Note    Pre-operative diagnosis:  Aortic stenosis  Post-operative diagnosis Same    Procedure:  Right transfemoral transcatheter aortic valve replacement using a 23 mm Montes Minerva 3 Ultra valve at nominal  Surgeon: Surgeons and Role:  Panel 1:     * Anil Gonzales MD - Primary  Panel 2:     * Sepideh Devlin MD - Primary  Anesthesia: Moderate Sedation with local  Estimated Blood Loss: 50 mL  Incisions:  None  Drains:   None  Specimens:  None  Findings:  After valve deployment by transthoracic echo the mean gradient was 7 to 8 mmHg.  There was no aortic insufficiency, perivalvular leak or pericardial effusion.  Once the Montes eSheath was removed and the Manta device deployed DSA revealed no narrowing or extravasation of the right femoral artery..  Complications:  None.  Implants:   Implant Name Type Inv. Item Serial No.  Lot No. LRB No. Used Action   VALVE AORTIC SAPEIN 3 UTLRA TAVR 23MM [id number] - [id number] Valve VALVE AORTIC SAPEIN 3 UTLRA TAVR 23MM [id number]  [employer] [id number] N/A 1 Implanted   DEVICE CLOSURE VASCULAR MANTA 18FR 2115 - [id number] Occlusion Device DEVICE CLOSURE VASCULAR MANTA 18FR 2115  [facility] [id number] Right 1 Implanted   CLOSURE ANGIOSEAL 6FR [id number] - [id number]  CLOSURE ANGIOSEAL 6FR [id number]  [employer] [id number]  1 Implanted   CLOSURE ANGIOSEAL 6FR [id number] - [id number]  CLOSURE ANGIOSEAL 6FR [id number]  [employer] [id number]  1 Implanted

## 2024-06-25 NOTE — DISCHARGE INSTRUCTIONS
Patient Instructions - Going Home after TAVR:    Going Home: It s normal to feel a little anxious after leaving the hospital and when fewer people are nearby. People do much better when they feel as though they have support.  If you live alone we suggest you arrange to have someone stay with you for a day or two to help you recover.     Medications:  Take all of your medications as directed in your discharge summary. Do not stop taking these medications without speaking with your cardiologist. If you have any questions about any of your medications, speak to your pharmacist or provider.     Follow up Appointments  You will follow up with  Nayeli Thompson PA-C  on July 10 at 12:50 pm.  You will have a repeat echocardiogram on July 29 at 1:45 pm.  You will have a follow up with Latonya Thompson PA-C  on July 31 at 2:30 pm, for labs and visit (you DON'T have to fast for these labs).      If you need to reschedule any of these appointments, please call:  [phone]    See your regular cardiologist in 6 months.  Your regular heart doctor will continue to be your heart specialist.   See your family doctor in 2 weeks.  Make an appointment once you get home.  You will receive a temporary  heart valve  wallet card. We recommend that you keep it in your wallet or purse at all times. You will be receiving a permanent wallet card from the  within 6 months.   Before an MRI (magnetic resonance imaging) procedure, always notify the doctor (or medical technician) that you have an implanted heart valve.     Site Care: Shower every day.  Let the soap and water run over your incision or access site, but do not rub the area. No tub baths, pools or hot-tubs for the 1st week you are at home. Do not put ointments, powders, or lotions on your access site and/or incision.  It is normal to feel a small lump the size of a marble in the groin access site.  That is the closure device used to close the  vein/artery.  If you are experiencing discomfort, bleeding, drainage, redness or increasing swelling, please call us.    Dental Work: Avoid major dental work for the next 6 months if possible. You will need antibiotics before any dental cleanings, work or surgery. This will decrease the risk of infection.     Driving:  You should not drive for the first 2 weeks after your procedure. The first time you drive, you must have someone with you. You may ride in a car immediately after your procedure.      Activity: People recover at different rates depending on their general health and the type of heart valve procedure. Most people take about 4-10 weeks to feel fully recovered. Daily activity and exercise are an important part of your recovery.  Do not lift, push or pull anything > 10 lb. for the first 2 weeks.        CALL THE VALVE CLINIC IF YOU HAVE ANY QUESTIONS OR CONCERNS:  [phone]  For the first 2 weeks after TAVR     Do Not:   Lift, push, or pull more than ten pounds (including pets, laundry, groceries, etc)  Garden, including lawn mowing and raking  Excessively bear down or strain when having a bowel movement   Ride a bike   Do:  Weigh yourself every day in the morning after using the bathroom.  If you notice weight gain of more than 3 pounds in 1 day or more than 5 pounds in 1 week, call the cardiology clinic.   Get up and get dressed every day. Do not stay in bed.  Set a daily routine.   Walk as much as you can. You may walk up and down stairs. When you are tired, rest.   Cough and deep breathe. Use your incentive spirometer 5-10 times each hour when you are awake.   We strongly recommend you participate in a cardiac rehabilitation program. This type of program will help you learn about your heart health, prevent more heart problems, participate in safe and heart-healthy activities, and learn how to return to your activities of daily living and hobbies.   Let the cardiology clinic know if you need to leave  town before your first follow-up visit.     When to call the Cardiology Clinic to speak with a nurse?   Swelling of the legs, ankles, or feet  Increasing shortness of breath  Change in the color or temperature of your lower legs and feet  Abdominal pain or unrelieved nausea and/or vomiting  Redness and warmth around your access site and/or incision that does not go away  Yellow or green drainage from your access site and/or incision   Weight gain of 3 pounds in one day or 5 pounds in one week  Develop any numbness, tingling, or limited movement of your arms or legs.   Chest pain that does not go away  New confusion or you cannot think clearly  Fever and chills     For Non-urgent concerns that could wait up to 48 hours for a response from a nurse: call the Valve Clinic Nurse between 8am-4:30pm at [phone]. You can leave a message after hours/on the weekend, messages will be returned in 1-2 business days.    For URGENT Concerns that cannot wait please call: [facility]: [phone]    If you are calling after hours, please listen to the entire voicemail, a live  will answer at the end of the message.    If you are experiencing a medical EMERGENCY please call 131. Do not wait to speak to your heart care team.      [facility]:  [phone]  If you are calling after hours, please listen to the entire voicemail, a live  will answer at the end of the message

## 2024-06-25 NOTE — Clinical Note
The ECG shows a paced rhythm, a sinus rhythm and a bundle branch block. The patient has permanent pacemaker. ECG rate  = 85 bpm.

## 2024-06-25 NOTE — PHARMACY-ADMISSION MEDICATION HISTORY
Pharmacist Admission Medication History    Admission medication history is complete. The information provided in this note is only as accurate as the sources available at the time of the update.    Information Source(s): Clinic records, Hospital records, and CareEverywhere/SureScripts via N/A    Allergies reviewed with patient and updates made in EHR: no, reviewed by RN in [facility]    Medication History Completed By: Kat South RPH 6/25/2024 9:48 AM    Current Facility-Administered Medications for the 6/25/24 encounter (Hospital Encounter)   Medication    denosumab (PROLIA) injection 60 mg     PTA Med List   Medication Sig Last Dose    albuterol (PROAIR HFA/PROVENTIL HFA/VENTOLIN HFA) 108 (90 Base) MCG/ACT inhaler Inhale 2 puffs into the lungs every 6 hours as needed for shortness of breath or wheezing 6/25/2024 at am    azithromycin (ZITHROMAX) 250 MG tablet Take 1 tablet (250 mg) by mouth every evening 6/24/2024 at pm    benzonatate (TESSALON) 100 MG capsule Take 1 capsule (100 mg) by mouth 3 times daily as needed for cough More than a month at unknown    Biotin 10 MG CAPS Take 1 capsule by mouth daily 6/24/2024 at am    calcium citrate (CITRACAL) 950 (200 Ca) MG tablet Take 1 tablet by mouth 2 times daily 6/24/2024 at pm    estradiol (ESTRACE) 0.1 MG/GM vaginal cream Place 2 g vaginally three times a week Past Week at unknown    ferrous sulfate (FEROSUL) 325 (65 Fe) MG tablet Take 1 tablet (325 mg) by mouth daily (with breakfast) 6/24/2024 at am    fluticasone-salmeterol (ADVAIR) 500-50 MCG/ACT inhaler Inhale 1 puff into the lungs every 12 hours 6/25/2024 at am    guaiFENesin (MUCINEX) 600 MG 12 hr tablet Take 1 tablet (600 mg) by mouth 2 times daily 6/24/2024 at pm    hydrochlorothiazide (HYDRODIURIL) 25 MG tablet Take 1 tablet (25 mg) by mouth daily 6/24/2024 at am    ipratropium - albuterol 0.5 mg/2.5 mg/3 mL (DUONEB) 0.5-2.5 (3) MG/3ML neb solution TAKE 1 VIAL BY NEBULIZATION EVERY 6 HOURS AS NEEDED FOR  SHORTNESS OF BREATH OR WHEEZING 6/25/2024 at am    levalbuterol (XOPENEX) 1.25 MG/3ML neb solution Take 3 mLs (1.25 mg) by nebulization every 4 hours as needed for shortness of breath or wheezing Unknown at unknown    levETIRAcetam (KEPPRA) 500 MG tablet Take 1 tablet (500 mg) by mouth 2 times daily 6/25/2024 at am    losartan (COZAAR) 50 MG tablet Take 1 tablet (50 mg) by mouth 2 times daily 6/25/2024 at am    magnesium 250 MG tablet Take 1 tablet by mouth 2 times daily 6/24/2024 at pm    nystatin (MYCOSTATIN) 297122 UNIT/GM external powder Apply topically daily as needed Unknown at unknown    potassium chloride teja ER (KLOR-CON M20) 20 MEQ CR tablet Take 1 tablet (20 mEq) by mouth at bedtime 6/24/2024 at am    RABEprazole (ACIPHEX) 20 MG EC tablet Take 1 tablet (20 mg) by mouth daily 6/25/2024 at am    sertraline (ZOLOFT) 100 MG tablet Take 1 tablet (100 mg) by mouth every evening 6/24/2024 at pm    solifenacin (VESICARE) 10 MG tablet Take 1 tablet (10 mg) by mouth daily 6/24/2024 at am    spironolactone (ALDACTONE) 25 MG tablet Take 0.5 tablets (12.5 mg) by mouth daily 6/24/2024 at am    tiotropium (SPIRIVA RESPIMAT) 2.5 MCG/ACT inhaler Inhale 2 puffs into the lungs daily 6/25/2024 at am    vitamin D3 (CHOLECALCIFEROL) 125 MCG (5000 UT) tablet Take 5,000 Units by mouth three times a week Monday, Wednesday and Friday 6/24/2024 at am

## 2024-06-26 ENCOUNTER — APPOINTMENT (OUTPATIENT)
Dept: CARDIOLOGY | Facility: HOSPITAL | Age: 79
DRG: 267 | End: 2024-06-26
Attending: PHYSICIAN ASSISTANT
Payer: MEDICARE

## 2024-06-26 ENCOUNTER — ANCILLARY PROCEDURE (OUTPATIENT)
Dept: CARDIOLOGY | Facility: CLINIC | Age: 79
End: 2024-06-26
Attending: INTERNAL MEDICINE
Payer: MEDICARE

## 2024-06-26 ENCOUNTER — APPOINTMENT (OUTPATIENT)
Dept: OCCUPATIONAL THERAPY | Facility: HOSPITAL | Age: 79
DRG: 267 | End: 2024-06-26
Attending: PHYSICIAN ASSISTANT
Payer: MEDICARE

## 2024-06-26 ENCOUNTER — APPOINTMENT (OUTPATIENT)
Dept: RADIOLOGY | Facility: HOSPITAL | Age: 79
DRG: 267 | End: 2024-06-26
Attending: PHYSICIAN ASSISTANT
Payer: MEDICARE

## 2024-06-26 VITALS
DIASTOLIC BLOOD PRESSURE: 62 MMHG | SYSTOLIC BLOOD PRESSURE: 125 MMHG | WEIGHT: 205.5 LBS | BODY MASS INDEX: 35.08 KG/M2 | HEART RATE: 81 BPM | TEMPERATURE: 97.6 F | OXYGEN SATURATION: 95 % | HEIGHT: 64 IN | RESPIRATION RATE: 20 BRPM

## 2024-06-26 DIAGNOSIS — Z95.2 S/P TAVR (TRANSCATHETER AORTIC VALVE REPLACEMENT): Primary | ICD-10-CM

## 2024-06-26 DIAGNOSIS — I49.5 SICK SINUS SYNDROME (H): ICD-10-CM

## 2024-06-26 DIAGNOSIS — Z95.0 CARDIAC PACEMAKER IN SITU: ICD-10-CM

## 2024-06-26 LAB
ANION GAP SERPL CALCULATED.3IONS-SCNC: 7 MMOL/L (ref 7–15)
ATRIAL RATE - MUSE: 78 BPM
ATRIAL RATE - MUSE: 83 BPM
BUN SERPL-MCNC: 23.6 MG/DL (ref 8–23)
CALCIUM SERPL-MCNC: 8.9 MG/DL (ref 8.8–10.2)
CHLORIDE SERPL-SCNC: 101 MMOL/L (ref 98–107)
CREAT SERPL-MCNC: 0.82 MG/DL (ref 0.51–0.95)
DEPRECATED HCO3 PLAS-SCNC: 30 MMOL/L (ref 22–29)
DIASTOLIC BLOOD PRESSURE - MUSE: NORMAL MMHG
DIASTOLIC BLOOD PRESSURE - MUSE: NORMAL MMHG
EGFRCR SERPLBLD CKD-EPI 2021: 72 ML/MIN/1.73M2
ERYTHROCYTE [DISTWIDTH] IN BLOOD BY AUTOMATED COUNT: 19.9 % (ref 10–15)
GLUCOSE SERPL-MCNC: 103 MG/DL (ref 70–99)
HCT VFR BLD AUTO: 28.5 % (ref 35–47)
HGB BLD-MCNC: 8.7 G/DL (ref 11.7–15.7)
INTERPRETATION ECG - MUSE: NORMAL
INTERPRETATION ECG - MUSE: NORMAL
MAGNESIUM SERPL-MCNC: 1.6 MG/DL (ref 1.7–2.3)
MCH RBC QN AUTO: 24.9 PG (ref 26.5–33)
MCHC RBC AUTO-ENTMCNC: 30.5 G/DL (ref 31.5–36.5)
MCV RBC AUTO: 82 FL (ref 78–100)
MDC_IDC_EPISODE_DTM: NORMAL
MDC_IDC_EPISODE_DURATION: 13 S
MDC_IDC_EPISODE_DURATION: 27 S
MDC_IDC_EPISODE_ID: NORMAL
MDC_IDC_EPISODE_TYPE: NORMAL
MDC_IDC_EPISODE_TYPE_INDUCED: NO
MDC_IDC_EPISODE_TYPE_INDUCED: NO
MDC_IDC_EPISODE_VENDOR_TYPE: NORMAL
MDC_IDC_LEAD_CONNECTION_STATUS: NORMAL
MDC_IDC_LEAD_CONNECTION_STATUS: NORMAL
MDC_IDC_LEAD_IMPLANT_DT: NORMAL
MDC_IDC_LEAD_IMPLANT_DT: NORMAL
MDC_IDC_LEAD_LOCATION: NORMAL
MDC_IDC_LEAD_LOCATION: NORMAL
MDC_IDC_LEAD_LOCATION_DETAIL_1: NORMAL
MDC_IDC_LEAD_LOCATION_DETAIL_1: NORMAL
MDC_IDC_LEAD_MFG: NORMAL
MDC_IDC_LEAD_MFG: NORMAL
MDC_IDC_LEAD_MODEL: NORMAL
MDC_IDC_LEAD_MODEL: NORMAL
MDC_IDC_LEAD_POLARITY_TYPE: NORMAL
MDC_IDC_LEAD_POLARITY_TYPE: NORMAL
MDC_IDC_LEAD_SERIAL: NORMAL
MDC_IDC_LEAD_SERIAL: NORMAL
MDC_IDC_MSMT_BATTERY_DTM: NORMAL
MDC_IDC_MSMT_BATTERY_REMAINING_LONGEVITY: 126 MO
MDC_IDC_MSMT_BATTERY_REMAINING_PERCENTAGE: 100 %
MDC_IDC_MSMT_BATTERY_STATUS: NORMAL
MDC_IDC_MSMT_LEADCHNL_RA_IMPEDANCE_VALUE: 450 OHM
MDC_IDC_MSMT_LEADCHNL_RV_IMPEDANCE_VALUE: 431 OHM
MDC_IDC_MSMT_LEADCHNL_RV_PACING_THRESHOLD_AMPLITUDE: 0.8 V
MDC_IDC_MSMT_LEADCHNL_RV_PACING_THRESHOLD_PULSEWIDTH: 0.4 MS
MDC_IDC_PG_IMPLANT_DTM: NORMAL
MDC_IDC_PG_MFG: NORMAL
MDC_IDC_PG_MODEL: NORMAL
MDC_IDC_PG_SERIAL: NORMAL
MDC_IDC_PG_TYPE: NORMAL
MDC_IDC_SESS_CLINIC_NAME: NORMAL
MDC_IDC_SESS_DTM: NORMAL
MDC_IDC_SESS_TYPE: NORMAL
MDC_IDC_SET_BRADY_AT_MODE_SWITCH_MODE: NORMAL
MDC_IDC_SET_BRADY_AT_MODE_SWITCH_RATE: 170 {BEATS}/MIN
MDC_IDC_SET_BRADY_LOWRATE: 60 {BEATS}/MIN
MDC_IDC_SET_BRADY_MAX_SENSOR_RATE: 130 {BEATS}/MIN
MDC_IDC_SET_BRADY_MAX_TRACKING_RATE: 130 {BEATS}/MIN
MDC_IDC_SET_BRADY_MODE: NORMAL
MDC_IDC_SET_BRADY_PAV_DELAY_HIGH: 200 MS
MDC_IDC_SET_BRADY_PAV_DELAY_LOW: 250 MS
MDC_IDC_SET_BRADY_SAV_DELAY_HIGH: 200 MS
MDC_IDC_SET_BRADY_SAV_DELAY_LOW: 250 MS
MDC_IDC_SET_LEADCHNL_RA_PACING_AMPLITUDE: 1.5 V
MDC_IDC_SET_LEADCHNL_RA_PACING_CAPTURE_MODE: NORMAL
MDC_IDC_SET_LEADCHNL_RA_PACING_POLARITY: NORMAL
MDC_IDC_SET_LEADCHNL_RA_PACING_PULSEWIDTH: 0.4 MS
MDC_IDC_SET_LEADCHNL_RA_SENSING_ADAPTATION_MODE: NORMAL
MDC_IDC_SET_LEADCHNL_RA_SENSING_POLARITY: NORMAL
MDC_IDC_SET_LEADCHNL_RA_SENSING_SENSITIVITY: 0.4 MV
MDC_IDC_SET_LEADCHNL_RV_PACING_AMPLITUDE: 1.2 V
MDC_IDC_SET_LEADCHNL_RV_PACING_CAPTURE_MODE: NORMAL
MDC_IDC_SET_LEADCHNL_RV_PACING_POLARITY: NORMAL
MDC_IDC_SET_LEADCHNL_RV_PACING_PULSEWIDTH: 0.4 MS
MDC_IDC_SET_LEADCHNL_RV_SENSING_ADAPTATION_MODE: NORMAL
MDC_IDC_SET_LEADCHNL_RV_SENSING_POLARITY: NORMAL
MDC_IDC_SET_LEADCHNL_RV_SENSING_SENSITIVITY: 1.5 MV
MDC_IDC_SET_ZONE_DETECTION_INTERVAL: 375 MS
MDC_IDC_SET_ZONE_STATUS: NORMAL
MDC_IDC_SET_ZONE_TYPE: NORMAL
MDC_IDC_SET_ZONE_VENDOR_TYPE: NORMAL
MDC_IDC_STAT_AT_BURDEN_PERCENT: 0 %
MDC_IDC_STAT_AT_DTM_END: NORMAL
MDC_IDC_STAT_AT_DTM_START: NORMAL
MDC_IDC_STAT_BRADY_DTM_END: NORMAL
MDC_IDC_STAT_BRADY_DTM_START: NORMAL
MDC_IDC_STAT_BRADY_RA_PERCENT_PACED: 2 %
MDC_IDC_STAT_BRADY_RV_PERCENT_PACED: 100 %
MDC_IDC_STAT_EPISODE_RECENT_COUNT: 0
MDC_IDC_STAT_EPISODE_RECENT_COUNT: 0
MDC_IDC_STAT_EPISODE_RECENT_COUNT: 1
MDC_IDC_STAT_EPISODE_RECENT_COUNT: 1
MDC_IDC_STAT_EPISODE_RECENT_COUNT_DTM_END: NORMAL
MDC_IDC_STAT_EPISODE_RECENT_COUNT_DTM_START: NORMAL
MDC_IDC_STAT_EPISODE_TYPE: NORMAL
MDC_IDC_STAT_EPISODE_VENDOR_TYPE: NORMAL
P AXIS - MUSE: 72 DEGREES
P AXIS - MUSE: 73 DEGREES
PLATELET # BLD AUTO: 160 10E3/UL (ref 150–450)
POTASSIUM SERPL-SCNC: 4.4 MMOL/L (ref 3.4–5.3)
PR INTERVAL - MUSE: 270 MS
PR INTERVAL - MUSE: 284 MS
QRS DURATION - MUSE: 146 MS
QRS DURATION - MUSE: 160 MS
QT - MUSE: 410 MS
QT - MUSE: 424 MS
QTC - MUSE: 481 MS
QTC - MUSE: 483 MS
R AXIS - MUSE: 95 DEGREES
R AXIS - MUSE: 95 DEGREES
RBC # BLD AUTO: 3.49 10E6/UL (ref 3.8–5.2)
SODIUM SERPL-SCNC: 138 MMOL/L (ref 135–145)
SYSTOLIC BLOOD PRESSURE - MUSE: NORMAL MMHG
SYSTOLIC BLOOD PRESSURE - MUSE: NORMAL MMHG
T AXIS - MUSE: 18 DEGREES
T AXIS - MUSE: 43 DEGREES
VENTRICULAR RATE- MUSE: 78 BPM
VENTRICULAR RATE- MUSE: 83 BPM
WBC # BLD AUTO: 9.7 10E3/UL (ref 4–11)

## 2024-06-26 PROCEDURE — 99232 SBSQ HOSP IP/OBS MODERATE 35: CPT | Performed by: INTERNAL MEDICINE

## 2024-06-26 PROCEDURE — 36415 COLL VENOUS BLD VENIPUNCTURE: CPT | Performed by: PHYSICIAN ASSISTANT

## 2024-06-26 PROCEDURE — 93005 ELECTROCARDIOGRAM TRACING: CPT | Performed by: PHYSICIAN ASSISTANT

## 2024-06-26 PROCEDURE — 250N000013 HC RX MED GY IP 250 OP 250 PS 637: Performed by: PHYSICIAN ASSISTANT

## 2024-06-26 PROCEDURE — 94640 AIRWAY INHALATION TREATMENT: CPT

## 2024-06-26 PROCEDURE — 999N000157 HC STATISTIC RCP TIME EA 10 MIN

## 2024-06-26 PROCEDURE — 71045 X-RAY EXAM CHEST 1 VIEW: CPT

## 2024-06-26 PROCEDURE — 85027 COMPLETE CBC AUTOMATED: CPT | Performed by: PHYSICIAN ASSISTANT

## 2024-06-26 PROCEDURE — 83735 ASSAY OF MAGNESIUM: CPT | Performed by: PHYSICIAN ASSISTANT

## 2024-06-26 PROCEDURE — 272N000202 HC AEROBIKA WITH MANOMETER

## 2024-06-26 PROCEDURE — 97110 THERAPEUTIC EXERCISES: CPT | Mod: GO

## 2024-06-26 PROCEDURE — 250N000013 HC RX MED GY IP 250 OP 250 PS 637: Performed by: INTERNAL MEDICINE

## 2024-06-26 PROCEDURE — 97165 OT EVAL LOW COMPLEX 30 MIN: CPT | Mod: GO

## 2024-06-26 PROCEDURE — 99232 SBSQ HOSP IP/OBS MODERATE 35: CPT | Performed by: PHYSICIAN ASSISTANT

## 2024-06-26 PROCEDURE — 93010 ELECTROCARDIOGRAM REPORT: CPT | Mod: HIP | Performed by: INTERNAL MEDICINE

## 2024-06-26 PROCEDURE — 93005 ELECTROCARDIOGRAM TRACING: CPT

## 2024-06-26 PROCEDURE — 250N000013 HC RX MED GY IP 250 OP 250 PS 637: Performed by: FAMILY MEDICINE

## 2024-06-26 PROCEDURE — 80048 BASIC METABOLIC PNL TOTAL CA: CPT | Performed by: PHYSICIAN ASSISTANT

## 2024-06-26 PROCEDURE — 93306 TTE W/DOPPLER COMPLETE: CPT

## 2024-06-26 PROCEDURE — 93306 TTE W/DOPPLER COMPLETE: CPT | Mod: 26 | Performed by: INTERNAL MEDICINE

## 2024-06-26 PROCEDURE — 250N000009 HC RX 250: Performed by: PHYSICIAN ASSISTANT

## 2024-06-26 PROCEDURE — 97535 SELF CARE MNGMENT TRAINING: CPT | Mod: GO

## 2024-06-26 PROCEDURE — 94799 UNLISTED PULMONARY SVC/PX: CPT

## 2024-06-26 RX ORDER — ASPIRIN 81 MG/1
81 TABLET ORAL DAILY
COMMUNITY
Start: 2024-06-27

## 2024-06-26 RX ADMIN — HYDROCHLOROTHIAZIDE 25 MG: 25 TABLET ORAL at 08:01

## 2024-06-26 RX ADMIN — IPRATROPIUM BROMIDE AND ALBUTEROL SULFATE 3 ML: .5; 3 SOLUTION RESPIRATORY (INHALATION) at 07:32

## 2024-06-26 RX ADMIN — GUAIFENESIN 600 MG: 600 TABLET ORAL at 08:03

## 2024-06-26 RX ADMIN — PANTOPRAZOLE SODIUM 40 MG: 40 TABLET, DELAYED RELEASE ORAL at 08:02

## 2024-06-26 RX ADMIN — Medication 200 MG: at 08:12

## 2024-06-26 RX ADMIN — MICONAZOLE NITRATE ANTIFUNGAL POWDER: 2 POWDER TOPICAL at 08:13

## 2024-06-26 RX ADMIN — TOLTERODINE 4 MG: 2 CAPSULE, EXTENDED RELEASE ORAL at 08:12

## 2024-06-26 RX ADMIN — FERROUS SULFATE TAB 325 MG (65 MG ELEMENTAL FE) 325 MG: 325 (65 FE) TAB at 08:03

## 2024-06-26 RX ADMIN — SPIRONOLACTONE 12.5 MG: 25 TABLET ORAL at 08:02

## 2024-06-26 RX ADMIN — LEVETIRACETAM 500 MG: 500 TABLET, FILM COATED ORAL at 08:03

## 2024-06-26 RX ADMIN — ASPIRIN 81 MG: 81 TABLET, COATED ORAL at 08:03

## 2024-06-26 RX ADMIN — LOSARTAN POTASSIUM 50 MG: 50 TABLET, FILM COATED ORAL at 08:03

## 2024-06-26 ASSESSMENT — ACTIVITIES OF DAILY LIVING (ADL)
ADLS_ACUITY_SCORE: 30
ADLS_ACUITY_SCORE: 32
ADLS_ACUITY_SCORE: 30
ADLS_ACUITY_SCORE: 32
ADLS_ACUITY_SCORE: 32
ADLS_ACUITY_SCORE: 30
ADLS_ACUITY_SCORE: 32
ADLS_ACUITY_SCORE: 32

## 2024-06-26 NOTE — PLAN OF CARE
Problem: Cardiac Catheterization (Diagnostic/Interventional)  Goal: Stable Heart Rate and Rhythm  Outcome: Progressing     Problem: Comorbidity Management  Goal: Maintenance of COPD Symptom Control  Outcome: Progressing  Intervention: Maintain COPD Symptom Control  Recent Flowsheet Documentation  Taken 6/25/2024 2339 by Topher Zabala RN  Medication Review/Management: medications reviewed  Goal: Maintenance of Heart Failure Symptom Control  Outcome: Progressing  Intervention: Maintain Heart Failure Management  Recent Flowsheet Documentation  Taken 6/25/2024 2339 by Topher Zabala RN  Medication Review/Management: medications reviewed  Goal: Blood Pressure in Desired Range  Outcome: Progressing  Intervention: Maintain Blood Pressure Management  Recent Flowsheet Documentation  Taken 6/25/2024 2339 by Topher Zabala RN  Medication Review/Management: medications reviewed  Goal: Maintenance of Seizure Control  Outcome: Progressing  Intervention: Maintain Seizure Symptom Control  Recent Flowsheet Documentation  Taken 6/25/2024 2339 by Topher Zabala RN  Sensory Stimulation Regulation:   care clustered   lighting decreased  Medication Review/Management: medications reviewed         Goal Outcome Evaluation:  Patient denied chest pain and dizziness. Has DENT. Using 2L oxygen via nasal cannula. Uses 3-4L oxygen at baseline. BP stable. Has 100% V-paced rhythm. POD #1 from TAVR. Right and left groin soft, no hematoma noted. CMS intact. Neuro intact. Patient has numbness/tingling on feet at baseline. Abdominal folds have rash and small abrasion. MD updated and powder ordered. Up with assist x1 and walker/gait belt. Call-light within reach. Bed alarm on.

## 2024-06-26 NOTE — PROGRESS NOTES
"Daily Progress Note    Assessment/Plan:  79-year-old female status post TAVR June 25.  [facility] consulted for medical management.    Aortic stenosis: Status post TAVR today  Chronic diastolic heart failure with core pulmonology/right heart failure.  No evidence of exacerbation, is on HCTZ, ARB and spironolactone  Chronic hypoxic respiratory failure with severe COPD: On 3 to 4 L of O2 at baseline.  Continue home inhalers/nebulizer regimen and prophylactic Zithromax.  History of Mobitz type II second-degree AV block: Status post dual-chamber pacemaker placement in March 2022.  Next device check scheduled for July 29, 2024.  Hypertension: On losartan, spironolactone and hydrochlorothiazide  Obesity: BMI 35  Seizure disorder: Continue home Keppra  Anemia of chronic disease: Appears stable with baseline between 8.8 and 10.5.  Continue home iron.  PAD  Clinically Significant Risk Factors   # Hypomagnesemia: Lowest Mg = 1.6 mg/dL in last 2 days, will replace as needed       # Hypertension: Noted on problem list      # Obesity: Estimated body mass index is 35.27 kg/m  as calculated from the following:    Height as of this encounter: 1.626 m (5' 4\").    Weight as of this encounter: 93.2 kg (205 lb 8 oz)., PRESENT ON ADMISSION     # COPD: noted on problem list  # Pacemaker present    Code status:Full Code    Barriers to Discharge: Postprocedural recovery    Disposition: Per primary team    Subjective:  Day #1 s/p TAVR.  She denies chest pain, dyspnea, cough, fever, abdominal pain, nausea, dysuria.  She ambulated in the room.  D/W cardiology NP-patient okay to discharge from [facility] standpoint.    Current Medications Reviewed via EHR List    Objective:  Vital signs in last 24 hours:  [unfilled]  .prog  Weight:   @THISENCWEIGHTS(1)@  Weight change:   Body mass index is 35.27 kg/m .    Intake/Output last 3 shifts:  I/O last 3 completed shifts:  In: 910 [P.O.:700; I.V.:210]  Out: 650 [Urine:650]  Intake/Output this shift:  I/O this " shift:  In: 440 [P.O.:440]  Out: 200 [Urine:200]    Physical Exam:  General: No apparent distress, lying in bed  CV: Regular rate and rhythm  Lungs: Decreased breath sounds but clear  Abdomen: Soft, nontender    Imaging:  Personally Reviewed.  Cardiac Catheterization    Result Date: 6/25/2024  Procedure: Transcatheter aortic valve replacement Indication: Severe symptomatic aortic stenosis with the patient felt to be an appropriate candidate for transcatheter aortic valve replacement after a thorough multidisciplinary approach, including a visit with a surgeon. Operators: Interventional Cardiology: Anil Gonzales MD CT Surgery: Sepideh Devlin MD Echocardiography: Transthoracic Approach: R transfemoral Anesthesia: Moderate Procedure Details: Informed consent obtained before the patient was brought to the procedure room.  Appropriate timeout was performed before the procedure was started. Access was obtained in the left femoral artery and vein using the modified Seldinger technique, and then in the right femoral artery under direct fluoroscopic visualization. Access site was pre-measured for Manta closure device.  A L femoral vein was used to place a temporary pacemaker in the RV. A pigtail catheter was placed at the aortic annulus via the left femoral artery to determine the coplanar angle. After appropriate anticoagulation, the Correia E sheath was placed through the right femoral arteriotomy with the help of a stiff guidewire.  The aortic valve was then crossed and a Safari 2 wire was placed at the LV apex. A 23mm Minerva 3 valve was then successfully deployed in the aortic position under rapid pacing.  Post deployment to the patient had good hemodynamics, with trace aortic insufficiency and a mean gradient of 7 mmHg. Relative aortic/venticular implant ratio was 90/10. The delivery system and sheath were then removed, with successful hemostasis of the arteriotomy by deployment of the Manta closure device. The left  femoral artery and venous sheaths were removed as well with successful hemostasis via the Angio-Seal device. Conclusions: 1. Severe symptomatic aortic stenosis status post successful transcatheter aortic valve replacement with a 23mm Minerva 3 valve, delivered via the right transfemoral approach. 2.  Trace aortic insufficiency post deployment with a mean gradient of 7 mmHg. Postprocedure patient status: Patient planned to be transferred to the PACU/recovery for ongoing management.  Temporary pacemaker removed and need to be re-assessed in the next 24 hours based on clinical need. Anil Gonzales MD Interventional Cardiology     Echocardiogram Complete    Result Date: 2024  [id number]^[place] [address]  Name: MRS. WASHINGTON JETT MRN: [id number] : 1945 Study Date: 2024 10:47 AM Age: 79 yrs Gender: Female Patient Location: [facility] Reason For Study: Nonrheumatic aortic valve stenosis; TAVR implant Ordering Physician: ELSA ARAMBULA Referring Physician: ELSA ARAMBULA Performed By: MARISELA  BSA: 2.0 m2 Height: 64 in Weight: 204 lb HR: 84 BP: 141/57 mmHg ______________________________________________________________________________ Procedure Limited Portable Echo Adult. ______________________________________________________________________________ Interpretation Summary  INTRAOPERATIVE TAVR Transthoracic Echo  Pre-TAVR: Left Ventricle: The estimated left ventricular ejection fraction is 65%. Right Ventricle: Normal size and systolic function. Aortic Valve: There is severe global calcification with reduced excursion of the aortic valve present. Severe aortic stenosis (mean gradient: 29 mmHg. Peakvel: 3.4 m/sec). Trace aortic regurgitation with a centrally directed jet. Mitral Valve: No mitral stenosis present. Trivial regurgitation. Pericardium: No pericardial effusion.  POST-TAVR: Left Ventricle: The estimated left  ventricular ejection fraction is 65%. No acute wall motion abnormalities. Right Ventricle: Normal size and systolic function. Aortic Valve: There is well seated 23 mm Correia Minerva 3 Ultra valve in aortic position with normal gradient (mean gradient 7mmHg). No paravalvular leak. Normal bioprosthetic leaflet motion. Mitral Valve: No mitral stenosis present. Trace regurgitation. No acute change in mitral function Pericardium: No pericardial effusion. communicated directly to Dr. Cassidy in operating room. ______________________________________________________________________________ Right Ventricle There is a pacemaker lead in the right ventricle.  Atria Pacer wire in right atrium.  Tricuspid Valve There is mild to moderate (1-2+) tricuspid regurgitation. Right ventricular systolic pressure is elevated, consistent with mild pulmonary hypertension.  Pulmonic Valve The pulmonic valve is normal in structure and function.  ______________________________________________________________________________ MMode/2D Measurements & Calculations LVOT diam: 1.9 cm LVOT area: 2.8 cm2  Time Measurements Aortic HR: 82.0 BPM  Doppler Measurements & Calculations Ao V2 max: 202.0 cm/sec Ao max P.3 mmHg Ao V2 mean: 143.0 cm/sec Ao mean P.0 mmHg Ao V2 VTI: 40.2 cm CARINE(I,D): 1.5 cm2 CARINE(V,D): 1.4 cm2 LV V1 max P.0 mmHg LV V1 max: 99.9 cm/sec LV V1 VTI: 21.6 cm CO(LVOT): 5.0 l/min CI(LVOT): 2.5 l/min/m2 SV(LVOT): 61.2 ml SI(LVOT): 31.0 ml/m2 TR max qasim: 289.0 cm/sec TR max P.4 mmHg AV Qasim Ratio (DI): 0.49 CARINE Index (cm2/m2): 0.77  ______________________________________________________________________________ Report approved by: Rossy Pak 2024 01:24 PM       Cardiac Catheterization    Result Date: 6/3/2024  CARDIAC CATHETERIZATION AND INTERVENTION REPORT PATIENT NAME:  Lalitha Underwood        MEDICAL RECORD NUMBER: [id number] : 1945     PROCEDURE DATE: Brianna 3, 2024 CONCLUSIONS: Mild  nonobstructive coronary artery disease. Mild to moderately elevated right and normal left-sided filling pressures. Mild pulmonary hypertension. 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 RECOMMENDATIONS: Usual postprocedure cares, please see orders. Continue workup for aortic valve replacement as planned. Aggressive risk factor modification for secondary prevention. Recommend medical management of anomalous PV return. PROCEDURE PERFORMED: Selective right and left coronary angiography Right heart catheterization Shunt run Anomalous pulmonary venous return venogram PRE-OP DIAGNOSIS: Preoperative evaluation prior to transcatheter aortic valve replacement POST-OP DIAGNOSIS: Same PROCEDURALISTS: Villa Croft MD DIAGNOSTIC PROCEDURAL DETAILS: Signed, informed consent was obtained. The patient was placed on the table and prepped and draped in the usual fashion. Time out and site verification performed. Access: Right common femoral artery and vein (right radial artery access was not attempted given patient uses a walker to ambulate) Catheters: JL 3.5, JR4, [place] Hemostasis: Perclose ProGlide suture for right common femoral artery, manual compression for right common femoral vein PROCEDURAL MEDICATIONS: Conscious sedation - midazolam and fentanyl, please see procedure log for dosing. Local anesthesia - 1% lidocaine Procedural anticoagulation - none CONTRAST VOLUME: 59 mL Visipaque BLOOD LOSS: minimal FLUIDS: None SPECIMENS: None COMPLICATIONS: None FINDINGS: Right Heart Catheterization       RA: 10 mmHg       RV: 47/4/12 mmHg (s/d/ed)       PA: 47/20/30 mmHg (s/d/m)       PCWP: 15 mmHg        Cardiac output calculation limited due to left-to-right shunt       Shunt run: IVC 70.2% SVC 85.9% RA 82% RV 78.8% PA 85% AO 98% Anomalous pulmonary venous return to left brachiocephalic vein - 95.1%  "Qp/Qs: 1.5 Left brachiocephalic venogram concerning for anomalous PV return Coronary Angiography: LEFT MAIN: Normal caliber vessel that bifurcates into left anterior descending and left circumflex arteries.  The left main has mild luminal irregularities. LEFT ANTERIOR DESCENDING: Large vessel that gives rise to a large diagonal 1 branch, a medium diagonal 2 branch, multiple septal perforators.  The LAD wraps around the apex distally.  The LAD and its branches have mild luminal irregularities. LEFT CIRCUMFLEX: Small nondominant vessel that gives rise to a medium size OM1 branch and terminates into a small vessel distally.  The left circumflex and its branches have mild luminal irregularities with no significant disease. RIGHT CORONARY ARTERY: Large dominant vessel that gives rise to a large right posterior descending artery and posterolateral system.  The right coronary artery and its branch have mild disease. Please do not hesitate to contact me if you have any questions or concerns. I was present for the procedure in its entirety. Moderate conscious sedation at level 3-4 with fentanyl and midazolam was administered, and I spent continuous face to face time with the patient which encompassed the duration of the procedure.  Please refer to the sedation flow sheet for additional information.  I have reviewed and agree with the documentation provided in the RN sedation flow sheet as outlined. I concluded sedation and recovery was monitored by the trained independent observer. I attest that I have ordered all medications administered, equipment used, and tests performed during the procedure. Villa Croft MD Interventional Cardiology       Lab Results:  Personally Reviewed.   Fingerstick Blood Glucose: @EHBYHCN47KGO(POCGLUFGR:10)@    Last Hbg A1C: No results found for: \"HGBA1C\"   Lab Results   Component Value Date    INR 1.11 06/24/2024     Recent Results (from the past 24 hour(s))   Activated clotting time celite, " POCT    Collection Time: 06/25/24 11:35 AM   Result Value Ref Range    Activated Clotting Time (Celite) POCT 249 (H) 74 - 150 seconds   CBC with platelets    Collection Time: 06/25/24  1:11 PM   Result Value Ref Range    WBC Count 8.4 4.0 - 11.0 10e3/uL    RBC Count 3.70 (L) 3.80 - 5.20 10e6/uL    Hemoglobin 9.0 (L) 11.7 - 15.7 g/dL    Hematocrit 30.2 (L) 35.0 - 47.0 %    MCV 82 78 - 100 fL    MCH 24.3 (L) 26.5 - 33.0 pg    MCHC 29.8 (L) 31.5 - 36.5 g/dL    RDW 19.6 (H) 10.0 - 15.0 %    Platelet Count 183 150 - 450 10e3/uL   Comprehensive metabolic panel    Collection Time: 06/25/24  1:11 PM   Result Value Ref Range    Sodium 139 135 - 145 mmol/L    Potassium 3.9 3.4 - 5.3 mmol/L    Carbon Dioxide (CO2) 31 (H) 22 - 29 mmol/L    Anion Gap 8 7 - 15 mmol/L    Urea Nitrogen 25.2 (H) 8.0 - 23.0 mg/dL    Creatinine 0.92 0.51 - 0.95 mg/dL    GFR Estimate 63 >60 mL/min/1.73m2    Calcium 8.9 8.8 - 10.2 mg/dL    Chloride 100 98 - 107 mmol/L    Glucose 119 (H) 70 - 99 mg/dL    Alkaline Phosphatase 56 40 - 150 U/L    AST 16 0 - 45 U/L    ALT 9 0 - 50 U/L    Protein Total 6.1 (L) 6.4 - 8.3 g/dL    Albumin 3.6 3.5 - 5.2 g/dL    Bilirubin Total 0.2 <=1.2 mg/dL   Magnesium    Collection Time: 06/25/24  1:11 PM   Result Value Ref Range    Magnesium 1.7 1.7 - 2.3 mg/dL   ECG with 12 lead and MUSE [LHE]    Collection Time: 06/25/24  3:36 PM   Result Value Ref Range    Systolic Blood Pressure  mmHg    Diastolic Blood Pressure  mmHg    Ventricular Rate 83 BPM    Atrial Rate 83 BPM    NY Interval 284 ms    QRS Duration 146 ms     ms    QTc 481 ms    P Axis 73 degrees    R AXIS 95 degrees    T Axis 18 degrees    Interpretation ECG       Sinus rhythm with 1st degree A-V block  Non-specific intra-ventricular conduction block  Lateral infarct , age undetermined  Abnormal ECG  When compared with ECG of 24-JUN-2024 10:27,  Non-specific intra-ventricular conduction block has replaced Left bundle branch block  Lateral infarct is now  Present  Confirmed by ELSA ARAMBULA MD LOC:JN ([id number]) on 6/26/2024 6:21:30 AM     Glucose by meter    Collection Time: 06/25/24  4:26 PM   Result Value Ref Range    GLUCOSE BY METER POCT 101 (H) 70 - 99 mg/dL   Glucose by meter    Collection Time: 06/25/24  9:25 PM   Result Value Ref Range    GLUCOSE BY METER POCT 103 (H) 70 - 99 mg/dL   CBC with platelets    Collection Time: 06/26/24  4:38 AM   Result Value Ref Range    WBC Count 9.7 4.0 - 11.0 10e3/uL    RBC Count 3.49 (L) 3.80 - 5.20 10e6/uL    Hemoglobin 8.7 (L) 11.7 - 15.7 g/dL    Hematocrit 28.5 (L) 35.0 - 47.0 %    MCV 82 78 - 100 fL    MCH 24.9 (L) 26.5 - 33.0 pg    MCHC 30.5 (L) 31.5 - 36.5 g/dL    RDW 19.9 (H) 10.0 - 15.0 %    Platelet Count 160 150 - 450 10e3/uL   Basic metabolic panel    Collection Time: 06/26/24  4:38 AM   Result Value Ref Range    Sodium 138 135 - 145 mmol/L    Potassium 4.4 3.4 - 5.3 mmol/L    Chloride 101 98 - 107 mmol/L    Carbon Dioxide (CO2) 30 (H) 22 - 29 mmol/L    Anion Gap 7 7 - 15 mmol/L    Urea Nitrogen 23.6 (H) 8.0 - 23.0 mg/dL    Creatinine 0.82 0.51 - 0.95 mg/dL    GFR Estimate 72 >60 mL/min/1.73m2    Calcium 8.9 8.8 - 10.2 mg/dL    Glucose 103 (H) 70 - 99 mg/dL   Magnesium    Collection Time: 06/26/24  4:38 AM   Result Value Ref Range    Magnesium 1.6 (L) 1.7 - 2.3 mg/dL   ECG with 12 lead and MUSE [LHE]    Collection Time: 06/26/24  7:58 AM   Result Value Ref Range    Systolic Blood Pressure  mmHg    Diastolic Blood Pressure  mmHg    Ventricular Rate 78 BPM    Atrial Rate 78 BPM    DE Interval 270 ms    QRS Duration 160 ms     ms    QTc 483 ms    P Axis 72 degrees    R AXIS 95 degrees    T Axis 43 degrees    Interpretation ECG       Sinus rhythm with 1st degree A-V block  Rightward axis  Left bundle branch block  Abnormal ECG  When compared with ECG of 25-JUN-2024 15:36,  Criteria for Lateral infarct are no longer Present  Confirmed by KOURTNEY PACHECO, LES LOC:WW ([id number]) on 6/26/2024 8:59:27 AM     Cardiac Device  Check - Remote (Standing ORD 5 count)    Collection Time: 06/26/24 10:07 AM   Result Value Ref Range    Date Time Interrogation Session [id number]     Implantable Pulse Generator  [employer]     Implantable Pulse Generator Model L331 ACCOLADE MRI EL     Implantable Pulse Generator Serial Number [id number]     Type Interrogation Session Remote     Clinic Name [facility]     Implantable Pulse Generator Type Pacemaker     Implantable Pulse Generator Implant Date 20220324     Implantable Lead  [employer]     Implantable Lead Model 4469 Fineline II Sterox EZ     Implantable Lead Serial Number [id number]     Implantable Lead Implant Date 20220324     Implantable Lead Polarity Type Bipolar Lead     Implantable Lead Location Detail 1 APPENDAGE     Implantable Lead Location Right Atrium     Implantable Lead Connection Status Connected     Implantable Lead  [employer]     Implantable Lead Model 4470 Fineline II Sterox EZ     Implantable Lead Serial Number [id number]     Implantable Lead Implant Date 20220324     Implantable Lead Polarity Type Bipolar Lead     Implantable Lead Location Detail 1 SEPTUM     Implantable Lead Location Right Ventricle     Implantable Lead Connection Status Connected     Lg Setting Mode (NBG Code) DDD     Lg Setting Lower Rate Limit 60 [beats]/min    Lg Setting Maximum Tracking Rate 130 [beats]/min    Lg Setting Maximum Sensor Rate 130 [beats]/min    Lg Setting BEBO Delay Low 250 ms    Lg Setting PAV Delay Low 250 ms    Lg Setting PAV Delay High 200 ms    Lg Setting BEBO Delay High 200 ms    Lg Setting AT Mode Switch Rate 170 [beats]/min    Lg Setting AT Mode Switch Mode DDIR     Lead Channel Setting Sensing Polarity Bipolar     Lead Channel Setting Sensing Sensitivity 0.4 mV    Lead Channel Setting Sensing Adaptation Mode Adaptive     Lead Channel Setting Sensing Polarity Bipolar     Lead Channel  Setting Sensing Sensitivity 1.5 mV    Lead Channel Setting Sensing Adaptation Mode Adaptive     Lead Channel Setting Pacing Polarity Bipolar     Lead Channel Setting Pacing Pulse Width 0.4 ms    Lead Channel Setting Pacing Amplitude 1.5 V    Lead Channel Setting Pacing Capture Mode Fixed Pacing     Lead Channel Setting Pacing Polarity Bipolar     Lead Channel Setting Pacing Pulse Width 0.4 ms    Lead Channel Setting Pacing Amplitude 1.2 V    Lead Channel Setting Pacing Capture Mode Adaptive     Zone Setting Type Category VT     Zone Setting Vendor Type Category VT     Zone Setting Status Monitor     Zone Setting Detection Interval 375 ms    Lead Channel Impedance Value 450 ohm    Lead Channel Impedance Value 431 ohm    Lead Channel Pacing Threshold Amplitude 0.8 V    Lead Channel Pacing Threshold Pulse Width 0.4 ms    Battery Date Time of Measurements [id number]     Battery Status Beginning of Service     Battery Remaining Longevity 126 mo    Battery Remaining Percentage 100 %    Lg Statistic Date Time Start 20240117000000     Lg Statistic Date Time End 20240626000000     Lg Statistic RA Percent Paced 2 %    Lg Statistic RV Percent Paced 100 %    Atrial Tachy Statistic Date Time Start 20240119000000     Atrial Tachy Statistic Date Time End 20240626000000     Atrial Tachy Statistic AT/AF [place] Percent 0 %    Episode Statistic Recent Count 0     Episode Statistic Type Category AT/AF     Episode Statistic Vendor Type Category AF     Episode Statistic Recent Count 0     Episode Statistic Type Category Other     Episode Statistic Recent Count 1     Episode Statistic Type Category VT     Episode Statistic Vendor Type Category NSVT     Episode Statistic Recent Count 1     Episode Statistic Type Category VT     Episode Statistic Vendor Type Category VT     Episode Statistic Recent Date Time Start 20240117000000     Episode Statistic Recent Date Time End 20240626000000     Episode Statistic Recent Date Time  Start [id number]     Episode Statistic Recent Date Time End [id number]     Episode Statistic Recent Date Time Start 20240117000000     Episode Statistic Recent Date Time End 20240626000000     Episode Statistic Recent Date Time Start 20240117000000     Episode Statistic Recent Date Time End 20240626000000     Episode Identifier [id number]     Episode Type Category Other     Episode Date Time 20240626012600     Episode Identifier V-2     Episode Type Category VT     Episode Vendor Type Category VT     Episode Date Time [id number]     Episode Duration 27 s    Episode Type Induced Flag NO     Episode Identifier V-1     Episode Type Category VT     Episode Date Time [id number]     Episode Duration 13 s    Episode Type Induced Flag NO      Medically Ready for Discharge:      Advanced Care Planning    Medical Decision Making     Jodie lCark MD  This document is created with the help of Dragon dictation system. All grammatical errors are unintentional.

## 2024-06-26 NOTE — TREATMENT PLAN
"/63 (BP Location: Left arm, Patient Position: Semi-Hayes's)   Pulse 80   Temp 97.8  F (36.6  C) (Oral)   Resp 18   Ht 1.626 m (5' 4\")   Wt 93.2 kg (205 lb 8 oz)   SpO2 98%   BMI 35.27 kg/m          Allergies   Allergen Reactions    Clindamycin Rash    Carbamazepine Rash    Morphine Nausea        Intake/Output Summary (Last 24 hours) at 6/26/2024 0905  Last data filed at 6/26/2024 0800  Gross per 24 hour   Intake 1110 ml   Output 650 ml   Net 460 ml      RCAT Treatment Plan    Patient Score: 8  Patient Acuity: 4    Clinical Indication for Therapy: prevent atelectasis s/p TAVR    Therapy Ordered: Home regimen of TID Douneb, Home O2 2 -2.5 lpm @ HS and activty, Aerobika QID, IS QID.     Assessment Summary: Good voicing. Pt denies SOB. No accessory muscle use. No nasal flaring. No retractions. Pt speaks in complete sentences. Good NPC.  Breath sounds decreased bilaterally. O2 2 lpm via NC.      Pt has home O2- does not recall provider. Pt does nebs TID at home(douneb).  Pt does not use home NPPV device. Pt comfortable with MDI use. Encourage pt to be OOB and ambulate. Wean O2.     Pt former smoker - 35 years/ 1ppd.       Malcolm Mckeon, RT  6/26/2024   "

## 2024-06-26 NOTE — PLAN OF CARE
"  Patient is POD 1 from TAVR.  Bilateral groin sites are WDL, right side had some slight blood oozing but is not uncomfortable.  Distal CSM present in both legs.  Neuros intact.  Patient is A/O, denied pain, is VSS.  Discharge order is in, patient to transport home with family.      Tele: 100% V-Paced.    Problem: Adult Inpatient Plan of Care  Goal: Plan of Care Review  Description: The Plan of Care Review/Shift note should be completed every shift.  The Outcome Evaluation is a brief statement about your assessment that the patient is improving, declining, or no change.  This information will be displayed automatically on your shift  note.  Outcome: Met  Goal: Patient-Specific Goal (Individualized)  Description: You can add care plan individualizations to a care plan. Examples of Individualization might be:  \"Parent requests to be called daily at 9am for status\", \"I have a hard time hearing out of my right ear\", or \"Do not touch me to wake me up as it startles  me\".  Outcome: Met  Goal: Absence of Hospital-Acquired Illness or Injury  Outcome: Met  Intervention: Identify and Manage Fall Risk  Recent Flowsheet Documentation  Taken 6/26/2024 0732 by Helder Wyatt, RN  Safety Promotion/Fall Prevention:   clutter free environment maintained   increased rounding and observation   lighting adjusted   mobility aid in reach   nonskid shoes/slippers when out of bed   patient and family education   room near nurse's station   room organization consistent   safety round/check completed  Intervention: Prevent and Manage VTE (Venous Thromboembolism) Risk  Recent Flowsheet Documentation  Taken 6/26/2024 0732 by Helder Wyatt, RN  VTE Prevention/Management:   SCDs (sequential compression devices) off   compression stockings off  Intervention: Prevent Infection  Recent Flowsheet Documentation  Taken 6/26/2024 0732 by Helder Wyatt, RN  Infection Prevention: hand hygiene promoted  Goal: Optimal Comfort and " Wellbeing  Outcome: Met  Goal: Readiness for Transition of Care  Outcome: Met     Problem: Risk for Delirium  Goal: Optimal Coping  Outcome: Met  Goal: Improved Behavioral Control  Outcome: Met  Intervention: Minimize Safety Risk  Recent Flowsheet Documentation  Taken 6/26/2024 0732 by Helder Wyatt RN  Communication Enhancement Strategies: call light answered in person  Enhanced Safety Measures: room near unit station  Goal: Improved Attention and Thought Clarity  Outcome: Met  Intervention: Maximize Cognitive Function  Recent Flowsheet Documentation  Taken 6/26/2024 0732 by Helder Wyatt, RN  Sensory Stimulation Regulation:   care clustered   lighting decreased  Reorientation Measures: clock in view  Goal: Improved Sleep  Outcome: Met     Problem: Cardiac Catheterization (Diagnostic/Interventional)  Goal: Absence of Bleeding  Outcome: Met  Goal: Absence of Contrast-Induced Injury  Outcome: Met  Goal: Stable Heart Rate and Rhythm  Outcome: Met  Goal: Absence of Embolism Signs and Symptoms  Outcome: Met  Intervention: Prevent or Manage Embolism  Recent Flowsheet Documentation  Taken 6/26/2024 0732 by Helder Wyatt, RN  VTE Prevention/Management:   SCDs (sequential compression devices) off   compression stockings off  Goal: Anesthesia/Sedation Recovery  Outcome: Met  Intervention: Optimize Anesthesia Recovery  Recent Flowsheet Documentation  Taken 6/26/2024 0732 by Helder Wyatt, RN  Safety Promotion/Fall Prevention:   clutter free environment maintained   increased rounding and observation   lighting adjusted   mobility aid in reach   nonskid shoes/slippers when out of bed   patient and family education   room near nurse's station   room organization consistent   safety round/check completed  Reorientation Measures: clock in view  Goal: Optimal Pain Control and Function  Outcome: Met  Goal: Absence of Vascular Access Complication  Outcome: Met  Intervention: Prevent and Manage Access  Complications  Recent Flowsheet Documentation  Taken 6/26/2024 0732 by Helder Wyatt, RN  Activity Management: activity adjusted per tolerance     Problem: Comorbidity Management  Goal: Maintenance of COPD Symptom Control  Outcome: Met  Goal: Maintenance of Heart Failure Symptom Control  Outcome: Met  Goal: Blood Pressure in Desired Range  Outcome: Met  Goal: Maintenance of Seizure Control  Outcome: Met  Intervention: Maintain Seizure Symptom Control  Recent Flowsheet Documentation  Taken 6/26/2024 0732 by Helder Wyatt, RN  Sensory Stimulation Regulation:   care clustered   lighting decreased    Tim Wyatt RN

## 2024-06-26 NOTE — PROGRESS NOTES
Care Management Discharge Note    Discharge Date: 06/26/2024       Discharge Disposition:  home    Discharge Services:  Outpatient Cardia Rehab    Discharge DME:  per therapy    Discharge Transportation: family or friend will provide    Private pay costs discussed: Not applicable    Does the patient's insurance plan have a 3 day qualifying hospital stay waiver?  No    PAS Confirmation Code:    Patient/family educated on Medicare website which has current facility and service quality ratings:      Education Provided on the Discharge Plan:    Persons Notified of Discharge Plans: yes  Patient/Family in Agreement with the Plan:      Handoff Referral Completed: Yes    Additional Information:  Pt adequate to discharge. Discharge orders placed. No CM needs identified. CM will un-follow this pt.    Leonela Olsen RN

## 2024-06-26 NOTE — PLAN OF CARE
Goal Outcome Evaluation:      Plan of Care Reviewed With: patient    Overall Patient Progress: improvingOverall Patient Progress: improving    Outcome Evaluation: Pt remains pain free. Intermittent, dry cough. Scheduled repiratory meds effective.    VSS. A&Ox4. Q2hr neuros good. Pt reports some blurry vision that started prior to admission but has been improving.Both groin sites had some leakage. Dressing changed. No more leaking noted. No hematoma noted at groin sites. Small wound along rash at lower abdomen fold. Pt denies pain or SOB. Dry cough with wheezing sound. Pt reports applesauce effective in helping her cough. RA during day, 2LPM NC at night. Pt has home O2 at baseline.    Plan: possible discharge in morning

## 2024-06-26 NOTE — DISCHARGE SUMMARY
"  [facility]  CARDIOLOGY DISCHARGE SUMMARY     Primary Care Physician: Kate Marte  Admission Date: 6/25/2024   Discharge Provider: Ana Lancaster PA-C Discharge Date: 6/26/2024   Diet: resume previous home diet   Code Status: Full Code   Activity: No lifting > 10 lb, no driving for 7 days        Condition at Discharge: Stable      BRIEF HPI:   \"Serene\" has a past medical history significant for aortic valve stenosis, chronic heart failure with preserved ejection fraction, severe COPD, obesity, coronary atherosclerosis, heart block status post PPM, PAD, seizure disorder, hyperlipidemia, hypertension.  She was recently found to have progression of her severe aortic stenosis to the severe range.  She underwent transcatheter aortic valve replacement yesterday.    PRINCIPAL & ACTIVE DISCHARGE DIAGNOSES     Active Problems:    S/P TAVR (transcatheter aortic valve replacement)    Aortic stenosis, severe      SIGNIFICANT FINDINGS (Imaging, labs):   ECHO   Interpretation Summary     1. Normal left ventricular size and systolic performance with a visually  estimated ejection fraction of 60-65%.  2. There is abnormal septal motion consistent with ventricular paced rhythm.  3. There is a bio-prosthetic aortic valve (documented 23 mm Correia Minerva 3  Ultra tissue valve).  Â  Normal aortic valve prosthesis metrics with a mean systolic gradient of 15  mmHg and a peak anterograde velocity of 2.6 m/sec.  Â  No aortic insufficiency is detected.  4. Normal right ventricular size and systolic performance.    CXR (personally reviewed and interpreted):  EXAM: XR CHEST PORT 1 VIEW  LOCATION: [facility]  DATE: 6/26/2024     INDICATION: S/P Transcatheter Aortic Valve Replacement  COMPARISON: 5/14/2024 CT                                                                      IMPRESSION: Minimal basilar atelectasis. Remaining lungs are clear. No pleural effusion or pneumothorax. Upper " normal heart size. Dual-chamber pacemaker. Post TAVR. Atherosclerosis      EKG (personally reviewed and interpreted):  AS-    Cardiac Device Check  Type: Latitude Consult remote pacemaker transmission done at [facility].  Courtesy check.  Rep notes: n/a.  Presenting rhythm: AS- 80 bpm.   Battery longevity: 10yrs, 6mo estimated.   Lead status: stable.   Atrial high rates: since 4/17/24; none detected.  Anticoagulant: None.   Ventricular high rates: since 4/17/24; One VT episode on 6/25/24 10:41AM, appears to be pacing from TAVR.   Comments: Normal magnet and pacemaker function. E. Pivec, Device Specialist       LABS or IMAGING REQUIRING FOLLOW-UP AFTER DISCHARGE:     Echocardiogram on July 29 at 1:45 PM    PROCEDURES ( this hospitalization only)      Procedure(s):  Transcatheter Aortic Valve Replacement-Femoral Approach  OR TRANSCATHETER AORTIC VALVE REPLACEMENT, FEMORAL PERCUTANEOUS APPROACH (STANDBY)    RECOMMENDATIONS TO OUTPATIENT PROVIDER FOR F/U VISIT     With Nayeli Thompson PA-C on July 10 at 12:50 PM    With Nayeli Thompson PA-C for 1 month visit on July 31 at 2:30 PM    DISPOSITION     Home    HOSPITAL COURSE BY DIAGNOSIS:      #Severe aortic stenosis, now s/p TAVR using a right TF approach with a 23 mm Correia AIDAN 3 ultra valve on 6/25/2024  # Chronic heart failure with preserved ejection fraction, NYHA class III -compensated  She tolerated the procedure well yesterday.  Her device was interrogated and showed normal device function postprocedure.  She had no significant telemetry events her echocardiogram this morning showed a mean gradient of 15 mmHg, no AI.      Sheath sites soft without hematoma.  Stitch was removed without difficulty.  Neuro's intact. Pt reports feeling okay today.  She has chronic shortness of breath at baseline and this has been stable for her since the procedure.  She denies chest discomfort, dizziness, lightheadedness, numbness, weakness, new vision changes.  She  "does report chronic tingling to her bilateral feet and this has remained stable since the procedure.     - ASA for anti-platelet therapy  - OP Cardiac rehab  - Daily weights  - Lifelong antibiotic prophylaxis prior to all dental procedures.  - follow ups have been arranged as listed above  -Continue hydrochlorothiazide, losartan, spironolactone    #CAD  Pre-TAVR coronary angiogram showed mild nonobstructive  - continue aspirin 81 mg daily    #HTN  BP has been controlled since procedure  -Continue losartan, spironolactone, hydrochlorothiazide    #Second-degree AV block, Mobitz type II status post PPM  - Normal device function postprocedure    #Chronic hypoxic respiratory failure with severe COPD  - stable  - wears 3-4 L of O2 at baseline  -Continue prophylactic azithromycin and PTA inhaler/nebulizer regimen    # Seizure disorder  -Continue PTA Keppra    #Anemia of chronic disease  -Hemoglobin 8.7, no current signs of bleeding       Clinically Significant Risk Factors         # Hypernatremia: Highest Na = 146 mmol/L in last 2 days, will monitor as appropriate    # Hypomagnesemia: Lowest Mg = 1.6 mg/dL in last 2 days, will replace as needed  # Anion Gap Metabolic Acidosis: Highest Anion Gap = 20 mmol/L in last 2 days, will monitor and treat as appropriate      # Hypertension: Noted on problem list              # Obesity: Estimated body mass index is 35.27 kg/m  as calculated from the following:    Height as of this encounter: 1.626 m (5' 4\").    Weight as of this encounter: 93.2 kg (205 lb 8 oz)., PRESENT ON ADMISSION     # COPD: noted on problem list  # Pacemaker present              Discharge Medications with Med changes:        Review of your medicines        START taking        Dose / Directions   aspirin 81 MG EC tablet      Dose: 81 mg  Start taking on: June 27, 2024  Take 1 tablet (81 mg) by mouth daily  Refills: 0            CONTINUE these medicines which have NOT CHANGED        Dose / Directions   albuterol " 108 (90 Base) MCG/ACT inhaler  Commonly known as: PROAIR HFA/PROVENTIL HFA/VENTOLIN HFA  Used for: Pulmonary emphysema, unspecified emphysema type (H)      Dose: 2 puff  Inhale 2 puffs into the lungs every 6 hours as needed for shortness of breath or wheezing  Quantity: 18 g  Refills: 1     azithromycin 250 MG tablet  Commonly known as: ZITHROMAX  Used for: Pulmonary emphysema, unspecified emphysema type (H)      Dose: 250 mg  Take 1 tablet (250 mg) by mouth every evening  Quantity: 90 tablet  Refills: 3     benzonatate 100 MG capsule  Commonly known as: TESSALON  Used for: Pulmonary emphysema, unspecified emphysema type (H)      Dose: 100 mg  Take 1 capsule (100 mg) by mouth 3 times daily as needed for cough  Quantity: 90 capsule  Refills: 3     Biotin 10 MG Caps      Dose: 1 capsule  Take 1 capsule by mouth daily  Refills: 0     calcium citrate 950 (200 Ca) MG tablet  Commonly known as: CITRACAL      Dose: 1 tablet  Take 1 tablet by mouth 2 times daily  Refills: 0     estradiol 0.1 MG/GM vaginal cream  Commonly known as: ESTRACE  Used for: Gross hematuria      Dose: 2 g  Place 2 g vaginally three times a week  Quantity: 72 g  Refills: 1     ferrous sulfate 325 (65 Fe) MG tablet  Commonly known as: FEROSUL  Used for: Iron deficiency anemia, unspecified iron deficiency anemia type      Dose: 325 mg  Take 1 tablet (325 mg) by mouth daily (with breakfast)  Quantity: 90 tablet  Refills: 1     fluticasone-salmeterol 500-50 MCG/ACT inhaler  Commonly known as: ADVAIR  Used for: Pulmonary emphysema, unspecified emphysema type (H)      Dose: 1 puff  Inhale 1 puff into the lungs every 12 hours  Quantity: 180 each  Refills: 3     guaiFENesin 600 MG 12 hr tablet  Commonly known as: MUCINEX  Used for: COPD exacerbation (H)      Dose: 600 mg  Take 1 tablet (600 mg) by mouth 2 times daily  Quantity: 180 tablet  Refills: 3     hydrochlorothiazide 25 MG tablet  Commonly known as: HYDRODIURIL  Used for: Essential hypertension       Dose: 25 mg  Take 1 tablet (25 mg) by mouth daily  Quantity: 90 tablet  Refills: 3     ipratropium - albuterol 0.5 mg/2.5 mg/3 mL 0.5-2.5 (3) MG/3ML neb solution  Commonly known as: DUONEB  Used for: COPD (chronic obstructive pulmonary disease) (H)      TAKE 1 VIAL BY NEBULIZATION EVERY 6 HOURS AS NEEDED FOR SHORTNESS OF BREATH OR WHEEZING  Quantity: 1080 mL  Refills: 3     levalbuterol 1.25 MG/3ML neb solution  Commonly known as: XOPENEX  Used for: Pulmonary emphysema, unspecified emphysema type (H), COPD exacerbation (H)      Dose: 1 ampule  Take 3 mLs (1.25 mg) by nebulization every 4 hours as needed for shortness of breath or wheezing  Quantity: 810 mL  Refills: 3     levETIRAcetam 500 MG tablet  Commonly known as: KEPPRA  Used for: Intractable epilepsy without status epilepticus, unspecified epilepsy type (H)      Dose: 500 mg  Take 1 tablet (500 mg) by mouth 2 times daily  Quantity: 180 tablet  Refills: 3     losartan 50 MG tablet  Commonly known as: COZAAR  Used for: Essential hypertension      Dose: 50 mg  Take 1 tablet (50 mg) by mouth 2 times daily  Quantity: 180 tablet  Refills: 3     magnesium 250 MG tablet      Dose: 1 tablet  Take 1 tablet by mouth 2 times daily  Refills: 0     nystatin 069294 UNIT/GM external powder  Commonly known as: MYCOSTATIN      Apply topically daily as needed  Refills: 0     potassium chloride teja ER 20 MEQ CR tablet  Commonly known as: KLOR-CON M20  Used for: Essential hypertension      Dose: 20 mEq  Take 1 tablet (20 mEq) by mouth at bedtime  Quantity: 90 tablet  Refills: 3     RABEprazole 20 MG EC tablet  Commonly known as: ACIPHEX  Used for: Gastroesophageal reflux disease without esophagitis      Dose: 1 tablet  Take 1 tablet (20 mg) by mouth daily  Quantity: 90 tablet  Refills: 3     sertraline 100 MG tablet  Commonly known as: ZOLOFT  Used for: Anxiety      Dose: 100 mg  Take 1 tablet (100 mg) by mouth every evening  Quantity: 90 tablet  Refills: 3     solifenacin 10 MG  tablet  Commonly known as: VESICARE  Used for: Urge incontinence of urine      Dose: 10 mg  Take 1 tablet (10 mg) by mouth daily  Quantity: 90 tablet  Refills: 3     spironolactone 25 MG tablet  Commonly known as: ALDACTONE  Used for: Essential hypertension, benign      Dose: 12.5 mg  Take 0.5 tablets (12.5 mg) by mouth daily  Quantity: 45 tablet  Refills: 3     tiotropium 2.5 MCG/ACT inhaler  Commonly known as: SPIRIVA RESPIMAT  Used for: Chronic obstructive pulmonary disease, unspecified COPD type (H)      Dose: 2 puff  Inhale 2 puffs into the lungs daily  Quantity: 12 g  Refills: 3     vitamin D3 125 MCG (5000 UT) tablet  Commonly known as: CHOLECALCIFEROL      Dose: 5,000 Units  Take 5,000 Units by mouth three times a week Monday, Wednesday and Friday  Refills: 0               Where to get your medicines        Some of these will need a paper prescription and others can be bought over the counter. Ask your nurse if you have questions.    You don't need a prescription for these medications  aspirin 81 MG EC tablet            Rationale for medication changes:      Antiplatelet therapy with aspirin for TAVR valve        Consults   Hospitalist  Cardiac rehab         Pertinent Labs     Lab Results   Component Value Date    NTBNPI 1,004 05/15/2023    NTBNP 301 06/24/2024     Lab Results   Component Value Date    WBC 9.7 06/26/2024    HGB 8.7 (L) 06/26/2024    HCT 28.5 (L) 06/26/2024    MCV 82 06/26/2024     06/26/2024     Lab Results   Component Value Date    CR 0.82 06/26/2024     Lab Results   Component Value Date     06/26/2024    POTASSIUM 4.4 06/26/2024    CHLORIDE 101 06/26/2024    CO2 30 (H) 06/26/2024     (H) 06/26/2024     Lab Results   Component Value Date    GFRESTIMATED 72 06/26/2024    GFRESTBLACK >60 02/12/2021           Discharge Orders     Discharge Procedure Orders   CARDIAC REHAB REFERRAL   Standing Status: Future   Referral Priority: Routine Referral Type: Rehab Therapy Cardiac  "Therapy   Number of Visits Requested: 1     Examination     Vital Signs in last 24 hours:   Vital signs:  Temp: 97.6  F (36.4  C) Temp src: Oral BP: 125/62 Pulse: 81   Resp: 20 SpO2: 95 % O2 Device: Nasal cannula Oxygen Delivery: 1 LPM (home regoime isd 2-2.5 lpm at noc and with activity) Height: 162.6 cm (5' 4\") Weight: 93.2 kg (205 lb 8 oz)  Estimated body mass index is 35.27 kg/m  as calculated from the following:    Height as of this encounter: 1.626 m (5' 4\").    Weight as of this encounter: 93.2 kg (205 lb 8 oz).          General Appearance:   Alert, cooperative and in no acute distress   ENT/Mouth: membranes moist, no oral lesions or bleeding gums.      EYES:  no scleral icterus, normal conjunctivae   Neck: Supple without lymphadenopathy.  Thyroid not visualized   Chest/Lungs:   lungs are coarse with expiratory wheeze   Cardiovascular:   Regular. Normal first and second heart sounds with no murmurs, rubs, or gallops; the carotid, radial and posterior tibial pulses are intact, no edema bilaterally    Abdomen:  Soft and nontender. Bowel sounds are present in all quadrants   Extremities: no cyanosis or clubbing.  Right puncture site is C/D/I. Left groin site with small excoriation medial to puncture site. Bilateral sites soft and without hematoma   Skin: no xanthelasma, warm.    Neurologic: normal gait, normal  bilateral, no tremors   Psychiatric: Normal mood and affect         Please see EMR for more detailed significant labs, imaging, consultant notes etc.    45 MINUTES SPENT BY ME on the date of service doing chart review, history, exam, documentation & further activities per the note.         Ana Lancaster PA-C  Structural Heart Program  [facility]   " none

## 2024-06-26 NOTE — PROGRESS NOTES
Cardiac Rehab      06/26/24 1115   Appointment Info   Signing Clinician's Name / Credentials (OT) Anahy Bowers OTR/L   Living Environment   People in Home alone   Current Living Arrangements other (see comments)  (one level [facility])   Home Accessibility no concerns   Transportation Anticipated family or friend will provide   Living Environment Comments Tub/shower w/ GB and does have shower chair but is borrowing it to a friend   Self-Care   Usual Activity Tolerance good   Equipment Currently Used at Home walker, rolling   Fall history within last six months no   Instrumental Activities of Daily Living (IADL)   IADL Comments Drives, [place] meds/finances, cooks/cleaning   General Information   Onset of Illness/Injury or Date of Surgery 06/25/24   Additional Occupational Profile Info/Pertinent History of Current Problem 79-year-old female status post TAVR June 25   Existing Precautions/Restrictions cardiac   Cognitive Status Examination   Orientation Status orientation to person, place and time   Transfers   Transfers sit-stand transfer   Sit-Stand Transfer   Sit-Stand Maury (Transfers) verbal cues;supervision   Assistive Device (Sit-Stand Transfers) walker, front-wheeled   Clinical Impression   Criteria for Skilled Therapeutic Interventions Met (OT) Yes, treatment indicated   OT Diagnosis decreased ac tolerance   OT Problem List-Impairments impacting ADL problems related to;activity tolerance impaired;balance;mobility   Assessment of Occupational Performance 1-3 Performance Deficits   Planned Therapy Interventions (OT) ADL retraining;balance training;strengthening;stretching;home program guidelines;progressive activity/exercise   Clinical Decision Making Complexity (OT) problem focused assessment/low complexity   Risk & Benefits of therapy have been explained evaluation/treatment results reviewed;patient   OT Total Evaluation Time   OT Eval, Low Complexity Minutes (06857) 10   OT Goals   Therapy Frequency (OT)  Daily   OT Predicted Duration/Target Date for Goal Attainment 07/03/24   OT Goals Cardiac Phase 1   OT: Understanding of cardiac education to maximize quality of life, condition management, and health outcomes Patient;Verbalize;Demonstrate   OT: Perform aerobic activity with stable cardiovascular response continuous;15 minutes;ambulation   OT: Functional/aerobic ambulation tolerance with stable cardiovascular response in order to return to home and community environment Modified independent;Assistive device;200 feet   Self-Care/Home Management   Self-Care/Home Mgmt/ADL, Compensatory, Meal Prep Minutes (43235) 8   Symptoms Noted During/After Treatment (Meal Preparation/Planning Training) fatigue   Treatment Detail/Skilled Intervention Pt ed/trained in cardiac ecducation following TAVR  procedure pt verbalizing understanding- pt provided w/ cardiac education (precautions, outpatient cardiac rehab, HEP), STS SBA w/ fww cues for tech no LOB pt on 2L for functional ambulation.   Therapeutic Procedures/Exercise   Therapeutic Procedure: strength, endurance, ROM, flexibillity minutes (99859) 10   Symptoms Noted During/After Treatment fatigue   Treatment Detail/Skilled Intervention See ambulation tab   Treatment Time Includes (CR Only) See specific exercise details intervention group(s)   Ambulation   Workload 150ft   Symptoms Denies symptoms   Cardiovascular Response Normal   Exercise Details SBA w/ fww on 2L   Vital Signs Details pre/post HR/BP WFL no c/o   Cardiac Education   Education Provided Daily weights;Diet;Home exercise program;Outpatient Cardiac Rehab;Precautions;Resuming home activities   Education Packet Given to Patient Yes   All Patient Education Handouts Reviewed with Patient and/or Family Yes   Cardiac Rehab Phase II Plan   Phase II Order Received Yes   Phase II Appointment Status Scheduled   Date/Time 7/2/24   Location [facility]   OT Discharge Planning   OT Plan ambulation/endurance w/ device   OT  Discharge Recommendation (DC Rec) home with outpatient cardiac rehab   OT Rationale for DC Rec Pt moving well w/ SBA w/ fww- pt lives alone in one [facility] son is able to assist w/ heavier IADL tasks as needed.   OT Brief overview of current status SBA fww   Total Session Time   Timed Code Treatment Minutes 18   Total Session Time (sum of timed and untimed services) 28

## 2024-06-26 NOTE — CONSULTS
SPIRITUAL HEALTH SERVICES Progress Note  [facility], P3    Saw pt Lalitha Underwood per consult order/assessment visit.    Patient/Family Understanding of Illness and Goals of Care - Brief visit with Serene, who shared about her valve replacement. She reports it went very well and was very complimentary of the staff. She is hoping to go home this afternoon.     Distress and Loss - N/A    Strengths, Coping, and Resources - N/A    Meaning, Beliefs, and Spirituality - Patient comes from [employer] tino background and derives meaning, purpose, and comfort from tino.     Plan of Care - No further plans to visit at this time, but  staff available if further needs arise.     Matthew Hale MDiv, [facility]  /Manager Spiritual Health Services  [phone]       Spiritual Health Services is available 24/7 for emergent requests and consults, either by paging the on-call  or by entering an ASAP/STAT consult in Qoture, which will also page the on-call .

## 2024-06-26 NOTE — PLAN OF CARE
Cardiac Rehab Discharge Summary    Reason for therapy discharge:    Discharged to home with outpatient therapy.    Progress towards therapy goal(s). See goals on Care Plan in Epic electronic health record for goal details.  Goals partially met.  Barriers to achieving goals:   discharge from facility.    Therapy recommendation(s):    Continued therapy is recommended.  Rationale/Recommendations:  home w/ outpatient cardiac rehab .    MAIDA Naidu, OTR/L, 6/26/2024, 3:55 PM

## 2024-06-26 NOTE — PROGRESS NOTES
Pacemaker interrogated.  Machine reported it will not print off results, and to call [place].  [phone]    Tim Wyatt RN

## 2024-06-27 ENCOUNTER — PATIENT OUTREACH (OUTPATIENT)
Dept: CARE COORDINATION | Facility: CLINIC | Age: 79
End: 2024-06-27
Payer: MEDICARE

## 2024-06-27 ENCOUNTER — TELEPHONE (OUTPATIENT)
Dept: CARE COORDINATION | Facility: CLINIC | Age: 79
End: 2024-06-27
Payer: MEDICARE

## 2024-06-27 DIAGNOSIS — Z95.2 S/P TAVR (TRANSCATHETER AORTIC VALVE REPLACEMENT): Primary | ICD-10-CM

## 2024-06-27 NOTE — TELEPHONE ENCOUNTER
Clinic Care Coordination Contact    Spoke with Serene today for Post Hospital follow up call.  She is asking to be able to attend Cardiac Rehab at a location closer to her home.    She is asking if your team can fax the order to   [facility]  Phone  [phone]  Fax  [phone]  Please call Serene directly with any questions.

## 2024-06-27 NOTE — PROGRESS NOTES
Clinic Care Coordination Contact  Transitions of Care Outreach  Chief Complaint   Patient presents with    Clinic Care Coordination - Post Hospital       Most Recent Admission Date: 6/25/2024   Most Recent Admission Diagnosis: Nonrheumatic aortic valve stenosis - I35.0     Most Recent Discharge Date: 6/26/2024   Most Recent Discharge Diagnosis: Nonrheumatic aortic valve stenosis - I35.0  S/P TAVR (transcatheter aortic valve replacement) - Z95.2     Transitions of Care Assessment    Discharge Assessment  How are you doing now that you are home?: Spoke with Serene.  She stated she is glad to be home and slept very well last night.  She denies any CCC needs.  She is asking to be able to attend Cardiac Rehab closer to her home due to transportation and restrictions.  Writer will send a telephone encounter to cardiology.  How are your symptoms? (Red Flag symptoms escalate to triage hotline per guidelines): Improved  Do you know how to contact your clinic care team if you have future questions or changes to your health status? : Yes  Does the patient have their discharge instructions? : Yes  Does the patient have questions regarding their discharge instructions? : No  Were you started on any new medications or were there changes to any of your previous medications? : Yes  Does the patient have all of their medications?: Yes  Do you have questions regarding any of your medications? : No  Do you have all of your needed medical supplies or equipment (DME)?  (i.e. oxygen tank, CPAP, cane, etc.): Yes    Post-op (CHW CTA Only)  If the patient had a surgery or procedure, do they have any questions for a nurse?: No    Post-op (Clinicians Only)  Did the patient have surgery or a procedure: Yes  Incision: closed;healing  Drainage: No  Bleeding: none  Fever: No  Chills: No  Redness: No  Warmth: No  Swelling: No  Incision site pain: No  Closure: none  Eating & Drinking: eating and drinking without complaints/concerns  PO Intake:  regular diet  Bowel Function: normal  Date of last BM: 06/26/24  Urinary Status: voiding without complaint/concerns        Follow up Plan     Discharge Follow-Up  Discharge follow up appointment scheduled in alignment with recommended follow up timeframe or Transitions of Risk Category? (Low = within 30 days; Moderate= within 14 days; High= within 7 days): Yes  Discharge Follow Up Appointment Date: 07/10/24  Discharge Follow Up Appointment Scheduled with?: Specialty Care Provider    Future Appointments   Date Time Provider Department Center   7/2/2024 10:00 AM [facility] CARDIAC REHAB RESOURCE 2 [facility]   7/10/2024 12:50 PM [facility]   7/29/2024 12:00 AM JN HCC REMOTE DEVICE CHECK FROM HOME CVN [facility]   7/29/2024  1:45 PM [facility]   7/30/2024 10:30 AM [facility] ENDOCRINOLOGY CSS [facility]   7/31/2024  2:30 PM HCC LAB [facility]   7/31/2024  2:50 PM [facility]   12/9/2024  1:00 PM Ev Plaza APRN CNP [facility]   1/2/2025 11:30 AM [facility]   1/7/2025 11:30 AM Lalitha Haq, NP [facility]       Outpatient Plan as outlined on AVS reviewed with patient.    For any urgent concerns, please contact our 24 hour nurse triage line: [phone])       Shannan Becerra RN

## 2024-06-28 ENCOUNTER — MEDICAL CORRESPONDENCE (OUTPATIENT)
Dept: HEALTH INFORMATION MANAGEMENT | Facility: CLINIC | Age: 79
End: 2024-06-28
Payer: MEDICARE

## 2024-06-28 NOTE — TELEPHONE ENCOUNTER
New order placed under MD. Faxed over to pts preferred location. Fantrotter message sent to patient.     Margy Solomon RN on 6/28/2024 at 1:54 PM

## 2024-07-02 ENCOUNTER — HOSPITAL ENCOUNTER (OUTPATIENT)
Dept: CARDIAC REHAB | Facility: HOSPITAL | Age: 79
Discharge: HOME | End: 2024-07-02
Attending: PHYSICIAN ASSISTANT
Payer: MEDICARE

## 2024-07-02 DIAGNOSIS — Z95.2 S/P TAVR (TRANSCATHETER AORTIC VALVE REPLACEMENT): ICD-10-CM

## 2024-07-02 PROCEDURE — 93798 PHYS/QHP OP CAR RHAB W/ECG: CPT

## 2024-07-03 NOTE — TELEPHONE ENCOUNTER
Incoming call from [facility] rehab. They stated they do not provide cardiac rehab. Call placed to patient and updated, she stated that she was aware of this and was having difficulty with the walk into the [facility] rehab location but she would like to schedule more sessions. Call placed to rehab and they will call patient to set these up. -SC

## 2024-07-08 ENCOUNTER — TELEPHONE (OUTPATIENT)
Dept: CARDIOLOGY | Facility: CLINIC | Age: 79
End: 2024-07-08
Payer: MEDICARE

## 2024-07-08 NOTE — TELEPHONE ENCOUNTER
Patient calling to ask if she needs a  for her appt on Wednesday 7/10. We discussed since she is 2 full weeks out from procedure if she is feeling well she can drive herself. Pt verbalized understanding.     Margy Solomon RN on 7/8/2024 at 1:27 PM

## 2024-07-09 ENCOUNTER — HOSPITAL ENCOUNTER (OUTPATIENT)
Dept: CARDIAC REHAB | Facility: HOSPITAL | Age: 79
Discharge: HOME | End: 2024-07-09
Attending: INTERNAL MEDICINE
Payer: MEDICARE

## 2024-07-09 PROCEDURE — 93798 PHYS/QHP OP CAR RHAB W/ECG: CPT

## 2024-07-10 ENCOUNTER — OFFICE VISIT (OUTPATIENT)
Dept: CARDIOLOGY | Facility: CLINIC | Age: 79
End: 2024-07-10
Payer: MEDICARE

## 2024-07-10 VITALS
HEART RATE: 99 BPM | DIASTOLIC BLOOD PRESSURE: 70 MMHG | WEIGHT: 203 LBS | RESPIRATION RATE: 16 BRPM | SYSTOLIC BLOOD PRESSURE: 132 MMHG | BODY MASS INDEX: 34.84 KG/M2

## 2024-07-10 DIAGNOSIS — I73.9 PAD (PERIPHERAL ARTERY DISEASE) (H): ICD-10-CM

## 2024-07-10 DIAGNOSIS — Z95.2 S/P TAVR (TRANSCATHETER AORTIC VALVE REPLACEMENT): ICD-10-CM

## 2024-07-10 DIAGNOSIS — I44.1 SECOND DEGREE HEART BLOCK: ICD-10-CM

## 2024-07-10 DIAGNOSIS — Z95.0 CARDIAC PACEMAKER IN SITU: ICD-10-CM

## 2024-07-10 DIAGNOSIS — J44.9 CHRONIC OBSTRUCTIVE PULMONARY DISEASE, UNSPECIFIED COPD TYPE (H): ICD-10-CM

## 2024-07-10 DIAGNOSIS — I27.81 COR PULMONALE (CHRONIC) (H): ICD-10-CM

## 2024-07-10 DIAGNOSIS — I10 ESSENTIAL HYPERTENSION, BENIGN: ICD-10-CM

## 2024-07-10 DIAGNOSIS — I35.0 AORTIC STENOSIS, SEVERE: Primary | ICD-10-CM

## 2024-07-10 PROCEDURE — 99214 OFFICE O/P EST MOD 30 MIN: CPT | Performed by: INTERNAL MEDICINE

## 2024-07-10 PROCEDURE — G2211 COMPLEX E/M VISIT ADD ON: HCPCS | Performed by: INTERNAL MEDICINE

## 2024-07-10 NOTE — PATIENT INSTRUCTIONS
Lalitha Underwood,    It was a pleasure to see you today in the clinic regarding your recent TAVR.     My recommendations after this visit include:     - no medication changes    My treatment team includes: myself, Ana Lancaster PA-C, Dr. Gonzales and Dr. Croft    You should followup with me on July 31  Don't forget your echo on July 29    **Remember you will need prophylactic antibiotics for all dental visits in the future.  You can get the Rx from your dentist, your PCP or from us.  If possible, still refrain from going to the dentist until 6 months or more after your valve replacement      If you have questions or concerns, please call using the numbers below:      Valve Clinic Phone   [phone]    After Hours/Scheduling  [phone]    Otherwise you can dial the nurse directly at:    Brandon Solomon RN  [phone]    Sepideh Baeza RN  [phone]

## 2024-07-10 NOTE — PROGRESS NOTES
"HEART CARE ENCOUNTER NOTE       [facility]  [phone]    Assessment/Recommendations   Assessment:  Severe LFLG aortic stenosis - s/p TAVR on 6/25/2024, using a 23 mm AIDAN 3 ultra valve  Chronic heart failure with preserved ejection fraction, cor pulmonale/right hear failure - currently compensated  Severe, oxygen dependent COPD with chronic hypoxic respiratory failure - managed by neurology and currently using as needed albuterol, scheduled fluticasone/salmeterol, scheduled tiotropium and duonebs  History of Mobitz type II second-degree AV block - PPM in situ  Hypertension -blood pressure today is at goal  Obesity with BMI 35  Seizure disorder managed on Keppra  Anemia of chronic disease  Chronic hypomagnesemia on supplementation    Plan:  Continue cardiac rehab with plans to go back to PT/OT after  Okay to resume all other activities at this time with no restrictions  Continue aspirin 81 mg daily indefinitely  Prophylactic antibiotics prior to all dental visits moving forward  As long as PCP is in agreement, okay to stop iron or at least decrease dose  Continue hydrochlorothiazide, spironolactone and losartan with no change to dosages  Repeat echocardiogram on July 29 and 1 month follow-up with me on July 31  She should see Dr. Robertson in about 6 months    Thank you for the opportunity to participate in the care of Lalitha Underwood. It's been a pleasure working with her.  Please do not hesitate to call with any questions or concerns.      The longitudinal plan of care for aortic stenosis, HTN and HF was addressed during this visit.  Due to added complexity of care, we will continue to support Serene and the subsequent management of this condition(s) and with the ongoing continuity of care of this condition(s).         History of Present Illness/Subjective    I had the opportunity to see Lalitha Underwood at the [facility] for her 1 week visit after TAVR.     \"Serene\" has a past " medical history significant for aortic valve stenosis, chronic heart failure with preserved ejection fraction, severe COPD, obesity, coronary atherosclerosis, heart block status post PPM, PAD, seizure disorder, hyperlipidemia and hypertension. She was recently found to have progression of her severe aortic stenosis to the severe range.    She underwent transcatheter aortic valve replacement on June 25.  Since procedure, she has felt about the same and is still short of breath with very minimal activity.  She knew that it was a long shot but the new valve heart make her breathing better given her chronic and severe lung disease, but she does understand that this will extend her life    She says she is still feeling palpitations at night, but denies exertional chest discomfort, palpitations, shortness of breath at rest, PND, orthopnea, lightheadedness, dizziness, pre-syncope or syncope.  Lalitha Underwood also denies any recent weight loss, changes in appetite, nausea or vomiting.   ____________________________________________________________  Echo: 6/26/24 (POD #1)  Interpretation Summary     1. Normal left ventricular size and systolic performance with a visually  estimated ejection fraction of 60-65%.  2. There is abnormal septal motion consistent with ventricular paced rhythm.  3. There is a bio-prosthetic aortic valve (documented 23 mm Correia Minerva 3  Ultra tissue valve).  Â  Normal aortic valve prosthesis metrics with a mean systolic gradient of 15  mmHg and a peak anterograde velocity of 2.6 m/sec.  Â  No aortic insufficiency is detected.  4. Normal right ventricular size and systolic performance.         Physical Examination Review of Systems   Vitals: /70 (BP Location: Other (Comment), Patient Position: Sitting, Cuff Size: Adult Regular)   Pulse 99   Resp 16   Wt 92.1 kg (203 lb)   BMI 34.84 kg/m    BMI= Body mass index is 34.84 kg/m .  Wt Readings from Last 3 Encounters:   07/10/24 92.1 kg (203  lb)   06/26/24 93.2 kg (205 lb 8 oz)   06/24/24 92.5 kg (204 lb)       General Appearance:   Alert, cooperative and in no acute distress   ENT/Mouth: membranes moist, no oral lesions or bleeding gums.      EYES:  no scleral icterus, normal conjunctivae   Neck: Supple without lymphadenopathy.  Thyroid not visualized   Chest/Lungs:   lungs are clear to auscultation, with occasional expiratory wheezing   Cardiovascular:   Regular. Normal first and second heart sounds with 1/6 systolic murmur.  N orubs, or gallops; the carotid, radial and posterior tibial pulses are intact, no edema bilaterally    Abdomen:  Soft and nontender. Bowel sounds are present in all quadrants   Extremities: no cyanosis or clubbing.  Puncture site is soft and nontender with no hematoma or drainage.  There is evidence of yeast infection bilaterally   Skin: no xanthelasma, warm.    Neurologic: normal gait, normal  bilateral, no tremors   Psychiatric: Normal mood and affect       Please refer above for cardiac ROS details.      Medical History  Surgical History Family History Social History   Past Medical History:   Diagnosis Date    Convulsive disorder (H)     Convulsive disorder (H)     COPD (chronic obstructive pulmonary disease) (H)     COPD (chronic obstructive pulmonary disease) (H)     Coronary artery disease involving native coronary artery with unstable angina pectoris (H) 3/31/2023    Depression     Dyspnea on exertion     Gastroesophageal reflux disease     Heart murmur     Hernia of unspecified site of abdominal cavity without mention of obstruction or gangrene     Hiatal hernia     Hiatal hernia     Hypertension     Hypertension     Obese     On home oxygen therapy     at 1.5 liters at nite    Osteopenia     Osteopenia     Oxygen dependent     1.5 L per NC    Pneumonia due to infectious organism, unspecified laterality, unspecified part of lung 3/19/2022    Second degree heart block 3/19/2022    Shortness of breath     Status post  coronary angiogram 6/3/2024    Uncomplicated asthma     URI (upper respiratory infection)     Venous insufficiency of both lower extremities     Venous insufficiency of both lower extremities     Walking troubles      Past Surgical History:   Procedure Laterality Date    AMPUTATE TOE(S) Left 8/11/2022    Procedure: AMPUTATION, fifth digit left foot;  Surgeon: Alfonso Orozco DPM;  Location: [facility]    ARTHROPLASTY TOE(S) Left 11/22/2021    Procedure: ARTHROPLASTY, digits two and three left foot;  Surgeon: Alfonso Orozco DPM;  Location: [facility]    ARTHROPLASTY TOE(S) Left 7/18/2022    Procedure: ARTHROPLASTY, digits four and five left foot;  Surgeon: Alfonso Orozco DPM;  Location: [facility]    CV CORONARY ANGIOGRAM N/A 7/7/2023    Procedure: Coronary Angiogram;  Surgeon: Elijah Leger MD;  Location: [facility] CATH LAB CV    CV CORONARY ANGIOGRAM N/A 6/3/2024    Procedure: Coronary Angiogram;  Surgeon: Villa Croft MD;  Location: [facility] CATH LAB CV    CV LEFT HEART CATH N/A 7/7/2023    Procedure: Left Heart Catheterization;  Surgeon: Elijah Leger MD;  Location: [facility] CATH LAB CV    CV RIGHT HEART CATH MEASUREMENTS RECORDED N/A 7/7/2023    Procedure: Right Heart Catheterization with [place];  Surgeon: Elijah Leger MD;  Location: [facility] CATH LAB CV    CV RIGHT HEART CATH MEASUREMENTS RECORDED N/A 6/3/2024    Procedure: Right Heart Catheterization;  Surgeon: Villa Croft MD;  Location: [facility] CATH LAB CV    CV TRANSCATHETER AORTIC VALVE REPLACEMENT-FEMORAL APPROACH N/A 6/25/2024    Procedure: Transcatheter Aortic Valve Replacement-Femoral Approach;  Surgeon: Anil Gonzales MD;  Location: [facility] CATH LAB CV    EP PACEMAKER DEVICE & LEAD IMPLANT- RIGHT ATRIAL & RIGHT VENTRICULAR N/A 3/24/2022    Procedure: Pacemaker Device & Lead Implant - Right Atrial & Right Ventricular;  Surgeon: Helder Pendleton MD;  Location: [facility]  CV    ESOPHAGOSCOPY, GASTROSCOPY, DUODENOSCOPY (EGD), COMBINED N/A 2018    Procedure: Esophagogastroduodenoscopy;  Surgeon: Jasmeet Wilder MD;  Location: [facility] OR    HERNIA REPAIR, UMBILICAL  2018    HERNIA REPAIR, UMBILICAL  2018    Dr. Jimenez    LAPAROSCOPIC HERNIORRHAPHY HIATAL N/A 2018    Procedure: Laparoscopic Hiatal Hernia Repair,bilateral chest tubes;  Surgeon: Jasmeet Wilder MD;  Location: [facility] OR    OR TRANSCATHETER AORTIC VALVE REPLACEMENT, FEMORAL PERCUTANEOUS APPROACH (STANDBY) N/A 2024    Procedure: OR TRANSCATHETER AORTIC VALVE REPLACEMENT, FEMORAL PERCUTANEOUS APPROACH (STANDBY);  Surgeon: Sepideh Devlin MD;  Location: [facility] CV    OTHER SURGICAL HISTORY Left 2003    Broke titanium in left arm    Repair arm fracture Left     SHOULDER SURGERY Right 1967    SHOULDER SURGERY Right     US BIOPSY THYROID FINE NEEDLE ASPIRATION  2020     Family History   Problem Relation Age of Onset    Pulmonary Hypertension Daughter         idiopathic     Heart Disease Other         2 sibs MI, 1 sib electrical conduction disorder    Breast Cancer Mother 66.00    Hypertension Mother     Coronary Artery Disease Mother     Varicose Veins Mother     Hypertension Father     Coronary Artery Disease Sister     Heart Disease Sister     Diabetes Son     Pulmonary Hypertension Daughter     Coronary Artery Disease Sister     Heart Disease Sister     Social History     Socioeconomic History    Marital status:      Spouse name: Not on file    Number of children: Not on file    Years of education: Not on file    Highest education level: Not on file   Occupational History    Not on file   Tobacco Use    Smoking status: Former     Current packs/day: 0.00     Average packs/day: 1.5 packs/day for 38.0 years (57.0 ttl pk-yrs)     Types: Cigarettes     Start date: 1960     Quit date: 1998     Years since quittin.6    Smokeless tobacco: Never    Tobacco  comments:     quit in 1998   Vaping Use    Vaping status: Never Used   Substance and Sexual Activity    Alcohol use: Yes     Alcohol/week: 2.0 standard drinks of alcohol     Comment: occ    Drug use: Not Currently    Sexual activity: Never   Other Topics Concern    Not on file   Social History Narrative    .  Two kids.  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     Social Determinants of Health     Financial Resource Strain: Low Risk  (5/6/2024)    Financial Resource Strain     Within the past 12 months, have you or your family members you live with been unable to get utilities (heat, electricity) when it was really needed?: No   Food Insecurity: Low Risk  (5/6/2024)    Food Insecurity     Within the past 12 months, did you worry that your food would run out before you got money to buy more?: No     Within the past 12 months, did the food you bought just not last and you didn t have money to get more?: No   Transportation Needs: Low Risk  (5/6/2024)    Transportation Needs     Within the past 12 months, has lack of transportation kept you from medical appointments, getting your medicines, non-medical meetings or appointments, work, or from getting things that you need?: No   Physical Activity: Patient Declined (5/6/2024)    Exercise Vital Sign     Days of Exercise per Week: Patient declined     Minutes of Exercise per Session: Patient declined   Stress: Stress Concern Present (5/6/2024)    [employer] [employer] - Occupational Stress Questionnaire     Feeling of Stress : To some extent   Social Connections: Unknown (5/6/2024)    Social Connection and Isolation Panel [NHANES]     Frequency of Communication with Friends and Family: Not on file     Frequency of Social Gatherings with Friends and Family: Twice a week     Attends Yarsanism Services: Not on file     Active Member of Clubs or Organizations: Not on file     Attends Club or Organization Meetings: Not on file     Marital Status: Not on file    Interpersonal Safety: Low Risk  (5/13/2024)    Interpersonal Safety     Do you feel physically and emotionally safe where you currently live?: Yes     Within the past 12 months, have you been hit, slapped, kicked or otherwise physically hurt by someone?: No     Within the past 12 months, have you been humiliated or emotionally abused in other ways by your partner or ex-partner?: No   Housing Stability: Low Risk  (5/6/2024)    Housing Stability     Do you have housing? : Yes     Are you worried about losing your housing?: No          Medications  Allergies   Current Outpatient Medications   Medication Sig Dispense Refill    albuterol (PROAIR HFA/PROVENTIL HFA/VENTOLIN HFA) 108 (90 Base) MCG/ACT inhaler Inhale 2 puffs into the lungs every 6 hours as needed for shortness of breath or wheezing 18 g 1    aspirin 81 MG EC tablet Take 1 tablet (81 mg) by mouth daily      azithromycin (ZITHROMAX) 250 MG tablet Take 1 tablet (250 mg) by mouth every evening 90 tablet 3    benzonatate (TESSALON) 100 MG capsule Take 1 capsule (100 mg) by mouth 3 times daily as needed for cough 90 capsule 3    Biotin 10 MG CAPS Take 1 capsule by mouth daily      calcium citrate (CITRACAL) 950 (200 Ca) MG tablet Take 1 tablet by mouth 2 times daily      estradiol (ESTRACE) 0.1 MG/GM vaginal cream Place 2 g vaginally three times a week 72 g 1    ferrous sulfate (FEROSUL) 325 (65 Fe) MG tablet Take 1 tablet (325 mg) by mouth daily (with breakfast) 90 tablet 1    fluticasone-salmeterol (ADVAIR) 500-50 MCG/ACT inhaler Inhale 1 puff into the lungs every 12 hours 180 each 3    guaiFENesin (MUCINEX) 600 MG 12 hr tablet Take 1 tablet (600 mg) by mouth 2 times daily 180 tablet 3    hydrochlorothiazide (HYDRODIURIL) 25 MG tablet Take 1 tablet (25 mg) by mouth daily 90 tablet 3    ipratropium - albuterol 0.5 mg/2.5 mg/3 mL (DUONEB) 0.5-2.5 (3) MG/3ML neb solution TAKE 1 VIAL BY NEBULIZATION EVERY 6 HOURS AS NEEDED FOR SHORTNESS OF BREATH OR WHEEZING 1080  mL 3    levalbuterol (XOPENEX) 1.25 MG/3ML neb solution Take 3 mLs (1.25 mg) by nebulization every 4 hours as needed for shortness of breath or wheezing 810 mL 3    levETIRAcetam (KEPPRA) 500 MG tablet Take 1 tablet (500 mg) by mouth 2 times daily 180 tablet 3    losartan (COZAAR) 50 MG tablet Take 1 tablet (50 mg) by mouth 2 times daily 180 tablet 3    magnesium 250 MG tablet Take 1 tablet by mouth 2 times daily      nystatin (MYCOSTATIN) 129854 UNIT/GM external powder Apply topically daily as needed      potassium chloride teja ER (KLOR-CON M20) 20 MEQ CR tablet Take 1 tablet (20 mEq) by mouth at bedtime 90 tablet 3    RABEprazole (ACIPHEX) 20 MG EC tablet Take 1 tablet (20 mg) by mouth daily 90 tablet 3    sertraline (ZOLOFT) 100 MG tablet Take 1 tablet (100 mg) by mouth every evening 90 tablet 3    solifenacin (VESICARE) 10 MG tablet Take 1 tablet (10 mg) by mouth daily 90 tablet 3    spironolactone (ALDACTONE) 25 MG tablet Take 0.5 tablets (12.5 mg) by mouth daily 45 tablet 3    tiotropium (SPIRIVA RESPIMAT) 2.5 MCG/ACT inhaler Inhale 2 puffs into the lungs daily 12 g 3    vitamin D3 (CHOLECALCIFEROL) 125 MCG (5000 UT) tablet Take 5,000 Units by mouth three times a week Monday, Wednesday and Friday      Allergies   Allergen Reactions    Clindamycin Rash    Carbamazepine Rash    Morphine Nausea         Lab Results    Chemistry/lipid CBC Cardiac Enzymes/BNP/TSH/INR   Recent Labs   Lab Test 05/13/24  1016   CHOL 171   HDL 74   LDL 87   TRIG 52     Recent Labs   Lab Test 05/13/24  1016 07/07/23  0703 04/27/23  1322   LDL 87 99 110*     Recent Labs   Lab Test 06/26/24  0438      POTASSIUM 4.4   CHLORIDE 101   CO2 30*   *   BUN 23.6*   CR 0.82   GFRESTIMATED 72   TENZIN 8.9     Recent Labs   Lab Test 06/26/24  0438 06/25/24  1311 06/25/24  0925   CR 0.82 0.92 1.01*     Recent Labs   Lab Test 02/22/22  1110   A1C 5.4    Recent Labs   Lab Test 06/26/24  0438   WBC 9.7   HGB 8.7*   HCT 28.5*   MCV 82         Recent Labs   Lab Test 06/26/24  0438 06/25/24  1311 06/24/24  1043   HGB 8.7* 9.0* 9.7*    Recent Labs   Lab Test 03/20/22  0759 03/19/22  2336 03/19/22  1441   TROPONINI <0.01 <0.01 0.01     Recent Labs   Lab Test 06/24/24  1043 06/28/23  1256 05/15/23  1149 04/19/22  1514 03/19/22  1441 11/13/18  1546   BNP  --   --   --   --  40  --    NTBNPI  --   --  1,004 508  --   --    NTBNP 301 218  --   --   --  63     Recent Labs   Lab Test 04/19/22  1514   TSH 3.59     Recent Labs   Lab Test 06/24/24  1043   INR 1.11            This note has been dictated using voice recognition software. Any grammatical or context distortions are unintentional and inherent to the software.

## 2024-07-10 NOTE — LETTER
7/10/2024    Kate Marte DO  [address]  [facility] [address]    RE: Lalitha Underwood       Dear Colleague,     I had the pleasure of seeing Lalitha Underwood in the [facility].  HEART CARE ENCOUNTER NOTE       [facility]  [phone]    Assessment/Recommendations   Assessment:  Severe LFLG aortic stenosis - s/p TAVR on 6/25/2024, using a 23 mm AIDAN 3 ultra valve  Chronic heart failure with preserved ejection fraction, cor pulmonale/right hear failure - currently compensated  Severe, oxygen dependent COPD with chronic hypoxic respiratory failure - managed by neurology and currently using as needed albuterol, scheduled fluticasone/salmeterol, scheduled tiotropium and duonebs  History of Mobitz type II second-degree AV block - PPM in situ  Hypertension -blood pressure today is at goal  Obesity with BMI 35  Seizure disorder managed on Keppra  Anemia of chronic disease  Chronic hypomagnesemia on supplementation    Plan:  Continue cardiac rehab with plans to go back to PT/OT after  Okay to resume all other activities at this time with no restrictions  Continue aspirin 81 mg daily indefinitely  Prophylactic antibiotics prior to all dental visits moving forward  As long as PCP is in agreement, okay to stop iron or at least decrease dose  Continue hydrochlorothiazide, spironolactone and losartan with no change to dosages  Repeat echocardiogram on July 29 and 1 month follow-up with me on July 31  She should see Dr. Robertson in about 6 months    Thank you for the opportunity to participate in the care of Lalitha Underwood. It's been a pleasure working with her.  Please do not hesitate to call with any questions or concerns.      The longitudinal plan of care for aortic stenosis, HTN and HF was addressed during this visit.  Due to added complexity of care, we will continue to support Serene and the subsequent management of this condition(s) and with the ongoing continuity of care  "of this condition(s).         History of Present Illness/Subjective    I had the opportunity to see Lalitha Underwood at the [facility] for her 1 week visit after TAVR.     \"Serene\" has a past medical history significant for aortic valve stenosis, chronic heart failure with preserved ejection fraction, severe COPD, obesity, coronary atherosclerosis, heart block status post PPM, PAD, seizure disorder, hyperlipidemia and hypertension. She was recently found to have progression of her severe aortic stenosis to the severe range.    She underwent transcatheter aortic valve replacement on June 25.  Since procedure, she has felt about the same and is still short of breath with very minimal activity.  She knew that it was a long shot but the new valve heart make her breathing better given her chronic and severe lung disease, but she does understand that this will extend her life    She says she is still feeling palpitations at night, but denies exertional chest discomfort, palpitations, shortness of breath at rest, PND, orthopnea, lightheadedness, dizziness, pre-syncope or syncope.  Lalitha Underwood also denies any recent weight loss, changes in appetite, nausea or vomiting.   ____________________________________________________________  Echo: 6/26/24 (POD #1)  Interpretation Summary     1. Normal left ventricular size and systolic performance with a visually  estimated ejection fraction of 60-65%.  2. There is abnormal septal motion consistent with ventricular paced rhythm.  3. There is a bio-prosthetic aortic valve (documented 23 mm Correia Minerva 3  Ultra tissue valve).  Â  Normal aortic valve prosthesis metrics with a mean systolic gradient of 15  mmHg and a peak anterograde velocity of 2.6 m/sec.  Â  No aortic insufficiency is detected.  4. Normal right ventricular size and systolic performance.         Physical Examination Review of Systems   Vitals: /70 (BP Location: Other (Comment), Patient " Position: Sitting, Cuff Size: Adult Regular)   Pulse 99   Resp 16   Wt 92.1 kg (203 lb)   BMI 34.84 kg/m    BMI= Body mass index is 34.84 kg/m .  Wt Readings from Last 3 Encounters:   07/10/24 92.1 kg (203 lb)   06/26/24 93.2 kg (205 lb 8 oz)   06/24/24 92.5 kg (204 lb)       General Appearance:   Alert, cooperative and in no acute distress   ENT/Mouth: membranes moist, no oral lesions or bleeding gums.      EYES:  no scleral icterus, normal conjunctivae   Neck: Supple without lymphadenopathy.  Thyroid not visualized   Chest/Lungs:   lungs are clear to auscultation, with occasional expiratory wheezing   Cardiovascular:   Regular. Normal first and second heart sounds with 1/6 systolic murmur.  N orubs, or gallops; the carotid, radial and posterior tibial pulses are intact, no edema bilaterally    Abdomen:  Soft and nontender. Bowel sounds are present in all quadrants   Extremities: no cyanosis or clubbing.  Puncture site is soft and nontender with no hematoma or drainage.  There is evidence of yeast infection bilaterally   Skin: no xanthelasma, warm.    Neurologic: normal gait, normal  bilateral, no tremors   Psychiatric: Normal mood and affect       Please refer above for cardiac ROS details.      Medical History  Surgical History Family History Social History   Past Medical History:   Diagnosis Date    Convulsive disorder (H)     Convulsive disorder (H)     COPD (chronic obstructive pulmonary disease) (H)     COPD (chronic obstructive pulmonary disease) (H)     Coronary artery disease involving native coronary artery with unstable angina pectoris (H) 3/31/2023    Depression     Dyspnea on exertion     Gastroesophageal reflux disease     Heart murmur     Hernia of unspecified site of abdominal cavity without mention of obstruction or gangrene     Hiatal hernia     Hiatal hernia     Hypertension     Hypertension     Obese     On home oxygen therapy     at 1.5 liters at nite    Osteopenia     Osteopenia      Oxygen dependent     1.5 L per NC    Pneumonia due to infectious organism, unspecified laterality, unspecified part of lung 3/19/2022    Second degree heart block 3/19/2022    Shortness of breath     Status post coronary angiogram 6/3/2024    Uncomplicated asthma     URI (upper respiratory infection)     Venous insufficiency of both lower extremities     Venous insufficiency of both lower extremities     Walking troubles      Past Surgical History:   Procedure Laterality Date    AMPUTATE TOE(S) Left 8/11/2022    Procedure: AMPUTATION, fifth digit left foot;  Surgeon: Alfonso Orozco DPM;  Location: [facility]    ARTHROPLASTY TOE(S) Left 11/22/2021    Procedure: ARTHROPLASTY, digits two and three left foot;  Surgeon: Alfonso Orozco DPM;  Location: [facility]    ARTHROPLASTY TOE(S) Left 7/18/2022    Procedure: ARTHROPLASTY, digits four and five left foot;  Surgeon: Alfonso Orozco DPM;  Location: [facility]    CV CORONARY ANGIOGRAM N/A 7/7/2023    Procedure: Coronary Angiogram;  Surgeon: Elijah Leger MD;  Location: [facility] CATH LAB CV    CV CORONARY ANGIOGRAM N/A 6/3/2024    Procedure: Coronary Angiogram;  Surgeon: Villa Croft MD;  Location: [facility] CV    CV LEFT HEART CATH N/A 7/7/2023    Procedure: Left Heart Catheterization;  Surgeon: Elijah Leger MD;  Location: [facility] CATH LAB CV    CV RIGHT HEART CATH MEASUREMENTS RECORDED N/A 7/7/2023    Procedure: Right Heart Catheterization with [place];  Surgeon: Elijah Leger MD;  Location: [facility] CATH LAB CV    CV RIGHT HEART CATH MEASUREMENTS RECORDED N/A 6/3/2024    Procedure: Right Heart Catheterization;  Surgeon: Villa Croft MD;  Location: [facility] LAB CV    CV TRANSCATHETER AORTIC VALVE REPLACEMENT-FEMORAL APPROACH N/A 6/25/2024    Procedure: Transcatheter Aortic Valve Replacement-Femoral Approach;  Surgeon: Anil Gonzales MD;  Location: [facility] CATH LAB CV    EP PACEMAKER  DEVICE & LEAD IMPLANT- RIGHT ATRIAL & RIGHT VENTRICULAR N/A 3/24/2022    Procedure: Pacemaker Device & Lead Implant - Right Atrial & Right Ventricular;  Surgeon: Helder Pendleton MD;  Location: [facility] LAB CV    ESOPHAGOSCOPY, GASTROSCOPY, DUODENOSCOPY (EGD), COMBINED N/A 11/26/2018    Procedure: Esophagogastroduodenoscopy;  Surgeon: Jasmeet Wilder MD;  Location: [facility] OR    HERNIA REPAIR, UMBILICAL  04/2018    HERNIA REPAIR, UMBILICAL  04/04/2018    Dr. Jimenez    LAPAROSCOPIC HERNIORRHAPHY HIATAL N/A 11/26/2018    Procedure: Laparoscopic Hiatal Hernia Repair,bilateral chest tubes;  Surgeon: Jasmeet Wilder MD;  Location: [facility] OR    OR TRANSCATHETER AORTIC VALVE REPLACEMENT, FEMORAL PERCUTANEOUS APPROACH (STANDBY) N/A 6/25/2024    Procedure: OR TRANSCATHETER AORTIC VALVE REPLACEMENT, FEMORAL PERCUTANEOUS APPROACH (STANDBY);  Surgeon: Sepideh Devlin MD;  Location: [facility] CV    OTHER SURGICAL HISTORY Left 2003    Broke titanium in left arm    Repair arm fracture Left     SHOULDER SURGERY Right 1967    SHOULDER SURGERY Right 1967     BIOPSY THYROID FINE NEEDLE ASPIRATION  7/29/2020     Family History   Problem Relation Age of Onset    Pulmonary Hypertension Daughter         idiopathic     Heart Disease Other         2 sibs MI, 1 sib electrical conduction disorder    Breast Cancer Mother 66.00    Hypertension Mother     Coronary Artery Disease Mother     Varicose Veins Mother     Hypertension Father     Coronary Artery Disease Sister     Heart Disease Sister     Diabetes Son     Pulmonary Hypertension Daughter     Coronary Artery Disease Sister     Heart Disease Sister     Social History     Socioeconomic History    Marital status:      Spouse name: Not on file    Number of children: Not on file    Years of education: Not on file    Highest education level: Not on file   Occupational History    Not on file   Tobacco Use    Smoking status: Former     Current packs/day: 0.00      Average packs/day: 1.5 packs/day for 38.0 years (57.0 ttl pk-yrs)     Types: Cigarettes     Start date: 1960     Quit date: 1998     Years since quittin.6    Smokeless tobacco: Never    Tobacco comments:     quit in    Vaping Use    Vaping status: Never Used   Substance and Sexual Activity    Alcohol use: Yes     Alcohol/week: 2.0 standard drinks of alcohol     Comment: occ    Drug use: Not Currently    Sexual activity: Never   Other Topics Concern    Not on file   Social History Narrative    .  Two kids.  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     Social Determinants of Health     Financial Resource Strain: Low Risk  (2024)    Financial Resource Strain     Within the past 12 months, have you or your family members you live with been unable to get utilities (heat, electricity) when it was really needed?: No   Food Insecurity: Low Risk  (2024)    Food Insecurity     Within the past 12 months, did you worry that your food would run out before you got money to buy more?: No     Within the past 12 months, did the food you bought just not last and you didn t have money to get more?: No   Transportation Needs: Low Risk  (2024)    Transportation Needs     Within the past 12 months, has lack of transportation kept you from medical appointments, getting your medicines, non-medical meetings or appointments, work, or from getting things that you need?: No   Physical Activity: Patient Declined (2024)    Exercise Vital Sign     Days of Exercise per Week: Patient declined     Minutes of Exercise per Session: Patient declined   Stress: Stress Concern Present (2024)    [employer] - Occupational Stress Questionnaire     Feeling of Stress : To some extent   Social Connections: Unknown (2024)    Social Connection and Isolation Panel [NHANES]     Frequency of Communication with Friends and Family: Not on file     Frequency of Social Gatherings with Friends and  Family: Twice a week     Attends Jehovah's witness Services: Not on file     Active Member of Clubs or Organizations: Not on file     Attends Club or Organization Meetings: Not on file     Marital Status: Not on file   Interpersonal Safety: Low Risk  (5/13/2024)    Interpersonal Safety     Do you feel physically and emotionally safe where you currently live?: Yes     Within the past 12 months, have you been hit, slapped, kicked or otherwise physically hurt by someone?: No     Within the past 12 months, have you been humiliated or emotionally abused in other ways by your partner or ex-partner?: No   Housing Stability: Low Risk  (5/6/2024)    Housing Stability     Do you have housing? : Yes     Are you worried about losing your housing?: No          Medications  Allergies   Current Outpatient Medications   Medication Sig Dispense Refill    albuterol (PROAIR HFA/PROVENTIL HFA/VENTOLIN HFA) 108 (90 Base) MCG/ACT inhaler Inhale 2 puffs into the lungs every 6 hours as needed for shortness of breath or wheezing 18 g 1    aspirin 81 MG EC tablet Take 1 tablet (81 mg) by mouth daily      azithromycin (ZITHROMAX) 250 MG tablet Take 1 tablet (250 mg) by mouth every evening 90 tablet 3    benzonatate (TESSALON) 100 MG capsule Take 1 capsule (100 mg) by mouth 3 times daily as needed for cough 90 capsule 3    Biotin 10 MG CAPS Take 1 capsule by mouth daily      calcium citrate (CITRACAL) 950 (200 Ca) MG tablet Take 1 tablet by mouth 2 times daily      estradiol (ESTRACE) 0.1 MG/GM vaginal cream Place 2 g vaginally three times a week 72 g 1    ferrous sulfate (FEROSUL) 325 (65 Fe) MG tablet Take 1 tablet (325 mg) by mouth daily (with breakfast) 90 tablet 1    fluticasone-salmeterol (ADVAIR) 500-50 MCG/ACT inhaler Inhale 1 puff into the lungs every 12 hours 180 each 3    guaiFENesin (MUCINEX) 600 MG 12 hr tablet Take 1 tablet (600 mg) by mouth 2 times daily 180 tablet 3    hydrochlorothiazide (HYDRODIURIL) 25 MG tablet Take 1 tablet (25  mg) by mouth daily 90 tablet 3    ipratropium - albuterol 0.5 mg/2.5 mg/3 mL (DUONEB) 0.5-2.5 (3) MG/3ML neb solution TAKE 1 VIAL BY NEBULIZATION EVERY 6 HOURS AS NEEDED FOR SHORTNESS OF BREATH OR WHEEZING 1080 mL 3    levalbuterol (XOPENEX) 1.25 MG/3ML neb solution Take 3 mLs (1.25 mg) by nebulization every 4 hours as needed for shortness of breath or wheezing 810 mL 3    levETIRAcetam (KEPPRA) 500 MG tablet Take 1 tablet (500 mg) by mouth 2 times daily 180 tablet 3    losartan (COZAAR) 50 MG tablet Take 1 tablet (50 mg) by mouth 2 times daily 180 tablet 3    magnesium 250 MG tablet Take 1 tablet by mouth 2 times daily      nystatin (MYCOSTATIN) 023290 UNIT/GM external powder Apply topically daily as needed      potassium chloride teja ER (KLOR-CON M20) 20 MEQ CR tablet Take 1 tablet (20 mEq) by mouth at bedtime 90 tablet 3    RABEprazole (ACIPHEX) 20 MG EC tablet Take 1 tablet (20 mg) by mouth daily 90 tablet 3    sertraline (ZOLOFT) 100 MG tablet Take 1 tablet (100 mg) by mouth every evening 90 tablet 3    solifenacin (VESICARE) 10 MG tablet Take 1 tablet (10 mg) by mouth daily 90 tablet 3    spironolactone (ALDACTONE) 25 MG tablet Take 0.5 tablets (12.5 mg) by mouth daily 45 tablet 3    tiotropium (SPIRIVA RESPIMAT) 2.5 MCG/ACT inhaler Inhale 2 puffs into the lungs daily 12 g 3    vitamin D3 (CHOLECALCIFEROL) 125 MCG (5000 UT) tablet Take 5,000 Units by mouth three times a week Monday, Wednesday and Friday      Allergies   Allergen Reactions    Clindamycin Rash    Carbamazepine Rash    Morphine Nausea         Lab Results    Chemistry/lipid CBC Cardiac Enzymes/BNP/TSH/INR   Recent Labs   Lab Test 05/13/24  1016   CHOL 171   HDL 74   LDL 87   TRIG 52     Recent Labs   Lab Test 05/13/24  1016 07/07/23  0703 04/27/23  1322   LDL 87 99 110*     Recent Labs   Lab Test 06/26/24  0438      POTASSIUM 4.4   CHLORIDE 101   CO2 30*   *   BUN 23.6*   CR 0.82   GFRESTIMATED 72   TENZIN 8.9     Recent Labs   Lab Test  06/26/24  0438 06/25/24  1311 06/25/24  0925   CR 0.82 0.92 1.01*     Recent Labs   Lab Test 02/22/22  1110   A1C 5.4    Recent Labs   Lab Test 06/26/24  0438   WBC 9.7   HGB 8.7*   HCT 28.5*   MCV 82        Recent Labs   Lab Test 06/26/24  0438 06/25/24  1311 06/24/24  1043   HGB 8.7* 9.0* 9.7*    Recent Labs   Lab Test 03/20/22  0759 03/19/22  2336 03/19/22  1441   TROPONINI <0.01 <0.01 0.01     Recent Labs   Lab Test 06/24/24  1043 06/28/23  1256 05/15/23  1149 04/19/22  1514 03/19/22  1441 11/13/18  1546   BNP  --   --   --   --  40  --    NTBNPI  --   --  1,004 508  --   --    NTBNP 301 218  --   --   --  63     Recent Labs   Lab Test 04/19/22  1514   TSH 3.59     Recent Labs   Lab Test 06/24/24  1043   INR 1.11            This note has been dictated using voice recognition software. Any grammatical or context distortions are unintentional and inherent to the software.                  Thank you for allowing me to participate in the care of your patient.      Sincerely,     JULIO KIMBALL PA-C     [facility]  cc:   Julio Kimball PA-C  [address]  [place] [address]

## 2024-07-11 ENCOUNTER — TELEPHONE (OUTPATIENT)
Dept: PULMONOLOGY | Facility: CLINIC | Age: 79
End: 2024-07-11

## 2024-07-11 ENCOUNTER — OFFICE VISIT (OUTPATIENT)
Dept: FAMILY MEDICINE | Facility: CLINIC | Age: 79
End: 2024-07-11
Payer: MEDICARE

## 2024-07-11 VITALS
HEIGHT: 64 IN | HEART RATE: 97 BPM | OXYGEN SATURATION: 94 % | SYSTOLIC BLOOD PRESSURE: 134 MMHG | BODY MASS INDEX: 35.3 KG/M2 | DIASTOLIC BLOOD PRESSURE: 70 MMHG | WEIGHT: 206.8 LBS | RESPIRATION RATE: 25 BRPM

## 2024-07-11 DIAGNOSIS — Z95.2 S/P TAVR (TRANSCATHETER AORTIC VALVE REPLACEMENT): ICD-10-CM

## 2024-07-11 DIAGNOSIS — Z89.422 ACQUIRED ABSENCE OF OTHER LEFT TOE(S) (H): ICD-10-CM

## 2024-07-11 DIAGNOSIS — D50.9 IRON DEFICIENCY ANEMIA, UNSPECIFIED IRON DEFICIENCY ANEMIA TYPE: ICD-10-CM

## 2024-07-11 DIAGNOSIS — E66.01 CLASS 2 SEVERE OBESITY DUE TO EXCESS CALORIES WITH SERIOUS COMORBIDITY AND BODY MASS INDEX (BMI) OF 35.0 TO 35.9 IN ADULT (H): ICD-10-CM

## 2024-07-11 DIAGNOSIS — Z09 HOSPITAL DISCHARGE FOLLOW-UP: Primary | ICD-10-CM

## 2024-07-11 DIAGNOSIS — E66.812 CLASS 2 SEVERE OBESITY DUE TO EXCESS CALORIES WITH SERIOUS COMORBIDITY AND BODY MASS INDEX (BMI) OF 35.0 TO 35.9 IN ADULT (H): ICD-10-CM

## 2024-07-11 DIAGNOSIS — E04.1 THYROID NODULE: ICD-10-CM

## 2024-07-11 PROBLEM — Z87.19 S/P REPAIR OF PARAESOPHAGEAL HERNIA: Status: RESOLVED | Noted: 2018-11-26 | Resolved: 2024-07-11

## 2024-07-11 PROBLEM — I51.89 OTHER ILL-DEFINED HEART DISEASES: Status: RESOLVED | Noted: 2024-06-03 | Resolved: 2024-07-11

## 2024-07-11 PROBLEM — J96.11 CHRONIC RESPIRATORY FAILURE WITH HYPOXIA (H): Status: RESOLVED | Noted: 2023-04-27 | Resolved: 2024-07-11

## 2024-07-11 PROBLEM — M21.40 COLLAPSED ARCHES: Status: RESOLVED | Noted: 2017-02-01 | Resolved: 2024-07-11

## 2024-07-11 PROBLEM — I35.0 NONRHEUMATIC AORTIC VALVE STENOSIS: Status: RESOLVED | Noted: 2017-11-17 | Resolved: 2024-07-11

## 2024-07-11 PROBLEM — Z98.890 STATUS POST CORONARY ANGIOGRAM: Status: RESOLVED | Noted: 2024-06-03 | Resolved: 2024-07-11

## 2024-07-11 PROBLEM — I35.0 AORTIC STENOSIS, SEVERE: Status: RESOLVED | Noted: 2024-06-25 | Resolved: 2024-07-11

## 2024-07-11 PROBLEM — E66.09 CLASS 2 OBESITY DUE TO EXCESS CALORIES WITHOUT SERIOUS COMORBIDITY WITH BODY MASS INDEX (BMI) OF 37.0 TO 37.9 IN ADULT: Status: RESOLVED | Noted: 2024-01-17 | Resolved: 2024-07-11

## 2024-07-11 PROBLEM — Z98.890 S/P REPAIR OF PARAESOPHAGEAL HERNIA: Status: RESOLVED | Noted: 2018-11-26 | Resolved: 2024-07-11

## 2024-07-11 LAB — HGB BLD-MCNC: 9.7 G/DL (ref 11.7–15.7)

## 2024-07-11 PROCEDURE — 85018 HEMOGLOBIN: CPT | Performed by: FAMILY MEDICINE

## 2024-07-11 PROCEDURE — 36415 COLL VENOUS BLD VENIPUNCTURE: CPT | Performed by: FAMILY MEDICINE

## 2024-07-11 PROCEDURE — 99495 TRANSJ CARE MGMT MOD F2F 14D: CPT | Performed by: FAMILY MEDICINE

## 2024-07-11 RX ORDER — FERROUS SULFATE 325(65) MG
325 TABLET ORAL EVERY OTHER DAY
COMMUNITY
Start: 2024-07-11

## 2024-07-11 NOTE — TELEPHONE ENCOUNTER
[facility] Call Center    Phone Message    May a detailed message be left on voicemail: yes     Reason for Call: Other: Pulm Rehab   Patient requesting a call back from the care team to further discuss the pulmonary rehab referral. Thank you.     Action Taken: Other: Pulm    Travel Screening: Not Applicable

## 2024-07-11 NOTE — TELEPHONE ENCOUNTER
Called and spoke with patient.  She is trying to combine her cardiac rehab with her pulmonary rehab, but is switching midway through her cardiac rehab at [facility] to [facility]  (physical therapy outside facility).    Unsure of what they are able to provide in the area of pulmonary rehab. Patient is going to do some more research on her own.

## 2024-07-11 NOTE — PROGRESS NOTES
Assessment & Plan     Hospital discharge follow-up  S/P TAVR (transcatheter aortic valve replacement)  Iron deficiency anemia, unspecified iron deficiency anemia type  History of severe aortic stenosis s/p TAVR 6/25/2024.  Continue lifelong aspirin 81 mg daily.  Has full set of dentures, so we will not need prophylactic antibiotics for dental procedures.  Continue cardiac rehab and cardiology follow-up.  Discharge hemoglobin 8.7, recheck today.  Due to constipation concerns, okay to decrease iron supplementation to every other day.  - ferrous sulfate (FEROSUL) 325 (65 Fe) MG tablet; Take 1 tablet (325 mg) by mouth every other day  - Hemoglobin; Future    Thyroid nodule  Left thyroid lesion incidentally noted on CT angiogram TAVR.  Has had known left thyroid nodule in the past and underwent fine-needle aspiration 7/2020.  Biopsy cytology reviewed and benign.  After discussion with patient, will repeat ultrasound to assess for any changes though patient may defer repeat biopsy.  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    Class 2 severe obesity due to excess calories with serious comorbidity and body mass index (BMI) of 35.0 to 35.9 in adult (H)  Body mass index is 35.5 kg/m .  Limited activity due to her chronic respiratory failure.  Discussed reducing carbohydrates/sugars.        MED REC REQUIRED  Post Medication Reconciliation Status:  Discharge medications reconciled and changed, see notes/orders    The longitudinal plan of care for the diagnosis(es)/condition(s) as documented were addressed during this visit. Due to the added complexity in care, I will continue to support Serene in the subsequent management and with ongoing continuity of care.    Brenda Cast is a 79 year old, presenting for the following health issues:  Hospital F/U ([facility] follow up)        7/11/2024    12:30 PM   Additional Questions   Roomed  "by Shaneka Espana [facility] Follow-up Visit:    Hospital/Nursing Home/IP Rehab Facility: [facility]  Date of Admission: 06/25/2024  Date of Discharge: 06/26/2024  Reason(s) for Admission: New heart valve   Was the patient in the ICU or did the patient experience delirium during hospitalization?  No  Do you have any other stressors you would like to discuss with your provider? No    Problems taking medications regularly:  None  Medication changes since discharge: Told to start iron  Problems adhering to non-medication therapy:  None    Summary of hospitalization:  [facility] discharge summary reviewed  Diagnostic Tests/Treatments reviewed.  Follow up needed: Hemoglobin  Other Healthcare Providers Involved in Patient s Care:          Cardiology  Update since discharge: improved.     6/25/2024 admitted for aortic valve stenosis s/p TAVR.  Tolerated the procedure well and device check showed normal function postprocedure.  Follow-up echo showed no aortic insufficiency.  Stitch was removed, pain controlled.  Recommended aspirin, cardiac rehab, and lifelong antibiotic prophylaxis prior to dental procedures.  She was continued on hydrochlorothiazide, losartan, spironolactone.  Discharge hemoglobin 8.7.  No change in shortness of breath since TAVR.  She was aware that this likely would not improve her breathing.  She recovered well from the surgery.  Scheduled for cardiac rehab.  Interested in decreasing iron supplement due to constipation.  Currently using stool softeners without MiraLAX.    Plan of care communicated with patient        Objective    /70 (BP Location: Left arm, Patient Position: Sitting, Cuff Size: Adult Regular)   Pulse 97   Resp 25   Ht 1.626 m (5' 4\")   Wt 93.8 kg (206 lb 12.8 oz)   SpO2 94%   BMI 35.50 kg/m    Body mass index is 35.5 kg/m .  Physical Exam   GENERAL APPEARANCE: alert and no distress  NECK: Left lobe fullness on " thyroid palpation  RESP: Diminished lung sounds throughout, faint expiratory wheeze and rhonchi bilaterally.  CV: regular rate and rhythm with mechanical click  PSYCH: mentation appears normal and affect normal/bright        Signed Electronically by: Kate Marte DO

## 2024-07-16 ENCOUNTER — HOSPITAL ENCOUNTER (OUTPATIENT)
Dept: CARDIAC REHAB | Facility: HOSPITAL | Age: 79
Discharge: HOME | End: 2024-07-16
Attending: INTERNAL MEDICINE
Payer: MEDICARE

## 2024-07-16 PROCEDURE — 93798 PHYS/QHP OP CAR RHAB W/ECG: CPT

## 2024-07-17 ENCOUNTER — TELEPHONE (OUTPATIENT)
Dept: ENDOCRINOLOGY | Facility: CLINIC | Age: 79
End: 2024-07-17
Payer: COMMERCIAL

## 2024-07-17 DIAGNOSIS — Z92.29 PERSONAL HISTORY OF OTHER DRUG THERAPY: ICD-10-CM

## 2024-07-17 DIAGNOSIS — M81.0 AGE-RELATED OSTEOPOROSIS WITHOUT CURRENT PATHOLOGICAL FRACTURE: Primary | ICD-10-CM

## 2024-07-18 ENCOUNTER — HOSPITAL ENCOUNTER (OUTPATIENT)
Dept: CARDIAC REHAB | Facility: HOSPITAL | Age: 79
Discharge: HOME | End: 2024-07-18
Attending: INTERNAL MEDICINE
Payer: MEDICARE

## 2024-07-18 PROCEDURE — 93798 PHYS/QHP OP CAR RHAB W/ECG: CPT

## 2024-07-22 ENCOUNTER — LAB (OUTPATIENT)
Dept: LAB | Facility: CLINIC | Age: 79
End: 2024-07-22
Payer: MEDICARE

## 2024-07-22 DIAGNOSIS — M81.0 AGE-RELATED OSTEOPOROSIS WITHOUT CURRENT PATHOLOGICAL FRACTURE: ICD-10-CM

## 2024-07-22 LAB — CALCIUM SERPL-MCNC: 10 MG/DL (ref 8.8–10.4)

## 2024-07-22 PROCEDURE — 82310 ASSAY OF CALCIUM: CPT

## 2024-07-22 PROCEDURE — 36415 COLL VENOUS BLD VENIPUNCTURE: CPT

## 2024-07-23 ENCOUNTER — HOSPITAL ENCOUNTER (OUTPATIENT)
Dept: CARDIAC REHAB | Facility: HOSPITAL | Age: 79
Discharge: HOME | End: 2024-07-23
Attending: INTERNAL MEDICINE
Payer: MEDICARE

## 2024-07-23 PROCEDURE — 93798 PHYS/QHP OP CAR RHAB W/ECG: CPT

## 2024-07-29 ENCOUNTER — ANCILLARY PROCEDURE (OUTPATIENT)
Dept: CARDIOLOGY | Facility: CLINIC | Age: 79
End: 2024-07-29
Attending: INTERNAL MEDICINE
Payer: MEDICARE

## 2024-07-29 ENCOUNTER — HOSPITAL ENCOUNTER (OUTPATIENT)
Dept: CARDIOLOGY | Facility: HOSPITAL | Age: 79
Discharge: HOME | End: 2024-07-29
Attending: INTERNAL MEDICINE | Admitting: INTERNAL MEDICINE
Payer: MEDICARE

## 2024-07-29 DIAGNOSIS — Z95.0 CARDIAC PACEMAKER IN SITU: ICD-10-CM

## 2024-07-29 DIAGNOSIS — I35.0 NONRHEUMATIC AORTIC VALVE STENOSIS: ICD-10-CM

## 2024-07-29 DIAGNOSIS — I44.1 SECOND DEGREE MOBITZ II AV BLOCK: ICD-10-CM

## 2024-07-29 PROCEDURE — 93306 TTE W/DOPPLER COMPLETE: CPT | Mod: 26 | Performed by: INTERNAL MEDICINE

## 2024-07-29 PROCEDURE — 93306 TTE W/DOPPLER COMPLETE: CPT

## 2024-07-30 ENCOUNTER — HOSPITAL ENCOUNTER (OUTPATIENT)
Dept: CARDIAC REHAB | Facility: HOSPITAL | Age: 79
Discharge: HOME | End: 2024-07-30
Attending: INTERNAL MEDICINE
Payer: MEDICARE

## 2024-07-30 ENCOUNTER — TRANSFERRED RECORDS (OUTPATIENT)
Dept: CARDIOLOGY | Facility: CLINIC | Age: 79
End: 2024-07-30

## 2024-07-30 DIAGNOSIS — Z95.2 S/P TAVR (TRANSCATHETER AORTIC VALVE REPLACEMENT): Primary | ICD-10-CM

## 2024-07-30 LAB
MDC_IDC_EPISODE_DTM: NORMAL
MDC_IDC_EPISODE_DTM: NORMAL
MDC_IDC_EPISODE_ID: NORMAL
MDC_IDC_EPISODE_ID: NORMAL
MDC_IDC_EPISODE_TYPE: NORMAL
MDC_IDC_EPISODE_TYPE: NORMAL
MDC_IDC_LEAD_CONNECTION_STATUS: NORMAL
MDC_IDC_LEAD_CONNECTION_STATUS: NORMAL
MDC_IDC_LEAD_IMPLANT_DT: NORMAL
MDC_IDC_LEAD_IMPLANT_DT: NORMAL
MDC_IDC_LEAD_LOCATION: NORMAL
MDC_IDC_LEAD_LOCATION: NORMAL
MDC_IDC_LEAD_LOCATION_DETAIL_1: NORMAL
MDC_IDC_LEAD_LOCATION_DETAIL_1: NORMAL
MDC_IDC_LEAD_MFG: NORMAL
MDC_IDC_LEAD_MFG: NORMAL
MDC_IDC_LEAD_MODEL: NORMAL
MDC_IDC_LEAD_MODEL: NORMAL
MDC_IDC_LEAD_POLARITY_TYPE: NORMAL
MDC_IDC_LEAD_POLARITY_TYPE: NORMAL
MDC_IDC_LEAD_SERIAL: NORMAL
MDC_IDC_LEAD_SERIAL: NORMAL
MDC_IDC_MSMT_BATTERY_DTM: NORMAL
MDC_IDC_MSMT_BATTERY_REMAINING_LONGEVITY: 126 MO
MDC_IDC_MSMT_BATTERY_REMAINING_PERCENTAGE: 100 %
MDC_IDC_MSMT_BATTERY_STATUS: NORMAL
MDC_IDC_MSMT_LEADCHNL_RA_IMPEDANCE_VALUE: 505 OHM
MDC_IDC_MSMT_LEADCHNL_RV_IMPEDANCE_VALUE: 450 OHM
MDC_IDC_MSMT_LEADCHNL_RV_PACING_THRESHOLD_AMPLITUDE: 0.9 V
MDC_IDC_MSMT_LEADCHNL_RV_PACING_THRESHOLD_PULSEWIDTH: 0.4 MS
MDC_IDC_PG_IMPLANT_DTM: NORMAL
MDC_IDC_PG_MFG: NORMAL
MDC_IDC_PG_MODEL: NORMAL
MDC_IDC_PG_SERIAL: NORMAL
MDC_IDC_PG_TYPE: NORMAL
MDC_IDC_SESS_CLINIC_NAME: NORMAL
MDC_IDC_SESS_DTM: NORMAL
MDC_IDC_SESS_TYPE: NORMAL
MDC_IDC_SET_BRADY_AT_MODE_SWITCH_MODE: NORMAL
MDC_IDC_SET_BRADY_AT_MODE_SWITCH_RATE: 170 {BEATS}/MIN
MDC_IDC_SET_BRADY_LOWRATE: 60 {BEATS}/MIN
MDC_IDC_SET_BRADY_MAX_SENSOR_RATE: 130 {BEATS}/MIN
MDC_IDC_SET_BRADY_MAX_TRACKING_RATE: 130 {BEATS}/MIN
MDC_IDC_SET_BRADY_MODE: NORMAL
MDC_IDC_SET_BRADY_PAV_DELAY_HIGH: 200 MS
MDC_IDC_SET_BRADY_PAV_DELAY_LOW: 250 MS
MDC_IDC_SET_BRADY_SAV_DELAY_HIGH: 200 MS
MDC_IDC_SET_BRADY_SAV_DELAY_LOW: 250 MS
MDC_IDC_SET_LEADCHNL_RA_PACING_AMPLITUDE: 1.5 V
MDC_IDC_SET_LEADCHNL_RA_PACING_CAPTURE_MODE: NORMAL
MDC_IDC_SET_LEADCHNL_RA_PACING_POLARITY: NORMAL
MDC_IDC_SET_LEADCHNL_RA_PACING_PULSEWIDTH: 0.4 MS
MDC_IDC_SET_LEADCHNL_RA_SENSING_ADAPTATION_MODE: NORMAL
MDC_IDC_SET_LEADCHNL_RA_SENSING_POLARITY: NORMAL
MDC_IDC_SET_LEADCHNL_RA_SENSING_SENSITIVITY: 0.4 MV
MDC_IDC_SET_LEADCHNL_RV_PACING_AMPLITUDE: 1.2 V
MDC_IDC_SET_LEADCHNL_RV_PACING_CAPTURE_MODE: NORMAL
MDC_IDC_SET_LEADCHNL_RV_PACING_POLARITY: NORMAL
MDC_IDC_SET_LEADCHNL_RV_PACING_PULSEWIDTH: 0.4 MS
MDC_IDC_SET_LEADCHNL_RV_SENSING_ADAPTATION_MODE: NORMAL
MDC_IDC_SET_LEADCHNL_RV_SENSING_POLARITY: NORMAL
MDC_IDC_SET_LEADCHNL_RV_SENSING_SENSITIVITY: 1.5 MV
MDC_IDC_SET_ZONE_DETECTION_INTERVAL: 375 MS
MDC_IDC_SET_ZONE_STATUS: NORMAL
MDC_IDC_SET_ZONE_TYPE: NORMAL
MDC_IDC_SET_ZONE_VENDOR_TYPE: NORMAL
MDC_IDC_STAT_AT_BURDEN_PERCENT: 0 %
MDC_IDC_STAT_AT_DTM_END: NORMAL
MDC_IDC_STAT_AT_DTM_START: NORMAL
MDC_IDC_STAT_BRADY_DTM_END: NORMAL
MDC_IDC_STAT_BRADY_DTM_START: NORMAL
MDC_IDC_STAT_BRADY_RA_PERCENT_PACED: 2 %
MDC_IDC_STAT_BRADY_RV_PERCENT_PACED: 100 %
MDC_IDC_STAT_EPISODE_RECENT_COUNT: 0
MDC_IDC_STAT_EPISODE_RECENT_COUNT_DTM_END: NORMAL
MDC_IDC_STAT_EPISODE_RECENT_COUNT_DTM_START: NORMAL
MDC_IDC_STAT_EPISODE_TYPE: NORMAL

## 2024-07-30 PROCEDURE — 93294 REM INTERROG EVL PM/LDLS PM: CPT | Performed by: INTERNAL MEDICINE

## 2024-07-30 PROCEDURE — 93798 PHYS/QHP OP CAR RHAB W/ECG: CPT

## 2024-07-30 PROCEDURE — 93296 REM INTERROG EVL PM/IDS: CPT | Performed by: INTERNAL MEDICINE

## 2024-07-31 ENCOUNTER — OFFICE VISIT (OUTPATIENT)
Dept: CARDIOLOGY | Facility: CLINIC | Age: 79
End: 2024-07-31
Payer: MEDICARE

## 2024-07-31 ENCOUNTER — LAB (OUTPATIENT)
Dept: CARDIOLOGY | Facility: CLINIC | Age: 79
End: 2024-07-31
Payer: MEDICARE

## 2024-07-31 VITALS
RESPIRATION RATE: 16 BRPM | DIASTOLIC BLOOD PRESSURE: 78 MMHG | HEART RATE: 89 BPM | SYSTOLIC BLOOD PRESSURE: 130 MMHG | BODY MASS INDEX: 34.84 KG/M2 | WEIGHT: 203 LBS

## 2024-07-31 DIAGNOSIS — Z95.2 S/P TAVR (TRANSCATHETER AORTIC VALVE REPLACEMENT): ICD-10-CM

## 2024-07-31 LAB
ANION GAP SERPL CALCULATED.3IONS-SCNC: 8 MMOL/L (ref 7–15)
BUN SERPL-MCNC: 31.2 MG/DL (ref 8–23)
CALCIUM SERPL-MCNC: 9.5 MG/DL (ref 8.8–10.4)
CHLORIDE SERPL-SCNC: 102 MMOL/L (ref 98–107)
CREAT SERPL-MCNC: 1.03 MG/DL (ref 0.51–0.95)
EGFRCR SERPLBLD CKD-EPI 2021: 55 ML/MIN/1.73M2
ERYTHROCYTE [DISTWIDTH] IN BLOOD BY AUTOMATED COUNT: 20.5 % (ref 10–15)
GLUCOSE SERPL-MCNC: 92 MG/DL (ref 70–99)
HCO3 SERPL-SCNC: 30 MMOL/L (ref 22–29)
HCT VFR BLD AUTO: 32.3 % (ref 35–47)
HGB BLD-MCNC: 9.9 G/DL (ref 11.7–15.7)
MCH RBC QN AUTO: 25.8 PG (ref 26.5–33)
MCHC RBC AUTO-ENTMCNC: 30.7 G/DL (ref 31.5–36.5)
MCV RBC AUTO: 84 FL (ref 78–100)
PLATELET # BLD AUTO: 184 10E3/UL (ref 150–450)
POTASSIUM SERPL-SCNC: 4.8 MMOL/L (ref 3.4–5.3)
RBC # BLD AUTO: 3.84 10E6/UL (ref 3.8–5.2)
SODIUM SERPL-SCNC: 140 MMOL/L (ref 135–145)
WBC # BLD AUTO: 9.7 10E3/UL (ref 4–11)

## 2024-07-31 PROCEDURE — 80048 BASIC METABOLIC PNL TOTAL CA: CPT

## 2024-07-31 PROCEDURE — 93000 ELECTROCARDIOGRAM COMPLETE: CPT | Performed by: INTERNAL MEDICINE

## 2024-07-31 PROCEDURE — 85027 COMPLETE CBC AUTOMATED: CPT

## 2024-07-31 PROCEDURE — 99214 OFFICE O/P EST MOD 30 MIN: CPT | Performed by: INTERNAL MEDICINE

## 2024-07-31 PROCEDURE — 36415 COLL VENOUS BLD VENIPUNCTURE: CPT

## 2024-07-31 PROCEDURE — G2211 COMPLEX E/M VISIT ADD ON: HCPCS | Performed by: INTERNAL MEDICINE

## 2024-07-31 NOTE — LETTER
7/31/2024    Kate DO Estuardo  [address]  [facility] [address]    RE: Lalitha Underwood       Dear Colleague,     I had the pleasure of seeing Lalitha Underwood in the [facility].  HEART CARE ENCOUNTER NOTE       [facility]  [phone]    Assessment/Recommendations   Assessment:  Severe LFLG aortic stenosis - s/p TAVR on 6/25/2024, using a 23 mm AIDAN 3 ultra valve  Chronic heart failure with preserved ejection fraction, cor pulmonale/right hear failure, NYHA class III - currently compensated  Severe, oxygen dependent COPD with chronic hypoxic respiratory failure - managed by pulmonology and currently using as needed albuterol, scheduled fluticasone/salmeterol, scheduled tiotropium and duonebs  History of Mobitz type II second-degree AV block - PPM in situ  Hypertension -blood pressure today is at goal  Obesity with BMI 35  Seizure disorder managed on Keppra  Anemia of chronic disease on iron supplementation   Chronic hypomagnesemia on supplementation    Plan:  Activity as tolerated/cardiac rehab  Continue aspirin 81 mg daily indefinitely  Prophylactic antibiotics prior to all dental visits moving forward  As long as PCP is in agreement, okay to stop iron or at least decrease dose  Continue hydrochlorothiazide, spironolactone and losartan with no change to dosages  She should see Dr. Robertson in late December or early January  She should see the structural team again in June 2025 with repeat echo    Thank you for the opportunity to participate in the care of Lalitha Underwood. It's been a pleasure working with her.  Please do not hesitate to call with any questions or concerns.      The longitudinal plan of care for aortic stenosis, HTN and HF was addressed during this visit.  Due to added complexity of care, we will continue to support Serene and the subsequent management of this condition(s) and with the ongoing continuity of care of this condition(s).         History  "of Present Illness/Subjective    I had the opportunity to see Lalitha Underwood at the [facility] for her 1 month visit after TAVR.     \"Serene\" has a past medical history significant for aortic valve stenosis with recent progression, chronic heart failure with preserved ejection fraction, severe COPD, obesity, coronary atherosclerosis, heart block status post PPM, PAD, seizure disorder, hyperlipidemia and hypertension.     She underwent transcatheter aortic valve replacement on June 25.  She continues to have SOB with minimal activity, but was told there may not be a lot of improvement given her lung disease.      She denies exertional chest discomfort, palpitations, shortness of breath at rest, PND, orthopnea, lightheadedness, dizziness, pre-syncope or syncope.  Lalitha Underwood also denies any recent weight loss, changes in appetite, nausea or vomiting.   ____________________________________________________________  Echo from 7/29/24 (report reviewed):  Interpretation Summary     1. Normal left ventricular size and systolic performance with a visually  estimated ejection fraction of 60%.  2. There is a bio-prosthetic aortic valve (documented 23 mm Correia Minerva 3  Ultra tissue valve).  Â  Normal aortic valve prosthesis metrics with a mean systolic gradient of 16  mmHg and a peak anterograde velocity of 1.9 m/sec.  Â  No aortic insufficiency is detected.  3. There is mild to moderate mitral insufficiency.  4. Normal right ventricular size and systolic performance.     When compared to the prior real-time echocardiogram dated 26 June 2024, there  has been a slight increase in the degree of mitral insufficiency from mild to  now mild-moderate. The findings are otherwise felt to be fairly similar on  both examinations.    EKG (personally reviewed and interpreted):  Sinus with 1st degree AVB and LBBB     Physical Examination Review of Systems   Vitals: /78 (BP Location: Other (Comment), Patient " Position: Sitting, Cuff Size: Adult Regular)   Pulse 89   Resp 16   Wt 92.1 kg (203 lb)   BMI 34.84 kg/m    BMI= Body mass index is 34.84 kg/m .  Wt Readings from Last 3 Encounters:   07/31/24 92.1 kg (203 lb)   07/11/24 93.8 kg (206 lb 12.8 oz)   07/10/24 92.1 kg (203 lb)       General Appearance:   Alert, cooperative and in no acute distress   ENT/Mouth: membranes moist, no oral lesions or bleeding gums.      EYES:  no scleral icterus, normal conjunctivae   Neck: Supple without lymphadenopathy.  Thyroid not visualized   Chest/Lungs:   lungs are clear to auscultation, with occasional expiratory wheezing   Cardiovascular:   Regular. Normal first and second heart sounds with 1/6 systolic murmur.  No rubs, or gallops; the carotid, radial and posterior tibial pulses are intact, no edema bilaterally    Abdomen:  Soft and nontender. Bowel sounds are present in all quadrants   Extremities: no cyanosis or clubbing.     Skin: no xanthelasma, warm.    Neurologic: normal gait, normal  bilateral, no tremors   Psychiatric: Normal mood and affect       Please refer above for cardiac ROS details.      Medical History  Surgical History Family History Social History   Past Medical History:   Diagnosis Date    Convulsive disorder (H)     Convulsive disorder (H)     COPD (chronic obstructive pulmonary disease) (H)     COPD (chronic obstructive pulmonary disease) (H)     Coronary artery disease involving native coronary artery with unstable angina pectoris (H) 3/31/2023    Depression     Dyspnea on exertion     Gastroesophageal reflux disease     Heart murmur     Hernia of unspecified site of abdominal cavity without mention of obstruction or gangrene     Hiatal hernia     Hiatal hernia     Hypertension     Hypertension     Obese     On home oxygen therapy     at 1.5 liters at nite    Osteopenia     Osteopenia     Oxygen dependent     1.5 L per NC    Pneumonia due to infectious organism, unspecified laterality, unspecified  part of lung 3/19/2022    Second degree heart block 3/19/2022    Shortness of breath     Status post coronary angiogram 6/3/2024    Uncomplicated asthma     URI (upper respiratory infection)     Venous insufficiency of both lower extremities     Venous insufficiency of both lower extremities     Walking troubles      Past Surgical History:   Procedure Laterality Date    AMPUTATE TOE(S) Left 8/11/2022    Procedure: AMPUTATION, fifth digit left foot;  Surgeon: Alfonso Orozco DPM;  Location: [facility]    ARTHROPLASTY TOE(S) Left 11/22/2021    Procedure: ARTHROPLASTY, digits two and three left foot;  Surgeon: Alfonso Orozco DPM;  Location: [facility]    ARTHROPLASTY TOE(S) Left 7/18/2022    Procedure: ARTHROPLASTY, digits four and five left foot;  Surgeon: Alfonso Orozco DPM;  Location: [facility]    CV CORONARY ANGIOGRAM N/A 7/7/2023    Procedure: Coronary Angiogram;  Surgeon: Elijah Leger MD;  Location: [facility] CATH LAB CV    CV CORONARY ANGIOGRAM N/A 6/3/2024    Procedure: Coronary Angiogram;  Surgeon: Villa Croft MD;  Location: [facility] CATH LAB CV    CV LEFT HEART CATH N/A 7/7/2023    Procedure: Left Heart Catheterization;  Surgeon: Elijah Leger MD;  Location: [facility] CATH LAB CV    CV RIGHT HEART CATH MEASUREMENTS RECORDED N/A 7/7/2023    Procedure: Right Heart Catheterization with [place];  Surgeon: Elijah Leger MD;  Location: [facility] CATH LAB CV    CV RIGHT HEART CATH MEASUREMENTS RECORDED N/A 6/3/2024    Procedure: Right Heart Catheterization;  Surgeon: Villa Croft MD;  Location: [facility] CATH LAB CV    CV TRANSCATHETER AORTIC VALVE REPLACEMENT-FEMORAL APPROACH N/A 6/25/2024    Procedure: Transcatheter Aortic Valve Replacement-Femoral Approach;  Surgeon: Anil Gonzales MD;  Location: [facility] CATH LAB CV    EP PACEMAKER DEVICE & LEAD IMPLANT- RIGHT ATRIAL & RIGHT VENTRICULAR N/A 3/24/2022    Procedure: Pacemaker Device & Lead  Implant - Right Atrial & Right Ventricular;  Surgeon: Helder Pendleton MD;  Location: [facility] CATH LAB CV    ESOPHAGOSCOPY, GASTROSCOPY, DUODENOSCOPY (EGD), COMBINED N/A 11/26/2018    Procedure: Esophagogastroduodenoscopy;  Surgeon: Jasmeet Wilder MD;  Location: [facility] OR    HERNIA REPAIR, UMBILICAL  04/2018    HERNIA REPAIR, UMBILICAL  04/04/2018    Dr. Jimenez    LAPAROSCOPIC HERNIORRHAPHY HIATAL N/A 11/26/2018    Procedure: Laparoscopic Hiatal Hernia Repair,bilateral chest tubes;  Surgeon: Jasmeet Wilder MD;  Location: [facility] OR    OR TRANSCATHETER AORTIC VALVE REPLACEMENT, FEMORAL PERCUTANEOUS APPROACH (STANDBY) N/A 6/25/2024    Procedure: OR TRANSCATHETER AORTIC VALVE REPLACEMENT, FEMORAL PERCUTANEOUS APPROACH (STANDBY);  Surgeon: Sepideh Devlin MD;  Location: [facility] LAB CV    OTHER SURGICAL HISTORY Left 2003    Broke titanium in left arm    Repair arm fracture Left     SHOULDER SURGERY Right 1967    SHOULDER SURGERY Right 1967     BIOPSY THYROID FINE NEEDLE ASPIRATION  7/29/2020     Family History   Problem Relation Age of Onset    Pulmonary Hypertension Daughter         idiopathic     Heart Disease Other         2 sibs MI, 1 sib electrical conduction disorder    Breast Cancer Mother 66.00    Hypertension Mother     Coronary Artery Disease Mother     Varicose Veins Mother     Hypertension Father     Coronary Artery Disease Sister     Heart Disease Sister     Diabetes Son     Pulmonary Hypertension Daughter     Coronary Artery Disease Sister     Heart Disease Sister     Social History     Socioeconomic History    Marital status:      Spouse name: Not on file    Number of children: Not on file    Years of education: Not on file    Highest education level: Not on file   Occupational History    Not on file   Tobacco Use    Smoking status: Former     Current packs/day: 0.00     Average packs/day: 1.5 packs/day for 38.0 years (57.0 ttl pk-yrs)     Types: Cigarettes     Start date:  1960     Quit date: 1998     Years since quittin.6    Smokeless tobacco: Never    Tobacco comments:     quit in    Vaping Use    Vaping status: Never Used   Substance and Sexual Activity    Alcohol use: Yes     Alcohol/week: 2.0 standard drinks of alcohol     Comment: occ    Drug use: Not Currently    Sexual activity: Never   Other Topics Concern    Not on file   Social History Narrative    .  Two kids.  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     Social Determinants of Health     Financial Resource Strain: Low Risk  (2024)    Financial Resource Strain     Within the past 12 months, have you or your family members you live with been unable to get utilities (heat, electricity) when it was really needed?: No   Food Insecurity: Low Risk  (2024)    Food Insecurity     Within the past 12 months, did you worry that your food would run out before you got money to buy more?: No     Within the past 12 months, did the food you bought just not last and you didn t have money to get more?: No   Transportation Needs: Low Risk  (2024)    Transportation Needs     Within the past 12 months, has lack of transportation kept you from medical appointments, getting your medicines, non-medical meetings or appointments, work, or from getting things that you need?: No   Physical Activity: Patient Declined (2024)    Exercise Vital Sign     Days of Exercise per Week: Patient declined     Minutes of Exercise per Session: Patient declined   Stress: Stress Concern Present (2024)    [employer] [place] [employer] - Occupational Stress Questionnaire     Feeling of Stress : To some extent   Social Connections: Unknown (2024)    Social Connection and Isolation Panel [NHANES]     Frequency of Communication with Friends and Family: Not on file     Frequency of Social Gatherings with Friends and Family: Twice a week     Attends Sikh Services: Not on file     Active Member of Clubs or Organizations: Not  on file     Attends Club or Organization Meetings: Not on file     Marital Status: Not on file   Interpersonal Safety: Low Risk  (5/13/2024)    Interpersonal Safety     Do you feel physically and emotionally safe where you currently live?: Yes     Within the past 12 months, have you been hit, slapped, kicked or otherwise physically hurt by someone?: No     Within the past 12 months, have you been humiliated or emotionally abused in other ways by your partner or ex-partner?: No   Housing Stability: Low Risk  (5/6/2024)    Housing Stability     Do you have housing? : Yes     Are you worried about losing your housing?: No          Medications  Allergies   Current Outpatient Medications   Medication Sig Dispense Refill    albuterol (PROAIR HFA/PROVENTIL HFA/VENTOLIN HFA) 108 (90 Base) MCG/ACT inhaler Inhale 2 puffs into the lungs every 6 hours as needed for shortness of breath or wheezing 18 g 1    aspirin 81 MG EC tablet Take 1 tablet (81 mg) by mouth daily      azithromycin (ZITHROMAX) 250 MG tablet Take 1 tablet (250 mg) by mouth every evening 90 tablet 3    benzonatate (TESSALON) 100 MG capsule Take 1 capsule (100 mg) by mouth 3 times daily as needed for cough 90 capsule 3    Biotin 10 MG CAPS Take 1 capsule by mouth daily      calcium citrate (CITRACAL) 950 (200 Ca) MG tablet Take 1 tablet by mouth 2 times daily      estradiol (ESTRACE) 0.1 MG/GM vaginal cream Place 2 g vaginally three times a week 72 g 1    ferrous sulfate (FEROSUL) 325 (65 Fe) MG tablet Take 1 tablet (325 mg) by mouth every other day      fluticasone-salmeterol (ADVAIR) 500-50 MCG/ACT inhaler Inhale 1 puff into the lungs every 12 hours 180 each 3    guaiFENesin (MUCINEX) 600 MG 12 hr tablet Take 1 tablet (600 mg) by mouth 2 times daily 180 tablet 3    hydrochlorothiazide (HYDRODIURIL) 25 MG tablet Take 1 tablet (25 mg) by mouth daily 90 tablet 3    ipratropium - albuterol 0.5 mg/2.5 mg/3 mL (DUONEB) 0.5-2.5 (3) MG/3ML neb solution TAKE 1 VIAL BY  NEBULIZATION EVERY 6 HOURS AS NEEDED FOR SHORTNESS OF BREATH OR WHEEZING 1080 mL 3    levalbuterol (XOPENEX) 1.25 MG/3ML neb solution Take 3 mLs (1.25 mg) by nebulization every 4 hours as needed for shortness of breath or wheezing 810 mL 3    levETIRAcetam (KEPPRA) 500 MG tablet Take 1 tablet (500 mg) by mouth 2 times daily 180 tablet 3    losartan (COZAAR) 50 MG tablet Take 1 tablet (50 mg) by mouth 2 times daily 180 tablet 3    magnesium 250 MG tablet Take 1 tablet by mouth 2 times daily      nystatin (MYCOSTATIN) 890910 UNIT/GM external powder Apply topically daily as needed      potassium chloride teja ER (KLOR-CON M20) 20 MEQ CR tablet Take 1 tablet (20 mEq) by mouth at bedtime 90 tablet 3    RABEprazole (ACIPHEX) 20 MG EC tablet Take 1 tablet (20 mg) by mouth daily 90 tablet 3    sertraline (ZOLOFT) 100 MG tablet Take 1 tablet (100 mg) by mouth every evening 90 tablet 3    solifenacin (VESICARE) 10 MG tablet Take 1 tablet (10 mg) by mouth daily 90 tablet 3    spironolactone (ALDACTONE) 25 MG tablet Take 0.5 tablets (12.5 mg) by mouth daily 45 tablet 3    tiotropium (SPIRIVA RESPIMAT) 2.5 MCG/ACT inhaler Inhale 2 puffs into the lungs daily 12 g 3    vitamin D3 (CHOLECALCIFEROL) 125 MCG (5000 UT) tablet Take 5,000 Units by mouth three times a week Monday, Wednesday and Friday      Allergies   Allergen Reactions    Clindamycin Rash    Carbamazepine Rash    Morphine Nausea         Lab Results    Chemistry/lipid CBC Cardiac Enzymes/BNP/TSH/INR   Recent Labs   Lab Test 05/13/24  1016   CHOL 171   HDL 74   LDL 87   TRIG 52     Recent Labs   Lab Test 05/13/24  1016 07/07/23  0703 04/27/23  1322   LDL 87 99 110*     Recent Labs   Lab Test 06/26/24  0438      POTASSIUM 4.4   CHLORIDE 101   CO2 30*   *   BUN 23.6*   CR 0.82   GFRESTIMATED 72   TENZIN 8.9     Recent Labs   Lab Test 06/26/24  0438 06/25/24  1311 06/25/24  0925   CR 0.82 0.92 1.01*     Recent Labs   Lab Test 02/22/22  1110   A1C 5.4    Recent Labs    Lab Test 06/26/24  0438   WBC 9.7   HGB 8.7*   HCT 28.5*   MCV 82        Recent Labs   Lab Test 06/26/24  0438 06/25/24  1311 06/24/24  1043   HGB 8.7* 9.0* 9.7*    Recent Labs   Lab Test 03/20/22  0759 03/19/22  2336 03/19/22  1441   TROPONINI <0.01 <0.01 0.01     Recent Labs   Lab Test 06/24/24  1043 06/28/23  1256 05/15/23  1149 04/19/22  1514 03/19/22  1441 11/13/18  1546   BNP  --   --   --   --  40  --    NTBNPI  --   --  1,004 508  --   --    NTBNP 301 218  --   --   --  63     Recent Labs   Lab Test 04/19/22  1514   TSH 3.59     Recent Labs   Lab Test 06/24/24  1043   INR 1.11            This note has been dictated using voice recognition software. Any grammatical or context distortions are unintentional and inherent to the software.                  Thank you for allowing me to participate in the care of your patient.      Sincerely,     JULIO KIMBALL PA-C     [facility]  cc:   Referred Self,

## 2024-07-31 NOTE — PATIENT INSTRUCTIONS
Lalitha Underwood,    It was a pleasure to see you today in the clinic regarding your TAVR.     My recommendations after this visit include:     - no medication changes at this time    My treatment team includes: myself, Ana Lancaster PA-C, Dr. Gonzales and Dr. Croft    You should followup with Dr. Robertson in late December or early January    **Remember you will need prophylactic antibiotics for all dental visits in the future.  You can get the Rx from your dentist, your PCP or from us.  If possible, still refrain from going to the dentist until 6 months or more after your valve replacement      If you have questions or concerns, please call using the numbers below:      Valve Clinic Phone   [phone]    After Hours/Scheduling  [phone]    Otherwise you can dial the nurse directly at:    Brandon Solomon RN  [phone]    Sepideh Baeza RN  [phone]

## 2024-07-31 NOTE — PROGRESS NOTES
"HEART CARE ENCOUNTER NOTE       [facility]  [phone]    Assessment/Recommendations   Assessment:  Severe LFLG aortic stenosis - s/p TAVR on 6/25/2024, using a 23 mm AIDAN 3 ultra valve  Chronic heart failure with preserved ejection fraction, cor pulmonale/right hear failure, NYHA class III - currently compensated  Severe, oxygen dependent COPD with chronic hypoxic respiratory failure - managed by pulmonology and currently using as needed albuterol, scheduled fluticasone/salmeterol, scheduled tiotropium and duonebs  History of Mobitz type II second-degree AV block - PPM in situ  Hypertension -blood pressure today is at goal  Obesity with BMI 35  Seizure disorder managed on Keppra  Anemia of chronic disease on iron supplementation   Chronic hypomagnesemia on supplementation    Plan:  Activity as tolerated/cardiac rehab  Continue aspirin 81 mg daily indefinitely  Prophylactic antibiotics prior to all dental visits moving forward  As long as PCP is in agreement, okay to stop iron or at least decrease dose  Continue hydrochlorothiazide, spironolactone and losartan with no change to dosages  She should see Dr. Robertson in late December or early January  She should see the structural team again in June 2025 with repeat echo    Thank you for the opportunity to participate in the care of Lalitha Underwood. It's been a pleasure working with her.  Please do not hesitate to call with any questions or concerns.      The longitudinal plan of care for aortic stenosis, HTN and HF was addressed during this visit.  Due to added complexity of care, we will continue to support Serene and the subsequent management of this condition(s) and with the ongoing continuity of care of this condition(s).         History of Present Illness/Subjective    I had the opportunity to see Lalitha SALINAS Utper[place] at the [facility] for her 1 month visit after TAVR.     \"Serene\" has a past medical history significant for " aortic valve stenosis with recent progression, chronic heart failure with preserved ejection fraction, severe COPD, obesity, coronary atherosclerosis, heart block status post PPM, PAD, seizure disorder, hyperlipidemia and hypertension.     She underwent transcatheter aortic valve replacement on June 25.  She continues to have SOB with minimal activity, but was told there may not be a lot of improvement given her lung disease.      She denies exertional chest discomfort, palpitations, shortness of breath at rest, PND, orthopnea, lightheadedness, dizziness, pre-syncope or syncope.  Lalitha Underwood also denies any recent weight loss, changes in appetite, nausea or vomiting.   ____________________________________________________________  Echo from 7/29/24 (report reviewed):  Interpretation Summary     1. Normal left ventricular size and systolic performance with a visually  estimated ejection fraction of 60%.  2. There is a bio-prosthetic aortic valve (documented 23 mm Correia Minerva 3  Ultra tissue valve).  Â  Normal aortic valve prosthesis metrics with a mean systolic gradient of 16  mmHg and a peak anterograde velocity of 1.9 m/sec.  Â  No aortic insufficiency is detected.  3. There is mild to moderate mitral insufficiency.  4. Normal right ventricular size and systolic performance.     When compared to the prior real-time echocardiogram dated 26 June 2024, there  has been a slight increase in the degree of mitral insufficiency from mild to  now mild-moderate. The findings are otherwise felt to be fairly similar on  both examinations.    EKG (personally reviewed and interpreted):  Sinus with 1st degree AVB and LBBB     Physical Examination Review of Systems   Vitals: /78 (BP Location: Other (Comment), Patient Position: Sitting, Cuff Size: Adult Regular)   Pulse 89   Resp 16   Wt 92.1 kg (203 lb)   BMI 34.84 kg/m    BMI= Body mass index is 34.84 kg/m .  Wt Readings from Last 3 Encounters:   07/31/24 92.1 kg  (203 lb)   07/11/24 93.8 kg (206 lb 12.8 oz)   07/10/24 92.1 kg (203 lb)       General Appearance:   Alert, cooperative and in no acute distress   ENT/Mouth: membranes moist, no oral lesions or bleeding gums.      EYES:  no scleral icterus, normal conjunctivae   Neck: Supple without lymphadenopathy.  Thyroid not visualized   Chest/Lungs:   lungs are clear to auscultation, with occasional expiratory wheezing   Cardiovascular:   Regular. Normal first and second heart sounds with 1/6 systolic murmur.  No rubs, or gallops; the carotid, radial and posterior tibial pulses are intact, no edema bilaterally    Abdomen:  Soft and nontender. Bowel sounds are present in all quadrants   Extremities: no cyanosis or clubbing.     Skin: no xanthelasma, warm.    Neurologic: normal gait, normal  bilateral, no tremors   Psychiatric: Normal mood and affect       Please refer above for cardiac ROS details.      Medical History  Surgical History Family History Social History   Past Medical History:   Diagnosis Date    Convulsive disorder (H)     Convulsive disorder (H)     COPD (chronic obstructive pulmonary disease) (H)     COPD (chronic obstructive pulmonary disease) (H)     Coronary artery disease involving native coronary artery with unstable angina pectoris (H) 3/31/2023    Depression     Dyspnea on exertion     Gastroesophageal reflux disease     Heart murmur     Hernia of unspecified site of abdominal cavity without mention of obstruction or gangrene     Hiatal hernia     Hiatal hernia     Hypertension     Hypertension     Obese     On home oxygen therapy     at 1.5 liters at nite    Osteopenia     Osteopenia     Oxygen dependent     1.5 L per NC    Pneumonia due to infectious organism, unspecified laterality, unspecified part of lung 3/19/2022    Second degree heart block 3/19/2022    Shortness of breath     Status post coronary angiogram 6/3/2024    Uncomplicated asthma     URI (upper respiratory infection)     Venous  insufficiency of both lower extremities     Venous insufficiency of both lower extremities     Walking troubles      Past Surgical History:   Procedure Laterality Date    AMPUTATE TOE(S) Left 8/11/2022    Procedure: AMPUTATION, fifth digit left foot;  Surgeon: Alfonso Orozco DPM;  Location: [facility]    ARTHROPLASTY TOE(S) Left 11/22/2021    Procedure: ARTHROPLASTY, digits two and three left foot;  Surgeon: Alfonso Orozco DPM;  Location: [facility]    ARTHROPLASTY TOE(S) Left 7/18/2022    Procedure: ARTHROPLASTY, digits four and five left foot;  Surgeon: Alfonso Orozco DPM;  Location: [facility]    CV CORONARY ANGIOGRAM N/A 7/7/2023    Procedure: Coronary Angiogram;  Surgeon: Elijah Leger MD;  Location: [facility] CATH LAB CV    CV CORONARY ANGIOGRAM N/A 6/3/2024    Procedure: Coronary Angiogram;  Surgeon: Villa Croft MD;  Location: [facility] LAB CV    CV LEFT HEART CATH N/A 7/7/2023    Procedure: Left Heart Catheterization;  Surgeon: Elijah Leger MD;  Location: [facility]    CV RIGHT HEART CATH MEASUREMENTS RECORDED N/A 7/7/2023    Procedure: Right Heart Catheterization with [place];  Surgeon: Elijah Leger MD;  Location: [facility] CATH LAB CV    CV RIGHT HEART CATH MEASUREMENTS RECORDED N/A 6/3/2024    Procedure: Right Heart Catheterization;  Surgeon: Villa Croft MD;  Location: [facility]    CV TRANSCATHETER AORTIC VALVE REPLACEMENT-FEMORAL APPROACH N/A 6/25/2024    Procedure: Transcatheter Aortic Valve Replacement-Femoral Approach;  Surgeon: Anil Gonzales MD;  Location: [facility] LAB CV    EP PACEMAKER DEVICE & LEAD IMPLANT- RIGHT ATRIAL & RIGHT VENTRICULAR N/A 3/24/2022    Procedure: Pacemaker Device & Lead Implant - Right Atrial & Right Ventricular;  Surgeon: Helder Pendleton MD;  Location: [facility] CV    ESOPHAGOSCOPY, GASTROSCOPY, DUODENOSCOPY (EGD), COMBINED N/A 11/26/2018    Procedure:  Esophagogastroduodenoscopy;  Surgeon: Jasmeet Wilder MD;  Location: [facility] OR    HERNIA REPAIR, UMBILICAL  2018    HERNIA REPAIR, UMBILICAL  2018    Dr. Jimenez    LAPAROSCOPIC HERNIORRHAPHY HIATAL N/A 2018    Procedure: Laparoscopic Hiatal Hernia Repair,bilateral chest tubes;  Surgeon: Jasmeet Wilder MD;  Location: [facility] OR    OR TRANSCATHETER AORTIC VALVE REPLACEMENT, FEMORAL PERCUTANEOUS APPROACH (STANDBY) N/A 2024    Procedure: OR TRANSCATHETER AORTIC VALVE REPLACEMENT, FEMORAL PERCUTANEOUS APPROACH (STANDBY);  Surgeon: Sepideh Devlin MD;  Location: [facility] CATH LAB CV    OTHER SURGICAL HISTORY Left     Broke titanium in left arm    Repair arm fracture Left     SHOULDER SURGERY Right 1967    SHOULDER SURGERY Right 1967     BIOPSY THYROID FINE NEEDLE ASPIRATION  2020     Family History   Problem Relation Age of Onset    Pulmonary Hypertension Daughter         idiopathic     Heart Disease Other         2 sibs MI, 1 sib electrical conduction disorder    Breast Cancer Mother 66.00    Hypertension Mother     Coronary Artery Disease Mother     Varicose Veins Mother     Hypertension Father     Coronary Artery Disease Sister     Heart Disease Sister     Diabetes Son     Pulmonary Hypertension Daughter     Coronary Artery Disease Sister     Heart Disease Sister     Social History     Socioeconomic History    Marital status:      Spouse name: Not on file    Number of children: Not on file    Years of education: Not on file    Highest education level: Not on file   Occupational History    Not on file   Tobacco Use    Smoking status: Former     Current packs/day: 0.00     Average packs/day: 1.5 packs/day for 38.0 years (57.0 ttl pk-yrs)     Types: Cigarettes     Start date: 1960     Quit date: 1998     Years since quittin.6    Smokeless tobacco: Never    Tobacco comments:     quit in    Vaping Use    Vaping status: Never Used   Substance and Sexual  Activity    Alcohol use: Yes     Alcohol/week: 2.0 standard drinks of alcohol     Comment: occ    Drug use: Not Currently    Sexual activity: Never   Other Topics Concern    Not on file   Social History Narrative    .  Two kids.  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     Social Determinants of Health     Financial Resource Strain: Low Risk  (5/6/2024)    Financial Resource Strain     Within the past 12 months, have you or your family members you live with been unable to get utilities (heat, electricity) when it was really needed?: No   Food Insecurity: Low Risk  (5/6/2024)    Food Insecurity     Within the past 12 months, did you worry that your food would run out before you got money to buy more?: No     Within the past 12 months, did the food you bought just not last and you didn t have money to get more?: No   Transportation Needs: Low Risk  (5/6/2024)    Transportation Needs     Within the past 12 months, has lack of transportation kept you from medical appointments, getting your medicines, non-medical meetings or appointments, work, or from getting things that you need?: No   Physical Activity: Patient Declined (5/6/2024)    Exercise Vital Sign     Days of Exercise per Week: Patient declined     Minutes of Exercise per Session: Patient declined   Stress: Stress Concern Present (5/6/2024)    [employer] - Occupational Stress Questionnaire     Feeling of Stress : To some extent   Social Connections: Unknown (5/6/2024)    Social Connection and Isolation Panel [NHANES]     Frequency of Communication with Friends and Family: Not on file     Frequency of Social Gatherings with Friends and Family: Twice a week     Attends Taoism Services: Not on file     Active Member of Clubs or Organizations: Not on file     Attends Club or Organization Meetings: Not on file     Marital Status: Not on file   Interpersonal Safety: Low Risk  (5/13/2024)    Interpersonal Safety     Do you feel physically and  emotionally safe where you currently live?: Yes     Within the past 12 months, have you been hit, slapped, kicked or otherwise physically hurt by someone?: No     Within the past 12 months, have you been humiliated or emotionally abused in other ways by your partner or ex-partner?: No   Housing Stability: Low Risk  (5/6/2024)    Housing Stability     Do you have housing? : Yes     Are you worried about losing your housing?: No          Medications  Allergies   Current Outpatient Medications   Medication Sig Dispense Refill    albuterol (PROAIR HFA/PROVENTIL HFA/VENTOLIN HFA) 108 (90 Base) MCG/ACT inhaler Inhale 2 puffs into the lungs every 6 hours as needed for shortness of breath or wheezing 18 g 1    aspirin 81 MG EC tablet Take 1 tablet (81 mg) by mouth daily      azithromycin (ZITHROMAX) 250 MG tablet Take 1 tablet (250 mg) by mouth every evening 90 tablet 3    benzonatate (TESSALON) 100 MG capsule Take 1 capsule (100 mg) by mouth 3 times daily as needed for cough 90 capsule 3    Biotin 10 MG CAPS Take 1 capsule by mouth daily      calcium citrate (CITRACAL) 950 (200 Ca) MG tablet Take 1 tablet by mouth 2 times daily      estradiol (ESTRACE) 0.1 MG/GM vaginal cream Place 2 g vaginally three times a week 72 g 1    ferrous sulfate (FEROSUL) 325 (65 Fe) MG tablet Take 1 tablet (325 mg) by mouth every other day      fluticasone-salmeterol (ADVAIR) 500-50 MCG/ACT inhaler Inhale 1 puff into the lungs every 12 hours 180 each 3    guaiFENesin (MUCINEX) 600 MG 12 hr tablet Take 1 tablet (600 mg) by mouth 2 times daily 180 tablet 3    hydrochlorothiazide (HYDRODIURIL) 25 MG tablet Take 1 tablet (25 mg) by mouth daily 90 tablet 3    ipratropium - albuterol 0.5 mg/2.5 mg/3 mL (DUONEB) 0.5-2.5 (3) MG/3ML neb solution TAKE 1 VIAL BY NEBULIZATION EVERY 6 HOURS AS NEEDED FOR SHORTNESS OF BREATH OR WHEEZING 1080 mL 3    levalbuterol (XOPENEX) 1.25 MG/3ML neb solution Take 3 mLs (1.25 mg) by nebulization every 4 hours as needed  for shortness of breath or wheezing 810 mL 3    levETIRAcetam (KEPPRA) 500 MG tablet Take 1 tablet (500 mg) by mouth 2 times daily 180 tablet 3    losartan (COZAAR) 50 MG tablet Take 1 tablet (50 mg) by mouth 2 times daily 180 tablet 3    magnesium 250 MG tablet Take 1 tablet by mouth 2 times daily      nystatin (MYCOSTATIN) 385215 UNIT/GM external powder Apply topically daily as needed      potassium chloride teja ER (KLOR-CON M20) 20 MEQ CR tablet Take 1 tablet (20 mEq) by mouth at bedtime 90 tablet 3    RABEprazole (ACIPHEX) 20 MG EC tablet Take 1 tablet (20 mg) by mouth daily 90 tablet 3    sertraline (ZOLOFT) 100 MG tablet Take 1 tablet (100 mg) by mouth every evening 90 tablet 3    solifenacin (VESICARE) 10 MG tablet Take 1 tablet (10 mg) by mouth daily 90 tablet 3    spironolactone (ALDACTONE) 25 MG tablet Take 0.5 tablets (12.5 mg) by mouth daily 45 tablet 3    tiotropium (SPIRIVA RESPIMAT) 2.5 MCG/ACT inhaler Inhale 2 puffs into the lungs daily 12 g 3    vitamin D3 (CHOLECALCIFEROL) 125 MCG (5000 UT) tablet Take 5,000 Units by mouth three times a week Monday, Wednesday and Friday      Allergies   Allergen Reactions    Clindamycin Rash    Carbamazepine Rash    Morphine Nausea         Lab Results    Chemistry/lipid CBC Cardiac Enzymes/BNP/TSH/INR   Recent Labs   Lab Test 05/13/24  1016   CHOL 171   HDL 74   LDL 87   TRIG 52     Recent Labs   Lab Test 05/13/24  1016 07/07/23  0703 04/27/23  1322   LDL 87 99 110*     Recent Labs   Lab Test 06/26/24 0438      POTASSIUM 4.4   CHLORIDE 101   CO2 30*   *   BUN 23.6*   CR 0.82   GFRESTIMATED 72   TENZIN 8.9     Recent Labs   Lab Test 06/26/24  0438 06/25/24  1311 06/25/24  0925   CR 0.82 0.92 1.01*     Recent Labs   Lab Test 02/22/22  1110   A1C 5.4    Recent Labs   Lab Test 06/26/24  0438   WBC 9.7   HGB 8.7*   HCT 28.5*   MCV 82        Recent Labs   Lab Test 06/26/24  0438 06/25/24  1311 06/24/24  1043   HGB 8.7* 9.0* 9.7*    Recent Labs   Lab  Test 03/20/22  0759 03/19/22  2336 03/19/22  1441   TROPONINI <0.01 <0.01 0.01     Recent Labs   Lab Test 06/24/24  1043 06/28/23  1256 05/15/23  1149 04/19/22  1514 03/19/22  1441 11/13/18  1546   BNP  --   --   --   --  40  --    NTBNPI  --   --  1,004 508  --   --    NTBNP 301 218  --   --   --  63     Recent Labs   Lab Test 04/19/22  1514   TSH 3.59     Recent Labs   Lab Test 06/24/24  1043   INR 1.11            This note has been dictated using voice recognition software. Any grammatical or context distortions are unintentional and inherent to the software.

## 2024-08-01 ENCOUNTER — HOSPITAL ENCOUNTER (OUTPATIENT)
Dept: CARDIAC REHAB | Facility: HOSPITAL | Age: 79
Discharge: HOME | End: 2024-08-01
Attending: INTERNAL MEDICINE
Payer: MEDICARE

## 2024-08-01 LAB
ATRIAL RATE - MUSE: 89 BPM
DIASTOLIC BLOOD PRESSURE - MUSE: NORMAL MMHG
INTERPRETATION ECG - MUSE: NORMAL
P AXIS - MUSE: 53 DEGREES
PR INTERVAL - MUSE: 242 MS
QRS DURATION - MUSE: 152 MS
QT - MUSE: 398 MS
QTC - MUSE: 484 MS
R AXIS - MUSE: -41 DEGREES
SYSTOLIC BLOOD PRESSURE - MUSE: NORMAL MMHG
T AXIS - MUSE: 60 DEGREES
VENTRICULAR RATE- MUSE: 89 BPM

## 2024-08-01 PROCEDURE — 93798 PHYS/QHP OP CAR RHAB W/ECG: CPT

## 2024-08-06 ENCOUNTER — HOSPITAL ENCOUNTER (OUTPATIENT)
Dept: CARDIAC REHAB | Facility: HOSPITAL | Age: 79
Discharge: HOME | End: 2024-08-06
Attending: INTERNAL MEDICINE
Payer: MEDICARE

## 2024-08-06 PROCEDURE — 93798 PHYS/QHP OP CAR RHAB W/ECG: CPT

## 2024-08-07 ENCOUNTER — ALLIED HEALTH/NURSE VISIT (OUTPATIENT)
Dept: ENDOCRINOLOGY | Facility: CLINIC | Age: 79
End: 2024-08-07
Payer: MEDICARE

## 2024-08-07 DIAGNOSIS — Z92.29 PERSONAL HISTORY OF OTHER DRUG THERAPY: ICD-10-CM

## 2024-08-07 DIAGNOSIS — M81.0 AGE-RELATED OSTEOPOROSIS WITHOUT CURRENT PATHOLOGICAL FRACTURE: Primary | ICD-10-CM

## 2024-08-07 PROCEDURE — 96372 THER/PROPH/DIAG INJ SC/IM: CPT | Performed by: NURSE PRACTITIONER

## 2024-08-07 PROCEDURE — 99207 PR NO CHARGE NURSE ONLY: CPT

## 2024-08-07 NOTE — PROGRESS NOTES
Clinic Administered Medication Documentation      Prolia Documentation    Indication: Prolia  (denosumab) is a prescription medicine used to treat osteoporosis in patients who:   Are at high risk for fracture, meaning patients who have had a fracture related to osteoporosis, or who have multiple risk factors for fracture.  Cannot use another osteoporosis medicine or other osteoporosis medicines did not work well.  The timeline for early/late injections would be 4 weeks early and any time after the 6 month edmar. If a patient receives their injection late, then the subsequent injection would be 6 months from the date that they actually received the injection.    When was the last injection?  24  Was the last injection at least 6 months ago? Yes  Has the prior authorization been completed?  Yes  Is there an active order (written within the past 365 days, with administrations remaining, not ) in the chart?  Yes   GFR Estimate   Date Value Ref Range Status   2024 55 (L) >60 mL/min/1.73m2 Final     Comment:     eGFR calculated using  CKD-EPI equation.   2021 >60 >60 mL/min/1.73m2 Final   2018 66 >60 mL/min/1.7m2 Final     Comment:     Non  GFR Calc     GFR, ESTIMATED POCT   Date Value Ref Range Status   2023 57 (L) >60 mL/min/1.73m2 Final     Has patient had a GFR within the last 12 months? Yes   Is GFR under 30, or patient has a diagnosis of CKD4 or CKD5? No   Patient denies gastric bypass or parathyroid surgery in past 6 months? Yes - patient denies.   Patient denies dental work in the past two months involving drilling into the bone, such as implants/extractions, oral surgery or a tooth extraction that has not healed yet?  Yes  Patient denies plans for an emergency tooth extraction within the next week? Yes    The following steps were completed to comply with the REMS program for Prolia:  Reviewed information in the Medication Guide, including the serious risks of  Prolia  and the symptoms of each risk.  Advised patient to seek prompt medical attention if they have signs or symptoms of any of the serious risks.  Provided each patient a copy of the Medication Guide and Patient Guide.    Prior to injection, verified patient identity using patient's name and date of birth. Medication was administered. Please see MAR and medication order for additional information. Patient instructed to remain in clinic for 15 minutes and report any adverse reaction to staff immediately.    Vial/Syringe: Syringe  Was this medication supplied by the patient? No  Verified that the patient has refills remaining in their prescription.

## 2024-08-08 ENCOUNTER — HOSPITAL ENCOUNTER (OUTPATIENT)
Dept: CARDIAC REHAB | Facility: HOSPITAL | Age: 79
Discharge: HOME | End: 2024-08-08
Attending: INTERNAL MEDICINE
Payer: MEDICARE

## 2024-08-08 PROCEDURE — 93798 PHYS/QHP OP CAR RHAB W/ECG: CPT

## 2024-08-09 ENCOUNTER — TELEPHONE (OUTPATIENT)
Dept: ENDOCRINOLOGY | Facility: CLINIC | Age: 79
End: 2024-08-09
Payer: MEDICARE

## 2024-08-09 DIAGNOSIS — M81.0 AGE-RELATED OSTEOPOROSIS WITHOUT CURRENT PATHOLOGICAL FRACTURE: Primary | ICD-10-CM

## 2024-08-09 NOTE — TELEPHONE ENCOUNTER
[facility] Call [facility]    Phone Message    May a detailed message be left on voicemail: yes     Reason for Call: Order(s): Other: lab orders for Prolia   Reason for requested: patient noticed there was an appt and writer noticed there is no lab orders for this.   Date needed: before2/5/25  Provider name: BK Haq NP    Action Taken: Message routed to:  [facility] ([facility] ENDO    Travel Screening: Not Applicable     Date of Service:

## 2024-08-13 ENCOUNTER — HOSPITAL ENCOUNTER (OUTPATIENT)
Dept: CARDIAC REHAB | Facility: HOSPITAL | Age: 79
Discharge: HOME | End: 2024-08-13
Attending: INTERNAL MEDICINE
Payer: MEDICARE

## 2024-08-13 PROCEDURE — 93798 PHYS/QHP OP CAR RHAB W/ECG: CPT

## 2024-08-15 ENCOUNTER — HOSPITAL ENCOUNTER (OUTPATIENT)
Dept: CARDIAC REHAB | Facility: HOSPITAL | Age: 79
Discharge: HOME | End: 2024-08-15
Attending: INTERNAL MEDICINE
Payer: MEDICARE

## 2024-08-15 PROCEDURE — 93798 PHYS/QHP OP CAR RHAB W/ECG: CPT

## 2024-08-20 ENCOUNTER — HOSPITAL ENCOUNTER (OUTPATIENT)
Dept: CARDIAC REHAB | Facility: HOSPITAL | Age: 79
Discharge: HOME | End: 2024-08-20
Attending: INTERNAL MEDICINE
Payer: MEDICARE

## 2024-08-20 PROCEDURE — 93798 PHYS/QHP OP CAR RHAB W/ECG: CPT

## 2024-08-26 ENCOUNTER — MYC MEDICAL ADVICE (OUTPATIENT)
Dept: PULMONOLOGY | Facility: CLINIC | Age: 79
End: 2024-08-26
Payer: MEDICARE

## 2024-08-26 ENCOUNTER — TELEPHONE (OUTPATIENT)
Dept: PULMONOLOGY | Facility: CLINIC | Age: 79
End: 2024-08-26
Payer: MEDICARE

## 2024-08-26 DIAGNOSIS — J44.9 CHRONIC OBSTRUCTIVE PULMONARY DISEASE, UNSPECIFIED COPD TYPE (H): Primary | ICD-10-CM

## 2024-08-26 NOTE — TELEPHONE ENCOUNTER
Called and spoke with Sabrina.   They stated that they just need a new prescription for neb supplies and most recent office visit note.      Ev - could you please place new prescription for neb supplies?

## 2024-08-26 NOTE — TELEPHONE ENCOUNTER
DATE: 08/26/2024  DME PROVIDER: Sabrina   SUPPLY ORDERED: nebulizer supplies  PROVIDER: [facility]    Faxed orders    Nabila Greenwood LPN

## 2024-08-27 ENCOUNTER — HOSPITAL ENCOUNTER (OUTPATIENT)
Dept: CARDIAC REHAB | Facility: HOSPITAL | Age: 79
Discharge: HOME | End: 2024-08-27
Attending: INTERNAL MEDICINE
Payer: MEDICARE

## 2024-08-27 PROCEDURE — 93798 PHYS/QHP OP CAR RHAB W/ECG: CPT

## 2024-08-29 ENCOUNTER — HOSPITAL ENCOUNTER (OUTPATIENT)
Dept: ULTRASOUND IMAGING | Facility: HOSPITAL | Age: 79
Discharge: HOME | End: 2024-08-29
Attending: FAMILY MEDICINE | Admitting: FAMILY MEDICINE
Payer: MEDICARE

## 2024-08-29 DIAGNOSIS — E04.1 THYROID NODULE: ICD-10-CM

## 2024-08-29 PROCEDURE — 76536 US EXAM OF HEAD AND NECK: CPT

## 2024-09-03 ENCOUNTER — HOSPITAL ENCOUNTER (OUTPATIENT)
Dept: CARDIAC REHAB | Facility: HOSPITAL | Age: 79
Discharge: HOME | End: 2024-09-03
Attending: INTERNAL MEDICINE
Payer: MEDICARE

## 2024-09-03 PROCEDURE — 93798 PHYS/QHP OP CAR RHAB W/ECG: CPT

## 2024-09-10 ENCOUNTER — HOSPITAL ENCOUNTER (OUTPATIENT)
Dept: CARDIAC REHAB | Facility: HOSPITAL | Age: 79
Discharge: HOME | End: 2024-09-10
Attending: INTERNAL MEDICINE
Payer: MEDICARE

## 2024-09-10 PROCEDURE — 93798 PHYS/QHP OP CAR RHAB W/ECG: CPT

## 2024-09-17 ENCOUNTER — HOSPITAL ENCOUNTER (OUTPATIENT)
Dept: CARDIAC REHAB | Facility: HOSPITAL | Age: 79
Discharge: HOME | End: 2024-09-17
Attending: INTERNAL MEDICINE
Payer: MEDICARE

## 2024-09-17 PROCEDURE — 93798 PHYS/QHP OP CAR RHAB W/ECG: CPT

## 2024-09-24 ENCOUNTER — ANCILLARY PROCEDURE (OUTPATIENT)
Dept: MAMMOGRAPHY | Facility: CLINIC | Age: 79
End: 2024-09-24
Payer: MEDICARE

## 2024-09-24 ENCOUNTER — HOSPITAL ENCOUNTER (OUTPATIENT)
Dept: CARDIAC REHAB | Facility: HOSPITAL | Age: 79
Discharge: HOME | End: 2024-09-24
Attending: INTERNAL MEDICINE
Payer: MEDICARE

## 2024-09-24 DIAGNOSIS — Z12.31 VISIT FOR SCREENING MAMMOGRAM: ICD-10-CM

## 2024-09-24 PROCEDURE — 77063 BREAST TOMOSYNTHESIS BI: CPT

## 2024-09-24 PROCEDURE — 93798 PHYS/QHP OP CAR RHAB W/ECG: CPT

## 2024-09-25 ENCOUNTER — MYC MEDICAL ADVICE (OUTPATIENT)
Dept: PULMONOLOGY | Facility: CLINIC | Age: 79
End: 2024-09-25
Payer: MEDICARE

## 2024-09-25 DIAGNOSIS — J44.1 COPD EXACERBATION (H): ICD-10-CM

## 2024-09-25 RX ORDER — GUAIFENESIN 600 MG/1
600 TABLET, EXTENDED RELEASE ORAL 2 TIMES DAILY
Qty: 180 TABLET | Refills: 0 | Status: SHIPPED | OUTPATIENT
Start: 2024-09-25

## 2024-10-04 ENCOUNTER — TRANSFERRED RECORDS (OUTPATIENT)
Dept: HEALTH INFORMATION MANAGEMENT | Facility: CLINIC | Age: 79
End: 2024-10-04
Payer: MEDICARE

## 2024-10-09 ENCOUNTER — MEDICAL CORRESPONDENCE (OUTPATIENT)
Dept: HEALTH INFORMATION MANAGEMENT | Facility: CLINIC | Age: 79
End: 2024-10-09

## 2024-10-10 ENCOUNTER — OFFICE VISIT (OUTPATIENT)
Dept: CARDIOLOGY | Facility: CLINIC | Age: 79
End: 2024-10-10
Payer: MEDICARE

## 2024-10-10 VITALS
WEIGHT: 199.8 LBS | HEIGHT: 64 IN | DIASTOLIC BLOOD PRESSURE: 83 MMHG | BODY MASS INDEX: 34.11 KG/M2 | HEART RATE: 103 BPM | RESPIRATION RATE: 16 BRPM | SYSTOLIC BLOOD PRESSURE: 132 MMHG

## 2024-10-10 DIAGNOSIS — E66.812 CLASS 2 SEVERE OBESITY DUE TO EXCESS CALORIES WITH SERIOUS COMORBIDITY AND BODY MASS INDEX (BMI) OF 35.0 TO 35.9 IN ADULT (H): ICD-10-CM

## 2024-10-10 DIAGNOSIS — E78.00 PURE HYPERCHOLESTEROLEMIA: ICD-10-CM

## 2024-10-10 DIAGNOSIS — E66.01 CLASS 2 SEVERE OBESITY DUE TO EXCESS CALORIES WITH SERIOUS COMORBIDITY AND BODY MASS INDEX (BMI) OF 35.0 TO 35.9 IN ADULT (H): ICD-10-CM

## 2024-10-10 DIAGNOSIS — Z95.2 S/P TAVR (TRANSCATHETER AORTIC VALVE REPLACEMENT): Primary | ICD-10-CM

## 2024-10-10 DIAGNOSIS — J43.9 PULMONARY EMPHYSEMA, UNSPECIFIED EMPHYSEMA TYPE (H): ICD-10-CM

## 2024-10-10 DIAGNOSIS — I10 ESSENTIAL HYPERTENSION, BENIGN: ICD-10-CM

## 2024-10-10 DIAGNOSIS — Z95.0 CARDIAC PACEMAKER IN SITU: ICD-10-CM

## 2024-10-10 DIAGNOSIS — I25.83 CORONARY ARTERIOSCLEROSIS DUE TO LIPID RICH PLAQUE: ICD-10-CM

## 2024-10-10 PROCEDURE — G2211 COMPLEX E/M VISIT ADD ON: HCPCS | Performed by: INTERNAL MEDICINE

## 2024-10-10 PROCEDURE — 99214 OFFICE O/P EST MOD 30 MIN: CPT | Performed by: INTERNAL MEDICINE

## 2024-10-10 NOTE — PROGRESS NOTES
[facility]  Heart Care Clinic Follow-up Note    Assessment & Plan        (Z95.2) S/P TAVR (transcatheter aortic valve replacement)  (primary encounter diagnosis)  Comment: Given severe aortic stenosis she underwent TAVR with a 23 mm AIDAN 3 valve on 25 June 2024.  Has not made any difference in her symptoms but getting along fairly well and stable.  Follow-up echo July 2024 shows a peak velocity of 1.9 m/s with a mean gradient of 16 mmHg.    (I25.83) Coronary arteriosclerosis due to lipid rich plaque  Comment: Pre-TAVR angiography showed normal left main, normal LAD, normal circumflex, and normal right coronary artery.    (Z95.0) Cardiac pacemaker in situ  Comment: [employer] device with [employer] right atrial and right ventricular leads placed March 2022. Most recent device check shows 1% atrial pacing and 100% ventricular pacing. This was placed due to second-degree heart block. Rates are good between 80 and 85 with no major arrhythmia. Showed battery good for 10-1/2 years.     (I10) Essential hypertension, benign  Comment: Under good control currently on HCTZ.    (E78.00) Pure hypercholesterolemia  Comment: Total cholesterol 181 with an LDL of 99 which is acceptable without coronary artery disease.    (J43.9) Pulmonary emphysema, unspecified emphysema type (H)  Comment: So noted, several inhalers and chronic oxygen therapy, no sleep apnea.    (E66.812,  E66.01,  Z68.35) Class 2 severe obesity due to excess calories with serious comorbidity and body mass index (BMI) of 35.0 to 35.9 in adult (H)  Comment: Work on weight loss.    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  Qp/Qs is 1.5.    Plan  1.  Continue current meds.  2.  At her request I signed her POLST form.  3.  Follow-up with me in 1 year or sooner if needed.    The longitudinal plan of care for the diagnosis(es)/condition(s) as documented were addressed during  this visit. Due to the added complexity in care, I will continue to support Serene in the subsequent management and with ongoing continuity of care.     Subjective  CC: 79-year-old white female being seen in posthospital discharge follow-up.  Since I seen her she has had a TAVR with a 23 mm valve.  Still lives alone in a single  story townhouse, has her groceries delivered and has a cleaning lady come in.  She does go to an occasional breakfast with friends, does a little cleaning up.  Does have baseline shortness of breath on minimal activity.  No PND, orthopnea, chest pains, syncope, dizziness or peripheral edema but does wear compression stockings.    Medications  Current Outpatient Medications   Medication Sig Dispense Refill    albuterol (PROAIR HFA/PROVENTIL HFA/VENTOLIN HFA) 108 (90 Base) MCG/ACT inhaler Inhale 2 puffs into the lungs every 6 hours as needed for shortness of breath or wheezing 18 g 1    aspirin 81 MG EC tablet Take 1 tablet (81 mg) by mouth daily      azithromycin (ZITHROMAX) 250 MG tablet Take 1 tablet (250 mg) by mouth every evening 90 tablet 3    Biotin 10 MG CAPS Take 1 capsule by mouth daily      calcium citrate (CITRACAL) 950 (200 Ca) MG tablet Take 1 tablet by mouth 2 times daily      estradiol (ESTRACE) 0.1 MG/GM vaginal cream Place 2 g vaginally three times a week 72 g 1    ferrous sulfate (FEROSUL) 325 (65 Fe) MG tablet Take 1 tablet (325 mg) by mouth every other day      fluticasone-salmeterol (ADVAIR) 500-50 MCG/ACT inhaler Inhale 1 puff into the lungs every 12 hours 180 each 3    guaiFENesin (MUCINEX) 600 MG 12 hr tablet Take 1 tablet (600 mg) by mouth 2 times daily. 180 tablet 0    hydrochlorothiazide (HYDRODIURIL) 25 MG tablet Take 1 tablet (25 mg) by mouth daily 90 tablet 3    ipratropium - albuterol 0.5 mg/2.5 mg/3 mL (DUONEB) 0.5-2.5 (3) MG/3ML neb solution TAKE 1 VIAL BY NEBULIZATION EVERY 6 HOURS AS NEEDED FOR SHORTNESS OF BREATH OR WHEEZING 1080 mL 3    levalbuterol  "(XOPENEX) 1.25 MG/3ML neb solution Take 3 mLs (1.25 mg) by nebulization every 4 hours as needed for shortness of breath or wheezing 810 mL 3    levETIRAcetam (KEPPRA) 500 MG tablet Take 1 tablet (500 mg) by mouth 2 times daily 180 tablet 3    losartan (COZAAR) 50 MG tablet Take 1 tablet (50 mg) by mouth 2 times daily 180 tablet 3    magnesium 250 MG tablet Take 1 tablet by mouth 2 times daily      nystatin (MYCOSTATIN) 302214 UNIT/GM external powder Apply topically daily as needed      potassium chloride teja ER (KLOR-CON M20) 20 MEQ CR tablet Take 1 tablet (20 mEq) by mouth at bedtime 90 tablet 3    RABEprazole (ACIPHEX) 20 MG EC tablet Take 1 tablet (20 mg) by mouth daily 90 tablet 3    sertraline (ZOLOFT) 100 MG tablet Take 1 tablet (100 mg) by mouth every evening 90 tablet 3    solifenacin (VESICARE) 10 MG tablet Take 1 tablet (10 mg) by mouth daily 90 tablet 3    spironolactone (ALDACTONE) 25 MG tablet Take 0.5 tablets (12.5 mg) by mouth daily 45 tablet 3    tiotropium (SPIRIVA RESPIMAT) 2.5 MCG/ACT inhaler Inhale 2 puffs into the lungs daily 12 g 3    vitamin D3 (CHOLECALCIFEROL) 125 MCG (5000 UT) tablet Take 5,000 Units by mouth three times a week Monday, Wednesday and Friday      benzonatate (TESSALON) 100 MG capsule Take 1 capsule (100 mg) by mouth 3 times daily as needed for cough (Patient not taking: Reported on 10/10/2024) 90 capsule 3       Objective  /83 (BP Location: Right arm, Patient Position: Sitting, Cuff Size: Adult Regular)   Pulse 103   Resp 16   Ht 1.626 m (5' 4\")   Wt 90.6 kg (199 lb 12.8 oz)   BMI 34.30 kg/m      General Appearance:    Alert, cooperative, no distress, appears stated age   Head:    Normocephalic, without obvious abnormality, atraumatic   Throat:   Lips, mucosa, and tongue normal; teeth and gums normal   Neck:   Supple, symmetrical, trachea midline, no adenopathy;        thyroid:  No enlargement/tenderness/nodules; no carotid    bruit or JVD   Back:     Symmetric, " "no curvature, ROM normal, no CVA tenderness   Lungs:     Clear to auscultation bilaterally, respirations mildly labored with an occasional expiratory wheeze   Chest wall:    No tenderness, left-sided pacemaker   Heart:    Regular rate and rhythm, S1 and S2 normal, 1/6 systolic ejection murmur, rub   or gallop   Abdomen:     Soft, non-tender, bowel sounds active all four quadrants,     no masses, no organomegaly   Extremities:   Normal, atraumatic, no cyanosis or edema   Pulses:   2+ and symmetric all extremities   Skin:   Skin color, texture, turgor normal, no rashes or lesions     Results    Lab Results personally reviewed   Lab Results   Component Value Date    CHOL 171 05/13/2024    CHOL 181 07/07/2023     Lab Results   Component Value Date    HDL 74 05/13/2024    HDL 69 07/07/2023     No components found for: \"LDLCALC\"  Lab Results   Component Value Date    TRIG 52 05/13/2024    TRIG 67 07/07/2023     Lab Results   Component Value Date    WBC 9.7 07/31/2024    HGB 9.9 (L) 07/31/2024    HCT 32.3 (L) 07/31/2024     07/31/2024     Lab Results   Component Value Date    BUN 31.2 (H) 07/31/2024     07/31/2024    CO2 30 (H) 07/31/2024             "

## 2024-10-10 NOTE — LETTER
10/10/2024    Kate Marte, DO  [address]  [facility] [address]    RE: Lalitha Underwood       Dear Colleague,     I had the pleasure of seeing Lalitha Underwood in the [facility].      [facility]  Heart Care Clinic Follow-up Note    Assessment & Plan        (Z95.2) S/P TAVR (transcatheter aortic valve replacement)  (primary encounter diagnosis)  Comment: Given severe aortic stenosis she underwent TAVR with a 23 mm AIDAN 3 valve on 25 June 2024.  Has not made any difference in her symptoms but getting along fairly well and stable.  Follow-up echo July 2024 shows a peak velocity of 1.9 m/s with a mean gradient of 16 mmHg.    (I25.83) Coronary arteriosclerosis due to lipid rich plaque  Comment: Pre-TAVR angiography showed normal left main, normal LAD, normal circumflex, and normal right coronary artery.    (Z95.0) Cardiac pacemaker in situ  Comment: [employer] device with [employer] right atrial and right ventricular leads placed March 2022. Most recent device check shows 1% atrial pacing and 100% ventricular pacing. This was placed due to second-degree heart block. Rates are good between 80 and 85 with no major arrhythmia. Showed battery good for 10-1/2 years.     (I10) Essential hypertension, benign  Comment: Under good control currently on HCTZ.    (E78.00) Pure hypercholesterolemia  Comment: Total cholesterol 181 with an LDL of 99 which is acceptable without coronary artery disease.    (J43.9) Pulmonary emphysema, unspecified emphysema type (H)  Comment: So noted, several inhalers and chronic oxygen therapy, no sleep apnea.    (E66.812,  E66.01,  Z68.35) Class 2 severe obesity due to excess calories with serious comorbidity and body mass index (BMI) of 35.0 to 35.9 in adult (H)  Comment: Work on weight loss.    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  Qp/Qs is 1.5.    Plan  1.  Continue current  meds.  2.  At her request I signed her POLST form.  3.  Follow-up with me in 1 year or sooner if needed.    The longitudinal plan of care for the diagnosis(es)/condition(s) as documented were addressed during this visit. Due to the added complexity in care, I will continue to support Serene in the subsequent management and with ongoing continuity of care.     Subjective  CC: 79-year-old white female being seen in posthospital discharge follow-up.  Since I seen her she has had a TAVR with a 23 mm valve.  Still lives alone in a single  story townhouse, has her groceries delivered and has a cleaning lady come in.  She does go to an occasional breakfast with friends, does a little cleaning up.  Does have baseline shortness of breath on minimal activity.  No PND, orthopnea, chest pains, syncope, dizziness or peripheral edema but does wear compression stockings.    Medications  Current Outpatient Medications   Medication Sig Dispense Refill     albuterol (PROAIR HFA/PROVENTIL HFA/VENTOLIN HFA) 108 (90 Base) MCG/ACT inhaler Inhale 2 puffs into the lungs every 6 hours as needed for shortness of breath or wheezing 18 g 1     aspirin 81 MG EC tablet Take 1 tablet (81 mg) by mouth daily       azithromycin (ZITHROMAX) 250 MG tablet Take 1 tablet (250 mg) by mouth every evening 90 tablet 3     Biotin 10 MG CAPS Take 1 capsule by mouth daily       calcium citrate (CITRACAL) 950 (200 Ca) MG tablet Take 1 tablet by mouth 2 times daily       estradiol (ESTRACE) 0.1 MG/GM vaginal cream Place 2 g vaginally three times a week 72 g 1     ferrous sulfate (FEROSUL) 325 (65 Fe) MG tablet Take 1 tablet (325 mg) by mouth every other day       fluticasone-salmeterol (ADVAIR) 500-50 MCG/ACT inhaler Inhale 1 puff into the lungs every 12 hours 180 each 3     guaiFENesin (MUCINEX) 600 MG 12 hr tablet Take 1 tablet (600 mg) by mouth 2 times daily. 180 tablet 0     hydrochlorothiazide (HYDRODIURIL) 25 MG tablet Take 1 tablet (25 mg) by mouth daily  "90 tablet 3     ipratropium - albuterol 0.5 mg/2.5 mg/3 mL (DUONEB) 0.5-2.5 (3) MG/3ML neb solution TAKE 1 VIAL BY NEBULIZATION EVERY 6 HOURS AS NEEDED FOR SHORTNESS OF BREATH OR WHEEZING 1080 mL 3     levalbuterol (XOPENEX) 1.25 MG/3ML neb solution Take 3 mLs (1.25 mg) by nebulization every 4 hours as needed for shortness of breath or wheezing 810 mL 3     levETIRAcetam (KEPPRA) 500 MG tablet Take 1 tablet (500 mg) by mouth 2 times daily 180 tablet 3     losartan (COZAAR) 50 MG tablet Take 1 tablet (50 mg) by mouth 2 times daily 180 tablet 3     magnesium 250 MG tablet Take 1 tablet by mouth 2 times daily       nystatin (MYCOSTATIN) 816976 UNIT/GM external powder Apply topically daily as needed       potassium chloride teja ER (KLOR-CON M20) 20 MEQ CR tablet Take 1 tablet (20 mEq) by mouth at bedtime 90 tablet 3     RABEprazole (ACIPHEX) 20 MG EC tablet Take 1 tablet (20 mg) by mouth daily 90 tablet 3     sertraline (ZOLOFT) 100 MG tablet Take 1 tablet (100 mg) by mouth every evening 90 tablet 3     solifenacin (VESICARE) 10 MG tablet Take 1 tablet (10 mg) by mouth daily 90 tablet 3     spironolactone (ALDACTONE) 25 MG tablet Take 0.5 tablets (12.5 mg) by mouth daily 45 tablet 3     tiotropium (SPIRIVA RESPIMAT) 2.5 MCG/ACT inhaler Inhale 2 puffs into the lungs daily 12 g 3     vitamin D3 (CHOLECALCIFEROL) 125 MCG (5000 UT) tablet Take 5,000 Units by mouth three times a week Monday, Wednesday and Friday       benzonatate (TESSALON) 100 MG capsule Take 1 capsule (100 mg) by mouth 3 times daily as needed for cough (Patient not taking: Reported on 10/10/2024) 90 capsule 3       Objective  /83 (BP Location: Right arm, Patient Position: Sitting, Cuff Size: Adult Regular)   Pulse 103   Resp 16   Ht 1.626 m (5' 4\")   Wt 90.6 kg (199 lb 12.8 oz)   BMI 34.30 kg/m      General Appearance:    Alert, cooperative, no distress, appears stated age   Head:    Normocephalic, without obvious abnormality, atraumatic " "  Throat:   Lips, mucosa, and tongue normal; teeth and gums normal   Neck:   Supple, symmetrical, trachea midline, no adenopathy;        thyroid:  No enlargement/tenderness/nodules; no carotid    bruit or JVD   Back:     Symmetric, no curvature, ROM normal, no CVA tenderness   Lungs:     Clear to auscultation bilaterally, respirations mildly labored with an occasional expiratory wheeze   Chest wall:    No tenderness, left-sided pacemaker   Heart:    Regular rate and rhythm, S1 and S2 normal, 1/6 systolic ejection murmur, rub   or gallop   Abdomen:     Soft, non-tender, bowel sounds active all four quadrants,     no masses, no organomegaly   Extremities:   Normal, atraumatic, no cyanosis or edema   Pulses:   2+ and symmetric all extremities   Skin:   Skin color, texture, turgor normal, no rashes or lesions     Results    Lab Results personally reviewed   Lab Results   Component Value Date    CHOL 171 05/13/2024    CHOL 181 07/07/2023     Lab Results   Component Value Date    HDL 74 05/13/2024    HDL 69 07/07/2023     No components found for: \"LDLCALC\"  Lab Results   Component Value Date    TRIG 52 05/13/2024    TRIG 67 07/07/2023     Lab Results   Component Value Date    WBC 9.7 07/31/2024    HGB 9.9 (L) 07/31/2024    HCT 32.3 (L) 07/31/2024     07/31/2024     Lab Results   Component Value Date    BUN 31.2 (H) 07/31/2024     07/31/2024    CO2 30 (H) 07/31/2024                 Thank you for allowing me to participate in the care of your patient.      Sincerely,     ZHANE ROBERTSON MD     [facility]  cc:   Myranda Robertson MD  [address], [address]  [place] [address]      "

## 2024-10-10 NOTE — PATIENT INSTRUCTIONS
Ms Lalitha Underwood,  I enjoyed visiting with you again today.  I am glad to hear you are doing well.  Per our conversation we will sign the POLST form.  I will plan on seeing you 1 year or sooner if needed.  Ezra Robertson

## 2024-10-17 ENCOUNTER — TELEPHONE (OUTPATIENT)
Dept: FAMILY MEDICINE | Facility: CLINIC | Age: 79
End: 2024-10-17
Payer: MEDICARE

## 2024-10-17 NOTE — TELEPHONE ENCOUNTER
Patient Returning Call    Reason for call:  Wants to know if she needs or should get the RSV Vaccine    Information relayed to patient:  I scheduled her for a flu/covid vaccine for 10/19. I tried to see if she has had the RSV vaccine before, could not locate it on her chart. She was not sure either.    Patient has additional questions:  No    What are your questions/concerns:  She is set to get both flu and covid vacccine on Saturday 10/19 and wants to know if she should also get the RSV vaccine.     [facility] or would you prefer to receive a phone call?:   Patient would prefer a phone call   Okay to leave a detailed message?: No at Home number on file [phone] (home)

## 2024-10-22 ENCOUNTER — MYC MEDICAL ADVICE (OUTPATIENT)
Dept: PULMONOLOGY | Facility: CLINIC | Age: 79
End: 2024-10-22

## 2024-10-22 ENCOUNTER — IMMUNIZATION (OUTPATIENT)
Dept: FAMILY MEDICINE | Facility: CLINIC | Age: 79
End: 2024-10-22
Payer: COMMERCIAL

## 2024-10-22 VITALS — TEMPERATURE: 98.4 F

## 2024-10-22 DIAGNOSIS — J44.9 COPD (CHRONIC OBSTRUCTIVE PULMONARY DISEASE) (H): Primary | ICD-10-CM

## 2024-10-22 DIAGNOSIS — Z23 ENCOUNTER FOR IMMUNIZATION: Primary | ICD-10-CM

## 2024-10-22 PROCEDURE — 90471 IMMUNIZATION ADMIN: CPT

## 2024-10-22 PROCEDURE — 90480 ADMN SARSCOV2 VAC 1/ONLY CMP: CPT

## 2024-10-22 PROCEDURE — 90662 IIV NO PRSV INCREASED AG IM: CPT

## 2024-10-22 PROCEDURE — 99207 PR NO CHARGE NURSE ONLY: CPT

## 2024-10-22 PROCEDURE — 91320 SARSCV2 VAC 30MCG TRS-SUC IM: CPT

## 2024-10-22 RX ORDER — FLUTICASONE PROPIONATE AND SALMETEROL 500; 50 UG/1; UG/1
1 POWDER RESPIRATORY (INHALATION) EVERY 12 HOURS
Qty: 180 EACH | Refills: 2 | Status: SHIPPED | OUTPATIENT
Start: 2024-10-22

## 2024-10-22 NOTE — PROGRESS NOTES
Prior to immunization administration, verified patients identity using patient s name and date of birth. Please see Immunization Activity for additional information.     Is the patient's temperature normal (100.5 or less)? Yes     Patient MEETS CRITERIA. PROCEED with vaccine administration.          10/22/2024   COVID   Have you had myocarditis or pericarditis (inflammation of or around the heart muscle) after getting a COVID-19 vaccine? No   Have you had a serious reaction to a COVID vaccine or something in a COVID vaccine, like polyethylene glycol (PEG) or polysorbate? No   Have you had multisystem inflammatory syndrome from COVID-19 in the past 90 days? No   Have you received a bone marrow transplant within the previous 3 months? No            Patient MEETS CRITERIA. PROCEED with vaccine administration.            10/22/2024   INFLUENZA   Would you like to receive the flu shot or the nasal flu vaccine today? Flu Shot   Have you had a serious reaction to a flu vaccine or something in a flu vaccine? No   Have you had Guillain-Shandon syndrome within 6 weeks of getting a vaccine? No   Have you received a bone marrow transplant within the previous 6 months? No            Patient MEETS CRITERIA. PROCEED with vaccine administration.        Patient instructed to remain in clinic for 15 minutes afterwards, and to report any adverse reactions.      Link to Ancillary Visit Immunization Standing Orders SmartSet     Screening performed by Storm Roldan RN on 10/22/2024 at 2:10 PM.

## 2024-11-07 ENCOUNTER — APPOINTMENT (OUTPATIENT)
Dept: CT IMAGING | Facility: HOSPITAL | Age: 79
End: 2024-11-07
Attending: EMERGENCY MEDICINE
Payer: MEDICARE

## 2024-11-07 ENCOUNTER — ANCILLARY PROCEDURE (OUTPATIENT)
Dept: CARDIOLOGY | Facility: CLINIC | Age: 79
End: 2024-11-07
Attending: INTERNAL MEDICINE
Payer: COMMERCIAL

## 2024-11-07 ENCOUNTER — APPOINTMENT (OUTPATIENT)
Dept: RADIOLOGY | Facility: HOSPITAL | Age: 79
End: 2024-11-07
Attending: EMERGENCY MEDICINE
Payer: MEDICARE

## 2024-11-07 ENCOUNTER — HOSPITAL ENCOUNTER (EMERGENCY)
Facility: HOSPITAL | Age: 79
Discharge: HOME | End: 2024-11-07
Attending: EMERGENCY MEDICINE | Admitting: EMERGENCY MEDICINE
Payer: MEDICARE

## 2024-11-07 VITALS
TEMPERATURE: 98 F | DIASTOLIC BLOOD PRESSURE: 66 MMHG | SYSTOLIC BLOOD PRESSURE: 157 MMHG | RESPIRATION RATE: 16 BRPM | OXYGEN SATURATION: 100 % | HEART RATE: 81 BPM

## 2024-11-07 DIAGNOSIS — S00.03XA SCALP HEMATOMA, INITIAL ENCOUNTER: ICD-10-CM

## 2024-11-07 DIAGNOSIS — Z95.0 CARDIAC PACEMAKER IN SITU: ICD-10-CM
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
## 2024-11-07 DIAGNOSIS — I44.1 SECOND DEGREE MOBITZ II AV BLOCK: ICD-10-CM

## 2024-11-07 DIAGNOSIS — S80.02XA CONTUSION OF LEFT KNEE, INITIAL ENCOUNTER: ICD-10-CM

## 2024-11-07 LAB
MDC_IDC_EPISODE_DTM: NORMAL
MDC_IDC_EPISODE_DURATION: 1 S
MDC_IDC_EPISODE_ID: NORMAL
MDC_IDC_EPISODE_TYPE: NORMAL
MDC_IDC_LEAD_CONNECTION_STATUS: NORMAL
MDC_IDC_LEAD_CONNECTION_STATUS: NORMAL
MDC_IDC_LEAD_IMPLANT_DT: NORMAL
MDC_IDC_LEAD_IMPLANT_DT: NORMAL
MDC_IDC_LEAD_LOCATION: NORMAL
MDC_IDC_LEAD_LOCATION: NORMAL
MDC_IDC_LEAD_LOCATION_DETAIL_1: NORMAL
MDC_IDC_LEAD_LOCATION_DETAIL_1: NORMAL
MDC_IDC_LEAD_MFG: NORMAL
MDC_IDC_LEAD_MFG: NORMAL
MDC_IDC_LEAD_MODEL: NORMAL
MDC_IDC_LEAD_MODEL: NORMAL
MDC_IDC_LEAD_POLARITY_TYPE: NORMAL
MDC_IDC_LEAD_POLARITY_TYPE: NORMAL
MDC_IDC_LEAD_SERIAL: NORMAL
MDC_IDC_LEAD_SERIAL: NORMAL
MDC_IDC_MSMT_BATTERY_DTM: NORMAL
MDC_IDC_MSMT_BATTERY_REMAINING_LONGEVITY: 120 MO
MDC_IDC_MSMT_BATTERY_REMAINING_PERCENTAGE: 100 %
MDC_IDC_MSMT_BATTERY_STATUS: NORMAL
MDC_IDC_MSMT_LEADCHNL_RA_IMPEDANCE_VALUE: 476 OHM
MDC_IDC_MSMT_LEADCHNL_RV_IMPEDANCE_VALUE: 435 OHM
MDC_IDC_MSMT_LEADCHNL_RV_PACING_THRESHOLD_AMPLITUDE: 0.7 V
MDC_IDC_MSMT_LEADCHNL_RV_PACING_THRESHOLD_PULSEWIDTH: 0.4 MS
MDC_IDC_PG_IMPLANT_DTM: NORMAL
MDC_IDC_PG_MFG: NORMAL
MDC_IDC_PG_MODEL: NORMAL
MDC_IDC_PG_SERIAL: NORMAL
MDC_IDC_PG_TYPE: NORMAL
MDC_IDC_SESS_CLINIC_NAME: NORMAL
MDC_IDC_SESS_DTM: NORMAL
MDC_IDC_SESS_TYPE: NORMAL
MDC_IDC_SET_BRADY_AT_MODE_SWITCH_MODE: NORMAL
MDC_IDC_SET_BRADY_AT_MODE_SWITCH_RATE: 170 {BEATS}/MIN
MDC_IDC_SET_BRADY_LOWRATE: 60 {BEATS}/MIN
MDC_IDC_SET_BRADY_MAX_SENSOR_RATE: 130 {BEATS}/MIN
MDC_IDC_SET_BRADY_MAX_TRACKING_RATE: 130 {BEATS}/MIN
MDC_IDC_SET_BRADY_MODE: NORMAL
MDC_IDC_SET_BRADY_PAV_DELAY_HIGH: 200 MS
MDC_IDC_SET_BRADY_PAV_DELAY_LOW: 250 MS
MDC_IDC_SET_BRADY_SAV_DELAY_HIGH: 200 MS
MDC_IDC_SET_BRADY_SAV_DELAY_LOW: 250 MS
MDC_IDC_SET_LEADCHNL_RA_PACING_AMPLITUDE: 1.5 V
MDC_IDC_SET_LEADCHNL_RA_PACING_CAPTURE_MODE: NORMAL
MDC_IDC_SET_LEADCHNL_RA_PACING_POLARITY: NORMAL
MDC_IDC_SET_LEADCHNL_RA_PACING_PULSEWIDTH: 0.4 MS
MDC_IDC_SET_LEADCHNL_RA_SENSING_ADAPTATION_MODE: NORMAL
MDC_IDC_SET_LEADCHNL_RA_SENSING_POLARITY: NORMAL
MDC_IDC_SET_LEADCHNL_RA_SENSING_SENSITIVITY: 0.4 MV
MDC_IDC_SET_LEADCHNL_RV_PACING_AMPLITUDE: 1.2 V
MDC_IDC_SET_LEADCHNL_RV_PACING_CAPTURE_MODE: NORMAL
MDC_IDC_SET_LEADCHNL_RV_PACING_POLARITY: NORMAL
MDC_IDC_SET_LEADCHNL_RV_PACING_PULSEWIDTH: 0.4 MS
MDC_IDC_SET_LEADCHNL_RV_SENSING_ADAPTATION_MODE: NORMAL
MDC_IDC_SET_LEADCHNL_RV_SENSING_POLARITY: NORMAL
MDC_IDC_SET_LEADCHNL_RV_SENSING_SENSITIVITY: 1.5 MV
MDC_IDC_SET_ZONE_DETECTION_INTERVAL: 375 MS
MDC_IDC_SET_ZONE_STATUS: NORMAL
MDC_IDC_SET_ZONE_TYPE: NORMAL
MDC_IDC_SET_ZONE_VENDOR_TYPE: NORMAL
MDC_IDC_STAT_AT_BURDEN_PERCENT: 1 %
MDC_IDC_STAT_AT_DTM_END: NORMAL
MDC_IDC_STAT_AT_DTM_START: NORMAL
MDC_IDC_STAT_BRADY_DTM_END: NORMAL
MDC_IDC_STAT_BRADY_DTM_START: NORMAL
MDC_IDC_STAT_BRADY_RA_PERCENT_PACED: 2 %
MDC_IDC_STAT_BRADY_RV_PERCENT_PACED: 100 %
MDC_IDC_STAT_EPISODE_RECENT_COUNT: 0
MDC_IDC_STAT_EPISODE_RECENT_COUNT: 1
MDC_IDC_STAT_EPISODE_RECENT_COUNT_DTM_END: NORMAL
MDC_IDC_STAT_EPISODE_RECENT_COUNT_DTM_START: NORMAL
MDC_IDC_STAT_EPISODE_TYPE: NORMAL
MDC_IDC_STAT_EPISODE_VENDOR_TYPE: NORMAL

## 2024-11-07 PROCEDURE — 93296 REM INTERROG EVL PM/IDS: CPT | Performed by: INTERNAL MEDICINE

## 2024-11-07 PROCEDURE — 93294 REM INTERROG EVL PM/LDLS PM: CPT | Performed by: INTERNAL MEDICINE

## 2024-11-07 PROCEDURE — 73560 X-RAY EXAM OF KNEE 1 OR 2: CPT | Mod: LT

## 2024-11-07 PROCEDURE — 99285 EMERGENCY DEPT VISIT HI MDM: CPT | Mod: 25

## 2024-11-07 PROCEDURE — G1010 CDSM STANSON: HCPCS

## 2024-11-07 ASSESSMENT — COLUMBIA-SUICIDE SEVERITY RATING SCALE - C-SSRS
2. HAVE YOU ACTUALLY HAD ANY THOUGHTS OF KILLING YOURSELF IN THE PAST MONTH?: NO
1. IN THE PAST MONTH, HAVE YOU WISHED YOU WERE DEAD OR WISHED YOU COULD GO TO SLEEP AND NOT WAKE UP?: NO
6. HAVE YOU EVER DONE ANYTHING, STARTED TO DO ANYTHING, OR PREPARED TO DO ANYTHING TO END YOUR LIFE?: NO

## 2024-11-07 ASSESSMENT — ACTIVITIES OF DAILY LIVING (ADL)
ADLS_ACUITY_SCORE: 0
ADLS_ACUITY_SCORE: 0

## 2024-11-07 NOTE — DISCHARGE INSTRUCTIONS
CT scan of the head, cervical spine were unremarkable.  X-ray of the knee unremarkable.  You may use use Tylenol as needed for pain.  Will also apply ice to the affected area.

## 2024-11-07 NOTE — ED PROVIDER NOTES
EMERGENCY DEPARTMENT ENCOUNTER      NAME: Lalitha Underwood  AGE: 79 year old female  YOB: 1945  MRN: [id number]  EVALUATION DATE & TIME: 11/7/2024  9:56 AM    PCP: Kate Marte    ED PROVIDER: Carolyn Trunog MD    Chief Complaint   Patient presents with    Fall         FINAL IMPRESSION:  1. Fall, initial encounter    2. Scalp hematoma, initial encounter    3. Contusion of left knee, initial encounter          ED COURSE & MEDICAL DECISION MAKING:    Pertinent Labs & Imaging studies reviewed. (See chart for details)  79 year old female with history of HTN, COPD on 2.5 L home O2, lymphedema who presents to the Emergency Department for evaluation of fall, states that her legs gave out from underneath her and she slipped on the floor with her walker.  Hit the left side of her head and on exam does have hematoma.  Also hit the left knee and has contusion.  On baby aspirin only.  Concern for closed head injury, skull fracture, ICH, cervical spine injury, knee fracture versus contusion.    CT of the head, cervical spine unremarkable for acute abnormalities.  X-ray of the left knee unremarkable.  Patient given trial of ambulation with walker, and is able to ambulate without any assistance.  Safe for discharge back to home, states she already has home care and declines any additional home resources.      ED Course as of 11/07/24 1508   Thu Nov 07, 2024   0957 I met with the patient and conducted the initial exam and interview.   1037 Head CT w/o contrast  CT head independently interpreted by myself.  No visualized ICH.  Scalp hematoma.   1051 Ambulated with walker at baseline without difficulty       Medical Decision Making  Obtained supplemental history:Supplemental history obtained?: EMS  Reviewed external records: External records reviewed?: Outpatient Record: 10/31/2024 GYN note  Care impacted by chronic illness:Chronic Lung Disease and Hypertension  Did you consider but not order tests?: Work up  considered but not performed and documented in chart, if applicable  Did you interpret images independently?: Independent interpretation of ECG and images noted in documentation, when applicable.  Consultation discussion with other provider:Did you involve another provider (consultant, , pharmacy, etc.)?: No  Discharge. No recommendations on prescription strength medication(s). See documentation for any additional details.    MIPS: Adult Minor Head Trauma:Age 65 years or older      At the conclusion of the encounter I discussed the results of all of the tests and the disposition. The questions were answered. The patient or family acknowledged understanding and was agreeable with the care plan.      MEDICATIONS GIVEN IN THE EMERGENCY:  Medications - No data to display    NEW PRESCRIPTIONS STARTED AT TODAY'S ER VISIT  Discharge Medication List as of 11/7/2024 11:01 AM             =================================================================    HPI    Patient information was obtained from: patient    Use of Intrepreter: N/A        Lalitha Underwood is a 79 year old female with pertinent medical history of blood thinner usage who presents for a fall.      At 8:30 AM, the patient tripped, fell, and hit her head. She could not get up due to her back arthritis. She called EMS. Once EMS arrived, they helped her get up and walk. The patient noticed additional pain with her left knee when she walked. She takes 2 baby aspirin daily.    PAST MEDICAL HISTORY:  Past Medical History:   Diagnosis Date    Convulsive disorder (H)     Convulsive disorder (H)     COPD (chronic obstructive pulmonary disease) (H)     COPD (chronic obstructive pulmonary disease) (H)     Coronary artery disease involving native coronary artery with unstable angina pectoris (H) 3/31/2023    Depression     Dyspnea on exertion     Gastroesophageal reflux disease     Heart murmur     Hernia of unspecified site of abdominal cavity without mention of  obstruction or gangrene     Hiatal hernia     Hiatal hernia     Hypertension     Hypertension     Obese     On home oxygen therapy     at 1.5 liters at nite    Osteopenia     Osteopenia     Oxygen dependent     1.5 L per NC    Pneumonia due to infectious organism, unspecified laterality, unspecified part of lung 3/19/2022    Second degree heart block 3/19/2022    Shortness of breath     Status post coronary angiogram 6/3/2024    Uncomplicated asthma     URI (upper respiratory infection)     Venous insufficiency of both lower extremities     Venous insufficiency of both lower extremities     Walking troubles        PAST SURGICAL HISTORY:  Past Surgical History:   Procedure Laterality Date    AMPUTATE TOE(S) Left 8/11/2022    Procedure: AMPUTATION, fifth digit left foot;  Surgeon: Alfonso Orozco DPM;  Location: [facility] OR    ARTHROPLASTY TOE(S) Left 11/22/2021    Procedure: ARTHROPLASTY, digits two and three left foot;  Surgeon: Alfonso Orozco DPM;  Location: [facility] OR    ARTHROPLASTY TOE(S) Left 7/18/2022    Procedure: ARTHROPLASTY, digits four and five left foot;  Surgeon: Alfonso Orozco DPM;  Location: [facility] OR    CV CORONARY ANGIOGRAM N/A 7/7/2023    Procedure: Coronary Angiogram;  Surgeon: Elijah Leger MD;  Location: [facility] LAB CV    CV CORONARY ANGIOGRAM N/A 6/3/2024    Procedure: Coronary Angiogram;  Surgeon: Villa Croft MD;  Location: [facility] CATH LAB CV    CV LEFT HEART CATH N/A 7/7/2023    Procedure: Left Heart Catheterization;  Surgeon: Elijah Leger MD;  Location: [facility] CATH LAB CV    CV RIGHT HEART CATH MEASUREMENTS RECORDED N/A 7/7/2023    Procedure: Right Heart Catheterization with [place];  Surgeon: Elijah Leger MD;  Location: [facility] CATH LAB CV    CV RIGHT HEART CATH MEASUREMENTS RECORDED N/A 6/3/2024    Procedure: Right Heart Catheterization;  Surgeon: Villa Croft MD;  Location: [facility] CATH LAB CV    CV  TRANSCATHETER AORTIC VALVE REPLACEMENT-FEMORAL APPROACH N/A 6/25/2024    Procedure: Transcatheter Aortic Valve Replacement-Femoral Approach;  Surgeon: Anil Gonzales MD;  Location: [facility] LAB CV    EP PACEMAKER DEVICE & LEAD IMPLANT- RIGHT ATRIAL & RIGHT VENTRICULAR N/A 3/24/2022    Procedure: Pacemaker Device & Lead Implant - Right Atrial & Right Ventricular;  Surgeon: Helder Pendleton MD;  Location: [facility] CV    ESOPHAGOSCOPY, GASTROSCOPY, DUODENOSCOPY (EGD), COMBINED N/A 11/26/2018    Procedure: Esophagogastroduodenoscopy;  Surgeon: Jasmeet Wilder MD;  Location: [facility] OR    HERNIA REPAIR, UMBILICAL  04/2018    HERNIA REPAIR, UMBILICAL  04/04/2018    Dr. Jimenez    LAPAROSCOPIC HERNIORRHAPHY HIATAL N/A 11/26/2018    Procedure: Laparoscopic Hiatal Hernia Repair,bilateral chest tubes;  Surgeon: Jasmeet Wilder MD;  Location: [facility] OR    OR TRANSCATHETER AORTIC VALVE REPLACEMENT, FEMORAL PERCUTANEOUS APPROACH (STANDBY) N/A 6/25/2024    Procedure: OR TRANSCATHETER AORTIC VALVE REPLACEMENT, FEMORAL PERCUTANEOUS APPROACH (STANDBY);  Surgeon: Sepideh Devlin MD;  Location: [facility] CV    OTHER SURGICAL HISTORY Left 2003    Broke titanium in left arm    Repair arm fracture Left     SHOULDER SURGERY Right 1967    SHOULDER SURGERY Right 1967     BIOPSY THYROID FINE NEEDLE ASPIRATION  7/29/2020       CURRENT MEDICATIONS:    Prior to Admission Medications   Prescriptions Last Dose Informant Patient Reported? Taking?   Biotin 10 MG CAPS   Yes No   Sig: Take 1 capsule by mouth daily   RABEprazole (ACIPHEX) 20 MG EC tablet   No No   Sig: Take 1 tablet (20 mg) by mouth daily   albuterol (PROAIR HFA/PROVENTIL HFA/VENTOLIN HFA) 108 (90 Base) MCG/ACT inhaler   No No   Sig: Inhale 2 puffs into the lungs every 6 hours as needed for shortness of breath or wheezing   aspirin 81 MG EC tablet   No No   Sig: Take 1 tablet (81 mg) by mouth daily   azithromycin (ZITHROMAX) 250 MG tablet   No  No   Sig: Take 1 tablet (250 mg) by mouth every evening   benzonatate (TESSALON) 100 MG capsule   No No   Sig: Take 1 capsule (100 mg) by mouth 3 times daily as needed for cough   Patient not taking: Reported on 10/10/2024   calcium citrate (CITRACAL) 950 (200 Ca) MG tablet  Self Yes No   Sig: Take 1 tablet by mouth 2 times daily   estradiol (ESTRACE) 0.1 MG/GM vaginal cream   No No   Sig: Place 2 g vaginally three times a week   ferrous sulfate (FEROSUL) 325 (65 Fe) MG tablet   Yes No   Sig: Take 1 tablet (325 mg) by mouth every other day   fluticasone-salmeterol (ADVAIR) 500-50 MCG/ACT inhaler   No No   Sig: Inhale 1 puff into the lungs every 12 hours   fluticasone-salmeterol (WIXELA INHUB) 500-50 MCG/ACT inhaler   No No   Sig: Inhale 1 puff into the lungs every 12 hours.   guaiFENesin (MUCINEX) 600 MG 12 hr tablet   No No   Sig: Take 1 tablet (600 mg) by mouth 2 times daily.   hydrochlorothiazide (HYDRODIURIL) 25 MG tablet   No No   Sig: Take 1 tablet (25 mg) by mouth daily   ipratropium - albuterol 0.5 mg/2.5 mg/3 mL (DUONEB) 0.5-2.5 (3) MG/3ML neb solution   No No   Sig: TAKE 1 VIAL BY NEBULIZATION EVERY 6 HOURS AS NEEDED FOR SHORTNESS OF BREATH OR WHEEZING   levETIRAcetam (KEPPRA) 500 MG tablet   No No   Sig: Take 1 tablet (500 mg) by mouth 2 times daily   levalbuterol (XOPENEX) 1.25 MG/3ML neb solution   No No   Sig: Take 3 mLs (1.25 mg) by nebulization every 4 hours as needed for shortness of breath or wheezing   losartan (COZAAR) 50 MG tablet   No No   Sig: Take 1 tablet (50 mg) by mouth 2 times daily   magnesium 250 MG tablet   Yes No   Sig: Take 1 tablet by mouth 2 times daily   nystatin (MYCOSTATIN) 430075 UNIT/GM external powder   Yes No   Sig: Apply topically daily as needed   potassium chloride teja ER (KLOR-CON M20) 20 MEQ CR tablet   No No   Sig: Take 1 tablet (20 mEq) by mouth at bedtime   sertraline (ZOLOFT) 100 MG tablet   No No   Sig: Take 1 tablet (100 mg) by mouth every evening   solifenacin  (VESICARE) 10 MG tablet  Self No No   Sig: Take 1 tablet (10 mg) by mouth daily   spironolactone (ALDACTONE) 25 MG tablet   No No   Sig: Take 0.5 tablets (12.5 mg) by mouth daily   tiotropium (SPIRIVA RESPIMAT) 2.5 MCG/ACT inhaler   No No   Sig: Inhale 2 puffs into the lungs daily   vitamin D3 (CHOLECALCIFEROL) 125 MCG (5000 UT) tablet  Self Yes No   Sig: Take 5,000 Units by mouth three times a week Monday, Wednesday and Friday      Facility-Administered Medications Last Administration Doses Remaining   denosumab (PROLIA) injection 60 mg None recorded           ALLERGIES:  Allergies   Allergen Reactions    Clindamycin Rash    Carbamazepine Rash    Morphine Nausea       FAMILY HISTORY:  Family History   Problem Relation Age of Onset    Pulmonary Hypertension Daughter         idiopathic     Heart Disease Other         2 sibs MI, 1 sib electrical conduction disorder    Breast Cancer Mother 66.00    Hypertension Mother     Coronary Artery Disease Mother     Varicose Veins Mother     Hypertension Father     Coronary Artery Disease Sister     Heart Disease Sister     Diabetes Son     Pulmonary Hypertension Daughter     Coronary Artery Disease Sister     Heart Disease Sister        SOCIAL HISTORY:  Social History     Tobacco Use    Smoking status: Former     Current packs/day: 0.00     Average packs/day: 1.5 packs/day for 38.0 years (57.0 ttl pk-yrs)     Types: Cigarettes     Start date: 1960     Quit date: 1998     Years since quittin.9    Smokeless tobacco: Never    Tobacco comments:     quit in    Vaping Use    Vaping status: Never Used   Substance Use Topics    Alcohol use: Yes     Alcohol/week: 2.0 standard drinks of alcohol     Comment: occ    Drug use: Not Currently        VITALS:  Patient Vitals for the past 24 hrs:   BP Temp Temp src Pulse Resp SpO2   24 1030 (!) 157/66 -- -- 81 16 100 %   24 1000 (!) 190/72 98  F (36.7  C) Oral 82 18 100 %       PHYSICAL EXAM    General Appearance:  Well-appearing, well-nourished, no acute distress   Head:  Large hematoma to left parietals scalp, Normocephalic, atraumatic  Eyes:  PERRL, conjunctiva/corneas clear, EOM's intact  ENT:  Lips, mucosa, and tongue normal; membranes are moist without pallor  Neck:  Supple, no midline tenderness palpation  Cardio:  Regular rate and rhythm, no murmur/gallop/rub, hypertensive normalized on recheck  Pulm:  No respiratory distress, clear to auscultation bilaterally  Back:  No midline tenderness to palpation to cervical, thoracic, or lumbar spine  Abdomen:  Soft, non-tender, non distended,no rebound or guarding.  Extremities:  Large hematoma left lateral knee, uses hands to lift both legs with lymphedema changes to both lower extremities, no cyanosis, full ROM and motor tone intact, bilateral pulses intact upper and lower  Skin:  Skin warm, dry, no rashes  Neuro:  Alert and oriented ×3, moving all extremities, no gross sensory defects     RADIOLOGY/LABS:  Reviewed all pertinent imaging. Please see official radiology report. All pertinent labs reviewed and interpreted.    Results for orders placed or performed during the hospital encounter of 11/07/24   Head CT w/o contrast    Impression    IMPRESSION:  1.  No evidence of acute intracranial abnormality.  2.  Left posterior lateral scalp hematoma without underlying calvarial fracture.  3.  Nonspecific hypodensities of the cerebral white matter most consistent with changes of chronic microvascular disease.   CT Cervical Spine w/o Contrast    Impression    IMPRESSION:  1.  No fracture or traumatic subluxation of the cervical spine.  2.  Cervical spine degenerative changes resulting in multilevel moderate to severe neural foraminal stenoses as detailed above.   XR Knee Left 1/2 Views    Impression    IMPRESSION: Normal joint spaces and alignment. No fracture or joint effusion.         The creation of this record is based on the scribe s observations of the work being performed by  Carolyn Truong MD and the provider s statements to them. It was created on her behalf by Sydnee Pearson, a trained medical scribe. This document has been checked and approved by the attending provider.    Carolyn Truong MD  Emergency Medicine  [facility] EMERGENCY DEPARTMENT  [address] [place] [id number]  [phone]  Dept: [phone]       Carolyn Truong MD  11/07/24 8163

## 2024-11-07 NOTE — ED TRIAGE NOTES
Patient arrives from home via [facility].   Patient reports climbing out of bed and slipping to floor at approximately 0830 this morning hitting left side of head and left knee.   Denies LOC.     Hematoma noted to left side of head with hematoma and left knee swelling during triage.     Patient on 2.5L nasal canula chronically for her COPD.

## 2024-11-07 NOTE — ED NOTES
Bed: [place] [address]  Expected date:   Expected time:   Means of arrival: Ambulance  Comments:  [facility]: 79F, fall, on thinners

## 2024-11-08 ENCOUNTER — PATIENT OUTREACH (OUTPATIENT)
Dept: CARE COORDINATION | Facility: CLINIC | Age: 79
End: 2024-11-08
Payer: MEDICARE

## 2024-11-08 NOTE — LETTER
Lalitha Underwood  [address]  [place] [address]    Dear Lalitha Underwood,    I am a team member within the Connected Care Resource Center with [facility]. I recently contacted you to ensure you are doing well following a visit within our health system. I also wanted to take this chance to introduce Clinic Care Coordination should you have any interest in this program in the future.    Below is a description of Clinic Care Coordination and how this team can further assist you:       The Clinic Care Coordination team is made up of a Registered Nurse, , Financial Resource Worker, and a Community Health Worker who understand and can help navigate the health care system. The goal of clinic care coordination is to help you manage your health, improve access to care, and achieve optimal health outcomes. They work alongside your provider to assist you in determining your health and social needs, obtain health care and community resources, and provide you with necessary information and education. Clinic Care Coordination can work with you through any barriers and develop a care plan that helps coordinate and strengthen the relationship between you and your care team.    If you wish to connect with the Clinic Care Coordination Team, please let your [facility] Primary Care Provider or Clinic Care Team know and they can place a referral. The Clinic Care Coordination team will then reach out by phone to further support you.    We are focused on providing you with the highest-quality healthcare experience possible.    Sincerely,   Shilpa MANE  Community Health Worker    Connected Care Resources   [facility]

## 2024-11-08 NOTE — PROGRESS NOTES
Clinic Care Coordination Contact  Community Health Worker Initial Outreach    CHW Initial Information Gathering:  Referral Source: ED Follow-Up  Current living arrangement:: Not Assessed  CHW Additional Questions  If ED/Hospital discharge, follow-up appointment scheduled as recommended?: N/A  Medication changes made following ED/Hospital discharge?: No  MyChart active?: Yes  Patient sent Social Drivers of Health questionnaire?: No    Patient accepts CC: No, Patient expressed no additional support is needed. Patient will be sent Care Coordination introduction letter for future reference.     Shilpa Pino  Community Health Worker    Connected Care Resources   [facility]     *Connected Care Resources Team does NOT   follow patient ongoing. Referrals are identified   based on internal discharge report and the outreach is to ensure   Patient has understanding of their discharge instructions.

## 2024-11-14 ENCOUNTER — PATIENT OUTREACH (OUTPATIENT)
Dept: CARE COORDINATION | Facility: CLINIC | Age: 79
End: 2024-11-14
Payer: MEDICARE

## 2024-11-14 NOTE — PROGRESS NOTES
"[facility] Medicare ACO Reach Population - Proactive Outreach  Unable to Reach    Background: Patient outreach conducted proactively to support health maintenance initiatives within [facility]'s Medicare ACO Reach Population.     Outreach attempted x 1.  Received message stating \"I'm sorry, we are currently experiencing system problems and are unable to process your call.\" Attempted x2     Plan:  Care Coordinator will try to reach patient again in 1-2 business days.    Nadia Walker RN  [facility]  "

## 2024-11-18 ENCOUNTER — APPOINTMENT (OUTPATIENT)
Dept: ULTRASOUND IMAGING | Facility: CLINIC | Age: 79
End: 2024-11-18
Attending: FAMILY MEDICINE
Payer: MEDICARE

## 2024-11-18 ENCOUNTER — MEDICAL CORRESPONDENCE (OUTPATIENT)
Dept: HEALTH INFORMATION MANAGEMENT | Facility: CLINIC | Age: 79
End: 2024-11-18

## 2024-11-18 ENCOUNTER — TELEPHONE (OUTPATIENT)
Dept: FAMILY MEDICINE | Facility: CLINIC | Age: 79
End: 2024-11-18
Payer: MEDICARE

## 2024-11-18 ENCOUNTER — HOSPITAL ENCOUNTER (OUTPATIENT)
Facility: CLINIC | Age: 79
Setting detail: OBSERVATION
Discharge: HOME | End: 2024-11-19
Attending: EMERGENCY MEDICINE | Admitting: STUDENT IN AN ORGANIZED HEALTH CARE EDUCATION/TRAINING PROGRAM
Payer: MEDICARE

## 2024-11-18 DIAGNOSIS — J42 CHRONIC BRONCHITIS, UNSPECIFIED CHRONIC BRONCHITIS TYPE (H): Primary | ICD-10-CM

## 2024-11-18 DIAGNOSIS — I82.412 ACUTE DEEP VEIN THROMBOSIS (DVT) OF FEMORAL VEIN OF LEFT LOWER EXTREMITY (H): ICD-10-CM

## 2024-11-18 DIAGNOSIS — Z95.2 S/P TAVR (TRANSCATHETER AORTIC VALVE REPLACEMENT): ICD-10-CM

## 2024-11-18 DIAGNOSIS — J96.11 CHRONIC HYPOXIC RESPIRATORY FAILURE (H): ICD-10-CM

## 2024-11-18 LAB
ANION GAP SERPL CALCULATED.3IONS-SCNC: 9 MMOL/L (ref 7–15)
BUN SERPL-MCNC: 24.9 MG/DL (ref 8–23)
CALCIUM SERPL-MCNC: 9.1 MG/DL (ref 8.8–10.4)
CHLORIDE SERPL-SCNC: 100 MMOL/L (ref 98–107)
CREAT SERPL-MCNC: 0.92 MG/DL (ref 0.51–0.95)
EGFRCR SERPLBLD CKD-EPI 2021: 63 ML/MIN/1.73M2
ERYTHROCYTE [DISTWIDTH] IN BLOOD BY AUTOMATED COUNT: 14.6 % (ref 10–15)
GLUCOSE SERPL-MCNC: 97 MG/DL (ref 70–99)
HCO3 SERPL-SCNC: 31 MMOL/L (ref 22–29)
HCT VFR BLD AUTO: 30.2 % (ref 35–47)
HGB BLD-MCNC: 9.7 G/DL (ref 11.7–15.7)
HOLD SPECIMEN: NORMAL
HOLD SPECIMEN: NORMAL
MCH RBC QN AUTO: 29 PG (ref 26.5–33)
MCHC RBC AUTO-ENTMCNC: 32.1 G/DL (ref 31.5–36.5)
MCV RBC AUTO: 90 FL (ref 78–100)
PLATELET # BLD AUTO: 208 10E3/UL (ref 150–450)
POTASSIUM SERPL-SCNC: 4.3 MMOL/L (ref 3.4–5.3)
RBC # BLD AUTO: 3.35 10E6/UL (ref 3.8–5.2)
SODIUM SERPL-SCNC: 140 MMOL/L (ref 135–145)
TROPONIN T SERPL HS-MCNC: 24 NG/L
TROPONIN T SERPL HS-MCNC: 29 NG/L
WBC # BLD AUTO: 10 10E3/UL (ref 4–11)

## 2024-11-18 PROCEDURE — 85014 HEMATOCRIT: CPT | Performed by: EMERGENCY MEDICINE

## 2024-11-18 PROCEDURE — 84484 ASSAY OF TROPONIN QUANT: CPT | Mod: 91 | Performed by: EMERGENCY MEDICINE

## 2024-11-18 PROCEDURE — 99223 1ST HOSP IP/OBS HIGH 75: CPT

## 2024-11-18 PROCEDURE — 93005 ELECTROCARDIOGRAM TRACING: CPT

## 2024-11-18 PROCEDURE — 250N000013 HC RX MED GY IP 250 OP 250 PS 637

## 2024-11-18 PROCEDURE — G0378 HOSPITAL OBSERVATION PER HR: HCPCS

## 2024-11-18 PROCEDURE — 93971 EXTREMITY STUDY: CPT | Mod: LT

## 2024-11-18 PROCEDURE — 99285 EMERGENCY DEPT VISIT HI MDM: CPT | Mod: 25 | Performed by: EMERGENCY MEDICINE

## 2024-11-18 PROCEDURE — 85048 AUTOMATED LEUKOCYTE COUNT: CPT | Performed by: EMERGENCY MEDICINE

## 2024-11-18 PROCEDURE — 250N000013 HC RX MED GY IP 250 OP 250 PS 637: Performed by: STUDENT IN AN ORGANIZED HEALTH CARE EDUCATION/TRAINING PROGRAM

## 2024-11-18 PROCEDURE — 80048 BASIC METABOLIC PNL TOTAL CA: CPT | Performed by: EMERGENCY MEDICINE

## 2024-11-18 PROCEDURE — 96365 THER/PROPH/DIAG IV INF INIT: CPT

## 2024-11-18 PROCEDURE — 82310 ASSAY OF CALCIUM: CPT | Performed by: EMERGENCY MEDICINE

## 2024-11-18 PROCEDURE — 250N000011 HC RX IP 250 OP 636: Performed by: EMERGENCY MEDICINE

## 2024-11-18 PROCEDURE — 96366 THER/PROPH/DIAG IV INF ADDON: CPT

## 2024-11-18 PROCEDURE — 36415 COLL VENOUS BLD VENIPUNCTURE: CPT | Performed by: EMERGENCY MEDICINE

## 2024-11-18 PROCEDURE — 93010 ELECTROCARDIOGRAM REPORT: CPT | Performed by: EMERGENCY MEDICINE

## 2024-11-18 PROCEDURE — 99285 EMERGENCY DEPT VISIT HI MDM: CPT | Mod: 25

## 2024-11-18 RX ORDER — SERTRALINE HYDROCHLORIDE 100 MG/1
100 TABLET, FILM COATED ORAL EVERY EVENING
Status: DISCONTINUED | OUTPATIENT
Start: 2024-11-19 | End: 2024-11-19 | Stop reason: HOSPADM

## 2024-11-18 RX ORDER — HYDROCHLOROTHIAZIDE 25 MG/1
25 TABLET ORAL DAILY
Status: DISCONTINUED | OUTPATIENT
Start: 2024-11-19 | End: 2024-11-19 | Stop reason: HOSPADM

## 2024-11-18 RX ORDER — POTASSIUM CHLORIDE 1500 MG/1
20 TABLET, EXTENDED RELEASE ORAL AT BEDTIME
Status: DISCONTINUED | OUTPATIENT
Start: 2024-11-18 | End: 2024-11-19 | Stop reason: HOSPADM

## 2024-11-18 RX ORDER — NALOXONE HYDROCHLORIDE 0.4 MG/ML
0.4 INJECTION, SOLUTION INTRAMUSCULAR; INTRAVENOUS; SUBCUTANEOUS
Status: DISCONTINUED | OUTPATIENT
Start: 2024-11-18 | End: 2024-11-19 | Stop reason: HOSPADM

## 2024-11-18 RX ORDER — PANTOPRAZOLE SODIUM 40 MG/1
40 TABLET, DELAYED RELEASE ORAL
Status: DISCONTINUED | OUTPATIENT
Start: 2024-11-19 | End: 2024-11-19 | Stop reason: HOSPADM

## 2024-11-18 RX ORDER — OXYCODONE HYDROCHLORIDE 5 MG/1
5 TABLET ORAL EVERY 4 HOURS PRN
Status: DISCONTINUED | OUTPATIENT
Start: 2024-11-18 | End: 2024-11-19 | Stop reason: HOSPADM

## 2024-11-18 RX ORDER — HEPARIN SODIUM 10000 [USP'U]/100ML
0-5000 INJECTION, SOLUTION INTRAVENOUS CONTINUOUS
Status: DISPENSED | OUTPATIENT
Start: 2024-11-18 | End: 2024-11-19

## 2024-11-18 RX ORDER — AMOXICILLIN 250 MG
2 CAPSULE ORAL 2 TIMES DAILY PRN
Status: DISCONTINUED | OUTPATIENT
Start: 2024-11-18 | End: 2024-11-19 | Stop reason: HOSPADM

## 2024-11-18 RX ORDER — ACETAMINOPHEN 325 MG/1
650 TABLET ORAL EVERY 4 HOURS PRN
Status: DISCONTINUED | OUTPATIENT
Start: 2024-11-18 | End: 2024-11-19 | Stop reason: HOSPADM

## 2024-11-18 RX ORDER — SPIRONOLACTONE 25 MG
12.5 TABLET ORAL DAILY
Status: DISCONTINUED | OUTPATIENT
Start: 2024-11-19 | End: 2024-11-19 | Stop reason: HOSPADM

## 2024-11-18 RX ORDER — GUAIFENESIN 600 MG/1
600 TABLET, EXTENDED RELEASE ORAL 2 TIMES DAILY
Status: DISCONTINUED | OUTPATIENT
Start: 2024-11-18 | End: 2024-11-19 | Stop reason: HOSPADM

## 2024-11-18 RX ORDER — FLUTICASONE FUROATE AND VILANTEROL 200; 25 UG/1; UG/1
1 POWDER RESPIRATORY (INHALATION) DAILY
Status: DISCONTINUED | OUTPATIENT
Start: 2024-11-19 | End: 2024-11-19 | Stop reason: HOSPADM

## 2024-11-18 RX ORDER — NALOXONE HYDROCHLORIDE 0.4 MG/ML
0.2 INJECTION, SOLUTION INTRAMUSCULAR; INTRAVENOUS; SUBCUTANEOUS
Status: DISCONTINUED | OUTPATIENT
Start: 2024-11-18 | End: 2024-11-19 | Stop reason: HOSPADM

## 2024-11-18 RX ORDER — ONDANSETRON 4 MG/1
4 TABLET, ORALLY DISINTEGRATING ORAL EVERY 6 HOURS PRN
Status: DISCONTINUED | OUTPATIENT
Start: 2024-11-18 | End: 2024-11-19 | Stop reason: HOSPADM

## 2024-11-18 RX ORDER — TOLTERODINE 4 MG/1
4 CAPSULE, EXTENDED RELEASE ORAL DAILY
Status: DISCONTINUED | OUTPATIENT
Start: 2024-11-19 | End: 2024-11-19 | Stop reason: HOSPADM

## 2024-11-18 RX ORDER — PROCHLORPERAZINE MALEATE 5 MG/1
5 TABLET ORAL EVERY 6 HOURS PRN
Status: DISCONTINUED | OUTPATIENT
Start: 2024-11-18 | End: 2024-11-19 | Stop reason: HOSPADM

## 2024-11-18 RX ORDER — MULTIVITAMIN WITH IRON
250 TABLET ORAL 2 TIMES DAILY
Status: DISCONTINUED | OUTPATIENT
Start: 2024-11-18 | End: 2024-11-18

## 2024-11-18 RX ORDER — LOSARTAN POTASSIUM 50 MG/1
50 TABLET ORAL 2 TIMES DAILY
Status: DISCONTINUED | OUTPATIENT
Start: 2024-11-18 | End: 2024-11-19 | Stop reason: HOSPADM

## 2024-11-18 RX ORDER — ASPIRIN 81 MG/1
81 TABLET ORAL DAILY
Status: DISCONTINUED | OUTPATIENT
Start: 2024-11-19 | End: 2024-11-19 | Stop reason: HOSPADM

## 2024-11-18 RX ORDER — AZITHROMYCIN 250 MG/1
250 TABLET, FILM COATED ORAL EVERY EVENING
Status: DISCONTINUED | OUTPATIENT
Start: 2024-11-18 | End: 2024-11-19 | Stop reason: HOSPADM

## 2024-11-18 RX ORDER — BENZONATATE 100 MG/1
100 CAPSULE ORAL 3 TIMES DAILY PRN
Status: DISCONTINUED | OUTPATIENT
Start: 2024-11-18 | End: 2024-11-19 | Stop reason: HOSPADM

## 2024-11-18 RX ORDER — AMOXICILLIN 250 MG
1 CAPSULE ORAL 2 TIMES DAILY PRN
Status: DISCONTINUED | OUTPATIENT
Start: 2024-11-18 | End: 2024-11-19 | Stop reason: HOSPADM

## 2024-11-18 RX ORDER — LEVETIRACETAM 500 MG/1
500 TABLET ORAL 2 TIMES DAILY
Status: DISCONTINUED | OUTPATIENT
Start: 2024-11-18 | End: 2024-11-19 | Stop reason: HOSPADM

## 2024-11-18 RX ORDER — POTASSIUM CHLORIDE 1500 MG/1
20 TABLET, EXTENDED RELEASE ORAL AT BEDTIME
COMMUNITY
Start: 2024-09-09 | End: 2024-11-18

## 2024-11-18 RX ORDER — CALCIUM CARBONATE 500 MG/1
1000 TABLET, CHEWABLE ORAL 4 TIMES DAILY PRN
Status: DISCONTINUED | OUTPATIENT
Start: 2024-11-18 | End: 2024-11-19 | Stop reason: HOSPADM

## 2024-11-18 RX ORDER — ONDANSETRON 2 MG/ML
4 INJECTION INTRAMUSCULAR; INTRAVENOUS EVERY 6 HOURS PRN
Status: DISCONTINUED | OUTPATIENT
Start: 2024-11-18 | End: 2024-11-19 | Stop reason: HOSPADM

## 2024-11-18 RX ADMIN — AZITHROMYCIN DIHYDRATE 250 MG: 250 TABLET, FILM COATED ORAL at 23:00

## 2024-11-18 RX ADMIN — GUAIFENESIN 600 MG: 600 TABLET ORAL at 23:00

## 2024-11-18 RX ADMIN — HEPARIN SODIUM 1650 UNITS/HR: 10000 INJECTION, SOLUTION INTRAVENOUS at 21:05

## 2024-11-18 RX ADMIN — LEVETIRACETAM 500 MG: 500 TABLET, FILM COATED ORAL at 23:00

## 2024-11-18 RX ADMIN — POTASSIUM CHLORIDE 20 MEQ: 1500 TABLET, EXTENDED RELEASE ORAL at 23:00

## 2024-11-18 RX ADMIN — UMECLIDINIUM 1 PUFF: 62.5 AEROSOL, POWDER ORAL at 23:08

## 2024-11-18 RX ADMIN — LOSARTAN POTASSIUM 50 MG: 50 TABLET, FILM COATED ORAL at 23:00

## 2024-11-18 ASSESSMENT — ACTIVITIES OF DAILY LIVING (ADL)
ADLS_ACUITY_SCORE: 0

## 2024-11-18 NOTE — ED TRIAGE NOTES
Arrives concerned for left lower leg swelling, tenderness & redness. had fall at beginning of November, had lump to side of knee prior & now getting more taut & tender. Has nurses for in home care & PT/OT-told to come in to evaluate lump & swelling around ankle. Has been doing lymphadema treatments to that leg. Denies fever/chills.   Triage Assessment (Adult)       Row Name 11/18/24 1443          Triage Assessment    Airway WDL WDL        Respiratory WDL    Respiratory WDL WDL        Skin Circulation/Temperature WDL    Skin Circulation/Temperature WDL WDL        Cardiac WDL    Cardiac WDL WDL        Peripheral/Neurovascular WDL    Peripheral Neurovascular WDL WDL        Cognitive/Neuro/Behavioral WDL    Cognitive/Neuro/Behavioral WDL WDL

## 2024-11-18 NOTE — TELEPHONE ENCOUNTER
Home care RN and patient calling to see if there were any openings available to be seen today.    Home RN came to do lymphedema therapy and left leg is warm and red and she is worried about cellulitis.    No appointment available so patient will got to [facility] to be evaluated.    Christine M Klisch, RN

## 2024-11-18 NOTE — PROGRESS NOTES
[facility] Medicare ACO Reach Population - Proactive Outreach  Unable to Reach    Background: Patient outreach conducted proactively to support health maintenance initiatives within [facility]'s Medicare ACO Reach Population.     Patient is currently in the Emergency Room, no 2nd attempt made.     Plan:  Care Coordinator will do no further outreaches at this time.    Nadia Walker RN  [facility]

## 2024-11-19 VITALS
TEMPERATURE: 98.3 F | OXYGEN SATURATION: 99 % | HEART RATE: 59 BPM | SYSTOLIC BLOOD PRESSURE: 146 MMHG | HEIGHT: 64 IN | WEIGHT: 198.63 LBS | RESPIRATION RATE: 18 BRPM | DIASTOLIC BLOOD PRESSURE: 58 MMHG | BODY MASS INDEX: 33.91 KG/M2

## 2024-11-19 LAB
ANION GAP SERPL CALCULATED.3IONS-SCNC: 6 MMOL/L (ref 7–15)
BUN SERPL-MCNC: 25.1 MG/DL (ref 8–23)
CALCIUM SERPL-MCNC: 8.8 MG/DL (ref 8.8–10.4)
CHLORIDE SERPL-SCNC: 103 MMOL/L (ref 98–107)
CREAT SERPL-MCNC: 0.9 MG/DL (ref 0.51–0.95)
EGFRCR SERPLBLD CKD-EPI 2021: 65 ML/MIN/1.73M2
ERYTHROCYTE [DISTWIDTH] IN BLOOD BY AUTOMATED COUNT: 14.5 % (ref 10–15)
GLUCOSE SERPL-MCNC: 113 MG/DL (ref 70–99)
HCO3 SERPL-SCNC: 33 MMOL/L (ref 22–29)
HCT VFR BLD AUTO: 27.4 % (ref 35–47)
HGB BLD-MCNC: 8.7 G/DL (ref 11.7–15.7)
MCH RBC QN AUTO: 28.7 PG (ref 26.5–33)
MCHC RBC AUTO-ENTMCNC: 31.8 G/DL (ref 31.5–36.5)
MCV RBC AUTO: 90 FL (ref 78–100)
PLATELET # BLD AUTO: 186 10E3/UL (ref 150–450)
POTASSIUM SERPL-SCNC: 4.5 MMOL/L (ref 3.4–5.3)
RBC # BLD AUTO: 3.03 10E6/UL (ref 3.8–5.2)
SODIUM SERPL-SCNC: 142 MMOL/L (ref 135–145)
UFH PPP CHRO-ACNC: >1.1 IU/ML
WBC # BLD AUTO: 8.5 10E3/UL (ref 4–11)

## 2024-11-19 PROCEDURE — 99239 HOSP IP/OBS DSCHRG MGMT >30: CPT | Performed by: FAMILY MEDICINE

## 2024-11-19 PROCEDURE — 999N000147 HC STATISTIC PT IP EVAL DEFER

## 2024-11-19 PROCEDURE — 82435 ASSAY OF BLOOD CHLORIDE: CPT

## 2024-11-19 PROCEDURE — 250N000013 HC RX MED GY IP 250 OP 250 PS 637

## 2024-11-19 PROCEDURE — 36415 COLL VENOUS BLD VENIPUNCTURE: CPT | Performed by: EMERGENCY MEDICINE

## 2024-11-19 PROCEDURE — 85520 HEPARIN ASSAY: CPT | Performed by: EMERGENCY MEDICINE

## 2024-11-19 PROCEDURE — 85014 HEMATOCRIT: CPT

## 2024-11-19 PROCEDURE — 80048 BASIC METABOLIC PNL TOTAL CA: CPT

## 2024-11-19 PROCEDURE — G0378 HOSPITAL OBSERVATION PER HR: HCPCS

## 2024-11-19 PROCEDURE — 82374 ASSAY BLOOD CARBON DIOXIDE: CPT

## 2024-11-19 PROCEDURE — 96366 THER/PROPH/DIAG IV INF ADDON: CPT

## 2024-11-19 RX ORDER — APIXABAN 5 MG (74)
KIT ORAL
Qty: 74 EACH | Refills: 0 | Status: SHIPPED | OUTPATIENT
Start: 2024-11-19 | End: 2024-12-19

## 2024-11-19 RX ORDER — ASPIRIN 81 MG/1
TABLET ORAL
Status: SHIPPED
Start: 2024-11-19

## 2024-11-19 RX ADMIN — ASPIRIN 81 MG: 81 TABLET, COATED ORAL at 08:57

## 2024-11-19 RX ADMIN — LEVETIRACETAM 500 MG: 500 TABLET, FILM COATED ORAL at 08:57

## 2024-11-19 RX ADMIN — PANTOPRAZOLE SODIUM 40 MG: 40 TABLET, DELAYED RELEASE ORAL at 06:45

## 2024-11-19 RX ADMIN — ACETAMINOPHEN 650 MG: 325 TABLET ORAL at 06:46

## 2024-11-19 RX ADMIN — HYDROCHLOROTHIAZIDE 25 MG: 25 TABLET ORAL at 08:57

## 2024-11-19 RX ADMIN — GUAIFENESIN 600 MG: 600 TABLET ORAL at 08:57

## 2024-11-19 RX ADMIN — LOSARTAN POTASSIUM 50 MG: 50 TABLET, FILM COATED ORAL at 08:57

## 2024-11-19 RX ADMIN — TOLTERODINE TARTRATE 4 MG: 4 CAPSULE, EXTENDED RELEASE ORAL at 08:57

## 2024-11-19 RX ADMIN — APIXABAN 10 MG: 5 TABLET, FILM COATED ORAL at 08:57

## 2024-11-19 ASSESSMENT — ACTIVITIES OF DAILY LIVING (ADL)
ADLS_ACUITY_SCORE: 0

## 2024-11-19 NOTE — PROGRESS NOTES
PRIMARY DIAGNOSIS:  Acute DVT of femoral vein of Left lower extremity.    OUTPATIENT/OBSERVATION GOALS TO BE MET BEFORE DISCHARGE:  ADLs back to baseline: No    Activity and level of assistance: Up with standby assistance.    Pain status: Pain free.    Return to near baseline physical activity: Yes     Discharge Planner Nurse   Safe discharge environment identified:  TBA  Barriers to discharge: Yes anticoagulation.         Entered by: Edwige Ndiaye RN 11/19/2024 6:29 AM   Patient is alert and oriented x4, patient denies with exception of back pain rated 2/10 this morning, PRN acetaminophen administered.    Patient on heparin drip, paused for 1 hour overnight per protocol,  discontinued at 7 am.  Patient uses walker with assist of 1 to bathroom, some urgency with incontinence/dribbling.  Patient rested between cares but awake most of shift.

## 2024-11-19 NOTE — MEDICATION SCRIBE - ADMISSION MEDICATION HISTORY
Medication Scribe Admission Medication History    Admission medication history is complete. The information provided in this note is only as accurate as the sources available at the time of the update.    Information Source(s): Patient and CareEverywhere/SureScripts via  with patient in room and finished at desk.    Pertinent Information: She does not have inhalers with her today.  She uses 1 Duoneb every morning and 1 Levalbuterol neb every evening.    Changes made to PTA medication list:  Added: None  Deleted: Advair inhaler old script.  Changed: Duoneb from prn to every morning and Levalbuterol neb from prn to every evening    Allergies reviewed with patient and updates made in EHR: yes, no change.    Medication History Completed By: Liza Chavez 11/18/2024 9:07 PM    Current Facility-Administered Medications for the 11/18/24 encounter (Hospital Encounter)   Medication    [START ON 1/7/2025] denosumab (PROLIA) injection 60 mg     PTA Med List   Medication Sig Last Dose/Taking    albuterol (PROAIR HFA/PROVENTIL HFA/VENTOLIN HFA) 108 (90 Base) MCG/ACT inhaler Inhale 2 puffs into the lungs every 6 hours as needed for shortness of breath or wheezing Past Month    aspirin 81 MG EC tablet Take 1 tablet (81 mg) by mouth daily 11/18/2024 Morning    azithromycin (ZITHROMAX) 250 MG tablet Take 1 tablet (250 mg) by mouth every evening 11/17/2024 Evening    benzonatate (TESSALON) 100 MG capsule Take 1 capsule (100 mg) by mouth 3 times daily as needed for cough Past Month    Biotin 10 MG CAPS Take 1 capsule by mouth daily 11/18/2024 Morning    calcium citrate (CITRACAL) 950 (200 Ca) MG tablet Take 1 tablet by mouth 2 times daily 11/17/2024 Evening    estradiol (ESTRACE) 0.1 MG/GM vaginal cream Place 2 g vaginally three times a week Past Month Bedtime    ferrous sulfate (FEROSUL) 325 (65 Fe) MG tablet Take 1 tablet (325 mg) by mouth every other day 11/17/2024 Morning    fluticasone-salmeterol (WIXELA INHUB) 500-50 MCG/ACT  inhaler Inhale 1 puff into the lungs every 12 hours. 11/18/2024 Morning    guaiFENesin (MUCINEX) 600 MG 12 hr tablet Take 1 tablet (600 mg) by mouth 2 times daily. 11/17/2024 Evening    hydrochlorothiazide (HYDRODIURIL) 25 MG tablet Take 1 tablet (25 mg) by mouth daily 11/18/2024 Morning    ipratropium - albuterol 0.5 mg/2.5 mg/3 mL (DUONEB) 0.5-2.5 (3) MG/3ML neb solution TAKE 1 VIAL BY NEBULIZATION EVERY 6 HOURS AS NEEDED FOR SHORTNESS OF BREATH OR WHEEZING (Patient taking differently: Take 1 vial by nebulization every morning.) 11/18/2024 Morning    levalbuterol (XOPENEX) 1.25 MG/3ML neb solution Take 3 mLs (1.25 mg) by nebulization every 4 hours as needed for shortness of breath or wheezing (Patient taking differently: Take 1 ampule by nebulization every evening.) 11/17/2024 Evening    levETIRAcetam (KEPPRA) 500 MG tablet Take 1 tablet (500 mg) by mouth 2 times daily 11/18/2024 Morning    losartan (COZAAR) 50 MG tablet Take 1 tablet (50 mg) by mouth 2 times daily 11/18/2024 Morning    magnesium 250 MG tablet Take 1 tablet by mouth 2 times daily Past Week Evening    nystatin (MYCOSTATIN) 563482 UNIT/GM external powder Apply topically daily as needed Past Week    potassium chloride teja ER (KLOR-CON M20) 20 MEQ CR tablet Take 1 tablet (20 mEq) by mouth at bedtime 11/17/2024 Bedtime    RABEprazole (ACIPHEX) 20 MG EC tablet Take 1 tablet (20 mg) by mouth daily 11/18/2024 Morning    sertraline (ZOLOFT) 100 MG tablet Take 1 tablet (100 mg) by mouth every evening 11/18/2024 Evening    solifenacin (VESICARE) 10 MG tablet Take 1 tablet (10 mg) by mouth daily 11/18/2024 Morning    spironolactone (ALDACTONE) 25 MG tablet Take 0.5 tablets (12.5 mg) by mouth daily 11/18/2024 Morning    tiotropium (SPIRIVA RESPIMAT) 2.5 MCG/ACT inhaler Inhale 2 puffs into the lungs daily 11/17/2024 Evening    vitamin D3 (CHOLECALCIFEROL) 125 MCG (5000 UT) tablet Take 5,000 Units by mouth three times a week Monday, Wednesday and Friday  11/18/2024 Morning

## 2024-11-19 NOTE — DISCHARGE INSTRUCTIONS
Anticoagulation Patient Instructions:  Start today 11/19/2024, taking Eliquis 10 mg by mouth twice daily through 11/25.   On 11/26 take Eliquis 5 mg by mouth twice daily until told otherwise by your care team.  Please monitor for any signs of bruising and bleeding and let your care team know.

## 2024-11-19 NOTE — ED NOTES
Patient has  [facility] to Observation  order. Patient has been given observation brochure and  What does Observation mean to me.   Patient has been given the opportunity to ask questions about observation status and their plan of care.      Mirtha Carson RN

## 2024-11-19 NOTE — PROGRESS NOTES
"[facility] ADMISSION NOTE    Patient admitted to room 4201 at approximately 2148 via cart from emergency room. Patient was accompanied by transport tech.     Verbal SBAR report received from ALLYSON Faustin prior to patient arrival.     Patient ambulated to bed with one assist. Patient alert and oriented X 4. The patient is not having any pain.  Admission vital signs: Blood pressure (!) 140/48, pulse 95, temperature 98  F (36.7  C), temperature source Oral, resp. rate 20, height 1.626 m (5' 4\"), weight 90.1 kg (198 lb 10.2 oz), SpO2 98%, not currently breastfeeding. Patient was oriented to plan of care, call light, bed controls, tv, telephone, bathroom, and visiting hours.     Risk Assessment    The following safety risks were identified during admission: fall. Yellow risk band applied: YES.     Skin Initial Assessment    This writer admitted this patient and completed a full skin assessment and Amol score in the Adult PCS flowsheet.   Photo documentation of skin problem and/or wound competed via [employer] application (located under Media):  No    Appropriate interventions initiated as needed.     Secondary skin check completed by: patient declined.         Education    Patient has a [place] to Observation order: Yes  Observation education completed and documented: Yes      Deborah Ashton RN      "

## 2024-11-19 NOTE — ED PROVIDER NOTES
[facility]  Emergency Department Visit Note    PATIENT:  Lalitha Underwood     79 year old     female      [id number]    Chief complaint:  Chief Complaint   Patient presents with    Leg Swelling     Left lower leg          History of present illness:  Patient is a 79 year old female with a medical history significant for COPD on oxygen at baseline, status post TAVR, peripheral arterial disease, history of a secondary heart block, hypertension, seizure disorder and impaired gait and mobility with chronic and worsening left lower extremity lymphedema who presents to the emergency department today with increasing redness and pain in her left lower extremity below her knee.  She reports that this has been changing and worsening for the past couple of weeks.  She is not on any blood thinners.  She has nurses who come to her house and evaluate her and they found her leg to be increasingly swollen and painful and they recommended that she come here for evaluation.  Patient denies any changes in sensorium to the foot or leg.    Review of Systems:  As in HPI above    BP (!) 143/83   Pulse 96   Temp 98.1  F (36.7  C) (Oral)   Resp 20   Wt 90.3 kg (199 lb)   SpO2 95%   BMI 34.16 kg/m      Physical Exam  Constitutional: laying in hospital bed, alert, oriented, and in no apparent distress  HEENT: normocephalic, atraumatic, sclerae anicteric, extraocular motions intact, and moist mucous membranes  Neck: able to fully range  Cardiovascular: regular rate and rhythm  Pulmonary: Breathing comfortably with nasal cannula, wheezing bilaterally and diffusely  Abdominal: soft, non-tender, non-distended  Genitourinary: deferred  Extremities/MSK: Lymphedema of the left lower extremity, large nodular swelling on the anterior lateral left knee as well as on the anterior lateral below the knee, leg is swollen, scant open wounds, no active bleeding  Skin: warm, dry, non-diaphoretic, and skin changes including redness  and swelling as well as taut skin in the left lower extremity up to the knee, lymphedema of the left upper extremity, there are various bruises all over her body in various stages of healing  Neurologic: moves all four extremities spontaneously, GCS 15, CNII-XII intact, 5/5  strength bilaterally, and ambulates with assistance  Psychiatric: calm, appropriate      MDM:  Patient is a 79 year old female with above history presenting for evaluation of left leg swelling and pain.    Vitals reassuring and within normal limits. Exam notable for increased swelling of the left lower extremity, redness, skin is taut below the mid calf.    Differential diagnosis includes was not limited to cellulitis, DVT, fracture, necrotizing soft tissue infection, worsening lymphedema, heart failure.    For basic labs and DVT scan.  Highest on my differential is a blood clot.  If negative will pursue additional workup.    Remainder of ED course below.    ED COURSE:  ED Course as of 11/18/24 2046 Mon Nov 18, 2024 1947 US Lower Extremity Venous Duplex Left  IMPRESSION:  1.  Nonocclusive thrombus within the left distal femoral vein. Indeterminate chronicity.     2044 Patient is very high bleeding risk considering her multiple falls and comorbidities.  Considering this plan to admit her on heparin and have the hospitalist team differentiate which anticoagulation she should go on for her DVT.   2045 Patient is agreeable to admission.  Added on an EKG as well as a troponin to evaluate for possible cardiac clots as well.   2045 EKG 12 lead  ECG:  - Ventricular rate 92 bpm, regular with occasional premature beats  - KY, QRS, QT intervals notable for prolonged KY as seen in AV heart blocks  - Axis LBBB  - no ST segment or T wave changes concerning for acute ischemia  - Comparison to prior ECGs: normal  - My independent interpretation: reassuring in this context, negative for sgarbossa criteria or concerning signs of symptoms that could  indicated acute ischemia      2046 Patient was accepted for admission.  Ordered heparin using shared decision making.       Encounter Diagnoses:  Final diagnoses:   Acute deep vein thrombosis (DVT) of femoral vein of left lower extremity (H)       Final disposition: [facility] observation    DO YAZAN Kauffman Physician  [facility] ED       Lupe Little DO  11/18/24 2047

## 2024-11-19 NOTE — PLAN OF CARE
[facility] DISCHARGE NOTE    Patient discharged to home at  1:00PM via wheel chair. Accompanied by spouse and staff. Discharge instructions reviewed with patient, opportunity offered to ask questions. Prescriptions sent to patients preferred pharmacy. All belongings sent with patient.    Wen Billingsley RN

## 2024-11-19 NOTE — PLAN OF CARE
Physical Therapy: PT order received and acknowledged. Per chart review and discussion with MD, IP PT eval not needed as pt denies concerns with returning home and has home care PT already in place. Will discontinue IP PT order, pt should continue to mobilize with nursing staff to prevent decline in baseline strength.

## 2024-11-19 NOTE — DISCHARGE SUMMARY
[facility] Discharge Summary    Lalitha Underwood MRN# [id number]   Age: 79 year old YOB: 1945     Date of Admission:  11/18/2024  Date of Discharge::  11/19/2024  Admitting Physician:  Praneeth Layton DO  Discharge Physician:  Chuckie Mohr MD             Admission Diagnoses:   Acute deep vein thrombosis (DVT) of femoral vein of left lower extremity (H) [I82.412]          Principle Discharge Diagnosis:          Acute deep vein thrombosis (DVT) of femoral vein of left lower extremity    See hospital course for further active diagnoses addressed during this admission.                 Medications Prior to Admission:     Facility-Administered Medications Prior to Admission   Medication Dose Route Frequency Provider Last Rate Last Admin    [DISCONTINUED] denosumab (PROLIA) injection 60 mg  60 mg Subcutaneous Q6 Months Lalitha Haq NP         Medications Prior to Admission   Medication Sig Dispense Refill Last Dose/Taking    albuterol (PROAIR HFA/PROVENTIL HFA/VENTOLIN HFA) 108 (90 Base) MCG/ACT inhaler Inhale 2 puffs into the lungs every 6 hours as needed for shortness of breath or wheezing 18 g 1 Past Month    azithromycin (ZITHROMAX) 250 MG tablet Take 1 tablet (250 mg) by mouth every evening 90 tablet 3 11/17/2024 Evening    benzonatate (TESSALON) 100 MG capsule Take 1 capsule (100 mg) by mouth 3 times daily as needed for cough 90 capsule 3 Past Month    Biotin 10 MG CAPS Take 1 capsule by mouth daily   11/18/2024 Morning    calcium citrate (CITRACAL) 950 (200 Ca) MG tablet Take 1 tablet by mouth 2 times daily   11/17/2024 Evening    estradiol (ESTRACE) 0.1 MG/GM vaginal cream Place 2 g vaginally three times a week 72 g 1 Past Month Bedtime    ferrous sulfate (FEROSUL) 325 (65 Fe) MG tablet Take 1 tablet (325 mg) by mouth every other day   11/17/2024 Morning    fluticasone-salmeterol (WIXELA INHUB) 500-50 MCG/ACT inhaler Inhale 1 puff into the lungs every 12 hours. 180 each 2 11/18/2024 Morning     guaiFENesin (MUCINEX) 600 MG 12 hr tablet Take 1 tablet (600 mg) by mouth 2 times daily. 180 tablet 0 11/17/2024 Evening    hydrochlorothiazide (HYDRODIURIL) 25 MG tablet Take 1 tablet (25 mg) by mouth daily 90 tablet 3 11/18/2024 Morning    ipratropium - albuterol 0.5 mg/2.5 mg/3 mL (DUONEB) 0.5-2.5 (3) MG/3ML neb solution TAKE 1 VIAL BY NEBULIZATION EVERY 6 HOURS AS NEEDED FOR SHORTNESS OF BREATH OR WHEEZING (Patient taking differently: Take 1 vial by nebulization every morning.) 1080 mL 3 11/18/2024 Morning    levalbuterol (XOPENEX) 1.25 MG/3ML neb solution Take 3 mLs (1.25 mg) by nebulization every 4 hours as needed for shortness of breath or wheezing 810 mL 3 11/17/2024 Evening    levETIRAcetam (KEPPRA) 500 MG tablet Take 1 tablet (500 mg) by mouth 2 times daily 180 tablet 3 11/18/2024 Morning    losartan (COZAAR) 50 MG tablet Take 1 tablet (50 mg) by mouth 2 times daily 180 tablet 3 11/18/2024 Morning    magnesium 250 MG tablet Take 1 tablet by mouth 2 times daily   Past Week Evening    nystatin (MYCOSTATIN) 149738 UNIT/GM external powder Apply topically daily as needed   Past Week    potassium chloride teja ER (KLOR-CON M20) 20 MEQ CR tablet Take 1 tablet (20 mEq) by mouth at bedtime 90 tablet 3 11/17/2024 Bedtime    RABEprazole (ACIPHEX) 20 MG EC tablet Take 1 tablet (20 mg) by mouth daily 90 tablet 3 11/18/2024 Morning    sertraline (ZOLOFT) 100 MG tablet Take 1 tablet (100 mg) by mouth every evening 90 tablet 3 11/18/2024 Evening    solifenacin (VESICARE) 10 MG tablet Take 1 tablet (10 mg) by mouth daily 90 tablet 3 11/18/2024 Morning    spironolactone (ALDACTONE) 25 MG tablet Take 0.5 tablets (12.5 mg) by mouth daily 45 tablet 3 11/18/2024 Morning    tiotropium (SPIRIVA RESPIMAT) 2.5 MCG/ACT inhaler Inhale 2 puffs into the lungs daily 12 g 3 11/17/2024 Evening    vitamin D3 (CHOLECALCIFEROL) 125 MCG (5000 UT) tablet Take 5,000 Units by mouth three times a week Monday, Wednesday and Friday 11/18/2024  Morning    [DISCONTINUED] aspirin 81 MG EC tablet Take 1 tablet (81 mg) by mouth daily   11/18/2024 Morning             Discharge Medications:     Current Discharge Medication List        START taking these medications    Details   apixaban ANTICOAGULANT (ELIQUIS) 5 MG tablet Take 1 tablet (5 mg) by mouth 2 times daily.  Qty: 60 tablet, Refills: 1    Comments: To be taken after her initial 30 days  Associated Diagnoses: Acute deep vein thrombosis (DVT) of femoral vein of left lower extremity (H)      Apixaban Starter Pack (ELIQUIS DVT/PE STARTER PACK) 5 MG TBPK Take 10 mg by mouth 2 times daily for 7 days, THEN 5 mg 2 times daily for 23 days.  Qty: 74 each, Refills: 0    Associated Diagnoses: Acute deep vein thrombosis (DVT) of femoral vein of left lower extremity (H)           CONTINUE these medications which have CHANGED    Details   aspirin 81 MG EC tablet Hold off on taking your aspirin while    Comments: Stop taking your aspirin while on the Eliquis, then resume it once you're done with the Eliquis.  Associated Diagnoses: S/P TAVR (transcatheter aortic valve replacement)           CONTINUE these medications which have NOT CHANGED    Details   albuterol (PROAIR HFA/PROVENTIL HFA/VENTOLIN HFA) 108 (90 Base) MCG/ACT inhaler Inhale 2 puffs into the lungs every 6 hours as needed for shortness of breath or wheezing  Qty: 18 g, Refills: 1    Comments: Pharmacy may dispense brand covered by insurance (Proair, or proventil or ventolin or generic albuterol inhaler)  Associated Diagnoses: Pulmonary emphysema, unspecified emphysema type (H)      azithromycin (ZITHROMAX) 250 MG tablet Take 1 tablet (250 mg) by mouth every evening  Qty: 90 tablet, Refills: 3    Associated Diagnoses: Pulmonary emphysema, unspecified emphysema type (H)      benzonatate (TESSALON) 100 MG capsule Take 1 capsule (100 mg) by mouth 3 times daily as needed for cough  Qty: 90 capsule, Refills: 3    Associated Diagnoses: Pulmonary emphysema,  unspecified emphysema type (H)      Biotin 10 MG CAPS Take 1 capsule by mouth daily      calcium citrate (CITRACAL) 950 (200 Ca) MG tablet Take 1 tablet by mouth 2 times daily      estradiol (ESTRACE) 0.1 MG/GM vaginal cream Place 2 g vaginally three times a week  Qty: 72 g, Refills: 1    Associated Diagnoses: Gross hematuria      ferrous sulfate (FEROSUL) 325 (65 Fe) MG tablet Take 1 tablet (325 mg) by mouth every other day    Associated Diagnoses: Iron deficiency anemia, unspecified iron deficiency anemia type      fluticasone-salmeterol (WIXELA INHUB) 500-50 MCG/ACT inhaler Inhale 1 puff into the lungs every 12 hours.  Qty: 180 each, Refills: 2    Associated Diagnoses: COPD (chronic obstructive pulmonary disease) (H)      guaiFENesin (MUCINEX) 600 MG 12 hr tablet Take 1 tablet (600 mg) by mouth 2 times daily.  Qty: 180 tablet, Refills: 0    Associated Diagnoses: COPD exacerbation (H)      hydrochlorothiazide (HYDRODIURIL) 25 MG tablet Take 1 tablet (25 mg) by mouth daily  Qty: 90 tablet, Refills: 3    Associated Diagnoses: Essential hypertension      ipratropium - albuterol 0.5 mg/2.5 mg/3 mL (DUONEB) 0.5-2.5 (3) MG/3ML neb solution TAKE 1 VIAL BY NEBULIZATION EVERY 6 HOURS AS NEEDED FOR SHORTNESS OF BREATH OR WHEEZING  Qty: 1080 mL, Refills: 3    Comments: **Patient requests 90 days supply**  Associated Diagnoses: COPD (chronic obstructive pulmonary disease) (H)      levalbuterol (XOPENEX) 1.25 MG/3ML neb solution Take 3 mLs (1.25 mg) by nebulization every 4 hours as needed for shortness of breath or wheezing  Qty: 810 mL, Refills: 3    Associated Diagnoses: Pulmonary emphysema, unspecified emphysema type (H); COPD exacerbation (H)      levETIRAcetam (KEPPRA) 500 MG tablet Take 1 tablet (500 mg) by mouth 2 times daily  Qty: 180 tablet, Refills: 3    Associated Diagnoses: Intractable epilepsy without status epilepticus, unspecified epilepsy type (H)      losartan (COZAAR) 50 MG tablet Take 1 tablet (50 mg) by  mouth 2 times daily  Qty: 180 tablet, Refills: 3    Associated Diagnoses: Essential hypertension      magnesium 250 MG tablet Take 1 tablet by mouth 2 times daily      nystatin (MYCOSTATIN) 756748 UNIT/GM external powder Apply topically daily as needed      potassium chloride teja ER (KLOR-CON M20) 20 MEQ CR tablet Take 1 tablet (20 mEq) by mouth at bedtime  Qty: 90 tablet, Refills: 3    Associated Diagnoses: Essential hypertension      RABEprazole (ACIPHEX) 20 MG EC tablet Take 1 tablet (20 mg) by mouth daily  Qty: 90 tablet, Refills: 3    Associated Diagnoses: Gastroesophageal reflux disease without esophagitis      sertraline (ZOLOFT) 100 MG tablet Take 1 tablet (100 mg) by mouth every evening  Qty: 90 tablet, Refills: 3    Associated Diagnoses: Anxiety      solifenacin (VESICARE) 10 MG tablet Take 1 tablet (10 mg) by mouth daily  Qty: 90 tablet, Refills: 3    Associated Diagnoses: Urge incontinence of urine      spironolactone (ALDACTONE) 25 MG tablet Take 0.5 tablets (12.5 mg) by mouth daily  Qty: 45 tablet, Refills: 3    Associated Diagnoses: Essential hypertension, benign      tiotropium (SPIRIVA RESPIMAT) 2.5 MCG/ACT inhaler Inhale 2 puffs into the lungs daily  Qty: 12 g, Refills: 3    Associated Diagnoses: Chronic obstructive pulmonary disease, unspecified COPD type (H)      vitamin D3 (CHOLECALCIFEROL) 125 MCG (5000 UT) tablet Take 5,000 Units by mouth three times a week Monday, Wednesday and Friday                        Brief History of Illness from time of admission:     From Admission H+P:   Lalitha Underwood is a 79 year old female who presents for LLE swelling.     Patient has chronic lymphedema of the left leg due to previous injury.  She has COPD with chronic respiratory failure on 2.5 L at home.  She had a fall on 11/7 and presented to the ER for this.  She stated she hit her head and her left lateral leg.  She says that since then her left lower extremity has been increasingly swollen, red,  "painful.  She is not on anticoagulation at home.  She denies any worsening neuropathy.            TODAY:     Subjective:  Patient doing well. Breathing at baseline.  Mobility at or very near baseline . A bit slower with pain in her leg but not notably different. Uses a walker at baseline.  Confirmed that she's had 2 falls in the past year per her report.  One her recent known fall.  The other was > 6 months ago and was a mechancal call where she decided to turn at the last second to use the bathroom before leaving her home and tripped over her walker.  Says she just tried to turn too fast.  Denies any other falls in the pats year.  Leg sore but improving.  No dyspnea on home oxygen.    No other pain.     ROS:   ROS: 10 point ROS neg other than the symptoms noted above in the HPI.   BP (!) 146/58 (BP Location: Left arm)   Pulse 59   Temp 98.3  F (36.8  C) (Oral)   Resp 18   Ht 1.626 m (5' 4\")   Wt 90.1 kg (198 lb 10.2 oz)   SpO2 99%   BMI 34.10 kg/m     EXAM:  General: awake and alert, NAD, oriented x 3  Head: normocephalic  Neck: unremarkable, no lymphadenopathy   HEENT: oropharynx pink and moist    Heart: Regular rate and rhythm, no murmurs, rubs, or gallops  Lungs: clear to auscultation bilaterally with good air movement throughout  Abdomen: soft, non-tender, no masses or organomegaly  Extremities: trace edema in lower extremities   Skin some erythema and mild tenderness left lower extremity, skin otherwise unremarkable as visualized.       Hospital Course:       Lalitha Underwood is a 79 year old female with past medical hx of COPD on 2.5L, s/p TAVR, chronic cor pulmonale, s/p pacer, seizures, lymphedema, frequent falls admitted on 11/18/2024. She presents for LLE swelling.     Acute deep vein thrombosis (DVT) of femoral vein of left lower extremity    Had a fall 11/7 where she hit her head and lateral LLE. ER workup was negative. Since then she's noticed swelling and pain in her LLE. U/S showed " non-occlusive thrombus in left distal femoral vein.     ER provider was concerned sending her home on oral anticoagulation due to high fall risk and living alone. Patient only reports 2 falls in 2024. HAS-BLED 2, moderate risk of bleeding with anticoagulation.  - was started on a Heparin drip overnight by ER provider.    - switched to Apixaban in am with 10mg BID for 7d then 5mg twice daily  - requested pharmacy to review cost/coverage for DOACS - first month would be free, then approximately $100/month for eliquis and $140 for xarelto.  I think this leaves eliquis as our best option here.    - plan to continue Apixaban (Eliquis) for likely 3 months on discharge, reassess and confirm duration of anticoagulation at follow-up with primary care provider.    - stopped aspirin while on anticoagulation.         COPD (chronic obstructive pulmonary disease)   Chronic hypoxic respiratory failure  On 2.5L NC at home. No worsening respiratory status.  - Continued PTA Azithromycin 250 daily, Mucinex, Wixela Inhub BID, Tiotropium  - at baseline, no changes.         PAD (peripheral artery disease)   Hyperlipidemia  - Continued PTA Aspirin 81       Severe aortic stenosis S/P TAVR (transcatheter aortic valve replacement)  TAVR 5/2024.  Per cardiology recommended lifelong aspirin.  Regardless, reasonable to stop that aspirin while on eliquis as above and resume once course of eliquis is complete.        Second degree heart block s/p pacer  Pacer placed 3/2022.        Anemia, chronic, normocytic  Hgb 9.7, baseline ~9.       CKD 3a  Cr 0.92, baseline 0.9-1.       Essential hypertension, benign  - Continued PTA hydrochlorothiazide 25, Losartan 50 BID, Spironolactone 12.5       Acquired lymphedema of LLE  From an injury several years ago. Is seeing lymphedema outpatient.  Will follow-up with them per their recommendations following her DVT.         Epileptic seizures  - Continued PTA Keppra 500 BID       Esophageal reflux  - Continued  "PTA Pantoprazole 40       Depression  - Continued PTA Sertraline 100          Diet: Combination Diet Low Saturated Fat Na <2400mg Diet  DVT Prophylaxis: Heparin SQ  Ann Catheter: Not present  Lines: None     Cardiac Monitoring: None  Code Status: No CPR- Do NOT Intubate        Clinically Significant Risk Factors Present on Admission                 # Drug Induced Platelet Defect: home medication list includes an antiplatelet medication   # Hypertension: Noted on problem list      # Anemia: based on hgb <11        # Obesity: Estimated body mass index is 34.1 kg/m  as calculated from the following:    Height as of this encounter: 1.626 m (5' 4\").    Weight as of this encounter: 90.1 kg (198 lb 10.2 oz).       # COPD: noted on problem list  # Pacemaker present                      Discharge Instructions and Follow-Up:      Discharge diet: Orders Placed This Encounter      Combination Diet Low Saturated Fat Na <2400mg Diet       Discharge activity: Activity as tolerated   Discharge follow-up: Follow-up with primary care provider in about 1 week            Discharge Disposition:     Discharged to home with continued home care       Attestation:  Amount of time performed on this discharge summary: 50 minutes.    Chuckie Mohr MD       "

## 2024-11-19 NOTE — H&P
[facility]    History and Physical - Hospitalist Service       Date of Admission:  11/18/2024    Assessment & Plan    Lalitha Underwood is a 79 year old female with past medical hx of COPD on 2.5L, s/p TAVR, chronic cor pulmonale, s/p pacer, seizures, lymphedema, frequent falls admitted on 11/18/2024. She presents for LLE swelling.    Acute deep vein thrombosis (DVT) of femoral vein of left lower extremity    Had a fall 11/7 where she hit her head and lateral LLE. ER workup was negative. Since then she's noticed swelling and pain in her LLE. U/S showed non-occlusive thrombus in left distal femoral vein.     ER provider was concerned sending her home on oral anticoagulation due to high fall risk and living alone. Patient only reports 2 falls in 2024. HAS-BLED 2, moderate risk of bleeding with anticoagulation.    - Heparin drip  - Apixaban starting in am with 10mg BID for 7d then 5mg BID  - Consider stopping Aspirin as below    COPD (chronic obstructive pulmonary disease)   Chronic hypoxic respiratory failure  On 2.5L NC at home. No worsening respiratory status.  - Continued PTA Azithromycin 250 daily, Mucinex, Wixela Inhub BID, Tiotropium    PAD (peripheral artery disease)   Hyperlipidemia  - Continued PTA Aspirin 81    Severe aortic stenosis S/P TAVR (transcatheter aortic valve replacement)  TAVR 5/2024.    Second degree heart block s/p pacer  Pacer placed 3/2022.     Anemia, chronic, normocytic  Hgb 9.7, baseline ~9.    CKD 3a  Cr 0.92, baseline 0.9-1.    Essential hypertension, benign  - Continued PTA hydrochlorothiazide 25, Losartan 50 BID, Spironolactone 12.5    Acquired lymphedema of LLE  From an injury several years ago. Is seeing lymphedema outpatient.    Epileptic seizures  - Continued PTA Keppra 500 BID    Esophageal reflux  - Continued PTA Pantoprazole 40    Depression  - Continued PTA Sertraline 100       Observation Goals: -diagnostic tests and consults completed and resulted,  "-vital signs normal or at patient baseline, -safe disposition plan has been identified, Nurse to notify provider when observation goals have been met and patient is ready for discharge.  Diet: Combination Diet Low Saturated Fat Na <2400mg Diet  DVT Prophylaxis: Heparin SQ  Ann Catheter: Not present  Lines: None     Cardiac Monitoring: None  Code Status: No CPR- Do NOT Intubate    Clinically Significant Risk Factors Present on Admission                 # Drug Induced Platelet Defect: home medication list includes an antiplatelet medication   # Hypertension: Noted on problem list      # Anemia: based on hgb <11       # Obesity: Estimated body mass index is 34.1 kg/m  as calculated from the following:    Height as of this encounter: 1.626 m (5' 4\").    Weight as of this encounter: 90.1 kg (198 lb 10.2 oz).       # COPD: noted on problem list  # Pacemaker present       Disposition Plan     Medically Ready for Discharge: Anticipated Tomorrow         The patient's care was discussed with the Attending Physician, Dr. Layton and Patient.    Casey Tejada PA-C  Hospitalist Service  [facility]  Securely message with [employer] info)  Text page via [facility] Paging/Directory     ______________________________________________________________________    Chief Complaint   LLE swelling    History is obtained from the patient    History of Present Illness   Lalitha Underwood is a 79 year old female who presents for LLE swelling.    Patient has chronic lymphedema of the left leg due to previous injury.  She has COPD with chronic respiratory failure on 2.5 L at home.  She had a fall on 11/7 and presented to the ER for this.  She stated she hit her head and her left lateral leg.  She says that since then her left lower extremity has been increasingly swollen, red, painful.  She is not on anticoagulation at home.  She denies any worsening neuropathy.    Past Medical History    Past Medical History: "   Diagnosis Date    Convulsive disorder (H)     Convulsive disorder (H)     COPD (chronic obstructive pulmonary disease) (H)     COPD (chronic obstructive pulmonary disease) (H)     Coronary artery disease involving native coronary artery with unstable angina pectoris (H) 3/31/2023    Depression     Dyspnea on exertion     Gastroesophageal reflux disease     Heart murmur     Hernia of unspecified site of abdominal cavity without mention of obstruction or gangrene     Hiatal hernia     Hiatal hernia     Hypertension     Hypertension     Obese     On home oxygen therapy     at 1.5 liters at nite    Osteopenia     Osteopenia     Oxygen dependent     1.5 L per NC    Pneumonia due to infectious organism, unspecified laterality, unspecified part of lung 3/19/2022    Second degree heart block 3/19/2022    Shortness of breath     Status post coronary angiogram 6/3/2024    Uncomplicated asthma     URI (upper respiratory infection)     Venous insufficiency of both lower extremities     Venous insufficiency of both lower extremities     Walking troubles        Past Surgical History   Past Surgical History:   Procedure Laterality Date    AMPUTATE TOE(S) Left 8/11/2022    Procedure: AMPUTATION, fifth digit left foot;  Surgeon: Alfonso Orozco DPM;  Location: [facility]    ARTHROPLASTY TOE(S) Left 11/22/2021    Procedure: ARTHROPLASTY, digits two and three left foot;  Surgeon: Alfonso Orozco DPM;  Location: [facility]    ARTHROPLASTY TOE(S) Left 7/18/2022    Procedure: ARTHROPLASTY, digits four and five left foot;  Surgeon: Alfonso Orozco DPM;  Location: [facility]    CV CORONARY ANGIOGRAM N/A 7/7/2023    Procedure: Coronary Angiogram;  Surgeon: Elijah Leger MD;  Location: [facility] CATH LAB CV    CV CORONARY ANGIOGRAM N/A 6/3/2024    Procedure: Coronary Angiogram;  Surgeon: Villa Croft MD;  Location: [facility] CATH LAB CV    CV LEFT HEART CATH N/A 7/7/2023    Procedure: Left  Heart Catheterization;  Surgeon: Elijah Leger MD;  Location: [facility] CV    CV RIGHT HEART CATH MEASUREMENTS RECORDED N/A 7/7/2023    Procedure: Right Heart Catheterization with [place];  Surgeon: Elijah Leger MD;  Location: [facility] LAB CV    CV RIGHT HEART CATH MEASUREMENTS RECORDED N/A 6/3/2024    Procedure: Right Heart Catheterization;  Surgeon: Villa Croft MD;  Location: [facility]    CV TRANSCATHETER AORTIC VALVE REPLACEMENT-FEMORAL APPROACH N/A 6/25/2024    Procedure: Transcatheter Aortic Valve Replacement-Femoral Approach;  Surgeon: Anil Gonzales MD;  Location: [facility] CV    EP PACEMAKER DEVICE & LEAD IMPLANT- RIGHT ATRIAL & RIGHT VENTRICULAR N/A 3/24/2022    Procedure: Pacemaker Device & Lead Implant - Right Atrial & Right Ventricular;  Surgeon: Helder Pendleton MD;  Location: [facility]    ESOPHAGOSCOPY, GASTROSCOPY, DUODENOSCOPY (EGD), COMBINED N/A 11/26/2018    Procedure: Esophagogastroduodenoscopy;  Surgeon: Jasmeet Wilder MD;  [facility]    HERNIA REPAIR, UMBILICAL  04/2018    HERNIA REPAIR, UMBILICAL  04/04/2018    Dr. Jimenez    LAPAROSCOPIC HERNIORRHAPHY HIATAL N/A 11/26/2018    Procedure: Laparoscopic Hiatal Hernia Repair,bilateral chest tubes;  Surgeon: Jasmeet Wilder MD;  [facility]    OR TRANSCATHETER AORTIC VALVE REPLACEMENT, FEMORAL PERCUTANEOUS APPROACH (STANDBY) N/A 6/25/2024    Procedure: OR TRANSCATHETER AORTIC VALVE REPLACEMENT, FEMORAL PERCUTANEOUS APPROACH (STANDBY);  Surgeon: Sepideh Devlin MD;  Location: [facility] CV    OTHER SURGICAL HISTORY Left 2003    Broke titanium in left arm    Repair arm fracture Left     SHOULDER SURGERY Right 1967    SHOULDER SURGERY Right 1967     BIOPSY THYROID FINE NEEDLE ASPIRATION  7/29/2020       Prior to Admission Medications   Prior to Admission Medications   Prescriptions Last Dose Informant Patient Reported? Taking?   Biotin 10 MG CAPS  11/18/2024 Morning Self Yes Yes   Sig: Take 1 capsule by mouth daily   RABEprazole (ACIPHEX) 20 MG EC tablet 11/18/2024 Morning Self No Yes   Sig: Take 1 tablet (20 mg) by mouth daily   albuterol (PROAIR HFA/PROVENTIL HFA/VENTOLIN HFA) 108 (90 Base) MCG/ACT inhaler Past Month Self No Yes   Sig: Inhale 2 puffs into the lungs every 6 hours as needed for shortness of breath or wheezing   aspirin 81 MG EC tablet 11/18/2024 Morning Self No Yes   Sig: Take 1 tablet (81 mg) by mouth daily   azithromycin (ZITHROMAX) 250 MG tablet 11/17/2024 Evening Self No Yes   Sig: Take 1 tablet (250 mg) by mouth every evening   benzonatate (TESSALON) 100 MG capsule Past Month Self No Yes   Sig: Take 1 capsule (100 mg) by mouth 3 times daily as needed for cough   calcium citrate (CITRACAL) 950 (200 Ca) MG tablet 11/17/2024 Evening Self Yes Yes   Sig: Take 1 tablet by mouth 2 times daily   estradiol (ESTRACE) 0.1 MG/GM vaginal cream Past Month Bedtime Self No Yes   Sig: Place 2 g vaginally three times a week   ferrous sulfate (FEROSUL) 325 (65 Fe) MG tablet 11/17/2024 Morning Self Yes Yes   Sig: Take 1 tablet (325 mg) by mouth every other day   fluticasone-salmeterol (WIXELA INHUB) 500-50 MCG/ACT inhaler 11/18/2024 Morning Self No Yes   Sig: Inhale 1 puff into the lungs every 12 hours.   guaiFENesin (MUCINEX) 600 MG 12 hr tablet 11/17/2024 Evening Self No Yes   Sig: Take 1 tablet (600 mg) by mouth 2 times daily.   hydrochlorothiazide (HYDRODIURIL) 25 MG tablet 11/18/2024 Morning Self No Yes   Sig: Take 1 tablet (25 mg) by mouth daily   ipratropium - albuterol 0.5 mg/2.5 mg/3 mL (DUONEB) 0.5-2.5 (3) MG/3ML neb solution 11/18/2024 Morning Self No Yes   Sig: TAKE 1 VIAL BY NEBULIZATION EVERY 6 HOURS AS NEEDED FOR SHORTNESS OF BREATH OR WHEEZING   Patient taking differently: Take 1 vial by nebulization every morning.   levETIRAcetam (KEPPRA) 500 MG tablet 11/18/2024 Morning Self No Yes   Sig: Take 1 tablet (500 mg) by mouth 2 times daily    levalbuterol (XOPENEX) 1.25 MG/3ML neb solution 11/17/2024 Evening Self No Yes   Sig: Take 3 mLs (1.25 mg) by nebulization every 4 hours as needed for shortness of breath or wheezing   Patient taking differently: Take 1 ampule by nebulization every evening.   losartan (COZAAR) 50 MG tablet 11/18/2024 Morning Self No Yes   Sig: Take 1 tablet (50 mg) by mouth 2 times daily   magnesium 250 MG tablet Past Week Evening Self Yes Yes   Sig: Take 1 tablet by mouth 2 times daily   nystatin (MYCOSTATIN) 417253 UNIT/GM external powder Past Week Self Yes Yes   Sig: Apply topically daily as needed   potassium chloride teja ER (KLOR-CON M20) 20 MEQ CR tablet 11/17/2024 Bedtime Self No Yes   Sig: Take 1 tablet (20 mEq) by mouth at bedtime   sertraline (ZOLOFT) 100 MG tablet 11/18/2024 Evening Self No Yes   Sig: Take 1 tablet (100 mg) by mouth every evening   solifenacin (VESICARE) 10 MG tablet 11/18/2024 Morning Self No Yes   Sig: Take 1 tablet (10 mg) by mouth daily   spironolactone (ALDACTONE) 25 MG tablet 11/18/2024 Morning Self No Yes   Sig: Take 0.5 tablets (12.5 mg) by mouth daily   tiotropium (SPIRIVA RESPIMAT) 2.5 MCG/ACT inhaler 11/17/2024 Evening Self No Yes   Sig: Inhale 2 puffs into the lungs daily   vitamin D3 (CHOLECALCIFEROL) 125 MCG (5000 UT) tablet 11/18/2024 Morning Self Yes Yes   Sig: Take 5,000 Units by mouth three times a week Monday, Wednesday and Friday      Facility-Administered Medications Last Administration Doses Remaining   denosumab (PROLIA) injection 60 mg None recorded          2023 UPDATE: these sections are not required for  billing, but can be added when medically relevant     Physical Exam   Vital Signs: Temp: 98  F (36.7  C) Temp src: Oral BP: (!) 140/48 Pulse: 95   Resp: 20 SpO2: 98 % O2 Device: Nasal cannula Oxygen Delivery: 2.5 LPM  Weight: 198 lbs 10.15 oz    Constitutional: Alert, oriented, cooperative, in no acute distress, appears nontoxic.  HENT: Oropharynx is clear. No evidence of  cranial trauma. Normocephalic. Eyes anicteric.   Cardiovascular: Regular rate and rhythm, normal S1 and S2, and no murmur noted. LLE lymphedema with redness and pain. 2+ edema  Respiratory: Scattered faint expiratory wheezes  GI: Soft, non-tender, normal bowel sounds, no hepatosplenomegaly, no masses appreciated.  Musculoskeletal: Normal muscle bulk and tone. FROM in all extremities   Skin: Warm and dry, no rashes on exposed skin.   Psych: Normal affect and speech.  Neurologic: Cranial nerves 2-12 are grossly intact.       Medical Decision Making       65 MINUTES SPENT BY ME on the date of service doing chart review, history, exam, documentation & further activities per the note.      Data     I have personally reviewed the following data over the past 24 hrs:    10.0  \   9.7 (L)   / 208     140 100 24.9 (H) /  97   4.3 31 (H) 0.92 \     Trop: 24 (H) BNP: N/A       Imaging results reviewed over the past 24 hrs:   Recent Results (from the past 24 hours)   US Lower Extremity Venous Duplex Left    Narrative    EXAM: US LOWER EXTREMITY VENOUS DUPLEX LEFT  LOCATION: [facility]  DATE: 11/18/2024    INDICATION: left leg swelling, lump, pain  COMPARISON: None.  TECHNIQUE: Venous Duplex ultrasound of the left lower extremity with and without compression, augmentation and duplex. Color flow and spectral Doppler with waveform analysis performed.    FINDINGS: Exam includes the common femoral, femoral, popliteal, and contralateral common femoral veins as well as segmentally visualized deep calf veins and greater saphenous vein.     LEFT: Partially compressible left distal femoral vein. Otherwise patent system. No superficial thrombophlebitis. No popliteal cyst.      Impression    IMPRESSION:  1.  Nonocclusive thrombus within the left distal femoral vein. Indeterminate chronicity.

## 2024-11-20 ENCOUNTER — PATIENT OUTREACH (OUTPATIENT)
Dept: CARE COORDINATION | Facility: CLINIC | Age: 79
End: 2024-11-20
Payer: MEDICARE

## 2024-11-20 NOTE — PROGRESS NOTES
Patient called and had a few more questions about her discharge from yesterday.  No further questions at this time.  Brooklyn Hernandez RN BSN

## 2024-11-20 NOTE — PROGRESS NOTES
[facility]    Background: Transitional Care Management program identified per system criteria and reviewed by [facility] Resource [place] team for possible outreach.    Assessment: Upon chart review, [facility] Team member will not proceed with patient outreach related to this episode of Transitional Care Management program due to reason below:    Patient has a follow up appointment with an appropriate provider today for hospital discharge    Plan: Transitional Care Management episode addressed appropriately per reason noted above.      Leonela Jones MA  [facility] [place]    *[facility] Resource Team does NOT follow patient ongoing. Referrals are identified based on internal discharge reports and the outreach is to ensure patient has an understanding of their discharge instructions.

## 2024-11-26 ENCOUNTER — OFFICE VISIT (OUTPATIENT)
Dept: FAMILY MEDICINE | Facility: CLINIC | Age: 79
End: 2024-11-26
Payer: COMMERCIAL

## 2024-11-26 VITALS
SYSTOLIC BLOOD PRESSURE: 135 MMHG | TEMPERATURE: 98.3 F | HEART RATE: 88 BPM | BODY MASS INDEX: 34.33 KG/M2 | OXYGEN SATURATION: 93 % | WEIGHT: 200 LBS | RESPIRATION RATE: 20 BRPM | DIASTOLIC BLOOD PRESSURE: 58 MMHG

## 2024-11-26 DIAGNOSIS — R29.6 RECURRENT FALLS: ICD-10-CM

## 2024-11-26 DIAGNOSIS — I89.0 LYMPHEDEMA OF LEFT LEG: ICD-10-CM

## 2024-11-26 DIAGNOSIS — I82.412 ACUTE DEEP VEIN THROMBOSIS (DVT) OF FEMORAL VEIN OF LEFT LOWER EXTREMITY (H): Primary | ICD-10-CM

## 2024-11-26 PROCEDURE — 99495 TRANSJ CARE MGMT MOD F2F 14D: CPT | Performed by: FAMILY MEDICINE

## 2024-11-26 NOTE — PROGRESS NOTES
"  Assessment & Plan     Acute deep vein thrombosis (DVT) of femoral vein of left lower extremity (H)    Reviewed her ultrasound showing a DVT as noted    Recommend that she continue Eliquis 5 mg twice a day  Reviewed her recent kidney function which is normal  Reviewed potential risks including previous falls  She will follow-up with Dr. Marte in 3 months for a re-evaluation  At that time can consider a follow-up ultrasound and review whether anticoagulation needs to continues    Ultrasound, left lower extremity    Nonocclusive thrombus within the left distal femoral vein. Indeterminate chronicity.      - apixaban ANTICOAGULANT (ELIQUIS) 5 MG tablet; Take 1 tablet (5 mg) by mouth 2 times daily.    Lymphedema of left leg    She will continue in-home therapy for lymphedema  Continue with leg elevation and compression stockings    Recurrent falls    She is currently using a walker consistently  Encourage physical therapy for strength and balance        MED REC REQUIRED  Post Medication Reconciliation Status: discharge medications reconciled and changed, per note/orders  BMI  Estimated body mass index is 34.33 kg/m  as calculated from the following:    Height as of 11/18/24: 1.626 m (5' 4\").    Weight as of this encounter: 90.7 kg (200 lb).             Brenda Cast is a 79 year old, presenting for the following health issues:  Hospital F/U      11/26/2024     9:22 AM   Additional Questions   Roomed by Kiana ROQUE CMA   Accompanied by friend     DEBORAH     Serene is a pleasant 79 year old female, patient of Dr. Marte's, who presents to the clinic following a recent hospital stay for November 18-November 19 for left leg swelling. She had a recent fall and ED visit. A CT scan of the head showed a scalp hematoma. The cervical spine CT was negative for fracture. She had a contusion of her left knee.     She later returned to the ED given left leg swelling. She has chronic lymphedema of the left leg. An ultrasound showed a " nonocclusive thrombus within the left distal femoral vein. She was started on Eliquis and has tolerated the medication. She follows up today with her neighbor as her . She denies significant pain at this time. She reports she has been using a walker for ambulation. She continues to live in her home and has the support of her neighbors and family. She has COPD and requires continuous oxygen.    She denies chest pain or shortness of breath currently.         Hospital Follow-up Visit:    Hospital/Nursing Home/IP Rehab Facility: [facility]  Date of Admission: 11/18/2024  Date of Discharge: 11/19/2024  Reason(s) for Admission: DVT  Was the patient in the ICU or did the patient experience delirium during hospitalization?  No  Do you have any other stressors you would like to discuss with your provider? No    Problems taking medications regularly:  None  Medication changes since discharge: None  Problems adhering to non-medication therapy:  None    Summary of hospitalization:  [facility] discharge summary reviewed  Diagnostic Tests/Treatments reviewed.  Follow up needed: none  Other Healthcare Providers Involved in Patient s Care:         None  Update since discharge: improved.       Study Result    Narrative & Impression   EXAM: US LOWER EXTREMITY VENOUS DUPLEX LEFT  LOCATION: [facility]  DATE: 11/18/2024     INDICATION: left leg swelling, lump, pain  COMPARISON: None.  TECHNIQUE: Venous Duplex ultrasound of the left lower extremity with and without compression, augmentation and duplex. Color flow and spectral Doppler with waveform analysis performed.     FINDINGS: Exam includes the common femoral, femoral, popliteal, and contralateral common femoral veins as well as segmentally visualized deep calf veins and greater saphenous vein.      LEFT: Partially compressible left distal femoral vein. Otherwise patent system. No superficial  thrombophlebitis. No popliteal cyst.                                                                      IMPRESSION:  1.  Nonocclusive thrombus within the left distal femoral vein. Indeterminate chronicity.         Plan of care communicated with patient                   Review of Systems  Constitutional, HEENT, cardiovascular, pulmonary, gi and gu systems are negative, except as otherwise noted.      Objective    /58 (BP Location: Left arm, Patient Position: Sitting, Cuff Size: Adult Regular)   Pulse 88   Temp 98.3  F (36.8  C) (Oral)   Resp 20   Wt 90.7 kg (200 lb)   SpO2 93%   BMI 34.33 kg/m    Body mass index is 34.33 kg/m .  Physical Exam   GENERAL: alert and no distress  EYES: Eyes grossly normal to inspection, PERRL and conjunctivae and sclerae normal  RESP: Diminished breath sounds bilaterally  CV: regular rate and rhythm, normal S1 S2, no S3 or S4, no murmur, click or rub, no peripheral edema  MS: 1+ lower extremity edema of the left ankle and calf  There is no palpable cord  Minimal erythema  SKIN: no suspicious lesions or rashes  NEURO: Normal strength and tone, mentation intact and speech normal  PSYCH: mentation appears normal, affect normal/bright            Signed Electronically by: Ruy Cutler MD

## 2024-11-26 NOTE — PATIENT INSTRUCTIONS
Serene,    Continue Eliquis  I sent a refill to your pharmacy  I recommend follow-up with Dr. Marte in February  At that time, you likely will have an ultrasound and Dr. Marte to help decide whether you should stay on Eliquis     Ruy Cutler MD

## 2024-12-04 ENCOUNTER — TELEPHONE (OUTPATIENT)
Dept: FAMILY MEDICINE | Facility: CLINIC | Age: 79
End: 2024-12-04
Payer: MEDICARE

## 2024-12-04 NOTE — TELEPHONE ENCOUNTER
Order/Referral Request    Who is requesting: Olvin/ Ruy Cutler      Orders being requested: Ultrasound on legs,  told pt he would put [facility] referral for her legs to be checked.     Reason service is needed/diagnosis: (DVT) - Unknown     When are orders needed by: Asap     Has this been discussed with Provider: Yes    Does patient have a preference on a Group/Provider/Facility? [facility]    Does patient have an appointment scheduled?: No    Where to send orders: Place orders within Epic    Could we send this information to you in [facility] or would you prefer to receive a phone call?:   No preference   Okay to leave a detailed message?: Yes at Cell number on file:    Telephone Information:   Mobile [phone]    Mobile      
Called and spoke with patient. Per review of 11/26 OV notes, it looks like the plan is to follow up with Dr. Marte in 3 months at which time PCP will re-evaluate and consider follow up imaging. Reiterated the plan to patient, she stated understanding and had no further questions or concerns at this time.    Lawanda Vaughn RN    
Detail Level: Detailed
Detail Level: Generalized
Detail Level: Zone

## 2024-12-04 NOTE — TELEPHONE ENCOUNTER
Home Care is calling regarding an established patient with [facility].       Requesting orders from: Kate Marte  Provider is following patient: Yes  Is this a 60-day recertification request?  Yes    Orders Requested    Physical Therapy  Request for recertification   Frequency:  1x/wk for 4 wks  To work on balance, strength, transfers, gait, and fall prevention.      Information was gathered and will be sent to provider for review.  RN will contact Home Care with information after provider review.  Confirmed ok to leave a detailed message with call back.  Contact information confirmed and updated as needed.    Spencer Dominguez RN

## 2024-12-05 ENCOUNTER — TELEPHONE (OUTPATIENT)
Dept: FAMILY MEDICINE | Facility: CLINIC | Age: 79
End: 2024-12-05
Payer: MEDICARE

## 2024-12-05 NOTE — TELEPHONE ENCOUNTER
Home Care is calling regarding an established patient with [facility].       Requesting orders from: Kate Marte  Provider is following patient: Yes  Is this a 60-day recertification request?  Yes    Orders Requested    Skilled Nursing- fall prevention, lymphedema management, depression monitoring.  Request for recertification   Frequency:  2x/wk for 4 wks  then 1x/wk for 2 wks      Information was gathered and will be sent to provider for review.  RN will contact Home Care with information after provider review.  Confirmed ok to leave a detailed message with call back.  Contact information confirmed and updated as needed.    Storm Roldan, RN

## 2024-12-08 ENCOUNTER — MEDICAL CORRESPONDENCE (OUTPATIENT)
Dept: HEALTH INFORMATION MANAGEMENT | Facility: CLINIC | Age: 79
End: 2024-12-08

## 2024-12-09 ENCOUNTER — VIRTUAL VISIT (OUTPATIENT)
Dept: PULMONOLOGY | Facility: CLINIC | Age: 79
End: 2024-12-09
Attending: NURSE PRACTITIONER
Payer: COMMERCIAL

## 2024-12-09 DIAGNOSIS — J44.1 COPD EXACERBATION (H): ICD-10-CM

## 2024-12-09 PROCEDURE — 99441 PR PHYSICIAN TELEPHONE EVALUATION 5-10 MIN: CPT | Mod: 93 | Performed by: NURSE PRACTITIONER

## 2024-12-09 RX ORDER — GUAIFENESIN 600 MG/1
600 TABLET, EXTENDED RELEASE ORAL 2 TIMES DAILY
Qty: 180 TABLET | Refills: 0 | Status: SHIPPED | OUTPATIENT
Start: 2024-12-09

## 2024-12-09 NOTE — PATIENT INSTRUCTIONS
It was a pleasure talking with you today. This is what we discussed:    During the day keep your oxygen between 88-92%. If your number is high than 92% you can turn the oxygen down or take it off.   You can continue the Duonebs 1 time daily if you feel it is helpful. You can use this up to 4 times daily but do not need to use this if you are not having increased symptoms.   Continue the Spiriva and Advair as you have been.   Use your albuterol every 4 hours as needed for cough, shortness of breath, wheeze, or chest tightness.   Follow up 6 months   If you have worsening symptoms, questions, or need to speak with the nurse please call [phone].

## 2024-12-09 NOTE — PROGRESS NOTES
Telephone visit:  Call length was 7 min       Outpatient Pulmonary Clinic Visit  December 9, 2024      Assessment and Plan:  Lalitha Underwood is a 79 year old female with a past medical history significant for severe COPD, emphysema, Cor pulmonale, chronic hypoxic respiratory failure, paraesophageal hernia s/p repair, HTN, acquired lymphedema of leg, epileptic seizure, HLD, GERD, aortic valve stenosis, obesity, neuropathy, osteoporosis, PAD, s/p cardiac pacemaker placement, second degree heart block, CAD, and former tobacco use who is being seen today via telephone visit for follow up.     Lung exam today demonstrates diffuse wheeze, sats 100% on room air at rest. Clinic walked today, after 200ft her oxygen was 97% on room air. She reports occasional readings in the high 80's.     #. Severe COPD GOLD B (high symptom burden, no recent exacerbations): CAT is up today at 20, was previously 13. PFTs show a severe obstructive ventilatory defect, no significant response to bronchodilator, with moderately reduced diffusion capacity.   Continue Advair/Spiriva and as needed albuterol or duonebs.    Azithromycin 250mg daily to help control exacerbation - will monitor her QTc closely (QTC was 424 in 11/2024).    OK to use her nebulizer 2-4 times daily, encouraged her to increase use while she is having increased symptoms. Changed her albuterol to levalbuterol to help with jittery side effect.    #. Chronic hypoxic respiratory failure: Reinforced how to titrate oxygen. Recent PSG did not show SHAZIA, test was done with oxygen so she has continued this.   Oxygen during the day prn to keep sats >88%.   Oxygen 2-2.5 L with sleep.    Sleep medicine evaluation done. She is waiting to follow up with them.   #. CAD, Aortic stenosis s/p TAVR 06/2024, Cor Pulmonale: remains on hydrochlorothiazide and spironolactone.  Prevent hypoxia at all times with oxygen use as above.   Continued follow up with cardiology. Plans for coronary angiogram  on 6/3/2024 in preparation for possible TAVR.    #. Dyspnea: Multifactorial from the above. Likely a component of deconditioning too. She has previously completed some pulmonary rehab. Recently completed cardiac rehab.     If this patients lung disease exacerbates I recommend: doxycycline 100mg BID for 10 days and a steroid taper with prednisone at 40mg for 3 days 30mg for 3 days, 20mg for 3 days and 10mg for 3 days    RTC in 6 months    Ev Plaza, CNP  Pulmonary Medicine  [facility]   [phone]    CCx:   Chief Complaint   Patient presents with    RECHECK     HPI:   She was last seen in clinic in 05/2024. Since that time she was hospitalized for a DVT in 11/2024, on Eliquis. Has home care to help with her lymphedema and is wearing compression stockings.   Is doing 1 nebulizer treatment per day.   SpO2 has been in the high 90's. Denies change in her breathing.   Continues to have dyspnea with exertion and daytime fatigue.  Denies chest tightness or wheeze.   Using 2.5 L with sleep, prn oxygen with activity.   Last COPD exacerbation wsa on 5/15/2023.     On spironolactone. Followed by Dr. Robertson in cardiology. Recent coronary angiogram, did not require stents. Cardiac MRI showed tricuspid valve regurgitation and aortic valve stenosis. TAVR done in June 2024.     Patient supplied answers from flow sheet for:  COPD Assessment Test (CAT)  2009 [employer]. All rights reserved.      1/3/2023     2:29 PM 5/22/2023     1:00 PM 7/21/2023     9:23 AM 11/26/2023     5:16 PM 5/24/2024     3:38 PM 12/4/2024    11:20 AM 12/8/2024     7:18 PM   COPD assessment test (CAT)   Cough 2  3 1  3  3  2  2    Phlegm 1  4 1  1  1  2  2    Chest tightness 1  2 1  2  2  2  2    Walk up hill 3  5 3  1  5  1  1    Limited activities 2  4 2  1  2  2  2    Leaving my home 1  1 1  0  1  2  2    Sleep 0  3 1  2  3  3  3    Energy 3  4 3  3  3  2  2    Total Score 13 26 13 13 20 16  16        Patient-reported      ROS:  10 point ROS neg other  than the symptoms noted above in the HPI.    Current Meds:  Current Outpatient Medications   Medication Sig Dispense Refill    albuterol (PROAIR HFA/PROVENTIL HFA/VENTOLIN HFA) 108 (90 Base) MCG/ACT inhaler Inhale 2 puffs into the lungs every 6 hours as needed for shortness of breath or wheezing 18 g 1    [START ON 12/17/2024] apixaban ANTICOAGULANT (ELIQUIS) 5 MG tablet Take 1 tablet (5 mg) by mouth 2 times daily. 60 tablet 2    Apixaban Starter Pack (ELIQUIS DVT/PE STARTER PACK) 5 MG TBPK Take 10 mg by mouth 2 times daily for 7 days, THEN 5 mg 2 times daily for 23 days. 74 each 0    aspirin 81 MG EC tablet Hold off on taking your aspirin while      azithromycin (ZITHROMAX) 250 MG tablet Take 1 tablet (250 mg) by mouth every evening 90 tablet 3    benzonatate (TESSALON) 100 MG capsule Take 1 capsule (100 mg) by mouth 3 times daily as needed for cough 90 capsule 3    Biotin 10 MG CAPS Take 1 capsule by mouth daily      calcium citrate (CITRACAL) 950 (200 Ca) MG tablet Take 1 tablet by mouth 2 times daily      estradiol (ESTRACE) 0.1 MG/GM vaginal cream Place 2 g vaginally three times a week 72 g 1    ferrous sulfate (FEROSUL) 325 (65 Fe) MG tablet Take 1 tablet (325 mg) by mouth every other day      fluticasone-salmeterol (WIXELA INHUB) 500-50 MCG/ACT inhaler Inhale 1 puff into the lungs every 12 hours. 180 each 2    guaiFENesin (MUCINEX) 600 MG 12 hr tablet Take 1 tablet (600 mg) by mouth 2 times daily. 180 tablet 0    hydrochlorothiazide (HYDRODIURIL) 25 MG tablet Take 1 tablet (25 mg) by mouth daily 90 tablet 3    ipratropium - albuterol 0.5 mg/2.5 mg/3 mL (DUONEB) 0.5-2.5 (3) MG/3ML neb solution TAKE 1 VIAL BY NEBULIZATION EVERY 6 HOURS AS NEEDED FOR SHORTNESS OF BREATH OR WHEEZING 1080 mL 3    levalbuterol (XOPENEX) 1.25 MG/3ML neb solution Take 3 mLs (1.25 mg) by nebulization every 4 hours as needed for shortness of breath or wheezing 810 mL 3    levETIRAcetam (KEPPRA) 500 MG tablet Take 1 tablet (500 mg) by  mouth 2 times daily 180 tablet 3    losartan (COZAAR) 50 MG tablet Take 1 tablet (50 mg) by mouth 2 times daily 180 tablet 3    magnesium 250 MG tablet Take 1 tablet by mouth 2 times daily      nystatin (MYCOSTATIN) 940725 UNIT/GM external powder Apply topically daily as needed      potassium chloride teja ER (KLOR-CON M20) 20 MEQ CR tablet Take 1 tablet (20 mEq) by mouth at bedtime 90 tablet 3    RABEprazole (ACIPHEX) 20 MG EC tablet Take 1 tablet (20 mg) by mouth daily 90 tablet 3    sertraline (ZOLOFT) 100 MG tablet Take 1 tablet (100 mg) by mouth every evening 90 tablet 3    solifenacin (VESICARE) 10 MG tablet Take 1 tablet (10 mg) by mouth daily 90 tablet 3    spironolactone (ALDACTONE) 25 MG tablet Take 0.5 tablets (12.5 mg) by mouth daily 45 tablet 3    tiotropium (SPIRIVA RESPIMAT) 2.5 MCG/ACT inhaler Inhale 2 puffs into the lungs daily 12 g 3    vitamin D3 (CHOLECALCIFEROL) 125 MCG (5000 UT) tablet Take 5,000 Units by mouth three times a week Monday, Wednesday and Friday       Physical Exam:  There were no vitals taken for this visit.  Telephone visit    Labs:  @clab@  Lab Results   Component Value Date    WBC 8.5 11/19/2024    HGB 8.7 (L) 11/19/2024    HCT 27.4 (L) 11/19/2024     11/19/2024     11/19/2024    POTASSIUM 4.5 11/19/2024    CHLORIDE 103 11/19/2024    CO2 33 (H) 11/19/2024     (H) 11/19/2024    BUN 25.1 (H) 11/19/2024    CR 0.90 11/19/2024    MAG 1.6 (L) 06/26/2024    INR 1.11 06/24/2024    BILITOTAL 0.2 06/25/2024    AST 16 06/25/2024    ALT 9 06/25/2024    ALKPHOS 56 06/25/2024    PROTTOTAL 6.1 (L) 06/25/2024    ALBUMIN 3.6 06/25/2024     I have personally reviewed all pertinent imaging studies and PFT results unless otherwise noted.    Imaging studies:    CT CHEST PULMONARY EMBOLISM W CONTRAST, DATE/TIME: 5/15/2023 1:41 PM CDT  INDICATION: chest pain, SOB, elevated D dimer  COMPARISON: Chest x-ray 5/15/2023 and 3/11/2022  FINDINGS:  ANGIOGRAM CHEST: Negative for pulmonary  emboli. Some mildly prominent central pulmonary arteries concerning for possible pulmonary artery hypertension. Thoracic aorta is negative for dissection. No CT evidence of right heart strain.  LUNGS AND PLEURA: Slightly elevated left hemidiaphragm similar to previous. Some slight probable fibrosis in the left lung base similar to chest x-ray. No acute new findings.  MEDIASTINUM/AXILLAE: Normal.  CORONARY ARTERY CALCIFICATION: Mild.                                                              IMPRESSION:  1.  Negative for pulmonary emboli.  2.  Mildly enlarged central pulmonary arteries suggestive of possible pulmonary artery hypertension.  3.  Slight fibrosis or atelectasis left lung base similar to chest x-ray. No acute findings in the lungs.    PFTs 04/2019:  FEV1/FVC is 0.53 and is reduced.  FEV1 is 40% predicted and is reduced.  FVC is 59% predicted and is reduced.  There was no improvement in spirometry after a single inhaled dose of bronchodilator.  TLC is 95% predicted and is normal.  RV is 138% predicted and is increased.  RV/TLC is 0.63 and is increased.  DLCO is 42% predicted and is reduced when it   is corrected for hemoglobin.  The flow volume loop shows obstructive morphology.     Impression:  Full Pulmonary Function Test is abnormal. Spirometry is consistent with severe obstructive ventilatory defect.  Spirometry is not consistent with reversibility.  There is no hyperinflation.  There is air-trapping.  Diffusion capacity when corrected for hemoglobin is moderately reduced.    Cardiac MRI 09/2023  IMPRESSION:  1.  The images by 3D contrast enhanced MRA and other image sequences did not show obvious evidence of  pulmonary vein anomaly. No atrial septal defect.    2.  Normal left ventricular size, mild concentric LVH and systolic function.  The calculated left  ventricular ejection fraction is 68%.  3.  No previous myocardial infarction.   4.  Normal right ventricular size and systolic function.   Pacemaker lead is noticed.  5.  Mild to moderate tricuspid valve regurgitation.  Moderate aortic valve stenosis is noticed.    Echo 07/52024:  Interpretation Summary  1. Normal left ventricular size and systolic performance with a visually  estimated ejection fraction of 60%.  2. There is a bio-prosthetic aortic valve (documented 23 mm Correia Minerva 3  Ultra tissue valve). Normal aortic valve prosthesis metrics with a mean systolic gradient of 16mmHg and a peak anterograde velocity of 1.9 m/sec. No aortic insufficiency is detected.  3. There is mild to moderate mitral insufficiency.  4. Normal right ventricular size and systolic performance.     When compared to the prior real-time echocardiogram dated 26 June 2024, there  has been a slight increase in the degree of mitral insufficiency from mild to  now mild-moderate. The findings are otherwise felt to be fairly similar on  both examinations.

## 2024-12-09 NOTE — NURSING NOTE
Current patient location: [address]  [place] [address]    Is the patient currently in the state of [place]? YES    Visit mode:TELEPHONE    If the visit is dropped, the patient can be reconnected by:TELEPHONE VISIT: Phone number: [phone]    Will anyone else be joining the visit? NO  (If patient encounters technical issues they should call [phone] :[phone])    Are changes needed to the allergy or medication list? Pt stated no changes to allergies and Pt stated no med changes    Are refills needed on medications prescribed by this physician? YES    Rooming Documentation:  Questionnaire(s) completed    Reason for visit: RECHECK    ANGELIA FARLEY

## 2024-12-16 DIAGNOSIS — Z53.9 DIAGNOSIS NOT YET DEFINED: Primary | ICD-10-CM

## 2024-12-16 PROCEDURE — G0179 MD RECERTIFICATION HHA PT: HCPCS | Performed by: FAMILY MEDICINE

## 2024-12-19 ENCOUNTER — DOCUMENTATION ONLY (OUTPATIENT)
Dept: ANTICOAGULATION | Facility: CLINIC | Age: 79
End: 2024-12-19
Payer: MEDICARE

## 2024-12-19 NOTE — PROGRESS NOTES
Anticoagulant Therapeutic Duplication    Duplicate orders identified: same medication but different dose, form, frequency or route    Duplicate orders appropriate-has starter pack order and ongoing therapy order     Active anticoagulant: apixaban (Eliquis)    Plan made per [facility] anticoagulation protocol.    Natanael Barrientos RN  12/19/2024

## 2024-12-30 ENCOUNTER — TELEPHONE (OUTPATIENT)
Dept: FAMILY MEDICINE | Facility: CLINIC | Age: 79
End: 2024-12-30
Payer: MEDICARE

## 2024-12-30 NOTE — TELEPHONE ENCOUNTER
Home Care is calling regarding an established patient with [facility].    Requesting orders from: Kate Marte  Provider is following patient: Yes  Is this a 60-day recertification request?  No    Orders Requested    Skilled Nursing  Request for continuation of care with no increase or decrease in frequency  Frequency:  2x/wk for 5 wks          Verbal orders given.  Home Care will send orders for provider to sign.  Confirmed ok to leave a detailed message with call back.  Contact information confirmed and updated as needed.    Nimisha Mckenzie RN

## 2025-01-02 ENCOUNTER — MYC REFILL (OUTPATIENT)
Dept: FAMILY MEDICINE | Facility: CLINIC | Age: 80
End: 2025-01-02

## 2025-01-02 DIAGNOSIS — K21.9 GASTROESOPHAGEAL REFLUX DISEASE WITHOUT ESOPHAGITIS: ICD-10-CM

## 2025-01-02 RX ORDER — RABEPRAZOLE SODIUM 20 MG/1
1 TABLET, DELAYED RELEASE ORAL DAILY
Qty: 90 TABLET | Refills: 2 | Status: SHIPPED | OUTPATIENT
Start: 2025-01-02

## 2025-01-14 ENCOUNTER — ANCILLARY PROCEDURE (OUTPATIENT)
Dept: BONE DENSITY | Facility: CLINIC | Age: 80
End: 2025-01-14
Attending: NURSE PRACTITIONER
Payer: COMMERCIAL

## 2025-01-14 DIAGNOSIS — M81.0 AGE-RELATED OSTEOPOROSIS WITHOUT CURRENT PATHOLOGICAL FRACTURE: ICD-10-CM

## 2025-01-14 PROCEDURE — 77081 DXA BONE DENSITY APPENDICULR: CPT | Mod: TC | Performed by: PHYSICIAN ASSISTANT

## 2025-01-14 PROCEDURE — 77080 DXA BONE DENSITY AXIAL: CPT | Mod: TC | Performed by: PHYSICIAN ASSISTANT

## 2025-01-14 NOTE — LETTER
January 16, 2025      Serene Underwood  [address]  [place] [address]        Dear ,    We are writing to inform you of your test results.        Resulted Orders   DX Bone Density    Narrative    EXAM: DX AXIAL AND PERIPHERAL  LOCATION: [facility]  DATE: 1/14/2025    INDICATION:  Age-related osteoporosis without current pathological fracture  DEMOGRAPHICS: Age- 79 years. Gender- Female. Menopausal status- Postmenopausal.  COMPARISON: 01/12/2023  TECHNIQUE: Dual-energy x-ray absorptiometry (DXA) performed with routine technique. Forearm DXA performed since the hip and/or spine could not be measured or interpreted.    FINDINGS:    DXA RESULTS  -RIGHT Hip Total: BMD: 0.989 g/cm2. T-score: -0.1. Z-score: 1.8.  -RIGHT Hip Femoral neck: BMD: 0.984 g/cm2. T-score: -0.4. Z-score: 1.8.  -LEFT Hip Total: BMD: 0.954 g/cm2. T-score: -0.4. Z-score: 1.6.  -LEFT Hip Femoral neck: BMD: 0.968 g/cm2. T-score: -0.5. Z-score: 1.6.  -RIGHT Radius 33%: BMD: 0.699 g/cm2. T-score: 0.4. Z-score: 3.1.    WHO T-SCORE CRITERIA  -Normal: T score at or above -1 SD  -Osteopenia: T score between -1 and -2.5 SD  -Osteoporosis: T score at or below -2.5 SD    The World Health Organization (WHO) criteria is applicable to perimenopausal females, postmenopausal females, and men aged 50 years or older.    INTERVAL CHANGE  -There has been a 0.1% decrease in bilateral hip BMD.    FRACTURE RISK  -The FRAX risk calculator is not applicable due to normal BMD in the spine/hip regions.      Impression    IMPRESSION: NORMAL. Bone mineral density measurements are within normal limits using T score.     The changes to your Bone Mineral Density are not statistically significant, and therefore indicate stability. That is a positive response to Prolia.         If you have any questions or concerns, please call the clinic at the number listed above.       Sincerely,      Lalitha Haq NP    Electronically signed

## 2025-01-16 RX ORDER — POTASSIUM CHLORIDE 1500 MG/1
20 TABLET, EXTENDED RELEASE ORAL AT BEDTIME
COMMUNITY
Start: 2024-11-25

## 2025-01-27 ENCOUNTER — OFFICE VISIT (OUTPATIENT)
Dept: FAMILY MEDICINE | Facility: CLINIC | Age: 80
End: 2025-01-27
Payer: COMMERCIAL

## 2025-01-27 VITALS
RESPIRATION RATE: 16 BRPM | SYSTOLIC BLOOD PRESSURE: 148 MMHG | BODY MASS INDEX: 33.95 KG/M2 | DIASTOLIC BLOOD PRESSURE: 71 MMHG | HEART RATE: 81 BPM | OXYGEN SATURATION: 92 % | WEIGHT: 197.8 LBS | TEMPERATURE: 97.9 F

## 2025-01-27 DIAGNOSIS — E66.01 MORBID OBESITY (H): ICD-10-CM

## 2025-01-27 DIAGNOSIS — M79.674 PAIN OF TOE OF RIGHT FOOT: ICD-10-CM

## 2025-01-27 DIAGNOSIS — M81.0 AGE-RELATED OSTEOPOROSIS WITHOUT CURRENT PATHOLOGICAL FRACTURE: ICD-10-CM

## 2025-01-27 DIAGNOSIS — M62.81 GENERALIZED MUSCLE WEAKNESS: ICD-10-CM

## 2025-01-27 DIAGNOSIS — J96.10 CHRONIC RESPIRATORY FAILURE, UNSPECIFIED WHETHER WITH HYPOXIA OR HYPERCAPNIA (H): ICD-10-CM

## 2025-01-27 DIAGNOSIS — I82.412 ACUTE DEEP VEIN THROMBOSIS (DVT) OF FEMORAL VEIN OF LEFT LOWER EXTREMITY (H): Primary | ICD-10-CM

## 2025-01-27 DIAGNOSIS — R29.6 RECURRENT FALLS: ICD-10-CM

## 2025-01-27 DIAGNOSIS — I10 ESSENTIAL HYPERTENSION: ICD-10-CM

## 2025-01-27 PROCEDURE — 99214 OFFICE O/P EST MOD 30 MIN: CPT | Performed by: PHYSICIAN ASSISTANT

## 2025-01-27 PROCEDURE — 82306 VITAMIN D 25 HYDROXY: CPT | Performed by: PHYSICIAN ASSISTANT

## 2025-01-27 PROCEDURE — G2211 COMPLEX E/M VISIT ADD ON: HCPCS | Performed by: PHYSICIAN ASSISTANT

## 2025-01-27 PROCEDURE — 36415 COLL VENOUS BLD VENIPUNCTURE: CPT | Performed by: PHYSICIAN ASSISTANT

## 2025-01-27 PROCEDURE — 82565 ASSAY OF CREATININE: CPT | Performed by: PHYSICIAN ASSISTANT

## 2025-01-27 PROCEDURE — 82310 ASSAY OF CALCIUM: CPT | Performed by: PHYSICIAN ASSISTANT

## 2025-01-27 RX ORDER — HYDROCHLOROTHIAZIDE 25 MG/1
25 TABLET ORAL DAILY
Qty: 90 TABLET | Refills: 3 | Status: SHIPPED | OUTPATIENT
Start: 2025-01-27

## 2025-01-27 NOTE — PROGRESS NOTES
"  Assessment & Plan     Acute deep vein thrombosis (DVT) of femoral vein of left lower extremity (H)  Patient here for DVT follow up, has only been on meds for 2 months. Recommend she finish full three months of treatment, follow up with PCP to see about repeat imaging and if more medication needed.    Recurrent falls  Generalized muscle weakness  Morbid obesity (H)  Patient endorses generalized weakness at home, suspect due to deconditioning. Stressed importance of doing home exercises and remaining active.    Essential hypertension  Chronic issue, BP mildly high, meds refilled.  Recheck at upcoming PCP visit in 1 month.  - hydrochlorothiazide (HYDRODIURIL) 25 MG tablet  Dispense: 90 tablet; Refill: 3    Chronic respiratory failure, unspecified whether with hypoxia or hypercapnia (H)  Chronic issue, oxygen on lower end but is working with Pulmonary to be off oxygen at rest.     Pain of toe of right foot  Chronic issue, patient complains of right toe pain, on exam her big toe is slightly red and tender to touch. Unclear cause as she is not wearing compressive shoes. Referral to podiatry for further treatment.  - Orthopedic  Referral    The longitudinal plan of care for the diagnosis(es)/condition(s) as documented were addressed during this visit. Due to the added complexity in care, I will continue to support the patient in the subsequent management and ongoing continuity of care.          BMI  Estimated body mass index is 33.95 kg/m  as calculated from the following:    Height as of 11/18/24: 1.626 m (5' 4\").    Weight as of this encounter: 89.7 kg (197 lb 12.8 oz).   Weight management plan: Discussed healthy diet and exercise guidelines      Risks, benefits and alternatives were discussed with patient. Agreeable to the plan of care.      Brenda Cast is a 79 year old, presenting for the following health issues:  Follow Up (Follow up blood clot in leg.   Swelling has gone down, but skin is very itchy " and does not look very good.  Having difficult getting around.  Unsure what has changed to make it more difficult.  She feels very stiff in her joints.  )      1/27/2025     2:50 PM   Additional Questions   Roomed by KARINE Seymour CMA([facility])     History of Present Illness       Reason for visit:  Follow for blood clot   She is taking medications regularly.       Patient is here today for her leg follow up  She was diagnosed with unprovoked DVT 11/18/24, on Eliquis and tolerating well  She notes decreased swelling in leg and no pain  Wondering if imaging needed and if she should continue medication for clot    She also endorses generalized weakness over the last 2 weeks  Feels well otherwise but needs a lot of effort to do things, like walking and moving around the home  Does lay around in her recliner at home a lot  She isn't doing all her PT as recommended    She notes her oxygen is off today, using 2L with activity and at night    Lastly, endorses right big toe pain  Not wearing shoes due to pain  No injury        Review of Systems  Constitutional, HEENT, cardiovascular, pulmonary, gi and gu systems are negative, except as otherwise noted.      Objective    BP (!) 141/66   Pulse 80   Temp 97.9  F (36.6  C) (Oral)   Resp 16   Wt 89.7 kg (197 lb 12.8 oz)   SpO2 92%   BMI 33.95 kg/m    Body mass index is 33.95 kg/m .  Physical Exam   GENERAL: alert and no distress  NECK: no adenopathy, no asymmetry, masses, or scars  RESP: lungs clear to auscultation - no rales, rhonchi or wheezes  CV: regular rate and rhythm, normal S1 S2, no S3 or S4, no murmur, click or rub, no peripheral edema  MS: no gross musculoskeletal defects noted, no edema  SKIN: sore on right big toe            Signed Electronically by: Teresa Johnson PA-C

## 2025-01-28 ENCOUNTER — PATIENT OUTREACH (OUTPATIENT)
Dept: CARE COORDINATION | Facility: CLINIC | Age: 80
End: 2025-01-28
Payer: MEDICARE

## 2025-01-28 LAB
CALCIUM SERPL-MCNC: 9.9 MG/DL (ref 8.8–10.4)
CREAT SERPL-MCNC: 0.95 MG/DL (ref 0.51–0.95)
EGFRCR SERPLBLD CKD-EPI 2021: 61 ML/MIN/1.73M2
VIT D+METAB SERPL-MCNC: 46 NG/ML (ref 20–50)

## 2025-01-29 DIAGNOSIS — M81.0 AGE-RELATED OSTEOPOROSIS WITHOUT CURRENT PATHOLOGICAL FRACTURE: Primary | ICD-10-CM

## 2025-01-29 DIAGNOSIS — Z92.29 PERSONAL HISTORY OF OTHER DRUG THERAPY: ICD-10-CM

## 2025-02-06 ENCOUNTER — ANCILLARY PROCEDURE (OUTPATIENT)
Dept: CARDIOLOGY | Facility: CLINIC | Age: 80
End: 2025-02-06
Attending: INTERNAL MEDICINE
Payer: MEDICARE

## 2025-02-06 DIAGNOSIS — I44.1 SECOND DEGREE MOBITZ II AV BLOCK: ICD-10-CM

## 2025-02-06 DIAGNOSIS — Z95.0 CARDIAC PACEMAKER IN SITU: ICD-10-CM

## 2025-02-06 LAB
MDC_IDC_EPISODE_DTM: NORMAL
MDC_IDC_EPISODE_ID: NORMAL
MDC_IDC_EPISODE_TYPE: NORMAL
MDC_IDC_LEAD_CONNECTION_STATUS: NORMAL
MDC_IDC_LEAD_CONNECTION_STATUS: NORMAL
MDC_IDC_LEAD_IMPLANT_DT: NORMAL
MDC_IDC_LEAD_IMPLANT_DT: NORMAL
MDC_IDC_LEAD_LOCATION: NORMAL
MDC_IDC_LEAD_LOCATION: NORMAL
MDC_IDC_LEAD_LOCATION_DETAIL_1: NORMAL
MDC_IDC_LEAD_LOCATION_DETAIL_1: NORMAL
MDC_IDC_LEAD_MFG: NORMAL
MDC_IDC_LEAD_MFG: NORMAL
MDC_IDC_LEAD_MODEL: NORMAL
MDC_IDC_LEAD_MODEL: NORMAL
MDC_IDC_LEAD_POLARITY_TYPE: NORMAL
MDC_IDC_LEAD_POLARITY_TYPE: NORMAL
MDC_IDC_LEAD_SERIAL: NORMAL
MDC_IDC_LEAD_SERIAL: NORMAL
MDC_IDC_MSMT_BATTERY_DTM: NORMAL
MDC_IDC_MSMT_BATTERY_REMAINING_LONGEVITY: 120 MO
MDC_IDC_MSMT_BATTERY_REMAINING_PERCENTAGE: 100 %
MDC_IDC_MSMT_BATTERY_STATUS: NORMAL
MDC_IDC_MSMT_LEADCHNL_RA_IMPEDANCE_VALUE: 532 OHM
MDC_IDC_MSMT_LEADCHNL_RA_PACING_THRESHOLD_AMPLITUDE: 1 V
MDC_IDC_MSMT_LEADCHNL_RA_PACING_THRESHOLD_PULSEWIDTH: 0.4 MS
MDC_IDC_MSMT_LEADCHNL_RV_IMPEDANCE_VALUE: 479 OHM
MDC_IDC_MSMT_LEADCHNL_RV_PACING_THRESHOLD_AMPLITUDE: 0.6 V
MDC_IDC_MSMT_LEADCHNL_RV_PACING_THRESHOLD_PULSEWIDTH: 0.4 MS
MDC_IDC_PG_IMPLANT_DTM: NORMAL
MDC_IDC_PG_MFG: NORMAL
MDC_IDC_PG_MODEL: NORMAL
MDC_IDC_PG_SERIAL: NORMAL
MDC_IDC_PG_TYPE: NORMAL
MDC_IDC_SESS_CLINIC_NAME: NORMAL
MDC_IDC_SESS_DTM: NORMAL
MDC_IDC_SESS_TYPE: NORMAL
MDC_IDC_SET_BRADY_AT_MODE_SWITCH_MODE: NORMAL
MDC_IDC_SET_BRADY_AT_MODE_SWITCH_RATE: 170 {BEATS}/MIN
MDC_IDC_SET_BRADY_LOWRATE: 60 {BEATS}/MIN
MDC_IDC_SET_BRADY_MAX_SENSOR_RATE: 130 {BEATS}/MIN
MDC_IDC_SET_BRADY_MAX_TRACKING_RATE: 130 {BEATS}/MIN
MDC_IDC_SET_BRADY_MODE: NORMAL
MDC_IDC_SET_BRADY_PAV_DELAY_HIGH: 200 MS
MDC_IDC_SET_BRADY_PAV_DELAY_LOW: 250 MS
MDC_IDC_SET_BRADY_SAV_DELAY_HIGH: 200 MS
MDC_IDC_SET_BRADY_SAV_DELAY_LOW: 250 MS
MDC_IDC_SET_LEADCHNL_RA_PACING_AMPLITUDE: 1.5 V
MDC_IDC_SET_LEADCHNL_RA_PACING_CAPTURE_MODE: NORMAL
MDC_IDC_SET_LEADCHNL_RA_PACING_POLARITY: NORMAL
MDC_IDC_SET_LEADCHNL_RA_PACING_PULSEWIDTH: 0.4 MS
MDC_IDC_SET_LEADCHNL_RA_SENSING_ADAPTATION_MODE: NORMAL
MDC_IDC_SET_LEADCHNL_RA_SENSING_POLARITY: NORMAL
MDC_IDC_SET_LEADCHNL_RA_SENSING_SENSITIVITY: 0.4 MV
MDC_IDC_SET_LEADCHNL_RV_PACING_AMPLITUDE: 1.2 V
MDC_IDC_SET_LEADCHNL_RV_PACING_CAPTURE_MODE: NORMAL
MDC_IDC_SET_LEADCHNL_RV_PACING_POLARITY: NORMAL
MDC_IDC_SET_LEADCHNL_RV_PACING_PULSEWIDTH: 0.4 MS
MDC_IDC_SET_LEADCHNL_RV_SENSING_ADAPTATION_MODE: NORMAL
MDC_IDC_SET_LEADCHNL_RV_SENSING_POLARITY: NORMAL
MDC_IDC_SET_LEADCHNL_RV_SENSING_SENSITIVITY: 1.5 MV
MDC_IDC_SET_ZONE_DETECTION_INTERVAL: 375 MS
MDC_IDC_SET_ZONE_STATUS: NORMAL
MDC_IDC_SET_ZONE_TYPE: NORMAL
MDC_IDC_SET_ZONE_VENDOR_TYPE: NORMAL
MDC_IDC_STAT_AT_BURDEN_PERCENT: 1 %
MDC_IDC_STAT_AT_DTM_END: NORMAL
MDC_IDC_STAT_AT_DTM_START: NORMAL
MDC_IDC_STAT_BRADY_DTM_END: NORMAL
MDC_IDC_STAT_BRADY_DTM_START: NORMAL
MDC_IDC_STAT_BRADY_RA_PERCENT_PACED: 2 %
MDC_IDC_STAT_BRADY_RV_PERCENT_PACED: 100 %
MDC_IDC_STAT_EPISODE_RECENT_COUNT: 0
MDC_IDC_STAT_EPISODE_RECENT_COUNT: 1
MDC_IDC_STAT_EPISODE_RECENT_COUNT: 1
MDC_IDC_STAT_EPISODE_RECENT_COUNT_DTM_END: NORMAL
MDC_IDC_STAT_EPISODE_RECENT_COUNT_DTM_START: NORMAL
MDC_IDC_STAT_EPISODE_TYPE: NORMAL
MDC_IDC_STAT_EPISODE_VENDOR_TYPE: NORMAL
MDC_IDC_STAT_EPISODE_VENDOR_TYPE: NORMAL

## 2025-02-12 ENCOUNTER — ALLIED HEALTH/NURSE VISIT (OUTPATIENT)
Dept: ENDOCRINOLOGY | Facility: CLINIC | Age: 80
End: 2025-02-12
Payer: COMMERCIAL

## 2025-02-12 ENCOUNTER — OFFICE VISIT (OUTPATIENT)
Dept: ENDOCRINOLOGY | Facility: CLINIC | Age: 80
End: 2025-02-12
Payer: COMMERCIAL

## 2025-02-12 VITALS
DIASTOLIC BLOOD PRESSURE: 73 MMHG | SYSTOLIC BLOOD PRESSURE: 151 MMHG | HEART RATE: 92 BPM | OXYGEN SATURATION: 96 % | BODY MASS INDEX: 33.81 KG/M2 | WEIGHT: 197 LBS

## 2025-02-12 DIAGNOSIS — Z92.29 PERSONAL HISTORY OF OTHER DRUG THERAPY: ICD-10-CM

## 2025-02-12 DIAGNOSIS — M81.0 AGE-RELATED OSTEOPOROSIS WITHOUT CURRENT PATHOLOGICAL FRACTURE: Primary | ICD-10-CM

## 2025-02-12 PROCEDURE — 99207 PR NO CHARGE NURSE ONLY: CPT

## 2025-02-12 PROCEDURE — 93280 PM DEVICE PROGR EVAL DUAL: CPT | Performed by: INTERNAL MEDICINE

## 2025-02-12 NOTE — PROGRESS NOTES
Clinic Administered Medication Documentation      Prolia Documentation    Indication: Prolia  (denosumab) is a prescription medicine used to treat osteoporosis in patients who:   Are at high risk for fracture, meaning patients who have had a fracture related to osteoporosis, or who have multiple risk factors for fracture.  Cannot use another osteoporosis medicine or other osteoporosis medicines did not work well.  The timeline for early/late injections would be 4 weeks early and any time after the 6 month edmar. If a patient receives their injection late, then the subsequent injection would be 6 months from the date that they actually received the injection.    When was the last injection?  24  Was the last injection at least 6 months ago? Yes  Has the prior authorization been completed?  Yes  Is there an active order (written within the past 365 days, with administrations remaining, not ) in the chart?  Yes   GFR Estimate   Date Value Ref Range Status   2025 61 >60 mL/min/1.73m2 Final     Comment:     eGFR calculated using  CKD-EPI equation.   2021 >60 >60 mL/min/1.73m2 Final   2018 66 >60 mL/min/1.7m2 Final     Comment:     Non  GFR Calc     GFR, ESTIMATED POCT   Date Value Ref Range Status   2023 57 (L) >60 mL/min/1.73m2 Final     Has patient had a GFR within the last 12 months? Yes   Is GFR under 30, or patient has a diagnosis of CKD4 or CKD5? No   Patient denies gastric bypass or parathyroid surgery in past 6 months? Yes - patient denies.   Patient denies undergoing any dental procedures involving drilling into the bone, such as implants, extractions, or oral surgery, within the past two months that have not yet healed?  Yes - patient denies  Patient denies plans for an emergency tooth extraction within the next week? Yes    The following steps were completed to comply with the REMS program for Prolia:  Reviewed information in the Medication Guide, including  the serious risks of Prolia  and the symptoms of each risk.  Advised patient to seek prompt medical attention if they have signs or symptoms of any of the serious risks.  Provided each patient a copy of the Medication Guide and Patient Guide.    Prior to injection, verified patient identity using patient's name and date of birth. Medication was administered. Please see MAR and medication order for additional information. Patient instructed to remain in clinic for 15 minutes and report any adverse reaction to staff immediately.    Vial/Syringe: Syringe  Was this medication supplied by the patient? No  Verified that the patient has administrations remaining in their prescription.

## 2025-02-12 NOTE — LETTER
2/12/2025      Lalitha Underwood  [address]  [facility] [address]      Dear Colleague,    Thank you for referring your patient, Lalitha Underwood, to the [facility]. Please see a copy of my visit note below.    [facility]  ENDOCRINOLOGY    Osteoporosis Follow Up 2/13/2025    Lalitha Underwood, 1945, [id number]          Reason for visit      1. Age-related osteoporosis without current pathological fracture        History     Lalitha Underwood is a very pleasant 79 year old old female who presents for follow up.   SUMMARY:  1. Osteopenia-she was started on alendronate 2 years ago. She has been tolerating alendronate well however a recent bone density report as noted below showed deterioration in the bone density in the right hip. She states she's been compliant with her alendronate every week.  She does not take any calcium supplements and only consumes about 1 serving of dairy each day. She takes 5000 IU daily of the 3 and her recent level was 41.8.  TSH in December 2014 was 1.87.  She has been on Dilantin for the last 24 years after having a grand mal seizure. Prior to that she was diagnosed with seizure disorder at the age of 18 and use medications initially for about 2 years and then discontinued for about 27 years.  She is also a former smoker and smoked for about 35 years and quit in 1998.  She does not undertake any weight-bearing exercise currently. She is thinking of joining the [employer] in January  She has had several falls but has never resulted in a fragility fracture.  She has lost 1.75 inches in height.  Menopause at age 50 and she used hormone replacement therapy for about 2-3 years and stopped after the women's health initiative data came out.  Her mother had an osteoporotic hip fracture at age 90. She had to have open reduction internal fixation and lived for another 4 years after that but was dependent on a walker.  Her sister also has osteopenia.  She has a  long-standing history of hiatal hernia and has been on proton pump inhibitors followed time and is currently in AcipHex.    TODAY:    Serene returns today in follow-up for Osteoporosis. She continues on Prolia injections without difficulties. Her most recent Dexa Scan of last year showed: COMPARISON: There has been a 0.1% decrease in left hip BMD. This is not considered to be statistically  She does do some extra walking, but doesn't go too far without one of her walkers. She is taking both Calcium with Vit D, and an additional Vit D. Calcium level is 9.9 and Vit D level is 46. She is due for another Prolia injection today.     Risk Factors     The following high- risk conditions have been ruled out: celiac disease, eating disorders, gastric bypass, hyperparathyroidism, inflammatory bowel disease, hyperthyroidism, rheumatoid arthritis, lupus, chronic kidney disease.      Lalitha Underwood has the following risk factors: Age, female gender, ex smoker, and breast cancer in her mother.      She is not on high risk medications such as glucocorticoids, anti-coagulants, chemotherapy or levothyroxine.  She is on an anticonvulsant.  Patient deniesHysterectomy, Oophrectomy, Breast cancer and Family history of breast cancer.   Menopause was at age: 50 years    Past Medical History     Patient Active Problem List   Diagnosis     Essential hypertension, benign     Acquired lymphedema of leg     Decreased hearing of both ears     Epileptic seizure (H)     Esophageal reflux     Hyperlipidemia     Impaired gait and mobility     Neuropathy, idiopathic     Osteoporosis     COPD (chronic obstructive pulmonary disease) (H)     Rosacea     Urge incontinence of urine     Uterine prolapse     Valgus deformity of both feet     Vitamin D deficiency     PAD (peripheral artery disease)     Second degree heart block     Cardiac pacemaker in situ     Coronary arteriosclerosis due to lipid rich plaque     Acquired absence of other left toe(s)      Cor pulmonale (chronic) (H)     Hypoxemia associated with sleep     Class 2 severe obesity due to excess calories with serious comorbidity in adult (H)     S/P TAVR (transcatheter aortic valve replacement)     Acute deep vein thrombosis (DVT) of femoral vein of left lower extremity (H)       Family History       family history includes Breast Cancer (age of onset: 66.00) in her mother; Coronary Artery Disease in her mother, sister, and sister; Diabetes in her son; Heart Disease in her sister, sister and another family member; Hypertension in her father and mother; Pulmonary Hypertension in her daughter and daughter; Varicose Veins in her mother.    Social History      reports that she quit smoking about 26 years ago. Her smoking use included cigarettes. She started smoking about 64 years ago. She has a 57 pack-year smoking history. She has never used smokeless tobacco. She reports current alcohol use of about 2.0 standard drinks of alcohol per week. She reports that she does not currently use drugs.      Review of Systems     Patient denies current pain, limited mobility, fractures.   Remainder per HPI.      Vital Signs     BP (!) 151/73 (BP Location: Left arm, Patient Position: Sitting, Cuff Size: Adult Small)   Pulse 92   Wt 89.4 kg (197 lb)   SpO2 96%   BMI 33.81 kg/m      Physical Exam     Constitutional:  Well developed, Well nourished  HENT:  Normocephalic,   Neck: normal in appearance  Eyes:  PERRL, Conjunctiva pink  Respiratory:  No respiratory distress  Skin: No acanthosis nigricans, lipoatrophy or lipodystrophy  Neurologic:  Alert & oriented x 3, nonfocal  Psychiatric:  Affect, Mood, Insight appropriate          Assessment     1. Age-related osteoporosis without current pathological fracture        Plan     She will continue on the Prolia injections and will follow-up with me in 1 year.           Lalitha Haq NP   Endocrinology  2/13/2025  9:04 AM      Current Medications     Outpatient Medications  Prior to Visit   Medication Sig Dispense Refill     albuterol (PROAIR HFA/PROVENTIL HFA/VENTOLIN HFA) 108 (90 Base) MCG/ACT inhaler Inhale 2 puffs into the lungs every 6 hours as needed for shortness of breath or wheezing 18 g 1     apixaban ANTICOAGULANT (ELIQUIS) 5 MG tablet Take 1 tablet (5 mg) by mouth 2 times daily. 60 tablet 2     aspirin 81 MG EC tablet Hold off on taking your aspirin while       azithromycin (ZITHROMAX) 250 MG tablet Take 1 tablet (250 mg) by mouth every evening 90 tablet 3     benzonatate (TESSALON) 100 MG capsule Take 1 capsule (100 mg) by mouth 3 times daily as needed for cough 90 capsule 3     Biotin 10 MG CAPS Take 1 capsule by mouth daily       calcium citrate (CITRACAL) 950 (200 Ca) MG tablet Take 1 tablet by mouth 2 times daily       estradiol (ESTRACE) 0.1 MG/GM vaginal cream Place 2 g vaginally three times a week 72 g 1     ferrous sulfate (FEROSUL) 325 (65 Fe) MG tablet Take 1 tablet (325 mg) by mouth every other day       fluticasone-salmeterol (WIXELA INHUB) 500-50 MCG/ACT inhaler Inhale 1 puff into the lungs every 12 hours. 180 each 2     guaiFENesin (MUCINEX) 600 MG 12 hr tablet Take 1 tablet (600 mg) by mouth 2 times daily. 180 tablet 0     hydrochlorothiazide (HYDRODIURIL) 25 MG tablet Take 1 tablet (25 mg) by mouth daily. 90 tablet 3     ipratropium - albuterol 0.5 mg/2.5 mg/3 mL (DUONEB) 0.5-2.5 (3) MG/3ML neb solution TAKE 1 VIAL BY NEBULIZATION EVERY 6 HOURS AS NEEDED FOR SHORTNESS OF BREATH OR WHEEZING 1080 mL 3     levalbuterol (XOPENEX) 1.25 MG/3ML neb solution Take 3 mLs (1.25 mg) by nebulization every 4 hours as needed for shortness of breath or wheezing 810 mL 3     levETIRAcetam (KEPPRA) 500 MG tablet Take 1 tablet (500 mg) by mouth 2 times daily 180 tablet 3     losartan (COZAAR) 50 MG tablet Take 1 tablet (50 mg) by mouth 2 times daily 180 tablet 3     magnesium 250 MG tablet Take 1 tablet by mouth 2 times daily       nystatin (MYCOSTATIN) 416321 UNIT/GM  external powder Apply topically daily as needed       potassium chloride teja ER (KLOR-CON M20) 20 MEQ CR tablet Take 1 tablet (20 mEq) by mouth at bedtime 90 tablet 3     potassium chloride ER (K-TAB) 20 MEQ CR tablet Take 20 mEq by mouth at bedtime.       RABEprazole (ACIPHEX) 20 MG EC tablet Take 1 tablet (20 mg) by mouth daily. 90 tablet 2     sertraline (ZOLOFT) 100 MG tablet Take 1 tablet (100 mg) by mouth every evening 90 tablet 3     solifenacin (VESICARE) 10 MG tablet Take 1 tablet (10 mg) by mouth daily 90 tablet 3     spironolactone (ALDACTONE) 25 MG tablet Take 0.5 tablets (12.5 mg) by mouth daily 45 tablet 3     tiotropium (SPIRIVA RESPIMAT) 2.5 MCG/ACT inhaler Inhale 2 puffs into the lungs daily 12 g 3     vitamin D3 (CHOLECALCIFEROL) 125 MCG (5000 UT) tablet Take 5,000 Units by mouth three times a week Monday, Wednesday and Friday       Facility-Administered Medications Prior to Visit   Medication Dose Route Frequency Provider Last Rate Last Admin     denosumab (PROLIA) injection 60 mg  60 mg Subcutaneous Q6 Months Lalitha Haq, NP   60 mg at 02/12/25 1508         Lab Results     TSH   Date Value Ref Range Status   04/19/2022 3.59 0.40 - 4.00 mU/L Final           Imaging Results   Last DEXA scan:  No valid procedures specified.    Narrative & Impression   EXAM: DX AXIAL AND PERIPHERAL  LOCATION: [facility]  DATE: 1/14/2025     INDICATION:  Age-related osteoporosis without current pathological fracture  DEMOGRAPHICS: Age- 79 years. Gender- Female. Menopausal status- Postmenopausal.  COMPARISON: 01/12/2023  TECHNIQUE: Dual-energy x-ray absorptiometry (DXA) performed with routine technique. Forearm DXA performed since the hip and/or spine could not be measured or interpreted.     FINDINGS:     DXA RESULTS  -RIGHT Hip Total: BMD: 0.989 g/cm2. T-score: -0.1. Z-score: 1.8.  -RIGHT Hip Femoral neck: BMD: 0.984 g/cm2. T-score: -0.4. Z-score: 1.8.  -LEFT Hip Total: BMD: 0.954 g/cm2.  T-score: -0.4. Z-score: 1.6.  -LEFT Hip Femoral neck: BMD: 0.968 g/cm2. T-score: -0.5. Z-score: 1.6.  -RIGHT Radius 33%: BMD: 0.699 g/cm2. T-score: 0.4. Z-score: 3.1.     WHO T-SCORE CRITERIA  -Normal: T score at or above -1 SD  -Osteopenia: T score between -1 and -2.5 SD  -Osteoporosis: T score at or below -2.5 SD     The World Health Organization (WHO) criteria is applicable to perimenopausal females, postmenopausal females, and men aged 50 years or older.     INTERVAL CHANGE  -There has been a 0.1% decrease in bilateral hip BMD.     FRACTURE RISK  -The FRAX risk calculator is not applicable due to normal BMD in the spine/hip regions.                                                                      IMPRESSION: NORMAL. Bone mineral density measurements are within normal limits using T score.     Again, thank you for allowing me to participate in the care of your patient.        Sincerely,        Lalitha Haq NP    Electronically signed

## 2025-02-13 NOTE — PROGRESS NOTES
[facility]  ENDOCRINOLOGY    Osteoporosis Follow Up 2/13/2025    Lalitha Underwood, 1945, [id number]          Reason for visit      1. Age-related osteoporosis without current pathological fracture        History     Lalitha Underwood is a very pleasant 79 year old old female who presents for follow up.   SUMMARY:  1. Osteopenia-she was started on alendronate 2 years ago. She has been tolerating alendronate well however a recent bone density report as noted below showed deterioration in the bone density in the right hip. She states she's been compliant with her alendronate every week.  She does not take any calcium supplements and only consumes about 1 serving of dairy each day. She takes 5000 IU daily of the 3 and her recent level was 41.8.  TSH in December 2014 was 1.87.  She has been on Dilantin for the last 24 years after having a grand mal seizure. Prior to that she was diagnosed with seizure disorder at the age of 18 and use medications initially for about 2 years and then discontinued for about 27 years.  She is also a former smoker and smoked for about 35 years and quit in 1998.  She does not undertake any weight-bearing exercise currently. She is thinking of joining the [employer] in January  She has had several falls but has never resulted in a fragility fracture.  She has lost 1.75 inches in height.  Menopause at age 50 and she used hormone replacement therapy for about 2-3 years and stopped after the women's health initiative data came out.  Her mother had an osteoporotic hip fracture at age 90. She had to have open reduction internal fixation and lived for another 4 years after that but was dependent on a walker.  Her sister also has osteopenia.  She has a long-standing history of hiatal hernia and has been on proton pump inhibitors followed time and is currently in AcipHex.    TODAY:    Serene returns today in follow-up for Osteoporosis. She continues on Prolia injections without difficulties. Her  most recent Dexa Scan of last year showed: COMPARISON: There has been a 0.1% decrease in left hip BMD. This is not considered to be statistically  She does do some extra walking, but doesn't go too far without one of her walkers. She is taking both Calcium with Vit D, and an additional Vit D. Calcium level is 9.9 and Vit D level is 46. She is due for another Prolia injection today.     Risk Factors     The following high- risk conditions have been ruled out: celiac disease, eating disorders, gastric bypass, hyperparathyroidism, inflammatory bowel disease, hyperthyroidism, rheumatoid arthritis, lupus, chronic kidney disease.      Lalitha Underwood has the following risk factors: Age, female gender, ex smoker, and breast cancer in her mother.      She is not on high risk medications such as glucocorticoids, anti-coagulants, chemotherapy or levothyroxine.  She is on an anticonvulsant.  Patient deniesHysterectomy, Oophrectomy, Breast cancer and Family history of breast cancer.   Menopause was at age: 50 years    Past Medical History     Patient Active Problem List   Diagnosis    Essential hypertension, benign    Acquired lymphedema of leg    Decreased hearing of both ears    Epileptic seizure (H)    Esophageal reflux    Hyperlipidemia    Impaired gait and mobility    Neuropathy, idiopathic    Osteoporosis    COPD (chronic obstructive pulmonary disease) (H)    Rosacea    Urge incontinence of urine    Uterine prolapse    Valgus deformity of both feet    Vitamin D deficiency    PAD (peripheral artery disease)    Second degree heart block    Cardiac pacemaker in situ    Coronary arteriosclerosis due to lipid rich plaque    Acquired absence of other left toe(s)    Cor pulmonale (chronic) (H)    Hypoxemia associated with sleep    Class 2 severe obesity due to excess calories with serious comorbidity in adult (H)    S/P TAVR (transcatheter aortic valve replacement)    Acute deep vein thrombosis (DVT) of femoral vein of left  lower extremity (H)       Family History       family history includes Breast Cancer (age of onset: 66.00) in her mother; Coronary Artery Disease in her mother, sister, and sister; Diabetes in her son; Heart Disease in her sister, sister and another family member; Hypertension in her father and mother; Pulmonary Hypertension in her daughter and daughter; Varicose Veins in her mother.    Social History      reports that she quit smoking about 26 years ago. Her smoking use included cigarettes. She started smoking about 64 years ago. She has a 57 pack-year smoking history. She has never used smokeless tobacco. She reports current alcohol use of about 2.0 standard drinks of alcohol per week. She reports that she does not currently use drugs.      Review of Systems     Patient denies current pain, limited mobility, fractures.   Remainder per HPI.      Vital Signs     BP (!) 151/73 (BP Location: Left arm, Patient Position: Sitting, Cuff Size: Adult Small)   Pulse 92   Wt 89.4 kg (197 lb)   SpO2 96%   BMI 33.81 kg/m      Physical Exam     Constitutional:  Well developed, Well nourished  HENT:  Normocephalic,   Neck: normal in appearance  Eyes:  PERRL, Conjunctiva pink  Respiratory:  No respiratory distress  Skin: No acanthosis nigricans, lipoatrophy or lipodystrophy  Neurologic:  Alert & oriented x 3, nonfocal  Psychiatric:  Affect, Mood, Insight appropriate          Assessment     1. Age-related osteoporosis without current pathological fracture        Plan     She will continue on the Prolia injections and will follow-up with me in 1 year.           Lalitha Haq NP   Endocrinology  2/13/2025  9:04 AM      Current Medications     Outpatient Medications Prior to Visit   Medication Sig Dispense Refill    albuterol (PROAIR HFA/PROVENTIL HFA/VENTOLIN HFA) 108 (90 Base) MCG/ACT inhaler Inhale 2 puffs into the lungs every 6 hours as needed for shortness of breath or wheezing 18 g 1    apixaban ANTICOAGULANT (ELIQUIS) 5  MG tablet Take 1 tablet (5 mg) by mouth 2 times daily. 60 tablet 2    aspirin 81 MG EC tablet Hold off on taking your aspirin while      azithromycin (ZITHROMAX) 250 MG tablet Take 1 tablet (250 mg) by mouth every evening 90 tablet 3    benzonatate (TESSALON) 100 MG capsule Take 1 capsule (100 mg) by mouth 3 times daily as needed for cough 90 capsule 3    Biotin 10 MG CAPS Take 1 capsule by mouth daily      calcium citrate (CITRACAL) 950 (200 Ca) MG tablet Take 1 tablet by mouth 2 times daily      estradiol (ESTRACE) 0.1 MG/GM vaginal cream Place 2 g vaginally three times a week 72 g 1    ferrous sulfate (FEROSUL) 325 (65 Fe) MG tablet Take 1 tablet (325 mg) by mouth every other day      fluticasone-salmeterol (WIXELA INHUB) 500-50 MCG/ACT inhaler Inhale 1 puff into the lungs every 12 hours. 180 each 2    guaiFENesin (MUCINEX) 600 MG 12 hr tablet Take 1 tablet (600 mg) by mouth 2 times daily. 180 tablet 0    hydrochlorothiazide (HYDRODIURIL) 25 MG tablet Take 1 tablet (25 mg) by mouth daily. 90 tablet 3    ipratropium - albuterol 0.5 mg/2.5 mg/3 mL (DUONEB) 0.5-2.5 (3) MG/3ML neb solution TAKE 1 VIAL BY NEBULIZATION EVERY 6 HOURS AS NEEDED FOR SHORTNESS OF BREATH OR WHEEZING 1080 mL 3    levalbuterol (XOPENEX) 1.25 MG/3ML neb solution Take 3 mLs (1.25 mg) by nebulization every 4 hours as needed for shortness of breath or wheezing 810 mL 3    levETIRAcetam (KEPPRA) 500 MG tablet Take 1 tablet (500 mg) by mouth 2 times daily 180 tablet 3    losartan (COZAAR) 50 MG tablet Take 1 tablet (50 mg) by mouth 2 times daily 180 tablet 3    magnesium 250 MG tablet Take 1 tablet by mouth 2 times daily      nystatin (MYCOSTATIN) 508118 UNIT/GM external powder Apply topically daily as needed      potassium chloride teja ER (KLOR-CON M20) 20 MEQ CR tablet Take 1 tablet (20 mEq) by mouth at bedtime 90 tablet 3    potassium chloride ER (K-TAB) 20 MEQ CR tablet Take 20 mEq by mouth at bedtime.      RABEprazole (ACIPHEX) 20 MG EC  tablet Take 1 tablet (20 mg) by mouth daily. 90 tablet 2    sertraline (ZOLOFT) 100 MG tablet Take 1 tablet (100 mg) by mouth every evening 90 tablet 3    solifenacin (VESICARE) 10 MG tablet Take 1 tablet (10 mg) by mouth daily 90 tablet 3    spironolactone (ALDACTONE) 25 MG tablet Take 0.5 tablets (12.5 mg) by mouth daily 45 tablet 3    tiotropium (SPIRIVA RESPIMAT) 2.5 MCG/ACT inhaler Inhale 2 puffs into the lungs daily 12 g 3    vitamin D3 (CHOLECALCIFEROL) 125 MCG (5000 UT) tablet Take 5,000 Units by mouth three times a week Monday, Wednesday and Friday       Facility-Administered Medications Prior to Visit   Medication Dose Route Frequency Provider Last Rate Last Admin    denosumab (PROLIA) injection 60 mg  60 mg Subcutaneous Q6 Months Lalitha Haq NP   60 mg at 02/12/25 1508         Lab Results     TSH   Date Value Ref Range Status   04/19/2022 3.59 0.40 - 4.00 mU/L Final           Imaging Results   Last DEXA scan:  No valid procedures specified.    Narrative & Impression   EXAM: DX AXIAL AND PERIPHERAL  LOCATION: [facility]  DATE: 1/14/2025     INDICATION:  Age-related osteoporosis without current pathological fracture  DEMOGRAPHICS: Age- 79 years. Gender- Female. Menopausal status- Postmenopausal.  COMPARISON: 01/12/2023  TECHNIQUE: Dual-energy x-ray absorptiometry (DXA) performed with routine technique. Forearm DXA performed since the hip and/or spine could not be measured or interpreted.     FINDINGS:     DXA RESULTS  -RIGHT Hip Total: BMD: 0.989 g/cm2. T-score: -0.1. Z-score: 1.8.  -RIGHT Hip Femoral neck: BMD: 0.984 g/cm2. T-score: -0.4. Z-score: 1.8.  -LEFT Hip Total: BMD: 0.954 g/cm2. T-score: -0.4. Z-score: 1.6.  -LEFT Hip Femoral neck: BMD: 0.968 g/cm2. T-score: -0.5. Z-score: 1.6.  -RIGHT Radius 33%: BMD: 0.699 g/cm2. T-score: 0.4. Z-score: 3.1.     WHO T-SCORE CRITERIA  -Normal: T score at or above -1 SD  -Osteopenia: T score between -1 and -2.5 SD  -Osteoporosis: T score  at or below -2.5 SD     The World Health Organization (WHO) criteria is applicable to perimenopausal females, postmenopausal females, and men aged 50 years or older.     INTERVAL CHANGE  -There has been a 0.1% decrease in bilateral hip BMD.     FRACTURE RISK  -The FRAX risk calculator is not applicable due to normal BMD in the spine/hip regions.                                                                      IMPRESSION: NORMAL. Bone mineral density measurements are within normal limits using T score.

## 2025-02-27 ENCOUNTER — OFFICE VISIT (OUTPATIENT)
Dept: FAMILY MEDICINE | Facility: CLINIC | Age: 80
End: 2025-02-27
Payer: COMMERCIAL

## 2025-02-27 VITALS
HEART RATE: 80 BPM | OXYGEN SATURATION: 94 % | WEIGHT: 196.8 LBS | DIASTOLIC BLOOD PRESSURE: 63 MMHG | TEMPERATURE: 98.3 F | SYSTOLIC BLOOD PRESSURE: 133 MMHG | BODY MASS INDEX: 33.78 KG/M2 | RESPIRATION RATE: 16 BRPM

## 2025-02-27 DIAGNOSIS — I10 ESSENTIAL HYPERTENSION: ICD-10-CM

## 2025-02-27 DIAGNOSIS — I27.20 PULMONARY HYPERTENSION (H): ICD-10-CM

## 2025-02-27 DIAGNOSIS — I82.412 ACUTE DEEP VEIN THROMBOSIS (DVT) OF FEMORAL VEIN OF LEFT LOWER EXTREMITY (H): Primary | ICD-10-CM

## 2025-02-27 DIAGNOSIS — D50.9 IRON DEFICIENCY ANEMIA, UNSPECIFIED IRON DEFICIENCY ANEMIA TYPE: ICD-10-CM

## 2025-02-27 DIAGNOSIS — G40.919 INTRACTABLE EPILEPSY WITHOUT STATUS EPILEPTICUS, UNSPECIFIED EPILEPSY TYPE (H): ICD-10-CM

## 2025-02-27 PROBLEM — J44.9 COPD (CHRONIC OBSTRUCTIVE PULMONARY DISEASE) (H): Status: RESOLVED | Noted: 2021-07-16 | Resolved: 2025-02-27

## 2025-02-27 NOTE — PROGRESS NOTES
Assessment & Plan     Acute deep vein thrombosis (DVT) of femoral vein of left lower extremity (H)  Hospitalized 11/18/2024 for nonocclusive left distal femoral vein DVT.  Initially placed on heparin drip and discharged on Eliquis which she remains on.  Minimally mobile so patient is interested in repeat ultrasound prior to discontinuing Eliquis which is reasonable.  If resolved will discontinue Eliquis and restart lifelong baby aspirin.  If clot remains would continue Eliquis for another 3 months and then reassess at that time.  - US Lower Extremity Venous Duplex Left; Future    Iron deficiency anemia, unspecified iron deficiency anemia type  Chronic anemia with hemoglobin 8.7-9.7 range.  Patient declined repeat today but denies symptoms of active blood loss.  If remains low at follow-up, would recommend hematology referral.    Essential hypertension  Well controlled multidrug regimen.    Pulmonary hypertension (H)  Found to have mild hypertension on prior echo.  Follows with pulmonology and cardiology.  Uses 8 L O2 to with ambulation.    Intractable epilepsy without status epilepticus, unspecified epilepsy type (H)  Stable on Keppra without breakthrough seizures since 1990s.    The longitudinal plan of care for the diagnosis(es)/condition(s) as documented were addressed during this visit. Due to the added complexity in care, I will continue to support Serene in the subsequent management and with ongoing continuity of care.      Subjective   Serene is a 79 year old, presenting for the following health issues:  Deep Vein Thrombosis (Left Leg - had last treatment for lymphodema)        2/27/2025    10:03 AM   Additional Questions   Roomed by Bonnie BURGER CMA   Accompanied by self     History of Present Illness       Reason for visit:  Follow up on blood clot   She is taking medications regularly.      Uses 8L O2 with amulation  Walks very slowly due to fear of falling  Had recent OT, PT, lymphedema  11/18/24 U/S showed  non-occlusive thrombus in left distal femoral vein was started on a Heparin drip overnight by ER provider.    Discharged on Eliquis x 3 months  Aspirin stopped while on anticoagulation  Patient would like to reassess with ultrasound prior to fully discontinuing Eliquis  Pain and skin changes have resolved  Chronic left knee pain, previously seen at [facility]  Uses Tylenol as needed.  May consider steroid injection in the future          Objective    /63   Pulse 80   Temp 98.3  F (36.8  C) (Oral)   Resp 16   Wt 89.3 kg (196 lb 12.8 oz)   SpO2 94%   BMI 33.78 kg/m    Body mass index is 33.78 kg/m .  Physical Exam   GENERAL: alert and no distress  EYES: Eyes grossly normal to inspection, PERRL and conjunctivae and sclerae normal  HENT: ear canals and TM's normal, nose and mouth without ulcers or lesions  NECK: no adenopathy, no asymmetry, masses, or scars  RESP: lungs clear to auscultation - no rales, rhonchi or wheezes  CV: regular rate and rhythm, normal S1 S2, no S3 or S4, no murmur, click or rub, no peripheral edema  ABDOMEN: soft, nontender, no hepatosplenomegaly, no masses and bowel sounds normal  MS: no gross musculoskeletal defects noted, no edema  SKIN: no suspicious lesions or rashes  NEURO: Normal strength and tone, mentation intact and speech normal  PSYCH: mentation appears normal, affect normal/bright            Signed Electronically by: Kate Marte DO

## 2025-03-10 ENCOUNTER — OFFICE VISIT (OUTPATIENT)
Dept: PODIATRY | Facility: CLINIC | Age: 80
End: 2025-03-10
Attending: PHYSICIAN ASSISTANT
Payer: COMMERCIAL

## 2025-03-10 DIAGNOSIS — M20.42 HAMMERTOE, BILATERAL: ICD-10-CM

## 2025-03-10 DIAGNOSIS — M20.41 HAMMERTOE, BILATERAL: ICD-10-CM

## 2025-03-10 DIAGNOSIS — S90.812A ABRASION, LEFT FOOT, INITIAL ENCOUNTER: Primary | ICD-10-CM

## 2025-03-10 PROCEDURE — 99213 OFFICE O/P EST LOW 20 MIN: CPT | Performed by: PODIATRIST

## 2025-03-10 NOTE — PATIENT INSTRUCTIONS
What are Prescription Custom Orthotics?  Custom orthotics are specially-made devices designed to support and comfort your feet. Prescription orthotics are crafted for you and no one else. They match the contours of your feet precisely and are designed for the way you move. Orthotics are only manufactured after a podiatrist has conducted a complete evaluation of your feet, ankles, and legs, so the orthotic can accommodate your unique foot structure and pathology.  Prescription orthotics are divided into two categories:  Functional orthotics are designed to control abnormal motion. They may be used to treat foot pain caused by abnormal motion; they can also be used to treat injuries such as shin splints or tendinitis. Functional orthotics are usually crafted of a semi-rigid material such as plastic or graphite.  Accommodative orthotics are softer and meant to provide additional cushioning and support. They can be used to treat diabetic foot ulcers, painful calluses on the bottom of the foot, and other uncomfortable conditions.  Podiatrists use orthotics to treat foot problems such as plantar fasciitis, bursitis, tendinitis, diabetic foot ulcers, and foot, ankle, and heel pain. Clinical research studies have shown that podiatrist-prescribed foot orthotics decrease foot pain and improve function.  Orthotics typically cost more than shoe inserts purchased in a retail store, but the additional cost is usually well worth it. Unlike shoe inserts, orthotics are molded to fit each individual foot, so you can be sure that your orthotics fit and do what they're supposed to do. Prescription orthotics are also made of top-notch materials and last many years when cared for properly. Insurance often helps pay for prescription orthotics.  What are Shoe Inserts?   You've seen them at the grocery store and at the mall. You've probably even seen them on TV and online. Shoe inserts are any kind of non-prescription foot support designed  to be worn inside a shoe. Pre-packaged, mass produced, arch supports are shoe inserts. So are the  custom-made  insoles and foot supports that you can order online or at retail stores. Unless the device has been prescribed by a doctor and crafted for your specific foot, it's a shoe insert, not a custom orthotic device--despite what the ads might say.  Shoe inserts can be very helpful for a variety of foot ailments, including flat arches and foot and leg pain. They can cushion your feet, provide comfort, and support your arches, but they can't correct biomechanical foot problems or cure long-standing foot issues.  The most common types of shoe inserts are:  Arch supports: Some people have high arches. Others have low arches or flat feet. Arch supports generally have a  bumped-up  appearance and are designed to support the foot's natural arch.   Insoles: Insoles slip into your shoe to provide extra cushioning and support. Insoles are often made of gel, foam, or plastic.   Heel liners: Heel liners, sometimes called heel pads or heel cups, provide extra cushioning in the heel region. They may be especially useful for patients who have foot pain caused by age-related thinning of the heels' natural fat pads.   Foot cushions: Do your shoes rub against your heel or your toes? Foot cushions come in many different shapes and sizes and can be used as a barrier between you and your shoe.  Choosing an Over-the-Counter Shoe Insert  Selecting a shoe insert from the wide variety of devices on the market can be overwhelming. Here are some podiatrist-tested tips to help you find the insert that best meets your needs:  Consider your health. Do you have diabetes? Problems with circulation? An over-the-counter insert may not be your best bet. Diabetes and poor circulation increase your risk of foot ulcers and infections, so schedule an appointment with a podiatrist. He or she can help you select a solution that won't cause additional  health problems.   Think about the purpose. Are you planning to run a marathon, or do you just need a little arch support in your work shoes? Look for a product that fits your planned level of activity.   Bring your shoes. For the insert to be effective, it has to fit into your shoes. So bring your sneakers, dress shoes, or work boots--whatever you plan to wear with your insert. Look for an insert that will fit the contours of your shoe.   Try them on. If all possible, slip the insert into your shoe and try it out. Walk around a little. How does it feel? Don't assume that feelings of pressure will go away with continued wear. (If you can't try the inserts at the store, ask about the store's return policy and hold on to your receipt.)    Please call one of the [place] locations below to schedule an appointment. If you received a prescription please bring it with you to your appointment. Some locations are limited to what they carry.    Office Locations    [facility]  [address] [place] [address]  Home Medical Equipment, [address]   Phone: [phone]   Orthotics and Prosthetics, [address]   Phone: [phone]    [facility]   Home Medical Equipment   [address] [address]  Phone :[phone]  Orthotics and Prosthetics   [address], [address], [facility] [address]  Phone:[phone]          [facility]  [address]  [address]   [place] [address]   Phone: [phone]    [facility]  [address]  [place] [address]  Phone: [phone]    [facility]  [address]  [place] [address]  Phone: [phone]  Fax: [phone]    [facility]   [address]  [place] [address]  Phone:  [phone]    [facility]  [address]  [place] [address]  Phone: [phone]    [facility]  [address]  [place] [address]  Phone: [phone]    [place]   [address] [place] [address]   Phone: [phone]    WEARING YOUR CUSTOM FOOT ORTHOTICS   Most insurance plans cover one pair of orthotics per year. You must check with your   insurance plan to see what your payment responsibility will be. Please call your   insurance company by calling the number on the back of your insurance card.   Orthotic's are non-refundable and non-returnable.   Orthotics are made of various designs. Some orthotics are covered with material that extends beyond your toes. If your orthotic is of this design, you will likely need to trim the toe end to get a proper fit. The insole from your shoe can be used as a template. Simply overlay the shoe insert on top of the custom orthotic. Align the heel end while tracing the length of the insert onto the custom orthotic. Use a large scissor to trim the toe end until you get a proper fit in the shoe.   The orthotic needs to be pushed as far back in the shoe as possible. The heel portion should not ride forward so as not to irritate your heel.   Orthotics are designed to work with socks. Excessive perspiration will shorten the life span of the orthotics. Remove the orthotic from the shoe frequently for proper drying.   The break-in period lasts for weeks. People new to orthotics will likely experience new aches and pains. The orthotic is forcing your foot into a new position. Arch, foot and leg muscle aches and fatigue are common during these weeks. Minor discomfort can be considered normal break in phenomenon. Start wearing your orthotic around your home your first day. Limited activity for one to two hours is recommended. You can increase one or two additional hours each  day provided the aches and pains are subsiding. The degree of discomfort, fatigue and problems will dictate the speed of break in. You may require multiple weeks to work up to full time use.   Do not continue wearing your orthotics if they are creating problems such as blisters or sores. Do not hesitate to call the clinic to speak with a nurse regarding orthotic   break in, fit, trimming, etc. You may also need to see the doctor if the orthotics are   simply not working out. Adjustments are sometimes made to improve orthotic   function.     Orthotics will only work in certain styles and types of shoes. Orthotics rarely work in dress shoes. Slip-ons, clogs, sandals and heels are particularly troublesome. Specially designed orthotics may be necessary for these types of shoes. Your custom orthotic was designed for activities that require appropriate walking or running shoes. Lace up athletic shoes, walking shoes or work boots should work appropriately. You may need a wider or longer shoe. Shoes with a removable  or insert work best. In general, you want to remove an insert from the shoe before placing the orthotic into the shoe. Shoes without a removable liner may not work as well.     When purchasing new shoes, bring your orthotics along to get a proper fit. Shop at stores that are familiar with orthotics.   Frequent washing of the orthotic may shorten the life span of the top cover. The top cover can be replaced but will generally last one to five years depending on use and foot perspiration.

## 2025-03-10 NOTE — LETTER
3/10/2025      Lalitha Underwood  [address]  [place] [address]      Dear Colleague,    Thank you for referring your patient, Lalitha Underwood, to the [facility]. Please see a copy of my visit note below.        FOOT AND ANKLE SURGERY/PODIATRY PROGRESS NOTE        ASSESSMENT:   Abrasion left hallux  Hammertoe        TREATMENT:  -I discussed with the patient that she does have skin irritation along the medial aspect of the IPJ of the left hallux.  No open lesions or signs of infection on exam today.    -We discussed that the skin irritation likely due to current shoe gear.    -I referred the patient to [facility] orthotics and prosthetics for shoe recommendations.    -I have asked her to closely monitor for any skin irritation or skin breakdown and return to see me should this occur.    -Patient's questions invited and answered. She was encouraged to call my office with any further questions or concerns.     Alfonso Orozco DPM  [facility]/Foot & Ankle Surgery      HPI: Lalitha Underwood was seen again today for concerns related to skin discoloration along the left hallux.  Patient reports that over the past several weeks she has noticed red skin discoloration along the medial aspect of the left hallux.  She does not have pain to palpation but does have slight irritation in her shoes.    Past Medical History:   Diagnosis Date     Convulsive disorder (H)      Convulsive disorder (H)      COPD (chronic obstructive pulmonary disease) (H)      COPD (chronic obstructive pulmonary disease) (H)      Coronary artery disease involving native coronary artery with unstable angina pectoris (H) 3/31/2023     Depression      Dyspnea on exertion      Gastroesophageal reflux disease      Heart murmur      Hernia of unspecified site of abdominal cavity without mention of obstruction or gangrene      Hiatal hernia      Hiatal hernia      Hypertension      Hypertension      Obese       On home oxygen therapy     at 1.5 liters at nite     Osteopenia      Osteopenia      Oxygen dependent     1.5 L per NC     Pneumonia due to infectious organism, unspecified laterality, unspecified part of lung 3/19/2022     Second degree heart block 3/19/2022     Shortness of breath      Status post coronary angiogram 6/3/2024     Uncomplicated asthma      URI (upper respiratory infection)      Venous insufficiency of both lower extremities      Venous insufficiency of both lower extremities      Walking troubles        Past Surgical History:   Procedure Laterality Date     AMPUTATE TOE(S) Left 8/11/2022    Procedure: AMPUTATION, fifth digit left foot;  Surgeon: Alfonso Orozco DPM;  Location: [facility] OR     ARTHROPLASTY TOE(S) Left 11/22/2021    Procedure: ARTHROPLASTY, digits two and three left foot;  Surgeon: Alfonso Orozco DPM;  Location: [facility]     ARTHROPLASTY TOE(S) Left 7/18/2022    Procedure: ARTHROPLASTY, digits four and five left foot;  Surgeon: Alfonso Orozco DPM;  Location: [facility] OR     CV CORONARY ANGIOGRAM N/A 7/7/2023    Procedure: Coronary Angiogram;  Surgeon: Elijah Leger MD;  Location: [facility] LAB CV     CV CORONARY ANGIOGRAM N/A 6/3/2024    Procedure: Coronary Angiogram;  Surgeon: Villa Croft MD;  Location: [facility] CATH LAB CV     CV LEFT HEART CATH N/A 7/7/2023    Procedure: Left Heart Catheterization;  Surgeon: Elijah Leger MD;  Location: [facility] CATH LAB CV     CV RIGHT HEART CATH MEASUREMENTS RECORDED N/A 7/7/2023    Procedure: Right Heart Catheterization with Shunt Run;  Surgeon: Elijah Leger MD;  Location: [facility] CATH LAB CV     CV RIGHT HEART CATH MEASUREMENTS RECORDED N/A 6/3/2024    Procedure: Right Heart Catheterization;  Surgeon: Villa Croft MD;  Location: Health system LAB CV     CV TRANSCATHETER AORTIC VALVE REPLACEMENT-FEMORAL APPROACH N/A 6/25/2024    Procedure: Transcatheter Aortic Valve  Replacement-Femoral Approach;  Surgeon: Anil Gonzales MD;  Location: [facility] CV     EP PACEMAKER DEVICE & LEAD IMPLANT- RIGHT ATRIAL & RIGHT VENTRICULAR N/A 3/24/2022    Procedure: Pacemaker Device & Lead Implant - Right Atrial & Right Ventricular;  Surgeon: Helder Pendleton MD;  Location: [facility] CV     ESOPHAGOSCOPY, GASTROSCOPY, DUODENOSCOPY (EGD), COMBINED N/A 11/26/2018    Procedure: Esophagogastroduodenoscopy;  Surgeon: Jasmeet Wilder MD;  Location: [facility] OR     HERNIA REPAIR, UMBILICAL  04/2018     HERNIA REPAIR, UMBILICAL  04/04/2018    Dr. Jimenez     LAPAROSCOPIC HERNIORRHAPHY HIATAL N/A 11/26/2018    Procedure: Laparoscopic Hiatal Hernia Repair,bilateral chest tubes;  Surgeon: Jasmeet Wilder MD;  Location: [facility] OR     OR TRANSCATHETER AORTIC VALVE REPLACEMENT, FEMORAL PERCUTANEOUS APPROACH (STANDBY) N/A 6/25/2024    Procedure: OR TRANSCATHETER AORTIC VALVE REPLACEMENT, FEMORAL PERCUTANEOUS APPROACH (STANDBY);  Surgeon: Sepideh Devlin MD;  Location: [facility] CV     OTHER SURGICAL HISTORY Left 2003    Broke titanium in left arm     Repair arm fracture Left      SHOULDER SURGERY Right 1967     SHOULDER SURGERY Right 1967     US BIOPSY THYROID FINE NEEDLE ASPIRATION  7/29/2020       Allergies   Allergen Reactions     Clindamycin Rash     Carbamazepine Rash     Morphine Nausea         Current Outpatient Medications:      albuterol (PROAIR HFA/PROVENTIL HFA/VENTOLIN HFA) 108 (90 Base) MCG/ACT inhaler, Inhale 2 puffs into the lungs every 6 hours as needed for shortness of breath or wheezing, Disp: 18 g, Rfl: 1     aspirin 81 MG EC tablet, Take 1 tablet (81 mg) by mouth daily., Disp: , Rfl:      azithromycin (ZITHROMAX) 250 MG tablet, Take 1 tablet (250 mg) by mouth every evening, Disp: 90 tablet, Rfl: 3     benzonatate (TESSALON) 100 MG capsule, Take 1 capsule (100 mg) by mouth 3 times daily as needed for cough, Disp: 90 capsule, Rfl: 3     Biotin 10 MG CAPS,  Take 1 capsule by mouth daily, Disp: , Rfl:      calcium citrate (CITRACAL) 950 (200 Ca) MG tablet, Take 1 tablet by mouth 2 times daily, Disp: , Rfl:      estradiol (ESTRACE) 0.1 MG/GM vaginal cream, Place 2 g vaginally three times a week, Disp: 72 g, Rfl: 1     ferrous sulfate (FEROSUL) 325 (65 Fe) MG tablet, Take 1 tablet (325 mg) by mouth every other day, Disp: , Rfl:      fluticasone-salmeterol (WIXELA INHUB) 500-50 MCG/ACT inhaler, Inhale 1 puff into the lungs every 12 hours., Disp: 180 each, Rfl: 2     guaiFENesin (MUCINEX) 600 MG 12 hr tablet, Take 1 tablet (600 mg) by mouth 2 times daily., Disp: 180 tablet, Rfl: 0     hydrochlorothiazide (HYDRODIURIL) 25 MG tablet, Take 1 tablet (25 mg) by mouth daily., Disp: 90 tablet, Rfl: 3     levETIRAcetam (KEPPRA) 500 MG tablet, Take 1 tablet (500 mg) by mouth 2 times daily, Disp: 180 tablet, Rfl: 3     losartan (COZAAR) 50 MG tablet, Take 1 tablet (50 mg) by mouth 2 times daily, Disp: 180 tablet, Rfl: 3     magnesium 250 MG tablet, Take 1 tablet by mouth 2 times daily, Disp: , Rfl:      nystatin (MYCOSTATIN) 526215 UNIT/GM external powder, Apply topically daily as needed, Disp: , Rfl:      potassium chloride teja ER (KLOR-CON M20) 20 MEQ CR tablet, Take 1 tablet (20 mEq) by mouth at bedtime, Disp: 90 tablet, Rfl: 3     potassium chloride ER (K-TAB) 20 MEQ CR tablet, Take 20 mEq by mouth at bedtime., Disp: , Rfl:      RABEprazole (ACIPHEX) 20 MG EC tablet, Take 1 tablet (20 mg) by mouth daily., Disp: 90 tablet, Rfl: 2     sertraline (ZOLOFT) 100 MG tablet, Take 1 tablet (100 mg) by mouth every evening, Disp: 90 tablet, Rfl: 3     solifenacin (VESICARE) 10 MG tablet, Take 1 tablet (10 mg) by mouth daily, Disp: 90 tablet, Rfl: 3     spironolactone (ALDACTONE) 25 MG tablet, Take 0.5 tablets (12.5 mg) by mouth daily, Disp: 45 tablet, Rfl: 3     tiotropium (SPIRIVA RESPIMAT) 2.5 MCG/ACT inhaler, Inhale 2 puffs into the lungs daily, Disp: 12 g, Rfl: 3     vitamin D3  (CHOLECALCIFEROL) 125 MCG (5000 UT) tablet, Take 5,000 Units by mouth three times a week Monday, Wednesday and Friday, Disp: , Rfl:     Current Facility-Administered Medications:      denosumab (PROLIA) injection 60 mg, 60 mg, Subcutaneous, Q6 Months, Lalitha Haq, NP, 60 mg at 25 1508    Family History   Problem Relation Age of Onset     Pulmonary Hypertension Daughter         idiopathic      Heart Disease Other         2 sibs MI, 1 sib electrical conduction disorder     Breast Cancer Mother 66.00     Hypertension Mother      Coronary Artery Disease Mother      Varicose Veins Mother      Hypertension Father      Coronary Artery Disease Sister      Heart Disease Sister      Diabetes Son      Pulmonary Hypertension Daughter      Coronary Artery Disease Sister      Heart Disease Sister        Social History     Socioeconomic History     Marital status:      Spouse name: Not on file     Number of children: Not on file     Years of education: Not on file     Highest education level: Not on file   Occupational History     Not on file   Tobacco Use     Smoking status: Former     Current packs/day: 0.00     Average packs/day: 1.5 packs/day for 38.0 years (57.0 ttl pk-yrs)     Types: Cigarettes     Start date: 1960     Quit date: 1998     Years since quittin.2     Smokeless tobacco: Never     Tobacco comments:     quit in    Vaping Use     Vaping status: Never Used   Substance and Sexual Activity     Alcohol use: Yes     Alcohol/week: 2.0 standard drinks of alcohol     Comment: occ     Drug use: Not Currently     Sexual activity: Never   Other Topics Concern     Not on file   Social History Narrative    .  Two kids.  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     Social Drivers of Health     Financial Resource Strain: Low Risk  (2024)    Financial Resource Strain      Within the past 12 months, have you or your family members you live with been unable to get utilities (heat, electricity) when  it was really needed?: No   Food Insecurity: Low Risk  (11/18/2024)    Food Insecurity      Within the past 12 months, did you worry that your food would run out before you got money to buy more?: No      Within the past 12 months, did the food you bought just not last and you didn t have money to get more?: No   Transportation Needs: Low Risk  (11/18/2024)    Transportation Needs      Within the past 12 months, has lack of transportation kept you from medical appointments, getting your medicines, non-medical meetings or appointments, work, or from getting things that you need?: No   Physical Activity: Patient Declined (5/6/2024)    Exercise Vital Sign      Days of Exercise per Week: Patient declined      Minutes of Exercise per Session: Patient declined   Stress: Stress Concern Present (5/6/2024)    [employer] [place] [employer] - Occupational Stress Questionnaire      Feeling of Stress : To some extent   Social Connections: Unknown (5/6/2024)    Social Connection and Isolation Panel [NHANES]      Frequency of Communication with Friends and Family: Not on file      Frequency of Social Gatherings with Friends and Family: Twice a week      Attends Holiness Services: Not on file      Active Member of Clubs or Organizations: Not on file      Attends Club or Organization Meetings: Not on file      Marital Status: Not on file   Interpersonal Safety: Low Risk  (11/26/2024)    Interpersonal Safety      Do you feel physically and emotionally safe where you currently live?: Yes      Within the past 12 months, have you been hit, slapped, kicked or otherwise physically hurt by someone?: No      Within the past 12 months, have you been humiliated or emotionally abused in other ways by your partner or ex-partner?: No   Housing Stability: Low Risk  (11/18/2024)    Housing Stability      Do you have housing? : Yes      Are you worried about losing your housing?: No       10 point Review of Systems is negative except  for left hallux irritation which is noted in HPI.     There were no vitals taken for this visit.    BMI= There is no height or weight on file to calculate BMI.    OBJECTIVE:  General appearance: Patient is alert and fully cooperative with history & exam.  No sign of distress is noted during the visit.    Vascular: Dorsalis pedis nonpalpable bilaterally.  There is no appreciable edema noted.  Dermatologic: Skin irritation along medial aspect of the left hallux, no open lesions or erythema identified.  Neurologic: All epicritic and proprioceptive sensations are grossly intact bilateral.  Musculoskeletal: Contracted digits bilateral feet.      Imaging:     US Lower Extremity Venous Duplex Left    Result Date: 3/6/2025  EXAM: US LOWER EXTREMITY VENOUS DUPLEX LEFT LOCATION: [facility] DATE: 3/6/2025 INDICATION: Left leg DVT follow-up. COMPARISON: 11/18/2024. TECHNIQUE: Venous Duplex ultrasound of the left lower extremity with and without compression, augmentation and duplex. Color flow and spectral Doppler with waveform analysis performed. FINDINGS: Exam includes the common femoral, femoral, popliteal, and contralateral common femoral veins as well as segmentally visualized deep calf veins and greater saphenous vein. LEFT: No deep vein thrombosis. No superficial thrombophlebitis. No popliteal cyst. Nonspecific subcutaneous edema.     IMPRESSION: 1.  No deep venous thrombosis in the left lower extremity. 2.  Resolution of prior DVT.    Cardiac Device Check - In Clinic    Result Date: 2/12/2025  Encounter Type: Patient seen in clinic for annual device evaluation and iterative programming Device: [employer] Accolade (D) PPM Pacing %/Programmed: 2% %  in DDD 60-130bpm Lead(s): Stable Battery longevity: 10 years Presenting rhythm: ASVP 101bpm Underlying rhythm: SR with CHB with no Rs at VVI 30 Heart rates: Excellent variability with rates primarily between 60-120sbpm Atrial High rates:  1 mode switch <1 minute, EGM shows brief burst of AT Ventricular High rates: 2 VHR. Episodes were from TAVR procedure. No true ventricular arrhythmia noted Comments: Normal device function. RA auto cap testing initiated and failed due to fusion. No programming changes made Plan: Routine remote scheduled 5/12/2025; letter given to patient. KEVIN Hawley RN Device follow up for the entirety of having the Pacemaker, based on best practices determined by [employer] and in Compliance with Medicare guidelines. Continue remote device monitoring per patient plan. I have reviewed and interpreted the device interrogation, settings, programming, and encounter summary. The device is functioning within normal device parameters. I agree with the current findings, assessment and plan.         Again, thank you for allowing me to participate in the care of your patient.        Sincerely,        Alfonso Orozco DPM    Electronically signed

## 2025-03-10 NOTE — PROGRESS NOTES
FOOT AND ANKLE SURGERY/PODIATRY PROGRESS NOTE        ASSESSMENT:   Abrasion left hallux  Hammertoe        TREATMENT:  -I discussed with the patient that she does have skin irritation along the medial aspect of the IPJ of the left hallux.  No open lesions or signs of infection on exam today.    -We discussed that the skin irritation likely due to current shoe gear.    -I referred the patient to [facility] orthotics and prosthetics for shoe recommendations.    -I have asked her to closely monitor for any skin irritation or skin breakdown and return to see me should this occur.    -Patient's questions invited and answered. She was encouraged to call my office with any further questions or concerns.     Alfonso Orozco DPM  [facility] Podiatry/Foot & Ankle Surgery      HPI: Lalitha SALINAS Utpercy was seen again today for concerns related to skin discoloration along the left hallux.  Patient reports that over the past several weeks she has noticed red skin discoloration along the medial aspect of the left hallux.  She does not have pain to palpation but does have slight irritation in her shoes.    Past Medical History:   Diagnosis Date    Convulsive disorder (H)     Convulsive disorder (H)     COPD (chronic obstructive pulmonary disease) (H)     COPD (chronic obstructive pulmonary disease) (H)     Coronary artery disease involving native coronary artery with unstable angina pectoris (H) 3/31/2023    Depression     Dyspnea on exertion     Gastroesophageal reflux disease     Heart murmur     Hernia of unspecified site of abdominal cavity without mention of obstruction or gangrene     Hiatal hernia     Hiatal hernia     Hypertension     Hypertension     Obese     On home oxygen therapy     at 1.5 liters at nite    Osteopenia     Osteopenia     Oxygen dependent     1.5 L per NC    Pneumonia due to infectious organism, unspecified laterality, unspecified part of lung 3/19/2022    Second degree heart block  3/19/2022    Shortness of breath     Status post coronary angiogram 6/3/2024    Uncomplicated asthma     URI (upper respiratory infection)     Venous insufficiency of both lower extremities     Venous insufficiency of both lower extremities     Walking troubles        Past Surgical History:   Procedure Laterality Date    AMPUTATE TOE(S) Left 8/11/2022    Procedure: AMPUTATION, fifth digit left foot;  Surgeon: Alfonso Orozco DPM;  Location: [facility]    ARTHROPLASTY TOE(S) Left 11/22/2021    Procedure: ARTHROPLASTY, digits two and three left foot;  Surgeon: Alfonso Orozco DPM;  Location: [facility]    ARTHROPLASTY TOE(S) Left 7/18/2022    Procedure: ARTHROPLASTY, digits four and five left foot;  Surgeon: Alfonso Orozco DPM;  Location: [facility]    CV CORONARY ANGIOGRAM N/A 7/7/2023    Procedure: Coronary Angiogram;  Surgeon: Elijah Leger MD;  Location: [facility] CATH LAB CV    CV CORONARY ANGIOGRAM N/A 6/3/2024    Procedure: Coronary Angiogram;  Surgeon: Villa Croft MD;  Location: [facility] CATH LAB CV    CV LEFT HEART CATH N/A 7/7/2023    Procedure: Left Heart Catheterization;  Surgeon: Elijah Leger MD;  Location: [facility] CATH LAB CV    CV RIGHT HEART CATH MEASUREMENTS RECORDED N/A 7/7/2023    Procedure: Right Heart Catheterization with [place];  Surgeon: Elijah Leger MD;  Location: [facility] CATH LAB CV    CV RIGHT HEART CATH MEASUREMENTS RECORDED N/A 6/3/2024    Procedure: Right Heart Catheterization;  Surgeon: Villa Croft MD;  Location: [facility] CATH LAB CV    CV TRANSCATHETER AORTIC VALVE REPLACEMENT-FEMORAL APPROACH N/A 6/25/2024    Procedure: Transcatheter Aortic Valve Replacement-Femoral Approach;  Surgeon: Anil Gonzales MD;  Location: [facility] CATH LAB CV    EP PACEMAKER DEVICE & LEAD IMPLANT- RIGHT ATRIAL & RIGHT VENTRICULAR N/A 3/24/2022    Procedure: Pacemaker Device & Lead Implant - Right Atrial & Right Ventricular;  Surgeon:  Helder Pendleton MD;  Location: [facility] CV    ESOPHAGOSCOPY, GASTROSCOPY, DUODENOSCOPY (EGD), COMBINED N/A 11/26/2018    Procedure: Esophagogastroduodenoscopy;  Surgeon: Jasmeet Wilder MD;  Location: [facility] OR    HERNIA REPAIR, UMBILICAL  04/2018    HERNIA REPAIR, UMBILICAL  04/04/2018    Dr. Jimenez    LAPAROSCOPIC HERNIORRHAPHY HIATAL N/A 11/26/2018    Procedure: Laparoscopic Hiatal Hernia Repair,bilateral chest tubes;  Surgeon: Jasmeet Wilder MD;  Location: [facility] OR    OR TRANSCATHETER AORTIC VALVE REPLACEMENT, FEMORAL PERCUTANEOUS APPROACH (STANDBY) N/A 6/25/2024    Procedure: OR TRANSCATHETER AORTIC VALVE REPLACEMENT, FEMORAL PERCUTANEOUS APPROACH (STANDBY);  Surgeon: Sepideh Devlin MD;  Location: [facility] CV    OTHER SURGICAL HISTORY Left 2003    Broke titanium in left arm    Repair arm fracture Left     SHOULDER SURGERY Right 1967    SHOULDER SURGERY Right 1967    US BIOPSY THYROID FINE NEEDLE ASPIRATION  7/29/2020       Allergies   Allergen Reactions    Clindamycin Rash    Carbamazepine Rash    Morphine Nausea         Current Outpatient Medications:     albuterol (PROAIR HFA/PROVENTIL HFA/VENTOLIN HFA) 108 (90 Base) MCG/ACT inhaler, Inhale 2 puffs into the lungs every 6 hours as needed for shortness of breath or wheezing, Disp: 18 g, Rfl: 1    aspirin 81 MG EC tablet, Take 1 tablet (81 mg) by mouth daily., Disp: , Rfl:     azithromycin (ZITHROMAX) 250 MG tablet, Take 1 tablet (250 mg) by mouth every evening, Disp: 90 tablet, Rfl: 3    benzonatate (TESSALON) 100 MG capsule, Take 1 capsule (100 mg) by mouth 3 times daily as needed for cough, Disp: 90 capsule, Rfl: 3    Biotin 10 MG CAPS, Take 1 capsule by mouth daily, Disp: , Rfl:     calcium citrate (CITRACAL) 950 (200 Ca) MG tablet, Take 1 tablet by mouth 2 times daily, Disp: , Rfl:     estradiol (ESTRACE) 0.1 MG/GM vaginal cream, Place 2 g vaginally three times a week, Disp: 72 g, Rfl: 1    ferrous sulfate (FEROSUL) 325  (65 Fe) MG tablet, Take 1 tablet (325 mg) by mouth every other day, Disp: , Rfl:     fluticasone-salmeterol (WIXELA INHUB) 500-50 MCG/ACT inhaler, Inhale 1 puff into the lungs every 12 hours., Disp: 180 each, Rfl: 2    guaiFENesin (MUCINEX) 600 MG 12 hr tablet, Take 1 tablet (600 mg) by mouth 2 times daily., Disp: 180 tablet, Rfl: 0    hydrochlorothiazide (HYDRODIURIL) 25 MG tablet, Take 1 tablet (25 mg) by mouth daily., Disp: 90 tablet, Rfl: 3    levETIRAcetam (KEPPRA) 500 MG tablet, Take 1 tablet (500 mg) by mouth 2 times daily, Disp: 180 tablet, Rfl: 3    losartan (COZAAR) 50 MG tablet, Take 1 tablet (50 mg) by mouth 2 times daily, Disp: 180 tablet, Rfl: 3    magnesium 250 MG tablet, Take 1 tablet by mouth 2 times daily, Disp: , Rfl:     nystatin (MYCOSTATIN) 371270 UNIT/GM external powder, Apply topically daily as needed, Disp: , Rfl:     potassium chloride teja ER (KLOR-CON M20) 20 MEQ CR tablet, Take 1 tablet (20 mEq) by mouth at bedtime, Disp: 90 tablet, Rfl: 3    potassium chloride ER (K-TAB) 20 MEQ CR tablet, Take 20 mEq by mouth at bedtime., Disp: , Rfl:     RABEprazole (ACIPHEX) 20 MG EC tablet, Take 1 tablet (20 mg) by mouth daily., Disp: 90 tablet, Rfl: 2    sertraline (ZOLOFT) 100 MG tablet, Take 1 tablet (100 mg) by mouth every evening, Disp: 90 tablet, Rfl: 3    solifenacin (VESICARE) 10 MG tablet, Take 1 tablet (10 mg) by mouth daily, Disp: 90 tablet, Rfl: 3    spironolactone (ALDACTONE) 25 MG tablet, Take 0.5 tablets (12.5 mg) by mouth daily, Disp: 45 tablet, Rfl: 3    tiotropium (SPIRIVA RESPIMAT) 2.5 MCG/ACT inhaler, Inhale 2 puffs into the lungs daily, Disp: 12 g, Rfl: 3    vitamin D3 (CHOLECALCIFEROL) 125 MCG (5000 UT) tablet, Take 5,000 Units by mouth three times a week Monday, Wednesday and Friday, Disp: , Rfl:     Current Facility-Administered Medications:     denosumab (PROLIA) injection 60 mg, 60 mg, Subcutaneous, Q6 Months, Lalitha Haq, NP, 60 mg at 02/12/25 6997    Family History    Problem Relation Age of Onset    Pulmonary Hypertension Daughter         idiopathic     Heart Disease Other         2 sibs MI, 1 sib electrical conduction disorder    Breast Cancer Mother 66.00    Hypertension Mother     Coronary Artery Disease Mother     Varicose Veins Mother     Hypertension Father     Coronary Artery Disease Sister     Heart Disease Sister     Diabetes Son     Pulmonary Hypertension Daughter     Coronary Artery Disease Sister     Heart Disease Sister        Social History     Socioeconomic History    Marital status:      Spouse name: Not on file    Number of children: Not on file    Years of education: Not on file    Highest education level: Not on file   Occupational History    Not on file   Tobacco Use    Smoking status: Former     Current packs/day: 0.00     Average packs/day: 1.5 packs/day for 38.0 years (57.0 ttl pk-yrs)     Types: Cigarettes     Start date: 1960     Quit date: 1998     Years since quittin.2    Smokeless tobacco: Never    Tobacco comments:     quit in    Vaping Use    Vaping status: Never Used   Substance and Sexual Activity    Alcohol use: Yes     Alcohol/week: 2.0 standard drinks of alcohol     Comment: occ    Drug use: Not Currently    Sexual activity: Never   Other Topics Concern    Not on file   Social History Narrative    .  Two kids.  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     Social Drivers of Health     Financial Resource Strain: Low Risk  (2024)    Financial Resource Strain     Within the past 12 months, have you or your family members you live with been unable to get utilities (heat, electricity) when it was really needed?: No   Food Insecurity: Low Risk  (2024)    Food Insecurity     Within the past 12 months, did you worry that your food would run out before you got money to buy more?: No     Within the past 12 months, did the food you bought just not last and you didn t have money to get more?: No   Transportation Needs: Low  Risk  (11/18/2024)    Transportation Needs     Within the past 12 months, has lack of transportation kept you from medical appointments, getting your medicines, non-medical meetings or appointments, work, or from getting things that you need?: No   Physical Activity: Patient Declined (5/6/2024)    Exercise Vital Sign     Days of Exercise per Week: Patient declined     Minutes of Exercise per Session: Patient declined   Stress: Stress Concern Present (5/6/2024)    [employer] [place] [employer] - Occupational Stress Questionnaire     Feeling of Stress : To some extent   Social Connections: Unknown (5/6/2024)    Social Connection and Isolation Panel [NHANES]     Frequency of Communication with Friends and Family: Not on file     Frequency of Social Gatherings with Friends and Family: Twice a week     Attends Rastafarian Services: Not on file     Active Member of Clubs or Organizations: Not on file     Attends Club or Organization Meetings: Not on file     Marital Status: Not on file   Interpersonal Safety: Low Risk  (11/26/2024)    Interpersonal Safety     Do you feel physically and emotionally safe where you currently live?: Yes     Within the past 12 months, have you been hit, slapped, kicked or otherwise physically hurt by someone?: No     Within the past 12 months, have you been humiliated or emotionally abused in other ways by your partner or ex-partner?: No   Housing Stability: Low Risk  (11/18/2024)    Housing Stability     Do you have housing? : Yes     Are you worried about losing your housing?: No       10 point Review of Systems is negative except for left hallux irritation which is noted in HPI.     There were no vitals taken for this visit.    BMI= There is no height or weight on file to calculate BMI.    OBJECTIVE:  General appearance: Patient is alert and fully cooperative with history & exam.  No sign of distress is noted during the visit.    Vascular: Dorsalis pedis nonpalpable bilaterally.   There is no appreciable edema noted.  Dermatologic: Skin irritation along medial aspect of the left hallux, no open lesions or erythema identified.  Neurologic: All epicritic and proprioceptive sensations are grossly intact bilateral.  Musculoskeletal: Contracted digits bilateral feet.      Imaging:     US Lower Extremity Venous Duplex Left    Result Date: 3/6/2025  EXAM: US LOWER EXTREMITY VENOUS DUPLEX LEFT LOCATION: [facility] DATE: 3/6/2025 INDICATION: Left leg DVT follow-up. COMPARISON: 11/18/2024. TECHNIQUE: Venous Duplex ultrasound of the left lower extremity with and without compression, augmentation and duplex. Color flow and spectral Doppler with waveform analysis performed. FINDINGS: Exam includes the common femoral, femoral, popliteal, and contralateral common femoral veins as well as segmentally visualized deep calf veins and greater saphenous vein. LEFT: No deep vein thrombosis. No superficial thrombophlebitis. No popliteal cyst. Nonspecific subcutaneous edema.     IMPRESSION: 1.  No deep venous thrombosis in the left lower extremity. 2.  Resolution of prior DVT.    Cardiac Device Check - In Clinic    Result Date: 2/12/2025  Encounter Type: Patient seen in clinic for annual device evaluation and iterative programming Device: [employer] Accolade (D) PPM Pacing %/Programmed: 2% %  in DDD 60-130bpm Lead(s): Stable Battery longevity: 10 years Presenting rhythm: ASVP 101bpm Underlying rhythm: SR with CHB with no Rs at VVI 30 Heart rates: Excellent variability with rates primarily between 60-120sbpm Atrial High rates: 1 mode switch <1 minute, EGM shows brief burst of AT Ventricular High rates: 2 VHR. Episodes were from TAVR procedure. No true ventricular arrhythmia noted Comments: Normal device function. RA auto cap testing initiated and failed due to fusion. No programming changes made Plan: Routine remote scheduled 5/12/2025; letter given to patient. KEVIN Hawley RN  Device follow up for the entirety of having the Pacemaker, based on best practices determined by Heart R[employer] Society and in Compliance with Medicare guidelines. Continue remote device monitoring per patient plan. I have reviewed and interpreted the device interrogation, settings, programming, and encounter summary. The device is functioning within normal device parameters. I agree with the current findings, assessment and plan.

## 2025-03-19 ENCOUNTER — TELEPHONE (OUTPATIENT)
Dept: VASCULAR SURGERY | Facility: CLINIC | Age: 80
End: 2025-03-19
Payer: MEDICARE

## 2025-03-19 NOTE — TELEPHONE ENCOUNTER
Caller: Serene    Provider: Dr. Alfonso Orozco    Detailed reason for call: Serene asking for a call back re: the orders for diabetic shoes. She states insurance will not cover them since she does not have diabetes. She would like a call back to discuss options.     Best phone number to contact: [phone]    Best time to contact: any    Ok to leave a detailed message: No    Ok to speak to authorized person if needed: No      (Noted to patient if reason is related to wound or incision, to please send a photo via email or Carestreamt.)

## 2025-03-19 NOTE — TELEPHONE ENCOUNTER
,DirectAddress_Unknown Called and spoke with Serene and stated that is correct, that shoes won't be covered by insurance since she doesn't have a diagnosis of diabetes. She asked about cost of shoes. Instructed her to call and ask as this writer doesn't know. Also stated that if price is out of her affordability to ask if they know of other places where she can be seen for orthopedic footwear. She voiced understanding.   ,DirectAddress_Unknown,[email or web address]

## 2025-04-09 ENCOUNTER — MYC REFILL (OUTPATIENT)
Dept: PULMONOLOGY | Facility: CLINIC | Age: 80
End: 2025-04-09
Payer: MEDICARE

## 2025-04-09 DIAGNOSIS — J44.1 COPD EXACERBATION (H): ICD-10-CM

## 2025-04-09 RX ORDER — GUAIFENESIN 600 MG/1
600 TABLET, EXTENDED RELEASE ORAL 2 TIMES DAILY
Qty: 180 TABLET | Refills: 0 | Status: SHIPPED | OUTPATIENT
Start: 2025-04-09

## 2025-04-14 ENCOUNTER — TRANSFERRED RECORDS (OUTPATIENT)
Dept: HEALTH INFORMATION MANAGEMENT | Facility: CLINIC | Age: 80
End: 2025-04-14
Payer: MEDICARE

## 2025-04-25 NOTE — Clinical Note
Problem: Pain  Goal: Acceptable pain level achieved/maintained at rest using appropriate pain scale for the patient  Outcome: Monitoring/Evaluating progress  Goal: Acceptable pain level achieved/maintained with activity using appropriate pain scale for the patient  Outcome: Monitoring/Evaluating progress  Goal: Acceptable pain level achieved/maintained without oversedation  Outcome: Monitoring/Evaluating progress     Problem: At Risk for Falls  Goal: Patient does not fall  Outcome: Monitoring/Evaluating progress  Goal: Patient takes action to control fall-related risks  Outcome: Monitoring/Evaluating progress     Problem: At Risk for Injury Due to Fall  Goal: Patient does not fall  Outcome: Monitoring/Evaluating progress  Goal: Takes action to control condition specific risks  Outcome: Monitoring/Evaluating progress  Goal: Verbalizes understanding of fall-related injury personal risks  Description: Document education using the patient education activity  Outcome: Monitoring/Evaluating progress     Problem: Impaired Physical Mobility  Goal: Functional status is maintained or returned to baseline during hospitalization  Outcome: Monitoring/Evaluating progress  Goal: Tolerates activity for discharge setting with no abnormal symptoms  Outcome: Monitoring/Evaluating progress     Problem: Skin Integrity Alteration  Goal: Skin remains intact with no new/deterioration of wound or pressure injury  Outcome: Monitoring/Evaluating progress  Goal: Participates in wound care activities  Outcome: Monitoring/Evaluating progress      Family has been updated.

## 2025-05-07 ENCOUNTER — MYC REFILL (OUTPATIENT)
Dept: FAMILY MEDICINE | Facility: CLINIC | Age: 80
End: 2025-05-07
Payer: MEDICARE

## 2025-05-07 DIAGNOSIS — G40.919 INTRACTABLE EPILEPSY WITHOUT STATUS EPILEPTICUS, UNSPECIFIED EPILEPSY TYPE (H): ICD-10-CM

## 2025-05-07 DIAGNOSIS — I10 ESSENTIAL HYPERTENSION: ICD-10-CM

## 2025-05-07 RX ORDER — LEVETIRACETAM 500 MG/1
500 TABLET ORAL 2 TIMES DAILY
Qty: 180 TABLET | Refills: 2 | Status: SHIPPED | OUTPATIENT
Start: 2025-05-07

## 2025-05-07 RX ORDER — LOSARTAN POTASSIUM 50 MG/1
50 TABLET ORAL 2 TIMES DAILY
Qty: 180 TABLET | Refills: 2 | Status: SHIPPED | OUTPATIENT
Start: 2025-05-07

## 2025-05-12 ENCOUNTER — ANCILLARY PROCEDURE (OUTPATIENT)
Dept: CARDIOLOGY | Facility: CLINIC | Age: 80
End: 2025-05-12
Attending: INTERNAL MEDICINE
Payer: MEDICARE

## 2025-05-12 DIAGNOSIS — I44.1 SECOND DEGREE MOBITZ II AV BLOCK: ICD-10-CM

## 2025-05-12 DIAGNOSIS — Z95.0 CARDIAC PACEMAKER IN SITU: ICD-10-CM

## 2025-05-13 LAB
MDC_IDC_EPISODE_DTM: NORMAL
MDC_IDC_EPISODE_ID: NORMAL
MDC_IDC_EPISODE_TYPE: NORMAL
MDC_IDC_LEAD_CONNECTION_STATUS: NORMAL
MDC_IDC_LEAD_CONNECTION_STATUS: NORMAL
MDC_IDC_LEAD_IMPLANT_DT: NORMAL
MDC_IDC_LEAD_IMPLANT_DT: NORMAL
MDC_IDC_LEAD_LOCATION: NORMAL
MDC_IDC_LEAD_LOCATION: NORMAL
MDC_IDC_LEAD_LOCATION_DETAIL_1: NORMAL
MDC_IDC_LEAD_LOCATION_DETAIL_1: NORMAL
MDC_IDC_LEAD_MFG: NORMAL
MDC_IDC_LEAD_MFG: NORMAL
MDC_IDC_LEAD_MODEL: NORMAL
MDC_IDC_LEAD_MODEL: NORMAL
MDC_IDC_LEAD_POLARITY_TYPE: NORMAL
MDC_IDC_LEAD_POLARITY_TYPE: NORMAL
MDC_IDC_LEAD_SERIAL: NORMAL
MDC_IDC_LEAD_SERIAL: NORMAL
MDC_IDC_MSMT_BATTERY_DTM: NORMAL
MDC_IDC_MSMT_BATTERY_REMAINING_LONGEVITY: 120 MO
MDC_IDC_MSMT_BATTERY_REMAINING_PERCENTAGE: 100 %
MDC_IDC_MSMT_BATTERY_STATUS: NORMAL
MDC_IDC_MSMT_LEADCHNL_RA_IMPEDANCE_VALUE: 511 OHM
MDC_IDC_MSMT_LEADCHNL_RV_IMPEDANCE_VALUE: 449 OHM
MDC_IDC_MSMT_LEADCHNL_RV_PACING_THRESHOLD_AMPLITUDE: 0.8 V
MDC_IDC_MSMT_LEADCHNL_RV_PACING_THRESHOLD_PULSEWIDTH: 0.4 MS
MDC_IDC_PG_IMPLANT_DTM: NORMAL
MDC_IDC_PG_MFG: NORMAL
MDC_IDC_PG_MODEL: NORMAL
MDC_IDC_PG_SERIAL: NORMAL
MDC_IDC_PG_TYPE: NORMAL
MDC_IDC_SESS_CLINIC_NAME: NORMAL
MDC_IDC_SESS_DTM: NORMAL
MDC_IDC_SESS_TYPE: NORMAL
MDC_IDC_SET_BRADY_AT_MODE_SWITCH_MODE: NORMAL
MDC_IDC_SET_BRADY_AT_MODE_SWITCH_RATE: 170 {BEATS}/MIN
MDC_IDC_SET_BRADY_LOWRATE: 60 {BEATS}/MIN
MDC_IDC_SET_BRADY_MAX_SENSOR_RATE: 130 {BEATS}/MIN
MDC_IDC_SET_BRADY_MAX_TRACKING_RATE: 130 {BEATS}/MIN
MDC_IDC_SET_BRADY_MODE: NORMAL
MDC_IDC_SET_BRADY_PAV_DELAY_HIGH: 200 MS
MDC_IDC_SET_BRADY_PAV_DELAY_LOW: 250 MS
MDC_IDC_SET_BRADY_SAV_DELAY_HIGH: 200 MS
MDC_IDC_SET_BRADY_SAV_DELAY_LOW: 250 MS
MDC_IDC_SET_LEADCHNL_RA_PACING_AMPLITUDE: 1.5 V
MDC_IDC_SET_LEADCHNL_RA_PACING_CAPTURE_MODE: NORMAL
MDC_IDC_SET_LEADCHNL_RA_PACING_POLARITY: NORMAL
MDC_IDC_SET_LEADCHNL_RA_PACING_PULSEWIDTH: 0.4 MS
MDC_IDC_SET_LEADCHNL_RA_SENSING_ADAPTATION_MODE: NORMAL
MDC_IDC_SET_LEADCHNL_RA_SENSING_POLARITY: NORMAL
MDC_IDC_SET_LEADCHNL_RA_SENSING_SENSITIVITY: 0.4 MV
MDC_IDC_SET_LEADCHNL_RV_PACING_AMPLITUDE: 1.2 V
MDC_IDC_SET_LEADCHNL_RV_PACING_CAPTURE_MODE: NORMAL
MDC_IDC_SET_LEADCHNL_RV_PACING_POLARITY: NORMAL
MDC_IDC_SET_LEADCHNL_RV_PACING_PULSEWIDTH: 0.4 MS
MDC_IDC_SET_LEADCHNL_RV_SENSING_ADAPTATION_MODE: NORMAL
MDC_IDC_SET_LEADCHNL_RV_SENSING_POLARITY: NORMAL
MDC_IDC_SET_LEADCHNL_RV_SENSING_SENSITIVITY: 1.5 MV
MDC_IDC_SET_ZONE_DETECTION_INTERVAL: 375 MS
MDC_IDC_SET_ZONE_STATUS: NORMAL
MDC_IDC_SET_ZONE_TYPE: NORMAL
MDC_IDC_SET_ZONE_VENDOR_TYPE: NORMAL
MDC_IDC_STAT_AT_BURDEN_PERCENT: 0 %
MDC_IDC_STAT_AT_DTM_END: NORMAL
MDC_IDC_STAT_AT_DTM_START: NORMAL
MDC_IDC_STAT_BRADY_DTM_END: NORMAL
MDC_IDC_STAT_BRADY_DTM_START: NORMAL
MDC_IDC_STAT_BRADY_RA_PERCENT_PACED: 5 %
MDC_IDC_STAT_BRADY_RV_PERCENT_PACED: 100 %
MDC_IDC_STAT_EPISODE_RECENT_COUNT: 0
MDC_IDC_STAT_EPISODE_RECENT_COUNT_DTM_END: NORMAL
MDC_IDC_STAT_EPISODE_RECENT_COUNT_DTM_START: NORMAL
MDC_IDC_STAT_EPISODE_TYPE: NORMAL
MDC_IDC_STAT_EPISODE_VENDOR_TYPE: NORMAL

## 2025-05-13 PROCEDURE — 93296 REM INTERROG EVL PM/IDS: CPT | Performed by: INTERNAL MEDICINE

## 2025-05-13 PROCEDURE — 93294 REM INTERROG EVL PM/LDLS PM: CPT | Performed by: INTERNAL MEDICINE

## 2025-05-14 ENCOUNTER — MYC MEDICAL ADVICE (OUTPATIENT)
Dept: PULMONOLOGY | Facility: CLINIC | Age: 80
End: 2025-05-14
Payer: MEDICARE

## 2025-05-14 DIAGNOSIS — J44.1 COPD EXACERBATION (H): ICD-10-CM

## 2025-05-14 DIAGNOSIS — J43.9 PULMONARY EMPHYSEMA, UNSPECIFIED EMPHYSEMA TYPE (H): ICD-10-CM

## 2025-05-14 RX ORDER — LEVALBUTEROL INHALATION SOLUTION 1.25 MG/3ML
1 SOLUTION RESPIRATORY (INHALATION) EVERY 4 HOURS PRN
Qty: 810 ML | Refills: 0 | Status: SHIPPED | OUTPATIENT
Start: 2025-05-14

## 2025-05-14 SDOH — HEALTH STABILITY: PHYSICAL HEALTH: ON AVERAGE, HOW MANY DAYS PER WEEK DO YOU ENGAGE IN MODERATE TO STRENUOUS EXERCISE (LIKE A BRISK WALK)?: 0 DAYS

## 2025-05-14 SDOH — HEALTH STABILITY: PHYSICAL HEALTH: ON AVERAGE, HOW MANY MINUTES DO YOU ENGAGE IN EXERCISE AT THIS LEVEL?: 20 MIN

## 2025-05-14 ASSESSMENT — SOCIAL DETERMINANTS OF HEALTH (SDOH): HOW OFTEN DO YOU GET TOGETHER WITH FRIENDS OR RELATIVES?: TWICE A WEEK

## 2025-05-20 ENCOUNTER — OFFICE VISIT (OUTPATIENT)
Dept: FAMILY MEDICINE | Facility: CLINIC | Age: 80
End: 2025-05-20
Payer: MEDICARE

## 2025-05-20 VITALS
HEART RATE: 77 BPM | TEMPERATURE: 98.5 F | OXYGEN SATURATION: 94 % | DIASTOLIC BLOOD PRESSURE: 69 MMHG | BODY MASS INDEX: 33.84 KG/M2 | WEIGHT: 191 LBS | RESPIRATION RATE: 20 BRPM | SYSTOLIC BLOOD PRESSURE: 134 MMHG | HEIGHT: 63 IN

## 2025-05-20 DIAGNOSIS — Z86.711 HISTORY OF PULMONARY EMBOLISM: ICD-10-CM

## 2025-05-20 DIAGNOSIS — R73.09 ELEVATED GLUCOSE: ICD-10-CM

## 2025-05-20 DIAGNOSIS — E87.6 HYPOKALEMIA: ICD-10-CM

## 2025-05-20 DIAGNOSIS — Z00.00 ENCOUNTER FOR MEDICARE ANNUAL WELLNESS EXAM: Primary | ICD-10-CM

## 2025-05-20 DIAGNOSIS — J43.2 CENTRILOBULAR EMPHYSEMA (H): ICD-10-CM

## 2025-05-20 DIAGNOSIS — I10 ESSENTIAL HYPERTENSION, BENIGN: ICD-10-CM

## 2025-05-20 DIAGNOSIS — F41.9 ANXIETY: ICD-10-CM

## 2025-05-20 DIAGNOSIS — I25.83 CORONARY ARTERIOSCLEROSIS DUE TO LIPID RICH PLAQUE: ICD-10-CM

## 2025-05-20 LAB
ALBUMIN SERPL BCG-MCNC: 4.3 G/DL (ref 3.5–5.2)
ALP SERPL-CCNC: 61 U/L (ref 40–150)
ALT SERPL W P-5'-P-CCNC: 15 U/L (ref 0–50)
ANION GAP SERPL CALCULATED.3IONS-SCNC: 9 MMOL/L (ref 7–15)
AST SERPL W P-5'-P-CCNC: 25 U/L (ref 0–45)
BILIRUB SERPL-MCNC: 0.3 MG/DL
BUN SERPL-MCNC: 32.3 MG/DL (ref 8–23)
CALCIUM SERPL-MCNC: 9.8 MG/DL (ref 8.8–10.4)
CHLORIDE SERPL-SCNC: 100 MMOL/L (ref 98–107)
CHOLEST SERPL-MCNC: 193 MG/DL
CREAT SERPL-MCNC: 0.98 MG/DL (ref 0.51–0.95)
EGFRCR SERPLBLD CKD-EPI 2021: 58 ML/MIN/1.73M2
ERYTHROCYTE [DISTWIDTH] IN BLOOD BY AUTOMATED COUNT: 14.9 % (ref 10–15)
EST. AVERAGE GLUCOSE BLD GHB EST-MCNC: 105 MG/DL
FASTING STATUS PATIENT QL REPORTED: YES
FASTING STATUS PATIENT QL REPORTED: YES
GLUCOSE SERPL-MCNC: 88 MG/DL (ref 70–99)
HBA1C MFR BLD: 5.3 % (ref 0–5.6)
HCO3 SERPL-SCNC: 30 MMOL/L (ref 22–29)
HCT VFR BLD AUTO: 36.8 % (ref 35–47)
HDLC SERPL-MCNC: 73 MG/DL
HGB BLD-MCNC: 11.6 G/DL (ref 11.7–15.7)
LDLC SERPL CALC-MCNC: 111 MG/DL
MCH RBC QN AUTO: 28.3 PG (ref 26.5–33)
MCHC RBC AUTO-ENTMCNC: 31.5 G/DL (ref 31.5–36.5)
MCV RBC AUTO: 90 FL (ref 78–100)
NONHDLC SERPL-MCNC: 120 MG/DL
PLATELET # BLD AUTO: 228 10E3/UL (ref 150–450)
POTASSIUM SERPL-SCNC: 4.4 MMOL/L (ref 3.4–5.3)
PROT SERPL-MCNC: 6.7 G/DL (ref 6.4–8.3)
RBC # BLD AUTO: 4.1 10E6/UL (ref 3.8–5.2)
SODIUM SERPL-SCNC: 139 MMOL/L (ref 135–145)
TRIGL SERPL-MCNC: 46 MG/DL
WBC # BLD AUTO: 8 10E3/UL (ref 4–11)

## 2025-05-20 PROCEDURE — G2211 COMPLEX E/M VISIT ADD ON: HCPCS | Performed by: FAMILY MEDICINE

## 2025-05-20 PROCEDURE — 3078F DIAST BP <80 MM HG: CPT | Performed by: FAMILY MEDICINE

## 2025-05-20 PROCEDURE — G0439 PPPS, SUBSEQ VISIT: HCPCS | Performed by: FAMILY MEDICINE

## 2025-05-20 PROCEDURE — 83036 HEMOGLOBIN GLYCOSYLATED A1C: CPT | Performed by: FAMILY MEDICINE

## 2025-05-20 PROCEDURE — 80061 LIPID PANEL: CPT | Performed by: FAMILY MEDICINE

## 2025-05-20 PROCEDURE — 85027 COMPLETE CBC AUTOMATED: CPT | Performed by: FAMILY MEDICINE

## 2025-05-20 PROCEDURE — 80053 COMPREHEN METABOLIC PANEL: CPT | Performed by: FAMILY MEDICINE

## 2025-05-20 PROCEDURE — 36415 COLL VENOUS BLD VENIPUNCTURE: CPT | Performed by: FAMILY MEDICINE

## 2025-05-20 PROCEDURE — 99214 OFFICE O/P EST MOD 30 MIN: CPT | Mod: 25 | Performed by: FAMILY MEDICINE

## 2025-05-20 PROCEDURE — 90480 ADMN SARSCOV2 VAC 1/ONLY CMP: CPT | Performed by: FAMILY MEDICINE

## 2025-05-20 PROCEDURE — 3075F SYST BP GE 130 - 139MM HG: CPT | Performed by: FAMILY MEDICINE

## 2025-05-20 PROCEDURE — 91320 SARSCV2 VAC 30MCG TRS-SUC IM: CPT | Performed by: FAMILY MEDICINE

## 2025-05-20 RX ORDER — SPIRONOLACTONE 25 MG/1
12.5 TABLET ORAL DAILY
Qty: 45 TABLET | Refills: 3 | Status: SHIPPED | OUTPATIENT
Start: 2025-05-20

## 2025-05-20 RX ORDER — AZITHROMYCIN 250 MG/1
250 TABLET, FILM COATED ORAL EVERY EVENING
Qty: 90 TABLET | Refills: 3 | Status: CANCELLED | OUTPATIENT
Start: 2025-05-20

## 2025-05-20 RX ORDER — SERTRALINE HYDROCHLORIDE 100 MG/1
100 TABLET, FILM COATED ORAL EVERY EVENING
Qty: 90 TABLET | Refills: 3 | Status: SHIPPED | OUTPATIENT
Start: 2025-05-20

## 2025-05-20 RX ORDER — POTASSIUM CHLORIDE 1500 MG/1
20 TABLET, EXTENDED RELEASE ORAL AT BEDTIME
Qty: 90 TABLET | Refills: 3 | Status: SHIPPED | OUTPATIENT
Start: 2025-05-20

## 2025-05-20 NOTE — PATIENT INSTRUCTIONS
Patient Education   Preventive Care Advice   This is general advice given by our system to help you stay healthy. However, your care team may have specific advice just for you. Please talk to your care team about your preventive care needs.  Nutrition  Eat 5 or more servings of fruits and vegetables each day.  Try wheat bread, brown rice and whole grain pasta (instead of white bread, rice, and pasta).  Get enough calcium and vitamin D. Check the label on foods and aim for 100% of the RDA (recommended daily allowance).  Lifestyle  Exercise at least 150 minutes each week  (30 minutes a day, 5 days a week).  Do muscle strengthening activities 2 days a week. These help control your weight and prevent disease.  No smoking.  Wear sunscreen to prevent skin cancer.  Have a dental exam and cleaning every 6 months.  Yearly exams  See your health care team every year to talk about:  Any changes in your health.  Any medicines your care team has prescribed.  Preventive care, family planning, and ways to prevent chronic diseases.  Shots (vaccines)   HPV shots (up to age 26), if you've never had them before.  Hepatitis B shots (up to age 59), if you've never had them before.  COVID-19 shot: Get this shot when it's due.  Flu shot: Get a flu shot every year.  Tetanus shot: Get a tetanus shot every 10 years.  Pneumococcal, hepatitis A, and RSV shots: Ask your care team if you need these based on your risk.  Shingles shot (for age 50 and up)  General health tests  Diabetes screening:  Starting at age 35, Get screened for diabetes at least every 3 years.  If you are younger than age 35, ask your care team if you should be screened for diabetes.  Cholesterol test: At age 39, start having a cholesterol test every 5 years, or more often if advised.  Bone density scan (DEXA): At age 50, ask your care team if you should have this scan for osteoporosis (brittle bones).  Hepatitis C: Get tested at least once in your life.  STIs (sexually  transmitted infections)  Before age 24: Ask your care team if you should be screened for STIs.  After age 24: Get screened for STIs if you're at risk. You are at risk for STIs (including HIV) if:  You are sexually active with more than one person.  You don't use condoms every time.  You or a partner was diagnosed with a sexually transmitted infection.  If you are at risk for HIV, ask about PrEP medicine to prevent HIV.  Get tested for HIV at least once in your life, whether you are at risk for HIV or not.  Cancer screening tests  Cervical cancer screening: If you have a cervix, begin getting regular cervical cancer screening tests starting at age 21.  Breast cancer scan (mammogram): If you've ever had breasts, begin having regular mammograms starting at age 40. This is a scan to check for breast cancer.  Colon cancer screening: It is important to start screening for colon cancer at age 45.  Have a colonoscopy test every 10 years (or more often if you're at risk) Or, ask your provider about stool tests like a FIT test every year or Cologuard test every 3 years.  To learn more about your testing options, visit:   .  For help making a decision, visit:   https://bit.ly/wq00656.  Prostate cancer screening test: If you have a prostate, ask your care team if a prostate cancer screening test (PSA) at age 55 is right for you.  Lung cancer screening: If you are a current or former smoker ages 50 to 80, ask your care team if ongoing lung cancer screenings are right for you.  For informational purposes only. Not to replace the advice of your health care provider. Copyright   2023 [facility]. All rights reserved. Clinically reviewed by the [facility]. [employer] [id number] - REV 01/24.  Learning About Activities of Daily Living  What are activities of daily living?     Activities of daily living (ADLs) are the basic self-care tasks you do every day. These include eating, bathing, dressing,  and moving around.  As you age, and if you have health problems, you may find that it's harder to do some of these tasks. If so, your doctor can suggest ideas that may help.  To measure what kind of help you may need, your doctor will ask how well you are able to do ADLs. Let your doctor know if there are any tasks that you are having trouble doing. This is an important first step to getting help. And when you have the help you need, you can stay as independent as possible.  How will a doctor assess your ADLs?  Asking about ADLs is part of a routine health checkup your doctor will likely do as you age. Your health check might be done in a doctor's office, in your home, or at a hospital. The goal is to find out if you are having any problems that could make it hard to care for yourself or that make it unsafe for you to be on your own.  To measure your ADLs, your doctor will ask how hard it is for you to do routine tasks. Your doctor may also want to know if you have changed the way you do a task because of a health problem. Your doctor may watch how you:  Walk back and forth.  Keep your balance while you stand or walk.  Move from sitting to standing or from a bed to a chair.  Button or unbutton a shirt or sweater.  Remove and put on your shoes.  It's common to feel a little worried or anxious if you find you can't do all the things you used to be able to do. Talking with your doctor about ADLs is a way to make sure you're as safe as possible and able to care for yourself as well as you can. You may want to bring a caregiver, friend, or family member to your checkup. They can help you talk to your doctor.  Follow-up care is a key part of your treatment and safety. Be sure to make and go to all appointments, and call your doctor if you are having problems. It's also a good idea to know your test results and keep a list of the medicines you take.  Current as of: October 24, 2024  Content Version: 14.4    9540-6610  [employer].   Care instructions adapted under license by your healthcare professional. If you have questions about a medical condition or this instruction, always ask your healthcare professional. [employer] disclaims any warranty or liability for your use of this information.    Preventing Falls: Care Instructions  Injuries and health problems such as trouble walking or poor eyesight can increase your risk of falling. So can some medicines. But there are things you can do to help prevent falls. You can exercise to get stronger. You can also arrange your home to make it safer.    Talk to your doctor about the medicines you take. Ask if any of them increase the risk of falls and whether they can be changed or stopped.   Try to exercise regularly. It can help improve your strength and balance. This can help lower your risk of falling.         Practice fall safety and prevention.   Wear low-heeled shoes that fit well and give your feet good support. Talk to your doctor if you have foot problems that make this hard.  Carry a cellphone or wear a medical alert device that you can use to call for help.  Use stepladders instead of chairs to reach high objects. Don't climb if you're at risk for falls. Ask for help, if needed.  Wear the correct eyeglasses, if you need them.        Make your home safer.   Remove rugs, cords, clutter, and furniture from walkways.  Keep your house well lit. Use night-lights in hallways and bathrooms.  Install and use sturdy handrails on stairways.  Wear nonskid footwear, even inside. Don't walk barefoot or in socks without shoes.        Be safe outside.   Use handrails, curb cuts, and ramps whenever possible.  Keep your hands free by using a shoulder bag or backpack.  Try to walk in well-lit areas. Watch out for uneven ground, changes in pavement, and debris.  Be careful in the winter. Walk on the grass or gravel when sidewalks are slippery. Use de-icer on steps and  "walkways. Add non-slip devices to shoes.    Put grab bars and nonskid mats in your shower or tub and near the toilet. Try to use a shower chair or bath bench when bathing.   Get into a tub or shower by putting in your weaker leg first. Get out with your strong side first. Have a phone or medical alert device in the bathroom with you.   Where can you learn more?  Go to https://www.[employer].net/patiented  Enter G117 in the search box to learn more about \"Preventing Falls: Care Instructions.\"  Current as of: July 31, 2024  Content Version: 14.4    1273-1511 [employer].   Care instructions adapted under license by your healthcare professional. If you have questions about a medical condition or this instruction, always ask your healthcare professional. [employer] disclaims any warranty or liability for your use of this information.    Hearing Loss: Care Instructions  Overview     Hearing loss is a sudden or slow decrease in how well you hear. It can range from slight to profound. Permanent hearing loss can occur with aging. It also can happen when you are exposed long-term to loud noise. Examples include listening to loud music, riding motorcycles, or being around other loud machines.  Hearing loss can affect your work and home life. It can make you feel lonely or depressed. You may feel that you have lost your independence. But hearing aids and other devices can help you hear better and feel connected to others.  Follow-up care is a key part of your treatment and safety. Be sure to make and go to all appointments, and call your doctor if you are having problems. It's also a good idea to know your test results and keep a list of the medicines you take.  How can you care for yourself at home?  Avoid loud noises whenever possible. This helps keep your hearing from getting worse.  Always wear hearing protection around loud noises.  Wear a hearing aid as directed.  A professional can help you pick a " "hearing aid that will work best for you.  You can also get hearing aids over the counter for mild to moderate hearing loss.  Have hearing tests as your doctor suggests. They can show whether your hearing has changed. Your hearing aid may need to be adjusted.  Use other devices as needed. These may include:  Telephone amplifiers and hearing aids that can connect to a television, stereo, radio, or microphone.  Devices that use lights or vibrations. These alert you to the doorbell, a ringing telephone, or a baby monitor.  Television closed-captioning. This shows the words at the bottom of the screen. Most new TVs can do this.  TTY (text telephone). This lets you type messages back and forth on the telephone instead of talking or listening. These devices are also called TDD. When messages are typed on the keyboard, they are sent over the phone line to a receiving TTY. The message is shown on a monitor.  Use text messaging, social media, and email if it is hard for you to communicate by telephone.  Try to learn a listening technique called speechreading. It is not lipreading. You pay attention to people's gestures, expressions, posture, and tone of voice. These clues can help you understand what a person is saying. Face the person you are talking to, and have them face you. Make sure the lighting is good. You need to see the other person's face clearly.  Think about counseling if you need help to adjust to your hearing loss.  When should you call for help?  Watch closely for changes in your health, and be sure to contact your doctor if:    You think your hearing is getting worse.     You have new symptoms, such as dizziness or nausea.   Where can you learn more?  Go to https://www.[employer]WatchGuard.net/patiented  Enter R798 in the search box to learn more about \"Hearing Loss: Care Instructions.\"  Current as of: October 27, 2024  Content Version: 14.4    5815-4363 [facility] [employer] [facility].   Care instructions adapted under license " by your healthcare professional. If you have questions about a medical condition or this instruction, always ask your healthcare professional. [employer] disclaims any warranty or liability for your use of this information.    Bladder Training: Care Instructions  Your Care Instructions     Bladder training is used to treat urge incontinence and stress incontinence. Urge incontinence means that the need to urinate comes on so fast that you can't get to a toilet in time. Stress incontinence means that you leak urine because of pressure on your bladder. For example, it may happen when you laugh, cough, or lift something heavy.  Bladder training can increase how long you can wait before you have to urinate. It can also help your bladder hold more urine. And it can give you better control over the urge to urinate.  It is important to remember that bladder training takes a few weeks to a few months to make a difference. You may not see results right away, but don't give up.  Follow-up care is a key part of your treatment and safety. Be sure to make and go to all appointments, and call your doctor if you are having problems. It's also a good idea to know your test results and keep a list of the medicines you take.  How can you care for yourself at home?  Work with your doctor to come up with a bladder training program that is right for you. You may use one or more of the following methods.  Delayed urination  In the beginning, try to keep from urinating for 5 minutes after you first feel the need to go.  While you wait, take deep, slow breaths to relax. Kegel exercises can also help you delay the need to go to the bathroom.  After some practice, when you can easily wait 5 minutes to urinate, try to wait 10 minutes before you urinate.  Slowly increase the waiting period until you are able to control when you have to urinate.  Scheduled urination  Empty your bladder when you first wake up in the  "morning.  Schedule times throughout the day when you will urinate.  Start by going to the bathroom every hour, even if you don't need to go.  Slowly increase the time between trips to the bathroom.  When you have found a schedule that works well for you, keep doing it.  If you wake up during the night and have to urinate, do it. Apply your schedule to waking hours only.  Kegel exercises  These tighten and strengthen pelvic muscles, which can help you control the flow of urine. (If doing these exercises causes pain, stop doing them and talk with your doctor.) To do Kegel exercises:  Squeeze your muscles as if you were trying not to pass gas. Or squeeze your muscles as if you were stopping the flow of urine. Your belly, legs, and buttocks shouldn't move.  Hold the squeeze for 3 seconds, then relax for 5 to 10 seconds.  Start with 3 seconds, then add 1 second each week until you are able to squeeze for 10 seconds.  Repeat the exercise 10 times a session. Do 3 to 8 sessions a day.  When should you call for help?  Watch closely for changes in your health, and be sure to contact your doctor if:    Your incontinence is getting worse.     You do not get better as expected.   Where can you learn more?  Go to https://www.[email or web address]/patiented  Enter V684 in the search box to learn more about \"Bladder Training: Care Instructions.\"  Current as of: April 30, 2024  Content Version: 14.4    2983-5068 [employer].   Care instructions adapted under license by your healthcare professional. If you have questions about a medical condition or this instruction, always ask your healthcare professional. [employer] disclaims any warranty or liability for your use of this information.       "

## 2025-05-20 NOTE — PROGRESS NOTES
Preventive Care Visit  [facility]  Kate Marte DO, Family Medicine  May 20, 2025      Assessment & Plan     Encounter for Medicare annual wellness exam    Counseling  Appropriate preventive services were addressed with this patient via screening, questionnaire, or discussion as appropriate for fall prevention, nutrition, physical activity,  social engagement, weight loss and cognition.  Checklist reviewing preventive services available has been given to the patient.  Reviewed patient's diet, addressing concerns and/or questions.   Updated plan of care.  Patient reported difficulty with activities of daily living were addressed today.The patient was provided with written information regarding signs of hearing loss.   Information on urinary incontinence and treatment options given to patient.   - CBC with platelets    Coronary arteriosclerosis due to lipid rich plaque  Reassess lipids.  Per review of cardiology note from 10/2024, noted to be under acceptable control at that time.  Cardiology follow-up scheduled in August.  - Lipid panel reflex to direct LDL Fasting    Anxiety  Stable on sertraline, refills provided  - sertraline (ZOLOFT) 100 MG tablet; Take 1 tablet (100 mg) by mouth every evening.    Essential hypertension, benign  Well-controlled on hydrochlorothiazide, losartan, spironolactone.  - spironolactone (ALDACTONE) 25 MG tablet; Take 0.5 tablets (12.5 mg) by mouth daily.  - Comprehensive metabolic panel (BMP + Alb, Alk Phos, ALT, AST, Total. Bili, TP)    Hypokalemia  Currently supplementing with potassium 20 mill equivalents daily though is on both losartan and spironolactone which can cause hyperkalemia.  Reassess level today and adjust dose if necessary.  - potassium chloride ER (K-TAB) 20 MEQ CR tablet; Take 1 tablet (20 mEq) by mouth at bedtime.    Elevated glucose  - Hemoglobin A1c    Patient has been advised of split billing requirements and indicates understanding:  Yes      The longitudinal plan of care for the diagnosis(es)/condition(s) as documented were addressed during this visit. Due to the added complexity in care, I will continue to support Serene in the subsequent management and with ongoing continuity of care.      Brenda Cast is a 80 year old, presenting for the following:  Wellness Visit (Fasting labs)        5/20/2025     8:54 AM   Additional Questions   Roomed by Sepideh CABRERA       Advance Care Planning    Discussed advance care planning with patient; informed AVS has link to [employer].        5/14/2025   General Health   How would you rate your overall physical health? (!) FAIR   Feel stress (tense, anxious, or unable to sleep) Only a little   (!) STRESS CONCERN      5/14/2025   Nutrition   Diet: Low salt         5/14/2025   Exercise   Days per week of moderate/strenous exercise 0 days   Average minutes spent exercising at this level 20 min   (!) EXERCISE CONCERN      5/14/2025   Social Factors   Frequency of gathering with friends or relatives Twice a week   Worry food won't last until get money to buy more No   Food not last or not have enough money for food? No   Do you have housing? (Housing is defined as stable permanent housing and does not include staying outside in a car, in a tent, in an abandoned building, in an overnight shelter, or couch-surfing.) No   Are you worried about losing your housing? No   Lack of transportation? Patient declined   Unable to get utilities (heat,electricity)? No   Want help with housing or utility concern? No   (!) HOUSING CONCERN PRESENT      5/20/2025   Fall Risk   Reason Gait Speed Test Not Completed Unable to complete test, patient reported symptoms          5/14/2025   Activities of Daily Living- Home Safety   Needs help with the following daily activites Transportation   Safety concerns in the home None of the above         5/14/2025   Dental   Dentist two times every year? (!) DECLINE          2025   Hearing Screening   Hearing concerns? (!) TROUBLE UNDERSTANDING SOFT OR WHISPERED SPEECH.         2025   Driving Risk Screening   Patient/family members have concerns about driving No         2025   General Alertness/Fatigue Screening   Have you been more tired than usual lately? No         2025   Urinary Incontinence Screening   Bothered by leaking urine in past 6 months Yes         Today's PHQ-2 Score:       2025     9:02 AM   PHQ-2 (  [employer])   PHQ-2 Score Incomplete           2025   Substance Use   Alcohol more than 3/day or more than 7/wk No   Do you have a current opioid prescription? No   How severe/bad is pain from 1 to 10? 3/10   Do you use any other substances recreationally? No     Social History     Tobacco Use    Smoking status: Former     Current packs/day: 0.00     Average packs/day: 1.5 packs/day for 38.0 years (57.0 ttl pk-yrs)     Types: Cigarettes     Start date: 1960     Quit date: 1998     Years since quittin.4    Smokeless tobacco: Never    Tobacco comments:     quit in    Vaping Use    Vaping status: Never Used   Substance Use Topics    Alcohol use: Yes     Alcohol/week: 2.0 standard drinks of alcohol     Comment: occ    Drug use: Not Currently           2024   LAST FHS-7 RESULTS   1st degree relative breast or ovarian cancer Yes   Any relative bilateral breast cancer No   Any male have breast cancer No   Any ONE woman have BOTH breast AND ovarian cancer No   Any woman with breast cancer before 50yrs No   2 or more relatives with breast AND/OR ovarian cancer No   2 or more relatives with breast AND/OR bowel cancer No        Mammogram Screening - After age 74- determine frequency with patient based on health status, life expectancy and patient goals          Reviewed and updated as needed this visit by Provider   Tobacco  Allergies  Meds  Problems  Med Hx  Surg Hx  Fam Hx              Current providers sharing in care for  "this patient include:  Patient Care Team:  Kate Marte DO as PCP - General (Family Medicine)  Karissa Ariza, PharmD as Pharmacist (Pharmacist)  Lalitha Haq NP as Nurse Practitioner  Lalitha Hamilton RT as Chronic Pulmonary Disease Specialist (Respiratory Therapy)  Joe Ellsworth MD as MD (Cardiovascular Disease)  Joe Ellsworth MD as MD (Cardiovascular Disease)  Myranda Robertson MD as MD (Cardiovascular Disease)  Ev Plaza APRN CNP as Assigned Pulmonology Provider  Ashley Akhtar PA-C as Physician Assistant (Urology)  Nayeli Thompson PA-C as Assigned Heart and Vascular Provider  Kate Marte DO as Assigned PCP  Savannah Gleason MD as Assigned Heart and Vascular Surgical Provider  Alfonso Orozco DPM as Assigned Surgical Provider    The following health maintenance items are reviewed in Epic and correct as of today:  Health Maintenance   Topic Date Due    ANNUAL REVIEW OF HM ORDERS  Never done    LIPID  05/13/2025    BMP  11/19/2025    MEDICARE ANNUAL WELLNESS VISIT  05/20/2026    FALL RISK ASSESSMENT  05/20/2026    DEXA  01/14/2027    DIABETES SCREENING  11/19/2027    ADVANCE CARE PLANNING  06/18/2029    DTAP/TDAP/TD IMMUNIZATION (4 - Td or Tdap) 02/12/2031    SPIROMETRY  Completed    COPD ACTION PLAN  Completed    PHQ-2 (once per calendar year)  Completed    INFLUENZA VACCINE  Completed    Pneumococcal Vaccine: 50+ Years  Completed    ZOSTER IMMUNIZATION  Completed    RSV VACCINE  Completed    COVID-19 Vaccine  Completed    HPV IMMUNIZATION  Aged Out    MENINGITIS IMMUNIZATION  Aged Out    TSH W/FREE T4 REFLEX  Discontinued    MAMMO SCREENING  Discontinued          Objective    Exam  /69 (BP Location: Left arm, Patient Position: Sitting, Cuff Size: Adult Regular)   Pulse 77   Temp 98.5  F (36.9  C) (Oral)   Resp 20   Ht 1.594 m (5' 2.75\")   Wt 86.6 kg (191 lb)   SpO2 94%   BMI 34.10 kg/m     Estimated body mass index is 34.1 kg/m  as calculated from the " "following:    Height as of this encounter: 1.594 m (5' 2.75\").    Weight as of this encounter: 86.6 kg (191 lb).    Physical Exam  GENERAL: alert and no distress  EYES: Eyes grossly normal to inspection, PERRL and conjunctivae and sclerae normal  HENT: ear canals and TM's normal, nose and mouth without ulcers or lesions, excess cerumen bilaterally  NECK: no adenopathy, no asymmetry, masses, or scars  RESP: lungs clear to auscultation - no rales, rhonchi or wheezes  CV: regular rate and rhythm, normal S1 S2, no S3 or S4, 2/5 systolic murmur left sternal border  ABDOMEN: soft, nontender  MS: no gross musculoskeletal defects noted, 1+ pitting edema to distal shin on the left, trace edema right shin  SKIN: no suspicious lesions or rashes  NEURO: Reduced strength bilateral lower extremities with shuffling gait, uses wheeled walker to ambulate  PSYCH: mentation appears normal, affect normal/bright        5/20/2025   Mini Cog   Clock Draw Score 2 Normal   3 Item Recall 3 objects recalled   Mini Cog Total Score 5              Signed Electronically by: Kate Marte DO    "

## 2025-05-22 ENCOUNTER — RESULTS FOLLOW-UP (OUTPATIENT)
Dept: FAMILY MEDICINE | Facility: CLINIC | Age: 80
End: 2025-05-22

## 2025-05-22 DIAGNOSIS — Z95.2 S/P TAVR (TRANSCATHETER AORTIC VALVE REPLACEMENT): Primary | ICD-10-CM

## 2025-06-09 ENCOUNTER — OFFICE VISIT (OUTPATIENT)
Dept: PULMONOLOGY | Facility: CLINIC | Age: 80
End: 2025-06-09
Attending: NURSE PRACTITIONER
Payer: MEDICARE

## 2025-06-09 VITALS
WEIGHT: 196 LBS | HEART RATE: 90 BPM | BODY MASS INDEX: 35 KG/M2 | OXYGEN SATURATION: 93 % | DIASTOLIC BLOOD PRESSURE: 64 MMHG | SYSTOLIC BLOOD PRESSURE: 120 MMHG

## 2025-06-09 DIAGNOSIS — J96.11 CHRONIC RESPIRATORY FAILURE WITH HYPOXIA (H): ICD-10-CM

## 2025-06-09 DIAGNOSIS — J43.9 PULMONARY EMPHYSEMA, UNSPECIFIED EMPHYSEMA TYPE (H): ICD-10-CM

## 2025-06-09 DIAGNOSIS — J44.9 CHRONIC OBSTRUCTIVE PULMONARY DISEASE, UNSPECIFIED COPD TYPE (H): Primary | ICD-10-CM

## 2025-06-09 DIAGNOSIS — R06.09 DYSPNEA ON EXERTION: ICD-10-CM

## 2025-06-09 PROCEDURE — 99214 OFFICE O/P EST MOD 30 MIN: CPT | Performed by: NURSE PRACTITIONER

## 2025-06-09 PROCEDURE — 3074F SYST BP LT 130 MM HG: CPT | Performed by: NURSE PRACTITIONER

## 2025-06-09 PROCEDURE — G2211 COMPLEX E/M VISIT ADD ON: HCPCS | Performed by: NURSE PRACTITIONER

## 2025-06-09 PROCEDURE — 3078F DIAST BP <80 MM HG: CPT | Performed by: NURSE PRACTITIONER

## 2025-06-09 RX ORDER — AZITHROMYCIN 250 MG/1
250 TABLET, FILM COATED ORAL EVERY EVENING
Qty: 90 TABLET | Refills: 3 | Status: SHIPPED | OUTPATIENT
Start: 2025-06-09

## 2025-06-09 NOTE — PATIENT INSTRUCTIONS
It was a pleasure talking with you today. This is what we discussed:    During the day keep your oxygen between 88-92%. If your number is high than 92% you can turn the oxygen down or take it off.   You can continue the Duonebs 1 time daily if you feel it is helpful. You can use this up to 4 times daily but do not need to use this if you are not having increased symptoms.   Continue the Spiriva and Advair as you have been.   Use your albuterol every 4 hours as needed for cough, shortness of breath, wheeze, or chest tightness.   You will need a EKG by November. If not done, we will have this ordered at your primary care clinic.   Follow up 1 year, sooner if needed   If you have worsening symptoms, questions, or need to speak with the nurse please call [phone].

## 2025-06-09 NOTE — PROGRESS NOTES
Outpatient Pulmonary Clinic Visit  June 9, 2025      Assessment and Plan:  Lalitha Underwood is a 80 year old female with a past medical history significant for severe COPD, emphysema, Cor pulmonale, chronic hypoxic respiratory failure, paraesophageal hernia s/p repair, HTN, acquired lymphedema of leg, epileptic seizure, HLD, GERD, aortic valve stenosis, obesity, neuropathy, osteoporosis, PAD, s/p cardiac pacemaker placement, second degree heart block, CAD, and former tobacco use who presents for follow up.   Previous in clinic walk, after 200ft her oxygen was 97% on room air.     Lung exam today demonstrates scattered faint expiratory rhonchi.  SpO2 was 93% on room air at rest.    #. Severe COPD GOLD B (high symptom burden, no recent exacerbations): CAT is 17 today. She has noticed mildly increased symptoms following weather and air quality changes. PFTs show a severe obstructive ventilatory defect, no significant response to bronchodilator, with moderately reduced diffusion capacity.   Continue Advair/Spiriva and as needed albuterol or duonebs.    Azithromycin 250mg daily to help control exacerbation - will monitor her QTc closely (QTC was 424 in 11/2024). Will need EKG this fall.   OK to use her nebulizer 2-4 times daily, encouraged her to increase use while she is having increased symptoms. Changed her albuterol to levalbuterol to help with jittery side effect.    #. Chronic hypoxic respiratory failure: Reinforced how to titrate oxygen. Recent PSG did not show SHAZIA, test was done with oxygen so she has continued this.   Oxygen during the day prn to keep sats >88%.   Oxygen 2-2.5 L with sleep.    Sleep medicine evaluation done. She is waiting to follow up with them.   #. CAD, Aortic stenosis s/p TAVR 06/2024, Cor Pulmonale: remains on hydrochlorothiazide and spironolactone.  Prevent hypoxia at all times with oxygen use as above.   Continued follow up with cardiology.    #. Dyspnea: Multifactorial from the above.  "Likely a component of deconditioning too. She has previously completed some pulmonary rehab. Recently completed cardiac rehab.   #. Hx of DVT: resolved on last US. Eliquis discontinued. She continues on ASA.     If this patients lung disease exacerbates I recommend: doxycycline 100mg BID for 10 days and a steroid taper with prednisone at 40mg for 3 days 30mg for 3 days, 20mg for 3 days and 10mg for 3 days    RTC in 1 year. Sooner if needed     Ev Plaza, CNP  Pulmonary Medicine  [facility]   [phone]    CCx:   Chief Complaint   Patient presents with    Follow Up     COPD      HPI:   She was last seen via telephone visit in 12/2024. Since that time she has remained stable. She has been needing prn nebs following warmer weather and poor air quality. States she is a bit more \"rattly\".   Has noticed some increased LE edema with a steady 1 lb weight gain weekly for the past few weeks.   Still having SOB with any activity. She did in home PT and has been trying to get some more activity.   SpO2 has been in the high 90's. Denies change in her breathing.   Continues to have dyspnea with exertion and daytime fatigue.  Denies chest tightness or wheeze.   Using 2 L with sleep, prn oxygen with activity.   Last COPD exacerbation wsa on 5/15/2023.     Hospitalized for a DVT in 11/2024, was on Eliquis. Has home care to help with her lymphedema and is wearing compression stockings.     On spironolactone. Followed by Dr. Robertson in cardiology. Recent coronary angiogram, did not require stents. Cardiac MRI showed tricuspid valve regurgitation and aortic valve stenosis. TAVR done in June 2024.     Patient supplied answers from flow sheet for:  COPD Assessment Test (CAT)  2009 [employer]. All rights reserved.      5/22/2023     1:00 PM 7/21/2023     9:23 AM 11/26/2023     5:16 PM 5/24/2024     3:38 PM 12/4/2024    11:20 AM 12/8/2024     7:18 PM 6/4/2025    11:22 AM   COPD assessment test (CAT)   Cough 3 1 3 3 2 2 2   Phlegm 4 1 " 1 1 2 2 2   Chest tightness 2 1 2 2 2 2 2   Walk up hill 5 3 1 5 1 1 3   Limited activities 4 2 1 2 2 2 2   Leaving my home 1 1 0 1 2 2 2   Sleep 3 1 2 3 3 3 1   Energy 4 3 3 3 2 2 3   Total Score 26 13 13 20 16  16  17        Patient-reported      ROS:  10 point ROS neg other than the symptoms noted above in the HPI.    Current Meds:  Current Outpatient Medications   Medication Sig Dispense Refill    albuterol (PROAIR HFA/PROVENTIL HFA/VENTOLIN HFA) 108 (90 Base) MCG/ACT inhaler Inhale 2 puffs into the lungs every 6 hours as needed for shortness of breath or wheezing 18 g 1    azithromycin (ZITHROMAX) 250 MG tablet Take 1 tablet (250 mg) by mouth every evening 90 tablet 3    fluticasone-salmeterol (WIXELA INHUB) 500-50 MCG/ACT inhaler Inhale 1 puff into the lungs every 12 hours. 180 each 2    guaiFENesin (MUCINEX) 600 MG 12 hr tablet Take 1 tablet (600 mg) by mouth 2 times daily. 180 tablet 0    levalbuterol (XOPENEX) 1.25 MG/3ML neb solution Take 3 mLs (1.25 mg) by nebulization every 4 hours as needed for shortness of breath or wheezing. 810 mL 0    tiotropium (SPIRIVA RESPIMAT) 2.5 MCG/ACT inhaler Inhale 2 puffs into the lungs daily 12 g 3    aspirin 81 MG EC tablet Take 1 tablet (81 mg) by mouth daily.      benzonatate (TESSALON) 100 MG capsule Take 1 capsule (100 mg) by mouth 3 times daily as needed for cough 90 capsule 3    Biotin 10 MG CAPS Take 1 capsule by mouth daily      calcium citrate (CITRACAL) 950 (200 Ca) MG tablet Take 1 tablet by mouth 2 times daily      estradiol (ESTRACE) 0.1 MG/GM vaginal cream Place 2 g vaginally three times a week 72 g 1    ferrous sulfate (FEROSUL) 325 (65 Fe) MG tablet Take 1 tablet (325 mg) by mouth every other day      hydrochlorothiazide (HYDRODIURIL) 25 MG tablet Take 1 tablet (25 mg) by mouth daily. 90 tablet 3    levETIRAcetam (KEPPRA) 500 MG tablet Take 1 tablet (500 mg) by mouth 2 times daily. 180 tablet 2    losartan (COZAAR) 50 MG tablet Take 1 tablet (50 mg) by  mouth 2 times daily. 180 tablet 2    magnesium 250 MG tablet Take 1 tablet by mouth 2 times daily      nystatin (MYCOSTATIN) 544008 UNIT/GM external powder Apply topically daily as needed      potassium chloride ER (K-TAB) 20 MEQ CR tablet Take 1 tablet (20 mEq) by mouth at bedtime. 90 tablet 3    RABEprazole (ACIPHEX) 20 MG EC tablet Take 1 tablet (20 mg) by mouth daily. 90 tablet 2    sertraline (ZOLOFT) 100 MG tablet Take 1 tablet (100 mg) by mouth every evening. 90 tablet 3    solifenacin (VESICARE) 10 MG tablet Take 1 tablet (10 mg) by mouth daily 90 tablet 3    spironolactone (ALDACTONE) 25 MG tablet Take 0.5 tablets (12.5 mg) by mouth daily. 45 tablet 3    vitamin D3 (CHOLECALCIFEROL) 125 MCG (5000 UT) tablet Take 5,000 Units by mouth three times a week Monday, Wednesday and Friday       Physical Exam:  /64   Pulse 90   Wt 88.9 kg (196 lb)   SpO2 93%   BMI 35.00 kg/m      Physical Exam  Constitutional:       General: She is not in acute distress.     Appearance: She is not ill-appearing.      Comments: On room air. Ambulating with walker    Cardiovascular:      Rate and Rhythm: Normal rate and regular rhythm.   Pulmonary:      Effort: Pulmonary effort is normal. No respiratory distress.      Breath sounds: Rhonchi (faint, scattered, expiratory) present. No wheezing.   Musculoskeletal:      Right lower leg: Edema present.      Left lower leg: Edema present.   Neurological:      Mental Status: She is alert.   Psychiatric:         Behavior: Behavior normal.         Labs:  @clab@  Lab Results   Component Value Date    WBC 8.0 05/20/2025    ANEU 5.3 06/24/2024    HGB 11.6 (L) 05/20/2025    HCT 36.8 05/20/2025     05/20/2025     05/20/2025    POTASSIUM 4.4 05/20/2025    CHLORIDE 100 05/20/2025    CO2 30 (H) 05/20/2025    GLC 88 05/20/2025    BUN 32.3 (H) 05/20/2025    CR 0.98 (H) 05/20/2025    MAG 1.6 (L) 06/26/2024    INR 1.11 06/24/2024    BILITOTAL 0.3 05/20/2025    AST 25 05/20/2025     ALT 15 05/20/2025    ALKPHOS 61 05/20/2025    PROTTOTAL 6.7 05/20/2025    ALBUMIN 4.3 05/20/2025     I have personally reviewed all pertinent imaging studies and PFT results unless otherwise noted.    Imaging studies:    CT CHEST PULMONARY EMBOLISM W CONTRAST, DATE/TIME: 5/15/2023 1:41 PM CDT  INDICATION: chest pain, SOB, elevated D dimer  COMPARISON: Chest x-ray 5/15/2023 and 3/11/2022  FINDINGS:  ANGIOGRAM CHEST: Negative for pulmonary emboli. Some mildly prominent central pulmonary arteries concerning for possible pulmonary artery hypertension. Thoracic aorta is negative for dissection. No CT evidence of right heart strain.  LUNGS AND PLEURA: Slightly elevated left hemidiaphragm similar to previous. Some slight probable fibrosis in the left lung base similar to chest x-ray. No acute new findings.  MEDIASTINUM/AXILLAE: Normal.  CORONARY ARTERY CALCIFICATION: Mild.                                                              IMPRESSION:  1.  Negative for pulmonary emboli.  2.  Mildly enlarged central pulmonary arteries suggestive of possible pulmonary artery hypertension.  3.  Slight fibrosis or atelectasis left lung base similar to chest x-ray. No acute findings in the lungs.    PFTs 04/2019:  FEV1/FVC is 0.53 and is reduced.  FEV1 is 40% predicted and is reduced.  FVC is 59% predicted and is reduced.  There was no improvement in spirometry after a single inhaled dose of bronchodilator.  TLC is 95% predicted and is normal.  RV is 138% predicted and is increased.  RV/TLC is 0.63 and is increased.  DLCO is 42% predicted and is reduced when it   is corrected for hemoglobin.  The flow volume loop shows obstructive morphology.     Impression:  Full Pulmonary Function Test is abnormal. Spirometry is consistent with severe obstructive ventilatory defect.  Spirometry is not consistent with reversibility.  There is no hyperinflation.  There is air-trapping.  Diffusion capacity when corrected for hemoglobin is moderately  reduced.    Cardiac MRI 09/2023  IMPRESSION:  1.  The images by 3D contrast enhanced MRA and other image sequences did not show obvious evidence of  pulmonary vein anomaly. No atrial septal defect.    2.  Normal left ventricular size, mild concentric LVH and systolic function.  The calculated left  ventricular ejection fraction is 68%.  3.  No previous myocardial infarction.   4.  Normal right ventricular size and systolic function.  Pacemaker lead is noticed.  5.  Mild to moderate tricuspid valve regurgitation.  Moderate aortic valve stenosis is noticed.    Echo 07/52024:  Interpretation Summary  1. Normal left ventricular size and systolic performance with a visually  estimated ejection fraction of 60%.  2. There is a bio-prosthetic aortic valve (documented 23 mm Correia Minerva 3  Ultra tissue valve). Normal aortic valve prosthesis metrics with a mean systolic gradient of 16mmHg and a peak anterograde velocity of 1.9 m/sec. No aortic insufficiency is detected.  3. There is mild to moderate mitral insufficiency.  4. Normal right ventricular size and systolic performance.  When compared to the prior real-time echocardiogram dated 26 June 2024, there  has been a slight increase in the degree of mitral insufficiency from mild to  now mild-moderate. The findings are otherwise felt to be fairly similar on  both examinations.

## 2025-06-24 ENCOUNTER — MYC MEDICAL ADVICE (OUTPATIENT)
Dept: FAMILY MEDICINE | Facility: CLINIC | Age: 80
End: 2025-06-24
Payer: MEDICARE

## 2025-06-24 DIAGNOSIS — H61.22 IMPACTED CERUMEN OF LEFT EAR: Primary | ICD-10-CM

## 2025-06-24 DIAGNOSIS — H61.21 IMPACTED CERUMEN OF RIGHT EAR: ICD-10-CM

## 2025-06-24 NOTE — TELEPHONE ENCOUNTER
[facility] This can be an RN add-on.  My apologies, intended to do that during her visit.      Kate Marte, DO     transcription will occasionally slip through.

## 2025-06-25 ENCOUNTER — ALLIED HEALTH/NURSE VISIT (OUTPATIENT)
Dept: FAMILY MEDICINE | Facility: CLINIC | Age: 80
End: 2025-06-25
Payer: MEDICARE

## 2025-06-25 DIAGNOSIS — H61.22 IMPACTED CERUMEN OF LEFT EAR: ICD-10-CM

## 2025-06-25 PROCEDURE — 69209 REMOVE IMPACTED EAR WAX UNI: CPT | Mod: LT

## 2025-06-26 NOTE — PROGRESS NOTES
RN ear assessment completed prior to ear irrigation. Otoscopic exam reveals cerumen present and irrigation advised for the LEFT EAR. Post Ear Irrigation exam completed and cerumen plug removed, tympanic membrane intact, and no bleeding noted in the LEFT EAR.  Pain assessment completed.     Patient tolerated procedure:  yes    Strom Roldan RN     [facility]

## 2025-07-02 ENCOUNTER — HOSPITAL ENCOUNTER (OUTPATIENT)
Dept: CARDIOLOGY | Facility: HOSPITAL | Age: 80
Discharge: HOME | End: 2025-07-02
Attending: INTERNAL MEDICINE
Payer: MEDICARE

## 2025-07-02 DIAGNOSIS — Z95.2 S/P TAVR (TRANSCATHETER AORTIC VALVE REPLACEMENT): ICD-10-CM

## 2025-07-02 LAB — LVEF ECHO: NORMAL

## 2025-07-02 PROCEDURE — 255N000002 HC RX 255 OP 636: Performed by: INTERNAL MEDICINE

## 2025-07-02 PROCEDURE — 93306 TTE W/DOPPLER COMPLETE: CPT | Mod: 26 | Performed by: INTERNAL MEDICINE

## 2025-07-02 PROCEDURE — 999N000208 ECHOCARDIOGRAM COMPLETE

## 2025-07-02 RX ADMIN — PERFLUTREN 2 ML: 6.52 INJECTION, SUSPENSION INTRAVENOUS at 13:30

## 2025-07-03 DIAGNOSIS — J44.1 COPD EXACERBATION (H): ICD-10-CM

## 2025-07-03 RX ORDER — GUAIFENESIN 600 MG/1
1 TABLET, EXTENDED RELEASE ORAL 2 TIMES DAILY
Qty: 180 TABLET | Refills: 2 | Status: SHIPPED | OUTPATIENT
Start: 2025-07-03

## 2025-08-06 DIAGNOSIS — Z95.2 S/P TAVR (TRANSCATHETER AORTIC VALVE REPLACEMENT): Primary | ICD-10-CM

## 2025-08-07 ENCOUNTER — LAB (OUTPATIENT)
Dept: CARDIOLOGY | Facility: CLINIC | Age: 80
End: 2025-08-07
Payer: MEDICARE

## 2025-08-07 ENCOUNTER — OFFICE VISIT (OUTPATIENT)
Dept: CARDIOLOGY | Facility: CLINIC | Age: 80
End: 2025-08-07
Payer: MEDICARE

## 2025-08-07 VITALS
BODY MASS INDEX: 35.18 KG/M2 | SYSTOLIC BLOOD PRESSURE: 132 MMHG | WEIGHT: 197 LBS | HEART RATE: 84 BPM | DIASTOLIC BLOOD PRESSURE: 40 MMHG | RESPIRATION RATE: 22 BRPM

## 2025-08-07 DIAGNOSIS — Z95.2 S/P TAVR (TRANSCATHETER AORTIC VALVE REPLACEMENT): ICD-10-CM

## 2025-08-07 DIAGNOSIS — I25.83 CORONARY ARTERIOSCLEROSIS DUE TO LIPID RICH PLAQUE: ICD-10-CM

## 2025-08-07 DIAGNOSIS — Z95.0 CARDIAC PACEMAKER IN SITU: ICD-10-CM

## 2025-08-07 DIAGNOSIS — Z95.2 S/P TAVR (TRANSCATHETER AORTIC VALVE REPLACEMENT): Primary | ICD-10-CM

## 2025-08-07 DIAGNOSIS — I10 ESSENTIAL HYPERTENSION, BENIGN: ICD-10-CM

## 2025-08-07 LAB
ANION GAP SERPL CALCULATED.3IONS-SCNC: 14 MMOL/L (ref 7–15)
ATRIAL RATE - MUSE: 82 BPM
BUN SERPL-MCNC: 34 MG/DL (ref 8–23)
CALCIUM SERPL-MCNC: 9.6 MG/DL (ref 8.8–10.4)
CHLORIDE SERPL-SCNC: 100 MMOL/L (ref 98–107)
CREAT SERPL-MCNC: 0.94 MG/DL (ref 0.51–0.95)
DIASTOLIC BLOOD PRESSURE - MUSE: NORMAL MMHG
EGFRCR SERPLBLD CKD-EPI 2021: 61 ML/MIN/1.73M2
ERYTHROCYTE [DISTWIDTH] IN BLOOD BY AUTOMATED COUNT: 13.2 % (ref 10–15)
GLUCOSE SERPL-MCNC: 104 MG/DL (ref 70–99)
HCO3 SERPL-SCNC: 25 MMOL/L (ref 22–29)
HCT VFR BLD AUTO: 33.6 % (ref 35–47)
HGB BLD-MCNC: 11.2 G/DL (ref 11.7–15.7)
INTERPRETATION ECG - MUSE: NORMAL
MCH RBC QN AUTO: 29.7 PG (ref 26.5–33)
MCHC RBC AUTO-ENTMCNC: 33.3 G/DL (ref 31.5–36.5)
MCV RBC AUTO: 89 FL (ref 78–100)
P AXIS - MUSE: 38 DEGREES
PLATELET # BLD AUTO: 196 10E3/UL (ref 150–450)
POTASSIUM SERPL-SCNC: 4.4 MMOL/L (ref 3.4–5.3)
PR INTERVAL - MUSE: 256 MS
QRS DURATION - MUSE: 162 MS
QT - MUSE: 426 MS
QTC - MUSE: 497 MS
R AXIS - MUSE: 49 DEGREES
RBC # BLD AUTO: 3.77 10E6/UL (ref 3.8–5.2)
SODIUM SERPL-SCNC: 139 MMOL/L (ref 135–145)
SYSTOLIC BLOOD PRESSURE - MUSE: NORMAL MMHG
T AXIS - MUSE: 24 DEGREES
VENTRICULAR RATE- MUSE: 82 BPM
WBC # BLD AUTO: 9.7 10E3/UL (ref 4–11)

## 2025-08-07 PROCEDURE — 36415 COLL VENOUS BLD VENIPUNCTURE: CPT

## 2025-08-07 PROCEDURE — 80048 BASIC METABOLIC PNL TOTAL CA: CPT

## 2025-08-07 PROCEDURE — 85027 COMPLETE CBC AUTOMATED: CPT

## 2025-08-07 PROCEDURE — 93000 ELECTROCARDIOGRAM COMPLETE: CPT | Performed by: INTERNAL MEDICINE

## 2025-08-07 RX ORDER — IPRATROPIUM BROMIDE AND ALBUTEROL SULFATE 2.5; .5 MG/3ML; MG/3ML
1 SOLUTION RESPIRATORY (INHALATION) PRN
COMMUNITY

## 2025-08-14 ENCOUNTER — TRANSFERRED RECORDS (OUTPATIENT)
Dept: HEALTH INFORMATION MANAGEMENT | Facility: CLINIC | Age: 80
End: 2025-08-14
Payer: MEDICARE

## 2025-08-18 ENCOUNTER — MYC MEDICAL ADVICE (OUTPATIENT)
Dept: PULMONOLOGY | Facility: CLINIC | Age: 80
End: 2025-08-18
Payer: MEDICARE

## 2025-08-18 DIAGNOSIS — J44.9 COPD (CHRONIC OBSTRUCTIVE PULMONARY DISEASE) (H): ICD-10-CM

## 2025-08-18 RX ORDER — FLUTICASONE PROPIONATE AND SALMETEROL 500; 50 UG/1; UG/1
1 POWDER RESPIRATORY (INHALATION) EVERY 12 HOURS
Qty: 180 EACH | Refills: 2 | Status: SHIPPED | OUTPATIENT
Start: 2025-08-18 | End: 2025-08-19

## 2025-08-19 ENCOUNTER — ALLIED HEALTH/NURSE VISIT (OUTPATIENT)
Dept: ENDOCRINOLOGY | Facility: CLINIC | Age: 80
End: 2025-08-19
Payer: MEDICARE

## 2025-08-19 DIAGNOSIS — Z92.29 PERSONAL HISTORY OF OTHER DRUG THERAPY: ICD-10-CM

## 2025-08-19 DIAGNOSIS — M81.0 AGE-RELATED OSTEOPOROSIS WITHOUT CURRENT PATHOLOGICAL FRACTURE: Primary | ICD-10-CM

## 2025-08-19 PROCEDURE — 99207 PR NO CHARGE NURSE ONLY: CPT

## 2025-08-19 PROCEDURE — 99207 PR NO CHARGE NURSE ONLY: CPT | Performed by: NURSE PRACTITIONER

## 2025-08-19 PROCEDURE — 96372 THER/PROPH/DIAG INJ SC/IM: CPT | Performed by: NURSE PRACTITIONER

## 2025-08-19 RX ORDER — FLUTICASONE PROPIONATE AND SALMETEROL 500; 50 UG/1; UG/1
1 POWDER RESPIRATORY (INHALATION) EVERY 12 HOURS
Qty: 180 EACH | Refills: 2 | Status: SHIPPED | OUTPATIENT
Start: 2025-08-19

## 2025-08-26 ENCOUNTER — ANCILLARY PROCEDURE (OUTPATIENT)
Dept: CARDIOLOGY | Facility: CLINIC | Age: 80
End: 2025-08-26
Attending: INTERNAL MEDICINE
Payer: MEDICARE

## 2025-08-26 DIAGNOSIS — I44.1 SECOND DEGREE MOBITZ II AV BLOCK: ICD-10-CM

## 2025-08-26 DIAGNOSIS — Z95.0 CARDIAC PACEMAKER IN SITU: ICD-10-CM

## 2025-08-26 LAB
MDC_IDC_EPISODE_DTM: NORMAL
MDC_IDC_EPISODE_DURATION: 1 S
MDC_IDC_EPISODE_ID: NORMAL
MDC_IDC_EPISODE_TYPE: NORMAL
MDC_IDC_LEAD_CONNECTION_STATUS: NORMAL
MDC_IDC_LEAD_CONNECTION_STATUS: NORMAL
MDC_IDC_LEAD_IMPLANT_DT: NORMAL
MDC_IDC_LEAD_IMPLANT_DT: NORMAL
MDC_IDC_LEAD_LOCATION: NORMAL
MDC_IDC_LEAD_LOCATION: NORMAL
MDC_IDC_LEAD_LOCATION_DETAIL_1: NORMAL
MDC_IDC_LEAD_LOCATION_DETAIL_1: NORMAL
MDC_IDC_LEAD_MFG: NORMAL
MDC_IDC_LEAD_MFG: NORMAL
MDC_IDC_LEAD_MODEL: NORMAL
MDC_IDC_LEAD_MODEL: NORMAL
MDC_IDC_LEAD_POLARITY_TYPE: NORMAL
MDC_IDC_LEAD_POLARITY_TYPE: NORMAL
MDC_IDC_LEAD_SERIAL: NORMAL
MDC_IDC_LEAD_SERIAL: NORMAL
MDC_IDC_MSMT_BATTERY_DTM: NORMAL
MDC_IDC_MSMT_BATTERY_REMAINING_LONGEVITY: 114 MO
MDC_IDC_MSMT_BATTERY_REMAINING_PERCENTAGE: 100 %
MDC_IDC_MSMT_BATTERY_STATUS: NORMAL
MDC_IDC_MSMT_LEADCHNL_RA_IMPEDANCE_VALUE: 546 OHM
MDC_IDC_MSMT_LEADCHNL_RV_IMPEDANCE_VALUE: 500 OHM
MDC_IDC_MSMT_LEADCHNL_RV_PACING_THRESHOLD_AMPLITUDE: 0.7 V
MDC_IDC_MSMT_LEADCHNL_RV_PACING_THRESHOLD_PULSEWIDTH: 0.4 MS
MDC_IDC_PG_IMPLANT_DTM: NORMAL
MDC_IDC_PG_MFG: NORMAL
MDC_IDC_PG_MODEL: NORMAL
MDC_IDC_PG_SERIAL: NORMAL
MDC_IDC_PG_TYPE: NORMAL
MDC_IDC_SESS_CLINIC_NAME: NORMAL
MDC_IDC_SESS_DTM: NORMAL
MDC_IDC_SESS_TYPE: NORMAL
MDC_IDC_SET_BRADY_AT_MODE_SWITCH_MODE: NORMAL
MDC_IDC_SET_BRADY_AT_MODE_SWITCH_RATE: 170 {BEATS}/MIN
MDC_IDC_SET_BRADY_LOWRATE: 60 {BEATS}/MIN
MDC_IDC_SET_BRADY_MAX_SENSOR_RATE: 130 {BEATS}/MIN
MDC_IDC_SET_BRADY_MAX_TRACKING_RATE: 130 {BEATS}/MIN
MDC_IDC_SET_BRADY_MODE: NORMAL
MDC_IDC_SET_BRADY_PAV_DELAY_HIGH: 200 MS
MDC_IDC_SET_BRADY_PAV_DELAY_LOW: 250 MS
MDC_IDC_SET_BRADY_SAV_DELAY_HIGH: 200 MS
MDC_IDC_SET_BRADY_SAV_DELAY_LOW: 250 MS
MDC_IDC_SET_LEADCHNL_RA_PACING_AMPLITUDE: 1.5 V
MDC_IDC_SET_LEADCHNL_RA_PACING_CAPTURE_MODE: NORMAL
MDC_IDC_SET_LEADCHNL_RA_PACING_POLARITY: NORMAL
MDC_IDC_SET_LEADCHNL_RA_PACING_PULSEWIDTH: 0.4 MS
MDC_IDC_SET_LEADCHNL_RA_SENSING_ADAPTATION_MODE: NORMAL
MDC_IDC_SET_LEADCHNL_RA_SENSING_POLARITY: NORMAL
MDC_IDC_SET_LEADCHNL_RA_SENSING_SENSITIVITY: 0.4 MV
MDC_IDC_SET_LEADCHNL_RV_PACING_AMPLITUDE: 1.2 V
MDC_IDC_SET_LEADCHNL_RV_PACING_CAPTURE_MODE: NORMAL
MDC_IDC_SET_LEADCHNL_RV_PACING_POLARITY: NORMAL
MDC_IDC_SET_LEADCHNL_RV_PACING_PULSEWIDTH: 0.4 MS
MDC_IDC_SET_LEADCHNL_RV_SENSING_ADAPTATION_MODE: NORMAL
MDC_IDC_SET_LEADCHNL_RV_SENSING_POLARITY: NORMAL
MDC_IDC_SET_LEADCHNL_RV_SENSING_SENSITIVITY: 1.5 MV
MDC_IDC_SET_ZONE_DETECTION_INTERVAL: 375 MS
MDC_IDC_SET_ZONE_STATUS: NORMAL
MDC_IDC_SET_ZONE_TYPE: NORMAL
MDC_IDC_SET_ZONE_VENDOR_TYPE: NORMAL
MDC_IDC_STAT_AT_BURDEN_PERCENT: 1 %
MDC_IDC_STAT_AT_DTM_END: NORMAL
MDC_IDC_STAT_AT_DTM_START: NORMAL
MDC_IDC_STAT_BRADY_DTM_END: NORMAL
MDC_IDC_STAT_BRADY_DTM_START: NORMAL
MDC_IDC_STAT_BRADY_RA_PERCENT_PACED: 6 %
MDC_IDC_STAT_BRADY_RV_PERCENT_PACED: 100 %
MDC_IDC_STAT_EPISODE_RECENT_COUNT: 0
MDC_IDC_STAT_EPISODE_RECENT_COUNT: 1
MDC_IDC_STAT_EPISODE_RECENT_COUNT_DTM_END: NORMAL
MDC_IDC_STAT_EPISODE_RECENT_COUNT_DTM_START: NORMAL
MDC_IDC_STAT_EPISODE_TYPE: NORMAL
MDC_IDC_STAT_EPISODE_VENDOR_TYPE: NORMAL

## 2025-08-26 PROCEDURE — 93296 REM INTERROG EVL PM/IDS: CPT | Performed by: INTERNAL MEDICINE

## 2025-08-26 PROCEDURE — 93294 REM INTERROG EVL PM/LDLS PM: CPT | Performed by: INTERNAL MEDICINE

## (undated) DEVICE — ESU GROUND PAD ADULT REM W/15' CORD E7507DB

## (undated) DEVICE — Device

## (undated) DEVICE — SHEATH GLIDE RADIAL 5FR 10CM .021

## (undated) DEVICE — WIRE GUIDE 0.035"X260CM AMPTLAZ XSTIFF CVD THSCF-35-260-3-A

## (undated) DEVICE — ELECTRODE ADULT PACING MULTI P-211-M1

## (undated) DEVICE — SOL NACL 0.9% IRRIG 1000ML BOTTLE 2F7124

## (undated) DEVICE — TRAY PREP DRY SKIN SCRUB 067

## (undated) DEVICE — SYR 10ML LL W/O NDL 302995

## (undated) DEVICE — INTRO TERUMO 6FRX25CM W/MARKER RSB603

## (undated) DEVICE — PREP POVIDONE-IODINE 7.5% SCRUB 4OZ BOTTLE MDS093945

## (undated) DEVICE — INTRO MICRO MINI STICK 4FR STD NITINOL

## (undated) DEVICE — SU TICRON 2 GS-21DA 36" 3146-81

## (undated) DEVICE — PREP POVIDONE IODINE SOLUTION 10% 4OZ BOTTLE 29906-004

## (undated) DEVICE — GOWN LG DISP 9515

## (undated) DEVICE — KIT CONNECTOR FOR OLYMPUS ENDOSCOPES DEFENDO 100310

## (undated) DEVICE — CONNECTOR CARDIO BLAKE DRAIN BCC2

## (undated) DEVICE — GUIDEWIRE LUNDERQUIST 0.035X260MM CVD TSCMG-35-260-7-LES

## (undated) DEVICE — SOL NACL 0.9% INJ 1000ML BAG 2B1324X

## (undated) DEVICE — INTRO SHEATH 6FRX10CM PINNACLE RSS602

## (undated) DEVICE — SPONGE RAY-TEC 4X8" 7318

## (undated) DEVICE — MANIFOLD KIT ANGIO AUTOMATED 014613

## (undated) DEVICE — DRSG KERLIX 4 1/2"X4YDS ROLL 6715

## (undated) DEVICE — SUCTION CANISTER MEDIVAC LINER 3000ML W/LID 65651-530

## (undated) DEVICE — INTRO MICRO MINI STICK 5FR STIFF NITINOL

## (undated) DEVICE — CUFF TOURN 18IN STRL DISP

## (undated) DEVICE — SU ETHILON 3-0 PS-2 18" 1669H

## (undated) DEVICE — SU SILK 2-0 SH 30" K833H

## (undated) DEVICE — LINEN TOWEL PACK X6 WHITE 5487

## (undated) DEVICE — SLEEVE TR BAND RADIAL COMPRESSION DEVICE 24CM TRB24-REG

## (undated) DEVICE — ESU LIGASURE MARYLAND LAPAROSCOPIC SLR/DVDR 5MMX37CM LF1937

## (undated) DEVICE — PREP CHLORAPREP 26ML TINTED ORANGE  260815

## (undated) DEVICE — SYR ANGIOGRAPHY MULTIUSE KIT ACIST 014612

## (undated) DEVICE — DRSG ADAPTIC 3X3" 6112

## (undated) DEVICE — GLOVE PROTEXIS POWDER FREE 8.0 ORTHOPEDIC 2D73ET80

## (undated) DEVICE — CUSTOM PACK CORONARY SAN5BCRHEA

## (undated) DEVICE — TRANSDUCER W/MONITORING TRAY 42632-05

## (undated) DEVICE — BNDG KLING 4" 2236

## (undated) DEVICE — SHTH INTRO 0.021IN ID 6FR DIA

## (undated) DEVICE — CATH ANGIO JUDKINS R4 6FRX100CM INFINITI 534621T

## (undated) DEVICE — SU VICRYL+ 3-0 27IN SH UND VCP416H

## (undated) DEVICE — GLOVE BIOGEL PI INDICATOR 8.0 LF 41680

## (undated) DEVICE — CLOSURE DEVICE 6FR VASC PROGLIDE MEDICATED SUTURE 12673-03

## (undated) DEVICE — CUSTOM PACK LOWER EXTREMITY SOP5BLEHEA

## (undated) DEVICE — KIT HAND CONTROL ACIST 014644 AR-P54

## (undated) DEVICE — DRSG TELFA 3X8" 1238

## (undated) DEVICE — DRSG ABD TNDRSRB WET PRUF 8IN X 10IN STRL  9194A

## (undated) DEVICE — LINEN GOWN XLG 5407

## (undated) DEVICE — INTRO MICRO MINI STICK 4FR STIFF NITINOL 45-753

## (undated) DEVICE — GLOVE BIOGEL PI SZ 8.0 40880

## (undated) DEVICE — DRAIN JACKSON PRATT ROUND SIL 19FR W/TROCAR LF JP-2232

## (undated) DEVICE — ENDO TROCAR FIRST ENTRY KII FIOS Z-THRD 12X100MM CTF73

## (undated) DEVICE — GLOVE BIOGEL PI SZ 6.5 40865

## (undated) DEVICE — INFLATION DEVICE ATRION QL2530

## (undated) DEVICE — PITCHER STERILE 1000ML  SSK9004A

## (undated) DEVICE — SUCTION DRY CHEST DRAIN OASIS 3600-100

## (undated) DEVICE — NDL 25GA 1.5" 305127

## (undated) DEVICE — SU DERMABOND ADVANCED .7ML DNX12

## (undated) DEVICE — PREP CHLORAPREP 26ML TINTED HI-LITE ORANGE 930815

## (undated) DEVICE — SHEATH PRELUDE SNAP 7FRX13CM PLS-1007

## (undated) DEVICE — DRSG PRIMAPORE 02X3" 7133

## (undated) DEVICE — ELECTRODE DEFIB CADENCE 22550R

## (undated) DEVICE — CATH ANGIO SUPERTORQUE AL1 6FRX100CM 532-645

## (undated) DEVICE — SUCTION MANIFOLD NEPTUNE 2 SYS 1 PORT 702-025-000

## (undated) DEVICE — TUBING SUCTION 10'X3/16" N510

## (undated) DEVICE — DRAPE CV INCISE CVARTS 89466

## (undated) DEVICE — BANDAGE ELASTIC VELCRO 4IN REB3014

## (undated) DEVICE — LINEN TOWEL PACK X5 5464

## (undated) DEVICE — DRAPE IOBAN INCISE 23X17" 6650EZ

## (undated) DEVICE — SOL WATER IRRIG 1000ML BOTTLE 2F7114

## (undated) DEVICE — GUIDEWIRE VASC SAFARI2 0.035X275CM H74939406XS1

## (undated) DEVICE — CUSTOM PACK PERIPH VASCULAR SCV5BPVHEA

## (undated) DEVICE — SUCTION MANIFOLD DORNOCH ULTRA CART UL-CL500

## (undated) DEVICE — DECANTER VIAL 2006S

## (undated) DEVICE — CATH ANGIO JUDKINS JL4 6FRX100CM INFINITI 534620T

## (undated) DEVICE — INTRO SHEATH 8FRX10CM PINNACLE RSS802

## (undated) DEVICE — BONE CLEANING TIP INTERPULSE  0210-010-000

## (undated) DEVICE — CATH ANGIO WEDGE PRESSURE 5FRX110CM DL AI-07124

## (undated) DEVICE — DRSG GAUZE 4X4" TRAY 6939

## (undated) DEVICE — IMP VALVE AORTIC SAPEIN 3 TAVR 23MM COMMANDER S3UCM223A

## (undated) DEVICE — ESU PENCIL SMOKE EVAC W/ROCKER SWITCH 0703-047-000

## (undated) DEVICE — DRAPE SHEET REV FOLD 3/4 9349

## (undated) DEVICE — SU PLEDGET SOFT TFE 13MMX7MMX1.5MM D7044

## (undated) DEVICE — TRANSDUCER TRAY ARTERIAL 42646-06

## (undated) DEVICE — CUSTOM PACK PACER ICD SAN5BPCHEA

## (undated) DEVICE — GLOVE BIOGEL PI ORTHOPRO SZ 7.5 47675

## (undated) DEVICE — SUTURE VICRYL+ 4-0 UNDYED PS-2 VCP496H

## (undated) DEVICE — SU VICRYL 4-0 PS-2 18" UND J496H

## (undated) DEVICE — CATH ANGIO INFINITI PIGTAIL 155 6 SH 6FRX110CM 534654S

## (undated) DEVICE — BLADE SAW OSCIL/SAG STRK MICRO 7.0X18.5X0.38MM 2296-003-114

## (undated) DEVICE — SU SILK 0 SH 30" K834H

## (undated) DEVICE — INTRO SHEATH 7FRX10CM PINNACLE RSS702

## (undated) DEVICE — CATH DIAGNOSTIC RADIAL 5FR TIG 4.0

## (undated) DEVICE — GLOVE BIOGEL PI MICRO INDICATOR UNDERGLOVE SZ 8.0 48980

## (undated) DEVICE — ENDO SYSTEM WATER BOTTLE & TUBING W/CO2 FILTER 00711549

## (undated) DEVICE — CATH ANGIO INFINITI VESTAN STAINLESS STEEL 155 D 534554S

## (undated) DEVICE — JELLY LUBRICATING SURGILUBE 2OZ TUBE

## (undated) DEVICE — DRAPE MINI C-ARM INSIGHT2 CF-5423

## (undated) DEVICE — TUBE GASTROSTOMY PONSKY PULL DELUXE 20FR 000792

## (undated) DEVICE — GUIDEWIRE FORTE FLOPPY J TOP 34949-05J

## (undated) DEVICE — SUCTION IRR SYSTEM W/O TIP INTERPULSE HANDPIECE 0210-100-000

## (undated) DEVICE — EXCHANGE WIRE .035 260 STAR/JFC/035/260/ M001491681

## (undated) DEVICE — BLADE CLIPPER SGL USE 9680

## (undated) DEVICE — SU VICRYL 0 UR-6 27" J603H

## (undated) DEVICE — SU ETHILON 4-0 PS-2 18" BLACK 1667H

## (undated) DEVICE — GLOVE GAMMEX NEOPRENE ULTRA SZ 7 LF 8514

## (undated) DEVICE — PLATE GROUNDING ADULT W/CORD 9165L

## (undated) DEVICE — GUIDEWIRE STRT .025 SCN M001491001

## (undated) DEVICE — DRAPE STERI 23X17 NON STERILE 1010NSD

## (undated) DEVICE — CELL SAVER

## (undated) DEVICE — CATH PULM ART 7FR X 110CM, SWA

## (undated) RX ORDER — DIAZEPAM 5 MG
TABLET ORAL
Status: DISPENSED
Start: 2022-03-24

## (undated) RX ORDER — FENTANYL CITRATE 50 UG/ML
INJECTION, SOLUTION INTRAMUSCULAR; INTRAVENOUS
Status: DISPENSED
Start: 2022-03-24

## (undated) RX ORDER — SODIUM CHLORIDE, SODIUM LACTATE, POTASSIUM CHLORIDE, CALCIUM CHLORIDE 600; 310; 30; 20 MG/100ML; MG/100ML; MG/100ML; MG/100ML
INJECTION, SOLUTION INTRAVENOUS
Status: DISPENSED
Start: 2018-11-26

## (undated) RX ORDER — FENTANYL CITRATE 50 UG/ML
INJECTION, SOLUTION INTRAMUSCULAR; INTRAVENOUS
Status: DISPENSED
Start: 2018-11-26

## (undated) RX ORDER — ACETAMINOPHEN 325 MG/1
TABLET ORAL
Status: DISPENSED
Start: 2018-11-26

## (undated) RX ORDER — ALBUMIN, HUMAN INJ 5% 5 %
SOLUTION INTRAVENOUS
Status: DISPENSED
Start: 2018-11-26

## (undated) RX ORDER — CHLORHEXIDINE GLUCONATE ORAL RINSE 1.2 MG/ML
SOLUTION DENTAL
Status: DISPENSED
Start: 2018-11-26

## (undated) RX ORDER — DIAZEPAM 5 MG
TABLET ORAL
Status: DISPENSED
Start: 2024-06-25

## (undated) RX ORDER — PHENYLEPHRINE HCL IN 0.9% NACL 1 MG/10 ML
SYRINGE (ML) INTRAVENOUS
Status: DISPENSED
Start: 2018-11-26

## (undated) RX ORDER — ASPIRIN 325 MG
TABLET ORAL
Status: DISPENSED
Start: 2024-06-25

## (undated) RX ORDER — CEFAZOLIN SODIUM 2 G/100ML
INJECTION, SOLUTION INTRAVENOUS
Status: DISPENSED
Start: 2018-11-26

## (undated) RX ORDER — DIAZEPAM 5 MG
TABLET ORAL
Status: DISPENSED
Start: 2024-06-03

## (undated) RX ORDER — PROTAMINE SULFATE 10 MG/ML
INJECTION, SOLUTION INTRAVENOUS
Status: DISPENSED
Start: 2024-06-25

## (undated) RX ORDER — FENTANYL CITRATE 50 UG/ML
INJECTION, SOLUTION INTRAMUSCULAR; INTRAVENOUS
Status: DISPENSED
Start: 2024-06-25

## (undated) RX ORDER — ONDANSETRON 2 MG/ML
INJECTION INTRAMUSCULAR; INTRAVENOUS
Status: DISPENSED
Start: 2018-11-26

## (undated) RX ORDER — HYDROMORPHONE HYDROCHLORIDE 1 MG/ML
INJECTION, SOLUTION INTRAMUSCULAR; INTRAVENOUS; SUBCUTANEOUS
Status: DISPENSED
Start: 2018-11-26

## (undated) RX ORDER — IPRATROPIUM BROMIDE AND ALBUTEROL SULFATE 2.5; .5 MG/3ML; MG/3ML
SOLUTION RESPIRATORY (INHALATION)
Status: DISPENSED
Start: 2018-11-26

## (undated) RX ORDER — FENTANYL CITRATE 50 UG/ML
INJECTION, SOLUTION INTRAMUSCULAR; INTRAVENOUS
Status: DISPENSED
Start: 2024-06-03

## (undated) RX ORDER — HYDROMORPHONE HCL/0.9% NACL/PF 0.2MG/0.2
SYRINGE (ML) INTRAVENOUS
Status: DISPENSED
Start: 2018-11-26

## (undated) RX ORDER — FENTANYL CITRATE 50 UG/ML
INJECTION, SOLUTION INTRAMUSCULAR; INTRAVENOUS
Status: DISPENSED
Start: 2023-07-07

## (undated) RX ORDER — ASPIRIN 325 MG
TABLET ORAL
Status: DISPENSED
Start: 2024-06-03

## (undated) RX ORDER — DIAZEPAM 5 MG
TABLET ORAL
Status: DISPENSED
Start: 2023-07-07

## (undated) RX ORDER — CEFAZOLIN SODIUM 1 G/3ML
INJECTION, POWDER, FOR SOLUTION INTRAMUSCULAR; INTRAVENOUS
Status: DISPENSED
Start: 2018-11-26